# Patient Record
Sex: MALE | Race: WHITE | Employment: OTHER | URBAN - METROPOLITAN AREA
[De-identification: names, ages, dates, MRNs, and addresses within clinical notes are randomized per-mention and may not be internally consistent; named-entity substitution may affect disease eponyms.]

---

## 2017-01-09 ENCOUNTER — HOSPITAL ENCOUNTER (EMERGENCY)
Facility: HOSPITAL | Age: 72
Discharge: HOME | End: 2017-01-09
Attending: EMERGENCY MEDICINE
Payer: MEDICARE

## 2017-01-09 ENCOUNTER — TELEPHONE (OUTPATIENT)
Dept: FAMILY MEDICINE CLINIC | Facility: CLINIC | Age: 72
End: 2017-01-09

## 2017-01-09 ENCOUNTER — PRIOR ORIGINAL RECORDS (OUTPATIENT)
Dept: OTHER | Age: 72
End: 2017-01-09

## 2017-01-09 ENCOUNTER — APPOINTMENT (OUTPATIENT)
Dept: GENERAL RADIOLOGY | Facility: HOSPITAL | Age: 72
End: 2017-01-09
Attending: EMERGENCY MEDICINE
Payer: MEDICARE

## 2017-01-09 VITALS
TEMPERATURE: 99 F | HEIGHT: 72 IN | HEART RATE: 85 BPM | WEIGHT: 190 LBS | DIASTOLIC BLOOD PRESSURE: 89 MMHG | SYSTOLIC BLOOD PRESSURE: 129 MMHG | BODY MASS INDEX: 25.73 KG/M2 | RESPIRATION RATE: 28 BRPM | OXYGEN SATURATION: 98 %

## 2017-01-09 DIAGNOSIS — D69.6 THROMBOCYTOPENIA (HCC): ICD-10-CM

## 2017-01-09 DIAGNOSIS — I48.91 ATRIAL FIBRILLATION WITH RAPID VENTRICULAR RESPONSE (HCC): Primary | ICD-10-CM

## 2017-01-09 LAB
ALBUMIN SERPL-MCNC: 3.5 G/DL (ref 3.5–4.8)
ALP LIVER SERPL-CCNC: 57 U/L (ref 45–117)
ALT SERPL-CCNC: 34 U/L (ref 17–63)
AST SERPL-CCNC: 36 U/L (ref 15–41)
ATRIAL RATE: 102 BPM
BASOPHILS # BLD AUTO: 0.02 X10(3) UL (ref 0–0.1)
BASOPHILS NFR BLD AUTO: 0.5 %
BILIRUB SERPL-MCNC: 0.8 MG/DL (ref 0.1–2)
BUN BLD-MCNC: 21 MG/DL (ref 8–20)
CALCIUM BLD-MCNC: 8.3 MG/DL (ref 8.3–10.3)
CHLORIDE: 110 MMOL/L (ref 101–111)
CO2: 24 MMOL/L (ref 22–32)
CREAT BLD-MCNC: 1.02 MG/DL (ref 0.7–1.3)
EOSINOPHIL # BLD AUTO: 0.09 X10(3) UL (ref 0–0.3)
EOSINOPHIL NFR BLD AUTO: 2.3 %
ERYTHROCYTE [DISTWIDTH] IN BLOOD BY AUTOMATED COUNT: 13.1 % (ref 11.5–16)
GLUCOSE BLD-MCNC: 91 MG/DL (ref 70–99)
HCT VFR BLD AUTO: 40.8 % (ref 37–53)
HGB BLD-MCNC: 13.6 G/DL (ref 13–17)
IMMATURE GRANULOCYTE COUNT: 0.01 X10(3) UL (ref 0–1)
IMMATURE GRANULOCYTE RATIO %: 0.3 %
LYMPHOCYTES # BLD AUTO: 0.98 X10(3) UL (ref 0.9–4)
LYMPHOCYTES NFR BLD AUTO: 24.6 %
M PROTEIN MFR SERPL ELPH: 7.4 G/DL (ref 6.1–8.3)
MCH RBC QN AUTO: 32 PG (ref 27–33.2)
MCHC RBC AUTO-ENTMCNC: 33.3 G/DL (ref 31–37)
MCV RBC AUTO: 96 FL (ref 80–99)
MONOCYTES # BLD AUTO: 0.49 X10(3) UL (ref 0.1–0.6)
MONOCYTES NFR BLD AUTO: 12.3 %
NEUTROPHIL ABS PRELIM: 2.4 X10 (3) UL (ref 1.3–6.7)
NEUTROPHILS # BLD AUTO: 2.4 X10(3) UL (ref 1.3–6.7)
NEUTROPHILS NFR BLD AUTO: 60 %
PLATELET # BLD AUTO: 61 10(3)UL (ref 150–450)
POTASSIUM SERPL-SCNC: 5.1 MMOL/L (ref 3.6–5.1)
PRO-BETA NATRIURETIC PEPTIDE: 1400 PG/ML (ref ?–125)
Q-T INTERVAL: 356 MS
QRS DURATION: 102 MS
QTC CALCULATION (BEZET): 479 MS
R AXIS: 0 DEGREES
RBC # BLD AUTO: 4.25 X10(6)UL (ref 3.8–5.8)
RED CELL DISTRIBUTION WIDTH-SD: 45.6 FL (ref 35.1–46.3)
SODIUM SERPL-SCNC: 139 MMOL/L (ref 136–144)
T AXIS: -5 DEGREES
TROPONIN: <0.046 NG/ML (ref ?–0.05)
TSI SER-ACNC: 1.63 MIU/ML (ref 0.35–5.5)
VENTRICULAR RATE: 109 BPM
WBC # BLD AUTO: 4 X10(3) UL (ref 4–13)

## 2017-01-09 PROCEDURE — 84132 ASSAY OF SERUM POTASSIUM: CPT | Performed by: EMERGENCY MEDICINE

## 2017-01-09 PROCEDURE — 93005 ELECTROCARDIOGRAM TRACING: CPT

## 2017-01-09 PROCEDURE — 85025 COMPLETE CBC W/AUTO DIFF WBC: CPT | Performed by: EMERGENCY MEDICINE

## 2017-01-09 PROCEDURE — 84450 TRANSFERASE (AST) (SGOT): CPT | Performed by: EMERGENCY MEDICINE

## 2017-01-09 PROCEDURE — 99285 EMERGENCY DEPT VISIT HI MDM: CPT

## 2017-01-09 PROCEDURE — 96374 THER/PROPH/DIAG INJ IV PUSH: CPT

## 2017-01-09 PROCEDURE — 84443 ASSAY THYROID STIM HORMONE: CPT | Performed by: EMERGENCY MEDICINE

## 2017-01-09 PROCEDURE — 83880 ASSAY OF NATRIURETIC PEPTIDE: CPT | Performed by: EMERGENCY MEDICINE

## 2017-01-09 PROCEDURE — 84484 ASSAY OF TROPONIN QUANT: CPT | Performed by: EMERGENCY MEDICINE

## 2017-01-09 PROCEDURE — 71010 XR CHEST AP PORTABLE  (CPT=71010): CPT

## 2017-01-09 PROCEDURE — 80053 COMPREHEN METABOLIC PANEL: CPT | Performed by: EMERGENCY MEDICINE

## 2017-01-09 PROCEDURE — 93010 ELECTROCARDIOGRAM REPORT: CPT

## 2017-01-09 RX ORDER — METOPROLOL SUCCINATE 25 MG/1
25 TABLET, EXTENDED RELEASE ORAL DAILY
Qty: 30 TABLET | Refills: 0 | Status: SHIPPED | OUTPATIENT
Start: 2017-01-09 | End: 2017-05-01

## 2017-01-09 RX ORDER — DILTIAZEM HYDROCHLORIDE 5 MG/ML
10 INJECTION INTRAVENOUS ONCE
Status: COMPLETED | OUTPATIENT
Start: 2017-01-09 | End: 2017-01-09

## 2017-01-09 NOTE — ED INITIAL ASSESSMENT (HPI)
Patient to ED via EMS. Reports he was to have an endoscopy today, he was noted to be in a-fib, no hx of the same. Patient denies any complaints. No chest pain, dizziness, SOB .

## 2017-01-09 NOTE — TELEPHONE ENCOUNTER
Dr Tilton Rubinstein called to inform Dr Quan Reyna that Pt is being sent from [facility] Endoscopy to ER for onset Atrial Fibulation. Call him directly w/any questions

## 2017-01-09 NOTE — ED NOTES
D/c instructions and scripts given to pt, no distress, denies wheelchair out of  ED, thanks staff for care.

## 2017-01-09 NOTE — ED PROVIDER NOTES
Patient Seen in: [facility] Emergency Department    History   Patient presents with:  Arrythmia/Palpitations (cardiovascular)    Stated Complaint: new onset a-fib    HPI    70-year-old male complaining of atrial fibrillation.   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 Constitutional and vital signs reviewed.       All other systems Abnormality         Status                     ---------                               -----------         ------                     CBC W/ DIFFERENTIAL[[id number]]          Abnormal            Final result                 Please view results for these norris times daily. , Normal, Disp-60 capsule, R-0

## 2017-01-11 ENCOUNTER — PRIOR ORIGINAL RECORDS (OUTPATIENT)
Dept: OTHER | Age: 72
End: 2017-01-11

## 2017-01-12 LAB
ALBUMIN: 3.5 G/DL
ALKALINE PHOSPHATATE(ALK PHOS): 57 IU/L
BILIRUBIN TOTAL: 0.8 MG/DL
BUN: 21 MG/DL
CALCIUM: 8.3 MG/DL
CHLORIDE: 110 MEQ/L
CREATININE, SERUM: 1.02 MG/DL
GLUCOSE: 91 MG/DL
HEMATOCRIT: 40.8 %
HEMOGLOBIN: 13.6 G/DL
PLATELETS: 61 K/UL
PROTEIN, TOTAL: 7.4 G/DL
RED BLOOD COUNT: 4.25 X 10-6/U
SGOT (AST): 36 IU/L
SGPT (ALT): 34 IU/L
SODIUM: 139 MEQ/L
THYROID STIMULATING HORMONE: 1.63 MLU/L
WHITE BLOOD COUNT: 4 X 10-3/U

## 2017-01-13 ENCOUNTER — HOSPITAL ENCOUNTER (OUTPATIENT)
Dept: CV DIAGNOSTICS | Facility: HOSPITAL | Age: 72
Discharge: HOME | End: 2017-01-13
Attending: INTERNAL MEDICINE
Payer: MEDICARE

## 2017-01-13 DIAGNOSIS — I48.91 A-FIB (HCC): ICD-10-CM

## 2017-01-13 PROCEDURE — 93017 CV STRESS TEST TRACING ONLY: CPT

## 2017-01-13 PROCEDURE — 78452 HT MUSCLE IMAGE SPECT MULT: CPT | Performed by: INTERNAL MEDICINE

## 2017-01-13 PROCEDURE — 78452 HT MUSCLE IMAGE SPECT MULT: CPT

## 2017-01-13 PROCEDURE — 93018 CV STRESS TEST I&R ONLY: CPT | Performed by: INTERNAL MEDICINE

## 2017-01-16 ENCOUNTER — HOSPITAL ENCOUNTER (OUTPATIENT)
Dept: CV DIAGNOSTICS | Facility: HOSPITAL | Age: 72
Discharge: HOME | End: 2017-01-16
Attending: INTERNAL MEDICINE
Payer: MEDICARE

## 2017-01-16 DIAGNOSIS — I48.91 A-FIB (HCC): ICD-10-CM

## 2017-01-16 PROCEDURE — 93225 XTRNL ECG REC<48 HRS REC: CPT

## 2017-01-16 PROCEDURE — 93227 XTRNL ECG REC<48 HR R&I: CPT | Performed by: INTERNAL MEDICINE

## 2017-01-16 PROCEDURE — 93226 XTRNL ECG REC<48 HR SCAN A/R: CPT

## 2017-01-18 ENCOUNTER — PRIOR ORIGINAL RECORDS (OUTPATIENT)
Dept: OTHER | Age: 72
End: 2017-01-18

## 2017-01-22 DIAGNOSIS — I10 BENIGN ESSENTIAL HYPERTENSION: Primary | ICD-10-CM

## 2017-01-23 RX ORDER — BENAZEPRIL HYDROCHLORIDE 40 MG/1
TABLET, FILM COATED ORAL
Qty: 90 TABLET | Refills: 0 | Status: SHIPPED | OUTPATIENT
Start: 2017-01-23 | End: 2017-03-21

## 2017-01-23 NOTE — TELEPHONE ENCOUNTER
Medication refilled per protocol.     LOV 7/28/2016    Future Appointments  Date Time Provider Chrissie Aden   1/27/2017 9:30 AM [facility] CARD ECHO RM 2 ECARD ECHO Daisy Go   3/30/2017 11:30 AM Serjio Liao MD [address]   7/10/2017 10:00

## 2017-01-26 DIAGNOSIS — D69.6 THROMBOCYTOPENIA (HCC): Primary | ICD-10-CM

## 2017-01-27 ENCOUNTER — MYAURORA ACCOUNT LINK (OUTPATIENT)
Dept: OTHER | Age: 72
End: 2017-01-27

## 2017-01-27 ENCOUNTER — HOSPITAL ENCOUNTER (OUTPATIENT)
Dept: CV DIAGNOSTICS | Facility: HOSPITAL | Age: 72
Discharge: HOME | End: 2017-01-27
Attending: INTERNAL MEDICINE

## 2017-01-27 DIAGNOSIS — I48.91 ATRIAL FIBRILLATION, UNSPECIFIED TYPE (HCC): ICD-10-CM

## 2017-01-27 DIAGNOSIS — I10 BENIGN HYPERTENSION: ICD-10-CM

## 2017-02-01 ENCOUNTER — LAB ENCOUNTER (OUTPATIENT)
Dept: LAB | Age: 72
End: 2017-02-01
Attending: INTERNAL MEDICINE
Payer: MEDICARE

## 2017-02-01 DIAGNOSIS — D69.6 THROMBOCYTOPENIA (HCC): ICD-10-CM

## 2017-02-01 LAB
BASOPHILS # BLD AUTO: 0.02 X10(3) UL (ref 0–0.1)
BASOPHILS NFR BLD AUTO: 0.4 %
EOSINOPHIL # BLD AUTO: 0.07 X10(3) UL (ref 0–0.3)
EOSINOPHIL NFR BLD AUTO: 1.6 %
ERYTHROCYTE [DISTWIDTH] IN BLOOD BY AUTOMATED COUNT: 13.1 % (ref 11.5–16)
HCT VFR BLD AUTO: 41.4 % (ref 37–53)
HGB BLD-MCNC: 13.6 G/DL (ref 13–17)
IMMATURE GRANULOCYTE COUNT: 0.02 X10(3) UL (ref 0–1)
IMMATURE GRANULOCYTE RATIO %: 0.4 %
LYMPHOCYTES # BLD AUTO: 1.19 X10(3) UL (ref 0.9–4)
LYMPHOCYTES NFR BLD AUTO: 26.7 %
MCH RBC QN AUTO: 32.5 PG (ref 27–33.2)
MCHC RBC AUTO-ENTMCNC: 32.9 G/DL (ref 31–37)
MCV RBC AUTO: 99 FL (ref 80–99)
MONOCYTES # BLD AUTO: 0.74 X10(3) UL (ref 0.1–0.6)
MONOCYTES NFR BLD AUTO: 16.6 %
NEUTROPHIL ABS PRELIM: 2.41 X10 (3) UL (ref 1.3–6.7)
NEUTROPHILS # BLD AUTO: 2.41 X10(3) UL (ref 1.3–6.7)
NEUTROPHILS NFR BLD AUTO: 54.3 %
PLATELET # BLD AUTO: 70 10(3)UL (ref 150–450)
RBC # BLD AUTO: 4.18 X10(6)UL (ref 3.8–5.8)
RED CELL DISTRIBUTION WIDTH-SD: 47.1 FL (ref 35.1–46.3)
WBC # BLD AUTO: 4.5 X10(3) UL (ref 4–13)

## 2017-02-01 PROCEDURE — 85025 COMPLETE CBC W/AUTO DIFF WBC: CPT

## 2017-02-06 ENCOUNTER — PRIOR ORIGINAL RECORDS (OUTPATIENT)
Dept: OTHER | Age: 72
End: 2017-02-06

## 2017-02-07 ENCOUNTER — PRIOR ORIGINAL RECORDS (OUTPATIENT)
Dept: OTHER | Age: 72
End: 2017-02-07

## 2017-02-10 RX ORDER — PREDNISONE 1 MG/1
TABLET ORAL
Qty: 100 TABLET | Refills: 0 | Status: SHIPPED | OUTPATIENT
Start: 2017-02-10 | End: 2017-05-01

## 2017-02-10 NOTE — TELEPHONE ENCOUNTER
Future Appointments  Date Time Provider Chrissie Aden   6/14/0850 50:98 AM Nano Nazario MD [facility] Nya Steinberg

## 2017-02-14 ENCOUNTER — PRIOR ORIGINAL RECORDS (OUTPATIENT)
Dept: OTHER | Age: 72
End: 2017-02-14

## 2017-02-20 ENCOUNTER — HOSPITAL ENCOUNTER (OUTPATIENT)
Dept: GENERAL RADIOLOGY | Age: 72
Discharge: HOME | End: 2017-02-20
Attending: INTERNAL MEDICINE
Payer: MEDICARE

## 2017-02-20 DIAGNOSIS — I10 HTN (HYPERTENSION): ICD-10-CM

## 2017-02-20 DIAGNOSIS — R94.39 ABNORMAL NUCLEAR STRESS TEST: ICD-10-CM

## 2017-02-20 PROCEDURE — 71020 XR CHEST PA + LAT CHEST (CPT=71020): CPT

## 2017-02-21 ENCOUNTER — LAB ENCOUNTER (OUTPATIENT)
Dept: LAB | Age: 72
End: 2017-02-21
Attending: INTERNAL MEDICINE
Payer: MEDICARE

## 2017-02-21 ENCOUNTER — PRIOR ORIGINAL RECORDS (OUTPATIENT)
Dept: OTHER | Age: 72
End: 2017-02-21

## 2017-02-21 DIAGNOSIS — I10 ESSENTIAL HYPERTENSION, MALIGNANT: Primary | ICD-10-CM

## 2017-02-21 LAB
BASOPHILS # BLD AUTO: 0.02 X10(3) UL (ref 0–0.1)
BASOPHILS NFR BLD AUTO: 0.5 %
BUN BLD-MCNC: 22 MG/DL (ref 8–20)
CALCIUM BLD-MCNC: 8.8 MG/DL (ref 8.3–10.3)
CHLORIDE: 106 MMOL/L (ref 101–111)
CHOLEST SMN-MCNC: 126 MG/DL (ref ?–200)
CO2: 29 MMOL/L (ref 22–32)
CREAT BLD-MCNC: 1.12 MG/DL (ref 0.7–1.3)
EOSINOPHIL # BLD AUTO: 0.09 X10(3) UL (ref 0–0.3)
EOSINOPHIL NFR BLD AUTO: 2.3 %
ERYTHROCYTE [DISTWIDTH] IN BLOOD BY AUTOMATED COUNT: 13.8 % (ref 11.5–16)
GLUCOSE BLD-MCNC: 87 MG/DL (ref 70–99)
HCT VFR BLD AUTO: 44.9 % (ref 37–53)
HDLC SERPL-MCNC: 28 MG/DL (ref 45–?)
HDLC SERPL: 4.5 {RATIO} (ref ?–4.97)
HGB BLD-MCNC: 13.9 G/DL (ref 13–17)
IMMATURE GRANULOCYTE COUNT: 0.02 X10(3) UL (ref 0–1)
IMMATURE GRANULOCYTE RATIO %: 0.5 %
LDLC SERPL CALC-MCNC: 82 MG/DL (ref ?–130)
LYMPHOCYTES # BLD AUTO: 0.96 X10(3) UL (ref 0.9–4)
LYMPHOCYTES NFR BLD AUTO: 24.2 %
MCH RBC QN AUTO: 31.2 PG (ref 27–33.2)
MCHC RBC AUTO-ENTMCNC: 31 G/DL (ref 31–37)
MCV RBC AUTO: 100.7 FL (ref 80–99)
MONOCYTES # BLD AUTO: 0.51 X10(3) UL (ref 0.1–0.6)
MONOCYTES NFR BLD AUTO: 12.8 %
NEUTROPHIL ABS PRELIM: 2.37 X10 (3) UL (ref 1.3–6.7)
NEUTROPHILS # BLD AUTO: 2.37 X10(3) UL (ref 1.3–6.7)
NEUTROPHILS NFR BLD AUTO: 59.7 %
NONHDLC SERPL-MCNC: 98 MG/DL (ref ?–130)
PLATELET # BLD AUTO: 52 10(3)UL (ref 150–450)
POTASSIUM SERPL-SCNC: 4.7 MMOL/L (ref 3.6–5.1)
RBC # BLD AUTO: 4.46 X10(6)UL (ref 3.8–5.8)
RED CELL DISTRIBUTION WIDTH-SD: 50 FL (ref 35.1–46.3)
SODIUM SERPL-SCNC: 140 MMOL/L (ref 136–144)
TRIGLYCERIDES: 80 MG/DL (ref ?–150)
VLDL: 16 MG/DL (ref 5–40)
WBC # BLD AUTO: 4 X10(3) UL (ref 4–13)

## 2017-02-21 PROCEDURE — 85025 COMPLETE CBC W/AUTO DIFF WBC: CPT

## 2017-02-21 PROCEDURE — 80048 BASIC METABOLIC PNL TOTAL CA: CPT

## 2017-02-21 PROCEDURE — 80061 LIPID PANEL: CPT

## 2017-02-21 PROCEDURE — 36415 COLL VENOUS BLD VENIPUNCTURE: CPT

## 2017-02-22 ENCOUNTER — PRIOR ORIGINAL RECORDS (OUTPATIENT)
Dept: OTHER | Age: 72
End: 2017-02-22

## 2017-02-22 ENCOUNTER — OFFICE VISIT (OUTPATIENT)
Dept: FAMILY MEDICINE CLINIC | Facility: CLINIC | Age: 72
End: 2017-02-22

## 2017-02-22 VITALS
WEIGHT: 194 LBS | HEIGHT: 70.5 IN | RESPIRATION RATE: 12 BRPM | DIASTOLIC BLOOD PRESSURE: 70 MMHG | HEART RATE: 84 BPM | SYSTOLIC BLOOD PRESSURE: 120 MMHG | BODY MASS INDEX: 27.46 KG/M2 | TEMPERATURE: 98 F

## 2017-02-22 DIAGNOSIS — I48.20 CHRONIC ATRIAL FIBRILLATION (HCC): ICD-10-CM

## 2017-02-22 DIAGNOSIS — D69.6 THROMBOCYTOPENIA (HCC): ICD-10-CM

## 2017-02-22 DIAGNOSIS — I77.810 AORTIC ROOT DILATION (HCC): ICD-10-CM

## 2017-02-22 DIAGNOSIS — E78.6 LOW HDL (UNDER 40): ICD-10-CM

## 2017-02-22 DIAGNOSIS — I10 BENIGN ESSENTIAL HYPERTENSION: Primary | ICD-10-CM

## 2017-02-22 DIAGNOSIS — K75.4: ICD-10-CM

## 2017-02-22 PROBLEM — I48.91 ATRIAL FIBRILLATION (HCC): Status: ACTIVE | Noted: 2017-02-22

## 2017-02-22 PROCEDURE — 99214 OFFICE O/P EST MOD 30 MIN: CPT | Performed by: FAMILY MEDICINE

## 2017-02-22 NOTE — PROGRESS NOTES
HPI:   Patient presents with: Follow - Up: Pt states he is just here for a 6month f/u   Cough: PT states has dry cough for a long time and it comes and goes. Micheal Hernandes is a 66year old male who presents for recheck of his hypertension.  He ha daily. Potassium Chloride ER 20 MEQ Oral Tab CR Take 1 tablet (20 mEq total) by mouth 3 (three) times daily. Fexofenadine HCl (ALLEGRA) 180 MG Oral Tab Take 180 mg by mouth as needed.    Methylcellulose, Laxative, (CITRUCEL) Oral Powder Take  by mouth d pain on exertion  GI: denies abdominal pain and denies heartburn  NEURO: denies headaches    EXAM:   /70 mmHg  Pulse 84  Temp(Src) 98.2 °F (36.8 °C)  Resp 12  Ht 70.5\"  Wt 194 lb  BMI 27.43 kg/m2 Body mass index is 27.43 kg/(m^2).    GENERAL: well de in remission         Current Assessment & Plan     Stable, Continue present management.               Other    Low HDL (under 40)    Thrombocytopenia (HCC)    Overview     Following with Dr Radha Olson at hematology                Return in about 6 months (arou

## 2017-02-22 NOTE — HISTORICAL OFFICE NOTE
Carol Lobe  : 1945  ACCOUNT:  [id number]  [phone]  PCP: Dr. 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 An ECG today demonstrates atrial fibrillation. Ventricular rates are controlled in the mid-80s. Most recent laboratory studies demonstrate normal renal function. Platelets are down as low as 60.     RISK FACT setting of thrombocytopenia. 2. Check 2-D echo with Doppler to evaluate LV structure and function in the setting of newly diagnosed atrial fibrillation. 3. It has been several years since his last stress test.  He is functional class 1.   He will be sched

## 2017-02-23 ENCOUNTER — TELEPHONE (OUTPATIENT)
Dept: FAMILY MEDICINE CLINIC | Facility: CLINIC | Age: 72
End: 2017-02-23

## 2017-02-23 NOTE — TELEPHONE ENCOUNTER
LMOM for pt to call back and schedule his Medicare Annual Well Visit w/Dr Mirta Faust due after 7/28/17

## 2017-02-23 NOTE — ASSESSMENT & PLAN NOTE
Stable, Continue present management.     Blood Pressure and Cardiac Medications          BENAZEPRIL HCL 40 MG Oral Tab    Metoprolol Succinate ER 25 MG Oral Tablet 24 Hr    AmLODIPine Besylate 5 MG Oral Tab

## 2017-02-24 LAB
BUN: 22 MG/DL
CALCIUM: 8.8 MG/DL
CHLORIDE: 106 MEQ/L
CHOLESTEROL, TOTAL: 126 MG/DL
CREATININE, SERUM: 1.12 MG/DL
GLUCOSE: 87 MG/DL
HDL CHOLESTEROL: 28 MG/DL
HEMATOCRIT: 44.9 %
HEMOGLOBIN: 13.9 G/DL
LDL CHOLESTEROL: 82 MG/DL
PLATELETS: 52 K/UL
POTASSIUM, SERUM: 4.7 MEQ/L
RED BLOOD COUNT: 4.46 X 10-6/U
SODIUM: 140 MEQ/L
TRIGLYCERIDES: 80 MG/DL
WHITE BLOOD COUNT: 4 X 10-3/U

## 2017-02-27 ENCOUNTER — HOSPITAL ENCOUNTER (OUTPATIENT)
Dept: INTERVENTIONAL RADIOLOGY/VASCULAR | Facility: HOSPITAL | Age: 72
Discharge: HOME | End: 2017-02-27
Attending: INTERNAL MEDICINE | Admitting: INTERNAL MEDICINE
Payer: MEDICARE

## 2017-02-27 VITALS
WEIGHT: 188 LBS | TEMPERATURE: 97 F | SYSTOLIC BLOOD PRESSURE: 131 MMHG | BODY MASS INDEX: 25.47 KG/M2 | OXYGEN SATURATION: 99 % | HEART RATE: 67 BPM | RESPIRATION RATE: 27 BRPM | DIASTOLIC BLOOD PRESSURE: 89 MMHG | HEIGHT: 72 IN

## 2017-02-27 DIAGNOSIS — R93.1 ABNORMAL NUCLEAR CARDIAC IMAGING TEST: ICD-10-CM

## 2017-02-27 PROCEDURE — 99152 MOD SED SAME PHYS/QHP 5/>YRS: CPT

## 2017-02-27 PROCEDURE — 4A023N7 MEASUREMENT OF CARDIAC SAMPLING AND PRESSURE, LEFT HEART, PERCUTANEOUS APPROACH: ICD-10-PCS | Performed by: INTERNAL MEDICINE

## 2017-02-27 PROCEDURE — 99153 MOD SED SAME PHYS/QHP EA: CPT

## 2017-02-27 PROCEDURE — 93458 L HRT ARTERY/VENTRICLE ANGIO: CPT

## 2017-02-27 PROCEDURE — B2111ZZ FLUOROSCOPY OF MULTIPLE CORONARY ARTERIES USING LOW OSMOLAR CONTRAST: ICD-10-PCS | Performed by: INTERNAL MEDICINE

## 2017-02-27 PROCEDURE — B2151ZZ FLUOROSCOPY OF LEFT HEART USING LOW OSMOLAR CONTRAST: ICD-10-PCS | Performed by: INTERNAL MEDICINE

## 2017-02-27 RX ORDER — SODIUM CHLORIDE 9 MG/ML
INJECTION, SOLUTION INTRAVENOUS CONTINUOUS
Status: DISCONTINUED | OUTPATIENT
Start: 2017-02-27 | End: 2017-02-27

## 2017-02-27 RX ORDER — HEPARIN SODIUM 5000 [USP'U]/ML
INJECTION, SOLUTION INTRAVENOUS; SUBCUTANEOUS
Status: COMPLETED
Start: 2017-02-27 | End: 2017-02-27

## 2017-02-27 RX ORDER — MIDAZOLAM HYDROCHLORIDE 1 MG/ML
INJECTION INTRAMUSCULAR; INTRAVENOUS
Status: COMPLETED
Start: 2017-02-27 | End: 2017-02-27

## 2017-02-27 RX ORDER — LIDOCAINE HYDROCHLORIDE 10 MG/ML
INJECTION, SOLUTION INFILTRATION; PERINEURAL
Status: COMPLETED
Start: 2017-02-27 | End: 2017-02-27

## 2017-02-27 RX ORDER — ASPIRIN 81 MG/1
324 TABLET, CHEWABLE ORAL ONCE
Status: DISCONTINUED | OUTPATIENT
Start: 2017-02-27 | End: 2017-02-27

## 2017-02-27 RX ADMIN — SODIUM CHLORIDE: 9 INJECTION, SOLUTION INTRAVENOUS at 09:15:00

## 2017-02-27 NOTE — PROCEDURES
[facility]    PATIENT'S NAME: Isela Mitchell   ATTENDING PHYSICIAN: Lenka Fernando M.D. OPERATING PHYSICIAN: Renu Jade M.D.    PATIENT ACCOUNT#:   [de-identified]    LOCATION:  [facility] EDWP 10  MEDICAL RECORD #:   [id number]       DATE OF BI significant stenosis identified. First diagonal branch is a moderate-sized vessel. There is approximately 30% to 40% stenosis in its proximal section. The circumflex coronary artery is a relatively large vessel.   It gives rise to 1 large obtuse marginal

## 2017-02-27 NOTE — PROGRESS NOTES
Patient is s/p C with Dr. Kg Cardenas; a/o x 4; hemodynamically stable/neurologically intact; denies pain; iv site removed intact; right groin site soft/stable with no bleeding/hematoma noted after ambulating in halls; patient verbalized understanding of

## 2017-02-27 NOTE — H&P
History & Physical Examination    Patient Name: Magno Zapata  MRN: [id number]  CSN: [id number]  YOB: 1945    Diagnosis: Afib, LV dysfunction w/ EF ~40%     Present Illness: Mr. Cass Wagner is a pleasant 75yom who follows outpatient with Dr. Elias Stockton at 0800   Omega-3 Fatty Acids (OMEGA 3 OR) None Entered Disp:  Rfl:  2/26/2017 at 0800   MULTI VITAMIN MENS OR None Entered Disp:  Rfl:  2/26/2017 at 0800   Fexofenadine HCl (ALLEGRA) 180 MG Oral Tab Take 180 mg by mouth as needed.  Disp:  Rfl:  Unknown at Kidney Disease Son         Smoking status: Former Smoker     Quit date: 07/28/1973    Smokeless tobacco: Never Used    Alcohol Use: Yes  4.8 - 6.0 oz/week    8-10 Standard drinks or equivalent per week            SYSTEM Check if Review is Normal Check if P

## 2017-03-07 ENCOUNTER — PRIOR ORIGINAL RECORDS (OUTPATIENT)
Dept: OTHER | Age: 72
End: 2017-03-07

## 2017-03-21 ENCOUNTER — LAB ENCOUNTER (OUTPATIENT)
Dept: LAB | Age: 72
End: 2017-03-21
Attending: INTERNAL MEDICINE
Payer: MEDICARE

## 2017-03-21 DIAGNOSIS — E87.6 HYPOKALEMIA: ICD-10-CM

## 2017-03-21 DIAGNOSIS — D69.6 THROMBOCYTOPENIA (HCC): ICD-10-CM

## 2017-03-21 LAB
BASOPHILS # BLD AUTO: 0.01 X10(3) UL (ref 0–0.1)
BASOPHILS NFR BLD AUTO: 0.2 %
EOSINOPHIL # BLD AUTO: 0.09 X10(3) UL (ref 0–0.3)
EOSINOPHIL NFR BLD AUTO: 2 %
ERYTHROCYTE [DISTWIDTH] IN BLOOD BY AUTOMATED COUNT: 13.7 % (ref 11.5–16)
HCT VFR BLD AUTO: 43.8 % (ref 37–53)
HGB BLD-MCNC: 13.8 G/DL (ref 13–17)
IMMATURE GRANULOCYTE COUNT: 0.02 X10(3) UL (ref 0–1)
IMMATURE GRANULOCYTE RATIO %: 0.5 %
LYMPHOCYTES # BLD AUTO: 0.9 X10(3) UL (ref 0.9–4)
LYMPHOCYTES NFR BLD AUTO: 20.4 %
MCH RBC QN AUTO: 31.2 PG (ref 27–33.2)
MCHC RBC AUTO-ENTMCNC: 31.5 G/DL (ref 31–37)
MCV RBC AUTO: 98.9 FL (ref 80–99)
MONOCYTES # BLD AUTO: 0.64 X10(3) UL (ref 0.1–0.6)
MONOCYTES NFR BLD AUTO: 14.5 %
NEUTROPHIL ABS PRELIM: 2.75 X10 (3) UL (ref 1.3–6.7)
NEUTROPHILS # BLD AUTO: 2.75 X10(3) UL (ref 1.3–6.7)
NEUTROPHILS NFR BLD AUTO: 62.4 %
PLATELET # BLD AUTO: 33 10(3)UL (ref 150–450)
RBC # BLD AUTO: 4.43 X10(6)UL (ref 3.8–5.8)
RED CELL DISTRIBUTION WIDTH-SD: 49.3 FL (ref 35.1–46.3)
WBC # BLD AUTO: 4.4 X10(3) UL (ref 4–13)

## 2017-03-21 PROCEDURE — 85025 COMPLETE CBC W/AUTO DIFF WBC: CPT

## 2017-03-21 PROCEDURE — 36415 COLL VENOUS BLD VENIPUNCTURE: CPT

## 2017-03-22 ENCOUNTER — PRIOR ORIGINAL RECORDS (OUTPATIENT)
Dept: OTHER | Age: 72
End: 2017-03-22

## 2017-03-22 RX ORDER — BENAZEPRIL HYDROCHLORIDE 40 MG/1
TABLET, FILM COATED ORAL
Qty: 90 TABLET | Refills: 0 | Status: SHIPPED | OUTPATIENT
Start: 2017-03-22 | End: 2017-11-27 | Stop reason: ALTCHOICE

## 2017-03-29 ENCOUNTER — TELEPHONE (OUTPATIENT)
Dept: HEMATOLOGY/ONCOLOGY | Facility: HOSPITAL | Age: 72
End: 2017-03-29

## 2017-03-29 ENCOUNTER — LAB ENCOUNTER (OUTPATIENT)
Dept: LAB | Age: 72
End: 2017-03-29
Attending: INTERNAL MEDICINE
Payer: MEDICARE

## 2017-03-29 ENCOUNTER — PRIOR ORIGINAL RECORDS (OUTPATIENT)
Dept: OTHER | Age: 72
End: 2017-03-29

## 2017-03-29 DIAGNOSIS — I25.10 CORONARY ATHEROSCLEROSIS OF NATIVE CORONARY ARTERY: Primary | ICD-10-CM

## 2017-03-29 DIAGNOSIS — D69.6 THROMBOCYTOPENIA (HCC): Primary | ICD-10-CM

## 2017-03-29 DIAGNOSIS — D69.6 THROMBOCYTOPENIA (HCC): ICD-10-CM

## 2017-03-29 LAB
BASOPHILS # BLD AUTO: 0.02 X10(3) UL (ref 0–0.1)
BASOPHILS NFR BLD AUTO: 0.4 %
BUN BLD-MCNC: 21 MG/DL (ref 8–20)
CREAT BLD-MCNC: 1.22 MG/DL (ref 0.7–1.3)
EOSINOPHIL # BLD AUTO: 0.08 X10(3) UL (ref 0–0.3)
EOSINOPHIL NFR BLD AUTO: 1.8 %
ERYTHROCYTE [DISTWIDTH] IN BLOOD BY AUTOMATED COUNT: 13.6 % (ref 11.5–16)
HCT VFR BLD AUTO: 44.7 % (ref 37–53)
HGB BLD-MCNC: 14.3 G/DL (ref 13–17)
IMMATURE GRANULOCYTE COUNT: 0.02 X10(3) UL (ref 0–1)
IMMATURE GRANULOCYTE RATIO %: 0.4 %
LYMPHOCYTES # BLD AUTO: 1.18 X10(3) UL (ref 0.9–4)
LYMPHOCYTES NFR BLD AUTO: 26 %
MCH RBC QN AUTO: 31.6 PG (ref 27–33.2)
MCHC RBC AUTO-ENTMCNC: 32 G/DL (ref 31–37)
MCV RBC AUTO: 98.9 FL (ref 80–99)
MONOCYTES # BLD AUTO: 0.62 X10(3) UL (ref 0.1–0.6)
MONOCYTES NFR BLD AUTO: 13.7 %
NEUTROPHIL ABS PRELIM: 2.61 X10 (3) UL (ref 1.3–6.7)
NEUTROPHILS # BLD AUTO: 2.61 X10(3) UL (ref 1.3–6.7)
NEUTROPHILS NFR BLD AUTO: 57.7 %
PLATELET # BLD AUTO: 5 10(3)UL (ref 150–450)
RBC # BLD AUTO: 4.52 X10(6)UL (ref 3.8–5.8)
RED CELL DISTRIBUTION WIDTH-SD: 48.6 FL (ref 35.1–46.3)
WBC # BLD AUTO: 4.5 X10(3) UL (ref 4–13)

## 2017-03-29 PROCEDURE — 80053 COMPREHEN METABOLIC PANEL: CPT | Performed by: INTERNAL MEDICINE

## 2017-03-29 PROCEDURE — 83615 LACTATE (LD) (LDH) ENZYME: CPT | Performed by: INTERNAL MEDICINE

## 2017-03-29 PROCEDURE — 36415 COLL VENOUS BLD VENIPUNCTURE: CPT

## 2017-03-29 PROCEDURE — 85025 COMPLETE CBC W/AUTO DIFF WBC: CPT

## 2017-03-29 PROCEDURE — 82565 ASSAY OF CREATININE: CPT

## 2017-03-29 PROCEDURE — 84520 ASSAY OF UREA NITROGEN: CPT

## 2017-03-30 ENCOUNTER — OFFICE VISIT (OUTPATIENT)
Dept: HEMATOLOGY/ONCOLOGY | Facility: HOSPITAL | Age: 72
End: 2017-03-30
Attending: INTERNAL MEDICINE
Payer: MEDICARE

## 2017-03-30 ENCOUNTER — OFFICE VISIT (OUTPATIENT)
Dept: RHEUMATOLOGY | Facility: CLINIC | Age: 72
End: 2017-03-30

## 2017-03-30 VITALS
WEIGHT: 191.81 LBS | BODY MASS INDEX: 26 KG/M2 | RESPIRATION RATE: 16 BRPM | HEART RATE: 76 BPM | SYSTOLIC BLOOD PRESSURE: 136 MMHG | DIASTOLIC BLOOD PRESSURE: 86 MMHG

## 2017-03-30 VITALS
SYSTOLIC BLOOD PRESSURE: 132 MMHG | HEART RATE: 68 BPM | OXYGEN SATURATION: 97 % | DIASTOLIC BLOOD PRESSURE: 81 MMHG | WEIGHT: 191.5 LBS | HEIGHT: 72.01 IN | RESPIRATION RATE: 18 BRPM | BODY MASS INDEX: 25.94 KG/M2 | TEMPERATURE: 97 F

## 2017-03-30 DIAGNOSIS — R16.1 SPLENOMEGALY: ICD-10-CM

## 2017-03-30 DIAGNOSIS — D69.6 THROMBOCYTOPENIA (HCC): Primary | ICD-10-CM

## 2017-03-30 DIAGNOSIS — K75.4: ICD-10-CM

## 2017-03-30 DIAGNOSIS — M32.19 OTHER SYSTEMIC LUPUS ERYTHEMATOSUS WITH OTHER ORGAN INVOLVEMENT (HCC): Primary | ICD-10-CM

## 2017-03-30 DIAGNOSIS — M33.20 POLYMYOSITIS (HCC): Chronic | ICD-10-CM

## 2017-03-30 DIAGNOSIS — M32.19 OTHER SYSTEMIC LUPUS ERYTHEMATOSUS WITH OTHER ORGAN INVOLVEMENT (HCC): ICD-10-CM

## 2017-03-30 DIAGNOSIS — D69.6 THROMBOCYTOPENIA (HCC): ICD-10-CM

## 2017-03-30 PROBLEM — M32.9 LUPUS (SYSTEMIC LUPUS ERYTHEMATOSUS) (HCC): Status: ACTIVE | Noted: 2017-03-30

## 2017-03-30 PROCEDURE — 99214 OFFICE O/P EST MOD 30 MIN: CPT | Performed by: INTERNAL MEDICINE

## 2017-03-30 PROCEDURE — 99215 OFFICE O/P EST HI 40 MIN: CPT | Performed by: INTERNAL MEDICINE

## 2017-03-30 RX ORDER — PREDNISONE 20 MG/1
20 TABLET ORAL DAILY
Qty: 180 TABLET | Refills: 1 | Status: SHIPPED | OUTPATIENT
Start: 2017-03-30 | End: 2017-06-28

## 2017-03-30 RX ORDER — METOPROLOL SUCCINATE 50 MG/1
TABLET, EXTENDED RELEASE ORAL
Refills: 11 | COMMUNITY
Start: 2017-03-07 | End: 2017-05-01

## 2017-03-30 NOTE — TELEPHONE ENCOUNTER
Called patient with CBC results- platelets 5. No bleeding but has noticed bruising ans feels his lupus may be rearing its ugly head. He will take 20 mg of prednisone tonight and also hold his pradaxa for now.  Signs and symptoms to watch out for were discus

## 2017-03-30 NOTE — PROGRESS NOTES
[facility] Progress Note    Patient Name: Faustino Harper   YOB: 1945   Medical Record Number: [id number]   CSN: [id number]   Attending Physician: Napoleon Espinal M.D.    Referring Physician: MD Dr. Soha Flores Dr. tablet, Rfl: 0  •  OMEPRAZOLE 20 MG Oral Capsule Delayed Release, TAKE 1 CAPSULE BY MOUTH TWICE DAILY, Disp: 180 capsule, Rfl: 3  •  AmLODIPine Besylate 5 MG Oral Tab, Take 1 tablet (5 mg total) by mouth once daily. , Disp: 90 tablet, Rfl: 3  •  spironolact surgery polypectomy    TONSILLECTOMY  1948    COLONOSCOPY WITH BIOPSY  5/14/13    COLONOSCOPY  5/14/2013    SKIN SURGERY      Comment MMS BCC R temple 6/24/09    SKIN SURGERY  2007    Comment basal ceell carcinoma    SKIN SURGERY  7-18-12    Comment BCC-no mainly in extremities. Psych:  Mood and affect appropriate  HEENT: EOMs intact. PERRL. Oropharynx is clear. Neck: No JVD. No palpable lymphadenopathy. Neck is supple. Lymphatics: There is no palpable lymphadenopathy   Chest: Clear to auscultation.   Hea Monday. I spent 60 minutes face to face with the patient. More than 50% of that time was spent counseling the patient and/or on coordination of care.        Emotional Well Being:  I have assessed the patient's emotional well-being and any concerns abou

## 2017-03-30 NOTE — PROGRESS NOTES
Education Record    Learner:  Patient    Disease / Diagnosis:  Thrombocytopenia    Barriers / Limitations:  None   Comments:    Method:  Brief focused and Reinforcement   Comments:    General Topics:  Plan of care reviewed   Comments:    Outcome:  Shows und

## 2017-03-30 NOTE — PROGRESS NOTES
[facility]  Dr. Anton Arcos Progress Note     Subjective: Joshua Em is a(n) 74year old male. Current complaints: Patient presents with: Follow - Up: Polymyositis, thrombocytopenia.  Pt states 'lupus is killing platelets, this morning platelet fashion. While on such a high dose of prednisone I would expect the joint pain to go away. Follow-up with Dr. Annita Mendieta the hematologist as she is scheduled for reevaluation of your thrombocytopenia.   Continue see Dr. Sadaf Napoles of [facility],  your h

## 2017-03-30 NOTE — PATIENT INSTRUCTIONS
Current plan is to take the prednisone at 60 mg a day for treatment of the immune thrombocytopenia. Hopefully this will increase the platelet count in rapid fashion. While on such a high dose of prednisone I would expect the joint pain to go away.   Soraida Watkins

## 2017-03-31 ENCOUNTER — HOSPITAL ENCOUNTER (OUTPATIENT)
Dept: CT IMAGING | Facility: HOSPITAL | Age: 72
Discharge: HOME | End: 2017-03-31
Attending: INTERNAL MEDICINE
Payer: MEDICARE

## 2017-03-31 DIAGNOSIS — I71.9 AORTIC ANEURYSM (HCC): ICD-10-CM

## 2017-03-31 PROCEDURE — 71275 CT ANGIOGRAPHY CHEST: CPT

## 2017-04-03 ENCOUNTER — NURSE ONLY (OUTPATIENT)
Dept: HEMATOLOGY/ONCOLOGY | Facility: HOSPITAL | Age: 72
End: 2017-04-03
Attending: INTERNAL MEDICINE
Payer: MEDICARE

## 2017-04-03 VITALS
HEIGHT: 72.01 IN | DIASTOLIC BLOOD PRESSURE: 72 MMHG | BODY MASS INDEX: 26.36 KG/M2 | HEART RATE: 54 BPM | TEMPERATURE: 98 F | WEIGHT: 194.63 LBS | OXYGEN SATURATION: 98 % | RESPIRATION RATE: 18 BRPM | SYSTOLIC BLOOD PRESSURE: 131 MMHG

## 2017-04-03 DIAGNOSIS — D69.6 THROMBOCYTOPENIA (HCC): ICD-10-CM

## 2017-04-03 DIAGNOSIS — D69.3 IMMUNE THROMBOCYTOPENIA (HCC): ICD-10-CM

## 2017-04-03 LAB
BUN: 21 MG/DL
CREATININE, SERUM: 1.22 MG/DL

## 2017-04-03 PROCEDURE — 99215 OFFICE O/P EST HI 40 MIN: CPT | Performed by: INTERNAL MEDICINE

## 2017-04-03 PROCEDURE — 85025 COMPLETE CBC W/AUTO DIFF WBC: CPT

## 2017-04-03 PROCEDURE — 36415 COLL VENOUS BLD VENIPUNCTURE: CPT

## 2017-04-03 NOTE — PROGRESS NOTES
[facility] Progress Note    Patient Name: Bong Benedict   YOB: 1945   Medical Record Number: [id number]   CSN: [id number]   Attending Physician: Christin Akers M.D.    Referring Physician: MD Dr. Geovani Mane Dr. TWICE DAILY, Disp: 180 capsule, Rfl: 3  •  AmLODIPine Besylate 5 MG Oral Tab, Take 1 tablet (5 mg total) by mouth once daily. , Disp: 90 tablet, Rfl: 3  •  spironolactone 25 MG Oral Tab, Take 1 tablet (25 mg total) by mouth daily. 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  Extremities: Pedal pulses are present. No edema. Neurological: Grossly intact. Laboratory:   Ref.  Range 4/3/2017 08:45   WBC Latest Ref Range: 4.0-13.0 x10(3) uL 9.5   Hemoglobin Latest Ref Range: 13.0-17.0 g/dL 14.1   Hematocrit Latest Ref Ran 31.0 03/30/2017   BUN 25* 03/30/2017   CREATSERUM 1.14 03/30/2017   GLU 71 03/30/2017   CA 9.1 03/30/2017   ALKPHO 62 03/30/2017   ALT 39 03/30/2017   AST 19 03/30/2017   BILT 0.6 03/30/2017   ALB 3.9 03/30/2017   TP 7.4 03/30/2017       Impression and Carlie

## 2017-04-04 ENCOUNTER — HOSPITAL ENCOUNTER (OUTPATIENT)
Dept: MRI IMAGING | Facility: HOSPITAL | Age: 72
Discharge: HOME | End: 2017-04-04
Attending: INTERNAL MEDICINE
Payer: MEDICARE

## 2017-04-04 DIAGNOSIS — I50.1 LEFT VENTRICULAR FAILURE (HCC): ICD-10-CM

## 2017-04-04 DIAGNOSIS — M33.20 POLYMYOSITIS (HCC): ICD-10-CM

## 2017-04-04 PROCEDURE — A9575 INJ GADOTERATE MEGLUMI 0.1ML: HCPCS

## 2017-04-04 PROCEDURE — 75561 CARDIAC MRI FOR MORPH W/DYE: CPT

## 2017-04-05 ENCOUNTER — PRIOR ORIGINAL RECORDS (OUTPATIENT)
Dept: OTHER | Age: 72
End: 2017-04-05

## 2017-04-06 ENCOUNTER — OFFICE VISIT (OUTPATIENT)
Dept: HEMATOLOGY/ONCOLOGY | Facility: HOSPITAL | Age: 72
End: 2017-04-06
Attending: INTERNAL MEDICINE
Payer: MEDICARE

## 2017-04-06 VITALS
SYSTOLIC BLOOD PRESSURE: 133 MMHG | WEIGHT: 196.38 LBS | TEMPERATURE: 97 F | OXYGEN SATURATION: 99 % | HEIGHT: 72.01 IN | DIASTOLIC BLOOD PRESSURE: 84 MMHG | BODY MASS INDEX: 26.6 KG/M2 | RESPIRATION RATE: 18 BRPM | HEART RATE: 73 BPM

## 2017-04-06 DIAGNOSIS — I10 BENIGN ESSENTIAL HYPERTENSION: ICD-10-CM

## 2017-04-06 DIAGNOSIS — M32.19 OTHER SYSTEMIC LUPUS ERYTHEMATOSUS WITH OTHER ORGAN INVOLVEMENT (HCC): ICD-10-CM

## 2017-04-06 DIAGNOSIS — D69.6 THROMBOCYTOPENIA (HCC): Primary | ICD-10-CM

## 2017-04-06 PROCEDURE — 96365 THER/PROPH/DIAG IV INF INIT: CPT

## 2017-04-06 PROCEDURE — 96375 TX/PRO/DX INJ NEW DRUG ADDON: CPT

## 2017-04-06 PROCEDURE — 96366 THER/PROPH/DIAG IV INF ADDON: CPT

## 2017-04-06 NOTE — PROGRESS NOTES
Education Record    Learner:  Patient and Spouse    Disease / Diagnosis: IVIG    Barriers / Limitations:  None   Comments:    Method:  Discussion   Comments:    General Topics:  Medication, Side effects and symptom management and Plan of care reviewed   Co

## 2017-04-07 ENCOUNTER — OFFICE VISIT (OUTPATIENT)
Dept: HEMATOLOGY/ONCOLOGY | Facility: HOSPITAL | Age: 72
End: 2017-04-07
Attending: INTERNAL MEDICINE
Payer: MEDICARE

## 2017-04-07 VITALS
HEART RATE: 73 BPM | RESPIRATION RATE: 18 BRPM | TEMPERATURE: 97 F | SYSTOLIC BLOOD PRESSURE: 116 MMHG | DIASTOLIC BLOOD PRESSURE: 78 MMHG

## 2017-04-07 DIAGNOSIS — M32.19 OTHER SYSTEMIC LUPUS ERYTHEMATOSUS WITH OTHER ORGAN INVOLVEMENT (HCC): ICD-10-CM

## 2017-04-07 DIAGNOSIS — D69.6 THROMBOCYTOPENIA (HCC): Primary | ICD-10-CM

## 2017-04-07 PROCEDURE — 96366 THER/PROPH/DIAG IV INF ADDON: CPT

## 2017-04-07 PROCEDURE — 96365 THER/PROPH/DIAG IV INF INIT: CPT

## 2017-04-07 PROCEDURE — 96375 TX/PRO/DX INJ NEW DRUG ADDON: CPT

## 2017-04-12 ENCOUNTER — TELEPHONE (OUTPATIENT)
Dept: HEMATOLOGY/ONCOLOGY | Facility: HOSPITAL | Age: 72
End: 2017-04-12

## 2017-04-13 ENCOUNTER — LAB ENCOUNTER (OUTPATIENT)
Dept: LAB | Age: 72
End: 2017-04-13
Attending: INTERNAL MEDICINE
Payer: MEDICARE

## 2017-04-13 DIAGNOSIS — D69.6 THROMBOCYTOPENIA (HCC): ICD-10-CM

## 2017-04-13 PROCEDURE — 36415 COLL VENOUS BLD VENIPUNCTURE: CPT

## 2017-04-13 PROCEDURE — 85025 COMPLETE CBC W/AUTO DIFF WBC: CPT

## 2017-04-19 ENCOUNTER — PRIOR ORIGINAL RECORDS (OUTPATIENT)
Dept: OTHER | Age: 72
End: 2017-04-19

## 2017-04-24 ENCOUNTER — TELEPHONE (OUTPATIENT)
Dept: INTERVENTIONAL RADIOLOGY/VASCULAR | Facility: HOSPITAL | Age: 72
End: 2017-04-24

## 2017-04-26 ENCOUNTER — LAB ENCOUNTER (OUTPATIENT)
Dept: LAB | Age: 72
End: 2017-04-26
Attending: INTERNAL MEDICINE
Payer: MEDICARE

## 2017-04-26 DIAGNOSIS — D69.3 IMMUNE THROMBOCYTOPENIA (HCC): ICD-10-CM

## 2017-04-26 PROCEDURE — 85025 COMPLETE CBC W/AUTO DIFF WBC: CPT

## 2017-04-27 ENCOUNTER — TELEPHONE (OUTPATIENT)
Dept: INTERVENTIONAL RADIOLOGY/VASCULAR | Facility: HOSPITAL | Age: 72
End: 2017-04-27

## 2017-05-01 ENCOUNTER — OFFICE VISIT (OUTPATIENT)
Dept: HEMATOLOGY/ONCOLOGY | Facility: HOSPITAL | Age: 72
End: 2017-05-01
Attending: INTERNAL MEDICINE
Payer: MEDICARE

## 2017-05-01 VITALS
RESPIRATION RATE: 18 BRPM | OXYGEN SATURATION: 98 % | BODY MASS INDEX: 26.34 KG/M2 | TEMPERATURE: 98 F | SYSTOLIC BLOOD PRESSURE: 126 MMHG | DIASTOLIC BLOOD PRESSURE: 77 MMHG | HEART RATE: 85 BPM | WEIGHT: 194.5 LBS | HEIGHT: 72.01 IN

## 2017-05-01 DIAGNOSIS — D69.6 THROMBOCYTOPENIA (HCC): ICD-10-CM

## 2017-05-01 PROCEDURE — 99214 OFFICE O/P EST MOD 30 MIN: CPT | Performed by: INTERNAL MEDICINE

## 2017-05-01 RX ORDER — METOPROLOL SUCCINATE 100 MG/1
100 TABLET, EXTENDED RELEASE ORAL DAILY
Status: ON HOLD | COMMUNITY
End: 2019-08-20

## 2017-05-01 NOTE — PROGRESS NOTES
[facility] Progress Note    Patient Name: Sania Lopez   YOB: 1945   Medical Record Number: [id number]   CSN: [id number]   Attending Physician: Lashawn Nunes M.D.    Referring Physician: MD Dr. Colton Villareal Dr. AmLODIPine Besylate 5 MG Oral Tab, Take 1 tablet (5 mg total) by mouth once daily. , Disp: 90 tablet, Rfl: 3  •  spironolactone 25 MG Oral Tab, Take 1 tablet (25 mg total) by mouth daily. 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 Heart: Regular rate and rhythm. Abdomen: Soft, non tender with good bowel sounds. Spleen tip palpable. No palpable mass. Extremities: Pedal pulses are present. No edema. Neurological: Grossly intact. recovery of his platelet counts. Though platelets above 50 I would like to continue to follow trends before I let him resume this. Also he will need to hold off on any elective procedures for now. I spent 30 minutes face to face with the patient.   More

## 2017-05-07 ENCOUNTER — PATIENT MESSAGE (OUTPATIENT)
Dept: RHEUMATOLOGY | Facility: CLINIC | Age: 72
End: 2017-05-07

## 2017-05-08 ENCOUNTER — LAB ENCOUNTER (OUTPATIENT)
Dept: LAB | Age: 72
End: 2017-05-08
Attending: INTERNAL MEDICINE
Payer: MEDICARE

## 2017-05-08 DIAGNOSIS — D69.6 THROMBOCYTOPENIA (HCC): ICD-10-CM

## 2017-05-08 PROCEDURE — 85025 COMPLETE CBC W/AUTO DIFF WBC: CPT

## 2017-05-08 PROCEDURE — 36415 COLL VENOUS BLD VENIPUNCTURE: CPT

## 2017-05-08 NOTE — TELEPHONE ENCOUNTER
From: [facility] File  To: Kathy Reid MD  Sent: 5/7/2017 1:54 PM CDT  Subject: Non-Urgent Medical Question    Dr Monik Patel, Do you have any recommendations for a rheumatologist to treat dermatomyositis for one of our [employer] support dane

## 2017-05-15 ENCOUNTER — LAB ENCOUNTER (OUTPATIENT)
Dept: LAB | Age: 72
End: 2017-05-15
Attending: INTERNAL MEDICINE
Payer: MEDICARE

## 2017-05-15 DIAGNOSIS — D69.6 THROMBOCYTOPENIA (HCC): ICD-10-CM

## 2017-05-15 PROCEDURE — 85025 COMPLETE CBC W/AUTO DIFF WBC: CPT

## 2017-05-15 PROCEDURE — 36415 COLL VENOUS BLD VENIPUNCTURE: CPT

## 2017-05-25 DIAGNOSIS — I10 BENIGN ESSENTIAL HYPERTENSION: Primary | ICD-10-CM

## 2017-05-26 ENCOUNTER — LAB ENCOUNTER (OUTPATIENT)
Dept: LAB | Age: 72
End: 2017-05-26
Attending: INTERNAL MEDICINE
Payer: MEDICARE

## 2017-05-26 DIAGNOSIS — D69.6 THROMBOCYTOPENIA (HCC): ICD-10-CM

## 2017-05-26 PROCEDURE — 36415 COLL VENOUS BLD VENIPUNCTURE: CPT

## 2017-05-26 PROCEDURE — 85025 COMPLETE CBC W/AUTO DIFF WBC: CPT

## 2017-05-26 PROCEDURE — 80053 COMPREHEN METABOLIC PANEL: CPT

## 2017-05-27 RX ORDER — SPIRONOLACTONE 25 MG/1
TABLET ORAL
Qty: 90 TABLET | Refills: 1 | Status: SHIPPED | OUTPATIENT
Start: 2017-05-27 | End: 2017-11-22

## 2017-05-31 ENCOUNTER — OFFICE VISIT (OUTPATIENT)
Dept: HEMATOLOGY/ONCOLOGY | Facility: HOSPITAL | Age: 72
End: 2017-05-31
Attending: INTERNAL MEDICINE
Payer: MEDICARE

## 2017-05-31 VITALS
HEART RATE: 90 BPM | HEIGHT: 72.01 IN | DIASTOLIC BLOOD PRESSURE: 83 MMHG | SYSTOLIC BLOOD PRESSURE: 133 MMHG | RESPIRATION RATE: 18 BRPM | BODY MASS INDEX: 26.71 KG/M2 | TEMPERATURE: 98 F | WEIGHT: 197.19 LBS

## 2017-05-31 DIAGNOSIS — M32.19 OTHER SYSTEMIC LUPUS ERYTHEMATOSUS WITH OTHER ORGAN INVOLVEMENT (HCC): ICD-10-CM

## 2017-05-31 DIAGNOSIS — D69.6 THROMBOCYTOPENIA (HCC): Primary | ICD-10-CM

## 2017-05-31 PROCEDURE — 99214 OFFICE O/P EST MOD 30 MIN: CPT | Performed by: INTERNAL MEDICINE

## 2017-05-31 RX ORDER — FLUTICASONE PROPIONATE 50 MCG
SPRAY, SUSPENSION (ML) NASAL DAILY
COMMUNITY
End: 2017-07-03

## 2017-05-31 NOTE — PROGRESS NOTES
[facility] Progress Note    Patient Name: Wiliam Hernandez   YOB: 1945   Medical Record Number: [id number]   CSN: [id number]   Attending Physician: Giuseppe Marin M.D.    Referring Physician: MD Dr. Sommer Bishop Dr. CAPSULE BY MOUTH TWICE DAILY, Disp: 180 capsule, Rfl: 3  •  AmLODIPine Besylate 5 MG Oral Tab, Take 1 tablet (5 mg total) by mouth once daily. , Disp: 90 tablet, Rfl: 3  •  Potassium Chloride ER 20 MEQ Oral Tab CR, Take 1 tablet (20 mEq total) by mouth 3 (t History:  Family History   Problem Relation Age of Onset   • Ramon's Esophagus[other] [OTHER] Son    • Heart Disease Father      CHF   • Gastro-Intestinal Disorder Father      Diverticulosis   • Heart Attack Maternal Grandfather    • Heart Disease Mother Pedal pulses are present. No edema. Neurological: Grossly intact.          Recent Results (from the past 24 hour(s))  -CBC W/ DIFFERENTIAL   Collection Time: 05/31/17  9:29 AM   Result Value Ref Range   WBC 9.1 4.0-13.0 x10(3) uL   RBC 4.65 3.80-5.80 x10(6

## 2017-06-01 ENCOUNTER — PRIOR ORIGINAL RECORDS (OUTPATIENT)
Dept: OTHER | Age: 72
End: 2017-06-01

## 2017-06-08 ENCOUNTER — TELEPHONE (OUTPATIENT)
Dept: INTERVENTIONAL RADIOLOGY/VASCULAR | Facility: HOSPITAL | Age: 72
End: 2017-06-08

## 2017-06-08 NOTE — TELEPHONE ENCOUNTER
Patient cleared to proceed with WATCHMAN procedure by Dr. Olga Salas and Dr. Renan Harris. Reviewed process and procedure with patient. Scheduled for 7/14/17 procedure. Dr. Teresa FINE notified to schedule pre- and post-procedure TEEs. All questions answered.

## 2017-06-14 ENCOUNTER — LAB ENCOUNTER (OUTPATIENT)
Dept: LAB | Age: 72
End: 2017-06-14
Attending: INTERNAL MEDICINE
Payer: MEDICARE

## 2017-06-14 DIAGNOSIS — M32.19 OTHER SYSTEMIC LUPUS ERYTHEMATOSUS WITH OTHER ORGAN INVOLVEMENT (HCC): ICD-10-CM

## 2017-06-14 DIAGNOSIS — D69.6 THROMBOCYTOPENIA (HCC): ICD-10-CM

## 2017-06-14 PROCEDURE — 36415 COLL VENOUS BLD VENIPUNCTURE: CPT

## 2017-06-14 PROCEDURE — 85025 COMPLETE CBC W/AUTO DIFF WBC: CPT

## 2017-06-15 ENCOUNTER — TELEPHONE (OUTPATIENT)
Dept: HEMATOLOGY/ONCOLOGY | Facility: HOSPITAL | Age: 72
End: 2017-06-15

## 2017-06-28 ENCOUNTER — OFFICE VISIT (OUTPATIENT)
Dept: HEMATOLOGY/ONCOLOGY | Facility: HOSPITAL | Age: 72
End: 2017-06-28
Attending: INTERNAL MEDICINE
Payer: MEDICARE

## 2017-06-28 VITALS
HEIGHT: 72.01 IN | RESPIRATION RATE: 18 BRPM | DIASTOLIC BLOOD PRESSURE: 56 MMHG | HEART RATE: 68 BPM | SYSTOLIC BLOOD PRESSURE: 100 MMHG | TEMPERATURE: 98 F | OXYGEN SATURATION: 94 % | WEIGHT: 191 LBS | BODY MASS INDEX: 25.87 KG/M2

## 2017-06-28 DIAGNOSIS — D69.6 THROMBOCYTOPENIA (HCC): ICD-10-CM

## 2017-06-28 DIAGNOSIS — D69.3 IMMUNE THROMBOCYTOPENIA (HCC): Primary | ICD-10-CM

## 2017-06-28 PROCEDURE — 99214 OFFICE O/P EST MOD 30 MIN: CPT | Performed by: INTERNAL MEDICINE

## 2017-06-28 NOTE — PROGRESS NOTES
[facility] Progress Note    Patient Name: Kylee Woodson   YOB: 1945   Medical Record Number: [id number]   CSN: [id number]   Attending Physician: Meg Caceres M.D.    Referring Physician: MD Dr. Emely Reyes Dr. CAPSULE BY MOUTH TWICE DAILY, Disp: 180 capsule, Rfl: 3  •  AmLODIPine Besylate 5 MG Oral Tab, Take 1 tablet (5 mg total) by mouth once daily. , Disp: 90 tablet, Rfl: 3  •  Potassium Chloride ER 20 MEQ Oral Tab CR, Take 1 tablet (20 mEq total) by mouth 3 (t History:  Family History   Problem Relation Age of Onset   • Heart Disease Father      CHF   • Gastro-Intestinal Disorder Father      Diverticulosis   • Heart Disease Mother      CHF   • Cancer Paternal Grandfather    • Heart Attack Paternal Grandmother mass.  Extremities: Pedal pulses are present. No edema. Neurological: Grossly intact.          Recent Results (from the past 24 hour(s))  -CBC W/ DIFFERENTIAL   Collection Time: 06/28/17  9:32 AM   Result Value Ref Range   WBC 6.7 4.0 - 13.0 x10(3) uL   RB

## 2017-07-03 RX ORDER — FEXOFENADINE HCL 180 MG/1
180 TABLET ORAL
COMMUNITY
End: 2018-06-04

## 2017-07-03 NOTE — HISTORICAL OFFICE NOTE
Miguel Steel  : 1945  ACCOUNT:  [id number]  [phone]  PCP: Dr. Luciano Charlton     TODAY'S DATE: 2017  DICTATED BY:  Bell Arciniega M.D.]    CHIEF COMPLAINT: Louretta Kick fibrillation.]    HPI:  [On 2017, Thomas Jimi, a 74year old male, FACTORS:  CAD - Hypertension    REVIEW OF SYSTEMS:  CONS: doing well. EYES: denies significant visual changes. ENMT: denies difficulties with hearing, otherwise negative. CV: see HPI; denies claudication. RESP: denies chronic cough, hemoptysis.  GI: denies long-term anticoagulation. PLAN:    [1. Continue the current medications. 2. The patient is presently off Pradaxa as outlined above.   I would have to discuss this with Dr. Cesar Pelletier to see if he would be stable to go back on short-term anticoagulat

## 2017-07-03 NOTE — HISTORICAL OFFICE NOTE
Romero Vanita  : 1945  ACCOUNT:  [id number]  [phone]  PCP: Dr. Kalee Rodas     TODAY'S DATE: 2017  DICTATED BY:  Verita Severs, M.D.]    CHIEF COMPLAINT: Sherita La fibrillation.]    HPI:  [On 2017, Araseli Grady, a 74year old male, FACTORS:  CAD - Hypertension    REVIEW OF SYSTEMS:  CONS: doing well. EYES: denies significant visual changes. ENMT: denies difficulties with hearing, otherwise negative. CV: see HPI; denies claudication. RESP: denies chronic cough, hemoptysis.  GI: denies long-term anticoagulation. PLAN:    [1. Continue the current medications. 2. The patient is presently off Pradaxa as outlined above.   I would have to discuss this with Dr. Geneva Osborne to see if he would be stable to go back on short-term anticoagulat

## 2017-07-06 ENCOUNTER — HOSPITAL ENCOUNTER (OUTPATIENT)
Dept: CV DIAGNOSTICS | Facility: HOSPITAL | Age: 72
Discharge: HOME | End: 2017-07-06
Attending: INTERNAL MEDICINE
Payer: MEDICARE

## 2017-07-06 ENCOUNTER — HOSPITAL ENCOUNTER (OUTPATIENT)
Dept: CT IMAGING | Facility: HOSPITAL | Age: 72
Discharge: HOME | End: 2017-07-06
Attending: INTERNAL MEDICINE | Admitting: INTERNAL MEDICINE
Payer: MEDICARE

## 2017-07-06 ENCOUNTER — HOSPITAL ENCOUNTER (OUTPATIENT)
Dept: INTERVENTIONAL RADIOLOGY/VASCULAR | Facility: HOSPITAL | Age: 72
Discharge: HOME | End: 2017-07-06
Attending: INTERNAL MEDICINE | Admitting: INTERNAL MEDICINE
Payer: MEDICARE

## 2017-07-06 ENCOUNTER — APPOINTMENT (OUTPATIENT)
Dept: GENERAL RADIOLOGY | Facility: HOSPITAL | Age: 72
End: 2017-07-06
Attending: INTERNAL MEDICINE
Payer: MEDICARE

## 2017-07-06 VITALS
RESPIRATION RATE: 23 BRPM | OXYGEN SATURATION: 96 % | DIASTOLIC BLOOD PRESSURE: 70 MMHG | SYSTOLIC BLOOD PRESSURE: 112 MMHG | HEART RATE: 69 BPM | TEMPERATURE: 98 F

## 2017-07-06 VITALS
DIASTOLIC BLOOD PRESSURE: 79 MMHG | SYSTOLIC BLOOD PRESSURE: 133 MMHG | OXYGEN SATURATION: 92 % | HEART RATE: 75 BPM | TEMPERATURE: 97 F | RESPIRATION RATE: 23 BRPM

## 2017-07-06 DIAGNOSIS — I48.91 A-FIB (HCC): ICD-10-CM

## 2017-07-06 DIAGNOSIS — I48.20 CHRONIC ATRIAL FIBRILLATION (HCC): Primary | ICD-10-CM

## 2017-07-06 DIAGNOSIS — I48.20 CHRONIC ATRIAL FIBRILLATION (HCC): ICD-10-CM

## 2017-07-06 LAB
ANTIBODY SCREEN: POSITIVE
BASOPHILS # BLD AUTO: 0.03 X10(3) UL (ref 0–0.1)
BASOPHILS NFR BLD AUTO: 0.7 %
BUN BLD-MCNC: 20 MG/DL (ref 8–20)
CALCIUM BLD-MCNC: 8.9 MG/DL (ref 8.3–10.3)
CHLORIDE: 104 MMOL/L (ref 101–111)
CO2: 28 MMOL/L (ref 22–32)
CREAT BLD-MCNC: 1.2 MG/DL (ref 0.7–1.3)
EOSINOPHIL # BLD AUTO: 0.07 X10(3) UL (ref 0–0.3)
EOSINOPHIL NFR BLD AUTO: 1.6 %
ERYTHROCYTE [DISTWIDTH] IN BLOOD BY AUTOMATED COUNT: 14.3 % (ref 11.5–16)
GLUCOSE BLD-MCNC: 90 MG/DL (ref 70–99)
HCT VFR BLD AUTO: 40.3 % (ref 37–53)
HGB BLD-MCNC: 12.9 G/DL (ref 13–17)
IMMATURE GRANULOCYTE COUNT: 0.06 X10(3) UL (ref 0–1)
IMMATURE GRANULOCYTE RATIO %: 1.3 %
LYMPHOCYTES # BLD AUTO: 1.22 X10(3) UL (ref 0.9–4)
LYMPHOCYTES NFR BLD AUTO: 27.2 %
MCH RBC QN AUTO: 31.2 PG (ref 27–33.2)
MCHC RBC AUTO-ENTMCNC: 32 G/DL (ref 31–37)
MCV RBC AUTO: 97.3 FL (ref 80–99)
MONOCYTES # BLD AUTO: 0.78 X10(3) UL (ref 0.1–0.6)
MONOCYTES NFR BLD AUTO: 17.4 %
NEUTROPHIL ABS PRELIM: 2.33 X10 (3) UL (ref 1.3–6.7)
NEUTROPHILS # BLD AUTO: 2.33 X10(3) UL (ref 1.3–6.7)
NEUTROPHILS NFR BLD AUTO: 51.8 %
PLATELET # BLD AUTO: 132 10(3)UL (ref 150–450)
POTASSIUM SERPL-SCNC: 4.9 MMOL/L (ref 3.6–5.1)
RBC # BLD AUTO: 4.14 X10(6)UL (ref 3.8–5.8)
RED CELL DISTRIBUTION WIDTH-SD: 50.8 FL (ref 35.1–46.3)
RH BLOOD TYPE: POSITIVE
SODIUM SERPL-SCNC: 138 MMOL/L (ref 136–144)
WBC # BLD AUTO: 4.5 X10(3) UL (ref 4–13)

## 2017-07-06 PROCEDURE — 71010 XR CHEST AP PORTABLE  (CPT=71010): CPT | Performed by: INTERNAL MEDICINE

## 2017-07-06 PROCEDURE — 75574 CT ANGIO HRT W/3D IMAGE: CPT | Performed by: INTERNAL MEDICINE

## 2017-07-06 PROCEDURE — 93010 ELECTROCARDIOGRAM REPORT: CPT | Performed by: INTERNAL MEDICINE

## 2017-07-06 PROCEDURE — 85025 COMPLETE CBC W/AUTO DIFF WBC: CPT | Performed by: INTERNAL MEDICINE

## 2017-07-06 PROCEDURE — 86901 BLOOD TYPING SEROLOGIC RH(D): CPT | Performed by: INTERNAL MEDICINE

## 2017-07-06 PROCEDURE — 86922 COMPATIBILITY TEST ANTIGLOB: CPT

## 2017-07-06 PROCEDURE — 93312 ECHO TRANSESOPHAGEAL: CPT

## 2017-07-06 PROCEDURE — 99153 MOD SED SAME PHYS/QHP EA: CPT

## 2017-07-06 PROCEDURE — 99152 MOD SED SAME PHYS/QHP 5/>YRS: CPT

## 2017-07-06 PROCEDURE — 86975 RBC SERUM PRETX INCUBJ DRUGS: CPT | Performed by: INTERNAL MEDICINE

## 2017-07-06 PROCEDURE — 86900 BLOOD TYPING SEROLOGIC ABO: CPT | Performed by: INTERNAL MEDICINE

## 2017-07-06 PROCEDURE — 86870 RBC ANTIBODY IDENTIFICATION: CPT | Performed by: INTERNAL MEDICINE

## 2017-07-06 PROCEDURE — 86850 RBC ANTIBODY SCREEN: CPT | Performed by: INTERNAL MEDICINE

## 2017-07-06 PROCEDURE — 93005 ELECTROCARDIOGRAM TRACING: CPT

## 2017-07-06 PROCEDURE — 80048 BASIC METABOLIC PNL TOTAL CA: CPT | Performed by: INTERNAL MEDICINE

## 2017-07-06 RX ORDER — MIDAZOLAM HYDROCHLORIDE 1 MG/ML
INJECTION INTRAMUSCULAR; INTRAVENOUS
Status: COMPLETED
Start: 2017-07-06 | End: 2017-07-06

## 2017-07-06 RX ORDER — SODIUM CHLORIDE 9 MG/ML
INJECTION, SOLUTION INTRAVENOUS CONTINUOUS
Status: DISCONTINUED | OUTPATIENT
Start: 2017-07-06 | End: 2017-07-07

## 2017-07-06 RX ORDER — DILTIAZEM HYDROCHLORIDE 5 MG/ML
5 INJECTION INTRAVENOUS ONCE
Status: COMPLETED | OUTPATIENT
Start: 2017-07-06 | End: 2017-07-06

## 2017-07-06 RX ORDER — MIDAZOLAM HYDROCHLORIDE 1 MG/ML
4 INJECTION INTRAMUSCULAR; INTRAVENOUS SEE ADMIN INSTRUCTIONS
Status: DISCONTINUED | OUTPATIENT
Start: 2017-07-06 | End: 2017-07-07

## 2017-07-06 RX ADMIN — MIDAZOLAM HYDROCHLORIDE 4 MG: 1 INJECTION INTRAMUSCULAR; INTRAVENOUS at 10:43:00

## 2017-07-06 RX ADMIN — SODIUM CHLORIDE: 9 INJECTION, SOLUTION INTRAVENOUS at 08:00:00

## 2017-07-06 RX ADMIN — DILTIAZEM HYDROCHLORIDE 5 MG: 5 INJECTION INTRAVENOUS at 14:00:00

## 2017-07-06 NOTE — PLAN OF CARE
Pt here for GERHARD for Watchman workup. Versed & Fentanyl IVP given as ordered for procedure however, GERHARD probe unable to be passed. Pt to go for Gated CTA instead per Dr Caryn Goyal. Pt now A&O x4, tolerating PO.  Pt c/o slight throat soreness but able to eat soft fo

## 2017-07-06 NOTE — PROGRESS NOTES
Met with patient in Holding prior to GERHARD. Modified Cranford Scale and Barthel Index completed as baseline assessment. Patient scheduled for Watchman procedure 7/14/17.

## 2017-07-06 NOTE — H&P
History & Physical Examination    Patient Name: Yaneli Holm  MRN: [id number]  CSN: [id number]  YOB: 1945    Diagnosis: Atrial fibrillation    History of Present Illness:  Yaneli Holm is a 74year old male with atrial fibrillation o Acids (OMEGA 3 OR) None Entered Disp:  Rfl:  Taking   MULTI VITAMIN MENS OR None Entered Disp:  Rfl:  Taking       Allergies:   Amoxicillin             Rash  Augmentin [Amoclan]     Hives    Past Medical History:   Diagnosis Date   • Arrhythmia    • Autoim oz/week    8 - 10 Standard drinks or equivalent per week            SYSTEM Check if Review is Normal Check if Physical Exam is Normal If not normal, please explain:   HEENT [ x] [x ]    NECK & BACK [x ] [x ]    HEART [x ] Vonzella Just, irregular]    LUNGS [x

## 2017-07-06 NOTE — PROCEDURES
Cardiology Transesophageal Echo Note    PRE-PROCEDURE DIAGNOSIS: Atrial fibrillation and pre-WATCHMAN device assessment    PROCEDURE: Attempted Transesophageal Echocardiogram.     SEDATION:   Topical spray x 1  Versed: 4 mg  Fentanyl: 100 mcg  I personally

## 2017-07-07 LAB
ATRIAL RATE: 71 BPM
Q-T INTERVAL: 414 MS
QRS DURATION: 100 MS
QTC CALCULATION (BEZET): 440 MS
R AXIS: 4 DEGREES
T AXIS: 25 DEGREES
VENTRICULAR RATE: 68 BPM

## 2017-07-07 RX ORDER — PREDNISONE 1 MG/1
TABLET ORAL
Refills: 0 | COMMUNITY
Start: 2017-06-15 | End: 2017-07-10

## 2017-07-10 ENCOUNTER — OFFICE VISIT (OUTPATIENT)
Dept: RHEUMATOLOGY | Facility: CLINIC | Age: 72
End: 2017-07-10

## 2017-07-10 VITALS
DIASTOLIC BLOOD PRESSURE: 80 MMHG | HEART RATE: 52 BPM | BODY MASS INDEX: 27 KG/M2 | RESPIRATION RATE: 12 BRPM | WEIGHT: 200 LBS | SYSTOLIC BLOOD PRESSURE: 122 MMHG

## 2017-07-10 DIAGNOSIS — M33.20 POLYMYOSITIS (HCC): Chronic | ICD-10-CM

## 2017-07-10 DIAGNOSIS — M35.1 MCTD (MIXED CONNECTIVE TISSUE DISEASE) (HCC): Primary | Chronic | ICD-10-CM

## 2017-07-10 DIAGNOSIS — D69.6 THROMBOCYTOPENIA (HCC): Primary | ICD-10-CM

## 2017-07-10 DIAGNOSIS — M35.1 MCTD (MIXED CONNECTIVE TISSUE DISEASE) (HCC): Chronic | ICD-10-CM

## 2017-07-10 DIAGNOSIS — M32.9 SYSTEMIC LUPUS ERYTHEMATOSUS, UNSPECIFIED SLE TYPE, UNSPECIFIED ORGAN INVOLVEMENT STATUS (HCC): ICD-10-CM

## 2017-07-10 DIAGNOSIS — D69.6 THROMBOCYTOPENIA (HCC): ICD-10-CM

## 2017-07-10 LAB — TREATSERUM: 5

## 2017-07-10 PROCEDURE — 99213 OFFICE O/P EST LOW 20 MIN: CPT | Performed by: INTERNAL MEDICINE

## 2017-07-10 RX ORDER — PREDNISONE 1 MG/1
TABLET ORAL
Qty: 90 TABLET | Refills: 1 | Status: SHIPPED | OUTPATIENT
Start: 2017-07-10 | End: 2019-08-23

## 2017-07-10 NOTE — PATIENT INSTRUCTIONS
Use Prednisone 5 mg as needed. Follow-up with Dr Geni Ureña for your heart and Dr Jewel Butterfield for the platelets. Return to office 6 months.

## 2017-07-10 NOTE — PROGRESS NOTES
[facility]  Dr. Chasity Rao Progress Note     Subjective: Bong Benedict is a(n) 74year old male. Current complaints: Patient presents with: Thrombocytopenia: 3 month f/u.  Pt states 'is having a Watchman put in this Friday.'  Platelet's up lately

## 2017-07-11 NOTE — PROGRESS NOTES
[facility]  Dr. Caprice Cohen Progress Note     Subjective: Leandro Segal is a(n) 74year old male. Current complaints: No chief complaint on file. Pt seen, feels ok. Uses prednisone as needed lately.  Before on high doses of Prednsione for thrombo

## 2017-07-12 DIAGNOSIS — I10 ESSENTIAL HYPERTENSION, BENIGN: ICD-10-CM

## 2017-07-13 RX ORDER — BENAZEPRIL HYDROCHLORIDE 40 MG/1
TABLET, FILM COATED ORAL
Qty: 90 TABLET | Refills: 0 | Status: ON HOLD | OUTPATIENT
Start: 2017-07-13 | End: 2017-07-15

## 2017-07-13 NOTE — TELEPHONE ENCOUNTER
Medication refilled per protocol.     LOV 2/22/2017    Future Appointments  Date Time Provider Chrissie Aden   9/27/2017 9:45 AM Starr Jones MD [address] [facility] HEM [address]   1/60/3856 36:84 AM Sheri Shah MD [facility] Wayne Lamb   1/16/2018 10:20

## 2017-07-14 ENCOUNTER — HOSPITAL ENCOUNTER (INPATIENT)
Facility: HOSPITAL | Age: 72
LOS: 1 days | Discharge: HOME | DRG: 274 | End: 2017-07-15
Attending: INTERNAL MEDICINE | Admitting: INTERNAL MEDICINE
Payer: MEDICARE

## 2017-07-14 ENCOUNTER — ANESTHESIA (OUTPATIENT)
Dept: CARDIAC SURGERY | Facility: HOSPITAL | Age: 72
DRG: 274 | End: 2017-07-14
Payer: MEDICARE

## 2017-07-14 ENCOUNTER — ANESTHESIA EVENT (OUTPATIENT)
Dept: CARDIAC SURGERY | Facility: HOSPITAL | Age: 72
DRG: 274 | End: 2017-07-14
Payer: MEDICARE

## 2017-07-14 ENCOUNTER — SURGERY (OUTPATIENT)
Age: 72
End: 2017-07-14

## 2017-07-14 DIAGNOSIS — I48.91 ATRIAL FIBRILLATION, UNSPECIFIED TYPE (HCC): Primary | ICD-10-CM

## 2017-07-14 LAB
ALBUMIN SERPL-MCNC: 3.1 G/DL (ref 3.5–4.8)
ALP LIVER SERPL-CCNC: 41 U/L (ref 45–117)
ALT SERPL-CCNC: 20 U/L (ref 17–63)
AST SERPL-CCNC: 14 U/L (ref 15–41)
ATRIAL RATE: 79 BPM
BASOPHILS NFR BLD AUTO: 0.3 %
BILIRUB SERPL-MCNC: 0.7 MG/DL (ref 0.1–2)
BUN BLD-MCNC: 17 MG/DL (ref 8–20)
CALCIUM BLD-MCNC: 8.1 MG/DL (ref 8.3–10.3)
CHLORIDE: 111 MMOL/L (ref 101–111)
CO2: 23 MMOL/L (ref 22–32)
CREAT BLD-MCNC: 0.89 MG/DL (ref 0.7–1.3)
EOSINOPHIL NFR BLD AUTO: 1 %
ERYTHROCYTE [DISTWIDTH] IN BLOOD BY AUTOMATED COUNT: 14.2 % (ref 11.5–16)
GLUCOSE BLD-MCNC: 111 MG/DL (ref 70–99)
HCT VFR BLD AUTO: 34.6 % (ref 37–53)
HGB BLD-MCNC: 11.1 G/DL (ref 13–17)
IMMATURE GRANULOCYTE RATIO %: 1 %
INR BLD: 1.28 (ref 0.89–1.11)
ISTAT ACTIVATED CLOTTING TIME: 164 SECONDS (ref 74–137)
ISTAT ACTIVATED CLOTTING TIME: 263 SECONDS (ref 74–137)
ISTAT ACTIVATED CLOTTING TIME: 290 SECONDS (ref 74–137)
LYMPHOCYTES NFR BLD AUTO: 14 %
M PROTEIN MFR SERPL ELPH: 6.3 G/DL (ref 6.1–8.3)
MCH RBC QN AUTO: 31.7 PG (ref 27–33.2)
MCHC RBC AUTO-ENTMCNC: 32.1 G/DL (ref 31–37)
MCV RBC AUTO: 98.9 FL (ref 80–99)
MONOCYTES NFR BLD AUTO: 4.1 %
NEUTROPHILS NFR BLD AUTO: 79.6 %
PLATELET # BLD AUTO: 47 10(3)UL (ref 150–450)
POTASSIUM SERPL-SCNC: 4.3 MMOL/L (ref 3.6–5.1)
PSA SERPL DL<=0.01 NG/ML-MCNC: 16.1 SECONDS (ref 12–14.3)
Q-T INTERVAL: 398 MS
QRS DURATION: 100 MS
QTC CALCULATION (BEZET): 473 MS
R AXIS: 4 DEGREES
RBC # BLD AUTO: 3.5 X10(6)UL (ref 3.8–5.8)
RED CELL DISTRIBUTION WIDTH-SD: 51 FL (ref 35.1–46.3)
SODIUM SERPL-SCNC: 141 MMOL/L (ref 136–144)
T AXIS: 2 DEGREES
VENTRICULAR RATE: 85 BPM
WBC # BLD AUTO: 5.9 X10(3) UL (ref 4–13)

## 2017-07-14 PROCEDURE — 85610 PROTHROMBIN TIME: CPT | Performed by: INTERNAL MEDICINE

## 2017-07-14 PROCEDURE — B245ZZ4 ULTRASONOGRAPHY OF LEFT HEART, TRANSESOPHAGEAL: ICD-10-PCS | Performed by: INTERNAL MEDICINE

## 2017-07-14 PROCEDURE — 80053 COMPREHEN METABOLIC PANEL: CPT | Performed by: INTERNAL MEDICINE

## 2017-07-14 PROCEDURE — 02L73DK OCCLUSION OF LEFT ATRIAL APPENDAGE WITH INTRALUMINAL DEVICE, PERCUTANEOUS APPROACH: ICD-10-PCS | Performed by: INTERNAL MEDICINE

## 2017-07-14 PROCEDURE — B215YZZ FLUOROSCOPY OF LEFT HEART USING OTHER CONTRAST: ICD-10-PCS | Performed by: INTERNAL MEDICINE

## 2017-07-14 PROCEDURE — 93010 ELECTROCARDIOGRAM REPORT: CPT | Performed by: INTERNAL MEDICINE

## 2017-07-14 PROCEDURE — 85347 COAGULATION TIME ACTIVATED: CPT

## 2017-07-14 PROCEDURE — 85025 COMPLETE CBC W/AUTO DIFF WBC: CPT | Performed by: INTERNAL MEDICINE

## 2017-07-14 PROCEDURE — S0028 INJECTION, FAMOTIDINE, 20 MG: HCPCS

## 2017-07-14 PROCEDURE — 93005 ELECTROCARDIOGRAM TRACING: CPT

## 2017-07-14 PROCEDURE — 87081 CULTURE SCREEN ONLY: CPT | Performed by: INTERNAL MEDICINE

## 2017-07-14 DEVICE — LEFT ATRIAL APPENDAGE CLOSURE DEVICE WITH DELIVERY SYSTEM
Type: IMPLANTABLE DEVICE | Status: FUNCTIONAL
Brand: WATCHMAN®

## 2017-07-14 RX ORDER — SODIUM CHLORIDE, SODIUM LACTATE, POTASSIUM CHLORIDE, CALCIUM CHLORIDE 600; 310; 30; 20 MG/100ML; MG/100ML; MG/100ML; MG/100ML
INJECTION, SOLUTION INTRAVENOUS CONTINUOUS
Status: DISCONTINUED | OUTPATIENT
Start: 2017-07-14 | End: 2017-07-15

## 2017-07-14 RX ORDER — WARFARIN SODIUM 1 MG/1
1 TABLET ORAL NIGHTLY
Status: DISCONTINUED | OUTPATIENT
Start: 2017-07-14 | End: 2017-07-15

## 2017-07-14 RX ORDER — HEPARIN SODIUM 5000 [USP'U]/ML
INJECTION, SOLUTION INTRAVENOUS; SUBCUTANEOUS
Status: COMPLETED
Start: 2017-07-14 | End: 2017-07-14

## 2017-07-14 RX ORDER — SODIUM CHLORIDE 9 MG/ML
INJECTION, SOLUTION INTRAVENOUS ONCE
Status: DISCONTINUED | OUTPATIENT
Start: 2017-07-14 | End: 2017-07-15

## 2017-07-14 RX ORDER — SPIRONOLACTONE 25 MG/1
25 TABLET ORAL
Status: DISCONTINUED | OUTPATIENT
Start: 2017-07-14 | End: 2017-07-15

## 2017-07-14 RX ORDER — AMLODIPINE BESYLATE 2.5 MG/1
2.5 TABLET ORAL
Status: DISCONTINUED | OUTPATIENT
Start: 2017-07-14 | End: 2017-07-15

## 2017-07-14 RX ORDER — POTASSIUM CHLORIDE 20 MEQ/1
20 TABLET, EXTENDED RELEASE ORAL 2 TIMES DAILY
Status: DISCONTINUED | OUTPATIENT
Start: 2017-07-14 | End: 2017-07-15

## 2017-07-14 RX ORDER — PROTAMINE SULFATE 10 MG/ML
INJECTION, SOLUTION INTRAVENOUS
Status: COMPLETED
Start: 2017-07-14 | End: 2017-07-14

## 2017-07-14 RX ORDER — ACETAMINOPHEN AND CODEINE PHOSPHATE 300; 30 MG/1; MG/1
2 TABLET ORAL EVERY 4 HOURS PRN
Status: DISCONTINUED | OUTPATIENT
Start: 2017-07-14 | End: 2017-07-15

## 2017-07-14 RX ORDER — ACETAMINOPHEN AND CODEINE PHOSPHATE 300; 30 MG/1; MG/1
1 TABLET ORAL EVERY 4 HOURS PRN
Status: DISCONTINUED | OUTPATIENT
Start: 2017-07-14 | End: 2017-07-15

## 2017-07-14 RX ORDER — METOPROLOL SUCCINATE 100 MG/1
100 TABLET, EXTENDED RELEASE ORAL
Status: DISCONTINUED | OUTPATIENT
Start: 2017-07-14 | End: 2017-07-15

## 2017-07-14 RX ORDER — SODIUM CHLORIDE 9 MG/ML
INJECTION, SOLUTION INTRAVENOUS CONTINUOUS
Status: DISCONTINUED | OUTPATIENT
Start: 2017-07-14 | End: 2017-07-15

## 2017-07-14 RX ORDER — ASPIRIN 81 MG/1
81 TABLET ORAL DAILY
Status: DISCONTINUED | OUTPATIENT
Start: 2017-07-14 | End: 2017-07-15

## 2017-07-14 RX ORDER — LIDOCAINE HYDROCHLORIDE 10 MG/ML
INJECTION, SOLUTION INFILTRATION; PERINEURAL
Status: COMPLETED
Start: 2017-07-14 | End: 2017-07-14

## 2017-07-14 RX ORDER — HYDROMORPHONE HYDROCHLORIDE 1 MG/ML
0.4 INJECTION, SOLUTION INTRAMUSCULAR; INTRAVENOUS; SUBCUTANEOUS EVERY 5 MIN PRN
Status: ACTIVE | OUTPATIENT
Start: 2017-07-14 | End: 2017-07-14

## 2017-07-14 RX ORDER — ONDANSETRON 2 MG/ML
4 INJECTION INTRAMUSCULAR; INTRAVENOUS EVERY 6 HOURS PRN
Status: DISCONTINUED | OUTPATIENT
Start: 2017-07-14 | End: 2017-07-15

## 2017-07-14 RX ORDER — NALOXONE HYDROCHLORIDE 0.4 MG/ML
80 INJECTION, SOLUTION INTRAMUSCULAR; INTRAVENOUS; SUBCUTANEOUS AS NEEDED
Status: ACTIVE | OUTPATIENT
Start: 2017-07-14 | End: 2017-07-14

## 2017-07-14 RX ORDER — ACETAMINOPHEN 325 MG/1
650 TABLET ORAL EVERY 4 HOURS PRN
Status: DISCONTINUED | OUTPATIENT
Start: 2017-07-14 | End: 2017-07-15

## 2017-07-14 NOTE — PROCEDURES
PROCEDURE PERFORMED:   1. Watchman device implant. 2. Transseptal catheterization with left atrial pressure recording. INDICATIONS FOR PROCEDURE: History of permanent afib with GI bleeding on Coumadin. CHADSVasc score 3      OPERATORS: SARAH Stout guidewire was then withdrawn. The 6-American pigtail catheter was then advanced to the distal part of the left atrial appendage. The Watchman delivery sheath was advanced over the pigtail catheter within the left atrial appendage.  The pigtail catheter was wi conclusion of the procedure. 3. The patient was transported to postanesthesia recovery in stable condition. 4. CHADSVasc score 3.     Kristy Smith, [address] [facility] [address] [facility]  Cardiac Electrophysiology  7/14/2017

## 2017-07-14 NOTE — ANESTHESIA PREPROCEDURE EVALUATION
PRE-OP EVALUATION    Patient Name: Luisa Goltz    Pre-op Diagnosis: atrial fibrillation    Procedure(s):  watchman    Surgeon(s) and Role:     Rekha Queen MD - Primary     * Nasir Snyder MD    Pre-op vitals reviewed.   Temp: 97.4 °F (36.3 °C) GI/Hepatic/Renal  Comment: Splenomegaly   H/O Dysphagia and barretts    (+) GERD                (+) diverticulitis           Cardiovascular  Comment: aortic root dilation  TTE-Conclusions:    1. Left ventricle:  The cavity size was normal. Wall thickness wa Comment: right  1946: RECTUM SURGERY PROCEDURE UNLISTED      Comment: rectal surgery polypectomy  No date: SKIN SURGERY      Comment: MMS BCC R [place] 6/24/09  2007: SKIN SURGERY      Comment: basal ceell carcinoma  7-18-12: SKIN SURGERY      Comment: BCC- product(s) discussed with: patient and spouse    Consented to blood products.           Present on Admission:  **None**

## 2017-07-14 NOTE — H&P
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, cardiomyopathy with LVEF Rfl:  Taking       Allergies:   Amoxicillin             Rash  Augmentin [Amoclan]     Hives    Past Medical History:   Diagnosis Date   • Arrhythmia    • Autoimmune disease ([facility] [address]    • Ramon's esophagus 5/14/2013   • Cancer (HCC)    • Candidiasis of the e Check if Physical Exam is Normal If not normal, please explain:   HEENT [ x] [x ]    NECK & BACK [x ] [x ]    HEART [x ] [x ]    LUNGS [x ] [x ]    ABDOMEN [x ] [ x]    UROGENITAL [x ] [ defer]    EXTREMITIES [x ] [x ]    OTHER        Plan:  Hgb 12.9, Pl [address]

## 2017-07-14 NOTE — PROGRESS NOTES
[facility]  GERHARD Note    Bong Benedict Location:      N [id number]   Admission Date 7/14/2017 Operation Date 7/14/2017   Attending Physician Shaun Hewitt MD Operating Physician Ashly Juarez MD     Pre-Operative Diagnosis: A fib, watchman LA

## 2017-07-14 NOTE — ANESTHESIA POSTPROCEDURE EVALUATION
[facility] Patient Status:  Inpatient   Age/Gender 74year old male MRN [id number]   [facility] 6NE-A Attending Avelino Sheehan MD   Hosp Day # 0 PCP Noreen Reynolds MD       Anesthesia Post-op Note    Procedure(s):  watc

## 2017-07-15 ENCOUNTER — APPOINTMENT (OUTPATIENT)
Dept: CV DIAGNOSTICS | Facility: HOSPITAL | Age: 72
DRG: 274 | End: 2017-07-15
Attending: INTERNAL MEDICINE
Payer: MEDICARE

## 2017-07-15 ENCOUNTER — PRIOR ORIGINAL RECORDS (OUTPATIENT)
Dept: OTHER | Age: 72
End: 2017-07-15

## 2017-07-15 VITALS
TEMPERATURE: 98 F | SYSTOLIC BLOOD PRESSURE: 116 MMHG | BODY MASS INDEX: 25.47 KG/M2 | WEIGHT: 188 LBS | OXYGEN SATURATION: 95 % | HEART RATE: 90 BPM | HEIGHT: 72 IN | RESPIRATION RATE: 26 BRPM | DIASTOLIC BLOOD PRESSURE: 69 MMHG

## 2017-07-15 LAB
BUN BLD-MCNC: 18 MG/DL (ref 8–20)
CALCIUM BLD-MCNC: 8.3 MG/DL (ref 8.3–10.3)
CHLORIDE: 106 MMOL/L (ref 101–111)
CO2: 25 MMOL/L (ref 22–32)
CREAT BLD-MCNC: 1.14 MG/DL (ref 0.7–1.3)
GLUCOSE BLD-MCNC: 101 MG/DL (ref 70–99)
INR BLD: 1.24 (ref 0.89–1.11)
POTASSIUM SERPL-SCNC: 4.7 MMOL/L (ref 3.6–5.1)
PSA SERPL DL<=0.01 NG/ML-MCNC: 15.7 SECONDS (ref 12–14.3)
SODIUM SERPL-SCNC: 139 MMOL/L (ref 136–144)

## 2017-07-15 PROCEDURE — 93306 TTE W/DOPPLER COMPLETE: CPT | Performed by: INTERNAL MEDICINE

## 2017-07-15 PROCEDURE — 85610 PROTHROMBIN TIME: CPT | Performed by: INTERNAL MEDICINE

## 2017-07-15 PROCEDURE — 80048 BASIC METABOLIC PNL TOTAL CA: CPT | Performed by: INTERNAL MEDICINE

## 2017-07-15 RX ORDER — ASPIRIN 81 MG/1
81 TABLET ORAL DAILY
Qty: 30 TABLET | Refills: 0 | Status: SHIPPED | COMMUNITY
Start: 2017-07-15 | End: 2017-08-05 | Stop reason: ALTCHOICE

## 2017-07-15 RX ORDER — WARFARIN SODIUM 1 MG/1
1 TABLET ORAL NIGHTLY
Qty: 30 TABLET | Refills: 2 | Status: SHIPPED | OUTPATIENT
Start: 2017-07-15 | End: 2017-08-05 | Stop reason: ALTCHOICE

## 2017-07-15 NOTE — PLAN OF CARE
Assumed care of this patient at approximately 1920. Monitor shows a. Fib 80's-110's, increased with activity. S/P watchman's procedure. Right groin intact, soft, non tender to palpation, negative hematoma. Bilateral distal pulses intact, +2 graded.  No c/o

## 2017-07-15 NOTE — PROGRESS NOTES
[facility]  Progress Note    Marcie Tyler Patient Status:  Inpatient    3/17/1945 MRN [id number]   [facility] 6NE-A Attending Dorothy Carnes MD   Hosp Day # 1 PCP Nati Beard MD     Assessment:  1. Status post watchman  2. AF  3. Lab Results  Component Value Date   TROP <0.046 01/09/2017   TROP <0.046 06/27/2015        Medications:    • vancomycin  15 mg/kg Intravenous Once   • sodium chloride   Intravenous Once   • AmLODIPine Besylate  2.5 mg Oral Daily   • Metoprolol Succinat

## 2017-07-15 NOTE — PROCEDURES
[address]    PATIENT'S NAME: Traci Sandoval   ATTENDING PHYSICIAN: Rikki Richards M.D. OPERATING PHYSICIAN: Miah Woodard M.D.    PATIENT ACCOUNT#:   [de-identified]    LOCATION:  [address] [place] [address]  MEDICAL RECORD #:   [id number]       DATE OF BIRTH: too large with a very significant mitral side shoulder and the device fabric did not occlude the appendage. The 33 mm device was then removed.   A 30 mm device was positioned in the appendage using fluoroscopic and GERHARD guidance, and the device was then dep

## 2017-07-15 NOTE — PLAN OF CARE
Assumed care of pt at 0730, a/ox4, afib on monitor, rates controlled, HR up to low 100s with activity, non sustaining, right groin soft, c/d/i dressing, no hematoma, palpable pulses, denies pain, up to bathroom, gait steady, echo completed- awaiting result

## 2017-07-15 NOTE — PROGRESS NOTES
Received pt from cvor s/p watchman procedure. He is alert/oriented x 4. Denies any pain. Right groin C/D/I HOB flat. VSS. Afib.

## 2017-07-15 NOTE — PLAN OF CARE
NURSING DISCHARGE NOTE    Discharged Home via Wheelchair. Accompanied by Support staff  Belongings Taken by patient/family.       Ok for dc per cardiology, dc instructions reviewed at length with patient and spouse including medications, post [employer]

## 2017-07-17 ENCOUNTER — PRIOR ORIGINAL RECORDS (OUTPATIENT)
Dept: OTHER | Age: 72
End: 2017-07-17

## 2017-07-17 ENCOUNTER — HOSPITAL ENCOUNTER (OUTPATIENT)
Dept: LAB | Facility: HOSPITAL | Age: 72
Discharge: HOME | End: 2017-07-17
Attending: INTERNAL MEDICINE
Payer: MEDICARE

## 2017-07-17 ENCOUNTER — LAB ENCOUNTER (OUTPATIENT)
Dept: LAB | Facility: HOSPITAL | Age: 72
End: 2017-07-17
Attending: INTERNAL MEDICINE
Payer: MEDICARE

## 2017-07-17 DIAGNOSIS — D69.6 THROMBOCYTOPENIA (HCC): ICD-10-CM

## 2017-07-17 DIAGNOSIS — I48.91 ATRIAL FIBRILLATION (HCC): ICD-10-CM

## 2017-07-17 LAB
BASOPHILS # BLD AUTO: 0.03 X10(3) UL (ref 0–0.1)
BASOPHILS NFR BLD AUTO: 0.5 %
EOSINOPHIL # BLD AUTO: 0.14 X10(3) UL (ref 0–0.3)
EOSINOPHIL NFR BLD AUTO: 2.2 %
ERYTHROCYTE [DISTWIDTH] IN BLOOD BY AUTOMATED COUNT: 14 % (ref 11.5–16)
HCT VFR BLD AUTO: 35.5 % (ref 37–53)
HGB BLD-MCNC: 11.3 G/DL (ref 13–17)
IMMATURE GRANULOCYTE COUNT: 0.04 X10(3) UL (ref 0–1)
IMMATURE GRANULOCYTE RATIO %: 0.6 %
LYMPHOCYTES # BLD AUTO: 1.6 X10(3) UL (ref 0.9–4)
LYMPHOCYTES NFR BLD AUTO: 25.2 %
MCH RBC QN AUTO: 31.8 PG (ref 27–33.2)
MCHC RBC AUTO-ENTMCNC: 31.8 G/DL (ref 31–37)
MCV RBC AUTO: 100 FL (ref 80–99)
MONOCYTES # BLD AUTO: 0.96 X10(3) UL (ref 0.1–0.6)
MONOCYTES NFR BLD AUTO: 15.1 %
NEUTROPHIL ABS PRELIM: 3.57 X10 (3) UL (ref 1.3–6.7)
NEUTROPHILS # BLD AUTO: 3.57 X10(3) UL (ref 1.3–6.7)
NEUTROPHILS NFR BLD AUTO: 56.4 %
PLATELET # BLD AUTO: 50 10(3)UL (ref 150–450)
POC INR: 1 (ref 0.8–1.3)
RBC # BLD AUTO: 3.55 X10(6)UL (ref 3.8–5.8)
RED CELL DISTRIBUTION WIDTH-SD: 51.1 FL (ref 35.1–46.3)
WBC # BLD AUTO: 6.3 X10(3) UL (ref 4–13)

## 2017-07-17 PROCEDURE — 85025 COMPLETE CBC W/AUTO DIFF WBC: CPT

## 2017-07-17 PROCEDURE — 85610 PROTHROMBIN TIME: CPT

## 2017-07-17 PROCEDURE — 36415 COLL VENOUS BLD VENIPUNCTURE: CPT

## 2017-07-18 ENCOUNTER — TELEPHONE (OUTPATIENT)
Dept: FAMILY MEDICINE CLINIC | Facility: CLINIC | Age: 72
End: 2017-07-18

## 2017-07-18 ENCOUNTER — PRIOR ORIGINAL RECORDS (OUTPATIENT)
Dept: OTHER | Age: 72
End: 2017-07-18

## 2017-07-18 ENCOUNTER — PATIENT OUTREACH (OUTPATIENT)
Dept: CASE MANAGEMENT | Age: 72
End: 2017-07-18

## 2017-07-18 DIAGNOSIS — Z02.9 ENCOUNTERS FOR ADMINISTRATIVE PURPOSE: ICD-10-CM

## 2017-07-18 LAB — BLOOD TYPE BARCODE: 5100

## 2017-07-18 NOTE — TELEPHONE ENCOUNTER
Patient returned call - states he is doing well and is following-up with cardiology. Pt does not wish to schedule an appt in our office at this time. Pt will call office \"in the near future\" to schedule his annual visit with Dr. Charles Sy.

## 2017-07-18 NOTE — TELEPHONE ENCOUNTER
Spoke to pt for TCM. Pt does not have HFU appt scheduled at this time. TCM/HFU appt recommended by 7/29/17 as pt is a moderate risk for readmission. Please advise.       TRIAGE: Please f/u with pt and try to get him to schedule as he would greatly benefi

## 2017-07-18 NOTE — PROGRESS NOTES
Initial Post Discharge Follow Up   Discharge Date: 7/15/17  Contact Date: 7/18/2017    Consent Verification:  Assessment Completed With: Patient  HIPAA Verified? Yes    Discharge Dx:   Afib, s/p watchman device placement    General:   • How have you bee TWICE DAILY Disp: 180 capsule Rfl: 3   AmLODIPine Besylate 5 MG Oral Tab Take 1 tablet (5 mg total) by mouth once daily.  (Patient taking differently: Take 2.5 mg by mouth once daily.  ) Disp: 90 tablet Rfl: 3   Potassium Chloride ER 20 MEQ Oral Tab CR Take prepared as this procedure was scheduled awhile ago. Appointments Scheduled:    PCP in one week:   no   Cardiologist 2 weeks yes    Chest Xray 7 days post d/c no- n/a per pt.        Needs post D/C:   Now that you are home, are there any needs or concer patient     Yes     Overall Rating: How would you rate the care you received while in the hospital?  excellent        Interventions by NCM: All d/c instructions reviewed with pt. Reviewed when to call MD vs when to go to ER/call 911.   Pt states INR was

## 2017-07-18 NOTE — TELEPHONE ENCOUNTER
Addendum from gated CT  Survey images through visualized lung parenchyma utilizing lung windows demonstrate bibasilar dependent changes. Small, 3 mm, right lower lobe nodule is nonspecific. Followup as clinically indicated.  Anterior osteophytes throughout

## 2017-07-19 ENCOUNTER — PRIOR ORIGINAL RECORDS (OUTPATIENT)
Dept: OTHER | Age: 72
End: 2017-07-19

## 2017-07-22 DIAGNOSIS — E87.6 HYPOKALEMIA: ICD-10-CM

## 2017-07-24 ENCOUNTER — LAB ENCOUNTER (OUTPATIENT)
Dept: LAB | Facility: HOSPITAL | Age: 72
End: 2017-07-24
Attending: INTERNAL MEDICINE
Payer: MEDICARE

## 2017-07-24 DIAGNOSIS — D69.3 IMMUNE THROMBOCYTOPENIA (HCC): ICD-10-CM

## 2017-07-24 DIAGNOSIS — D69.6 THROMBOCYTOPENIA (HCC): ICD-10-CM

## 2017-07-24 LAB
BASOPHILS # BLD AUTO: 0.02 X10(3) UL (ref 0–0.1)
BASOPHILS NFR BLD AUTO: 0.5 %
EOSINOPHIL # BLD AUTO: 0.11 X10(3) UL (ref 0–0.3)
EOSINOPHIL NFR BLD AUTO: 2.5 %
ERYTHROCYTE [DISTWIDTH] IN BLOOD BY AUTOMATED COUNT: 14.1 % (ref 11.5–16)
HCT VFR BLD AUTO: 35.3 % (ref 37–53)
HGB BLD-MCNC: 11 G/DL (ref 13–17)
IMMATURE GRANULOCYTE COUNT: 0.03 X10(3) UL (ref 0–1)
IMMATURE GRANULOCYTE RATIO %: 0.7 %
LYMPHOCYTES # BLD AUTO: 0.97 X10(3) UL (ref 0.9–4)
LYMPHOCYTES NFR BLD AUTO: 22.5 %
MCH RBC QN AUTO: 31.7 PG (ref 27–33.2)
MCHC RBC AUTO-ENTMCNC: 31.2 G/DL (ref 31–37)
MCV RBC AUTO: 101.7 FL (ref 80–99)
MONOCYTES # BLD AUTO: 0.72 X10(3) UL (ref 0.1–0.6)
MONOCYTES NFR BLD AUTO: 16.7 %
NEUTROPHIL ABS PRELIM: 2.47 X10 (3) UL (ref 1.3–6.7)
NEUTROPHILS # BLD AUTO: 2.47 X10(3) UL (ref 1.3–6.7)
NEUTROPHILS NFR BLD AUTO: 57.1 %
PLATELET # BLD AUTO: 67 10(3)UL (ref 150–450)
RBC # BLD AUTO: 3.47 X10(6)UL (ref 3.8–5.8)
RED CELL DISTRIBUTION WIDTH-SD: 51.7 FL (ref 35.1–46.3)
WBC # BLD AUTO: 4.3 X10(3) UL (ref 4–13)

## 2017-07-24 PROCEDURE — 36415 COLL VENOUS BLD VENIPUNCTURE: CPT

## 2017-07-24 PROCEDURE — 85025 COMPLETE CBC W/AUTO DIFF WBC: CPT

## 2017-07-24 RX ORDER — POTASSIUM CHLORIDE 20 MEQ/1
TABLET, EXTENDED RELEASE ORAL
Qty: 270 TABLET | Refills: 3 | Status: SHIPPED | OUTPATIENT
Start: 2017-07-24 | End: 2018-05-16

## 2017-07-24 NOTE — TELEPHONE ENCOUNTER
Refill requested for Potassium. LOV 02/22/17 and BMP 07/06/17. Will you approve ? Routed to Dr Evie Nath.

## 2017-07-26 ENCOUNTER — PRIOR ORIGINAL RECORDS (OUTPATIENT)
Dept: OTHER | Age: 72
End: 2017-07-26

## 2017-07-26 ENCOUNTER — MYAURORA ACCOUNT LINK (OUTPATIENT)
Dept: OTHER | Age: 72
End: 2017-07-26

## 2017-07-31 ENCOUNTER — LAB ENCOUNTER (OUTPATIENT)
Dept: LAB | Age: 72
End: 2017-07-31
Attending: INTERNAL MEDICINE
Payer: MEDICARE

## 2017-07-31 DIAGNOSIS — D69.1 THROMBOCYTOPATHIA (HCC): Primary | ICD-10-CM

## 2017-07-31 LAB
BASOPHILS # BLD AUTO: 0.02 X10(3) UL (ref 0–0.1)
BASOPHILS NFR BLD AUTO: 0.4 %
EOSINOPHIL # BLD AUTO: 0.17 X10(3) UL (ref 0–0.3)
EOSINOPHIL NFR BLD AUTO: 3.1 %
ERYTHROCYTE [DISTWIDTH] IN BLOOD BY AUTOMATED COUNT: 13.6 % (ref 11.5–16)
HCT VFR BLD AUTO: 37.5 % (ref 37–53)
HGB BLD-MCNC: 11.4 G/DL (ref 13–17)
IMMATURE GRANULOCYTE COUNT: 0.02 X10(3) UL (ref 0–1)
IMMATURE GRANULOCYTE RATIO %: 0.4 %
LYMPHOCYTES # BLD AUTO: 1.3 X10(3) UL (ref 0.9–4)
LYMPHOCYTES NFR BLD AUTO: 23.9 %
MCH RBC QN AUTO: 31 PG (ref 27–33.2)
MCHC RBC AUTO-ENTMCNC: 30.4 G/DL (ref 31–37)
MCV RBC AUTO: 101.9 FL (ref 80–99)
MONOCYTES # BLD AUTO: 0.72 X10(3) UL (ref 0.1–0.6)
MONOCYTES NFR BLD AUTO: 13.2 %
NEUTROPHIL ABS PRELIM: 3.22 X10 (3) UL (ref 1.3–6.7)
NEUTROPHILS # BLD AUTO: 3.22 X10(3) UL (ref 1.3–6.7)
NEUTROPHILS NFR BLD AUTO: 59 %
PLATELET # BLD AUTO: 58 10(3)UL (ref 150–450)
RBC # BLD AUTO: 3.68 X10(6)UL (ref 3.8–5.8)
RED CELL DISTRIBUTION WIDTH-SD: 50.4 FL (ref 35.1–46.3)
WBC # BLD AUTO: 5.5 X10(3) UL (ref 4–13)

## 2017-07-31 PROCEDURE — 85025 COMPLETE CBC W/AUTO DIFF WBC: CPT

## 2017-07-31 PROCEDURE — 36415 COLL VENOUS BLD VENIPUNCTURE: CPT

## 2017-08-03 LAB
BUN: 18 MG/DL
CALCIUM: 8.3 MG/DL
CHLORIDE: 106 MEQ/L
CREATININE, SERUM: 1.14 MG/DL
GLUCOSE: 101 MG/DL
POTASSIUM, SERUM: 4.7 MEQ/L
SODIUM: 139 MEQ/L

## 2017-08-05 ENCOUNTER — OFFICE VISIT (OUTPATIENT)
Dept: FAMILY MEDICINE CLINIC | Facility: CLINIC | Age: 72
End: 2017-08-05

## 2017-08-05 VITALS
SYSTOLIC BLOOD PRESSURE: 126 MMHG | WEIGHT: 197 LBS | RESPIRATION RATE: 14 BRPM | HEART RATE: 70 BPM | BODY MASS INDEX: 27 KG/M2 | TEMPERATURE: 98 F | DIASTOLIC BLOOD PRESSURE: 82 MMHG

## 2017-08-05 DIAGNOSIS — M35.1 MCTD (MIXED CONNECTIVE TISSUE DISEASE) (HCC): Primary | Chronic | ICD-10-CM

## 2017-08-05 DIAGNOSIS — R91.1 INCIDENTAL LUNG NODULE, LESS THAN OR EQUAL TO 3MM: ICD-10-CM

## 2017-08-05 DIAGNOSIS — I10 BENIGN ESSENTIAL HYPERTENSION: ICD-10-CM

## 2017-08-05 DIAGNOSIS — D64.9 ANEMIA, UNSPECIFIED: ICD-10-CM

## 2017-08-05 DIAGNOSIS — R53.83 OTHER FATIGUE: ICD-10-CM

## 2017-08-05 PROCEDURE — 99214 OFFICE O/P EST MOD 30 MIN: CPT | Performed by: FAMILY MEDICINE

## 2017-08-05 NOTE — PROGRESS NOTES
Patient presents with:  Test Results: pt is here to discuss CT results       HPI:   This is a 74year old male coming in for abnl CT scan, fatigue xx 8 months, and low HGB last month with baseline 14, now 11.0 and back to 12.5     Severe fatigue, lowet poi Allergic/Immunologic: Negative for immunocompromised state. Neurological: Negative. Negative for dizziness, weakness and numbness. Hematological: Negative for adenopathy.         EXAM:   /82   Pulse 70   Temp 98.1 °F (36.7 °C)   Resp 14   Wt 19 stable. Incidental lung nodules less than 3 mm are seen in the recent exam but he will be getting another one before the end of the month and will continue to follow this.   Explained that these are not unusual and if they stay less than 3 mm will continue

## 2017-08-05 NOTE — PATIENT INSTRUCTIONS
Future Appointments  Date Time Provider Chrissie Aden   8/31/2017 9:30 AM [facility] CT MAIN RM2 [facility] CT [facility] AT [facility]   9/27/2017 9:45 AM Hernandez Ponce MD [facility] HEM [address]   5/34/4831 06:50 AM Brady Chow MD [facility] EMG Yvonne Khan   1/16/2018 10:20

## 2017-08-05 NOTE — ASSESSMENT & PLAN NOTE
Recently off of prednisone, changes in energy and changes in hemoglobin may be related to this.   Continue to follow with Dr. Monik Patel

## 2017-08-15 ENCOUNTER — LAB ENCOUNTER (OUTPATIENT)
Dept: LAB | Age: 72
End: 2017-08-15
Attending: INTERNAL MEDICINE
Payer: MEDICARE

## 2017-08-15 DIAGNOSIS — D69.6 THROMBOCYTOPENIA (HCC): ICD-10-CM

## 2017-08-15 DIAGNOSIS — D69.6 THROMBOCYTOPENIA (HCC): Primary | ICD-10-CM

## 2017-08-15 LAB
BASOPHILS # BLD AUTO: 0.04 X10(3) UL (ref 0–0.1)
BASOPHILS NFR BLD AUTO: 0.8 %
EOSINOPHIL # BLD AUTO: 0.14 X10(3) UL (ref 0–0.3)
EOSINOPHIL NFR BLD AUTO: 2.8 %
ERYTHROCYTE [DISTWIDTH] IN BLOOD BY AUTOMATED COUNT: 12.9 % (ref 11.5–16)
HCT VFR BLD AUTO: 42.3 % (ref 37–53)
HGB BLD-MCNC: 12.6 G/DL (ref 13–17)
IMMATURE GRANULOCYTE COUNT: 0.02 X10(3) UL (ref 0–1)
IMMATURE GRANULOCYTE RATIO %: 0.4 %
LYMPHOCYTES # BLD AUTO: 1.27 X10(3) UL (ref 0.9–4)
LYMPHOCYTES NFR BLD AUTO: 25.2 %
MCH RBC QN AUTO: 30.2 PG (ref 27–33.2)
MCHC RBC AUTO-ENTMCNC: 29.8 G/DL (ref 31–37)
MCV RBC AUTO: 101.4 FL (ref 80–99)
MONOCYTES # BLD AUTO: 0.76 X10(3) UL (ref 0.1–0.6)
MONOCYTES NFR BLD AUTO: 15.1 %
NEUTROPHIL ABS PRELIM: 2.81 X10 (3) UL (ref 1.3–6.7)
NEUTROPHILS # BLD AUTO: 2.81 X10(3) UL (ref 1.3–6.7)
NEUTROPHILS NFR BLD AUTO: 55.7 %
PLATELET # BLD AUTO: 60 10(3)UL (ref 150–450)
RBC # BLD AUTO: 4.17 X10(6)UL (ref 3.8–5.8)
RED CELL DISTRIBUTION WIDTH-SD: 48.5 FL (ref 35.1–46.3)
WBC # BLD AUTO: 5 X10(3) UL (ref 4–13)

## 2017-08-15 PROCEDURE — 36415 COLL VENOUS BLD VENIPUNCTURE: CPT

## 2017-08-15 PROCEDURE — 85025 COMPLETE CBC W/AUTO DIFF WBC: CPT

## 2017-08-17 DIAGNOSIS — K22.70 BARRETT'S ESOPHAGUS WITHOUT DYSPLASIA: ICD-10-CM

## 2017-08-18 RX ORDER — OMEPRAZOLE 20 MG/1
CAPSULE, DELAYED RELEASE ORAL
Qty: 180 CAPSULE | Refills: 1 | Status: SHIPPED | OUTPATIENT
Start: 2017-08-18 | End: 2018-01-24

## 2017-08-31 ENCOUNTER — HOSPITAL ENCOUNTER (OUTPATIENT)
Dept: CT IMAGING | Facility: HOSPITAL | Age: 72
Discharge: HOME | End: 2017-08-31
Attending: INTERNAL MEDICINE
Payer: MEDICARE

## 2017-08-31 DIAGNOSIS — I25.10 CAD (CORONARY ARTERY DISEASE): ICD-10-CM

## 2017-08-31 DIAGNOSIS — I71.2 ANEURYSM, AORTA, THORACIC (HCC): ICD-10-CM

## 2017-08-31 DIAGNOSIS — I50.1 LVF (LEFT VENTRICULAR FAILURE) (HCC): ICD-10-CM

## 2017-08-31 PROCEDURE — 75572 CT HRT W/3D IMAGE: CPT | Performed by: INTERNAL MEDICINE

## 2017-09-05 ENCOUNTER — LABORATORY ENCOUNTER (OUTPATIENT)
Dept: LAB | Age: 72
End: 2017-09-05
Attending: INTERNAL MEDICINE
Payer: MEDICARE

## 2017-09-05 DIAGNOSIS — D69.6 THROMBOCYTOPENIA (HCC): ICD-10-CM

## 2017-09-05 LAB
BASOPHILS # BLD AUTO: 0.03 X10(3) UL (ref 0–0.1)
BASOPHILS NFR BLD AUTO: 0.7 %
EOSINOPHIL # BLD AUTO: 0.13 X10(3) UL (ref 0–0.3)
EOSINOPHIL NFR BLD AUTO: 2.9 %
ERYTHROCYTE [DISTWIDTH] IN BLOOD BY AUTOMATED COUNT: 13 % (ref 11.5–16)
HCT VFR BLD AUTO: 41.9 % (ref 37–53)
HGB BLD-MCNC: 12.5 G/DL (ref 13–17)
IMMATURE GRANULOCYTE COUNT: 0.01 X10(3) UL (ref 0–1)
IMMATURE GRANULOCYTE RATIO %: 0.2 %
LYMPHOCYTES # BLD AUTO: 0.93 X10(3) UL (ref 0.9–4)
LYMPHOCYTES NFR BLD AUTO: 20.9 %
MCH RBC QN AUTO: 29.7 PG (ref 27–33.2)
MCHC RBC AUTO-ENTMCNC: 29.8 G/DL (ref 31–37)
MCV RBC AUTO: 99.5 FL (ref 80–99)
MONOCYTES # BLD AUTO: 0.65 X10(3) UL (ref 0.1–0.6)
MONOCYTES NFR BLD AUTO: 14.6 %
NEUTROPHIL ABS PRELIM: 2.69 X10 (3) UL (ref 1.3–6.7)
NEUTROPHILS # BLD AUTO: 2.69 X10(3) UL (ref 1.3–6.7)
NEUTROPHILS NFR BLD AUTO: 60.7 %
PLATELET # BLD AUTO: 51 10(3)UL (ref 150–450)
RBC # BLD AUTO: 4.21 X10(6)UL (ref 3.8–5.8)
RED CELL DISTRIBUTION WIDTH-SD: 47.3 FL (ref 35.1–46.3)
WBC # BLD AUTO: 4.4 X10(3) UL (ref 4–13)

## 2017-09-05 PROCEDURE — 36415 COLL VENOUS BLD VENIPUNCTURE: CPT

## 2017-09-05 PROCEDURE — 85025 COMPLETE CBC W/AUTO DIFF WBC: CPT

## 2017-09-06 ENCOUNTER — TELEPHONE (OUTPATIENT)
Dept: INTERVENTIONAL RADIOLOGY/VASCULAR | Facility: HOSPITAL | Age: 72
End: 2017-09-06

## 2017-09-06 NOTE — TELEPHONE ENCOUNTER
Patient had CTA to assess Watchman device 8/31/17. Per Dr. Yulisa Underwood, acceptable findings for post-33mm Watchman placement - notified patient of findings, verbalizes understanding. He remains off of all anticoagulation per Dr. Manny Grossman.   Modified Kimberly score a

## 2017-09-13 ENCOUNTER — PRIOR ORIGINAL RECORDS (OUTPATIENT)
Dept: OTHER | Age: 72
End: 2017-09-13

## 2017-09-19 ENCOUNTER — LABORATORY ENCOUNTER (OUTPATIENT)
Dept: LAB | Age: 72
End: 2017-09-19
Attending: INTERNAL MEDICINE
Payer: MEDICARE

## 2017-09-19 DIAGNOSIS — D69.6 THROMBOCYTOPENIA (HCC): ICD-10-CM

## 2017-09-19 LAB
BASOPHILS # BLD AUTO: 0.02 X10(3) UL (ref 0–0.1)
BASOPHILS NFR BLD AUTO: 0.5 %
EOSINOPHIL # BLD AUTO: 0.11 X10(3) UL (ref 0–0.3)
EOSINOPHIL NFR BLD AUTO: 2.7 %
ERYTHROCYTE [DISTWIDTH] IN BLOOD BY AUTOMATED COUNT: 13.2 % (ref 11.5–16)
HCT VFR BLD AUTO: 43.8 % (ref 37–53)
HGB BLD-MCNC: 12.9 G/DL (ref 13–17)
IMMATURE GRANULOCYTE COUNT: 0.01 X10(3) UL (ref 0–1)
IMMATURE GRANULOCYTE RATIO %: 0.2 %
LYMPHOCYTES # BLD AUTO: 0.99 X10(3) UL (ref 0.9–4)
LYMPHOCYTES NFR BLD AUTO: 24.1 %
MCH RBC QN AUTO: 28.9 PG (ref 27–33.2)
MCHC RBC AUTO-ENTMCNC: 29.5 G/DL (ref 31–37)
MCV RBC AUTO: 98.2 FL (ref 80–99)
MONOCYTES # BLD AUTO: 0.67 X10(3) UL (ref 0.1–0.6)
MONOCYTES NFR BLD AUTO: 16.3 %
NEUTROPHIL ABS PRELIM: 2.31 X10 (3) UL (ref 1.3–6.7)
NEUTROPHILS # BLD AUTO: 2.31 X10(3) UL (ref 1.3–6.7)
NEUTROPHILS NFR BLD AUTO: 56.2 %
PLATELET # BLD AUTO: 58 10(3)UL (ref 150–450)
RBC # BLD AUTO: 4.46 X10(6)UL (ref 3.8–5.8)
RED CELL DISTRIBUTION WIDTH-SD: 47.4 FL (ref 35.1–46.3)
WBC # BLD AUTO: 4.1 X10(3) UL (ref 4–13)

## 2017-09-19 PROCEDURE — 85025 COMPLETE CBC W/AUTO DIFF WBC: CPT

## 2017-09-19 PROCEDURE — 36415 COLL VENOUS BLD VENIPUNCTURE: CPT

## 2017-09-27 ENCOUNTER — OFFICE VISIT (OUTPATIENT)
Dept: HEMATOLOGY/ONCOLOGY | Facility: HOSPITAL | Age: 72
End: 2017-09-27
Attending: INTERNAL MEDICINE
Payer: MEDICARE

## 2017-09-27 ENCOUNTER — PRIOR ORIGINAL RECORDS (OUTPATIENT)
Dept: OTHER | Age: 72
End: 2017-09-27

## 2017-09-27 VITALS
HEIGHT: 72.01 IN | OXYGEN SATURATION: 98 % | TEMPERATURE: 98 F | BODY MASS INDEX: 26.36 KG/M2 | DIASTOLIC BLOOD PRESSURE: 79 MMHG | WEIGHT: 194.63 LBS | HEART RATE: 84 BPM | RESPIRATION RATE: 18 BRPM | SYSTOLIC BLOOD PRESSURE: 130 MMHG

## 2017-09-27 DIAGNOSIS — D69.6 THROMBOCYTOPENIA (HCC): ICD-10-CM

## 2017-09-27 DIAGNOSIS — D69.3 IMMUNE THROMBOCYTOPENIA (HCC): ICD-10-CM

## 2017-09-27 PROCEDURE — 99214 OFFICE O/P EST MOD 30 MIN: CPT | Performed by: INTERNAL MEDICINE

## 2017-09-27 NOTE — PROGRESS NOTES
Cancer Center Progress Note    Patient Name: Mirian Pritchard   YOB: 1945   Medical Record Number: [id number]   CSN: [id number]   Attending Physician: Khai Polanco M.D.    Referring Physician: MD Dr. Bill Bolton Dr., Ciro Disp: 90 tablet, Rfl: 1  •  Metoprolol Succinate  MG Oral Tablet 24 Hr, Take 100 mg by mouth daily. , Disp: , Rfl:   •  BENAZEPRIL HCL 40 MG Oral Tab, TAKE 1 TABLET BY MOUTH DAILY, Disp: 90 tablet, Rfl: 0  •  Methylcellulose, Laxative, (CITRUCEL) Oral TONSILLECTOMY  10/2003 2006 01/2010 , 5/13: UPPER GI ENDOSCOPY,EXAM      Comment: x3    Family Medical History:  Family History   Problem Relation Age of Onset   • Heart Disease Father      CHF   • Gastro-Intestinal Disorder Father      Diverticulosis   • Results  Component Value Date   WBC 4.5 09/27/2017   RBC 4.36 09/27/2017   HGB 12.5 (L) 09/27/2017   HCT 40.2 09/27/2017   MCV 92.2 09/27/2017   MCH 28.7 09/27/2017   MCHC 31.1 09/27/2017   RDW 13.3 09/27/2017   PLT 53.0 (L) 09/27/2017       Lab Results  C

## 2017-10-15 DIAGNOSIS — I10 ESSENTIAL HYPERTENSION, BENIGN: ICD-10-CM

## 2017-10-16 RX ORDER — BENAZEPRIL HYDROCHLORIDE 40 MG/1
TABLET, FILM COATED ORAL
Qty: 90 TABLET | Refills: 0 | Status: SHIPPED | OUTPATIENT
Start: 2017-10-16 | End: 2017-11-16

## 2017-10-23 ENCOUNTER — LAB ENCOUNTER (OUTPATIENT)
Dept: LAB | Facility: HOSPITAL | Age: 72
End: 2017-10-23
Attending: INTERNAL MEDICINE
Payer: MEDICARE

## 2017-10-23 ENCOUNTER — PRIOR ORIGINAL RECORDS (OUTPATIENT)
Dept: OTHER | Age: 72
End: 2017-10-23

## 2017-10-23 DIAGNOSIS — D69.6 THROMBOCYTOPENIA (HCC): ICD-10-CM

## 2017-10-23 DIAGNOSIS — D69.3 IMMUNE THROMBOCYTOPENIA (HCC): ICD-10-CM

## 2017-10-23 PROCEDURE — 36415 COLL VENOUS BLD VENIPUNCTURE: CPT

## 2017-10-23 PROCEDURE — 85025 COMPLETE CBC W/AUTO DIFF WBC: CPT

## 2017-10-25 ENCOUNTER — PRIOR ORIGINAL RECORDS (OUTPATIENT)
Dept: OTHER | Age: 72
End: 2017-10-25

## 2017-11-07 ENCOUNTER — OFFICE VISIT (OUTPATIENT)
Dept: SLEEP CENTER | Facility: HOSPITAL | Age: 72
End: 2017-11-07
Attending: INTERNAL MEDICINE
Payer: MEDICARE

## 2017-11-07 PROCEDURE — 95806 SLEEP STUDY UNATT&RESP EFFT: CPT

## 2017-11-09 LAB
ALBUMIN: 3.6 G/DL
ALKALINE PHOSPHATATE(ALK PHOS): 58 IU/L
BILIRUBIN TOTAL: 0.5 MG/DL
BUN: 20 MG/DL
CALCIUM: 8.9 MG/DL
CHLORIDE: 109 MEQ/L
CREATININE, SERUM: 1.12 MG/DL
GLUCOSE: 81 MG/DL
HEMATOCRIT: 40.2 %
HEMATOCRIT: 40.3 %
HEMOGLOBIN: 112.1 G/DL
HEMOGLOBIN: 12.5 G/DL
PLATELETS: 43 K/UL
PLATELETS: 53 K/UL
POTASSIUM, SERUM: 4.3 MEQ/L
PROTEIN, TOTAL: 7.2 G/DL
RED BLOOD COUNT: 4.34 X 10-6/U
RED BLOOD COUNT: 4.36 X 10-6/U
SGOT (AST): 14 IU/L
SGPT (ALT): 21 IU/L
SODIUM: 141 MEQ/L
WHITE BLOOD COUNT: 4.1 X 10-3/U
WHITE BLOOD COUNT: 4.5 X 10-3/U

## 2017-11-10 NOTE — PROCEDURES
[address]       Accredited by the [facility] of [facility] ([facility])    PATIENT'S NAME:        Panchito Salazar  ATTENDING PHYSICIAN:   Grecia Daly M.D. REFERRING PHYSICIAN:   Aimee Bravo M.D.   P Annelise Piña M.D.  d: 11/10/2017 11:18:15  t: 11/10/2017 12:41:27  [facility] [id number]  FRANCIE/    cc: Bonnell Boeck, M.D.

## 2017-11-16 PROBLEM — G47.33 OBSTRUCTIVE SLEEP APNEA (ADULT) (PEDIATRIC): Status: ACTIVE | Noted: 2017-11-16

## 2017-11-16 PROBLEM — R53.83 FATIGUE, UNSPECIFIED TYPE: Status: ACTIVE | Noted: 2017-11-16

## 2017-11-19 DIAGNOSIS — I10 BENIGN ESSENTIAL HYPERTENSION: ICD-10-CM

## 2017-11-22 RX ORDER — SPIRONOLACTONE 25 MG/1
TABLET ORAL
Qty: 90 TABLET | Refills: 0 | Status: SHIPPED | OUTPATIENT
Start: 2017-11-22 | End: 2018-01-24

## 2017-11-22 NOTE — TELEPHONE ENCOUNTER
Refill request sent from pharmacy for [address] [place] [address] Rd. LOV 08/05/17 and CMP 9/27/17. Will you approve ? Routed to Dr Diego Garces.

## 2017-11-29 ENCOUNTER — HOSPITAL ENCOUNTER (OUTPATIENT)
Age: 72
Discharge: HOME | End: 2017-11-29
Attending: EMERGENCY MEDICINE
Payer: MEDICARE

## 2017-11-29 VITALS
TEMPERATURE: 98 F | RESPIRATION RATE: 18 BRPM | WEIGHT: 188 LBS | SYSTOLIC BLOOD PRESSURE: 124 MMHG | HEART RATE: 78 BPM | DIASTOLIC BLOOD PRESSURE: 88 MMHG | OXYGEN SATURATION: 98 % | HEIGHT: 72 IN | BODY MASS INDEX: 25.47 KG/M2

## 2017-11-29 DIAGNOSIS — J20.8 VIRAL BRONCHITIS: Primary | ICD-10-CM

## 2017-11-29 PROCEDURE — 99203 OFFICE O/P NEW LOW 30 MIN: CPT

## 2017-11-29 PROCEDURE — 99213 OFFICE O/P EST LOW 20 MIN: CPT

## 2017-11-29 RX ORDER — BENZONATATE 200 MG/1
200 CAPSULE ORAL 3 TIMES DAILY PRN
Qty: 30 CAPSULE | Refills: 0 | Status: SHIPPED | OUTPATIENT
Start: 2017-11-29 | End: 2017-12-29

## 2017-11-29 RX ORDER — ALBUTEROL SULFATE 90 UG/1
2 AEROSOL, METERED RESPIRATORY (INHALATION) EVERY 4 HOURS PRN
Qty: 1 INHALER | Refills: 0 | Status: SHIPPED | OUTPATIENT
Start: 2017-11-29 | End: 2017-12-29

## 2017-11-29 NOTE — ED PROVIDER NOTES
Patient presents with:  Cough/URI    HPI:     Deric Darnell is a 74year old male who presents with chief complaint of cough. Started yesterday afternoon/evening.   \"It feels bronchial.\"  States he has had bronchitis in the past and this feels simila care - robitussin, honey  3. F/u with PCP in 7 days as scheduled for re-evaluation, sooner if symptoms worsen      All results reviewed and discussed with patient. See AVS for detailed discharge instructions.

## 2017-12-01 ENCOUNTER — TELEPHONE (OUTPATIENT)
Dept: FAMILY MEDICINE CLINIC | Facility: CLINIC | Age: 72
End: 2017-12-01

## 2017-12-01 NOTE — TELEPHONE ENCOUNTER
Patient changed his follow up with Dr. Jamal Umana to a Medicare Annual Wellness per Lissy's request. Completed health assessment form in triage.

## 2017-12-04 ENCOUNTER — LABORATORY ENCOUNTER (OUTPATIENT)
Dept: LAB | Age: 72
End: 2017-12-04
Attending: INTERNAL MEDICINE
Payer: MEDICARE

## 2017-12-04 DIAGNOSIS — D69.3 IMMUNE THROMBOCYTOPENIA (HCC): ICD-10-CM

## 2017-12-04 DIAGNOSIS — D69.6 THROMBOCYTOPENIA (HCC): ICD-10-CM

## 2017-12-04 PROCEDURE — 36415 COLL VENOUS BLD VENIPUNCTURE: CPT

## 2017-12-04 PROCEDURE — 85025 COMPLETE CBC W/AUTO DIFF WBC: CPT

## 2017-12-06 ENCOUNTER — OFFICE VISIT (OUTPATIENT)
Dept: FAMILY MEDICINE CLINIC | Facility: CLINIC | Age: 72
End: 2017-12-06

## 2017-12-06 VITALS
HEART RATE: 78 BPM | TEMPERATURE: 97 F | RESPIRATION RATE: 16 BRPM | WEIGHT: 194 LBS | HEIGHT: 70.5 IN | SYSTOLIC BLOOD PRESSURE: 130 MMHG | DIASTOLIC BLOOD PRESSURE: 80 MMHG | BODY MASS INDEX: 27.46 KG/M2

## 2017-12-06 DIAGNOSIS — M35.1 MCTD (MIXED CONNECTIVE TISSUE DISEASE) (HCC): Chronic | ICD-10-CM

## 2017-12-06 DIAGNOSIS — I48.91 ATRIAL FIBRILLATION, UNSPECIFIED TYPE (HCC): Primary | ICD-10-CM

## 2017-12-06 DIAGNOSIS — I10 BENIGN ESSENTIAL HYPERTENSION: ICD-10-CM

## 2017-12-06 DIAGNOSIS — D69.6 THROMBOCYTOPENIA (HCC): ICD-10-CM

## 2017-12-06 DIAGNOSIS — Z00.00 ENCOUNTER FOR ANNUAL HEALTH EXAMINATION: ICD-10-CM

## 2017-12-06 DIAGNOSIS — Z23 NEED FOR VACCINATION: ICD-10-CM

## 2017-12-06 DIAGNOSIS — K75.4: ICD-10-CM

## 2017-12-06 PROBLEM — G47.33 OSA (OBSTRUCTIVE SLEEP APNEA): Status: ACTIVE | Noted: 2017-11-16

## 2017-12-06 PROCEDURE — G0009 ADMIN PNEUMOCOCCAL VACCINE: HCPCS | Performed by: FAMILY MEDICINE

## 2017-12-06 PROCEDURE — 90732 PPSV23 VACC 2 YRS+ SUBQ/IM: CPT | Performed by: FAMILY MEDICINE

## 2017-12-06 PROCEDURE — G0439 PPPS, SUBSEQ VISIT: HCPCS | Performed by: FAMILY MEDICINE

## 2017-12-06 NOTE — PATIENT INSTRUCTIONS
[facility] SCREENING SCHEDULE   Tests on this list are recommended by your physician but may not be covered, or covered at this frequency, by your insurer. Please check with your insurance carrier before scheduling to verify coverage.     PREVENT Men who are 73-68 years old and have smoked more than 100 cigarettes in their lifetime   • Anyone with a family history    Colorectal Cancer Screening Covered up to Age 76     Colonoscopy Screen   Covered every 10 years- more often if abnormal Colonoscopy, virus carrier   Homosexual men   Illicit injectable drug abusers     Tetanus Toxoid- Only covered with a cut with metal- TD and TDaP Not covered by Medicare Part B) No orders found for this or any previous visit.  This may be covered with your prescription

## 2017-12-06 NOTE — PROGRESS NOTES
HPI:   Vinnie Krueger is a 74year old male who presents for a Medicare Subsequent Annual Wellness visit (Pt already had Initial Annual Wellness).     Overall doing well, very stable despite his autoimmune condition and history  His last annual assessmen (ONCOLOGY)  Ngozi Rodríguez (Hepatology)    Patient Active Problem List:     Personal history of other malignant neoplasm of skin     Hypopotassemia     Ramon's esophagus     Gout     ED (erectile dysfunction)     Benign essential hypertension     Low HD MOUTH DAILY   OMEPRAZOLE 20 MG Oral Capsule Delayed Release TAKE 1 CAPSULE BY MOUTH TWICE DAILY   POTASSIUM CHLORIDE ER 20 MEQ Oral Tab CR TAKE 1 TABLET(20 MEQ) BY MOUTH THREE TIMES DAILY   predniSONE 5 MG Oral Tab PRN   Fexofenadine HCl 180 MG Oral Tab Ta that he quit smoking about 44 years ago. He has never used smokeless tobacco. He reports that he drinks about 4.8 - 6.0 oz of alcohol per week . He reports that he does not use drugs. REVIEW OF SYSTEMS:   Review of Systems   Constitutional: Negative. Uvula is midline, oropharynx is clear and moist and mucous membranes are normal. No oropharyngeal exudate. Eyes: Conjunctivae and EOM are normal. Pupils are equal, round, and reactive to light. Neck: Trachea normal and normal range of motion.  Neck supp ASSESSMENT AND OTHER RELEVANT CHRONIC CONDITIONS:   Quoc Ahumada is a 74year old male who presents for a Medicare Assessment.      PLAN SUMMARY:   Diagnoses and all orders for this visit:    Atrial fibrillation, unspecified type ([facility] [address]    Encounter would you describe your current health state?: Good  How do you maintain positive mental well-being?: Social Interaction    This section provided for quick review of chart, separate sheet to patient  [address] Internal the mentally retarded   Persons who live in the same house as a HepB virus carrier   Homosexual men   Illicit injectable drug abusers     Tetanus Toxoid  Only covered with a cut with metal- TD and TDaP Not covered by Medicare Part B) 01/01/1999 This may be

## 2017-12-27 ENCOUNTER — OFFICE VISIT (OUTPATIENT)
Dept: HEMATOLOGY/ONCOLOGY | Facility: HOSPITAL | Age: 72
End: 2017-12-27
Attending: INTERNAL MEDICINE
Payer: MEDICARE

## 2017-12-27 VITALS
SYSTOLIC BLOOD PRESSURE: 123 MMHG | HEART RATE: 76 BPM | WEIGHT: 193.63 LBS | HEIGHT: 72.01 IN | OXYGEN SATURATION: 99 % | DIASTOLIC BLOOD PRESSURE: 77 MMHG | RESPIRATION RATE: 18 BRPM | BODY MASS INDEX: 26.23 KG/M2 | TEMPERATURE: 99 F

## 2017-12-27 DIAGNOSIS — D69.6 THROMBOCYTOPENIA (HCC): ICD-10-CM

## 2017-12-27 DIAGNOSIS — D69.3 IMMUNE THROMBOCYTOPENIA (HCC): ICD-10-CM

## 2017-12-27 PROCEDURE — 99213 OFFICE O/P EST LOW 20 MIN: CPT | Performed by: INTERNAL MEDICINE

## 2017-12-27 NOTE — PROGRESS NOTES
Education Record    Learner:  Patient    Disease / Diagnosis: Immune-related thrombocytopenia    Barriers / Limitations:  None   Comments:    Method:  Brief focused and Reinforcement   Comments:    General Topics:  Plan of care reviewed   Comments:    Genoveva Hernández

## 2017-12-27 NOTE — PROGRESS NOTES
[facility] Progress Note    Patient Name: Kimberly Barfield   YOB: 1945   Medical Record Number: [id number]   CSN: [id number]   Attending Physician: Rashel Molina M.D.    Referring Physician: Jose Miguel Ahumada MD      Date of Visit: 12/27/2017 Delayed Release, TAKE 1 CAPSULE BY MOUTH TWICE DAILY, Disp: 180 capsule, Rfl: 1  •  POTASSIUM CHLORIDE ER 20 MEQ Oral Tab CR, TAKE 1 TABLET(20 MEQ) BY MOUTH THREE TIMES DAILY, Disp: 270 tablet, Rfl: 3  •  predniSONE 5 MG Oral Tab, PRN, Disp: 90 tablet, Rfl Comment: rectal surgery polypectomy  No date: SKIN SURGERY      Comment: MMS BCC R Druze 6/24/09  2007: SKIN SURGERY      Comment: basal ceell carcinoma  7-18-12: SKIN SURGERY      Comment: BCC-nodular to right superior eyebrow/ MOHS  07/15/2013: SKIN ROSANNE 9.6 oz)   SpO2 99%   BMI 26.25 kg/m²       Physical Examination:  General: Patient is alert and oriented x 3, not in acute distress. No bruising. Psych:  Mood and affect appropriate  HEENT: EOMs intact. PERRL. Oropharynx is clear. Neck: No JVD.  No palp

## 2018-01-11 ENCOUNTER — OFFICE VISIT (OUTPATIENT)
Dept: RHEUMATOLOGY | Facility: CLINIC | Age: 73
End: 2018-01-11

## 2018-01-11 VITALS — DIASTOLIC BLOOD PRESSURE: 62 MMHG | SYSTOLIC BLOOD PRESSURE: 124 MMHG | RESPIRATION RATE: 16 BRPM | HEART RATE: 72 BPM

## 2018-01-11 DIAGNOSIS — M35.1 MCTD (MIXED CONNECTIVE TISSUE DISEASE) (HCC): Primary | Chronic | ICD-10-CM

## 2018-01-11 DIAGNOSIS — K75.4: ICD-10-CM

## 2018-01-11 DIAGNOSIS — D69.6 THROMBOCYTOPENIA (HCC): ICD-10-CM

## 2018-01-11 DIAGNOSIS — M33.20 POLYMYOSITIS (HCC): Chronic | ICD-10-CM

## 2018-01-11 PROCEDURE — 99213 OFFICE O/P EST LOW 20 MIN: CPT | Performed by: INTERNAL MEDICINE

## 2018-01-11 RX ORDER — BENAZEPRIL HYDROCHLORIDE 40 MG/1
40 TABLET, FILM COATED ORAL DAILY
COMMUNITY
End: 2018-01-15

## 2018-01-11 NOTE — PROGRESS NOTES
[facility]  Dr. Monika Butts Progress Note     Subjective: Sania oLpez is a(n) 74year old male. Current complaints: Patient presents with:  Mixed Connective Tissue Disease: LOV 7/10/17. Pt stated no flare ups noted since last visit. Occasional shantelle

## 2018-01-11 NOTE — PATIENT INSTRUCTIONS
Use Prednisone 5 mg as needed when you have joint pain. See Dr Jennyfer Naylor for the low platelets. CBC every 4-6 weeks. Return to office 6 months.

## 2018-01-15 ENCOUNTER — TELEPHONE (OUTPATIENT)
Dept: INTERVENTIONAL RADIOLOGY/VASCULAR | Facility: HOSPITAL | Age: 73
End: 2018-01-15

## 2018-01-15 NOTE — TELEPHONE ENCOUNTER
Patient returned phone call. Six month post-WATCHMAN follow-up.   Patient states he has had no bleeding events, no cardiovascular or neurologic events since 45-day CTA in August.  He still struggles with low platelets, and has remained off of all anticoagu

## 2018-01-20 ENCOUNTER — HOSPITAL ENCOUNTER (OUTPATIENT)
Age: 73
Discharge: HOME | End: 2018-01-20
Attending: FAMILY MEDICINE
Payer: MEDICARE

## 2018-01-20 VITALS
BODY MASS INDEX: 25.19 KG/M2 | OXYGEN SATURATION: 99 % | RESPIRATION RATE: 16 BRPM | TEMPERATURE: 98 F | WEIGHT: 186 LBS | HEIGHT: 72 IN | HEART RATE: 76 BPM | SYSTOLIC BLOOD PRESSURE: 142 MMHG | DIASTOLIC BLOOD PRESSURE: 74 MMHG

## 2018-01-20 DIAGNOSIS — J06.9 UPPER RESPIRATORY TRACT INFECTION, UNSPECIFIED TYPE: Primary | ICD-10-CM

## 2018-01-20 PROCEDURE — 99214 OFFICE O/P EST MOD 30 MIN: CPT

## 2018-01-20 PROCEDURE — 99213 OFFICE O/P EST LOW 20 MIN: CPT

## 2018-01-20 RX ORDER — OSELTAMIVIR PHOSPHATE 75 MG/1
75 CAPSULE ORAL 2 TIMES DAILY
Qty: 10 CAPSULE | Refills: 0 | Status: SHIPPED | OUTPATIENT
Start: 2018-01-20 | End: 2018-01-25

## 2018-01-20 NOTE — ED INITIAL ASSESSMENT (HPI)
The patient is here with complaints of a dry cough that started yesterday. Denies any nausea, vomiting, fevers, chills, GI symptoms, or any other symptoms. He has taken Robitussin with some relief but then the cough returns, and Ricola cough drops.

## 2018-01-22 NOTE — ED PROVIDER NOTES
Patient Seen in: [facility]    History   Patient presents with:  Cough/URI    Stated Complaint: FLU [place]    HPI    74year old male presents for cough and congestion.  Patient presents for dry cou ENDOSCOPY,EXAM      Comment: x3    Family history reviewed and is not pertinent to presenting problem.     Smoking status: Former Smoker                                                              Packs/day: 0.25      Years: 15.00        Start date: 8/1/19 Labs Reviewed - No data to display    ED Course as of Jan 21 1948  ------------------------------------------------------------       MDM     Patients for URI symptoms, likely influenza. Advised to increase PO fluids, Tylenol or Motrin as needed.  Tamiflu

## 2018-01-24 DIAGNOSIS — I10 BENIGN ESSENTIAL HYPERTENSION: ICD-10-CM

## 2018-01-24 DIAGNOSIS — K22.70 BARRETT'S ESOPHAGUS WITHOUT DYSPLASIA: ICD-10-CM

## 2018-01-24 RX ORDER — SPIRONOLACTONE 25 MG/1
25 TABLET ORAL
Qty: 90 TABLET | Refills: 1 | Status: SHIPPED | OUTPATIENT
Start: 2018-01-24 | End: 2018-08-19

## 2018-01-24 RX ORDER — OMEPRAZOLE 20 MG/1
CAPSULE, DELAYED RELEASE ORAL
Qty: 180 CAPSULE | Refills: 1 | Status: SHIPPED | OUTPATIENT
Start: 2018-01-24 | End: 2018-08-10

## 2018-01-24 NOTE — TELEPHONE ENCOUNTER
Incoming fax received for [employer]. LOV 12/06/17 and labs 09/27/17. Will you approve pended refill ? Routed to Dr Tien Harvey.

## 2018-02-09 ENCOUNTER — APPOINTMENT (OUTPATIENT)
Dept: LAB | Age: 73
End: 2018-02-09
Attending: INTERNAL MEDICINE
Payer: MEDICARE

## 2018-02-09 LAB
BASOPHILS # BLD AUTO: 0.02 X10(3) UL (ref 0–0.1)
BASOPHILS NFR BLD AUTO: 0.5 %
EOSINOPHIL # BLD AUTO: 0.12 X10(3) UL (ref 0–0.3)
EOSINOPHIL NFR BLD AUTO: 3.2 %
ERYTHROCYTE [DISTWIDTH] IN BLOOD BY AUTOMATED COUNT: 14.6 % (ref 11.5–16)
HCT VFR BLD AUTO: 40.9 % (ref 37–53)
HGB BLD-MCNC: 12.2 G/DL (ref 13–17)
IMMATURE GRANULOCYTE COUNT: 0.02 X10(3) UL (ref 0–1)
IMMATURE GRANULOCYTE RATIO %: 0.5 %
LYMPHOCYTES # BLD AUTO: 1 X10(3) UL (ref 0.9–4)
LYMPHOCYTES NFR BLD AUTO: 27 %
MCH RBC QN AUTO: 29.3 PG (ref 27–33.2)
MCHC RBC AUTO-ENTMCNC: 29.8 G/DL (ref 31–37)
MCV RBC AUTO: 98.3 FL (ref 80–99)
MONOCYTES # BLD AUTO: 0.51 X10(3) UL (ref 0.1–1)
MONOCYTES NFR BLD AUTO: 13.8 %
NEUTROPHIL ABS PRELIM: 2.03 X10 (3) UL (ref 1.3–6.7)
NEUTROPHILS # BLD AUTO: 2.03 X10(3) UL (ref 1.3–6.7)
NEUTROPHILS NFR BLD AUTO: 55 %
PLATELET # BLD AUTO: 57 10(3)UL (ref 150–450)
RBC # BLD AUTO: 4.16 X10(6)UL (ref 3.8–5.8)
RED CELL DISTRIBUTION WIDTH-SD: 51.9 FL (ref 35.1–46.3)
WBC # BLD AUTO: 3.7 X10(3) UL (ref 4–13)

## 2018-02-09 PROCEDURE — 85025 COMPLETE CBC W/AUTO DIFF WBC: CPT

## 2018-02-09 PROCEDURE — 36415 COLL VENOUS BLD VENIPUNCTURE: CPT

## 2018-02-16 ENCOUNTER — PATIENT MESSAGE (OUTPATIENT)
Dept: FAMILY MEDICINE CLINIC | Facility: CLINIC | Age: 73
End: 2018-02-16

## 2018-02-16 NOTE — TELEPHONE ENCOUNTER
From: Karen Long  To: Chan Hull MD  Sent: 2/16/2018 11:34 AM CST  Subject: Non-Urgent Medical Question    Dr. Jocelyne Abdi, I continue to have a cough and some nasal discharge after getting the flu (at least that is what the urgent care doctor thought i

## 2018-02-18 ENCOUNTER — HOSPITAL ENCOUNTER (OUTPATIENT)
Age: 73
Discharge: HOME | End: 2018-02-18
Attending: FAMILY MEDICINE
Payer: MEDICARE

## 2018-02-18 ENCOUNTER — APPOINTMENT (OUTPATIENT)
Dept: GENERAL RADIOLOGY | Age: 73
End: 2018-02-18
Attending: FAMILY MEDICINE
Payer: MEDICARE

## 2018-02-18 VITALS
RESPIRATION RATE: 16 BRPM | BODY MASS INDEX: 25.33 KG/M2 | HEART RATE: 99 BPM | TEMPERATURE: 98 F | SYSTOLIC BLOOD PRESSURE: 143 MMHG | HEIGHT: 72 IN | DIASTOLIC BLOOD PRESSURE: 77 MMHG | OXYGEN SATURATION: 98 % | WEIGHT: 187 LBS

## 2018-02-18 DIAGNOSIS — J20.9 ACUTE BRONCHITIS, UNSPECIFIED ORGANISM: Primary | ICD-10-CM

## 2018-02-18 PROCEDURE — 99213 OFFICE O/P EST LOW 20 MIN: CPT

## 2018-02-18 PROCEDURE — 99214 OFFICE O/P EST MOD 30 MIN: CPT

## 2018-02-18 PROCEDURE — 71046 X-RAY EXAM CHEST 2 VIEWS: CPT | Performed by: FAMILY MEDICINE

## 2018-02-18 RX ORDER — ALBUTEROL SULFATE 90 UG/1
2 AEROSOL, METERED RESPIRATORY (INHALATION) EVERY 4 HOURS PRN
Qty: 1 INHALER | Refills: 6 | Status: SHIPPED | OUTPATIENT
Start: 2018-02-18 | End: 2018-02-28

## 2018-02-18 RX ORDER — DOXYCYCLINE 100 MG/1
100 CAPSULE ORAL 2 TIMES DAILY
Qty: 14 CAPSULE | Refills: 0 | Status: SHIPPED | OUTPATIENT
Start: 2018-02-18 | End: 2018-02-25

## 2018-02-18 NOTE — ED PROVIDER NOTES
Patient Seen in: [facility]    History   Patient presents with:  Cough/URI    Stated Complaint: cough, congestion, x 1 month    HPI    47-year-old male with history of A. 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 No wheezing  or crackles. No accessory muscle use. No respiratory distress. No tachypnea noted. No retractions noted.    Heart: S1, S2 normal, no murmur, click, rub or gallop, regular rate and rhythm  Abdomen: soft, non-tender; bowel soun am    Follow-up:  Nicki Inman MD  [address] [address] [place] [address] [phone]    In 1 week  For re-check        Medications Prescribed:  Discharge Medication List as of 2/18/2018 11:46 AM    START taking these medications    Doxycycline Mo

## 2018-02-18 NOTE — ED INITIAL ASSESSMENT (HPI)
Bronchitis in December, got better but then developed the flu in mid January. Still c/o productive cough and post nasal drip and states cough is getting worse. Denies fevers.

## 2018-03-28 ENCOUNTER — OFFICE VISIT (OUTPATIENT)
Dept: HEMATOLOGY/ONCOLOGY | Facility: HOSPITAL | Age: 73
End: 2018-03-28
Attending: INTERNAL MEDICINE
Payer: MEDICARE

## 2018-03-28 VITALS
BODY MASS INDEX: 26.19 KG/M2 | DIASTOLIC BLOOD PRESSURE: 84 MMHG | OXYGEN SATURATION: 100 % | WEIGHT: 193.38 LBS | SYSTOLIC BLOOD PRESSURE: 131 MMHG | HEIGHT: 72.01 IN | HEART RATE: 69 BPM | RESPIRATION RATE: 18 BRPM

## 2018-03-28 DIAGNOSIS — D69.3 IMMUNE THROMBOCYTOPENIA (HCC): ICD-10-CM

## 2018-03-28 DIAGNOSIS — D69.6 THROMBOCYTOPENIA (HCC): ICD-10-CM

## 2018-03-28 LAB
BASOPHILS # BLD AUTO: 0.02 X10(3) UL (ref 0–0.1)
BASOPHILS NFR BLD AUTO: 0.6 %
EOSINOPHIL # BLD AUTO: 0.07 X10(3) UL (ref 0–0.3)
EOSINOPHIL NFR BLD AUTO: 2.1 %
ERYTHROCYTE [DISTWIDTH] IN BLOOD BY AUTOMATED COUNT: 15.3 % (ref 11.5–16)
HCT VFR BLD AUTO: 40.6 % (ref 37–53)
HGB BLD-MCNC: 12.5 G/DL (ref 13–17)
IMMATURE GRANULOCYTE COUNT: 0.01 X10(3) UL (ref 0–1)
IMMATURE GRANULOCYTE RATIO %: 0.3 %
LYMPHOCYTES # BLD AUTO: 0.85 X10(3) UL (ref 0.9–4)
LYMPHOCYTES NFR BLD AUTO: 25.3 %
MCH RBC QN AUTO: 29.3 PG (ref 27–33.2)
MCHC RBC AUTO-ENTMCNC: 30.8 G/DL (ref 31–37)
MCV RBC AUTO: 95.3 FL (ref 80–99)
MONOCYTES # BLD AUTO: 0.51 X10(3) UL (ref 0.1–1)
MONOCYTES NFR BLD AUTO: 15.2 %
NEUTROPHIL ABS PRELIM: 1.9 X10 (3) UL (ref 1.3–6.7)
NEUTROPHILS # BLD AUTO: 1.9 X10(3) UL (ref 1.3–6.7)
NEUTROPHILS NFR BLD AUTO: 56.5 %
PLATELET # BLD AUTO: 51 10(3)UL (ref 150–450)
RBC # BLD AUTO: 4.26 X10(6)UL (ref 3.8–5.8)
RED CELL DISTRIBUTION WIDTH-SD: 53 FL (ref 35.1–46.3)
WBC # BLD AUTO: 3.4 X10(3) UL (ref 4–13)

## 2018-03-28 PROCEDURE — 99214 OFFICE O/P EST MOD 30 MIN: CPT | Performed by: INTERNAL MEDICINE

## 2018-03-28 RX ORDER — ACETAMINOPHEN 325 MG/1
650 TABLET ORAL ONCE
Status: CANCELLED | OUTPATIENT
Start: 2018-03-30 | End: 2018-03-30

## 2018-03-28 RX ORDER — DIPHENHYDRAMINE HYDROCHLORIDE 50 MG/ML
50 INJECTION INTRAMUSCULAR; INTRAVENOUS ONCE
Status: CANCELLED | OUTPATIENT
Start: 2018-03-30 | End: 2018-03-30

## 2018-03-28 RX ORDER — PREDNISONE 20 MG/1
20 TABLET ORAL DAILY
Qty: 120 TABLET | Refills: 1 | Status: SHIPPED | OUTPATIENT
Start: 2018-03-28 | End: 2018-06-04

## 2018-03-28 RX ORDER — DIPHENHYDRAMINE HYDROCHLORIDE 50 MG/ML
25 INJECTION INTRAMUSCULAR; INTRAVENOUS ONCE
Status: CANCELLED | OUTPATIENT
Start: 2018-03-30 | End: 2018-03-30

## 2018-03-28 RX ORDER — DIPHENHYDRAMINE HCL 25 MG
25 CAPSULE ORAL ONCE
Status: CANCELLED | OUTPATIENT
Start: 2018-03-30 | End: 2018-03-30

## 2018-03-28 NOTE — PROGRESS NOTES
[facility] Progress Note    Patient Name: Adilia Jean   YOB: 1945   Medical Record Number: [id number]   CSN: [id number]   Attending Physician: Dalia Campo M.D.    Referring Physician: Simon Olsen MD      Date of Visit: 3/28/2018 (2.5 mg total) by mouth daily.  Pt is currently using 2.5mg (a half a tab) (Patient taking differently: Take 2.5 mg by mouth daily.  ), Disp: 90 tablet, Rfl: 3  •  POTASSIUM CHLORIDE ER 20 MEQ Oral Tab CR, TAKE 1 TABLET(20 MEQ) BY MOUTH THREE TIMES DAILY (P History:  1970: APPENDECTOMY  10/2003 2006 01/2010: COLONOSCOPY      Comment: x3  5/14/2013: COLONOSCOPY  5/14/13: COLONOSCOPY WITH BIOPSY  2003: HAND/FINGER SURGERY UNLISTED      Comment: right  2017: OTHER SURGICAL HISTORY      Comment: watchman filter History Narrative   None on file         Allergies:    Amoxicillin             Rash  Augmentin [Amoclan]     Hives     Review of Systems:  A 14-point ROS was done with pertinent positives and negative per the HPI    Vital Signs:  /84 (BP Location: [facility] now and check his labs weekly. Prednisone was sent to his pharmacy. He will receive IVIG this week. He will have labs weekly. Will taper his steroids pending results. He was very comfortable with the plan.       Emotional Well Being:  I have assessed th

## 2018-03-30 ENCOUNTER — OFFICE VISIT (OUTPATIENT)
Dept: HEMATOLOGY/ONCOLOGY | Facility: HOSPITAL | Age: 73
End: 2018-03-30
Attending: INTERNAL MEDICINE
Payer: MEDICARE

## 2018-03-30 VITALS
OXYGEN SATURATION: 98 % | WEIGHT: 193 LBS | BODY MASS INDEX: 26.14 KG/M2 | SYSTOLIC BLOOD PRESSURE: 122 MMHG | TEMPERATURE: 98 F | RESPIRATION RATE: 19 BRPM | HEART RATE: 69 BPM | DIASTOLIC BLOOD PRESSURE: 72 MMHG | HEIGHT: 72.01 IN

## 2018-03-30 DIAGNOSIS — M32.19 OTHER SYSTEMIC LUPUS ERYTHEMATOSUS WITH OTHER ORGAN INVOLVEMENT (HCC): Primary | ICD-10-CM

## 2018-03-30 DIAGNOSIS — D69.6 THROMBOCYTOPENIA (HCC): ICD-10-CM

## 2018-03-30 PROCEDURE — 96365 THER/PROPH/DIAG IV INF INIT: CPT

## 2018-03-30 PROCEDURE — 96366 THER/PROPH/DIAG IV INF ADDON: CPT

## 2018-03-30 RX ORDER — DIPHENHYDRAMINE HCL 25 MG
25 CAPSULE ORAL ONCE
Status: CANCELLED | OUTPATIENT
Start: 2018-03-30 | End: 2018-03-30

## 2018-03-30 RX ORDER — DIPHENHYDRAMINE HCL 25 MG
25 CAPSULE ORAL ONCE
Status: DISCONTINUED | OUTPATIENT
Start: 2018-03-30 | End: 2018-03-30

## 2018-03-30 RX ORDER — DIPHENHYDRAMINE HYDROCHLORIDE 50 MG/ML
25 INJECTION INTRAMUSCULAR; INTRAVENOUS ONCE
Status: COMPLETED | OUTPATIENT
Start: 2018-03-30 | End: 2018-03-30

## 2018-03-30 RX ORDER — ACETAMINOPHEN 325 MG/1
650 TABLET ORAL ONCE
Status: CANCELLED | OUTPATIENT
Start: 2018-03-30 | End: 2018-03-30

## 2018-03-30 RX ORDER — DIPHENHYDRAMINE HYDROCHLORIDE 50 MG/ML
50 INJECTION INTRAMUSCULAR; INTRAVENOUS ONCE
Status: CANCELLED | OUTPATIENT
Start: 2018-03-30 | End: 2018-03-30

## 2018-03-30 RX ORDER — ACETAMINOPHEN 325 MG/1
650 TABLET ORAL ONCE
Status: COMPLETED | OUTPATIENT
Start: 2018-03-30 | End: 2018-03-30

## 2018-03-30 RX ORDER — DIPHENHYDRAMINE HYDROCHLORIDE 50 MG/ML
25 INJECTION INTRAMUSCULAR; INTRAVENOUS ONCE
Status: CANCELLED | OUTPATIENT
Start: 2018-03-30 | End: 2018-03-30

## 2018-03-30 RX ADMIN — DIPHENHYDRAMINE HYDROCHLORIDE 25 MG: 50 INJECTION INTRAMUSCULAR; INTRAVENOUS at 08:47:00

## 2018-03-30 RX ADMIN — ACETAMINOPHEN 650 MG: 325 TABLET ORAL at 08:30:00

## 2018-03-30 NOTE — PROGRESS NOTES
Education Record    Learner:  Patient    Disease / Diagnosis:Immune thrombocytopenia    Barriers / Limitations:  None   Comments:    Method:  Brief focused   Comments:    General Topics:  Infection, Medication, Pain, Precautions, Procedure, Side effects an

## 2018-04-04 ENCOUNTER — LAB ENCOUNTER (OUTPATIENT)
Dept: LAB | Age: 73
End: 2018-04-04
Attending: INTERNAL MEDICINE
Payer: MEDICARE

## 2018-04-04 DIAGNOSIS — D69.3 IMMUNE THROMBOCYTOPENIA (HCC): ICD-10-CM

## 2018-04-04 PROCEDURE — 36415 COLL VENOUS BLD VENIPUNCTURE: CPT

## 2018-04-04 PROCEDURE — 85025 COMPLETE CBC W/AUTO DIFF WBC: CPT

## 2018-04-11 ENCOUNTER — LAB ENCOUNTER (OUTPATIENT)
Dept: LAB | Age: 73
End: 2018-04-11
Attending: INTERNAL MEDICINE
Payer: MEDICARE

## 2018-04-11 DIAGNOSIS — D69.3 IMMUNE THROMBOCYTOPENIA (HCC): ICD-10-CM

## 2018-04-11 PROCEDURE — 36415 COLL VENOUS BLD VENIPUNCTURE: CPT

## 2018-04-11 PROCEDURE — 85025 COMPLETE CBC W/AUTO DIFF WBC: CPT

## 2018-04-18 ENCOUNTER — LABORATORY ENCOUNTER (OUTPATIENT)
Dept: LAB | Age: 73
End: 2018-04-18
Attending: INTERNAL MEDICINE
Payer: MEDICARE

## 2018-04-18 DIAGNOSIS — D69.3 IMMUNE THROMBOCYTOPENIA (HCC): ICD-10-CM

## 2018-04-18 PROCEDURE — 36415 COLL VENOUS BLD VENIPUNCTURE: CPT

## 2018-04-18 PROCEDURE — 85025 COMPLETE CBC W/AUTO DIFF WBC: CPT

## 2018-04-20 ENCOUNTER — APPOINTMENT (OUTPATIENT)
Dept: LAB | Facility: HOSPITAL | Age: 73
End: 2018-04-20
Attending: INTERNAL MEDICINE
Payer: MEDICARE

## 2018-04-20 DIAGNOSIS — Z86.010 HISTORY OF COLON POLYPS: ICD-10-CM

## 2018-04-20 DIAGNOSIS — Z87.19 HISTORY OF BARRETT'S ESOPHAGUS: ICD-10-CM

## 2018-04-20 PROCEDURE — 36415 COLL VENOUS BLD VENIPUNCTURE: CPT

## 2018-04-20 PROCEDURE — 80051 ELECTROLYTE PANEL: CPT

## 2018-04-24 ENCOUNTER — LABORATORY ENCOUNTER (OUTPATIENT)
Dept: LAB | Age: 73
End: 2018-04-24
Attending: INTERNAL MEDICINE
Payer: MEDICARE

## 2018-04-24 ENCOUNTER — PRIOR ORIGINAL RECORDS (OUTPATIENT)
Dept: OTHER | Age: 73
End: 2018-04-24

## 2018-04-24 DIAGNOSIS — D69.3 IMMUNE THROMBOCYTOPENIA (HCC): ICD-10-CM

## 2018-04-24 PROCEDURE — 85025 COMPLETE CBC W/AUTO DIFF WBC: CPT

## 2018-04-24 PROCEDURE — 36415 COLL VENOUS BLD VENIPUNCTURE: CPT

## 2018-04-27 ENCOUNTER — APPOINTMENT (OUTPATIENT)
Dept: LAB | Age: 73
End: 2018-04-27
Attending: INTERNAL MEDICINE
Payer: MEDICARE

## 2018-04-27 ENCOUNTER — PRIOR ORIGINAL RECORDS (OUTPATIENT)
Dept: OTHER | Age: 73
End: 2018-04-27

## 2018-04-27 PROCEDURE — 36415 COLL VENOUS BLD VENIPUNCTURE: CPT

## 2018-04-27 PROCEDURE — 85025 COMPLETE CBC W/AUTO DIFF WBC: CPT

## 2018-04-30 ENCOUNTER — ANESTHESIA EVENT (OUTPATIENT)
Dept: ENDOSCOPY | Facility: HOSPITAL | Age: 73
End: 2018-04-30
Payer: MEDICARE

## 2018-05-01 ENCOUNTER — ANESTHESIA (OUTPATIENT)
Dept: ENDOSCOPY | Facility: HOSPITAL | Age: 73
End: 2018-05-01
Payer: MEDICARE

## 2018-05-01 ENCOUNTER — HOSPITAL ENCOUNTER (OUTPATIENT)
Facility: HOSPITAL | Age: 73
Setting detail: HOSPITAL OUTPATIENT SURGERY
Discharge: HOME | End: 2018-05-01
Attending: INTERNAL MEDICINE | Admitting: INTERNAL MEDICINE
Payer: MEDICARE

## 2018-05-01 ENCOUNTER — SURGERY (OUTPATIENT)
Age: 73
End: 2018-05-01

## 2018-05-01 VITALS
WEIGHT: 185 LBS | BODY MASS INDEX: 25.06 KG/M2 | HEART RATE: 64 BPM | HEIGHT: 72 IN | TEMPERATURE: 98 F | DIASTOLIC BLOOD PRESSURE: 87 MMHG | SYSTOLIC BLOOD PRESSURE: 140 MMHG | RESPIRATION RATE: 18 BRPM | OXYGEN SATURATION: 100 %

## 2018-05-01 DIAGNOSIS — Z86.010 HISTORY OF COLON POLYPS: ICD-10-CM

## 2018-05-01 DIAGNOSIS — Z87.19 HISTORY OF BARRETT'S ESOPHAGUS: Primary | ICD-10-CM

## 2018-05-01 PROCEDURE — 0DB48ZX EXCISION OF ESOPHAGOGASTRIC JUNCTION, VIA NATURAL OR ARTIFICIAL OPENING ENDOSCOPIC, DIAGNOSTIC: ICD-10-PCS | Performed by: INTERNAL MEDICINE

## 2018-05-01 PROCEDURE — 0DBK8ZX EXCISION OF ASCENDING COLON, VIA NATURAL OR ARTIFICIAL OPENING ENDOSCOPIC, DIAGNOSTIC: ICD-10-PCS | Performed by: INTERNAL MEDICINE

## 2018-05-01 PROCEDURE — 0DB58ZX EXCISION OF ESOPHAGUS, VIA NATURAL OR ARTIFICIAL OPENING ENDOSCOPIC, DIAGNOSTIC: ICD-10-PCS | Performed by: INTERNAL MEDICINE

## 2018-05-01 PROCEDURE — 0DBN8ZX EXCISION OF SIGMOID COLON, VIA NATURAL OR ARTIFICIAL OPENING ENDOSCOPIC, DIAGNOSTIC: ICD-10-PCS | Performed by: INTERNAL MEDICINE

## 2018-05-01 PROCEDURE — 88305 TISSUE EXAM BY PATHOLOGIST: CPT | Performed by: INTERNAL MEDICINE

## 2018-05-01 RX ORDER — NALOXONE HYDROCHLORIDE 0.4 MG/ML
80 INJECTION, SOLUTION INTRAMUSCULAR; INTRAVENOUS; SUBCUTANEOUS AS NEEDED
Status: DISCONTINUED | OUTPATIENT
Start: 2018-05-01 | End: 2018-05-01

## 2018-05-01 RX ORDER — SODIUM CHLORIDE, SODIUM LACTATE, POTASSIUM CHLORIDE, CALCIUM CHLORIDE 600; 310; 30; 20 MG/100ML; MG/100ML; MG/100ML; MG/100ML
INJECTION, SOLUTION INTRAVENOUS CONTINUOUS
Status: DISCONTINUED | OUTPATIENT
Start: 2018-05-01 | End: 2018-05-01

## 2018-05-01 NOTE — ANESTHESIA POSTPROCEDURE EVALUATION
[facility] Patient Status:  Hospital Outpatient Surgery   Age/Gender 74year old male MRN [id number]   Location [facility]. Attending Sasha Feliciano MD   Hosp Day # 0 PCP Sadaf Tapia MD       Anesthesia Post-op N

## 2018-05-01 NOTE — OPERATIVE REPORT
[address] Patient Status:  Hospital Outpatient Surgery    3/17/1945 MRN [id number]   [facility] ENDOSCOPY Attending Lu Cota MD   Hosp Day # 0 PCP Alex Gonzalez MD     PREOPERATIVE DIAGNOSIS/INDICATION: H/o colon polyps Colonoscopy/polypectomy precautions, watch for bleeding, infection, perforation, adverse drug reactions   - Follow biopsies.  - Repeat colonoscopy in 3 years if clinically indicated at that time.     Lucina Gonzales  5/1/2018  8:04 AM

## 2018-05-01 NOTE — OPERATIVE REPORT
[address] Patient Status:  Hospital Outpatient Surgery    3/17/1945 MRN [id number]   [facility] ENDOSCOPY Attending Ludwin Ritchie MD   Hosp Day # 0 PCP Dakota Majano MD     PREOPERATIVE DIAGNOSIS/INDICATION: Ramon's esophagu

## 2018-05-01 NOTE — ANESTHESIA PREPROCEDURE EVALUATION
PRE-OP EVALUATION    Patient Name: Taylor Rowe    Pre-op Diagnosis: History of colon polyps [Z86.010]  History of Ramon's esophagus [Z87.19]    Procedure(s):  ESOPHAGOGASTRODUODENOSCOPY AND COLONOSCOPY      Surgeon(s) and Role:     Avelino Christina [Amoclan]      Anesthesia Evaluation    Patient summary reviewed.     Anesthetic Complications  (-) history of anesthetic complications         GI/Hepatic/Renal      (+) GERD                           Cardiovascular        Exercise tolerance: good     MET: 04/27/2018   MCHC 31.2 04/27/2018   RDW 16.6 (H) 04/27/2018   PLT 80.0 (L) 04/27/2018       Lab Results  Component Value Date    04/20/2018   K 4.9 04/20/2018    04/20/2018   CO2 29.0 04/20/2018            Airway      Mallampati: III  Mouth ope

## 2018-05-01 NOTE — H&P
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

## 2018-05-04 NOTE — PROGRESS NOTES
Date: 18    To: Denise Murillo  : [birth year]    I hope this letter finds you doing well. I am writing to inform you of the following:      The biopsies obtained from the esophagus at the time of your recent endoscopic procedure showed infection with C

## 2018-05-16 ENCOUNTER — PRIOR ORIGINAL RECORDS (OUTPATIENT)
Dept: OTHER | Age: 73
End: 2018-05-16

## 2018-06-04 ENCOUNTER — OFFICE VISIT (OUTPATIENT)
Dept: HEMATOLOGY/ONCOLOGY | Facility: HOSPITAL | Age: 73
End: 2018-06-04
Attending: INTERNAL MEDICINE
Payer: MEDICARE

## 2018-06-04 VITALS
HEART RATE: 67 BPM | SYSTOLIC BLOOD PRESSURE: 122 MMHG | OXYGEN SATURATION: 98 % | RESPIRATION RATE: 18 BRPM | DIASTOLIC BLOOD PRESSURE: 75 MMHG | HEIGHT: 72.01 IN | WEIGHT: 197.81 LBS | TEMPERATURE: 98 F | BODY MASS INDEX: 26.79 KG/M2

## 2018-06-04 DIAGNOSIS — D69.3 IMMUNE THROMBOCYTOPENIA (HCC): ICD-10-CM

## 2018-06-04 PROBLEM — R53.83 FATIGUE, UNSPECIFIED TYPE: Status: RESOLVED | Noted: 2017-11-16 | Resolved: 2018-06-04

## 2018-06-04 PROCEDURE — 99213 OFFICE O/P EST LOW 20 MIN: CPT | Performed by: INTERNAL MEDICINE

## 2018-06-04 RX ORDER — FLUCONAZOLE 200 MG/1
200 TABLET ORAL
Refills: 0 | COMMUNITY
Start: 2018-05-04 | End: 2018-11-19

## 2018-06-04 NOTE — PROGRESS NOTES
Cancer Center Progress Note    Patient Name: Taylor Rowe   YOB: 1945   Medical Record Number: [id number]   CSN: [id number]   Attending Physician: Nick John M.D.    Referring Physician: Suzy Watkins MD      Date of Visit: 6/4/2018 TWICE DAILY, Disp: 180 capsule, Rfl: 1  •  spironolactone 25 MG Oral Tab, Take 1 tablet (25 mg total) by mouth once daily. , Disp: 90 tablet, Rfl: 1  •  Benazepril HCl 40 MG Oral Tab, Take 1 tablet (40 mg total) by mouth daily. , Disp: 90 tablet, Rfl: 1  • • Sleep apnea     CPAP   • Wears glasses        Past Surgical History:  Past Surgical History:  1970: APPENDECTOMY  No date: APPENDECTOMY  10/2003 2006 01/2010: COLONOSCOPY      Comment: x3  5/14/2013: COLONOSCOPY  5/1/2018: COLONOSCOPY N/A      Comment: 7/28/1973    Smokeless tobacco: Never Used    Alcohol use Yes  4.8 - 6.0 oz/week    8 - 10 Standard drinks or equivalent per week         Comment: 1 per day    Drug use: No    Sexual activity: Not on file     Other Topics Concern    Caffeine Concern Yes 4.9 04/20/2018    04/20/2018   CO2 29.0 04/20/2018   BUN 20 09/27/2017   CREATSERUM 1.12 09/27/2017   GLU 81 09/27/2017   CA 8.9 09/27/2017   ALKPHO 58 09/27/2017   ALT 21 09/27/2017   AST 14 (L) 09/27/2017   BILT 0.5 09/27/2017   ALB 3.6 09/27/2017

## 2018-06-06 ENCOUNTER — OFFICE VISIT (OUTPATIENT)
Dept: FAMILY MEDICINE CLINIC | Facility: CLINIC | Age: 73
End: 2018-06-06

## 2018-06-06 VITALS
TEMPERATURE: 97 F | WEIGHT: 198.63 LBS | HEIGHT: 71.8 IN | HEART RATE: 64 BPM | SYSTOLIC BLOOD PRESSURE: 106 MMHG | BODY MASS INDEX: 27.2 KG/M2 | RESPIRATION RATE: 16 BRPM | DIASTOLIC BLOOD PRESSURE: 60 MMHG

## 2018-06-06 DIAGNOSIS — M35.1 MCTD (MIXED CONNECTIVE TISSUE DISEASE) (HCC): Chronic | ICD-10-CM

## 2018-06-06 DIAGNOSIS — I10 BENIGN ESSENTIAL HYPERTENSION: ICD-10-CM

## 2018-06-06 DIAGNOSIS — Z00.00 LABORATORY EXAMINATION ORDERED AS PART OF A ROUTINE GENERAL MEDICAL EXAMINATION: ICD-10-CM

## 2018-06-06 DIAGNOSIS — M1A.09X0 IDIOPATHIC CHRONIC GOUT OF MULTIPLE SITES WITHOUT TOPHUS: Primary | ICD-10-CM

## 2018-06-06 LAB
HEMATOCRIT: 44.9 %
HEMATOCRIT: 45.2 %
HEMOGLOBIN: 13.8 G/DL
HEMOGLOBIN: 14 G/DL
PLATELETS: 80 K/UL
PLATELETS: 82 K/UL
RED BLOOD COUNT: 4.45 X 10-6/U
RED BLOOD COUNT: 4.48 X 10-6/U
WHITE BLOOD COUNT: 6.2 X 10-3/U
WHITE BLOOD COUNT: 7.9 X 10-3/U

## 2018-06-06 PROCEDURE — 99214 OFFICE O/P EST MOD 30 MIN: CPT | Performed by: FAMILY MEDICINE

## 2018-06-06 NOTE — PROGRESS NOTES
Patient presents with:  BPH: six month check      Subjective   HPI:   This is a 74year old male coming in for BPH, better overall, stable MCT. HPI ;ots of bronchiits last winter, still some congestion/runny nose.  Not using allergy meds, but no change Negative. Negative for rash. Allergic/Immunologic: Negative for immunocompromised state. Neurological: Negative. Negative for dizziness, weakness and numbness. Hematological: Negative for adenopathy.         Objective   EXAM:   /60   Pulse 64 strength and normal reflexes. No cranial nerve deficit or sensory deficit. Skin: Skin is warm, dry and intact. No rash noted. He is not diaphoretic. No cyanosis or erythema. Nails show no clubbing. Psychiatric: He has a normal mood and affect.  His spee

## 2018-06-06 NOTE — ASSESSMENT & PLAN NOTE
Stable, Continue present management.     Blood Pressure and Cardiac Medications          Benazepril HCl 40 MG Oral Tab    AmLODIPine Besylate 2.5 MG Oral Tab    Metoprolol Succinate  MG Oral Tablet 24 Hr

## 2018-06-06 NOTE — ASSESSMENT & PLAN NOTE
Stable. Continue present management.      Lab Results  Component Value Date   URIC 6.1 06/27/2016   URIC 4.2 06/04/2014   WBC 5.1 06/04/2018   CREATSERUM 1.12 09/27/2017   GFR 64 09/27/2017

## 2018-06-15 ENCOUNTER — HOSPITAL ENCOUNTER (OUTPATIENT)
Dept: CV DIAGNOSTICS | Facility: HOSPITAL | Age: 73
Discharge: HOME | End: 2018-06-15
Attending: INTERNAL MEDICINE

## 2018-06-15 ENCOUNTER — MYAURORA ACCOUNT LINK (OUTPATIENT)
Dept: OTHER | Age: 73
End: 2018-06-15

## 2018-06-15 DIAGNOSIS — I50.1 LEFT VENTRICULAR FAILURE (HCC): ICD-10-CM

## 2018-06-15 DIAGNOSIS — M33.20 POLYMYOSITIS (HCC): ICD-10-CM

## 2018-06-18 ENCOUNTER — LABORATORY ENCOUNTER (OUTPATIENT)
Dept: LAB | Age: 73
End: 2018-06-18
Attending: INTERNAL MEDICINE
Payer: MEDICARE

## 2018-06-18 ENCOUNTER — PRIOR ORIGINAL RECORDS (OUTPATIENT)
Dept: OTHER | Age: 73
End: 2018-06-18

## 2018-06-18 DIAGNOSIS — Z00.00 LABORATORY EXAMINATION ORDERED AS PART OF A ROUTINE GENERAL MEDICAL EXAMINATION: ICD-10-CM

## 2018-06-18 DIAGNOSIS — M1A.09X0 IDIOPATHIC CHRONIC GOUT OF MULTIPLE SITES WITHOUT TOPHUS: ICD-10-CM

## 2018-06-18 DIAGNOSIS — D69.3 IMMUNE THROMBOCYTOPENIA (HCC): ICD-10-CM

## 2018-06-18 PROCEDURE — 84550 ASSAY OF BLOOD/URIC ACID: CPT

## 2018-06-18 PROCEDURE — 36415 COLL VENOUS BLD VENIPUNCTURE: CPT

## 2018-06-18 PROCEDURE — 80061 LIPID PANEL: CPT

## 2018-06-18 PROCEDURE — 80053 COMPREHEN METABOLIC PANEL: CPT

## 2018-06-18 PROCEDURE — 84443 ASSAY THYROID STIM HORMONE: CPT

## 2018-06-18 PROCEDURE — 85025 COMPLETE CBC W/AUTO DIFF WBC: CPT

## 2018-06-19 ENCOUNTER — HOSPITAL ENCOUNTER (OUTPATIENT)
Age: 73
Discharge: HOME | End: 2018-06-19
Attending: FAMILY MEDICINE
Payer: MEDICARE

## 2018-06-19 VITALS
RESPIRATION RATE: 16 BRPM | TEMPERATURE: 98 F | BODY MASS INDEX: 25.73 KG/M2 | DIASTOLIC BLOOD PRESSURE: 84 MMHG | WEIGHT: 190 LBS | HEIGHT: 72 IN | OXYGEN SATURATION: 98 % | HEART RATE: 76 BPM | SYSTOLIC BLOOD PRESSURE: 125 MMHG

## 2018-06-19 DIAGNOSIS — H00.022 HORDEOLUM INTERNUM OF RIGHT LOWER EYELID: Primary | ICD-10-CM

## 2018-06-19 PROCEDURE — 99214 OFFICE O/P EST MOD 30 MIN: CPT

## 2018-06-19 PROCEDURE — 99213 OFFICE O/P EST LOW 20 MIN: CPT

## 2018-06-19 RX ORDER — TOBRAMYCIN 3 MG/ML
1 SOLUTION/ DROPS OPHTHALMIC EVERY 4 HOURS
Qty: 1 BOTTLE | Refills: 0 | Status: SHIPPED | OUTPATIENT
Start: 2018-06-19 | End: 2018-06-24

## 2018-06-19 NOTE — ED PROVIDER NOTES
Patient Seen in: [facility]    History   Patient presents with:   Eye Visual Problem (opthalmic)    Stated Complaint: eye swelling    HPI    52-year-old male presents today with chief complaint of lower eyelid swelling, redn PERCUTANEOUS  ANGIOPLASTY  (PTCA)- PBP ONLY      Comment: right  1946: RECTUM SURGERY PROCEDURE UNLISTED      Comment: rectal surgery polypectomy  No date: SKIN SURGERY      Comment: MMS BCC R asya 6/24/09  2007: SKIN SURGERY      Comment: miguel ángel garcía or abscess. Mild swelling in the lower eyelid external skin appreciated without any significant periorbital swelling or redness. No foreign bodies in the eye.   Bulbar conjunctiva bilaterally is clear no redness, pupils equally reactive to light bilateral

## 2018-06-19 NOTE — ED INITIAL ASSESSMENT (HPI)
Pt arrived alert and responsive. Pt c/o swelling to lower right eye lid. Pt states the symptoms started 5 days ago. Pt denies vision issues. Pt has redness to face that's new.

## 2018-06-21 ENCOUNTER — PRIOR ORIGINAL RECORDS (OUTPATIENT)
Dept: OTHER | Age: 73
End: 2018-06-21

## 2018-07-07 RX ORDER — BENAZEPRIL HYDROCHLORIDE 40 MG/1
40 TABLET, FILM COATED ORAL DAILY
Qty: 90 TABLET | Refills: 1 | Status: SHIPPED | OUTPATIENT
Start: 2018-07-07 | End: 2019-01-02

## 2018-07-12 ENCOUNTER — OFFICE VISIT (OUTPATIENT)
Dept: RHEUMATOLOGY | Facility: CLINIC | Age: 73
End: 2018-07-12

## 2018-07-12 VITALS
RESPIRATION RATE: 20 BRPM | HEIGHT: 72 IN | BODY MASS INDEX: 27.36 KG/M2 | WEIGHT: 202 LBS | HEART RATE: 80 BPM | SYSTOLIC BLOOD PRESSURE: 130 MMHG | DIASTOLIC BLOOD PRESSURE: 80 MMHG

## 2018-07-12 DIAGNOSIS — M35.1 MCTD (MIXED CONNECTIVE TISSUE DISEASE) (HCC): ICD-10-CM

## 2018-07-12 DIAGNOSIS — D69.6 THROMBOCYTOPENIA (HCC): ICD-10-CM

## 2018-07-12 DIAGNOSIS — M33.20 POLYMYOSITIS (HCC): Primary | Chronic | ICD-10-CM

## 2018-07-12 PROCEDURE — 99214 OFFICE O/P EST MOD 30 MIN: CPT | Performed by: INTERNAL MEDICINE

## 2018-07-12 RX ORDER — FEXOFENADINE HCL 180 MG/1
180 TABLET ORAL DAILY
COMMUNITY
End: 2018-11-19

## 2018-07-12 NOTE — PATIENT INSTRUCTIONS
Current plan is inflammation tests, lupus tests, and CPK. Etiology of fatigue unclear. Eat a well-balanced diet, 20-30 minutes of exercise every other day, 8 hours of sleep at night. No prednisone therapy for now.   We will call you with results as soon

## 2018-07-12 NOTE — PROGRESS NOTES
[facility]  Dr. Faviola Hunt Progress Note     Subjective: Sherita Menjivar is a(n) 74year old male.    Current complaints: Patient presents with:  Mixed Connective Tissue Disease: 6 mo f/u- labs 6/18/18-uric acid 7.9, Was taking 60mg of predisone for l

## 2018-07-17 ENCOUNTER — LAB ENCOUNTER (OUTPATIENT)
Dept: LAB | Age: 73
End: 2018-07-17
Attending: INTERNAL MEDICINE
Payer: MEDICARE

## 2018-07-17 DIAGNOSIS — M33.20 POLYMYOSITIS (HCC): Chronic | ICD-10-CM

## 2018-07-17 DIAGNOSIS — M35.1 MCTD (MIXED CONNECTIVE TISSUE DISEASE) (HCC): ICD-10-CM

## 2018-07-17 DIAGNOSIS — D69.3 IMMUNE THROMBOCYTOPENIA (HCC): ICD-10-CM

## 2018-07-17 LAB
BASOPHILS # BLD AUTO: 0.02 X10(3) UL (ref 0–0.1)
BASOPHILS NFR BLD AUTO: 0.4 %
C-REACTIVE PROTEIN: <0.29 MG/DL (ref ?–1)
CK: 21 IU/L (ref 39–308)
EOSINOPHIL # BLD AUTO: 0.07 X10(3) UL (ref 0–0.3)
EOSINOPHIL NFR BLD AUTO: 1.3 %
ERYTHROCYTE [DISTWIDTH] IN BLOOD BY AUTOMATED COUNT: 13.9 % (ref 11.5–16)
HCT VFR BLD AUTO: 42.4 % (ref 37–53)
HGB BLD-MCNC: 13 G/DL (ref 13–17)
IMMATURE GRANULOCYTE COUNT: 0.02 X10(3) UL (ref 0–1)
IMMATURE GRANULOCYTE RATIO %: 0.4 %
LYMPHOCYTES # BLD AUTO: 0.67 X10(3) UL (ref 0.9–4)
LYMPHOCYTES NFR BLD AUTO: 12.3 %
MCH RBC QN AUTO: 31.8 PG (ref 27–33.2)
MCHC RBC AUTO-ENTMCNC: 30.7 G/DL (ref 31–37)
MCV RBC AUTO: 103.7 FL (ref 80–99)
MONOCYTES # BLD AUTO: 0.67 X10(3) UL (ref 0.1–1)
MONOCYTES NFR BLD AUTO: 12.3 %
NEUTROPHIL ABS PRELIM: 3.98 X10 (3) UL (ref 1.3–6.7)
NEUTROPHILS # BLD AUTO: 3.98 X10(3) UL (ref 1.3–6.7)
NEUTROPHILS NFR BLD AUTO: 73.3 %
PLATELET # BLD AUTO: 89 10(3)UL (ref 150–450)
RBC # BLD AUTO: 4.09 X10(6)UL (ref 3.8–5.8)
RED CELL DISTRIBUTION WIDTH-SD: 53.3 FL (ref 35.1–46.3)
SED RATE-ML: 31 MM/HR (ref 0–12)
WBC # BLD AUTO: 5.4 X10(3) UL (ref 4–13)

## 2018-07-17 PROCEDURE — 85652 RBC SED RATE AUTOMATED: CPT

## 2018-07-17 PROCEDURE — 85025 COMPLETE CBC W/AUTO DIFF WBC: CPT

## 2018-07-17 PROCEDURE — 82550 ASSAY OF CK (CPK): CPT

## 2018-07-17 PROCEDURE — 86140 C-REACTIVE PROTEIN: CPT

## 2018-07-17 PROCEDURE — 36415 COLL VENOUS BLD VENIPUNCTURE: CPT

## 2018-07-17 PROCEDURE — 86038 ANTINUCLEAR ANTIBODIES: CPT

## 2018-07-18 LAB — ANA SCREEN: NEGATIVE

## 2018-07-20 LAB — ANA SCREEN: NEGATIVE

## 2018-07-26 ENCOUNTER — MED REC SCAN ONLY (OUTPATIENT)
Dept: HEMATOLOGY/ONCOLOGY | Facility: HOSPITAL | Age: 73
End: 2018-07-26

## 2018-07-26 ENCOUNTER — HOSPITAL ENCOUNTER (OUTPATIENT)
Age: 73
Discharge: HOME | End: 2018-07-26
Attending: FAMILY MEDICINE
Payer: MEDICARE

## 2018-07-26 VITALS
BODY MASS INDEX: 25.73 KG/M2 | OXYGEN SATURATION: 98 % | HEART RATE: 80 BPM | HEIGHT: 72 IN | DIASTOLIC BLOOD PRESSURE: 82 MMHG | SYSTOLIC BLOOD PRESSURE: 145 MMHG | TEMPERATURE: 98 F | RESPIRATION RATE: 18 BRPM | WEIGHT: 190 LBS

## 2018-07-26 DIAGNOSIS — T14.8XXA SKIN AVULSION: Primary | ICD-10-CM

## 2018-07-26 PROCEDURE — 99212 OFFICE O/P EST SF 10 MIN: CPT

## 2018-07-26 NOTE — ED INITIAL ASSESSMENT (HPI)
Pt hit his left lower leg on a piece of wooden furniture 2 days ago and now has a skin tear. The bleeding is controlled at this time. Pt is ambulatory to the .

## 2018-07-26 NOTE — ED PROVIDER NOTES
Patient Seen in: [address]    History   Patient presents with:  Laceration Abrasion (integumentary)    Stated Complaint: LEFT LEG INJURY     HPI    47-year-old male presents today for evaluation of lower leg skin abrasion, SURGERY PROCEDURE UNLISTED      Comment: rectal surgery polypectomy  No date: SKIN SURGERY      Comment: MMS BCC R [facility] 6/24/09  2007: SKIN SURGERY      Comment: basal ceell carcinoma  7-18-12: SKIN SURGERY      Comment: BCC-nodular to right superior eye dead skin removed with sterile forceps. The regular wound dressing applied. MDM       No signs of wound infection. Healing by secondary intention. Keep it clean, covered. Monitor, look for signs of infection. Follow-up PCP in a week.         Dispo

## 2018-08-10 DIAGNOSIS — K22.70 BARRETT'S ESOPHAGUS WITHOUT DYSPLASIA: ICD-10-CM

## 2018-08-13 RX ORDER — OMEPRAZOLE 20 MG/1
CAPSULE, DELAYED RELEASE ORAL
Qty: 180 CAPSULE | Refills: 1 | Status: SHIPPED | OUTPATIENT
Start: 2018-08-13 | End: 2019-02-04

## 2018-08-13 NOTE — TELEPHONE ENCOUNTER
LOV 6/6/2018     Future Appointments  Date Time Provider Chrissie Aden   9/13/2018 1:45 PM Loenela Rios MD [address]   12/7/2018 11:00 AM Kendrick Mcdonald MD [facility] 3 [facility]   7/8/5487 56:59 AM Adam Zarco MD [facility] EMG Tori Chiang

## 2018-08-19 DIAGNOSIS — I10 BENIGN ESSENTIAL HYPERTENSION: ICD-10-CM

## 2018-08-21 RX ORDER — SPIRONOLACTONE 25 MG/1
25 TABLET ORAL
Qty: 90 TABLET | Refills: 0 | Status: SHIPPED | OUTPATIENT
Start: 2018-08-21 | End: 2018-11-10

## 2018-08-21 NOTE — TELEPHONE ENCOUNTER
Refill request for Spironolactone passes protocol, but there are warnings. Please address. Routed to Dr Tien Harvey.

## 2018-08-27 DIAGNOSIS — I10 BENIGN ESSENTIAL HYPERTENSION: ICD-10-CM

## 2018-08-28 RX ORDER — AMLODIPINE BESYLATE 2.5 MG/1
TABLET ORAL
Qty: 90 TABLET | Refills: 2 | Status: SHIPPED | OUTPATIENT
Start: 2018-08-28 | End: 2019-05-20

## 2018-08-28 NOTE — TELEPHONE ENCOUNTER
Pharmacy is calling in regards to getting another AmLODIPine Besylate 2.5 MG Oral Tab script sent in. Patient originally picked up medication but lost it. Is completely out, will Dr. Jayce Su script?

## 2018-09-10 ENCOUNTER — APPOINTMENT (OUTPATIENT)
Dept: HEMATOLOGY/ONCOLOGY | Facility: HOSPITAL | Age: 73
End: 2018-09-10
Attending: INTERNAL MEDICINE
Payer: MEDICARE

## 2018-09-12 DIAGNOSIS — D69.6 THROMBOCYTOPENIA (HCC): Primary | ICD-10-CM

## 2018-09-13 ENCOUNTER — OFFICE VISIT (OUTPATIENT)
Dept: HEMATOLOGY/ONCOLOGY | Facility: HOSPITAL | Age: 73
End: 2018-09-13
Attending: INTERNAL MEDICINE
Payer: MEDICARE

## 2018-09-13 VITALS
HEIGHT: 72.01 IN | OXYGEN SATURATION: 97 % | SYSTOLIC BLOOD PRESSURE: 134 MMHG | BODY MASS INDEX: 27.27 KG/M2 | TEMPERATURE: 97 F | DIASTOLIC BLOOD PRESSURE: 80 MMHG | WEIGHT: 201.38 LBS | HEART RATE: 89 BPM | RESPIRATION RATE: 18 BRPM

## 2018-09-13 DIAGNOSIS — D69.6 THROMBOCYTOPENIA (HCC): ICD-10-CM

## 2018-09-13 LAB
BASOPHILS # BLD AUTO: 0.02 X10(3) UL (ref 0–0.1)
BASOPHILS NFR BLD AUTO: 0.4 %
EOSINOPHIL # BLD AUTO: 0.08 X10(3) UL (ref 0–0.3)
EOSINOPHIL NFR BLD AUTO: 1.7 %
ERYTHROCYTE [DISTWIDTH] IN BLOOD BY AUTOMATED COUNT: 12.8 % (ref 11.5–16)
HCT VFR BLD AUTO: 41.5 % (ref 37–53)
HGB BLD-MCNC: 13.2 G/DL (ref 13–17)
IMMATURE GRANULOCYTE COUNT: 0.01 X10(3) UL (ref 0–1)
IMMATURE GRANULOCYTE RATIO %: 0.2 %
LYMPHOCYTES # BLD AUTO: 0.99 X10(3) UL (ref 0.9–4)
LYMPHOCYTES NFR BLD AUTO: 21.2 %
MCH RBC QN AUTO: 31.1 PG (ref 27–33.2)
MCHC RBC AUTO-ENTMCNC: 31.8 G/DL (ref 31–37)
MCV RBC AUTO: 97.9 FL (ref 80–99)
MONOCYTES # BLD AUTO: 0.55 X10(3) UL (ref 0.1–1)
MONOCYTES NFR BLD AUTO: 11.8 %
NEUTROPHIL ABS PRELIM: 3.02 X10 (3) UL (ref 1.3–6.7)
NEUTROPHILS # BLD AUTO: 3.02 X10(3) UL (ref 1.3–6.7)
NEUTROPHILS NFR BLD AUTO: 64.7 %
PLATELET # BLD AUTO: 57 10(3)UL (ref 150–450)
RBC # BLD AUTO: 4.24 X10(6)UL (ref 3.8–5.8)
RED CELL DISTRIBUTION WIDTH-SD: 45.6 FL (ref 35.1–46.3)
WBC # BLD AUTO: 4.7 X10(3) UL (ref 4–13)

## 2018-09-13 PROCEDURE — 99213 OFFICE O/P EST LOW 20 MIN: CPT | Performed by: INTERNAL MEDICINE

## 2018-09-13 NOTE — PROGRESS NOTES
[facility] Progress Note    Patient Name: Cele Fernandez   YOB: 1945   Medical Record Number: [id number]   CSN: [id number]   Attending Physician: Lili Kemp M.D.    Referring Physician: Baldemar La MD      Date of Visit: 9/13/2018 Disp: 90 tablet, Rfl: 0  •  OMEPRAZOLE 20 MG Oral Capsule Delayed Release, TAKE 1 CAPSULE BY MOUTH TWICE DAILY, Disp: 180 capsule, Rfl: 1  •  Fexofenadine HCl 180 MG Oral Tab, Take 180 mg by mouth daily. , Disp: , Rfl:   •  BENAZEPRIL HCL 40 MG Oral Tab, TA Pj 83%    • Pain in joints    • Polymyositis (HCC)    • Problems with swallowing    • Sleep apnea     CPAP   • Wears glasses        Past Surgical History:  Past Surgical History:  1970: APPENDECTOMY  No date: APPENDECTOMY  10/2003 2006 01/2010: Yaquelin Walters (Ramon's Esophagus) Son    • Heart Attack Maternal Grandfather        Psychosocial History:  Social History    Socioeconomic History      Marital status:       Spouse name: Not on file      Number of children: Not on file      Years of education: (36.3 °C) (Tympanic)   Resp 18   Ht 1.829 m (6' 0.01\")   Wt 91.4 kg (201 lb 6.4 oz)   SpO2 97%   BMI 27.31 kg/m²       Physical Examination:  General: Patient is alert and oriented x 3, not in acute distress. No bruising.    Psych:  Mood and affect appropr

## 2018-09-18 DIAGNOSIS — E87.6 HYPOKALEMIA: ICD-10-CM

## 2018-09-20 ENCOUNTER — PATIENT MESSAGE (OUTPATIENT)
Dept: FAMILY MEDICINE CLINIC | Facility: CLINIC | Age: 73
End: 2018-09-20

## 2018-09-20 DIAGNOSIS — E87.6 HYPOKALEMIA: ICD-10-CM

## 2018-09-20 RX ORDER — POTASSIUM CHLORIDE 20 MEQ/1
TABLET, EXTENDED RELEASE ORAL
Qty: 90 TABLET | Refills: 2 | Status: SHIPPED | OUTPATIENT
Start: 2018-09-20 | End: 2019-02-12

## 2018-09-20 RX ORDER — POTASSIUM CHLORIDE 20 MEQ/1
TABLET, EXTENDED RELEASE ORAL
Qty: 90 TABLET | Refills: 2 | Status: SHIPPED | OUTPATIENT
Start: 2018-09-20 | End: 2018-09-20

## 2018-09-20 NOTE — TELEPHONE ENCOUNTER
From: Simon Peters  To: Ekta Kohler MD  Sent: 9/20/2018 1:35 PM CDT  Subject: Prescription Question    You sent a potassium 20meq refill to [facility] which I no longer use. My current pharmacy is [facility] at [address] [place] [address].  Please resend t

## 2018-09-20 NOTE — TELEPHONE ENCOUNTER
LOV 6/6/2018.  Last potassium level was 4.6 on 6/18/18     Future Appointments   Date Time Provider Chrissie Aden   12/7/2018 11:00 AM Erich Delgado MD [facility] 3 [facility]   8/7/1117 12:98 AM Nano Nazario MD [facility] EMG Nya Steinberg   1/15/2019 10:20 AM

## 2018-11-10 DIAGNOSIS — I10 BENIGN ESSENTIAL HYPERTENSION: ICD-10-CM

## 2018-11-12 RX ORDER — SPIRONOLACTONE 25 MG/1
25 TABLET ORAL
Qty: 90 TABLET | Refills: 1 | Status: SHIPPED | OUTPATIENT
Start: 2018-11-12 | End: 2019-05-19

## 2018-11-18 ENCOUNTER — PATIENT MESSAGE (OUTPATIENT)
Dept: RHEUMATOLOGY | Facility: CLINIC | Age: 73
End: 2018-11-18

## 2018-11-19 ENCOUNTER — HOSPITAL ENCOUNTER (EMERGENCY)
Facility: HOSPITAL | Age: 73
Discharge: HOME | End: 2018-11-19
Attending: EMERGENCY MEDICINE
Payer: MEDICARE

## 2018-11-19 VITALS
HEIGHT: 72 IN | WEIGHT: 190 LBS | DIASTOLIC BLOOD PRESSURE: 79 MMHG | SYSTOLIC BLOOD PRESSURE: 130 MMHG | TEMPERATURE: 97 F | RESPIRATION RATE: 16 BRPM | HEART RATE: 75 BPM | BODY MASS INDEX: 25.73 KG/M2 | OXYGEN SATURATION: 100 %

## 2018-11-19 DIAGNOSIS — M10.9 ACUTE GOUT INVOLVING TOE OF LEFT FOOT, UNSPECIFIED CAUSE: Primary | ICD-10-CM

## 2018-11-19 PROCEDURE — 99283 EMERGENCY DEPT VISIT LOW MDM: CPT

## 2018-11-19 RX ORDER — COLCHICINE 0.6 MG/1
0.6 TABLET ORAL DAILY
Status: DISCONTINUED | OUTPATIENT
Start: 2018-11-19 | End: 2018-11-19

## 2018-11-19 RX ORDER — PREDNISONE 10 MG/1
10 TABLET ORAL DAILY
Qty: 6 TABLET | Refills: 0 | Status: SHIPPED | OUTPATIENT
Start: 2018-11-19 | End: 2018-11-22

## 2018-11-19 RX ORDER — PREDNISONE 20 MG/1
40 TABLET ORAL ONCE
Status: COMPLETED | OUTPATIENT
Start: 2018-11-19 | End: 2018-11-19

## 2018-11-19 RX ORDER — COLCHICINE 0.6 MG/1
0.6 TABLET ORAL DAILY
Qty: 2 TABLET | Refills: 0 | Status: SHIPPED | OUTPATIENT
Start: 2018-11-19 | End: 2018-11-21

## 2018-11-19 NOTE — ED PROVIDER NOTES
Patient Seen in: [facility] Emergency Department    History   Patient presents with:  Pain (neurologic)    Stated Complaint: foot pain    HPI    Doloris Avery is a pleasant 68-year-old gentleman with multiple health maladies who presents to the emerge par (EGD) N/A 5/1/2018    Performed by Jolene Zheng MD at [facility] ENDOSCOPY   • HAND/FINGER SURGERY UNLISTED  2003    right   • NEEDLE BIOPSY LIVER     • OTHER SURGICAL HISTORY  2017    watchman filter   • PERCUTANEOUS  ANGIOPLASTY  (PTCA)- PBP ONLY  2006 cyanosis or edema left foot interphalangeal joint of the left great toe is slightly erythematous tender to range of motion capillary refill less than 2 seconds full range of motion is noted though       ED Course   Labs Reviewed - No data to display

## 2018-11-19 NOTE — ED INITIAL ASSESSMENT (HPI)
Patient here with bilateral big toe pain. Patient with history of gout,  indomethacin not helping. Patient with increased pain with weight bearing.

## 2018-11-19 NOTE — TELEPHONE ENCOUNTER
From: Quoc Ahumada  To: Reji Morales MD  Sent: 11/18/2018 3:38 PM CST  Subject: Other    Dr Farida Garcia, Your new doctor prescribed indomethacin for what seems to be a gout attack last week.  There is still lots of pain in both big toes although the l

## 2018-11-20 ENCOUNTER — TELEPHONE (OUTPATIENT)
Dept: FAMILY MEDICINE CLINIC | Facility: CLINIC | Age: 73
End: 2018-11-20

## 2018-11-20 NOTE — TELEPHONE ENCOUNTER
Attempted to contact Ludivina Williamson for a courtesy call of his visit at the ER on 11- for his foot pain, left voicemail to give us a call back if he would like to make a follow up appointment.

## 2018-11-26 ENCOUNTER — PATIENT MESSAGE (OUTPATIENT)
Dept: RHEUMATOLOGY | Facility: CLINIC | Age: 73
End: 2018-11-26

## 2018-11-26 RX ORDER — COLCHICINE 0.6 MG/1
0.6 TABLET ORAL 2 TIMES DAILY PRN
Qty: 60 TABLET | Refills: 3 | Status: SHIPPED | OUTPATIENT
Start: 2018-11-26 | End: 2019-03-11

## 2018-11-26 NOTE — TELEPHONE ENCOUNTER
From: Wiliam Hernandez  To: Debra Song MD  Sent: 11/26/2018 4:47 PM CST  Subject: Other    Dr. Jane Orr, My gout continues to heal very slowly and I extended treatment with 20mg prednisone daily (had a stash of it from treating my platelets).  Both t

## 2018-12-03 ENCOUNTER — TELEPHONE (OUTPATIENT)
Dept: FAMILY MEDICINE CLINIC | Facility: CLINIC | Age: 73
End: 2018-12-03

## 2018-12-07 ENCOUNTER — OFFICE VISIT (OUTPATIENT)
Dept: FAMILY MEDICINE CLINIC | Facility: CLINIC | Age: 73
End: 2018-12-07
Payer: MEDICARE

## 2018-12-07 VITALS
HEIGHT: 70.5 IN | HEART RATE: 68 BPM | SYSTOLIC BLOOD PRESSURE: 136 MMHG | DIASTOLIC BLOOD PRESSURE: 76 MMHG | TEMPERATURE: 98 F | WEIGHT: 192 LBS | BODY MASS INDEX: 27.18 KG/M2 | RESPIRATION RATE: 14 BRPM

## 2018-12-07 DIAGNOSIS — M35.1 MCTD (MIXED CONNECTIVE TISSUE DISEASE) (HCC): Chronic | ICD-10-CM

## 2018-12-07 DIAGNOSIS — Z00.00 LABORATORY EXAMINATION ORDERED AS PART OF A ROUTINE GENERAL MEDICAL EXAMINATION: ICD-10-CM

## 2018-12-07 DIAGNOSIS — I77.810 AORTIC ROOT DILATION (HCC): ICD-10-CM

## 2018-12-07 DIAGNOSIS — Z00.00 ANNUAL PHYSICAL EXAM: Primary | ICD-10-CM

## 2018-12-07 DIAGNOSIS — D69.6 THROMBOCYTOPENIA (HCC): ICD-10-CM

## 2018-12-07 DIAGNOSIS — I48.91 ATRIAL FIBRILLATION, UNSPECIFIED TYPE (HCC): ICD-10-CM

## 2018-12-07 DIAGNOSIS — I10 BENIGN ESSENTIAL HYPERTENSION: ICD-10-CM

## 2018-12-07 DIAGNOSIS — Z00.00 ENCOUNTER FOR ANNUAL HEALTH EXAMINATION: ICD-10-CM

## 2018-12-07 DIAGNOSIS — Z79.52 CURRENT CHRONIC USE OF SYSTEMIC STEROIDS: ICD-10-CM

## 2018-12-07 PROCEDURE — G0439 PPPS, SUBSEQ VISIT: HCPCS | Performed by: FAMILY MEDICINE

## 2018-12-07 PROCEDURE — 99213 OFFICE O/P EST LOW 20 MIN: CPT | Performed by: FAMILY MEDICINE

## 2018-12-07 RX ORDER — PREDNISONE 20 MG/1
20 TABLET ORAL DAILY
COMMUNITY
End: 2019-03-30 | Stop reason: ALTCHOICE

## 2018-12-07 RX ORDER — SILDENAFIL 50 MG/1
50 TABLET, FILM COATED ORAL
Qty: 30 TABLET | Refills: 3 | Status: SHIPPED | OUTPATIENT
Start: 2018-12-07 | End: 2021-01-13

## 2018-12-07 NOTE — PROGRESS NOTES
HPI:   Reji Hobson is a 74year old male who presents for a Medicare Subsequent Annual Wellness visit (Pt already had Initial Annual Wellness).     Doing well, gout on colchicin and MCTD with Raynaud's and yosistis  His last annual assessment has been (GASTROENTEROLOGY)  Nir Bland (Physician Assistant)  Kush Adkins MD ([address])  Neftali Ruff (Hepatology)  Gallo Mars MD (Cardiovascular Diseases)    Patient Active Problem List:     Personal history of other malignant neoplasm Oral Tab TAKE 1 TABLET (25 MG TOTAL) BY MOUTH ONCE DAILY. Potassium Chloride ER 20 MEQ Oral Tab CR TAKE 1 TABLET(20 MEQ) BY MOUTH THREE TIMES DAILY (Patient taking differently: Take 20 mEq by mouth 2 (two) times daily.  TAKE 1 TABLET(20 MEQ) BY MOUTH THRE colonoscopy with biopsy (5/14/13); colonoscopy (5/14/2013); skin surgery; skin surgery (2007); skin surgery (7-18-12); skin surgery (07/15/2013); skin surgery (2-19-14); other surgical history (2017); appendectomy; egd; needle biopsy liver; ESOPHAGOGASTROD (Required for AWV/SWV)    Hearing Screening    Screening Method:  Whisper Test  Whisper Test Result:  Pass             Visual Acuity  Right Eye Visual Acuity: Uncorrected Right Eye Chart Acuity: 20/60   Left Eye Visual Acuity: Uncorrected Left Eye Chart Ac nerve deficit or sensory deficit. Skin: Skin is warm, dry and intact. No rash noted. He is not diaphoretic. No cyanosis or erythema. Nails show no clubbing. Psychiatric: He has a normal mood and affect.  His speech is normal and behavior is normal. Judg Stable, following with CV         Relevant Orders    OFFICE/OUTPT VISIT,EST,LEVL III       Immune    MCTD (mixed connective tissue disease) (HCC) (Chronic)    Overview     Managed by Dr Blaine Jimenes,          Current Assessment & Plan     Stable on meds, cur 07/28/2014 99          Cardiovascular Disease Screening     LDL Annually LDL CHOLESTROL (mg/dL)   Date Value   06/04/2014 83     LDL Cholesterol (mg/dL)   Date Value   06/18/2018 96        EKG - w/ Initial Preventative Physical Exam only, or if medically SPECIFIC DISEASE MONITORING Internal Lab or Procedure External Lab or Procedure      Annual Monitoring of Persistent     Medications (ACE/ARB, digoxin diuretics, anticonvulsants.)    Potassium  Annually Potassium (mmol/L)   Date Value   06/18/2018 4.6

## 2018-12-08 NOTE — ASSESSMENT & PLAN NOTE
Stable, Continue present management.     Blood Pressure and Cardiac Medications          Sildenafil Citrate 50 MG Oral Tab    AMLODIPINE BESYLATE 2.5 MG Oral Tab    BENAZEPRIL HCL 40 MG Oral Tab    Metoprolol Succinate  MG Oral Tablet 24 Hr

## 2018-12-08 NOTE — PATIENT INSTRUCTIONS
Jonathan Davila's SCREENING SCHEDULE   Tests on this list are recommended by your physician but may not be covered, or covered at this frequency, by your insurer. Please check with your insurance carrier before scheduling to verify coverage.     PREVENT years old and have smoked more than 100 cigarettes in their lifetime   • Anyone with a family history    Colorectal Cancer Screening Covered up to Age 76     Colonoscopy Screen   Covered every 10 years- more often if abnormal Colonoscopy due on 05/01/2023 house as a HepB virus carrier   Homosexual men   Illicit injectable drug abusers     Tetanus Toxoid- Only covered with a cut with metal- TD and TDaP Not covered by Medicare Part B) No orders found for this or any previous visit.  This may be covered with yo

## 2018-12-14 ENCOUNTER — LAB ENCOUNTER (OUTPATIENT)
Dept: LAB | Age: 73
End: 2018-12-14
Attending: INTERNAL MEDICINE
Payer: MEDICARE

## 2018-12-14 ENCOUNTER — PRIOR ORIGINAL RECORDS (OUTPATIENT)
Dept: OTHER | Age: 73
End: 2018-12-14

## 2018-12-14 DIAGNOSIS — D69.6 THROMBOCYTOPENIA (HCC): ICD-10-CM

## 2018-12-14 DIAGNOSIS — Z00.00 LABORATORY EXAMINATION ORDERED AS PART OF A ROUTINE GENERAL MEDICAL EXAMINATION: ICD-10-CM

## 2018-12-14 PROCEDURE — 80053 COMPREHEN METABOLIC PANEL: CPT

## 2018-12-14 PROCEDURE — 36415 COLL VENOUS BLD VENIPUNCTURE: CPT

## 2018-12-14 PROCEDURE — 85025 COMPLETE CBC W/AUTO DIFF WBC: CPT

## 2018-12-26 ENCOUNTER — PATIENT MESSAGE (OUTPATIENT)
Dept: RHEUMATOLOGY | Facility: CLINIC | Age: 73
End: 2018-12-26

## 2018-12-27 RX ORDER — ALLOPURINOL 100 MG/1
100 TABLET ORAL DAILY
Qty: 30 TABLET | Refills: 3 | Status: SHIPPED | OUTPATIENT
Start: 2018-12-27 | End: 2019-03-18

## 2018-12-27 NOTE — TELEPHONE ENCOUNTER
From: Quoc Ahumada  To: Reji Morales MD  Sent: 12/26/2018 4:43 PM CST  Subject: Non-Urgent Medical Question    Dr Farida Garcia, I finally got a response from Dr Efren Gay and he was not enthusiastic about using allopurinol since after stopping several ye

## 2019-01-01 ENCOUNTER — EXTERNAL RECORD (OUTPATIENT)
Dept: HEALTH INFORMATION MANAGEMENT | Facility: OTHER | Age: 74
End: 2019-01-01

## 2019-01-04 RX ORDER — BENAZEPRIL HYDROCHLORIDE 40 MG/1
40 TABLET, FILM COATED ORAL DAILY
Qty: 90 TABLET | Refills: 1 | Status: SHIPPED | OUTPATIENT
Start: 2019-01-04 | End: 2019-03-26

## 2019-01-16 ENCOUNTER — PRIOR ORIGINAL RECORDS (OUTPATIENT)
Dept: OTHER | Age: 74
End: 2019-01-16

## 2019-01-16 ENCOUNTER — MYAURORA ACCOUNT LINK (OUTPATIENT)
Dept: OTHER | Age: 74
End: 2019-01-16

## 2019-01-24 LAB
ALBUMIN: 3.5 G/DL
ALBUMIN: 3.7 G/DL
ALKALINE PHOSPHATATE(ALK PHOS): 38 IU/L
ALKALINE PHOSPHATATE(ALK PHOS): 44 IU/L
ALT (SGPT): 27 U/L
AST (SGOT): 18 U/L
BILIRUBIN TOTAL: 0.7 MG/DL
BILIRUBIN TOTAL: 0.7 MG/DL
BUN: 19 MG/DL
BUN: 31 MG/DL
CALCIUM: 8.8 MG/DL
CALCIUM: 9.1 MG/DL
CHLORIDE: 106 MEQ/L
CHLORIDE: 108 MEQ/L
CHOLESTEROL, TOTAL: 142 MG/DL
CREATININE, SERUM: 1.12 MG/DL
CREATININE, SERUM: 1.13 MG/DL
GLOBULIN: 3.4 G/DL
GLUCOSE: 86 MG/DL
HDL CHOLESTEROL: 28 MG/DL
HEMATOCRIT: 44.1 %
HEMOGLOBIN: 13.6 G/DL
LDL CHOLESTEROL: 96 MG/DL
PLATELETS: 72 K/UL
POTASSIUM, SERUM: 4.6 MEQ/L
POTASSIUM, SERUM: 4.8 MEQ/L
PROTEIN, TOTAL: 6.2 G/DL
PROTEIN, TOTAL: 7.1 G/DL
RED BLOOD COUNT: 4.54 X 10-6/U
SGOT (AST): 18 IU/L
SGOT (AST): 19 IU/L
SGPT (ALT): 27 IU/L
SGPT (ALT): 43 IU/L
SODIUM: 140 MEQ/L
SODIUM: 142 MEQ/L
THYROID STIMULATING HORMONE: 1.76 MLU/L
TRIGLYCERIDES: 88 MG/DL
URIC ACID: 7.9 MG/DL
WHITE BLOOD COUNT: 5.4 X 10-3/U

## 2019-02-04 ENCOUNTER — HOSPITAL ENCOUNTER (OUTPATIENT)
Dept: BONE DENSITY | Age: 74
Discharge: HOME | End: 2019-02-04
Attending: FAMILY MEDICINE
Payer: MEDICARE

## 2019-02-04 DIAGNOSIS — M35.1 MCTD (MIXED CONNECTIVE TISSUE DISEASE) (HCC): Chronic | ICD-10-CM

## 2019-02-04 DIAGNOSIS — K22.70 BARRETT'S ESOPHAGUS WITHOUT DYSPLASIA: ICD-10-CM

## 2019-02-04 DIAGNOSIS — Z79.52 CURRENT CHRONIC USE OF SYSTEMIC STEROIDS: ICD-10-CM

## 2019-02-04 PROCEDURE — 77080 DXA BONE DENSITY AXIAL: CPT | Performed by: FAMILY MEDICINE

## 2019-02-05 RX ORDER — OMEPRAZOLE 20 MG/1
CAPSULE, DELAYED RELEASE ORAL
Qty: 180 CAPSULE | Refills: 1 | Status: SHIPPED | OUTPATIENT
Start: 2019-02-05 | End: 2019-07-20

## 2019-02-05 NOTE — TELEPHONE ENCOUNTER
LOV 12/7/2018     Future Appointments   Date Time Provider Chrissie Aden   3/13/2019  9:45 AM Homa Escobar MD [facility] HEM [address]   6/22/9113 23:05 AM Governor Lanes, MD [facility] EMG Wonda Husbands   4/25/2019 10:30 AM Juliana Crook MD [facility]

## 2019-02-12 ENCOUNTER — APPOINTMENT (OUTPATIENT)
Dept: GENERAL RADIOLOGY | Age: 74
End: 2019-02-12
Attending: FAMILY MEDICINE
Payer: MEDICARE

## 2019-02-12 ENCOUNTER — HOSPITAL ENCOUNTER (OUTPATIENT)
Age: 74
Discharge: HOME | End: 2019-02-12
Attending: FAMILY MEDICINE
Payer: MEDICARE

## 2019-02-12 VITALS
BODY MASS INDEX: 25.73 KG/M2 | TEMPERATURE: 99 F | DIASTOLIC BLOOD PRESSURE: 70 MMHG | OXYGEN SATURATION: 96 % | HEIGHT: 72 IN | SYSTOLIC BLOOD PRESSURE: 125 MMHG | RESPIRATION RATE: 16 BRPM | HEART RATE: 73 BPM | WEIGHT: 190 LBS

## 2019-02-12 DIAGNOSIS — E87.6 HYPOKALEMIA: ICD-10-CM

## 2019-02-12 DIAGNOSIS — J20.9 ACUTE BRONCHITIS, UNSPECIFIED ORGANISM: Primary | ICD-10-CM

## 2019-02-12 LAB
POCT INFLUENZA A: NEGATIVE
POCT INFLUENZA B: NEGATIVE

## 2019-02-12 PROCEDURE — 71046 X-RAY EXAM CHEST 2 VIEWS: CPT | Performed by: FAMILY MEDICINE

## 2019-02-12 PROCEDURE — 99214 OFFICE O/P EST MOD 30 MIN: CPT

## 2019-02-12 PROCEDURE — 87502 INFLUENZA DNA AMP PROBE: CPT | Performed by: FAMILY MEDICINE

## 2019-02-12 PROCEDURE — 99213 OFFICE O/P EST LOW 20 MIN: CPT

## 2019-02-12 RX ORDER — DOXYCYCLINE HYCLATE 100 MG/1
100 CAPSULE ORAL 2 TIMES DAILY
Qty: 20 CAPSULE | Refills: 0 | Status: SHIPPED | OUTPATIENT
Start: 2019-02-12 | End: 2019-02-22

## 2019-02-12 NOTE — ED INITIAL ASSESSMENT (HPI)
For the past 2 days c/o dry cough, sinus congestion/pressure and temp today of 99.6. Used cough syrup with minimal relief.

## 2019-02-12 NOTE — ED PROVIDER NOTES
Patient Seen in: [address] [facility]    History   Patient presents with:  Cough/URI    Stated Complaint: cough, congestion x2 days    HPI    77-year-old male with multiple medical problems presents with complaints of dry cough for t • PERCUTANEOUS  ANGIOPLASTY  (PTCA)- PBP ONLY  2006    right   • RECTUM SURGERY PROCEDURE UNLISTED  1946    rectal surgery polypectomy   • SKIN SURGERY      MMS BCC R temple 6/24/09   • SKIN SURGERY  2007    basal ceell carcinoma   • SKIN SURGERY  7-18-1 bilaterally  Lungs no respiratory distress,: Few crepitations appreciated in the right lower lung base otherwise lungs clear to auscultation bilaterally. No wheezing, no rhonchi.   Heart: regularly irregular rhythm S1-S2 heard no murmurs no gallops week          Medications Prescribed:  Discharge Medication List as of 2/12/2019 11:53 AM    START taking these medications    Doxycycline Hyclate 100 MG Oral Cap  Take 1 capsule (100 mg total) by mouth 2 (two) times daily for 10 days. , Normal, Disp-20 cap

## 2019-02-15 DIAGNOSIS — E87.6 HYPOKALEMIA: ICD-10-CM

## 2019-02-19 DIAGNOSIS — E87.6 HYPOKALEMIA: ICD-10-CM

## 2019-02-19 RX ORDER — POTASSIUM CHLORIDE 20 MEQ/1
TABLET, EXTENDED RELEASE ORAL
Qty: 90 TABLET | Refills: 0 | Status: SHIPPED | OUTPATIENT
Start: 2019-02-19 | End: 2019-03-15

## 2019-02-20 RX ORDER — POTASSIUM CHLORIDE 20 MEQ/1
TABLET, EXTENDED RELEASE ORAL
Qty: 90 TABLET | Refills: 2 | OUTPATIENT
Start: 2019-02-20

## 2019-02-28 VITALS
DIASTOLIC BLOOD PRESSURE: 62 MMHG | BODY MASS INDEX: 26.01 KG/M2 | WEIGHT: 192 LBS | HEIGHT: 72 IN | HEART RATE: 76 BPM | SYSTOLIC BLOOD PRESSURE: 100 MMHG

## 2019-02-28 VITALS
SYSTOLIC BLOOD PRESSURE: 116 MMHG | BODY MASS INDEX: 26.95 KG/M2 | DIASTOLIC BLOOD PRESSURE: 62 MMHG | HEIGHT: 72 IN | HEART RATE: 64 BPM | WEIGHT: 199 LBS

## 2019-02-28 VITALS
HEIGHT: 72 IN | WEIGHT: 195 LBS | SYSTOLIC BLOOD PRESSURE: 132 MMHG | BODY MASS INDEX: 26.41 KG/M2 | DIASTOLIC BLOOD PRESSURE: 68 MMHG | HEART RATE: 68 BPM

## 2019-02-28 VITALS
WEIGHT: 187 LBS | DIASTOLIC BLOOD PRESSURE: 52 MMHG | SYSTOLIC BLOOD PRESSURE: 108 MMHG | BODY MASS INDEX: 25.33 KG/M2 | HEART RATE: 70 BPM | HEIGHT: 72 IN

## 2019-03-01 VITALS
HEIGHT: 72 IN | HEART RATE: 60 BPM | SYSTOLIC BLOOD PRESSURE: 118 MMHG | BODY MASS INDEX: 26.01 KG/M2 | WEIGHT: 192 LBS | DIASTOLIC BLOOD PRESSURE: 66 MMHG

## 2019-03-01 VITALS
HEIGHT: 72 IN | SYSTOLIC BLOOD PRESSURE: 124 MMHG | DIASTOLIC BLOOD PRESSURE: 72 MMHG | HEART RATE: 75 BPM | BODY MASS INDEX: 25.73 KG/M2 | WEIGHT: 190 LBS

## 2019-03-01 VITALS
HEART RATE: 72 BPM | WEIGHT: 193 LBS | BODY MASS INDEX: 26.14 KG/M2 | DIASTOLIC BLOOD PRESSURE: 76 MMHG | SYSTOLIC BLOOD PRESSURE: 124 MMHG | HEIGHT: 72 IN

## 2019-03-01 VITALS — SYSTOLIC BLOOD PRESSURE: 141 MMHG | WEIGHT: 193 LBS | DIASTOLIC BLOOD PRESSURE: 66 MMHG | HEART RATE: 60 BPM

## 2019-03-01 VITALS
SYSTOLIC BLOOD PRESSURE: 118 MMHG | WEIGHT: 197 LBS | BODY MASS INDEX: 26.68 KG/M2 | DIASTOLIC BLOOD PRESSURE: 62 MMHG | HEART RATE: 82 BPM | HEIGHT: 72 IN

## 2019-03-11 ENCOUNTER — OFFICE VISIT (OUTPATIENT)
Dept: FAMILY MEDICINE CLINIC | Facility: CLINIC | Age: 74
End: 2019-03-11
Payer: MEDICARE

## 2019-03-11 VITALS
HEIGHT: 70.5 IN | DIASTOLIC BLOOD PRESSURE: 68 MMHG | BODY MASS INDEX: 27.89 KG/M2 | WEIGHT: 197 LBS | SYSTOLIC BLOOD PRESSURE: 128 MMHG | RESPIRATION RATE: 12 BRPM | TEMPERATURE: 97 F | HEART RATE: 76 BPM

## 2019-03-11 DIAGNOSIS — J32.4 CHRONIC PANSINUSITIS: Primary | ICD-10-CM

## 2019-03-11 PROCEDURE — 99213 OFFICE O/P EST LOW 20 MIN: CPT | Performed by: FAMILY MEDICINE

## 2019-03-11 NOTE — PROGRESS NOTES
Patient presents with:  Runny Nose: on/off x 1 year  Cough: on/off x 1 year  Urgent Care F/u: Dx with Bronchitis 2/12/19      Subjective   HPI:   This is a 74year old male coming in for sinus issues. PND and clear discharge 1 year. And other sx ogoing. normal. No respiratory distress. Abdominal: Soft. Neurological: He is alert and oriented to person, place, and time. Skin: Skin is warm and dry. Pale boggy nasal mucosa.        Assessment and Plan:     Problem List Items Addressed This Visit

## 2019-03-13 ENCOUNTER — OFFICE VISIT (OUTPATIENT)
Dept: HEMATOLOGY/ONCOLOGY | Facility: HOSPITAL | Age: 74
End: 2019-03-13
Attending: INTERNAL MEDICINE
Payer: MEDICARE

## 2019-03-13 VITALS
HEART RATE: 70 BPM | BODY MASS INDEX: 27.63 KG/M2 | DIASTOLIC BLOOD PRESSURE: 72 MMHG | OXYGEN SATURATION: 97 % | WEIGHT: 195.19 LBS | HEIGHT: 70.51 IN | TEMPERATURE: 96 F | SYSTOLIC BLOOD PRESSURE: 114 MMHG | RESPIRATION RATE: 18 BRPM

## 2019-03-13 DIAGNOSIS — D69.6 THROMBOCYTOPENIA (HCC): ICD-10-CM

## 2019-03-13 LAB
BASOPHILS # BLD AUTO: 0.02 X10(3) UL (ref 0–0.2)
BASOPHILS NFR BLD AUTO: 0.3 %
DEPRECATED RDW RBC AUTO: 54.1 FL (ref 35.1–46.3)
EOSINOPHIL # BLD AUTO: 0.12 X10(3) UL (ref 0–0.7)
EOSINOPHIL NFR BLD AUTO: 1.7 %
ERYTHROCYTE [DISTWIDTH] IN BLOOD BY AUTOMATED COUNT: 15 % (ref 11–15)
HCT VFR BLD AUTO: 40.1 % (ref 39–53)
HGB BLD-MCNC: 12.9 G/DL (ref 13–17.5)
IMM GRANULOCYTES # BLD AUTO: 0.02 X10(3) UL (ref 0–1)
IMM GRANULOCYTES NFR BLD: 0.3 %
LYMPHOCYTES # BLD AUTO: 2.09 X10(3) UL (ref 1–4)
LYMPHOCYTES NFR BLD AUTO: 29.4 %
MCH RBC QN AUTO: 31.6 PG (ref 26–34)
MCHC RBC AUTO-ENTMCNC: 32.2 G/DL (ref 31–37)
MCV RBC AUTO: 98.3 FL (ref 80–100)
MONOCYTES # BLD AUTO: 1.03 X10(3) UL (ref 0.1–1)
MONOCYTES NFR BLD AUTO: 14.5 %
NEUTROPHILS # BLD AUTO: 3.83 X10 (3) UL (ref 1.5–7.7)
NEUTROPHILS # BLD AUTO: 3.83 X10(3) UL (ref 1.5–7.7)
NEUTROPHILS NFR BLD AUTO: 53.8 %
PLATELET # BLD AUTO: 83 10(3)UL (ref 150–450)
RBC # BLD AUTO: 4.08 X10(6)UL (ref 3.8–5.8)
WBC # BLD AUTO: 7.1 X10(3) UL (ref 4–11)

## 2019-03-13 PROCEDURE — 99213 OFFICE O/P EST LOW 20 MIN: CPT | Performed by: INTERNAL MEDICINE

## 2019-03-13 NOTE — PROGRESS NOTES
[facility] Progress Note    Patient Name: Sherita Menjivar   YOB: 1945   Medical Record Number: [id number]   CSN: [id number]   Attending Physician: Richard Madrid M.D.    Referring Physician: Caden Sánchez MD      Date of Visit: 3/13/2019 total) by mouth daily. Use for gout., Disp: 30 tablet, Rfl: 3  •  predniSONE 20 MG Oral Tab, Take 20 mg by mouth daily. , Disp: , Rfl:   •  Sildenafil Citrate 50 MG Oral Tab, Take 1 tablet (50 mg total) by mouth daily as needed for Erectile Dysfunction. Segundo Miramontes Polymyositis ([address]    • Problems with swallowing    • Sleep apnea     CPAP   • Thrombocytopathia (HCC)    • Wears glasses        Past Surgical History:  Past Surgical History:   Procedure Laterality Date   • APPENDECTOMY  1970   • APPENDECTOMY     • COLONO Not on file      Highest education level: Not on file    Occupational History      Occupation: Retired     Social Needs      Financial resource strain: Not on file      Food insecurity:        Worry: Not on file        Inability: Not on file      Transport Self-Exams: Not Asked    Social History Narrative      Not on file        Allergies:    Augmentin [Amoclan]     HIVES  Amoxicillin             RASH     Review of Systems:  A 14-point ROS was done with pertinent positives and negative per the HPI    Vi year.     Emotional Well Being:  I have assessed the patient's emotional well-being and any concerns about anxiety or depression. We discussed issues of distress, coping difficulties and social support systems and currently there are no active problems.

## 2019-03-15 ENCOUNTER — TELEPHONE (OUTPATIENT)
Dept: FAMILY MEDICINE CLINIC | Facility: CLINIC | Age: 74
End: 2019-03-15

## 2019-03-15 DIAGNOSIS — E87.6 HYPOKALEMIA: ICD-10-CM

## 2019-03-15 RX ORDER — POTASSIUM CHLORIDE 20 MEQ/1
TABLET, EXTENDED RELEASE ORAL
Qty: 90 TABLET | Refills: 2 | Status: SHIPPED | OUTPATIENT
Start: 2019-03-15 | End: 2019-06-08

## 2019-03-18 RX ORDER — ALLOPURINOL 100 MG/1
100 TABLET ORAL DAILY
Qty: 30 TABLET | Refills: 0 | Status: SHIPPED | OUTPATIENT
Start: 2019-03-18 | End: 2019-04-14

## 2019-03-22 RX ORDER — PREDNISONE 20 MG/1
TABLET ORAL
Qty: 120 TABLET | Refills: 0 | OUTPATIENT
Start: 2019-03-22

## 2019-03-30 ENCOUNTER — HOSPITAL ENCOUNTER (OUTPATIENT)
Age: 74
Discharge: HOME | End: 2019-03-30
Attending: FAMILY MEDICINE
Payer: MEDICARE

## 2019-03-30 ENCOUNTER — APPOINTMENT (OUTPATIENT)
Dept: GENERAL RADIOLOGY | Age: 74
End: 2019-03-30
Attending: FAMILY MEDICINE
Payer: MEDICARE

## 2019-03-30 VITALS
BODY MASS INDEX: 25.06 KG/M2 | OXYGEN SATURATION: 98 % | HEIGHT: 72 IN | RESPIRATION RATE: 18 BRPM | TEMPERATURE: 97 F | HEART RATE: 76 BPM | SYSTOLIC BLOOD PRESSURE: 128 MMHG | DIASTOLIC BLOOD PRESSURE: 71 MMHG | WEIGHT: 185 LBS

## 2019-03-30 DIAGNOSIS — J06.9 UPPER RESPIRATORY TRACT INFECTION, UNSPECIFIED TYPE: Primary | ICD-10-CM

## 2019-03-30 PROCEDURE — 99214 OFFICE O/P EST MOD 30 MIN: CPT

## 2019-03-30 PROCEDURE — 99213 OFFICE O/P EST LOW 20 MIN: CPT

## 2019-03-30 PROCEDURE — 71046 X-RAY EXAM CHEST 2 VIEWS: CPT | Performed by: FAMILY MEDICINE

## 2019-03-30 RX ORDER — BENZONATATE 100 MG/1
100 CAPSULE ORAL 2 TIMES DAILY PRN
Qty: 10 CAPSULE | Refills: 0 | Status: SHIPPED | OUTPATIENT
Start: 2019-03-30 | End: 2019-04-04

## 2019-03-30 NOTE — ED PROVIDER NOTES
Patient Seen in: [address] [facility]    History   Patient presents with:  Cough/URI    Stated Complaint: congestion and cough x 7 days     HPI  22-year-old gentleman with complex medical history presents to immediate care with a one by Michelle Mota MD at [facility] ENDOSCOPY   • COLONOSCOPY WITH BIOPSY  5/14/13   • EGD     • ESOPHAGOGASTRODUODENOSCOPY (EGD) N/A 5/1/2018    Performed by Michelle Mota MD at [facility] ENDOSCOPY   • HAND/FINGER SURGERY UNLISTED  2003    right   • NEEDLE BIOPS 25.09 kg/m²         Physical Exam   Constitutional: He is oriented to person, place, and time. He appears well-developed and well-nourished. No distress. HENT:   Head: Normocephalic and atraumatic.    Mouth/Throat: Oropharynx is clear and moist. No oropha by: Lynne Medeiros MD on 3/30/2019 at 10:44     Approved by: Lynne Medeiros MD            Chest xray findings discussed with pt             Disposition and Plan     Clinical Impression:  Upper respiratory tract infection, unspecified type  (primary

## 2019-04-01 ENCOUNTER — OFFICE VISIT (OUTPATIENT)
Dept: FAMILY MEDICINE CLINIC | Facility: CLINIC | Age: 74
End: 2019-04-01
Payer: MEDICARE

## 2019-04-01 VITALS
DIASTOLIC BLOOD PRESSURE: 70 MMHG | HEIGHT: 70.75 IN | HEART RATE: 68 BPM | BODY MASS INDEX: 27.19 KG/M2 | TEMPERATURE: 99 F | SYSTOLIC BLOOD PRESSURE: 122 MMHG | WEIGHT: 194.19 LBS | RESPIRATION RATE: 14 BRPM

## 2019-04-01 DIAGNOSIS — J06.9 ACUTE URI: Primary | ICD-10-CM

## 2019-04-01 PROCEDURE — 99213 OFFICE O/P EST LOW 20 MIN: CPT | Performed by: NURSE PRACTITIONER

## 2019-04-01 RX ORDER — AZITHROMYCIN 250 MG/1
TABLET, FILM COATED ORAL
Qty: 6 TABLET | Refills: 0 | Status: SHIPPED | OUTPATIENT
Start: 2019-04-01 | End: 2019-04-22 | Stop reason: ALTCHOICE

## 2019-04-01 NOTE — PROGRESS NOTES
Patient presents with:  Cough  Nasal Congestion      HPI:  Presents with approx 1 week history of cough with production of green colored sputum- worse at night, sinus congestion, mild sore throat, fatigue and nasal drainage.  Denies fever/chills, ear pain/p Atrial fibrillation (HCC)     Aortic root dilation (HCC)     Lupus (systemic lupus erythematosus) (HCC)     Incidental lung nodule, less than or equal to 3mm     LI (obstructive sleep apnea)        Current Outpatient Medications:  azithromycin 250 MG Oral developed, well nourished,in no apparent distress  HEENT: Normocephalic and atraumatic. Tympanic membranes clear bilat. Oropharynx is erythematous without exudate, lesions or edema. (+)PND. No facial tenderness.    Eyes: Conjunctivae are pink and moist with

## 2019-04-15 RX ORDER — ALLOPURINOL 100 MG/1
100 TABLET ORAL DAILY
Qty: 30 TABLET | Refills: 0 | Status: SHIPPED | OUTPATIENT
Start: 2019-04-15 | End: 2019-05-09

## 2019-04-22 ENCOUNTER — OFFICE VISIT (OUTPATIENT)
Dept: RHEUMATOLOGY | Facility: CLINIC | Age: 74
End: 2019-04-22
Payer: MEDICARE

## 2019-04-22 VITALS
DIASTOLIC BLOOD PRESSURE: 72 MMHG | SYSTOLIC BLOOD PRESSURE: 142 MMHG | RESPIRATION RATE: 16 BRPM | BODY MASS INDEX: 27.16 KG/M2 | WEIGHT: 194 LBS | HEIGHT: 71 IN | HEART RATE: 72 BPM

## 2019-04-22 DIAGNOSIS — M32.19 OTHER SYSTEMIC LUPUS ERYTHEMATOSUS WITH OTHER ORGAN INVOLVEMENT (HCC): Primary | ICD-10-CM

## 2019-04-22 DIAGNOSIS — D69.6 THROMBOCYTOPENIA (HCC): ICD-10-CM

## 2019-04-22 PROCEDURE — 99213 OFFICE O/P EST LOW 20 MIN: CPT | Performed by: INTERNAL MEDICINE

## 2019-04-22 RX ORDER — FEXOFENADINE HCL 180 MG/1
180 TABLET ORAL
COMMUNITY
End: 2019-12-11 | Stop reason: ALTCHOICE

## 2019-04-22 RX ORDER — OMEPRAZOLE 20 MG/1
CAPSULE, DELAYED RELEASE ORAL
COMMUNITY

## 2019-04-22 RX ORDER — SPIRONOLACTONE 25 MG/1
TABLET ORAL
COMMUNITY
End: 2019-10-09 | Stop reason: CLARIF

## 2019-04-22 RX ORDER — ALLOPURINOL 100 MG/1
TABLET ORAL
COMMUNITY

## 2019-04-22 RX ORDER — POTASSIUM CHLORIDE 20 MEQ/1
20 TABLET, EXTENDED RELEASE ORAL 3 TIMES DAILY
COMMUNITY

## 2019-04-22 RX ORDER — ELECTROLYTES/DEXTROSE
SOLUTION, ORAL ORAL
COMMUNITY

## 2019-04-22 RX ORDER — BENAZEPRIL HYDROCHLORIDE 40 MG/1
TABLET, FILM COATED ORAL
COMMUNITY
End: 2019-10-09 | Stop reason: CLARIF

## 2019-04-22 RX ORDER — CHLORAL HYDRATE 500 MG
CAPSULE ORAL
COMMUNITY

## 2019-04-22 RX ORDER — METOPROLOL SUCCINATE 100 MG/1
TABLET, EXTENDED RELEASE ORAL
COMMUNITY
End: 2019-06-12 | Stop reason: SDUPTHER

## 2019-04-22 RX ORDER — AMLODIPINE BESYLATE 2.5 MG/1
TABLET ORAL
COMMUNITY
End: 2019-10-09 | Stop reason: CLARIF

## 2019-04-22 NOTE — PATIENT INSTRUCTIONS
Continue to eat a well-balanced diet, try to get regular exercise, 8 hours of sleep at night. Follow-up with Dr. Melissa Sosa of hepatology from [place]. Follow with Dr. Marija Broderick of hematology from [facility].   Currently your platelets are low at 78,000 but not low

## 2019-04-22 NOTE — PROGRESS NOTES
[facility] RHEUMATOLOGY  Dr. Jayda Joseph Progress Note     Subjective: Luisa Goltz is a(n) 66year old male. Current complaints: Patient presents with: Follow - Up: polymyositis. LOV 7/12/18. Labs 4/6/19-Denies muscle aches.  Pt had gout attacks x 2 in big t

## 2019-04-26 ENCOUNTER — OFFICE VISIT (OUTPATIENT)
Dept: FAMILY MEDICINE CLINIC | Facility: CLINIC | Age: 74
End: 2019-04-26
Payer: MEDICARE

## 2019-04-26 VITALS
WEIGHT: 193 LBS | BODY MASS INDEX: 27.02 KG/M2 | SYSTOLIC BLOOD PRESSURE: 130 MMHG | HEART RATE: 68 BPM | RESPIRATION RATE: 12 BRPM | TEMPERATURE: 97 F | HEIGHT: 71 IN | DIASTOLIC BLOOD PRESSURE: 64 MMHG

## 2019-04-26 DIAGNOSIS — J32.4 CHRONIC PANSINUSITIS: Primary | ICD-10-CM

## 2019-04-26 PROCEDURE — 99213 OFFICE O/P EST LOW 20 MIN: CPT | Performed by: FAMILY MEDICINE

## 2019-04-26 NOTE — PROGRESS NOTES
Patient presents with:  Sinus Problem      Subjective   HPI:   This is a 66year old male coming in for cough ongoing > 12 months, not allergy seasonal but PND< occasional cough    HPI   See reviewed tab for PMSFHx  REVIEW OF SYSTEMS:   GENERAL HEALTH: fee respiratory distress. Abdominal: Soft. Neurological: He is alert and oriented to person, place, and time. Skin: Skin is warm and dry.             Assessment and Plan:     Problem List Items Addressed This Visit     None      Visit Diagnoses     Chroni

## 2019-04-30 ENCOUNTER — HOSPITAL ENCOUNTER (OUTPATIENT)
Dept: CT IMAGING | Facility: HOSPITAL | Age: 74
Discharge: HOME | End: 2019-04-30
Attending: FAMILY MEDICINE
Payer: MEDICARE

## 2019-04-30 DIAGNOSIS — J32.4 CHRONIC PANSINUSITIS: ICD-10-CM

## 2019-04-30 PROCEDURE — 70486 CT MAXILLOFACIAL W/O DYE: CPT | Performed by: FAMILY MEDICINE

## 2019-05-09 RX ORDER — ALLOPURINOL 100 MG/1
100 TABLET ORAL DAILY
Qty: 30 TABLET | Refills: 0 | Status: SHIPPED | OUTPATIENT
Start: 2019-05-09 | End: 2019-06-11

## 2019-05-19 ENCOUNTER — TELEPHONE (OUTPATIENT)
Dept: FAMILY MEDICINE CLINIC | Facility: CLINIC | Age: 74
End: 2019-05-19

## 2019-05-19 DIAGNOSIS — I10 BENIGN ESSENTIAL HYPERTENSION: ICD-10-CM

## 2019-05-20 DIAGNOSIS — I10 BENIGN ESSENTIAL HYPERTENSION: ICD-10-CM

## 2019-05-21 RX ORDER — AMLODIPINE BESYLATE 2.5 MG/1
TABLET ORAL
Qty: 90 TABLET | Refills: 0 | Status: SHIPPED | OUTPATIENT
Start: 2019-05-21 | End: 2019-07-20

## 2019-05-21 RX ORDER — LOSARTAN POTASSIUM 50 MG/1
TABLET ORAL
Qty: 90 TABLET | Refills: 0 | Status: SHIPPED | OUTPATIENT
Start: 2019-05-21 | End: 2019-07-20

## 2019-05-21 RX ORDER — SPIRONOLACTONE 25 MG/1
25 TABLET ORAL
Qty: 90 TABLET | Refills: 0 | Status: SHIPPED | OUTPATIENT
Start: 2019-05-21 | End: 2019-07-20

## 2019-05-21 NOTE — TELEPHONE ENCOUNTER
Medication refilled per protocol. Requested Prescriptions     Signed Prescriptions Disp Refills   • SPIRONOLACTONE 25 MG Oral Tab 90 tablet 0     Sig: TAKE 1 TABLET (25 MG TOTAL) BY MOUTH ONCE DAILY.      Authorizing Provider: Anais Franco

## 2019-05-21 NOTE — TELEPHONE ENCOUNTER
LM letting patient know that medication was refilled and to contact our office to schedule follow up appointment for June.

## 2019-05-21 NOTE — TELEPHONE ENCOUNTER
Requested Prescriptions     Pending Prescriptions Disp Refills   • LOSARTAN POTASSIUM 50 MG Oral Tab [Pharmacy Med Name: LOSARTAN POTASSIUM 50 MG TAB] 90 tablet 0     Sig: TAKE 1 TABLET BY MOUTH EVERY DAY       LOV 4/26/2019     Appointment scheduled: Carol

## 2019-05-21 NOTE — TELEPHONE ENCOUNTER
Requested Prescriptions     Pending Prescriptions Disp Refills   • AMLODIPINE BESYLATE 2.5 MG Oral Tab [Pharmacy Med Name: AMLODIPINE BESYLATE 2.5 MG TAB] 90 tablet 2     Sig: TAKE 1 TABLET BY MOUTH EVERY DAY       LOV 4/26/2019 (acute visit) - had wellnes

## 2019-06-07 ENCOUNTER — OFFICE VISIT (OUTPATIENT)
Dept: FAMILY MEDICINE CLINIC | Facility: CLINIC | Age: 74
End: 2019-06-07
Payer: MEDICARE

## 2019-06-07 VITALS
HEIGHT: 70.5 IN | RESPIRATION RATE: 14 BRPM | HEART RATE: 62 BPM | BODY MASS INDEX: 25.76 KG/M2 | WEIGHT: 182 LBS | DIASTOLIC BLOOD PRESSURE: 60 MMHG | SYSTOLIC BLOOD PRESSURE: 126 MMHG

## 2019-06-07 DIAGNOSIS — Z00.00 LABORATORY EXAMINATION ORDERED AS PART OF A ROUTINE GENERAL MEDICAL EXAMINATION: ICD-10-CM

## 2019-06-07 DIAGNOSIS — I10 BENIGN ESSENTIAL HYPERTENSION: Primary | ICD-10-CM

## 2019-06-07 DIAGNOSIS — M1A.9XX0 CHRONIC GOUT, UNSPECIFIED CAUSE, UNSPECIFIED SITE: ICD-10-CM

## 2019-06-07 PROCEDURE — 99213 OFFICE O/P EST LOW 20 MIN: CPT | Performed by: FAMILY MEDICINE

## 2019-06-07 NOTE — PROGRESS NOTES
HPI:   Patient presents with:  Blood Pressure: f/u    Subjective   Magno Zapata is a 66year old male who presents for recheck of his hypertension. He has been taking medications as instructed, with no medication side effects.  His home BP monitoring i Normal range of motion. Neck supple. Cardiovascular: Normal rate, regular rhythm and normal heart sounds. No murmur heard. Pulmonary/Chest: Effort normal and breath sounds normal. No respiratory distress. Abdominal: Soft.  Normal appearance and pascale

## 2019-06-07 NOTE — ASSESSMENT & PLAN NOTE
Stable, Continue present management.     Blood Pressure and Cardiac Medications          AMLODIPINE BESYLATE 2.5 MG Oral Tab    LOSARTAN POTASSIUM 50 MG Oral Tab    Sildenafil Citrate 50 MG Oral Tab    Metoprolol Succinate  MG Oral Tablet 24 Hr

## 2019-06-08 DIAGNOSIS — E87.6 HYPOKALEMIA: ICD-10-CM

## 2019-06-10 RX ORDER — POTASSIUM CHLORIDE 20 MEQ/1
TABLET, EXTENDED RELEASE ORAL
Qty: 90 TABLET | Refills: 11 | Status: SHIPPED | OUTPATIENT
Start: 2019-06-10 | End: 2019-07-20

## 2019-06-10 NOTE — TELEPHONE ENCOUNTER
LOV 6/7/2019     Appointment scheduled: 12/11/2019 Rupert Girard MD   Last potassium level was 4.8 on 12/14/18    Refill request for:    Requested Prescriptions     Pending Prescriptions Disp Refills   • Potassium Chloride ER (KLOR-CON M20) 20 MEQ Oral Tab

## 2019-06-11 RX ORDER — ALLOPURINOL 100 MG/1
100 TABLET ORAL DAILY
Qty: 90 TABLET | Refills: 3 | Status: SHIPPED | OUTPATIENT
Start: 2019-06-11 | End: 2019-07-20

## 2019-06-13 RX ORDER — METOPROLOL SUCCINATE 100 MG/1
TABLET, EXTENDED RELEASE ORAL
Qty: 30 TABLET | Refills: 5 | Status: SHIPPED | OUTPATIENT
Start: 2019-06-13 | End: 2019-12-29 | Stop reason: SDUPTHER

## 2019-06-28 ENCOUNTER — OFFICE VISIT (OUTPATIENT)
Dept: FAMILY MEDICINE CLINIC | Facility: CLINIC | Age: 74
End: 2019-06-28
Payer: MEDICARE

## 2019-06-28 VITALS
HEART RATE: 76 BPM | HEIGHT: 70.5 IN | TEMPERATURE: 98 F | SYSTOLIC BLOOD PRESSURE: 108 MMHG | BODY MASS INDEX: 27.01 KG/M2 | DIASTOLIC BLOOD PRESSURE: 60 MMHG | WEIGHT: 190.81 LBS | OXYGEN SATURATION: 92 % | RESPIRATION RATE: 16 BRPM

## 2019-06-28 DIAGNOSIS — R05.9 COUGH: Primary | ICD-10-CM

## 2019-06-28 PROCEDURE — 99213 OFFICE O/P EST LOW 20 MIN: CPT | Performed by: FAMILY MEDICINE

## 2019-06-28 RX ORDER — AZITHROMYCIN 250 MG/1
TABLET, FILM COATED ORAL
Qty: 6 TABLET | Refills: 0 | Status: SHIPPED | OUTPATIENT
Start: 2019-06-28 | End: 2019-07-20

## 2019-06-28 RX ORDER — ALBUTEROL SULFATE 90 UG/1
2 AEROSOL, METERED RESPIRATORY (INHALATION) EVERY 4 HOURS PRN
Qty: 1 INHALER | Refills: 2 | Status: SHIPPED | OUTPATIENT
Start: 2019-06-28 | End: 2019-08-23 | Stop reason: ALTCHOICE

## 2019-06-28 NOTE — PROGRESS NOTES
Patient presents with:  Cough: Dry cough present 2 days, feeling like bronchial spasm,disturbing sleep. Fatigue  Shortness Of Breath: with ADL.  Taking prednisone, which helps him feel a little better     HPI:   Reji Hobson is a 66year old male with Dispense: 1 Inhaler; Refill: 2  - azithromycin 250 MG Oral Tab; Take two tablets by mouth today, then one daily. Dispense: 6 tablet;  Refill: 0      Jenn Mclain M.D.   [facility] 3  06/28/19

## 2019-07-20 ENCOUNTER — APPOINTMENT (OUTPATIENT)
Dept: GENERAL RADIOLOGY | Facility: HOSPITAL | Age: 74
DRG: 187 | End: 2019-07-20
Attending: EMERGENCY MEDICINE
Payer: MEDICARE

## 2019-07-20 ENCOUNTER — APPOINTMENT (OUTPATIENT)
Dept: CT IMAGING | Facility: HOSPITAL | Age: 74
DRG: 187 | End: 2019-07-20
Attending: EMERGENCY MEDICINE
Payer: MEDICARE

## 2019-07-20 ENCOUNTER — HOSPITAL ENCOUNTER (INPATIENT)
Facility: HOSPITAL | Age: 74
LOS: 2 days | Discharge: HOME | DRG: 187 | End: 2019-07-22
Attending: EMERGENCY MEDICINE | Admitting: INTERNAL MEDICINE
Payer: MEDICARE

## 2019-07-20 DIAGNOSIS — R09.02 HYPOXIA: Primary | ICD-10-CM

## 2019-07-20 DIAGNOSIS — J90 PLEURAL EFFUSION: ICD-10-CM

## 2019-07-20 PROBLEM — M32.9 LUPUS: Status: ACTIVE | Noted: 2017-03-30

## 2019-07-20 PROBLEM — IMO0002 LUPUS: Status: ACTIVE | Noted: 2017-03-30

## 2019-07-20 PROBLEM — M32.9 LUPUS (HCC): Status: ACTIVE | Noted: 2017-03-30

## 2019-07-20 LAB
ADENOVIRUS PCR:: NEGATIVE
ALBUMIN SERPL-MCNC: 3.5 G/DL (ref 3.4–5)
ALBUMIN/GLOB SERPL: 1.1 {RATIO} (ref 1–2)
ALP LIVER SERPL-CCNC: 62 U/L (ref 45–117)
ALT SERPL-CCNC: 32 U/L (ref 16–61)
ANION GAP SERPL CALC-SCNC: 8 MMOL/L (ref 0–18)
AST SERPL-CCNC: 26 U/L (ref 15–37)
B PERT DNA SPEC QL NAA+PROBE: NEGATIVE
BASOPHILS # BLD AUTO: 0.02 X10(3) UL (ref 0–0.2)
BASOPHILS NFR BLD AUTO: 0.5 %
BILIRUB SERPL-MCNC: 0.9 MG/DL (ref 0.1–2)
BUN BLD-MCNC: 21 MG/DL (ref 7–18)
BUN/CREAT SERPL: 18.3 (ref 10–20)
C PNEUM DNA SPEC QL NAA+PROBE: NEGATIVE
CALCIUM BLD-MCNC: 8.7 MG/DL (ref 8.5–10.1)
CHLORIDE SERPL-SCNC: 108 MMOL/L (ref 98–112)
CO2 SERPL-SCNC: 24 MMOL/L (ref 21–32)
CORONAVIRUS 229E PCR:: NEGATIVE
CORONAVIRUS HKU1 PCR:: NEGATIVE
CORONAVIRUS NL63 PCR:: NEGATIVE
CORONAVIRUS OC43 PCR:: NEGATIVE
CREAT BLD-MCNC: 1.15 MG/DL (ref 0.7–1.3)
DEPRECATED RDW RBC AUTO: 57 FL (ref 35.1–46.3)
EOSINOPHIL # BLD AUTO: 0.2 X10(3) UL (ref 0–0.7)
EOSINOPHIL NFR BLD AUTO: 4.5 %
ERYTHROCYTE [DISTWIDTH] IN BLOOD BY AUTOMATED COUNT: 15.9 % (ref 11–15)
FLUAV RNA SPEC QL NAA+PROBE: NEGATIVE
FLUBV RNA SPEC QL NAA+PROBE: NEGATIVE
GLOBULIN PLAS-MCNC: 3.2 G/DL (ref 2.8–4.4)
GLUCOSE BLD-MCNC: 108 MG/DL (ref 70–99)
HCT VFR BLD AUTO: 45.7 % (ref 39–53)
HGB BLD-MCNC: 14.7 G/DL (ref 13–17.5)
IMM GRANULOCYTES # BLD AUTO: 0.02 X10(3) UL (ref 0–1)
IMM GRANULOCYTES NFR BLD: 0.5 %
INR BLD: 1.03 (ref 0.9–1.1)
LYMPHOCYTES # BLD AUTO: 1.17 X10(3) UL (ref 1–4)
LYMPHOCYTES NFR BLD AUTO: 26.4 %
M PROTEIN MFR SERPL ELPH: 6.7 G/DL (ref 6.4–8.2)
MCH RBC QN AUTO: 31.5 PG (ref 26–34)
MCHC RBC AUTO-ENTMCNC: 32.2 G/DL (ref 31–37)
MCV RBC AUTO: 98.1 FL (ref 80–100)
METAPNEUMOVIRUS PCR:: NEGATIVE
MONOCYTES # BLD AUTO: 0.45 X10(3) UL (ref 0.1–1)
MONOCYTES NFR BLD AUTO: 10.1 %
MYCOPLASMA PNEUMONIA PCR:: NEGATIVE
NEUTROPHILS # BLD AUTO: 2.58 X10 (3) UL (ref 1.5–7.7)
NEUTROPHILS # BLD AUTO: 2.58 X10(3) UL (ref 1.5–7.7)
NEUTROPHILS NFR BLD AUTO: 58 %
NT-PROBNP SERPL-MCNC: 2348 PG/ML (ref ?–450)
OSMOLALITY SERPL CALC.SUM OF ELEC: 294 MOSM/KG (ref 275–295)
PARAINFLUENZA 1 PCR:: NEGATIVE
PARAINFLUENZA 2 PCR:: NEGATIVE
PARAINFLUENZA 3 PCR:: NEGATIVE
PARAINFLUENZA 4 PCR:: NEGATIVE
PLATELET # BLD AUTO: 68 10(3)UL (ref 150–450)
POTASSIUM SERPL-SCNC: 4.4 MMOL/L (ref 3.5–5.1)
PROCALCITONIN SERPL-MCNC: 0.03 NG/ML
PSA SERPL DL<=0.01 NG/ML-MCNC: 13.9 SECONDS (ref 12.5–14.7)
RBC # BLD AUTO: 4.66 X10(6)UL (ref 3.8–5.8)
RHINOVIRUS/ENTERO PCR:: NEGATIVE
RSV RNA SPEC QL NAA+PROBE: NEGATIVE
SODIUM SERPL-SCNC: 140 MMOL/L (ref 136–145)
TROPONIN I SERPL-MCNC: <0.045 NG/ML (ref ?–0.04)
VENOUS BASE EXCESS: -0.6
VENOUS BLOOD GAS HCO3: 25.4 MEQ/L (ref 23–27)
VENOUS LITERS PER MINUTE: 3 L/MIN
VENOUS O2 SAT CALC: 31 % (ref 72–78)
VENOUS O2 SATURATION: 26 % (ref 72–78)
VENOUS PCO2: 47 MM HG (ref 38–50)
VENOUS PH: 7.35 (ref 7.33–7.43)
VENOUS PO2: 21 MM HG (ref 30–50)
WBC # BLD AUTO: 4.4 X10(3) UL (ref 4–11)

## 2019-07-20 PROCEDURE — 71045 X-RAY EXAM CHEST 1 VIEW: CPT | Performed by: EMERGENCY MEDICINE

## 2019-07-20 PROCEDURE — 99223 1ST HOSP IP/OBS HIGH 75: CPT | Performed by: STUDENT IN AN ORGANIZED HEALTH CARE EDUCATION/TRAINING PROGRAM

## 2019-07-20 PROCEDURE — 71275 CT ANGIOGRAPHY CHEST: CPT | Performed by: EMERGENCY MEDICINE

## 2019-07-20 RX ORDER — PANTOPRAZOLE SODIUM 20 MG/1
20 TABLET, DELAYED RELEASE ORAL
Status: DISCONTINUED | OUTPATIENT
Start: 2019-07-20 | End: 2019-07-22

## 2019-07-20 RX ORDER — METOPROLOL SUCCINATE 100 MG/1
100 TABLET, EXTENDED RELEASE ORAL NIGHTLY
Status: DISCONTINUED | OUTPATIENT
Start: 2019-07-20 | End: 2019-07-22

## 2019-07-20 RX ORDER — POTASSIUM CHLORIDE 20 MEQ/1
20 TABLET, EXTENDED RELEASE ORAL DAILY
COMMUNITY
End: 2019-10-01

## 2019-07-20 RX ORDER — LOSARTAN POTASSIUM 50 MG/1
50 TABLET ORAL DAILY
Status: DISCONTINUED | OUTPATIENT
Start: 2019-07-21 | End: 2019-07-22

## 2019-07-20 RX ORDER — FUROSEMIDE 10 MG/ML
40 INJECTION INTRAMUSCULAR; INTRAVENOUS ONCE
Status: COMPLETED | OUTPATIENT
Start: 2019-07-20 | End: 2019-07-20

## 2019-07-20 RX ORDER — ALLOPURINOL 100 MG/1
100 TABLET ORAL DAILY
COMMUNITY
End: 2020-02-13

## 2019-07-20 RX ORDER — AMLODIPINE BESYLATE 2.5 MG/1
2.5 TABLET ORAL DAILY
COMMUNITY
End: 2019-08-05 | Stop reason: DRUGHIGH

## 2019-07-20 RX ORDER — METOCLOPRAMIDE HYDROCHLORIDE 5 MG/ML
10 INJECTION INTRAMUSCULAR; INTRAVENOUS EVERY 8 HOURS PRN
Status: DISCONTINUED | OUTPATIENT
Start: 2019-07-20 | End: 2019-07-22

## 2019-07-20 RX ORDER — LOSARTAN POTASSIUM 50 MG/1
50 TABLET ORAL DAILY
Status: ON HOLD | COMMUNITY
End: 2019-08-20

## 2019-07-20 RX ORDER — OMEPRAZOLE 20 MG/1
20 CAPSULE, DELAYED RELEASE ORAL
COMMUNITY
End: 2019-08-28

## 2019-07-20 RX ORDER — AMLODIPINE BESYLATE 2.5 MG/1
2.5 TABLET ORAL DAILY
Status: DISCONTINUED | OUTPATIENT
Start: 2019-07-21 | End: 2019-07-22

## 2019-07-20 RX ORDER — ONDANSETRON 2 MG/ML
4 INJECTION INTRAMUSCULAR; INTRAVENOUS EVERY 6 HOURS PRN
Status: DISCONTINUED | OUTPATIENT
Start: 2019-07-20 | End: 2019-07-22

## 2019-07-20 RX ORDER — ALBUTEROL SULFATE 2.5 MG/3ML
2.5 SOLUTION RESPIRATORY (INHALATION) EVERY 4 HOURS PRN
Status: DISCONTINUED | OUTPATIENT
Start: 2019-07-20 | End: 2019-07-22

## 2019-07-20 RX ORDER — SPIRONOLACTONE 25 MG/1
25 TABLET ORAL DAILY
Status: ON HOLD | COMMUNITY
End: 2019-08-20

## 2019-07-20 RX ORDER — ACETAMINOPHEN 325 MG/1
650 TABLET ORAL EVERY 6 HOURS PRN
Status: DISCONTINUED | OUTPATIENT
Start: 2019-07-20 | End: 2019-07-22

## 2019-07-20 RX ORDER — PANTOPRAZOLE SODIUM 20 MG/1
20 TABLET, DELAYED RELEASE ORAL
Status: DISCONTINUED | OUTPATIENT
Start: 2019-07-21 | End: 2019-07-20

## 2019-07-20 RX ORDER — ALLOPURINOL 100 MG/1
100 TABLET ORAL DAILY
Status: DISCONTINUED | OUTPATIENT
Start: 2019-07-21 | End: 2019-07-22

## 2019-07-20 RX ORDER — CETIRIZINE HYDROCHLORIDE 10 MG/1
10 TABLET ORAL DAILY
Status: DISCONTINUED | OUTPATIENT
Start: 2019-07-21 | End: 2019-07-22

## 2019-07-20 NOTE — ED PROVIDER NOTES
Patient Seen in: [facility] Emergency Department    History   Patient presents with:  Dyspnea LIZ SOB (respiratory)    Stated Complaint: sob    HPI    This is a very pleasant 68-year-old male with past medical history of atrial fibrillation with delia COLONOSCOPY  10/2003 2006 01/2010    x3   • COLONOSCOPY  5/14/2013   • COLONOSCOPY N/A 5/1/2018    Performed by Neymar Dexter MD at [address] [place] [address] ENDOSCOPY   • COLONOSCOPY WITH BIOPSY  5/14/13   • EGD     • ESOPHAGOGASTRODUODENOSCOPY (EGD) N/A 5/1/2018    Perfor 1635 91 %   O2 Device 07/20/19 1742 Nasal cannula       Current:BP (!) 161/96   Pulse 76   Temp 97.6 °F (36.4 °C)   Resp 24   Ht 180.3 cm (5' 11\")   Wt 84.8 kg   SpO2 96%   BMI 26.08 kg/m²         Physical Exam    GENERAL: Awake, alert oriented x3, nontox ------                     CBC W/ DIFFERENTIAL[[id number]]          Abnormal            Final result                 Please view results for these tests on the individual orders.    PROCALCITONIN    Narrative:     Resulted by: batch: K, PBNP, CTNI, CO2, CL, lobe.  There are a few subcentimeter hypodense foci within the liver, too small to accurately characterize. Partially visualized cholelithiasis. Bilateral renal cysts measuring up to 3.3 cm on the left, partially visualized.  BONES:  Degenerative changes Patient placed on cardiac monitor, continuous pulse oximetry and IV line was established of normal saline. Patient's O2 sats were 82% on room air with good waveform. He was placed on 4 L oxygen by nasal cannula and O2 sats improved to 96%.   LARRYG paola

## 2019-07-20 NOTE — ED INITIAL ASSESSMENT (HPI)
C/o sob for 2 days,  Hx broncitis 6 weeks ago.   Denies pain   Last albuterol 5 hours ago,  States is not helping any more

## 2019-07-21 ENCOUNTER — APPOINTMENT (OUTPATIENT)
Dept: GENERAL RADIOLOGY | Facility: HOSPITAL | Age: 74
DRG: 187 | End: 2019-07-21
Attending: INTERNAL MEDICINE
Payer: MEDICARE

## 2019-07-21 ENCOUNTER — APPOINTMENT (OUTPATIENT)
Dept: CV DIAGNOSTICS | Facility: HOSPITAL | Age: 74
DRG: 187 | End: 2019-07-21
Attending: STUDENT IN AN ORGANIZED HEALTH CARE EDUCATION/TRAINING PROGRAM
Payer: MEDICARE

## 2019-07-21 LAB
ANION GAP SERPL CALC-SCNC: 5 MMOL/L (ref 0–18)
ATRIAL RATE: 100 BPM
BASOPHIL PLEURAL FLUID: 0 %
BASOPHILS # BLD AUTO: 0.02 X10(3) UL (ref 0–0.2)
BASOPHILS NFR BLD AUTO: 0.4 %
BUN BLD-MCNC: 23 MG/DL (ref 7–18)
BUN/CREAT SERPL: 18.5 (ref 10–20)
CALCIUM BLD-MCNC: 8.6 MG/DL (ref 8.5–10.1)
CHLORIDE SERPL-SCNC: 106 MMOL/L (ref 98–112)
CO2 SERPL-SCNC: 29 MMOL/L (ref 21–32)
CREAT BLD-MCNC: 1.24 MG/DL (ref 0.7–1.3)
DEPRECATED RDW RBC AUTO: 57.7 FL (ref 35.1–46.3)
EOSINOPHIL # BLD AUTO: 0.15 X10(3) UL (ref 0–0.7)
EOSINOPHIL NFR BLD AUTO: 3.3 %
EOSINOPHIL PLEURAL FLUID: 0 %
ERYTHROCYTE [DISTWIDTH] IN BLOOD BY AUTOMATED COUNT: 15.8 % (ref 11–15)
GLUCOSE BLD-MCNC: 91 MG/DL (ref 70–99)
GLUCOSE PLR-MCNC: 100 MG/DL
HCT VFR BLD AUTO: 44.5 % (ref 39–53)
HGB BLD-MCNC: 14.3 G/DL (ref 13–17.5)
IMM GRANULOCYTES # BLD AUTO: 0.02 X10(3) UL (ref 0–1)
IMM GRANULOCYTES NFR BLD: 0.4 %
LDH FLD L TO P-CCNC: 73 U/L
LYMPHOCYTE PLEURAL FLUID: 70 %
LYMPHOCYTES # BLD AUTO: 1.03 X10(3) UL (ref 1–4)
LYMPHOCYTES NFR BLD AUTO: 22.6 %
MCH RBC QN AUTO: 31.7 PG (ref 26–34)
MCHC RBC AUTO-ENTMCNC: 32.1 G/DL (ref 31–37)
MCV RBC AUTO: 98.7 FL (ref 80–100)
MESOTHELIAL PLEURAL FLUID: 5 %
MONO/MAC/HISTIO PLEURAL FLUID: 20 %
MONOCYTES # BLD AUTO: 0.6 X10(3) UL (ref 0.1–1)
MONOCYTES NFR BLD AUTO: 13.2 %
NEUTROPHIL PLEURAL FLUID: 5 %
NEUTROPHILS # BLD AUTO: 2.73 X10 (3) UL (ref 1.5–7.7)
NEUTROPHILS # BLD AUTO: 2.73 X10(3) UL (ref 1.5–7.7)
NEUTROPHILS NFR BLD AUTO: 60.1 %
OSMOLALITY SERPL CALC.SUM OF ELEC: 293 MOSM/KG (ref 275–295)
PLATELET # BLD AUTO: 64 10(3)UL (ref 150–450)
POTASSIUM SERPL-SCNC: 4.4 MMOL/L (ref 3.5–5.1)
PROT PLR-MCNC: 3.4 G/DL
Q-T INTERVAL: 398 MS
QRS DURATION: 96 MS
QTC CALCULATION (BEZET): 464 MS
R AXIS: 40 DEGREES
RBC # BLD AUTO: 4.51 X10(6)UL (ref 3.8–5.8)
RBC PLEURAL FLUID: 6000 /MM3
SODIUM SERPL-SCNC: 140 MMOL/L (ref 136–145)
T AXIS: 19 DEGREES
TOTAL CELLS COUNTED: 100
TSI SER-ACNC: 2.54 MIU/ML (ref 0.36–3.74)
VENTRICULAR RATE: 82 BPM
WBC # BLD AUTO: 4.6 X10(3) UL (ref 4–11)
WBC # FLD: 519 /MM3

## 2019-07-21 PROCEDURE — 0W9B3ZZ DRAINAGE OF LEFT PLEURAL CAVITY, PERCUTANEOUS APPROACH: ICD-10-PCS | Performed by: INTERNAL MEDICINE

## 2019-07-21 PROCEDURE — 93306 TTE W/DOPPLER COMPLETE: CPT | Performed by: STUDENT IN AN ORGANIZED HEALTH CARE EDUCATION/TRAINING PROGRAM

## 2019-07-21 PROCEDURE — 99232 SBSQ HOSP IP/OBS MODERATE 35: CPT | Performed by: HOSPITALIST

## 2019-07-21 PROCEDURE — 71045 X-RAY EXAM CHEST 1 VIEW: CPT | Performed by: INTERNAL MEDICINE

## 2019-07-21 RX ORDER — SPIRONOLACTONE 25 MG/1
25 TABLET ORAL DAILY
Status: DISCONTINUED | OUTPATIENT
Start: 2019-07-21 | End: 2019-07-22

## 2019-07-21 NOTE — PROCEDURES
[facility] Patient Status:  Inpatient    3/17/1945 MRN [id number]   [facility] 5NW-A Attending Antonio Garcia, DO   Hosp Day # 1 PCP Jcakie Nichols MD     Thoracentesis Procedure Note    Consent: Informed consent was

## 2019-07-21 NOTE — RESPIRATORY THERAPY NOTE
Patient states he is josé miguel, and does wear a cpap machine at home, but is refusing to use a machine while in the hospital. Advised patient that we will be monitoring his oxygen over night and if his saturation drop we may need to bring a machine.  Pulse ox to

## 2019-07-21 NOTE — PLAN OF CARE
DX: BILATERAL PLEURAL EFFUSIONS  PLAN OF CARE: PLAN FOR BEDSIDE THORACENTESIS TODAY PER DR. RUIZ, WEAN O2 AS ABLE  PT COMMUNICATES UNDERSTANDING, NO PAIN. STATES BREATHING FEELS BETTER. CURRENTLY ON 3-4 LITERS NASAL CANNULA. AFIB ON TELE.  LEFT THORA DO

## 2019-07-21 NOTE — H&P
[facility]  History and Physical     Alidaisa Goltz Patient Status:  Emergency    3/17/1945 MRN [id number]   Location [facility] Attending Randy Evans, [facility] Day # 0 PCP Katey Canada MD     Chief Complaint: apnea     CPAP   • Thrombocytopathia [facility])    • Wears glasses         Past Surgical History:   Past Surgical History:   Procedure Laterality Date   • APPENDECTOMY  1970   • APPENDECTOMY     • COLONOSCOPY  10/2003 2006 01/2010    x3   • COLONOSCOPY  5/14/20 Esophagus) Son    • Heart Attack Maternal Grandfather     reviewed    Allergies:   Augmentin [Amoclan]     HIVES  Amoxicillin             RASH    Medications:    No current facility-administered medications on file prior to encounter.    Current Outpatient Pertinent positives and negatives noted in the HPI. Physical Exam:    BP (!) 161/96   Pulse 76   Temp 97.6 °F (36.4 °C)   Resp 24   Ht 5' 11\" (1.803 m)   Wt 187 lb (84.8 kg)   SpO2 96%   BMI 26.08 kg/m²   General: No acute distress.  Alert and oriente Quality:  · DVT Prophylaxis: SCD, plt < 100K  · CODE status: full  · Reyes: no    Plan of care discussed with pt, pt spouse     Rene Fatima MD  7/20/2019

## 2019-07-21 NOTE — PROGRESS NOTES
[facility]  Progress Note     Ayden Fernandes Patient Status:  Inpatient    3/17/1945 MRN [id number]   [facility] 5NW-A Attending Elliot Brand, [address] [facility] Day # 1 PCP Jennifer Patricia MD     Chief Complaint: SOB    S: Patient seen and Daily   • amLODIPine Besylate  2.5 mg Oral Daily   • cetirizine  10 mg Oral Daily   • losartan Potassium  50 mg Oral Daily   • Metoprolol Succinate ER  100 mg Oral Nightly   • Pantoprazole Sodium  20 mg Oral BID AC       ASSESSMENT / PLAN:     1.  Dyspnea/H

## 2019-07-21 NOTE — CONSULTS
Pulmonary H&P/Consult     NAME: Luisa Goltz - ROOM: 803/067-L - MRN: [id number] - Age: 66year old - :  3/17/1945    Date of Admission: 2019  4:28 PM  Admission Diagnosis: Pleural effusion [J90]  Hypoxia [R09.02]    Assessment/Plan:  1.  Garcia 11/16/2017   • LI (obstructive sleep apnea) HST 11-7-17    AHI 37  Supine AHI 38 non-supine AHI 16 Sao2 Pj 83%    • LI (obstructive sleep apnea) PSG 5-22-19    AHI 36 RDI 36 REM AHI 45 Supine AHI 65 non-supine AHI 19 Sao2 Pj 86% CPAP 9cwp   • Pain Paternal Grandmother    • Kidney Disease Son    • Other (Ramon's Esophagus) Son    • Heart Attack Maternal Grandfather    Social History    Tobacco Use      Smoking status: Former Smoker        Packs/day: 0.25        Years: 15.00        Pack years: 3.75 Recent Labs   Lab 07/20/19  1638 07/21/19  0608   * 91   BUN 21* 23*   CREATSERUM 1.15 1.24   GFRAA 71 65   GFRNAA 61 56*   CA 8.7 8.6   ALB 3.5  --     140   K 4.4 4.4    106   CO2 24.0 29.0   ALKPHO 62  --    AST 26  --    ALT 32

## 2019-07-21 NOTE — PLAN OF CARE
NURSING ADMISSION NOTE      Patient admitted via cart. Oriented to room. Safety precautions initiated. Bed in low position. Call light in reach. Monitor on tele-Afib,  on nasal cannula wean as able. Pt refusing CPAP. Monitor I&O.  Labs in am.

## 2019-07-22 ENCOUNTER — APPOINTMENT (OUTPATIENT)
Dept: ULTRASOUND IMAGING | Facility: HOSPITAL | Age: 74
DRG: 187 | End: 2019-07-22
Attending: INTERNAL MEDICINE
Payer: MEDICARE

## 2019-07-22 ENCOUNTER — APPOINTMENT (OUTPATIENT)
Dept: GENERAL RADIOLOGY | Facility: HOSPITAL | Age: 74
DRG: 187 | End: 2019-07-22
Attending: INTERNAL MEDICINE
Payer: MEDICARE

## 2019-07-22 VITALS
RESPIRATION RATE: 17 BRPM | HEART RATE: 78 BPM | DIASTOLIC BLOOD PRESSURE: 69 MMHG | TEMPERATURE: 98 F | BODY MASS INDEX: 27.16 KG/M2 | WEIGHT: 194 LBS | HEIGHT: 71 IN | OXYGEN SATURATION: 95 % | SYSTOLIC BLOOD PRESSURE: 116 MMHG

## 2019-07-22 LAB
BASOPHIL PLEURAL FLUID: 0 %
EOSINOPHIL PLEURAL FLUID: 0 %
GLUCOSE PLR-MCNC: 102 MG/DL
LDH FLD L TO P-CCNC: 76 U/L
LDH SERPL L TO P-CCNC: 169 U/L (ref 87–241)
LYMPHOCYTE PLEURAL FLUID: 34 %
MONO/MAC/HISTIO PLEURAL FLUID: 59 %
NEUTROPHIL PLEURAL FLUID: 7 %
PROT PLR-MCNC: 3.1 G/DL
RBC PLEURAL FLUID: 3000 /MM3
TOTAL CELLS COUNTED: 100
WBC # FLD: 606 /MM3

## 2019-07-22 PROCEDURE — 71045 X-RAY EXAM CHEST 1 VIEW: CPT | Performed by: INTERNAL MEDICINE

## 2019-07-22 PROCEDURE — 71046 X-RAY EXAM CHEST 2 VIEWS: CPT | Performed by: INTERNAL MEDICINE

## 2019-07-22 PROCEDURE — 76604 US EXAM CHEST: CPT | Performed by: INTERNAL MEDICINE

## 2019-07-22 PROCEDURE — 0W993ZZ DRAINAGE OF RIGHT PLEURAL CAVITY, PERCUTANEOUS APPROACH: ICD-10-PCS | Performed by: INTERNAL MEDICINE

## 2019-07-22 PROCEDURE — 99239 HOSP IP/OBS DSCHRG MGMT >30: CPT | Performed by: HOSPITALIST

## 2019-07-22 NOTE — HOME CARE LIAISON
Met with patient and family at the bedside. Patient declined [facility]. Residential brochure provided with contact information. All questions addressed and answered.

## 2019-07-22 NOTE — PLAN OF CARE
Assumed care of patient at 1900. Monitor on tele-controlled Afib,  on NC. Drsg C/D/I L back from thoracentesis. Denies any pain. VSS.  Will continue to monitor

## 2019-07-22 NOTE — PROGRESS NOTES
Multidisciplinary Discharge Rounds held 7/22/2019.     Treatment team members present today include , , Charge Nurse, Nurse, RT, PT and Pharmacy caring for Betzy Neighbors     Other care providers present:    Mobility Goal:    Re

## 2019-07-22 NOTE — PROCEDURES
Thoracentesis Procedure Note  : Dr. Darryl Wilson  Indication: right pleural effusion  Location: right  Pre-procedure: Patient was identified in room 511. Consent was obtained and documented from the patient. Procedural pause performed.   Ultrasound was u

## 2019-07-22 NOTE — PROGRESS NOTES
Pulmonary Progress Note        NAME: Micheal Hernandes - ROOM: 389[facility]- - MRN: [id number] - Age: 66year old - : 3/17/1945        Last 24hrs: No events overnight, feels much better since having his left side drained yesterday, ambulated w/ nursing staff --    AST 26  --    ALB 3.5  --    LDH  --  169       Invalid input(s): ARTERIALPH, ARTERIALPO2, ARTERIALPCO2, ARTERIALHCO3    No results for input(s): BNP in the last 72 hours.     Invalid input(s): TROPI    INR   Date/Time Value Ref Range Status   07/20/

## 2019-07-22 NOTE — DISCHARGE SUMMARY
[facility] HOSPITALIST  DISCHARGE SUMMARY     Leandro Segal Patient Status:  Inpatient    3/17/1945 MRN [id number]   [facility] 5NW-A Attending No att. providers found   Hosp Day # 2 PCP Zoie Castellon MD     Date of Admission: 2019  Andres Medication List:     Discharge Medications      CONTINUE taking these medications      Instructions Prescription details   Albuterol Sulfate  (90 Base) MCG/ACT Aers      Inhale 2 puffs into the lungs every 4 (four) hours as needed for Wheezing.    Oumou Lindsay weeks     Appointments for Next 30 Days 7/22/2019 - 8/21/2019      Date Arrival Time Visit Type Length Department Provider     7/26/2019 10:30 AM  NON-[facility] HOSPITAL FOLLOW UP [5060] 30 min.  Jesus Perez MD

## 2019-07-22 NOTE — PROGRESS NOTES
AMBER HOSPITALIST  Progress Note     Reji Hobson Patient Status:  Inpatient    3/17/1945 MRN [id number]   [facility] 5NW-A Attending Rafia Villagran, [facility] Day # 2 PCP Dakota Majano MD     Chief Complaint: SOB    S: Patient seen and allopurinol  100 mg Oral Daily   • amLODIPine Besylate  2.5 mg Oral Daily   • cetirizine  10 mg Oral Daily   • losartan Potassium  50 mg Oral Daily   • Metoprolol Succinate ER  100 mg Oral Nightly   • Pantoprazole Sodium  20 mg Oral BID AC       ASSESSMENT

## 2019-07-22 NOTE — PLAN OF CARE
Problem: Patient/Family Goals  Goal: Patient/Family Long Term Goal  Description  Patient's Long Term Goal: To return to normal active life  Interventions:  - Meds as prescribe  -follow up with md  -pulmonary consult  -echo  -wean O2 s able  - See additio

## 2019-07-22 NOTE — PROGRESS NOTES
SPO2 % ON ROOM AIR 91-88%    SPO2% ON O2 AT REST: 91% ON 1.5 LITERS  O2 AT REST     SPO2% AMBULATION ON ROOM AIR  85%    SPO2% AMBULATION ON O2 6  LITERS PER MINUTE  89%

## 2019-07-22 NOTE — CM/SW NOTE
07/22/19 1400   CM/SW Screening   Referral Source    Information Source Chart review;Nursing rounds   Patient's Mental Status Alert;Oriented   Patient lives with Spouse   Patient Status Prior to Admission   Independent with ADLs and Mobility

## 2019-07-23 ENCOUNTER — PATIENT OUTREACH (OUTPATIENT)
Dept: CASE MANAGEMENT | Age: 74
End: 2019-07-23

## 2019-07-23 ENCOUNTER — TELEPHONE (OUTPATIENT)
Dept: INTERVENTIONAL RADIOLOGY/VASCULAR | Facility: HOSPITAL | Age: 74
End: 2019-07-23

## 2019-07-23 DIAGNOSIS — Z02.9 ENCOUNTERS FOR UNSPECIFIED ADMINISTRATIVE PURPOSE: ICD-10-CM

## 2019-07-23 LAB
NON GYNE INTERPRETATION: NEGATIVE
NON GYNE INTERPRETATION: NEGATIVE

## 2019-07-23 NOTE — TELEPHONE ENCOUNTER
2 year Watchman f/u call. Pt was just released from hospital for bilateral pleural effusions. Otherwise he has been doing well. No bleeding or embolic events.   Encouraged to call with any questions/concerns

## 2019-07-23 NOTE — PROGRESS NOTES
Initial Post Discharge Follow Up   Discharge Date: 7/22/19  Contact Date: 7/23/2019    Consent Verification:  Assessment Completed With: Patient  HIPAA Verified?   Yes    Discharge Dx:    B/L pleural effusion  Pulmonary hypertension  LI  Afib  SLE  Mixe Sulfate HFA (PROAIR HFA) 108 (90 Base) MCG/ACT Inhalation Aero Soln Inhale 2 puffs into the lungs every 4 (four) hours as needed for Wheezing. Disp: 1 Inhaler Rfl: 2   Fexofenadine HCl 180 MG Oral Tab Take 180 mg by mouth daily.  Disp:  Rfl:    Sildenafil C Group (Extension Ray Galvan)        Dec 11, 2019 11:15 AM CST Medicare Annual Well Visit with MD Trever Miller 26, [address], [place]  ([address])        Jan 21, 2020  4:20 PM CST EXAM-ESTABLISHED with

## 2019-07-24 ENCOUNTER — TELEPHONE (OUTPATIENT)
Dept: CARDIOLOGY | Age: 74
End: 2019-07-24

## 2019-07-25 NOTE — PROGRESS NOTES
HPI:    Faustino Harper is a 66year old male here today for hospital follow up.    He was discharged from Inpatient hospital, [facility] to Home   Admission Date: 7/20/19   Discharge Date: 7/22/19  Hospital Discharge Diagnoses (since 6/24/2019)   No bronchitis- given albuterol/  Z pack and prolonged prednisone taper and felt better ~ 4 weeks later. Has been off the steroids for ~ 2 weeks now.   No LE edema, no CP, no palpitations.      Brief Synopsis: Patient admitted with dyspnea/hypoxia secondary to daily.     MULTI VITAMIN MENS OR Take 1 tablet by mouth daily. No current facility-administered medications on file prior to visit.        HISTORY: reconciled and review with patient  He  has a past medical history of A-fib [facility]), Arrhythmia, Arthriti about 46 years ago. He started smoking about 61 years ago. He has a 3.75 pack-year smoking history. He has never used smokeless tobacco. He reports that he drinks about 8.0 - 10.0 standard drinks of alcohol per week. He reports that he does not use drugs. place, and time. He has normal strength. Skin: Skin is warm and dry. No rash noted. Psychiatric: He has a normal mood and affect.  His speech is normal and behavior is normal. Judgment and thought content normal. Cognition and memory are normal.     ASS Management Options: moderate  · Amount and/or Complexity of Data to Be Reviewed: moderate  · Risk of Significant Complications, Morbidity, and/or Mortality: moderate    Overall Risk:   moderate    Patient seen within 7 days from date of discharge.      Sravani Mckeon

## 2019-07-26 ENCOUNTER — OFFICE VISIT (OUTPATIENT)
Dept: FAMILY MEDICINE CLINIC | Facility: CLINIC | Age: 74
End: 2019-07-26
Payer: MEDICARE

## 2019-07-26 ENCOUNTER — HOSPITAL ENCOUNTER (OUTPATIENT)
Dept: GENERAL RADIOLOGY | Age: 74
Discharge: HOME | End: 2019-07-26
Attending: FAMILY MEDICINE
Payer: MEDICARE

## 2019-07-26 VITALS
BODY MASS INDEX: 26.88 KG/M2 | TEMPERATURE: 98 F | DIASTOLIC BLOOD PRESSURE: 62 MMHG | HEART RATE: 72 BPM | HEIGHT: 71 IN | SYSTOLIC BLOOD PRESSURE: 110 MMHG | WEIGHT: 192 LBS | RESPIRATION RATE: 12 BRPM

## 2019-07-26 DIAGNOSIS — I27.20 PULMONARY HYPERTENSION (HCC): ICD-10-CM

## 2019-07-26 DIAGNOSIS — I27.20 PULMONARY HYPERTENSION (HCC): Primary | ICD-10-CM

## 2019-07-26 DIAGNOSIS — J90 BILATERAL PLEURAL EFFUSION: ICD-10-CM

## 2019-07-26 DIAGNOSIS — M35.1 MCTD (MIXED CONNECTIVE TISSUE DISEASE) (HCC): Chronic | ICD-10-CM

## 2019-07-26 DIAGNOSIS — R09.02 HYPOXIA: ICD-10-CM

## 2019-07-26 PROCEDURE — 99495 TRANSJ CARE MGMT MOD F2F 14D: CPT | Performed by: FAMILY MEDICINE

## 2019-07-26 PROCEDURE — 1111F DSCHRG MED/CURRENT MED MERGE: CPT | Performed by: FAMILY MEDICINE

## 2019-07-26 PROCEDURE — 71046 X-RAY EXAM CHEST 2 VIEWS: CPT | Performed by: FAMILY MEDICINE

## 2019-07-26 RX ORDER — AZITHROMYCIN 250 MG/1
TABLET, FILM COATED ORAL
Qty: 6 TABLET | Refills: 0 | Status: SHIPPED | OUTPATIENT
Start: 2019-07-26 | End: 2019-08-05 | Stop reason: ALTCHOICE

## 2019-07-30 RX ORDER — SILDENAFIL 50 MG/1
50 TABLET, FILM COATED ORAL
COMMUNITY
Start: 2018-12-07

## 2019-07-30 RX ORDER — LOSARTAN POTASSIUM 50 MG/1
50 TABLET ORAL
COMMUNITY
End: 2019-10-09 | Stop reason: CLARIF

## 2019-07-30 RX ORDER — PREDNISONE 50 MG/1
9 TABLET ORAL DAILY
COMMUNITY
Start: 2017-07-10 | End: 2020-10-14 | Stop reason: DRUGHIGH

## 2019-07-30 RX ORDER — ALBUTEROL SULFATE 90 UG/1
2 AEROSOL, METERED RESPIRATORY (INHALATION)
COMMUNITY
Start: 2019-06-28 | End: 2019-10-09 | Stop reason: CLARIF

## 2019-07-30 RX ORDER — FEXOFENADINE HCL 180 MG/1
180 TABLET ORAL
COMMUNITY
End: 2019-10-09 | Stop reason: CLARIF

## 2019-07-31 ENCOUNTER — OFFICE VISIT (OUTPATIENT)
Dept: CARDIOLOGY | Age: 74
End: 2019-07-31

## 2019-07-31 ENCOUNTER — HOSPITAL ENCOUNTER (OUTPATIENT)
Dept: GENERAL RADIOLOGY | Age: 74
Discharge: HOME | End: 2019-07-31
Attending: FAMILY MEDICINE
Payer: MEDICARE

## 2019-07-31 VITALS
DIASTOLIC BLOOD PRESSURE: 68 MMHG | HEIGHT: 71 IN | WEIGHT: 190 LBS | SYSTOLIC BLOOD PRESSURE: 126 MMHG | HEART RATE: 64 BPM | BODY MASS INDEX: 26.6 KG/M2

## 2019-07-31 DIAGNOSIS — I27.20 PULMONARY HYPERTENSION (HCC): ICD-10-CM

## 2019-07-31 DIAGNOSIS — Z86.79 HISTORY OF ATRIAL FIBRILLATION: ICD-10-CM

## 2019-07-31 DIAGNOSIS — J90 PLEURAL EFFUSION: Primary | ICD-10-CM

## 2019-07-31 DIAGNOSIS — J90 BILATERAL PLEURAL EFFUSION: ICD-10-CM

## 2019-07-31 PROCEDURE — 71046 X-RAY EXAM CHEST 2 VIEWS: CPT | Performed by: FAMILY MEDICINE

## 2019-07-31 PROCEDURE — 99214 OFFICE O/P EST MOD 30 MIN: CPT | Performed by: INTERNAL MEDICINE

## 2019-08-02 DIAGNOSIS — I10 BENIGN ESSENTIAL HYPERTENSION: ICD-10-CM

## 2019-08-02 RX ORDER — AMLODIPINE BESYLATE 2.5 MG/1
TABLET ORAL
Qty: 90 TABLET | Refills: 1 | Status: ON HOLD | OUTPATIENT
Start: 2019-08-02 | End: 2019-08-20

## 2019-08-12 PROCEDURE — 88305 TISSUE EXAM BY PATHOLOGIST: CPT | Performed by: INTERNAL MEDICINE

## 2019-08-12 PROCEDURE — 87070 CULTURE OTHR SPECIMN AEROBIC: CPT | Performed by: INTERNAL MEDICINE

## 2019-08-12 PROCEDURE — 89051 BODY FLUID CELL COUNT: CPT | Performed by: INTERNAL MEDICINE

## 2019-08-12 PROCEDURE — 82465 ASSAY BLD/SERUM CHOLESTEROL: CPT | Performed by: INTERNAL MEDICINE

## 2019-08-12 PROCEDURE — 36415 COLL VENOUS BLD VENIPUNCTURE: CPT | Performed by: INTERNAL MEDICINE

## 2019-08-12 PROCEDURE — 82150 ASSAY OF AMYLASE: CPT | Performed by: INTERNAL MEDICINE

## 2019-08-12 PROCEDURE — 84311 SPECTROPHOTOMETRY: CPT | Performed by: INTERNAL MEDICINE

## 2019-08-12 PROCEDURE — 82945 GLUCOSE OTHER FLUID: CPT | Performed by: INTERNAL MEDICINE

## 2019-08-12 PROCEDURE — 88108 CYTOPATH CONCENTRATE TECH: CPT | Performed by: INTERNAL MEDICINE

## 2019-08-12 PROCEDURE — 84157 ASSAY OF PROTEIN OTHER: CPT | Performed by: INTERNAL MEDICINE

## 2019-08-12 PROCEDURE — 85014 HEMATOCRIT: CPT | Performed by: INTERNAL MEDICINE

## 2019-08-12 PROCEDURE — 83615 LACTATE (LD) (LDH) ENZYME: CPT | Performed by: INTERNAL MEDICINE

## 2019-08-12 PROCEDURE — 83690 ASSAY OF LIPASE: CPT | Performed by: INTERNAL MEDICINE

## 2019-08-12 PROCEDURE — 87205 SMEAR GRAM STAIN: CPT | Performed by: INTERNAL MEDICINE

## 2019-08-12 PROCEDURE — 84478 ASSAY OF TRIGLYCERIDES: CPT | Performed by: INTERNAL MEDICINE

## 2019-08-15 ENCOUNTER — APPOINTMENT (OUTPATIENT)
Dept: GENERAL RADIOLOGY | Facility: HOSPITAL | Age: 74
DRG: 292 | End: 2019-08-15
Attending: EMERGENCY MEDICINE
Payer: MEDICARE

## 2019-08-15 ENCOUNTER — HOSPITAL ENCOUNTER (EMERGENCY)
Facility: HOSPITAL | Age: 74
Discharge: HOME | DRG: 292 | End: 2019-08-15
Attending: EMERGENCY MEDICINE
Payer: MEDICARE

## 2019-08-15 VITALS
OXYGEN SATURATION: 92 % | DIASTOLIC BLOOD PRESSURE: 87 MMHG | RESPIRATION RATE: 24 BRPM | TEMPERATURE: 97 F | WEIGHT: 185 LBS | SYSTOLIC BLOOD PRESSURE: 120 MMHG | HEART RATE: 83 BPM | BODY MASS INDEX: 26 KG/M2

## 2019-08-15 DIAGNOSIS — I50.9 ACUTE ON CHRONIC CONGESTIVE HEART FAILURE, UNSPECIFIED HEART FAILURE TYPE (HCC): Primary | ICD-10-CM

## 2019-08-15 LAB
ALBUMIN SERPL-MCNC: 3.4 G/DL (ref 3.4–5)
ALBUMIN/GLOB SERPL: 1.1 {RATIO} (ref 1–2)
ALP LIVER SERPL-CCNC: 62 U/L (ref 45–117)
ALT SERPL-CCNC: 24 U/L (ref 16–61)
ANION GAP SERPL CALC-SCNC: 5 MMOL/L (ref 0–18)
AST SERPL-CCNC: 17 U/L (ref 15–37)
ATRIAL RATE: 71 BPM
BASOPHILS # BLD AUTO: 0.02 X10(3) UL (ref 0–0.2)
BASOPHILS NFR BLD AUTO: 0.5 %
BILIRUB SERPL-MCNC: 1.1 MG/DL (ref 0.1–2)
BUN BLD-MCNC: 28 MG/DL (ref 7–18)
BUN/CREAT SERPL: 23.5 (ref 10–20)
CALCIUM BLD-MCNC: 8.5 MG/DL (ref 8.5–10.1)
CHLORIDE SERPL-SCNC: 103 MMOL/L (ref 98–112)
CO2 SERPL-SCNC: 25 MMOL/L (ref 21–32)
CREAT BLD-MCNC: 1.19 MG/DL (ref 0.7–1.3)
D-DIMER: 0.68 UG/ML FEU (ref ?–0.76)
DEPRECATED RDW RBC AUTO: 57.5 FL (ref 35.1–46.3)
EOSINOPHIL # BLD AUTO: 0.12 X10(3) UL (ref 0–0.7)
EOSINOPHIL NFR BLD AUTO: 2.7 %
ERYTHROCYTE [DISTWIDTH] IN BLOOD BY AUTOMATED COUNT: 15.9 % (ref 11–15)
GLOBULIN PLAS-MCNC: 3.2 G/DL (ref 2.8–4.4)
GLUCOSE BLD-MCNC: 90 MG/DL (ref 70–99)
HCT VFR BLD AUTO: 44.5 % (ref 39–53)
HGB BLD-MCNC: 14.7 G/DL (ref 13–17.5)
IMM GRANULOCYTES # BLD AUTO: 0.03 X10(3) UL (ref 0–1)
IMM GRANULOCYTES NFR BLD: 0.7 %
INR BLD: 1.06 (ref 0.9–1.1)
LYMPHOCYTES # BLD AUTO: 1.03 X10(3) UL (ref 1–4)
LYMPHOCYTES NFR BLD AUTO: 23.5 %
M PROTEIN MFR SERPL ELPH: 6.6 G/DL (ref 6.4–8.2)
MCH RBC QN AUTO: 32.4 PG (ref 26–34)
MCHC RBC AUTO-ENTMCNC: 33 G/DL (ref 31–37)
MCV RBC AUTO: 98 FL (ref 80–100)
MONOCYTES # BLD AUTO: 0.51 X10(3) UL (ref 0.1–1)
MONOCYTES NFR BLD AUTO: 11.6 %
NEUTROPHILS # BLD AUTO: 2.67 X10 (3) UL (ref 1.5–7.7)
NEUTROPHILS # BLD AUTO: 2.67 X10(3) UL (ref 1.5–7.7)
NEUTROPHILS NFR BLD AUTO: 61 %
NT-PROBNP SERPL-MCNC: 2188 PG/ML (ref ?–450)
OSMOLALITY SERPL CALC.SUM OF ELEC: 281 MOSM/KG (ref 275–295)
PLATELET # BLD AUTO: 66 10(3)UL (ref 150–450)
POTASSIUM SERPL-SCNC: 4.7 MMOL/L (ref 3.5–5.1)
PSA SERPL DL<=0.01 NG/ML-MCNC: 14.3 SECONDS (ref 12.5–14.7)
Q-T INTERVAL: 414 MS
QRS DURATION: 104 MS
QTC CALCULATION (BEZET): 480 MS
R AXIS: 45 DEGREES
RBC # BLD AUTO: 4.54 X10(6)UL (ref 3.8–5.8)
SODIUM SERPL-SCNC: 133 MMOL/L (ref 136–145)
T AXIS: -5 DEGREES
TROPONIN I SERPL-MCNC: <0.045 NG/ML (ref ?–0.04)
VENTRICULAR RATE: 81 BPM
WBC # BLD AUTO: 4.4 X10(3) UL (ref 4–11)

## 2019-08-15 PROCEDURE — 84484 ASSAY OF TROPONIN QUANT: CPT | Performed by: EMERGENCY MEDICINE

## 2019-08-15 PROCEDURE — 85379 FIBRIN DEGRADATION QUANT: CPT | Performed by: EMERGENCY MEDICINE

## 2019-08-15 PROCEDURE — 85025 COMPLETE CBC W/AUTO DIFF WBC: CPT | Performed by: EMERGENCY MEDICINE

## 2019-08-15 PROCEDURE — 93005 ELECTROCARDIOGRAM TRACING: CPT

## 2019-08-15 PROCEDURE — 96374 THER/PROPH/DIAG INJ IV PUSH: CPT

## 2019-08-15 PROCEDURE — 99285 EMERGENCY DEPT VISIT HI MDM: CPT

## 2019-08-15 PROCEDURE — 80053 COMPREHEN METABOLIC PANEL: CPT | Performed by: EMERGENCY MEDICINE

## 2019-08-15 PROCEDURE — 93010 ELECTROCARDIOGRAM REPORT: CPT

## 2019-08-15 PROCEDURE — 85610 PROTHROMBIN TIME: CPT | Performed by: EMERGENCY MEDICINE

## 2019-08-15 PROCEDURE — 71045 X-RAY EXAM CHEST 1 VIEW: CPT | Performed by: EMERGENCY MEDICINE

## 2019-08-15 PROCEDURE — 83880 ASSAY OF NATRIURETIC PEPTIDE: CPT | Performed by: EMERGENCY MEDICINE

## 2019-08-15 RX ORDER — FUROSEMIDE 20 MG/1
20 TABLET ORAL EVERY OTHER DAY
Qty: 10 TABLET | Refills: 0 | Status: ON HOLD | OUTPATIENT
Start: 2019-08-15 | End: 2019-08-20

## 2019-08-15 RX ORDER — FUROSEMIDE 10 MG/ML
40 INJECTION INTRAMUSCULAR; INTRAVENOUS ONCE
Status: COMPLETED | OUTPATIENT
Start: 2019-08-15 | End: 2019-08-15

## 2019-08-15 NOTE — ED INITIAL ASSESSMENT (HPI)
Pt here due to LIZ. Has been going on for the past few weeks. Pt has been admitted for it and has fluid drained off his lungs.

## 2019-08-15 NOTE — ED NOTES
Pt ambulated around the nursing station on pulse ox with steady gait. Pt O2 sat did not drop below 92%, pt O2 sat RA 94%.   Pt heart rate did increase to 120 and pt stated he felt winded not short of breath but felt he was breathing a little heavier, non la

## 2019-08-15 NOTE — ED PROVIDER NOTES
Patient Seen in: [facility] Emergency Department    History   Patient presents with:  Dyspnea LIZ SOB (respiratory)    Stated Complaint: shortness of breath, recent thoracentesis, not feeling better    HPI    61-year-old male complaint of shortness APPENDECTOMY     • COLONOSCOPY  10/2003 2006 01/2010    x3   • COLONOSCOPY  5/14/2013   • COLONOSCOPY N/A 5/1/2018    Performed by Janie De La Torre MD at [facility] ENDOSCOPY   • COLONOSCOPY WITH BIOPSY  5/14/13   • EGD     • ESOPHAGOGASTRODUODENOSCOPY (EGD) N/A 96.6 °F (35.9 °C)   Temp src Temporal   SpO2 95 %   O2 Device None (Room air)       Current:/87   Pulse 83   Temp 96.6 °F (35.9 °C) (Temporal)   Resp 24   Wt 83.9 kg   SpO2 92%   BMI 25.80 kg/m²         Physical Exam    Patient is alert orient x3 no Abnormal            Final result                 Please view results for these tests on the individual orders.    SCAN SLIDE   RAINBOW DRAW BLUE   RAINBOW DRAW LAVENDER   RAINBOW DRAW LIGHT GREEN   RAINBOW DRAW GOLD     EKG    Rate, intervals and axes atelectasis, however, a superimposed infectious process is not excluded. 4. Dilated main pulmonary artery is nonspecific but may represent pulmonary hypertension. Reflux of contrast into the hepatic veins may represent right heart dysfunction.  5. Dilated Saurav Fierro MD            Xr Chest Ap Portable  (cpt=71045)    Result Date: 7/20/2019  CONCLUSION:  1. Left basilar consolidation may represent atelectasis and/or pneumonia. 2. Mild to moderate left pleural effusion.  3. Patchy right infrahilar airspace disease m

## 2019-08-16 ENCOUNTER — HOSPITAL ENCOUNTER (INPATIENT)
Facility: HOSPITAL | Age: 74
LOS: 3 days | Discharge: HOME | DRG: 292 | End: 2019-08-20
Attending: EMERGENCY MEDICINE | Admitting: HOSPITALIST
Payer: MEDICARE

## 2019-08-16 ENCOUNTER — APPOINTMENT (OUTPATIENT)
Dept: CT IMAGING | Facility: HOSPITAL | Age: 74
DRG: 292 | End: 2019-08-16
Attending: EMERGENCY MEDICINE
Payer: MEDICARE

## 2019-08-16 ENCOUNTER — APPOINTMENT (OUTPATIENT)
Dept: GENERAL RADIOLOGY | Facility: HOSPITAL | Age: 74
DRG: 292 | End: 2019-08-16
Attending: EMERGENCY MEDICINE
Payer: MEDICARE

## 2019-08-16 ENCOUNTER — TELEPHONE (OUTPATIENT)
Dept: CARDIOLOGY | Age: 74
End: 2019-08-16

## 2019-08-16 DIAGNOSIS — J90 BILATERAL PLEURAL EFFUSION: Primary | ICD-10-CM

## 2019-08-16 PROBLEM — M32.9 SYSTEMIC LUPUS ERYTHEMATOSUS (HCC): Status: ACTIVE | Noted: 2017-03-30

## 2019-08-16 PROBLEM — R79.89 AZOTEMIA: Status: ACTIVE | Noted: 2019-08-16

## 2019-08-16 PROBLEM — E87.1 HYPONATREMIA: Status: ACTIVE | Noted: 2019-08-16

## 2019-08-16 LAB
ALBUMIN SERPL-MCNC: 3.4 G/DL (ref 3.4–5)
ALBUMIN/GLOB SERPL: 1.1 {RATIO} (ref 1–2)
ALP LIVER SERPL-CCNC: 59 U/L (ref 45–117)
ALT SERPL-CCNC: 24 U/L (ref 16–61)
ANION GAP SERPL CALC-SCNC: 6 MMOL/L (ref 0–18)
AST SERPL-CCNC: 22 U/L (ref 15–37)
BASOPHILS # BLD AUTO: 0.02 X10(3) UL (ref 0–0.2)
BASOPHILS NFR BLD AUTO: 0.5 %
BILIRUB SERPL-MCNC: 0.9 MG/DL (ref 0.1–2)
BUN BLD-MCNC: 33 MG/DL (ref 7–18)
BUN/CREAT SERPL: 24.8 (ref 10–20)
CALCIUM BLD-MCNC: 8.4 MG/DL (ref 8.5–10.1)
CHLORIDE SERPL-SCNC: 103 MMOL/L (ref 98–112)
CO2 SERPL-SCNC: 24 MMOL/L (ref 21–32)
CREAT BLD-MCNC: 1.33 MG/DL (ref 0.7–1.3)
DEPRECATED RDW RBC AUTO: 55.6 FL (ref 35.1–46.3)
EOSINOPHIL # BLD AUTO: 0.09 X10(3) UL (ref 0–0.7)
EOSINOPHIL NFR BLD AUTO: 2.4 %
ERYTHROCYTE [DISTWIDTH] IN BLOOD BY AUTOMATED COUNT: 15.7 % (ref 11–15)
GLOBULIN PLAS-MCNC: 3.1 G/DL (ref 2.8–4.4)
GLUCOSE BLD-MCNC: 77 MG/DL (ref 70–99)
HCT VFR BLD AUTO: 42.6 % (ref 39–53)
HGB BLD-MCNC: 14.2 G/DL (ref 13–17.5)
IMM GRANULOCYTES # BLD AUTO: 0.02 X10(3) UL (ref 0–1)
IMM GRANULOCYTES NFR BLD: 0.5 %
LYMPHOCYTES # BLD AUTO: 1.05 X10(3) UL (ref 1–4)
LYMPHOCYTES NFR BLD AUTO: 27.5 %
M PROTEIN MFR SERPL ELPH: 6.5 G/DL (ref 6.4–8.2)
MCH RBC QN AUTO: 32.1 PG (ref 26–34)
MCHC RBC AUTO-ENTMCNC: 33.3 G/DL (ref 31–37)
MCV RBC AUTO: 96.4 FL (ref 80–100)
MONOCYTES # BLD AUTO: 0.4 X10(3) UL (ref 0.1–1)
MONOCYTES NFR BLD AUTO: 10.5 %
NEUTROPHILS # BLD AUTO: 2.24 X10 (3) UL (ref 1.5–7.7)
NEUTROPHILS # BLD AUTO: 2.24 X10(3) UL (ref 1.5–7.7)
NEUTROPHILS NFR BLD AUTO: 58.6 %
NT-PROBNP SERPL-MCNC: 2387 PG/ML (ref ?–450)
OSMOLALITY SERPL CALC.SUM OF ELEC: 282 MOSM/KG (ref 275–295)
PLATELET # BLD AUTO: 73 10(3)UL (ref 150–450)
POTASSIUM SERPL-SCNC: 4.9 MMOL/L (ref 3.5–5.1)
RBC # BLD AUTO: 4.42 X10(6)UL (ref 3.8–5.8)
SODIUM SERPL-SCNC: 133 MMOL/L (ref 136–145)
TROPONIN I SERPL-MCNC: <0.045 NG/ML (ref ?–0.04)
WBC # BLD AUTO: 3.8 X10(3) UL (ref 4–11)

## 2019-08-16 PROCEDURE — 71275 CT ANGIOGRAPHY CHEST: CPT | Performed by: EMERGENCY MEDICINE

## 2019-08-16 PROCEDURE — 99223 1ST HOSP IP/OBS HIGH 75: CPT | Performed by: HOSPITALIST

## 2019-08-16 PROCEDURE — 71045 X-RAY EXAM CHEST 1 VIEW: CPT | Performed by: EMERGENCY MEDICINE

## 2019-08-17 LAB
ATRIAL RATE: 102 BPM
ATRIAL RATE: 91 BPM
CRP SERPL-MCNC: <0.29 MG/DL (ref ?–0.3)
Q-T INTERVAL: 406 MS
Q-T INTERVAL: 438 MS
QRS DURATION: 104 MS
QRS DURATION: 108 MS
QTC CALCULATION (BEZET): 483 MS
QTC CALCULATION (BEZET): 499 MS
R AXIS: 47 DEGREES
R AXIS: 65 DEGREES
SED RATE-ML: 22 MM/HR (ref 0–12)
T AXIS: 27 DEGREES
T AXIS: 8 DEGREES
VENTRICULAR RATE: 78 BPM
VENTRICULAR RATE: 85 BPM

## 2019-08-17 PROCEDURE — 99222 1ST HOSP IP/OBS MODERATE 55: CPT | Performed by: INTERNAL MEDICINE

## 2019-08-17 PROCEDURE — 99232 SBSQ HOSP IP/OBS MODERATE 35: CPT | Performed by: HOSPITALIST

## 2019-08-17 RX ORDER — FUROSEMIDE 10 MG/ML
40 INJECTION INTRAMUSCULAR; INTRAVENOUS
Status: DISCONTINUED | OUTPATIENT
Start: 2019-08-17 | End: 2019-08-20

## 2019-08-17 RX ORDER — HEPARIN SODIUM 5000 [USP'U]/ML
5000 INJECTION, SOLUTION INTRAVENOUS; SUBCUTANEOUS EVERY 12 HOURS SCHEDULED
Status: DISCONTINUED | OUTPATIENT
Start: 2019-08-17 | End: 2019-08-17

## 2019-08-17 RX ORDER — FUROSEMIDE 10 MG/ML
20 INJECTION INTRAMUSCULAR; INTRAVENOUS
Status: DISCONTINUED | OUTPATIENT
Start: 2019-08-17 | End: 2019-08-17

## 2019-08-17 RX ORDER — ONDANSETRON 2 MG/ML
4 INJECTION INTRAMUSCULAR; INTRAVENOUS EVERY 6 HOURS PRN
Status: DISCONTINUED | OUTPATIENT
Start: 2019-08-17 | End: 2019-08-20

## 2019-08-17 RX ORDER — METOPROLOL SUCCINATE 100 MG/1
100 TABLET, EXTENDED RELEASE ORAL
Status: DISCONTINUED | OUTPATIENT
Start: 2019-08-17 | End: 2019-08-20

## 2019-08-17 RX ORDER — PANTOPRAZOLE SODIUM 20 MG/1
20 TABLET, DELAYED RELEASE ORAL
Status: DISCONTINUED | OUTPATIENT
Start: 2019-08-17 | End: 2019-08-20

## 2019-08-17 RX ORDER — ALLOPURINOL 100 MG/1
100 TABLET ORAL DAILY
Status: DISCONTINUED | OUTPATIENT
Start: 2019-08-17 | End: 2019-08-20

## 2019-08-17 RX ORDER — ACETAMINOPHEN 325 MG/1
650 TABLET ORAL EVERY 6 HOURS PRN
Status: DISCONTINUED | OUTPATIENT
Start: 2019-08-17 | End: 2019-08-20

## 2019-08-17 NOTE — CONSULTS
Cardiology Consult Note     PRIMARY CARDIOLOGIST: HERNANDEZ/YRN      CONSULT FOR:DYSPNEA AND POSS CHF COMPONENT, CHRONIC AFIB WITH WATCHMAN      HISTORY: 73 Y/O MALE WITH KNOWN LUPUS AND CHRONIC AFIB   S/P WATCHMAN.  RECENT WITH RECURRENT PLEURAL EFFUSION NEED

## 2019-08-17 NOTE — ED PROVIDER NOTES
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 Constitutional and vital signs reviewed. All other systems reviewed and negative except as noted above.     Physical Exam     ED Triage Vitals [08/16/19 2038]   /7 Please view results for these tests on the individual orders. RAINBOW DRAW BLUE   RAINBOW DRAW LAVENDER   RAINBOW DRAW LIGHT GREEN   RAINBOW DRAW GOLD     EKG    Rate, intervals and axes as noted on EKG Report.   Rate: 85  Rhythm: Atrial Fibrillation (ljw=[id number])    Result Date: 7/20/2019  CONCLUSION:  1. No acute pulmonary embolism. 2. Moderate bilateral pleural effusions, left greater than right.  3. There is a small amount of consolidation within the right middle lobe and lingula which may represent a 7/22/2019  CONCLUSION:    No sign of pneumothorax after thoracentesis. Mild atelectasis left lower lobe and probable small left pleural effusion. Stable cardiac enlargement.   Decreased vascular congestion   Dictated by: Skip Rosas MD on 7/22/2019 at

## 2019-08-17 NOTE — CONSULTS
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   • Sleep apnea     CPAP   • Thrombocytopathia (HCC)    • Wears glasses       Past Surgical History:   Procedure Laterality Date   • APPENDECTOMY  1970   • APPENDECTOMY Stroke Mother    • Cancer Paternal Grandfather    • Heart Attack Paternal Grandmother    • Kidney Disease Son    • Other (Ramon's Esophagus) Son    • Heart Attack Maternal Grandfather          Home Medications:    Outpatient Medications Marked as Taking Medications:   •  allopurinol (ZYLOPRIM) tab 100 mg, 100 mg, Oral, Daily  •  Metoprolol Succinate ER (Toprol XL) 24 hr tab 100 mg, 100 mg, Oral, Daily Beta Blocker  •  Pantoprazole Sodium (PROTONIX) EC tab 20 mg, 20 mg, Oral, QAM AC  •  acetaminophen (TYLE 08/16/2019    HGB 14.2 08/16/2019    HCT 42.6 08/16/2019    MCV 96.4 08/16/2019    MCH 32.1 08/16/2019    MCHC 33.3 08/16/2019    RDW 15.7 08/16/2019    PLT 73.0 08/16/2019     Lab Results   Component Value Date     08/16/2019    K 4.9 08/16/2019 more than one occurrence. Has not been diuresed as OP.   Likely related to HFpEF  - IV lasix  - cards consult  - check ESR/CRP to r/o inflammatory condition but highly unlikely to be lupus related b/c it is transudative and pt has no other systemic symptom

## 2019-08-17 NOTE — PLAN OF CARE
Patient admitted to room 7604  Admission navigator completed  Patients wife at bedside  Vss monitored on tele.  Sat >90% on 2L O2  Patient alert and oriented X 4, denies pain      Problem: RESPIRATORY - ADULT  Goal: Achieves optimal ventilation and oxygenat

## 2019-08-17 NOTE — ED INITIAL ASSESSMENT (HPI)
Patient reporting shortness of breath for the last month. He said he was seen in ED at that time and diagnosed with pleural effusion. He was admitted and underwent pleuracentesis. He has gotten progressively worse since then.  Cardiologist told him to take

## 2019-08-17 NOTE — PROGRESS NOTES
AMBER HOSPITALIST  Progress Note     Ayden Fernandes Patient Status:  Inpatient    3/17/1945 MRN [id number]   [facility] 7NE-A Attending Sherrill Cotter MD   Hosp Day # 0 PCP Jennifer Patricia MD     Chief Complaint: sob    S: Patient still so ASSESSMENT / PLAN:     1. Dyspnea/Hypoxia due to recurrent bilateral pleural effusions 2/2 autoimmune dz vs less likely CHF? 1. Recent left thoracentesis on Monday w/ -1200 cc  2. Previously fluid has been transudative in nature  3.  Recent echo with

## 2019-08-18 ENCOUNTER — APPOINTMENT (OUTPATIENT)
Dept: CV DIAGNOSTICS | Facility: HOSPITAL | Age: 74
DRG: 292 | End: 2019-08-18
Attending: INTERNAL MEDICINE
Payer: MEDICARE

## 2019-08-18 LAB
ALBUMIN SERPL-MCNC: 3 G/DL (ref 3.4–5)
ALBUMIN/GLOB SERPL: 1.1 {RATIO} (ref 1–2)
ALP LIVER SERPL-CCNC: 54 U/L (ref 45–117)
ALT SERPL-CCNC: 24 U/L (ref 16–61)
ANION GAP SERPL CALC-SCNC: 8 MMOL/L (ref 0–18)
AST SERPL-CCNC: 24 U/L (ref 15–37)
BILIRUB SERPL-MCNC: 0.8 MG/DL (ref 0.1–2)
BUN BLD-MCNC: 33 MG/DL (ref 7–18)
BUN/CREAT SERPL: 26.6 (ref 10–20)
CALCIUM BLD-MCNC: 8.1 MG/DL (ref 8.5–10.1)
CHLORIDE SERPL-SCNC: 101 MMOL/L (ref 98–112)
CO2 SERPL-SCNC: 24 MMOL/L (ref 21–32)
CREAT BLD-MCNC: 1.24 MG/DL (ref 0.7–1.3)
GLOBULIN PLAS-MCNC: 2.8 G/DL (ref 2.8–4.4)
GLUCOSE BLD-MCNC: 102 MG/DL (ref 70–99)
HAV IGM SER QL: 2 MG/DL (ref 1.6–2.6)
M PROTEIN MFR SERPL ELPH: 5.8 G/DL (ref 6.4–8.2)
OSMOLALITY SERPL CALC.SUM OF ELEC: 283 MOSM/KG (ref 275–295)
POTASSIUM SERPL-SCNC: 4.1 MMOL/L (ref 3.5–5.1)
SODIUM SERPL-SCNC: 133 MMOL/L (ref 136–145)

## 2019-08-18 PROCEDURE — 99232 SBSQ HOSP IP/OBS MODERATE 35: CPT | Performed by: HOSPITALIST

## 2019-08-18 PROCEDURE — 93306 TTE W/DOPPLER COMPLETE: CPT | Performed by: INTERNAL MEDICINE

## 2019-08-18 PROCEDURE — 99232 SBSQ HOSP IP/OBS MODERATE 35: CPT | Performed by: INTERNAL MEDICINE

## 2019-08-18 NOTE — PLAN OF CARE
Assumed care of patient at 0700. Pt A/O x 4. VSS, controlled A-fib on tele. SPO2 > 93% on RA to 2L throughout shift. Pt experiencing SOB with exertion. Pt denies any pain or discomfort. Daily weights. Strict I &O's. Pt and wife updated on POC.   Trey Reid or hemorrhage  - Monitor labs and vital signs for trends  - Administer supportive blood products/factors, fluids and medications as ordered and appropriate  - Administer supportive blood products/factors as ordered and appropriate  Outcome: Progressing

## 2019-08-18 NOTE — PROGRESS NOTES
[facility]  Progress Note    Marcie Tyler Patient Status:  Inpatient    3/17/1945 MRN [id number]   [facility] 7NE-A Attending Era Morrell MD   Hosp Day # 1 PCP Nati Beard MD       Assessment and Plan:  Patient Active Problem (36.9 °C)      Intake/Output:    Intake/Output Summary (Last 24 hours) at 8/18/2019 0837  Last data filed at 8/18/2019 0100  Gross per 24 hour   Intake 960 ml   Output 1460 ml   Net -500 ml       Wt Readings from Last 3 Encounters:  08/18/19 : 180 lb 14.4

## 2019-08-18 NOTE — PLAN OF CARE
Assumed care at Doctor Mara 91 and oriented x4  Wife at bedside  SpO2 >90 with 2 L O2  Lungs clear, dim at bases  Instructed on cough/deep breathing  Verbalizes understanding  Plan is for diuresis  Discussed with patient and wife  Call light in reach signs for trends  - Administer supportive blood products/factors, fluids and medications as ordered and appropriate  - Administer supportive blood products/factors as ordered and appropriate  Outcome: Progressing

## 2019-08-18 NOTE — PLAN OF CARE
Assumed care at 0730  A&Ox4, VSS, afib on tele  C/O SOB with exertion. Pt reports minimal improvement  Pt reported no increase in urinary frequency after IV lasix administered. IV patent. MDs aware.  No new orders  Denies dizziness with ambulation  Echo com

## 2019-08-18 NOTE — CONSULTS
[address]    PATIENT'S NAME: Zeinab Marin   ATTENDING PHYSICIAN: Genaro Caceres MD   CONSULTING PHYSICIAN: Mirtha Villalpando M.D.    PATIENT ACCOUNT#:   [de-identified]    LOCATION:  [address] [place] [address]  MEDICAL RECORD #:   [id number]       DATE OF BIR round.    NECK:  Neck veins not distended. No bruits. LUNGS:  Poor airflow. Dull bases. HEART:  S1, S2 irregular. No murmur. ABDOMEN:  Positive bowel sounds. Liver is somewhat enlarged. EXTREMITIES:  Without edema.   Radial pulses equal.  NEUROLOGIC

## 2019-08-18 NOTE — PROGRESS NOTES
AMBER HOSPITALIST  Progress Note     Luisa Goltz Patient Status:  Inpatient    3/17/1945 MRN [id number]   [facility] 7NE-A Attending Mic Parrish MD   Hosp Day # 1 PCP Katey Canada MD     Chief Complaint: sob    S: severely dyspnei (Diuretic)       ASSESSMENT / PLAN:     1. Dyspnea/Hypoxia due to recurrent bilateral pleural effusions 2/2 autoimmune dz vs less likely CHF? 1. Recent left thoracentesis on Monday w/ -1200 cc  2. Previously fluid has been transudative in nature  3.  Recen

## 2019-08-19 ENCOUNTER — APPOINTMENT (OUTPATIENT)
Dept: GENERAL RADIOLOGY | Facility: HOSPITAL | Age: 74
DRG: 292 | End: 2019-08-19
Attending: INTERNAL MEDICINE
Payer: MEDICARE

## 2019-08-19 LAB
ANION GAP SERPL CALC-SCNC: 8 MMOL/L (ref 0–18)
BUN BLD-MCNC: 38 MG/DL (ref 7–18)
BUN/CREAT SERPL: 26.4 (ref 10–20)
CALCIUM BLD-MCNC: 8.4 MG/DL (ref 8.5–10.1)
CHLORIDE SERPL-SCNC: 101 MMOL/L (ref 98–112)
CO2 SERPL-SCNC: 28 MMOL/L (ref 21–32)
CREAT BLD-MCNC: 1.44 MG/DL (ref 0.7–1.3)
DEPRECATED RDW RBC AUTO: 57.2 FL (ref 35.1–46.3)
ERYTHROCYTE [DISTWIDTH] IN BLOOD BY AUTOMATED COUNT: 15.7 % (ref 11–15)
GLUCOSE BLD-MCNC: 83 MG/DL (ref 70–99)
HCT VFR BLD AUTO: 41 % (ref 39–53)
HGB BLD-MCNC: 13.5 G/DL (ref 13–17.5)
MCH RBC QN AUTO: 32.5 PG (ref 26–34)
MCHC RBC AUTO-ENTMCNC: 32.9 G/DL (ref 31–37)
MCV RBC AUTO: 98.6 FL (ref 80–100)
OSMOLALITY SERPL CALC.SUM OF ELEC: 292 MOSM/KG (ref 275–295)
PLATELET # BLD AUTO: 61 10(3)UL (ref 150–450)
POTASSIUM SERPL-SCNC: 4.1 MMOL/L (ref 3.5–5.1)
RBC # BLD AUTO: 4.16 X10(6)UL (ref 3.8–5.8)
SODIUM SERPL-SCNC: 137 MMOL/L (ref 136–145)
WBC # BLD AUTO: 3.3 X10(3) UL (ref 4–11)

## 2019-08-19 PROCEDURE — 99232 SBSQ HOSP IP/OBS MODERATE 35: CPT | Performed by: INTERNAL MEDICINE

## 2019-08-19 PROCEDURE — 71045 X-RAY EXAM CHEST 1 VIEW: CPT | Performed by: INTERNAL MEDICINE

## 2019-08-19 NOTE — PROGRESS NOTES
AMBER HOSPITALIST  Progress Note     Wiliam Hernandez Patient Status:  Inpatient    3/17/1945 MRN [id number]   [facility] 7NE-A Attending Lydia Cash MD   Hosp Day # 2 PCP Luciano Charlton MD     Chief Complaint: sob    S: Pt without acute Oral Daily   • Metoprolol Succinate ER  100 mg Oral Daily Beta Blocker   • Pantoprazole Sodium  20 mg Oral QAM AC   • furosemide  40 mg Intravenous BID (Diuretic)       ASSESSMENT / PLAN:     1.  Dyspnea/Hypoxia due to recurrent bilateral pleural effusions

## 2019-08-19 NOTE — PROGRESS NOTES
[facility]  Progress Note    Reino Tamica Patient Status:  Inpatient    3/17/1945 MRN [id number]   [facility] 7NE-A Attending Carmina Burks MD   [address] Day # 2 PCP Yaritza Ceja MD       Subjective:  Breathing overall improved bu Pantoprazole Sodium  20 mg Oral QAM AC   • furosemide  40 mg Intravenous BID (Diuretic)         Assessment:      1. Recurrent pleural effusion requiring repeat thoracenteses  2. Afib s/p watchman device  3.  Pulmonary htn with rv dysfunction- pap similar to

## 2019-08-19 NOTE — PLAN OF CARE
Patient is alert and oriented. Spouse at the bedside. Oxygen saturation remains above 90% on 2 L. Patient continues to report dyspnea with exertion but reports improvement. A fib on telemetry. Ambulates independently in room.   Plan to repeat CXR in the hematologic stability  Description  INTERVENTIONS  - Assess for signs and symptoms of bleeding or hemorrhage  - Monitor labs and vital signs for trends  - Administer supportive blood products/factors, fluids and medications as ordered and appropriate  - Ad

## 2019-08-19 NOTE — PROGRESS NOTES
Pulmonary Progress Note        NAME: [address]: 4958/1430-A - MRN: [id number] - Age: 66year old - : 3/17/1945        Last 24hrs: No events overnight, feels that breathing is better, almost off O2 at rest    OBJECTIVE:   19  0503  TROPI    INR   Date/Time Value Ref Range Status   08/15/2019 12:31 PM 1.06 0.90 - 1.10 Final     TSH   Date/Time Value Ref Range Status   07/21/2019 06:08 AM 2.540 0.358 - 3.740 mIU/mL Final     Comment:        This test may exhibit interference when a samp

## 2019-08-20 VITALS
OXYGEN SATURATION: 92 % | DIASTOLIC BLOOD PRESSURE: 64 MMHG | SYSTOLIC BLOOD PRESSURE: 109 MMHG | TEMPERATURE: 98 F | RESPIRATION RATE: 20 BRPM | HEART RATE: 76 BPM | WEIGHT: 179.31 LBS | HEIGHT: 71 IN | BODY MASS INDEX: 25.1 KG/M2

## 2019-08-20 LAB
BUN BLD-MCNC: 37 MG/DL (ref 7–18)
CALCIUM BLD-MCNC: 8.2 MG/DL (ref 8.5–10.1)
CHLORIDE SERPL-SCNC: 102 MMOL/L (ref 98–112)
CO2 SERPL-SCNC: 28 MMOL/L (ref 21–32)
CREAT BLD-MCNC: 1.23 MG/DL (ref 0.7–1.3)
GLUCOSE BLD-MCNC: 86 MG/DL (ref 70–99)
HAV IGM SER QL: 1.9 MG/DL (ref 1.6–2.6)
POTASSIUM SERPL-SCNC: 3.7 MMOL/L (ref 3.5–5.1)
SODIUM SERPL-SCNC: 137 MMOL/L (ref 136–145)

## 2019-08-20 PROCEDURE — 99232 SBSQ HOSP IP/OBS MODERATE 35: CPT | Performed by: INTERNAL MEDICINE

## 2019-08-20 PROCEDURE — 99222 1ST HOSP IP/OBS MODERATE 55: CPT | Performed by: INTERNAL MEDICINE

## 2019-08-20 RX ORDER — FUROSEMIDE 20 MG/1
20 TABLET ORAL DAILY
Qty: 10 TABLET | Refills: 0 | Status: SHIPPED | OUTPATIENT
Start: 2019-08-20 | End: 2019-08-27 | Stop reason: ALTCHOICE

## 2019-08-20 RX ORDER — METOPROLOL SUCCINATE 100 MG/1
100 TABLET, EXTENDED RELEASE ORAL
Qty: 30 TABLET | Refills: 3 | Status: SHIPPED | OUTPATIENT
Start: 2019-08-21 | End: 2020-02-27

## 2019-08-20 RX ORDER — POTASSIUM CHLORIDE 20 MEQ/1
40 TABLET, EXTENDED RELEASE ORAL ONCE
Status: COMPLETED | OUTPATIENT
Start: 2019-08-20 | End: 2019-08-20

## 2019-08-20 RX ORDER — ACETAMINOPHEN 325 MG/1
650 TABLET ORAL EVERY 6 HOURS PRN
Refills: 0 | Status: SHIPPED | COMMUNITY
Start: 2019-08-20 | End: 2019-08-20

## 2019-08-20 NOTE — PLAN OF CARE
NURSING DISCHARGE NOTE    Discharged Home via Wheelchair. Accompanied by Support staff  Belongings Taken by patient/family. Cleared for discharge by all consults. Discharge AVS completed and reviewed with patient and wife.  Discussed medications, in Adequate for Discharge     Problem: SKIN/TISSUE INTEGRITY - ADULT  Goal: Skin integrity remains intact  Description  INTERVENTIONS  - Assess and document risk factors for pressure ulcer development  - Assess and document skin integrity  - Monitor for areas

## 2019-08-20 NOTE — CONSULTS
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 About 2 months ago he was able to walk as far as he needed and was not really limited by SOB. Since then he has been noticing more exertional dyspnea.  Since the pleural effusion problems started last month he has progressively decl x3   • COLONOSCOPY  5/14/2013   • COLONOSCOPY N/A 5/1/2018    Performed by Teena Parra MD at [facility] ENDOSCOPY   • COLONOSCOPY WITH BIOPSY  5/14/13   • EGD     • ESOPHAGOGASTRODUODENOSCOPY (EGD) N/A 5/1/2018    Performed by Teena Parra MD at [facility] per session: 1 or 2        Binge frequency: Never        Comment: 1 per day      Drug use: No      Sexual activity: Not on file    Lifestyle      Physical activity:        Days per week: Not on file        Minutes per session: Not on file      Stress: Not systems is otherwise negative.     Medications/Infusions:  Scheduled:   • allopurinol  100 mg Oral Daily   • Metoprolol Succinate ER  100 mg Oral Daily Beta Blocker   • Pantoprazole Sodium  20 mg Oral QAM AC           Vital Last Value 24 Hour Range   Temper evidence for regional wall motion      abnormalities. The study is not technically sufficient to allow      evaluation of LV diastolic function. 2.  Aortic valve: Trileaflet; mildly to moderately calcified leaflets. Mild      regurgitation.   3.  Aorta: As

## 2019-08-20 NOTE — PLAN OF CARE
Assumed care @ 1900. Patient alert oriented x4. On telemetry monitoring. Denies SOB, Denies any pain. Updated patient with plan of care, verbalizes understanding. Ensures patient is safe at all times. Patient requested for 02 @ night, 02 SAT @ 90%. areas of redness and/or skin breakdown  - Initiate interventions, skin care algorithm/standards of care as needed  Outcome: Progressing     Problem: HEMATOLOGIC - ADULT  Goal: Maintains hematologic stability  Description  INTERVENTIONS  - Assess for signs

## 2019-08-20 NOTE — PROGRESS NOTES
[facility] Patient Status:  Inpatient    3/17/1945 MRN [id number]   [facility] 7NE-A Attending Maciej Greer MD   Hosp Day # 3 PCP Fany Montalvo MD     SUBJECTIVE: no new events.  Feels better     OBJECTIVE:  BP on more than one occurrence.  Likely related to HFpEF  - on IV lasix per cards  -echo w/ signs of fluid overload  -hold on thora for now- CXR showed improvement with diuresis  - mgt per heart failure clinic- has plans for extensive OP w/u with Dr. Jaquelni Roca

## 2019-08-20 NOTE — PLAN OF CARE
Assumed care at 0730  AxOx4, VSS, afib on tele  2L O2 weaned to room air. Ambulated in halls for approximately 5 min on RA with sats > 90%. No complaints of SOB or dizziness with ambulation.   Plan for possible D/C tomorrow  Pt and spouse updated on POC, wi Maintains hematologic stability  Description  INTERVENTIONS  - Assess for signs and symptoms of bleeding or hemorrhage  - Monitor labs and vital signs for trends  - Administer supportive blood products/factors, fluids and medications as ordered and appropr

## 2019-08-20 NOTE — PROGRESS NOTES
AMBER HOSPITALIST  Progress Note     Rajat Hernandez Patient Status:  Inpatient    3/17/1945 MRN [id number]   [facility] 7NE-A Attending Isaias Blum MD   Hosp Day # 3 PCP Emma Bacon MD     Chief Complaint: sob    S: Pt without acute Imaging data reviewed in Epic.     Medications:   • allopurinol  100 mg Oral Daily   • Metoprolol Succinate ER  100 mg Oral Daily Beta Blocker   • Pantoprazole Sodium  20 mg Oral QAM AC   • furosemide  40 mg Intravenous BID (Diuretic)       ASSESSMENT / KATARZYNA

## 2019-08-21 ENCOUNTER — PATIENT OUTREACH (OUTPATIENT)
Dept: CASE MANAGEMENT | Age: 74
End: 2019-08-21

## 2019-08-21 DIAGNOSIS — Z02.9 ENCOUNTERS FOR UNSPECIFIED ADMINISTRATIVE PURPOSE: ICD-10-CM

## 2019-08-21 DIAGNOSIS — I27.20 PULMONARY HYPERTENSION (HCC): ICD-10-CM

## 2019-08-21 PROCEDURE — 1111F DSCHRG MED/CURRENT MED MERGE: CPT

## 2019-08-21 NOTE — PROGRESS NOTES
Initial Post Discharge Follow Up   Discharge Date: 8/20/19  Contact Date: 8/21/2019    Consent Verification:  Assessment Completed With: Patient  HIPAA Verified?   Yes    Discharge Dx:  Recurrent bilateral pleural effusion- s/p thoracentesis, CHF    Was Release Take 20 mg by mouth 2 (two) times daily before meals. Disp:  Rfl:    Potassium Chloride ER (KLOR-CON M20) 20 MEQ Oral Tab CR Take 20 mEq by mouth 3 (three) times daily.  Disp:  Rfl:    Albuterol Sulfate HFA (PROAIR HFA) 108 (90 Base) MCG/ACT Inhalat diagnosis weighing yourself is very important  How often are you weighing yourself? Daily   Is there any reason you are unable to weigh yourself daily?   No  What was your weight yesterday?  179lbs   Today?  179lbs  Were you told about any fluid restrictio [id number]  [phone]          PCP TCM/HFU appointment: scheduled at D/C within 7-14 days  no     NCM Reviewed/scheduled/rescheduled PCP TCM/HFU appointment with pt:  Yes      Have you made all of your follow up appointments? no    Is there any reason a

## 2019-08-21 NOTE — DISCHARGE SUMMARY
[facility] HOSPITALIST  DISCHARGE SUMMARY     Rajat Hernandez Patient Status:  Inpatient    3/17/1945 MRN [id number]   [facility] 7NE-A Attending No att. providers found   [address] Day # 3 PCP Emma Bacon MD     Date of Admission: 2019  Andres daily.   Quantity:  10 tablet  Refills:  0     Metoprolol Succinate  MG Tb24  Commonly known as: Toprol XL  Start taking on:  8/21/2019  What changed:  when to take this      Take 1 tablet (100 mg total) by mouth Daily Beta Blocker.    Quantity:  30 These medications were sent to [facility], [address] [place] [address] MONA Salas [phone], [phone]  [address], [address] [place]    Phone:  [phone]   · furosemide 20 MG Tabs     Please  your prescriptions at th

## 2019-08-23 ENCOUNTER — OFFICE VISIT (OUTPATIENT)
Dept: FAMILY MEDICINE CLINIC | Facility: CLINIC | Age: 74
End: 2019-08-23
Payer: MEDICARE

## 2019-08-23 VITALS
TEMPERATURE: 97 F | SYSTOLIC BLOOD PRESSURE: 102 MMHG | RESPIRATION RATE: 24 BRPM | WEIGHT: 180 LBS | DIASTOLIC BLOOD PRESSURE: 54 MMHG | BODY MASS INDEX: 25 KG/M2 | HEART RATE: 68 BPM

## 2019-08-23 DIAGNOSIS — J90 BILATERAL PLEURAL EFFUSION: Primary | ICD-10-CM

## 2019-08-23 DIAGNOSIS — I10 BENIGN ESSENTIAL HYPERTENSION: ICD-10-CM

## 2019-08-23 DIAGNOSIS — R09.02 HYPOXIA: ICD-10-CM

## 2019-08-23 DIAGNOSIS — M33.20 POLYMYOSITIS (HCC): Chronic | ICD-10-CM

## 2019-08-23 PROCEDURE — 99215 OFFICE O/P EST HI 40 MIN: CPT | Performed by: FAMILY MEDICINE

## 2019-08-23 NOTE — PATIENT INSTRUCTIONS
Future Appointments   Date Time Provider Chrissie Aden   8/26/2019 10:00 AM Lynne Beckford NP DGPUL DG   9/9/2019  2:00 PM HEART FAILURE APN 1 [facility] HF CLIN [email or web address] AT [facility]   9/11/2019 12:15 PM Amanda Goldsmith MD EMG 3 [facility] Marisabel   45/56/8410 74:50 AM Stanislav

## 2019-08-23 NOTE — PROGRESS NOTES
HPI:    Quoc Ahumada is a 66year old male here today for hospital follow up.    He was discharged from Inpatient hospital, [facility] to [address] [place] [address] Date: 8/16  Discharge Date: 8/20  Hospital Discharge Diagnosis:    Right pleural effusion, hypon list and reviewed by me. See medication list for additions of new medication, and changes to current doses of medications and discontinued medications.     HPI: continue pleural effusion, now consistent with high output ht failure, EF good, but no energy a (11/16/2017), LI (obstructive sleep apnea) (HST 11-7-17), LI (obstructive sleep apnea) (PSG 5-22-19), Pain in joints, Pleural effusion, Polymyositis ([facility] [address] Problems with swallowing, Raynaud disease, Renal disorder, Sleep apnea, Thrombocytopathia ([facility] [address] a dysuria and difficulty urinating. Musculoskeletal: Negative for joint swelling. Skin: Negative. Negative for rash. Neurological: Negative. Negative for dizziness. PHYSICAL EXAM:   No LMP for male patient.  Estimated body mass index is 25.1 kg/ ASSESSMENT/ PLAN:     Problem List Items Addressed This Visit        Cardiovascular    Benign essential hypertension    Overview     Benazepril 40, amlodipine 5, triameteren HCT 37.5-25            Respiratory    Bilateral pleural effusion - Primary Medical Decision Making- Based on service period of discharge to 30 days:   · Number of Possible Diagnoses and/or Management Options: severe  · Amount and/or Complexity of Data to Be Reviewed: severe  · Risk of Significant Complications, Morbidity, and/o

## 2019-08-26 ENCOUNTER — TELEPHONE (OUTPATIENT)
Dept: CARDIOLOGY CLINIC | Facility: HOSPITAL | Age: 74
End: 2019-08-26

## 2019-08-26 ENCOUNTER — TELEPHONE (OUTPATIENT)
Dept: CARDIOLOGY | Age: 74
End: 2019-08-26

## 2019-08-26 NOTE — PROGRESS NOTES
Received call from [facility] of Dr. Jeanetta Mortimer asking if pt can be seen sooner because he is now SOB. Pt was d/c'ed from the hospital on 8/20/19 and was referred to the [facility]. Initial appointment was originally scheduled for 9/9/19.  New appointment made for jayleen

## 2019-08-27 ENCOUNTER — OFFICE VISIT (OUTPATIENT)
Dept: RHEUMATOLOGY | Facility: CLINIC | Age: 74
End: 2019-08-27
Payer: MEDICARE

## 2019-08-27 ENCOUNTER — HOSPITAL ENCOUNTER (OUTPATIENT)
Dept: CARDIOLOGY CLINIC | Facility: HOSPITAL | Age: 74
Discharge: HOME | End: 2019-08-27
Attending: NURSE PRACTITIONER
Payer: MEDICARE

## 2019-08-27 VITALS
WEIGHT: 188.69 LBS | DIASTOLIC BLOOD PRESSURE: 70 MMHG | HEART RATE: 66 BPM | OXYGEN SATURATION: 94 % | RESPIRATION RATE: 36 BRPM | BODY MASS INDEX: 26 KG/M2 | SYSTOLIC BLOOD PRESSURE: 122 MMHG

## 2019-08-27 VITALS
HEART RATE: 72 BPM | HEIGHT: 71 IN | BODY MASS INDEX: 25.48 KG/M2 | WEIGHT: 182 LBS | DIASTOLIC BLOOD PRESSURE: 54 MMHG | RESPIRATION RATE: 20 BRPM | SYSTOLIC BLOOD PRESSURE: 86 MMHG

## 2019-08-27 DIAGNOSIS — I48.20 CHRONIC ATRIAL FIBRILLATION (HCC): ICD-10-CM

## 2019-08-27 DIAGNOSIS — M32.19 OTHER SYSTEMIC LUPUS ERYTHEMATOSUS WITH OTHER ORGAN INVOLVEMENT (HCC): ICD-10-CM

## 2019-08-27 DIAGNOSIS — I27.20 PULMONARY HYPERTENSION (HCC): ICD-10-CM

## 2019-08-27 DIAGNOSIS — J90 BILATERAL PLEURAL EFFUSION: ICD-10-CM

## 2019-08-27 DIAGNOSIS — D69.3 AUTOIMMUNE THROMBOCYTOPENIA (HCC): ICD-10-CM

## 2019-08-27 DIAGNOSIS — I50.812 CHRONIC RIGHT-SIDED CONGESTIVE HEART FAILURE (HCC): ICD-10-CM

## 2019-08-27 DIAGNOSIS — N18.2 CKD (CHRONIC KIDNEY DISEASE) STAGE 2, GFR 60-89 ML/MIN: ICD-10-CM

## 2019-08-27 DIAGNOSIS — M33.20 POLYMYOSITIS (HCC): Primary | ICD-10-CM

## 2019-08-27 DIAGNOSIS — E87.1 HYPONATREMIA: ICD-10-CM

## 2019-08-27 DIAGNOSIS — G47.33 OSA (OBSTRUCTIVE SLEEP APNEA): ICD-10-CM

## 2019-08-27 DIAGNOSIS — M35.1 MIXED CONNECTIVE TISSUE DISEASE (HCC): ICD-10-CM

## 2019-08-27 DIAGNOSIS — D69.6 THROMBOCYTOPENIA (HCC): ICD-10-CM

## 2019-08-27 DIAGNOSIS — I50.813 ACUTE ON CHRONIC RIGHT HEART FAILURE (HCC): Primary | ICD-10-CM

## 2019-08-27 PROBLEM — R79.89 AZOTEMIA: Status: RESOLVED | Noted: 2019-08-16 | Resolved: 2019-08-27

## 2019-08-27 LAB
ANION GAP SERPL CALC-SCNC: 4 MMOL/L (ref 0–18)
BUN BLD-MCNC: 24 MG/DL (ref 7–18)
BUN/CREAT SERPL: 21.4 (ref 10–20)
CALCIUM BLD-MCNC: 8.4 MG/DL (ref 8.5–10.1)
CHLORIDE SERPL-SCNC: 104 MMOL/L (ref 98–112)
CO2 SERPL-SCNC: 24 MMOL/L (ref 21–32)
CREAT BLD-MCNC: 1.12 MG/DL (ref 0.7–1.3)
GLUCOSE BLD-MCNC: 85 MG/DL (ref 70–99)
OSMOLALITY SERPL CALC.SUM OF ELEC: 277 MOSM/KG (ref 275–295)
POTASSIUM SERPL-SCNC: 4.8 MMOL/L (ref 3.5–5.1)
SODIUM SERPL-SCNC: 132 MMOL/L (ref 136–145)

## 2019-08-27 PROCEDURE — 99215 OFFICE O/P EST HI 40 MIN: CPT | Performed by: NURSE PRACTITIONER

## 2019-08-27 PROCEDURE — 99214 OFFICE O/P EST MOD 30 MIN: CPT | Performed by: INTERNAL MEDICINE

## 2019-08-27 RX ORDER — TORSEMIDE 20 MG/1
20 TABLET ORAL DAILY
Qty: 45 TABLET | Refills: 6 | Status: SHIPPED | OUTPATIENT
Start: 2019-08-27 | End: 2019-12-02

## 2019-08-27 RX ORDER — BUMETANIDE 0.25 MG/ML
2 INJECTION, SOLUTION INTRAMUSCULAR; INTRAVENOUS ONCE
Status: COMPLETED | OUTPATIENT
Start: 2019-08-27 | End: 2019-08-27

## 2019-08-27 RX ADMIN — BUMETANIDE 2 MG: 0.25 INJECTION, SOLUTION INTRAMUSCULAR; INTRAVENOUS at 10:36:00

## 2019-08-27 NOTE — PROGRESS NOTES
This pt is a new pt to the [facility] and arrived with his wife. The patient was provided with an orientation to the [facility] with a review of the welcome packet and all questions answered. Pt. assessed. Pt. c/o sob with activity and APN notified. Weight 188.7 lbs.  L

## 2019-08-27 NOTE — PROGRESS NOTES
[facility] RHEUMATOLOGY  Dr. Ross Co Progress Note     Subjective: Micheal Hernandes is a(n) 66year old male. Current complaints: Patient presents with: Other: Polymyositis with h/o SLE (MCTD), also on allopurinol, labs 8/16/19 Sed Rate 22, CRP <0.29.  Has be autoimmune disease.     Neli Oquendo MD 1/82/5058 1:50 PM

## 2019-08-27 NOTE — PATIENT INSTRUCTIONS
Heart Failure Discharge Instructions    · Stop taking Lasix (furosmide) and start Torsemide 20mg daily. Activity: Regular exercise and activity is important for your overall health and to help keep your heart strong and functioning as well as possible. are limiting activity because of shortness of breath or fatigue  · You are short of breath lying down, you need more pillows to breathe comfortably,  or wake up during the night short of breath  · You urinate less often during the day and more often at nig

## 2019-08-27 NOTE — PROGRESS NOTES
[facility] Cardiac Health Progress Note    Joshua Em is a 66year old male who presents to clinic at the request of Dr. Paddy Jones for APN assessment and management of chronic RV heart failure and pHTN and is functional class 3-4.   P OTHER SURGICAL HISTORY  2017    watchman filter   • PERCUTANEOUS  ANGIOPLASTY  (PTCA)- PBP ONLY  2006    right   • RECTUM SURGERY PROCEDURE UNLISTED  1946    rectal surgery polypectomy   • SKIN SURGERY      MMS BCC R temple 6/24/09   • SKIN SURGERY  2007 dyspneic within only a couple days with associated orthopnea. He started having issues a couple months ago with shortness of breath.  He has been having \"chronic bronchitis and sinusitis\" problems for the last year or so, which he stated has caused him fa PM    Echocardiogram:  Conclusions:  1.  Left ventricle: The cavity size was normal. Wall thickness was mildly      increased. Systolic function was normal. The estimated ejection fraction      was 55-60%.  There was no diagnostic evidence for regional wall for acute pulmonary embolism. Stable moderate size pleural effusions, larger on the left.        Education:  Patient and family instructed at length regarding clinic procedures, hours, purpose of clinic visits, sodium restricted diet, low sodium foods, flu

## 2019-08-27 NOTE — PATIENT INSTRUCTIONS
Plan do autoimmune labs to check for inflammation and polymyositis activity. Pulmonary hypertension is highly suspected as the cause of the heart failure, aand the right heart cath is the test for that diagnosis.   Pulmonary Hypertension treatment is new a

## 2019-08-28 ENCOUNTER — TELEPHONE (OUTPATIENT)
Dept: CARDIOLOGY | Age: 74
End: 2019-08-28

## 2019-08-28 DIAGNOSIS — I50.813 ACUTE ON CHRONIC RIGHT-SIDED HEART FAILURE (CMD): Primary | ICD-10-CM

## 2019-08-28 DIAGNOSIS — K22.70 BARRETT'S ESOPHAGUS WITHOUT DYSPLASIA: ICD-10-CM

## 2019-08-28 RX ORDER — OMEPRAZOLE 20 MG/1
CAPSULE, DELAYED RELEASE ORAL
Qty: 180 CAPSULE | Refills: 1 | Status: SHIPPED | OUTPATIENT
Start: 2019-08-28 | End: 2020-03-13

## 2019-08-28 NOTE — TELEPHONE ENCOUNTER
LOV 8/23/2019     Patient was asked to follow-up in: Follow-up timeframe not documented in note    Appointment scheduled: 9/11/2019 Melyssa Wilhelm MD     Refill request for:    Requested Prescriptions     Pending Prescriptions Disp Refills   • OMEPRAZOLE 20

## 2019-08-29 ENCOUNTER — TELEPHONE (OUTPATIENT)
Dept: CARDIOLOGY | Age: 74
End: 2019-08-29

## 2019-09-03 ENCOUNTER — TELEPHONE (OUTPATIENT)
Dept: CARDIOLOGY CLINIC | Facility: HOSPITAL | Age: 74
End: 2019-09-03

## 2019-09-03 NOTE — PROGRESS NOTES
Mr. Messi Gutierrez called to let us know that he was dehydrated yesterday. He drank some water and felt better. He had some lightheadedness as well. He states he has had some intermittent diarrhea but otherwise he has done nothing to become dehydrated.   Today

## 2019-09-04 ENCOUNTER — HOSPITAL ENCOUNTER (OUTPATIENT)
Dept: CARDIOLOGY CLINIC | Facility: HOSPITAL | Age: 74
Discharge: HOME | End: 2019-09-04
Attending: NURSE PRACTITIONER
Payer: MEDICARE

## 2019-09-04 ENCOUNTER — APPOINTMENT (OUTPATIENT)
Dept: LAB | Facility: HOSPITAL | Age: 74
End: 2019-09-04
Attending: NURSE PRACTITIONER
Payer: MEDICARE

## 2019-09-04 ENCOUNTER — HOSPITAL ENCOUNTER (OUTPATIENT)
Dept: GENERAL RADIOLOGY | Facility: HOSPITAL | Age: 74
Discharge: HOME | End: 2019-09-04
Attending: NURSE PRACTITIONER
Payer: MEDICARE

## 2019-09-04 VITALS
SYSTOLIC BLOOD PRESSURE: 118 MMHG | DIASTOLIC BLOOD PRESSURE: 65 MMHG | OXYGEN SATURATION: 95 % | RESPIRATION RATE: 18 BRPM | HEART RATE: 80 BPM | WEIGHT: 183 LBS | BODY MASS INDEX: 26 KG/M2

## 2019-09-04 DIAGNOSIS — I27.20 PULMONARY HYPERTENSION (HCC): ICD-10-CM

## 2019-09-04 DIAGNOSIS — G47.33 OSA (OBSTRUCTIVE SLEEP APNEA): ICD-10-CM

## 2019-09-04 DIAGNOSIS — I36.1 NONRHEUMATIC TRICUSPID VALVE REGURGITATION: ICD-10-CM

## 2019-09-04 DIAGNOSIS — M33.20 POLYMYOSITIS (HCC): ICD-10-CM

## 2019-09-04 DIAGNOSIS — N18.2 CKD (CHRONIC KIDNEY DISEASE) STAGE 2, GFR 60-89 ML/MIN: ICD-10-CM

## 2019-09-04 DIAGNOSIS — D69.6 THROMBOCYTOPENIA (HCC): ICD-10-CM

## 2019-09-04 DIAGNOSIS — M32.19 OTHER SYSTEMIC LUPUS ERYTHEMATOSUS WITH OTHER ORGAN INVOLVEMENT (HCC): ICD-10-CM

## 2019-09-04 DIAGNOSIS — I50.812 CHRONIC RIGHT HEART FAILURE (HCC): Primary | ICD-10-CM

## 2019-09-04 DIAGNOSIS — I48.20 CHRONIC ATRIAL FIBRILLATION (HCC): ICD-10-CM

## 2019-09-04 LAB
ANION GAP SERPL CALC-SCNC: 10 MMOL/L (ref 0–18)
BUN BLD-MCNC: 29 MG/DL (ref 7–18)
BUN/CREAT SERPL: 21.2 (ref 10–20)
C4 SERPL-MCNC: 4.4 MG/DL (ref 10–40)
CALCIUM BLD-MCNC: 8.9 MG/DL (ref 8.5–10.1)
CHLORIDE SERPL-SCNC: 101 MMOL/L (ref 98–112)
CK SERPL-CCNC: 20 U/L (ref 39–308)
CO2 SERPL-SCNC: 25 MMOL/L (ref 21–32)
CREAT BLD-MCNC: 1.37 MG/DL (ref 0.7–1.3)
GLUCOSE BLD-MCNC: 82 MG/DL (ref 70–99)
OSMOLALITY SERPL CALC.SUM OF ELEC: 287 MOSM/KG (ref 275–295)
POTASSIUM SERPL-SCNC: 4 MMOL/L (ref 3.5–5.1)
SODIUM SERPL-SCNC: 136 MMOL/L (ref 136–145)
TSI SER-ACNC: 3.43 MIU/ML (ref 0.36–3.74)

## 2019-09-04 PROCEDURE — 83516 IMMUNOASSAY NONANTIBODY: CPT

## 2019-09-04 PROCEDURE — 86235 NUCLEAR ANTIGEN ANTIBODY: CPT

## 2019-09-04 PROCEDURE — 86038 ANTINUCLEAR ANTIBODIES: CPT

## 2019-09-04 PROCEDURE — 86160 COMPLEMENT ANTIGEN: CPT

## 2019-09-04 PROCEDURE — 99214 OFFICE O/P EST MOD 30 MIN: CPT | Performed by: NURSE PRACTITIONER

## 2019-09-04 PROCEDURE — 71046 X-RAY EXAM CHEST 2 VIEWS: CPT | Performed by: NURSE PRACTITIONER

## 2019-09-04 PROCEDURE — 82550 ASSAY OF CK (CPK): CPT

## 2019-09-04 NOTE — PROGRESS NOTES
[facility] Patient assessed. Pt reports persistent SOB and fatigue. Pt reports that he sleeps on two pillows at night. Weight is down 5 lbs today.  Reviewed current list of patient's allergies and medication and up

## 2019-09-04 NOTE — PATIENT INSTRUCTIONS
Heart Failure Discharge Instructions       · We will have you alternate taking Torsemide 10mg daily and 20mg daily every other day.      Activity: Regular exercise and activity is important for your overall health and to help keep your heart strong and func with regular activity, or are limiting activity because of shortness of breath or fatigue  · You are short of breath lying down, you need more pillows to breathe comfortably,  or wake up during the night short of breath  · You urinate less often during the

## 2019-09-04 NOTE — PROGRESS NOTES
[facility]  Heart Failure Clinic Progress Note  Wiliam Hernandez is a 66year old male who presents to clinic at the request of Dr. Yari Hernández for APN assessment and management of chronic RV heart failure and pHTN and is functional class 3     Subjective  09/04/2019    CO2 25.0 09/04/2019    GLU 82 09/04/2019    CA 8.9 09/04/2019     Echo 8/18/19:  Conclusions:    1.  Left ventricle: The cavity size was normal. Wall thickness was mildly      increased.  Systolic function was normal. The estimated e daily. Disp:  Rfl:    Sildenafil Citrate 50 MG Oral Tab Take 1 tablet (50 mg total) by mouth daily as needed for Erectile Dysfunction. Disp: 30 tablet Rfl: 3   Calcium Citrate-Vitamin D (CITRACAL + D OR) Take 1 tablet by mouth daily.    Disp:  Rfl:    Omega effusions - recent left thoracentesis with 1200cc out. Has increased dry cough and marked shortness of breath with minimal activity. Needs repeat CXR today. · Hx Lupus/Mixed CTD/Severe polymyositis - Hx IVIG infusions, follows with Dr. Kathie Guzman.   · Non-ob

## 2019-09-11 ENCOUNTER — OFFICE VISIT (OUTPATIENT)
Dept: FAMILY MEDICINE CLINIC | Facility: CLINIC | Age: 74
End: 2019-09-11
Payer: MEDICARE

## 2019-09-11 VITALS
WEIGHT: 182 LBS | SYSTOLIC BLOOD PRESSURE: 100 MMHG | TEMPERATURE: 98 F | HEIGHT: 71 IN | RESPIRATION RATE: 14 BRPM | HEART RATE: 68 BPM | BODY MASS INDEX: 25.48 KG/M2 | DIASTOLIC BLOOD PRESSURE: 60 MMHG

## 2019-09-11 DIAGNOSIS — M33.20 POLYMYOSITIS (HCC): Chronic | ICD-10-CM

## 2019-09-11 DIAGNOSIS — J90 BILATERAL PLEURAL EFFUSION: ICD-10-CM

## 2019-09-11 DIAGNOSIS — I27.20 PULMONARY HYPERTENSION (HCC): Primary | ICD-10-CM

## 2019-09-11 PROCEDURE — 1111F DSCHRG MED/CURRENT MED MERGE: CPT | Performed by: FAMILY MEDICINE

## 2019-09-11 PROCEDURE — 99213 OFFICE O/P EST LOW 20 MIN: CPT | Performed by: FAMILY MEDICINE

## 2019-09-11 NOTE — PROGRESS NOTES
Patient presents with:  Hospital F/U      Subjective   HPI:   This is a 66year old male coming in for continued cough, better pleural effusion, thougt to be CV in source. Right hearrt cath next week. Still coughing with deep congestion.    Diuretic 1x jeet strength. Skin: Skin is warm and dry. No rash noted. Psychiatric: He has a normal mood and affect.  His speech is normal and behavior is normal. Judgment and thought content normal. Cognition and memory are normal.            Assessment and Plan:     Pr

## 2019-09-11 NOTE — PATIENT INSTRUCTIONS
Future Appointments   Date Time Provider Chrissie Keiko   9/18/2019  6:00 AM [facility] IVS CATH EH IVS [facility] AT [facility]   9/19/2019  3:00 PM HEART FAILURE APN 1 EH HF CLIN [facility] AT [facility]   12/11/2019 11:15 AM Cheyanne Iverson MD EMG 3 EMG [facility]abel   1/21/2020  4:20 PM Her

## 2019-09-13 NOTE — HISTORICAL OFFICE NOTE
Martin Pelletier MD - 2019 2:45 PM CDT  Formatting of this note might be different from the original.  [address] Note    Adilia Jean  : 3/17/1945  PCP: Nahun De Jesus Pcp    Reason for Consultation:  Chief Complaint   Fanny Mcguire Relation Age of Onset   • Coronary Artery Disease Neg Hx   Negative for premature CAD   • Aneurysm Neg Hx   Negative for AAA. Social Hx:  he reports that he has quit smoking.  He has quit using smokeless tobacco.    Allergies:  ALLERGIES:   Allergen Vanduser Sill Month(s) from this visit.    Latest known visit with results is:   Prior Original Records on 12/14/2018   Component Date Value Ref Range Status   • Hemoglobin 12/14/2018 13.6 g/dL Final   • Hematocrit 12/14/2018 44.1 % Final   • Red Blood Count 12/14/2018 4

## 2019-09-16 RX ORDER — TORSEMIDE 10 MG/1
20 TABLET ORAL DAILY
COMMUNITY
End: 2019-11-05

## 2019-09-17 LAB
EJ (GLYCYL - TRNA SYNTHETASE) ANTIBODY: NEGATIVE
FIBRILLARIN (U3 RNP) AB, IGG: NEGATIVE
JO-1 (HISTIDY1-TRNA SYNTHETASE) AB, IGG: 12 AU/ML
KU ANTIBODY: NEGATIVE
MI-2 (NUCLEAR HELICASE PROTEIN) ANTIBODY: NEGATIVE
OJ (ISOLEUCYL-TRNA SYNTHETASE) ANTIBODY: NEGATIVE
P155/140 (TIF-1 GAMMA) ANTIBODY: NEGATIVE
PL-12 (ALANYL-TRNA SYNTHETASE) ANTIBODY: NEGATIVE
PL-7 (THREONYL-TRNA SYNTHETASE) ANTIBODY: NEGATIVE
PM_SCL 100 ANTIBODY, IGG: NEGATIVE
RIBONUCLEIC PROTEIN (U1) (ENA) AB, IGG: 71 AU/ML
SRP (SINGLE RECOGNITION PARTICLE) AB: NEGATIVE
SSA 52 (RO) (ENA) ANTIBODY, IGG: 9 AU/ML
SSA 60 (RO) (ENA) ANTIBODY, IGG: 14 AU/ML

## 2019-09-18 ENCOUNTER — APPOINTMENT (OUTPATIENT)
Dept: NUCLEAR MEDICINE | Facility: HOSPITAL | Age: 74
End: 2019-09-18
Attending: INTERNAL MEDICINE
Payer: MEDICARE

## 2019-09-18 ENCOUNTER — HOSPITAL ENCOUNTER (OUTPATIENT)
Dept: INTERVENTIONAL RADIOLOGY/VASCULAR | Facility: HOSPITAL | Age: 74
Discharge: HOME | End: 2019-09-22
Attending: INTERNAL MEDICINE | Admitting: INTERNAL MEDICINE
Payer: MEDICARE

## 2019-09-18 ENCOUNTER — APPOINTMENT (OUTPATIENT)
Dept: CT IMAGING | Facility: HOSPITAL | Age: 74
End: 2019-09-18
Attending: INTERNAL MEDICINE
Payer: MEDICARE

## 2019-09-18 ENCOUNTER — APPOINTMENT (OUTPATIENT)
Dept: GENERAL RADIOLOGY | Facility: HOSPITAL | Age: 74
End: 2019-09-18
Attending: INTERNAL MEDICINE
Payer: MEDICARE

## 2019-09-18 DIAGNOSIS — I50.9 HEART FAILURE, UNSPECIFIED HF CHRONICITY, UNSPECIFIED HEART FAILURE TYPE (HCC): ICD-10-CM

## 2019-09-18 LAB
ALLENS TEST: POSITIVE
ARTERIAL BLD GAS O2 SATURATION: 85 % (ref 92–100)
ARTERIAL BLD GAS O2 SATURATION: 85 % (ref 92–100)
ARTERIAL BLOOD GAS BASE EXCESS: -0.9 MMOL/L (ref ?–2)
ARTERIAL BLOOD GAS BASE EXCESS: -1.1 MMOL/L (ref ?–2)
ARTERIAL BLOOD GAS HCO3: 22.6 MEQ/L (ref 22–26)
ARTERIAL BLOOD GAS HCO3: 22.7 MEQ/L (ref 22–26)
ARTERIAL BLOOD GAS PCO2: 34 MM HG (ref 35–45)
ARTERIAL BLOOD GAS PCO2: 34 MM HG (ref 35–45)
ARTERIAL BLOOD GAS PH: 7.44 (ref 7.35–7.45)
ARTERIAL BLOOD GAS PH: 7.44 (ref 7.35–7.45)
ARTERIAL BLOOD GAS PO2: 52 MM HG (ref 80–105)
ARTERIAL BLOOD GAS PO2: 52 MM HG (ref 80–105)
BASOPHILS # BLD AUTO: 0.02 X10(3) UL (ref 0–0.2)
BASOPHILS NFR BLD AUTO: 0.5 %
CALCULATED O2 SATURATION: 88 % (ref 92–100)
CALCULATED O2 SATURATION: 88 % (ref 92–100)
CARBOXYHEMOGLOBIN: 2 % SAT (ref 0–3)
CARBOXYHEMOGLOBIN: 2 % SAT (ref 0–3)
DEPRECATED RDW RBC AUTO: 63.8 FL (ref 35.1–46.3)
EOSINOPHIL # BLD AUTO: 0.1 X10(3) UL (ref 0–0.7)
EOSINOPHIL NFR BLD AUTO: 2.7 %
ERYTHROCYTE [DISTWIDTH] IN BLOOD BY AUTOMATED COUNT: 16.7 % (ref 11–15)
FIO2: 21 %
HCT VFR BLD AUTO: 41.6 % (ref 39–53)
HGB BLD-MCNC: 13.5 G/DL (ref 13–17.5)
IMM GRANULOCYTES # BLD AUTO: 0.02 X10(3) UL (ref 0–1)
IMM GRANULOCYTES NFR BLD: 0.5 %
LYMPHOCYTES # BLD AUTO: 0.93 X10(3) UL (ref 1–4)
LYMPHOCYTES NFR BLD AUTO: 24.9 %
MCH RBC QN AUTO: 33.7 PG (ref 26–34)
MCHC RBC AUTO-ENTMCNC: 32.5 G/DL (ref 31–37)
MCV RBC AUTO: 103.7 FL (ref 80–100)
METHEMOGLOBIN: 0.7 % SAT (ref 0.4–1.5)
METHEMOGLOBIN: 0.7 % SAT (ref 0.4–1.5)
MONOCYTES # BLD AUTO: 0.45 X10(3) UL (ref 0.1–1)
MONOCYTES NFR BLD AUTO: 12.1 %
NEUTROPHILS # BLD AUTO: 2.21 X10 (3) UL (ref 1.5–7.7)
NEUTROPHILS # BLD AUTO: 2.21 X10(3) UL (ref 1.5–7.7)
NEUTROPHILS NFR BLD AUTO: 59.3 %
PATIENT TEMPERATURE: 98.6 F
PATIENT TEMPERATURE: 98.6 F
PLATELET # BLD AUTO: 69 10(3)UL (ref 150–450)
RAPID HIV: NONREACTIVE
RBC # BLD AUTO: 4.01 X10(6)UL (ref 3.8–5.8)
TOTAL HEMOGLOBIN: 12.4 G/DL (ref 12.6–17.4)
TOTAL HEMOGLOBIN: 12.4 G/DL (ref 12.6–17.4)
WBC # BLD AUTO: 3.7 X10(3) UL (ref 4–11)

## 2019-09-18 PROCEDURE — 71046 X-RAY EXAM CHEST 2 VIEWS: CPT | Performed by: INTERNAL MEDICINE

## 2019-09-18 PROCEDURE — 76937 US GUIDE VASCULAR ACCESS: CPT | Performed by: INTERNAL MEDICINE

## 2019-09-18 PROCEDURE — 93451 RIGHT HEART CATH: CPT | Performed by: INTERNAL MEDICINE

## 2019-09-18 PROCEDURE — 71250 CT THORAX DX C-: CPT | Performed by: INTERNAL MEDICINE

## 2019-09-18 PROCEDURE — 78582 LUNG VENTILAT&PERFUS IMAGING: CPT | Performed by: INTERNAL MEDICINE

## 2019-09-18 PROCEDURE — 3E083KZ INTRODUCTION OF OTHER DIAGNOSTIC SUBSTANCE INTO HEART, PERCUTANEOUS APPROACH: ICD-10-PCS | Performed by: INTERNAL MEDICINE

## 2019-09-18 PROCEDURE — 4A023N6 MEASUREMENT OF CARDIAC SAMPLING AND PRESSURE, RIGHT HEART, PERCUTANEOUS APPROACH: ICD-10-PCS | Performed by: INTERNAL MEDICINE

## 2019-09-18 RX ORDER — ALLOPURINOL 100 MG/1
100 TABLET ORAL DAILY
Status: DISCONTINUED | OUTPATIENT
Start: 2019-09-19 | End: 2019-09-22

## 2019-09-18 RX ORDER — HEPARIN SODIUM 5000 [USP'U]/ML
INJECTION, SOLUTION INTRAVENOUS; SUBCUTANEOUS
Status: COMPLETED
Start: 2019-09-18 | End: 2019-09-18

## 2019-09-18 RX ORDER — METOPROLOL SUCCINATE 100 MG/1
100 TABLET, EXTENDED RELEASE ORAL
Status: DISCONTINUED | OUTPATIENT
Start: 2019-09-19 | End: 2019-09-22

## 2019-09-18 RX ORDER — TORSEMIDE 20 MG/1
20 TABLET ORAL EVERY OTHER DAY
Status: DISCONTINUED | OUTPATIENT
Start: 2019-09-20 | End: 2019-09-22

## 2019-09-18 RX ORDER — LIDOCAINE HYDROCHLORIDE 10 MG/ML
INJECTION, SOLUTION EPIDURAL; INFILTRATION; INTRACAUDAL; PERINEURAL
Status: COMPLETED
Start: 2019-09-18 | End: 2019-09-18

## 2019-09-18 RX ORDER — SODIUM CHLORIDE 9 MG/ML
INJECTION, SOLUTION INTRAVENOUS
Status: DISCONTINUED | OUTPATIENT
Start: 2019-09-19 | End: 2019-09-18 | Stop reason: HOSPADM

## 2019-09-18 RX ORDER — TORSEMIDE 5 MG/1
10 TABLET ORAL EVERY OTHER DAY
Status: DISCONTINUED | OUTPATIENT
Start: 2019-09-19 | End: 2019-09-22

## 2019-09-18 RX ORDER — PANTOPRAZOLE SODIUM 20 MG/1
20 TABLET, DELAYED RELEASE ORAL
Status: DISCONTINUED | OUTPATIENT
Start: 2019-09-19 | End: 2019-09-22

## 2019-09-18 NOTE — PROGRESS NOTES
Patient received from cath lab. Right jugular site soft, no hematoma, dressing C/D/I. Pulses intact. Hemodynamics stable. Post-op orders received and implemented. Patient and family educated on flat bedrest, verbalize understanding.  Dr. Naila Alvarado at [facility]

## 2019-09-18 NOTE — CONSULTS
Pulmonary / Critical Care H&P/Consult       NAME: [address]: [id number] - MRN: [id number] - Age: 66year old - :  3/17/1945    Date of Admission: 2019  6:48 AM  Admission Diagnosis: Heart failure, unspecified HF chronicity, unspecifi • Problems with swallowing     dysphagia in 2006   • Raynaud disease    • Renal disorder     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   • Sleep apnea     CPAP   • Thrombocytopathia (HCC)    • Wears glasses       Past Surgical History:   Procedure Laterality Date   • AP Tobacco Use      Smoking status: Former Smoker        Packs/day: 0.25        Years: 15.00        Pack years: 3.75        Start date: 1958        Quit date: 1973        Years since quittin.1      Smokeless tobacco: Never Used    Substance and Disorder Father         Diverticulosis   • Alcohol and Other Disorders Associated Father    • Heart Disease Mother         CHF   • Breast Cancer Mother    • Stroke Mother    • Cancer Paternal Grandfather    • Heart Attack Paternal Grandmother    • Kidney D 09/18/19  1404 09/18/19  1406 09/18/19  1408   BP: 119/82 126/66 120/76 126/66   BP Location:  Left arm Left arm Left arm   Pulse: 66 79     Resp: 25 22     Temp:  97.7 °F (36.5 °C)     TempSrc:  Oral     SpO2: 94% 96%     Weight:  174 lb 9.7 oz (79.2 kg) 11/22/2013 0.91     Creatinine (mg/dL)   Date Value   09/04/2019 1.37 (H)   08/27/2019 1.12   08/20/2019 1.23   ]    No results for input(s): ALKPHOS, ALT, AST in the last 168 hours.     Invalid input(s): BILITOT, BILIDIR, PROT, LABALBU    RHC: PA 58/28,

## 2019-09-18 NOTE — PROCEDURES
Right Heart Catheterization     DESCRIPTION OF PROCEDURE:  After informed consent, patient had interrogation of the right neck with  ultrasound and a micropuncture set was used to effect venipuncture.  The ultrasound was used to define the relationship betw evaluation of hypoxemia and assess need for home O2  -Pulmonary consultation     Chelsey Freitas MD  Advanced Heart Failure  [facility]

## 2019-09-18 NOTE — H&P
PRE PROCEDURE H&P      Patient: Marcie Tyler Date: 2019     :  Attending: Jame Escalante MD   66year old male      Chief Complaint/Reason for consult: Presenting for RHC    History of Present Illness:  Marcie Tyler is a 66 year • APPENDECTOMY     • COLONOSCOPY  10/2003 2006 01/2010    x3   • COLONOSCOPY  5/14/2013   • COLONOSCOPY N/A 5/1/2018    Performed by Ludwin Ritchie MD at [facility] ENDOSCOPY   • COLONOSCOPY WITH BIOPSY  5/14/13   • EGD     • ESOPHAGOGASTRODUODENOSCOPY (EGD) week        Frequency: 4 or more times a week        Drinks per session: 1 or 2        Binge frequency: Never        Comment: 1 per day      Drug use: No      Sexual activity: Not on file    Lifestyle      Physical activity:        Days per week: Not on fi (history of present illness): A comprehensive 10 point + review of systems is otherwise negative.     Medications/Infusions:  Scheduled:   • [START ON 9/19/2019] sodium chloride   Intravenous On Call       Continuous Infusions:       Rhythm: AF    Arrythmia

## 2019-09-19 ENCOUNTER — APPOINTMENT (OUTPATIENT)
Dept: CV DIAGNOSTICS | Facility: HOSPITAL | Age: 74
End: 2019-09-19
Attending: INTERNAL MEDICINE
Payer: MEDICARE

## 2019-09-19 ENCOUNTER — APPOINTMENT (OUTPATIENT)
Dept: GENERAL RADIOLOGY | Facility: HOSPITAL | Age: 74
End: 2019-09-19
Attending: RADIOLOGY
Payer: MEDICARE

## 2019-09-19 ENCOUNTER — APPOINTMENT (OUTPATIENT)
Dept: ULTRASOUND IMAGING | Facility: HOSPITAL | Age: 74
End: 2019-09-19
Attending: INTERNAL MEDICINE
Payer: MEDICARE

## 2019-09-19 LAB
ANION GAP SERPL CALC-SCNC: 6 MMOL/L (ref 0–18)
BASOPHIL PLEURAL FLUID: 0 %
BASOPHILS # BLD AUTO: 0.02 X10(3) UL (ref 0–0.2)
BASOPHILS NFR BLD AUTO: 0.6 %
BUN BLD-MCNC: 25 MG/DL (ref 7–18)
BUN/CREAT SERPL: 22.7 (ref 10–20)
CALCIUM BLD-MCNC: 8.7 MG/DL (ref 8.5–10.1)
CHLORIDE SERPL-SCNC: 109 MMOL/L (ref 98–112)
CO2 SERPL-SCNC: 29 MMOL/L (ref 21–32)
CREAT BLD-MCNC: 1.1 MG/DL (ref 0.7–1.3)
DEPRECATED RDW RBC AUTO: 64.5 FL (ref 35.1–46.3)
EOSINOPHIL # BLD AUTO: 0.1 X10(3) UL (ref 0–0.7)
EOSINOPHIL NFR BLD AUTO: 3 %
EOSINOPHIL PLEURAL FLUID: 0 %
ERYTHROCYTE [DISTWIDTH] IN BLOOD BY AUTOMATED COUNT: 16.7 % (ref 11–15)
GLUCOSE BLD-MCNC: 99 MG/DL (ref 70–99)
HAV IGM SER QL: 1.9 MG/DL (ref 1.6–2.6)
HCT VFR BLD AUTO: 36.8 % (ref 39–53)
HGB BLD-MCNC: 11.7 G/DL (ref 13–17.5)
IMM GRANULOCYTES # BLD AUTO: 0.01 X10(3) UL (ref 0–1)
IMM GRANULOCYTES NFR BLD: 0.3 %
INR BLD: 1.2 (ref 0.9–1.1)
LDH FLD L TO P-CCNC: 91 U/L
LYMPHOCYTE PLEURAL FLUID: 93 %
LYMPHOCYTES # BLD AUTO: 0.74 X10(3) UL (ref 1–4)
LYMPHOCYTES NFR BLD AUTO: 22.6 %
MCH RBC QN AUTO: 33.3 PG (ref 26–34)
MCHC RBC AUTO-ENTMCNC: 31.8 G/DL (ref 31–37)
MCV RBC AUTO: 104.8 FL (ref 80–100)
MONO/MAC/HISTIO PLEURAL FLUID: 2 %
MONOCYTES # BLD AUTO: 0.46 X10(3) UL (ref 0.1–1)
MONOCYTES NFR BLD AUTO: 14 %
NEUTROPHIL PLEURAL FLUID: 5 %
NEUTROPHILS # BLD AUTO: 1.95 X10 (3) UL (ref 1.5–7.7)
NEUTROPHILS # BLD AUTO: 1.95 X10(3) UL (ref 1.5–7.7)
NEUTROPHILS NFR BLD AUTO: 59.5 %
OSMOLALITY SERPL CALC.SUM OF ELEC: 302 MOSM/KG (ref 275–295)
PLATELET # BLD AUTO: 59 10(3)UL (ref 150–450)
POTASSIUM SERPL-SCNC: 3.9 MMOL/L (ref 3.5–5.1)
PROT PLR-MCNC: 3.3 G/DL
PSA SERPL DL<=0.01 NG/ML-MCNC: 15.8 SECONDS (ref 12.5–14.7)
RBC # BLD AUTO: 3.51 X10(6)UL (ref 3.8–5.8)
RBC PLEURAL FLUID: NORMAL /MM3
SODIUM SERPL-SCNC: 144 MMOL/L (ref 136–145)
TOTAL CELLS COUNTED: 100
WBC # BLD AUTO: 3.3 X10(3) UL (ref 4–11)
WBC # FLD: 967 /MM3

## 2019-09-19 PROCEDURE — 71045 X-RAY EXAM CHEST 1 VIEW: CPT | Performed by: RADIOLOGY

## 2019-09-19 PROCEDURE — 93306 TTE W/DOPPLER COMPLETE: CPT | Performed by: INTERNAL MEDICINE

## 2019-09-19 PROCEDURE — 0W9B3ZZ DRAINAGE OF LEFT PLEURAL CAVITY, PERCUTANEOUS APPROACH: ICD-10-PCS | Performed by: RADIOLOGY

## 2019-09-19 PROCEDURE — 99214 OFFICE O/P EST MOD 30 MIN: CPT | Performed by: INTERNAL MEDICINE

## 2019-09-19 PROCEDURE — 99226 SUBSEQUENT OBSERVATION CARE III: CPT | Performed by: INTERNAL MEDICINE

## 2019-09-19 PROCEDURE — 32555 ASPIRATE PLEURA W/ IMAGING: CPT | Performed by: INTERNAL MEDICINE

## 2019-09-19 RX ORDER — HYDROCODONE BITARTRATE AND ACETAMINOPHEN 5; 325 MG/1; MG/1
1 TABLET ORAL EVERY 4 HOURS PRN
Status: DISCONTINUED | OUTPATIENT
Start: 2019-09-19 | End: 2019-09-22

## 2019-09-19 RX ORDER — ACETAMINOPHEN 325 MG/1
650 TABLET ORAL EVERY 6 HOURS PRN
Status: DISCONTINUED | OUTPATIENT
Start: 2019-09-19 | End: 2019-09-22

## 2019-09-19 RX ORDER — MORPHINE SULFATE 4 MG/ML
2 INJECTION, SOLUTION INTRAMUSCULAR; INTRAVENOUS EVERY 2 HOUR PRN
Status: DISCONTINUED | OUTPATIENT
Start: 2019-09-19 | End: 2019-09-22

## 2019-09-19 RX ORDER — HYDROCODONE BITARTRATE AND ACETAMINOPHEN 5; 325 MG/1; MG/1
2 TABLET ORAL EVERY 4 HOURS PRN
Status: DISCONTINUED | OUTPATIENT
Start: 2019-09-19 | End: 2019-09-22

## 2019-09-19 RX ADMIN — METOPROLOL SUCCINATE 100 MG: 100 TABLET, EXTENDED RELEASE ORAL at 05:41:00

## 2019-09-19 RX ADMIN — PANTOPRAZOLE SODIUM 20 MG: 20 TABLET, DELAYED RELEASE ORAL at 05:41:00

## 2019-09-19 RX ADMIN — TORSEMIDE 10 MG: 5 TABLET ORAL at 09:45:00

## 2019-09-19 RX ADMIN — ALLOPURINOL 100 MG: 100 TABLET ORAL at 09:45:00

## 2019-09-19 NOTE — IMAGING NOTE
Report phoned to [place], RN pt had 750 removed from left lung bandaid to site c,d,i. Pt transported back to room 2604 following chest xray.

## 2019-09-19 NOTE — PLAN OF CARE
Controlled a fib on telemetry. VSS. No c/o cardiac symptoms. Received on 2L O2 per nasal canula with SPO2 at 100%. Weaned to 1L. Desaturated to 85% during Echo and laying flat, elevated head of bed and increase O2 to 3L with SPO2 returning to >90%.   vasoactive medications to optimize hemodynamic stability  - Monitor arterial and/or venous puncture sites for bleeding and/or hematoma  - Assess quality of pulses, skin color and temperature  - Assess for signs of decreased coronary artery perfusion - ex.

## 2019-09-19 NOTE — PROGRESS NOTES
Pulmonary Progress Note     Assessment / Plan:  1. Pulmonary hypertension: has h/o ctd as well as josé miguel which likely contribute. No clear intrinsic lung disease though needs further w/u for this. reportedly no vasodilator response durnig RHC.    - high res c

## 2019-09-19 NOTE — PLAN OF CARE
Patient admitted from cath lab recovery, admission navigator and assessment completed in EMR; patient lives with his wife, is independent in all ADLs to include driving and volunteering at [facility] when feeling better, no home health; patient is alert an

## 2019-09-19 NOTE — PROCEDURES
[facility]  Procedure Note    Faustino Feast Patient Status:  Outpatient in a Bed    3/17/1945 MRN [id number]   [facility] 2NE-A Attending Jaja Escobedo MD   Hosp Day # 0 PCP Ana Escalante MD     Procedure: US L thoracentesis    Pre

## 2019-09-19 NOTE — PROGRESS NOTES
[facility]  Cardiology Progress Note    Cele Fernandez Patient Status:  Outpatient in a Bed    3/17/1945 MRN [id number]   [facility] 2NE-A Attending Xin Briseno MD   Hosp Day # 0 PCP Baldemar La MD     Subjective:  No change in Assessment:  · Chronic RV heart failure - LVEF 55-60% with mild to moderately dilated RV with reduced RV systolic function s/p RHC 9/18.    · Hypoxemia noted on RHC- bubble study today, pulmonary also following   · pHTN w/ moderate TR - PAS 55-60mmh and chart reviewed. Right heart catheterization results reviewed. Right heart catheter revealed moderate to severe pulmonary hypertension partially congestion related. Pulmonary hypertension was reversible with nitric oxide.   Mean pulmonary pressure emily half of the time. Will consider PAH vasodilator -I will discuss it with Dr. Danielle Johnson -he has not started vasodilator yet in hospital but may consider it outpatient during follow-up.   Recurrent left pleural effusion may be a combination of chronic RV dysfunc

## 2019-09-19 NOTE — PLAN OF CARE
Assumed care of pt. At [address] [facility]. Wife at bedside. Pt. Is Axox4 and on 3 L's of O2 with a O2 sat of 96%. Pt. Has diminished bilateral breath sounds. Pt. Has a nonproductive cough. Pt. Is refusing to wear the CPAP. Pt. Has controlled Afib with a HR of 72.  Pt. D Maintains optimal cardiac output and hemodynamic stability  Description  INTERVENTIONS:  - Monitor vital signs, rhythm, and trends  - Monitor for bleeding, hypotension and signs of decreased cardiac output  - Evaluate effectiveness of vasoactive medication

## 2019-09-19 NOTE — RESPIRATORY THERAPY NOTE
Patient did not use CPAP for LI last night because he is refusing . said he sleeps better without it. RT informed patient we will return the cpap unit  if he needs it.

## 2019-09-19 NOTE — RESPIRATORY THERAPY NOTE
Pt on LI protocol and wears cpap at home but is refusing to wear it while in the hospital. Instructed pt on importance of wearing cpap with sleep and encouraged to wear it tonight but pt still refusing. cpap of 9 is set up in the room.  Pt instructed to le

## 2019-09-20 LAB
C4 SERPL-MCNC: 3.7 MG/DL (ref 10–40)
CRP SERPL-MCNC: <0.29 MG/DL (ref ?–0.3)
SED RATE-ML: 38 MM/HR (ref 0–12)

## 2019-09-20 PROCEDURE — 99225 SUBSEQUENT OBSERVATION CARE LEVEL II: CPT | Performed by: NURSE PRACTITIONER

## 2019-09-20 PROCEDURE — 99214 OFFICE O/P EST MOD 30 MIN: CPT | Performed by: INTERNAL MEDICINE

## 2019-09-20 RX ADMIN — TORSEMIDE 20 MG: 20 TABLET ORAL at 09:07:00

## 2019-09-20 RX ADMIN — PANTOPRAZOLE SODIUM 20 MG: 20 TABLET, DELAYED RELEASE ORAL at 05:48:00

## 2019-09-20 RX ADMIN — ALLOPURINOL 100 MG: 100 TABLET ORAL at 09:08:00

## 2019-09-20 RX ADMIN — METOPROLOL SUCCINATE 100 MG: 100 TABLET, EXTENDED RELEASE ORAL at 05:48:00

## 2019-09-20 NOTE — PLAN OF CARE
Up and about in room  Denies any pain, no sob on room air  Immune Globulin IV on going per order  per pt request to give it slow, pharmacy ok'd slow infusion  IVIG at 0.5ml/kg/hr at 40cc/hr now running for 3 hrs  So far no reactions noted  Will continue to arrhythmias  - Monitor electrolytes and administer replacement therapy as ordered  Outcome: Progressing

## 2019-09-20 NOTE — PROGRESS NOTES
09/20/19 1100   Mobility   O2 walk?  Yes   SPO2 on Room Air at Rest 95   SPO2 Ambulation on Room Air 94

## 2019-09-20 NOTE — PROGRESS NOTES
[facility]  Rheumatology Progress Note  Vanessa Milian Patient Status:  Outpatient in a Bed    3/17/1945 MRN [id number]   [facility] 2NE-A Attending Kayleigh Lam MD   Hosp Day # 0 PCP Lenka Sequeira MD     Subjective:   Keerthi Gr disease/polymyositis/mixed connective tissue disease. Follow-up with Dr. Reji Mcdonald in office in 2 weeks.       Humberto De La Paz MD 6/32/1246 3:30 PM

## 2019-09-20 NOTE — PROGRESS NOTES
[facility]  Cardiology Progress Note    Kimberly Barfield Patient Status:  Outpatient in a Bed    3/17/1945 MRN [id number]   [facility] 2NE-A Attending Indira Peng MD   Hosp Day # 0 PCP Jose Miguel Ahumada MD     Subjective:  Sitting up in pressure was moderately to markedly     increased, estimated to be 54mm Hg. 9. Pericardium, extracardiac: There was a moderate-sized left pleural     effusion. *    Physical Exam:  General: Alert and oriented in no apparent distress.   HEENT: No focal d with Dr. Kimberley Velázquez. · Non-obstructive CAD  · Chronic thrombocytopenia - last platelet count stable at . · Hyponatremia - labile, continue to monitor.  Na normal at 144.      Plan:   · Prednisone 60 mg per Rheum  · Continue BB, torsemide  · Will revaluate fo

## 2019-09-20 NOTE — PROGRESS NOTES
Pulmonary Progress Note        NAME: Mann Yanes - ROOM: 7712/2667-H - MRN: [id number] - Age: 66year old - : 3/17/1945        Last 24hrs: No events overnight, feeling better this AM    OBJECTIVE:   19  0100 19  0401 19  0626  (H) 0.90 - 1.10 Final     TSH   Date/Time Value Ref Range Status   09/04/2019 11:07 AM 3.430 0.358 - 3.740 mIU/mL Final     Comment:        This test may exhibit interference when a sample is collected from a person who is consuming high dose of biotin supp in 1-2 weeks after discharge    [address] [facility] Pulmonary and Critical Care

## 2019-09-20 NOTE — DIETARY NOTE
[facility]     Admitting diagnosis:  Heart failure, unspecified HF chronicity, unspecified heart failure type ([facility] [I50.9]    Ht: 180.3 cm (5' 11\")  Wt: 79 kg (174 lb 2.6 oz).    BMI: Body mass index is 24.29

## 2019-09-20 NOTE — CONSULTS
[facility]  Rheumatology Report of Consultation  Micheal Hernandes Patient Status:  Outpatient in a Bed    3/17/1945 MRN [id number]   [facility] 2NE-A Attending Delicia Morocho MD   Hosp Day # 0 PCP Alta Romo MD     Date of Admission treated treated with Imuran and prednisone over 10 years ago. Immune thrombocytopenia treated with prednisone and IVIG which is occurred over the last 4 years or so. Obstructive sleep apnea. Hypertension.     Atrial fibrillation ablation status po on exertion. Decreased stamina. No current abdominal pain. No nausea vomiting diarrhea. No major joint pain or joint swelling. Occasionally the ankles swell.     Physical Exam:/68 (BP Location: Left arm)   Pulse 88   Temp 98.1 °F (36.7 °C) (Oral) moderate to severe regurgitation. Pulmonary arteries systemic pressure moderately to markedly increased to 55 mg mercury. Also moderate size left pleural effusion noted. Nuclear medicine scan for PE shows low probability of PE.   Abnormality in medial se infections which could lead to significant problems even death.        Also with discussed with pulmonary treatment of pulmonary hypertension with use of agents that are effective to lower the pulmonary hypertension such as Traclear, Siledafil etc.    Thank

## 2019-09-20 NOTE — PLAN OF CARE
Assumed care of pt. At [address] [facility]. Wife at bedside. Pt. Is Axox4 and on 2 L's of O2 with a O2 sat of 94%. Attempting to wean O2. Pt. Has diminished bilateral breath sounds. Pt. Has a nonproductive cough. Pt. Is refusing to wear the CPAP.   Pt. Has controlled Afib difficulty  - Respiratory Therapy support as indicated  - Manage/alleviate anxiety  - Monitor for signs/symptoms of CO2 retention  Outcome: Progressing     Problem: CARDIOVASCULAR - ADULT  Goal: Maintains optimal cardiac output and hemodynamic stability  D

## 2019-09-21 ENCOUNTER — APPOINTMENT (OUTPATIENT)
Dept: GENERAL RADIOLOGY | Facility: HOSPITAL | Age: 74
End: 2019-09-21
Attending: INTERNAL MEDICINE
Payer: MEDICARE

## 2019-09-21 LAB — ALDOLASE, SERUM: 2.9 U/L

## 2019-09-21 PROCEDURE — 99225 SUBSEQUENT OBSERVATION CARE LEVEL II: CPT | Performed by: INTERNAL MEDICINE

## 2019-09-21 PROCEDURE — 71046 X-RAY EXAM CHEST 2 VIEWS: CPT | Performed by: INTERNAL MEDICINE

## 2019-09-21 RX ADMIN — METOPROLOL SUCCINATE 100 MG: 100 TABLET, EXTENDED RELEASE ORAL at 07:04:00

## 2019-09-21 RX ADMIN — ALLOPURINOL 100 MG: 100 TABLET ORAL at 08:17:00

## 2019-09-21 RX ADMIN — TORSEMIDE 10 MG: 5 TABLET ORAL at 08:16:00

## 2019-09-21 RX ADMIN — PANTOPRAZOLE SODIUM 20 MG: 20 TABLET, DELAYED RELEASE ORAL at 07:04:00

## 2019-09-21 NOTE — PROGRESS NOTES
[facility]    Cardiology Progress Note    Ayden Fernandes Patient Status:  Outpatient in a Bed    3/17/1945 MRN [id number]   [facility] 2NE-A Attending Berry Petersen MD   Hosp Day # 0 PCP Jennifer Patricia MD     Patient Active Problem recent left thoracentesis with 1200cc out.  Has increased dry cough and marked shortness of breath with minimal activity.  Pulmonary following may need repeat left sided thoracentesis   · Hx Lupus/Mixed CTD/Severe polymyositis - Hx IVIG infusions, follows Last 3 Weights  09/21/19 0626 : 176 lb 5.9 oz (80 kg)  09/20/19 0626 : 174 lb 2.6 oz (79 kg)  09/19/19 0435 : 174 lb 2.6 oz (79 kg)  09/18/19 1404 : 174 lb 9.7 oz (79.2 kg)  09/16/19 1032 : 175 lb (79.4 kg)  09/11/19 1227 : 182 lb (82.6 kg)  09/04/19 1 HYDROcodone-acetaminophen (NORCO) 5-325 MG per tab 2 tablet 2 tablet Oral Q4H PRN   morphINE sulfate (PF) 4 MG/ML injection 2 mg 2 mg Intravenous Q2H PRN   predniSONE (DELTASONE) tab 60 mg 60 mg Oral Daily with breakfast   allopurinol (ZYLOPRIM) tab 100

## 2019-09-21 NOTE — PROGRESS NOTES
Pulmonary Progress Note     Assessment / Plan:  1. Pulmonary hypertension - has h/o CTD as well as LI which likely contribute. No clear intrinsic lung disease though needs further w/u for this.  Reportedly no vasodilator response during RHC.   - HRCT witho

## 2019-09-21 NOTE — PLAN OF CARE
A fib controlled rate deny chest pain deny dyspnea at rest  Per patient exertional dyspnea is less   Weaned off oxygen 02 sat stable 93%    Chest x ray done today; cultures from pleural fluid  still pending  Iv vancomycin   ; trough level tomorrow  Deny medications as ordered  - Initiate emergency measures for life threatening arrhythmias  - Monitor electrolytes and administer replacement therapy as ordered  Outcome: Progressing

## 2019-09-21 NOTE — PROGRESS NOTES
[facility] [employer] Dosing Service    Initial Pharmacokinetic Consult for Vancomycin Dosing     Aquilla Closs is a 66year old male who is being treated for postive pleural fluid cultures - gram + cocci in clusters .   Pharmacy has been asked to dose Vancomyci

## 2019-09-21 NOTE — PROGRESS NOTES
09/20/19 8262   Provider Notification   Reason for Communication Critical value   Test/Procedure Name Pleural fluid culture +   Provider Name Other (comment)  (Dr Oscar Ross)   Method of Communication Page   Response Phone call;See orders     Dr Malik Osorio

## 2019-09-21 NOTE — PLAN OF CARE
Assumed care of pt at 0000 a/o x4 with wife bedside in no apparent distress. He is on RA without c/o SOB and is able to speak in full sentences whereas before he came in he could only speak 2-3 words at a time.   Monitor shows controlled Afib and stable BP vital signs, rhythm, and trends  - Monitor for bleeding, hypotension and signs of decreased cardiac output  - Evaluate effectiveness of vasoactive medications to optimize hemodynamic stability  - Monitor arterial and/or venous puncture sites for bleeding a

## 2019-09-22 VITALS
HEIGHT: 71 IN | HEART RATE: 75 BPM | SYSTOLIC BLOOD PRESSURE: 121 MMHG | OXYGEN SATURATION: 96 % | BODY MASS INDEX: 25.12 KG/M2 | DIASTOLIC BLOOD PRESSURE: 75 MMHG | TEMPERATURE: 98 F | RESPIRATION RATE: 17 BRPM | WEIGHT: 179.44 LBS

## 2019-09-22 RX ORDER — PREDNISONE 20 MG/1
TABLET ORAL
Qty: 60 TABLET | Refills: 0 | Status: SHIPPED | OUTPATIENT
Start: 2019-09-22 | End: 2019-11-05

## 2019-09-22 RX ADMIN — PANTOPRAZOLE SODIUM 20 MG: 20 TABLET, DELAYED RELEASE ORAL at 05:16:00

## 2019-09-22 RX ADMIN — ALLOPURINOL 100 MG: 100 TABLET ORAL at 09:37:00

## 2019-09-22 RX ADMIN — METOPROLOL SUCCINATE 100 MG: 100 TABLET, EXTENDED RELEASE ORAL at 05:16:00

## 2019-09-22 RX ADMIN — TORSEMIDE 20 MG: 20 TABLET ORAL at 09:38:00

## 2019-09-22 NOTE — PROGRESS NOTES
Pulmonary Progress Note     Assessment / Plan:  1. Pulmonary hypertension - has h/o CTD as well as LI which likely contribute. No clear intrinsic lung disease though needs further w/u for this.  Reportedly no vasodilator response during RHC.   - HRCT witho edema  Mental status - interactive and answering questions appropriately    Medications:  Reviewed in EMR    Lab Data:  Reviewed in EMR    Imaging:  I independently visualized all relevant chest imaging in PACS and agree with radiology interpretation excep

## 2019-09-22 NOTE — BH PROGRESS NOTE
Patient walk around on room air tolerated  Activity well deny pain deny dyspnea  A fib controlled rate   Discharge instructions completed w/ printed avs- verbalized understanding

## 2019-09-22 NOTE — BH PROGRESS NOTE
Discharge instructions completed w/ printed avs-verbalized  Understanding  Discharge home accompanied by transporter

## 2019-09-22 NOTE — PLAN OF CARE
Assumed care of pt at 1930 a/o x4 in no apparent distress. Monitors shows controlled AFib with stable BP. He denies pain and wife is staying bedside overnight. He is anxious to go home but understands the need to stay for full pleural cxs.   He is up ad Monitor for bleeding, hypotension and signs of decreased cardiac output  - Evaluate effectiveness of vasoactive medications to optimize hemodynamic stability  - Monitor arterial and/or venous puncture sites for bleeding and/or hematoma  - Assess quality of

## 2019-09-23 LAB
ANTI-NUCLEAR ANTIBODY (ANA), HEP-2 IGG: DETECTED
NON GYNE INTERPRETATION: NEGATIVE

## 2019-09-24 LAB
DOUBLE-STRANDED DNA (DSDNA) AB IGG ELISA: DETECTED
RIBONUCLEIC PROTEIN (U1) (ENA) AB, IGG: 61 AU/ML
SCLERODERMA (SCL-70) (ENA) AB, IGG: 23 AU/ML
SMITH (ENA) ANTIBODY, IGG: 2 AU/ML
SSA 52 (RO) (ENA) ANTIBODY, IGG: 11 AU/ML
SSA 60 (RO) (ENA) ANTIBODY, IGG: 17 AU/ML
SSB (LA) (ENA) ANTIBODY, IGG: 3 AU/ML

## 2019-09-25 LAB — CHROMATIN ANTIBODY,IGG: 124 UNITS

## 2019-10-01 ENCOUNTER — HOSPITAL ENCOUNTER (OUTPATIENT)
Dept: CARDIOLOGY CLINIC | Facility: HOSPITAL | Age: 74
Discharge: HOME | End: 2019-10-01
Attending: NURSE PRACTITIONER
Payer: MEDICARE

## 2019-10-01 ENCOUNTER — PATIENT MESSAGE (OUTPATIENT)
Dept: RHEUMATOLOGY | Facility: CLINIC | Age: 74
End: 2019-10-01

## 2019-10-01 VITALS
BODY MASS INDEX: 25 KG/M2 | WEIGHT: 179.88 LBS | SYSTOLIC BLOOD PRESSURE: 137 MMHG | RESPIRATION RATE: 16 BRPM | OXYGEN SATURATION: 100 % | DIASTOLIC BLOOD PRESSURE: 84 MMHG | HEART RATE: 71 BPM

## 2019-10-01 DIAGNOSIS — I48.20 CHRONIC ATRIAL FIBRILLATION (HCC): ICD-10-CM

## 2019-10-01 DIAGNOSIS — J90 BILATERAL PLEURAL EFFUSION: ICD-10-CM

## 2019-10-01 DIAGNOSIS — I27.20 PULMONARY HYPERTENSION (HCC): ICD-10-CM

## 2019-10-01 DIAGNOSIS — I36.1 NONRHEUMATIC TRICUSPID VALVE REGURGITATION: ICD-10-CM

## 2019-10-01 DIAGNOSIS — D69.3 AUTOIMMUNE THROMBOCYTOPENIA (HCC): ICD-10-CM

## 2019-10-01 DIAGNOSIS — M35.1 MIXED CONNECTIVE TISSUE DISEASE (HCC): ICD-10-CM

## 2019-10-01 DIAGNOSIS — N18.2 CKD (CHRONIC KIDNEY DISEASE) STAGE 2, GFR 60-89 ML/MIN: ICD-10-CM

## 2019-10-01 DIAGNOSIS — M32.19 OTHER SYSTEMIC LUPUS ERYTHEMATOSUS WITH OTHER ORGAN INVOLVEMENT (HCC): ICD-10-CM

## 2019-10-01 DIAGNOSIS — M33.20 POLYMYOSITIS (HCC): ICD-10-CM

## 2019-10-01 DIAGNOSIS — I50.812 CHRONIC RIGHT HEART FAILURE (HCC): Primary | ICD-10-CM

## 2019-10-01 DIAGNOSIS — G47.33 OSA (OBSTRUCTIVE SLEEP APNEA): ICD-10-CM

## 2019-10-01 PROCEDURE — 99214 OFFICE O/P EST MOD 30 MIN: CPT | Performed by: NURSE PRACTITIONER

## 2019-10-01 RX ORDER — POTASSIUM CHLORIDE 20 MEQ/1
20 TABLET, EXTENDED RELEASE ORAL DAILY
Refills: 0 | Status: SHIPPED | COMMUNITY
Start: 2019-10-01 | End: 2020-04-21

## 2019-10-01 NOTE — PATIENT INSTRUCTIONS
Heart Failure Discharge Instructions    · Please let us know when you are having your blood drawn next week, so we can check your potassium. · Increase potassium 20meq daily to 40meq daily.     Activity: Regular exercise and activity is important for your days  · You have more trouble breathing  · You get more tired with regular activity, or are limiting activity because of shortness of breath or fatigue  · You are short of breath lying down, you need more pillows to breathe comfortably,  or wake up during

## 2019-10-01 NOTE — PROGRESS NOTES
[facility] Cardiac Health Progress Note    Cele Fernandez is a 66year old male who presents to clinic for APN assessment and management of chronic right ventricular heart failure and is functional class III.      Subjective:  He returns • Esophageal reflux    • Essential hypertension    • Gout    • Hammer toe, acquired 6/29/2012   • High blood pressure    • Lupus (HCC)    • MCTD (mixed connective tissue disease) (HCC)    • Obstructive sleep apnea (adult) (pediatric) 11/16/2017   • LI ( Disease Father         CHF   • Gastro-Intestinal Disorder Father         Diverticulosis   • Alcohol and Other Disorders Associated Father    • Heart Disease Mother         CHF   • Breast Cancer Mother    • Stroke Mother    • Cancer Paternal Grandfather 0.6 mg by mouth daily. For gout. ), Disp: 60 tablet, Rfl: 1  •  OMEPRAZOLE 20 MG Oral Capsule Delayed Release, TAKE 1 CAPSULE BY MOUTH TWICE DAILY (Patient taking differently: Take 20 mg by mouth 2 (two) times daily before meals.  TAKE 1 CAPSULE BY MOUTH TW Sat 59.8%  AO Sat 90%     TPG 18 mmHg  DPG 8 mmHg  PVR 4 Wood units     Cardiac output/cardiac index (Lillie) 4.7/2. 4     Condition 1 - BP improvement to 130/72 mmHg     PA 45/25/32 mmHg  PCW 10 mmHg     Cardiac output/cardiac index (TD) = 4.5/2.3      Condi · Increase potassium to 40 meq daily  · Continue with current medications  · CMP next week from another physician - patient will call [facility] when lab work is completed  · Return to clinic in 3 weeks  · F/U with Dr. Roman Morton as planned on 12/7  · CHF discharge

## 2019-10-01 NOTE — PROGRESS NOTES
Pt. assessed. No s/s of shortness of breath, fatigue, chest pain or increased edema noted. Weight down 3 lbs at 179.9 lbs. Reviewed current list of patient's allergies and medication; updated EMR.  Labs ordered and drawn in the [facility] to assess kidney function

## 2019-10-01 NOTE — TELEPHONE ENCOUNTER
From: Kimberly Barfield  To: aBrney Valverde MD  Sent: 10/1/2019 1:02 PM CDT  Subject: Visit Follow-up Question    Doctor, were you able to connect with Dr. Sangeetha Reyna from [facility]? If not I will send an email directly to her and to the exec dir.  Thes

## 2019-10-03 ENCOUNTER — LAB ENCOUNTER (OUTPATIENT)
Dept: LAB | Age: 74
End: 2019-10-03
Attending: INTERNAL MEDICINE
Payer: MEDICARE

## 2019-10-03 DIAGNOSIS — D69.6 THROMBOCYTOPENIA (HCC): ICD-10-CM

## 2019-10-03 DIAGNOSIS — E87.6 HYPOKALEMIA: Primary | ICD-10-CM

## 2019-10-03 PROCEDURE — 36415 COLL VENOUS BLD VENIPUNCTURE: CPT

## 2019-10-03 PROCEDURE — 85025 COMPLETE CBC W/AUTO DIFF WBC: CPT

## 2019-10-08 RX ORDER — TORSEMIDE 20 MG/1
40 TABLET ORAL
COMMUNITY

## 2019-10-09 ENCOUNTER — OFFICE VISIT (OUTPATIENT)
Dept: CARDIOLOGY | Age: 74
End: 2019-10-09

## 2019-10-09 VITALS
DIASTOLIC BLOOD PRESSURE: 64 MMHG | HEART RATE: 92 BPM | HEIGHT: 71 IN | RESPIRATION RATE: 18 BRPM | OXYGEN SATURATION: 98 % | BODY MASS INDEX: 25.48 KG/M2 | WEIGHT: 182 LBS | SYSTOLIC BLOOD PRESSURE: 110 MMHG

## 2019-10-09 DIAGNOSIS — I50.810 RIGHT-SIDED HEART FAILURE, UNSPECIFIED HF CHRONICITY (CMD): Primary | ICD-10-CM

## 2019-10-09 DIAGNOSIS — I50.9 HEART FAILURE, UNSPECIFIED HF CHRONICITY, UNSPECIFIED HEART FAILURE TYPE (CMD): ICD-10-CM

## 2019-10-09 PROCEDURE — 99213 OFFICE O/P EST LOW 20 MIN: CPT | Performed by: INTERNAL MEDICINE

## 2019-10-09 ASSESSMENT — ENCOUNTER SYMPTOMS
HEMATOCHEZIA: 0
ALLERGIC/IMMUNOLOGIC COMMENTS: NO NEW FOOD ALLERGIES
COUGH: 0
CHILLS: 0
HEMOPTYSIS: 0
WEIGHT LOSS: 0
WEIGHT GAIN: 0
SUSPICIOUS LESIONS: 0
BRUISES/BLEEDS EASILY: 0
FEVER: 0

## 2019-10-09 ASSESSMENT — PATIENT HEALTH QUESTIONNAIRE - PHQ9
SUM OF ALL RESPONSES TO PHQ9 QUESTIONS 1 AND 2: 0
2. FEELING DOWN, DEPRESSED OR HOPELESS: NOT AT ALL
SUM OF ALL RESPONSES TO PHQ9 QUESTIONS 1 AND 2: 0
1. LITTLE INTEREST OR PLEASURE IN DOING THINGS: NOT AT ALL

## 2019-10-10 ENCOUNTER — OFFICE VISIT (OUTPATIENT)
Dept: RHEUMATOLOGY | Facility: CLINIC | Age: 74
End: 2019-10-10
Payer: MEDICARE

## 2019-10-10 VITALS
BODY MASS INDEX: 25.34 KG/M2 | DIASTOLIC BLOOD PRESSURE: 80 MMHG | HEART RATE: 64 BPM | WEIGHT: 181 LBS | SYSTOLIC BLOOD PRESSURE: 140 MMHG | HEIGHT: 71 IN | RESPIRATION RATE: 16 BRPM

## 2019-10-10 DIAGNOSIS — D69.3 AUTOIMMUNE THROMBOCYTOPENIA (HCC): ICD-10-CM

## 2019-10-10 DIAGNOSIS — I27.20 PULMONARY HYPERTENSION (HCC): ICD-10-CM

## 2019-10-10 DIAGNOSIS — M33.20 POLYMYOSITIS (HCC): Chronic | ICD-10-CM

## 2019-10-10 DIAGNOSIS — M35.1 MIXED CONNECTIVE TISSUE DISEASE (HCC): Primary | Chronic | ICD-10-CM

## 2019-10-10 PROCEDURE — 99215 OFFICE O/P EST HI 40 MIN: CPT | Performed by: INTERNAL MEDICINE

## 2019-10-10 RX ORDER — PREDNISONE 20 MG/1
TABLET ORAL
Qty: 60 TABLET | Refills: 0 | Status: CANCELLED | OUTPATIENT
Start: 2019-10-10

## 2019-10-10 RX ORDER — PREDNISONE 10 MG/1
TABLET ORAL
Qty: 150 TABLET | Refills: 2 | Status: SHIPPED | OUTPATIENT
Start: 2019-10-10 | End: 2020-03-20

## 2019-10-10 NOTE — PATIENT INSTRUCTIONS
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   Prednisone 50 mg per week for ano

## 2019-10-10 NOTE — PROGRESS NOTES
[facility]  Dr. Rolan Barnhart Progress Note     Subjective: Vinnie Krueger is a(n) 66year old male. Current complaints: Patient presents with:   Follow - Up: est pt-fu hospital visit 9/19- Pt had SOB- CHF and pulmonary Hypertension-txd with prednison homogenous pattern. Also 1 to–80s cytoplasmic pattern. 9/4/2019 Maiolo site is specific antibody testing negative. Tania 1 antibody negative. MI 2-. PDL 7-.  Ku antibody negative. SRP negative. Interpretation myositis panel negative.   9/21/2019 chest x 40-minute office visit with greater than half time spent discussing autoimmune disease, pulmonary hypertension, and continue treatment with prednisone and IVIG. Possible necessity to add medicines for pulmonary hypertension in the future.   Katia Weaver

## 2019-10-14 PROBLEM — D69.3 IMMUNE THROMBOCYTOPENIC PURPURA (HCC): Status: ACTIVE | Noted: 2019-10-14

## 2019-10-18 ENCOUNTER — OFFICE VISIT (OUTPATIENT)
Dept: HEMATOLOGY/ONCOLOGY | Age: 74
End: 2019-10-18
Attending: INTERNAL MEDICINE
Payer: MEDICARE

## 2019-10-18 VITALS
WEIGHT: 181 LBS | BODY MASS INDEX: 25 KG/M2 | DIASTOLIC BLOOD PRESSURE: 73 MMHG | TEMPERATURE: 97 F | OXYGEN SATURATION: 100 % | SYSTOLIC BLOOD PRESSURE: 112 MMHG | HEART RATE: 84 BPM | RESPIRATION RATE: 18 BRPM

## 2019-10-18 DIAGNOSIS — M32.19 OTHER SYSTEMIC LUPUS ERYTHEMATOSUS WITH OTHER ORGAN INVOLVEMENT (HCC): ICD-10-CM

## 2019-10-18 DIAGNOSIS — D69.3 IMMUNE THROMBOCYTOPENIC PURPURA (HCC): Primary | ICD-10-CM

## 2019-10-18 DIAGNOSIS — M33.20 POLYMYOSITIS (HCC): ICD-10-CM

## 2019-10-18 DIAGNOSIS — D69.6 THROMBOCYTOPENIA (HCC): ICD-10-CM

## 2019-10-18 PROCEDURE — 96365 THER/PROPH/DIAG IV INF INIT: CPT

## 2019-10-18 PROCEDURE — 96375 TX/PRO/DX INJ NEW DRUG ADDON: CPT

## 2019-10-18 PROCEDURE — 96366 THER/PROPH/DIAG IV INF ADDON: CPT

## 2019-10-18 RX ORDER — METHYLPREDNISOLONE SODIUM SUCCINATE 125 MG/2ML
125 INJECTION, POWDER, LYOPHILIZED, FOR SOLUTION INTRAMUSCULAR; INTRAVENOUS ONCE
Status: CANCELLED | OUTPATIENT
Start: 2019-11-15

## 2019-10-18 RX ORDER — METHYLPREDNISOLONE SODIUM SUCCINATE 40 MG/ML
40 INJECTION, POWDER, LYOPHILIZED, FOR SOLUTION INTRAMUSCULAR; INTRAVENOUS ONCE
Status: CANCELLED
Start: 2019-11-15

## 2019-10-18 RX ORDER — DIPHENHYDRAMINE HCL 25 MG
25 CAPSULE ORAL ONCE
Status: CANCELLED | OUTPATIENT
Start: 2019-11-15

## 2019-10-18 RX ORDER — ACETAMINOPHEN 325 MG/1
650 TABLET ORAL ONCE
Status: CANCELLED | OUTPATIENT
Start: 2019-11-15

## 2019-10-18 RX ORDER — FAMOTIDINE 10 MG/ML
20 INJECTION, SOLUTION INTRAVENOUS ONCE
Status: CANCELLED | OUTPATIENT
Start: 2019-11-15

## 2019-10-18 RX ORDER — MEPERIDINE HYDROCHLORIDE 25 MG/ML
25 INJECTION INTRAMUSCULAR; INTRAVENOUS; SUBCUTANEOUS ONCE
Status: CANCELLED | OUTPATIENT
Start: 2019-11-15

## 2019-10-18 RX ORDER — DIPHENHYDRAMINE HCL 25 MG
25 CAPSULE ORAL ONCE
Status: COMPLETED | OUTPATIENT
Start: 2019-10-18 | End: 2019-10-18

## 2019-10-18 RX ORDER — METHYLPREDNISOLONE SODIUM SUCCINATE 40 MG/ML
40 INJECTION, POWDER, LYOPHILIZED, FOR SOLUTION INTRAMUSCULAR; INTRAVENOUS ONCE
Status: COMPLETED | OUTPATIENT
Start: 2019-10-18 | End: 2019-10-18

## 2019-10-18 RX ORDER — METHYLPREDNISOLONE SODIUM SUCCINATE 40 MG/ML
40 INJECTION, POWDER, LYOPHILIZED, FOR SOLUTION INTRAMUSCULAR; INTRAVENOUS ONCE
Status: CANCELLED | OUTPATIENT
Start: 2019-11-15

## 2019-10-18 RX ORDER — ACETAMINOPHEN 325 MG/1
650 TABLET ORAL ONCE
Status: COMPLETED | OUTPATIENT
Start: 2019-10-18 | End: 2019-10-18

## 2019-10-18 RX ORDER — DIPHENHYDRAMINE HYDROCHLORIDE 50 MG/ML
25 INJECTION INTRAMUSCULAR; INTRAVENOUS ONCE
Status: CANCELLED | OUTPATIENT
Start: 2019-11-15

## 2019-10-18 RX ADMIN — ACETAMINOPHEN 650 MG: 325 TABLET ORAL at 11:36:00

## 2019-10-18 RX ADMIN — DIPHENHYDRAMINE HCL 25 MG: 25 MG CAPSULE ORAL at 11:36:00

## 2019-10-18 RX ADMIN — METHYLPREDNISOLONE SODIUM SUCCINATE 40 MG: 40 INJECTION, POWDER, LYOPHILIZED, FOR SOLUTION INTRAMUSCULAR; INTRAVENOUS at 11:36:00

## 2019-10-18 NOTE — PROGRESS NOTES
Education Record    Learner:  Patient and Spouse    Disease / Diagnosis: IVIG infusion    Barriers / Limitations:  None   Comments:    Method:  Brief focused and Discussion   Comments:    General Topics:  Medication, Side effects and symptom management and

## 2019-10-21 ENCOUNTER — HOSPITAL ENCOUNTER (OUTPATIENT)
Dept: LAB | Facility: HOSPITAL | Age: 74
Discharge: HOME | End: 2019-10-21
Attending: NURSE PRACTITIONER
Payer: MEDICARE

## 2019-10-21 ENCOUNTER — HOSPITAL ENCOUNTER (OUTPATIENT)
Dept: CARDIOLOGY CLINIC | Facility: HOSPITAL | Age: 74
Discharge: HOME | End: 2019-10-21
Attending: NURSE PRACTITIONER
Payer: MEDICARE

## 2019-10-21 VITALS
SYSTOLIC BLOOD PRESSURE: 138 MMHG | OXYGEN SATURATION: 100 % | WEIGHT: 183.88 LBS | RESPIRATION RATE: 14 BRPM | HEART RATE: 84 BPM | DIASTOLIC BLOOD PRESSURE: 82 MMHG | BODY MASS INDEX: 26 KG/M2

## 2019-10-21 DIAGNOSIS — I50.9 CHF (CONGESTIVE HEART FAILURE) (HCC): ICD-10-CM

## 2019-10-21 DIAGNOSIS — I27.20 PULMONARY HYPERTENSION (HCC): ICD-10-CM

## 2019-10-21 DIAGNOSIS — I36.1 NONRHEUMATIC TRICUSPID VALVE REGURGITATION: ICD-10-CM

## 2019-10-21 DIAGNOSIS — I48.20 CHRONIC ATRIAL FIBRILLATION (HCC): ICD-10-CM

## 2019-10-21 DIAGNOSIS — I10 BENIGN ESSENTIAL HYPERTENSION: ICD-10-CM

## 2019-10-21 DIAGNOSIS — M32.19 OTHER SYSTEMIC LUPUS ERYTHEMATOSUS WITH OTHER ORGAN INVOLVEMENT (HCC): ICD-10-CM

## 2019-10-21 DIAGNOSIS — I50.812 CHRONIC RIGHT HEART FAILURE (HCC): Primary | ICD-10-CM

## 2019-10-21 DIAGNOSIS — J90 BILATERAL PLEURAL EFFUSION: ICD-10-CM

## 2019-10-21 DIAGNOSIS — M35.1 MIXED CONNECTIVE TISSUE DISEASE (HCC): ICD-10-CM

## 2019-10-21 DIAGNOSIS — G47.33 OSA (OBSTRUCTIVE SLEEP APNEA): ICD-10-CM

## 2019-10-21 DIAGNOSIS — M33.20 POLYMYOSITIS (HCC): ICD-10-CM

## 2019-10-21 PROCEDURE — 36415 COLL VENOUS BLD VENIPUNCTURE: CPT | Performed by: NURSE PRACTITIONER

## 2019-10-21 PROCEDURE — 80048 BASIC METABOLIC PNL TOTAL CA: CPT | Performed by: NURSE PRACTITIONER

## 2019-10-21 PROCEDURE — 99213 OFFICE O/P EST LOW 20 MIN: CPT | Performed by: NURSE PRACTITIONER

## 2019-10-21 NOTE — PROGRESS NOTES
Pt. assessed. No s/s of shortness of breath, fatigue, or chest pain. Pt with BLE edema noted. Weight up 4 lbs at 183.9 lbs. Reviewed current list of patient's allergies and medication; updated EMR. Labs ordered to assess kidney function.  Reviewed follow-up

## 2019-10-21 NOTE — PATIENT INSTRUCTIONS
Heart Failure Discharge Instructions    · Continue current medications. Activity: Regular exercise and activity is important for your overall health and to help keep your heart strong and functioning as well as possible.    Walk at a slow to moderate pac shortness of breath or fatigue  · You are short of breath lying down, you need more pillows to breathe comfortably,  or wake up during the night short of breath  · You urinate less often during the day and more often at night  · You have a bloated feeling,

## 2019-10-21 NOTE — PROGRESS NOTES
[facility] Cardiac Health Progress Note    Aquilla Closs is a 66year old male who presents to clinic for APN assessment and management of chronic RV heart failure and is functional class 2.      Subjective:  He returns for routine asse AHI 38 non-supine AHI 16 Sao2 Pj 83%    • LI (obstructive sleep apnea) PSG 5-22-19    AHI 36 RDI 36 REM AHI 45 Supine AHI 65 non-supine AHI 19 Sao2 Pj 86% CPAP 9cwp   • Pain in joints    • Pleural effusion     right   • Polymyositis (HCC)    • Probl Grandmother    • Kidney Disease Son    • Other (Ramon's Esophagus) Son    • Heart Attack Maternal Grandfather       Social History    Tobacco Use      Smoking status: Former Smoker        Packs/day: 0.25        Years: 15.00        Pack years: 3.75 40 mg per week for 3 weeks. Then 30 mg per week., Disp: 150 tablet, Rfl: 2  •  Potassium Chloride ER (KLOR-CON M20) 20 MEQ Oral Tab CR, Take 2 tablets (40 mEq total) by mouth daily. , Disp: , Rfl: 0  •  predniSONE 20 MG Oral Tab, Take 60mg daily x 5 days th CTAB                     CV:                  Irregularly irregular  Abdomen:       Non-distended, soft, non-tender, BS+  Extremities:    +1 LE edema up to mid calf  Neuro:             A&O x 3, CASTRO  Skin:                Pink, warm, dry    Education: and greater than 50% of the time was spent counseling and coordinating care.     Zahira Jackson, APRN  10/21/2019

## 2019-10-23 ENCOUNTER — OFFICE VISIT (OUTPATIENT)
Dept: FAMILY MEDICINE CLINIC | Facility: CLINIC | Age: 74
End: 2019-10-23
Payer: MEDICARE

## 2019-10-23 VITALS
HEART RATE: 64 BPM | DIASTOLIC BLOOD PRESSURE: 64 MMHG | RESPIRATION RATE: 14 BRPM | HEIGHT: 71 IN | BODY MASS INDEX: 25.45 KG/M2 | WEIGHT: 181.81 LBS | SYSTOLIC BLOOD PRESSURE: 112 MMHG | TEMPERATURE: 98 F

## 2019-10-23 DIAGNOSIS — I10 BENIGN ESSENTIAL HYPERTENSION: Primary | ICD-10-CM

## 2019-10-23 DIAGNOSIS — K75.4: ICD-10-CM

## 2019-10-23 DIAGNOSIS — M35.1 MIXED CONNECTIVE TISSUE DISEASE (HCC): Chronic | ICD-10-CM

## 2019-10-23 DIAGNOSIS — J90 PLEURAL EFFUSION, LEFT: ICD-10-CM

## 2019-10-23 PROCEDURE — 99214 OFFICE O/P EST MOD 30 MIN: CPT | Performed by: FAMILY MEDICINE

## 2019-10-23 NOTE — PATIENT INSTRUCTIONS
Future Appointments   Date Time Provider Chrissie Aden   40/5/8881  1:75 PM Yanira Collado MD [facility] Ria Santiago   11/20/2019 10:30 AM [facility] [place] RN1 [address] [facility]   12/2/2019  2:30 PM HEART FAILURE APN 1 [facility] HF CLIN [facility] AT [facility]   12/11/2019 11:1

## 2019-10-23 NOTE — PROGRESS NOTES
Patient presents with:  Hypertension      Subjective   HPI:   This is a 66year old male coming in for pleural effusion, now no coughing or SOB, slowly increasing endurance. IVIG last Friday and next month. HPI due to CHF and auoimmune issues.  No sinsu Skin is warm and dry. No rash noted. Psychiatric: He has a normal mood and affect.  His speech is normal and behavior is normal. Judgment and thought content normal. Cognition and memory are normal.            Assessment and Plan:     Problem List Items A

## 2019-11-05 ENCOUNTER — OFFICE VISIT (OUTPATIENT)
Dept: RHEUMATOLOGY | Facility: CLINIC | Age: 74
End: 2019-11-05
Payer: MEDICARE

## 2019-11-05 VITALS
WEIGHT: 181 LBS | HEIGHT: 71 IN | HEART RATE: 64 BPM | SYSTOLIC BLOOD PRESSURE: 138 MMHG | RESPIRATION RATE: 18 BRPM | DIASTOLIC BLOOD PRESSURE: 76 MMHG | BODY MASS INDEX: 25.34 KG/M2

## 2019-11-05 DIAGNOSIS — M33.20 POLYMYOSITIS (HCC): Chronic | ICD-10-CM

## 2019-11-05 DIAGNOSIS — D69.6 THROMBOCYTOPENIA (HCC): ICD-10-CM

## 2019-11-05 DIAGNOSIS — D69.3 AUTOIMMUNE THROMBOCYTOPENIA (HCC): ICD-10-CM

## 2019-11-05 DIAGNOSIS — D69.3 IMMUNE THROMBOCYTOPENIC PURPURA (HCC): ICD-10-CM

## 2019-11-05 DIAGNOSIS — M35.1 MIXED CONNECTIVE TISSUE DISEASE (HCC): Primary | Chronic | ICD-10-CM

## 2019-11-05 PROCEDURE — 99214 OFFICE O/P EST MOD 30 MIN: CPT | Performed by: INTERNAL MEDICINE

## 2019-11-05 NOTE — PROGRESS NOTES
[facility]  Dr. Caprice Cohen Progress Note     Subjective: Leandro Segal is a(n) 66year old male.    Current complaints: Patient presents with:  Mixed Connective Tissue Disease: 1 month f/u, doing well over the past month, feels weak, started working

## 2019-11-05 NOTE — DISCHARGE SUMMARY
[facility]    Discharge Summary    Jonathan Davila Patient Status:  Outpatient in a Bed    3/17/1945 MRN [id number]   [facility] 2NE-A Attending No att. providers found   Hosp Day # 0 PCP Lenka Sequeira MD     Date of Admission:  recommend with possiblie reversibility for further evaluation. Hospital Course:   Patient was admitted after RHC and worsening SOB. He had a L thoracentesis and felt better.  He was seen by rheumatology and his wosrening SOB was thought to be due to wors Fexofenadine HCl 180 MG Tabs  Commonly known as:  ALLEGRA      Take 180 mg by mouth daily as needed. Refills:  0     Metoprolol Succinate  MG Tb24  Commonly known as: Toprol XL      Take 1 tablet (100 mg total) by mouth Daily Beta Blocker.    Karla Santiago

## 2019-11-05 NOTE — PATIENT INSTRUCTIONS
Maintain Prednisone 30 mg per day for the next month. Check labs in 4 weeks. Then depending on labs hopefully lower Prednisone 25 mg per day. Continue IvIG monthly at [facility] Infusion center. Return to 5 weeks.

## 2019-11-20 ENCOUNTER — OFFICE VISIT (OUTPATIENT)
Dept: HEMATOLOGY/ONCOLOGY | Facility: HOSPITAL | Age: 74
End: 2019-11-20
Attending: INTERNAL MEDICINE
Payer: MEDICARE

## 2019-11-20 VITALS
OXYGEN SATURATION: 94 % | SYSTOLIC BLOOD PRESSURE: 118 MMHG | WEIGHT: 190.19 LBS | HEART RATE: 90 BPM | BODY MASS INDEX: 25.76 KG/M2 | TEMPERATURE: 98 F | HEIGHT: 72.01 IN | RESPIRATION RATE: 16 BRPM | DIASTOLIC BLOOD PRESSURE: 74 MMHG

## 2019-11-20 DIAGNOSIS — D69.6 THROMBOCYTOPENIA (HCC): ICD-10-CM

## 2019-11-20 DIAGNOSIS — M33.20 POLYMYOSITIS (HCC): ICD-10-CM

## 2019-11-20 DIAGNOSIS — D69.3 IMMUNE THROMBOCYTOPENIC PURPURA (HCC): Primary | ICD-10-CM

## 2019-11-20 DIAGNOSIS — M32.19 OTHER SYSTEMIC LUPUS ERYTHEMATOSUS WITH OTHER ORGAN INVOLVEMENT (HCC): ICD-10-CM

## 2019-11-20 PROCEDURE — 96375 TX/PRO/DX INJ NEW DRUG ADDON: CPT

## 2019-11-20 PROCEDURE — 96366 THER/PROPH/DIAG IV INF ADDON: CPT

## 2019-11-20 PROCEDURE — 96365 THER/PROPH/DIAG IV INF INIT: CPT

## 2019-11-20 RX ORDER — METHYLPREDNISOLONE SODIUM SUCCINATE 125 MG/2ML
125 INJECTION, POWDER, LYOPHILIZED, FOR SOLUTION INTRAMUSCULAR; INTRAVENOUS ONCE
Status: CANCELLED | OUTPATIENT
Start: 2019-12-11

## 2019-11-20 RX ORDER — DIPHENHYDRAMINE HYDROCHLORIDE 50 MG/ML
25 INJECTION INTRAMUSCULAR; INTRAVENOUS ONCE
Status: CANCELLED | OUTPATIENT
Start: 2019-12-11

## 2019-11-20 RX ORDER — METHYLPREDNISOLONE SODIUM SUCCINATE 40 MG/ML
40 INJECTION, POWDER, LYOPHILIZED, FOR SOLUTION INTRAMUSCULAR; INTRAVENOUS ONCE
Status: CANCELLED
Start: 2019-12-11

## 2019-11-20 RX ORDER — ACETAMINOPHEN 325 MG/1
650 TABLET ORAL ONCE
Status: COMPLETED | OUTPATIENT
Start: 2019-11-20 | End: 2019-11-20

## 2019-11-20 RX ORDER — METHYLPREDNISOLONE SODIUM SUCCINATE 40 MG/ML
40 INJECTION, POWDER, LYOPHILIZED, FOR SOLUTION INTRAMUSCULAR; INTRAVENOUS ONCE
Status: COMPLETED | OUTPATIENT
Start: 2019-11-20 | End: 2019-11-20

## 2019-11-20 RX ORDER — ACETAMINOPHEN 325 MG/1
650 TABLET ORAL ONCE
Status: CANCELLED | OUTPATIENT
Start: 2019-12-11

## 2019-11-20 RX ORDER — METHYLPREDNISOLONE SODIUM SUCCINATE 40 MG/ML
40 INJECTION, POWDER, LYOPHILIZED, FOR SOLUTION INTRAMUSCULAR; INTRAVENOUS ONCE
Status: CANCELLED | OUTPATIENT
Start: 2019-12-11

## 2019-11-20 RX ORDER — DIPHENHYDRAMINE HCL 25 MG
25 CAPSULE ORAL ONCE
Status: COMPLETED | OUTPATIENT
Start: 2019-11-20 | End: 2019-11-20

## 2019-11-20 RX ORDER — DIPHENHYDRAMINE HCL 25 MG
25 CAPSULE ORAL ONCE
Status: CANCELLED | OUTPATIENT
Start: 2019-12-11

## 2019-11-20 RX ORDER — FAMOTIDINE 10 MG/ML
20 INJECTION, SOLUTION INTRAVENOUS ONCE
Status: CANCELLED | OUTPATIENT
Start: 2019-12-11

## 2019-11-20 RX ORDER — MEPERIDINE HYDROCHLORIDE 25 MG/ML
25 INJECTION INTRAMUSCULAR; INTRAVENOUS; SUBCUTANEOUS ONCE
Status: CANCELLED | OUTPATIENT
Start: 2019-12-11

## 2019-11-20 RX ADMIN — ACETAMINOPHEN 650 MG: 325 TABLET ORAL at 10:54:00

## 2019-11-20 RX ADMIN — METHYLPREDNISOLONE SODIUM SUCCINATE 40 MG: 40 INJECTION, POWDER, LYOPHILIZED, FOR SOLUTION INTRAMUSCULAR; INTRAVENOUS at 10:57:00

## 2019-11-20 RX ADMIN — DIPHENHYDRAMINE HCL 25 MG: 25 MG CAPSULE ORAL at 10:54:00

## 2019-11-20 NOTE — PROGRESS NOTES
Pt here for IVIG infusion.   Arrives Ambulating independently, accompanied by Spouse           Patient denies possible pregnancy since last therapy cycle: Not Applicable    Modifications in dose or schedule: No     Frequency of blood return and site check t

## 2019-11-22 ENCOUNTER — TELEPHONE (OUTPATIENT)
Dept: RHEUMATOLOGY | Facility: CLINIC | Age: 74
End: 2019-11-22

## 2019-11-22 ENCOUNTER — LAB ENCOUNTER (OUTPATIENT)
Dept: LAB | Facility: HOSPITAL | Age: 74
End: 2019-11-22
Attending: INTERNAL MEDICINE
Payer: MEDICARE

## 2019-11-22 DIAGNOSIS — D69.6 THROMBOCYTOPENIA (HCC): Primary | ICD-10-CM

## 2019-11-22 DIAGNOSIS — M33.20 POLYMYOSITIS (HCC): ICD-10-CM

## 2019-11-22 DIAGNOSIS — D69.6 THROMBOCYTOPENIA (HCC): ICD-10-CM

## 2019-11-22 PROCEDURE — 36415 COLL VENOUS BLD VENIPUNCTURE: CPT

## 2019-11-22 PROCEDURE — 85025 COMPLETE CBC W/AUTO DIFF WBC: CPT

## 2019-11-22 NOTE — TELEPHONE ENCOUNTER
Patient had IVIG treatment earlier this week. Today had a nosebleed that took 40 minutes to control. Will order CBC to check platelets. Dr. Anton Arcos.

## 2019-11-23 ENCOUNTER — PATIENT MESSAGE (OUTPATIENT)
Dept: RHEUMATOLOGY | Facility: CLINIC | Age: 74
End: 2019-11-23

## 2019-11-25 ENCOUNTER — APPOINTMENT (OUTPATIENT)
Dept: GENERAL RADIOLOGY | Age: 74
End: 2019-11-25
Attending: FAMILY MEDICINE
Payer: MEDICARE

## 2019-11-25 ENCOUNTER — HOSPITAL ENCOUNTER (OUTPATIENT)
Age: 74
Discharge: HOME | End: 2019-11-25
Attending: FAMILY MEDICINE
Payer: MEDICARE

## 2019-11-25 VITALS
OXYGEN SATURATION: 96 % | BODY MASS INDEX: 25.76 KG/M2 | HEART RATE: 95 BPM | HEIGHT: 71 IN | WEIGHT: 184 LBS | SYSTOLIC BLOOD PRESSURE: 124 MMHG | TEMPERATURE: 98 F | RESPIRATION RATE: 14 BRPM | DIASTOLIC BLOOD PRESSURE: 79 MMHG

## 2019-11-25 DIAGNOSIS — J18.1 LUNG CONSOLIDATION (HCC): Primary | ICD-10-CM

## 2019-11-25 DIAGNOSIS — R09.81 SINUS CONGESTION: ICD-10-CM

## 2019-11-25 PROCEDURE — 99214 OFFICE O/P EST MOD 30 MIN: CPT

## 2019-11-25 PROCEDURE — 94640 AIRWAY INHALATION TREATMENT: CPT

## 2019-11-25 PROCEDURE — 71046 X-RAY EXAM CHEST 2 VIEWS: CPT | Performed by: FAMILY MEDICINE

## 2019-11-25 RX ORDER — IPRATROPIUM BROMIDE AND ALBUTEROL SULFATE 2.5; .5 MG/3ML; MG/3ML
3 SOLUTION RESPIRATORY (INHALATION) ONCE
Status: COMPLETED | OUTPATIENT
Start: 2019-11-25 | End: 2019-11-25

## 2019-11-25 RX ORDER — ALBUTEROL SULFATE 90 UG/1
2 AEROSOL, METERED RESPIRATORY (INHALATION) EVERY 4 HOURS PRN
Qty: 1 INHALER | Refills: 0 | Status: SHIPPED | OUTPATIENT
Start: 2019-11-25 | End: 2019-12-25

## 2019-11-25 RX ORDER — AZITHROMYCIN 250 MG/1
TABLET, FILM COATED ORAL
Qty: 1 PACKAGE | Refills: 0 | Status: SHIPPED | OUTPATIENT
Start: 2019-11-25 | End: 2019-11-30

## 2019-11-25 NOTE — ED INITIAL ASSESSMENT (HPI)
Pt c/o nasal congestion, post nasal drip, cough, over the past 3 days. Denies fever, chills or body aches.

## 2019-11-25 NOTE — ED PROVIDER NOTES
Patient Seen in: [facility]      History   Patient presents with:  Sinusitis    Stated Complaint: cough / nasal congestion x3 days    HPI    57-year-old male with multiple medical problems presents with complaints of nasal COLONOSCOPY  10/2003 2006 01/2010    x3   • COLONOSCOPY  5/14/2013   • COLONOSCOPY N/A 5/1/2018    Performed by Sonny Sierra MD at [facility] ENDOSCOPY   • COLONOSCOPY WITH BIOPSY  5/14/13   • EGD     • ESOPHAGOGASTRODUODENOSCOPY (EGD) N/A 5/1/2018    Perfor [11/25/19 1005]   /79   Pulse 94   Resp 14   Temp 97.7 °F (36.5 °C)   Temp src Oral   SpO2 93 %   O2 Device None (Room air)       Current:/79   Pulse 95   Temp 97.7 °F (36.5 °C) (Oral)   Resp 14   Ht 180.3 cm (5' 11\")   Wt 83.5 kg   SpO2 96% the medication. Recommend to monitor symptoms and follow-up PCP in a week for recheck. If worse go to the ER.               Disposition and Plan     Clinical Impression:  Lung consolidation ([address]  (primary encounter diagnosis)  Sinus congestion    Disposi

## 2019-11-29 DIAGNOSIS — I50.9 CHF (CONGESTIVE HEART FAILURE) (HCC): Primary | ICD-10-CM

## 2019-12-02 ENCOUNTER — HOSPITAL ENCOUNTER (OUTPATIENT)
Dept: CARDIOLOGY CLINIC | Facility: HOSPITAL | Age: 74
Discharge: HOME | End: 2019-12-02
Attending: NURSE PRACTITIONER
Payer: MEDICARE

## 2019-12-02 ENCOUNTER — HOSPITAL ENCOUNTER (OUTPATIENT)
Dept: LAB | Facility: HOSPITAL | Age: 74
Discharge: HOME | End: 2019-12-02
Attending: NURSE PRACTITIONER
Payer: MEDICARE

## 2019-12-02 VITALS
HEART RATE: 80 BPM | DIASTOLIC BLOOD PRESSURE: 76 MMHG | SYSTOLIC BLOOD PRESSURE: 129 MMHG | BODY MASS INDEX: 27 KG/M2 | RESPIRATION RATE: 16 BRPM | WEIGHT: 195 LBS | OXYGEN SATURATION: 91 %

## 2019-12-02 DIAGNOSIS — I50.813 ACUTE ON CHRONIC RIGHT HEART FAILURE (HCC): Primary | ICD-10-CM

## 2019-12-02 DIAGNOSIS — I10 BENIGN ESSENTIAL HYPERTENSION: ICD-10-CM

## 2019-12-02 DIAGNOSIS — M35.1 MIXED CONNECTIVE TISSUE DISEASE (HCC): ICD-10-CM

## 2019-12-02 DIAGNOSIS — N18.2 CKD (CHRONIC KIDNEY DISEASE) STAGE 2, GFR 60-89 ML/MIN: ICD-10-CM

## 2019-12-02 DIAGNOSIS — M33.20 POLYMYOSITIS (HCC): ICD-10-CM

## 2019-12-02 DIAGNOSIS — M32.19 OTHER SYSTEMIC LUPUS ERYTHEMATOSUS WITH OTHER ORGAN INVOLVEMENT (HCC): ICD-10-CM

## 2019-12-02 DIAGNOSIS — D69.6 THROMBOCYTOPENIA (HCC): ICD-10-CM

## 2019-12-02 DIAGNOSIS — I36.1 NONRHEUMATIC TRICUSPID VALVE REGURGITATION: ICD-10-CM

## 2019-12-02 DIAGNOSIS — G47.33 OSA (OBSTRUCTIVE SLEEP APNEA): ICD-10-CM

## 2019-12-02 DIAGNOSIS — I48.20 CHRONIC ATRIAL FIBRILLATION (HCC): ICD-10-CM

## 2019-12-02 DIAGNOSIS — I50.9 CHF (CONGESTIVE HEART FAILURE) (HCC): ICD-10-CM

## 2019-12-02 PROCEDURE — 36415 COLL VENOUS BLD VENIPUNCTURE: CPT | Performed by: NURSE PRACTITIONER

## 2019-12-02 PROCEDURE — 99214 OFFICE O/P EST MOD 30 MIN: CPT | Performed by: NURSE PRACTITIONER

## 2019-12-02 PROCEDURE — 83880 ASSAY OF NATRIURETIC PEPTIDE: CPT | Performed by: NURSE PRACTITIONER

## 2019-12-02 PROCEDURE — 80048 BASIC METABOLIC PNL TOTAL CA: CPT | Performed by: NURSE PRACTITIONER

## 2019-12-02 RX ORDER — TORSEMIDE 20 MG/1
20 TABLET ORAL 2 TIMES DAILY
Qty: 45 TABLET | Refills: 6 | Status: SHIPPED | COMMUNITY
Start: 2019-12-02 | End: 2020-02-27

## 2019-12-02 RX ORDER — BUMETANIDE 0.25 MG/ML
2 INJECTION, SOLUTION INTRAMUSCULAR; INTRAVENOUS ONCE
Status: COMPLETED | OUTPATIENT
Start: 2019-12-02 | End: 2019-12-02

## 2019-12-02 RX ADMIN — BUMETANIDE 2 MG: 0.25 INJECTION, SOLUTION INTRAMUSCULAR; INTRAVENOUS at 15:26:00

## 2019-12-02 NOTE — PROGRESS NOTES
[facility] Cardiac Health Progress Note    Mann Yanes is a 66year old male who presents to clinic for APN assessment and management of chronic RV heart failure and is functional class 2-3.      Subjective:  He returns for routine as swallowing     dysphagia in 2006   • Raynaud disease    • Renal disorder     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   • Sleep apnea     CPAP   • Thrombocytopathia (HCC)    • Wears glasses       Past Surgical History:   Procedure Laterality Date   • APPENDECTOMY  1970 date: 1958        Quit date: 1973        Years since quittin.3      Smokeless tobacco: Never Used    Alcohol use: Not Currently      Alcohol/week: 8.0 - 10.0 standard drinks      Types: 8 - 10 Standard drinks or equivalent per week      Frequ differently: Take 30 mg by mouth daily. 50 mg per week for one week, then 40 mg per week for 3 weeks.  Then 30 mg per week. ), Disp: 150 tablet, Rfl: 2  •  Potassium Chloride ER (KLOR-CON M20) 20 MEQ Oral Tab CR, Take 2 tablets (40 mEq total) by mouth daily instructed regarding sodium restricted diet, low sodium foods, fluid restriction, daily weights, medication regimen, s/s HF exacerbation and when to call APN/clinic. Activity: Works out three times per week.     Assessment:   1. Chronic RV heart failure je.    Tam Hicks, APRN  12/2/2019

## 2019-12-02 NOTE — PROGRESS NOTES
Pt. assessed. Pt. c/o increased weight and LE edema attributed to prednisone and APN notified. Weight up 11 lbs at 195.0 lbs. Labs ordered and drawn in the [facility]. Reviewed allergies and list of current medications with pt. and updated it in the EMR.  IV estab

## 2019-12-02 NOTE — PATIENT INSTRUCTIONS
Heart Failure Discharge Instructions    · Continue current dose of medications. Activity: Regular exercise and activity is important for your overall health and to help keep your heart strong and functioning as well as possible.    Walk at a slow to mode because of shortness of breath or fatigue  · You are short of breath lying down, you need more pillows to breathe comfortably,  or wake up during the night short of breath  · You urinate less often during the day and more often at night  · You have a bloat

## 2019-12-05 ENCOUNTER — TELEPHONE (OUTPATIENT)
Dept: FAMILY MEDICINE CLINIC | Facility: CLINIC | Age: 74
End: 2019-12-05

## 2019-12-11 ENCOUNTER — OFFICE VISIT (OUTPATIENT)
Dept: FAMILY MEDICINE CLINIC | Facility: CLINIC | Age: 74
End: 2019-12-11
Payer: MEDICARE

## 2019-12-11 VITALS
SYSTOLIC BLOOD PRESSURE: 130 MMHG | WEIGHT: 191 LBS | DIASTOLIC BLOOD PRESSURE: 68 MMHG | HEIGHT: 70.5 IN | RESPIRATION RATE: 16 BRPM | TEMPERATURE: 98 F | BODY MASS INDEX: 27.04 KG/M2 | HEART RATE: 64 BPM

## 2019-12-11 DIAGNOSIS — Z00.00 ENCOUNTER FOR ANNUAL HEALTH EXAMINATION: ICD-10-CM

## 2019-12-11 DIAGNOSIS — Z00.00 ANNUAL PHYSICAL EXAM: Primary | ICD-10-CM

## 2019-12-11 DIAGNOSIS — K22.70 BARRETT'S ESOPHAGUS WITHOUT DYSPLASIA: ICD-10-CM

## 2019-12-11 DIAGNOSIS — K75.4: ICD-10-CM

## 2019-12-11 DIAGNOSIS — I77.810 AORTIC ROOT DILATION (HCC): ICD-10-CM

## 2019-12-11 DIAGNOSIS — I10 BENIGN ESSENTIAL HYPERTENSION: ICD-10-CM

## 2019-12-11 DIAGNOSIS — I48.11 LONGSTANDING PERSISTENT ATRIAL FIBRILLATION (HCC): ICD-10-CM

## 2019-12-11 PROCEDURE — G0439 PPPS, SUBSEQ VISIT: HCPCS | Performed by: FAMILY MEDICINE

## 2019-12-11 PROCEDURE — 99213 OFFICE O/P EST LOW 20 MIN: CPT | Performed by: FAMILY MEDICINE

## 2019-12-11 RX ORDER — AZELASTINE 1 MG/ML
1 SPRAY, METERED NASAL 2 TIMES DAILY
Qty: 3 BOTTLE | Refills: 0 | Status: SHIPPED | OUTPATIENT
Start: 2019-12-11 | End: 2020-01-13

## 2019-12-11 NOTE — PROGRESS NOTES
HPI:   Sherita Menjivar is a 66year old male who presents for a Medicare Subsequent Annual Wellness visit (Pt already had Initial Annual Wellness).     Recent PNA, but better, chronic PND and no help with flonase (Gave nose bleed)  His last annual assessm Assistant)  Francesca Martinez MD ([address])  Viktoriya Beach (Hepatology)  Jose Tomlinson MD (Cardiovascular Diseases)  Claudia Talavera (OPHTHALMOLOGY)    Patient Active Problem List:     Personal history of other malignant neoplasm of skin     Hypopota Aero Soln, Inhale 2 puffs into the lungs every 4 (four) hours as needed for Wheezing. predniSONE 10 MG Oral Tab, 50 mg per week for one week, then 40 mg per week for 3 weeks. Then 30 mg per week. (Patient taking differently: Take 25 mg by mouth daily.  46 percutaneous  angioplasty  (ptca)- pbp only (2006); appendectomy (1970); upper gi endoscopy,exam (10/2003 2006 01/2010 , 5/13); hand/finger surgery unlisted (2003); rectum surgery procedure unlisted (1946); tonsillectomy (1965); colonoscopy with biopsy (5/ 70.5\"   Wt 191 lb (86.6 kg)   BMI 27.02 kg/m²   Estimated body mass index is 27.02 kg/m² as calculated from the following:    Height as of this encounter: 70.5\". Weight as of this encounter: 191 lb (86.6 kg).     Medicare Hearing Assessment  (Required no tenderness. There is no rebound and no guarding. No hernia. Musculoskeletal: Normal range of motion. Lymphadenopathy:     He has no cervical adenopathy. He has no axillary adenopathy.    Neurological: He is alert and oriented to person, place, an VISIT,EST,LEVL III    Benign essential hypertension    Overview     Benazepril 40, amlodipine 5, triameteren HCT 37.5-25         Current Assessment & Plan     Stable, Continue present management.     Blood Pressure and Cardiac Medications          Metoprolo Value   07/28/2014 99          Cardiovascular Disease Screening     LDL Annually LDL Cholesterol (mg/dL)   Date Value   06/18/2018 96     LDL CHOLESTROL (mg/dL)   Date Value   06/04/2014 83        EKG - w/ Initial Preventative Physical Exam only, or if med SPECIFIC DISEASE MONITORING Internal Lab or Procedure External Lab or Procedure      Annual Monitoring of Persistent     Medications (ACE/ARB, digoxin diuretics, anticonvulsants.)    Potassium  Annually Potassium (mmol/L)   Date Value   12/02/2019 4.9

## 2019-12-12 NOTE — PATIENT INSTRUCTIONS
Zoey Davila's SCREENING SCHEDULE   Tests on this list are recommended by your physician but may not be covered, or covered at this frequency, by your insurer. Please check with your insurance carrier before scheduling to verify coverage.     PREVENT 73-68 years old and have smoked more than 100 cigarettes in their lifetime   • Anyone with a family history    Colorectal Cancer Screening Covered up to Age 76     Colonoscopy Screen   Covered every 10 years- more often if abnormal Colonoscopy due on 05/01 same house as a HepB virus carrier   Homosexual men   Illicit injectable drug abusers     Tetanus Toxoid- Only covered with a cut with metal- TD and TDaP Not covered by Medicare Part B) No orders found for this or any previous visit.  This may be covered wi

## 2019-12-12 NOTE — ASSESSMENT & PLAN NOTE
Stable, Continue present management.     Blood Pressure and Cardiac Medications          Metoprolol Succinate  MG Oral Tablet 24 Hr    Sildenafil Citrate 50 MG Oral Tab

## 2019-12-18 ENCOUNTER — HOSPITAL ENCOUNTER (OUTPATIENT)
Dept: CARDIOLOGY CLINIC | Facility: HOSPITAL | Age: 74
Discharge: HOME | End: 2019-12-18
Attending: NURSE PRACTITIONER
Payer: MEDICARE

## 2019-12-18 VITALS
SYSTOLIC BLOOD PRESSURE: 150 MMHG | HEART RATE: 95 BPM | OXYGEN SATURATION: 95 % | BODY MASS INDEX: 28 KG/M2 | RESPIRATION RATE: 16 BRPM | WEIGHT: 195.19 LBS | DIASTOLIC BLOOD PRESSURE: 94 MMHG

## 2019-12-18 DIAGNOSIS — M33.20 POLYMYOSITIS (HCC): ICD-10-CM

## 2019-12-18 DIAGNOSIS — I27.20 PULMONARY HYPERTENSION (HCC): ICD-10-CM

## 2019-12-18 DIAGNOSIS — I36.1 NONRHEUMATIC TRICUSPID VALVE REGURGITATION: ICD-10-CM

## 2019-12-18 DIAGNOSIS — G47.33 OSA (OBSTRUCTIVE SLEEP APNEA): ICD-10-CM

## 2019-12-18 DIAGNOSIS — I48.20 CHRONIC ATRIAL FIBRILLATION (HCC): ICD-10-CM

## 2019-12-18 DIAGNOSIS — M32.19 OTHER SYSTEMIC LUPUS ERYTHEMATOSUS WITH OTHER ORGAN INVOLVEMENT (HCC): ICD-10-CM

## 2019-12-18 DIAGNOSIS — N18.2 CKD (CHRONIC KIDNEY DISEASE) STAGE 2, GFR 60-89 ML/MIN: ICD-10-CM

## 2019-12-18 DIAGNOSIS — I50.812 CHRONIC RIGHT-SIDED CONGESTIVE HEART FAILURE (HCC): Primary | ICD-10-CM

## 2019-12-18 DIAGNOSIS — I10 BENIGN ESSENTIAL HYPERTENSION: ICD-10-CM

## 2019-12-18 PROCEDURE — 99214 OFFICE O/P EST MOD 30 MIN: CPT | Performed by: NURSE PRACTITIONER

## 2019-12-18 NOTE — PROGRESS NOTES
Pt. Assessed. No s/s of shortness of breath, fatigue, chest pain or edema noted. Weight stable at 195.2 lbs. Reviewed current list of patient's allergies and medication; updated EMR. Labs ordered to assess kidney function.  6 minute walk completed;results b

## 2019-12-18 NOTE — PROGRESS NOTES
[facility]  Heart Failure Clinic Progress Note    Karen Long is a 66year old male who presents to clinic for APN assessment and management of chronic RV heart failure and is functional class 2-3.      Subjective:  He returns for routine assessm Value Date    CREATSERUM 1.33 12/18/2019    BUN 31 12/18/2019     12/18/2019    K 3.5 12/18/2019     12/18/2019    CO2 32.0 12/18/2019    GLU 92 12/18/2019    CA 8.7 12/18/2019       Azelastine HCl 0.1 % Nasal Solution, 1 spray by Nasal route 2 Level II  Number of times stopped: 0  Duration of rest times: 0 minutes    Recovery Results     Recovery SpO2: 92 %  Recovery HR: 118          6 Min Walk Results  Exertion Scale: Very light  Angina Scale: None  Dyspnea Scale: Mild    Exam:   General: symptoms. · Non-obstructive CAD  · Chronic thrombocytopenia - last platelet BLWZV 40. · Hyponatremia - resolved. · Emphysema - seen on recent CT. · Recent PNA/URI - s/p antiboitics. Plan:     · No change to baseline cardiac medications.   We did dis

## 2019-12-30 RX ORDER — METOPROLOL SUCCINATE 100 MG/1
TABLET, EXTENDED RELEASE ORAL
Qty: 90 TABLET | Refills: 3 | Status: SHIPPED | OUTPATIENT
Start: 2019-12-30 | End: 2020-03-18 | Stop reason: SDUPTHER

## 2019-12-31 DIAGNOSIS — M33.20 POLYMYOSITIS (HCC): Primary | ICD-10-CM

## 2019-12-31 DIAGNOSIS — M35.1 MCTD (MIXED CONNECTIVE TISSUE DISEASE) (HCC): ICD-10-CM

## 2019-12-31 DIAGNOSIS — I50.9 CHF (CONGESTIVE HEART FAILURE) (HCC): Primary | ICD-10-CM

## 2019-12-31 NOTE — PROGRESS NOTES
Your appointments     Date & Time Appointment Department [facility])          Jan 09, 2020  2:00 PM CST Exam - Established with Chris Reilly 149 ([facility])

## 2020-01-01 ENCOUNTER — EXTERNAL RECORD (OUTPATIENT)
Dept: OTHER | Age: 75
End: 2020-01-01

## 2020-01-07 ENCOUNTER — APPOINTMENT (OUTPATIENT)
Dept: LAB | Age: 75
End: 2020-01-07
Attending: INTERNAL MEDICINE
Payer: MEDICARE

## 2020-01-07 DIAGNOSIS — M35.1 MCTD (MIXED CONNECTIVE TISSUE DISEASE) (HCC): ICD-10-CM

## 2020-01-07 DIAGNOSIS — M33.20 POLYMYOSITIS (HCC): ICD-10-CM

## 2020-01-07 LAB
C4 SERPL-MCNC: 1.8 MG/DL (ref 10–40)
CRP SERPL-MCNC: <0.29 MG/DL (ref ?–0.3)
SED RATE-ML: 8 MM/HR (ref 0–12)

## 2020-01-07 PROCEDURE — 86140 C-REACTIVE PROTEIN: CPT

## 2020-01-07 PROCEDURE — 36415 COLL VENOUS BLD VENIPUNCTURE: CPT

## 2020-01-07 PROCEDURE — 85652 RBC SED RATE AUTOMATED: CPT

## 2020-01-07 PROCEDURE — 82085 ASSAY OF ALDOLASE: CPT

## 2020-01-07 PROCEDURE — 86160 COMPLEMENT ANTIGEN: CPT

## 2020-01-08 LAB — ALDOLASE, SERUM: 6.9 U/L

## 2020-01-09 ENCOUNTER — OFFICE VISIT (OUTPATIENT)
Dept: RHEUMATOLOGY | Facility: CLINIC | Age: 75
End: 2020-01-09
Payer: MEDICARE

## 2020-01-09 VITALS
BODY MASS INDEX: 27.61 KG/M2 | DIASTOLIC BLOOD PRESSURE: 76 MMHG | SYSTOLIC BLOOD PRESSURE: 118 MMHG | HEIGHT: 70.5 IN | RESPIRATION RATE: 18 BRPM | WEIGHT: 195 LBS | HEART RATE: 78 BPM

## 2020-01-09 DIAGNOSIS — D69.3 AUTOIMMUNE THROMBOCYTOPENIA (HCC): ICD-10-CM

## 2020-01-09 DIAGNOSIS — M33.20 POLYMYOSITIS (HCC): Chronic | ICD-10-CM

## 2020-01-09 DIAGNOSIS — M35.1 MIXED CONNECTIVE TISSUE DISEASE (HCC): Primary | Chronic | ICD-10-CM

## 2020-01-09 PROCEDURE — 99214 OFFICE O/P EST MOD 30 MIN: CPT | Performed by: INTERNAL MEDICINE

## 2020-01-09 RX ORDER — ACETAMINOPHEN 325 MG/1
650 TABLET ORAL ONCE
Status: CANCELLED | OUTPATIENT
Start: 2020-01-09

## 2020-01-09 RX ORDER — METHYLPREDNISOLONE SODIUM SUCCINATE 40 MG/ML
40 INJECTION, POWDER, LYOPHILIZED, FOR SOLUTION INTRAMUSCULAR; INTRAVENOUS ONCE
Status: CANCELLED
Start: 2020-01-09

## 2020-01-09 RX ORDER — DIPHENHYDRAMINE HCL 25 MG
25 CAPSULE ORAL ONCE
Status: CANCELLED | OUTPATIENT
Start: 2020-01-09

## 2020-01-09 NOTE — PATIENT INSTRUCTIONS
Current plan -  Iv Ig ordered to treat thrombocytopenia and MCTD/Myositis. Continue Prednisone 25 mg per day. Lower to 20 mg per day in February if feeling well. Check labs before next visit CBC/CMP/KLAUS/ESR. Return to office 5 weeks.

## 2020-01-13 RX ORDER — AZELASTINE 1 MG/ML
SPRAY, METERED NASAL
Qty: 1 BOTTLE | Refills: 0 | Status: SHIPPED | OUTPATIENT
Start: 2020-01-13 | End: 2020-03-02

## 2020-01-14 ENCOUNTER — TELEPHONE (OUTPATIENT)
Dept: HEMATOLOGY/ONCOLOGY | Facility: HOSPITAL | Age: 75
End: 2020-01-14

## 2020-01-14 ENCOUNTER — HOSPITAL ENCOUNTER (OUTPATIENT)
Dept: CARDIOLOGY CLINIC | Facility: HOSPITAL | Age: 75
Discharge: HOME | End: 2020-01-14
Attending: NURSE PRACTITIONER
Payer: MEDICARE

## 2020-01-14 ENCOUNTER — HOSPITAL ENCOUNTER (OUTPATIENT)
Dept: LAB | Facility: HOSPITAL | Age: 75
Discharge: HOME | End: 2020-01-14
Attending: NURSE PRACTITIONER
Payer: MEDICARE

## 2020-01-14 VITALS
OXYGEN SATURATION: 97 % | HEART RATE: 90 BPM | SYSTOLIC BLOOD PRESSURE: 121 MMHG | DIASTOLIC BLOOD PRESSURE: 74 MMHG | BODY MASS INDEX: 28 KG/M2 | WEIGHT: 196 LBS

## 2020-01-14 DIAGNOSIS — M35.1 MIXED CONNECTIVE TISSUE DISEASE (HCC): ICD-10-CM

## 2020-01-14 DIAGNOSIS — G47.33 OSA (OBSTRUCTIVE SLEEP APNEA): ICD-10-CM

## 2020-01-14 DIAGNOSIS — M33.20 POLYMYOSITIS (HCC): ICD-10-CM

## 2020-01-14 DIAGNOSIS — I50.812 CHRONIC RIGHT-SIDED CONGESTIVE HEART FAILURE (HCC): Primary | ICD-10-CM

## 2020-01-14 DIAGNOSIS — I10 BENIGN ESSENTIAL HYPERTENSION: ICD-10-CM

## 2020-01-14 DIAGNOSIS — I50.9 CHF (CONGESTIVE HEART FAILURE) (HCC): ICD-10-CM

## 2020-01-14 DIAGNOSIS — I36.1 NONRHEUMATIC TRICUSPID VALVE REGURGITATION: ICD-10-CM

## 2020-01-14 DIAGNOSIS — M32.19 OTHER SYSTEMIC LUPUS ERYTHEMATOSUS WITH OTHER ORGAN INVOLVEMENT (HCC): ICD-10-CM

## 2020-01-14 DIAGNOSIS — D69.6 THROMBOCYTOPENIA (HCC): ICD-10-CM

## 2020-01-14 DIAGNOSIS — I27.20 PULMONARY HYPERTENSION (HCC): ICD-10-CM

## 2020-01-14 DIAGNOSIS — I48.20 CHRONIC ATRIAL FIBRILLATION (HCC): ICD-10-CM

## 2020-01-14 DIAGNOSIS — N18.2 CKD (CHRONIC KIDNEY DISEASE) STAGE 2, GFR 60-89 ML/MIN: ICD-10-CM

## 2020-01-14 LAB
ANION GAP SERPL CALC-SCNC: 4 MMOL/L (ref 0–18)
BUN BLD-MCNC: 39 MG/DL (ref 7–18)
BUN/CREAT SERPL: 26.7 (ref 10–20)
CALCIUM BLD-MCNC: 9.1 MG/DL (ref 8.5–10.1)
CHLORIDE SERPL-SCNC: 106 MMOL/L (ref 98–112)
CO2 SERPL-SCNC: 33 MMOL/L (ref 21–32)
CREAT BLD-MCNC: 1.46 MG/DL (ref 0.7–1.3)
GLUCOSE BLD-MCNC: 113 MG/DL (ref 70–99)
OSMOLALITY SERPL CALC.SUM OF ELEC: 306 MOSM/KG (ref 275–295)
PATIENT FASTING Y/N/NP: NO
POTASSIUM SERPL-SCNC: 4.6 MMOL/L (ref 3.5–5.1)
SODIUM SERPL-SCNC: 143 MMOL/L (ref 136–145)

## 2020-01-14 PROCEDURE — 80048 BASIC METABOLIC PNL TOTAL CA: CPT | Performed by: NURSE PRACTITIONER

## 2020-01-14 PROCEDURE — 36415 COLL VENOUS BLD VENIPUNCTURE: CPT | Performed by: NURSE PRACTITIONER

## 2020-01-14 PROCEDURE — 99214 OFFICE O/P EST MOD 30 MIN: CPT | Performed by: NURSE PRACTITIONER

## 2020-01-14 NOTE — PROGRESS NOTES
Pt assessed. Feels his face is puffy. PO steroids tapered down to 25 mg daily. Plan is to go down to 20 mg daily by the end of the month. Denies shortness of breath, abdominal bloating, or increased edema. 2+ BLE edema on exam. Weight up 1 lb at 196 lbs.  H

## 2020-01-14 NOTE — PATIENT INSTRUCTIONS
Heart Failure Discharge Instructions    · Continue taking torsemide 20mg twice per day. Activity: Regular exercise and activity is important for your overall health and to help keep your heart strong and functioning as well as possible.    Walk at a slow activity because of shortness of breath or fatigue  · You are short of breath lying down, you need more pillows to breathe comfortably,  or wake up during the night short of breath  · You urinate less often during the day and more often at night  · You hav

## 2020-01-14 NOTE — PROGRESS NOTES
[facility] Cardiac Health Progress Note    Luisa Goltz is a 66year old male who presents to clinic for APN assessment and management of chronic RV heart failure and is functional class 2.      Subjective:  He returns for routine asse sleep apnea) PSG 5-22-19    AHI 36 RDI 36 REM AHI 45 Supine AHI 65 non-supine AHI 19 Sao2 Pj 86% CPAP 9cwp   • Pain in joints    • Pleural effusion     right   • Polymyositis (HCC)    • Problems with swallowing     dysphagia in 2006   • Raynaud disease Esophagus) Son    • Heart Attack Maternal Grandfather       Social History    Tobacco Use      Smoking status: Former Smoker        Packs/day: 0.25        Years: 15.00        Pack years: 3.75        Start date: 8/1/1958        Quit date: 7/28/1973        Y mouth 2 (two) times daily. , Disp: 45 tablet, Rfl: 6  •  predniSONE 10 MG Oral Tab, 50 mg per week for one week, then 40 mg per week for 3 weeks. Then 30 mg per week. (Patient taking differently: Take 25 mg by mouth daily.  50 mg per week for one week, the autoimmune disease, mixed connective tissue disease and sleep apnea. ILD ruled out with High res CT but with GGOs.   3. Dilated ascending aorta - max dimension 4.5cm by echo in August. Ongoing outpatient f/u.    4. Progressive weakness - likely due to [employer]

## 2020-01-22 ENCOUNTER — TELEPHONE (OUTPATIENT)
Dept: CARDIOLOGY CLINIC | Facility: HOSPITAL | Age: 75
End: 2020-01-22

## 2020-01-22 ENCOUNTER — OFFICE VISIT (OUTPATIENT)
Dept: HEMATOLOGY/ONCOLOGY | Facility: HOSPITAL | Age: 75
End: 2020-01-22
Attending: INTERNAL MEDICINE
Payer: MEDICARE

## 2020-01-22 VITALS
SYSTOLIC BLOOD PRESSURE: 127 MMHG | TEMPERATURE: 97 F | OXYGEN SATURATION: 98 % | DIASTOLIC BLOOD PRESSURE: 79 MMHG | HEART RATE: 82 BPM | RESPIRATION RATE: 16 BRPM

## 2020-01-22 DIAGNOSIS — I27.20 PULMONARY HYPERTENSION (HCC): ICD-10-CM

## 2020-01-22 DIAGNOSIS — M32.19 OTHER SYSTEMIC LUPUS ERYTHEMATOSUS WITH OTHER ORGAN INVOLVEMENT (HCC): ICD-10-CM

## 2020-01-22 DIAGNOSIS — D69.3 IMMUNE THROMBOCYTOPENIC PURPURA (HCC): Primary | ICD-10-CM

## 2020-01-22 DIAGNOSIS — M33.20 POLYMYOSITIS (HCC): Chronic | ICD-10-CM

## 2020-01-22 DIAGNOSIS — D69.3 AUTOIMMUNE THROMBOCYTOPENIA (HCC): ICD-10-CM

## 2020-01-22 DIAGNOSIS — D69.6 THROMBOCYTOPENIA (HCC): ICD-10-CM

## 2020-01-22 DIAGNOSIS — M35.1 MIXED CONNECTIVE TISSUE DISEASE (HCC): Chronic | ICD-10-CM

## 2020-01-22 PROCEDURE — 96375 TX/PRO/DX INJ NEW DRUG ADDON: CPT

## 2020-01-22 PROCEDURE — 96365 THER/PROPH/DIAG IV INF INIT: CPT

## 2020-01-22 PROCEDURE — 96366 THER/PROPH/DIAG IV INF ADDON: CPT

## 2020-01-22 RX ORDER — METHYLPREDNISOLONE SODIUM SUCCINATE 40 MG/ML
40 INJECTION, POWDER, LYOPHILIZED, FOR SOLUTION INTRAMUSCULAR; INTRAVENOUS ONCE
Status: COMPLETED | OUTPATIENT
Start: 2020-01-22 | End: 2020-01-22

## 2020-01-22 RX ORDER — METHYLPREDNISOLONE SODIUM SUCCINATE 40 MG/ML
40 INJECTION, POWDER, LYOPHILIZED, FOR SOLUTION INTRAMUSCULAR; INTRAVENOUS ONCE
Status: CANCELLED
Start: 2020-02-19

## 2020-01-22 RX ORDER — DIPHENHYDRAMINE HCL 25 MG
25 CAPSULE ORAL ONCE
Status: COMPLETED | OUTPATIENT
Start: 2020-01-22 | End: 2020-01-22

## 2020-01-22 RX ORDER — ACETAMINOPHEN 325 MG/1
650 TABLET ORAL ONCE
Status: COMPLETED | OUTPATIENT
Start: 2020-01-22 | End: 2020-01-22

## 2020-01-22 RX ORDER — DIPHENHYDRAMINE HCL 25 MG
25 CAPSULE ORAL ONCE
Status: CANCELLED | OUTPATIENT
Start: 2020-02-19

## 2020-01-22 RX ORDER — ACETAMINOPHEN 325 MG/1
650 TABLET ORAL ONCE
Status: CANCELLED | OUTPATIENT
Start: 2020-02-19

## 2020-01-22 RX ADMIN — METHYLPREDNISOLONE SODIUM SUCCINATE 40 MG: 40 INJECTION, POWDER, LYOPHILIZED, FOR SOLUTION INTRAMUSCULAR; INTRAVENOUS at 11:20:00

## 2020-01-22 RX ADMIN — DIPHENHYDRAMINE HCL 25 MG: 25 MG CAPSULE ORAL at 11:13:00

## 2020-01-22 RX ADMIN — ACETAMINOPHEN 650 MG: 325 TABLET ORAL at 11:13:00

## 2020-01-22 NOTE — PROGRESS NOTES
Pt. Called asking for a new Rx to be sent to [facility]. The old one only gives him 45 tablets of torsemide, he tkes 20mg po bid. Called [facility] and spoke with pharmacist. Reordered torsemide 20mg po bid, qty#60 with 2 refills. Pt. Will pickup tomorrow.

## 2020-01-22 NOTE — PROGRESS NOTES
Education Record    Learner:  Patient and Spouse    Disease / Diagnosis:myositis    Barriers / Limitations:  None   Comments:    Method:  Discussion and Reinforcement   Comments:    General Topics:  Medication, Side effects and symptom management, Plan of

## 2020-01-27 RX ORDER — PREDNISONE 10 MG/1
TABLET ORAL
Qty: 150 TABLET | Refills: 2 | OUTPATIENT
Start: 2020-01-27

## 2020-02-11 ENCOUNTER — LAB ENCOUNTER (OUTPATIENT)
Dept: LAB | Age: 75
End: 2020-02-11
Attending: INTERNAL MEDICINE
Payer: MEDICARE

## 2020-02-11 DIAGNOSIS — M35.1 MIXED CONNECTIVE TISSUE DISEASE (HCC): ICD-10-CM

## 2020-02-11 DIAGNOSIS — M33.20 POLYMYOSITIS (HCC): ICD-10-CM

## 2020-02-11 DIAGNOSIS — I50.9 CHF (CONGESTIVE HEART FAILURE) (HCC): Primary | ICD-10-CM

## 2020-02-11 DIAGNOSIS — D69.3 AUTOIMMUNE THROMBOCYTOPENIA (HCC): ICD-10-CM

## 2020-02-11 LAB
ALBUMIN SERPL-MCNC: 3.3 G/DL (ref 3.4–5)
ALBUMIN/GLOB SERPL: 0.8 {RATIO} (ref 1–2)
ALP LIVER SERPL-CCNC: 77 U/L (ref 45–117)
ALT SERPL-CCNC: 41 U/L (ref 16–61)
ANION GAP SERPL CALC-SCNC: 5 MMOL/L (ref 0–18)
AST SERPL-CCNC: 35 U/L (ref 15–37)
BASOPHILS # BLD AUTO: 0.03 X10(3) UL (ref 0–0.2)
BASOPHILS NFR BLD AUTO: 0.3 %
BILIRUB SERPL-MCNC: 1.4 MG/DL (ref 0.1–2)
BUN BLD-MCNC: 40 MG/DL (ref 7–18)
BUN/CREAT SERPL: 25.5 (ref 10–20)
CALCIUM BLD-MCNC: 9.2 MG/DL (ref 8.5–10.1)
CHLORIDE SERPL-SCNC: 107 MMOL/L (ref 98–112)
CO2 SERPL-SCNC: 30 MMOL/L (ref 21–32)
CREAT BLD-MCNC: 1.57 MG/DL (ref 0.7–1.3)
DEPRECATED RDW RBC AUTO: 62.1 FL (ref 35.1–46.3)
EOSINOPHIL # BLD AUTO: 0.2 X10(3) UL (ref 0–0.7)
EOSINOPHIL NFR BLD AUTO: 2.3 %
ERYTHROCYTE [DISTWIDTH] IN BLOOD BY AUTOMATED COUNT: 15.3 % (ref 11–15)
GLOBULIN PLAS-MCNC: 4.1 G/DL (ref 2.8–4.4)
GLUCOSE BLD-MCNC: 105 MG/DL (ref 70–99)
HCT VFR BLD AUTO: 46.4 % (ref 39–53)
HGB BLD-MCNC: 14.2 G/DL (ref 13–17.5)
IMM GRANULOCYTES # BLD AUTO: 0.06 X10(3) UL (ref 0–1)
IMM GRANULOCYTES NFR BLD: 0.7 %
LYMPHOCYTES # BLD AUTO: 1.04 X10(3) UL (ref 1–4)
LYMPHOCYTES NFR BLD AUTO: 11.9 %
M PROTEIN MFR SERPL ELPH: 7.4 G/DL (ref 6.4–8.2)
MCH RBC QN AUTO: 33.6 PG (ref 26–34)
MCHC RBC AUTO-ENTMCNC: 30.6 G/DL (ref 31–37)
MCV RBC AUTO: 110 FL (ref 80–100)
MONOCYTES # BLD AUTO: 0.44 X10(3) UL (ref 0.1–1)
MONOCYTES NFR BLD AUTO: 5 %
NEUTROPHILS # BLD AUTO: 6.95 X10 (3) UL (ref 1.5–7.7)
NEUTROPHILS # BLD AUTO: 6.95 X10(3) UL (ref 1.5–7.7)
NEUTROPHILS NFR BLD AUTO: 79.8 %
OSMOLALITY SERPL CALC.SUM OF ELEC: 304 MOSM/KG (ref 275–295)
PATIENT FASTING Y/N/NP: YES
PLATELET # BLD AUTO: 171 10(3)UL (ref 150–450)
POTASSIUM SERPL-SCNC: 4.9 MMOL/L (ref 3.5–5.1)
RBC # BLD AUTO: 4.22 X10(6)UL (ref 3.8–5.8)
SED RATE-ML: 63 MM/HR (ref 0–12)
SODIUM SERPL-SCNC: 142 MMOL/L (ref 136–145)
WBC # BLD AUTO: 8.7 X10(3) UL (ref 4–11)

## 2020-02-11 PROCEDURE — 36415 COLL VENOUS BLD VENIPUNCTURE: CPT

## 2020-02-11 PROCEDURE — 85652 RBC SED RATE AUTOMATED: CPT

## 2020-02-11 PROCEDURE — 86225 DNA ANTIBODY NATIVE: CPT

## 2020-02-11 PROCEDURE — 86235 NUCLEAR ANTIGEN ANTIBODY: CPT

## 2020-02-11 PROCEDURE — 86038 ANTINUCLEAR ANTIBODIES: CPT

## 2020-02-11 PROCEDURE — 80053 COMPREHEN METABOLIC PANEL: CPT

## 2020-02-11 PROCEDURE — 85025 COMPLETE CBC W/AUTO DIFF WBC: CPT

## 2020-02-12 ENCOUNTER — HOSPITAL ENCOUNTER (OUTPATIENT)
Dept: CARDIOLOGY CLINIC | Facility: HOSPITAL | Age: 75
Discharge: HOME | End: 2020-02-12
Attending: NURSE PRACTITIONER
Payer: MEDICARE

## 2020-02-12 VITALS
DIASTOLIC BLOOD PRESSURE: 68 MMHG | OXYGEN SATURATION: 96 % | RESPIRATION RATE: 24 BRPM | HEART RATE: 102 BPM | WEIGHT: 197.63 LBS | BODY MASS INDEX: 28 KG/M2 | SYSTOLIC BLOOD PRESSURE: 109 MMHG

## 2020-02-12 DIAGNOSIS — M32.19 OTHER SYSTEMIC LUPUS ERYTHEMATOSUS WITH OTHER ORGAN INVOLVEMENT (HCC): ICD-10-CM

## 2020-02-12 DIAGNOSIS — N18.30 CKD (CHRONIC KIDNEY DISEASE) STAGE 3, GFR 30-59 ML/MIN (HCC): ICD-10-CM

## 2020-02-12 DIAGNOSIS — I27.20 PULMONARY HYPERTENSION (HCC): ICD-10-CM

## 2020-02-12 DIAGNOSIS — I48.20 CHRONIC ATRIAL FIBRILLATION (HCC): ICD-10-CM

## 2020-02-12 DIAGNOSIS — M33.20 POLYMYOSITIS (HCC): ICD-10-CM

## 2020-02-12 DIAGNOSIS — G47.33 OSA (OBSTRUCTIVE SLEEP APNEA): ICD-10-CM

## 2020-02-12 DIAGNOSIS — I10 BENIGN ESSENTIAL HYPERTENSION: ICD-10-CM

## 2020-02-12 DIAGNOSIS — D69.6 THROMBOCYTOPENIA (HCC): ICD-10-CM

## 2020-02-12 DIAGNOSIS — I50.812 CHRONIC RIGHT-SIDED CONGESTIVE HEART FAILURE (HCC): Primary | ICD-10-CM

## 2020-02-12 LAB — NT-PROBNP SERPL-MCNC: 4190 PG/ML (ref ?–450)

## 2020-02-12 PROCEDURE — 99214 OFFICE O/P EST MOD 30 MIN: CPT | Performed by: NURSE PRACTITIONER

## 2020-02-12 RX ORDER — METOPROLOL SUCCINATE 50 MG/1
50 TABLET, EXTENDED RELEASE ORAL NIGHTLY
Qty: 30 TABLET | Refills: 3 | Status: SHIPPED | OUTPATIENT
Start: 2020-02-12 | End: 2020-02-27 | Stop reason: DRUGHIGH

## 2020-02-12 NOTE — PROGRESS NOTES
[address] [facility]  Heart Failure Clinic Progress Note    Sharon Davila is a 66year old male who presents to clinic for APN assessment and management of chronic RV heart failure and is functional class 3.     Subjective:  He returns for routine assessment Oral Tab, Take 20 mg by mouth 2 (two) times daily. , Disp: 45 tablet, Rfl: 6  predniSONE 10 MG Oral Tab, 50 mg per week for one week, then 40 mg per week for 3 weeks. Then 30 mg per week. (Patient taking differently: Take 25 mg by mouth daily.  50 mg per hypervolemic on exam but stable. Probnp 2,953 12/2 and slowly rising - will add to blood obtained as outpatient today for comparison.   · pHTN w/ moderate TR - PAS 55-60mmhg on echo. Per Rheumatology, thought to be due to autoimmune disease, mixed connectiv that visit to reassess how you are feeling with any new/uptitrated therapies based on recent elevation in ESR.   · CHF discharge instructions given    I spent greater than 35 minutes with this patient, >50% of which was spent providing counseling, coordinat no radiation

## 2020-02-12 NOTE — PATIENT INSTRUCTIONS
Heart Failure Discharge Instructions    · No change to diuretic regimen for now  · Add a dose of Toprol XL 50mg by mouth every night. Continue daily Toprol XL 100mg every morning.   A new Rx has been sent for the night time dose to your [facility] in [facility] have any of these signs or symptoms:  · You gain 2 or more pounds overnight or 3-5 pounds in 3-7 days  · You have more trouble breathing  · You get more tired with regular activity, or are limiting activity because of shortness of breath or fatigue  · You

## 2020-02-12 NOTE — PROGRESS NOTES
Pt. Assessed. No s/s of shortness of breath, fatigue, chest pain or edema noted. Weight stable at __197.6___ lbs. Reviewed current list of patient's allergies and medication; updated EMR. Labs ordered to assess kidney function.  Reviewed follow-up appointme

## 2020-02-13 ENCOUNTER — OFFICE VISIT (OUTPATIENT)
Dept: RHEUMATOLOGY | Facility: CLINIC | Age: 75
End: 2020-02-13
Payer: MEDICARE

## 2020-02-13 VITALS
HEART RATE: 68 BPM | BODY MASS INDEX: 28 KG/M2 | DIASTOLIC BLOOD PRESSURE: 82 MMHG | RESPIRATION RATE: 16 BRPM | WEIGHT: 197 LBS | TEMPERATURE: 98 F | SYSTOLIC BLOOD PRESSURE: 110 MMHG

## 2020-02-13 DIAGNOSIS — D69.3 AUTOIMMUNE THROMBOCYTOPENIA (HCC): ICD-10-CM

## 2020-02-13 DIAGNOSIS — M35.1 MIXED CONNECTIVE TISSUE DISEASE (HCC): Chronic | ICD-10-CM

## 2020-02-13 DIAGNOSIS — I50.812 CHRONIC RIGHT-SIDED CONGESTIVE HEART FAILURE (HCC): ICD-10-CM

## 2020-02-13 DIAGNOSIS — M33.20 POLYMYOSITIS (HCC): Primary | Chronic | ICD-10-CM

## 2020-02-13 PROCEDURE — 99214 OFFICE O/P EST MOD 30 MIN: CPT | Performed by: INTERNAL MEDICINE

## 2020-02-13 RX ORDER — SODIUM FLUORIDE 6 MG/ML
PASTE, DENTIFRICE DENTAL
COMMUNITY
Start: 2020-01-07 | End: 2020-08-03 | Stop reason: ALTCHOICE

## 2020-02-13 RX ORDER — AZATHIOPRINE 50 MG/1
50 TABLET ORAL DAILY
Qty: 30 TABLET | Refills: 3 | Status: SHIPPED | OUTPATIENT
Start: 2020-02-13 | End: 2020-03-12

## 2020-02-13 NOTE — PATIENT INSTRUCTIONS
Add Imuran 50 mg per day. Use Prednisone 30 mg per day. Next Wednesday next IV IG treatment. Check ESR , CPK  CMP CBC  In one month  Return to office 6 weeks.

## 2020-02-13 NOTE — PROGRESS NOTES
[facility]  Dr. Farida Garcia Progress Note     Subjective: Quoc Ahumada is a(n) 66year old male. Current complaints: Patient presents with:  Mixed Connective Tissue Disease: F/U. Pt states 'is not doing so well. Feels bad. Is on 30 mg prednisone.

## 2020-02-14 LAB
CENTROMERE AUTOAB: <100 AU/ML (ref ?–100)
DSDNA AUTOAB: <100 IU/ML (ref ?–100)
JO-1 AUTOAB: 117 AU/ML (ref ?–100)
RNP AUTOAB: <100 AU/ML (ref ?–100)
SCL-70 AUTOAB: <100 AU/ML (ref ?–100)
SM AUTOAB (SMITH): <100 AU/ML (ref ?–100)
SSA AUTOAB: <100 AU/ML (ref ?–100)
SSB AUTOAB: <100 AU/ML (ref ?–100)

## 2020-02-19 ENCOUNTER — OFFICE VISIT (OUTPATIENT)
Dept: HEMATOLOGY/ONCOLOGY | Facility: HOSPITAL | Age: 75
End: 2020-02-19
Attending: INTERNAL MEDICINE
Payer: MEDICARE

## 2020-02-19 VITALS
TEMPERATURE: 98 F | RESPIRATION RATE: 18 BRPM | HEIGHT: 72.01 IN | WEIGHT: 199.63 LBS | BODY MASS INDEX: 27.04 KG/M2 | HEART RATE: 108 BPM

## 2020-02-19 DIAGNOSIS — M32.19 OTHER SYSTEMIC LUPUS ERYTHEMATOSUS WITH OTHER ORGAN INVOLVEMENT (HCC): ICD-10-CM

## 2020-02-19 DIAGNOSIS — D69.6 THROMBOCYTOPENIA (HCC): ICD-10-CM

## 2020-02-19 DIAGNOSIS — D69.3 IMMUNE THROMBOCYTOPENIC PURPURA (HCC): Primary | ICD-10-CM

## 2020-02-19 PROCEDURE — 96365 THER/PROPH/DIAG IV INF INIT: CPT

## 2020-02-19 PROCEDURE — 96366 THER/PROPH/DIAG IV INF ADDON: CPT

## 2020-02-19 PROCEDURE — 96375 TX/PRO/DX INJ NEW DRUG ADDON: CPT

## 2020-02-19 RX ORDER — DIPHENHYDRAMINE HCL 25 MG
25 CAPSULE ORAL ONCE
Status: CANCELLED | OUTPATIENT
Start: 2020-03-18

## 2020-02-19 RX ORDER — DIPHENHYDRAMINE HCL 25 MG
25 CAPSULE ORAL ONCE
Status: COMPLETED | OUTPATIENT
Start: 2020-02-19 | End: 2020-02-19

## 2020-02-19 RX ORDER — ACETAMINOPHEN 325 MG/1
650 TABLET ORAL ONCE
Status: CANCELLED | OUTPATIENT
Start: 2020-03-18

## 2020-02-19 RX ORDER — ACETAMINOPHEN 325 MG/1
650 TABLET ORAL ONCE
Status: COMPLETED | OUTPATIENT
Start: 2020-02-19 | End: 2020-02-19

## 2020-02-19 RX ORDER — METHYLPREDNISOLONE SODIUM SUCCINATE 40 MG/ML
40 INJECTION, POWDER, LYOPHILIZED, FOR SOLUTION INTRAMUSCULAR; INTRAVENOUS ONCE
Status: COMPLETED | OUTPATIENT
Start: 2020-02-19 | End: 2020-02-19

## 2020-02-19 RX ORDER — METHYLPREDNISOLONE SODIUM SUCCINATE 40 MG/ML
40 INJECTION, POWDER, LYOPHILIZED, FOR SOLUTION INTRAMUSCULAR; INTRAVENOUS ONCE
Status: CANCELLED
Start: 2020-03-18

## 2020-02-19 RX ADMIN — METHYLPREDNISOLONE SODIUM SUCCINATE 40 MG: 40 INJECTION, POWDER, LYOPHILIZED, FOR SOLUTION INTRAMUSCULAR; INTRAVENOUS at 11:17:00

## 2020-02-19 RX ADMIN — ACETAMINOPHEN 650 MG: 325 TABLET ORAL at 11:05:00

## 2020-02-19 RX ADMIN — DIPHENHYDRAMINE HCL 25 MG: 25 MG CAPSULE ORAL at 11:06:00

## 2020-02-19 NOTE — PROGRESS NOTES
Education Record    Learner:  Patient    Disease / Diagnosis: Idiopathic thrombocytopenia    Barriers / Limitations:  None   Comments:    Method:  Brief focused   Comments:    General Topics:  Medication, Procedure and Plan of care reviewed   Comments:

## 2020-02-26 DIAGNOSIS — I50.9 CHF (CONGESTIVE HEART FAILURE) (HCC): Primary | ICD-10-CM

## 2020-02-27 ENCOUNTER — HOSPITAL ENCOUNTER (OUTPATIENT)
Dept: LAB | Facility: HOSPITAL | Age: 75
Discharge: HOME | End: 2020-02-27
Attending: NURSE PRACTITIONER
Payer: MEDICARE

## 2020-02-27 ENCOUNTER — HOSPITAL ENCOUNTER (OUTPATIENT)
Dept: CARDIOLOGY CLINIC | Facility: HOSPITAL | Age: 75
Discharge: HOME | End: 2020-02-27
Attending: NURSE PRACTITIONER
Payer: MEDICARE

## 2020-02-27 ENCOUNTER — HOSPITAL ENCOUNTER (OUTPATIENT)
Dept: GENERAL RADIOLOGY | Facility: HOSPITAL | Age: 75
Discharge: HOME | End: 2020-02-27
Attending: NURSE PRACTITIONER
Payer: MEDICARE

## 2020-02-27 VITALS
HEART RATE: 90 BPM | BODY MASS INDEX: 27 KG/M2 | SYSTOLIC BLOOD PRESSURE: 123 MMHG | DIASTOLIC BLOOD PRESSURE: 69 MMHG | WEIGHT: 199 LBS | RESPIRATION RATE: 24 BRPM | OXYGEN SATURATION: 96 %

## 2020-02-27 DIAGNOSIS — I48.20 CHRONIC ATRIAL FIBRILLATION (HCC): ICD-10-CM

## 2020-02-27 DIAGNOSIS — D69.6 THROMBOCYTOPENIA (HCC): ICD-10-CM

## 2020-02-27 DIAGNOSIS — I10 BENIGN ESSENTIAL HYPERTENSION: ICD-10-CM

## 2020-02-27 DIAGNOSIS — M33.20 POLYMYOSITIS (HCC): ICD-10-CM

## 2020-02-27 DIAGNOSIS — N18.2 CKD (CHRONIC KIDNEY DISEASE) STAGE 2, GFR 60-89 ML/MIN: ICD-10-CM

## 2020-02-27 DIAGNOSIS — M35.1 MIXED CONNECTIVE TISSUE DISEASE (HCC): ICD-10-CM

## 2020-02-27 DIAGNOSIS — I27.20 PULMONARY HYPERTENSION (HCC): ICD-10-CM

## 2020-02-27 DIAGNOSIS — G47.33 OSA (OBSTRUCTIVE SLEEP APNEA): ICD-10-CM

## 2020-02-27 DIAGNOSIS — I50.9 CHF (CONGESTIVE HEART FAILURE) (HCC): ICD-10-CM

## 2020-02-27 DIAGNOSIS — I50.41 ACUTE COMBINED SYSTOLIC AND DIASTOLIC CONGESTIVE HEART FAILURE (HCC): Primary | ICD-10-CM

## 2020-02-27 LAB
ANION GAP SERPL CALC-SCNC: 4 MMOL/L (ref 0–18)
BUN BLD-MCNC: 32 MG/DL (ref 7–18)
BUN/CREAT SERPL: 21.1 (ref 10–20)
CALCIUM BLD-MCNC: 8.8 MG/DL (ref 8.5–10.1)
CHLORIDE SERPL-SCNC: 105 MMOL/L (ref 98–112)
CO2 SERPL-SCNC: 32 MMOL/L (ref 21–32)
CREAT BLD-MCNC: 1.52 MG/DL (ref 0.7–1.3)
GLUCOSE BLD-MCNC: 96 MG/DL (ref 70–99)
NT-PROBNP SERPL-MCNC: 5927 PG/ML (ref ?–450)
OSMOLALITY SERPL CALC.SUM OF ELEC: 299 MOSM/KG (ref 275–295)
PATIENT FASTING Y/N/NP: NO
POTASSIUM SERPL-SCNC: 4.4 MMOL/L (ref 3.5–5.1)
SODIUM SERPL-SCNC: 141 MMOL/L (ref 136–145)

## 2020-02-27 PROCEDURE — 99215 OFFICE O/P EST HI 40 MIN: CPT | Performed by: NURSE PRACTITIONER

## 2020-02-27 PROCEDURE — 80048 BASIC METABOLIC PNL TOTAL CA: CPT | Performed by: NURSE PRACTITIONER

## 2020-02-27 PROCEDURE — 36415 COLL VENOUS BLD VENIPUNCTURE: CPT | Performed by: NURSE PRACTITIONER

## 2020-02-27 PROCEDURE — 71045 X-RAY EXAM CHEST 1 VIEW: CPT | Performed by: NURSE PRACTITIONER

## 2020-02-27 RX ORDER — BUMETANIDE 0.25 MG/ML
2 INJECTION, SOLUTION INTRAMUSCULAR; INTRAVENOUS ONCE
Status: COMPLETED | OUTPATIENT
Start: 2020-02-27 | End: 2020-02-27

## 2020-02-27 RX ORDER — TORSEMIDE 20 MG/1
20 TABLET ORAL 2 TIMES DAILY
Qty: 45 TABLET | Refills: 6 | Status: SHIPPED | COMMUNITY
Start: 2020-02-27 | End: 2020-03-09

## 2020-02-27 RX ORDER — METOPROLOL SUCCINATE 100 MG/1
100 TABLET, EXTENDED RELEASE ORAL
Qty: 30 TABLET | Refills: 3 | Status: SHIPPED | COMMUNITY
Start: 2020-02-27 | End: 2020-03-20

## 2020-02-27 RX ADMIN — BUMETANIDE 2 MG: 0.25 INJECTION, SOLUTION INTRAMUSCULAR; INTRAVENOUS at 15:29:00

## 2020-02-27 NOTE — PROGRESS NOTES
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 Subjective:   Karie Helton returns for routine asses mid-upper 90's at rest    /70   Pulse 94   Resp (!) 32   Wt 199 lb (90.3 kg)   SpO2 96%   BMI 26.98 kg/m²     Wt Readings from Last 6 Encounters:  02/27/20 : 199 lb (90.3 kg)  02/19/20 : 199 lb 9.6 oz (90.5 kg)  02/13/20 : 197 lb (89.4 kg)  02/12/20 (+) JVD up to jawline  Lungs:            Bibasilar crackles, seems rather diminished both bases (hx recurrent effusions)           CV:                  S1, S2 irreg irreg, 2/6 TARA                   Abdomen:       Non-distended, soft, non-tender, BS+  Ex recent CT. · Recent PNA/URI - s/p antiboitics.     Plan:     · Diuril 250mg IV once  · Bumex 2mg IV once  · Continue Torsemide 20mg PO BID, but add additional Torsemide 20mg PO daily 3x/week on Monday/Wed/Fri.   · Increase to Toprol XL 100mg PO BID  · Retu

## 2020-02-27 NOTE — PROGRESS NOTES
Pt. assessed. Pt. c/o sob and APN notified. Weight up > 2 lbs at 199 lbs. Labs ordered and drawn in the [facility]. Reviewed allergies and list of current medications with pt. and updated it in the EMR. IV established in the [facility] per protocol.  IVP diuril 250 mg gi

## 2020-03-02 RX ORDER — AZELASTINE 1 MG/ML
SPRAY, METERED NASAL
Qty: 1 BOTTLE | Refills: 0 | Status: SHIPPED | OUTPATIENT
Start: 2020-03-02 | End: 2020-05-29 | Stop reason: ALTCHOICE

## 2020-03-02 NOTE — TELEPHONE ENCOUNTER
Requested Prescriptions     Pending Prescriptions Disp Refills   • Azelastine HCl 0.1 % Nasal Solution [Pharmacy Med Name: AZELASTINE 0.1% (137 MCG) SPRY] 1 Bottle 0     Sig: USE 1 SPRAY INTO EACH NOSTRIL TWICE A DAY     LOV 12/11/2019     Patient was aske

## 2020-03-09 ENCOUNTER — HOSPITAL ENCOUNTER (OUTPATIENT)
Dept: CARDIOLOGY CLINIC | Facility: HOSPITAL | Age: 75
Discharge: HOME | End: 2020-03-09
Attending: NURSE PRACTITIONER
Payer: MEDICARE

## 2020-03-09 VITALS
WEIGHT: 199.63 LBS | OXYGEN SATURATION: 99 % | SYSTOLIC BLOOD PRESSURE: 129 MMHG | HEART RATE: 88 BPM | BODY MASS INDEX: 27 KG/M2 | RESPIRATION RATE: 25 BRPM | DIASTOLIC BLOOD PRESSURE: 76 MMHG

## 2020-03-09 DIAGNOSIS — G47.33 OSA (OBSTRUCTIVE SLEEP APNEA): ICD-10-CM

## 2020-03-09 DIAGNOSIS — N18.2 CKD (CHRONIC KIDNEY DISEASE) STAGE 2, GFR 60-89 ML/MIN: ICD-10-CM

## 2020-03-09 DIAGNOSIS — I50.41 ACUTE COMBINED SYSTOLIC AND DIASTOLIC CONGESTIVE HEART FAILURE (HCC): Primary | ICD-10-CM

## 2020-03-09 DIAGNOSIS — M33.20 POLYMYOSITIS (HCC): ICD-10-CM

## 2020-03-09 DIAGNOSIS — M35.1 MIXED CONNECTIVE TISSUE DISEASE (HCC): ICD-10-CM

## 2020-03-09 DIAGNOSIS — I48.20 CHRONIC ATRIAL FIBRILLATION (HCC): ICD-10-CM

## 2020-03-09 DIAGNOSIS — I27.20 PULMONARY HYPERTENSION (HCC): ICD-10-CM

## 2020-03-09 DIAGNOSIS — I10 BENIGN ESSENTIAL HYPERTENSION: ICD-10-CM

## 2020-03-09 LAB
ANION GAP SERPL CALC-SCNC: 7 MMOL/L (ref 0–18)
BUN BLD-MCNC: 32 MG/DL (ref 7–18)
BUN/CREAT SERPL: 23 (ref 10–20)
CALCIUM BLD-MCNC: 9.2 MG/DL (ref 8.5–10.1)
CHLORIDE SERPL-SCNC: 105 MMOL/L (ref 98–112)
CO2 SERPL-SCNC: 30 MMOL/L (ref 21–32)
CREAT BLD-MCNC: 1.39 MG/DL (ref 0.7–1.3)
GLUCOSE BLD-MCNC: 131 MG/DL (ref 70–99)
OSMOLALITY SERPL CALC.SUM OF ELEC: 303 MOSM/KG (ref 275–295)
PATIENT FASTING Y/N/NP: NO
POTASSIUM SERPL-SCNC: 4 MMOL/L (ref 3.5–5.1)
SODIUM SERPL-SCNC: 142 MMOL/L (ref 136–145)

## 2020-03-09 PROCEDURE — 99215 OFFICE O/P EST HI 40 MIN: CPT | Performed by: NURSE PRACTITIONER

## 2020-03-09 RX ORDER — BUMETANIDE 0.25 MG/ML
3 INJECTION, SOLUTION INTRAMUSCULAR; INTRAVENOUS ONCE
Status: COMPLETED | OUTPATIENT
Start: 2020-03-09 | End: 2020-03-09

## 2020-03-09 RX ORDER — TORSEMIDE 20 MG/1
TABLET ORAL
Qty: 45 TABLET | Refills: 6 | Status: SHIPPED | OUTPATIENT
Start: 2020-03-09 | End: 2020-04-21

## 2020-03-09 RX ADMIN — BUMETANIDE 3 MG: 0.25 INJECTION, SOLUTION INTRAMUSCULAR; INTRAVENOUS at 16:53:00

## 2020-03-09 NOTE — PROGRESS NOTES
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 Subjective:   Richard Combs returns for assessment of 83%    • LI (obstructive sleep apnea) PSG 5-22-19    AHI 36 RDI 36 REM AHI 45 Supine AHI 65 non-supine AHI 19 Sao2 Pj 86% CPAP 9cwp   • Pain in joints    • Pleural effusion     right   • Polymyositis (HCC)    • Problems with swallowing     dysphagia in • Other (Ramon's Esophagus) Son    • Heart Attack Maternal Grandfather       Social History    Tobacco Use      Smoking status: Former Smoker        Packs/day: 0.25        Years: 15.00        Pack years: 3.75        Start date: [birth year]/1/1960        Quit meka Capsule Delayed Release, TAKE 1 CAPSULE BY MOUTH TWICE DAILY, Disp: 180 capsule, Rfl: 1  Sildenafil Citrate 50 MG Oral Tab, Take 1 tablet (50 mg total) by mouth daily as needed for Erectile Dysfunction. , Disp: 30 tablet, Rfl: 3  Calcium Citrate-Vitamin D ( Afib - s/p Watchman, now off AC. Rates 80-90's today on increased dose Toprol  mg BID. · LI - uses cpap, but doesn't like it. · Recurrent bilateral pleural effusions - s/p left thoracentesis while inpatient.    · Hx Lupus/Mixed CTD/Severe polymyos

## 2020-03-09 NOTE — PATIENT INSTRUCTIONS
Heart Failure Discharge Instructions    Take 40 mg Torsemide in the morning, 20 mg in the afternoon everyday.        Activity: Regular exercise and activity is important for your overall health and to help keep your heart strong and functioning as well as p activity, or are limiting activity because of shortness of breath or fatigue  · You are short of breath lying down, you need more pillows to breathe comfortably,  or wake up during the night short of breath  · You urinate less often during the day and more

## 2020-03-09 NOTE — PROGRESS NOTES
Pt. Assessed. Pt. c/o _fatigue and shortness of breath. Weight stable at 199.6 lbs. APN notified of pt's c/o fatigue and shortness of breath despite the increase in diuretics and continuation of prednisone. Labs ordered.  Reviewed allergies and list of curr

## 2020-03-10 ENCOUNTER — APPOINTMENT (OUTPATIENT)
Dept: LAB | Age: 75
End: 2020-03-10
Attending: PEDIATRICS
Payer: MEDICARE

## 2020-03-10 DIAGNOSIS — M33.20 POLYMYOSITIS (HCC): ICD-10-CM

## 2020-03-10 DIAGNOSIS — M33.20 POLYMYOSITIS (HCC): Primary | ICD-10-CM

## 2020-03-10 DIAGNOSIS — M35.1 MIXED CONNECTIVE TISSUE DISEASE (HCC): ICD-10-CM

## 2020-03-10 LAB
C4 SERPL-MCNC: 4.6 MG/DL (ref 10–40)
CRP SERPL-MCNC: 1.64 MG/DL (ref ?–0.3)
SED RATE-ML: 27 MM/HR (ref 0–12)

## 2020-03-10 PROCEDURE — 85652 RBC SED RATE AUTOMATED: CPT

## 2020-03-10 PROCEDURE — 86160 COMPLEMENT ANTIGEN: CPT

## 2020-03-10 PROCEDURE — 36415 COLL VENOUS BLD VENIPUNCTURE: CPT

## 2020-03-10 PROCEDURE — 86140 C-REACTIVE PROTEIN: CPT

## 2020-03-10 NOTE — ADDENDUM NOTE
Encounter addended by: Leonore Alpers, APRN on: 3/10/2020 1:15 PM   Actions taken: Clinical Note Signed

## 2020-03-12 ENCOUNTER — OFFICE VISIT (OUTPATIENT)
Dept: RHEUMATOLOGY | Facility: CLINIC | Age: 75
End: 2020-03-12
Payer: MEDICARE

## 2020-03-12 VITALS
WEIGHT: 185 LBS | TEMPERATURE: 99 F | DIASTOLIC BLOOD PRESSURE: 68 MMHG | RESPIRATION RATE: 24 BRPM | SYSTOLIC BLOOD PRESSURE: 102 MMHG | HEART RATE: 64 BPM | BODY MASS INDEX: 25 KG/M2

## 2020-03-12 DIAGNOSIS — D69.3 IMMUNE THROMBOCYTOPENIC PURPURA (HCC): ICD-10-CM

## 2020-03-12 DIAGNOSIS — K22.70 BARRETT'S ESOPHAGUS WITHOUT DYSPLASIA: ICD-10-CM

## 2020-03-12 DIAGNOSIS — M33.20 POLYMYOSITIS (HCC): Primary | Chronic | ICD-10-CM

## 2020-03-12 DIAGNOSIS — I43 CARDIOMYOPATHY AS MANIFESTATION OF UNDERLYING DISEASE (HCC): ICD-10-CM

## 2020-03-12 DIAGNOSIS — M35.1 MIXED CONNECTIVE TISSUE DISEASE (HCC): Chronic | ICD-10-CM

## 2020-03-12 DIAGNOSIS — I50.812 CHRONIC RIGHT-SIDED CONGESTIVE HEART FAILURE (HCC): ICD-10-CM

## 2020-03-12 PROBLEM — R09.02 HYPOXIA: Status: RESOLVED | Noted: 2019-07-20 | Resolved: 2020-03-12

## 2020-03-12 PROCEDURE — 99214 OFFICE O/P EST MOD 30 MIN: CPT | Performed by: INTERNAL MEDICINE

## 2020-03-12 RX ORDER — AZATHIOPRINE 50 MG/1
50 TABLET ORAL 2 TIMES DAILY
Qty: 60 TABLET | Refills: 3 | Status: SHIPPED | OUTPATIENT
Start: 2020-03-12 | End: 2020-06-10

## 2020-03-12 RX ORDER — PREDNISONE 1 MG/1
15 TABLET ORAL DAILY
Qty: 90 TABLET | Refills: 3 | Status: SHIPPED | OUTPATIENT
Start: 2020-03-12 | End: 2020-04-21

## 2020-03-12 NOTE — PROGRESS NOTES
[facility]  Dr. Jane Orr Progress Note     Subjective: Wiliam Hernandez is a(n) 66year old male. Current complaints: Patient presents with: Follow - Up: 6 week polymyositis. Pt states 'is doing a little better.  Is on prednisone 20 mg daily.'  Fe

## 2020-03-12 NOTE — PATIENT INSTRUCTIONS
Current plan - increase Imuran 50 mg to twice a day. Decrease Prednisone 15 mg per day after one more week of Prednisone 20 mg per day. IV IG once a month. Activity as tolerated  Recheck labs in 5 weeks, office visit on 5 weeks.

## 2020-03-13 RX ORDER — OMEPRAZOLE 20 MG/1
CAPSULE, DELAYED RELEASE ORAL
Qty: 180 CAPSULE | Refills: 1 | Status: SHIPPED | OUTPATIENT
Start: 2020-03-13 | End: 2020-09-15

## 2020-03-13 NOTE — TELEPHONE ENCOUNTER
Refill request for:    Requested Prescriptions     Pending Prescriptions Disp Refills   • OMEPRAZOLE 20 MG Oral Capsule Delayed Release [Pharmacy Med Name: OMEPRAZOLE DR 20 MG CAPSULE] 180 capsule 1     Sig: TAKE 1 CAPSULE BY MOUTH TWICE DAILY        Last

## 2020-03-17 ENCOUNTER — TELEPHONE (OUTPATIENT)
Dept: CARDIOLOGY | Age: 75
End: 2020-03-17

## 2020-03-17 ENCOUNTER — TELEPHONE (OUTPATIENT)
Dept: HEMATOLOGY/ONCOLOGY | Facility: HOSPITAL | Age: 75
End: 2020-03-17

## 2020-03-18 ENCOUNTER — OFFICE VISIT (OUTPATIENT)
Dept: CARDIOLOGY | Age: 75
End: 2020-03-18

## 2020-03-18 VITALS
BODY MASS INDEX: 25.9 KG/M2 | HEIGHT: 71 IN | DIASTOLIC BLOOD PRESSURE: 73 MMHG | HEART RATE: 94 BPM | WEIGHT: 185 LBS | SYSTOLIC BLOOD PRESSURE: 127 MMHG

## 2020-03-18 DIAGNOSIS — Z95.818 PRESENCE OF WATCHMAN LEFT ATRIAL APPENDAGE CLOSURE DEVICE: ICD-10-CM

## 2020-03-18 DIAGNOSIS — Z86.79 HISTORY OF ATRIAL FIBRILLATION: ICD-10-CM

## 2020-03-18 DIAGNOSIS — J90 PLEURAL EFFUSION: Primary | ICD-10-CM

## 2020-03-18 PROCEDURE — 99442 TELEPHONE E&M BY PHYSICIAN EST PT NOT ORIG PREV 7 DAYS 11-20 MIN: CPT | Performed by: INTERNAL MEDICINE

## 2020-03-18 RX ORDER — METOPROLOL SUCCINATE 100 MG/1
100 TABLET, EXTENDED RELEASE ORAL 2 TIMES DAILY
Qty: 180 TABLET | Refills: 3 | Status: SHIPPED | OUTPATIENT
Start: 2020-03-18 | End: 2021-06-01

## 2020-03-18 RX ORDER — AZATHIOPRINE 100 MG/1
50 TABLET ORAL 2 TIMES DAILY
COMMUNITY
End: 2021-05-03

## 2020-03-20 ENCOUNTER — TELEPHONE (OUTPATIENT)
Dept: CARDIOLOGY CLINIC | Facility: HOSPITAL | Age: 75
End: 2020-03-20

## 2020-03-20 ENCOUNTER — HOSPITAL ENCOUNTER (OUTPATIENT)
Dept: CARDIOLOGY CLINIC | Facility: HOSPITAL | Age: 75
Discharge: HOME | End: 2020-03-20
Attending: NURSE PRACTITIONER
Payer: MEDICARE

## 2020-03-20 VITALS
DIASTOLIC BLOOD PRESSURE: 72 MMHG | OXYGEN SATURATION: 100 % | RESPIRATION RATE: 20 BRPM | WEIGHT: 193.38 LBS | SYSTOLIC BLOOD PRESSURE: 122 MMHG | HEART RATE: 83 BPM | BODY MASS INDEX: 26 KG/M2

## 2020-03-20 DIAGNOSIS — M33.20 POLYMYOSITIS (HCC): ICD-10-CM

## 2020-03-20 DIAGNOSIS — N18.2 CKD (CHRONIC KIDNEY DISEASE) STAGE 2, GFR 60-89 ML/MIN: ICD-10-CM

## 2020-03-20 DIAGNOSIS — I48.20 CHRONIC ATRIAL FIBRILLATION (HCC): ICD-10-CM

## 2020-03-20 DIAGNOSIS — I50.41 ACUTE COMBINED SYSTOLIC AND DIASTOLIC CONGESTIVE HEART FAILURE (HCC): Primary | ICD-10-CM

## 2020-03-20 DIAGNOSIS — M35.1 MIXED CONNECTIVE TISSUE DISEASE (HCC): ICD-10-CM

## 2020-03-20 DIAGNOSIS — I27.20 PULMONARY HYPERTENSION (HCC): ICD-10-CM

## 2020-03-20 DIAGNOSIS — I10 BENIGN ESSENTIAL HYPERTENSION: ICD-10-CM

## 2020-03-20 LAB
ANION GAP SERPL CALC-SCNC: 7 MMOL/L (ref 0–18)
BUN BLD-MCNC: 27 MG/DL (ref 7–18)
BUN/CREAT SERPL: 19.4 (ref 10–20)
CALCIUM BLD-MCNC: 8.8 MG/DL (ref 8.5–10.1)
CHLORIDE SERPL-SCNC: 105 MMOL/L (ref 98–112)
CO2 SERPL-SCNC: 30 MMOL/L (ref 21–32)
CREAT BLD-MCNC: 1.39 MG/DL (ref 0.7–1.3)
GLUCOSE BLD-MCNC: 101 MG/DL (ref 70–99)
OSMOLALITY SERPL CALC.SUM OF ELEC: 299 MOSM/KG (ref 275–295)
PATIENT FASTING Y/N/NP: NO
POTASSIUM SERPL-SCNC: 3.7 MMOL/L (ref 3.5–5.1)
SODIUM SERPL-SCNC: 142 MMOL/L (ref 136–145)

## 2020-03-20 PROCEDURE — 99214 OFFICE O/P EST MOD 30 MIN: CPT | Performed by: NURSE PRACTITIONER

## 2020-03-20 RX ORDER — METOPROLOL SUCCINATE 100 MG/1
100 TABLET, EXTENDED RELEASE ORAL
Qty: 60 TABLET | Refills: 3 | Status: SHIPPED | OUTPATIENT
Start: 2020-03-20

## 2020-03-20 RX ORDER — POTASSIUM CHLORIDE 20 MEQ/1
60 TABLET, EXTENDED RELEASE ORAL ONCE
Status: COMPLETED | OUTPATIENT
Start: 2020-03-20 | End: 2020-03-20

## 2020-03-20 RX ORDER — BUMETANIDE 0.25 MG/ML
2 INJECTION, SOLUTION INTRAMUSCULAR; INTRAVENOUS ONCE
Status: COMPLETED | OUTPATIENT
Start: 2020-03-20 | End: 2020-03-20

## 2020-03-20 RX ADMIN — BUMETANIDE 2 MG: 0.25 INJECTION, SOLUTION INTRAMUSCULAR; INTRAVENOUS at 16:27:00

## 2020-03-20 RX ADMIN — POTASSIUM CHLORIDE 60 MEQ: 20 TABLET, EXTENDED RELEASE ORAL at 16:20:00

## 2020-03-20 NOTE — PROGRESS NOTES
Pt assessed. Pt reports improvement in breathing and LE edema but still has some edema left. Weight down 6 lbs at 193.4 lbs. APN notified of pt's complaints. Labs ordered.  Reviewed allergies and list of current medications with pt and updated it in the EMR

## 2020-03-20 NOTE — PATIENT INSTRUCTIONS
Heart Failure Discharge Instructions    Call central scheduling to arrange CPX and Echocardiogram.       Activity: Regular exercise and activity is important for your overall health and to help keep your heart strong and functioning as well as possible. limiting activity because of shortness of breath or fatigue  · You are short of breath lying down, you need more pillows to breathe comfortably,  or wake up during the night short of breath  · You urinate less often during the day and more often at night

## 2020-03-20 NOTE — PROGRESS NOTES
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 Subjective:   Ming sheldon returns for assessment of 11/16/2017   • LI (obstructive sleep apnea) HST 11-7-17    AHI 37  Supine AHI 38 non-supine AHI 16 Sao2 Pj 83%    • LI (obstructive sleep apnea) PSG 5-22-19    AHI 36 RDI 36 REM AHI 45 Supine AHI 65 non-supine AHI 19 Sao2 Pj 86% CPAP 9cwp   • Pain Stroke Mother    • Cancer Paternal Grandfather    • Heart Attack Paternal Grandmother    • Kidney Disease Son    • Other (Ramon's Esophagus) Son    • Heart Attack Maternal Grandfather       Social History    Tobacco Use      Smoking status: Former Smoker tablet, Rfl: 3  PREVIDENT 5000 BOOSTER PLUS 1.1 % Dental Paste, , Disp: , Rfl:   Potassium Chloride ER (KLOR-CON M20) 20 MEQ Oral Tab CR, Take 20 mEq by mouth daily.   , Disp: , Rfl: 0  Sildenafil Citrate 50 MG Oral Tab, Take 1 tablet (50 mg total) by mouth f/u.    · Progressive weakness - likely due to deconditioning, but improving. Patient working on increasing strength with ADLs and exercise. · CKD stage 2 - baseline creatinine ~ 1.2-1.4 mg/dl, stable.   · Chronic Afib - s/p Watchman, now off AC. Rates imp

## 2020-03-20 NOTE — PROGRESS NOTES
Rec'd call from patient regarding cell phone left in [facility]. Patient name and ID attached to phone and placed in security downstairs at the desk in [place].

## 2020-03-23 ENCOUNTER — OFFICE VISIT (OUTPATIENT)
Dept: HEMATOLOGY/ONCOLOGY | Facility: HOSPITAL | Age: 75
End: 2020-03-23
Attending: INTERNAL MEDICINE
Payer: MEDICARE

## 2020-03-23 VITALS
RESPIRATION RATE: 16 BRPM | TEMPERATURE: 98 F | OXYGEN SATURATION: 99 % | HEART RATE: 73 BPM | DIASTOLIC BLOOD PRESSURE: 72 MMHG | WEIGHT: 192.81 LBS | SYSTOLIC BLOOD PRESSURE: 125 MMHG | BODY MASS INDEX: 26 KG/M2

## 2020-03-23 DIAGNOSIS — M32.19 OTHER SYSTEMIC LUPUS ERYTHEMATOSUS WITH OTHER ORGAN INVOLVEMENT (HCC): ICD-10-CM

## 2020-03-23 DIAGNOSIS — D69.3 IMMUNE THROMBOCYTOPENIC PURPURA (HCC): Primary | ICD-10-CM

## 2020-03-23 DIAGNOSIS — D69.6 THROMBOCYTOPENIA (HCC): ICD-10-CM

## 2020-03-23 PROCEDURE — 96365 THER/PROPH/DIAG IV INF INIT: CPT

## 2020-03-23 PROCEDURE — 96375 TX/PRO/DX INJ NEW DRUG ADDON: CPT

## 2020-03-23 PROCEDURE — 96366 THER/PROPH/DIAG IV INF ADDON: CPT

## 2020-03-23 RX ORDER — METHYLPREDNISOLONE SODIUM SUCCINATE 40 MG/ML
40 INJECTION, POWDER, LYOPHILIZED, FOR SOLUTION INTRAMUSCULAR; INTRAVENOUS ONCE
Status: CANCELLED | OUTPATIENT
Start: 2020-04-20

## 2020-03-23 RX ORDER — ACETAMINOPHEN 325 MG/1
650 TABLET ORAL ONCE
Status: COMPLETED | OUTPATIENT
Start: 2020-03-23 | End: 2020-03-23

## 2020-03-23 RX ORDER — DIPHENHYDRAMINE HCL 25 MG
25 CAPSULE ORAL ONCE
Status: COMPLETED | OUTPATIENT
Start: 2020-03-23 | End: 2020-03-23

## 2020-03-23 RX ORDER — FAMOTIDINE 10 MG/ML
20 INJECTION, SOLUTION INTRAVENOUS ONCE
Status: CANCELLED | OUTPATIENT
Start: 2020-04-20

## 2020-03-23 RX ORDER — ACETAMINOPHEN 325 MG/1
650 TABLET ORAL ONCE
Status: CANCELLED | OUTPATIENT
Start: 2020-04-20

## 2020-03-23 RX ORDER — METHYLPREDNISOLONE SODIUM SUCCINATE 40 MG/ML
40 INJECTION, POWDER, LYOPHILIZED, FOR SOLUTION INTRAMUSCULAR; INTRAVENOUS ONCE
Status: COMPLETED | OUTPATIENT
Start: 2020-03-23 | End: 2020-03-23

## 2020-03-23 RX ORDER — MEPERIDINE HYDROCHLORIDE 25 MG/ML
25 INJECTION INTRAMUSCULAR; INTRAVENOUS; SUBCUTANEOUS ONCE
Status: CANCELLED | OUTPATIENT
Start: 2020-04-20

## 2020-03-23 RX ORDER — METHYLPREDNISOLONE SODIUM SUCCINATE 40 MG/ML
40 INJECTION, POWDER, LYOPHILIZED, FOR SOLUTION INTRAMUSCULAR; INTRAVENOUS ONCE
Status: CANCELLED
Start: 2020-04-20

## 2020-03-23 RX ORDER — METHYLPREDNISOLONE SODIUM SUCCINATE 125 MG/2ML
125 INJECTION, POWDER, LYOPHILIZED, FOR SOLUTION INTRAMUSCULAR; INTRAVENOUS ONCE
Status: CANCELLED | OUTPATIENT
Start: 2020-04-20

## 2020-03-23 RX ORDER — DIPHENHYDRAMINE HCL 25 MG
25 CAPSULE ORAL ONCE
Status: CANCELLED | OUTPATIENT
Start: 2020-04-20

## 2020-03-23 RX ORDER — DIPHENHYDRAMINE HYDROCHLORIDE 50 MG/ML
25 INJECTION INTRAMUSCULAR; INTRAVENOUS ONCE
Status: CANCELLED | OUTPATIENT
Start: 2020-04-20

## 2020-03-23 RX ADMIN — ACETAMINOPHEN 650 MG: 325 TABLET ORAL at 11:25:00

## 2020-03-23 RX ADMIN — DIPHENHYDRAMINE HCL 25 MG: 25 MG CAPSULE ORAL at 11:25:00

## 2020-03-23 RX ADMIN — METHYLPREDNISOLONE SODIUM SUCCINATE 40 MG: 40 INJECTION, POWDER, LYOPHILIZED, FOR SOLUTION INTRAMUSCULAR; INTRAVENOUS at 11:44:00

## 2020-03-23 NOTE — PROGRESS NOTES
Education Record    Learner:  Patient    Disease / Piedad Gordon polymyositis    Barriers / Limitations:  None   Comments:    Method:  Discussion and Reinforcement   Comments:    General Topics:  Medication, Side effects and symptom management, Plan of

## 2020-03-25 ENCOUNTER — APPOINTMENT (OUTPATIENT)
Dept: HEMATOLOGY/ONCOLOGY | Facility: HOSPITAL | Age: 75
End: 2020-03-25
Attending: INTERNAL MEDICINE
Payer: MEDICARE

## 2020-03-26 ENCOUNTER — PATIENT MESSAGE (OUTPATIENT)
Dept: RHEUMATOLOGY | Facility: CLINIC | Age: 75
End: 2020-03-26

## 2020-04-06 ENCOUNTER — HOSPITAL ENCOUNTER (OUTPATIENT)
Dept: CARDIOLOGY CLINIC | Facility: HOSPITAL | Age: 75
Discharge: HOME | End: 2020-04-06
Attending: NURSE PRACTITIONER
Payer: MEDICARE

## 2020-04-06 DIAGNOSIS — M35.1 MIXED CONNECTIVE TISSUE DISEASE (HCC): Chronic | ICD-10-CM

## 2020-04-06 DIAGNOSIS — I48.19 OTHER PERSISTENT ATRIAL FIBRILLATION (HCC): ICD-10-CM

## 2020-04-06 DIAGNOSIS — I50.812 CHRONIC RIGHT-SIDED CONGESTIVE HEART FAILURE (HCC): ICD-10-CM

## 2020-04-06 DIAGNOSIS — I27.20 PULMONARY HYPERTENSION (HCC): ICD-10-CM

## 2020-04-06 DIAGNOSIS — G47.33 OSA (OBSTRUCTIVE SLEEP APNEA): ICD-10-CM

## 2020-04-06 PROCEDURE — 99443 PHONE E/M BY PHYS 21-30 MIN: CPT | Performed by: NURSE PRACTITIONER

## 2020-04-06 NOTE — PROGRESS NOTES
Virtual/Telephone Check-In    [facility] File verbally consents to a Virtual/Telephone Check-In service on 04/06/20. Patient understands and accepts financial responsibility for any deductible, co-insurance and/or co-pays associated with this service. · Hx Lupus/Mixed CTD/Severe polymyositis - IVIG infusions monthly, follows with Dr. Compa Garcia steroid taper and down to 10 mg today. Imuran increased to 50 mg BID, increased dose.   · Non-obstructive CAD  · Chronic thrombocytopenia - last platelet count

## 2020-04-09 ENCOUNTER — TELEPHONE (OUTPATIENT)
Dept: CARDIOLOGY CLINIC | Facility: HOSPITAL | Age: 75
End: 2020-04-09

## 2020-04-16 ENCOUNTER — E-ADVICE (OUTPATIENT)
Dept: CARDIOLOGY | Age: 75
End: 2020-04-16

## 2020-04-16 ENCOUNTER — TELEPHONE (OUTPATIENT)
Dept: CARDIOLOGY CLINIC | Facility: HOSPITAL | Age: 75
End: 2020-04-16

## 2020-04-16 NOTE — TELEPHONE ENCOUNTER
Pt. Called c/o weight gain slowly of 4-5 lbs. Normal baseline weight 184-185lbs. Today's weight is 190lbs. Pt. Feels\"more cloated and swollen,\" but denies shortness of breath. Discussed with Rogelio SWARTZ.  Pt. Instructed to increase his torsemide to 40mg

## 2020-04-17 ENCOUNTER — E-ADVICE (OUTPATIENT)
Dept: CARDIOLOGY | Age: 75
End: 2020-04-17

## 2020-04-17 ENCOUNTER — VIRTUAL PHONE E/M (OUTPATIENT)
Dept: RHEUMATOLOGY | Facility: CLINIC | Age: 75
End: 2020-04-17
Payer: MEDICARE

## 2020-04-17 DIAGNOSIS — D69.3 IMMUNE THROMBOCYTOPENIC PURPURA (HCC): ICD-10-CM

## 2020-04-17 DIAGNOSIS — I50.812 CHRONIC RIGHT-SIDED CONGESTIVE HEART FAILURE (HCC): ICD-10-CM

## 2020-04-17 DIAGNOSIS — M33.20 POLYMYOSITIS (HCC): Primary | ICD-10-CM

## 2020-04-17 DIAGNOSIS — M35.1 MCTD (MIXED CONNECTIVE TISSUE DISEASE) (HCC): ICD-10-CM

## 2020-04-17 DIAGNOSIS — D69.6 THROMBOCYTOPENIA (HCC): ICD-10-CM

## 2020-04-17 DIAGNOSIS — I43 CARDIOMYOPATHY AS MANIFESTATION OF UNDERLYING DISEASE (HCC): ICD-10-CM

## 2020-04-17 PROCEDURE — 99442 PHONE E/M BY PHYS 11-20 MIN: CPT | Performed by: INTERNAL MEDICINE

## 2020-04-17 RX ORDER — PREDNISONE 10 MG/1
10 TABLET ORAL DAILY
Qty: 90 TABLET | Refills: 1 | Status: SHIPPED | OUTPATIENT
Start: 2020-04-17 | End: 2020-06-10 | Stop reason: DRUGHIGH

## 2020-04-17 NOTE — PROGRESS NOTES
Virtual Telephone Check-In    Idalia Hernandez verbally consents to a Virtual/Telephone Check-In visit on 04/17/20. Patient understands and accepts financial responsibility for any deductible, co-insurance and/or co-pays associated with this service. Imuran 50 mg twice a day. Continue prednisone 10 mg a day. Plan to get IVIG which treats the thrombocytopenia, polymyositis, mixed connective tissue disease in early May. Recheck labs also in early May including CBC, chemistry profile, muscle enzymes.

## 2020-04-20 ENCOUNTER — TELEPHONE (OUTPATIENT)
Dept: CARDIOLOGY CLINIC | Facility: HOSPITAL | Age: 75
End: 2020-04-20

## 2020-04-20 ENCOUNTER — APPOINTMENT (OUTPATIENT)
Dept: HEMATOLOGY/ONCOLOGY | Facility: HOSPITAL | Age: 75
End: 2020-04-20
Attending: INTERNAL MEDICINE
Payer: MEDICARE

## 2020-04-21 ENCOUNTER — HOSPITAL ENCOUNTER (OUTPATIENT)
Dept: CARDIOLOGY CLINIC | Facility: HOSPITAL | Age: 75
Discharge: HOME | End: 2020-04-21
Attending: NURSE PRACTITIONER
Payer: MEDICARE

## 2020-04-21 DIAGNOSIS — I48.19 OTHER PERSISTENT ATRIAL FIBRILLATION (HCC): ICD-10-CM

## 2020-04-21 DIAGNOSIS — M35.1 MIXED CONNECTIVE TISSUE DISEASE (HCC): Chronic | ICD-10-CM

## 2020-04-21 DIAGNOSIS — G47.33 OSA (OBSTRUCTIVE SLEEP APNEA): ICD-10-CM

## 2020-04-21 DIAGNOSIS — I27.20 PULMONARY HYPERTENSION (HCC): ICD-10-CM

## 2020-04-21 DIAGNOSIS — I50.811 ACUTE RIGHT-SIDED HEART FAILURE (HCC): ICD-10-CM

## 2020-04-21 PROCEDURE — 99443 PHONE E/M BY PHYS 21-30 MIN: CPT | Performed by: NURSE PRACTITIONER

## 2020-04-21 RX ORDER — POTASSIUM CHLORIDE 20 MEQ/1
40 TABLET, EXTENDED RELEASE ORAL DAILY
Refills: 0 | Status: SHIPPED | COMMUNITY
Start: 2020-04-21 | End: 2020-08-12

## 2020-04-21 RX ORDER — TORSEMIDE 20 MG/1
40 TABLET ORAL 2 TIMES DAILY
Qty: 45 TABLET | Refills: 6 | Status: SHIPPED | COMMUNITY
Start: 2020-04-21 | End: 2020-05-18

## 2020-04-21 RX ORDER — PREDNISONE 1 MG/1
10 TABLET ORAL DAILY
Qty: 90 TABLET | Refills: 3 | Status: SHIPPED | COMMUNITY
Start: 2020-04-21 | End: 2020-05-18

## 2020-04-21 NOTE — PROGRESS NOTES
Virtual Telephone Check-In    Reji Hobson verbally consents to a Virtual/Telephone Check-In visit on 04/21/20. Patient understands and accepts financial responsibility for any deductible, co-insurance and/or co-pays associated with this service. · Hx Lupus/Mixed CTD/Severe polymyositis - IVIG infusions monthly, follows with Dr. Letty Manuel steroid taper and down to 10 mg. On increased dose of Imuran.    · Non-obstructive CAD  · Chronic thrombocytopenia - last platelet count interestingly normal at

## 2020-04-22 ENCOUNTER — E-ADVICE (OUTPATIENT)
Dept: CARDIOLOGY | Age: 75
End: 2020-04-22

## 2020-04-22 ENCOUNTER — V-VISIT (OUTPATIENT)
Dept: CARDIOLOGY | Age: 75
End: 2020-04-22

## 2020-04-22 VITALS
DIASTOLIC BLOOD PRESSURE: 74 MMHG | WEIGHT: 186 LBS | HEART RATE: 80 BPM | BODY MASS INDEX: 25.94 KG/M2 | SYSTOLIC BLOOD PRESSURE: 121 MMHG

## 2020-04-22 DIAGNOSIS — I50.810 RVF (RIGHT VENTRICULAR FAILURE) (CMD): ICD-10-CM

## 2020-04-22 DIAGNOSIS — I27.21 PULMONARY ARTERIAL HYPERTENSION (CMD): Primary | ICD-10-CM

## 2020-04-22 PROCEDURE — 99211 OFF/OP EST MAY X REQ PHY/QHP: CPT | Performed by: INTERNAL MEDICINE

## 2020-04-22 ASSESSMENT — ENCOUNTER SYMPTOMS
BLOATING: 1
WEIGHT LOSS: 0
HEMOPTYSIS: 0
ABDOMINAL PAIN: 1
BRUISES/BLEEDS EASILY: 0
COUGH: 0
SUSPICIOUS LESIONS: 0
CHILLS: 0
WEIGHT GAIN: 1
ALLERGIC/IMMUNOLOGIC COMMENTS: NO NEW FOOD ALLERGIES
FEVER: 0
HEMATOCHEZIA: 0

## 2020-04-22 ASSESSMENT — PATIENT HEALTH QUESTIONNAIRE - PHQ9
SUM OF ALL RESPONSES TO PHQ9 QUESTIONS 1 AND 2: 0
1. LITTLE INTEREST OR PLEASURE IN DOING THINGS: NOT AT ALL
2. FEELING DOWN, DEPRESSED OR HOPELESS: NOT AT ALL
SUM OF ALL RESPONSES TO PHQ9 QUESTIONS 1 AND 2: 0

## 2020-04-23 DIAGNOSIS — I50.9 CHF (CONGESTIVE HEART FAILURE) (HCC): Primary | ICD-10-CM

## 2020-04-24 ENCOUNTER — APPOINTMENT (OUTPATIENT)
Dept: LAB | Facility: HOSPITAL | Age: 75
End: 2020-04-24
Attending: SOCIAL WORKER
Payer: MEDICARE

## 2020-04-24 DIAGNOSIS — I50.9 CHF (CONGESTIVE HEART FAILURE) (HCC): ICD-10-CM

## 2020-04-24 PROCEDURE — 80048 BASIC METABOLIC PNL TOTAL CA: CPT

## 2020-04-24 PROCEDURE — 36415 COLL VENOUS BLD VENIPUNCTURE: CPT

## 2020-04-29 ENCOUNTER — TELEPHONE (OUTPATIENT)
Dept: CARDIOLOGY CLINIC | Facility: HOSPITAL | Age: 75
End: 2020-04-29

## 2020-04-29 RX ORDER — TORSEMIDE 20 MG/1
40 TABLET ORAL 2 TIMES DAILY
Qty: 360 TABLET | Refills: 1 | Status: SHIPPED | OUTPATIENT
Start: 2020-04-29 | End: 2020-07-10

## 2020-05-01 ENCOUNTER — HOSPITAL ENCOUNTER (OUTPATIENT)
Dept: CARDIOLOGY CLINIC | Facility: HOSPITAL | Age: 75
Discharge: HOME | End: 2020-05-01
Attending: NURSE PRACTITIONER
Payer: MEDICARE

## 2020-05-01 DIAGNOSIS — I10 BENIGN ESSENTIAL HYPERTENSION: ICD-10-CM

## 2020-05-01 DIAGNOSIS — I48.20 CHRONIC ATRIAL FIBRILLATION (HCC): ICD-10-CM

## 2020-05-01 DIAGNOSIS — I50.812 CHRONIC RIGHT-SIDED CONGESTIVE HEART FAILURE (HCC): Primary | ICD-10-CM

## 2020-05-01 DIAGNOSIS — M35.1 MIXED CONNECTIVE TISSUE DISEASE (HCC): ICD-10-CM

## 2020-05-01 DIAGNOSIS — G47.33 OSA (OBSTRUCTIVE SLEEP APNEA): ICD-10-CM

## 2020-05-01 DIAGNOSIS — N18.2 CKD (CHRONIC KIDNEY DISEASE) STAGE 2, GFR 60-89 ML/MIN: ICD-10-CM

## 2020-05-01 DIAGNOSIS — I27.20 PULMONARY HYPERTENSION (HCC): ICD-10-CM

## 2020-05-01 DIAGNOSIS — I50.811 ACUTE RIGHT-SIDED HEART FAILURE (HCC): ICD-10-CM

## 2020-05-01 PROCEDURE — 99214 OFFICE O/P EST MOD 30 MIN: CPT | Performed by: NURSE PRACTITIONER

## 2020-05-01 NOTE — PROGRESS NOTES
This is a telemedicine visit with live, interactive video and audio. Patient understands and accepts financial responsibility for any deductible, co-insurance and/or co-pays associated with this service.     CHAPITO Morel Spearing Lola is a 66 year ol History:   Procedure Laterality Date   • APPENDECTOMY  1970   • APPENDECTOMY     • CARDIAC CATHETERIZATION  09/2019   • COLONOSCOPY  10/2003 2006 01/2010    x3   • COLONOSCOPY  5/14/2013   • COLONOSCOPY N/A 5/1/2018    Performed by Sonny Sierra MD at Types: 8 - 10 Standard drinks or equivalent per week      Frequency: 4 or more times a week      Drinks per session: 1 or 2      Binge frequency: Never      Comment: 1 per day    Drug use: No         Amoxicillin             RASH  Augmentin [Amoclan]     RA for Reginia Marking (MRN [id number]) as of 5/1/2020 12:35   Ref.  Range 4/24/2020 11:07   Glucose Latest Ref Range: 70 - 99 mg/dL 95   Sodium Latest Ref Range: 136 - 145 mmol/L 142   Potassium Latest Ref Range: 3.5 - 5.1 mmol/L 4.0   Chloride Latest Ref R and down to 10 mg. On increased dose of Imuran. · Non-obstructive CAD  · Chronic thrombocytopenia - last platelet count interestingly normal at 171K (highest it has been in years).   · Hyponatremia - resolved per labs 3/20.   · HERNANDEZ with biopsy proven sta

## 2020-05-04 NOTE — ADDENDUM NOTE
Encounter addended by: SHEA Durham on: 5/4/2020 9:00 AM   Actions taken: Charge Capture section accepted

## 2020-05-12 ENCOUNTER — OFFICE VISIT (OUTPATIENT)
Dept: HEMATOLOGY/ONCOLOGY | Facility: HOSPITAL | Age: 75
End: 2020-05-12
Attending: INTERNAL MEDICINE
Payer: MEDICARE

## 2020-05-12 VITALS
TEMPERATURE: 98 F | SYSTOLIC BLOOD PRESSURE: 115 MMHG | OXYGEN SATURATION: 99 % | WEIGHT: 195.38 LBS | BODY MASS INDEX: 27 KG/M2 | HEART RATE: 89 BPM | RESPIRATION RATE: 16 BRPM | DIASTOLIC BLOOD PRESSURE: 71 MMHG

## 2020-05-12 DIAGNOSIS — D69.6 THROMBOCYTOPENIA (HCC): ICD-10-CM

## 2020-05-12 DIAGNOSIS — D69.3 IMMUNE THROMBOCYTOPENIC PURPURA (HCC): Primary | ICD-10-CM

## 2020-05-12 DIAGNOSIS — M32.19 OTHER SYSTEMIC LUPUS ERYTHEMATOSUS WITH OTHER ORGAN INVOLVEMENT (HCC): ICD-10-CM

## 2020-05-12 PROCEDURE — 96375 TX/PRO/DX INJ NEW DRUG ADDON: CPT

## 2020-05-12 PROCEDURE — 96365 THER/PROPH/DIAG IV INF INIT: CPT

## 2020-05-12 PROCEDURE — 96366 THER/PROPH/DIAG IV INF ADDON: CPT

## 2020-05-12 RX ORDER — ACETAMINOPHEN 325 MG/1
650 TABLET ORAL ONCE
Status: CANCELLED | OUTPATIENT
Start: 2020-05-19

## 2020-05-12 RX ORDER — FAMOTIDINE 10 MG/ML
20 INJECTION, SOLUTION INTRAVENOUS ONCE
Status: CANCELLED | OUTPATIENT
Start: 2020-05-19

## 2020-05-12 RX ORDER — MEPERIDINE HYDROCHLORIDE 25 MG/ML
25 INJECTION INTRAMUSCULAR; INTRAVENOUS; SUBCUTANEOUS ONCE
Status: CANCELLED | OUTPATIENT
Start: 2020-05-19

## 2020-05-12 RX ORDER — METHYLPREDNISOLONE SODIUM SUCCINATE 40 MG/ML
40 INJECTION, POWDER, LYOPHILIZED, FOR SOLUTION INTRAMUSCULAR; INTRAVENOUS ONCE
Status: CANCELLED | OUTPATIENT
Start: 2020-05-19

## 2020-05-12 RX ORDER — DIPHENHYDRAMINE HCL 25 MG
25 CAPSULE ORAL ONCE
Status: COMPLETED | OUTPATIENT
Start: 2020-05-12 | End: 2020-05-12

## 2020-05-12 RX ORDER — METHYLPREDNISOLONE SODIUM SUCCINATE 40 MG/ML
40 INJECTION, POWDER, LYOPHILIZED, FOR SOLUTION INTRAMUSCULAR; INTRAVENOUS ONCE
Status: CANCELLED
Start: 2020-05-19

## 2020-05-12 RX ORDER — ACETAMINOPHEN 325 MG/1
650 TABLET ORAL ONCE
Status: COMPLETED | OUTPATIENT
Start: 2020-05-12 | End: 2020-05-12

## 2020-05-12 RX ORDER — DIPHENHYDRAMINE HCL 25 MG
25 CAPSULE ORAL ONCE
Status: CANCELLED | OUTPATIENT
Start: 2020-05-19

## 2020-05-12 RX ORDER — METHYLPREDNISOLONE SODIUM SUCCINATE 125 MG/2ML
125 INJECTION, POWDER, LYOPHILIZED, FOR SOLUTION INTRAMUSCULAR; INTRAVENOUS ONCE
Status: CANCELLED | OUTPATIENT
Start: 2020-05-19

## 2020-05-12 RX ORDER — METHYLPREDNISOLONE SODIUM SUCCINATE 40 MG/ML
40 INJECTION, POWDER, LYOPHILIZED, FOR SOLUTION INTRAMUSCULAR; INTRAVENOUS ONCE
Status: COMPLETED | OUTPATIENT
Start: 2020-05-12 | End: 2020-05-12

## 2020-05-12 RX ORDER — DIPHENHYDRAMINE HYDROCHLORIDE 50 MG/ML
25 INJECTION INTRAMUSCULAR; INTRAVENOUS ONCE
Status: CANCELLED | OUTPATIENT
Start: 2020-05-19

## 2020-05-12 RX ADMIN — METHYLPREDNISOLONE SODIUM SUCCINATE 40 MG: 40 INJECTION, POWDER, LYOPHILIZED, FOR SOLUTION INTRAMUSCULAR; INTRAVENOUS at 11:16:00

## 2020-05-12 RX ADMIN — DIPHENHYDRAMINE HCL 25 MG: 25 MG CAPSULE ORAL at 11:10:00

## 2020-05-12 RX ADMIN — ACETAMINOPHEN 650 MG: 325 TABLET ORAL at 11:10:00

## 2020-05-12 NOTE — PROGRESS NOTES
Education Record    Learner:  Patient    Disease / Diagnosis:Polymyositis    Barriers / Limitations:  None   Comments:    Method:  Discussion and Reinforcement   Comments:    General Topics:  Medication, Side effects and symptom management, Plan of care re

## 2020-05-18 ENCOUNTER — HOSPITAL ENCOUNTER (OUTPATIENT)
Dept: CARDIOLOGY CLINIC | Facility: HOSPITAL | Age: 75
Discharge: HOME | End: 2020-05-18
Attending: NURSE PRACTITIONER
Payer: MEDICARE

## 2020-05-18 DIAGNOSIS — G47.33 OSA (OBSTRUCTIVE SLEEP APNEA): ICD-10-CM

## 2020-05-18 DIAGNOSIS — I27.20 PULMONARY HYPERTENSION (HCC): ICD-10-CM

## 2020-05-18 DIAGNOSIS — I48.20 CHRONIC ATRIAL FIBRILLATION (HCC): ICD-10-CM

## 2020-05-18 DIAGNOSIS — M35.1 MIXED CONNECTIVE TISSUE DISEASE (HCC): ICD-10-CM

## 2020-05-18 DIAGNOSIS — I10 BENIGN ESSENTIAL HYPERTENSION: ICD-10-CM

## 2020-05-18 DIAGNOSIS — I48.19 OTHER PERSISTENT ATRIAL FIBRILLATION (HCC): ICD-10-CM

## 2020-05-18 DIAGNOSIS — N18.2 CKD (CHRONIC KIDNEY DISEASE) STAGE 2, GFR 60-89 ML/MIN: ICD-10-CM

## 2020-05-18 DIAGNOSIS — I50.812 CHRONIC RIGHT-SIDED CONGESTIVE HEART FAILURE (HCC): Primary | ICD-10-CM

## 2020-05-18 PROCEDURE — 99442 PHONE E/M BY PHYS 11-20 MIN: CPT | Performed by: NURSE PRACTITIONER

## 2020-05-18 NOTE — PROGRESS NOTES
Virtual Telephone Check-In    Taylor Rowe verbally consents to a Virtual/Telephone Check-In visit on 05/18/20. Patient understands and accepts financial responsibility for any deductible, co-insurance and/or co-pays associated with this service. Rfl: 3  Calcium Citrate-Vitamin D (CITRACAL + D OR), Take 1 tablet by mouth daily. , Disp: , Rfl:   Omega-3 Fatty Acids (OMEGA 3 OR), Take 1 capsule by mouth daily. , Disp: , Rfl:   MULTI VITAMIN MENS OR, Take 1 tablet by mouth daily.   , Disp: , Rfl: 5/1/2018    Performed by Jessica Marie MD at [facility] ENDOSCOPY   • COLONOSCOPY WITH BIOPSY  5/14/13   • EGD     • ESOPHAGOGASTRODUODENOSCOPY (EGD) N/A 5/1/2018    Performed by Jessica Marie MD at [facility] ENDOSCOPY   • HAND/FINGER SURGERY UNLISTED  2003 · Chronic RV heart failure - LVEF 55-60% with mild to moderately dilated RV with reduced RV systolic function. No s/s to suggest decompensation. 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 PVR 4 wood units and mPAP 38 mmhg, wedge 10 mmg. communication.   This has been done in good herbert to provide continuity of care in the best interest of the provider-patient relationship, due to the on-going public health crisis/national emergency and because of restrictions of face to face non - critical

## 2020-05-27 ENCOUNTER — APPOINTMENT (OUTPATIENT)
Dept: LAB | Facility: HOSPITAL | Age: 75
End: 2020-05-27
Attending: INTERNAL MEDICINE
Payer: MEDICARE

## 2020-05-27 DIAGNOSIS — M33.20 POLYMYOSITIS (HCC): ICD-10-CM

## 2020-05-27 DIAGNOSIS — D69.3 IMMUNE THROMBOCYTOPENIC PURPURA (HCC): ICD-10-CM

## 2020-05-27 DIAGNOSIS — I43 CARDIOMYOPATHY AS MANIFESTATION OF UNDERLYING DISEASE (HCC): ICD-10-CM

## 2020-05-27 DIAGNOSIS — M35.1 MIXED CONNECTIVE TISSUE DISEASE (HCC): ICD-10-CM

## 2020-05-27 DIAGNOSIS — M35.1 MCTD (MIXED CONNECTIVE TISSUE DISEASE) (HCC): ICD-10-CM

## 2020-05-27 PROCEDURE — 82085 ASSAY OF ALDOLASE: CPT

## 2020-05-27 PROCEDURE — 82550 ASSAY OF CK (CPK): CPT

## 2020-05-27 PROCEDURE — 80053 COMPREHEN METABOLIC PANEL: CPT

## 2020-05-27 PROCEDURE — 36415 COLL VENOUS BLD VENIPUNCTURE: CPT

## 2020-05-27 PROCEDURE — 86140 C-REACTIVE PROTEIN: CPT

## 2020-05-29 ENCOUNTER — TELEMEDICINE (OUTPATIENT)
Dept: RHEUMATOLOGY | Facility: CLINIC | Age: 75
End: 2020-05-29

## 2020-05-29 VITALS — HEIGHT: 71 IN | WEIGHT: 188 LBS | BODY MASS INDEX: 26.32 KG/M2

## 2020-05-29 DIAGNOSIS — I50.812 CHRONIC RIGHT-SIDED CONGESTIVE HEART FAILURE (HCC): ICD-10-CM

## 2020-05-29 DIAGNOSIS — I43 CARDIOMYOPATHY AS MANIFESTATION OF UNDERLYING DISEASE (HCC): ICD-10-CM

## 2020-05-29 DIAGNOSIS — M35.1 MIXED CONNECTIVE TISSUE DISEASE (HCC): Chronic | ICD-10-CM

## 2020-05-29 DIAGNOSIS — D69.3 IMMUNE THROMBOCYTOPENIC PURPURA (HCC): ICD-10-CM

## 2020-05-29 DIAGNOSIS — D69.6 THROMBOCYTOPENIA (HCC): ICD-10-CM

## 2020-05-29 DIAGNOSIS — M33.20 POLYMYOSITIS (HCC): Primary | ICD-10-CM

## 2020-05-29 PROCEDURE — 99214 OFFICE O/P EST MOD 30 MIN: CPT | Performed by: INTERNAL MEDICINE

## 2020-05-29 RX ORDER — PREDNISONE 1 MG/1
4 TABLET ORAL
Qty: 150 TABLET | Refills: 2 | Status: SHIPPED | OUTPATIENT
Start: 2020-05-29 | End: 2020-08-24

## 2020-05-29 RX ORDER — PREDNISONE 1 MG/1
5 TABLET ORAL DAILY
Qty: 90 TABLET | Refills: 2 | Status: SHIPPED | OUTPATIENT
Start: 2020-05-29 | End: 2020-12-01

## 2020-05-29 NOTE — PATIENT INSTRUCTIONS
Continue regular follow-up visits with cardiology regarding her cardiomyopathy and edema. Continue to follow a proper diet low in salt low in fat. Avoid the junk food. Continue IVIG infusions monthly for the thrombocytopenia and polymyositis.   Continue

## 2020-05-29 NOTE — PROGRESS NOTES
This visit is conducted using Telemedicine with live, interactive video and audio. Patient understands and accepts financial responsibility for any deductible, co-insurance and/or co-pays associated with this service.   [facility] Video Visit   IVIG infusions monthly for the thrombocytopenia and polymyositis. Continue Imuran 50 mg twice a day. Regarding prednisone try to lower the prednisone from 10 mg a day to 9 mg a day. If you feel worse on 9 mg daily, then return to 10 mg/day prednisone.

## 2020-06-09 ENCOUNTER — OFFICE VISIT (OUTPATIENT)
Dept: HEMATOLOGY/ONCOLOGY | Facility: HOSPITAL | Age: 75
End: 2020-06-09
Attending: INTERNAL MEDICINE
Payer: MEDICARE

## 2020-06-09 VITALS
RESPIRATION RATE: 18 BRPM | TEMPERATURE: 98 F | HEIGHT: 72.01 IN | HEART RATE: 70 BPM | DIASTOLIC BLOOD PRESSURE: 77 MMHG | WEIGHT: 195 LBS | OXYGEN SATURATION: 99 % | SYSTOLIC BLOOD PRESSURE: 137 MMHG | BODY MASS INDEX: 26.41 KG/M2

## 2020-06-09 DIAGNOSIS — D69.6 THROMBOCYTOPENIA (HCC): ICD-10-CM

## 2020-06-09 DIAGNOSIS — M32.19 OTHER SYSTEMIC LUPUS ERYTHEMATOSUS WITH OTHER ORGAN INVOLVEMENT (HCC): ICD-10-CM

## 2020-06-09 DIAGNOSIS — D69.3 IMMUNE THROMBOCYTOPENIC PURPURA (HCC): Primary | ICD-10-CM

## 2020-06-09 PROCEDURE — 96365 THER/PROPH/DIAG IV INF INIT: CPT

## 2020-06-09 PROCEDURE — 96366 THER/PROPH/DIAG IV INF ADDON: CPT

## 2020-06-09 PROCEDURE — 96375 TX/PRO/DX INJ NEW DRUG ADDON: CPT

## 2020-06-09 RX ORDER — MEPERIDINE HYDROCHLORIDE 25 MG/ML
25 INJECTION INTRAMUSCULAR; INTRAVENOUS; SUBCUTANEOUS ONCE
Status: CANCELLED | OUTPATIENT
Start: 2020-07-07

## 2020-06-09 RX ORDER — METHYLPREDNISOLONE SODIUM SUCCINATE 40 MG/ML
40 INJECTION, POWDER, LYOPHILIZED, FOR SOLUTION INTRAMUSCULAR; INTRAVENOUS ONCE
Status: COMPLETED | OUTPATIENT
Start: 2020-06-09 | End: 2020-06-09

## 2020-06-09 RX ORDER — METHYLPREDNISOLONE SODIUM SUCCINATE 125 MG/2ML
125 INJECTION, POWDER, LYOPHILIZED, FOR SOLUTION INTRAMUSCULAR; INTRAVENOUS ONCE
Status: CANCELLED | OUTPATIENT
Start: 2020-07-07

## 2020-06-09 RX ORDER — FAMOTIDINE 10 MG/ML
20 INJECTION, SOLUTION INTRAVENOUS ONCE
Status: CANCELLED | OUTPATIENT
Start: 2020-07-07

## 2020-06-09 RX ORDER — DIPHENHYDRAMINE HYDROCHLORIDE 50 MG/ML
25 INJECTION INTRAMUSCULAR; INTRAVENOUS ONCE
Status: CANCELLED | OUTPATIENT
Start: 2020-07-07

## 2020-06-09 RX ORDER — ACETAMINOPHEN 325 MG/1
650 TABLET ORAL ONCE
Status: CANCELLED | OUTPATIENT
Start: 2020-07-07

## 2020-06-09 RX ORDER — METHYLPREDNISOLONE SODIUM SUCCINATE 40 MG/ML
40 INJECTION, POWDER, LYOPHILIZED, FOR SOLUTION INTRAMUSCULAR; INTRAVENOUS ONCE
Status: CANCELLED | OUTPATIENT
Start: 2020-07-07

## 2020-06-09 RX ORDER — DIPHENHYDRAMINE HCL 25 MG
25 CAPSULE ORAL ONCE
Status: CANCELLED | OUTPATIENT
Start: 2020-07-07

## 2020-06-09 RX ORDER — METHYLPREDNISOLONE SODIUM SUCCINATE 40 MG/ML
40 INJECTION, POWDER, LYOPHILIZED, FOR SOLUTION INTRAMUSCULAR; INTRAVENOUS ONCE
Status: CANCELLED
Start: 2020-07-07

## 2020-06-09 RX ORDER — ACETAMINOPHEN 325 MG/1
650 TABLET ORAL ONCE
Status: COMPLETED | OUTPATIENT
Start: 2020-06-09 | End: 2020-06-09

## 2020-06-09 RX ORDER — DIPHENHYDRAMINE HCL 25 MG
25 CAPSULE ORAL ONCE
Status: COMPLETED | OUTPATIENT
Start: 2020-06-09 | End: 2020-06-09

## 2020-06-09 RX ADMIN — METHYLPREDNISOLONE SODIUM SUCCINATE 40 MG: 40 INJECTION, POWDER, LYOPHILIZED, FOR SOLUTION INTRAMUSCULAR; INTRAVENOUS at 11:09:00

## 2020-06-09 RX ADMIN — ACETAMINOPHEN 650 MG: 325 TABLET ORAL at 11:09:00

## 2020-06-09 RX ADMIN — DIPHENHYDRAMINE HCL 25 MG: 25 MG CAPSULE ORAL at 11:09:00

## 2020-06-09 NOTE — PROGRESS NOTES
Education Record    Learner:  Patient    Disease / Diagnosis: ITP - IVIG infusion    Barriers / Limitations:  None   Comments:    Method:  Brief focused and Reinforcement   Comments:    General Topics:  Plan of care reviewed   Comments:    Outcome:  Shows

## 2020-06-10 ENCOUNTER — OFFICE VISIT (OUTPATIENT)
Dept: HEMATOLOGY/ONCOLOGY | Facility: HOSPITAL | Age: 75
End: 2020-06-10
Attending: INTERNAL MEDICINE
Payer: MEDICARE

## 2020-06-10 VITALS
WEIGHT: 197 LBS | SYSTOLIC BLOOD PRESSURE: 131 MMHG | HEART RATE: 85 BPM | BODY MASS INDEX: 26.68 KG/M2 | TEMPERATURE: 97 F | RESPIRATION RATE: 16 BRPM | HEIGHT: 72.01 IN | DIASTOLIC BLOOD PRESSURE: 72 MMHG | OXYGEN SATURATION: 98 %

## 2020-06-10 DIAGNOSIS — M32.19 OTHER SYSTEMIC LUPUS ERYTHEMATOSUS WITH OTHER ORGAN INVOLVEMENT (HCC): ICD-10-CM

## 2020-06-10 DIAGNOSIS — M33.20 POLYMYOSITIS (HCC): ICD-10-CM

## 2020-06-10 DIAGNOSIS — D69.6 THROMBOCYTOPENIA (HCC): Primary | ICD-10-CM

## 2020-06-10 PROCEDURE — 99213 OFFICE O/P EST LOW 20 MIN: CPT | Performed by: INTERNAL MEDICINE

## 2020-06-10 RX ORDER — AZATHIOPRINE 50 MG/1
TABLET ORAL
Qty: 30 TABLET | Refills: 3 | Status: SHIPPED | OUTPATIENT
Start: 2020-06-10 | End: 2020-07-03

## 2020-06-10 NOTE — PROGRESS NOTES
Education Record    Learner:  Patient    Disease / Diagnosis:thrombocytopenia     Barriers / Limitations:  None   Comments:    Method:  Discussion   Comments:    General Topics:  Plan of care reviewed   Comments:    Outcome:  Shows understanding   Comments

## 2020-06-10 NOTE — PROGRESS NOTES
[facility] Progress Note    Patient Name: Simon Peters   YOB: 1945   Medical Record Number: [id number]   CSN: [id number]   Attending Physician: Emilio Schultz M.D.    Referring Physician: MD Dr. Sumi Hall    Date of Vi Disp: 360 tablet, Rfl: 1  •  Potassium Chloride ER (KLOR-CON M20) 20 MEQ Oral Tab CR, Take 2 tablets (40 mEq total) by mouth daily. 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   • Esophagus) Son    • Heart Attack Maternal Grandfather        Psychosocial History:  Social History    Socioeconomic History      Marital status:       Spouse name: Not on file      Number of children: Not on file      Years of education: Not on file Concern: Yes          1 soda per day and decaf coffee        Occupational Exposure: Not Asked        Hobby Hazards: Not Asked        Sleep Concern: No        Stress Concern: Not Asked        Weight Concern: Not Asked        Special Diet: Not Asked        B 07/28/2014 0821    HCT 39.4 06/04/2014 0831    .9 (H) 06/10/2020 0944    .0 (H) 02/11/2020 0932    .6 (H) 11/22/2019 1126    MEAN CELL VOLUME 98.1 08/05/2014 1205    MEAN CELL VOLUME 98.0 07/28/2014 0821    MEAN CELL VOLUME 94.9 06/04/ signs and symptoms to watch out for. He has frequent labs through his other physicians and will forward results to us for review. RTC 1 year.      Emotional Well Being:  I have assessed the patient's emotional well-being and any concerns about anxie

## 2020-06-11 ENCOUNTER — HOSPITAL ENCOUNTER (OUTPATIENT)
Dept: CARDIOLOGY CLINIC | Facility: HOSPITAL | Age: 75
Discharge: HOME | End: 2020-06-11
Attending: NURSE PRACTITIONER
Payer: MEDICARE

## 2020-06-11 DIAGNOSIS — I50.812 CHRONIC RIGHT-SIDED CONGESTIVE HEART FAILURE (HCC): Primary | ICD-10-CM

## 2020-06-11 DIAGNOSIS — I27.20 PULMONARY HYPERTENSION (HCC): ICD-10-CM

## 2020-06-11 PROCEDURE — 99214 OFFICE O/P EST MOD 30 MIN: CPT | Performed by: NURSE PRACTITIONER

## 2020-06-11 NOTE — PROGRESS NOTES
Virtual Video Visit     This is a telemedicine visit with live, interactive video and audio. Patient understands and accepts financial responsibility for any deductible, co-insurance and/or co-pays associated with this service.     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 Assessment:   · Chronic RV heart failure - LVEF 55-60% with mild to moderately dilated RV with reduced RV systolic function. No s/s to suggest decompensation. 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  PVR 4 wood uni

## 2020-06-22 RX ORDER — DOXYCYCLINE HYCLATE 100 MG
TABLET ORAL
Qty: 20 TABLET | Refills: 0 | Status: SHIPPED | OUTPATIENT
Start: 2020-06-22 | End: 2020-06-25 | Stop reason: ALTCHOICE

## 2020-06-22 NOTE — TELEPHONE ENCOUNTER
Refill request for:    Requested Prescriptions     Pending Prescriptions Disp Refills   • DOXYCYCLINE HYCLATE 100 MG Oral Tab [Pharmacy Med Name: DOXYCYCLINE HYCLATE 100 MG TAB] 20 tablet 0     Sig: TAKE 1 TABLET BY MOUTH TWICE A DAY FOR 10 DAYS        Las

## 2020-06-23 ENCOUNTER — PATIENT MESSAGE (OUTPATIENT)
Dept: RHEUMATOLOGY | Facility: CLINIC | Age: 75
End: 2020-06-23

## 2020-06-24 ENCOUNTER — TELEPHONE (OUTPATIENT)
Dept: RHEUMATOLOGY | Facility: CLINIC | Age: 75
End: 2020-06-24

## 2020-06-24 DIAGNOSIS — I50.9 CHF (CONGESTIVE HEART FAILURE) (HCC): Primary | ICD-10-CM

## 2020-06-24 NOTE — TELEPHONE ENCOUNTER
Pt contacted Dany Wong regarding his IVIG infusions. Julio Moody is approved until March. Pt notified to call [facility] to schedule.

## 2020-06-24 NOTE — TELEPHONE ENCOUNTER
Mare Mcintyre RN 6/24/2020 8:35 AM CDT      ----- Message -----  From: Reji Hobson  Sent: 6/23/2020 9:41 PM CDT  To: [facility] Clinical Staff  Subject: RE: Other     IVIG is helping and I feel good. I will call the office tomorrow.  Thanh dueñas

## 2020-06-25 ENCOUNTER — HOSPITAL ENCOUNTER (OUTPATIENT)
Dept: LAB | Facility: HOSPITAL | Age: 75
Discharge: HOME | End: 2020-06-25
Attending: NURSE PRACTITIONER
Payer: MEDICARE

## 2020-06-25 ENCOUNTER — HOSPITAL ENCOUNTER (OUTPATIENT)
Dept: CV DIAGNOSTICS | Facility: HOSPITAL | Age: 75
Discharge: HOME | End: 2020-06-25
Attending: INTERNAL MEDICINE
Payer: MEDICARE

## 2020-06-25 ENCOUNTER — HOSPITAL ENCOUNTER (OUTPATIENT)
Dept: CARDIOLOGY CLINIC | Facility: HOSPITAL | Age: 75
Discharge: HOME | End: 2020-06-25
Attending: NURSE PRACTITIONER
Payer: MEDICARE

## 2020-06-25 VITALS
DIASTOLIC BLOOD PRESSURE: 63 MMHG | BODY MASS INDEX: 26 KG/M2 | HEART RATE: 86 BPM | OXYGEN SATURATION: 100 % | SYSTOLIC BLOOD PRESSURE: 120 MMHG | RESPIRATION RATE: 27 BRPM | WEIGHT: 193.38 LBS

## 2020-06-25 DIAGNOSIS — N18.2 CKD (CHRONIC KIDNEY DISEASE) STAGE 2, GFR 60-89 ML/MIN: ICD-10-CM

## 2020-06-25 DIAGNOSIS — I27.20 PULMONARY HYPERTENSION (HCC): ICD-10-CM

## 2020-06-25 DIAGNOSIS — I48.20 CHRONIC ATRIAL FIBRILLATION (HCC): ICD-10-CM

## 2020-06-25 DIAGNOSIS — I50.812 CHRONIC RIGHT-SIDED CONGESTIVE HEART FAILURE (HCC): Primary | ICD-10-CM

## 2020-06-25 DIAGNOSIS — G47.33 OSA (OBSTRUCTIVE SLEEP APNEA): ICD-10-CM

## 2020-06-25 DIAGNOSIS — I50.9 CHF (CONGESTIVE HEART FAILURE) (HCC): ICD-10-CM

## 2020-06-25 DIAGNOSIS — I50.9 HEART FAILURE, UNSPECIFIED HF CHRONICITY, UNSPECIFIED HEART FAILURE TYPE (HCC): ICD-10-CM

## 2020-06-25 PROCEDURE — 83880 ASSAY OF NATRIURETIC PEPTIDE: CPT | Performed by: NURSE PRACTITIONER

## 2020-06-25 PROCEDURE — 99215 OFFICE O/P EST HI 40 MIN: CPT | Performed by: NURSE PRACTITIONER

## 2020-06-25 PROCEDURE — 93306 TTE W/DOPPLER COMPLETE: CPT | Performed by: INTERNAL MEDICINE

## 2020-06-25 PROCEDURE — 80048 BASIC METABOLIC PNL TOTAL CA: CPT | Performed by: NURSE PRACTITIONER

## 2020-06-25 PROCEDURE — 36415 COLL VENOUS BLD VENIPUNCTURE: CPT | Performed by: NURSE PRACTITIONER

## 2020-06-25 NOTE — PROGRESS NOTES
Pt. assessed. No s/s of shortness of breath, fatigue, or chest pain. BLE edema noted, and pt reporting mild abdominal bloating and APN notified. Weight up 5 lbs at 193.4 lbs. Reviewed current list of patient's allergies and medication; updated EMR.  Labs o

## 2020-06-25 NOTE — PROGRESS NOTES
[facility] Cardiac Health Progress Note    Marcie Tyler is a 66year old male who presents to clinic for APN assessment and management of chronic RV heart failure and is functional class 2-3.      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 , Disp: 90 tablet, Rfl: 2  •  torsemide 20 MG Oral Tab, Take 2 tablets (40 mg total) by mouth 2 (two) times daily. , Disp: 360 tablet, Rfl: 1  •  Po 38 mmhg, wedge 10 mmg. Moderate-severe TR per last echo. Eventual RHC per Dr. Gabriele Harringtno. Per Rheumatology, PH thought to be due to autoimmune disease, mixed connective tissue disease and sleep apnea. ILD ruled out with High res CT but with GGOs.    · Dilated a

## 2020-06-26 DIAGNOSIS — I50.9 HEART FAILURE, UNSPECIFIED HF CHRONICITY, UNSPECIFIED HEART FAILURE TYPE (CMD): ICD-10-CM

## 2020-07-02 ENCOUNTER — TELEPHONE (OUTPATIENT)
Dept: CARDIOLOGY CLINIC | Facility: HOSPITAL | Age: 75
End: 2020-07-02

## 2020-07-02 NOTE — TELEPHONE ENCOUNTER
Rec'd a call from the patient stating he has gain 5 lbs in the last few days, today's weight 196 lbs. Denies any SOB and sig leg edema but feels bloated. Patient is taking 40 mg of Torsemide BID. Denies any changes to his food and fluid intake.    Consulted

## 2020-07-03 ENCOUNTER — HOSPITAL ENCOUNTER (OUTPATIENT)
Dept: CARDIOLOGY CLINIC | Facility: HOSPITAL | Age: 75
Discharge: HOME | End: 2020-07-03
Attending: NURSE PRACTITIONER
Payer: MEDICARE

## 2020-07-03 ENCOUNTER — PATIENT MESSAGE (OUTPATIENT)
Dept: RHEUMATOLOGY | Facility: CLINIC | Age: 75
End: 2020-07-03

## 2020-07-03 VITALS
BODY MASS INDEX: 28 KG/M2 | DIASTOLIC BLOOD PRESSURE: 89 MMHG | WEIGHT: 203.69 LBS | HEART RATE: 94 BPM | SYSTOLIC BLOOD PRESSURE: 123 MMHG | RESPIRATION RATE: 20 BRPM

## 2020-07-03 DIAGNOSIS — N18.2 CKD (CHRONIC KIDNEY DISEASE) STAGE 2, GFR 60-89 ML/MIN: ICD-10-CM

## 2020-07-03 DIAGNOSIS — I27.20 PULMONARY HYPERTENSION (HCC): ICD-10-CM

## 2020-07-03 DIAGNOSIS — I48.20 CHRONIC ATRIAL FIBRILLATION (HCC): ICD-10-CM

## 2020-07-03 DIAGNOSIS — G47.33 OSA (OBSTRUCTIVE SLEEP APNEA): ICD-10-CM

## 2020-07-03 DIAGNOSIS — I50.41 ACUTE COMBINED SYSTOLIC AND DIASTOLIC CONGESTIVE HEART FAILURE (HCC): Primary | ICD-10-CM

## 2020-07-03 DIAGNOSIS — M32.19 OTHER SYSTEMIC LUPUS ERYTHEMATOSUS WITH OTHER ORGAN INVOLVEMENT (HCC): Primary | ICD-10-CM

## 2020-07-03 DIAGNOSIS — I10 BENIGN ESSENTIAL HYPERTENSION: ICD-10-CM

## 2020-07-03 LAB
ANION GAP SERPL CALC-SCNC: 6 MMOL/L (ref 0–18)
BUN BLD-MCNC: 35 MG/DL (ref 7–18)
BUN/CREAT SERPL: 20.8 (ref 10–20)
CALCIUM BLD-MCNC: 9.1 MG/DL (ref 8.5–10.1)
CHLORIDE SERPL-SCNC: 107 MMOL/L (ref 98–112)
CO2 SERPL-SCNC: 28 MMOL/L (ref 21–32)
CREAT BLD-MCNC: 1.68 MG/DL (ref 0.7–1.3)
GLUCOSE BLD-MCNC: 115 MG/DL (ref 70–99)
OSMOLALITY SERPL CALC.SUM OF ELEC: 301 MOSM/KG (ref 275–295)
PATIENT FASTING Y/N/NP: NO
POTASSIUM SERPL-SCNC: 4.8 MMOL/L (ref 3.5–5.1)
SODIUM SERPL-SCNC: 141 MMOL/L (ref 136–145)

## 2020-07-03 PROCEDURE — 99215 OFFICE O/P EST HI 40 MIN: CPT | Performed by: NURSE PRACTITIONER

## 2020-07-03 RX ORDER — AZATHIOPRINE 50 MG/1
50 TABLET ORAL 2 TIMES DAILY
Qty: 30 TABLET | Refills: 3 | Status: SHIPPED | OUTPATIENT
Start: 2020-07-03 | End: 2020-07-03

## 2020-07-03 RX ORDER — AZATHIOPRINE 100 MG/1
50 TABLET ORAL 2 TIMES DAILY
COMMUNITY
End: 2020-07-24

## 2020-07-03 RX ORDER — BUMETANIDE 0.25 MG/ML
4 INJECTION, SOLUTION INTRAMUSCULAR; INTRAVENOUS ONCE
Status: COMPLETED | OUTPATIENT
Start: 2020-07-03 | End: 2020-07-03

## 2020-07-03 RX ADMIN — BUMETANIDE 4 MG: 0.25 INJECTION, SOLUTION INTRAMUSCULAR; INTRAVENOUS at 14:55:00

## 2020-07-03 NOTE — PATIENT INSTRUCTIONS
Heart Failure Discharge Instructions    Hold diuretics tonight. Activity: Regular exercise and activity is important for your overall health and to help keep your heart strong and functioning as well as possible.    Walk at a slow to moderate pace for shortness of breath or fatigue  · You are short of breath lying down, you need more pillows to breathe comfortably,  or wake up during the night short of breath  · You urinate less often during the day and more often at night  · You have a bloated feeling,

## 2020-07-03 NOTE — PROGRESS NOTES
Patient assessed. Evidence of slight Bilateral leg edema but evidence of abdominal bloating . Weight at 203.7 lbs, Up about 10 lbs. C/O SOB, Reviewed current list of patient's allergies and medication; updated EMR. BMP drawn, IV established per protocol.  2

## 2020-07-03 NOTE — PROGRESS NOTES
[facility] Cardiac Health Progress Note    Cele Fernandez is a 66year old male who presents to clinic for APN assessment and management of chronic RV heart failure and is functional class 2-3.      Subjective:  He returns for assessment sleep apnea) HST 11-7-17    AHI 37  Supine AHI 38 non-supine AHI 16 Sao2 Pj 83%    • LI (obstructive sleep apnea) PSG 5-22-19    AHI 36 RDI 36 REM AHI 45 Supine AHI 65 non-supine AHI 19 Sao2 Pj 86% CPAP 9cwp   • Pain in joints    • Pleural effusion Paternal Grandfather    • Heart Attack Paternal Grandmother    • Kidney Disease Son    • Other (Ramon's Esophagus) Son    • Heart Attack Maternal Grandfather       Social History    Tobacco Use      Smoking status: Former Smoker        Packs/day: 0.25 50 mg by mouth 2 (two) times daily. , Disp: , Rfl:   •  predniSONE 1 MG Oral Tab, Take 4 tablets (4 mg total) by mouth daily with breakfast., Disp: 150 tablet, Rfl: 2  •  predniSONE 5 MG Oral Tab, Take 1 tablet (5 mg total) by mouth daily. , Disp: 90 tablet, left ventricular     diastolic compliance and/or increased left atrial pressure. 2. Aortic valve: Trileaflet; mildly to moderately calcified leaflets. Transvalvular velocity was within the normal range. There was no     stenosis.  Moderate, 2+ regurgit exercise.   · CKD stage 2-3 - baseline creatinine ~ 1.2-1.4 mg/dl, worse, possibly d.t fluid overload. Has seen Dr. Shayan Em in the past.   · Chronic Afib - s/p Watchman, now off AC. Rates improved on increased dose Toprol  mg BID.    · LI - uses cp

## 2020-07-03 NOTE — TELEPHONE ENCOUNTER
----- Message from Jose Manuel Davila sent at 7/3/2020 11:37 AM CDT -----  Regarding: Prescription Question  Contact: [phone]  Doctor, my current prescription for azathioprine is for 50mg once a day but that was changed to twice daily a month or two a

## 2020-07-06 ENCOUNTER — PATIENT MESSAGE (OUTPATIENT)
Dept: RHEUMATOLOGY | Facility: CLINIC | Age: 75
End: 2020-07-06

## 2020-07-06 RX ORDER — AZATHIOPRINE 50 MG/1
50 TABLET ORAL 2 TIMES DAILY
Qty: 60 TABLET | Refills: 3 | Status: SHIPPED | OUTPATIENT
Start: 2020-07-06 | End: 2020-11-16

## 2020-07-06 NOTE — TELEPHONE ENCOUNTER
From: Mann Yanes  To:  Blaise Carson MD  Sent: 4/2/2576 4:17 PM CDT  Subject: Prescription Question    Dr. Haley Mirza know how this happened but [facility] only has a prescription for azathioprine for 50 mg daily and I have been taking 100 per day fo

## 2020-07-10 ENCOUNTER — HOSPITAL ENCOUNTER (OUTPATIENT)
Dept: CARDIOLOGY CLINIC | Facility: HOSPITAL | Age: 75
Discharge: HOME | End: 2020-07-10
Attending: NURSE PRACTITIONER
Payer: MEDICARE

## 2020-07-10 VITALS
SYSTOLIC BLOOD PRESSURE: 136 MMHG | HEART RATE: 74 BPM | OXYGEN SATURATION: 100 % | BODY MASS INDEX: 27 KG/M2 | DIASTOLIC BLOOD PRESSURE: 72 MMHG | WEIGHT: 200.19 LBS | RESPIRATION RATE: 17 BRPM

## 2020-07-10 DIAGNOSIS — I77.810 AORTIC ROOT DILATION (HCC): ICD-10-CM

## 2020-07-10 DIAGNOSIS — I10 BENIGN ESSENTIAL HYPERTENSION: ICD-10-CM

## 2020-07-10 DIAGNOSIS — I48.19 OTHER PERSISTENT ATRIAL FIBRILLATION (HCC): ICD-10-CM

## 2020-07-10 DIAGNOSIS — N18.30 CKD (CHRONIC KIDNEY DISEASE) STAGE 3, GFR 30-59 ML/MIN (HCC): ICD-10-CM

## 2020-07-10 DIAGNOSIS — I50.810 RIGHT HEART FAILURE, NYHA CLASS 3 (HCC): ICD-10-CM

## 2020-07-10 LAB
ANION GAP SERPL CALC-SCNC: 4 MMOL/L (ref 0–18)
BUN BLD-MCNC: 37 MG/DL (ref 7–18)
BUN/CREAT SERPL: 20.2 (ref 10–20)
CALCIUM BLD-MCNC: 9.2 MG/DL (ref 8.5–10.1)
CHLORIDE SERPL-SCNC: 106 MMOL/L (ref 98–112)
CO2 SERPL-SCNC: 32 MMOL/L (ref 21–32)
CREAT BLD-MCNC: 1.83 MG/DL (ref 0.7–1.3)
GLUCOSE BLD-MCNC: 105 MG/DL (ref 70–99)
NT-PROBNP SERPL-MCNC: 4631 PG/ML (ref ?–450)
OSMOLALITY SERPL CALC.SUM OF ELEC: 303 MOSM/KG (ref 275–295)
PATIENT FASTING Y/N/NP: NO
POTASSIUM SERPL-SCNC: 3.7 MMOL/L (ref 3.5–5.1)
SODIUM SERPL-SCNC: 142 MMOL/L (ref 136–145)

## 2020-07-10 PROCEDURE — 99214 OFFICE O/P EST MOD 30 MIN: CPT | Performed by: NURSE PRACTITIONER

## 2020-07-10 RX ORDER — TORSEMIDE 20 MG/1
TABLET ORAL
Qty: 360 TABLET | Refills: 1 | Status: SHIPPED | COMMUNITY
Start: 2020-07-10 | End: 2020-07-10

## 2020-07-10 RX ORDER — TORSEMIDE 20 MG/1
40 TABLET ORAL 2 TIMES DAILY
Qty: 360 TABLET | Refills: 1 | Status: SHIPPED | COMMUNITY
Start: 2020-07-10 | End: 2020-10-27

## 2020-07-10 RX ORDER — SPIRONOLACTONE 25 MG/1
12.5 TABLET ORAL DAILY
Qty: 30 TABLET | Refills: 2 | Status: SHIPPED | OUTPATIENT
Start: 2020-07-10 | End: 2021-01-04

## 2020-07-10 RX ORDER — BUMETANIDE 0.25 MG/ML
4 INJECTION, SOLUTION INTRAMUSCULAR; INTRAVENOUS ONCE
Status: COMPLETED | OUTPATIENT
Start: 2020-07-10 | End: 2020-07-10

## 2020-07-10 RX ORDER — POTASSIUM CHLORIDE 20 MEQ/1
40 TABLET, EXTENDED RELEASE ORAL ONCE
Status: COMPLETED | OUTPATIENT
Start: 2020-07-10 | End: 2020-07-10

## 2020-07-10 RX ORDER — POTASSIUM CHLORIDE 20 MEQ/1
60 TABLET, EXTENDED RELEASE ORAL ONCE
Status: DISCONTINUED | OUTPATIENT
Start: 2020-07-10 | End: 2020-07-10

## 2020-07-10 RX ADMIN — BUMETANIDE 4 MG: 0.25 INJECTION, SOLUTION INTRAMUSCULAR; INTRAVENOUS at 12:13:00

## 2020-07-10 RX ADMIN — POTASSIUM CHLORIDE 40 MEQ: 20 TABLET, EXTENDED RELEASE ORAL at 12:10:00

## 2020-07-10 NOTE — PATIENT INSTRUCTIONS
Heart Failure Discharge Instructions    Start taking Aldactone 12.5 mg daily. Take 30 minutes before you take Torsemide. Have labs when you see Dr. Don Tran to check kidney function.        Activity: Regular exercise and activity is important for your ove days  · You have more trouble breathing  · You get more tired with regular activity, or are limiting activity because of shortness of breath or fatigue  · You are short of breath lying down, you need more pillows to breathe comfortably,  or wake up during

## 2020-07-10 NOTE — PROGRESS NOTES
Pt. Assessed. Pt. C/olosing 5 pounds then regaining 2 lbs back. Pt. C/o abdominal bloating. Weight down 3 1/2 pounds at 200.2lbs. APN notified of pt's c/o abdominal bloating_. Labs ordered. Reviewed allergies and list of current medications with pt.  And up

## 2020-07-10 NOTE — PROGRESS NOTES
[facility] Cardiac Health Progress Note    Magno Zapata is a 66year old male who presents to clinic for APN assessment and management of chronic RV heart failure and is functional class 2-3.      He returns for a one week follow up of • LI (obstructive sleep apnea) HST 11-7-17    AHI 37  Supine AHI 38 non-supine AHI 16 Sao2 Pj 83%    • LI (obstructive sleep apnea) PSG 5-22-19    AHI 36 RDI 36 REM AHI 45 Supine AHI 65 non-supine AHI 19 Sao2 Pj 86% CPAP 9cwp   • Pain in joints Mother    • Cancer Paternal Grandfather    • Heart Attack Paternal Grandmother    • Kidney Disease Son    • Other (Ramon's Esophagus) Son    • Heart Attack Maternal Grandfather       Social History    Tobacco Use      Smoking status: Former Smoker total) by mouth daily as needed for Erectile Dysfunction. , Disp: 30 tablet, Rfl: 3  •  Calcium Citrate-Vitamin D (CITRACAL + D OR), Take 1 tablet by mouth daily. , Disp: , Rfl:   •  Omega-3 Fatty Acids (OMEGA 3 OR), Take 1 capsule by mouth daily.   , Disp: 2+ regurgitation. 3. Aortic root: The aortic root was normal at 3.9 cm. The ascending aorta     was dilated to ~ 4.5 cm. 4. Mitral valve: Mitral valve demonstrates normal thickness leaflets. Transvalvular velocity was within the normal range.  There w past follow up with Pulmonary revealed elevated AHI mostly due central apneas.   · Recurrent bilateral pleural effusions - hx of thoracentesis.   · Hx Lupus/Mixed CTD/Severe polymyositis - IVIG infusions monthly, follows with Dr. Aliya Garcia steroid taper an

## 2020-07-14 ENCOUNTER — OFFICE VISIT (OUTPATIENT)
Dept: HEMATOLOGY/ONCOLOGY | Facility: HOSPITAL | Age: 75
End: 2020-07-14
Attending: INTERNAL MEDICINE
Payer: MEDICARE

## 2020-07-14 VITALS
RESPIRATION RATE: 18 BRPM | HEART RATE: 74 BPM | DIASTOLIC BLOOD PRESSURE: 75 MMHG | WEIGHT: 195 LBS | TEMPERATURE: 96 F | HEIGHT: 72.01 IN | SYSTOLIC BLOOD PRESSURE: 132 MMHG | BODY MASS INDEX: 26.41 KG/M2 | OXYGEN SATURATION: 100 %

## 2020-07-14 DIAGNOSIS — D69.3 IMMUNE THROMBOCYTOPENIC PURPURA (HCC): Primary | ICD-10-CM

## 2020-07-14 DIAGNOSIS — M32.19 OTHER SYSTEMIC LUPUS ERYTHEMATOSUS WITH OTHER ORGAN INVOLVEMENT (HCC): ICD-10-CM

## 2020-07-14 DIAGNOSIS — D69.6 THROMBOCYTOPENIA (HCC): ICD-10-CM

## 2020-07-14 PROCEDURE — 96365 THER/PROPH/DIAG IV INF INIT: CPT

## 2020-07-14 PROCEDURE — 96375 TX/PRO/DX INJ NEW DRUG ADDON: CPT

## 2020-07-14 PROCEDURE — 96366 THER/PROPH/DIAG IV INF ADDON: CPT

## 2020-07-14 RX ORDER — DIPHENHYDRAMINE HCL 25 MG
25 CAPSULE ORAL ONCE
Status: COMPLETED | OUTPATIENT
Start: 2020-07-14 | End: 2020-07-14

## 2020-07-14 RX ORDER — METHYLPREDNISOLONE SODIUM SUCCINATE 40 MG/ML
40 INJECTION, POWDER, LYOPHILIZED, FOR SOLUTION INTRAMUSCULAR; INTRAVENOUS ONCE
Status: COMPLETED | OUTPATIENT
Start: 2020-07-14 | End: 2020-07-14

## 2020-07-14 RX ORDER — ACETAMINOPHEN 325 MG/1
650 TABLET ORAL ONCE
Status: CANCELLED | OUTPATIENT
Start: 2020-08-04

## 2020-07-14 RX ORDER — METHYLPREDNISOLONE SODIUM SUCCINATE 125 MG/2ML
125 INJECTION, POWDER, LYOPHILIZED, FOR SOLUTION INTRAMUSCULAR; INTRAVENOUS ONCE
Status: CANCELLED | OUTPATIENT
Start: 2020-08-04

## 2020-07-14 RX ORDER — MEPERIDINE HYDROCHLORIDE 25 MG/ML
25 INJECTION INTRAMUSCULAR; INTRAVENOUS; SUBCUTANEOUS ONCE
Status: CANCELLED | OUTPATIENT
Start: 2020-08-04

## 2020-07-14 RX ORDER — FAMOTIDINE 10 MG/ML
20 INJECTION, SOLUTION INTRAVENOUS ONCE
Status: CANCELLED | OUTPATIENT
Start: 2020-08-04

## 2020-07-14 RX ORDER — ACETAMINOPHEN 325 MG/1
650 TABLET ORAL ONCE
Status: COMPLETED | OUTPATIENT
Start: 2020-07-14 | End: 2020-07-14

## 2020-07-14 RX ORDER — METHYLPREDNISOLONE SODIUM SUCCINATE 40 MG/ML
40 INJECTION, POWDER, LYOPHILIZED, FOR SOLUTION INTRAMUSCULAR; INTRAVENOUS ONCE
Status: CANCELLED | OUTPATIENT
Start: 2020-08-04

## 2020-07-14 RX ORDER — DIPHENHYDRAMINE HCL 25 MG
25 CAPSULE ORAL ONCE
Status: CANCELLED | OUTPATIENT
Start: 2020-08-04

## 2020-07-14 RX ORDER — METHYLPREDNISOLONE SODIUM SUCCINATE 40 MG/ML
40 INJECTION, POWDER, LYOPHILIZED, FOR SOLUTION INTRAMUSCULAR; INTRAVENOUS ONCE
Status: CANCELLED
Start: 2020-08-04

## 2020-07-14 RX ORDER — DIPHENHYDRAMINE HYDROCHLORIDE 50 MG/ML
25 INJECTION INTRAMUSCULAR; INTRAVENOUS ONCE
Status: CANCELLED | OUTPATIENT
Start: 2020-08-04

## 2020-07-14 RX ADMIN — ACETAMINOPHEN 650 MG: 325 TABLET ORAL at 11:19:00

## 2020-07-14 RX ADMIN — DIPHENHYDRAMINE HCL 25 MG: 25 MG CAPSULE ORAL at 11:19:00

## 2020-07-14 RX ADMIN — METHYLPREDNISOLONE SODIUM SUCCINATE 40 MG: 40 INJECTION, POWDER, LYOPHILIZED, FOR SOLUTION INTRAMUSCULAR; INTRAVENOUS at 11:33:00

## 2020-07-14 NOTE — PROGRESS NOTES
Education Record    Learner:  Patient    Disease / Diagnosis: ITP    Barriers / Limitations:  None   Comments:    Method:  Brief focused   Comments:    General Topics:  Plan of care reviewed and Fall risk and prevention   Comments:    Outcome:  Shows under

## 2020-07-15 ENCOUNTER — HOSPITAL ENCOUNTER (OUTPATIENT)
Dept: LAB | Facility: HOSPITAL | Age: 75
Discharge: HOME | End: 2020-07-15
Attending: NURSE PRACTITIONER
Payer: MEDICARE

## 2020-07-15 ENCOUNTER — TELEPHONE (OUTPATIENT)
Dept: CARDIOLOGY CLINIC | Facility: HOSPITAL | Age: 75
End: 2020-07-15

## 2020-07-15 ENCOUNTER — OFFICE VISIT (OUTPATIENT)
Dept: CARDIOLOGY | Age: 75
End: 2020-07-15

## 2020-07-15 VITALS
BODY MASS INDEX: 27.44 KG/M2 | DIASTOLIC BLOOD PRESSURE: 74 MMHG | HEART RATE: 80 BPM | WEIGHT: 196 LBS | RESPIRATION RATE: 18 BRPM | HEIGHT: 71 IN | SYSTOLIC BLOOD PRESSURE: 122 MMHG

## 2020-07-15 DIAGNOSIS — I27.21 PAH (PULMONARY ARTERIAL HYPERTENSION) WITH PORTAL HYPERTENSION (CMD): ICD-10-CM

## 2020-07-15 DIAGNOSIS — I50.33 ACUTE ON CHRONIC HEART FAILURE WITH PRESERVED EJECTION FRACTION (HFPEF) (CMD): Primary | ICD-10-CM

## 2020-07-15 DIAGNOSIS — K76.6 PAH (PULMONARY ARTERIAL HYPERTENSION) WITH PORTAL HYPERTENSION (CMD): ICD-10-CM

## 2020-07-15 DIAGNOSIS — I50.810 RIGHT HEART FAILURE, NYHA CLASS 3 (HCC): ICD-10-CM

## 2020-07-15 LAB
ANION GAP SERPL CALC-SCNC: 3 MMOL/L (ref 0–18)
BUN BLD-MCNC: 36 MG/DL (ref 7–18)
BUN/CREAT SERPL: 22.2 (ref 10–20)
CALCIUM BLD-MCNC: 9.1 MG/DL (ref 8.5–10.1)
CHLORIDE SERPL-SCNC: 107 MMOL/L (ref 98–112)
CO2 SERPL-SCNC: 33 MMOL/L (ref 21–32)
CREAT BLD-MCNC: 1.62 MG/DL (ref 0.7–1.3)
GLUCOSE BLD-MCNC: 83 MG/DL (ref 70–99)
OSMOLALITY SERPL CALC.SUM OF ELEC: 303 MOSM/KG (ref 275–295)
PATIENT FASTING Y/N/NP: NO
POTASSIUM SERPL-SCNC: 3.9 MMOL/L (ref 3.5–5.1)
SODIUM SERPL-SCNC: 143 MMOL/L (ref 136–145)

## 2020-07-15 PROCEDURE — 36415 COLL VENOUS BLD VENIPUNCTURE: CPT | Performed by: NURSE PRACTITIONER

## 2020-07-15 PROCEDURE — 99214 OFFICE O/P EST MOD 30 MIN: CPT | Performed by: INTERNAL MEDICINE

## 2020-07-15 PROCEDURE — 80048 BASIC METABOLIC PNL TOTAL CA: CPT | Performed by: NURSE PRACTITIONER

## 2020-07-15 RX ORDER — SPIRONOLACTONE 25 MG/1
12.5 TABLET ORAL DAILY
COMMUNITY
Start: 2020-07-10

## 2020-07-15 ASSESSMENT — ENCOUNTER SYMPTOMS
FEVER: 0
WEIGHT LOSS: 0
HEMATOCHEZIA: 0
ABDOMINAL PAIN: 1
BRUISES/BLEEDS EASILY: 0
HEMOPTYSIS: 0
CHILLS: 0
SUSPICIOUS LESIONS: 0
WEIGHT GAIN: 1
BLOATING: 1
COUGH: 0
ALLERGIC/IMMUNOLOGIC COMMENTS: NO NEW FOOD ALLERGIES

## 2020-07-15 ASSESSMENT — PATIENT HEALTH QUESTIONNAIRE - PHQ9
SUM OF ALL RESPONSES TO PHQ9 QUESTIONS 1 AND 2: 0
CLINICAL INTERPRETATION OF PHQ2 SCORE: NO FURTHER SCREENING NEEDED
SUM OF ALL RESPONSES TO PHQ9 QUESTIONS 1 AND 2: 0
CLINICAL INTERPRETATION OF PHQ9 SCORE: NO FURTHER SCREENING NEEDED
2. FEELING DOWN, DEPRESSED OR HOPELESS: NOT AT ALL
1. LITTLE INTEREST OR PLEASURE IN DOING THINGS: NOT AT ALL

## 2020-07-15 NOTE — TELEPHONE ENCOUNTER
Labs reviewed with stable renal function after initiation of low Spironolactone. Results for Roxana Moncada (MRN [id number]) as of 7/15/2020 17:10   Ref.  Range 7/15/2020 11:59   Glucose Latest Ref Range: 70 - 99 mg/dL 83   Sodium Latest Ref Range: 13

## 2020-07-17 ENCOUNTER — TELEPHONE (OUTPATIENT)
Dept: CARDIOLOGY | Age: 75
End: 2020-07-17

## 2020-07-20 ENCOUNTER — TELEPHONE (OUTPATIENT)
Dept: CARDIOLOGY | Age: 75
End: 2020-07-20

## 2020-07-20 DIAGNOSIS — I50.9 CHRONIC CONGESTIVE HEART FAILURE, UNSPECIFIED HEART FAILURE TYPE (CMD): Primary | ICD-10-CM

## 2020-07-20 DIAGNOSIS — I27.20 PULMONARY HYPERTENSION (CMD): ICD-10-CM

## 2020-07-24 ENCOUNTER — HOSPITAL ENCOUNTER (OUTPATIENT)
Dept: CARDIOLOGY CLINIC | Facility: HOSPITAL | Age: 75
Discharge: HOME | End: 2020-07-24
Attending: NURSE PRACTITIONER
Payer: MEDICARE

## 2020-07-24 VITALS
SYSTOLIC BLOOD PRESSURE: 127 MMHG | HEART RATE: 92 BPM | DIASTOLIC BLOOD PRESSURE: 75 MMHG | OXYGEN SATURATION: 100 % | RESPIRATION RATE: 23 BRPM | BODY MASS INDEX: 26 KG/M2 | WEIGHT: 193.5 LBS

## 2020-07-24 DIAGNOSIS — G47.33 OSA (OBSTRUCTIVE SLEEP APNEA): ICD-10-CM

## 2020-07-24 DIAGNOSIS — I48.19 OTHER PERSISTENT ATRIAL FIBRILLATION (HCC): ICD-10-CM

## 2020-07-24 DIAGNOSIS — I48.20 CHRONIC ATRIAL FIBRILLATION (HCC): ICD-10-CM

## 2020-07-24 DIAGNOSIS — N18.2 CKD (CHRONIC KIDNEY DISEASE) STAGE 2, GFR 60-89 ML/MIN: ICD-10-CM

## 2020-07-24 DIAGNOSIS — N18.30 CKD (CHRONIC KIDNEY DISEASE) STAGE 3, GFR 30-59 ML/MIN (HCC): ICD-10-CM

## 2020-07-24 DIAGNOSIS — I50.810 RIGHT HEART FAILURE, NYHA CLASS 3 (HCC): Primary | ICD-10-CM

## 2020-07-24 DIAGNOSIS — I77.810 AORTIC ROOT DILATION (HCC): ICD-10-CM

## 2020-07-24 DIAGNOSIS — I10 BENIGN ESSENTIAL HYPERTENSION: ICD-10-CM

## 2020-07-24 LAB
ANION GAP SERPL CALC-SCNC: 2 MMOL/L (ref 0–18)
BUN BLD-MCNC: 28 MG/DL (ref 7–18)
BUN/CREAT SERPL: 20.1 (ref 10–20)
CALCIUM BLD-MCNC: 9.4 MG/DL (ref 8.5–10.1)
CHLORIDE SERPL-SCNC: 103 MMOL/L (ref 98–112)
CO2 SERPL-SCNC: 34 MMOL/L (ref 21–32)
CREAT BLD-MCNC: 1.39 MG/DL (ref 0.7–1.3)
GLUCOSE BLD-MCNC: 108 MG/DL (ref 70–99)
OSMOLALITY SERPL CALC.SUM OF ELEC: 294 MOSM/KG (ref 275–295)
PATIENT FASTING Y/N/NP: NO
POTASSIUM SERPL-SCNC: 4.4 MMOL/L (ref 3.5–5.1)
SODIUM SERPL-SCNC: 139 MMOL/L (ref 136–145)

## 2020-07-24 PROCEDURE — 99215 OFFICE O/P EST HI 40 MIN: CPT | Performed by: NURSE PRACTITIONER

## 2020-07-24 NOTE — PROGRESS NOTES
[facility] Cardiac Health Progress Note    Marcie Tyler is a 66year old male who presents to clinic for APN assessment and management of chronic RV heart failure and is functional class 3. He returns for a 2 week follow up.  Since (mixed connective tissue disease) ([facility] [address]    • Obstructive sleep apnea (adult) (pediatric) 11/16/2017   • LI (obstructive sleep apnea) HST 11-7-17    AHI 37  Supine AHI 38 non-supine AHI 16 Sao2 Pj 83%    • LI (obstructive sleep apnea) PSG 5-22-19    AH Disorders Associated Father    • Heart Disease Mother         CHF   • Breast Cancer Mother    • Stroke Mother    • Cancer Paternal Grandfather    • Heart Attack Paternal Grandmother    • Kidney Disease Son    • Other (Ramon's Esophagus) Son    • Heart At PREVIDENT 5000 BOOSTER PLUS 1.1 % Dental Paste, , Disp: , Rfl:   •  Sildenafil Citrate 50 MG Oral Tab, Take 1 tablet (50 mg total) by mouth daily as needed for Erectile Dysfunction. , Disp: 30 tablet, Rfl: 3  •  Calcium Citrate-Vitamin D (CITRACAL + D OR), decreased left ventricular     diastolic compliance and/or increased left atrial pressure. 2. Aortic valve: Trileaflet; mildly to moderately calcified leaflets. Transvalvular velocity was within the normal range. There was no     stenosis.  Moderate, 2 increasing strength with ADLs and mild exercise.   · CKD stage 2-3 - baseline creatinine ~ 1.2-1.4 mg/dl, back to baseline. Has seen Dr. Meghna Sutherland in the past.   · Chronic Afib - s/p Watchman. Rates acceptable on Toprol  mg BID.    · LI - uses cpap,

## 2020-07-24 NOTE — PROGRESS NOTES
Pt assessed. He feels good today. Reports breathing and bloating is better and edema is down. Weight down 7 lbs at 193.5 lbs. Reviewed current list of patient's allergies and medication; updated EMR. Labs ordered.  Reviewed follow-up appointment and HF disc

## 2020-07-28 ENCOUNTER — OFFICE VISIT (OUTPATIENT)
Dept: RHEUMATOLOGY | Facility: CLINIC | Age: 75
End: 2020-07-28
Payer: MEDICARE

## 2020-07-28 VITALS
HEIGHT: 72 IN | TEMPERATURE: 97 F | BODY MASS INDEX: 25.06 KG/M2 | RESPIRATION RATE: 18 BRPM | SYSTOLIC BLOOD PRESSURE: 124 MMHG | HEART RATE: 88 BPM | DIASTOLIC BLOOD PRESSURE: 78 MMHG | WEIGHT: 185 LBS

## 2020-07-28 DIAGNOSIS — M35.1 MIXED CONNECTIVE TISSUE DISEASE (HCC): Primary | Chronic | ICD-10-CM

## 2020-07-28 DIAGNOSIS — M33.20 POLYMYOSITIS (HCC): Chronic | ICD-10-CM

## 2020-07-28 DIAGNOSIS — I43 CARDIOMYOPATHY AS MANIFESTATION OF UNDERLYING DISEASE (HCC): ICD-10-CM

## 2020-07-28 DIAGNOSIS — I50.812 CHRONIC RIGHT-SIDED CONGESTIVE HEART FAILURE (HCC): ICD-10-CM

## 2020-07-28 DIAGNOSIS — D69.3 IMMUNE THROMBOCYTOPENIC PURPURA (HCC): ICD-10-CM

## 2020-07-28 DIAGNOSIS — M32.19 OTHER SYSTEMIC LUPUS ERYTHEMATOSUS WITH OTHER ORGAN INVOLVEMENT (HCC): ICD-10-CM

## 2020-07-28 PROCEDURE — 99214 OFFICE O/P EST MOD 30 MIN: CPT | Performed by: INTERNAL MEDICINE

## 2020-07-28 NOTE — PROGRESS NOTES
[facility]  Dr. Faviola Hunt Progress Note     Subjective: Sherita Menjivar is a(n) 66year old male. Current complaints: Patient presents with: Follow - Up: 4 month f/u IVIG q month, CHF clinic stabilizing heart failure  Feeling better.   Lost 10 maikel

## 2020-07-28 NOTE — PATIENT INSTRUCTIONS
Continue Imuran 50 mg twice a day. Use Prednisone 9 mg daily. IV IG monthly. Right Heart Cath 8/4/20  Low salt diet. Check labs CBC, CMP, CK. Return to office 2 months.

## 2020-07-29 ENCOUNTER — LAB ENCOUNTER (OUTPATIENT)
Dept: LAB | Facility: HOSPITAL | Age: 75
End: 2020-07-29
Attending: INTERNAL MEDICINE
Payer: MEDICARE

## 2020-07-29 DIAGNOSIS — D69.6 THROMBOCYTOPENIA (HCC): ICD-10-CM

## 2020-07-29 DIAGNOSIS — M33.20 POLYMYOSITIS (HCC): ICD-10-CM

## 2020-07-29 LAB
ALBUMIN SERPL-MCNC: 3.5 G/DL (ref 3.4–5)
ALBUMIN/GLOB SERPL: 1 {RATIO} (ref 1–2)
ALP LIVER SERPL-CCNC: 57 U/L (ref 45–117)
ALT SERPL-CCNC: 32 U/L (ref 16–61)
ANION GAP SERPL CALC-SCNC: 2 MMOL/L (ref 0–18)
AST SERPL-CCNC: 30 U/L (ref 15–37)
BASOPHILS # BLD AUTO: 0.03 X10(3) UL (ref 0–0.2)
BASOPHILS NFR BLD AUTO: 0.5 %
BILIRUB SERPL-MCNC: 0.9 MG/DL (ref 0.1–2)
BUN BLD-MCNC: 35 MG/DL (ref 7–18)
BUN/CREAT SERPL: 21.3 (ref 10–20)
CALCIUM BLD-MCNC: 9.4 MG/DL (ref 8.5–10.1)
CHLORIDE SERPL-SCNC: 103 MMOL/L (ref 98–112)
CK SERPL-CCNC: 25 U/L (ref 39–308)
CO2 SERPL-SCNC: 34 MMOL/L (ref 21–32)
CREAT BLD-MCNC: 1.64 MG/DL (ref 0.7–1.3)
DEPRECATED RDW RBC AUTO: 56.5 FL (ref 35.1–46.3)
EOSINOPHIL # BLD AUTO: 0.09 X10(3) UL (ref 0–0.7)
EOSINOPHIL NFR BLD AUTO: 1.4 %
ERYTHROCYTE [DISTWIDTH] IN BLOOD BY AUTOMATED COUNT: 15.2 % (ref 11–15)
GLOBULIN PLAS-MCNC: 3.5 G/DL (ref 2.8–4.4)
GLUCOSE BLD-MCNC: 105 MG/DL (ref 70–99)
HCT VFR BLD AUTO: 44.9 % (ref 39–53)
HGB BLD-MCNC: 14 G/DL (ref 13–17.5)
IMM GRANULOCYTES # BLD AUTO: 0.02 X10(3) UL (ref 0–1)
IMM GRANULOCYTES NFR BLD: 0.3 %
LYMPHOCYTES # BLD AUTO: 0.65 X10(3) UL (ref 1–4)
LYMPHOCYTES NFR BLD AUTO: 10.2 %
M PROTEIN MFR SERPL ELPH: 7 G/DL (ref 6.4–8.2)
MCH RBC QN AUTO: 31.4 PG (ref 26–34)
MCHC RBC AUTO-ENTMCNC: 31.2 G/DL (ref 31–37)
MCV RBC AUTO: 100.7 FL (ref 80–100)
MONOCYTES # BLD AUTO: 0.57 X10(3) UL (ref 0.1–1)
MONOCYTES NFR BLD AUTO: 9 %
NEUTROPHILS # BLD AUTO: 5 X10 (3) UL (ref 1.5–7.7)
NEUTROPHILS # BLD AUTO: 5 X10(3) UL (ref 1.5–7.7)
NEUTROPHILS NFR BLD AUTO: 78.6 %
OSMOLALITY SERPL CALC.SUM OF ELEC: 296 MOSM/KG (ref 275–295)
PATIENT FASTING Y/N/NP: NO
PLATELET # BLD AUTO: 105 10(3)UL (ref 150–450)
POTASSIUM SERPL-SCNC: 4.7 MMOL/L (ref 3.5–5.1)
RBC # BLD AUTO: 4.46 X10(6)UL (ref 3.8–5.8)
SED RATE-ML: 16 MM/HR (ref 0–12)
SODIUM SERPL-SCNC: 139 MMOL/L (ref 136–145)
WBC # BLD AUTO: 6.4 X10(3) UL (ref 4–11)

## 2020-07-29 PROCEDURE — 80053 COMPREHEN METABOLIC PANEL: CPT

## 2020-07-29 PROCEDURE — 82550 ASSAY OF CK (CPK): CPT

## 2020-07-29 PROCEDURE — 85025 COMPLETE CBC W/AUTO DIFF WBC: CPT

## 2020-07-29 PROCEDURE — 36415 COLL VENOUS BLD VENIPUNCTURE: CPT

## 2020-07-29 PROCEDURE — 85652 RBC SED RATE AUTOMATED: CPT

## 2020-08-01 ENCOUNTER — LAB ENCOUNTER (OUTPATIENT)
Dept: LAB | Facility: HOSPITAL | Age: 75
End: 2020-08-01
Attending: INTERNAL MEDICINE
Payer: MEDICARE

## 2020-08-01 DIAGNOSIS — I50.9 HEART FAILURE (HCC): ICD-10-CM

## 2020-08-02 LAB
SARS-COV-2 RNA RESP QL NAA+PROBE: NOT DETECTED
SARS-COV-2 RNA SPEC QL NAA+PROBE: NOT DETECTED
SPECIMEN SOURCE: NORMAL

## 2020-08-03 NOTE — H&P
Progress Notes  - documented in this encounter  Jessica Martinez MD - 75/[id number]/9639 12:30 PM CDT  Formatting of this note might be different from the original.  [address]  Advanced Heart Failure Clinic Note    Pulmonary Hypertension Consult out or get SOB as he did. He used to maintain a very active lifestyle few years ago. He denies orthopnea, PND. He is not having chest pain. He denies lightheadedness, dizziness, falls, or syncope.     COMPLIANCE   Description   [x] YES [] NO Medication: MOUTH DAILY. FOR GOUT.    • Omega-3 Fatty Acids (FISH OIL) 1000 MG capsule one capsule daily   • Multiple Vitamins-Minerals (MULTIVITAMIN ADULT) Tab one tablet daily   • omeprazole (PRILOSEC) 20 MG capsule one capsule twice daily   • potassium chloride (KLO was normal.     Systolic function was normal. The estimated ejection fraction was 55-60%.      There was no diagnostic evidence for regional wall motion abnormalities.     The study is not technically sufficient to allow evaluation of LV     diastolic funct thickening with perihilar interlobular septal thickening extending into the lower lobes and some associated ground-glass opacity.  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 Bilateral pleural effusions left greater than right appearance unchanged. basis. Call with a weight gain greater than 3 pounds in one day or 5 pounds in one week. Follow a 2 gram sodium (2000 mg)/2 liter (64 ounces) fluid restricted diet.    Avoid the use of NSAID (nonsteroidal anti-inflammatory drugs) including but not limit

## 2020-08-04 ENCOUNTER — HOSPITAL ENCOUNTER (OUTPATIENT)
Dept: INTERVENTIONAL RADIOLOGY/VASCULAR | Facility: HOSPITAL | Age: 75
Discharge: HOME | End: 2020-08-04
Attending: INTERNAL MEDICINE | Admitting: INTERNAL MEDICINE
Payer: MEDICARE

## 2020-08-04 VITALS
HEIGHT: 71 IN | TEMPERATURE: 97 F | DIASTOLIC BLOOD PRESSURE: 62 MMHG | RESPIRATION RATE: 15 BRPM | WEIGHT: 185 LBS | HEART RATE: 68 BPM | BODY MASS INDEX: 25.9 KG/M2 | OXYGEN SATURATION: 99 % | SYSTOLIC BLOOD PRESSURE: 116 MMHG

## 2020-08-04 DIAGNOSIS — I50.9 HEART FAILURE, UNSPECIFIED HF CHRONICITY, UNSPECIFIED HEART FAILURE TYPE (HCC): ICD-10-CM

## 2020-08-04 DIAGNOSIS — I50.9 HEART FAILURE (HCC): Primary | ICD-10-CM

## 2020-08-04 PROCEDURE — 93451 RIGHT HEART CATH: CPT | Performed by: INTERNAL MEDICINE

## 2020-08-04 PROCEDURE — 4A023N6 MEASUREMENT OF CARDIAC SAMPLING AND PRESSURE, RIGHT HEART, PERCUTANEOUS APPROACH: ICD-10-PCS | Performed by: INTERNAL MEDICINE

## 2020-08-04 PROCEDURE — 76937 US GUIDE VASCULAR ACCESS: CPT

## 2020-08-04 PROCEDURE — 93451 RIGHT HEART CATH: CPT

## 2020-08-04 PROCEDURE — 99152 MOD SED SAME PHYS/QHP 5/>YRS: CPT

## 2020-08-04 PROCEDURE — 99153 MOD SED SAME PHYS/QHP EA: CPT

## 2020-08-04 PROCEDURE — 99152 MOD SED SAME PHYS/QHP 5/>YRS: CPT | Performed by: INTERNAL MEDICINE

## 2020-08-04 RX ORDER — SODIUM CHLORIDE 9 MG/ML
INJECTION, SOLUTION INTRAVENOUS
Status: DISCONTINUED | OUTPATIENT
Start: 2020-08-05 | End: 2020-08-04 | Stop reason: HOSPADM

## 2020-08-04 RX ORDER — MIDAZOLAM HYDROCHLORIDE 1 MG/ML
INJECTION INTRAMUSCULAR; INTRAVENOUS
Status: COMPLETED
Start: 2020-08-04 | End: 2020-08-04

## 2020-08-04 RX ORDER — HEPARIN SODIUM 5000 [USP'U]/ML
INJECTION, SOLUTION INTRAVENOUS; SUBCUTANEOUS
Status: COMPLETED
Start: 2020-08-04 | End: 2020-08-04

## 2020-08-04 RX ORDER — LIDOCAINE HYDROCHLORIDE 10 MG/ML
INJECTION, SOLUTION EPIDURAL; INFILTRATION; INTRACAUDAL; PERINEURAL
Status: COMPLETED
Start: 2020-08-04 | End: 2020-08-04

## 2020-08-04 NOTE — PROGRESS NOTES
Pt arrived from lab via bed. Rt neck puncture site C/d/I soft. Activity restriction reviewed with support person. Call light in reach, HOB flat. Will continue to monitor. 1110: rt neck dressing remains unchanged.  IV d.cd, discharge instructions reviewed

## 2020-08-04 NOTE — PROCEDURES
Right Heart Catheterization     DESCRIPTION OF PROCEDURE:  After informed consent, patient had interrogation of the right neck with  ultrasound and a micropuncture set was used to effect venipuncture.  The ultrasound was used to define the relationship betw

## 2020-08-11 ENCOUNTER — OFFICE VISIT (OUTPATIENT)
Dept: HEMATOLOGY/ONCOLOGY | Facility: HOSPITAL | Age: 75
End: 2020-08-11
Attending: INTERNAL MEDICINE
Payer: MEDICARE

## 2020-08-11 VITALS
TEMPERATURE: 98 F | HEIGHT: 72.01 IN | BODY MASS INDEX: 26.14 KG/M2 | WEIGHT: 193 LBS | SYSTOLIC BLOOD PRESSURE: 136 MMHG | DIASTOLIC BLOOD PRESSURE: 81 MMHG | OXYGEN SATURATION: 98 % | HEART RATE: 88 BPM | RESPIRATION RATE: 16 BRPM

## 2020-08-11 DIAGNOSIS — M32.19 OTHER SYSTEMIC LUPUS ERYTHEMATOSUS WITH OTHER ORGAN INVOLVEMENT (HCC): ICD-10-CM

## 2020-08-11 DIAGNOSIS — D69.3 IMMUNE THROMBOCYTOPENIC PURPURA (HCC): Primary | ICD-10-CM

## 2020-08-11 DIAGNOSIS — D69.6 THROMBOCYTOPENIA (HCC): ICD-10-CM

## 2020-08-11 PROCEDURE — 96365 THER/PROPH/DIAG IV INF INIT: CPT

## 2020-08-11 PROCEDURE — 96375 TX/PRO/DX INJ NEW DRUG ADDON: CPT

## 2020-08-11 PROCEDURE — 96366 THER/PROPH/DIAG IV INF ADDON: CPT

## 2020-08-11 RX ORDER — MEPERIDINE HYDROCHLORIDE 25 MG/ML
25 INJECTION INTRAMUSCULAR; INTRAVENOUS; SUBCUTANEOUS ONCE
Status: CANCELLED | OUTPATIENT
Start: 2020-09-08

## 2020-08-11 RX ORDER — DIPHENHYDRAMINE HCL 25 MG
25 CAPSULE ORAL ONCE
Status: CANCELLED | OUTPATIENT
Start: 2020-09-08

## 2020-08-11 RX ORDER — METHYLPREDNISOLONE SODIUM SUCCINATE 40 MG/ML
40 INJECTION, POWDER, LYOPHILIZED, FOR SOLUTION INTRAMUSCULAR; INTRAVENOUS ONCE
Status: COMPLETED | OUTPATIENT
Start: 2020-08-11 | End: 2020-08-11

## 2020-08-11 RX ORDER — METHYLPREDNISOLONE SODIUM SUCCINATE 125 MG/2ML
125 INJECTION, POWDER, LYOPHILIZED, FOR SOLUTION INTRAMUSCULAR; INTRAVENOUS ONCE
Status: CANCELLED | OUTPATIENT
Start: 2020-09-08

## 2020-08-11 RX ORDER — ACETAMINOPHEN 325 MG/1
650 TABLET ORAL ONCE
Status: CANCELLED | OUTPATIENT
Start: 2020-09-08

## 2020-08-11 RX ORDER — METHYLPREDNISOLONE SODIUM SUCCINATE 40 MG/ML
40 INJECTION, POWDER, LYOPHILIZED, FOR SOLUTION INTRAMUSCULAR; INTRAVENOUS ONCE
Status: CANCELLED
Start: 2020-09-08

## 2020-08-11 RX ORDER — FAMOTIDINE 10 MG/ML
20 INJECTION, SOLUTION INTRAVENOUS ONCE
Status: CANCELLED | OUTPATIENT
Start: 2020-09-08

## 2020-08-11 RX ORDER — DIPHENHYDRAMINE HYDROCHLORIDE 50 MG/ML
25 INJECTION INTRAMUSCULAR; INTRAVENOUS ONCE
Status: CANCELLED | OUTPATIENT
Start: 2020-09-08

## 2020-08-11 RX ORDER — DIPHENHYDRAMINE HCL 25 MG
25 CAPSULE ORAL ONCE
Status: COMPLETED | OUTPATIENT
Start: 2020-08-11 | End: 2020-08-11

## 2020-08-11 RX ORDER — METHYLPREDNISOLONE SODIUM SUCCINATE 40 MG/ML
40 INJECTION, POWDER, LYOPHILIZED, FOR SOLUTION INTRAMUSCULAR; INTRAVENOUS ONCE
Status: CANCELLED | OUTPATIENT
Start: 2020-09-08

## 2020-08-11 RX ORDER — ACETAMINOPHEN 325 MG/1
650 TABLET ORAL ONCE
Status: COMPLETED | OUTPATIENT
Start: 2020-08-11 | End: 2020-08-11

## 2020-08-11 RX ADMIN — DIPHENHYDRAMINE HCL 25 MG: 25 MG CAPSULE ORAL at 11:39:00

## 2020-08-11 RX ADMIN — ACETAMINOPHEN 650 MG: 325 TABLET ORAL at 11:39:00

## 2020-08-11 RX ADMIN — METHYLPREDNISOLONE SODIUM SUCCINATE 40 MG: 40 INJECTION, POWDER, LYOPHILIZED, FOR SOLUTION INTRAMUSCULAR; INTRAVENOUS at 11:39:00

## 2020-08-11 NOTE — PROGRESS NOTES
Education Record    Learner:  Patient    Disease / Diagnosis: lupus - IVIG infusion 35 g monthly. appt made for next month. Tolerated well without issue.      Barriers / Limitations:  None   Comments:    Method:  Brief focused   Comments:    General Topics:

## 2020-08-12 ENCOUNTER — PATIENT MESSAGE (OUTPATIENT)
Dept: FAMILY MEDICINE CLINIC | Facility: CLINIC | Age: 75
End: 2020-08-12

## 2020-08-12 RX ORDER — POTASSIUM CHLORIDE 20 MEQ/1
TABLET, EXTENDED RELEASE ORAL
Qty: 270 TABLET | Refills: 0 | Status: SHIPPED | OUTPATIENT
Start: 2020-08-12 | End: 2020-11-03

## 2020-08-12 NOTE — TELEPHONE ENCOUNTER
Dr. Jocelyne Abdi, I need a refill asap for klor con 20, twice daily.  Overlooked my supply and ran out yesterday. Kane Cherry will send a request for a renewal prescription.  Sorry about the urgency but I thought I had another bottle of it but did not.  Thanks fo

## 2020-08-12 NOTE — TELEPHONE ENCOUNTER
From: Adilia Jean  To: Simon Olsen MD  Sent: 8/12/2020 11:56 AM CDT  Subject: Prescription Question    Dr. Janie Garcia, I need a refill asap for klor con 20, twice daily. Overlooked my supply and ran out yesterday.  [facility] will send a request for a wiley

## 2020-08-13 ENCOUNTER — HOSPITAL ENCOUNTER (OUTPATIENT)
Dept: CARDIOLOGY CLINIC | Facility: HOSPITAL | Age: 75
Discharge: HOME | End: 2020-08-13
Attending: NURSE PRACTITIONER
Payer: MEDICARE

## 2020-08-13 ENCOUNTER — HOSPITAL ENCOUNTER (OUTPATIENT)
Dept: LAB | Facility: HOSPITAL | Age: 75
Discharge: HOME | End: 2020-08-13
Attending: NURSE PRACTITIONER
Payer: MEDICARE

## 2020-08-13 VITALS
WEIGHT: 195.13 LBS | SYSTOLIC BLOOD PRESSURE: 143 MMHG | HEART RATE: 70 BPM | BODY MASS INDEX: 26 KG/M2 | DIASTOLIC BLOOD PRESSURE: 71 MMHG | RESPIRATION RATE: 18 BRPM | OXYGEN SATURATION: 100 %

## 2020-08-13 DIAGNOSIS — N18.2 CKD (CHRONIC KIDNEY DISEASE) STAGE 2, GFR 60-89 ML/MIN: ICD-10-CM

## 2020-08-13 DIAGNOSIS — N18.30 CKD (CHRONIC KIDNEY DISEASE) STAGE 3, GFR 30-59 ML/MIN (HCC): ICD-10-CM

## 2020-08-13 DIAGNOSIS — I50.9 CHF (CONGESTIVE HEART FAILURE) (HCC): ICD-10-CM

## 2020-08-13 DIAGNOSIS — I77.810 AORTIC ROOT DILATION (HCC): ICD-10-CM

## 2020-08-13 DIAGNOSIS — I48.20 CHRONIC ATRIAL FIBRILLATION (HCC): ICD-10-CM

## 2020-08-13 DIAGNOSIS — G47.33 OSA (OBSTRUCTIVE SLEEP APNEA): ICD-10-CM

## 2020-08-13 DIAGNOSIS — I10 BENIGN ESSENTIAL HYPERTENSION: ICD-10-CM

## 2020-08-13 DIAGNOSIS — I50.810 RIGHT HEART FAILURE, NYHA CLASS 3 (HCC): Primary | ICD-10-CM

## 2020-08-13 LAB
ANION GAP SERPL CALC-SCNC: 1 MMOL/L (ref 0–18)
BUN BLD-MCNC: 29 MG/DL (ref 7–18)
BUN/CREAT SERPL: 17.8 (ref 10–20)
CALCIUM BLD-MCNC: 9.3 MG/DL (ref 8.5–10.1)
CHLORIDE SERPL-SCNC: 103 MMOL/L (ref 98–112)
CO2 SERPL-SCNC: 38 MMOL/L (ref 21–32)
CREAT BLD-MCNC: 1.63 MG/DL (ref 0.7–1.3)
GLUCOSE BLD-MCNC: 100 MG/DL (ref 70–99)
OSMOLALITY SERPL CALC.SUM OF ELEC: 300 MOSM/KG (ref 275–295)
PATIENT FASTING Y/N/NP: NO
POTASSIUM SERPL-SCNC: 4.2 MMOL/L (ref 3.5–5.1)
SODIUM SERPL-SCNC: 142 MMOL/L (ref 136–145)

## 2020-08-13 PROCEDURE — 99214 OFFICE O/P EST MOD 30 MIN: CPT | Performed by: NURSE PRACTITIONER

## 2020-08-13 PROCEDURE — 36415 COLL VENOUS BLD VENIPUNCTURE: CPT | Performed by: NURSE PRACTITIONER

## 2020-08-13 PROCEDURE — 80048 BASIC METABOLIC PNL TOTAL CA: CPT | Performed by: NURSE PRACTITIONER

## 2020-08-13 NOTE — PROGRESS NOTES
[facility] Patient assessed. No signs and symptoms of shortness of breath, fatigue, chest pain or edema noted. Pt states that he has more energy and is going to start working out outside this week.  The patient rec

## 2020-08-24 RX ORDER — PREDNISONE 1 MG/1
4 TABLET ORAL
Qty: 120 TABLET | Refills: 2 | Status: SHIPPED | OUTPATIENT
Start: 2020-08-24 | End: 2020-11-05

## 2020-08-24 NOTE — TELEPHONE ENCOUNTER
Future Appointments   Date Time Provider Chrissie Aden   14/3/9713 86:63 AM Edmund Rendon MD [facility] [place]

## 2020-08-28 ENCOUNTER — HOSPITAL ENCOUNTER (OUTPATIENT)
Dept: CARDIOLOGY CLINIC | Facility: HOSPITAL | Age: 75
Discharge: HOME | End: 2020-08-28
Attending: NURSE PRACTITIONER
Payer: MEDICARE

## 2020-08-28 ENCOUNTER — HOSPITAL ENCOUNTER (OUTPATIENT)
Dept: LAB | Facility: HOSPITAL | Age: 75
Discharge: HOME | End: 2020-08-28
Attending: NURSE PRACTITIONER
Payer: MEDICARE

## 2020-08-28 VITALS
OXYGEN SATURATION: 100 % | BODY MASS INDEX: 27 KG/M2 | WEIGHT: 197 LBS | SYSTOLIC BLOOD PRESSURE: 138 MMHG | DIASTOLIC BLOOD PRESSURE: 75 MMHG | HEART RATE: 70 BPM

## 2020-08-28 DIAGNOSIS — N18.30 CKD (CHRONIC KIDNEY DISEASE) STAGE 3, GFR 30-59 ML/MIN (HCC): ICD-10-CM

## 2020-08-28 DIAGNOSIS — I48.20 CHRONIC ATRIAL FIBRILLATION (HCC): ICD-10-CM

## 2020-08-28 DIAGNOSIS — M35.1 MIXED CONNECTIVE TISSUE DISEASE (HCC): ICD-10-CM

## 2020-08-28 DIAGNOSIS — G47.33 OSA (OBSTRUCTIVE SLEEP APNEA): ICD-10-CM

## 2020-08-28 DIAGNOSIS — I50.812 CHRONIC RIGHT-SIDED CONGESTIVE HEART FAILURE (HCC): Primary | ICD-10-CM

## 2020-08-28 DIAGNOSIS — I50.9 CHF (CONGESTIVE HEART FAILURE) (HCC): ICD-10-CM

## 2020-08-28 LAB
ANION GAP SERPL CALC-SCNC: 3 MMOL/L (ref 0–18)
BUN BLD-MCNC: 29 MG/DL (ref 7–18)
BUN/CREAT SERPL: 18.4 (ref 10–20)
CALCIUM BLD-MCNC: 9.3 MG/DL (ref 8.5–10.1)
CHLORIDE SERPL-SCNC: 104 MMOL/L (ref 98–112)
CO2 SERPL-SCNC: 35 MMOL/L (ref 21–32)
CREAT BLD-MCNC: 1.58 MG/DL (ref 0.7–1.3)
GLUCOSE BLD-MCNC: 103 MG/DL (ref 70–99)
OSMOLALITY SERPL CALC.SUM OF ELEC: 300 MOSM/KG (ref 275–295)
PATIENT FASTING Y/N/NP: NO
POTASSIUM SERPL-SCNC: 4.6 MMOL/L (ref 3.5–5.1)
SODIUM SERPL-SCNC: 142 MMOL/L (ref 136–145)

## 2020-08-28 PROCEDURE — 80048 BASIC METABOLIC PNL TOTAL CA: CPT | Performed by: NURSE PRACTITIONER

## 2020-08-28 PROCEDURE — 36415 COLL VENOUS BLD VENIPUNCTURE: CPT | Performed by: NURSE PRACTITIONER

## 2020-08-28 PROCEDURE — 99215 OFFICE O/P EST HI 40 MIN: CPT | Performed by: NURSE PRACTITIONER

## 2020-08-28 NOTE — PROGRESS NOTES
Pt assessed. States he feels \"fine\" Denies shortness of breath or abdominal bloating. His weight log shows gradual weight gain of about 6 lbs at home a/w increased LE edema but he slowly lost 4 lbs w/ improvement of symptoms.  Today, weight up 2 lbs at 19

## 2020-08-28 NOTE — PATIENT INSTRUCTIONS
Heart Failure Discharge Instructions    Keep current medications. Follow-up in 1 month. Activity: Regular exercise and activity is important for your overall health and to help keep your heart strong and functioning as well as possible.    Walk at a slo activity because of shortness of breath or fatigue  · You are short of breath lying down, you need more pillows to breathe comfortably,  or wake up during the night short of breath  · You urinate less often during the day and more often at night  · You hav

## 2020-08-28 NOTE — PROGRESS NOTES
[facility] Cardiac Health Progress Note    Rajat Hernandez is a 66year old male who presents to clinic for APN assessment and management of chronic diastolic/RV heart failure and is functional class 2.      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 , Disp: 60 tablet, Rfl: 3  •  predniSONE 5 MG Oral Tab, Take 1 tablet (5 mg total) by mouth daily. (Patient taking differently: Take 5 mg by mouth daily.  With 4 -1 increasing strength with ADLs and mild exercise.   · CKD stage 2-3 - baseline creatinine ~ 1.2-1.4 mg/dl, stable. Follows with Dr. Eric Danielosn. · Chronic Afib - s/p Watchman. Rates acceptable on Toprol  mg BID.    · LI - uses cpap, but doesn't like it

## 2020-09-08 ENCOUNTER — OFFICE VISIT (OUTPATIENT)
Dept: HEMATOLOGY/ONCOLOGY | Facility: HOSPITAL | Age: 75
End: 2020-09-08
Attending: INTERNAL MEDICINE
Payer: MEDICARE

## 2020-09-08 VITALS
SYSTOLIC BLOOD PRESSURE: 130 MMHG | HEIGHT: 72.01 IN | WEIGHT: 197.38 LBS | DIASTOLIC BLOOD PRESSURE: 78 MMHG | RESPIRATION RATE: 16 BRPM | TEMPERATURE: 97 F | BODY MASS INDEX: 26.73 KG/M2 | HEART RATE: 58 BPM | OXYGEN SATURATION: 96 %

## 2020-09-08 DIAGNOSIS — M32.19 OTHER SYSTEMIC LUPUS ERYTHEMATOSUS WITH OTHER ORGAN INVOLVEMENT (HCC): ICD-10-CM

## 2020-09-08 DIAGNOSIS — D69.6 THROMBOCYTOPENIA (HCC): ICD-10-CM

## 2020-09-08 DIAGNOSIS — D69.3 IMMUNE THROMBOCYTOPENIC PURPURA (HCC): Primary | ICD-10-CM

## 2020-09-08 PROCEDURE — 96366 THER/PROPH/DIAG IV INF ADDON: CPT

## 2020-09-08 PROCEDURE — 96365 THER/PROPH/DIAG IV INF INIT: CPT

## 2020-09-08 PROCEDURE — 96375 TX/PRO/DX INJ NEW DRUG ADDON: CPT

## 2020-09-08 RX ORDER — METHYLPREDNISOLONE SODIUM SUCCINATE 125 MG/2ML
125 INJECTION, POWDER, LYOPHILIZED, FOR SOLUTION INTRAMUSCULAR; INTRAVENOUS ONCE
Status: CANCELLED | OUTPATIENT
Start: 2020-10-06

## 2020-09-08 RX ORDER — DIPHENHYDRAMINE HCL 25 MG
25 CAPSULE ORAL ONCE
Status: COMPLETED | OUTPATIENT
Start: 2020-09-08 | End: 2020-09-08

## 2020-09-08 RX ORDER — FAMOTIDINE 10 MG/ML
20 INJECTION, SOLUTION INTRAVENOUS ONCE
Status: CANCELLED | OUTPATIENT
Start: 2020-10-06

## 2020-09-08 RX ORDER — METHYLPREDNISOLONE SODIUM SUCCINATE 40 MG/ML
40 INJECTION, POWDER, LYOPHILIZED, FOR SOLUTION INTRAMUSCULAR; INTRAVENOUS ONCE
Status: CANCELLED | OUTPATIENT
Start: 2020-10-06

## 2020-09-08 RX ORDER — ACETAMINOPHEN 325 MG/1
650 TABLET ORAL ONCE
Status: CANCELLED | OUTPATIENT
Start: 2020-10-06

## 2020-09-08 RX ORDER — DIPHENHYDRAMINE HCL 25 MG
25 CAPSULE ORAL ONCE
Status: CANCELLED | OUTPATIENT
Start: 2020-10-06

## 2020-09-08 RX ORDER — METHYLPREDNISOLONE SODIUM SUCCINATE 40 MG/ML
40 INJECTION, POWDER, LYOPHILIZED, FOR SOLUTION INTRAMUSCULAR; INTRAVENOUS ONCE
Status: COMPLETED | OUTPATIENT
Start: 2020-09-08 | End: 2020-09-08

## 2020-09-08 RX ORDER — DIPHENHYDRAMINE HYDROCHLORIDE 50 MG/ML
25 INJECTION INTRAMUSCULAR; INTRAVENOUS ONCE
Status: CANCELLED | OUTPATIENT
Start: 2020-10-06

## 2020-09-08 RX ORDER — MEPERIDINE HYDROCHLORIDE 25 MG/ML
25 INJECTION INTRAMUSCULAR; INTRAVENOUS; SUBCUTANEOUS ONCE
Status: CANCELLED | OUTPATIENT
Start: 2020-10-06

## 2020-09-08 RX ORDER — METHYLPREDNISOLONE SODIUM SUCCINATE 40 MG/ML
40 INJECTION, POWDER, LYOPHILIZED, FOR SOLUTION INTRAMUSCULAR; INTRAVENOUS ONCE
Status: CANCELLED
Start: 2020-10-06

## 2020-09-08 RX ORDER — ACETAMINOPHEN 325 MG/1
650 TABLET ORAL ONCE
Status: COMPLETED | OUTPATIENT
Start: 2020-09-08 | End: 2020-09-08

## 2020-09-08 RX ADMIN — METHYLPREDNISOLONE SODIUM SUCCINATE 40 MG: 40 INJECTION, POWDER, LYOPHILIZED, FOR SOLUTION INTRAMUSCULAR; INTRAVENOUS at 11:47:00

## 2020-09-08 RX ADMIN — ACETAMINOPHEN 650 MG: 325 TABLET ORAL at 11:46:00

## 2020-09-08 RX ADMIN — DIPHENHYDRAMINE HCL 25 MG: 25 MG CAPSULE ORAL at 11:46:00

## 2020-09-09 ENCOUNTER — OFFICE VISIT (OUTPATIENT)
Dept: NEPHROLOGY | Facility: CLINIC | Age: 75
End: 2020-09-09
Payer: MEDICARE

## 2020-09-09 VITALS — BODY MASS INDEX: 27 KG/M2 | WEIGHT: 197.38 LBS | DIASTOLIC BLOOD PRESSURE: 80 MMHG | SYSTOLIC BLOOD PRESSURE: 128 MMHG

## 2020-09-09 DIAGNOSIS — N18.30 CKD (CHRONIC KIDNEY DISEASE) STAGE 3, GFR 30-59 ML/MIN (HCC): Primary | ICD-10-CM

## 2020-09-09 DIAGNOSIS — I50.812 CHRONIC RIGHT-SIDED CONGESTIVE HEART FAILURE (HCC): ICD-10-CM

## 2020-09-09 DIAGNOSIS — I10 ESSENTIAL HYPERTENSION: ICD-10-CM

## 2020-09-09 DIAGNOSIS — M35.1 MIXED CONNECTIVE TISSUE DISEASE (HCC): ICD-10-CM

## 2020-09-09 PROCEDURE — 99203 OFFICE O/P NEW LOW 30 MIN: CPT | Performed by: INTERNAL MEDICINE

## 2020-09-09 NOTE — PROGRESS NOTES
Consult Note      REASON FOR CONSULT:  CKD III         HPI:   Simon Peters is a 66year old male with Patient presents with: Other: New patient referred from 's NP.  Lab 8/13    MD Mr. Kalen Hall was seen in nephrology clinic tod thrombocytopenia   • Blood in urine    • Cancer (HCC)     skin   • Candidiasis of the esophagus 6/29/2012    Due to steroid use for Autoimmune disorder    • Colon polyps 5/13/2013   • Congestive heart disease ([facility] [address]    • Diverticulosis of colon 5/14/2013 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/MMS   • SKIN SURGERY  2-19-14    BCC-NOD to right superior pinna, MMS, AB   • TONSILLECTOMY  1948   • UPPER GI ENDOSCOPY,EXAM  10/2003 2006 01/2010 , 5/13    x3   • WATCHMAN N/A 7/14/2017    Performed by Alba Mcmanus MD at E Spouse name: Not on file      Number of children: Not on file      Years of education: Not on file      Highest education level: Not on file    Occupational History      Occupation: Retired     Tobacco Use      Smoking status: Former Smoker        Packs/da without wheezes, rales, rhonchi. Extremities: No significant pitting edema.   Neurologic:  normal affect, cranial nerves grossly intact, moving all extremities  Skin: Warm and dry, no rashes      LABS:     Lab Results   Component Value Date     (H III-has had a longstanding mild elevation in creatinine likely in large part related to his history of autoimmune disease.   That being said the creatinine is slightly higher of late although this is in association with increased diuretic dosing and an over

## 2020-09-10 DIAGNOSIS — K22.70 BARRETT'S ESOPHAGUS WITHOUT DYSPLASIA: ICD-10-CM

## 2020-09-11 NOTE — TELEPHONE ENCOUNTER
Refill request for:    Requested Prescriptions     Pending Prescriptions Disp Refills   • OMEPRAZOLE 20 MG Oral Capsule Delayed Release [Pharmacy Med Name: OMEPRAZOLE DR 20 MG CAPSULE] 180 capsule 1     Sig: TAKE 1 CAPSULE BY MOUTH TWICE A DAY        Last

## 2020-09-14 DIAGNOSIS — K22.70 BARRETT'S ESOPHAGUS WITHOUT DYSPLASIA: ICD-10-CM

## 2020-09-14 NOTE — TELEPHONE ENCOUNTER
Pt only has two tablets left of medication. Pt is asking If medication can be sent to pharmacy.  Appointment scheduled for Friday with Dr. Kecia Bradford

## 2020-09-15 RX ORDER — OMEPRAZOLE 20 MG/1
20 CAPSULE, DELAYED RELEASE ORAL 2 TIMES DAILY
Qty: 180 CAPSULE | Refills: 1 | Status: SHIPPED | OUTPATIENT
Start: 2020-09-15 | End: 2021-02-26

## 2020-09-16 RX ORDER — OMEPRAZOLE 20 MG/1
20 CAPSULE, DELAYED RELEASE ORAL 2 TIMES DAILY
Qty: 180 CAPSULE | Refills: 1 | OUTPATIENT
Start: 2020-09-16

## 2020-09-17 PROBLEM — J43.2 CENTRILOBULAR EMPHYSEMA (HCC): Status: ACTIVE | Noted: 2020-09-17

## 2020-09-17 PROBLEM — Z95.818 PRESENCE OF WATCHMAN LEFT ATRIAL APPENDAGE CLOSURE DEVICE: Status: ACTIVE | Noted: 2020-03-18

## 2020-09-18 ENCOUNTER — OFFICE VISIT (OUTPATIENT)
Dept: FAMILY MEDICINE CLINIC | Facility: CLINIC | Age: 75
End: 2020-09-18
Payer: MEDICARE

## 2020-09-18 VITALS
RESPIRATION RATE: 18 BRPM | BODY MASS INDEX: 27.44 KG/M2 | WEIGHT: 196 LBS | TEMPERATURE: 98 F | DIASTOLIC BLOOD PRESSURE: 70 MMHG | HEIGHT: 71.01 IN | SYSTOLIC BLOOD PRESSURE: 126 MMHG | HEART RATE: 67 BPM

## 2020-09-18 DIAGNOSIS — I48.11 LONGSTANDING PERSISTENT ATRIAL FIBRILLATION (HCC): ICD-10-CM

## 2020-09-18 DIAGNOSIS — K75.4: ICD-10-CM

## 2020-09-18 DIAGNOSIS — I77.810 AORTIC ROOT DILATION (HCC): Primary | ICD-10-CM

## 2020-09-18 DIAGNOSIS — I10 BENIGN ESSENTIAL HYPERTENSION: ICD-10-CM

## 2020-09-18 DIAGNOSIS — J43.2 CENTRILOBULAR EMPHYSEMA (HCC): ICD-10-CM

## 2020-09-18 PROCEDURE — 99214 OFFICE O/P EST MOD 30 MIN: CPT | Performed by: FAMILY MEDICINE

## 2020-09-18 NOTE — PROGRESS NOTES
HPI:   Patient presents with: Follow - Up: 6 month     Subjective   Adilia Jean is a 66year old male who presents for recheck of his hypertension. He has been taking medications as instructed, with no medication side effects.  His home BP monitoring well-nourished. No distress. HENT:   Head: Normocephalic. Neck: Normal range of motion. Cardiovascular: Normal rate, regular rhythm, S1 normal, S2 normal and normal heart sounds. No murmur heard.   Pulses:       Posterior tibial pulses are 2+ on th Dr Robinson Holland, currently in remission           stable function. Continue present management      Return in about 4 months (around 1/18/2021) for AWV with chonic condition follow up.     Jose Miguel Ahumada, 9/18/2020  10:21 AM

## 2020-09-25 ENCOUNTER — HOSPITAL ENCOUNTER (OUTPATIENT)
Dept: LAB | Facility: HOSPITAL | Age: 75
Discharge: HOME | End: 2020-09-25
Attending: NURSE PRACTITIONER
Payer: MEDICARE

## 2020-09-25 ENCOUNTER — HOSPITAL ENCOUNTER (OUTPATIENT)
Dept: CARDIOLOGY CLINIC | Facility: HOSPITAL | Age: 75
Discharge: HOME | End: 2020-09-25
Attending: NURSE PRACTITIONER
Payer: MEDICARE

## 2020-09-25 VITALS
HEART RATE: 76 BPM | OXYGEN SATURATION: 98 % | BODY MASS INDEX: 28 KG/M2 | SYSTOLIC BLOOD PRESSURE: 147 MMHG | DIASTOLIC BLOOD PRESSURE: 78 MMHG | WEIGHT: 199.63 LBS | RESPIRATION RATE: 17 BRPM

## 2020-09-25 DIAGNOSIS — I50.9 CHF (CONGESTIVE HEART FAILURE) (HCC): ICD-10-CM

## 2020-09-25 DIAGNOSIS — G47.33 OSA (OBSTRUCTIVE SLEEP APNEA): ICD-10-CM

## 2020-09-25 DIAGNOSIS — M35.1 MIXED CONNECTIVE TISSUE DISEASE (HCC): ICD-10-CM

## 2020-09-25 DIAGNOSIS — I48.20 CHRONIC ATRIAL FIBRILLATION (HCC): ICD-10-CM

## 2020-09-25 DIAGNOSIS — N18.30 CKD (CHRONIC KIDNEY DISEASE) STAGE 3, GFR 30-59 ML/MIN (HCC): ICD-10-CM

## 2020-09-25 DIAGNOSIS — I50.810 RIGHT HEART FAILURE, NYHA CLASS 3 (HCC): Primary | ICD-10-CM

## 2020-09-25 LAB
ANION GAP SERPL CALC-SCNC: 4 MMOL/L (ref 0–18)
BUN BLD-MCNC: 31 MG/DL (ref 7–18)
BUN/CREAT SERPL: 21.5 (ref 10–20)
CALCIUM BLD-MCNC: 9.3 MG/DL (ref 8.5–10.1)
CHLORIDE SERPL-SCNC: 105 MMOL/L (ref 98–112)
CO2 SERPL-SCNC: 33 MMOL/L (ref 21–32)
CREAT BLD-MCNC: 1.44 MG/DL
GLUCOSE BLD-MCNC: 105 MG/DL (ref 70–99)
OSMOLALITY SERPL CALC.SUM OF ELEC: 301 MOSM/KG (ref 275–295)
PATIENT FASTING Y/N/NP: NO
POTASSIUM SERPL-SCNC: 4.7 MMOL/L (ref 3.5–5.1)
SODIUM SERPL-SCNC: 142 MMOL/L (ref 136–145)

## 2020-09-25 PROCEDURE — 36415 COLL VENOUS BLD VENIPUNCTURE: CPT | Performed by: NURSE PRACTITIONER

## 2020-09-25 PROCEDURE — 99215 OFFICE O/P EST HI 40 MIN: CPT | Performed by: NURSE PRACTITIONER

## 2020-09-25 PROCEDURE — 80048 BASIC METABOLIC PNL TOTAL CA: CPT | Performed by: NURSE PRACTITIONER

## 2020-09-25 RX ORDER — BUMETANIDE 0.25 MG/ML
3 INJECTION, SOLUTION INTRAMUSCULAR; INTRAVENOUS ONCE
Status: COMPLETED | OUTPATIENT
Start: 2020-09-25 | End: 2020-09-25

## 2020-09-25 RX ORDER — NAPROXEN SODIUM 220 MG
1 TABLET ORAL DAILY
COMMUNITY
End: 2020-09-25

## 2020-09-25 RX ORDER — NAPROXEN SODIUM 220 MG
1 TABLET ORAL AS NEEDED
Refills: 0 | Status: SHIPPED | COMMUNITY
Start: 2020-09-25 | End: 2020-11-16

## 2020-09-25 RX ADMIN — BUMETANIDE 3 MG: 0.25 INJECTION, SOLUTION INTRAMUSCULAR; INTRAVENOUS at 15:13:00

## 2020-09-25 NOTE — PROGRESS NOTES
[facility] Cardiac Health Progress Note    Taylor Rowe is a 66year old male who presents to clinic for APN assessment and management of chronic diastolic and RV heart failure and is functional class 3.      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 , Disp: 180 capsule, Rfl: 1  •  predniSONE 1 MG Oral Tab, Take 4 tablets (4 mg total) by mouth daily with breakfast. (Patient taking differently: Take 3 mg by mouth daily with breakfast.  ), Disp: 1 heart failure - LVEF 55% with moderately dilated RV with reduced RV systolic function. Hypervolemic on exam.  · PH, mild post capillary- DPG 5 on RHC 8/4. PVR 2.3 wood units and mPAP 29 mmhg, wedge 15 mmg.    · Moderate AI, Moderate MR, Moderate TR   · Dila

## 2020-09-25 NOTE — PATIENT INSTRUCTIONS
Heart Failure Discharge Instructions    · Continue current medications. · No more torsemide today. Activity: Regular exercise and activity is important for your overall health and to help keep your heart strong and functioning as well as possible.    Wa limiting activity because of shortness of breath or fatigue  · You are short of breath lying down, you need more pillows to breathe comfortably,  or wake up during the night short of breath  · You urinate less often during the day and more often at night

## 2020-09-25 NOTE — PROGRESS NOTES
Pt. Assessed. No s/s of shortness of breath, fatigue, chest pain or edema noted. Weight stable at _199.6 lbs; a few pounds higher than last visit. Pt. C/o a \"puffy face\" due to his steroids; denies poor eating habits or fluid restriction.  Reviewed rachid

## 2020-09-28 ENCOUNTER — OFFICE VISIT (OUTPATIENT)
Dept: CARDIOLOGY | Age: 75
End: 2020-09-28

## 2020-09-28 VITALS
DIASTOLIC BLOOD PRESSURE: 70 MMHG | HEART RATE: 79 BPM | WEIGHT: 198 LBS | BODY MASS INDEX: 27.72 KG/M2 | SYSTOLIC BLOOD PRESSURE: 120 MMHG | HEIGHT: 71 IN

## 2020-09-28 DIAGNOSIS — Z86.79 HISTORY OF ATRIAL FIBRILLATION: ICD-10-CM

## 2020-09-28 DIAGNOSIS — Z95.818 PRESENCE OF WATCHMAN LEFT ATRIAL APPENDAGE CLOSURE DEVICE: Primary | ICD-10-CM

## 2020-09-28 PROCEDURE — 99213 OFFICE O/P EST LOW 20 MIN: CPT | Performed by: INTERNAL MEDICINE

## 2020-10-01 ENCOUNTER — TELEPHONE (OUTPATIENT)
Dept: RHEUMATOLOGY | Facility: CLINIC | Age: 75
End: 2020-10-01

## 2020-10-01 ENCOUNTER — LAB ENCOUNTER (OUTPATIENT)
Dept: LAB | Facility: HOSPITAL | Age: 75
End: 2020-10-01
Attending: INTERNAL MEDICINE
Payer: MEDICARE

## 2020-10-01 DIAGNOSIS — M33.20 POLYMYOSITIS (HCC): ICD-10-CM

## 2020-10-01 DIAGNOSIS — M33.20 POLYMYOSITIS (HCC): Primary | ICD-10-CM

## 2020-10-01 PROCEDURE — 36415 COLL VENOUS BLD VENIPUNCTURE: CPT

## 2020-10-01 PROCEDURE — 85652 RBC SED RATE AUTOMATED: CPT

## 2020-10-01 PROCEDURE — 85025 COMPLETE CBC W/AUTO DIFF WBC: CPT

## 2020-10-01 PROCEDURE — 82550 ASSAY OF CK (CPK): CPT

## 2020-10-01 PROCEDURE — 80053 COMPREHEN METABOLIC PANEL: CPT

## 2020-10-02 ENCOUNTER — OFFICE VISIT (OUTPATIENT)
Dept: RHEUMATOLOGY | Facility: CLINIC | Age: 75
End: 2020-10-02
Payer: MEDICARE

## 2020-10-02 VITALS
OXYGEN SATURATION: 99 % | RESPIRATION RATE: 16 BRPM | TEMPERATURE: 97 F | HEART RATE: 76 BPM | HEIGHT: 71 IN | DIASTOLIC BLOOD PRESSURE: 72 MMHG | BODY MASS INDEX: 28.05 KG/M2 | SYSTOLIC BLOOD PRESSURE: 120 MMHG | WEIGHT: 200.38 LBS

## 2020-10-02 DIAGNOSIS — M35.1 MIXED CONNECTIVE TISSUE DISEASE (HCC): Chronic | ICD-10-CM

## 2020-10-02 DIAGNOSIS — D69.6 THROMBOCYTOPENIA (HCC): ICD-10-CM

## 2020-10-02 DIAGNOSIS — M33.20 POLYMYOSITIS (HCC): Primary | Chronic | ICD-10-CM

## 2020-10-02 DIAGNOSIS — D69.3 IMMUNE THROMBOCYTOPENIC PURPURA (HCC): ICD-10-CM

## 2020-10-02 PROCEDURE — 99214 OFFICE O/P EST MOD 30 MIN: CPT | Performed by: INTERNAL MEDICINE

## 2020-10-02 NOTE — PATIENT INSTRUCTIONS
Maintain IV IG infusions monthly. Use azathioprine 50 mg 2 per day. Lower Prednisone to 7 mg per day. Maintain visits with Dr Jennyfer Naylor of hematology. Current labs are stable except for low platelets of 26,728.   The bruising experiences there is direct r

## 2020-10-02 NOTE — PROGRESS NOTES
[facility]  Dr. Sonia Duran Progress Note     Subjective: Karen Long is a(n) 66year old male. Current complaints: Patient presents with: Follow - Up: Patient states doing \"good. \" Went to CHF clinic last week, weight still up and down.   Rayna Smoker months.         Jovana Carballo MD 55/3/0325 10:25 AM

## 2020-10-06 ENCOUNTER — OFFICE VISIT (OUTPATIENT)
Dept: HEMATOLOGY/ONCOLOGY | Facility: HOSPITAL | Age: 75
End: 2020-10-06
Attending: INTERNAL MEDICINE
Payer: MEDICARE

## 2020-10-06 VITALS
TEMPERATURE: 97 F | SYSTOLIC BLOOD PRESSURE: 107 MMHG | DIASTOLIC BLOOD PRESSURE: 68 MMHG | BODY MASS INDEX: 27.77 KG/M2 | WEIGHT: 198.38 LBS | HEART RATE: 84 BPM | OXYGEN SATURATION: 97 % | RESPIRATION RATE: 18 BRPM | HEIGHT: 70.98 IN

## 2020-10-06 DIAGNOSIS — D69.6 THROMBOCYTOPENIA (HCC): ICD-10-CM

## 2020-10-06 DIAGNOSIS — D69.3 IMMUNE THROMBOCYTOPENIC PURPURA (HCC): Primary | ICD-10-CM

## 2020-10-06 DIAGNOSIS — M32.19 OTHER SYSTEMIC LUPUS ERYTHEMATOSUS WITH OTHER ORGAN INVOLVEMENT (HCC): ICD-10-CM

## 2020-10-06 PROCEDURE — 96366 THER/PROPH/DIAG IV INF ADDON: CPT

## 2020-10-06 PROCEDURE — 96365 THER/PROPH/DIAG IV INF INIT: CPT

## 2020-10-06 PROCEDURE — 96375 TX/PRO/DX INJ NEW DRUG ADDON: CPT

## 2020-10-06 RX ORDER — DIPHENHYDRAMINE HCL 25 MG
25 CAPSULE ORAL ONCE
Status: CANCELLED | OUTPATIENT
Start: 2020-11-03

## 2020-10-06 RX ORDER — ACETAMINOPHEN 325 MG/1
650 TABLET ORAL ONCE
Status: CANCELLED | OUTPATIENT
Start: 2020-11-03

## 2020-10-06 RX ORDER — FAMOTIDINE 10 MG/ML
20 INJECTION, SOLUTION INTRAVENOUS ONCE
Status: CANCELLED | OUTPATIENT
Start: 2020-11-03

## 2020-10-06 RX ORDER — MEPERIDINE HYDROCHLORIDE 25 MG/ML
25 INJECTION INTRAMUSCULAR; INTRAVENOUS; SUBCUTANEOUS ONCE
Status: CANCELLED | OUTPATIENT
Start: 2020-11-03

## 2020-10-06 RX ORDER — METHYLPREDNISOLONE SODIUM SUCCINATE 40 MG/ML
40 INJECTION, POWDER, LYOPHILIZED, FOR SOLUTION INTRAMUSCULAR; INTRAVENOUS ONCE
Status: CANCELLED | OUTPATIENT
Start: 2020-11-03

## 2020-10-06 RX ORDER — DIPHENHYDRAMINE HYDROCHLORIDE 50 MG/ML
25 INJECTION INTRAMUSCULAR; INTRAVENOUS ONCE
Status: CANCELLED | OUTPATIENT
Start: 2020-11-03

## 2020-10-06 RX ORDER — ACETAMINOPHEN 325 MG/1
650 TABLET ORAL ONCE
Status: COMPLETED | OUTPATIENT
Start: 2020-10-06 | End: 2020-10-06

## 2020-10-06 RX ORDER — METHYLPREDNISOLONE SODIUM SUCCINATE 40 MG/ML
40 INJECTION, POWDER, LYOPHILIZED, FOR SOLUTION INTRAMUSCULAR; INTRAVENOUS ONCE
Status: COMPLETED | OUTPATIENT
Start: 2020-10-06 | End: 2020-10-06

## 2020-10-06 RX ORDER — METHYLPREDNISOLONE SODIUM SUCCINATE 40 MG/ML
40 INJECTION, POWDER, LYOPHILIZED, FOR SOLUTION INTRAMUSCULAR; INTRAVENOUS ONCE
Status: CANCELLED
Start: 2020-11-03 | End: 2020-11-03

## 2020-10-06 RX ORDER — DIPHENHYDRAMINE HCL 25 MG
25 CAPSULE ORAL ONCE
Status: COMPLETED | OUTPATIENT
Start: 2020-10-06 | End: 2020-10-06

## 2020-10-06 RX ORDER — METHYLPREDNISOLONE SODIUM SUCCINATE 125 MG/2ML
125 INJECTION, POWDER, LYOPHILIZED, FOR SOLUTION INTRAMUSCULAR; INTRAVENOUS ONCE
Status: CANCELLED | OUTPATIENT
Start: 2020-11-03

## 2020-10-06 RX ADMIN — DIPHENHYDRAMINE HCL 25 MG: 25 MG CAPSULE ORAL at 11:36:00

## 2020-10-06 RX ADMIN — ACETAMINOPHEN 650 MG: 325 TABLET ORAL at 11:36:00

## 2020-10-06 RX ADMIN — METHYLPREDNISOLONE SODIUM SUCCINATE 40 MG: 40 INJECTION, POWDER, LYOPHILIZED, FOR SOLUTION INTRAMUSCULAR; INTRAVENOUS at 12:00:00

## 2020-10-06 NOTE — PROGRESS NOTES
Education Record    Learner:  Patient     Disease / Diagnosis: Polymyositis    Barriers / Limitations:  None   Comments:    Method:  Brief focused and Discussion   Comments:    General Topics:  Side effects and symptom management, Plan of care reviewed and

## 2020-10-09 ENCOUNTER — HOSPITAL ENCOUNTER (OUTPATIENT)
Dept: CARDIOLOGY CLINIC | Facility: HOSPITAL | Age: 75
Discharge: HOME | End: 2020-10-09
Attending: NURSE PRACTITIONER
Payer: MEDICARE

## 2020-10-09 VITALS
WEIGHT: 200 LBS | DIASTOLIC BLOOD PRESSURE: 57 MMHG | HEART RATE: 70 BPM | RESPIRATION RATE: 12 BRPM | BODY MASS INDEX: 28 KG/M2 | OXYGEN SATURATION: 100 % | SYSTOLIC BLOOD PRESSURE: 126 MMHG

## 2020-10-09 DIAGNOSIS — I50.9 CHF (CONGESTIVE HEART FAILURE) (HCC): ICD-10-CM

## 2020-10-09 DIAGNOSIS — G47.33 OSA (OBSTRUCTIVE SLEEP APNEA): ICD-10-CM

## 2020-10-09 DIAGNOSIS — I10 BENIGN ESSENTIAL HYPERTENSION: ICD-10-CM

## 2020-10-09 DIAGNOSIS — N18.30 STAGE 3 CHRONIC KIDNEY DISEASE, UNSPECIFIED WHETHER STAGE 3A OR 3B CKD (HCC): ICD-10-CM

## 2020-10-09 DIAGNOSIS — M35.1 MIXED CONNECTIVE TISSUE DISEASE (HCC): ICD-10-CM

## 2020-10-09 DIAGNOSIS — I50.810 RIGHT HEART FAILURE, NYHA CLASS 3 (HCC): Primary | ICD-10-CM

## 2020-10-09 DIAGNOSIS — I48.20 CHRONIC ATRIAL FIBRILLATION (HCC): ICD-10-CM

## 2020-10-09 DIAGNOSIS — I50.812 CHRONIC RIGHT-SIDED CONGESTIVE HEART FAILURE (HCC): ICD-10-CM

## 2020-10-09 PROCEDURE — 99214 OFFICE O/P EST MOD 30 MIN: CPT | Performed by: NURSE PRACTITIONER

## 2020-10-09 RX ORDER — BUMETANIDE 0.25 MG/ML
3 INJECTION, SOLUTION INTRAMUSCULAR; INTRAVENOUS ONCE
Status: COMPLETED | OUTPATIENT
Start: 2020-10-09 | End: 2020-10-09

## 2020-10-09 RX ADMIN — BUMETANIDE 3 MG: 0.25 INJECTION, SOLUTION INTRAMUSCULAR; INTRAVENOUS at 15:13:00

## 2020-10-09 NOTE — PATIENT INSTRUCTIONS
Heart Failure Discharge Instructions    Hold torsemide tonight. Activity: Regular exercise and activity is important for your overall health and to help keep your heart strong and functioning as well as possible.    Walk at a slow to moderate pace for shortness of breath or fatigue  · You are short of breath lying down, you need more pillows to breathe comfortably,  or wake up during the night short of breath  · You urinate less often during the day and more often at night  · You have a bloated feeling,

## 2020-10-09 NOTE — PROGRESS NOTES
[facility] Cardiac Health Progress Note    Rajat Hernandez is a 66year old male who presents to clinic for APN assessment and management of chronic diastolic and RV heart failure and is functional class 3.      Since he was last seen he 16 Sao2 Pj 83%    • LI (obstructive sleep apnea) PSG 5-22-19    AHI 36 RDI 36 REM AHI 45 Supine AHI 65 non-supine AHI 19 Sao2 Pj 86% CPAP 9cwp   • Pain in joints    • Pleural effusion     right   • Polymyositis (HCC)    • Problems with swallowing Kidney Disease Son    • Other (Ramon's Esophagus) Son    • Heart Attack Maternal Grandfather       Social History    Tobacco Use      Smoking status: Former Smoker        Packs/day: 0.25        Years: 15.00        Pack years: 3.75        Start date: 8/1/ mg total) by mouth 2 (two) times daily. , Disp: 60 tablet, Rfl: 3  •  predniSONE 5 MG Oral Tab, Take 1 tablet (5 mg total) by mouth daily. (Patient taking differently: Take 5 mg by mouth daily.  With 2 -1 mg tablets for 7 mg ), Disp: 90 tablet, Rfl: 2  •  Me dilated RV with reduced RV systolic function. Mild fluid overload. ProBNP stable at 3,298, improved from July but up form June. · PH, mild post capillary- DPG 5 on RHC 8/4. PVR 2.3 wood units and mPAP 29 mmhg, wedge 15 mmg.    · Moderate AI, Moderate MR,

## 2020-10-09 NOTE — PROGRESS NOTES
Pt. Assessed. Pt. c/o abdominal bloating. Weight stable at 200lbs. . APN notified of pt's c/o abdominal bloating d/t steroids__. Labs ordered. Reviewed allergies and list of current medications with pt. And updated it int he EMR.  IV established per protocol

## 2020-10-14 ENCOUNTER — OFFICE VISIT (OUTPATIENT)
Dept: CARDIOLOGY | Age: 75
End: 2020-10-14

## 2020-10-14 VITALS
HEIGHT: 71 IN | SYSTOLIC BLOOD PRESSURE: 136 MMHG | BODY MASS INDEX: 27.61 KG/M2 | WEIGHT: 197.25 LBS | HEART RATE: 81 BPM | DIASTOLIC BLOOD PRESSURE: 70 MMHG

## 2020-10-14 DIAGNOSIS — I27.20 PULMONARY HYPERTENSION (CMD): Primary | ICD-10-CM

## 2020-10-14 PROCEDURE — 99214 OFFICE O/P EST MOD 30 MIN: CPT | Performed by: INTERNAL MEDICINE

## 2020-10-14 RX ORDER — PREDNISONE 1 MG/1
7 TABLET ORAL
COMMUNITY
Start: 2020-09-23

## 2020-10-14 ASSESSMENT — ENCOUNTER SYMPTOMS
BRUISES/BLEEDS EASILY: 0
CHILLS: 0
ABDOMINAL PAIN: 0
COUGH: 0
SUSPICIOUS LESIONS: 0
ALLERGIC/IMMUNOLOGIC COMMENTS: NO NEW FOOD ALLERGIES
WEIGHT GAIN: 0
HEMATOCHEZIA: 0
FEVER: 0
BLOATING: 0
HEMOPTYSIS: 0

## 2020-10-14 ASSESSMENT — PATIENT HEALTH QUESTIONNAIRE - PHQ9
SUM OF ALL RESPONSES TO PHQ9 QUESTIONS 1 AND 2: 0
CLINICAL INTERPRETATION OF PHQ2 SCORE: NO FURTHER SCREENING NEEDED
2. FEELING DOWN, DEPRESSED OR HOPELESS: NOT AT ALL
1. LITTLE INTEREST OR PLEASURE IN DOING THINGS: NOT AT ALL
CLINICAL INTERPRETATION OF PHQ9 SCORE: NO FURTHER SCREENING NEEDED
SUM OF ALL RESPONSES TO PHQ9 QUESTIONS 1 AND 2: 0

## 2020-10-27 RX ORDER — TORSEMIDE 20 MG/1
40 TABLET ORAL 2 TIMES DAILY
Qty: 360 TABLET | Refills: 1 | Status: SHIPPED | OUTPATIENT
Start: 2020-10-27 | End: 2021-11-15

## 2020-11-03 ENCOUNTER — OFFICE VISIT (OUTPATIENT)
Dept: HEMATOLOGY/ONCOLOGY | Facility: HOSPITAL | Age: 75
End: 2020-11-03
Attending: INTERNAL MEDICINE
Payer: MEDICARE

## 2020-11-03 VITALS
HEIGHT: 70.98 IN | HEART RATE: 87 BPM | WEIGHT: 199.81 LBS | TEMPERATURE: 97 F | OXYGEN SATURATION: 98 % | DIASTOLIC BLOOD PRESSURE: 84 MMHG | SYSTOLIC BLOOD PRESSURE: 131 MMHG | BODY MASS INDEX: 27.97 KG/M2 | RESPIRATION RATE: 16 BRPM

## 2020-11-03 DIAGNOSIS — M32.19 OTHER SYSTEMIC LUPUS ERYTHEMATOSUS WITH OTHER ORGAN INVOLVEMENT (HCC): ICD-10-CM

## 2020-11-03 DIAGNOSIS — D69.3 IMMUNE THROMBOCYTOPENIC PURPURA (HCC): Primary | ICD-10-CM

## 2020-11-03 DIAGNOSIS — D69.6 THROMBOCYTOPENIA (HCC): ICD-10-CM

## 2020-11-03 PROCEDURE — 96365 THER/PROPH/DIAG IV INF INIT: CPT

## 2020-11-03 PROCEDURE — 96375 TX/PRO/DX INJ NEW DRUG ADDON: CPT

## 2020-11-03 PROCEDURE — 96366 THER/PROPH/DIAG IV INF ADDON: CPT

## 2020-11-03 RX ORDER — ACETAMINOPHEN 325 MG/1
650 TABLET ORAL ONCE
Status: COMPLETED | OUTPATIENT
Start: 2020-11-03 | End: 2020-11-03

## 2020-11-03 RX ORDER — DIPHENHYDRAMINE HYDROCHLORIDE 50 MG/ML
25 INJECTION INTRAMUSCULAR; INTRAVENOUS ONCE
Status: CANCELLED | OUTPATIENT
Start: 2020-12-01

## 2020-11-03 RX ORDER — METHYLPREDNISOLONE SODIUM SUCCINATE 40 MG/ML
40 INJECTION, POWDER, LYOPHILIZED, FOR SOLUTION INTRAMUSCULAR; INTRAVENOUS ONCE
Status: COMPLETED | OUTPATIENT
Start: 2020-11-03 | End: 2020-11-03

## 2020-11-03 RX ORDER — DIPHENHYDRAMINE HCL 25 MG
25 CAPSULE ORAL ONCE
Status: CANCELLED | OUTPATIENT
Start: 2020-12-01

## 2020-11-03 RX ORDER — MEPERIDINE HYDROCHLORIDE 25 MG/ML
25 INJECTION INTRAMUSCULAR; INTRAVENOUS; SUBCUTANEOUS ONCE
Status: CANCELLED | OUTPATIENT
Start: 2020-12-01

## 2020-11-03 RX ORDER — METHYLPREDNISOLONE SODIUM SUCCINATE 40 MG/ML
40 INJECTION, POWDER, LYOPHILIZED, FOR SOLUTION INTRAMUSCULAR; INTRAVENOUS ONCE
Status: CANCELLED
Start: 2020-12-01 | End: 2020-12-01

## 2020-11-03 RX ORDER — ACETAMINOPHEN 325 MG/1
650 TABLET ORAL ONCE
Status: CANCELLED | OUTPATIENT
Start: 2020-12-01

## 2020-11-03 RX ORDER — METHYLPREDNISOLONE SODIUM SUCCINATE 40 MG/ML
40 INJECTION, POWDER, LYOPHILIZED, FOR SOLUTION INTRAMUSCULAR; INTRAVENOUS ONCE
Status: CANCELLED | OUTPATIENT
Start: 2020-12-01

## 2020-11-03 RX ORDER — METHYLPREDNISOLONE SODIUM SUCCINATE 125 MG/2ML
125 INJECTION, POWDER, LYOPHILIZED, FOR SOLUTION INTRAMUSCULAR; INTRAVENOUS ONCE
Status: CANCELLED | OUTPATIENT
Start: 2020-12-01

## 2020-11-03 RX ORDER — POTASSIUM CHLORIDE 20 MEQ/1
20 TABLET, EXTENDED RELEASE ORAL 3 TIMES DAILY
Qty: 270 TABLET | Refills: 1 | Status: SHIPPED | OUTPATIENT
Start: 2020-11-03 | End: 2020-11-06

## 2020-11-03 RX ORDER — DIPHENHYDRAMINE HCL 25 MG
25 CAPSULE ORAL ONCE
Status: COMPLETED | OUTPATIENT
Start: 2020-11-03 | End: 2020-11-03

## 2020-11-03 RX ORDER — FAMOTIDINE 10 MG/ML
20 INJECTION, SOLUTION INTRAVENOUS ONCE
Status: CANCELLED | OUTPATIENT
Start: 2020-12-01

## 2020-11-03 RX ADMIN — DIPHENHYDRAMINE HCL 25 MG: 25 MG CAPSULE ORAL at 10:14:00

## 2020-11-03 RX ADMIN — ACETAMINOPHEN 650 MG: 325 TABLET ORAL at 10:14:00

## 2020-11-03 RX ADMIN — METHYLPREDNISOLONE SODIUM SUCCINATE 40 MG: 40 INJECTION, POWDER, LYOPHILIZED, FOR SOLUTION INTRAMUSCULAR; INTRAVENOUS at 10:00:00

## 2020-11-03 NOTE — PROGRESS NOTES
Education Record    Learner:  Patient    Disease / Mary Mins thrombocytopenic purpura    Barriers / Limitations:  None   Comments:    Method:  Brief focused   Comments:    General Topics:  Infection, Medication, Pain, Precautions, Procedure, Side ef

## 2020-11-05 ENCOUNTER — OFFICE VISIT (OUTPATIENT)
Dept: RHEUMATOLOGY | Facility: CLINIC | Age: 75
End: 2020-11-05
Payer: MEDICARE

## 2020-11-05 VITALS
SYSTOLIC BLOOD PRESSURE: 128 MMHG | HEIGHT: 71 IN | WEIGHT: 199 LBS | DIASTOLIC BLOOD PRESSURE: 70 MMHG | OXYGEN SATURATION: 99 % | RESPIRATION RATE: 16 BRPM | HEART RATE: 67 BPM | BODY MASS INDEX: 27.86 KG/M2 | TEMPERATURE: 97 F

## 2020-11-05 DIAGNOSIS — M32.19 OTHER SYSTEMIC LUPUS ERYTHEMATOSUS WITH OTHER ORGAN INVOLVEMENT (HCC): ICD-10-CM

## 2020-11-05 DIAGNOSIS — I50.9 CHF (CONGESTIVE HEART FAILURE) (HCC): Primary | ICD-10-CM

## 2020-11-05 DIAGNOSIS — M35.1 MIXED CONNECTIVE TISSUE DISEASE (HCC): Chronic | ICD-10-CM

## 2020-11-05 DIAGNOSIS — M33.20 POLYMYOSITIS (HCC): Chronic | ICD-10-CM

## 2020-11-05 DIAGNOSIS — M1A.09X0 IDIOPATHIC CHRONIC GOUT OF MULTIPLE SITES WITHOUT TOPHUS: Primary | ICD-10-CM

## 2020-11-05 PROCEDURE — 99213 OFFICE O/P EST LOW 20 MIN: CPT | Performed by: INTERNAL MEDICINE

## 2020-11-05 RX ORDER — INDOMETHACIN 50 MG/1
50 CAPSULE ORAL 3 TIMES DAILY PRN
Qty: 50 CAPSULE | Refills: 1 | Status: SHIPPED | OUTPATIENT
Start: 2020-11-05 | End: 2020-11-16

## 2020-11-05 NOTE — PROGRESS NOTES
[facility]  Dr. Angeli You Progress Note     Subjective: Rajat Hernandez is a(n) 66year old male. Current complaints: Patient presents with:  Knee Pain: Patient in for L. and R. big toe gout flare  C/O pain - rates 5/10. Took aleve yesterday.   No

## 2020-11-05 NOTE — PATIENT INSTRUCTIONS
You are currently having a gout attack you left big toe and left second toe. Take indomethacin 50 mg 3 times per day along with colchicine 0.6 mg twice a day. This should improve things in a day or 2. If not then start taking prednisone 20 mg a day.   Ca

## 2020-11-06 ENCOUNTER — HOSPITAL ENCOUNTER (OUTPATIENT)
Dept: CARDIOLOGY CLINIC | Facility: HOSPITAL | Age: 75
Discharge: HOME | End: 2020-11-06
Attending: NURSE PRACTITIONER
Payer: MEDICARE

## 2020-11-06 ENCOUNTER — HOSPITAL ENCOUNTER (OUTPATIENT)
Dept: LAB | Facility: HOSPITAL | Age: 75
Discharge: HOME | End: 2020-11-06
Attending: NURSE PRACTITIONER
Payer: MEDICARE

## 2020-11-06 VITALS
OXYGEN SATURATION: 100 % | RESPIRATION RATE: 21 BRPM | SYSTOLIC BLOOD PRESSURE: 143 MMHG | DIASTOLIC BLOOD PRESSURE: 72 MMHG | BODY MASS INDEX: 28 KG/M2 | WEIGHT: 202.69 LBS | HEART RATE: 77 BPM

## 2020-11-06 DIAGNOSIS — I50.810 RIGHT HEART FAILURE, NYHA CLASS 3 (HCC): Primary | ICD-10-CM

## 2020-11-06 DIAGNOSIS — I10 BENIGN ESSENTIAL HYPERTENSION: ICD-10-CM

## 2020-11-06 DIAGNOSIS — G47.33 OSA (OBSTRUCTIVE SLEEP APNEA): ICD-10-CM

## 2020-11-06 DIAGNOSIS — M35.1 MIXED CONNECTIVE TISSUE DISEASE (HCC): ICD-10-CM

## 2020-11-06 DIAGNOSIS — I50.9 CHF (CONGESTIVE HEART FAILURE) (HCC): ICD-10-CM

## 2020-11-06 DIAGNOSIS — I48.20 CHRONIC ATRIAL FIBRILLATION (HCC): ICD-10-CM

## 2020-11-06 DIAGNOSIS — I50.812 CHRONIC RIGHT-SIDED CONGESTIVE HEART FAILURE (HCC): ICD-10-CM

## 2020-11-06 DIAGNOSIS — N18.30 STAGE 3 CHRONIC KIDNEY DISEASE, UNSPECIFIED WHETHER STAGE 3A OR 3B CKD (HCC): ICD-10-CM

## 2020-11-06 PROCEDURE — 80048 BASIC METABOLIC PNL TOTAL CA: CPT | Performed by: NURSE PRACTITIONER

## 2020-11-06 PROCEDURE — 99215 OFFICE O/P EST HI 40 MIN: CPT | Performed by: NURSE PRACTITIONER

## 2020-11-06 PROCEDURE — 36415 COLL VENOUS BLD VENIPUNCTURE: CPT | Performed by: NURSE PRACTITIONER

## 2020-11-06 RX ORDER — POTASSIUM CHLORIDE 20 MEQ/1
20 TABLET, EXTENDED RELEASE ORAL DAILY
Qty: 270 TABLET | Refills: 1 | Status: SHIPPED | COMMUNITY
Start: 2020-11-06 | End: 2021-02-12

## 2020-11-06 RX ORDER — BUMETANIDE 0.25 MG/ML
3 INJECTION, SOLUTION INTRAMUSCULAR; INTRAVENOUS ONCE
Status: COMPLETED | OUTPATIENT
Start: 2020-11-06 | End: 2020-11-06

## 2020-11-06 RX ORDER — POTASSIUM CHLORIDE 20 MEQ/1
20 TABLET, EXTENDED RELEASE ORAL 2 TIMES DAILY
Qty: 270 TABLET | Refills: 1 | Status: SHIPPED | COMMUNITY
Start: 2020-11-06 | End: 2020-11-06

## 2020-11-06 RX ADMIN — BUMETANIDE 3 MG: 0.25 INJECTION, SOLUTION INTRAMUSCULAR; INTRAVENOUS at 14:50:00

## 2020-11-06 NOTE — PROGRESS NOTES
[facility] Cardiac Health Progress Note    Idalia Hernandez is a 66year old male who presents to clinic for APN assessment and management of chronic diastolic and RV heart failure and is functional class 3.      Since he was last seen he (obstructive sleep apnea) HST 11-7-17    AHI 37  Supine AHI 38 non-supine AHI 16 Sao2 Pj 83%    • LI (obstructive sleep apnea) PSG 5-22-19    AHI 36 RDI 36 REM AHI 45 Supine AHI 65 non-supine AHI 19 Sao2 Pj 86% CPAP 9cwp   • Pain in joints    • Pleu Cancer Paternal Grandfather    • Heart Attack Paternal Grandmother    • Kidney Disease Son    • Other (Ramon's Esophagus) Son    • Heart Attack Maternal Grandfather       Social History    Tobacco Use      Smoking status: Former Smoker        Packs/day: Oral Tab, Take 1 tablet (50 mg total) by mouth 2 (two) times daily. , Disp: 60 tablet, Rfl: 3  •  predniSONE 5 MG Oral Tab, Take 1 tablet (5 mg total) by mouth daily. (Patient taking differently: Take 5 mg by mouth daily.  With 2 -1 mg tablets for 7 mg ), Marcello Drone but doesn't like it and past follow up with Pulmonary revealed elevated AHI mostly due central apneas.   · Recurrent bilateral pleural effusions - hx of thoracentesis.   · Hx Lupus/Mixed CTD/Severe polymyositis - IVIG infusions monthly, follows with Dr. Lewis Hobson

## 2020-11-06 NOTE — PATIENT INSTRUCTIONS
Heart Failure Discharge Instructions    Decrease Potassium to 20 meq daily. Try to limit use of Indomethacin and Aleve. Hold evening dose of Torsemide.        Activity: Regular exercise and activity is important for your overall health and to help keep y breathing  · You get more tired with regular activity, or are limiting activity because of shortness of breath or fatigue  · You are short of breath lying down, you need more pillows to breathe comfortably,  or wake up during the night short of breath  · Y

## 2020-11-06 NOTE — PROGRESS NOTES
Patient was assessed. No complain of  fatigue, chest pain or significant edema, but complain of slight shortness of breath . Weight up 2 lbs to 202.7 lbs.  Reviewed current list of patient's allergies and medication; updated medications list. Labs ordered t

## 2020-11-09 ENCOUNTER — TELEPHONE (OUTPATIENT)
Dept: CARDIOLOGY CLINIC | Facility: HOSPITAL | Age: 75
End: 2020-11-09

## 2020-11-12 ENCOUNTER — TELEPHONE (OUTPATIENT)
Dept: CARDIOLOGY CLINIC | Facility: HOSPITAL | Age: 75
End: 2020-11-12

## 2020-11-12 NOTE — TELEPHONE ENCOUNTER
Rec'd a call from patient Stating \" his weight has been unchanged since he increased the Torsemide and he feels very Bloated in belly. \"  Denies Fever temp 97.2- 97.6, SPO2 97-98% but still SOB from abdominal bloating.    Stated Beatriz La still takes [place]

## 2020-11-13 DIAGNOSIS — I50.812 CHRONIC RIGHT-SIDED CONGESTIVE HEART FAILURE (HCC): Primary | ICD-10-CM

## 2020-11-16 ENCOUNTER — HOSPITAL ENCOUNTER (OUTPATIENT)
Dept: CARDIOLOGY CLINIC | Facility: HOSPITAL | Age: 75
Discharge: HOME | End: 2020-11-16
Attending: NURSE PRACTITIONER
Payer: MEDICARE

## 2020-11-16 ENCOUNTER — HOSPITAL ENCOUNTER (OUTPATIENT)
Dept: LAB | Facility: HOSPITAL | Age: 75
Discharge: HOME | End: 2020-11-16
Attending: NURSE PRACTITIONER
Payer: MEDICARE

## 2020-11-16 VITALS
BODY MASS INDEX: 28 KG/M2 | RESPIRATION RATE: 17 BRPM | WEIGHT: 197.81 LBS | SYSTOLIC BLOOD PRESSURE: 134 MMHG | OXYGEN SATURATION: 99 % | DIASTOLIC BLOOD PRESSURE: 73 MMHG | HEART RATE: 75 BPM

## 2020-11-16 DIAGNOSIS — I50.812 CHRONIC RIGHT-SIDED CONGESTIVE HEART FAILURE (HCC): Primary | ICD-10-CM

## 2020-11-16 DIAGNOSIS — I27.20 PULMONARY HTN (HCC): ICD-10-CM

## 2020-11-16 DIAGNOSIS — I48.20 CHRONIC ATRIAL FIBRILLATION (HCC): ICD-10-CM

## 2020-11-16 DIAGNOSIS — I50.812 CHRONIC RIGHT-SIDED CONGESTIVE HEART FAILURE (HCC): ICD-10-CM

## 2020-11-16 DIAGNOSIS — N18.30 STAGE 3 CHRONIC KIDNEY DISEASE, UNSPECIFIED WHETHER STAGE 3A OR 3B CKD (HCC): ICD-10-CM

## 2020-11-16 DIAGNOSIS — G47.33 OSA (OBSTRUCTIVE SLEEP APNEA): ICD-10-CM

## 2020-11-16 PROCEDURE — 36415 COLL VENOUS BLD VENIPUNCTURE: CPT | Performed by: NURSE PRACTITIONER

## 2020-11-16 PROCEDURE — 99215 OFFICE O/P EST HI 40 MIN: CPT | Performed by: NURSE PRACTITIONER

## 2020-11-16 PROCEDURE — 80048 BASIC METABOLIC PNL TOTAL CA: CPT | Performed by: NURSE PRACTITIONER

## 2020-11-16 RX ORDER — COLCHICINE 0.6 MG/1
0.6 TABLET ORAL 2 TIMES DAILY
COMMUNITY
End: 2021-03-02

## 2020-11-16 RX ORDER — AZATHIOPRINE 50 MG/1
50 TABLET ORAL 2 TIMES DAILY
Qty: 60 TABLET | Refills: 1 | Status: SHIPPED | OUTPATIENT
Start: 2020-11-16 | End: 2021-01-18

## 2020-11-16 NOTE — PROGRESS NOTES
[facility] Cardiac Health Progress Note    Cele Fernandez is a 66year old male who presents to clinic for APN assessment and management of chronic diastolic and RV heart failure and is functional class 2-3.      Subjective:  He returns non-supine AHI 16 Sao2 Pj 83%    • LI (obstructive sleep apnea) PSG 5-22-19    AHI 36 RDI 36 REM AHI 45 Supine AHI 65 non-supine AHI 19 Sao2 Pj 86% CPAP 9cwp   • Pain in joints    • Pleural effusion     right   • Polymyositis (HCC)    • Problems wit Grandmother    • Kidney Disease Son    • Other (Ramon's Esophagus) Son    • Heart Attack Maternal Grandfather       Social History    Tobacco Use      Smoking status: Former Smoker        Packs/day: 0.25        Years: 15.00        Pack years: 3.75 20 MEQ Oral Tab CR, Take 1 tablet (20 mEq total) by mouth daily. , Disp: 270 tablet, Rfl: 1  •  torsemide 20 MG Oral Tab, Take 2 tablets (40 mg total) by mouth 2 (two) times a day., Disp: 360 tablet, Rfl: 1  •  omeprazole 20 MG Oral Capsule Delayed Release, units and mPAP 29 mmhg, wedge 15 mmg. · Moderate AI, Moderate MR, Moderate TR   · Dilated ascending aorta - max dimension 4.5cm by echo 6/25.  Ongoing outpatient f/u.    · Progressive weakness - improving. Patient working on increasing strength with ADLs

## 2020-11-30 ENCOUNTER — HOSPITAL ENCOUNTER (OUTPATIENT)
Dept: LAB | Facility: HOSPITAL | Age: 75
Discharge: HOME | End: 2020-11-30
Attending: NURSE PRACTITIONER
Payer: MEDICARE

## 2020-11-30 ENCOUNTER — HOSPITAL ENCOUNTER (OUTPATIENT)
Dept: CARDIOLOGY CLINIC | Facility: HOSPITAL | Age: 75
Discharge: HOME | End: 2020-11-30
Attending: NURSE PRACTITIONER
Payer: MEDICARE

## 2020-11-30 VITALS
SYSTOLIC BLOOD PRESSURE: 113 MMHG | OXYGEN SATURATION: 100 % | DIASTOLIC BLOOD PRESSURE: 79 MMHG | BODY MASS INDEX: 27 KG/M2 | WEIGHT: 197 LBS | HEART RATE: 75 BPM

## 2020-11-30 DIAGNOSIS — I48.20 CHRONIC ATRIAL FIBRILLATION (HCC): ICD-10-CM

## 2020-11-30 DIAGNOSIS — I50.812 CHRONIC RIGHT-SIDED CONGESTIVE HEART FAILURE (HCC): Primary | ICD-10-CM

## 2020-11-30 DIAGNOSIS — I50.9 CHF (CONGESTIVE HEART FAILURE) (HCC): ICD-10-CM

## 2020-11-30 DIAGNOSIS — I27.20 PULMONARY HTN (HCC): ICD-10-CM

## 2020-11-30 DIAGNOSIS — N18.30 STAGE 3 CHRONIC KIDNEY DISEASE, UNSPECIFIED WHETHER STAGE 3A OR 3B CKD (HCC): ICD-10-CM

## 2020-11-30 DIAGNOSIS — M35.1 MIXED CONNECTIVE TISSUE DISEASE (HCC): ICD-10-CM

## 2020-11-30 DIAGNOSIS — G47.33 OSA (OBSTRUCTIVE SLEEP APNEA): ICD-10-CM

## 2020-11-30 PROCEDURE — 99215 OFFICE O/P EST HI 40 MIN: CPT | Performed by: NURSE PRACTITIONER

## 2020-11-30 PROCEDURE — 36415 COLL VENOUS BLD VENIPUNCTURE: CPT | Performed by: NURSE PRACTITIONER

## 2020-11-30 PROCEDURE — 80048 BASIC METABOLIC PNL TOTAL CA: CPT | Performed by: NURSE PRACTITIONER

## 2020-11-30 NOTE — PATIENT INSTRUCTIONS
Heart Failure Discharge Instructions    No changes to medications at this time. Activity: Regular exercise and activity is important for your overall health and to help keep your heart strong and functioning as well as possible.    Walk at a slow to mode because of shortness of breath or fatigue  · You are short of breath lying down, you need more pillows to breathe comfortably,  or wake up during the night short of breath  · You urinate less often during the day and more often at night  · You have a bloat

## 2020-11-30 NOTE — PROGRESS NOTES
[facility] Cardiac Health Progress Note    Vinnie Krueger is a 66year old male who presents to clinic for APN assessment and management of chronic diastolic, RV heart failure and is functional class 2-3.      Subjective:  He returns for 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   • Sleep apnea     CPAP   • Thrombocytopathia (HCC)    • Wears glasses       Past Surgical History:   Procedure Laterality Date   • APPENDECTOMY  1970   • APPENDECTOMY     • CARDIAC CATHETERIZATION  09/2019   • COLONOSCOPY  10/20 Smokeless tobacco: Never Used    Alcohol use: Not Currently      Alcohol/week: 8.0 - 10.0 standard drinks      Types: 8 - 10 Standard drinks or equivalent per week      Frequency: 4 or more times a week      Drinks per session: 1 or 2      Binge frequenc increased torsemide. , Disp: 270 tablet, Rfl: 1  •  torsemide 20 MG Oral Tab, Take 2 tablets (40 mg total) by mouth 2 (two) times a day. (Patient taking differently: Take 40 mg by mouth 2 (two) times a day. Pt.  Was instructed to take 60mg in AM and 40mg in wedge 15 mmg. · Moderate AI, Moderate MR, Moderate TR   · Dilated ascending aorta - max dimension 4.5cm by echo 6/25.  Ongoing outpatient f/u.    · Progressive weakness - improving. Patient working on increasing strength with ADLs and mild exercise.   · C

## 2020-11-30 NOTE — PROGRESS NOTES
[facility]. RN assessed the patient. Patient denies symptoms of shortness of breath, fatigue, chest pain, and edema. The patient's weight stable at 197 lbs.  RN reviewed the patient's allergies and medications and u

## 2020-12-01 ENCOUNTER — OFFICE VISIT (OUTPATIENT)
Dept: HEMATOLOGY/ONCOLOGY | Facility: HOSPITAL | Age: 75
End: 2020-12-01
Attending: INTERNAL MEDICINE
Payer: MEDICARE

## 2020-12-01 VITALS
DIASTOLIC BLOOD PRESSURE: 72 MMHG | HEIGHT: 70.98 IN | SYSTOLIC BLOOD PRESSURE: 115 MMHG | OXYGEN SATURATION: 98 % | WEIGHT: 195.19 LBS | HEART RATE: 78 BPM | BODY MASS INDEX: 27.33 KG/M2 | RESPIRATION RATE: 18 BRPM | TEMPERATURE: 96 F

## 2020-12-01 DIAGNOSIS — D69.6 THROMBOCYTOPENIA (HCC): ICD-10-CM

## 2020-12-01 DIAGNOSIS — D69.3 IMMUNE THROMBOCYTOPENIC PURPURA (HCC): Primary | ICD-10-CM

## 2020-12-01 DIAGNOSIS — M32.19 OTHER SYSTEMIC LUPUS ERYTHEMATOSUS WITH OTHER ORGAN INVOLVEMENT (HCC): ICD-10-CM

## 2020-12-01 PROCEDURE — 96365 THER/PROPH/DIAG IV INF INIT: CPT

## 2020-12-01 PROCEDURE — 96375 TX/PRO/DX INJ NEW DRUG ADDON: CPT

## 2020-12-01 PROCEDURE — 96366 THER/PROPH/DIAG IV INF ADDON: CPT

## 2020-12-01 RX ORDER — METHYLPREDNISOLONE SODIUM SUCCINATE 40 MG/ML
40 INJECTION, POWDER, LYOPHILIZED, FOR SOLUTION INTRAMUSCULAR; INTRAVENOUS ONCE
Status: COMPLETED | OUTPATIENT
Start: 2020-12-01 | End: 2020-12-01

## 2020-12-01 RX ORDER — FAMOTIDINE 10 MG/ML
20 INJECTION, SOLUTION INTRAVENOUS ONCE
Status: CANCELLED | OUTPATIENT
Start: 2020-12-29

## 2020-12-01 RX ORDER — METHYLPREDNISOLONE SODIUM SUCCINATE 40 MG/ML
40 INJECTION, POWDER, LYOPHILIZED, FOR SOLUTION INTRAMUSCULAR; INTRAVENOUS ONCE
Status: CANCELLED
Start: 2020-12-29 | End: 2020-12-29

## 2020-12-01 RX ORDER — PREDNISONE 5 MG/1
5 TABLET ORAL DAILY
Qty: 90 TABLET | Refills: 0 | Status: SHIPPED | OUTPATIENT
Start: 2020-12-01 | End: 2021-03-24

## 2020-12-01 RX ORDER — DIPHENHYDRAMINE HCL 25 MG
25 CAPSULE ORAL ONCE
Status: COMPLETED | OUTPATIENT
Start: 2020-12-01 | End: 2020-12-01

## 2020-12-01 RX ORDER — ACETAMINOPHEN 325 MG/1
650 TABLET ORAL ONCE
Status: CANCELLED | OUTPATIENT
Start: 2020-12-29

## 2020-12-01 RX ORDER — METHYLPREDNISOLONE SODIUM SUCCINATE 125 MG/2ML
125 INJECTION, POWDER, LYOPHILIZED, FOR SOLUTION INTRAMUSCULAR; INTRAVENOUS ONCE
Status: CANCELLED | OUTPATIENT
Start: 2020-12-29

## 2020-12-01 RX ORDER — MEPERIDINE HYDROCHLORIDE 25 MG/ML
25 INJECTION INTRAMUSCULAR; INTRAVENOUS; SUBCUTANEOUS ONCE
Status: CANCELLED | OUTPATIENT
Start: 2020-12-29

## 2020-12-01 RX ORDER — ACETAMINOPHEN 325 MG/1
650 TABLET ORAL ONCE
Status: COMPLETED | OUTPATIENT
Start: 2020-12-01 | End: 2020-12-01

## 2020-12-01 RX ORDER — DIPHENHYDRAMINE HYDROCHLORIDE 50 MG/ML
25 INJECTION INTRAMUSCULAR; INTRAVENOUS ONCE
Status: CANCELLED | OUTPATIENT
Start: 2020-12-29

## 2020-12-01 RX ORDER — METHYLPREDNISOLONE SODIUM SUCCINATE 40 MG/ML
40 INJECTION, POWDER, LYOPHILIZED, FOR SOLUTION INTRAMUSCULAR; INTRAVENOUS ONCE
Status: CANCELLED | OUTPATIENT
Start: 2020-12-29

## 2020-12-01 RX ORDER — DIPHENHYDRAMINE HCL 25 MG
25 CAPSULE ORAL ONCE
Status: CANCELLED | OUTPATIENT
Start: 2020-12-29

## 2020-12-01 RX ORDER — PREDNISONE 1 MG/1
2 TABLET ORAL
Qty: 180 TABLET | Refills: 0 | Status: SHIPPED | OUTPATIENT
Start: 2020-12-01 | End: 2021-03-24

## 2020-12-01 RX ADMIN — METHYLPREDNISOLONE SODIUM SUCCINATE 40 MG: 40 INJECTION, POWDER, LYOPHILIZED, FOR SOLUTION INTRAMUSCULAR; INTRAVENOUS at 10:56:00

## 2020-12-01 RX ADMIN — ACETAMINOPHEN 650 MG: 325 TABLET ORAL at 10:55:00

## 2020-12-01 RX ADMIN — DIPHENHYDRAMINE HCL 25 MG: 25 MG CAPSULE ORAL at 10:55:00

## 2020-12-01 NOTE — PROGRESS NOTES
Education Record    Learner:  Patient    Disease / Diagnosis: Myositis - IVIG infusion monthly.      Barriers / Limitations:  None   Comments:    Method:  Brief focused   Comments:    General Topics:  Plan of care reviewed   Comments:    Outcome:  Shows und

## 2020-12-10 ENCOUNTER — TELEPHONE (OUTPATIENT)
Dept: RHEUMATOLOGY | Facility: CLINIC | Age: 75
End: 2020-12-10

## 2020-12-10 DIAGNOSIS — M1A.09X0 IDIOPATHIC CHRONIC GOUT OF MULTIPLE SITES WITHOUT TOPHUS: Primary | ICD-10-CM

## 2020-12-10 RX ORDER — COLCHICINE 0.6 MG/1
0.6 TABLET ORAL 2 TIMES DAILY
Qty: 60 TABLET | Refills: 5 | Status: SHIPPED | OUTPATIENT
Start: 2020-12-10 | End: 2021-01-04

## 2020-12-17 ENCOUNTER — TELEPHONE (OUTPATIENT)
Dept: RHEUMATOLOGY | Facility: CLINIC | Age: 75
End: 2020-12-17

## 2020-12-17 NOTE — TELEPHONE ENCOUNTER
Phoned pt, left voice message for pt to call our office. Dr. Renee Every would like him to hold azathioprine the week of covid vaccine and the week after.

## 2020-12-26 ENCOUNTER — PATIENT MESSAGE (OUTPATIENT)
Dept: RHEUMATOLOGY | Facility: CLINIC | Age: 75
End: 2020-12-26

## 2020-12-26 DIAGNOSIS — M33.20 POLYMYOSITIS (HCC): Primary | ICD-10-CM

## 2020-12-28 ENCOUNTER — HOSPITAL ENCOUNTER (OUTPATIENT)
Dept: CARDIOLOGY CLINIC | Facility: HOSPITAL | Age: 75
Discharge: HOME | End: 2020-12-28
Attending: NURSE PRACTITIONER
Payer: MEDICARE

## 2020-12-28 DIAGNOSIS — I50.812 CHRONIC RIGHT-SIDED CONGESTIVE HEART FAILURE (HCC): Primary | ICD-10-CM

## 2020-12-28 PROCEDURE — 99443 PHONE E/M BY PHYS 21-30 MIN: CPT | Performed by: NURSE PRACTITIONER

## 2020-12-28 NOTE — PROGRESS NOTES
Virtual Telephone Check-In      Errol Ruiz verbally consents to a Virtual/Telephone Check-In visit on 05/04/20. Patient understands and accepts financial responsibility for any deductible, co-insurance and/or co-pays associated with this service. [facility])    • Wears glasses       Past Surgical History:   Procedure Laterality Date   • APPENDECTOMY  1970   • APPENDECTOMY     • CARDIAC CATHETERIZATION  09/2019   • COLONOSCOPY  10/2003 2006 01/2010    x3   • COLONOSCOPY  5/14/2013   • COLONOSCOPY N/A 5/1/ Alcohol/week: 8.0 - 10.0 standard drinks      Types: 8 - 10 Standard drinks or equivalent per week      Frequency: 4 or more times a week      Drinks per session: 1 or 2      Binge frequency: Never      Comment: 1 per day    Drug use: No          Current O , Disp: , Rfl:   •  MULTI VITAMIN MENS OR, Take 1 tablet by mouth daily.   , Disp: , Rfl:     Assessment:  · Chronic diastolic, RV heart failure - LVEF 55% with moderately dilated RV with reduced RV systolic function. Volume status improved.   · PH, mild po There are limitations of this visit as no physical exam could be performed. Every conscious effort was taken to allow for sufficient and adequate time.   This billing visit was spent on reviewing sodium restricted diet, low sodium foods, fluid restrictio

## 2020-12-28 NOTE — TELEPHONE ENCOUNTER
From: Karen Long  To: Javan Parra MD  Sent: 29/79/1319 9:51 PM CST  Subject: Other    Dr. Sonia Duran, do you have labs in the system for my appointment on Jan 4th?  Thanks, [facility]

## 2020-12-29 ENCOUNTER — OFFICE VISIT (OUTPATIENT)
Dept: HEMATOLOGY/ONCOLOGY | Facility: HOSPITAL | Age: 75
End: 2020-12-29
Attending: INTERNAL MEDICINE
Payer: MEDICARE

## 2020-12-29 VITALS
BODY MASS INDEX: 27.53 KG/M2 | WEIGHT: 196.63 LBS | HEIGHT: 70.98 IN | DIASTOLIC BLOOD PRESSURE: 69 MMHG | OXYGEN SATURATION: 99 % | SYSTOLIC BLOOD PRESSURE: 126 MMHG | HEART RATE: 65 BPM | RESPIRATION RATE: 18 BRPM | TEMPERATURE: 98 F

## 2020-12-29 DIAGNOSIS — M33.20 POLYMYOSITIS (HCC): Primary | ICD-10-CM

## 2020-12-29 DIAGNOSIS — D69.3 IMMUNE THROMBOCYTOPENIC PURPURA (HCC): ICD-10-CM

## 2020-12-29 DIAGNOSIS — M32.19 OTHER SYSTEMIC LUPUS ERYTHEMATOSUS WITH OTHER ORGAN INVOLVEMENT (HCC): ICD-10-CM

## 2020-12-29 DIAGNOSIS — D69.6 THROMBOCYTOPENIA (HCC): ICD-10-CM

## 2020-12-29 PROCEDURE — 85025 COMPLETE CBC W/AUTO DIFF WBC: CPT

## 2020-12-29 PROCEDURE — 36415 COLL VENOUS BLD VENIPUNCTURE: CPT

## 2020-12-29 PROCEDURE — 80053 COMPREHEN METABOLIC PANEL: CPT

## 2020-12-29 PROCEDURE — 96365 THER/PROPH/DIAG IV INF INIT: CPT

## 2020-12-29 PROCEDURE — 96375 TX/PRO/DX INJ NEW DRUG ADDON: CPT

## 2020-12-29 PROCEDURE — 82550 ASSAY OF CK (CPK): CPT

## 2020-12-29 PROCEDURE — 96366 THER/PROPH/DIAG IV INF ADDON: CPT

## 2020-12-29 PROCEDURE — 85652 RBC SED RATE AUTOMATED: CPT

## 2020-12-29 RX ORDER — METHYLPREDNISOLONE SODIUM SUCCINATE 40 MG/ML
40 INJECTION, POWDER, LYOPHILIZED, FOR SOLUTION INTRAMUSCULAR; INTRAVENOUS ONCE
Status: CANCELLED | OUTPATIENT
Start: 2021-01-26

## 2020-12-29 RX ORDER — MEPERIDINE HYDROCHLORIDE 25 MG/ML
25 INJECTION INTRAMUSCULAR; INTRAVENOUS; SUBCUTANEOUS ONCE
Status: CANCELLED | OUTPATIENT
Start: 2021-01-26

## 2020-12-29 RX ORDER — METHYLPREDNISOLONE SODIUM SUCCINATE 40 MG/ML
40 INJECTION, POWDER, LYOPHILIZED, FOR SOLUTION INTRAMUSCULAR; INTRAVENOUS ONCE
Status: CANCELLED
Start: 2021-01-26 | End: 2021-01-26

## 2020-12-29 RX ORDER — METHYLPREDNISOLONE SODIUM SUCCINATE 125 MG/2ML
125 INJECTION, POWDER, LYOPHILIZED, FOR SOLUTION INTRAMUSCULAR; INTRAVENOUS ONCE
Status: CANCELLED | OUTPATIENT
Start: 2021-01-26

## 2020-12-29 RX ORDER — METHYLPREDNISOLONE SODIUM SUCCINATE 40 MG/ML
40 INJECTION, POWDER, LYOPHILIZED, FOR SOLUTION INTRAMUSCULAR; INTRAVENOUS ONCE
Status: COMPLETED | OUTPATIENT
Start: 2020-12-29 | End: 2020-12-29

## 2020-12-29 RX ORDER — DIPHENHYDRAMINE HCL 25 MG
25 CAPSULE ORAL ONCE
Status: COMPLETED | OUTPATIENT
Start: 2020-12-29 | End: 2020-12-29

## 2020-12-29 RX ORDER — ACETAMINOPHEN 325 MG/1
650 TABLET ORAL ONCE
Status: CANCELLED | OUTPATIENT
Start: 2021-01-26

## 2020-12-29 RX ORDER — DIPHENHYDRAMINE HYDROCHLORIDE 50 MG/ML
25 INJECTION INTRAMUSCULAR; INTRAVENOUS ONCE
Status: CANCELLED | OUTPATIENT
Start: 2021-01-26

## 2020-12-29 RX ORDER — DIPHENHYDRAMINE HCL 25 MG
25 CAPSULE ORAL ONCE
Status: CANCELLED | OUTPATIENT
Start: 2021-01-26

## 2020-12-29 RX ORDER — ACETAMINOPHEN 325 MG/1
650 TABLET ORAL ONCE
Status: COMPLETED | OUTPATIENT
Start: 2020-12-29 | End: 2020-12-29

## 2020-12-29 RX ORDER — FAMOTIDINE 10 MG/ML
20 INJECTION, SOLUTION INTRAVENOUS ONCE
Status: CANCELLED | OUTPATIENT
Start: 2021-01-26

## 2020-12-29 RX ADMIN — ACETAMINOPHEN 650 MG: 325 TABLET ORAL at 11:23:00

## 2020-12-29 RX ADMIN — DIPHENHYDRAMINE HCL 25 MG: 25 MG CAPSULE ORAL at 11:23:00

## 2020-12-29 RX ADMIN — METHYLPREDNISOLONE SODIUM SUCCINATE 40 MG: 40 INJECTION, POWDER, LYOPHILIZED, FOR SOLUTION INTRAMUSCULAR; INTRAVENOUS at 11:22:00

## 2020-12-29 NOTE — PROGRESS NOTES
Education Record    Learner:  Patient    Disease / Diagnosis: Polymyositis/Lupus    Barriers / Limitations:  None   Comments:    Method:  Brief focused   Comments:    General Topics:  Plan of care reviewed   Comments:    Outcome:  Shows understanding   Com

## 2021-01-04 ENCOUNTER — OFFICE VISIT (OUTPATIENT)
Dept: RHEUMATOLOGY | Facility: CLINIC | Age: 76
End: 2021-01-04
Payer: MEDICARE

## 2021-01-04 VITALS
HEIGHT: 71 IN | BODY MASS INDEX: 27.69 KG/M2 | TEMPERATURE: 97 F | HEART RATE: 74 BPM | SYSTOLIC BLOOD PRESSURE: 128 MMHG | OXYGEN SATURATION: 97 % | DIASTOLIC BLOOD PRESSURE: 76 MMHG | WEIGHT: 197.81 LBS

## 2021-01-04 DIAGNOSIS — M33.20 POLYMYOSITIS (HCC): Primary | Chronic | ICD-10-CM

## 2021-01-04 DIAGNOSIS — M35.1 MIXED CONNECTIVE TISSUE DISEASE (HCC): Chronic | ICD-10-CM

## 2021-01-04 DIAGNOSIS — D69.6 THROMBOCYTOPENIA (HCC): ICD-10-CM

## 2021-01-04 DIAGNOSIS — I43 CARDIOMYOPATHY AS MANIFESTATION OF UNDERLYING DISEASE (HCC): ICD-10-CM

## 2021-01-04 DIAGNOSIS — D69.3 IMMUNE THROMBOCYTOPENIC PURPURA (HCC): ICD-10-CM

## 2021-01-04 DIAGNOSIS — I50.812 CHRONIC RIGHT-SIDED CONGESTIVE HEART FAILURE (HCC): ICD-10-CM

## 2021-01-04 PROCEDURE — 99214 OFFICE O/P EST MOD 30 MIN: CPT | Performed by: INTERNAL MEDICINE

## 2021-01-04 RX ORDER — SPIRONOLACTONE 25 MG/1
TABLET ORAL
Qty: 30 TABLET | Refills: 2 | Status: SHIPPED | OUTPATIENT
Start: 2021-01-04 | End: 2021-04-27

## 2021-01-04 NOTE — PATIENT INSTRUCTIONS
Gout diet. Prednisone 7 mg per day. Colchicine as needed for gout. Exercise as tolerated. No NSAIDs due to CHF/low platelets. Return to office 2 months.

## 2021-01-04 NOTE — PROGRESS NOTES
[facility]  Dr. Ashtyn Sanderson Progress Note     Subjective: Vanessa Milian is a(n) 66year old male. Current complaints: Patient presents with:   Follow - Up: LOV 11-5-2020; chronic pain, knees, ankles, feet worse; Pred 7mg/d; IVIG monthly infusions;

## 2021-01-08 ENCOUNTER — PATIENT MESSAGE (OUTPATIENT)
Dept: FAMILY MEDICINE CLINIC | Facility: CLINIC | Age: 76
End: 2021-01-08

## 2021-01-08 ENCOUNTER — TELEPHONE (OUTPATIENT)
Dept: FAMILY MEDICINE CLINIC | Facility: CLINIC | Age: 76
End: 2021-01-08

## 2021-01-08 NOTE — TELEPHONE ENCOUNTER
From: Yaneli Holm  To: Fany Montalvo MD  Sent: 1/8/2021 12:45 PM CST  Subject: Non-Urgent Medical Question    Dr Zenovia Cooks, Is there any news when covid19 vaccines will be available for myself and Leoncio Goyal?  I will be 76 in a few weeks and Guille Jose is 78 and

## 2021-01-12 NOTE — ASSESSMENT & PLAN NOTE
Stable, based on history, physical exam and review of pertinent labs, studies and medications; meds reconciled; continue current treatment plan, medication compliance emphasized.

## 2021-01-12 NOTE — ASSESSMENT & PLAN NOTE
Last Platelet value was 175 done 12/29/2020. Lowest level in the last 10 years was 5. stable.  Continue present management

## 2021-01-13 ENCOUNTER — OFFICE VISIT (OUTPATIENT)
Dept: FAMILY MEDICINE CLINIC | Facility: CLINIC | Age: 76
End: 2021-01-13
Payer: MEDICARE

## 2021-01-13 VITALS
HEIGHT: 71 IN | DIASTOLIC BLOOD PRESSURE: 60 MMHG | WEIGHT: 199.19 LBS | BODY MASS INDEX: 27.89 KG/M2 | TEMPERATURE: 97 F | RESPIRATION RATE: 16 BRPM | HEART RATE: 68 BPM | SYSTOLIC BLOOD PRESSURE: 122 MMHG

## 2021-01-13 DIAGNOSIS — Z00.00 ENCOUNTER FOR ANNUAL HEALTH EXAMINATION: ICD-10-CM

## 2021-01-13 DIAGNOSIS — I10 BENIGN ESSENTIAL HYPERTENSION: ICD-10-CM

## 2021-01-13 DIAGNOSIS — E87.6 HYPOPOTASSEMIA: ICD-10-CM

## 2021-01-13 DIAGNOSIS — J43.2 CENTRILOBULAR EMPHYSEMA (HCC): ICD-10-CM

## 2021-01-13 DIAGNOSIS — D69.3 IMMUNE THROMBOCYTOPENIC PURPURA (HCC): ICD-10-CM

## 2021-01-13 DIAGNOSIS — I27.20 PULMONARY HYPERTENSION (HCC): ICD-10-CM

## 2021-01-13 DIAGNOSIS — M35.1 MIXED CONNECTIVE TISSUE DISEASE (HCC): Chronic | ICD-10-CM

## 2021-01-13 DIAGNOSIS — Z00.00 ANNUAL PHYSICAL EXAM: Primary | ICD-10-CM

## 2021-01-13 DIAGNOSIS — I77.810 AORTIC ROOT DILATION (HCC): ICD-10-CM

## 2021-01-13 DIAGNOSIS — K22.70 BARRETT'S ESOPHAGUS WITHOUT DYSPLASIA: ICD-10-CM

## 2021-01-13 DIAGNOSIS — D69.6 THROMBOCYTOPENIA (HCC): ICD-10-CM

## 2021-01-13 DIAGNOSIS — M33.20 POLYMYOSITIS (HCC): Chronic | ICD-10-CM

## 2021-01-13 DIAGNOSIS — I48.11 LONGSTANDING PERSISTENT ATRIAL FIBRILLATION (HCC): ICD-10-CM

## 2021-01-13 DIAGNOSIS — I43 CARDIOMYOPATHY AS MANIFESTATION OF UNDERLYING DISEASE (HCC): ICD-10-CM

## 2021-01-13 DIAGNOSIS — K75.4: ICD-10-CM

## 2021-01-13 PROCEDURE — G0439 PPPS, SUBSEQ VISIT: HCPCS | Performed by: FAMILY MEDICINE

## 2021-01-13 PROCEDURE — 99213 OFFICE O/P EST LOW 20 MIN: CPT | Performed by: FAMILY MEDICINE

## 2021-01-13 RX ORDER — OMEPRAZOLE 20 MG/1
1 TABLET, DELAYED RELEASE ORAL
COMMUNITY
End: 2021-01-20

## 2021-01-13 NOTE — PROGRESS NOTES
HPI:   Taylor Rowe is a 66year old male who presents for a Medicare Subsequent Annual Wellness visit (Pt already had Initial Annual Wellness). Doing well overall.    Annual Physical due on 01/13/2022        Fall/Risk Assessment   He has been scree Ramon's esophagus     Gout     ED (erectile dysfunction)     Benign essential hypertension     Low HDL (under 40)     Thrombocytopenia (HCC)     Basal cell carcinoma, ear     Lupus hepatitis syndrome (HCC)     Splenomegaly     Mixed connective tissue di MG Oral Tab, Take 1 tablet (50 mg total) by mouth 2 (two) times daily. •  colchicine 0.6 MG Oral Tab, Take 0.6 mg by mouth daily. Wean down to 1 tablet daily when gout improves.       •  Potassium Chloride ER (KLOR-CON M20) 20 MEQ Oral Tab CR, Take 20 mE 2006 01/2010 , 5/13); hand/finger surgery unlisted (2003); rectum surgery procedure unlisted (1946); tonsillectomy (1948); colonoscopy with biopsy (5/14/13); colonoscopy (5/14/2013); skin surgery; skin surgery (2007); skin surgery (7-18-12); skin surgery ( from the following:    Height as of this encounter: 5' 11\" (1.803 m). Weight as of this encounter: 199 lb 3.2 oz (90.4 kg).     Medicare Hearing Assessment  (Required for AWV/SWV)    Hearing Screening    Screening Method: Whisper Test  Whisper Test Resu He has no cervical adenopathy. He has no axillary adenopathy. Neurological: He is alert and oriented to person, place, and time. He has normal strength and normal reflexes. No cranial nerve deficit or sensory deficit.    Skin: Skin is warm, dry an OFFICE/OUTPT VISIT,EST,LEVL III    Benign essential hypertension    Overview     Benazepril 40, amlodipine 5, triameteren HCT 37.5-25         Current Assessment & Plan     Stable, Continue present management.     Blood Pressure and Cardiac Medications compliance emphasized.           Relevant Orders    OFFICE/OUTPT VISIT,EST,LEVL III    Lupus hepatitis syndrome ([facility] [address]    Overview     Followed with Dr Roberto Carlos Parks, currently in remission         Current Assessment & Plan     Stable continue present management your daily physical activity?: Moderate  How would you describe your current health state?: Fair  How do you maintain positive mental well-being?: Social Interaction(has support groups)    This section provided for quick review of chart, separate sheet to Hemophiliacs who received Factor VIII or IX concentrates   Clients of institutions for the mentally retarded   Persons who live in the same house as a HepB virus carrier   Homosexual men   Illicit injectable drug abusers     Tetanus Toxoid  Only covered

## 2021-01-13 NOTE — PATIENT INSTRUCTIONS
Indu Davila's SCREENING SCHEDULE   Tests on this list are recommended by your physician but may not be covered, or covered at this frequency, by your insurer. Please check with your insurance carrier before scheduling to verify coverage.     PREVENT years old and have smoked more than 100 cigarettes in their lifetime   • Anyone with a family history    Colorectal Cancer Screening Covered up to Age 76     Colonoscopy Screen   Covered every 10 years- more often if abnormal Colonoscopy due on 05/01/2023 house as a HepB virus carrier   Homosexual men   Illicit injectable drug abusers     Tetanus Toxoid- Only covered with a cut with metal- TD and TDaP Not covered by Medicare Part B) No orders found for this or any previous visit.  This may be covered with yo

## 2021-01-18 RX ORDER — AZATHIOPRINE 50 MG/1
TABLET ORAL
Qty: 60 TABLET | Refills: 1 | Status: SHIPPED | OUTPATIENT
Start: 2021-01-18 | End: 2021-03-31

## 2021-01-19 ENCOUNTER — TELEPHONE (OUTPATIENT)
Dept: CARDIOLOGY CLINIC | Facility: HOSPITAL | Age: 76
End: 2021-01-19

## 2021-01-19 NOTE — TELEPHONE ENCOUNTER
Brittney Daughters called to say that his weight is up 4-5 lbs. It is all in the belly and he has a little swelling in his left foot. He takes torsemide 40 mg bid and spironolactone 12.5 mg daily. Discussed with SHERIDAN Coreas. We will see him tomorrow.   He verbali

## 2021-01-20 ENCOUNTER — HOSPITAL ENCOUNTER (OUTPATIENT)
Dept: CARDIOLOGY CLINIC | Facility: HOSPITAL | Age: 76
Discharge: HOME | End: 2021-01-20
Attending: NURSE PRACTITIONER
Payer: MEDICARE

## 2021-01-20 ENCOUNTER — HOSPITAL ENCOUNTER (OUTPATIENT)
Dept: LAB | Facility: HOSPITAL | Age: 76
Discharge: HOME | End: 2021-01-20
Attending: NURSE PRACTITIONER
Payer: MEDICARE

## 2021-01-20 ENCOUNTER — TELEPHONE (OUTPATIENT)
Dept: CARDIOLOGY CLINIC | Facility: HOSPITAL | Age: 76
End: 2021-01-20

## 2021-01-20 VITALS
RESPIRATION RATE: 15 BRPM | WEIGHT: 200.81 LBS | HEART RATE: 62 BPM | BODY MASS INDEX: 28 KG/M2 | SYSTOLIC BLOOD PRESSURE: 128 MMHG | OXYGEN SATURATION: 99 % | DIASTOLIC BLOOD PRESSURE: 70 MMHG

## 2021-01-20 DIAGNOSIS — G47.33 OSA (OBSTRUCTIVE SLEEP APNEA): ICD-10-CM

## 2021-01-20 DIAGNOSIS — I48.20 CHRONIC ATRIAL FIBRILLATION (HCC): ICD-10-CM

## 2021-01-20 DIAGNOSIS — I27.20 PULMONARY HTN (HCC): ICD-10-CM

## 2021-01-20 DIAGNOSIS — I50.810 RIGHT HEART FAILURE, NYHA CLASS 3 (HCC): Primary | ICD-10-CM

## 2021-01-20 DIAGNOSIS — I50.9 CHF (CONGESTIVE HEART FAILURE) (HCC): ICD-10-CM

## 2021-01-20 DIAGNOSIS — N18.30 STAGE 3 CHRONIC KIDNEY DISEASE, UNSPECIFIED WHETHER STAGE 3A OR 3B CKD (HCC): ICD-10-CM

## 2021-01-20 LAB
ANION GAP SERPL CALC-SCNC: 3 MMOL/L (ref 0–18)
BUN BLD-MCNC: 23 MG/DL (ref 7–18)
BUN/CREAT SERPL: 16.3 (ref 10–20)
CALCIUM BLD-MCNC: 9.4 MG/DL (ref 8.5–10.1)
CHLORIDE SERPL-SCNC: 107 MMOL/L (ref 98–112)
CO2 SERPL-SCNC: 35 MMOL/L (ref 21–32)
CREAT BLD-MCNC: 1.41 MG/DL
GLUCOSE BLD-MCNC: 73 MG/DL (ref 70–99)
OSMOLALITY SERPL CALC.SUM OF ELEC: 302 MOSM/KG (ref 275–295)
PATIENT FASTING Y/N/NP: NO
POTASSIUM SERPL-SCNC: 4.6 MMOL/L (ref 3.5–5.1)
SODIUM SERPL-SCNC: 145 MMOL/L (ref 136–145)

## 2021-01-20 PROCEDURE — 36415 COLL VENOUS BLD VENIPUNCTURE: CPT | Performed by: NURSE PRACTITIONER

## 2021-01-20 PROCEDURE — 99215 OFFICE O/P EST HI 40 MIN: CPT | Performed by: NURSE PRACTITIONER

## 2021-01-20 PROCEDURE — 80048 BASIC METABOLIC PNL TOTAL CA: CPT | Performed by: NURSE PRACTITIONER

## 2021-01-20 RX ORDER — BUMETANIDE 0.25 MG/ML
4 INJECTION, SOLUTION INTRAMUSCULAR; INTRAVENOUS ONCE
Status: COMPLETED | OUTPATIENT
Start: 2021-01-20 | End: 2021-01-20

## 2021-01-20 RX ADMIN — BUMETANIDE 4 MG: 0.25 INJECTION, SOLUTION INTRAMUSCULAR; INTRAVENOUS at 11:55:00

## 2021-01-20 NOTE — PROGRESS NOTES
[facility] for Cardiac Health Progress Note    Cele Fernandez is a 66year old male who presents to clinic for APN assessment and management of chronic diastolic, RV heart failure and is functional class 3.      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  Homar Gibbs exam.   · PH, mild post capillary- DPG 5 on RHC 8/4. PVR 2.3 wood units and mPAP 29 mmhg, wedge 15 mmg. · Moderate AI, Moderate MR, Moderate TR   · Dilated ascending aorta - max dimension 4.5cm by echo 6/25.  Ongoing outpatient f/u.    · Progressive weakn

## 2021-01-20 NOTE — PATIENT INSTRUCTIONS
Heart Failure Discharge Instructions    · Continue current medications. · Hold your afternoon dose of Torsemide.     Activity: Regular exercise and activity is important for your overall health and to help keep your heart strong and functioning as well as activity, or are limiting activity because of shortness of breath or fatigue  · You are short of breath lying down, you need more pillows to breathe comfortably,  or wake up during the night short of breath  · You urinate less often during the day and more

## 2021-01-20 NOTE — PROGRESS NOTES
Patient assessed. Patient reports abdominal bloating and a 4-lb weight gain in 2 days. He also has bilateral lower extremity edema. Denies increased shortness of breath.  Endorses intermittent arthritic pain for which he has taken 2 tablets of Aleve in a sp

## 2021-01-25 DIAGNOSIS — Z23 NEED FOR VACCINATION: ICD-10-CM

## 2021-01-26 ENCOUNTER — OFFICE VISIT (OUTPATIENT)
Dept: HEMATOLOGY/ONCOLOGY | Facility: HOSPITAL | Age: 76
End: 2021-01-26
Attending: INTERNAL MEDICINE
Payer: MEDICARE

## 2021-01-26 VITALS
BODY MASS INDEX: 28 KG/M2 | RESPIRATION RATE: 18 BRPM | OXYGEN SATURATION: 95 % | WEIGHT: 200.19 LBS | TEMPERATURE: 97 F | DIASTOLIC BLOOD PRESSURE: 78 MMHG | SYSTOLIC BLOOD PRESSURE: 131 MMHG | HEART RATE: 73 BPM

## 2021-01-26 DIAGNOSIS — M32.19 OTHER SYSTEMIC LUPUS ERYTHEMATOSUS WITH OTHER ORGAN INVOLVEMENT (HCC): ICD-10-CM

## 2021-01-26 DIAGNOSIS — D69.6 THROMBOCYTOPENIA (HCC): ICD-10-CM

## 2021-01-26 DIAGNOSIS — D69.3 IMMUNE THROMBOCYTOPENIC PURPURA (HCC): Primary | ICD-10-CM

## 2021-01-26 PROCEDURE — 96366 THER/PROPH/DIAG IV INF ADDON: CPT

## 2021-01-26 PROCEDURE — 96365 THER/PROPH/DIAG IV INF INIT: CPT

## 2021-01-26 PROCEDURE — 96375 TX/PRO/DX INJ NEW DRUG ADDON: CPT

## 2021-01-26 RX ORDER — FAMOTIDINE 10 MG/ML
20 INJECTION, SOLUTION INTRAVENOUS ONCE
Status: CANCELLED | OUTPATIENT
Start: 2021-02-23

## 2021-01-26 RX ORDER — DIPHENHYDRAMINE HCL 25 MG
25 CAPSULE ORAL ONCE
Status: CANCELLED | OUTPATIENT
Start: 2021-02-23

## 2021-01-26 RX ORDER — METHYLPREDNISOLONE SODIUM SUCCINATE 40 MG/ML
40 INJECTION, POWDER, LYOPHILIZED, FOR SOLUTION INTRAMUSCULAR; INTRAVENOUS ONCE
Status: CANCELLED
Start: 2021-02-23 | End: 2021-02-23

## 2021-01-26 RX ORDER — ACETAMINOPHEN 325 MG/1
650 TABLET ORAL ONCE
Status: CANCELLED | OUTPATIENT
Start: 2021-02-23

## 2021-01-26 RX ORDER — METHYLPREDNISOLONE SODIUM SUCCINATE 40 MG/ML
40 INJECTION, POWDER, LYOPHILIZED, FOR SOLUTION INTRAMUSCULAR; INTRAVENOUS ONCE
Status: COMPLETED | OUTPATIENT
Start: 2021-01-26 | End: 2021-01-26

## 2021-01-26 RX ORDER — MEPERIDINE HYDROCHLORIDE 25 MG/ML
25 INJECTION INTRAMUSCULAR; INTRAVENOUS; SUBCUTANEOUS ONCE
Status: CANCELLED | OUTPATIENT
Start: 2021-02-23

## 2021-01-26 RX ORDER — METHYLPREDNISOLONE SODIUM SUCCINATE 125 MG/2ML
125 INJECTION, POWDER, LYOPHILIZED, FOR SOLUTION INTRAMUSCULAR; INTRAVENOUS ONCE
Status: CANCELLED | OUTPATIENT
Start: 2021-02-23

## 2021-01-26 RX ORDER — DIPHENHYDRAMINE HCL 25 MG
25 CAPSULE ORAL ONCE
Status: COMPLETED | OUTPATIENT
Start: 2021-01-26 | End: 2021-01-26

## 2021-01-26 RX ORDER — ACETAMINOPHEN 325 MG/1
650 TABLET ORAL ONCE
Status: COMPLETED | OUTPATIENT
Start: 2021-01-26 | End: 2021-01-26

## 2021-01-26 RX ORDER — DIPHENHYDRAMINE HYDROCHLORIDE 50 MG/ML
25 INJECTION INTRAMUSCULAR; INTRAVENOUS ONCE
Status: CANCELLED | OUTPATIENT
Start: 2021-02-23

## 2021-01-26 RX ORDER — METHYLPREDNISOLONE SODIUM SUCCINATE 40 MG/ML
40 INJECTION, POWDER, LYOPHILIZED, FOR SOLUTION INTRAMUSCULAR; INTRAVENOUS ONCE
Status: CANCELLED | OUTPATIENT
Start: 2021-02-23

## 2021-01-26 RX ADMIN — METHYLPREDNISOLONE SODIUM SUCCINATE 40 MG: 40 INJECTION, POWDER, LYOPHILIZED, FOR SOLUTION INTRAMUSCULAR; INTRAVENOUS at 11:28:00

## 2021-01-26 RX ADMIN — DIPHENHYDRAMINE HCL 25 MG: 25 MG CAPSULE ORAL at 11:11:00

## 2021-01-26 RX ADMIN — ACETAMINOPHEN 650 MG: 325 TABLET ORAL at 11:11:00

## 2021-01-26 NOTE — PROGRESS NOTES
Education Record    Learner:  Patient    Disease / Diagnosis: Immune thrombocytopenia purpura, systemic lupus erythematosus    Barriers / Limitations:  None   Comments:    Method:  Brief focused   Comments:    General Topics:  Plan of care elyssa and Con Gavin

## 2021-01-29 ENCOUNTER — HOSPITAL ENCOUNTER (OUTPATIENT)
Dept: LAB | Facility: HOSPITAL | Age: 76
Discharge: HOME | End: 2021-01-29
Attending: NURSE PRACTITIONER
Payer: MEDICARE

## 2021-01-29 ENCOUNTER — HOSPITAL ENCOUNTER (OUTPATIENT)
Dept: CARDIOLOGY CLINIC | Facility: HOSPITAL | Age: 76
Discharge: HOME | End: 2021-01-29
Attending: NURSE PRACTITIONER
Payer: MEDICARE

## 2021-01-29 VITALS
WEIGHT: 201.69 LBS | HEART RATE: 64 BPM | SYSTOLIC BLOOD PRESSURE: 146 MMHG | OXYGEN SATURATION: 100 % | DIASTOLIC BLOOD PRESSURE: 72 MMHG | BODY MASS INDEX: 28 KG/M2 | RESPIRATION RATE: 21 BRPM

## 2021-01-29 DIAGNOSIS — N18.30 STAGE 3 CHRONIC KIDNEY DISEASE, UNSPECIFIED WHETHER STAGE 3A OR 3B CKD (HCC): ICD-10-CM

## 2021-01-29 DIAGNOSIS — I50.812 CHRONIC RIGHT-SIDED CONGESTIVE HEART FAILURE (HCC): Primary | ICD-10-CM

## 2021-01-29 DIAGNOSIS — M35.1 MIXED CONNECTIVE TISSUE DISEASE (HCC): ICD-10-CM

## 2021-01-29 DIAGNOSIS — I48.20 CHRONIC ATRIAL FIBRILLATION (HCC): ICD-10-CM

## 2021-01-29 DIAGNOSIS — I27.20 PULMONARY HTN (HCC): ICD-10-CM

## 2021-01-29 DIAGNOSIS — I50.9 CHF (CONGESTIVE HEART FAILURE) (HCC): ICD-10-CM

## 2021-01-29 DIAGNOSIS — G47.33 OSA (OBSTRUCTIVE SLEEP APNEA): ICD-10-CM

## 2021-01-29 LAB
ANION GAP SERPL CALC-SCNC: 3 MMOL/L (ref 0–18)
BUN BLD-MCNC: 26 MG/DL (ref 7–18)
BUN/CREAT SERPL: 18.4 (ref 10–20)
CALCIUM BLD-MCNC: 9.3 MG/DL (ref 8.5–10.1)
CHLORIDE SERPL-SCNC: 108 MMOL/L (ref 98–112)
CO2 SERPL-SCNC: 33 MMOL/L (ref 21–32)
CREAT BLD-MCNC: 1.41 MG/DL
DEPRECATED RDW RBC AUTO: 59.3 FL (ref 35.1–46.3)
ERYTHROCYTE [DISTWIDTH] IN BLOOD BY AUTOMATED COUNT: 15.6 % (ref 11–15)
GLUCOSE BLD-MCNC: 92 MG/DL (ref 70–99)
HCT VFR BLD AUTO: 43.9 %
HGB BLD-MCNC: 13.9 G/DL
MCH RBC QN AUTO: 32.6 PG (ref 26–34)
MCHC RBC AUTO-ENTMCNC: 31.7 G/DL (ref 31–37)
MCV RBC AUTO: 103.1 FL
NT-PROBNP SERPL-MCNC: 2173 PG/ML (ref ?–450)
OSMOLALITY SERPL CALC.SUM OF ELEC: 302 MOSM/KG (ref 275–295)
PLATELET # BLD AUTO: 107 10(3)UL (ref 150–450)
POTASSIUM SERPL-SCNC: 4.2 MMOL/L (ref 3.5–5.1)
RBC # BLD AUTO: 4.26 X10(6)UL
SODIUM SERPL-SCNC: 144 MMOL/L (ref 136–145)
WBC # BLD AUTO: 4.5 X10(3) UL (ref 4–11)

## 2021-01-29 PROCEDURE — 80048 BASIC METABOLIC PNL TOTAL CA: CPT | Performed by: NURSE PRACTITIONER

## 2021-01-29 PROCEDURE — 36415 COLL VENOUS BLD VENIPUNCTURE: CPT | Performed by: NURSE PRACTITIONER

## 2021-01-29 PROCEDURE — 99215 OFFICE O/P EST HI 40 MIN: CPT | Performed by: NURSE PRACTITIONER

## 2021-01-29 PROCEDURE — 83880 ASSAY OF NATRIURETIC PEPTIDE: CPT | Performed by: NURSE PRACTITIONER

## 2021-01-29 PROCEDURE — 85027 COMPLETE CBC AUTOMATED: CPT | Performed by: NURSE PRACTITIONER

## 2021-01-29 NOTE — PROGRESS NOTES
[facility] Cardiac Health Progress Note    Zan Bence is a 66year old male who presents to clinic for APN assessment and management of chronic diastolic/RV heart failure and is functional class 2-3.      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   • Sleep apnea     CPAP   • Thrombocytopathia (HCC)    • Wears glasses       Past S Packs/day: 0.25        Years: 15.00        Pack years: 3.75        Start date: 1958        Quit date: 1973        Years since quittin.5      Smokeless tobacco: Never Used    Alcohol use:  Yes      Alcohol/week: 8.0 - 10.0 standard drinks Latest Ref Range: 150.0 - 450.0 10(3)uL 107.0 (L)   RBC Latest Ref Range: 3.80 - 5.80 x10(6)uL 4.26   MCH Latest Ref Range: 26.0 - 34.0 pg 32.6   MCHC Latest Ref Range: 31.0 - 37.0 g/dL 31.7   MCV Latest Ref Range: 80.0 - 100.0 fL 103.1 (H)   RDW Latest Re Alert, in no apparent distress  HEENT:          +6cm JVD  Lungs:            CTAB                     CV:                  irregular  Abdomen:       Slightly distended, soft, non-tender, BS+  Extremities:    Trace LE edema  Neuro:             A&O x 3 medications. · Return to clinic in 1 month. · F/U with Dr. Karthikeyan Cortez as planned in March. · CHF discharge instructions given    40 minutes spent with patient and greater than 50% of the time was spent counseling and coordinating care.     ROGER Villanueva

## 2021-01-29 NOTE — PATIENT INSTRUCTIONS
Heart Failure Discharge Instructions    Continue with same medications. Activity: Regular exercise and activity is important for your overall health and to help keep your heart strong and functioning as well as possible.    Walk at a slow to moderate pa shortness of breath or fatigue  · You are short of breath lying down, you need more pillows to breathe comfortably,  or wake up during the night short of breath  · You urinate less often during the day and more often at night  · You have a bloated feeling,

## 2021-01-29 NOTE — PROGRESS NOTES
Patient was assessed. No complain of shortness of breath, fatigue, chest pain or edema reported. Does not feel like he is bloated. Only C/O Lupus flair up, Imuran and Prednisone is on hold for a week now due to getting the COVID-19 vaccine tomorrow.  Weight

## 2021-01-30 ENCOUNTER — IMMUNIZATION (OUTPATIENT)
Dept: LAB | Age: 76
End: 2021-01-30

## 2021-01-30 DIAGNOSIS — Z23 NEED FOR VACCINATION: Primary | ICD-10-CM

## 2021-01-30 PROCEDURE — 91301 COVID-19 MODERNA VACCINE: CPT

## 2021-01-30 PROCEDURE — 0011A COVID-19 MODERNA VACCINE: CPT

## 2021-02-11 ENCOUNTER — PATIENT MESSAGE (OUTPATIENT)
Dept: FAMILY MEDICINE CLINIC | Facility: CLINIC | Age: 76
End: 2021-02-11

## 2021-02-12 RX ORDER — POTASSIUM CHLORIDE 20 MEQ/1
20 TABLET, EXTENDED RELEASE ORAL DAILY
Qty: 90 TABLET | Refills: 1 | Status: SHIPPED | OUTPATIENT
Start: 2021-02-12 | End: 2021-08-16

## 2021-02-12 RX ORDER — MULTIVITAMIN,THER AND MINERALS
TABLET ORAL
COMMUNITY
End: 2021-04-27

## 2021-02-12 NOTE — TELEPHONE ENCOUNTER
From: Luisa Goltz  To: Katey Canada MD  Sent: 2/11/2021 10:23 PM CST  Subject: Prescription Question    Dr. Tien Harvey, I need a refill on my potassium prescription.  It is currently for 3 X 20 meq daily but I have not needed to take that much since my test

## 2021-02-23 ENCOUNTER — OFFICE VISIT (OUTPATIENT)
Dept: HEMATOLOGY/ONCOLOGY | Facility: HOSPITAL | Age: 76
End: 2021-02-23
Attending: INTERNAL MEDICINE
Payer: MEDICARE

## 2021-02-23 VITALS
HEART RATE: 78 BPM | RESPIRATION RATE: 16 BRPM | WEIGHT: 202.38 LBS | BODY MASS INDEX: 28 KG/M2 | SYSTOLIC BLOOD PRESSURE: 117 MMHG | TEMPERATURE: 96 F | OXYGEN SATURATION: 97 % | DIASTOLIC BLOOD PRESSURE: 66 MMHG

## 2021-02-23 DIAGNOSIS — D69.6 THROMBOCYTOPENIA (HCC): ICD-10-CM

## 2021-02-23 DIAGNOSIS — M32.19 OTHER SYSTEMIC LUPUS ERYTHEMATOSUS WITH OTHER ORGAN INVOLVEMENT (HCC): ICD-10-CM

## 2021-02-23 DIAGNOSIS — D69.3 IMMUNE THROMBOCYTOPENIC PURPURA (HCC): Primary | ICD-10-CM

## 2021-02-23 PROCEDURE — 96366 THER/PROPH/DIAG IV INF ADDON: CPT

## 2021-02-23 PROCEDURE — 96375 TX/PRO/DX INJ NEW DRUG ADDON: CPT

## 2021-02-23 PROCEDURE — 96365 THER/PROPH/DIAG IV INF INIT: CPT

## 2021-02-23 RX ORDER — ACETAMINOPHEN 325 MG/1
650 TABLET ORAL ONCE
Status: COMPLETED | OUTPATIENT
Start: 2021-02-23 | End: 2021-02-23

## 2021-02-23 RX ORDER — ACETAMINOPHEN 325 MG/1
650 TABLET ORAL ONCE
Status: CANCELLED | OUTPATIENT
Start: 2021-03-23

## 2021-02-23 RX ORDER — METHYLPREDNISOLONE SODIUM SUCCINATE 40 MG/ML
40 INJECTION, POWDER, LYOPHILIZED, FOR SOLUTION INTRAMUSCULAR; INTRAVENOUS ONCE
Status: CANCELLED
Start: 2021-03-23 | End: 2021-03-23

## 2021-02-23 RX ORDER — FAMOTIDINE 10 MG/ML
20 INJECTION, SOLUTION INTRAVENOUS ONCE
Status: CANCELLED | OUTPATIENT
Start: 2021-03-23

## 2021-02-23 RX ORDER — METHYLPREDNISOLONE SODIUM SUCCINATE 40 MG/ML
40 INJECTION, POWDER, LYOPHILIZED, FOR SOLUTION INTRAMUSCULAR; INTRAVENOUS ONCE
Status: CANCELLED | OUTPATIENT
Start: 2021-03-23

## 2021-02-23 RX ORDER — METHYLPREDNISOLONE SODIUM SUCCINATE 125 MG/2ML
125 INJECTION, POWDER, LYOPHILIZED, FOR SOLUTION INTRAMUSCULAR; INTRAVENOUS ONCE
Status: CANCELLED | OUTPATIENT
Start: 2021-03-23

## 2021-02-23 RX ORDER — DIPHENHYDRAMINE HCL 25 MG
25 CAPSULE ORAL ONCE
Status: COMPLETED | OUTPATIENT
Start: 2021-02-23 | End: 2021-02-23

## 2021-02-23 RX ORDER — MEPERIDINE HYDROCHLORIDE 25 MG/ML
25 INJECTION INTRAMUSCULAR; INTRAVENOUS; SUBCUTANEOUS ONCE
Status: CANCELLED | OUTPATIENT
Start: 2021-03-23

## 2021-02-23 RX ORDER — METHYLPREDNISOLONE SODIUM SUCCINATE 40 MG/ML
40 INJECTION, POWDER, LYOPHILIZED, FOR SOLUTION INTRAMUSCULAR; INTRAVENOUS ONCE
Status: COMPLETED | OUTPATIENT
Start: 2021-02-23 | End: 2021-02-23

## 2021-02-23 RX ORDER — DIPHENHYDRAMINE HCL 25 MG
25 CAPSULE ORAL ONCE
Status: CANCELLED | OUTPATIENT
Start: 2021-03-23

## 2021-02-23 RX ORDER — DIPHENHYDRAMINE HYDROCHLORIDE 50 MG/ML
25 INJECTION INTRAMUSCULAR; INTRAVENOUS ONCE
Status: CANCELLED | OUTPATIENT
Start: 2021-03-23

## 2021-02-23 RX ADMIN — ACETAMINOPHEN 650 MG: 325 TABLET ORAL at 10:59:00

## 2021-02-23 RX ADMIN — DIPHENHYDRAMINE HCL 25 MG: 25 MG CAPSULE ORAL at 10:59:00

## 2021-02-23 RX ADMIN — METHYLPREDNISOLONE SODIUM SUCCINATE 40 MG: 40 INJECTION, POWDER, LYOPHILIZED, FOR SOLUTION INTRAMUSCULAR; INTRAVENOUS at 11:01:00

## 2021-02-23 NOTE — PROGRESS NOTES
Pt tolerated IVIG without difficulty. Pt is aware that his current order will  before his next infusion is due. No further appointments made at this time.  Pt states he will be meeting with Dr. Helio Mary within the next month to discuss further treatment

## 2021-02-24 DIAGNOSIS — I50.812 CHRONIC RIGHT-SIDED CONGESTIVE HEART FAILURE (HCC): Primary | ICD-10-CM

## 2021-02-25 ENCOUNTER — HOSPITAL ENCOUNTER (OUTPATIENT)
Dept: LAB | Facility: HOSPITAL | Age: 76
Discharge: HOME | End: 2021-02-25
Attending: NURSE PRACTITIONER
Payer: MEDICARE

## 2021-02-25 ENCOUNTER — HOSPITAL ENCOUNTER (OUTPATIENT)
Dept: CARDIOLOGY CLINIC | Facility: HOSPITAL | Age: 76
Discharge: HOME | End: 2021-02-25
Attending: NURSE PRACTITIONER
Payer: MEDICARE

## 2021-02-25 VITALS
RESPIRATION RATE: 16 BRPM | HEART RATE: 68 BPM | OXYGEN SATURATION: 98 % | DIASTOLIC BLOOD PRESSURE: 72 MMHG | WEIGHT: 203.63 LBS | SYSTOLIC BLOOD PRESSURE: 137 MMHG | BODY MASS INDEX: 28 KG/M2

## 2021-02-25 DIAGNOSIS — I50.812 CHRONIC RIGHT-SIDED CONGESTIVE HEART FAILURE (HCC): ICD-10-CM

## 2021-02-25 DIAGNOSIS — I50.30 DIASTOLIC HEART FAILURE, UNSPECIFIED HF CHRONICITY (HCC): ICD-10-CM

## 2021-02-25 DIAGNOSIS — I48.11 LONGSTANDING PERSISTENT ATRIAL FIBRILLATION (HCC): ICD-10-CM

## 2021-02-25 DIAGNOSIS — G47.33 OSA (OBSTRUCTIVE SLEEP APNEA): ICD-10-CM

## 2021-02-25 DIAGNOSIS — I10 BENIGN ESSENTIAL HYPERTENSION: ICD-10-CM

## 2021-02-25 LAB
ANION GAP SERPL CALC-SCNC: 7 MMOL/L (ref 0–18)
BUN BLD-MCNC: 33 MG/DL (ref 7–18)
BUN/CREAT SERPL: 21.4 (ref 10–20)
CALCIUM BLD-MCNC: 9.1 MG/DL (ref 8.5–10.1)
CHLORIDE SERPL-SCNC: 106 MMOL/L (ref 98–112)
CO2 SERPL-SCNC: 32 MMOL/L (ref 21–32)
CREAT BLD-MCNC: 1.54 MG/DL
GLUCOSE BLD-MCNC: 88 MG/DL (ref 70–99)
OSMOLALITY SERPL CALC.SUM OF ELEC: 307 MOSM/KG (ref 275–295)
PATIENT FASTING Y/N/NP: NO
POTASSIUM SERPL-SCNC: 4.3 MMOL/L (ref 3.5–5.1)
SODIUM SERPL-SCNC: 145 MMOL/L (ref 136–145)

## 2021-02-25 PROCEDURE — 99214 OFFICE O/P EST MOD 30 MIN: CPT | Performed by: NURSE PRACTITIONER

## 2021-02-25 PROCEDURE — 80048 BASIC METABOLIC PNL TOTAL CA: CPT | Performed by: NURSE PRACTITIONER

## 2021-02-25 PROCEDURE — 36415 COLL VENOUS BLD VENIPUNCTURE: CPT | Performed by: NURSE PRACTITIONER

## 2021-02-25 NOTE — PROGRESS NOTES
[facility] Cardiac Health Progress Note    Janell Romero is a 66year old male who presents to clinic for APN assessment and management of chronic diastolic/RV heart failure and is functional class 2-3.      He returns for routine asses 6/29/2012   • High blood pressure    • Lupus (HCC)    • MCTD (mixed connective tissue disease) (HCC)    • Obstructive sleep apnea (adult) (pediatric) 11/16/2017   • LI (obstructive sleep apnea) HST 11-7-17    AHI 37  Supine AHI 38 non-supine AHI 16 Sao2 N • Heart Disease Father         CHF   • Gastro-Intestinal Disorder Father         Diverticulosis   • Alcohol and Other Disorders Associated Father    • Heart Disease Mother         CHF   • Breast Cancer Mother    • Stroke Mother    • Cancer Paternal Kincaid tablet, Rfl: 2  •  predniSONE 1 MG Oral Tab, Take 2 tablets (2 mg total) by mouth daily with breakfast. (Patient not taking: Reported on 1/26/2021 ), Disp: 180 tablet, Rfl: 0  •  predniSONE 5 MG Oral Tab, Take 1 tablet (5 mg total) by mouth daily.  With 2 - Latest Ref Range: 10.0 - 20.0  21.4 (H)   eGFR NON-AFR.  AMERICAN Latest Ref Range: >=60  43 (L)   eGFR  Latest Ref Range: >=60  50 (L)   ANION GAP Latest Ref Range: 0 - 18 mmol/L 7   CALCULATED OSMOLALITY Latest Ref Range: 275 - 295 mOsm/kg March.  · CHF discharge instructions given    40 minutes spent with patient and greater than 50% of the time was spent counseling and coordinating care.     Cristina Tanner NP   2/25/2021

## 2021-02-25 NOTE — PROGRESS NOTES
Patient assessed. Patient complaining of mild bilateral lower extremity edema with left>right and APN notified. Weight up 2 lbs at 203.6 lbs. Patient denies abdominal bloating or shortness of breath. Labs ordered and drawn by [facility].  Reviewed allergies and

## 2021-02-26 DIAGNOSIS — K22.70 BARRETT'S ESOPHAGUS WITHOUT DYSPLASIA: ICD-10-CM

## 2021-02-26 RX ORDER — OMEPRAZOLE 20 MG/1
CAPSULE, DELAYED RELEASE ORAL
Qty: 180 CAPSULE | Refills: 1 | Status: SHIPPED | OUTPATIENT
Start: 2021-02-26 | End: 2021-04-27

## 2021-02-27 ENCOUNTER — IMMUNIZATION (OUTPATIENT)
Dept: LAB | Age: 76
End: 2021-02-27

## 2021-02-27 DIAGNOSIS — Z23 NEED FOR VACCINATION: Primary | ICD-10-CM

## 2021-02-27 PROCEDURE — 0012A COVID-19 MODERNA VACCINE: CPT | Performed by: NURSE PRACTITIONER

## 2021-02-27 PROCEDURE — 91301 COVID-19 MODERNA VACCINE: CPT | Performed by: NURSE PRACTITIONER

## 2021-03-02 ENCOUNTER — TELEPHONE (OUTPATIENT)
Dept: RHEUMATOLOGY | Facility: CLINIC | Age: 76
End: 2021-03-02

## 2021-03-02 DIAGNOSIS — M1A.09X0 IDIOPATHIC CHRONIC GOUT OF MULTIPLE SITES WITHOUT TOPHUS: Primary | ICD-10-CM

## 2021-03-02 DIAGNOSIS — M33.20 POLYMYOSITIS (HCC): Primary | ICD-10-CM

## 2021-03-02 RX ORDER — COLCHICINE 0.6 MG/1
0.6 TABLET ORAL 2 TIMES DAILY
Qty: 60 TABLET | Refills: 1 | Status: SHIPPED | OUTPATIENT
Start: 2021-03-02 | End: 2021-05-06

## 2021-03-02 NOTE — TELEPHONE ENCOUNTER
----- Message from Renetta Davila sent at 3/2/2021 10:07 AM CST -----  Regarding: Other  Contact: [phone]  Dr. Mary Pan, Do you want me to get any labs prior to my visit on Thursday?   I still have some swelling in my toe joints and mostly on the bot

## 2021-03-03 ENCOUNTER — LAB ENCOUNTER (OUTPATIENT)
Dept: LAB | Age: 76
End: 2021-03-03
Attending: INTERNAL MEDICINE
Payer: MEDICARE

## 2021-03-03 DIAGNOSIS — M1A.09X0 IDIOPATHIC CHRONIC GOUT OF MULTIPLE SITES WITHOUT TOPHUS: ICD-10-CM

## 2021-03-03 DIAGNOSIS — M33.20 POLYMYOSITIS (HCC): ICD-10-CM

## 2021-03-03 LAB
ALBUMIN SERPL-MCNC: 3.5 G/DL (ref 3.4–5)
ALBUMIN/GLOB SERPL: 1 {RATIO} (ref 1–2)
ALP LIVER SERPL-CCNC: 61 U/L
ALT SERPL-CCNC: 27 U/L
ANION GAP SERPL CALC-SCNC: 3 MMOL/L (ref 0–18)
AST SERPL-CCNC: 23 U/L (ref 15–37)
BASOPHILS # BLD AUTO: 0.04 X10(3) UL (ref 0–0.2)
BASOPHILS NFR BLD AUTO: 0.7 %
BILIRUB SERPL-MCNC: 0.8 MG/DL (ref 0.1–2)
BUN BLD-MCNC: 23 MG/DL (ref 7–18)
BUN/CREAT SERPL: 15.6 (ref 10–20)
CALCIUM BLD-MCNC: 8.9 MG/DL (ref 8.5–10.1)
CHLORIDE SERPL-SCNC: 106 MMOL/L (ref 98–112)
CO2 SERPL-SCNC: 32 MMOL/L (ref 21–32)
CREAT BLD-MCNC: 1.47 MG/DL
DEPRECATED RDW RBC AUTO: 58.9 FL (ref 35.1–46.3)
EOSINOPHIL # BLD AUTO: 0.11 X10(3) UL (ref 0–0.7)
EOSINOPHIL NFR BLD AUTO: 1.9 %
ERYTHROCYTE [DISTWIDTH] IN BLOOD BY AUTOMATED COUNT: 14.9 % (ref 11–15)
GLOBULIN PLAS-MCNC: 3.5 G/DL (ref 2.8–4.4)
GLUCOSE BLD-MCNC: 92 MG/DL (ref 70–99)
HCT VFR BLD AUTO: 42.3 %
HGB BLD-MCNC: 13.2 G/DL
IMM GRANULOCYTES # BLD AUTO: 0.02 X10(3) UL (ref 0–1)
IMM GRANULOCYTES NFR BLD: 0.3 %
LYMPHOCYTES # BLD AUTO: 0.36 X10(3) UL (ref 1–4)
LYMPHOCYTES NFR BLD AUTO: 6.1 %
M PROTEIN MFR SERPL ELPH: 7 G/DL (ref 6.4–8.2)
MCH RBC QN AUTO: 33.2 PG (ref 26–34)
MCHC RBC AUTO-ENTMCNC: 31.2 G/DL (ref 31–37)
MCV RBC AUTO: 106.5 FL
MONOCYTES # BLD AUTO: 0.59 X10(3) UL (ref 0.1–1)
MONOCYTES NFR BLD AUTO: 10 %
NEUTROPHILS # BLD AUTO: 4.76 X10 (3) UL (ref 1.5–7.7)
NEUTROPHILS # BLD AUTO: 4.76 X10(3) UL (ref 1.5–7.7)
NEUTROPHILS NFR BLD AUTO: 81 %
OSMOLALITY SERPL CALC.SUM OF ELEC: 295 MOSM/KG (ref 275–295)
PATIENT FASTING Y/N/NP: YES
PLATELET # BLD AUTO: 101 10(3)UL (ref 150–450)
POTASSIUM SERPL-SCNC: 4 MMOL/L (ref 3.5–5.1)
RBC # BLD AUTO: 3.97 X10(6)UL
SED RATE-ML: 26 MM/HR
SODIUM SERPL-SCNC: 141 MMOL/L (ref 136–145)
URATE SERPL-MCNC: 11.2 MG/DL
WBC # BLD AUTO: 5.9 X10(3) UL (ref 4–11)

## 2021-03-03 PROCEDURE — 36415 COLL VENOUS BLD VENIPUNCTURE: CPT

## 2021-03-03 PROCEDURE — 84550 ASSAY OF BLOOD/URIC ACID: CPT

## 2021-03-03 PROCEDURE — 80053 COMPREHEN METABOLIC PANEL: CPT

## 2021-03-03 PROCEDURE — 85652 RBC SED RATE AUTOMATED: CPT

## 2021-03-03 PROCEDURE — 85025 COMPLETE CBC W/AUTO DIFF WBC: CPT

## 2021-03-04 ENCOUNTER — OFFICE VISIT (OUTPATIENT)
Dept: RHEUMATOLOGY | Facility: CLINIC | Age: 76
End: 2021-03-04
Payer: MEDICARE

## 2021-03-04 VITALS
HEART RATE: 55 BPM | DIASTOLIC BLOOD PRESSURE: 64 MMHG | BODY MASS INDEX: 27.3 KG/M2 | OXYGEN SATURATION: 99 % | TEMPERATURE: 97 F | WEIGHT: 195 LBS | HEIGHT: 71 IN | SYSTOLIC BLOOD PRESSURE: 110 MMHG

## 2021-03-04 DIAGNOSIS — M35.1 MIXED CONNECTIVE TISSUE DISEASE (HCC): Primary | Chronic | ICD-10-CM

## 2021-03-04 DIAGNOSIS — M1A.09X0 IDIOPATHIC CHRONIC GOUT OF MULTIPLE SITES WITHOUT TOPHUS: ICD-10-CM

## 2021-03-04 DIAGNOSIS — M33.20 POLYMYOSITIS (HCC): Chronic | ICD-10-CM

## 2021-03-04 DIAGNOSIS — M79.672 PAIN IN BOTH FEET: ICD-10-CM

## 2021-03-04 DIAGNOSIS — D69.6 THROMBOCYTOPENIA (HCC): ICD-10-CM

## 2021-03-04 DIAGNOSIS — M32.19 OTHER SYSTEMIC LUPUS ERYTHEMATOSUS WITH OTHER ORGAN INVOLVEMENT (HCC): ICD-10-CM

## 2021-03-04 DIAGNOSIS — M79.671 PAIN IN BOTH FEET: ICD-10-CM

## 2021-03-04 PROCEDURE — 99214 OFFICE O/P EST MOD 30 MIN: CPT | Performed by: INTERNAL MEDICINE

## 2021-03-04 RX ORDER — ALLOPURINOL 100 MG/1
100 TABLET ORAL DAILY
Qty: 90 TABLET | Refills: 1 | Status: SHIPPED | OUTPATIENT
Start: 2021-03-04 | End: 2021-07-27

## 2021-03-04 NOTE — PROGRESS NOTES
[facility]  Dr. Forrest Urias Progress Note     Subjective: Aquilla Closs is a(n) 66year old male. Current complaints: Patient presents with: Follow - Up: LOV 1-4-21;  Mohs surgery left forehead 2 wks ago for [address]. Dr Maximilian Del Valle; doing okay, still has alexey

## 2021-03-04 NOTE — PATIENT INSTRUCTIONS
Increase colchicine to .6 mg twice aday. Increase Prednisone 8 mg per day. You n might nahum 9 or 10 mg of Prednisone for awhile. Add allopurinol 100 mg per day. Labs show platelets 039,941,  Uric acid 11.2, sed rate 26. Call if things don't improve.

## 2021-03-05 ENCOUNTER — TELEPHONE (OUTPATIENT)
Dept: RHEUMATOLOGY | Facility: CLINIC | Age: 76
End: 2021-03-05

## 2021-03-05 DIAGNOSIS — M35.1 MIXED CONNECTIVE TISSUE DISEASE (HCC): Chronic | ICD-10-CM

## 2021-03-05 DIAGNOSIS — D69.3 IMMUNE THROMBOCYTOPENIC PURPURA (HCC): Primary | ICD-10-CM

## 2021-03-05 DIAGNOSIS — D69.3 IMMUNE THROMBOCYTOPENIC PURPURA (HCC): ICD-10-CM

## 2021-03-05 DIAGNOSIS — M33.20 POLYMYOSITIS (HCC): Primary | Chronic | ICD-10-CM

## 2021-03-05 RX ORDER — DIPHENHYDRAMINE HCL 25 MG
25 CAPSULE ORAL ONCE
Status: CANCELLED | OUTPATIENT
Start: 2021-03-05

## 2021-03-05 RX ORDER — METHYLPREDNISOLONE SODIUM SUCCINATE 40 MG/ML
40 INJECTION, POWDER, LYOPHILIZED, FOR SOLUTION INTRAMUSCULAR; INTRAVENOUS ONCE
Status: CANCELLED | OUTPATIENT
Start: 2021-03-05

## 2021-03-05 RX ORDER — ACETAMINOPHEN 325 MG/1
650 TABLET ORAL ONCE
Status: CANCELLED | OUTPATIENT
Start: 2021-03-05

## 2021-03-05 RX ORDER — FAMOTIDINE 10 MG/ML
20 INJECTION, SOLUTION INTRAVENOUS ONCE
Status: CANCELLED | OUTPATIENT
Start: 2021-03-05

## 2021-03-05 RX ORDER — MEPERIDINE HYDROCHLORIDE 25 MG/ML
25 INJECTION INTRAMUSCULAR; INTRAVENOUS; SUBCUTANEOUS ONCE
Status: CANCELLED | OUTPATIENT
Start: 2021-03-05

## 2021-03-05 RX ORDER — METHYLPREDNISOLONE SODIUM SUCCINATE 125 MG/2ML
125 INJECTION, POWDER, LYOPHILIZED, FOR SOLUTION INTRAMUSCULAR; INTRAVENOUS ONCE
Status: CANCELLED | OUTPATIENT
Start: 2021-03-05

## 2021-03-05 RX ORDER — DIPHENHYDRAMINE HYDROCHLORIDE 50 MG/ML
25 INJECTION INTRAMUSCULAR; INTRAVENOUS ONCE
Status: CANCELLED | OUTPATIENT
Start: 2021-03-05

## 2021-03-05 RX ORDER — METHYLPREDNISOLONE SODIUM SUCCINATE 40 MG/ML
40 INJECTION, POWDER, LYOPHILIZED, FOR SOLUTION INTRAMUSCULAR; INTRAVENOUS ONCE
Status: CANCELLED
Start: 2021-03-05 | End: 2021-03-05

## 2021-03-05 NOTE — TELEPHONE ENCOUNTER
Gammagard/IVIG medication ordered at [facility] for monthly infusion. 0.4 g/kg dosing, 1 dose given once a month for his combination of idiopathic thrombocytopenia purpura, polymyositis, and mixed connective tissue disease.   Dr. Jane Orr

## 2021-03-08 ENCOUNTER — OFFICE VISIT (OUTPATIENT)
Dept: NEPHROLOGY | Facility: CLINIC | Age: 76
End: 2021-03-08
Payer: MEDICARE

## 2021-03-08 VITALS — WEIGHT: 200.63 LBS | SYSTOLIC BLOOD PRESSURE: 134 MMHG | DIASTOLIC BLOOD PRESSURE: 74 MMHG | BODY MASS INDEX: 28 KG/M2

## 2021-03-08 DIAGNOSIS — I10 ESSENTIAL HYPERTENSION: ICD-10-CM

## 2021-03-08 DIAGNOSIS — I50.812 CHRONIC RIGHT-SIDED CONGESTIVE HEART FAILURE (HCC): ICD-10-CM

## 2021-03-08 DIAGNOSIS — M35.1 MIXED CONNECTIVE TISSUE DISEASE (HCC): ICD-10-CM

## 2021-03-08 DIAGNOSIS — N18.30 STAGE 3 CHRONIC KIDNEY DISEASE, UNSPECIFIED WHETHER STAGE 3A OR 3B CKD (HCC): Primary | ICD-10-CM

## 2021-03-08 PROCEDURE — 99214 OFFICE O/P EST MOD 30 MIN: CPT | Performed by: INTERNAL MEDICINE

## 2021-03-08 NOTE — PROGRESS NOTES
Nephrology Progress Note      Bong Benedict is a 66year old male.     HPI:   Patient presents with:  Chronic Kidney Disease  Hypertension  Other: CHF, mixed connective tissue disease      Mr. Kinza Cabrera was seen in the nephrology clinic today in follow-up • LI (obstructive sleep apnea) PSG 5-22-19    AHI 36 RDI 36 REM AHI 45 Supine AHI 65 non-supine AHI 19 Sao2 Pj 86% CPAP 9cwp   • Pain in joints    • Pleural effusion     right   • Polymyositis (HCC)    • Problems with swallowing     dysphagia in 2006 • Heart Attack Paternal Grandmother    • Kidney Disease Son    • Other (Ramon's Esophagus) Son    • Heart Attack Maternal Grandfather       Social History: Social History    Tobacco Use      Smoking status: Former Smoker        Packs/day: 0.25        Y tablets (40 mg total) by mouth 2 (two) times a day. (Patient taking differently: Take 40 mg by mouth 2 (two) times a day.  Pt taking 40am and 40pm ) 360 tablet 1   • Metoprolol Succinate  MG Oral Tablet 24 Hr Take 1 tablet (100 mg total) by mouth 2x D 03/03/2021    ALB 3.5 03/03/2021    GLOBULIN 3.5 03/03/2021     03/03/2021    K 4.0 03/03/2021     03/03/2021    CO2 32.0 03/03/2021     Lab Results   Component Value Date    RBC 3.97 03/03/2021    HGB 13.2 03/03/2021    HCT 42.3 03/03/2021 includes torsemide 40 mg twice daily and Aldactone 12.5 mg daily. He continues to follow in [place] for cardiac health    #3. Gouty arthritis-he has developed gouty arthritis in the setting of diuretic use although does have a history of gout as well.   He

## 2021-03-24 ENCOUNTER — OFFICE VISIT (OUTPATIENT)
Dept: HEMATOLOGY/ONCOLOGY | Facility: HOSPITAL | Age: 76
End: 2021-03-24
Attending: INTERNAL MEDICINE
Payer: MEDICARE

## 2021-03-24 VITALS
DIASTOLIC BLOOD PRESSURE: 75 MMHG | RESPIRATION RATE: 16 BRPM | HEART RATE: 65 BPM | WEIGHT: 199 LBS | BODY MASS INDEX: 27.86 KG/M2 | TEMPERATURE: 99 F | SYSTOLIC BLOOD PRESSURE: 123 MMHG | HEIGHT: 70.98 IN | OXYGEN SATURATION: 96 %

## 2021-03-24 DIAGNOSIS — I50.30 DIASTOLIC HEART FAILURE, UNSPECIFIED HF CHRONICITY (HCC): Primary | ICD-10-CM

## 2021-03-24 DIAGNOSIS — M32.19 OTHER SYSTEMIC LUPUS ERYTHEMATOSUS WITH OTHER ORGAN INVOLVEMENT (HCC): ICD-10-CM

## 2021-03-24 DIAGNOSIS — D69.3 IMMUNE THROMBOCYTOPENIC PURPURA (HCC): Primary | ICD-10-CM

## 2021-03-24 PROCEDURE — 96366 THER/PROPH/DIAG IV INF ADDON: CPT

## 2021-03-24 PROCEDURE — 96365 THER/PROPH/DIAG IV INF INIT: CPT

## 2021-03-24 PROCEDURE — 96375 TX/PRO/DX INJ NEW DRUG ADDON: CPT

## 2021-03-24 RX ORDER — FAMOTIDINE 10 MG/ML
20 INJECTION, SOLUTION INTRAVENOUS ONCE
Status: CANCELLED | OUTPATIENT
Start: 2021-04-21

## 2021-03-24 RX ORDER — METHYLPREDNISOLONE SODIUM SUCCINATE 40 MG/ML
40 INJECTION, POWDER, LYOPHILIZED, FOR SOLUTION INTRAMUSCULAR; INTRAVENOUS ONCE
Status: CANCELLED
Start: 2021-04-21 | End: 2021-04-21

## 2021-03-24 RX ORDER — DIPHENHYDRAMINE HYDROCHLORIDE 50 MG/ML
25 INJECTION INTRAMUSCULAR; INTRAVENOUS ONCE
Status: CANCELLED | OUTPATIENT
Start: 2021-04-21

## 2021-03-24 RX ORDER — METHYLPREDNISOLONE SODIUM SUCCINATE 40 MG/ML
40 INJECTION, POWDER, LYOPHILIZED, FOR SOLUTION INTRAMUSCULAR; INTRAVENOUS ONCE
Status: COMPLETED | OUTPATIENT
Start: 2021-03-24 | End: 2021-03-24

## 2021-03-24 RX ORDER — METHYLPREDNISOLONE SODIUM SUCCINATE 40 MG/ML
40 INJECTION, POWDER, LYOPHILIZED, FOR SOLUTION INTRAMUSCULAR; INTRAVENOUS ONCE
Status: CANCELLED | OUTPATIENT
Start: 2021-04-21

## 2021-03-24 RX ORDER — ACETAMINOPHEN 325 MG/1
650 TABLET ORAL ONCE
Status: COMPLETED | OUTPATIENT
Start: 2021-03-24 | End: 2021-03-24

## 2021-03-24 RX ORDER — PREDNISONE 1 MG/1
5 TABLET ORAL DAILY
COMMUNITY
End: 2021-04-12

## 2021-03-24 RX ORDER — MEPERIDINE HYDROCHLORIDE 25 MG/ML
25 INJECTION INTRAMUSCULAR; INTRAVENOUS; SUBCUTANEOUS ONCE
Status: CANCELLED | OUTPATIENT
Start: 2021-04-21

## 2021-03-24 RX ORDER — FEXOFENADINE HYDROCHLORIDE 60 MG/1
60 TABLET, FILM COATED ORAL DAILY
COMMUNITY
End: 2021-04-27

## 2021-03-24 RX ORDER — METHYLPREDNISOLONE SODIUM SUCCINATE 125 MG/2ML
125 INJECTION, POWDER, LYOPHILIZED, FOR SOLUTION INTRAMUSCULAR; INTRAVENOUS ONCE
Status: CANCELLED | OUTPATIENT
Start: 2021-04-21

## 2021-03-24 RX ORDER — PREDNISONE 1 MG/1
2 TABLET ORAL
COMMUNITY
End: 2021-04-12

## 2021-03-24 RX ORDER — DIPHENHYDRAMINE HCL 25 MG
25 CAPSULE ORAL ONCE
Status: COMPLETED | OUTPATIENT
Start: 2021-03-24 | End: 2021-03-24

## 2021-03-24 RX ORDER — ACETAMINOPHEN 325 MG/1
650 TABLET ORAL ONCE
Status: CANCELLED | OUTPATIENT
Start: 2021-04-21

## 2021-03-24 RX ORDER — DIPHENHYDRAMINE HCL 25 MG
25 CAPSULE ORAL ONCE
Status: CANCELLED | OUTPATIENT
Start: 2021-04-21

## 2021-03-24 RX ADMIN — METHYLPREDNISOLONE SODIUM SUCCINATE 40 MG: 40 INJECTION, POWDER, LYOPHILIZED, FOR SOLUTION INTRAMUSCULAR; INTRAVENOUS at 11:34:00

## 2021-03-24 RX ADMIN — DIPHENHYDRAMINE HCL 25 MG: 25 MG CAPSULE ORAL at 11:32:00

## 2021-03-24 RX ADMIN — ACETAMINOPHEN 650 MG: 325 TABLET ORAL at 11:32:00

## 2021-03-24 NOTE — PROGRESS NOTES
Education Record    Learner:  Patient    Disease / Diagnosis: ITP    Barriers / Limitations:  None   Comments:    Method:  Discussion   Comments:    General Topics:  Medication and Plan of care reviewed   Comments:    Outcome:  Shows understanding   Commen

## 2021-03-25 ENCOUNTER — HOSPITAL ENCOUNTER (OUTPATIENT)
Dept: LAB | Facility: HOSPITAL | Age: 76
Discharge: HOME | End: 2021-03-25
Attending: NURSE PRACTITIONER
Payer: MEDICARE

## 2021-03-25 ENCOUNTER — HOSPITAL ENCOUNTER (OUTPATIENT)
Dept: CARDIOLOGY CLINIC | Facility: HOSPITAL | Age: 76
Discharge: HOME | End: 2021-03-25
Attending: NURSE PRACTITIONER
Payer: MEDICARE

## 2021-03-25 VITALS
HEART RATE: 68 BPM | WEIGHT: 201.13 LBS | OXYGEN SATURATION: 100 % | RESPIRATION RATE: 12 BRPM | BODY MASS INDEX: 28 KG/M2 | DIASTOLIC BLOOD PRESSURE: 72 MMHG | SYSTOLIC BLOOD PRESSURE: 123 MMHG

## 2021-03-25 DIAGNOSIS — M35.1 MIXED CONNECTIVE TISSUE DISEASE (HCC): ICD-10-CM

## 2021-03-25 DIAGNOSIS — M33.20 POLYMYOSITIS (HCC): ICD-10-CM

## 2021-03-25 DIAGNOSIS — M10.9 GOUT, UNSPECIFIED CAUSE, UNSPECIFIED CHRONICITY, UNSPECIFIED SITE: ICD-10-CM

## 2021-03-25 DIAGNOSIS — G47.33 OSA (OBSTRUCTIVE SLEEP APNEA): ICD-10-CM

## 2021-03-25 DIAGNOSIS — I50.30 DIASTOLIC HEART FAILURE, UNSPECIFIED HF CHRONICITY (HCC): ICD-10-CM

## 2021-03-25 DIAGNOSIS — I10 BENIGN ESSENTIAL HYPERTENSION: ICD-10-CM

## 2021-03-25 DIAGNOSIS — I36.1 NONRHEUMATIC TRICUSPID VALVE REGURGITATION: ICD-10-CM

## 2021-03-25 DIAGNOSIS — I27.20 PULMONARY HTN (HCC): ICD-10-CM

## 2021-03-25 DIAGNOSIS — I48.20 CHRONIC ATRIAL FIBRILLATION (HCC): ICD-10-CM

## 2021-03-25 DIAGNOSIS — I77.810 AORTIC ROOT DILATION (HCC): ICD-10-CM

## 2021-03-25 DIAGNOSIS — N18.30 STAGE 3 CHRONIC KIDNEY DISEASE, UNSPECIFIED WHETHER STAGE 3A OR 3B CKD (HCC): ICD-10-CM

## 2021-03-25 DIAGNOSIS — I50.812 CHRONIC RIGHT-SIDED CONGESTIVE HEART FAILURE (HCC): Primary | ICD-10-CM

## 2021-03-25 LAB
ANION GAP SERPL CALC-SCNC: 5 MMOL/L (ref 0–18)
BUN BLD-MCNC: 36 MG/DL (ref 7–18)
BUN/CREAT SERPL: 25 (ref 10–20)
CALCIUM BLD-MCNC: 9.2 MG/DL (ref 8.5–10.1)
CHLORIDE SERPL-SCNC: 104 MMOL/L (ref 98–112)
CO2 SERPL-SCNC: 33 MMOL/L (ref 21–32)
CREAT BLD-MCNC: 1.44 MG/DL
GLUCOSE BLD-MCNC: 66 MG/DL (ref 70–99)
OSMOLALITY SERPL CALC.SUM OF ELEC: 301 MOSM/KG (ref 275–295)
POTASSIUM SERPL-SCNC: 4 MMOL/L (ref 3.5–5.1)
SODIUM SERPL-SCNC: 142 MMOL/L (ref 136–145)

## 2021-03-25 PROCEDURE — 36415 COLL VENOUS BLD VENIPUNCTURE: CPT | Performed by: NURSE PRACTITIONER

## 2021-03-25 PROCEDURE — 99215 OFFICE O/P EST HI 40 MIN: CPT | Performed by: NURSE PRACTITIONER

## 2021-03-25 PROCEDURE — 80048 BASIC METABOLIC PNL TOTAL CA: CPT | Performed by: NURSE PRACTITIONER

## 2021-03-25 NOTE — PROGRESS NOTES
Patient was assessed. No complain of shortness of breath, fatigue, chest pain or edema reported. Weight down 2.5 lbs to 201.1 lbs.  Reviewed current list of patient's allergies and medication; updated medications list. Labs drawn in [facility] lab and reviewed by A

## 2021-03-31 RX ORDER — AZATHIOPRINE 50 MG/1
TABLET ORAL
Qty: 60 TABLET | Refills: 3 | Status: SHIPPED | OUTPATIENT
Start: 2021-03-31 | End: 2021-04-27

## 2021-03-31 NOTE — TELEPHONE ENCOUNTER
LOV 3-4-21  Future Appointments   Date Time Provider Chrissie Aden   9/8/5055 78:89 PM Brady Chow MD [facility] Yvonne Epp

## 2021-04-12 RX ORDER — PREDNISONE 1 MG/1
TABLET ORAL
Qty: 180 TABLET | Refills: 0 | Status: SHIPPED | OUTPATIENT
Start: 2021-04-12 | End: 2021-04-27

## 2021-04-12 RX ORDER — PREDNISONE 1 MG/1
TABLET ORAL
Qty: 90 TABLET | Refills: 0 | Status: SHIPPED | OUTPATIENT
Start: 2021-04-12 | End: 2021-04-27

## 2021-04-14 ENCOUNTER — OFFICE VISIT (OUTPATIENT)
Dept: CARDIOLOGY | Age: 76
End: 2021-04-14

## 2021-04-14 ENCOUNTER — APPOINTMENT (OUTPATIENT)
Dept: CARDIOLOGY | Age: 76
End: 2021-04-14

## 2021-04-14 VITALS
SYSTOLIC BLOOD PRESSURE: 109 MMHG | HEIGHT: 71 IN | WEIGHT: 196 LBS | BODY MASS INDEX: 27.44 KG/M2 | DIASTOLIC BLOOD PRESSURE: 64 MMHG | HEART RATE: 87 BPM

## 2021-04-14 DIAGNOSIS — I50.9 CHRONIC CONGESTIVE HEART FAILURE, UNSPECIFIED HEART FAILURE TYPE (CMD): Primary | ICD-10-CM

## 2021-04-14 DIAGNOSIS — E78.00 HYPERCHOLESTEREMIA: ICD-10-CM

## 2021-04-14 DIAGNOSIS — I27.20 PULMONARY HYPERTENSION (CMD): ICD-10-CM

## 2021-04-14 DIAGNOSIS — E55.9 VITAMIN D DEFICIENCY: ICD-10-CM

## 2021-04-14 PROCEDURE — 99214 OFFICE O/P EST MOD 30 MIN: CPT | Performed by: INTERNAL MEDICINE

## 2021-04-14 ASSESSMENT — ENCOUNTER SYMPTOMS
WEIGHT GAIN: 0
CHILLS: 0
ABDOMINAL PAIN: 0
BRUISES/BLEEDS EASILY: 0
BLOATING: 0
HEMATOCHEZIA: 0
SUSPICIOUS LESIONS: 0
ALLERGIC/IMMUNOLOGIC COMMENTS: NO NEW FOOD ALLERGIES
COUGH: 0
BLURRED VISION: 1
HEMOPTYSIS: 0
FEVER: 0

## 2021-04-21 ENCOUNTER — OFFICE VISIT (OUTPATIENT)
Dept: HEMATOLOGY/ONCOLOGY | Facility: HOSPITAL | Age: 76
End: 2021-04-21
Attending: INTERNAL MEDICINE
Payer: MEDICARE

## 2021-04-21 VITALS
TEMPERATURE: 98 F | BODY MASS INDEX: 27.72 KG/M2 | WEIGHT: 198 LBS | SYSTOLIC BLOOD PRESSURE: 107 MMHG | OXYGEN SATURATION: 98 % | HEART RATE: 75 BPM | DIASTOLIC BLOOD PRESSURE: 62 MMHG | HEIGHT: 70.98 IN | RESPIRATION RATE: 16 BRPM

## 2021-04-21 DIAGNOSIS — D69.3 IMMUNE THROMBOCYTOPENIC PURPURA (HCC): Primary | ICD-10-CM

## 2021-04-21 DIAGNOSIS — M32.19 OTHER SYSTEMIC LUPUS ERYTHEMATOSUS WITH OTHER ORGAN INVOLVEMENT (HCC): ICD-10-CM

## 2021-04-21 PROCEDURE — 96366 THER/PROPH/DIAG IV INF ADDON: CPT

## 2021-04-21 PROCEDURE — 96365 THER/PROPH/DIAG IV INF INIT: CPT

## 2021-04-21 PROCEDURE — 96375 TX/PRO/DX INJ NEW DRUG ADDON: CPT

## 2021-04-21 RX ORDER — METHYLPREDNISOLONE SODIUM SUCCINATE 125 MG/2ML
125 INJECTION, POWDER, LYOPHILIZED, FOR SOLUTION INTRAMUSCULAR; INTRAVENOUS ONCE
Status: CANCELLED | OUTPATIENT
Start: 2021-05-19

## 2021-04-21 RX ORDER — MEPERIDINE HYDROCHLORIDE 25 MG/ML
25 INJECTION INTRAMUSCULAR; INTRAVENOUS; SUBCUTANEOUS ONCE
Status: CANCELLED | OUTPATIENT
Start: 2021-05-19

## 2021-04-21 RX ORDER — METHYLPREDNISOLONE SODIUM SUCCINATE 40 MG/ML
40 INJECTION, POWDER, LYOPHILIZED, FOR SOLUTION INTRAMUSCULAR; INTRAVENOUS ONCE
Status: CANCELLED | OUTPATIENT
Start: 2021-05-19

## 2021-04-21 RX ORDER — ACETAMINOPHEN 325 MG/1
650 TABLET ORAL ONCE
Status: CANCELLED | OUTPATIENT
Start: 2021-05-19

## 2021-04-21 RX ORDER — DIPHENHYDRAMINE HCL 25 MG
25 CAPSULE ORAL ONCE
Status: CANCELLED | OUTPATIENT
Start: 2021-05-19

## 2021-04-21 RX ORDER — FAMOTIDINE 10 MG/ML
20 INJECTION, SOLUTION INTRAVENOUS ONCE
Status: CANCELLED | OUTPATIENT
Start: 2021-05-19

## 2021-04-21 RX ORDER — ACETAMINOPHEN 325 MG/1
650 TABLET ORAL ONCE
Status: COMPLETED | OUTPATIENT
Start: 2021-04-21 | End: 2021-04-21

## 2021-04-21 RX ORDER — DIPHENHYDRAMINE HYDROCHLORIDE 50 MG/ML
25 INJECTION INTRAMUSCULAR; INTRAVENOUS ONCE
Status: CANCELLED | OUTPATIENT
Start: 2021-05-19

## 2021-04-21 RX ORDER — DIPHENHYDRAMINE HCL 25 MG
25 CAPSULE ORAL ONCE
Status: COMPLETED | OUTPATIENT
Start: 2021-04-21 | End: 2021-04-21

## 2021-04-21 RX ORDER — METHYLPREDNISOLONE SODIUM SUCCINATE 40 MG/ML
40 INJECTION, POWDER, LYOPHILIZED, FOR SOLUTION INTRAMUSCULAR; INTRAVENOUS ONCE
Status: CANCELLED
Start: 2021-05-19 | End: 2021-05-19

## 2021-04-21 RX ORDER — METHYLPREDNISOLONE SODIUM SUCCINATE 40 MG/ML
40 INJECTION, POWDER, LYOPHILIZED, FOR SOLUTION INTRAMUSCULAR; INTRAVENOUS ONCE
Status: COMPLETED | OUTPATIENT
Start: 2021-04-21 | End: 2021-04-21

## 2021-04-21 RX ADMIN — METHYLPREDNISOLONE SODIUM SUCCINATE 40 MG: 40 INJECTION, POWDER, LYOPHILIZED, FOR SOLUTION INTRAMUSCULAR; INTRAVENOUS at 11:39:00

## 2021-04-21 RX ADMIN — ACETAMINOPHEN 650 MG: 325 TABLET ORAL at 11:34:00

## 2021-04-21 RX ADMIN — DIPHENHYDRAMINE HCL 25 MG: 25 MG CAPSULE ORAL at 11:34:00

## 2021-04-27 ENCOUNTER — HOSPITAL ENCOUNTER (INPATIENT)
Facility: HOSPITAL | Age: 76
LOS: 2 days | Discharge: HOME | DRG: 809 | End: 2021-04-29
Attending: EMERGENCY MEDICINE | Admitting: INTERNAL MEDICINE
Payer: MEDICARE

## 2021-04-27 ENCOUNTER — APPOINTMENT (OUTPATIENT)
Dept: GENERAL RADIOLOGY | Facility: HOSPITAL | Age: 76
DRG: 809 | End: 2021-04-27
Payer: MEDICARE

## 2021-04-27 ENCOUNTER — APPOINTMENT (OUTPATIENT)
Dept: CV DIAGNOSTICS | Facility: HOSPITAL | Age: 76
DRG: 809 | End: 2021-04-27
Attending: EMERGENCY MEDICINE
Payer: MEDICARE

## 2021-04-27 DIAGNOSIS — D70.2 OTHER DRUG-INDUCED NEUTROPENIA (HCC): ICD-10-CM

## 2021-04-27 DIAGNOSIS — D61.818 PANCYTOPENIA (HCC): ICD-10-CM

## 2021-04-27 DIAGNOSIS — D64.9 SYMPTOMATIC ANEMIA: ICD-10-CM

## 2021-04-27 DIAGNOSIS — R06.00 EXERTIONAL DYSPNEA: Primary | ICD-10-CM

## 2021-04-27 DIAGNOSIS — M32.9 SYSTEMIC LUPUS ERYTHEMATOSUS, UNSPECIFIED SLE TYPE, UNSPECIFIED ORGAN INVOLVEMENT STATUS (HCC): ICD-10-CM

## 2021-04-27 PROBLEM — R06.09 EXERTIONAL DYSPNEA: Status: ACTIVE | Noted: 2021-04-27

## 2021-04-27 PROBLEM — R79.89 AZOTEMIA: Status: ACTIVE | Noted: 2021-04-27

## 2021-04-27 PROCEDURE — 71045 X-RAY EXAM CHEST 1 VIEW: CPT | Performed by: EMERGENCY MEDICINE

## 2021-04-27 PROCEDURE — 99221 1ST HOSP IP/OBS SF/LOW 40: CPT | Performed by: INTERNAL MEDICINE

## 2021-04-27 PROCEDURE — 99223 1ST HOSP IP/OBS HIGH 75: CPT | Performed by: INTERNAL MEDICINE

## 2021-04-27 PROCEDURE — 93306 TTE W/DOPPLER COMPLETE: CPT | Performed by: EMERGENCY MEDICINE

## 2021-04-27 RX ORDER — OMEPRAZOLE 20 MG/1
20 CAPSULE, DELAYED RELEASE ORAL
COMMUNITY
End: 2021-08-23

## 2021-04-27 RX ORDER — TORSEMIDE 20 MG/1
40 TABLET ORAL 2 TIMES DAILY
Status: DISCONTINUED | OUTPATIENT
Start: 2021-04-27 | End: 2021-04-29

## 2021-04-27 RX ORDER — PREDNISONE 1 MG/1
2 TABLET ORAL
Status: DISCONTINUED | OUTPATIENT
Start: 2021-04-28 | End: 2021-04-29

## 2021-04-27 RX ORDER — AZATHIOPRINE 50 MG/1
50 TABLET ORAL 2 TIMES DAILY
Status: ON HOLD | COMMUNITY
End: 2021-04-29

## 2021-04-27 RX ORDER — SPIRONOLACTONE 25 MG/1
12.5 TABLET ORAL DAILY
COMMUNITY
End: 2021-11-15

## 2021-04-27 RX ORDER — PREDNISONE 1 MG/1
5 TABLET ORAL DAILY
Status: DISCONTINUED | OUTPATIENT
Start: 2021-04-27 | End: 2021-04-29

## 2021-04-27 RX ORDER — AZATHIOPRINE 50 MG/1
50 TABLET ORAL DAILY
Status: DISCONTINUED | OUTPATIENT
Start: 2021-04-28 | End: 2021-04-28

## 2021-04-27 RX ORDER — MULTIVITAMIN WITH FOLIC ACID 400 MCG
1 TABLET ORAL DAILY
COMMUNITY

## 2021-04-27 RX ORDER — METOCLOPRAMIDE HYDROCHLORIDE 5 MG/ML
10 INJECTION INTRAMUSCULAR; INTRAVENOUS EVERY 8 HOURS PRN
Status: DISCONTINUED | OUTPATIENT
Start: 2021-04-27 | End: 2021-04-29

## 2021-04-27 RX ORDER — AZATHIOPRINE 50 MG/1
50 TABLET ORAL 2 TIMES DAILY
Status: DISCONTINUED | OUTPATIENT
Start: 2021-04-27 | End: 2021-04-27

## 2021-04-27 RX ORDER — ACETAMINOPHEN 325 MG/1
650 TABLET ORAL EVERY 6 HOURS PRN
Status: DISCONTINUED | OUTPATIENT
Start: 2021-04-27 | End: 2021-04-29

## 2021-04-27 RX ORDER — PREDNISONE 1 MG/1
2 TABLET ORAL
COMMUNITY
End: 2021-05-06

## 2021-04-27 RX ORDER — CETIRIZINE HYDROCHLORIDE 10 MG/1
10 TABLET ORAL DAILY
Status: DISCONTINUED | OUTPATIENT
Start: 2021-04-27 | End: 2021-04-29

## 2021-04-27 RX ORDER — ALLOPURINOL 100 MG/1
100 TABLET ORAL DAILY
Status: DISCONTINUED | OUTPATIENT
Start: 2021-04-27 | End: 2021-04-29

## 2021-04-27 RX ORDER — METOPROLOL SUCCINATE 100 MG/1
100 TABLET, EXTENDED RELEASE ORAL
Status: DISCONTINUED | OUTPATIENT
Start: 2021-04-27 | End: 2021-04-29

## 2021-04-27 RX ORDER — POTASSIUM CHLORIDE 20 MEQ/1
20 TABLET, EXTENDED RELEASE ORAL
Status: DISCONTINUED | OUTPATIENT
Start: 2021-04-27 | End: 2021-04-29

## 2021-04-27 RX ORDER — COLCHICINE 0.6 MG/1
0.6 TABLET ORAL DAILY
Status: DISCONTINUED | OUTPATIENT
Start: 2021-04-27 | End: 2021-04-29

## 2021-04-27 RX ORDER — SPIRONOLACTONE 25 MG/1
12.5 TABLET ORAL DAILY
Status: DISCONTINUED | OUTPATIENT
Start: 2021-04-27 | End: 2021-04-29

## 2021-04-27 RX ORDER — CETIRIZINE HYDROCHLORIDE 10 MG/1
10 TABLET ORAL DAILY
COMMUNITY
End: 2021-05-14

## 2021-04-27 RX ORDER — ONDANSETRON 2 MG/ML
4 INJECTION INTRAMUSCULAR; INTRAVENOUS EVERY 6 HOURS PRN
Status: DISCONTINUED | OUTPATIENT
Start: 2021-04-27 | End: 2021-04-29

## 2021-04-27 RX ORDER — PREDNISONE 1 MG/1
5 TABLET ORAL
COMMUNITY
End: 2021-05-06

## 2021-04-27 RX ORDER — MULTIVITAMIN WITH MINERALS
1 TABLET ORAL DAILY
COMMUNITY
End: 2021-10-11 | Stop reason: ALTCHOICE

## 2021-04-27 RX ORDER — PANTOPRAZOLE SODIUM 20 MG/1
20 TABLET, DELAYED RELEASE ORAL
Status: DISCONTINUED | OUTPATIENT
Start: 2021-04-28 | End: 2021-04-29

## 2021-04-27 NOTE — PLAN OF CARE
Problem: CARDIOVASCULAR - ADULT  Goal: Maintains optimal cardiac output and hemodynamic stability  Description: INTERVENTIONS:  - Monitor vital signs, rhythm, and trends  - Monitor for bleeding, hypotension and signs of decreased cardiac output  - Evalua labs and vital signs for trends  - Administer supportive blood products/factors, fluids and medications as ordered and appropriate  - Administer supportive blood products/factors as ordered and appropriate  Outcome: Progressing  Goal: Free from bleeding in

## 2021-04-27 NOTE — ED PROVIDER NOTES
Patient Seen in: [facility] Emergency Department      History   Patient presents with:  Difficulty Breathing    Stated Complaint: SOB with exertion, x 1 week, has been getting worse.     HPI/Subjective:   HPI  Pleasant 40-year-old presents to the cami 5/14/2013   • Blood disorder     thrombocytopenia   • Blood in urine    • Cancer (HCC)     skin   • Candidiasis of the esophagus 6/29/2012    Due to steroid use for Autoimmune disorder    • Cataract    • Colon polyps 5/13/2013   • Congestive heart disease watchman filter   • PERCUTANEOUS  ANGIOPLASTY  (PTCA)- PBP ONLY  2006    right   • RECTUM SURGERY PROCEDURE UNLISTED  1946    rectal surgery polypectomy   • SKIN SURGERY      MMS BCC R temple 6/24/09   • SKIN SURGERY  2007    basal ceell carcinoma   • SKIN Left Ear: External ear normal.      Nose: Nose normal.   Eyes:      General: No scleral icterus. Right eye: No discharge. Left eye: No discharge.       Conjunctiva/sclera: Conjunctivae normal.      Pupils: Pupils are equal, round, and reactiv Osmolality 297 (*)     GFR, Non- 47 (*)     GFR, -American 54 (*)     All other components within normal limits   IRON AND TIBC - Abnormal; Notable for the following components:    Iron 297 (*)     % Saturation 89 (*)     All other c 100.9 (*)     RDW 15.3 (*)     RDW-SD 54.3 (*)     Neutrophil Absolute Prelim 0.76 (*)     All other components within normal limits   TROPONIN I - Normal   PROTHROMBIN TIME (PT) - Normal   PTT, ACTIVATED - Normal   FOLIC ACID SERUM(FOLATE) - Normal   SIDNEY results for these tests on the individual orders. ABORH (BLOOD TYPE)   ANTIBODY SCREEN   PREPARE RBC   RAINBOW DRAW BLUE   RAINBOW DRAW LAVENDER   RAINBOW DRAW LIGHT GREEN   RAINBOW DRAW GOLD     EKG    Rate, intervals and axes as noted on EKG Report.   R symptomatic anemia/dyspnea on exertion. Echocardiogram results being read by [facility]. As long as patient is resting in the emergency department has not required oxygen supplementation and is hemodynamically stable.   He does however tire very quickl

## 2021-04-27 NOTE — CONSULTS
[facility]  Cardiology Consultation    [address] Patient Status:  Inpatient    3/17/1945 MRN [id number]   [facility] 2NE-A Attending Maciej Greer MD   Hosp Day # 0 PCP Fany Montalvo MD     Reason for Consultation:  Derick Pratt Polymyositis ([facility] [address]    • Problems with swallowing     dysphagia in 2006   • Pulmonary hypertension ([facility] [address]    • Raynaud disease    • Raynaud disease    • Renal disorder     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   • Sleep apnea     CPAP   • Thrombocytopathia (HCC)    • Vi that he quit smoking about 47 years ago. He started smoking about 62 years ago. He has a 3.75 pack-year smoking history. He has never used smokeless tobacco. He reports current alcohol use of about 8.0 - 10.0 standard drinks of alcohol per week.  He reports kg)  03/25/21 : 201 lb 1.6 oz (91.2 kg)      Physical Exam:   General: Alert and oriented x 3. HEENT: Normocephalic, pale  Neck: No JVD  Cardiac: irregular. S1, S2 normal. Holosystolic murmur  Lungs: Clear anteriorly  Abdomen: Soft, non-tender.    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

## 2021-04-27 NOTE — H&P
[place] [facility]ITAL[facility]  History and Physical     Bartholome File Patient Status:  Emergency    3/17/1945 MRN [id number]   Location [address] [facility] Attending Thuan Daniels MD   Hosp Day # 0 PCP Reg Shea MD     Chief Complaint: SO Surgical History:   Past Surgical History:   Procedure Laterality Date   • APPENDECTOMY  1970   • APPENDECTOMY     • CARDIAC CATHETERIZATION  09/2019   • COLONOSCOPY  10/2003 2006 01/2010    x3   • COLONOSCOPY  5/14/2013   • COLONOSCOPY N/A 5/1/2018    Pro [Amoclan]     RASH  Doxycycline             RASH    Medications:  No current facility-administered medications on file prior to encounter. spironolactone 25 MG Oral Tab, Take 12.5 mg by mouth daily. , Disp: , Rfl:   omeprazole 20 MG Oral Capsule Delayed Re , Disp: , Rfl:         Review of Systems:   A comprehensive 14 point review of systems was completed. Pertinent positives and negatives noted in the HPI.     Physical Exam:    /81   Pulse 79   Temp 97.9 °F (36.6 °C) (Oral)   Resp 16   Ht 180.3 cm ( 168 hours. Imaging: Imaging data reviewed in Epic. ASSESSMENT / PLAN:     1. Dyspnea  1. Likely multifactorial but more due to Anemia  2. Echo given hx of pericarditis  3. O2 as needed  4. Cardiology consulted from ED  2. SYmptomatic anemia  1. ?  G

## 2021-04-28 PROCEDURE — 99223 1ST HOSP IP/OBS HIGH 75: CPT | Performed by: INTERNAL MEDICINE

## 2021-04-28 PROCEDURE — 99232 SBSQ HOSP IP/OBS MODERATE 35: CPT | Performed by: STUDENT IN AN ORGANIZED HEALTH CARE EDUCATION/TRAINING PROGRAM

## 2021-04-28 PROCEDURE — 30233N1 TRANSFUSION OF NONAUTOLOGOUS RED BLOOD CELLS INTO PERIPHERAL VEIN, PERCUTANEOUS APPROACH: ICD-10-PCS | Performed by: STUDENT IN AN ORGANIZED HEALTH CARE EDUCATION/TRAINING PROGRAM

## 2021-04-28 PROCEDURE — 99232 SBSQ HOSP IP/OBS MODERATE 35: CPT | Performed by: INTERNAL MEDICINE

## 2021-04-28 RX ORDER — BUMETANIDE 0.25 MG/ML
1 INJECTION, SOLUTION INTRAMUSCULAR; INTRAVENOUS ONCE
Status: COMPLETED | OUTPATIENT
Start: 2021-04-28 | End: 2021-04-28

## 2021-04-28 RX ORDER — BUMETANIDE 0.25 MG/ML
2 INJECTION, SOLUTION INTRAMUSCULAR; INTRAVENOUS ONCE
Status: DISCONTINUED | OUTPATIENT
Start: 2021-04-28 | End: 2021-04-28

## 2021-04-28 NOTE — CONSULTS
[facility]                       Gastroenterology [address] [facility] Gastroenterology    [address] [facility] Patient Status:  Inpatient    3/17/1945 MRN [id number]   [facility] 2NE-A Attending Jadiel Malagon, [address] [facility] Esophageal polyp 5/14/2013   • Esophageal reflux    • Essential hypertension    • Gout    • Hammer toe, acquired 6/29/2012   • Hepatitis     autoimmune induce hepatitis d/t lupus   • High blood pressure    • IBS (irritable bowel syndrome)     mild d/t lupu eyebrow/ MOHS   • SKIN SURGERY  07/15/2013    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/MMS   • SKIN SURGERY  2-19-14    BCC-NOD to right superior pinna, MMS, AB   • SKIN SURGERY  02/16/2021    BCC- left superior forehead, MMS    • TONSILLECTOMY  1948   • UPPER GI END mediated thrombocytopenia;   The patient has no history of known chronic anemia            Dermatologic: The patient reports no recent rashes or chronic skin disorders            Rheumatologic: + mixed connective tissue disease             Genitourinary:  T ALKPHO 56 04/27/2021    BILT 1.0 04/27/2021    AST 26 04/27/2021    ALT 28 04/27/2021    PTT 28.0 04/27/2021    INR 1.04 04/27/2021    PTP 13.9 04/27/2021     Recent Labs   Lab 04/27/21  1206 04/28/21  0500   GLU 95 92   BUN 32* 24*   CREATSERUM 1.42* 1.25 and agree with above nurse practitioner's assessment and recommendations.      Briefly, this is a 64-year-old male with a history of polymyositis, ITP, mixed connective tissue disorder complicated by pulmonary hypertension, Ramon's esophagus presenting wi

## 2021-04-28 NOTE — CONSULTS
[facility]  Rheumatology Report of Consultation  Bartholome File Patient Status:  Inpatient    3/17/1945 MRN [id number]   [facility] 2NE-A Attending Francesca Maurice MD   Hosp Day # 0 PCP Reg Shea MD     Date of Admission:  was treated successfully with Imuran and prednisone. Eventually his medicines were tapered off. Next he developed thrombocytopenia referred to Dr. Filiberto Soni of [facility].   Treated with steroids once again and IVIG and eventually is thrombocytope Daily(Beta Blocker)  [START ON 4/28/2021] Pantoprazole Sodium (PROTONIX) EC tab 20 mg, 20 mg, Oral, QAM AC  Potassium Chloride ER (K-DUR M20) CR tab 20 mEq, 20 mEq, Oral, Before breakfast  [START ON 4/28/2021] predniSONE (DELTASONE) tab 2 mg, 2 mg, Oral, D peptide 3072. BUN 32 creatinine 1.42.   Lab Results   Component Value Date    WBC 2.0 04/27/2021    HGB 7.8 04/27/2021    HCT 23.6 04/27/2021    PLT 74.0 04/27/2021    CREATSERUM 1.42 04/27/2021    BUN 32 04/27/2021     04/27/2021    K 4.0 04/27/2021 participate in the care of your patient. Yuli Ortega [phone]  4/27/2021  8:59 PM

## 2021-04-28 NOTE — CONSULTS
Hematology-Oncology Consultation Note    Patient Name: Rajat Hernandez   YOB: 1945   Medical Record Number: [id number]   [facility]: [id number]   Consulting Physician: Delicia Conkiln MD  Date of Consultation: 4/28/2021     Reason for Consultation non-supine AHI 19 Sao2 Pj 86% CPAP 9cwp   • Pain in joints    • Personal history of antineoplastic chemotherapy    • Pleural effusion     right   • Pneumonia due to organism    • Polymyositis [facility])    • Problems with swallowing     dysphagia in 2006   • CHF   • Breast Cancer Mother    • Stroke Mother    • Cancer Paternal Grandfather    • Heart Attack Paternal Grandmother    • Kidney Disease Son    • Other (Ramon's Esophagus) Son    • Heart Attack Maternal Grandfather        Psychosocial History:  Social Activity:       Days of Exercise per Week:       Minutes of Exercise per Session:   Stress:       Feeling of Stress :   Social Connections:       Frequency of Communication with Friends and Family:       Frequency of Social Gatherings with Friends and Fami alert and oriented x 3, not in acute distress. No significant ecchymosis  HEENT: EOMs intact. PERRL. Oropharynx is clear. Neck: No JVD. No palpable lymphadenopathy. Neck is supple. Chest: Clear to auscultation.   Heart: Irregular  Abdomen: Soft, non tend WIDTH 13.5 07/28/2014 0821    RED CELL DISTRIBUTION WIDTH 13.7 06/04/2014 0831    PRELIMINARY NEUTROPHIL ABS 1.68 08/05/2014 1205    PRELIMINARY NEUTROPHIL ABS 1.76 11/22/2013 0850    PRELIMINARY NEUTROPHIL ABS 1.65 06/20/2013 0815    Neutrophil Absolute P 03/25/2021 1236    CALCIUM 8.8 (L) 07/28/2014 0821    CALCIUM 9.1 06/04/2014 0831    CALCIUM 8.6 (L) 11/22/2013 0850    Calcium, Total 8.4 (L) 04/28/2021 0500    Calcium, Total 8.8 04/27/2021 1206    Calcium, Total 9.2 03/25/2021 1236    Albumin 3.6 04/27/ Bilirubin, Total 1.0 04/27/2021 1206    Bilirubin, Total 0.8 03/03/2021 1341    Bilirubin, Total 0.7 12/29/2020 1053    Total Protein 6.7 04/27/2021 1206    Total Protein 7.0 03/03/2021 1341    Total Protein 7.1 12/29/2020 1053    TOTAL PROTEIN 7.0 08/0 No evidence of hemolysis or nutritional deficiencies. He says in retrospect he had the same problems before on Imuran and effect on his counts was delayed.  Imuran has been on hold and will give a dose of growth factor (Filgrastim) and transfuse a unit of P

## 2021-04-28 NOTE — PROGRESS NOTES
[facility]  Rheumatology Progress Note  [address] Patient Status:  Inpatient    3/17/1945 MRN [id number]   [facility] 2NE-A Attending Jadiel Malagon MD   Hosp Day # 1 PCP Federico Kenney MD     Subjective:   [address] is a( blood due to chronic suppression of the autoimmune disease. #3 mixed connective tissue disease appears stable, past history of polymyositis CK is normal and strength is good. Does have chronic thrombocytopenia and a history of ITP.   Again question is is

## 2021-04-28 NOTE — PLAN OF CARE
Assume care of patient [phone] [id number] 75 resting in bed. AO x4. Afib on tele monitor. O2 sat adequate on room air. Denies chest pain and shortness of breath. Patient does not want bed alarm on. Informed patient this is unit policy for every patient.  Bed locked, in [facility] and heart rate control medications as ordered  - Initiate emergency measures for life threatening arrhythmias  - Monitor electrolytes and administer replacement therapy as ordered  Outcome: Progressing

## 2021-04-28 NOTE — PROGRESS NOTES
[facility]  Progress Note     Joshua Em Patient Status:  Inpatient    3/17/1945 MRN [id number]   [facility] 2NE-A Attending Fredrick Smith MD   Hosp Day # 1 PCP Rasheed Logan MD     Chief Complaint: Shortness of breath     S: Detected 04/27/2021    COVID19 Not Detected 08/01/2020       Pro-Calcitonin  No results for input(s): PCT in the last 168 hours.     Cardiac  Recent Labs   Lab 04/27/21  1206   TROP <0.045   PBNP 3,072*       Creatinine Kinase  Recent Labs   Lab 04/28/21  0 clinical documentation in H+P. Based on patients current state of illness, I anticipate that, after discharge, patient will require TBD.

## 2021-04-28 NOTE — PROGRESS NOTES
[facility]  Cardiology Progress Note    Subjective:  No chest pain or shortness of breath at rest, but does note significant dyspnea with any exertion. Noted plans for GI and Heme to evaluate.   Does not feel any significant volume excess (no edema/no - Hgb 7.0 today. Heme/GI to evaluate. · Pancytopenia - ANC ~760. Heme to evaluate. Imuran held for possible marrow suppression.    · Chronic diastolic, RV heart failure - LVEF 55% with moderately dilated RV with reduced RV systolic function. Recent prob plans for PRBC transfusion. Will plan for Bumex 1mg IV after PRBCs transfused. See orders. SHERIDAN Perera      =======================================================  Patient seen and examined independently. Note reviewed and labs reviewed.   Agree

## 2021-04-28 NOTE — PLAN OF CARE
Assumed care of patient at 0730. Alert and oriented. Vital signs stable. Atrial fibrillation on telemetry. Heart rate controlled. Hgb 7.0.  1 unit PRBCs currently transfusing. No reaction noted. Lungs clear to auscultation.   Denies dyspnea on exertion deep breathe, Incentive Spirometry  - Assess the need for suctioning and perform as needed  - Assess and instruct to report SOB or any respiratory difficulty  - Respiratory Therapy support as indicated  - Manage/alleviate anxiety  - Monitor for signs/sympt

## 2021-04-29 VITALS
RESPIRATION RATE: 16 BRPM | WEIGHT: 192.19 LBS | DIASTOLIC BLOOD PRESSURE: 64 MMHG | TEMPERATURE: 98 F | SYSTOLIC BLOOD PRESSURE: 107 MMHG | HEART RATE: 78 BPM | HEIGHT: 71 IN | BODY MASS INDEX: 26.91 KG/M2 | OXYGEN SATURATION: 100 %

## 2021-04-29 PROCEDURE — 99232 SBSQ HOSP IP/OBS MODERATE 35: CPT | Performed by: INTERNAL MEDICINE

## 2021-04-29 NOTE — PROGRESS NOTES
Heme/Onc Progress Note    Patient Name: Sania Lopez   YOB: 1945   Medical Record Number: [id number]   CSN: [id number]   Attending Physician: Lashawn Nunes M.D. Subjective:  No new complaints.  No problems with transfusion    Object 0.25 MG/ML injection 1 mg, 1 mg, Intravenous, Once  allopurinol (ZYLOPRIM) tab 100 mg, 100 mg, Oral, Daily  colchicine tab 0.6 mg, 0.6 mg, Oral, Daily  cetirizine (ZYRTEC) tab 10 mg, 10 mg, Oral, Daily  Metoprolol Succinate ER (Toprol XL) 24 hr tab 100 mg, count normal. Would give another dose filgrastim today prior to DC.      2. Autoimmune disease- management as per rheum. Imuran on hold given (1). Continues on steroids and IVIG     3. Dyspnea- multifactorial but main culprit felt to be severe anemia.  Also

## 2021-04-29 NOTE — PLAN OF CARE
Assumed care of patient 1930. AO x4. Afib on tele monitor, controlled rate. O2 sat adequate on room air. Denies chest pain and shortness of breath. Ambulating in room without issue. Plan of care updated with patient. Bed locked, in lowest position.  Patient instruct to report SOB or any respiratory difficulty  - Respiratory Therapy support as indicated  - Manage/alleviate anxiety  - Monitor for signs/symptoms of CO2 retention  Outcome: Progressing     Problem: HEMATOLOGIC - ADULT  Goal: Maintains hematologic

## 2021-04-29 NOTE — PROGRESS NOTES
[facility] Gastroenterology Progress Note    CC: Anemia    S: No GI bleeding sx     O: /52 (BP Location: Right arm)   Pulse 66   Temp 98.2 °F (36.8 °C) (Oral)   Resp 18   Ht 5' 11\" (1.803 m)   Wt 192 lb 3.2 oz (87.2 kg)   SpO2 95%   BMI 26.81 k

## 2021-04-29 NOTE — PROGRESS NOTES
[facility]  Progress Note     Lori Jules Patient Status:  Inpatient    3/17/1945 MRN [id number]   [facility] 2NE-A Attending Anthony Mcdaniel MD   Hosp Day # 2 PCP Al Chatterjee MD     Chief Complaint: Shortness of breath     S: Results    COVID-19  Lab Results   Component Value Date    COVID19 Not Detected 04/27/2021    COVID19 Not Detected 08/01/2020       Pro-Calcitonin  No results for input(s): PCT in the last 168 hours.     Cardiac  Recent Labs   Lab 04/27/21  1206   TROP <0.0

## 2021-04-29 NOTE — PLAN OF CARE
Assumed pt care around 0730. Pt denies CP, dizziness, or lightheadedness. Pt stated WILSON is \"improved. \" Pt education provided on medication and POC. Pt verbalized understanding. All needs met. Will continue to monitor.    POC: monitor Hgb/ SOB, discharge p Respiratory Therapy support as indicated  - Manage/alleviate anxiety  - Monitor for signs/symptoms of CO2 retention  Outcome: Progressing     Problem: HEMATOLOGIC - ADULT  Goal: Maintains hematologic stability  Description: INTERVENTIONS  - Assess for sign

## 2021-04-29 NOTE — PLAN OF CARE
Chart reviewed. Patient apparently feeling better after transfusion. Counts have improved. Volume status remains compensated. No cardiac objection to discharge. Needs f/u appointment in [facility] next week. Discussed w/ bedside RN.      SHERIDAN Schuster

## 2021-04-29 NOTE — DISCHARGE SUMMARY
----- Message from Christina Velásquez sent at 11/5/2019 12:45 PM CST -----  Patient FOBT specimen received too old to process   [facility] [place] [facility]  DISCHARGE SUMMARY     Simon Peters Patient Status:  Inpatient    3/17/1945 MRN [id number]   [facility] 2NE-A Attending Radha Velez MD   Hosp Day # 2 PCP Kenn Carreno MD     Date of Admission: 2021  Date of D CITRACAL + D OR      Take 1 tablet by mouth daily. Refills: 0     colchicine 0.6 MG Tabs      Take 1 tablet (0.6 mg total) by mouth 2 (two) times a day. Wean down to 1 tablet daily when gout improves.    Quantity: 60 tablet  Refills: 1     Immune Globul breakfast.   Refills: 0              [place] n[place]    Follow-up appointment:   Liane Awad MD  [address] [facility] [address]          Kendrick Mcdonald MD  [address] [place] [address] [place] [address] [phone] is an absolute priority for [place] [address], we have put the following visitor restrictions in place during the Coronavirus outbreak.   -Visitors 18 years and under are not allowed at the [address] [place] [address] A visitors and patients will be screened for

## 2021-04-29 NOTE — PROGRESS NOTES
[facility]  Rheumatology Progress Note  Taylor Rowe Patient Status:  Inpatient    3/17/1945 MRN [id number]   [facility] 2NE-A Attending Erick Steele MD   Hosp Day # 2 PCP Enrioc Yepez MD     Subjective:   Taylor Rowe is a( exertion is improved. Transfusion undoubtedly help this. Patient does have underlying pulmonary hypertension and right-sided heart failure, and chronic atrial fib.   #3–mixed connective tissue disease, and autoimmune disease which has had features of thro

## 2021-04-30 ENCOUNTER — PATIENT OUTREACH (OUTPATIENT)
Dept: CASE MANAGEMENT | Age: 76
End: 2021-04-30

## 2021-04-30 DIAGNOSIS — I27.20 PULMONARY HYPERTENSION (HCC): ICD-10-CM

## 2021-04-30 DIAGNOSIS — I10 BENIGN ESSENTIAL HYPERTENSION: ICD-10-CM

## 2021-04-30 DIAGNOSIS — Z02.9 ENCOUNTERS FOR UNSPECIFIED ADMINISTRATIVE PURPOSE: ICD-10-CM

## 2021-04-30 DIAGNOSIS — M32.9 LUPUS (HCC): ICD-10-CM

## 2021-04-30 DIAGNOSIS — I50.812 CHRONIC RIGHT-SIDED CONGESTIVE HEART FAILURE (HCC): ICD-10-CM

## 2021-04-30 DIAGNOSIS — I48.11 LONGSTANDING PERSISTENT ATRIAL FIBRILLATION (HCC): Primary | ICD-10-CM

## 2021-04-30 PROCEDURE — 1111F DSCHRG MED/CURRENT MED MERGE: CPT

## 2021-04-30 NOTE — PROGRESS NOTES
Attempted to reach the patient to complete [facility] FU call. Left a message for the pt to call NCM back at, [phone]. The number for the TCC was also given to the patient to schedule at, [phone]. A referral was entered for CCM.

## 2021-05-03 ENCOUNTER — TELEPHONE (OUTPATIENT)
Dept: FAMILY MEDICINE CLINIC | Facility: CLINIC | Age: 76
End: 2021-05-03

## 2021-05-03 NOTE — TELEPHONE ENCOUNTER
Spoke to the pt today for TCM to follow up on the recent hospitalization for anemia/pancytopenia. The pt does not have HFU appt scheduled and prefers to only follow up with specialists at this time .  A TCM/HFU appt is recommended by 5/6/2021 as the pt is

## 2021-05-03 NOTE — PROGRESS NOTES
Initial Post Discharge Follow Up   Discharge Date: 4/29/21  Contact Date: 5/3/2021    Consent Verification:  Assessment Completed With: Patient  HIPAA Verified?   Yes    Discharge Dx:     Pancytopenia due to imuran  Afib  CHF  HTN  GERD  Lupus  Mixed con MG Oral Tab Take 12.5 mg by mouth daily. • omeprazole 20 MG Oral Capsule Delayed Release Take 20 mg by mouth 2 (two) times daily before meals. • cetirizine 10 MG Oral Tab Take 10 mg by mouth daily.      • Multiple Vitamins-Minerals (TAB-A-KEVIN) Oral taking your medication as prescribed? No  Are you having any concerns with constipation? No    Referrals/orders at D/C:  Home Health/Services ordered at D/C? No    Except for [facility] Miriam MEJÍA mentioned above, have you scheduled these other services? feet to the right, just inside the entrance to the hospital floor. It's across from the main desk you see as soon as you enter into the unit.       May 14, 2021  1:15 PM CDT HEMATOLOGY ONCOLOGY FOLLOW UP with MD Dave Holder [address] [phone]          PCP TCM/HFU appointment: scheduled at D/C within 7-14 days  no     NCM Reviewed/scheduled/rescheduled PCP TCM/HFU appointment with pt:  Yes;  The patient declined an appoi and or orders sent to PCP.

## 2021-05-03 NOTE — TELEPHONE ENCOUNTER
Future Appointments   Date Time Provider Chrissie Aden   9/5/9545 34:36 PM Josiah Joshua MD [facility] EMG Tana Violeta   5/12/2021  3:00 PM HEART FAILURE APN 1 [facility] HF CLIN [facility] AT [facility]   5/14/2021  1:15 PM Manny Carlos MD [address] [employer]   5/19

## 2021-05-05 ENCOUNTER — LAB ENCOUNTER (OUTPATIENT)
Dept: LAB | Age: 76
End: 2021-05-05
Attending: INTERNAL MEDICINE
Payer: MEDICARE

## 2021-05-05 DIAGNOSIS — D61.818 PANCYTOPENIA (HCC): ICD-10-CM

## 2021-05-05 PROCEDURE — 36415 COLL VENOUS BLD VENIPUNCTURE: CPT

## 2021-05-05 PROCEDURE — 85025 COMPLETE CBC W/AUTO DIFF WBC: CPT

## 2021-05-06 ENCOUNTER — OFFICE VISIT (OUTPATIENT)
Dept: RHEUMATOLOGY | Facility: CLINIC | Age: 76
End: 2021-05-06
Payer: MEDICARE

## 2021-05-06 VITALS
TEMPERATURE: 98 F | SYSTOLIC BLOOD PRESSURE: 115 MMHG | BODY MASS INDEX: 26.88 KG/M2 | WEIGHT: 192 LBS | HEART RATE: 67 BPM | HEIGHT: 71 IN | DIASTOLIC BLOOD PRESSURE: 60 MMHG

## 2021-05-06 DIAGNOSIS — R06.00 EXERTIONAL DYSPNEA: ICD-10-CM

## 2021-05-06 DIAGNOSIS — Z95.818 PRESENCE OF WATCHMAN LEFT ATRIAL APPENDAGE CLOSURE DEVICE: ICD-10-CM

## 2021-05-06 DIAGNOSIS — M32.9 LUPUS (HCC): ICD-10-CM

## 2021-05-06 DIAGNOSIS — I43 CARDIOMYOPATHY AS MANIFESTATION OF UNDERLYING DISEASE (HCC): ICD-10-CM

## 2021-05-06 DIAGNOSIS — M10.9 GOUT, UNSPECIFIED CAUSE, UNSPECIFIED CHRONICITY, UNSPECIFIED SITE: ICD-10-CM

## 2021-05-06 DIAGNOSIS — D61.818 PANCYTOPENIA (HCC): ICD-10-CM

## 2021-05-06 DIAGNOSIS — M35.1 MCTD (MIXED CONNECTIVE TISSUE DISEASE) (HCC): Primary | ICD-10-CM

## 2021-05-06 PROCEDURE — 99214 OFFICE O/P EST MOD 30 MIN: CPT | Performed by: INTERNAL MEDICINE

## 2021-05-06 RX ORDER — COLCHICINE 0.6 MG/1
0.6 TABLET ORAL 2 TIMES DAILY
Qty: 60 TABLET | Refills: 1 | Status: SHIPPED | OUTPATIENT
Start: 2021-05-06 | End: 2021-07-06 | Stop reason: DRUGHIGH

## 2021-05-06 RX ORDER — PREDNISONE 1 MG/1
5 TABLET ORAL
Qty: 90 TABLET | Refills: 3 | Status: SHIPPED | OUTPATIENT
Start: 2021-05-06 | End: 2021-08-24

## 2021-05-06 RX ORDER — PREDNISONE 1 MG/1
2 TABLET ORAL
Qty: 180 TABLET | Refills: 3 | Status: SHIPPED | OUTPATIENT
Start: 2021-05-06 | End: 2021-08-24

## 2021-05-06 NOTE — PROGRESS NOTES
[facility] RHEUMATOLOGY  Dr. Greg Velez Progress Note     Subjective: Idalia Hernandez is a(n) 64year old male. Current complaints: Patient presents with: Follow - Up: LOV 03/04/21, HFU from 04/27 due to his Imuran attacking his blood cells.  Has been feeling t

## 2021-05-06 NOTE — PATIENT INSTRUCTIONS
Prednisone 7 mg per day. IVIG monthly for autoimmune treatment especially low platelets. Off Imuran due to pancytopenia. Colchicine 0.6 mg as needed for gout attacks. See Dr. Elana Andrea of hematology next week and Dr Aisha Simeon of cardiology.   Activity as ash

## 2021-05-10 ENCOUNTER — OFFICE VISIT (OUTPATIENT)
Dept: CARDIOLOGY | Age: 76
End: 2021-05-10

## 2021-05-10 VITALS
WEIGHT: 192 LBS | DIASTOLIC BLOOD PRESSURE: 60 MMHG | SYSTOLIC BLOOD PRESSURE: 120 MMHG | BODY MASS INDEX: 26.88 KG/M2 | HEART RATE: 80 BPM | HEIGHT: 71 IN

## 2021-05-10 DIAGNOSIS — Z86.79 HISTORY OF ATRIAL FIBRILLATION: ICD-10-CM

## 2021-05-10 DIAGNOSIS — Z95.818 PRESENCE OF WATCHMAN LEFT ATRIAL APPENDAGE CLOSURE DEVICE: Primary | ICD-10-CM

## 2021-05-10 PROCEDURE — 99213 OFFICE O/P EST LOW 20 MIN: CPT | Performed by: INTERNAL MEDICINE

## 2021-05-10 RX ORDER — COLCHICINE 0.6 MG/1
TABLET ORAL
COMMUNITY
Start: 2021-05-06

## 2021-05-12 ENCOUNTER — HOSPITAL ENCOUNTER (OUTPATIENT)
Dept: LAB | Facility: HOSPITAL | Age: 76
Discharge: HOME | End: 2021-05-12
Attending: NURSE PRACTITIONER
Payer: MEDICARE

## 2021-05-12 ENCOUNTER — HOSPITAL ENCOUNTER (OUTPATIENT)
Dept: CARDIOLOGY CLINIC | Facility: HOSPITAL | Age: 76
Discharge: HOME | End: 2021-05-12
Attending: NURSE PRACTITIONER
Payer: MEDICARE

## 2021-05-12 VITALS
OXYGEN SATURATION: 100 % | SYSTOLIC BLOOD PRESSURE: 118 MMHG | HEART RATE: 73 BPM | RESPIRATION RATE: 18 BRPM | WEIGHT: 201.19 LBS | DIASTOLIC BLOOD PRESSURE: 68 MMHG | BODY MASS INDEX: 28 KG/M2

## 2021-05-12 DIAGNOSIS — N18.30 STAGE 3 CHRONIC KIDNEY DISEASE, UNSPECIFIED WHETHER STAGE 3A OR 3B CKD (HCC): ICD-10-CM

## 2021-05-12 DIAGNOSIS — I50.9 CHRONIC CONGESTIVE HEART FAILURE, UNSPECIFIED HEART FAILURE TYPE (HCC): ICD-10-CM

## 2021-05-12 DIAGNOSIS — M35.1 MIXED CONNECTIVE TISSUE DISEASE (HCC): ICD-10-CM

## 2021-05-12 DIAGNOSIS — I27.20 PULMONARY HTN (HCC): ICD-10-CM

## 2021-05-12 DIAGNOSIS — G47.33 OSA (OBSTRUCTIVE SLEEP APNEA): ICD-10-CM

## 2021-05-12 DIAGNOSIS — I48.20 CHRONIC ATRIAL FIBRILLATION (HCC): ICD-10-CM

## 2021-05-12 DIAGNOSIS — I10 BENIGN ESSENTIAL HYPERTENSION: ICD-10-CM

## 2021-05-12 DIAGNOSIS — I50.812 CHRONIC RIGHT-SIDED CONGESTIVE HEART FAILURE (HCC): Primary | ICD-10-CM

## 2021-05-12 PROCEDURE — 80048 BASIC METABOLIC PNL TOTAL CA: CPT | Performed by: NURSE PRACTITIONER

## 2021-05-12 PROCEDURE — 36415 COLL VENOUS BLD VENIPUNCTURE: CPT | Performed by: NURSE PRACTITIONER

## 2021-05-12 PROCEDURE — 99215 OFFICE O/P EST HI 40 MIN: CPT | Performed by: NURSE PRACTITIONER

## 2021-05-12 RX ORDER — BUMETANIDE 0.25 MG/ML
4 INJECTION, SOLUTION INTRAMUSCULAR; INTRAVENOUS ONCE
Status: DISCONTINUED | OUTPATIENT
Start: 2021-05-12 | End: 2021-05-12

## 2021-05-12 RX ADMIN — BUMETANIDE 4 MG: 0.25 INJECTION, SOLUTION INTRAMUSCULAR; INTRAVENOUS at 15:45:00

## 2021-05-12 NOTE — PROGRESS NOTES
[facility] Cardiac Health Progress Note    Vinnie Krueger is a 64year old male who presents to clinic for APN assessment and management of chronic diastolic, RV heart failure and is functional class 3.      Subjective:  He returns for r colon 5/14/2013   • Esophageal polyp 5/14/2013   • Esophageal reflux    • Essential hypertension    • Gout    • Hammer toe, acquired 6/29/2012   • Hepatitis     autoimmune induce hepatitis d/t lupus   • High blood pressure    • IBS (irritable bowel syndrom superior eyebrow/ MOHS   • SKIN SURGERY  07/15/2013    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/MMS   • SKIN SURGERY  2-19-14    BCC-NOD to right superior pinna, MMS, AB   • SKIN SURGERY  02/16/2021    BCC- left superior forehead, MMS    • TONSILLECTOMY  1948   • P mmol/L 107   Carbon Dioxide, Total Latest Ref Range: 21.0 - 32.0 mmol/L 28.0   BUN Latest Ref Range: 7 - 18 mg/dL 19 (H)   CREATININE Latest Ref Range: 0.70 - 1.30 mg/dL 1.27   CALCIUM Latest Ref Range: 8.5 - 10.1 mg/dL 8.8   BUN/CREAT Ratio Latest Ref Ran torsemide.  (Patient taking differently: Take 20 mEq by mouth daily.  ), Disp: 90 tablet, Rfl: 1  •  torsemide 20 MG Oral Tab, Take 2 tablets (40 mg total) by mouth 2 (two) times a day., Disp: 360 tablet, Rfl: 1  •  Metoprolol Succinate  MG Oral Table polymyositis - s/p IVIG monthly infusions. Follows with Dr. Gordon Rico currently. Off Imuran due to pancytopenia.   · Non-obstructive CAD  · Chronic thrombocytopenia, immune mediated/Recent pancytopenia related to imuran - last platelet coun

## 2021-05-12 NOTE — PATIENT INSTRUCTIONS
Heart Failure Discharge Instructions    · Hold evening Torsemide today. Activity: Regular exercise and activity is important for your overall health and to help keep your heart strong and functioning as well as possible.    Walk at a slow to moderate pac shortness of breath or fatigue  · You are short of breath lying down, you need more pillows to breathe comfortably,  or wake up during the night short of breath  · You urinate less often during the day and more often at night  · You have a bloated feeling,

## 2021-05-12 NOTE — PROGRESS NOTES
Patient was assessed. No complain of shortness of breath, fatigue, chest pain or edema reported. Weight same as last visit at 201.2 lbs.  Reviewed current list of patient's allergies and medication; updated medications list. Labs drawn in [facility] lab and reviewe

## 2021-05-14 ENCOUNTER — OFFICE VISIT (OUTPATIENT)
Dept: HEMATOLOGY/ONCOLOGY | Facility: HOSPITAL | Age: 76
End: 2021-05-14
Attending: INTERNAL MEDICINE
Payer: MEDICARE

## 2021-05-14 VITALS
OXYGEN SATURATION: 99 % | HEIGHT: 70.98 IN | RESPIRATION RATE: 16 BRPM | DIASTOLIC BLOOD PRESSURE: 70 MMHG | BODY MASS INDEX: 28.05 KG/M2 | SYSTOLIC BLOOD PRESSURE: 111 MMHG | TEMPERATURE: 97 F | WEIGHT: 200.38 LBS | HEART RATE: 90 BPM

## 2021-05-14 DIAGNOSIS — D61.818 PANCYTOPENIA (HCC): ICD-10-CM

## 2021-05-14 PROCEDURE — 99214 OFFICE O/P EST MOD 30 MIN: CPT | Performed by: INTERNAL MEDICINE

## 2021-05-14 NOTE — PROGRESS NOTES
Cancer Center Progress Note    Patient Name: Simon Peters   YOB: 1945   Medical Record Number: [id number]   CSN: [id number]   Attending Physician: Emilio Schultz M.D.    Referring Physician: MD Dr. Sumi Hall    Date of Vi and platelets were 82. Continues on Prednisone and IVIG per rheum for polymyositis. He is tolerating this quite well and feels ok. No pain or bleeding.   Denies chest or abdominal pain, change in his bowel or urinary habits, headaches, dizziness or visu Rfl:   •  colchicine 0.6 MG Oral Tab, Take 1 tablet (0.6 mg total) by mouth 2 (two) times a day. Wean down to 1 tablet daily when gout improves.  (Patient not taking: Reported on 5/14/2021 ), Disp: 60 tablet, Rfl: 1    Past Medical History:  Past Medical Hi APPENDECTOMY  1970   • APPENDECTOMY     • CARDIAC CATHETERIZATION  09/2019   • COLONOSCOPY  10/2003 2006 01/2010    x3   • COLONOSCOPY  5/14/2013   • COLONOSCOPY N/A 5/1/2018    Procedure: COLONOSCOPY;  Surgeon: Elie Moreland MD;  Location: [facility] END[facility] Pack years: 3.75        Start date: 1958        Quit date: 1973        Years since quittin.8      Smokeless tobacco: Never Used    Vaping Use      Vaping Use: Never used    Substance and Sexual Activity      Alcohol use:  Yes        Alcohol/we [Amoclan]     RASH  Doxycycline             RASH     Review of Systems:  A 14-point ROS was done with pertinent positives and negative per the HPI    Vital Signs:  /70 (BP Location: Left arm, Patient Position: Sitting, Cuff Size: adult)   Pulse 90 06/04/2014 0831    RBC 2.61 (L) 05/05/2021 1107    RBC 2.65 (L) 04/29/2021 0752    RBC 2.18 (L) 04/28/2021 0500    HGB 8.4 (L) 05/05/2021 1107    HGB 8.5 (L) 04/29/2021 0752    HGB 8.1 (L) 04/28/2021 1753    HGB 13.5 08/05/2014 1205    HGB 13.3 07/28/2014 62.0 (L) 04/29/2021 0752    PLT 60.0 (L) 04/28/2021 0500    PLT 46.0 (L) 08/05/2014 1205    PLT 49.0 (L) 07/28/2014 0821    .0 (L) 06/04/2014 0831         Impression and Plan:  1. Pancytopenia- suspect marrow suppression from Imuran.   No evidence of

## 2021-05-19 ENCOUNTER — OFFICE VISIT (OUTPATIENT)
Dept: HEMATOLOGY/ONCOLOGY | Facility: HOSPITAL | Age: 76
End: 2021-05-19
Attending: INTERNAL MEDICINE
Payer: MEDICARE

## 2021-05-19 VITALS
WEIGHT: 202.63 LBS | RESPIRATION RATE: 16 BRPM | BODY MASS INDEX: 28.37 KG/M2 | OXYGEN SATURATION: 97 % | SYSTOLIC BLOOD PRESSURE: 121 MMHG | HEART RATE: 100 BPM | DIASTOLIC BLOOD PRESSURE: 66 MMHG | HEIGHT: 70.98 IN | TEMPERATURE: 97 F

## 2021-05-19 DIAGNOSIS — D69.3 IMMUNE THROMBOCYTOPENIC PURPURA (HCC): Primary | ICD-10-CM

## 2021-05-19 DIAGNOSIS — M32.19 OTHER SYSTEMIC LUPUS ERYTHEMATOSUS WITH OTHER ORGAN INVOLVEMENT (HCC): ICD-10-CM

## 2021-05-19 PROCEDURE — 96365 THER/PROPH/DIAG IV INF INIT: CPT

## 2021-05-19 PROCEDURE — 96366 THER/PROPH/DIAG IV INF ADDON: CPT

## 2021-05-19 PROCEDURE — 96375 TX/PRO/DX INJ NEW DRUG ADDON: CPT

## 2021-05-19 RX ORDER — METHYLPREDNISOLONE SODIUM SUCCINATE 40 MG/ML
40 INJECTION, POWDER, LYOPHILIZED, FOR SOLUTION INTRAMUSCULAR; INTRAVENOUS ONCE
Status: COMPLETED | OUTPATIENT
Start: 2021-05-19 | End: 2021-05-19

## 2021-05-19 RX ORDER — METHYLPREDNISOLONE SODIUM SUCCINATE 125 MG/2ML
125 INJECTION, POWDER, LYOPHILIZED, FOR SOLUTION INTRAMUSCULAR; INTRAVENOUS ONCE
Status: CANCELLED | OUTPATIENT
Start: 2021-06-16

## 2021-05-19 RX ORDER — FAMOTIDINE 10 MG/ML
20 INJECTION, SOLUTION INTRAVENOUS ONCE
Status: CANCELLED | OUTPATIENT
Start: 2021-06-16

## 2021-05-19 RX ORDER — DIPHENHYDRAMINE HYDROCHLORIDE 50 MG/ML
25 INJECTION INTRAMUSCULAR; INTRAVENOUS ONCE
Status: CANCELLED | OUTPATIENT
Start: 2021-06-16

## 2021-05-19 RX ORDER — DIPHENHYDRAMINE HCL 25 MG
25 CAPSULE ORAL ONCE
Status: CANCELLED | OUTPATIENT
Start: 2021-06-16

## 2021-05-19 RX ORDER — ACETAMINOPHEN 325 MG/1
650 TABLET ORAL ONCE
Status: COMPLETED | OUTPATIENT
Start: 2021-05-19 | End: 2021-05-19

## 2021-05-19 RX ORDER — DIPHENHYDRAMINE HCL 25 MG
25 CAPSULE ORAL ONCE
Status: COMPLETED | OUTPATIENT
Start: 2021-05-19 | End: 2021-05-19

## 2021-05-19 RX ORDER — MEPERIDINE HYDROCHLORIDE 25 MG/ML
25 INJECTION INTRAMUSCULAR; INTRAVENOUS; SUBCUTANEOUS ONCE
Status: CANCELLED | OUTPATIENT
Start: 2021-06-16

## 2021-05-19 RX ORDER — METHYLPREDNISOLONE SODIUM SUCCINATE 40 MG/ML
40 INJECTION, POWDER, LYOPHILIZED, FOR SOLUTION INTRAMUSCULAR; INTRAVENOUS ONCE
Status: CANCELLED | OUTPATIENT
Start: 2021-06-16

## 2021-05-19 RX ORDER — ACETAMINOPHEN 325 MG/1
650 TABLET ORAL ONCE
Status: CANCELLED | OUTPATIENT
Start: 2021-06-16

## 2021-05-19 RX ORDER — METHYLPREDNISOLONE SODIUM SUCCINATE 40 MG/ML
40 INJECTION, POWDER, LYOPHILIZED, FOR SOLUTION INTRAMUSCULAR; INTRAVENOUS ONCE
Status: CANCELLED
Start: 2021-06-16 | End: 2021-06-16

## 2021-05-19 RX ADMIN — DIPHENHYDRAMINE HCL 25 MG: 25 MG CAPSULE ORAL at 10:56:00

## 2021-05-19 RX ADMIN — ACETAMINOPHEN 650 MG: 325 TABLET ORAL at 10:56:00

## 2021-05-19 RX ADMIN — METHYLPREDNISOLONE SODIUM SUCCINATE 40 MG: 40 INJECTION, POWDER, LYOPHILIZED, FOR SOLUTION INTRAMUSCULAR; INTRAVENOUS at 10:56:00

## 2021-05-24 ENCOUNTER — TELEPHONE (OUTPATIENT)
Dept: FAMILY MEDICINE CLINIC | Facility: CLINIC | Age: 76
End: 2021-05-24

## 2021-05-24 NOTE — TELEPHONE ENCOUNTER
Patient is having a Cataract -extraction with IOL on 6/14 & 06/28   He will need HP  And any other testing that the Dr  sees fit for, five days prior to surgery.   Please fax [phone]  Phone # [phone] placed in Triage appointment is 6/1/

## 2021-05-24 NOTE — TELEPHONE ENCOUNTER
Future Appointments     Date Time Provider Chrissie Keiko   5/27/2021  2:30 PM HEART FAILURE APN 1 [facility] HF CLIN Jayjay Close   6/1/2021  1:30 PM María Marks MD EMG 3 [facility] Marisabel   6/8/1988  8:06 PM Rian Kepm MD [facility] EMG Britney Bradford   7/21/202

## 2021-05-27 ENCOUNTER — HOSPITAL ENCOUNTER (OUTPATIENT)
Dept: CARDIOLOGY CLINIC | Facility: HOSPITAL | Age: 76
Discharge: HOME | End: 2021-05-27
Attending: NURSE PRACTITIONER
Payer: MEDICARE

## 2021-05-27 ENCOUNTER — HOSPITAL ENCOUNTER (OUTPATIENT)
Dept: LAB | Facility: HOSPITAL | Age: 76
Discharge: HOME | End: 2021-05-27
Attending: NURSE PRACTITIONER
Payer: MEDICARE

## 2021-05-27 VITALS
DIASTOLIC BLOOD PRESSURE: 50 MMHG | BODY MASS INDEX: 28 KG/M2 | HEART RATE: 69 BPM | OXYGEN SATURATION: 96 % | WEIGHT: 201.63 LBS | RESPIRATION RATE: 22 BRPM | SYSTOLIC BLOOD PRESSURE: 105 MMHG

## 2021-05-27 DIAGNOSIS — I50.812 CHRONIC RIGHT-SIDED CONGESTIVE HEART FAILURE (HCC): Primary | ICD-10-CM

## 2021-05-27 DIAGNOSIS — G47.33 OSA (OBSTRUCTIVE SLEEP APNEA): ICD-10-CM

## 2021-05-27 DIAGNOSIS — I48.20 CHRONIC ATRIAL FIBRILLATION (HCC): ICD-10-CM

## 2021-05-27 DIAGNOSIS — I50.9 CHRONIC CONGESTIVE HEART FAILURE, UNSPECIFIED HEART FAILURE TYPE (HCC): ICD-10-CM

## 2021-05-27 DIAGNOSIS — N18.30 STAGE 3 CHRONIC KIDNEY DISEASE, UNSPECIFIED WHETHER STAGE 3A OR 3B CKD (HCC): ICD-10-CM

## 2021-05-27 DIAGNOSIS — I27.20 PULMONARY HTN (HCC): ICD-10-CM

## 2021-05-27 PROCEDURE — 80048 BASIC METABOLIC PNL TOTAL CA: CPT | Performed by: NURSE PRACTITIONER

## 2021-05-27 PROCEDURE — 99214 OFFICE O/P EST MOD 30 MIN: CPT | Performed by: NURSE PRACTITIONER

## 2021-05-27 PROCEDURE — 36415 COLL VENOUS BLD VENIPUNCTURE: CPT | Performed by: NURSE PRACTITIONER

## 2021-05-27 NOTE — PROGRESS NOTES
[facility] Cardiac Health Progress Note    Kylee Woodson is a 64year old male who presents to clinic for APN assessment and management of chronic diastolic, RV heart failure and is functional class 3.      Subjective:  He returns for r Cataract    • Colon polyps 5/13/2013   • Congestive heart disease ([facility] [address]    • Diverticulosis of colon 5/14/2013   • Esophageal polyp 5/14/2013   • Esophageal reflux    • Essential hypertension    • Gout    • Hammer toe, acquired 6/29/2012   • Hepatitis SKIN SURGERY  2007    basal ceell carcinoma   • SKIN SURGERY  7-18-12    BCC-nodular to right superior eyebrow/ MOHS   • SKIN SURGERY  07/15/2013    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/MMS   • SKIN SURGERY  2-19-14    BCC-NOD to right superior pinna, MMS, AB   • - 145 mmol/L 141   Potassium Latest Ref Range: 3.5 - 5.1 mmol/L 4.4   Chloride Latest Ref Range: 98 - 112 mmol/L 105   Carbon Dioxide, Total Latest Ref Range: 21.0 - 32.0 mmol/L 30.0   BUN Latest Ref Range: 7 - 18 mg/dL 26 (H)   CREATININE Latest Ref Range total) by mouth daily. Pt. Has taken 40meq po daily the past 3 days d/t increased torsemide.  (Patient taking differently: Take 20 mEq by mouth daily.  ), Disp: 90 tablet, Rfl: 1  •  torsemide 20 MG Oral Tab, Take 2 tablets (40 mg total) by mouth 2 (two) ti bilateral pleural effusions - hx of thoracentesis.   · Hx Lupus/Mixed CTD/Severe polymyositis - s/p IVIG monthly infusions. Follows with Dr. Azar Solis currently. Off Imuran due to pancytopenia.   · Non-obstructive CAD  · Chronic thrombocyto

## 2021-05-27 NOTE — PATIENT INSTRUCTIONS
Heart Failure Discharge Instructions    Continue with same medications. Activity: Regular exercise and activity is important for your overall health and to help keep your heart strong and functioning as well as possible.    Walk at a slow to moderate pace shortness of breath or fatigue  · You are short of breath lying down, you need more pillows to breathe comfortably,  or wake up during the night short of breath  · You urinate less often during the day and more often at night  · You have a bloated feeling,

## 2021-05-27 NOTE — PROGRESS NOTES
Patient was assessed. No complain of shortness of breath, fatigue, chest pain or edema reported. Overall feeling better. Weight same as last visit at 201.6 lbs. Reviewed current list of patient's allergies and medication.  Labs drawn in [facility] lab and reviewed

## 2021-06-01 ENCOUNTER — OFFICE VISIT (OUTPATIENT)
Dept: FAMILY MEDICINE CLINIC | Facility: CLINIC | Age: 76
End: 2021-06-01
Payer: MEDICARE

## 2021-06-01 VITALS
OXYGEN SATURATION: 99 % | DIASTOLIC BLOOD PRESSURE: 64 MMHG | HEIGHT: 71 IN | RESPIRATION RATE: 16 BRPM | WEIGHT: 201.81 LBS | TEMPERATURE: 98 F | BODY MASS INDEX: 28.25 KG/M2 | SYSTOLIC BLOOD PRESSURE: 118 MMHG | HEART RATE: 84 BPM

## 2021-06-01 DIAGNOSIS — M35.1 MCTD (MIXED CONNECTIVE TISSUE DISEASE) (HCC): ICD-10-CM

## 2021-06-01 DIAGNOSIS — H26.9 CATARACT OF BOTH EYES, UNSPECIFIED CATARACT TYPE: ICD-10-CM

## 2021-06-01 DIAGNOSIS — I48.11 LONGSTANDING PERSISTENT ATRIAL FIBRILLATION (HCC): ICD-10-CM

## 2021-06-01 DIAGNOSIS — D61.818 PANCYTOPENIA (HCC): ICD-10-CM

## 2021-06-01 DIAGNOSIS — I50.812 CHRONIC RIGHT-SIDED CONGESTIVE HEART FAILURE (HCC): ICD-10-CM

## 2021-06-01 DIAGNOSIS — M33.20 POLYMYOSITIS (HCC): Chronic | ICD-10-CM

## 2021-06-01 DIAGNOSIS — Z01.818 PREOP EXAMINATION: Primary | ICD-10-CM

## 2021-06-01 DIAGNOSIS — I10 BENIGN ESSENTIAL HYPERTENSION: ICD-10-CM

## 2021-06-01 DIAGNOSIS — M32.9 LUPUS (HCC): ICD-10-CM

## 2021-06-01 PROCEDURE — 99214 OFFICE O/P EST MOD 30 MIN: CPT | Performed by: FAMILY MEDICINE

## 2021-06-01 RX ORDER — BESIFLOXACIN 6 MG/ML
1 SUSPENSION OPHTHALMIC
COMMUNITY
Start: 2021-05-24 | End: 2021-07-06 | Stop reason: ALTCHOICE

## 2021-06-01 RX ORDER — MOXIFLOXACIN 5 MG/ML
SOLUTION/ DROPS OPHTHALMIC
COMMUNITY
Start: 2021-05-25 | End: 2021-07-21 | Stop reason: ALTCHOICE

## 2021-06-01 RX ORDER — TRIPROLIDINE/PSEUDOEPHEDRINE 2.5MG-60MG
TABLET ORAL
COMMUNITY
Start: 2021-05-24 | End: 2021-08-02

## 2021-06-01 RX ORDER — METOPROLOL SUCCINATE 100 MG/1
TABLET, EXTENDED RELEASE ORAL
Qty: 180 TABLET | Refills: 3 | Status: SHIPPED | OUTPATIENT
Start: 2021-06-01

## 2021-06-01 NOTE — PROGRESS NOTES
Patient presents with:  Pre-Op Exam: Cataract Surg with Dr. Dorothy Brandt    HPI:   Cele Fernandez is a 64year old male who is being evaluated for pre-surgical clearance at the request of Dr. Juan Deras.    Surgery: cataract removal  Surgeon: Dorothy Brandt  Date of Surgery: Exertional dyspnea     Symptomatic anemia     Pancytopenia (HCC)     Renal insufficiency, mild      besifloxacin HCl (BESIVANCE) 0.6 % Ophthalmic Suspension, 1 drop., Disp: , Rfl:   Difluprednate (DUREZOL) 0.05 % Ophthalmic Emulsion, POST-OP: 1 drop in erin Blocker). , Disp: 60 tablet, Rfl: 3  Calcium Citrate-Vitamin D (CITRACAL + D OR), Take 1 tablet by mouth daily. , Disp: , Rfl:   colchicine 0.6 MG Oral Tab, Take 1 tablet (0.6 mg total) by mouth 2 (two) times a day.  Wean down to 1 tablet daily when gout im Grandfather    • Heart Attack Paternal Grandmother    • Kidney Disease Son    • Other (Ramon's Esophagus) Son    • Heart Attack Maternal Grandfather        Social History    Tobacco Use      Smoking status: Former Smoker        Packs/day: 0.25        Yea candidate for the planned procedures  All medical conditions currently controlled and stable. Patient aware to avoid NSAIDs and NSAID eye drops leading up to procedure  Ok to continue other meds  He is cleared    2.  Cataract of both eyes, unspecified raudel

## 2021-07-06 ENCOUNTER — HOSPITAL ENCOUNTER (OUTPATIENT)
Dept: CARDIOLOGY CLINIC | Facility: HOSPITAL | Age: 76
Discharge: HOME | End: 2021-07-06
Attending: NURSE PRACTITIONER
Payer: MEDICARE

## 2021-07-06 ENCOUNTER — HOSPITAL ENCOUNTER (OUTPATIENT)
Dept: LAB | Facility: HOSPITAL | Age: 76
Discharge: HOME | End: 2021-07-06
Attending: NURSE PRACTITIONER
Payer: MEDICARE

## 2021-07-06 VITALS
SYSTOLIC BLOOD PRESSURE: 116 MMHG | OXYGEN SATURATION: 99 % | HEART RATE: 75 BPM | DIASTOLIC BLOOD PRESSURE: 50 MMHG | WEIGHT: 199.19 LBS | BODY MASS INDEX: 28 KG/M2

## 2021-07-06 DIAGNOSIS — I48.11 LONGSTANDING PERSISTENT ATRIAL FIBRILLATION (HCC): ICD-10-CM

## 2021-07-06 DIAGNOSIS — G47.33 OSA (OBSTRUCTIVE SLEEP APNEA): ICD-10-CM

## 2021-07-06 DIAGNOSIS — I27.20 PULMONARY HYPERTENSION (HCC): ICD-10-CM

## 2021-07-06 DIAGNOSIS — I50.9 CHF (CONGESTIVE HEART FAILURE) (HCC): ICD-10-CM

## 2021-07-06 DIAGNOSIS — I50.812 CHRONIC RIGHT-SIDED CONGESTIVE HEART FAILURE (HCC): Primary | ICD-10-CM

## 2021-07-06 LAB
ANION GAP SERPL CALC-SCNC: 3 MMOL/L (ref 0–18)
BUN BLD-MCNC: 22 MG/DL (ref 7–18)
BUN/CREAT SERPL: 15 (ref 10–20)
CALCIUM BLD-MCNC: 8.9 MG/DL (ref 8.5–10.1)
CHLORIDE SERPL-SCNC: 104 MMOL/L (ref 98–112)
CO2 SERPL-SCNC: 32 MMOL/L (ref 21–32)
CREAT BLD-MCNC: 1.47 MG/DL
GLUCOSE BLD-MCNC: 113 MG/DL (ref 70–99)
NT-PROBNP SERPL-MCNC: 2472 PG/ML (ref ?–450)
OSMOLALITY SERPL CALC.SUM OF ELEC: 292 MOSM/KG (ref 275–295)
PATIENT FASTING Y/N/NP: NO
POTASSIUM SERPL-SCNC: 4.3 MMOL/L (ref 3.5–5.1)
SODIUM SERPL-SCNC: 139 MMOL/L (ref 136–145)

## 2021-07-06 PROCEDURE — 83880 ASSAY OF NATRIURETIC PEPTIDE: CPT | Performed by: NURSE PRACTITIONER

## 2021-07-06 PROCEDURE — 99215 OFFICE O/P EST HI 40 MIN: CPT | Performed by: NURSE PRACTITIONER

## 2021-07-06 PROCEDURE — 36415 COLL VENOUS BLD VENIPUNCTURE: CPT | Performed by: NURSE PRACTITIONER

## 2021-07-06 PROCEDURE — 80048 BASIC METABOLIC PNL TOTAL CA: CPT | Performed by: NURSE PRACTITIONER

## 2021-07-06 RX ORDER — COLCHICINE 0.6 MG/1
0.6 TABLET ORAL DAILY
COMMUNITY
End: 2021-08-24

## 2021-07-06 NOTE — PROGRESS NOTES
Pt. Assessed. No signs or symptoms of shortness of breath, fatigue, chest pain or edema noted. Weight stable at 199.2 lbs. Pt states that he feels the best he has felt in years.  Reviewed current list of patient's allergies and medication; updated the [facility]

## 2021-07-06 NOTE — PROGRESS NOTES
[facility] Cardiac Health Progress Note    Aquilla Closs is a 64year old male who presents to clinic for APN assessment and management of chronic diastolic, RV heart failure and is functional class 2.      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 , Disp: 90 tablet, Rfl: 1  •  Potassium Chloride ER (KLOR-CON M20) 20 MEQ Oral Tab CR, Take 1 tablet (20 mEq total) by mouth daily.  Pt. Has Toprol  mg BID. · LI - uses cpap, but doesn't like it and past follow up with Pulmonary revealed elevated AHI mostly due central apneas.   · Recurrent bilateral pleural effusions - hx of thoracentesis.   · Hx Lupus/Mixed CTD/Severe polymyositis - s

## 2021-07-08 ENCOUNTER — OFFICE VISIT (OUTPATIENT)
Dept: RHEUMATOLOGY | Facility: CLINIC | Age: 76
End: 2021-07-08
Payer: MEDICARE

## 2021-07-08 VITALS
SYSTOLIC BLOOD PRESSURE: 114 MMHG | WEIGHT: 199 LBS | HEART RATE: 64 BPM | DIASTOLIC BLOOD PRESSURE: 70 MMHG | HEIGHT: 71 IN | TEMPERATURE: 98 F | BODY MASS INDEX: 27.86 KG/M2 | RESPIRATION RATE: 16 BRPM

## 2021-07-08 DIAGNOSIS — M33.20 POLYMYOSITIS (HCC): Chronic | ICD-10-CM

## 2021-07-08 DIAGNOSIS — M35.1 MCTD (MIXED CONNECTIVE TISSUE DISEASE) (HCC): Primary | ICD-10-CM

## 2021-07-08 DIAGNOSIS — M32.9 LUPUS (HCC): ICD-10-CM

## 2021-07-08 DIAGNOSIS — Z95.818 PRESENCE OF WATCHMAN LEFT ATRIAL APPENDAGE CLOSURE DEVICE: ICD-10-CM

## 2021-07-08 DIAGNOSIS — I50.812 CHRONIC RIGHT-SIDED CONGESTIVE HEART FAILURE (HCC): ICD-10-CM

## 2021-07-08 DIAGNOSIS — M1A.0720 IDIOPATHIC CHRONIC GOUT OF LEFT FOOT WITHOUT TOPHUS: ICD-10-CM

## 2021-07-08 DIAGNOSIS — I43 CARDIOMYOPATHY AS MANIFESTATION OF UNDERLYING DISEASE (HCC): ICD-10-CM

## 2021-07-08 DIAGNOSIS — K75.4: ICD-10-CM

## 2021-07-08 PROCEDURE — 99214 OFFICE O/P EST MOD 30 MIN: CPT | Performed by: INTERNAL MEDICINE

## 2021-07-08 RX ORDER — FEXOFENADINE HCL 180 MG/1
180 TABLET ORAL DAILY PRN
COMMUNITY

## 2021-07-08 NOTE — PATIENT INSTRUCTIONS
Check uric acid level. Allopurinol 100 mg per day. IV IG monthly for MCTDlpolymyositis. Colchicine 0.6 mg as needed for gout pain. Prednisone 7 mg per day. Gout diet. Return to office 3 months.

## 2021-07-08 NOTE — PROGRESS NOTES
[facility]  Dr. Blaine Jimnees Progress Note     Subjective: Faustino Harper is a(n) 64year old male. Current complaints: Patient presents with: Follow - Up: 2 month f/u. Feeling good. Feeling better then before.  Still has swelling in toes and wanted monthly for MCTDlpolymyositis. Colchicine 0.6 mg as needed for gout pain. Prednisone 7 mg per day. Gout diet. Return to office 3 months.         Jim George MD 5/3/9127 8:11 PM

## 2021-07-12 ENCOUNTER — TELEPHONE (OUTPATIENT)
Dept: RHEUMATOLOGY | Facility: CLINIC | Age: 76
End: 2021-07-12

## 2021-07-12 NOTE — TELEPHONE ENCOUNTER
Test results 7/12/2021 CBC white count 5.0 hemoglobin 12.4 hematocrit 37.7  platelet count 71,790  Sodium 140 potassium 4.6 chloride 100 BUN 26 creatinine 1.31 glucose 92 albumin 4.0 total protein 6.6 calcium 9.4 alkaline phosphatase 44 pro time 11.

## 2021-07-19 ENCOUNTER — TELEPHONE (OUTPATIENT)
Dept: RHEUMATOLOGY | Facility: CLINIC | Age: 76
End: 2021-07-19

## 2021-07-19 NOTE — TELEPHONE ENCOUNTER
New Order for IVIG faxed to [facility] per Dr. Marcelino Dorsey.     Sent for Scanning

## 2021-07-21 ENCOUNTER — OFFICE VISIT (OUTPATIENT)
Dept: FAMILY MEDICINE CLINIC | Facility: CLINIC | Age: 76
End: 2021-07-21
Payer: MEDICARE

## 2021-07-21 ENCOUNTER — TELEPHONE (OUTPATIENT)
Dept: FAMILY MEDICINE CLINIC | Facility: CLINIC | Age: 76
End: 2021-07-21

## 2021-07-21 VITALS
HEART RATE: 76 BPM | HEIGHT: 71 IN | WEIGHT: 200.81 LBS | RESPIRATION RATE: 10 BRPM | BODY MASS INDEX: 28.11 KG/M2 | SYSTOLIC BLOOD PRESSURE: 120 MMHG | TEMPERATURE: 98 F | DIASTOLIC BLOOD PRESSURE: 68 MMHG

## 2021-07-21 DIAGNOSIS — Z00.00 LABORATORY EXAMINATION ORDERED AS PART OF A ROUTINE GENERAL MEDICAL EXAMINATION: ICD-10-CM

## 2021-07-21 DIAGNOSIS — J43.2 CENTRILOBULAR EMPHYSEMA (HCC): ICD-10-CM

## 2021-07-21 DIAGNOSIS — E78.6 LOW HDL (UNDER 40): ICD-10-CM

## 2021-07-21 DIAGNOSIS — I27.20 PULMONARY HYPERTENSION (HCC): Primary | ICD-10-CM

## 2021-07-21 DIAGNOSIS — K75.4: ICD-10-CM

## 2021-07-21 DIAGNOSIS — D69.6 THROMBOCYTOPENIA (HCC): ICD-10-CM

## 2021-07-21 DIAGNOSIS — I48.11 LONGSTANDING PERSISTENT ATRIAL FIBRILLATION (HCC): Primary | ICD-10-CM

## 2021-07-21 DIAGNOSIS — I48.11 LONGSTANDING PERSISTENT ATRIAL FIBRILLATION (HCC): ICD-10-CM

## 2021-07-21 DIAGNOSIS — I27.20 PULMONARY HYPERTENSION (HCC): ICD-10-CM

## 2021-07-21 PROCEDURE — 99214 OFFICE O/P EST MOD 30 MIN: CPT | Performed by: FAMILY MEDICINE

## 2021-07-21 RX ORDER — SODIUM FLUORIDE 6 MG/ML
PASTE, DENTIFRICE DENTAL
COMMUNITY
Start: 2021-06-10

## 2021-07-21 NOTE — TELEPHONE ENCOUNTER
Please enter lab orders for the patient's upcoming physical appointment. Physical scheduled:    Your appointments     Date & Time Appointment Department [facility])    Jan 13, 2022 11:00 AM CST Medicare Annual Well Visit with Melyssa Wilhelm MD [facility]

## 2021-07-21 NOTE — PROGRESS NOTES
HPI/Subjective:     Patient presents with:  Blood Pressure: 6 months follow up      Trever Palmer is a 64year old male who presents for recheck of his hypertension.  He has been taking medications as instructed, with no medication side eff edema.   Skin:     General: Skin is warm and dry. Findings: No rash. Neurological:      Mental Status: He is alert and oriented to person, place, and time.    Psychiatric:         Mood and Affect: Mood normal.         Behavior: Behavior normal. Plus.   Return in about 6 months (around 1/21/2022) for AWV with chonic condition follow up.     Luanne Hendrickson, 7/21/2021  12:20 PM

## 2021-07-27 ENCOUNTER — TELEPHONE (OUTPATIENT)
Dept: RHEUMATOLOGY | Facility: CLINIC | Age: 76
End: 2021-07-27

## 2021-07-27 ENCOUNTER — LAB ENCOUNTER (OUTPATIENT)
Dept: LAB | Age: 76
End: 2021-07-27
Attending: INTERNAL MEDICINE
Payer: MEDICARE

## 2021-07-27 DIAGNOSIS — M1A.0720 IDIOPATHIC CHRONIC GOUT OF LEFT FOOT WITHOUT TOPHUS: Primary | ICD-10-CM

## 2021-07-27 DIAGNOSIS — M1A.0720 IDIOPATHIC CHRONIC GOUT OF LEFT FOOT WITHOUT TOPHUS: ICD-10-CM

## 2021-07-27 DIAGNOSIS — M10.9 GOUT, UNSPECIFIED CAUSE, UNSPECIFIED CHRONICITY, UNSPECIFIED SITE: Primary | ICD-10-CM

## 2021-07-27 DIAGNOSIS — J43.2 CENTRILOBULAR EMPHYSEMA (HCC): ICD-10-CM

## 2021-07-27 DIAGNOSIS — I48.11 LONGSTANDING PERSISTENT ATRIAL FIBRILLATION (HCC): ICD-10-CM

## 2021-07-27 LAB
ALBUMIN SERPL-MCNC: 3.8 G/DL (ref 3.4–5)
ALBUMIN/GLOB SERPL: 1.2 {RATIO} (ref 1–2)
ALP LIVER SERPL-CCNC: 60 U/L
ALT SERPL-CCNC: 48 U/L
ANION GAP SERPL CALC-SCNC: 5 MMOL/L (ref 0–18)
AST SERPL-CCNC: 29 U/L (ref 15–37)
BASOPHILS # BLD AUTO: 0.02 X10(3) UL (ref 0–0.2)
BASOPHILS NFR BLD AUTO: 0.3 %
BILIRUB SERPL-MCNC: 0.6 MG/DL (ref 0.1–2)
BUN BLD-MCNC: 20 MG/DL (ref 7–18)
BUN/CREAT SERPL: 15.4 (ref 10–20)
CALCIUM BLD-MCNC: 8.8 MG/DL (ref 8.5–10.1)
CHLORIDE SERPL-SCNC: 107 MMOL/L (ref 98–112)
CO2 SERPL-SCNC: 32 MMOL/L (ref 21–32)
CREAT BLD-MCNC: 1.3 MG/DL
DEPRECATED RDW RBC AUTO: 67.1 FL (ref 35.1–46.3)
EOSINOPHIL # BLD AUTO: 0.14 X10(3) UL (ref 0–0.7)
EOSINOPHIL NFR BLD AUTO: 2.1 %
ERYTHROCYTE [DISTWIDTH] IN BLOOD BY AUTOMATED COUNT: 15.8 % (ref 11–15)
GLOBULIN PLAS-MCNC: 3.1 G/DL (ref 2.8–4.4)
GLUCOSE BLD-MCNC: 67 MG/DL (ref 70–99)
HCT VFR BLD AUTO: 41.7 %
HGB BLD-MCNC: 12.4 G/DL
IMM GRANULOCYTES # BLD AUTO: 0.03 X10(3) UL (ref 0–1)
IMM GRANULOCYTES NFR BLD: 0.4 %
LYMPHOCYTES # BLD AUTO: 0.86 X10(3) UL (ref 1–4)
LYMPHOCYTES NFR BLD AUTO: 12.9 %
M PROTEIN MFR SERPL ELPH: 6.9 G/DL (ref 6.4–8.2)
MCH RBC QN AUTO: 34 PG (ref 26–34)
MCHC RBC AUTO-ENTMCNC: 29.7 G/DL (ref 31–37)
MCV RBC AUTO: 114.2 FL
MONOCYTES # BLD AUTO: 0.96 X10(3) UL (ref 0.1–1)
MONOCYTES NFR BLD AUTO: 14.3 %
NEUTROPHILS # BLD AUTO: 4.68 X10 (3) UL (ref 1.5–7.7)
NEUTROPHILS # BLD AUTO: 4.68 X10(3) UL (ref 1.5–7.7)
NEUTROPHILS NFR BLD AUTO: 70 %
OSMOLALITY SERPL CALC.SUM OF ELEC: 299 MOSM/KG (ref 275–295)
PATIENT FASTING Y/N/NP: YES
PLATELET # BLD AUTO: 95 10(3)UL (ref 150–450)
PLATELET MORPHOLOGY: NORMAL
POTASSIUM SERPL-SCNC: 3.1 MMOL/L (ref 3.5–5.1)
RBC # BLD AUTO: 3.65 X10(6)UL
SODIUM SERPL-SCNC: 144 MMOL/L (ref 136–145)
URATE SERPL-MCNC: 9.1 MG/DL
WBC # BLD AUTO: 6.7 X10(3) UL (ref 4–11)

## 2021-07-27 PROCEDURE — 84550 ASSAY OF BLOOD/URIC ACID: CPT

## 2021-07-27 PROCEDURE — 80053 COMPREHEN METABOLIC PANEL: CPT

## 2021-07-27 PROCEDURE — 36415 COLL VENOUS BLD VENIPUNCTURE: CPT

## 2021-07-27 PROCEDURE — 85025 COMPLETE CBC W/AUTO DIFF WBC: CPT

## 2021-07-27 RX ORDER — ALLOPURINOL 100 MG/1
200 TABLET ORAL DAILY
Qty: 180 TABLET | Refills: 1 | Status: SHIPPED | OUTPATIENT
Start: 2021-07-27 | End: 2021-08-05 | Stop reason: DRUGHIGH

## 2021-07-29 ENCOUNTER — OFFICE VISIT (OUTPATIENT)
Dept: HEMATOLOGY/ONCOLOGY | Facility: HOSPITAL | Age: 76
End: 2021-07-29
Attending: INTERNAL MEDICINE
Payer: MEDICARE

## 2021-07-29 VITALS
HEART RATE: 93 BPM | HEIGHT: 70.98 IN | TEMPERATURE: 98 F | SYSTOLIC BLOOD PRESSURE: 123 MMHG | DIASTOLIC BLOOD PRESSURE: 71 MMHG | WEIGHT: 201.63 LBS | RESPIRATION RATE: 18 BRPM | OXYGEN SATURATION: 96 % | BODY MASS INDEX: 28.23 KG/M2

## 2021-07-29 DIAGNOSIS — M32.9 LUPUS (HCC): ICD-10-CM

## 2021-07-29 DIAGNOSIS — D69.3 IMMUNE THROMBOCYTOPENIC PURPURA (HCC): Primary | ICD-10-CM

## 2021-07-29 DIAGNOSIS — D69.6 THROMBOCYTOPENIA (HCC): ICD-10-CM

## 2021-07-29 DIAGNOSIS — M33.20 POLYMYOSITIS (HCC): ICD-10-CM

## 2021-07-29 PROCEDURE — 96365 THER/PROPH/DIAG IV INF INIT: CPT

## 2021-07-29 PROCEDURE — 96366 THER/PROPH/DIAG IV INF ADDON: CPT

## 2021-07-29 RX ORDER — ACETAMINOPHEN 325 MG/1
650 TABLET ORAL ONCE
Status: COMPLETED | OUTPATIENT
Start: 2021-07-29 | End: 2021-07-29

## 2021-07-29 RX ORDER — MEPERIDINE HYDROCHLORIDE 25 MG/ML
25 INJECTION INTRAMUSCULAR; INTRAVENOUS; SUBCUTANEOUS ONCE
Status: CANCELLED | OUTPATIENT
Start: 2021-08-26

## 2021-07-29 RX ORDER — METHYLPREDNISOLONE SODIUM SUCCINATE 125 MG/2ML
125 INJECTION, POWDER, LYOPHILIZED, FOR SOLUTION INTRAMUSCULAR; INTRAVENOUS ONCE
Status: CANCELLED | OUTPATIENT
Start: 2021-08-26

## 2021-07-29 RX ORDER — FAMOTIDINE 10 MG/ML
20 INJECTION, SOLUTION INTRAVENOUS ONCE
Status: CANCELLED | OUTPATIENT
Start: 2021-08-26

## 2021-07-29 RX ORDER — DIPHENHYDRAMINE HCL 25 MG
25 CAPSULE ORAL ONCE
Status: COMPLETED | OUTPATIENT
Start: 2021-07-29 | End: 2021-07-29

## 2021-07-29 RX ORDER — ACETAMINOPHEN 325 MG/1
650 TABLET ORAL ONCE
Status: CANCELLED | OUTPATIENT
Start: 2021-08-26

## 2021-07-29 RX ORDER — DIPHENHYDRAMINE HYDROCHLORIDE 50 MG/ML
25 INJECTION INTRAMUSCULAR; INTRAVENOUS ONCE
Status: CANCELLED | OUTPATIENT
Start: 2021-08-26

## 2021-07-29 RX ORDER — DIPHENHYDRAMINE HCL 25 MG
25 CAPSULE ORAL ONCE
Status: CANCELLED | OUTPATIENT
Start: 2021-08-26

## 2021-07-29 RX ORDER — METHYLPREDNISOLONE SODIUM SUCCINATE 40 MG/ML
40 INJECTION, POWDER, LYOPHILIZED, FOR SOLUTION INTRAMUSCULAR; INTRAVENOUS ONCE
Status: CANCELLED | OUTPATIENT
Start: 2021-08-26

## 2021-07-29 RX ADMIN — ACETAMINOPHEN 650 MG: 325 TABLET ORAL at 12:45:00

## 2021-07-29 RX ADMIN — DIPHENHYDRAMINE HCL 25 MG: 25 MG CAPSULE ORAL at 12:45:00

## 2021-07-29 NOTE — PROGRESS NOTES
Education Record    Learner:  Patient    Disease / Diagnosis:MCTD    Barriers / Limitations:  None   Comments:    Method:  Discussion   Comments:    General Topics:  Plan of care reviewed   Comments:    Outcome:  Shows understanding   Comments:    Patient

## 2021-08-04 DIAGNOSIS — I50.812 CHRONIC RIGHT-SIDED CONGESTIVE HEART FAILURE (HCC): Primary | ICD-10-CM

## 2021-08-05 ENCOUNTER — HOSPITAL ENCOUNTER (OUTPATIENT)
Dept: LAB | Facility: HOSPITAL | Age: 76
Discharge: HOME | End: 2021-08-05
Attending: NURSE PRACTITIONER
Payer: MEDICARE

## 2021-08-05 ENCOUNTER — HOSPITAL ENCOUNTER (OUTPATIENT)
Dept: CARDIOLOGY CLINIC | Facility: HOSPITAL | Age: 76
Discharge: HOME | End: 2021-08-05
Attending: NURSE PRACTITIONER
Payer: MEDICARE

## 2021-08-05 VITALS
OXYGEN SATURATION: 98 % | WEIGHT: 200.81 LBS | HEART RATE: 66 BPM | SYSTOLIC BLOOD PRESSURE: 130 MMHG | BODY MASS INDEX: 28 KG/M2 | DIASTOLIC BLOOD PRESSURE: 71 MMHG | RESPIRATION RATE: 18 BRPM

## 2021-08-05 DIAGNOSIS — I27.20 PULMONARY HYPERTENSION (HCC): ICD-10-CM

## 2021-08-05 DIAGNOSIS — I50.812 CHRONIC RIGHT-SIDED CONGESTIVE HEART FAILURE (HCC): ICD-10-CM

## 2021-08-05 DIAGNOSIS — M33.20 POLYMYOSITIS (HCC): Chronic | ICD-10-CM

## 2021-08-05 DIAGNOSIS — I77.810 AORTIC ROOT DILATION (HCC): ICD-10-CM

## 2021-08-05 DIAGNOSIS — I43 CARDIOMYOPATHY AS MANIFESTATION OF UNDERLYING DISEASE (HCC): ICD-10-CM

## 2021-08-05 DIAGNOSIS — I10 BENIGN ESSENTIAL HYPERTENSION: ICD-10-CM

## 2021-08-05 LAB
ANION GAP SERPL CALC-SCNC: 3 MMOL/L (ref 0–18)
BUN BLD-MCNC: 23 MG/DL (ref 7–18)
CALCIUM BLD-MCNC: 9.1 MG/DL (ref 8.5–10.1)
CHLORIDE SERPL-SCNC: 106 MMOL/L (ref 98–112)
CO2 SERPL-SCNC: 33 MMOL/L (ref 21–32)
CREAT BLD-MCNC: 1.46 MG/DL
GLUCOSE BLD-MCNC: 97 MG/DL (ref 70–99)
OSMOLALITY SERPL CALC.SUM OF ELEC: 298 MOSM/KG (ref 275–295)
PATIENT FASTING Y/N/NP: NO
POTASSIUM SERPL-SCNC: 3.7 MMOL/L (ref 3.5–5.1)
SODIUM SERPL-SCNC: 142 MMOL/L (ref 136–145)

## 2021-08-05 PROCEDURE — 99214 OFFICE O/P EST MOD 30 MIN: CPT | Performed by: NURSE PRACTITIONER

## 2021-08-05 PROCEDURE — 36415 COLL VENOUS BLD VENIPUNCTURE: CPT | Performed by: NURSE PRACTITIONER

## 2021-08-05 PROCEDURE — 80048 BASIC METABOLIC PNL TOTAL CA: CPT | Performed by: NURSE PRACTITIONER

## 2021-08-05 RX ORDER — ALLOPURINOL 100 MG/1
100 TABLET ORAL 2 TIMES DAILY
COMMUNITY
End: 2021-08-23

## 2021-08-05 NOTE — PATIENT INSTRUCTIONS
Heart Failure Discharge Instructions    Continue with same Cardiac medications. Activity: Regular exercise and activity is important for your overall health and to help keep your heart strong and functioning as well as possible.    Walk at a slow to moder because of shortness of breath or fatigue  · You are short of breath lying down, you need more pillows to breathe comfortably,  or wake up during the night short of breath  · You urinate less often during the day and more often at night  · You have a bloat

## 2021-08-05 NOTE — PROGRESS NOTES
Patient was assessed. No complain of shortness of breath, fatigue, chest pain or edema reported. Weight almost the same as last visit at 200.8 lbs.  Reviewed current list of patient's allergies and medication; updated medications list. Labs drawn in [facility]

## 2021-08-05 NOTE — PROGRESS NOTES
[facility] Cardiac Health Progress Note    Magno Zapata is a 64year old male who presents to clinic for APN assessment and management of chronic diastolic, RV heart failure and is functional class 2.      Subjective:  He returns for a HISTORY:  Past Medical History:   Diagnosis Date   • A-fib [facility])    • Arrhythmia     atrial fibrillation   • Arthritis    • Autoimmune disease ([facility] [address]     hepatits, resolved anfd nephritis, resolved   • Ramon's esophagus 5/14/2013   • Bleeding nose 1 driving   • Wears glasses       Past Surgical History:   Procedure Laterality Date   • APPENDECTOMY  1970   • APPENDECTOMY     • CARDIAC CATHETERIZATION  09/2019   • CATARACT Bilateral    • COLONOSCOPY  10/2003 2006 01/2010    x3   • COLONOSCOPY  5/14/2013 smoking in 1973    Vaping Use      Vaping Use: Never used    Alcohol use:  Yes      Alcohol/week: 8.0 - 10.0 standard drinks      Types: 8 - 10 Standard drinks or equivalent per week      Comment: 1 per day    Drug use: Never          Objective:    Cardiac tablet, Rfl: 3  •  spironolactone 25 MG Oral Tab, Take 12.5 mg by mouth daily. , Disp: , Rfl:   •  omeprazole 20 MG Oral Capsule Delayed Release, Take 20 mg by mouth 2 (two) times daily before meals. , Disp: , Rfl:   •  Multiple Vitamins-Minerals (TAB-A-KEVIN with mildly reduced RV systolic function. Last Probnp stable at 2,472. Compensated. · PH, mild post capillary - DPG 5 on RHC 8/4. PVR 2.3 wood units and mPAP 29 mmhg, wedge 15 mmg. Recent PAS 56 mmhg on echo and stable.   · Moderate AI, Mild-Moderate MR/T

## 2021-08-16 ENCOUNTER — APPOINTMENT (OUTPATIENT)
Dept: CARDIOLOGY | Age: 76
End: 2021-08-16

## 2021-08-16 RX ORDER — POTASSIUM CHLORIDE 20 MEQ/1
20 TABLET, EXTENDED RELEASE ORAL DAILY
Qty: 90 TABLET | Refills: 1 | Status: SHIPPED | OUTPATIENT
Start: 2021-08-16

## 2021-08-16 NOTE — TELEPHONE ENCOUNTER
KLOR-CON M20 TABLET     Sig: TAKE 1 TABLET (20 MEQ TOTAL) BY MOUTH DAILY    Disp:  90 tablet    Refills:  1    Start: 8/10/2021    Class: Normal    Non-formulary    Last ordered: 6 months ago by Daniela Kc MD Last refill: 5/13/2021

## 2021-08-16 NOTE — TELEPHONE ENCOUNTER
LOV 07/21//21 w/ [facility] for pulmonary HTN  MAW scheduled 1/13/2022  BMP on 08/05/2021 - sodium and potassium WNL

## 2021-08-22 DIAGNOSIS — K22.70 BARRETT'S ESOPHAGUS WITHOUT DYSPLASIA: ICD-10-CM

## 2021-08-22 DIAGNOSIS — M10.9 GOUT, UNSPECIFIED CAUSE, UNSPECIFIED CHRONICITY, UNSPECIFIED SITE: Primary | ICD-10-CM

## 2021-08-23 RX ORDER — OMEPRAZOLE 20 MG/1
CAPSULE, DELAYED RELEASE ORAL
Qty: 180 CAPSULE | Refills: 1 | Status: SHIPPED | OUTPATIENT
Start: 2021-08-23

## 2021-08-23 RX ORDER — ALLOPURINOL 100 MG/1
TABLET ORAL
Qty: 90 TABLET | Refills: 1 | Status: SHIPPED | OUTPATIENT
Start: 2021-08-23 | End: 2021-10-11 | Stop reason: DRUGHIGH

## 2021-08-23 NOTE — TELEPHONE ENCOUNTER
LOV 7-8-21  Future Appointments   Date Time Provider Chrissie Aden   8/26/2021 11:30 AM [address] [place] [address]   9/9/2021 11:00 AM Keily Miner MD [facility] CAYLA Weinstein   9/20/2021  1:00 PM HEART FAILURE APN 1 [facility]  [place] [id number]

## 2021-08-24 ENCOUNTER — TELEPHONE (OUTPATIENT)
Dept: RHEUMATOLOGY | Facility: CLINIC | Age: 76
End: 2021-08-24

## 2021-08-24 DIAGNOSIS — M35.1 MCTD (MIXED CONNECTIVE TISSUE DISEASE) (HCC): Primary | ICD-10-CM

## 2021-08-24 RX ORDER — PREDNISONE 1 MG/1
2 TABLET ORAL
Qty: 180 TABLET | Refills: 3 | Status: SHIPPED | OUTPATIENT
Start: 2021-08-24

## 2021-08-24 RX ORDER — COLCHICINE 0.6 MG/1
0.6 TABLET ORAL DAILY
Qty: 30 TABLET | Refills: 3 | Status: SHIPPED | OUTPATIENT
Start: 2021-08-24 | End: 2021-09-20 | Stop reason: ALTCHOICE

## 2021-08-24 RX ORDER — PREDNISONE 1 MG/1
5 TABLET ORAL
Qty: 90 TABLET | Refills: 3 | Status: SHIPPED | OUTPATIENT
Start: 2021-08-24

## 2021-08-24 NOTE — TELEPHONE ENCOUNTER
Prednisone 1 mg, prednisone 5 mg, and colchicine 0.6 mg medications are refilled. Take the colchicine 1 a day for gout. Take prednisone 1 mg a day and 5 mg a day for underlying autoimmune disease.

## 2021-08-26 ENCOUNTER — OFFICE VISIT (OUTPATIENT)
Dept: HEMATOLOGY/ONCOLOGY | Facility: HOSPITAL | Age: 76
End: 2021-08-26
Attending: INTERNAL MEDICINE
Payer: MEDICARE

## 2021-08-26 VITALS
OXYGEN SATURATION: 95 % | HEIGHT: 70.98 IN | BODY MASS INDEX: 28.28 KG/M2 | DIASTOLIC BLOOD PRESSURE: 72 MMHG | RESPIRATION RATE: 18 BRPM | TEMPERATURE: 98 F | HEART RATE: 91 BPM | WEIGHT: 202 LBS | SYSTOLIC BLOOD PRESSURE: 125 MMHG

## 2021-08-26 DIAGNOSIS — D69.3 IMMUNE THROMBOCYTOPENIC PURPURA (HCC): Primary | ICD-10-CM

## 2021-08-26 DIAGNOSIS — M32.9 LUPUS (HCC): ICD-10-CM

## 2021-08-26 DIAGNOSIS — D69.6 THROMBOCYTOPENIA (HCC): ICD-10-CM

## 2021-08-26 DIAGNOSIS — M33.20 POLYMYOSITIS (HCC): ICD-10-CM

## 2021-08-26 PROCEDURE — 96365 THER/PROPH/DIAG IV INF INIT: CPT

## 2021-08-26 PROCEDURE — 96366 THER/PROPH/DIAG IV INF ADDON: CPT

## 2021-08-26 RX ORDER — ACETAMINOPHEN 325 MG/1
650 TABLET ORAL ONCE
Status: COMPLETED | OUTPATIENT
Start: 2021-08-26 | End: 2021-08-26

## 2021-08-26 RX ORDER — ACETAMINOPHEN 325 MG/1
650 TABLET ORAL ONCE
Status: CANCELLED | OUTPATIENT
Start: 2021-09-23

## 2021-08-26 RX ORDER — DIPHENHYDRAMINE HCL 25 MG
25 CAPSULE ORAL ONCE
Status: COMPLETED | OUTPATIENT
Start: 2021-08-26 | End: 2021-08-26

## 2021-08-26 RX ORDER — FAMOTIDINE 10 MG/ML
20 INJECTION, SOLUTION INTRAVENOUS ONCE
Status: CANCELLED | OUTPATIENT
Start: 2021-09-23

## 2021-08-26 RX ORDER — METHYLPREDNISOLONE SODIUM SUCCINATE 125 MG/2ML
125 INJECTION, POWDER, LYOPHILIZED, FOR SOLUTION INTRAMUSCULAR; INTRAVENOUS ONCE
Status: CANCELLED | OUTPATIENT
Start: 2021-09-23

## 2021-08-26 RX ORDER — MEPERIDINE HYDROCHLORIDE 25 MG/ML
25 INJECTION INTRAMUSCULAR; INTRAVENOUS; SUBCUTANEOUS ONCE
Status: CANCELLED | OUTPATIENT
Start: 2021-09-23

## 2021-08-26 RX ORDER — METHYLPREDNISOLONE SODIUM SUCCINATE 40 MG/ML
40 INJECTION, POWDER, LYOPHILIZED, FOR SOLUTION INTRAMUSCULAR; INTRAVENOUS ONCE
Status: CANCELLED | OUTPATIENT
Start: 2021-09-23

## 2021-08-26 RX ORDER — DIPHENHYDRAMINE HCL 25 MG
25 CAPSULE ORAL ONCE
Status: CANCELLED | OUTPATIENT
Start: 2021-09-23

## 2021-08-26 RX ORDER — DIPHENHYDRAMINE HYDROCHLORIDE 50 MG/ML
25 INJECTION INTRAMUSCULAR; INTRAVENOUS ONCE
Status: CANCELLED | OUTPATIENT
Start: 2021-09-23

## 2021-08-26 RX ADMIN — DIPHENHYDRAMINE HCL 25 MG: 25 MG CAPSULE ORAL at 11:49:00

## 2021-08-26 RX ADMIN — ACETAMINOPHEN 650 MG: 325 TABLET ORAL at 11:49:00

## 2021-09-09 ENCOUNTER — OFFICE VISIT (OUTPATIENT)
Dept: NEPHROLOGY | Facility: CLINIC | Age: 76
End: 2021-09-09
Payer: MEDICARE

## 2021-09-09 VITALS — DIASTOLIC BLOOD PRESSURE: 78 MMHG | WEIGHT: 202 LBS | BODY MASS INDEX: 28 KG/M2 | SYSTOLIC BLOOD PRESSURE: 112 MMHG

## 2021-09-09 DIAGNOSIS — N18.30 STAGE 3 CHRONIC KIDNEY DISEASE, UNSPECIFIED WHETHER STAGE 3A OR 3B CKD (HCC): Primary | ICD-10-CM

## 2021-09-09 DIAGNOSIS — I10 ESSENTIAL HYPERTENSION: ICD-10-CM

## 2021-09-09 DIAGNOSIS — M35.1 MIXED CONNECTIVE TISSUE DISEASE (HCC): ICD-10-CM

## 2021-09-09 PROCEDURE — 99214 OFFICE O/P EST MOD 30 MIN: CPT | Performed by: INTERNAL MEDICINE

## 2021-09-09 NOTE — PROGRESS NOTES
Nephrology Progress Note      Leandro Segal is a 64year old male.     HPI:   Patient presents with:  Chronic Kidney Disease  Hypertension  Lupus      Mr. Tahmina Rudd was seen in the nephrology clinic today in follow-up for management of several issues incl • LI (obstructive sleep apnea) HST 11-7-17    AHI 37  Supine AHI 38 non-supine AHI 16 Sao2 Pj 83%    • LI (obstructive sleep apnea) PSG 5-22-19    AHI 36 RDI 36 REM AHI 45 Supine AHI 65 non-supine AHI 19 Sao2 Pj 86% CPAP 9cwp   • Pain in joints 1948   • UPPER GI ENDOSCOPY,EXAM  10/2003 2006 01/2010 , 5/13    x3      Family History   Problem Relation Age of Onset   • Heart Disease Father         CHF   • Gastro-Intestinal Disorder Father         Diverticulosis   • Alcohol and Other Disorders Associ daily.     • Multiple Vitamins-Minerals (TAB-A-KEVIN) Oral Tab Take 1 tablet by mouth daily. • Multiple Vitamins-Minerals (GNP HAIR/SKIN/NAILS) Oral Tab Take 1 tablet by mouth daily.      • Immune Globulin, Human, 10 g Intravenous Recon Soln Inject 0.4 g Component Value Date    BUN 23 (H) 08/05/2021    BUNCREA 15.4 07/27/2021    CREATSERUM 1.46 (H) 08/05/2021    ANIONGAP 3 08/05/2021    GFR 64 09/27/2017    GFRNAA 45 (L) 08/05/2021    GFRAA 53 (L) 08/05/2021    CA 9.1 08/05/2021    OSMOCALC 298 (H) 08/05 includes polymyositis and lupus. He does have pulmonary hypertension right heart failure and requires higher doses of diuretics to maintain volume status.   That being said he does remain quite stable with a creatinine close to 1.5 mg/dL so and this is unc

## 2021-09-20 ENCOUNTER — HOSPITAL ENCOUNTER (OUTPATIENT)
Dept: LAB | Facility: HOSPITAL | Age: 76
Discharge: HOME | End: 2021-09-20
Attending: NURSE PRACTITIONER
Payer: MEDICARE

## 2021-09-20 ENCOUNTER — HOSPITAL ENCOUNTER (OUTPATIENT)
Dept: CARDIOLOGY CLINIC | Facility: HOSPITAL | Age: 76
Discharge: HOME | End: 2021-09-20
Attending: NURSE PRACTITIONER
Payer: MEDICARE

## 2021-09-20 VITALS
RESPIRATION RATE: 16 BRPM | DIASTOLIC BLOOD PRESSURE: 75 MMHG | OXYGEN SATURATION: 100 % | BODY MASS INDEX: 28 KG/M2 | WEIGHT: 203.38 LBS | SYSTOLIC BLOOD PRESSURE: 131 MMHG | HEART RATE: 63 BPM

## 2021-09-20 DIAGNOSIS — I48.11 LONGSTANDING PERSISTENT ATRIAL FIBRILLATION (HCC): ICD-10-CM

## 2021-09-20 DIAGNOSIS — I50.9 CHF (CONGESTIVE HEART FAILURE) (HCC): Primary | ICD-10-CM

## 2021-09-20 DIAGNOSIS — I50.9 CHF (CONGESTIVE HEART FAILURE) (HCC): ICD-10-CM

## 2021-09-20 DIAGNOSIS — Z95.818 PRESENCE OF WATCHMAN LEFT ATRIAL APPENDAGE CLOSURE DEVICE: ICD-10-CM

## 2021-09-20 DIAGNOSIS — I10 BENIGN ESSENTIAL HYPERTENSION: ICD-10-CM

## 2021-09-20 DIAGNOSIS — G47.33 OSA (OBSTRUCTIVE SLEEP APNEA): ICD-10-CM

## 2021-09-20 DIAGNOSIS — I27.20 PULMONARY HYPERTENSION (HCC): ICD-10-CM

## 2021-09-20 DIAGNOSIS — C44.219 BASAL CELL CARCINOMA (BCC) OF SKIN OF LEFT EAR: ICD-10-CM

## 2021-09-20 DIAGNOSIS — I50.812 CHRONIC RIGHT-SIDED CONGESTIVE HEART FAILURE (HCC): ICD-10-CM

## 2021-09-20 LAB
ANION GAP SERPL CALC-SCNC: 3 MMOL/L (ref 0–18)
BUN BLD-MCNC: 24 MG/DL (ref 7–18)
CALCIUM BLD-MCNC: 9.1 MG/DL (ref 8.5–10.1)
CHLORIDE SERPL-SCNC: 108 MMOL/L (ref 98–112)
CO2 SERPL-SCNC: 31 MMOL/L (ref 21–32)
CREAT BLD-MCNC: 1.29 MG/DL
GLUCOSE BLD-MCNC: 95 MG/DL (ref 70–99)
OSMOLALITY SERPL CALC.SUM OF ELEC: 298 MOSM/KG (ref 275–295)
PATIENT FASTING Y/N/NP: NO
POTASSIUM SERPL-SCNC: 4.3 MMOL/L (ref 3.5–5.1)
SODIUM SERPL-SCNC: 142 MMOL/L (ref 136–145)

## 2021-09-20 PROCEDURE — 80048 BASIC METABOLIC PNL TOTAL CA: CPT | Performed by: NURSE PRACTITIONER

## 2021-09-20 PROCEDURE — 99215 OFFICE O/P EST HI 40 MIN: CPT | Performed by: NURSE PRACTITIONER

## 2021-09-20 PROCEDURE — 36415 COLL VENOUS BLD VENIPUNCTURE: CPT | Performed by: NURSE PRACTITIONER

## 2021-09-20 NOTE — PROGRESS NOTES
[facility] Cardiac Health Progress Note    Franklyn Kee is a 64year old male who presents to clinic for APN assessment and management of chronic diastolic, RV heart failure and is functional class 2.      Subjective:  He returns for a steroids, surgery in June   • Cancer [facility])     skin   • Candidiasis of the esophagus 6/29/2012    Due to steroid use for Autoimmune disorder    • Cataract    • Colon polyps 5/13/2013   • Congestive heart disease ([facility] [address]    • Diverticulosis of colon 5/14/2013 COLONOSCOPY WITH BIOPSY  5/14/13   • EGD     • HAND/FINGER SURGERY UNLISTED  2003    right   • NEEDLE BIOPSY LIVER     • OTHER  12/2005    needle bipsy of kidney   • OTHER SURGICAL HISTORY  2017    watchman filter   • PERCUTANEOUS  ANGIOPLASTY  (PTCA)- PBP Encounters:  09/20/21 : 203 lb 6.4 oz (92.3 kg)  09/09/21 : 202 lb (91.6 kg)  08/26/21 : 202 lb (91.6 kg)  08/05/21 : 200 lb 12.8 oz (91.1 kg)  08/02/21 : 200 lb 9.6 oz (91 kg)  07/29/21 : 201 lb 10.1 oz (91.5 kg)      Current Outpatient Medications:   • Disp: 60 tablet, Rfl: 3  •  Calcium Citrate-Vitamin D (CITRACAL + D OR), Take 1 tablet by mouth daily.   , Disp: , Rfl:     Exam:   General:         Alert, in no apparent distress  HEENT:          No JVD  Lungs:            Diminished bases Afib - s/p Watchman. Rates acceptable on Toprol  mg BID. · LI - uses cpap.   · Recurrent bilateral pleural effusions - hx of thoracentesis.   · Hx Lupus/Mixed CTD/Severe polymyositis - s/p IVIG monthly infusions. Follows with Dr. Taty Davison

## 2021-09-20 NOTE — PATIENT INSTRUCTIONS
Heart Failure Discharge Instructions    Take 60 mg of Torsemide in the AM the next 2 mornings, continue 40 mg in the evening.        Activity: Regular exercise and activity is important for your overall health and to help keep your heart strong and function with regular activity, or are limiting activity because of shortness of breath or fatigue  · You are short of breath lying down, you need more pillows to breathe comfortably,  or wake up during the night short of breath  · You urinate less often during the

## 2021-09-20 NOTE — PROGRESS NOTES
Patient assessed. No signs or symptoms of shortness of breath, fatigue, chest pain, or abdominal bloating noted. Patient with mild edema to lower extremities with left>right. Weight up 3 lbs at 203.4 lbs.  Reviewed current list of patient's allergies and me

## 2021-09-23 ENCOUNTER — OFFICE VISIT (OUTPATIENT)
Dept: HEMATOLOGY/ONCOLOGY | Facility: HOSPITAL | Age: 76
End: 2021-09-23
Attending: INTERNAL MEDICINE
Payer: MEDICARE

## 2021-09-23 VITALS
HEIGHT: 70.98 IN | TEMPERATURE: 97 F | OXYGEN SATURATION: 99 % | DIASTOLIC BLOOD PRESSURE: 87 MMHG | BODY MASS INDEX: 28.28 KG/M2 | RESPIRATION RATE: 18 BRPM | SYSTOLIC BLOOD PRESSURE: 132 MMHG | HEART RATE: 83 BPM | WEIGHT: 202 LBS

## 2021-09-23 DIAGNOSIS — D69.3 IMMUNE THROMBOCYTOPENIC PURPURA (HCC): Primary | ICD-10-CM

## 2021-09-23 DIAGNOSIS — M33.20 POLYMYOSITIS (HCC): ICD-10-CM

## 2021-09-23 DIAGNOSIS — M32.9 LUPUS (HCC): ICD-10-CM

## 2021-09-23 DIAGNOSIS — D69.6 THROMBOCYTOPENIA (HCC): ICD-10-CM

## 2021-09-23 PROCEDURE — 96365 THER/PROPH/DIAG IV INF INIT: CPT

## 2021-09-23 PROCEDURE — 96366 THER/PROPH/DIAG IV INF ADDON: CPT

## 2021-09-23 RX ORDER — METHYLPREDNISOLONE SODIUM SUCCINATE 125 MG/2ML
125 INJECTION, POWDER, LYOPHILIZED, FOR SOLUTION INTRAMUSCULAR; INTRAVENOUS ONCE
Status: CANCELLED | OUTPATIENT
Start: 2021-09-23

## 2021-09-23 RX ORDER — METHYLPREDNISOLONE SODIUM SUCCINATE 40 MG/ML
40 INJECTION, POWDER, LYOPHILIZED, FOR SOLUTION INTRAMUSCULAR; INTRAVENOUS ONCE
Status: CANCELLED | OUTPATIENT
Start: 2021-09-23

## 2021-09-23 RX ORDER — FAMOTIDINE 10 MG/ML
20 INJECTION, SOLUTION INTRAVENOUS ONCE
Status: CANCELLED | OUTPATIENT
Start: 2021-09-23

## 2021-09-23 RX ORDER — DIPHENHYDRAMINE HCL 25 MG
25 CAPSULE ORAL ONCE
Status: COMPLETED | OUTPATIENT
Start: 2021-09-23 | End: 2021-09-23

## 2021-09-23 RX ORDER — ACETAMINOPHEN 325 MG/1
650 TABLET ORAL ONCE
Status: COMPLETED | OUTPATIENT
Start: 2021-09-23 | End: 2021-09-23

## 2021-09-23 RX ORDER — MEPERIDINE HYDROCHLORIDE 25 MG/ML
25 INJECTION INTRAMUSCULAR; INTRAVENOUS; SUBCUTANEOUS ONCE
Status: CANCELLED | OUTPATIENT
Start: 2021-09-23

## 2021-09-23 RX ORDER — DIPHENHYDRAMINE HYDROCHLORIDE 50 MG/ML
25 INJECTION INTRAMUSCULAR; INTRAVENOUS ONCE
Status: CANCELLED | OUTPATIENT
Start: 2021-09-23

## 2021-09-23 RX ORDER — ACETAMINOPHEN 325 MG/1
650 TABLET ORAL ONCE
Status: CANCELLED | OUTPATIENT
Start: 2021-09-23

## 2021-09-23 RX ORDER — DIPHENHYDRAMINE HCL 25 MG
25 CAPSULE ORAL ONCE
Status: CANCELLED | OUTPATIENT
Start: 2021-09-23

## 2021-09-23 RX ADMIN — DIPHENHYDRAMINE HCL 25 MG: 25 MG CAPSULE ORAL at 11:51:00

## 2021-09-23 RX ADMIN — ACETAMINOPHEN 650 MG: 325 TABLET ORAL at 11:51:00

## 2021-09-23 NOTE — PROGRESS NOTES
Education Record    Learner:  Patient    Disease / Fany Palin thrombocytopenic purpura    Barriers / Limitations:  None   Comments:    Method:  Discussion   Comments:    General Topics:  Side effects and symptom management and Plan of care reviewed

## 2021-10-08 ENCOUNTER — LAB ENCOUNTER (OUTPATIENT)
Dept: LAB | Age: 76
End: 2021-10-08
Attending: INTERNAL MEDICINE
Payer: MEDICARE

## 2021-10-08 DIAGNOSIS — I10 ESSENTIAL HYPERTENSION: ICD-10-CM

## 2021-10-08 DIAGNOSIS — N18.30 STAGE 3 CHRONIC KIDNEY DISEASE, UNSPECIFIED WHETHER STAGE 3A OR 3B CKD (HCC): ICD-10-CM

## 2021-10-08 DIAGNOSIS — M35.1 MIXED CONNECTIVE TISSUE DISEASE (HCC): ICD-10-CM

## 2021-10-08 PROCEDURE — 80048 BASIC METABOLIC PNL TOTAL CA: CPT

## 2021-10-08 PROCEDURE — 36415 COLL VENOUS BLD VENIPUNCTURE: CPT

## 2021-10-09 ENCOUNTER — LAB ENCOUNTER (OUTPATIENT)
Dept: LAB | Facility: HOSPITAL | Age: 76
End: 2021-10-09
Attending: INTERNAL MEDICINE
Payer: MEDICARE

## 2021-10-09 DIAGNOSIS — M1A.0720 IDIOPATHIC CHRONIC GOUT OF LEFT FOOT WITHOUT TOPHUS: ICD-10-CM

## 2021-10-09 PROCEDURE — 36415 COLL VENOUS BLD VENIPUNCTURE: CPT

## 2021-10-09 PROCEDURE — 84550 ASSAY OF BLOOD/URIC ACID: CPT

## 2021-10-11 ENCOUNTER — OFFICE VISIT (OUTPATIENT)
Dept: RHEUMATOLOGY | Facility: CLINIC | Age: 76
End: 2021-10-11
Payer: MEDICARE

## 2021-10-11 VITALS
WEIGHT: 196 LBS | SYSTOLIC BLOOD PRESSURE: 122 MMHG | DIASTOLIC BLOOD PRESSURE: 70 MMHG | HEIGHT: 71 IN | HEART RATE: 74 BPM | TEMPERATURE: 98 F | BODY MASS INDEX: 27.44 KG/M2 | OXYGEN SATURATION: 99 %

## 2021-10-11 DIAGNOSIS — M35.1 MCTD (MIXED CONNECTIVE TISSUE DISEASE) (HCC): Primary | ICD-10-CM

## 2021-10-11 DIAGNOSIS — M33.20 POLYMYOSITIS (HCC): Chronic | ICD-10-CM

## 2021-10-11 DIAGNOSIS — M10.9 GOUT, UNSPECIFIED CAUSE, UNSPECIFIED CHRONICITY, UNSPECIFIED SITE: ICD-10-CM

## 2021-10-11 PROCEDURE — 99214 OFFICE O/P EST MOD 30 MIN: CPT | Performed by: INTERNAL MEDICINE

## 2021-10-11 RX ORDER — PREDNISONE 1 MG/1
5 TABLET ORAL
Qty: 90 TABLET | Refills: 3 | Status: CANCELLED | OUTPATIENT
Start: 2021-10-11

## 2021-10-11 RX ORDER — ALLOPURINOL 100 MG/1
100 TABLET ORAL DAILY
Qty: 90 TABLET | Refills: 1 | Status: CANCELLED | OUTPATIENT
Start: 2021-10-11

## 2021-10-11 RX ORDER — ALLOPURINOL 300 MG/1
300 TABLET ORAL DAILY
Qty: 90 TABLET | Refills: 3 | Status: SHIPPED | OUTPATIENT
Start: 2021-10-11

## 2021-10-11 RX ORDER — PREDNISONE 1 MG/1
2 TABLET ORAL
Qty: 180 TABLET | Refills: 3 | Status: CANCELLED | OUTPATIENT
Start: 2021-10-11

## 2021-10-11 NOTE — PROGRESS NOTES
[facility]  Dr. Chasity Rao Progress Note     Subjective: Bong Benedict is a(n) 64year old male. Current complaints: Patient presents with: Follow - Up: LOV in July. .. pt states having normal pain and soreness in the big toes.  skin cancer in the

## 2021-10-11 NOTE — PATIENT INSTRUCTIONS
Increase allopurinol from 200 mg to 300 mg per day. Predniosne 7 mg per day. IV IG monthly. Colchicine 0.6 mg as needed. Exercise as tolerated. Return to office 3 months.

## 2021-10-13 ENCOUNTER — HOSPITAL ENCOUNTER (OUTPATIENT)
Dept: LAB | Facility: HOSPITAL | Age: 76
Discharge: HOME | End: 2021-10-13
Attending: NURSE PRACTITIONER
Payer: MEDICARE

## 2021-10-13 PROCEDURE — 80061 LIPID PANEL: CPT | Performed by: NURSE PRACTITIONER

## 2021-10-13 PROCEDURE — 36415 COLL VENOUS BLD VENIPUNCTURE: CPT | Performed by: NURSE PRACTITIONER

## 2021-10-13 PROCEDURE — 80053 COMPREHEN METABOLIC PANEL: CPT | Performed by: NURSE PRACTITIONER

## 2021-10-13 PROCEDURE — 83036 HEMOGLOBIN GLYCOSYLATED A1C: CPT | Performed by: NURSE PRACTITIONER

## 2021-10-13 PROCEDURE — 83880 ASSAY OF NATRIURETIC PEPTIDE: CPT | Performed by: NURSE PRACTITIONER

## 2021-10-13 PROCEDURE — 85025 COMPLETE CBC W/AUTO DIFF WBC: CPT | Performed by: NURSE PRACTITIONER

## 2021-10-15 ENCOUNTER — APPOINTMENT (OUTPATIENT)
Dept: CARDIOLOGY | Age: 76
End: 2021-10-15
Attending: INTERNAL MEDICINE

## 2021-10-20 ENCOUNTER — APPOINTMENT (OUTPATIENT)
Dept: CARDIOLOGY | Age: 76
End: 2021-10-20

## 2021-10-21 ENCOUNTER — APPOINTMENT (OUTPATIENT)
Dept: HEMATOLOGY/ONCOLOGY | Facility: HOSPITAL | Age: 76
End: 2021-10-21
Attending: INTERNAL MEDICINE
Payer: MEDICARE

## 2021-10-25 ENCOUNTER — TELEPHONE (OUTPATIENT)
Dept: HEMATOLOGY/ONCOLOGY | Facility: HOSPITAL | Age: 76
End: 2021-10-25

## 2021-10-25 ENCOUNTER — TELEPHONE (OUTPATIENT)
Dept: RHEUMATOLOGY | Facility: CLINIC | Age: 76
End: 2021-10-25

## 2021-10-25 NOTE — TELEPHONE ENCOUNTER
Spoke with Deirdre Borja at Dr. Rajwinder Busby office. Pt's IVIG order was good for 3 months. Pt say MD 2 weeks ago. New orders need to be sent for appt scheduled tomorrow.

## 2021-10-26 ENCOUNTER — OFFICE VISIT (OUTPATIENT)
Dept: HEMATOLOGY/ONCOLOGY | Facility: HOSPITAL | Age: 76
End: 2021-10-26
Attending: INTERNAL MEDICINE
Payer: MEDICARE

## 2021-10-26 VITALS
TEMPERATURE: 97 F | BODY MASS INDEX: 28.51 KG/M2 | HEIGHT: 70.98 IN | RESPIRATION RATE: 18 BRPM | OXYGEN SATURATION: 99 % | WEIGHT: 203.63 LBS | HEART RATE: 74 BPM | DIASTOLIC BLOOD PRESSURE: 78 MMHG | SYSTOLIC BLOOD PRESSURE: 118 MMHG

## 2021-10-26 DIAGNOSIS — D69.3 IMMUNE THROMBOCYTOPENIC PURPURA (HCC): Primary | ICD-10-CM

## 2021-10-26 DIAGNOSIS — M33.20 POLYMYOSITIS (HCC): ICD-10-CM

## 2021-10-26 DIAGNOSIS — M32.9 LUPUS (HCC): ICD-10-CM

## 2021-10-26 DIAGNOSIS — D69.6 THROMBOCYTOPENIA (HCC): ICD-10-CM

## 2021-10-26 PROCEDURE — 96365 THER/PROPH/DIAG IV INF INIT: CPT

## 2021-10-26 PROCEDURE — 96366 THER/PROPH/DIAG IV INF ADDON: CPT

## 2021-10-26 RX ORDER — METHYLPREDNISOLONE SODIUM SUCCINATE 125 MG/2ML
125 INJECTION, POWDER, LYOPHILIZED, FOR SOLUTION INTRAMUSCULAR; INTRAVENOUS ONCE
Status: CANCELLED | OUTPATIENT
Start: 2021-11-23

## 2021-10-26 RX ORDER — DIPHENHYDRAMINE HCL 25 MG
25 CAPSULE ORAL ONCE
Status: COMPLETED | OUTPATIENT
Start: 2021-10-26 | End: 2021-10-26

## 2021-10-26 RX ORDER — ACETAMINOPHEN 325 MG/1
650 TABLET ORAL ONCE
Status: COMPLETED | OUTPATIENT
Start: 2021-10-26 | End: 2021-10-26

## 2021-10-26 RX ORDER — DIPHENHYDRAMINE HCL 25 MG
25 CAPSULE ORAL ONCE
Status: CANCELLED | OUTPATIENT
Start: 2021-11-23

## 2021-10-26 RX ORDER — ACETAMINOPHEN 325 MG/1
650 TABLET ORAL ONCE
Status: CANCELLED | OUTPATIENT
Start: 2021-11-23

## 2021-10-26 RX ORDER — METHYLPREDNISOLONE SODIUM SUCCINATE 40 MG/ML
40 INJECTION, POWDER, LYOPHILIZED, FOR SOLUTION INTRAMUSCULAR; INTRAVENOUS ONCE
Status: CANCELLED | OUTPATIENT
Start: 2021-11-23

## 2021-10-26 RX ORDER — DIPHENHYDRAMINE HYDROCHLORIDE 50 MG/ML
25 INJECTION INTRAMUSCULAR; INTRAVENOUS ONCE
Status: CANCELLED | OUTPATIENT
Start: 2021-11-23

## 2021-10-26 RX ORDER — FAMOTIDINE 10 MG/ML
20 INJECTION, SOLUTION INTRAVENOUS ONCE
Status: CANCELLED | OUTPATIENT
Start: 2021-11-23

## 2021-10-26 RX ORDER — MEPERIDINE HYDROCHLORIDE 25 MG/ML
25 INJECTION INTRAMUSCULAR; INTRAVENOUS; SUBCUTANEOUS ONCE
Status: CANCELLED | OUTPATIENT
Start: 2021-11-23

## 2021-10-26 RX ADMIN — DIPHENHYDRAMINE HCL 25 MG: 25 MG CAPSULE ORAL at 11:26:00

## 2021-10-26 RX ADMIN — ACETAMINOPHEN 650 MG: 325 TABLET ORAL at 11:26:00

## 2021-10-26 NOTE — PROGRESS NOTES
Education Record    Learner:  Patient    Disease / Diagnosis: ITP: IVIG     Barriers / Limitations:  None   Comments:    Method:  Brief focused and Discussion   Comments:    General Topics:  Side effects and symptom management   Comments:    Outcome:  Show

## 2021-11-12 ENCOUNTER — OFFICE VISIT (OUTPATIENT)
Dept: HEMATOLOGY/ONCOLOGY | Facility: HOSPITAL | Age: 76
End: 2021-11-12
Attending: INTERNAL MEDICINE
Payer: MEDICARE

## 2021-11-12 VITALS
BODY MASS INDEX: 29 KG/M2 | HEART RATE: 76 BPM | SYSTOLIC BLOOD PRESSURE: 112 MMHG | RESPIRATION RATE: 16 BRPM | OXYGEN SATURATION: 98 % | WEIGHT: 206 LBS | DIASTOLIC BLOOD PRESSURE: 74 MMHG | TEMPERATURE: 98 F

## 2021-11-12 DIAGNOSIS — M32.19 OTHER SYSTEMIC LUPUS ERYTHEMATOSUS WITH OTHER ORGAN INVOLVEMENT (HCC): ICD-10-CM

## 2021-11-12 DIAGNOSIS — D61.818 PANCYTOPENIA (HCC): ICD-10-CM

## 2021-11-12 DIAGNOSIS — M33.20 POLYMYOSITIS (HCC): ICD-10-CM

## 2021-11-12 DIAGNOSIS — D69.3 IMMUNE THROMBOCYTOPENIC PURPURA (HCC): Primary | ICD-10-CM

## 2021-11-12 DIAGNOSIS — I50.812 CHRONIC RIGHT-SIDED CONGESTIVE HEART FAILURE (HCC): Primary | ICD-10-CM

## 2021-11-12 PROCEDURE — 99213 OFFICE O/P EST LOW 20 MIN: CPT | Performed by: INTERNAL MEDICINE

## 2021-11-12 NOTE — PROGRESS NOTES
[facility] Progress Note    Patient Name: Vinnie Krueger   YOB: 1945   Medical Record Number: [id number]   CSN: [id number]   Attending Physician: Russell Mccord M.D.    Referring Physician: Yany Amado MD      Date of Visit: 11/12/2021 Present Illness:  He feels ok. Energy improved and overall doing better but still not back to baseline. Continues on Prednisone and IVIG per rheum for polymyositis. Intermittent joint pain and/or muscle soreness. No bleeding.   Denies chest or abdominal alexey (CITRACAL + D OR), Take 1 tablet by mouth daily.   , Disp: , Rfl:     Past Medical History:  Past Medical History:   Diagnosis Date   • A-fib ([facility]    • Arrhythmia     atrial fibrillation   • Arthritis    • Autoimmune disease ([facility]     hepatits, resolved an • Visual impairment     bifocals for reading & computer; distance for driving   • Wears glasses        Past Surgical History:  Past Surgical History:   Procedure Laterality Date   • APPENDECTOMY  1970   • APPENDECTOMY     • CARDIAC CATHETERIZATION  09/20 Not on file      Highest education level: Not on file    Occupational History      Occupation: Retired     Tobacco Use      Smoking status: Former Smoker        Packs/day: 0.50        Years: 15.00        Pack years: 7.5        Start date: 8/1/1958        Q SpO2 98%   BMI 28.74 kg/m²       Physical Examination:  General: Patient is alert and oriented x 3, not in acute distress. No bruising. Psych:  Mood and affect appropriate  HEENT: EOMs intact. PERRL. Oropharynx is clear. Malar rash? Neck: No JVD.  No pal 10/13/2021    EOPERCENT 3.1 10/13/2021    BAPERCENT 0.5 10/13/2021    NE 3.65 10/13/2021    LYMABS 0.96 (L) 10/13/2021    MOABSO 0.72 10/13/2021    EOABSO 0.17 10/13/2021    BAABSO 0.03 10/13/2021         Lab Component   Component Value Date/Time    RED BL NEUTROPHIL ABS 1.65 06/20/2013 0815    Neutrophil Absolute Prelim 3.65 10/13/2021 0924    Neutrophil Absolute Prelim 4.68 07/27/2021 1047    Neutrophil Absolute Prelim 2.99 05/14/2021 1319    WBC 5.6 10/13/2021 0924    WBC 6.7 07/27/2021 1047    WBC 4.0 05

## 2021-11-15 ENCOUNTER — HOSPITAL ENCOUNTER (OUTPATIENT)
Dept: LAB | Facility: HOSPITAL | Age: 76
Discharge: HOME | End: 2021-11-15
Attending: NURSE PRACTITIONER
Payer: MEDICARE

## 2021-11-15 ENCOUNTER — HOSPITAL ENCOUNTER (OUTPATIENT)
Dept: CARDIOLOGY CLINIC | Facility: HOSPITAL | Age: 76
Discharge: HOME | End: 2021-11-15
Attending: NURSE PRACTITIONER
Payer: MEDICARE

## 2021-11-15 VITALS
SYSTOLIC BLOOD PRESSURE: 120 MMHG | BODY MASS INDEX: 29 KG/M2 | HEART RATE: 73 BPM | DIASTOLIC BLOOD PRESSURE: 69 MMHG | RESPIRATION RATE: 31 BRPM | OXYGEN SATURATION: 97 % | WEIGHT: 204.81 LBS

## 2021-11-15 DIAGNOSIS — I50.812 CHRONIC RIGHT-SIDED CONGESTIVE HEART FAILURE (HCC): ICD-10-CM

## 2021-11-15 DIAGNOSIS — N18.31 STAGE 3A CHRONIC KIDNEY DISEASE (HCC): ICD-10-CM

## 2021-11-15 DIAGNOSIS — I50.812 CHRONIC RIGHT-SIDED CONGESTIVE HEART FAILURE (HCC): Primary | ICD-10-CM

## 2021-11-15 DIAGNOSIS — I27.20 PULMONARY HYPERTENSION (HCC): ICD-10-CM

## 2021-11-15 DIAGNOSIS — I48.11 LONGSTANDING PERSISTENT ATRIAL FIBRILLATION (HCC): ICD-10-CM

## 2021-11-15 PROCEDURE — 36415 COLL VENOUS BLD VENIPUNCTURE: CPT | Performed by: NURSE PRACTITIONER

## 2021-11-15 PROCEDURE — 99215 OFFICE O/P EST HI 40 MIN: CPT | Performed by: NURSE PRACTITIONER

## 2021-11-15 PROCEDURE — 80048 BASIC METABOLIC PNL TOTAL CA: CPT | Performed by: NURSE PRACTITIONER

## 2021-11-15 RX ORDER — SPIRONOLACTONE 25 MG/1
12.5 TABLET ORAL DAILY
Qty: 15 TABLET | Refills: 2 | Status: ON HOLD | OUTPATIENT
Start: 2021-11-15 | End: 2021-12-06

## 2021-11-15 RX ORDER — TORSEMIDE 20 MG/1
40 TABLET ORAL 2 TIMES DAILY
Qty: 130 TABLET | Refills: 2 | Status: SHIPPED | OUTPATIENT
Start: 2021-11-15 | End: 2022-01-10

## 2021-11-15 NOTE — PROGRESS NOTES
Patient assessed. No signs or symptoms of shortness of breath, fatigue, chest pain, or abdominal bloating, or edema noted. Patient reports having mild occasional lower extremity edema and APN notified.  Weight up 1 lb at 204.8 lbs, since last seen in Septem

## 2021-11-15 NOTE — PROGRESS NOTES
[facility] Cardiac Health Progress Note    Vinnie Krueger is a 64year old male who presents to clinic for APN assessment and management of chronic  diastolic, RV heart failure and is functional class 2-3.      Subjective:  Since he was 5/14/2013   • Esophageal reflux    • Essential hypertension    • Fatigue Since 2005    polymyositis and lupus   • Gout    • Hammer toe, acquired 6/29/2012   • Heart palpitations 2017    Afib   • Hepatitis     autoimmune induce hepatitis d/t lupus   • High SURGERY PROCEDURE UNLISTED  1946    rectal surgery polypectomy   • SKIN SURGERY      [facility] R temple 6/24/09   • SKIN SURGERY  2007    basal ceell carcinoma   • SKIN SURGERY  7-18-12    BCC-nodular to right superior eyebrow/ MOHS   • SKIN SURGERY  07/15/2 [id number]) as of 11/15/2021 13:17   Ref.  Range 11/15/2021 12:30   Glucose Latest Ref Range: 70 - 99 mg/dL 96   Sodium Latest Ref Range: 136 - 145 mmol/L 143   Potassium Latest Ref Range: 3.5 - 5.1 mmol/L 4.6   Chloride Latest Ref Range: 98 - 112 mmol/L 106 (thirty) days.  Given for treatment of polymyositis, mixed connective tissue disease, and immune thrombocytopenia purpura monthly at [facility]. , Disp: 3 each, Rfl: 0  •  Metoprolol Succinate  MG Oral Tablet 24 Hr, Take 1 tablet (100 mg t pancytopenia related to imuran - last platelet GIUMU 26 and stable. Follows with Dr. Tiffanie Hemphill. · Hx Hyponatremia   · HERNANDEZ with biopsy proven stage I Fibrosis. Follows with Dr. Newton Galeazzi, Hepatology at [facility]  · Gout - on colchicine prn and Allopurinol.   · [place] Soni

## 2021-11-22 ENCOUNTER — APPOINTMENT (OUTPATIENT)
Dept: CARDIOLOGY | Age: 76
End: 2021-11-22

## 2021-11-23 ENCOUNTER — OFFICE VISIT (OUTPATIENT)
Dept: HEMATOLOGY/ONCOLOGY | Facility: HOSPITAL | Age: 76
End: 2021-11-23
Attending: INTERNAL MEDICINE
Payer: MEDICARE

## 2021-11-23 VITALS
RESPIRATION RATE: 18 BRPM | TEMPERATURE: 96 F | WEIGHT: 197.81 LBS | HEIGHT: 70.98 IN | HEART RATE: 82 BPM | OXYGEN SATURATION: 98 % | BODY MASS INDEX: 27.69 KG/M2 | SYSTOLIC BLOOD PRESSURE: 139 MMHG | DIASTOLIC BLOOD PRESSURE: 81 MMHG

## 2021-11-23 DIAGNOSIS — D69.3 IMMUNE THROMBOCYTOPENIC PURPURA (HCC): Primary | ICD-10-CM

## 2021-11-23 DIAGNOSIS — M32.9 LUPUS (HCC): ICD-10-CM

## 2021-11-23 DIAGNOSIS — D69.6 THROMBOCYTOPENIA (HCC): ICD-10-CM

## 2021-11-23 DIAGNOSIS — M33.20 POLYMYOSITIS (HCC): ICD-10-CM

## 2021-11-23 PROCEDURE — 96366 THER/PROPH/DIAG IV INF ADDON: CPT

## 2021-11-23 PROCEDURE — 96365 THER/PROPH/DIAG IV INF INIT: CPT

## 2021-11-23 RX ORDER — MEPERIDINE HYDROCHLORIDE 25 MG/ML
25 INJECTION INTRAMUSCULAR; INTRAVENOUS; SUBCUTANEOUS ONCE
Status: CANCELLED | OUTPATIENT
Start: 2021-12-21

## 2021-11-23 RX ORDER — ACETAMINOPHEN 325 MG/1
650 TABLET ORAL ONCE
Status: CANCELLED | OUTPATIENT
Start: 2021-12-21

## 2021-11-23 RX ORDER — FAMOTIDINE 10 MG/ML
20 INJECTION, SOLUTION INTRAVENOUS ONCE
Status: CANCELLED | OUTPATIENT
Start: 2021-12-21

## 2021-11-23 RX ORDER — ACETAMINOPHEN 325 MG/1
650 TABLET ORAL ONCE
Status: COMPLETED | OUTPATIENT
Start: 2021-11-23 | End: 2021-11-23

## 2021-11-23 RX ORDER — DIPHENHYDRAMINE HYDROCHLORIDE 50 MG/ML
25 INJECTION INTRAMUSCULAR; INTRAVENOUS ONCE
Status: CANCELLED | OUTPATIENT
Start: 2021-12-21

## 2021-11-23 RX ORDER — METHYLPREDNISOLONE SODIUM SUCCINATE 125 MG/2ML
125 INJECTION, POWDER, LYOPHILIZED, FOR SOLUTION INTRAMUSCULAR; INTRAVENOUS ONCE
Status: CANCELLED | OUTPATIENT
Start: 2021-12-21

## 2021-11-23 RX ORDER — DIPHENHYDRAMINE HCL 25 MG
25 CAPSULE ORAL ONCE
Status: COMPLETED | OUTPATIENT
Start: 2021-11-23 | End: 2021-11-23

## 2021-11-23 RX ORDER — METHYLPREDNISOLONE SODIUM SUCCINATE 40 MG/ML
40 INJECTION, POWDER, LYOPHILIZED, FOR SOLUTION INTRAMUSCULAR; INTRAVENOUS ONCE
Status: CANCELLED | OUTPATIENT
Start: 2021-12-21

## 2021-11-23 RX ORDER — DIPHENHYDRAMINE HCL 25 MG
25 CAPSULE ORAL ONCE
Status: CANCELLED | OUTPATIENT
Start: 2021-12-21

## 2021-11-23 RX ADMIN — DIPHENHYDRAMINE HCL 25 MG: 25 MG CAPSULE ORAL at 11:09:00

## 2021-11-23 RX ADMIN — ACETAMINOPHEN 650 MG: 325 TABLET ORAL at 11:09:00

## 2021-12-03 ENCOUNTER — LAB ENCOUNTER (OUTPATIENT)
Dept: LAB | Age: 76
End: 2021-12-03
Attending: INTERNAL MEDICINE
Payer: MEDICARE

## 2021-12-03 DIAGNOSIS — M33.20 POLYMYOSITIS (HCC): ICD-10-CM

## 2021-12-06 ENCOUNTER — HOSPITAL ENCOUNTER (OUTPATIENT)
Dept: INTERVENTIONAL RADIOLOGY/VASCULAR | Facility: HOSPITAL | Age: 76
Discharge: HOME | End: 2021-12-06
Attending: INTERNAL MEDICINE | Admitting: INTERNAL MEDICINE
Payer: MEDICARE

## 2021-12-06 VITALS
BODY MASS INDEX: 27.3 KG/M2 | TEMPERATURE: 96 F | OXYGEN SATURATION: 94 % | HEIGHT: 71 IN | HEART RATE: 62 BPM | RESPIRATION RATE: 19 BRPM | WEIGHT: 195 LBS | DIASTOLIC BLOOD PRESSURE: 71 MMHG | SYSTOLIC BLOOD PRESSURE: 116 MMHG

## 2021-12-06 DIAGNOSIS — M34.9 SCLERODERMA (HCC): ICD-10-CM

## 2021-12-06 DIAGNOSIS — M33.20 POLYMYOSITIS (HCC): Primary | ICD-10-CM

## 2021-12-06 PROCEDURE — 85018 HEMOGLOBIN: CPT | Performed by: INTERNAL MEDICINE

## 2021-12-06 PROCEDURE — 36600 WITHDRAWAL OF ARTERIAL BLOOD: CPT | Performed by: INTERNAL MEDICINE

## 2021-12-06 PROCEDURE — 82375 ASSAY CARBOXYHB QUANT: CPT | Performed by: INTERNAL MEDICINE

## 2021-12-06 PROCEDURE — 93463 DRUG ADMIN & HEMODYNMIC MEAS: CPT

## 2021-12-06 PROCEDURE — 83050 HGB METHEMOGLOBIN QUAN: CPT | Performed by: INTERNAL MEDICINE

## 2021-12-06 PROCEDURE — 4A023N6 MEASUREMENT OF CARDIAC SAMPLING AND PRESSURE, RIGHT HEART, PERCUTANEOUS APPROACH: ICD-10-PCS | Performed by: INTERNAL MEDICINE

## 2021-12-06 PROCEDURE — 82803 BLOOD GASES ANY COMBINATION: CPT | Performed by: INTERNAL MEDICINE

## 2021-12-06 PROCEDURE — 93451 RIGHT HEART CATH: CPT

## 2021-12-06 RX ORDER — SODIUM CHLORIDE 9 MG/ML
INJECTION, SOLUTION INTRAVENOUS
Status: DISCONTINUED | OUTPATIENT
Start: 2021-12-07 | End: 2021-12-06 | Stop reason: HOSPADM

## 2021-12-06 RX ORDER — LIDOCAINE HYDROCHLORIDE 10 MG/ML
INJECTION, SOLUTION EPIDURAL; INFILTRATION; INTRACAUDAL; PERINEURAL
Status: COMPLETED
Start: 2021-12-06 | End: 2021-12-06

## 2021-12-06 RX ORDER — HEPARIN SODIUM 5000 [USP'U]/ML
INJECTION, SOLUTION INTRAVENOUS; SUBCUTANEOUS
Status: COMPLETED
Start: 2021-12-06 | End: 2021-12-06

## 2021-12-06 RX ORDER — SPIRONOLACTONE 25 MG/1
25 TABLET ORAL DAILY
Qty: 15 TABLET | Refills: 2 | Status: SHIPPED | OUTPATIENT
Start: 2021-12-06

## 2021-12-06 RX ORDER — MIDAZOLAM HYDROCHLORIDE 1 MG/ML
INJECTION INTRAMUSCULAR; INTRAVENOUS
Status: DISCONTINUED
Start: 2021-12-06 | End: 2021-12-06 | Stop reason: WASHOUT

## 2021-12-06 NOTE — OPERATIVE REPORT
Right Heart Catheterization with Vasodilator Challenge    Procedures:    Right Heart Catheterization  Vasodilator Challenge    Indication: PAH WHO I due to ROSANNA    Access Site: Right Internal Jugular Vein    Sheath Size (Fr): 7    Complications:  None    Va

## 2021-12-06 NOTE — PROGRESS NOTES
Pt s/p RHC with Dr. Willy Damon. Pt recovered in holding. Right neck dressing CDI, no bleeding or hematoma. Pt wife at bedside. Pt urinated. Pt ate and drank. Dr. Sarita Prado came and spoke to pt and wife at bedside.  Discharge instructions reviewed with pt and w

## 2021-12-13 NOTE — TELEPHONE ENCOUNTER
1. Longstanding persistent atrial fibrillation (HCC) (Primary)  Overview:   metoprolol ER 25, Dr Song Public placed July 2017  Orders:  -     TSH W Reflex To Free T4; Future; Expected date: 12/13/2021  2.  P[facility] ([facility]  Portillo Solorzano

## 2021-12-14 NOTE — IMAGING NOTE
Call placed to pt regarding CTA Gated pulmonary vein study on 12/16/2021. Instructed to arrive at 13:45. Instructed to drink plenty of fluids a day prior to procedure and day of procedure. May eat a light breakfast/lunch.  Advised to hold caffeine 12 hrs pr

## 2021-12-16 ENCOUNTER — HOSPITAL ENCOUNTER (OUTPATIENT)
Dept: CT IMAGING | Facility: HOSPITAL | Age: 76
Discharge: HOME | End: 2021-12-16
Attending: INTERNAL MEDICINE
Payer: MEDICARE

## 2021-12-16 VITALS — DIASTOLIC BLOOD PRESSURE: 63 MMHG | SYSTOLIC BLOOD PRESSURE: 120 MMHG | HEART RATE: 70 BPM

## 2021-12-16 DIAGNOSIS — I27.20 PULMONARY HTN (HCC): ICD-10-CM

## 2021-12-16 DIAGNOSIS — I50.9 CHF (CONGESTIVE HEART FAILURE) (HCC): ICD-10-CM

## 2021-12-16 DIAGNOSIS — M33.20 POLYMYOSITIS (HCC): ICD-10-CM

## 2021-12-16 DIAGNOSIS — M34.9 SCLERODERMA (HCC): ICD-10-CM

## 2021-12-16 DIAGNOSIS — I10 HTN (HYPERTENSION): ICD-10-CM

## 2021-12-16 DIAGNOSIS — M32.9 LUPUS (HCC): ICD-10-CM

## 2021-12-16 DIAGNOSIS — G47.33 OSA (OBSTRUCTIVE SLEEP APNEA): ICD-10-CM

## 2021-12-16 DIAGNOSIS — I48.91 A-FIB (HCC): ICD-10-CM

## 2021-12-16 PROCEDURE — 82565 ASSAY OF CREATININE: CPT

## 2021-12-16 PROCEDURE — 75574 CT ANGIO HRT W/3D IMAGE: CPT | Performed by: INTERNAL MEDICINE

## 2021-12-16 NOTE — IMAGING NOTE
Received pt to CT 4, GE at 1428. Pt verified name, , and allergies. Contrast= 75 ml  Saline= 74 ml  Average HR= 78  Pt tolerated exam well. Exam finished at 1436. Joe Wilson

## 2021-12-21 ENCOUNTER — OFFICE VISIT (OUTPATIENT)
Dept: HEMATOLOGY/ONCOLOGY | Facility: HOSPITAL | Age: 76
End: 2021-12-21
Attending: INTERNAL MEDICINE
Payer: MEDICARE

## 2021-12-21 VITALS
TEMPERATURE: 97 F | BODY MASS INDEX: 28 KG/M2 | SYSTOLIC BLOOD PRESSURE: 131 MMHG | RESPIRATION RATE: 16 BRPM | OXYGEN SATURATION: 97 % | HEART RATE: 75 BPM | WEIGHT: 201 LBS | DIASTOLIC BLOOD PRESSURE: 77 MMHG

## 2021-12-21 DIAGNOSIS — M32.9 LUPUS (HCC): ICD-10-CM

## 2021-12-21 DIAGNOSIS — M33.20 POLYMYOSITIS (HCC): ICD-10-CM

## 2021-12-21 DIAGNOSIS — D69.3 IMMUNE THROMBOCYTOPENIC PURPURA (HCC): Primary | ICD-10-CM

## 2021-12-21 DIAGNOSIS — D69.6 THROMBOCYTOPENIA (HCC): ICD-10-CM

## 2021-12-21 PROCEDURE — 96365 THER/PROPH/DIAG IV INF INIT: CPT

## 2021-12-21 PROCEDURE — 96366 THER/PROPH/DIAG IV INF ADDON: CPT

## 2021-12-21 RX ORDER — MEPERIDINE HYDROCHLORIDE 25 MG/ML
25 INJECTION INTRAMUSCULAR; INTRAVENOUS; SUBCUTANEOUS ONCE
Status: CANCELLED | OUTPATIENT
Start: 2022-01-18

## 2021-12-21 RX ORDER — ACETAMINOPHEN 325 MG/1
650 TABLET ORAL ONCE
Status: COMPLETED | OUTPATIENT
Start: 2021-12-21 | End: 2021-12-21

## 2021-12-21 RX ORDER — DIPHENHYDRAMINE HCL 25 MG
25 CAPSULE ORAL ONCE
Status: CANCELLED | OUTPATIENT
Start: 2022-01-18

## 2021-12-21 RX ORDER — DIPHENHYDRAMINE HCL 25 MG
25 CAPSULE ORAL ONCE
Status: COMPLETED | OUTPATIENT
Start: 2021-12-21 | End: 2021-12-21

## 2021-12-21 RX ORDER — METHYLPREDNISOLONE SODIUM SUCCINATE 125 MG/2ML
125 INJECTION, POWDER, LYOPHILIZED, FOR SOLUTION INTRAMUSCULAR; INTRAVENOUS ONCE
Status: CANCELLED | OUTPATIENT
Start: 2022-01-18

## 2021-12-21 RX ORDER — ACETAMINOPHEN 325 MG/1
650 TABLET ORAL ONCE
Status: CANCELLED | OUTPATIENT
Start: 2022-01-18

## 2021-12-21 RX ORDER — FAMOTIDINE 10 MG/ML
20 INJECTION, SOLUTION INTRAVENOUS ONCE
Status: CANCELLED | OUTPATIENT
Start: 2022-01-18

## 2021-12-21 RX ORDER — METHYLPREDNISOLONE SODIUM SUCCINATE 40 MG/ML
40 INJECTION, POWDER, LYOPHILIZED, FOR SOLUTION INTRAMUSCULAR; INTRAVENOUS ONCE
Status: CANCELLED | OUTPATIENT
Start: 2022-01-18

## 2021-12-21 RX ORDER — DIPHENHYDRAMINE HYDROCHLORIDE 50 MG/ML
25 INJECTION INTRAMUSCULAR; INTRAVENOUS ONCE
Status: CANCELLED | OUTPATIENT
Start: 2022-01-18

## 2021-12-21 RX ADMIN — ACETAMINOPHEN 650 MG: 325 TABLET ORAL at 11:31:00

## 2021-12-21 RX ADMIN — DIPHENHYDRAMINE HCL 25 MG: 25 MG CAPSULE ORAL at 11:31:00

## 2022-01-06 ENCOUNTER — TELEPHONE (OUTPATIENT)
Dept: RHEUMATOLOGY | Facility: CLINIC | Age: 77
End: 2022-01-06

## 2022-01-06 DIAGNOSIS — D69.6 THROMBOCYTOPENIA (HCC): ICD-10-CM

## 2022-01-06 DIAGNOSIS — M33.20 POLYMYOSITIS (HCC): Primary | ICD-10-CM

## 2022-01-06 DIAGNOSIS — M35.1 MCTD (MIXED CONNECTIVE TISSUE DISEASE) (HCC): ICD-10-CM

## 2022-01-06 DIAGNOSIS — M10.9 GOUT, UNSPECIFIED CAUSE, UNSPECIFIED CHRONICITY, UNSPECIFIED SITE: ICD-10-CM

## 2022-01-07 ENCOUNTER — LAB ENCOUNTER (OUTPATIENT)
Dept: LAB | Facility: HOSPITAL | Age: 77
End: 2022-01-07
Attending: INTERNAL MEDICINE
Payer: MEDICARE

## 2022-01-07 DIAGNOSIS — E78.6 LOW HDL (UNDER 40): ICD-10-CM

## 2022-01-07 DIAGNOSIS — M35.1 MCTD (MIXED CONNECTIVE TISSUE DISEASE) (HCC): ICD-10-CM

## 2022-01-07 DIAGNOSIS — I27.20 PULMONARY HYPERTENSION (HCC): ICD-10-CM

## 2022-01-07 DIAGNOSIS — D69.6 THROMBOCYTOPENIA (HCC): ICD-10-CM

## 2022-01-07 DIAGNOSIS — M10.9 GOUT, UNSPECIFIED CAUSE, UNSPECIFIED CHRONICITY, UNSPECIFIED SITE: ICD-10-CM

## 2022-01-07 DIAGNOSIS — M33.20 POLYMYOSITIS (HCC): ICD-10-CM

## 2022-01-07 DIAGNOSIS — I48.11 LONGSTANDING PERSISTENT ATRIAL FIBRILLATION (HCC): ICD-10-CM

## 2022-01-07 DIAGNOSIS — Z00.00 LABORATORY EXAMINATION ORDERED AS PART OF A ROUTINE GENERAL MEDICAL EXAMINATION: ICD-10-CM

## 2022-01-07 LAB
ALBUMIN SERPL-MCNC: 3.8 G/DL (ref 3.4–5)
ALBUMIN/GLOB SERPL: 1 {RATIO} (ref 1–2)
ALP LIVER SERPL-CCNC: 59 U/L
ALT SERPL-CCNC: 25 U/L
ANION GAP SERPL CALC-SCNC: 7 MMOL/L (ref 0–18)
AST SERPL-CCNC: 22 U/L (ref 15–37)
BASOPHILS # BLD AUTO: 0.04 X10(3) UL (ref 0–0.2)
BASOPHILS NFR BLD AUTO: 0.6 %
BILIRUB SERPL-MCNC: 0.8 MG/DL (ref 0.1–2)
BUN BLD-MCNC: 52 MG/DL (ref 7–18)
CALCIUM BLD-MCNC: 9.4 MG/DL (ref 8.5–10.1)
CHLORIDE SERPL-SCNC: 103 MMOL/L (ref 98–112)
CHOLEST SERPL-MCNC: 200 MG/DL (ref ?–200)
CK SERPL-CCNC: 32 U/L
CO2 SERPL-SCNC: 31 MMOL/L (ref 21–32)
CREAT BLD-MCNC: 1.82 MG/DL
EOSINOPHIL # BLD AUTO: 0.12 X10(3) UL (ref 0–0.7)
EOSINOPHIL NFR BLD AUTO: 1.9 %
ERYTHROCYTE [DISTWIDTH] IN BLOOD BY AUTOMATED COUNT: 16.5 %
FASTING PATIENT LIPID ANSWER: YES
FASTING STATUS PATIENT QL REPORTED: YES
GLOBULIN PLAS-MCNC: 4 G/DL (ref 2.8–4.4)
GLUCOSE BLD-MCNC: 97 MG/DL (ref 70–99)
HCT VFR BLD AUTO: 47 %
HDLC SERPL-MCNC: 38 MG/DL (ref 40–59)
HGB BLD-MCNC: 14.7 G/DL
IMM GRANULOCYTES # BLD AUTO: 0.03 X10(3) UL (ref 0–1)
IMM GRANULOCYTES NFR BLD: 0.5 %
LDLC SERPL CALC-MCNC: 127 MG/DL (ref ?–100)
LYMPHOCYTES # BLD AUTO: 1.3 X10(3) UL (ref 1–4)
LYMPHOCYTES NFR BLD AUTO: 20.4 %
MCH RBC QN AUTO: 34.5 PG (ref 26–34)
MCHC RBC AUTO-ENTMCNC: 31.3 G/DL (ref 31–37)
MCV RBC AUTO: 110.3 FL
MONOCYTES # BLD AUTO: 0.72 X10(3) UL (ref 0.1–1)
MONOCYTES NFR BLD AUTO: 11.3 %
NEUTROPHILS # BLD AUTO: 4.15 X10 (3) UL (ref 1.5–7.7)
NEUTROPHILS # BLD AUTO: 4.15 X10(3) UL (ref 1.5–7.7)
NEUTROPHILS NFR BLD AUTO: 65.3 %
NONHDLC SERPL-MCNC: 162 MG/DL (ref ?–130)
OSMOLALITY SERPL CALC.SUM OF ELEC: 306 MOSM/KG (ref 275–295)
PLATELET # BLD AUTO: 106 10(3)UL (ref 150–450)
POTASSIUM SERPL-SCNC: 5.4 MMOL/L (ref 3.5–5.1)
PROT SERPL-MCNC: 7.8 G/DL (ref 6.4–8.2)
RBC # BLD AUTO: 4.26 X10(6)UL
SODIUM SERPL-SCNC: 141 MMOL/L (ref 136–145)
T4 FREE SERPL-MCNC: 0.9 NG/DL (ref 0.8–1.7)
TRIGL SERPL-MCNC: 195 MG/DL (ref 30–149)
TSI SER-ACNC: 4.22 MIU/ML (ref 0.36–3.74)
URATE SERPL-MCNC: 5.4 MG/DL
VLDLC SERPL CALC-MCNC: 35 MG/DL (ref 0–30)
WBC # BLD AUTO: 6.4 X10(3) UL (ref 4–11)

## 2022-01-07 PROCEDURE — 84443 ASSAY THYROID STIM HORMONE: CPT

## 2022-01-07 PROCEDURE — 84550 ASSAY OF BLOOD/URIC ACID: CPT

## 2022-01-07 PROCEDURE — 80053 COMPREHEN METABOLIC PANEL: CPT

## 2022-01-07 PROCEDURE — 36415 COLL VENOUS BLD VENIPUNCTURE: CPT

## 2022-01-07 PROCEDURE — 84439 ASSAY OF FREE THYROXINE: CPT

## 2022-01-07 PROCEDURE — 85025 COMPLETE CBC W/AUTO DIFF WBC: CPT

## 2022-01-07 PROCEDURE — 82550 ASSAY OF CK (CPK): CPT

## 2022-01-07 PROCEDURE — 80061 LIPID PANEL: CPT

## 2022-01-10 RX ORDER — TORSEMIDE 20 MG/1
40 TABLET ORAL 2 TIMES DAILY
Qty: 130 TABLET | Refills: 5 | Status: SHIPPED | OUTPATIENT
Start: 2022-01-10

## 2022-01-11 ENCOUNTER — OFFICE VISIT (OUTPATIENT)
Dept: RHEUMATOLOGY | Facility: CLINIC | Age: 77
End: 2022-01-11
Payer: MEDICARE

## 2022-01-11 VITALS
BODY MASS INDEX: 27.63 KG/M2 | HEIGHT: 71 IN | DIASTOLIC BLOOD PRESSURE: 60 MMHG | WEIGHT: 197.38 LBS | HEART RATE: 93 BPM | SYSTOLIC BLOOD PRESSURE: 102 MMHG | TEMPERATURE: 97 F | OXYGEN SATURATION: 98 %

## 2022-01-11 DIAGNOSIS — I50.812 CHRONIC RIGHT-SIDED CONGESTIVE HEART FAILURE (HCC): ICD-10-CM

## 2022-01-11 DIAGNOSIS — D69.3 IMMUNE THROMBOCYTOPENIC PURPURA (HCC): ICD-10-CM

## 2022-01-11 DIAGNOSIS — M10.9 GOUT, UNSPECIFIED CAUSE, UNSPECIFIED CHRONICITY, UNSPECIFIED SITE: ICD-10-CM

## 2022-01-11 DIAGNOSIS — I27.20 PULMONARY HYPERTENSION (HCC): ICD-10-CM

## 2022-01-11 DIAGNOSIS — M35.1 MCTD (MIXED CONNECTIVE TISSUE DISEASE) (HCC): Primary | ICD-10-CM

## 2022-01-11 DIAGNOSIS — M33.20 POLYMYOSITIS (HCC): Chronic | ICD-10-CM

## 2022-01-11 PROCEDURE — 99214 OFFICE O/P EST MOD 30 MIN: CPT | Performed by: INTERNAL MEDICINE

## 2022-01-11 RX ORDER — PREDNISONE 1 MG/1
2 TABLET ORAL
Qty: 180 TABLET | Refills: 3 | Status: CANCELLED | OUTPATIENT
Start: 2022-01-11

## 2022-01-11 RX ORDER — PREDNISONE 1 MG/1
5 TABLET ORAL
Qty: 90 TABLET | Refills: 3 | Status: CANCELLED | OUTPATIENT
Start: 2022-01-11

## 2022-01-11 NOTE — PROGRESS NOTES
[facility]  Dr. Rosa Herbert Progress Note     Subjective: Juana Montgomery is a(n) 64year old male. Current complaints: Patient presents with:   Follow - Up: LOV 10-11-21; feels the best he has in 2-3 years; labs at [facility] 1-7-22; CT heart scan 12-16-21 MD 1/11/2022 1:13 PM

## 2022-01-11 NOTE — PATIENT INSTRUCTIONS
Continue Prednisone 7 mg per day. IV IG receive monthly. Exercise regularly to build up your stamina and strength. Current las are stable. Platelets 031,035. Muscle enzyme normal.  Hemoglobin normal.  Allopurinol 300 mg per day.   Continue toresimide,

## 2022-01-12 PROBLEM — R79.89 AZOTEMIA: Status: RESOLVED | Noted: 2021-04-27 | Resolved: 2022-01-12

## 2022-01-13 ENCOUNTER — OFFICE VISIT (OUTPATIENT)
Dept: FAMILY MEDICINE CLINIC | Facility: CLINIC | Age: 77
End: 2022-01-13
Payer: MEDICARE

## 2022-01-13 VITALS
HEIGHT: 71 IN | DIASTOLIC BLOOD PRESSURE: 60 MMHG | BODY MASS INDEX: 27.6 KG/M2 | RESPIRATION RATE: 16 BRPM | HEART RATE: 74 BPM | SYSTOLIC BLOOD PRESSURE: 120 MMHG | WEIGHT: 197.19 LBS

## 2022-01-13 DIAGNOSIS — M35.1 MCTD (MIXED CONNECTIVE TISSUE DISEASE) (HCC): ICD-10-CM

## 2022-01-13 DIAGNOSIS — I48.11 LONGSTANDING PERSISTENT ATRIAL FIBRILLATION (HCC): ICD-10-CM

## 2022-01-13 DIAGNOSIS — G47.33 OSA (OBSTRUCTIVE SLEEP APNEA): ICD-10-CM

## 2022-01-13 DIAGNOSIS — E87.6 HYPOPOTASSEMIA: ICD-10-CM

## 2022-01-13 DIAGNOSIS — K22.70 BARRETT'S ESOPHAGUS WITHOUT DYSPLASIA: ICD-10-CM

## 2022-01-13 DIAGNOSIS — J43.2 CENTRILOBULAR EMPHYSEMA (HCC): ICD-10-CM

## 2022-01-13 DIAGNOSIS — N18.31 STAGE 3A CHRONIC KIDNEY DISEASE (HCC): ICD-10-CM

## 2022-01-13 DIAGNOSIS — Z00.00 ENCOUNTER FOR ANNUAL HEALTH EXAMINATION: ICD-10-CM

## 2022-01-13 DIAGNOSIS — M33.20 POLYMYOSITIS (HCC): Chronic | ICD-10-CM

## 2022-01-13 DIAGNOSIS — D61.818 PANCYTOPENIA (HCC): ICD-10-CM

## 2022-01-13 DIAGNOSIS — I50.812 CHRONIC RIGHT-SIDED CONGESTIVE HEART FAILURE (HCC): ICD-10-CM

## 2022-01-13 DIAGNOSIS — I27.20 PULMONARY HYPERTENSION (HCC): ICD-10-CM

## 2022-01-13 DIAGNOSIS — I77.810 AORTIC ROOT DILATION (HCC): ICD-10-CM

## 2022-01-13 DIAGNOSIS — Z00.00 ANNUAL PHYSICAL EXAM: Primary | ICD-10-CM

## 2022-01-13 DIAGNOSIS — D69.3 IMMUNE THROMBOCYTOPENIC PURPURA (HCC): ICD-10-CM

## 2022-01-13 DIAGNOSIS — I43 CARDIOMYOPATHY AS MANIFESTATION OF UNDERLYING DISEASE (HCC): ICD-10-CM

## 2022-01-13 DIAGNOSIS — E78.6 LOW HDL (UNDER 40): ICD-10-CM

## 2022-01-13 DIAGNOSIS — I10 BENIGN ESSENTIAL HYPERTENSION: ICD-10-CM

## 2022-01-13 DIAGNOSIS — K75.4: ICD-10-CM

## 2022-01-13 PROCEDURE — G0439 PPPS, SUBSEQ VISIT: HCPCS | Performed by: FAMILY MEDICINE

## 2022-01-13 PROCEDURE — 99214 OFFICE O/P EST MOD 30 MIN: CPT | Performed by: FAMILY MEDICINE

## 2022-01-13 NOTE — PROGRESS NOTES
HPI:   Jazmin Lazo is a 64year old male who presents for a MA (Medicare Advantage) [facility] (Once per calendar year). Doing well, stable autoimmune, working on weight loss. No new issues.    His last annual assessment has been over 1 year: Tomasa Gauthier esophagus     Gout     ED (erectile dysfunction)     Benign essential hypertension     Low HDL (under 40)     Thrombocytopenia (HCC)     Basal cell carcinoma, ear     Lupus hepatitis syndrome (HCC)     Splenomegaly     MCTD (mixed connective tissue disease total) by mouth daily.   predniSONE 1 MG Oral Tab, Take 2 tablets (2 mg total) by mouth daily with breakfast.  predniSONE 5 MG Oral Tab, Take 1 tablet (5 mg total) by mouth daily with breakfast.  OMEPRAZOLE 20 MG Oral Capsule Delayed Release, TAKE 1 CAPSULE to organism, Polymyositis ([address] Presence of other cardiac implants and grafts (2017), Problems with swallowing, Pulmonary hypertension ([address] Raynaud disease, Raynaud disease, Renal disorder, Shortness of breath (on and off), Sleep apnea, Thrombocytopathi Negative for nausea and vomiting. Endocrine: Negative. Negative for polydipsia, polyphagia and polyuria. Genitourinary: Negative. Musculoskeletal: Negative. Negative for neck pain. Skin: Negative. Neurological: Negative.     Psychiatric/Behavi left side. Heart sounds: Normal heart sounds, S1 normal and S2 normal. No murmur heard. Pulmonary:      Effort: Pulmonary effort is normal.      Breath sounds: Normal breath sounds. Abdominal:      General: Abdomen is flat.  Bowel sounds are nor understanding of these issues and agrees to the plan. Reinforced healthy diet, lifestyle, and exercise. 1. Annual physical exam (Primary)  2.  Benign essential hypertension  Overview:  Benazepril 40, amlodipine 5, triameteren HCT 37.5-25  Assessment & Carlie MGMT  -     OFFICE/OUTPT VISIT,EST,LEVL IV  8. LI (obstructive sleep apnea)  Assessment & Plan:  Stable, continue present management     Orders:  -     [facility] REFER TO CHRONIC CARE MGMT  -     OFFICE/OUTPT VISIT,EST,LEVL IV  9.  Centrilobular emphysema ([address] 1/7/2022. Lowest level in the last 10 years was 5. Orders:  -     EE[facility] REFER TO CHRONIC CARE MGMT  -     OFFICE/OUTPT VISIT,EST,LEVL IV  16. Pancytopenia ([facility]  Overview:  Stable per oncology. WBC: 6.4, done on 1/7/2022. HGB: 14.7, done on 1/7/2022.   PLT Fasting Blood Sugar / Glucose    One screening every 12 months if never tested or if previously tested but not diagnosed with pre-diabetes   One screening every 6 months if diagnosed with pre-diabetes Lab Results   Component Value Date    GLU 97 01/07/2022 unless medically necessary (cut with metal); may be covered with your pharmacy prescription benefits 01/01/1999    Tetanus, Diptheria and Pertusis TD and TDaP Not covered by Medicare Part B -  No recommendations at this time    Zoster Not covered by Gin Ugalde

## 2022-01-13 NOTE — PATIENT INSTRUCTIONS
David Davila's SCREENING SCHEDULE   Tests on this list are recommended by your physician but may not be covered, or covered at this frequency, by your insurer. Please check with your insurance carrier before scheduling to verify coverage.    PREVEN this time    Pneumococcal Each vaccine (Hucuwqv18 & [id number]) covered once after 65 Prevnar 13: 07/28/2016    Hdophczfl77: 12/06/2017     No recommendations at this time    Hepatitis B One screening covered for patients with certain risk factors   07/12

## 2022-01-13 NOTE — ASSESSMENT & PLAN NOTE
Stable, Continue present management.     Blood Pressure and Cardiac Medications          Metoprolol Succinate  MG Oral Tablet 24 Hr

## 2022-01-18 ENCOUNTER — APPOINTMENT (OUTPATIENT)
Dept: HEMATOLOGY/ONCOLOGY | Facility: HOSPITAL | Age: 77
End: 2022-01-18
Attending: INTERNAL MEDICINE
Payer: MEDICARE

## 2022-01-18 DIAGNOSIS — I50.812 CHRONIC RIGHT-SIDED CONGESTIVE HEART FAILURE (HCC): Primary | ICD-10-CM

## 2022-01-19 ENCOUNTER — HOSPITAL ENCOUNTER (OUTPATIENT)
Dept: CARDIOLOGY CLINIC | Facility: HOSPITAL | Age: 77
Discharge: HOME | End: 2022-01-19
Attending: NURSE PRACTITIONER
Payer: MEDICARE

## 2022-01-19 ENCOUNTER — HOSPITAL ENCOUNTER (OUTPATIENT)
Dept: LAB | Facility: HOSPITAL | Age: 77
Discharge: HOME | End: 2022-01-19
Attending: NURSE PRACTITIONER
Payer: MEDICARE

## 2022-01-19 VITALS
RESPIRATION RATE: 18 BRPM | BODY MASS INDEX: 28 KG/M2 | SYSTOLIC BLOOD PRESSURE: 135 MMHG | HEART RATE: 69 BPM | OXYGEN SATURATION: 100 % | DIASTOLIC BLOOD PRESSURE: 75 MMHG | WEIGHT: 198 LBS

## 2022-01-19 DIAGNOSIS — G47.33 OSA (OBSTRUCTIVE SLEEP APNEA): ICD-10-CM

## 2022-01-19 DIAGNOSIS — E87.5 HYPERKALEMIA: ICD-10-CM

## 2022-01-19 DIAGNOSIS — N28.9 RENAL INSUFFICIENCY, MILD: ICD-10-CM

## 2022-01-19 DIAGNOSIS — I48.21 PERMANENT ATRIAL FIBRILLATION (HCC): ICD-10-CM

## 2022-01-19 DIAGNOSIS — I50.812 CHRONIC RIGHT-SIDED CONGESTIVE HEART FAILURE (HCC): ICD-10-CM

## 2022-01-19 LAB
ANION GAP SERPL CALC-SCNC: 5 MMOL/L (ref 0–18)
BUN BLD-MCNC: 40 MG/DL (ref 7–18)
CALCIUM BLD-MCNC: 9.4 MG/DL (ref 8.5–10.1)
CHLORIDE SERPL-SCNC: 103 MMOL/L (ref 98–112)
CO2 SERPL-SCNC: 32 MMOL/L (ref 21–32)
CREAT BLD-MCNC: 1.79 MG/DL
FASTING STATUS PATIENT QL REPORTED: NO
GLUCOSE BLD-MCNC: 92 MG/DL (ref 70–99)
NT-PROBNP SERPL-MCNC: 2660 PG/ML (ref ?–450)
OSMOLALITY SERPL CALC.SUM OF ELEC: 299 MOSM/KG (ref 275–295)
POTASSIUM SERPL-SCNC: 4.9 MMOL/L (ref 3.5–5.1)
SODIUM SERPL-SCNC: 140 MMOL/L (ref 136–145)

## 2022-01-19 PROCEDURE — 83880 ASSAY OF NATRIURETIC PEPTIDE: CPT | Performed by: NURSE PRACTITIONER

## 2022-01-19 PROCEDURE — 80048 BASIC METABOLIC PNL TOTAL CA: CPT | Performed by: NURSE PRACTITIONER

## 2022-01-19 PROCEDURE — 36415 COLL VENOUS BLD VENIPUNCTURE: CPT | Performed by: NURSE PRACTITIONER

## 2022-01-19 PROCEDURE — 99214 OFFICE O/P EST MOD 30 MIN: CPT | Performed by: CLINICAL NURSE SPECIALIST

## 2022-01-19 NOTE — PATIENT INSTRUCTIONS
Heart Failure Discharge Instructions    · We will call you today with your lab results  · See you again the first part of March, but certainly call with any concerns or problems    Activity: Regular exercise and activity is important for your overall healt more trouble breathing  · You get more tired with regular activity, or are limiting activity because of shortness of breath or fatigue  · You are short of breath lying down, you need more pillows to breathe comfortably,  or wake up during the night short o

## 2022-01-19 NOTE — PROGRESS NOTES
RN welcomed patient to the [place] [facility]. RN assessed the patient. Patient denies symptoms of shortness of breath, fatigue, chest pain, and edema beyond her baseline.   The patient has no concerns today and states that he feels the best he's fel

## 2022-01-19 NOTE — PROGRESS NOTES
[facility] Cardiac Health Progress Note    Nancy Huffman is a 64year old male who presents to clinic for APN assessment and management of chronic  diastolic, RV heart failure and is functional class 2-3.      Subjective:  Since he was atrium with no evidence of anomalous pulmonary venous return.     No abnormality on cardiac over read.          Of note, he was hospitalized from 4/27-4/29 for progressive shortness of breath.  He was evaluated by Cardiology, GI, Rheumatology and Oncology a • Pain in joints    • Personal history of antineoplastic chemotherapy    • Pleural effusion     right   • Pneumonia due to organism    • Polymyositis [facility])    • Presence of other cardiac implants and grafts 2017    watchman filter   • Problems with swall Other Disorders Associated Father    • Heart Disease Mother         CHF   • Breast Cancer Mother    • Stroke Mother    • Cancer Paternal Grandfather    • Heart Attack Paternal Grandmother    • Kidney Disease Son    • Other (Ramon's Esophagus) Son    • He 201 lb (91.2 kg)  12/02/21 : 195 lb (88.5 kg)  11/23/21 : 197 lb 12.8 oz (89.7 kg)          Current Outpatient Medications:   •  torsemide 20 MG Oral Tab, Take 2 tablets (40 mg total) by mouth 2 (two) times a day., Disp: 130 tablet, Rfl: 5  •  spironolacto Irregularly irregular  Abdomen:       Non-distended/round, soft, non-tender, BS+  Extremities:    No  LE edema  Neuro:             A&O x 3, CASTRO  Skin:                Pink, warm, dry    Education:  Patient instructed regarding sodium restricted diet, low so recent RHC/ pro bnp results, and clinical stability we will leave as is. He knows to call us if he is feeling dry and wiped out.     · CHF discharge instructions given    40 minutes spent with patient and greater than 50% of the time was spent counseling a

## 2022-01-20 ENCOUNTER — OFFICE VISIT (OUTPATIENT)
Dept: HEMATOLOGY/ONCOLOGY | Facility: HOSPITAL | Age: 77
End: 2022-01-20
Attending: INTERNAL MEDICINE
Payer: MEDICARE

## 2022-01-20 VITALS
RESPIRATION RATE: 16 BRPM | OXYGEN SATURATION: 98 % | DIASTOLIC BLOOD PRESSURE: 66 MMHG | TEMPERATURE: 97 F | HEIGHT: 70.98 IN | WEIGHT: 199.63 LBS | SYSTOLIC BLOOD PRESSURE: 102 MMHG | HEART RATE: 87 BPM | BODY MASS INDEX: 27.95 KG/M2

## 2022-01-20 DIAGNOSIS — M32.9 LUPUS (HCC): ICD-10-CM

## 2022-01-20 DIAGNOSIS — D69.3 IMMUNE THROMBOCYTOPENIC PURPURA (HCC): Primary | ICD-10-CM

## 2022-01-20 DIAGNOSIS — M33.20 POLYMYOSITIS (HCC): ICD-10-CM

## 2022-01-20 DIAGNOSIS — D69.6 THROMBOCYTOPENIA (HCC): ICD-10-CM

## 2022-01-20 PROCEDURE — 96365 THER/PROPH/DIAG IV INF INIT: CPT

## 2022-01-20 PROCEDURE — 96366 THER/PROPH/DIAG IV INF ADDON: CPT

## 2022-01-20 RX ORDER — ACETAMINOPHEN 325 MG/1
650 TABLET ORAL ONCE
Status: CANCELLED | OUTPATIENT
Start: 2022-01-20

## 2022-01-20 RX ORDER — DIPHENHYDRAMINE HCL 25 MG
25 CAPSULE ORAL ONCE
Status: CANCELLED | OUTPATIENT
Start: 2022-01-20

## 2022-01-20 RX ORDER — MEPERIDINE HYDROCHLORIDE 25 MG/ML
25 INJECTION INTRAMUSCULAR; INTRAVENOUS; SUBCUTANEOUS ONCE
Status: CANCELLED | OUTPATIENT
Start: 2022-01-20

## 2022-01-20 RX ORDER — METHYLPREDNISOLONE SODIUM SUCCINATE 125 MG/2ML
125 INJECTION, POWDER, LYOPHILIZED, FOR SOLUTION INTRAMUSCULAR; INTRAVENOUS ONCE
Status: CANCELLED | OUTPATIENT
Start: 2022-01-20

## 2022-01-20 RX ORDER — DIPHENHYDRAMINE HCL 25 MG
25 CAPSULE ORAL ONCE
Status: COMPLETED | OUTPATIENT
Start: 2022-01-20 | End: 2022-01-20

## 2022-01-20 RX ORDER — ACETAMINOPHEN 325 MG/1
650 TABLET ORAL ONCE
Status: COMPLETED | OUTPATIENT
Start: 2022-01-20 | End: 2022-01-20

## 2022-01-20 RX ORDER — FAMOTIDINE 10 MG/ML
20 INJECTION, SOLUTION INTRAVENOUS ONCE
Status: CANCELLED | OUTPATIENT
Start: 2022-01-20

## 2022-01-20 RX ORDER — DIPHENHYDRAMINE HYDROCHLORIDE 50 MG/ML
25 INJECTION INTRAMUSCULAR; INTRAVENOUS ONCE
Status: CANCELLED | OUTPATIENT
Start: 2022-01-20

## 2022-01-20 RX ORDER — METHYLPREDNISOLONE SODIUM SUCCINATE 40 MG/ML
40 INJECTION, POWDER, LYOPHILIZED, FOR SOLUTION INTRAMUSCULAR; INTRAVENOUS ONCE
Status: CANCELLED | OUTPATIENT
Start: 2022-01-20

## 2022-01-20 RX ADMIN — DIPHENHYDRAMINE HCL 25 MG: 25 MG CAPSULE ORAL at 11:22:00

## 2022-01-20 RX ADMIN — ACETAMINOPHEN 650 MG: 325 TABLET ORAL at 11:21:00

## 2022-01-20 NOTE — PROGRESS NOTES
Education Record    Learner:  Patient    Disease / Diagnosis: IVIG    Barriers / Limitations:  None   Comments:    Method:  Discussion   Comments:    General Topics:  Plan of care reviewed   Comments:    Outcome:  Shows understanding   Comments:  Pt tolera

## 2022-01-22 ENCOUNTER — PATIENT MESSAGE (OUTPATIENT)
Dept: RHEUMATOLOGY | Facility: CLINIC | Age: 77
End: 2022-01-22

## 2022-01-27 DIAGNOSIS — D69.3 IMMUNE THROMBOCYTOPENIC PURPURA (HCC): Primary | ICD-10-CM

## 2022-01-27 RX ORDER — METHYLPREDNISOLONE SODIUM SUCCINATE 125 MG/2ML
125 INJECTION, POWDER, LYOPHILIZED, FOR SOLUTION INTRAMUSCULAR; INTRAVENOUS ONCE
OUTPATIENT
Start: 2022-01-27

## 2022-01-27 RX ORDER — FAMOTIDINE 10 MG/ML
20 INJECTION, SOLUTION INTRAVENOUS ONCE
OUTPATIENT
Start: 2022-01-27

## 2022-01-27 RX ORDER — MEPERIDINE HYDROCHLORIDE 25 MG/ML
25 INJECTION INTRAMUSCULAR; INTRAVENOUS; SUBCUTANEOUS ONCE
OUTPATIENT
Start: 2022-01-27

## 2022-01-27 RX ORDER — ACETAMINOPHEN 325 MG/1
650 TABLET ORAL ONCE
OUTPATIENT
Start: 2022-01-27

## 2022-01-27 RX ORDER — DIPHENHYDRAMINE HCL 25 MG
25 CAPSULE ORAL ONCE
OUTPATIENT
Start: 2022-01-27

## 2022-01-27 RX ORDER — METHYLPREDNISOLONE SODIUM SUCCINATE 40 MG/ML
40 INJECTION, POWDER, LYOPHILIZED, FOR SOLUTION INTRAMUSCULAR; INTRAVENOUS ONCE
OUTPATIENT
Start: 2022-01-27

## 2022-01-27 RX ORDER — DIPHENHYDRAMINE HYDROCHLORIDE 50 MG/ML
25 INJECTION INTRAMUSCULAR; INTRAVENOUS ONCE
OUTPATIENT
Start: 2022-01-27

## 2022-02-02 ENCOUNTER — TELEPHONE (OUTPATIENT)
Dept: RHEUMATOLOGY | Facility: CLINIC | Age: 77
End: 2022-02-02

## 2022-02-04 ENCOUNTER — LAB ENCOUNTER (OUTPATIENT)
Dept: LAB | Age: 77
End: 2022-02-04
Attending: FAMILY MEDICINE
Payer: MEDICARE

## 2022-02-04 DIAGNOSIS — E87.6 HYPOPOTASSEMIA: ICD-10-CM

## 2022-02-04 LAB
CREAT BLD-MCNC: 1.85 MG/DL
POTASSIUM SERPL-SCNC: 4.6 MMOL/L (ref 3.5–5.1)

## 2022-02-04 PROCEDURE — 36415 COLL VENOUS BLD VENIPUNCTURE: CPT

## 2022-02-04 PROCEDURE — 82565 ASSAY OF CREATININE: CPT

## 2022-02-04 PROCEDURE — 84132 ASSAY OF SERUM POTASSIUM: CPT

## 2022-02-10 RX ORDER — POTASSIUM CHLORIDE 20 MEQ/1
TABLET, EXTENDED RELEASE ORAL
Qty: 90 TABLET | Refills: 1 | Status: SHIPPED | OUTPATIENT
Start: 2022-02-10 | End: 2022-03-01 | Stop reason: ALTCHOICE

## 2022-02-11 ENCOUNTER — TELEMEDICINE (OUTPATIENT)
Dept: FAMILY MEDICINE CLINIC | Facility: CLINIC | Age: 77
End: 2022-02-11

## 2022-02-11 ENCOUNTER — TELEPHONE (OUTPATIENT)
Dept: FAMILY MEDICINE CLINIC | Facility: CLINIC | Age: 77
End: 2022-02-11

## 2022-02-11 DIAGNOSIS — G47.33 OSA (OBSTRUCTIVE SLEEP APNEA): Primary | ICD-10-CM

## 2022-02-11 DIAGNOSIS — J01.91 ACUTE RECURRENT SINUSITIS, UNSPECIFIED LOCATION: Primary | ICD-10-CM

## 2022-02-11 PROCEDURE — 99213 OFFICE O/P EST LOW 20 MIN: CPT | Performed by: FAMILY MEDICINE

## 2022-02-11 RX ORDER — CEFUROXIME AXETIL 250 MG/1
250 TABLET ORAL 2 TIMES DAILY
Qty: 20 TABLET | Refills: 0 | Status: SHIPPED | OUTPATIENT
Start: 2022-02-11 | End: 2022-02-21

## 2022-02-11 NOTE — TELEPHONE ENCOUNTER
Referral request [facility]  Fax number: [phone]    Diagnosis: LI    Office notes from Dr. Katharina Yousif M.D. 1/13/22  LI (obstructive sleep apnea)     quantity 1 per 90 days   quantity 3 per 90 days   quantity 1 per 180 days   quantity 1 per 180 days   quantity 1 per 90 days   quantity 1 per 180 days   quantity 6 per 90 days   quantity 1 per 180 days

## 2022-02-11 NOTE — PROGRESS NOTES
Halie Romero is a 64year old male coming in for had concerns including Chest Congestion (long term, nasal drip, congestion is worse ). Subjective:   HPI sick 2 weeks ago with GI ussues and congestion. Neg home testing for COVID. LT PND cotninues and now worse since then . More nasally    ROS: GENERAL HEALTH: feels well otherwise  This visit is conducted using Telemedicine with live, interactive video and audio. Last abx doxyxcycline. Patient has been referred to the [facility] website at [email or web address] to review the yearly Consent to Treat document. Patient understands and accepts financial responsibility for any deductible, co-insurance and/or co-pays associated with this service. Objective: There were no vitals taken for this visit. There is no height or weight on file to calculate BMI. Physical Exam  Constitutional:       Appearance: He is well-developed. HENT:      Head: Normocephalic and atraumatic. Pulmonary:      Effort: Pulmonary effort is normal. No respiratory distress. Musculoskeletal:         General: Normal range of motion. Cervical back: Normal range of motion. Skin:     Findings: No rash. Neurological:      Mental Status: He is alert and oriented to person, place, and time. Psychiatric:         Mood and Affect: Mood normal.         Behavior: Behavior normal.         Thought Content: Thought content normal.         Judgment: Judgment normal.           Assessment & Plan:   1. Acute recurrent sinusitis, unspecified location (Primary)  -     Cefuroxime Axetil; Take 1 tablet (250 mg total) by mouth 2 (two) times daily for 10 days. Dispense: 20 tablet; Refill: 0   not likely COVID> doin OK but will try abx and reassess ig needed. I am having Dilia Carlsoneral start on cefuroxime.  I am also having him maintain his Calcium Citrate-Vitamin D (CITRACAL + D OR), metoprolol succinate, Immune Globulin (Human), Tab-A-Nick, fexofenadine, PreviDent 5000 Booster Plus, omeprazole, predniSONE, predniSONE, allopurinol, spironolactone, torsemide, and Klor-Con M20. Return if symptoms worsen or fail to improve.     Wanda Tejada MD, 2/11/2022, 12:48 PM

## 2022-02-21 RX ORDER — OMEPRAZOLE 20 MG/1
CAPSULE, DELAYED RELEASE ORAL
Qty: 180 CAPSULE | Refills: 1 | Status: SHIPPED | OUTPATIENT
Start: 2022-02-21

## 2022-02-22 ENCOUNTER — OFFICE VISIT (OUTPATIENT)
Dept: HEMATOLOGY/ONCOLOGY | Facility: HOSPITAL | Age: 77
End: 2022-02-22
Attending: INTERNAL MEDICINE
Payer: MEDICARE

## 2022-02-22 VITALS
BODY MASS INDEX: 27.83 KG/M2 | WEIGHT: 198.81 LBS | SYSTOLIC BLOOD PRESSURE: 124 MMHG | RESPIRATION RATE: 16 BRPM | HEART RATE: 84 BPM | OXYGEN SATURATION: 99 % | HEIGHT: 70.98 IN | TEMPERATURE: 97 F | DIASTOLIC BLOOD PRESSURE: 81 MMHG

## 2022-02-22 DIAGNOSIS — D69.3 IMMUNE THROMBOCYTOPENIC PURPURA (HCC): Primary | ICD-10-CM

## 2022-02-22 DIAGNOSIS — D69.6 THROMBOCYTOPENIA (HCC): ICD-10-CM

## 2022-02-22 DIAGNOSIS — M32.9 LUPUS (HCC): ICD-10-CM

## 2022-02-22 DIAGNOSIS — M33.20 POLYMYOSITIS (HCC): ICD-10-CM

## 2022-02-22 PROCEDURE — 96365 THER/PROPH/DIAG IV INF INIT: CPT

## 2022-02-22 PROCEDURE — 96366 THER/PROPH/DIAG IV INF ADDON: CPT

## 2022-02-22 RX ORDER — DIPHENHYDRAMINE HCL 25 MG
25 CAPSULE ORAL ONCE
Status: CANCELLED | OUTPATIENT
Start: 2022-03-22

## 2022-02-22 RX ORDER — METHYLPREDNISOLONE SODIUM SUCCINATE 40 MG/ML
40 INJECTION, POWDER, LYOPHILIZED, FOR SOLUTION INTRAMUSCULAR; INTRAVENOUS ONCE
Status: CANCELLED | OUTPATIENT
Start: 2022-03-22

## 2022-02-22 RX ORDER — ACETAMINOPHEN 325 MG/1
650 TABLET ORAL ONCE
Status: CANCELLED | OUTPATIENT
Start: 2022-03-22

## 2022-02-22 RX ORDER — FAMOTIDINE 10 MG/ML
20 INJECTION, SOLUTION INTRAVENOUS ONCE
Status: CANCELLED | OUTPATIENT
Start: 2022-03-22

## 2022-02-22 RX ORDER — ACETAMINOPHEN 325 MG/1
650 TABLET ORAL ONCE
Status: COMPLETED | OUTPATIENT
Start: 2022-02-22 | End: 2022-02-22

## 2022-02-22 RX ORDER — DIPHENHYDRAMINE HCL 25 MG
25 CAPSULE ORAL ONCE
Status: COMPLETED | OUTPATIENT
Start: 2022-02-22 | End: 2022-02-22

## 2022-02-22 RX ORDER — DIPHENHYDRAMINE HYDROCHLORIDE 50 MG/ML
25 INJECTION INTRAMUSCULAR; INTRAVENOUS ONCE
Status: CANCELLED | OUTPATIENT
Start: 2022-03-22

## 2022-02-22 RX ORDER — MEPERIDINE HYDROCHLORIDE 25 MG/ML
25 INJECTION INTRAMUSCULAR; INTRAVENOUS; SUBCUTANEOUS ONCE
Status: CANCELLED | OUTPATIENT
Start: 2022-03-22

## 2022-02-22 RX ORDER — METHYLPREDNISOLONE SODIUM SUCCINATE 125 MG/2ML
125 INJECTION, POWDER, LYOPHILIZED, FOR SOLUTION INTRAMUSCULAR; INTRAVENOUS ONCE
Status: CANCELLED | OUTPATIENT
Start: 2022-03-22

## 2022-02-22 RX ADMIN — DIPHENHYDRAMINE HCL 25 MG: 25 MG CAPSULE ORAL at 11:07:00

## 2022-02-22 RX ADMIN — ACETAMINOPHEN 650 MG: 325 TABLET ORAL at 11:07:00

## 2022-02-22 NOTE — PROGRESS NOTES
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 Tolerated well without issue. Will return for same in 4 weeks.      Barriers / Limitations:  None   Comments:    Method:  Brief focused   Comments:    General Topics:  Plan of care reviewed   Comments:    Outcome:  Shows understanding   Comments:

## 2022-03-01 ENCOUNTER — HOSPITAL ENCOUNTER (OUTPATIENT)
Dept: CARDIOLOGY CLINIC | Facility: HOSPITAL | Age: 77
Discharge: HOME | End: 2022-03-01
Attending: NURSE PRACTITIONER
Payer: MEDICARE

## 2022-03-01 ENCOUNTER — HOSPITAL ENCOUNTER (OUTPATIENT)
Dept: LAB | Facility: HOSPITAL | Age: 77
Discharge: HOME | End: 2022-03-01
Attending: NURSE PRACTITIONER
Payer: MEDICARE

## 2022-03-01 VITALS
HEART RATE: 66 BPM | SYSTOLIC BLOOD PRESSURE: 124 MMHG | BODY MASS INDEX: 28 KG/M2 | RESPIRATION RATE: 13 BRPM | WEIGHT: 198.81 LBS | DIASTOLIC BLOOD PRESSURE: 83 MMHG | OXYGEN SATURATION: 100 %

## 2022-03-01 DIAGNOSIS — N18.31 STAGE 3A CHRONIC KIDNEY DISEASE (HCC): ICD-10-CM

## 2022-03-01 DIAGNOSIS — I50.812 CHRONIC RIGHT-SIDED CONGESTIVE HEART FAILURE (HCC): Primary | ICD-10-CM

## 2022-03-01 DIAGNOSIS — G47.33 OSA (OBSTRUCTIVE SLEEP APNEA): ICD-10-CM

## 2022-03-01 DIAGNOSIS — I27.20 PULMONARY HYPERTENSION (HCC): ICD-10-CM

## 2022-03-01 DIAGNOSIS — I50.30 DIASTOLIC HEART FAILURE, UNSPECIFIED HF CHRONICITY (HCC): ICD-10-CM

## 2022-03-01 DIAGNOSIS — I48.21 PERMANENT ATRIAL FIBRILLATION (HCC): ICD-10-CM

## 2022-03-01 DIAGNOSIS — E87.5 HYPERKALEMIA: ICD-10-CM

## 2022-03-01 LAB
ANION GAP SERPL CALC-SCNC: 3 MMOL/L (ref 0–18)
BUN BLD-MCNC: 47 MG/DL (ref 7–18)
CALCIUM BLD-MCNC: 9.1 MG/DL (ref 8.5–10.1)
CHLORIDE SERPL-SCNC: 103 MMOL/L (ref 98–112)
CO2 SERPL-SCNC: 31 MMOL/L (ref 21–32)
CREAT BLD-MCNC: 1.66 MG/DL
OSMOLALITY SERPL CALC.SUM OF ELEC: 297 MOSM/KG (ref 275–295)
POTASSIUM SERPL-SCNC: 5.2 MMOL/L (ref 3.5–5.1)
SODIUM SERPL-SCNC: 137 MMOL/L (ref 136–145)

## 2022-03-01 PROCEDURE — 36415 COLL VENOUS BLD VENIPUNCTURE: CPT | Performed by: NURSE PRACTITIONER

## 2022-03-01 PROCEDURE — 99215 OFFICE O/P EST HI 40 MIN: CPT | Performed by: NURSE PRACTITIONER

## 2022-03-01 PROCEDURE — 80048 BASIC METABOLIC PNL TOTAL CA: CPT | Performed by: NURSE PRACTITIONER

## 2022-03-01 NOTE — PROGRESS NOTES
Patient assessed. No signs or symptoms of shortness of breath, fatigue, chest pain, or edema noted. Weight stable at 198.8 lbs. Reviewed current list of patient's allergies and medication; updated the Electronic Medical Record. Labs ordered to assess kidney function and drawn by [facility]. Reviewed follow-up appointment and Heart Failure discharge instructions with patient. Patient verbalized an understanding. Spent 16-30 minutes with patient who has acute heart failure.

## 2022-03-09 ENCOUNTER — TELEPHONE (OUTPATIENT)
Dept: CARDIOLOGY CLINIC | Facility: HOSPITAL | Age: 77
End: 2022-03-09

## 2022-03-09 ENCOUNTER — OFFICE VISIT (OUTPATIENT)
Dept: NEPHROLOGY | Facility: CLINIC | Age: 77
End: 2022-03-09
Payer: MEDICARE

## 2022-03-09 ENCOUNTER — LAB ENCOUNTER (OUTPATIENT)
Dept: LAB | Facility: HOSPITAL | Age: 77
End: 2022-03-09
Attending: NURSE PRACTITIONER
Payer: MEDICARE

## 2022-03-09 VITALS — BODY MASS INDEX: 34 KG/M2 | DIASTOLIC BLOOD PRESSURE: 58 MMHG | SYSTOLIC BLOOD PRESSURE: 120 MMHG | WEIGHT: 246.38 LBS

## 2022-03-09 DIAGNOSIS — E87.5 HYPERKALEMIA: ICD-10-CM

## 2022-03-09 DIAGNOSIS — N18.30 STAGE 3 CHRONIC KIDNEY DISEASE, UNSPECIFIED WHETHER STAGE 3A OR 3B CKD (HCC): Primary | ICD-10-CM

## 2022-03-09 DIAGNOSIS — I50.812 CHRONIC RIGHT-SIDED CONGESTIVE HEART FAILURE (HCC): ICD-10-CM

## 2022-03-09 DIAGNOSIS — I10 ESSENTIAL HYPERTENSION: ICD-10-CM

## 2022-03-09 LAB
ANION GAP SERPL CALC-SCNC: 3 MMOL/L (ref 0–18)
BUN BLD-MCNC: 41 MG/DL (ref 7–18)
CALCIUM BLD-MCNC: 9.5 MG/DL (ref 8.5–10.1)
CHLORIDE SERPL-SCNC: 103 MMOL/L (ref 98–112)
CO2 SERPL-SCNC: 32 MMOL/L (ref 21–32)
CREAT BLD-MCNC: 1.66 MG/DL
FASTING STATUS PATIENT QL REPORTED: NO
GLUCOSE BLD-MCNC: 98 MG/DL (ref 70–99)
OSMOLALITY SERPL CALC.SUM OF ELEC: 296 MOSM/KG (ref 275–295)
POTASSIUM SERPL-SCNC: 5.5 MMOL/L (ref 3.5–5.1)
SODIUM SERPL-SCNC: 138 MMOL/L (ref 136–145)

## 2022-03-09 PROCEDURE — 36415 COLL VENOUS BLD VENIPUNCTURE: CPT

## 2022-03-09 PROCEDURE — 99214 OFFICE O/P EST MOD 30 MIN: CPT | Performed by: INTERNAL MEDICINE

## 2022-03-09 PROCEDURE — 80048 BASIC METABOLIC PNL TOTAL CA: CPT

## 2022-03-09 NOTE — TELEPHONE ENCOUNTER
Per APN, Ginny Sat. Labs reviewed with worsening potassium level after stopping potassium supplement. Make sure he stopped the supplement, and if so, we need to cut his Spironolactone in half to 12.5mg daily with repeat lab work next week. Spoke with patient, he is not taking any potassium. Patient verbalized an understanding to decrease his spironolactone dose to 12.5 mg po daily. Patient states he will be able to get labs done next week 3/16. APN aware.

## 2022-03-16 ENCOUNTER — LAB ENCOUNTER (OUTPATIENT)
Dept: LAB | Age: 77
End: 2022-03-16
Attending: NURSE PRACTITIONER
Payer: MEDICARE

## 2022-03-16 DIAGNOSIS — E87.5 HYPERKALEMIA: ICD-10-CM

## 2022-03-16 LAB
ANION GAP SERPL CALC-SCNC: 4 MMOL/L (ref 0–18)
BUN BLD-MCNC: 51 MG/DL (ref 7–18)
CALCIUM BLD-MCNC: 9.4 MG/DL (ref 8.5–10.1)
CHLORIDE SERPL-SCNC: 103 MMOL/L (ref 98–112)
CO2 SERPL-SCNC: 31 MMOL/L (ref 21–32)
FASTING STATUS PATIENT QL REPORTED: YES
GLUCOSE BLD-MCNC: 123 MG/DL (ref 70–99)
OSMOLALITY SERPL CALC.SUM OF ELEC: 301 MOSM/KG (ref 275–295)
POTASSIUM SERPL-SCNC: 5.2 MMOL/L (ref 3.5–5.1)
SODIUM SERPL-SCNC: 138 MMOL/L (ref 136–145)

## 2022-03-16 PROCEDURE — 36415 COLL VENOUS BLD VENIPUNCTURE: CPT

## 2022-03-16 PROCEDURE — 80048 BASIC METABOLIC PNL TOTAL CA: CPT

## 2022-03-17 ENCOUNTER — TELEPHONE (OUTPATIENT)
Dept: CARDIOLOGY CLINIC | Facility: HOSPITAL | Age: 77
End: 2022-03-17

## 2022-03-17 NOTE — PROGRESS NOTES
I spoke with Arno Apgar and instructed him to follow a low potassium diet and to stay on the lower dose of spironolactone. I also instructed him to stay off of his supplement. I asked him if he needed any instructions for a low potassium diet. He stated he knows what to eat. He verbalized understanding of instructions.

## 2022-03-22 ENCOUNTER — OFFICE VISIT (OUTPATIENT)
Dept: HEMATOLOGY/ONCOLOGY | Facility: HOSPITAL | Age: 77
End: 2022-03-22
Attending: INTERNAL MEDICINE
Payer: MEDICARE

## 2022-03-22 VITALS
SYSTOLIC BLOOD PRESSURE: 125 MMHG | TEMPERATURE: 97 F | BODY MASS INDEX: 28 KG/M2 | DIASTOLIC BLOOD PRESSURE: 78 MMHG | HEART RATE: 96 BPM | OXYGEN SATURATION: 96 % | RESPIRATION RATE: 18 BRPM | WEIGHT: 201.19 LBS

## 2022-03-22 DIAGNOSIS — M32.9 LUPUS (HCC): ICD-10-CM

## 2022-03-22 DIAGNOSIS — D69.6 THROMBOCYTOPENIA (HCC): ICD-10-CM

## 2022-03-22 DIAGNOSIS — D69.3 IMMUNE THROMBOCYTOPENIC PURPURA (HCC): Primary | ICD-10-CM

## 2022-03-22 DIAGNOSIS — M33.20 POLYMYOSITIS (HCC): ICD-10-CM

## 2022-03-22 PROCEDURE — 96365 THER/PROPH/DIAG IV INF INIT: CPT

## 2022-03-22 PROCEDURE — 96366 THER/PROPH/DIAG IV INF ADDON: CPT

## 2022-03-22 RX ORDER — DIPHENHYDRAMINE HYDROCHLORIDE 50 MG/ML
25 INJECTION INTRAMUSCULAR; INTRAVENOUS ONCE
OUTPATIENT
Start: 2022-04-19

## 2022-03-22 RX ORDER — METHYLPREDNISOLONE SODIUM SUCCINATE 40 MG/ML
40 INJECTION, POWDER, LYOPHILIZED, FOR SOLUTION INTRAMUSCULAR; INTRAVENOUS ONCE
OUTPATIENT
Start: 2022-04-19

## 2022-03-22 RX ORDER — ACETAMINOPHEN 325 MG/1
650 TABLET ORAL ONCE
Status: COMPLETED | OUTPATIENT
Start: 2022-03-22 | End: 2022-03-22

## 2022-03-22 RX ORDER — DIPHENHYDRAMINE HCL 25 MG
25 CAPSULE ORAL ONCE
Status: COMPLETED | OUTPATIENT
Start: 2022-03-22 | End: 2022-03-22

## 2022-03-22 RX ORDER — FAMOTIDINE 10 MG/ML
20 INJECTION, SOLUTION INTRAVENOUS ONCE
OUTPATIENT
Start: 2022-04-19

## 2022-03-22 RX ORDER — METHYLPREDNISOLONE SODIUM SUCCINATE 125 MG/2ML
125 INJECTION, POWDER, LYOPHILIZED, FOR SOLUTION INTRAMUSCULAR; INTRAVENOUS ONCE
OUTPATIENT
Start: 2022-04-19

## 2022-03-22 RX ORDER — MEPERIDINE HYDROCHLORIDE 25 MG/ML
25 INJECTION INTRAMUSCULAR; INTRAVENOUS; SUBCUTANEOUS ONCE
OUTPATIENT
Start: 2022-04-19

## 2022-03-22 RX ORDER — ACETAMINOPHEN 325 MG/1
650 TABLET ORAL ONCE
OUTPATIENT
Start: 2022-04-19

## 2022-03-22 RX ORDER — DIPHENHYDRAMINE HCL 25 MG
25 CAPSULE ORAL ONCE
OUTPATIENT
Start: 2022-04-19

## 2022-03-22 RX ADMIN — ACETAMINOPHEN 650 MG: 325 TABLET ORAL at 11:00:00

## 2022-03-22 RX ADMIN — DIPHENHYDRAMINE HCL 25 MG: 25 MG CAPSULE ORAL at 11:00:00

## 2022-03-22 NOTE — PROGRESS NOTES
Education Record    Learner:  Patient    Disease / Diagnosis: Immune thrombocytopenia purpura, systemic lupus erythematosus    Barriers / Limitations:  None   Comments:    Method:  Brief focused   Comments:    General Topics:  Plan of care reviewed and Fall risk and prevention   Comments:    Outcome:  Shows understanding   Comments:

## 2022-03-29 ENCOUNTER — PATIENT MESSAGE (OUTPATIENT)
Dept: FAMILY MEDICINE CLINIC | Facility: CLINIC | Age: 77
End: 2022-03-29

## 2022-03-29 ENCOUNTER — HOSPITAL ENCOUNTER (OUTPATIENT)
Dept: CARDIOLOGY CLINIC | Facility: HOSPITAL | Age: 77
Discharge: HOME | End: 2022-03-29
Attending: NURSE PRACTITIONER
Payer: MEDICARE

## 2022-03-29 ENCOUNTER — HOSPITAL ENCOUNTER (OUTPATIENT)
Dept: LAB | Facility: HOSPITAL | Age: 77
Discharge: HOME | End: 2022-03-29
Attending: NURSE PRACTITIONER
Payer: MEDICARE

## 2022-03-29 VITALS
WEIGHT: 201 LBS | HEART RATE: 73 BPM | RESPIRATION RATE: 18 BRPM | SYSTOLIC BLOOD PRESSURE: 124 MMHG | OXYGEN SATURATION: 100 % | DIASTOLIC BLOOD PRESSURE: 60 MMHG | BODY MASS INDEX: 28 KG/M2

## 2022-03-29 DIAGNOSIS — I27.20 PULMONARY HYPERTENSION (HCC): ICD-10-CM

## 2022-03-29 DIAGNOSIS — I48.21 PERMANENT ATRIAL FIBRILLATION (HCC): ICD-10-CM

## 2022-03-29 DIAGNOSIS — I50.812 CHRONIC RIGHT-SIDED CONGESTIVE HEART FAILURE (HCC): Primary | ICD-10-CM

## 2022-03-29 DIAGNOSIS — N18.31 STAGE 3A CHRONIC KIDNEY DISEASE (HCC): ICD-10-CM

## 2022-03-29 DIAGNOSIS — I50.812 CHRONIC RIGHT-SIDED CONGESTIVE HEART FAILURE (HCC): ICD-10-CM

## 2022-03-29 LAB
ANION GAP SERPL CALC-SCNC: 1 MMOL/L (ref 0–18)
BUN BLD-MCNC: 35 MG/DL (ref 7–18)
CALCIUM BLD-MCNC: 9.2 MG/DL (ref 8.5–10.1)
CHLORIDE SERPL-SCNC: 105 MMOL/L (ref 98–112)
CO2 SERPL-SCNC: 32 MMOL/L (ref 21–32)
CREAT BLD-MCNC: 1.72 MG/DL
FASTING STATUS PATIENT QL REPORTED: NO
GLUCOSE BLD-MCNC: 138 MG/DL (ref 70–99)
OSMOLALITY SERPL CALC.SUM OF ELEC: 296 MOSM/KG (ref 275–295)
POTASSIUM SERPL-SCNC: 4.8 MMOL/L (ref 3.5–5.1)
SODIUM SERPL-SCNC: 138 MMOL/L (ref 136–145)

## 2022-03-29 PROCEDURE — 80048 BASIC METABOLIC PNL TOTAL CA: CPT | Performed by: NURSE PRACTITIONER

## 2022-03-29 PROCEDURE — 36415 COLL VENOUS BLD VENIPUNCTURE: CPT | Performed by: NURSE PRACTITIONER

## 2022-03-29 PROCEDURE — 99215 OFFICE O/P EST HI 40 MIN: CPT | Performed by: NURSE PRACTITIONER

## 2022-03-29 RX ORDER — BUMETANIDE 0.25 MG/ML
4 INJECTION, SOLUTION INTRAMUSCULAR; INTRAVENOUS ONCE
Status: COMPLETED | OUTPATIENT
Start: 2022-03-29 | End: 2022-03-29

## 2022-03-29 RX ORDER — SPIRONOLACTONE 25 MG/1
12.5 TABLET ORAL DAILY
Qty: 15 TABLET | Refills: 2 | COMMUNITY
Start: 2022-03-29

## 2022-03-29 RX ADMIN — BUMETANIDE 4 MG: 0.25 INJECTION, SOLUTION INTRAMUSCULAR; INTRAVENOUS at 13:41:00

## 2022-03-29 NOTE — TELEPHONE ENCOUNTER
From: Dez Griffin  To: Dexter Pereira MD  Sent: 3/29/2022 2:53 PM CDT  Subject: 2nd Covid 19 booster    Dr. Chayito Pat, Now that the 2nd booster has been authorized by Chema Rincon and I intend to get them unless you have different thoughts. Also, should we get the same vaccine we each got before? Joshua Fernanda had Jose Gresham and I had Desiree Ann.  Thanks, Sterling and MIKE Ng

## 2022-03-29 NOTE — PROGRESS NOTES
Patient assessed. Since his last visit on 3/1, weight up 3 lbs at 201.0 lbs. Admits to feeling bloated today. Mild LE edema noted. Denies shortness of breath. He had an IVIG infusion last week but did not take an extra dose of Torsemide as suggested by Dr. Dipti Davidson because he is worried about potential adverse effects. States he did not gain weight after last week's infusion. APN notified of patient's complaints. Labs ordered and drawn in outpatient lab. Reviewed allergies and list of current medications with patient and updated it in the electronic medical record. IV established per protocol. IVP Bumex 4 mg x1 given. Patient tolerated well. Educated patient on low sodium diet and food choices, fluid restriction of 2 liters, and daily weights. Reviewed follow-up appointments and discharge Heart Failure instructions with patient. Patient verbalized understanding. IV discontinued; catheter intact. Pressure held and gauze applied.

## 2022-03-31 NOTE — H&P
No changes; We will proceed with repeat RHC and O2 shunt run    Louann Saucedo M.D., F.MEGAN.C.C., F.MEGAN.H.MEGAN., F.E.S.C. Medical Director, [facility] AT [address]  [address], [address] [place], [address] [address] [place]  Phone: [phone]  Fax: [phone]  E-mail: [email or web address]    7/18/2022  7:09 AM

## 2022-04-12 ENCOUNTER — OFFICE VISIT (OUTPATIENT)
Dept: RHEUMATOLOGY | Facility: CLINIC | Age: 77
End: 2022-04-12
Payer: MEDICARE

## 2022-04-12 VITALS
TEMPERATURE: 98 F | WEIGHT: 205 LBS | SYSTOLIC BLOOD PRESSURE: 118 MMHG | DIASTOLIC BLOOD PRESSURE: 70 MMHG | BODY MASS INDEX: 28.7 KG/M2 | HEIGHT: 71 IN | OXYGEN SATURATION: 99 % | HEART RATE: 72 BPM

## 2022-04-12 DIAGNOSIS — I43 CARDIOMYOPATHY AS MANIFESTATION OF UNDERLYING DISEASE (HCC): ICD-10-CM

## 2022-04-12 DIAGNOSIS — M33.20 POLYMYOSITIS (HCC): Chronic | ICD-10-CM

## 2022-04-12 DIAGNOSIS — D69.3 IMMUNE THROMBOCYTOPENIC PURPURA (HCC): ICD-10-CM

## 2022-04-12 DIAGNOSIS — M35.1 MCTD (MIXED CONNECTIVE TISSUE DISEASE) (HCC): Primary | ICD-10-CM

## 2022-04-12 DIAGNOSIS — I50.812 CHRONIC RIGHT-SIDED CONGESTIVE HEART FAILURE (HCC): ICD-10-CM

## 2022-04-12 DIAGNOSIS — M10.9 GOUT, UNSPECIFIED CAUSE, UNSPECIFIED CHRONICITY, UNSPECIFIED SITE: ICD-10-CM

## 2022-04-12 PROCEDURE — 99214 OFFICE O/P EST MOD 30 MIN: CPT | Performed by: INTERNAL MEDICINE

## 2022-04-12 RX ORDER — PREDNISONE 1 MG/1
2 TABLET ORAL
Qty: 180 TABLET | Refills: 3 | Status: CANCELLED | OUTPATIENT
Start: 2022-04-12

## 2022-04-12 RX ORDER — PREDNISONE 1 MG/1
5 TABLET ORAL
Qty: 90 TABLET | Refills: 3 | Status: CANCELLED | OUTPATIENT
Start: 2022-04-12

## 2022-04-12 RX ORDER — ALLOPURINOL 300 MG/1
300 TABLET ORAL DAILY
Qty: 90 TABLET | Refills: 3 | Status: CANCELLED | OUTPATIENT
Start: 2022-04-12

## 2022-04-12 NOTE — PATIENT INSTRUCTIONS
Continue prednisone 7 mg/day. If you feel much better you could try to lower the 6 mg/day. IVIG continues monthly because of your thrombocytopenia and autoimmune disease. Tomorrow you will be seen in the cardiac clinic hopefully they can address some of your recent weight gain and fluid retention. Continue allopurinol 300 mg a day to prevent gout. Continue your water pills as ordered by cardiology has your torsemide. Exercise as tolerated. Return to office for recheck 3 months.

## 2022-04-13 ENCOUNTER — HOSPITAL ENCOUNTER (OUTPATIENT)
Dept: CARDIOLOGY CLINIC | Facility: HOSPITAL | Age: 77
Discharge: HOME | End: 2022-04-13
Attending: NURSE PRACTITIONER
Payer: MEDICARE

## 2022-04-13 ENCOUNTER — HOSPITAL ENCOUNTER (OUTPATIENT)
Dept: LAB | Facility: HOSPITAL | Age: 77
Discharge: HOME | End: 2022-04-13
Attending: NURSE PRACTITIONER
Payer: MEDICARE

## 2022-04-13 VITALS
WEIGHT: 205.19 LBS | OXYGEN SATURATION: 98 % | HEART RATE: 63 BPM | BODY MASS INDEX: 29 KG/M2 | SYSTOLIC BLOOD PRESSURE: 114 MMHG | DIASTOLIC BLOOD PRESSURE: 71 MMHG | RESPIRATION RATE: 20 BRPM

## 2022-04-13 DIAGNOSIS — I50.9 CHF (CONGESTIVE HEART FAILURE) (HCC): ICD-10-CM

## 2022-04-13 DIAGNOSIS — I48.20 CHRONIC ATRIAL FIBRILLATION (HCC): ICD-10-CM

## 2022-04-13 DIAGNOSIS — I50.32 CHRONIC DIASTOLIC CONGESTIVE HEART FAILURE (HCC): Primary | ICD-10-CM

## 2022-04-13 DIAGNOSIS — I50.812 CHRONIC RIGHT-SIDED CONGESTIVE HEART FAILURE (HCC): ICD-10-CM

## 2022-04-13 DIAGNOSIS — N18.31 STAGE 3A CHRONIC KIDNEY DISEASE (HCC): ICD-10-CM

## 2022-04-13 DIAGNOSIS — I27.20 PULMONARY HTN (HCC): ICD-10-CM

## 2022-04-13 LAB
ANION GAP SERPL CALC-SCNC: 5 MMOL/L (ref 0–18)
BUN BLD-MCNC: 33 MG/DL (ref 7–18)
CALCIUM BLD-MCNC: 8.9 MG/DL (ref 8.5–10.1)
CHLORIDE SERPL-SCNC: 104 MMOL/L (ref 98–112)
CO2 SERPL-SCNC: 31 MMOL/L (ref 21–32)
CREAT BLD-MCNC: 1.42 MG/DL
ERYTHROCYTE [DISTWIDTH] IN BLOOD BY AUTOMATED COUNT: 15.1 %
FASTING STATUS PATIENT QL REPORTED: NO
GLUCOSE BLD-MCNC: 124 MG/DL (ref 70–99)
HCT VFR BLD AUTO: 41.4 %
HGB BLD-MCNC: 13.3 G/DL
MCH RBC QN AUTO: 36 PG (ref 26–34)
MCHC RBC AUTO-ENTMCNC: 32.1 G/DL (ref 31–37)
MCV RBC AUTO: 112.2 FL
OSMOLALITY SERPL CALC.SUM OF ELEC: 299 MOSM/KG (ref 275–295)
PLATELET # BLD AUTO: 108 10(3)UL (ref 150–450)
POTASSIUM SERPL-SCNC: 4.7 MMOL/L (ref 3.5–5.1)
RBC # BLD AUTO: 3.69 X10(6)UL
SODIUM SERPL-SCNC: 140 MMOL/L (ref 136–145)
WBC # BLD AUTO: 7.4 X10(3) UL (ref 4–11)

## 2022-04-13 PROCEDURE — 96375 TX/PRO/DX INJ NEW DRUG ADDON: CPT

## 2022-04-13 PROCEDURE — 80048 BASIC METABOLIC PNL TOTAL CA: CPT | Performed by: NURSE PRACTITIONER

## 2022-04-13 PROCEDURE — S0171 BUMETANIDE 0.5 MG: HCPCS | Performed by: NURSE PRACTITIONER

## 2022-04-13 PROCEDURE — 36415 COLL VENOUS BLD VENIPUNCTURE: CPT | Performed by: NURSE PRACTITIONER

## 2022-04-13 PROCEDURE — 85027 COMPLETE CBC AUTOMATED: CPT | Performed by: NURSE PRACTITIONER

## 2022-04-13 PROCEDURE — 96374 THER/PROPH/DIAG INJ IV PUSH: CPT

## 2022-04-13 PROCEDURE — 99213 OFFICE O/P EST LOW 20 MIN: CPT

## 2022-04-13 RX ORDER — BUMETANIDE 0.25 MG/ML
4 INJECTION, SOLUTION INTRAMUSCULAR; INTRAVENOUS ONCE
Status: COMPLETED | OUTPATIENT
Start: 2022-04-13 | End: 2022-04-13

## 2022-04-13 RX ORDER — TORSEMIDE 20 MG/1
TABLET ORAL
Qty: 130 TABLET | Refills: 5 | Status: SHIPPED | OUTPATIENT
Start: 2022-04-13

## 2022-04-13 RX ADMIN — BUMETANIDE 4 MG: 0.25 INJECTION, SOLUTION INTRAMUSCULAR; INTRAVENOUS at 16:00:00

## 2022-04-13 NOTE — PROGRESS NOTES
Patient assessed with wife present. Pt. complaining of weight gain, abdominal bloat and bilateral lower leg edema. Weight 205lbs. APN notified of above. Labs ordered and drawn by [facility]. Reviewed allergies and list of current medications with patient and updated it in the Electronic Medical Record. IV established per protocol. Diuril 500 mg IVP and Bumex 4 mg IVP given. Patient tolerated it well. Educated patient on low sodium diet and food choices, fluid restriction of 2 liters, and daily weights. Reviewed follow-up appointments and discharge Heart Failure instructions with patient. Patient verbalized an understanding. IV discontinued; catheter intact. Pressure held and gauze applied. Spent 31-60 minutes with patient who has acute heart failure.       Will RTC in 2 weeks

## 2022-04-19 ENCOUNTER — OFFICE VISIT (OUTPATIENT)
Dept: HEMATOLOGY/ONCOLOGY | Facility: HOSPITAL | Age: 77
End: 2022-04-19
Attending: INTERNAL MEDICINE
Payer: MEDICARE

## 2022-04-19 VITALS
SYSTOLIC BLOOD PRESSURE: 111 MMHG | OXYGEN SATURATION: 98 % | WEIGHT: 205.38 LBS | HEIGHT: 70.98 IN | DIASTOLIC BLOOD PRESSURE: 68 MMHG | BODY MASS INDEX: 28.75 KG/M2 | TEMPERATURE: 97 F | HEART RATE: 76 BPM | RESPIRATION RATE: 18 BRPM

## 2022-04-19 DIAGNOSIS — D69.3 IMMUNE THROMBOCYTOPENIC PURPURA (HCC): Primary | ICD-10-CM

## 2022-04-19 DIAGNOSIS — M33.20 POLYMYOSITIS (HCC): ICD-10-CM

## 2022-04-19 DIAGNOSIS — M32.9 LUPUS (HCC): ICD-10-CM

## 2022-04-19 DIAGNOSIS — D69.6 THROMBOCYTOPENIA (HCC): ICD-10-CM

## 2022-04-19 PROCEDURE — 96366 THER/PROPH/DIAG IV INF ADDON: CPT

## 2022-04-19 PROCEDURE — 96365 THER/PROPH/DIAG IV INF INIT: CPT

## 2022-04-19 RX ORDER — DIPHENHYDRAMINE HCL 25 MG
25 CAPSULE ORAL ONCE
OUTPATIENT
Start: 2022-05-17

## 2022-04-19 RX ORDER — METHYLPREDNISOLONE SODIUM SUCCINATE 125 MG/2ML
125 INJECTION, POWDER, LYOPHILIZED, FOR SOLUTION INTRAMUSCULAR; INTRAVENOUS ONCE
OUTPATIENT
Start: 2022-05-17

## 2022-04-19 RX ORDER — ACETAMINOPHEN 325 MG/1
650 TABLET ORAL ONCE
OUTPATIENT
Start: 2022-05-17

## 2022-04-19 RX ORDER — MEPERIDINE HYDROCHLORIDE 25 MG/ML
25 INJECTION INTRAMUSCULAR; INTRAVENOUS; SUBCUTANEOUS ONCE
OUTPATIENT
Start: 2022-05-17

## 2022-04-19 RX ORDER — ACETAMINOPHEN 325 MG/1
650 TABLET ORAL ONCE
Status: COMPLETED | OUTPATIENT
Start: 2022-04-19 | End: 2022-04-19

## 2022-04-19 RX ORDER — FAMOTIDINE 10 MG/ML
20 INJECTION, SOLUTION INTRAVENOUS ONCE
OUTPATIENT
Start: 2022-05-17

## 2022-04-19 RX ORDER — DIPHENHYDRAMINE HCL 25 MG
25 CAPSULE ORAL ONCE
Status: COMPLETED | OUTPATIENT
Start: 2022-04-19 | End: 2022-04-19

## 2022-04-19 RX ORDER — METHYLPREDNISOLONE SODIUM SUCCINATE 40 MG/ML
40 INJECTION, POWDER, LYOPHILIZED, FOR SOLUTION INTRAMUSCULAR; INTRAVENOUS ONCE
OUTPATIENT
Start: 2022-05-17

## 2022-04-19 RX ORDER — DIPHENHYDRAMINE HYDROCHLORIDE 50 MG/ML
25 INJECTION INTRAMUSCULAR; INTRAVENOUS ONCE
OUTPATIENT
Start: 2022-05-17

## 2022-04-19 RX ADMIN — DIPHENHYDRAMINE HCL 25 MG: 25 MG CAPSULE ORAL at 11:09:00

## 2022-04-19 RX ADMIN — ACETAMINOPHEN 650 MG: 325 TABLET ORAL at 11:09:00

## 2022-04-19 NOTE — PROGRESS NOTES
Education Record    Learner:  Patient    Disease / Diagnosis: IVIG.     Barriers / Limitations:  None   Comments:    Method:  Discussion   Comments:    General Topics:  Plan of care reviewed   Comments:    Outcome:  Shows understanding   Comments:

## 2022-04-22 ENCOUNTER — HOSPITAL ENCOUNTER (OUTPATIENT)
Dept: LAB | Facility: HOSPITAL | Age: 77
Discharge: HOME | End: 2022-04-22
Attending: NURSE PRACTITIONER
Payer: MEDICARE

## 2022-04-22 ENCOUNTER — HOSPITAL ENCOUNTER (OUTPATIENT)
Dept: CARDIOLOGY CLINIC | Facility: HOSPITAL | Age: 77
Discharge: HOME | End: 2022-04-22
Attending: NURSE PRACTITIONER
Payer: MEDICARE

## 2022-04-22 VITALS
BODY MASS INDEX: 29 KG/M2 | RESPIRATION RATE: 34 BRPM | OXYGEN SATURATION: 100 % | WEIGHT: 205.38 LBS | DIASTOLIC BLOOD PRESSURE: 76 MMHG | HEART RATE: 89 BPM | SYSTOLIC BLOOD PRESSURE: 131 MMHG

## 2022-04-22 DIAGNOSIS — I50.33 ACUTE ON CHRONIC DIASTOLIC HEART FAILURE (HCC): ICD-10-CM

## 2022-04-22 DIAGNOSIS — G47.33 OSA (OBSTRUCTIVE SLEEP APNEA): ICD-10-CM

## 2022-04-22 DIAGNOSIS — I48.21 PERMANENT ATRIAL FIBRILLATION (HCC): ICD-10-CM

## 2022-04-22 DIAGNOSIS — N18.31 STAGE 3A CHRONIC KIDNEY DISEASE (HCC): ICD-10-CM

## 2022-04-22 DIAGNOSIS — I50.32 CHRONIC DIASTOLIC CONGESTIVE HEART FAILURE (HCC): ICD-10-CM

## 2022-04-22 LAB
ANION GAP SERPL CALC-SCNC: 5 MMOL/L (ref 0–18)
BUN BLD-MCNC: 28 MG/DL (ref 7–18)
CALCIUM BLD-MCNC: 9.1 MG/DL (ref 8.5–10.1)
CHLORIDE SERPL-SCNC: 104 MMOL/L (ref 98–112)
CO2 SERPL-SCNC: 31 MMOL/L (ref 21–32)
CREAT BLD-MCNC: 1.59 MG/DL
GLUCOSE BLD-MCNC: 128 MG/DL (ref 70–99)
NT-PROBNP SERPL-MCNC: 3210 PG/ML (ref ?–450)
OSMOLALITY SERPL CALC.SUM OF ELEC: 297 MOSM/KG (ref 275–295)
POTASSIUM SERPL-SCNC: 4.8 MMOL/L (ref 3.5–5.1)
SODIUM SERPL-SCNC: 140 MMOL/L (ref 136–145)

## 2022-04-22 PROCEDURE — 83880 ASSAY OF NATRIURETIC PEPTIDE: CPT | Performed by: NURSE PRACTITIONER

## 2022-04-22 PROCEDURE — 99215 OFFICE O/P EST HI 40 MIN: CPT | Performed by: CLINICAL NURSE SPECIALIST

## 2022-04-22 PROCEDURE — 36415 COLL VENOUS BLD VENIPUNCTURE: CPT | Performed by: NURSE PRACTITIONER

## 2022-04-22 PROCEDURE — 80048 BASIC METABOLIC PNL TOTAL CA: CPT | Performed by: NURSE PRACTITIONER

## 2022-04-22 RX ORDER — BUMETANIDE 0.25 MG/ML
4 INJECTION, SOLUTION INTRAMUSCULAR; INTRAVENOUS ONCE
Status: COMPLETED | OUTPATIENT
Start: 2022-04-22 | End: 2022-04-22

## 2022-04-22 RX ORDER — TORSEMIDE 20 MG/1
TABLET ORAL
Qty: 130 TABLET | Refills: 5 | COMMUNITY
Start: 2022-04-22

## 2022-04-22 RX ADMIN — BUMETANIDE 4 MG: 0.25 INJECTION, SOLUTION INTRAMUSCULAR; INTRAVENOUS at 16:20:00

## 2022-04-22 NOTE — PROGRESS NOTES
Patient assessed with wife present. Pt. complaining of weight gain and abdominal bloat. Trace edema to bilateral lower legs. Weight stable 205.4 lbs. States he lost 4 lbs last week the day after he was seen in [facility] and received IV diuretics. APN notified of above Labs ordered and drawn by [facility]. Reviewed allergies and list of current medications with patient and updated it in the Electronic Medical Record. IV established per protocol. IV Bumex 4 mg IVP and diuril 500 mg IVP given. Patient tolerated it well. Educated patient on low sodium diet and food choices, fluid restriction of 2 liters, and daily weights. Reviewed follow-up appointments and discharge Heart Failure instructions with patient. Patient verbalized an understanding. IV discontinued; catheter intact. Pressure held and gauze applied. Spent 31-60 minutes with patient who has acute heart failure.       Six minute walk completed    6 Minute Walk    Results at Rest  Resting SpO2 (minimum): 99 %  Resting HR (max): 87  Resting Oxygen Device: None          6 Minute Walk     Number of laps made: 21 laps  Distance Ambulated (ft): 1050 ft  Level: Level II  Number of times stopped: 0  Duration of rest times: 0 minutes    Recovery Results     Recovery SpO2: 98 %  Recovery HR: 106          6 Min Walk Results  Exertion Scale: Fairly light  Angina Scale: None  Dyspnea Scale: Mild, noticeable

## 2022-04-28 RX ORDER — TORSEMIDE 20 MG/1
TABLET ORAL
Qty: 130 TABLET | Refills: 5 | Status: SHIPPED | OUTPATIENT
Start: 2022-04-28

## 2022-05-02 ENCOUNTER — HOSPITAL ENCOUNTER (OUTPATIENT)
Dept: LAB | Facility: HOSPITAL | Age: 77
Discharge: HOME | End: 2022-05-02
Attending: NURSE PRACTITIONER
Payer: MEDICARE

## 2022-05-02 ENCOUNTER — HOSPITAL ENCOUNTER (OUTPATIENT)
Dept: CARDIOLOGY CLINIC | Facility: HOSPITAL | Age: 77
End: 2022-05-02
Attending: NURSE PRACTITIONER
Payer: MEDICARE

## 2022-05-02 VITALS
BODY MASS INDEX: 28 KG/M2 | RESPIRATION RATE: 14 BRPM | SYSTOLIC BLOOD PRESSURE: 123 MMHG | WEIGHT: 202.81 LBS | HEART RATE: 69 BPM | DIASTOLIC BLOOD PRESSURE: 72 MMHG | OXYGEN SATURATION: 99 %

## 2022-05-02 DIAGNOSIS — G47.33 OSA (OBSTRUCTIVE SLEEP APNEA): ICD-10-CM

## 2022-05-02 DIAGNOSIS — I50.32 CHRONIC DIASTOLIC CONGESTIVE HEART FAILURE (HCC): ICD-10-CM

## 2022-05-02 DIAGNOSIS — I48.21 PERMANENT ATRIAL FIBRILLATION (HCC): ICD-10-CM

## 2022-05-02 DIAGNOSIS — I27.20 PULMONARY HTN (HCC): ICD-10-CM

## 2022-05-02 DIAGNOSIS — I50.812 CHRONIC RIGHT-SIDED CONGESTIVE HEART FAILURE (HCC): ICD-10-CM

## 2022-05-02 DIAGNOSIS — N18.31 STAGE 3A CHRONIC KIDNEY DISEASE (HCC): ICD-10-CM

## 2022-05-02 DIAGNOSIS — I50.32 CHRONIC DIASTOLIC (CONGESTIVE) HEART FAILURE (HCC): Primary | ICD-10-CM

## 2022-05-02 LAB
ANION GAP SERPL CALC-SCNC: 3 MMOL/L (ref 0–18)
BUN BLD-MCNC: 35 MG/DL (ref 7–18)
CALCIUM BLD-MCNC: 9.3 MG/DL (ref 8.5–10.1)
CHLORIDE SERPL-SCNC: 106 MMOL/L (ref 98–112)
CO2 SERPL-SCNC: 32 MMOL/L (ref 21–32)
CREAT BLD-MCNC: 1.54 MG/DL
GLUCOSE BLD-MCNC: 120 MG/DL (ref 70–99)
OSMOLALITY SERPL CALC.SUM OF ELEC: 301 MOSM/KG (ref 275–295)
POTASSIUM SERPL-SCNC: 4.6 MMOL/L (ref 3.5–5.1)
SODIUM SERPL-SCNC: 141 MMOL/L (ref 136–145)

## 2022-05-02 PROCEDURE — 80048 BASIC METABOLIC PNL TOTAL CA: CPT | Performed by: NURSE PRACTITIONER

## 2022-05-02 PROCEDURE — 36415 COLL VENOUS BLD VENIPUNCTURE: CPT | Performed by: NURSE PRACTITIONER

## 2022-05-02 PROCEDURE — 99215 OFFICE O/P EST HI 40 MIN: CPT | Performed by: NURSE PRACTITIONER

## 2022-05-02 RX ORDER — BUMETANIDE 0.25 MG/ML
4 INJECTION, SOLUTION INTRAMUSCULAR; INTRAVENOUS ONCE
Status: COMPLETED | OUTPATIENT
Start: 2022-05-02 | End: 2022-05-02

## 2022-05-02 RX ADMIN — BUMETANIDE 4 MG: 0.25 INJECTION, SOLUTION INTRAMUSCULAR; INTRAVENOUS at 15:36:00

## 2022-05-02 NOTE — PROGRESS NOTES
Patient assessed. Patient complaining of abdominal bloating and bilateral lower extremity edema with +1 pitting edema noted. Weight down 3 lbs at 302.8 lbs. Patient reports having difficulty wearing his CPAP at night. He is generally a side sleeper, but it has been difficult after his right ear surgery. He also states that when he moves in his sleep he gets increased air that feels like a \"hurricane. \" APN notified of all the above information. Labs ordered and drawn by [facility]. Reviewed allergies and list of current medications with patient and updated it in the Electronic Medical Record. IV established per protocol.  mg diuril given and IVP 4 mg bumex given. Patient tolerated it well. Educated patient on low sodium diet and food choices, fluid restriction of 2 liters, and daily weights. Reviewed follow-up appointments and discharge Heart Failure instructions with patient. Patient verbalized an understanding. IV discontinued; catheter intact. Pressure held and gauze applied. Spent 31-60 minutes with patient who has acute heart failure.

## 2022-05-05 ENCOUNTER — PATIENT OUTREACH (OUTPATIENT)
Dept: CASE MANAGEMENT | Age: 77
End: 2022-05-05

## 2022-05-10 ENCOUNTER — HOSPITAL ENCOUNTER (OUTPATIENT)
Dept: CV DIAGNOSTICS | Facility: HOSPITAL | Age: 77
Discharge: HOME | End: 2022-05-10
Attending: NURSE PRACTITIONER
Payer: MEDICARE

## 2022-05-10 DIAGNOSIS — I50.32 CHRONIC DIASTOLIC (CONGESTIVE) HEART FAILURE (HCC): ICD-10-CM

## 2022-05-10 PROCEDURE — 93306 TTE W/DOPPLER COMPLETE: CPT | Performed by: NURSE PRACTITIONER

## 2022-05-11 ENCOUNTER — HOSPITAL ENCOUNTER (OUTPATIENT)
Dept: CARDIOLOGY CLINIC | Facility: HOSPITAL | Age: 77
Discharge: HOME | End: 2022-05-11
Attending: NURSE PRACTITIONER
Payer: MEDICARE

## 2022-05-11 ENCOUNTER — HOSPITAL ENCOUNTER (OUTPATIENT)
Dept: LAB | Facility: HOSPITAL | Age: 77
Discharge: HOME | End: 2022-05-11
Attending: NURSE PRACTITIONER
Payer: MEDICARE

## 2022-05-11 VITALS
WEIGHT: 201.19 LBS | BODY MASS INDEX: 28 KG/M2 | DIASTOLIC BLOOD PRESSURE: 65 MMHG | HEART RATE: 75 BPM | SYSTOLIC BLOOD PRESSURE: 138 MMHG | OXYGEN SATURATION: 100 % | RESPIRATION RATE: 15 BRPM

## 2022-05-11 DIAGNOSIS — I27.20 PULMONARY HTN (HCC): ICD-10-CM

## 2022-05-11 DIAGNOSIS — I48.20 CHRONIC ATRIAL FIBRILLATION (HCC): ICD-10-CM

## 2022-05-11 DIAGNOSIS — I50.32 CHRONIC DIASTOLIC (CONGESTIVE) HEART FAILURE (HCC): Primary | ICD-10-CM

## 2022-05-11 DIAGNOSIS — I50.32 CHRONIC DIASTOLIC (CONGESTIVE) HEART FAILURE (HCC): ICD-10-CM

## 2022-05-11 LAB
ANION GAP SERPL CALC-SCNC: 4 MMOL/L (ref 0–18)
BUN BLD-MCNC: 35 MG/DL (ref 7–18)
CALCIUM BLD-MCNC: 9 MG/DL (ref 8.5–10.1)
CHLORIDE SERPL-SCNC: 104 MMOL/L (ref 98–112)
CO2 SERPL-SCNC: 31 MMOL/L (ref 21–32)
CREAT BLD-MCNC: 1.6 MG/DL
FASTING STATUS PATIENT QL REPORTED: NO
GLUCOSE BLD-MCNC: 105 MG/DL (ref 70–99)
OSMOLALITY SERPL CALC.SUM OF ELEC: 296 MOSM/KG (ref 275–295)
POTASSIUM SERPL-SCNC: 5 MMOL/L (ref 3.5–5.1)
SODIUM SERPL-SCNC: 139 MMOL/L (ref 136–145)

## 2022-05-11 PROCEDURE — 80048 BASIC METABOLIC PNL TOTAL CA: CPT | Performed by: NURSE PRACTITIONER

## 2022-05-11 PROCEDURE — 99215 OFFICE O/P EST HI 40 MIN: CPT | Performed by: NURSE PRACTITIONER

## 2022-05-11 PROCEDURE — 36415 COLL VENOUS BLD VENIPUNCTURE: CPT | Performed by: NURSE PRACTITIONER

## 2022-05-11 NOTE — PROGRESS NOTES
Patient assessed. Patient complaining of mild abdominal bloating and mild lower extremity edema noted. Patient reports his shortness of breath is at baseline. Weight stable at 201 lbs. APN notified of all the above information. Labs ordered and drawn by [facility]. Reviewed allergies and list of current medications with patient and updated it in the Electronic Medical Record. Educated patient on low sodium diet and food choices, fluid restriction of 2 liters, and daily weights. Reviewed follow-up appointments and discharge Heart Failure instructions with patient. Patient verbalized an understanding. Spent 16-30 minutes with patient who has acute heart failure.

## 2022-05-13 ENCOUNTER — OFFICE VISIT (OUTPATIENT)
Dept: HEMATOLOGY/ONCOLOGY | Facility: HOSPITAL | Age: 77
End: 2022-05-13
Attending: INTERNAL MEDICINE
Payer: MEDICARE

## 2022-05-13 VITALS
OXYGEN SATURATION: 100 % | HEIGHT: 70.98 IN | RESPIRATION RATE: 18 BRPM | BODY MASS INDEX: 28 KG/M2 | SYSTOLIC BLOOD PRESSURE: 107 MMHG | DIASTOLIC BLOOD PRESSURE: 68 MMHG | HEART RATE: 84 BPM | TEMPERATURE: 97 F | WEIGHT: 200 LBS

## 2022-05-13 DIAGNOSIS — D69.3 IMMUNE THROMBOCYTOPENIC PURPURA (HCC): ICD-10-CM

## 2022-05-13 DIAGNOSIS — M32.19 OTHER SYSTEMIC LUPUS ERYTHEMATOSUS WITH OTHER ORGAN INVOLVEMENT (HCC): ICD-10-CM

## 2022-05-13 DIAGNOSIS — D61.818 PANCYTOPENIA (HCC): ICD-10-CM

## 2022-05-13 DIAGNOSIS — M33.20 POLYMYOSITIS (HCC): ICD-10-CM

## 2022-05-13 LAB
BASOPHILS # BLD AUTO: 0.03 X10(3) UL (ref 0–0.2)
BASOPHILS NFR BLD AUTO: 0.5 %
EOSINOPHIL # BLD AUTO: 0.1 X10(3) UL (ref 0–0.7)
EOSINOPHIL NFR BLD AUTO: 1.7 %
ERYTHROCYTE [DISTWIDTH] IN BLOOD BY AUTOMATED COUNT: 15.5 %
HCT VFR BLD AUTO: 42.1 %
HGB BLD-MCNC: 13.2 G/DL
IMM GRANULOCYTES # BLD AUTO: 0.02 X10(3) UL (ref 0–1)
IMM GRANULOCYTES NFR BLD: 0.3 %
LYMPHOCYTES # BLD AUTO: 0.88 X10(3) UL (ref 1–4)
LYMPHOCYTES NFR BLD AUTO: 14.5 %
MCH RBC QN AUTO: 34.6 PG (ref 26–34)
MCHC RBC AUTO-ENTMCNC: 31.4 G/DL (ref 31–37)
MCV RBC AUTO: 110.2 FL
MONOCYTES # BLD AUTO: 0.59 X10(3) UL (ref 0.1–1)
MONOCYTES NFR BLD AUTO: 9.8 %
NEUTROPHILS # BLD AUTO: 4.43 X10 (3) UL (ref 1.5–7.7)
NEUTROPHILS # BLD AUTO: 4.43 X10(3) UL (ref 1.5–7.7)
NEUTROPHILS NFR BLD AUTO: 73.2 %
PLATELET # BLD AUTO: 101 10(3)UL (ref 150–450)
RBC # BLD AUTO: 3.82 X10(6)UL
WBC # BLD AUTO: 6.1 X10(3) UL (ref 4–11)

## 2022-05-13 PROCEDURE — 99214 OFFICE O/P EST MOD 30 MIN: CPT | Performed by: INTERNAL MEDICINE

## 2022-05-13 NOTE — PROGRESS NOTES
Outpatient / Education Record    Learner:   patient    Problems related to:    Disease/diagnosis    Barriers / Limitations:   None    Interventions/Method  Brief focused discussion, reinforcement  Medications/plan of care    Outcomes:  shows understanding    AVS provided and follow up reviewed. Pt instructed to call office for concerns.

## 2022-05-19 ENCOUNTER — OFFICE VISIT (OUTPATIENT)
Dept: HEMATOLOGY/ONCOLOGY | Facility: HOSPITAL | Age: 77
End: 2022-05-19
Attending: INTERNAL MEDICINE
Payer: MEDICARE

## 2022-05-19 VITALS
OXYGEN SATURATION: 99 % | BODY MASS INDEX: 28.02 KG/M2 | WEIGHT: 200.19 LBS | SYSTOLIC BLOOD PRESSURE: 112 MMHG | TEMPERATURE: 97 F | HEIGHT: 70.98 IN | HEART RATE: 62 BPM | RESPIRATION RATE: 18 BRPM | DIASTOLIC BLOOD PRESSURE: 76 MMHG

## 2022-05-19 DIAGNOSIS — D69.6 THROMBOCYTOPENIA (HCC): ICD-10-CM

## 2022-05-19 DIAGNOSIS — M33.20 POLYMYOSITIS (HCC): ICD-10-CM

## 2022-05-19 DIAGNOSIS — D69.3 IMMUNE THROMBOCYTOPENIC PURPURA (HCC): Primary | ICD-10-CM

## 2022-05-19 DIAGNOSIS — M32.9 LUPUS (HCC): ICD-10-CM

## 2022-05-19 PROCEDURE — 96365 THER/PROPH/DIAG IV INF INIT: CPT

## 2022-05-19 PROCEDURE — 96366 THER/PROPH/DIAG IV INF ADDON: CPT

## 2022-05-19 RX ORDER — DIPHENHYDRAMINE HCL 25 MG
25 CAPSULE ORAL ONCE
Status: CANCELLED | OUTPATIENT
Start: 2022-05-19

## 2022-05-19 RX ORDER — DIPHENHYDRAMINE HCL 25 MG
25 CAPSULE ORAL ONCE
Status: COMPLETED | OUTPATIENT
Start: 2022-05-19 | End: 2022-05-19

## 2022-05-19 RX ORDER — METHYLPREDNISOLONE SODIUM SUCCINATE 125 MG/2ML
125 INJECTION, POWDER, LYOPHILIZED, FOR SOLUTION INTRAMUSCULAR; INTRAVENOUS ONCE
OUTPATIENT
Start: 2022-05-19

## 2022-05-19 RX ORDER — FAMOTIDINE 10 MG/ML
20 INJECTION, SOLUTION INTRAVENOUS ONCE
OUTPATIENT
Start: 2022-05-19

## 2022-05-19 RX ORDER — ACETAMINOPHEN 325 MG/1
650 TABLET ORAL ONCE
Status: CANCELLED | OUTPATIENT
Start: 2022-05-19

## 2022-05-19 RX ORDER — ACETAMINOPHEN 325 MG/1
650 TABLET ORAL ONCE
Status: COMPLETED | OUTPATIENT
Start: 2022-05-19 | End: 2022-05-19

## 2022-05-19 RX ORDER — DIPHENHYDRAMINE HYDROCHLORIDE 50 MG/ML
25 INJECTION INTRAMUSCULAR; INTRAVENOUS ONCE
OUTPATIENT
Start: 2022-05-19

## 2022-05-19 RX ORDER — METHYLPREDNISOLONE SODIUM SUCCINATE 40 MG/ML
40 INJECTION, POWDER, LYOPHILIZED, FOR SOLUTION INTRAMUSCULAR; INTRAVENOUS ONCE
OUTPATIENT
Start: 2022-05-19

## 2022-05-19 RX ORDER — MEPERIDINE HYDROCHLORIDE 25 MG/ML
25 INJECTION INTRAMUSCULAR; INTRAVENOUS; SUBCUTANEOUS ONCE
OUTPATIENT
Start: 2022-05-19

## 2022-05-19 RX ADMIN — DIPHENHYDRAMINE HCL 25 MG: 25 MG CAPSULE ORAL at 11:11:00

## 2022-05-19 RX ADMIN — ACETAMINOPHEN 650 MG: 325 TABLET ORAL at 11:11:00

## 2022-05-19 NOTE — PROGRESS NOTES
Education Record    Learner:  Patient    Disease / Diagnosis: polymyositis    Barriers / Limitations:  None    Method:  Brief focused, printed material and  reinforcement    General Topics:  Plan of care reviewed    Outcome:  Shows understanding      Here for IVIG. Gets monthly, tolerating well. Last dose in order from rheumatology. Pt aware, will call doctor for new order to set up next appt.

## 2022-05-20 ENCOUNTER — PATIENT MESSAGE (OUTPATIENT)
Dept: RHEUMATOLOGY | Facility: CLINIC | Age: 77
End: 2022-05-20

## 2022-05-23 NOTE — TELEPHONE ENCOUNTER
From: Montana Hudson  To: Mary Garcias MD  Sent: 5/23/5754 12:48 PM CDT  Subject: Ivig order    Dr. Jackman Innocent, I assume you want me to continue getting IVIG monthly infusions. Yesterday I had the last infusion on the current order. Please put in another order if you think I should continue. Doing well and hate to break up the party.     Thanks, Yennifer Elder

## 2022-05-26 ENCOUNTER — TELEPHONE (OUTPATIENT)
Dept: HEMATOLOGY/ONCOLOGY | Facility: HOSPITAL | Age: 77
End: 2022-05-26

## 2022-05-26 RX ORDER — MEPERIDINE HYDROCHLORIDE 25 MG/ML
25 INJECTION INTRAMUSCULAR; INTRAVENOUS; SUBCUTANEOUS ONCE
OUTPATIENT
Start: 2022-05-26

## 2022-05-26 RX ORDER — METHYLPREDNISOLONE SODIUM SUCCINATE 125 MG/2ML
125 INJECTION, POWDER, LYOPHILIZED, FOR SOLUTION INTRAMUSCULAR; INTRAVENOUS ONCE
OUTPATIENT
Start: 2022-05-26

## 2022-05-26 RX ORDER — DIPHENHYDRAMINE HYDROCHLORIDE 50 MG/ML
25 INJECTION INTRAMUSCULAR; INTRAVENOUS ONCE
Status: CANCELLED | OUTPATIENT
Start: 2022-05-26

## 2022-05-26 RX ORDER — FAMOTIDINE 10 MG/ML
20 INJECTION, SOLUTION INTRAVENOUS ONCE
OUTPATIENT
Start: 2022-05-26

## 2022-05-26 RX ORDER — DIPHENHYDRAMINE HCL 25 MG
25 CAPSULE ORAL ONCE
OUTPATIENT
Start: 2022-05-26

## 2022-05-26 RX ORDER — FAMOTIDINE 10 MG/ML
20 INJECTION, SOLUTION INTRAVENOUS ONCE
Status: CANCELLED | OUTPATIENT
Start: 2022-05-26

## 2022-05-26 RX ORDER — METHYLPREDNISOLONE SODIUM SUCCINATE 125 MG/2ML
125 INJECTION, POWDER, LYOPHILIZED, FOR SOLUTION INTRAMUSCULAR; INTRAVENOUS ONCE
Status: CANCELLED | OUTPATIENT
Start: 2022-05-26

## 2022-05-26 RX ORDER — DIPHENHYDRAMINE HCL 25 MG
25 CAPSULE ORAL ONCE
Status: CANCELLED | OUTPATIENT
Start: 2022-05-26 | End: 2022-05-26

## 2022-05-26 RX ORDER — METHYLPREDNISOLONE SODIUM SUCCINATE 40 MG/ML
40 INJECTION, POWDER, LYOPHILIZED, FOR SOLUTION INTRAMUSCULAR; INTRAVENOUS ONCE
OUTPATIENT
Start: 2022-05-26

## 2022-05-26 RX ORDER — METHYLPREDNISOLONE SODIUM SUCCINATE 40 MG/ML
40 INJECTION, POWDER, LYOPHILIZED, FOR SOLUTION INTRAMUSCULAR; INTRAVENOUS ONCE
Status: CANCELLED | OUTPATIENT
Start: 2022-05-26

## 2022-05-26 RX ORDER — ACETAMINOPHEN 325 MG/1
650 TABLET ORAL ONCE
OUTPATIENT
Start: 2022-05-26

## 2022-05-26 RX ORDER — ACETAMINOPHEN 325 MG/1
650 TABLET ORAL ONCE
Status: CANCELLED | OUTPATIENT
Start: 2022-05-26 | End: 2022-05-26

## 2022-05-26 RX ORDER — DIPHENHYDRAMINE HYDROCHLORIDE 50 MG/ML
25 INJECTION INTRAMUSCULAR; INTRAVENOUS ONCE
OUTPATIENT
Start: 2022-05-26

## 2022-05-26 NOTE — TELEPHONE ENCOUNTER
----- Message from Elvis Leach RN sent at 5/26/2022 11:34 AM CDT -----  Patient ready to be scheduled. Recommend about 11ish? About a 3h treatment    Thank you!   Henry Boyd RN

## 2022-06-08 ENCOUNTER — HOSPITAL ENCOUNTER (OUTPATIENT)
Dept: LAB | Facility: HOSPITAL | Age: 77
Discharge: HOME | End: 2022-06-08
Attending: NURSE PRACTITIONER
Payer: MEDICARE

## 2022-06-08 ENCOUNTER — HOSPITAL ENCOUNTER (OUTPATIENT)
Dept: CARDIOLOGY CLINIC | Facility: HOSPITAL | Age: 77
Discharge: HOME | End: 2022-06-08
Attending: NURSE PRACTITIONER
Payer: MEDICARE

## 2022-06-08 VITALS
RESPIRATION RATE: 17 BRPM | HEART RATE: 73 BPM | DIASTOLIC BLOOD PRESSURE: 89 MMHG | WEIGHT: 203 LBS | BODY MASS INDEX: 28 KG/M2 | SYSTOLIC BLOOD PRESSURE: 140 MMHG | OXYGEN SATURATION: 100 %

## 2022-06-08 DIAGNOSIS — I50.32 CHRONIC DIASTOLIC (CONGESTIVE) HEART FAILURE (HCC): ICD-10-CM

## 2022-06-08 DIAGNOSIS — N18.31 STAGE 3A CHRONIC KIDNEY DISEASE (HCC): ICD-10-CM

## 2022-06-08 DIAGNOSIS — I50.32 CHRONIC DIASTOLIC HEART FAILURE (HCC): Primary | ICD-10-CM

## 2022-06-08 DIAGNOSIS — G47.33 OSA (OBSTRUCTIVE SLEEP APNEA): ICD-10-CM

## 2022-06-08 DIAGNOSIS — I50.32 CHRONIC DIASTOLIC HEART FAILURE (HCC): ICD-10-CM

## 2022-06-08 DIAGNOSIS — I27.20 PULMONARY HTN (HCC): ICD-10-CM

## 2022-06-08 DIAGNOSIS — I48.21 PERMANENT ATRIAL FIBRILLATION (HCC): ICD-10-CM

## 2022-06-08 LAB
ANION GAP SERPL CALC-SCNC: 6 MMOL/L (ref 0–18)
BUN BLD-MCNC: 31 MG/DL (ref 7–18)
CALCIUM BLD-MCNC: 9 MG/DL (ref 8.5–10.1)
CHLORIDE SERPL-SCNC: 101 MMOL/L (ref 98–112)
CO2 SERPL-SCNC: 33 MMOL/L (ref 21–32)
CREAT BLD-MCNC: 1.58 MG/DL
FASTING STATUS PATIENT QL REPORTED: NO
GLUCOSE BLD-MCNC: 100 MG/DL (ref 70–99)
OSMOLALITY SERPL CALC.SUM OF ELEC: 297 MOSM/KG (ref 275–295)
POTASSIUM SERPL-SCNC: 4.7 MMOL/L (ref 3.5–5.1)
SODIUM SERPL-SCNC: 140 MMOL/L (ref 136–145)

## 2022-06-08 PROCEDURE — 80048 BASIC METABOLIC PNL TOTAL CA: CPT | Performed by: NURSE PRACTITIONER

## 2022-06-08 PROCEDURE — 99215 OFFICE O/P EST HI 40 MIN: CPT | Performed by: NURSE PRACTITIONER

## 2022-06-08 PROCEDURE — 36415 COLL VENOUS BLD VENIPUNCTURE: CPT | Performed by: NURSE PRACTITIONER

## 2022-06-08 NOTE — PROGRESS NOTES
Pt. Assessed with wife present. No signs or symptoms of shortness of breath, fatigue, chest pain or edema noted. Weight stable at 203 lbs, up 2 lbs since last appointment. States overall, he feels ok, has had some flairs of arthritic lupus pain, now resolved. Reviewed current list of patient's allergies and medication; updated the Electronic Medical Record. Labs ordered to assess kidney function and drawn by [facility]. Reviewed follow-up appointment and Heart Failure discharge instructions with patient. Patient verbalized an understanding. Spent 16-30 minutes with patient who has acute heart failure. Will RTC in 1 month.

## 2022-06-27 ENCOUNTER — OFFICE VISIT (OUTPATIENT)
Dept: HEMATOLOGY/ONCOLOGY | Facility: HOSPITAL | Age: 77
End: 2022-06-27
Attending: INTERNAL MEDICINE
Payer: MEDICARE

## 2022-06-27 VITALS
RESPIRATION RATE: 18 BRPM | TEMPERATURE: 98 F | SYSTOLIC BLOOD PRESSURE: 130 MMHG | HEART RATE: 80 BPM | WEIGHT: 202.63 LBS | HEIGHT: 70.98 IN | BODY MASS INDEX: 28.37 KG/M2 | OXYGEN SATURATION: 99 % | DIASTOLIC BLOOD PRESSURE: 82 MMHG

## 2022-06-27 DIAGNOSIS — D69.6 THROMBOCYTOPENIA (HCC): ICD-10-CM

## 2022-06-27 DIAGNOSIS — D69.3 IMMUNE THROMBOCYTOPENIC PURPURA (HCC): Primary | ICD-10-CM

## 2022-06-27 PROCEDURE — 96365 THER/PROPH/DIAG IV INF INIT: CPT

## 2022-06-27 PROCEDURE — 96366 THER/PROPH/DIAG IV INF ADDON: CPT

## 2022-06-27 RX ORDER — DIPHENHYDRAMINE HCL 25 MG
25 CAPSULE ORAL ONCE
Status: COMPLETED | OUTPATIENT
Start: 2022-06-27 | End: 2022-06-27

## 2022-06-27 RX ORDER — DIPHENHYDRAMINE HYDROCHLORIDE 50 MG/ML
25 INJECTION INTRAMUSCULAR; INTRAVENOUS ONCE
OUTPATIENT
Start: 2022-08-01

## 2022-06-27 RX ORDER — ACETAMINOPHEN 325 MG/1
650 TABLET ORAL ONCE
OUTPATIENT
Start: 2022-08-01

## 2022-06-27 RX ORDER — MEPERIDINE HYDROCHLORIDE 25 MG/ML
25 INJECTION INTRAMUSCULAR; INTRAVENOUS; SUBCUTANEOUS ONCE
OUTPATIENT
Start: 2022-08-01

## 2022-06-27 RX ORDER — METHYLPREDNISOLONE SODIUM SUCCINATE 125 MG/2ML
125 INJECTION, POWDER, LYOPHILIZED, FOR SOLUTION INTRAMUSCULAR; INTRAVENOUS ONCE
OUTPATIENT
Start: 2022-08-01

## 2022-06-27 RX ORDER — FAMOTIDINE 10 MG/ML
20 INJECTION, SOLUTION INTRAVENOUS ONCE
OUTPATIENT
Start: 2022-08-01

## 2022-06-27 RX ORDER — ACETAMINOPHEN 325 MG/1
650 TABLET ORAL ONCE
Status: COMPLETED | OUTPATIENT
Start: 2022-06-27 | End: 2022-06-27

## 2022-06-27 RX ORDER — DIPHENHYDRAMINE HCL 25 MG
25 CAPSULE ORAL ONCE
OUTPATIENT
Start: 2022-08-01

## 2022-06-27 RX ORDER — METHYLPREDNISOLONE SODIUM SUCCINATE 40 MG/ML
40 INJECTION, POWDER, LYOPHILIZED, FOR SOLUTION INTRAMUSCULAR; INTRAVENOUS ONCE
OUTPATIENT
Start: 2022-08-01

## 2022-06-27 RX ADMIN — DIPHENHYDRAMINE HCL 25 MG: 25 MG CAPSULE ORAL at 11:22:00

## 2022-06-27 RX ADMIN — ACETAMINOPHEN 650 MG: 325 TABLET ORAL at 11:22:00

## 2022-06-27 NOTE — PROGRESS NOTES
Education Record    Learner:  Patient and Spouse    Disease / Diagnosis: ITP    Barriers / Limitations:  None   Comments:    Method:  Brief focused   Comments:    General Topics:  Plan of care reviewed   Comments:    Outcome:  Shows understanding   Comments: here for IVIG infusion. Tolerated treatment. Pt aware he will need an order after next treatment. Discharge pt home in stable condition.

## 2022-07-07 DIAGNOSIS — I50.32 CHRONIC DIASTOLIC HEART FAILURE (HCC): Primary | ICD-10-CM

## 2022-07-08 ENCOUNTER — HOSPITAL ENCOUNTER (OUTPATIENT)
Dept: LAB | Facility: HOSPITAL | Age: 77
Discharge: HOME | End: 2022-07-08
Attending: NURSE PRACTITIONER
Payer: MEDICARE

## 2022-07-08 ENCOUNTER — HOSPITAL ENCOUNTER (OUTPATIENT)
Dept: CARDIOLOGY CLINIC | Facility: HOSPITAL | Age: 77
Discharge: HOME | End: 2022-07-08
Attending: NURSE PRACTITIONER
Payer: MEDICARE

## 2022-07-08 VITALS
SYSTOLIC BLOOD PRESSURE: 139 MMHG | DIASTOLIC BLOOD PRESSURE: 77 MMHG | BODY MASS INDEX: 28 KG/M2 | WEIGHT: 203.19 LBS | HEART RATE: 74 BPM | OXYGEN SATURATION: 98 % | RESPIRATION RATE: 13 BRPM

## 2022-07-08 DIAGNOSIS — I50.32 CHRONIC DIASTOLIC HEART FAILURE (HCC): ICD-10-CM

## 2022-07-08 DIAGNOSIS — I50.812 CHRONIC RIGHT-SIDED HEART FAILURE (HCC): ICD-10-CM

## 2022-07-08 LAB
ANION GAP SERPL CALC-SCNC: 5 MMOL/L (ref 0–18)
BUN BLD-MCNC: 37 MG/DL (ref 7–18)
CALCIUM BLD-MCNC: 9.4 MG/DL (ref 8.5–10.1)
CHLORIDE SERPL-SCNC: 104 MMOL/L (ref 98–112)
CO2 SERPL-SCNC: 33 MMOL/L (ref 21–32)
CREAT BLD-MCNC: 1.57 MG/DL
FASTING STATUS PATIENT QL REPORTED: NO
GLUCOSE BLD-MCNC: 85 MG/DL (ref 70–99)
OSMOLALITY SERPL CALC.SUM OF ELEC: 302 MOSM/KG (ref 275–295)
POTASSIUM SERPL-SCNC: 4 MMOL/L (ref 3.5–5.1)
SODIUM SERPL-SCNC: 142 MMOL/L (ref 136–145)

## 2022-07-08 PROCEDURE — 99214 OFFICE O/P EST MOD 30 MIN: CPT | Performed by: NURSE PRACTITIONER

## 2022-07-08 PROCEDURE — 36415 COLL VENOUS BLD VENIPUNCTURE: CPT | Performed by: NURSE PRACTITIONER

## 2022-07-08 PROCEDURE — 80048 BASIC METABOLIC PNL TOTAL CA: CPT | Performed by: NURSE PRACTITIONER

## 2022-07-08 NOTE — PROGRESS NOTES
Pt. Assessed with wife present. No signs or symptoms of shortness of breath, fatigue, chest pain or edema noted. States he is coming out of his \"lupus funk. \" Weight stable at 203.2 lbs. Reviewed current list of patient's allergies and medication; updated the Electronic Medical Record. Labs ordered to assess kidney function and drawn by [facility]. Reviewed follow-up appointment and Heart Failure discharge instructions with patient. Patient verbalized an understanding. Spent 16-30 minutes with patient who has acute heart failure.     RTC in September

## 2022-07-14 ENCOUNTER — TELEPHONE (OUTPATIENT)
Dept: FAMILY MEDICINE CLINIC | Facility: CLINIC | Age: 77
End: 2022-07-14

## 2022-07-14 ENCOUNTER — OFFICE VISIT (OUTPATIENT)
Dept: FAMILY MEDICINE CLINIC | Facility: CLINIC | Age: 77
End: 2022-07-14
Payer: MEDICARE

## 2022-07-14 VITALS
WEIGHT: 204.81 LBS | BODY MASS INDEX: 29.32 KG/M2 | HEIGHT: 70 IN | DIASTOLIC BLOOD PRESSURE: 68 MMHG | HEART RATE: 68 BPM | RESPIRATION RATE: 18 BRPM | SYSTOLIC BLOOD PRESSURE: 122 MMHG

## 2022-07-14 DIAGNOSIS — J43.2 CENTRILOBULAR EMPHYSEMA (HCC): ICD-10-CM

## 2022-07-14 DIAGNOSIS — K75.4: ICD-10-CM

## 2022-07-14 DIAGNOSIS — I10 BENIGN ESSENTIAL HYPERTENSION: Primary | ICD-10-CM

## 2022-07-14 DIAGNOSIS — I48.20 ATRIAL FIBRILLATION, CHRONIC (HCC): ICD-10-CM

## 2022-07-14 DIAGNOSIS — N18.31 STAGE 3A CHRONIC KIDNEY DISEASE (HCC): ICD-10-CM

## 2022-07-14 DIAGNOSIS — D61.818 PANCYTOPENIA (HCC): ICD-10-CM

## 2022-07-14 DIAGNOSIS — E87.6 HYPOPOTASSEMIA: ICD-10-CM

## 2022-07-14 DIAGNOSIS — M1A.9XX0 CHRONIC GOUT, UNSPECIFIED CAUSE, UNSPECIFIED SITE: ICD-10-CM

## 2022-07-14 DIAGNOSIS — E78.6 LOW HDL (UNDER 40): Primary | ICD-10-CM

## 2022-07-14 DIAGNOSIS — I27.20 PULMONARY HYPERTENSION (HCC): ICD-10-CM

## 2022-07-14 DIAGNOSIS — Z00.00 LABORATORY EXAMINATION ORDERED AS PART OF A ROUTINE GENERAL MEDICAL EXAMINATION: ICD-10-CM

## 2022-07-14 DIAGNOSIS — E78.2 MIXED DYSLIPIDEMIA: ICD-10-CM

## 2022-07-14 DIAGNOSIS — R73.9 HYPERGLYCEMIA: ICD-10-CM

## 2022-07-14 PROCEDURE — 99214 OFFICE O/P EST MOD 30 MIN: CPT | Performed by: FAMILY MEDICINE

## 2022-07-14 NOTE — TELEPHONE ENCOUNTER
Please enter lab orders for the patient's upcoming physical appointment. Physical scheduled: Your appointments     Date & Time Appointment Department [facility])    Jan 05, 2023 11:30 AM CST Medicare Annual Well Visit with Raeford Carrel, MD [facility], [place] [address], [address], [place]  ([address])            Shayy Ramirez [address] [phone]         Preferred lab: [facility])     The patient has been notified to complete fasting labs prior to their physical appointment.

## 2022-07-14 NOTE — ASSESSMENT & PLAN NOTE
Lab Results   Component Value Date/Time    CREATSERUM 1.57 (H) 07/08/2022 12:33 PM    GFR 64 09/27/2017 09:43 AM    GFRNAA 41 (L) 07/08/2022 12:33 PM      Stable, continue present management

## 2022-07-15 ENCOUNTER — LAB ENCOUNTER (OUTPATIENT)
Dept: LAB | Age: 77
End: 2022-07-15
Attending: INTERNAL MEDICINE
Payer: MEDICARE

## 2022-07-15 DIAGNOSIS — G47.33 OSA (OBSTRUCTIVE SLEEP APNEA): ICD-10-CM

## 2022-07-16 LAB — SARS-COV-2 RNA RESP QL NAA+PROBE: NOT DETECTED

## 2022-07-18 ENCOUNTER — HOSPITAL ENCOUNTER (OUTPATIENT)
Dept: INTERVENTIONAL RADIOLOGY/VASCULAR | Facility: HOSPITAL | Age: 77
Discharge: HOME | End: 2022-07-18
Attending: INTERNAL MEDICINE | Admitting: INTERNAL MEDICINE
Payer: MEDICARE

## 2022-07-18 ENCOUNTER — HOSPITAL ENCOUNTER (OUTPATIENT)
Dept: GENERAL RADIOLOGY | Facility: HOSPITAL | Age: 77
End: 2022-07-18
Attending: INTERNAL MEDICINE | Admitting: INTERNAL MEDICINE
Payer: MEDICARE

## 2022-07-18 VITALS
RESPIRATION RATE: 23 BRPM | HEART RATE: 67 BPM | BODY MASS INDEX: 28.2 KG/M2 | OXYGEN SATURATION: 99 % | SYSTOLIC BLOOD PRESSURE: 110 MMHG | HEIGHT: 70 IN | DIASTOLIC BLOOD PRESSURE: 53 MMHG | WEIGHT: 197 LBS | TEMPERATURE: 97 F

## 2022-07-18 DIAGNOSIS — I50.9 CHF (CONGESTIVE HEART FAILURE) (HCC): ICD-10-CM

## 2022-07-18 DIAGNOSIS — G47.33 OSA (OBSTRUCTIVE SLEEP APNEA): Primary | ICD-10-CM

## 2022-07-18 LAB
ARTERIAL PATENCY WRIST A: POSITIVE
BASE EXCESS BLDA CALC-SCNC: 7.2 MMOL/L (ref ?–2)
BODY TEMPERATURE: 98.6 F
COHGB MFR BLD: 1.9 % SAT (ref 0–3)
ERYTHROCYTE [DISTWIDTH] IN BLOOD BY AUTOMATED COUNT: 16.5 %
FIO2: 21 %
HCO3 BLDA-SCNC: 30.5 MEQ/L (ref 21–27)
HCT VFR BLD AUTO: 44.6 %
HGB BLD-MCNC: 13.7 G/DL
HGB BLD-MCNC: 14.4 G/DL
MCH RBC QN AUTO: 35.2 PG (ref 26–34)
MCHC RBC AUTO-ENTMCNC: 32.3 G/DL (ref 31–37)
MCV RBC AUTO: 109 FL
METHGB MFR BLD: 0.7 % SAT (ref 0.4–1.5)
OXYHGB MFR BLDA: 96.8 % (ref 92–100)
PCO2 BLDA: 36 MM HG (ref 35–45)
PH BLDA: 7.53 [PH] (ref 7.35–7.45)
PLATELET # BLD AUTO: 112 10(3)UL (ref 150–450)
PO2 BLDA: 100 MM HG (ref 80–100)
RBC # BLD AUTO: 4.09 X10(6)UL
WBC # BLD AUTO: 6.8 X10(3) UL (ref 4–11)

## 2022-07-18 PROCEDURE — 4A023N6 MEASUREMENT OF CARDIAC SAMPLING AND PRESSURE, RIGHT HEART, PERCUTANEOUS APPROACH: ICD-10-PCS | Performed by: INTERNAL MEDICINE

## 2022-07-18 PROCEDURE — 85018 HEMOGLOBIN: CPT | Performed by: INTERNAL MEDICINE

## 2022-07-18 PROCEDURE — 85027 COMPLETE CBC AUTOMATED: CPT | Performed by: INTERNAL MEDICINE

## 2022-07-18 PROCEDURE — 82803 BLOOD GASES ANY COMBINATION: CPT | Performed by: INTERNAL MEDICINE

## 2022-07-18 PROCEDURE — 99153 MOD SED SAME PHYS/QHP EA: CPT | Performed by: INTERNAL MEDICINE

## 2022-07-18 PROCEDURE — 82375 ASSAY CARBOXYHB QUANT: CPT | Performed by: INTERNAL MEDICINE

## 2022-07-18 PROCEDURE — 93451 RIGHT HEART CATH: CPT | Performed by: INTERNAL MEDICINE

## 2022-07-18 PROCEDURE — 36600 WITHDRAWAL OF ARTERIAL BLOOD: CPT | Performed by: INTERNAL MEDICINE

## 2022-07-18 PROCEDURE — 99152 MOD SED SAME PHYS/QHP 5/>YRS: CPT | Performed by: INTERNAL MEDICINE

## 2022-07-18 PROCEDURE — 83050 HGB METHEMOGLOBIN QUAN: CPT | Performed by: INTERNAL MEDICINE

## 2022-07-18 RX ORDER — HEPARIN SODIUM 5000 [USP'U]/ML
INJECTION, SOLUTION INTRAVENOUS; SUBCUTANEOUS
Status: COMPLETED
Start: 2022-07-18 | End: 2022-07-18

## 2022-07-18 RX ORDER — LIDOCAINE HYDROCHLORIDE 10 MG/ML
INJECTION, SOLUTION EPIDURAL; INFILTRATION; INTRACAUDAL; PERINEURAL
Status: COMPLETED
Start: 2022-07-18 | End: 2022-07-18

## 2022-07-18 RX ORDER — MIDAZOLAM HYDROCHLORIDE 1 MG/ML
INJECTION INTRAMUSCULAR; INTRAVENOUS
Status: COMPLETED
Start: 2022-07-18 | End: 2022-07-18

## 2022-07-18 RX ORDER — SODIUM CHLORIDE 9 MG/ML
INJECTION, SOLUTION INTRAVENOUS
Status: DISCONTINUED | OUTPATIENT
Start: 2022-07-19 | End: 2022-07-18

## 2022-07-18 NOTE — PROGRESS NOTES
Patient received from cath lab Right neck puncture site soft, no hematoma, dressing C/D/I. Hemodynamics stable. Post-op orders received and implemented. Dr Luciano Reyes at bedside, questions answered. Will continue to monitor. 0915 neck puncture site unchanged. Pt tolerated po well.  Discharge instructions reviewed w pt and spouse. MELANIA [email or web address] and pt discharged to home in stable condition

## 2022-07-18 NOTE — OPERATIVE REPORT
Right Heart Catheterization    Procedures:    Right Heart Catheterization    Indication: Elevated cardiac filling pressures  Access Site: Right Internal Jugular Vein      Sheath Size (Fr): 7    Complications:  None    Findings:  BP: 139/93 mmHg  RA: 11 mmHg  RV: 55/0 mmHg  PA: 51/20/32 mmHg  PCWP: 20 mmHg  TP   mmHg    Thermo  CO: 3.5 liters/min  CI: 1.7 liters/min/m2  PVR: 3.4 Wood units  PVRI: 7.0 Wood units/m2    Lillie  CO: 4.7 liters/min  CI: 2.1 liters/min/m2  PVR: 2.5 Wood units  PVRI: 5.7 Wood units/m2    Disposition: At office visit I will increase Spironolactone, add SGLT2i and ARNI. I will also discuss CardioMEMS with the patient. Nini Lee M.D., F.A.C.C., F.A.H.A., F.E.S.C. Medical Director, Heart Failure Research, [address] [place] [address] Director, [address]  Jennifer Peoples 12, [address] [facility], [address] [place] [address]  Phone: [phone]  Fax: [phone]  E-mail: [email or web address]

## 2022-07-25 ENCOUNTER — TELEPHONE (OUTPATIENT)
Dept: RHEUMATOLOGY | Facility: CLINIC | Age: 77
End: 2022-07-25

## 2022-07-25 DIAGNOSIS — M35.1 MCTD (MIXED CONNECTIVE TISSUE DISEASE) (HCC): Primary | ICD-10-CM

## 2022-07-26 ENCOUNTER — LAB ENCOUNTER (OUTPATIENT)
Dept: LAB | Age: 77
End: 2022-07-26
Attending: INTERNAL MEDICINE
Payer: MEDICARE

## 2022-07-26 DIAGNOSIS — M35.1 MCTD (MIXED CONNECTIVE TISSUE DISEASE) (HCC): ICD-10-CM

## 2022-07-26 LAB
CK SERPL-CCNC: 31 U/L
CRP SERPL-MCNC: 0.51 MG/DL (ref ?–0.3)

## 2022-07-26 PROCEDURE — 86140 C-REACTIVE PROTEIN: CPT

## 2022-07-26 PROCEDURE — 82550 ASSAY OF CK (CPK): CPT

## 2022-07-26 PROCEDURE — 36415 COLL VENOUS BLD VENIPUNCTURE: CPT

## 2022-07-27 ENCOUNTER — OFFICE VISIT (OUTPATIENT)
Dept: RHEUMATOLOGY | Facility: CLINIC | Age: 77
End: 2022-07-27
Payer: MEDICARE

## 2022-07-27 VITALS
WEIGHT: 206 LBS | HEIGHT: 71 IN | DIASTOLIC BLOOD PRESSURE: 66 MMHG | HEART RATE: 75 BPM | TEMPERATURE: 97 F | OXYGEN SATURATION: 99 % | SYSTOLIC BLOOD PRESSURE: 104 MMHG | BODY MASS INDEX: 28.84 KG/M2

## 2022-07-27 DIAGNOSIS — M35.1 MCTD (MIXED CONNECTIVE TISSUE DISEASE) (HCC): Primary | ICD-10-CM

## 2022-07-27 DIAGNOSIS — I50.812 CHRONIC RIGHT-SIDED CONGESTIVE HEART FAILURE (HCC): ICD-10-CM

## 2022-07-27 DIAGNOSIS — M33.20 POLYMYOSITIS (HCC): Chronic | ICD-10-CM

## 2022-07-27 DIAGNOSIS — D69.3 IMMUNE THROMBOCYTOPENIC PURPURA (HCC): ICD-10-CM

## 2022-07-27 DIAGNOSIS — I27.20 PULMONARY HYPERTENSION (HCC): ICD-10-CM

## 2022-07-27 PROCEDURE — 99214 OFFICE O/P EST MOD 30 MIN: CPT | Performed by: INTERNAL MEDICINE

## 2022-07-27 PROCEDURE — 1111F DSCHRG MED/CURRENT MED MERGE: CPT | Performed by: INTERNAL MEDICINE

## 2022-07-27 RX ORDER — PREDNISONE 1 MG/1
2 TABLET ORAL
Qty: 180 TABLET | Refills: 3 | Status: CANCELLED
Start: 2022-07-27

## 2022-07-27 RX ORDER — PREDNISONE 1 MG/1
5 TABLET ORAL
Qty: 90 TABLET | Refills: 3 | Status: CANCELLED
Start: 2022-07-27

## 2022-07-27 NOTE — PATIENT INSTRUCTIONS
Continue Prednisone 7 mg per day. IV IG monthly for immune thrombocytopenia. Activity and exercise as tolerated. Allopurinol 300 mg per day to prevent gout. See the cardiologist.  Return to office 3 months.

## 2022-08-01 ENCOUNTER — OFFICE VISIT (OUTPATIENT)
Dept: HEMATOLOGY/ONCOLOGY | Facility: HOSPITAL | Age: 77
End: 2022-08-01
Attending: INTERNAL MEDICINE
Payer: MEDICARE

## 2022-08-01 VITALS
TEMPERATURE: 97 F | OXYGEN SATURATION: 99 % | DIASTOLIC BLOOD PRESSURE: 74 MMHG | HEIGHT: 70.98 IN | SYSTOLIC BLOOD PRESSURE: 126 MMHG | WEIGHT: 205 LBS | BODY MASS INDEX: 28.7 KG/M2 | RESPIRATION RATE: 18 BRPM | HEART RATE: 77 BPM

## 2022-08-01 DIAGNOSIS — D69.6 THROMBOCYTOPENIA (HCC): ICD-10-CM

## 2022-08-01 DIAGNOSIS — D69.3 IMMUNE THROMBOCYTOPENIC PURPURA (HCC): Primary | ICD-10-CM

## 2022-08-01 PROCEDURE — 96365 THER/PROPH/DIAG IV INF INIT: CPT

## 2022-08-01 PROCEDURE — 96366 THER/PROPH/DIAG IV INF ADDON: CPT

## 2022-08-01 RX ORDER — DIPHENHYDRAMINE HCL 25 MG
25 CAPSULE ORAL ONCE
Status: COMPLETED | OUTPATIENT
Start: 2022-08-01 | End: 2022-08-01

## 2022-08-01 RX ORDER — ACETAMINOPHEN 325 MG/1
650 TABLET ORAL ONCE
OUTPATIENT
Start: 2022-09-05

## 2022-08-01 RX ORDER — METHYLPREDNISOLONE SODIUM SUCCINATE 40 MG/ML
40 INJECTION, POWDER, LYOPHILIZED, FOR SOLUTION INTRAMUSCULAR; INTRAVENOUS ONCE
OUTPATIENT
Start: 2022-09-05

## 2022-08-01 RX ORDER — DIPHENHYDRAMINE HCL 25 MG
25 CAPSULE ORAL ONCE
OUTPATIENT
Start: 2022-09-05

## 2022-08-01 RX ORDER — DIPHENHYDRAMINE HYDROCHLORIDE 50 MG/ML
25 INJECTION INTRAMUSCULAR; INTRAVENOUS ONCE
OUTPATIENT
Start: 2022-09-05

## 2022-08-01 RX ORDER — FAMOTIDINE 10 MG/ML
20 INJECTION, SOLUTION INTRAVENOUS ONCE
OUTPATIENT
Start: 2022-09-05

## 2022-08-01 RX ORDER — MEPERIDINE HYDROCHLORIDE 25 MG/ML
25 INJECTION INTRAMUSCULAR; INTRAVENOUS; SUBCUTANEOUS ONCE
OUTPATIENT
Start: 2022-09-05

## 2022-08-01 RX ORDER — ACETAMINOPHEN 325 MG/1
650 TABLET ORAL ONCE
Status: COMPLETED | OUTPATIENT
Start: 2022-08-01 | End: 2022-08-01

## 2022-08-01 RX ORDER — METHYLPREDNISOLONE SODIUM SUCCINATE 125 MG/2ML
125 INJECTION, POWDER, LYOPHILIZED, FOR SOLUTION INTRAMUSCULAR; INTRAVENOUS ONCE
OUTPATIENT
Start: 2022-09-05

## 2022-08-01 RX ADMIN — DIPHENHYDRAMINE HCL 25 MG: 25 MG CAPSULE ORAL at 10:57:00

## 2022-08-01 RX ADMIN — ACETAMINOPHEN 650 MG: 325 TABLET ORAL at 10:57:00

## 2022-08-01 NOTE — PROGRESS NOTES
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 Barriers / Limitations:  None   Comments:    Method:  Brief focused   Comments:    General Topics:  Medication and Precautions   Comments:    Outcome:  Shows understanding   Comments: Tolerated infusion well without issue. Will return for same in 5 weeks.

## 2022-08-04 ENCOUNTER — ORDER TRANSCRIPTION (OUTPATIENT)
Dept: ADMINISTRATIVE | Facility: HOSPITAL | Age: 77
End: 2022-08-04

## 2022-08-04 DIAGNOSIS — Z01.818 PREOP EXAMINATION: Primary | ICD-10-CM

## 2022-08-12 ENCOUNTER — HOSPITAL ENCOUNTER (OUTPATIENT)
Dept: CARDIOLOGY CLINIC | Facility: HOSPITAL | Age: 77
Discharge: HOME | End: 2022-08-12
Attending: NURSE PRACTITIONER
Payer: MEDICARE

## 2022-08-12 ENCOUNTER — HOSPITAL ENCOUNTER (OUTPATIENT)
Dept: GENERAL RADIOLOGY | Facility: HOSPITAL | Age: 77
Discharge: HOME | End: 2022-08-12
Attending: INTERNAL MEDICINE
Payer: MEDICARE

## 2022-08-12 ENCOUNTER — HOSPITAL ENCOUNTER (OUTPATIENT)
Dept: LAB | Facility: HOSPITAL | Age: 77
Discharge: HOME | End: 2022-08-12
Attending: NURSE PRACTITIONER
Payer: MEDICARE

## 2022-08-12 VITALS
DIASTOLIC BLOOD PRESSURE: 53 MMHG | RESPIRATION RATE: 16 BRPM | OXYGEN SATURATION: 97 % | BODY MASS INDEX: 28 KG/M2 | WEIGHT: 203.38 LBS | HEART RATE: 65 BPM | SYSTOLIC BLOOD PRESSURE: 110 MMHG

## 2022-08-12 DIAGNOSIS — I50.812 CHRONIC RIGHT-SIDED HEART FAILURE (HCC): ICD-10-CM

## 2022-08-12 DIAGNOSIS — I50.812 CHRONIC RIGHT-SIDED CONGESTIVE HEART FAILURE (HCC): ICD-10-CM

## 2022-08-12 DIAGNOSIS — I27.20 PULMONARY HYPERTENSION (HCC): ICD-10-CM

## 2022-08-12 DIAGNOSIS — Z01.818 PRE-OP TESTING: ICD-10-CM

## 2022-08-12 DIAGNOSIS — J90 PLEURAL EFFUSION, LEFT: ICD-10-CM

## 2022-08-12 DIAGNOSIS — N18.31 STAGE 3A CHRONIC KIDNEY DISEASE (HCC): ICD-10-CM

## 2022-08-12 DIAGNOSIS — I10 BENIGN ESSENTIAL HYPERTENSION: ICD-10-CM

## 2022-08-12 LAB
ANION GAP SERPL CALC-SCNC: 4 MMOL/L (ref 0–18)
BUN BLD-MCNC: 34 MG/DL (ref 7–18)
CALCIUM BLD-MCNC: 9.2 MG/DL (ref 8.5–10.1)
CHLORIDE SERPL-SCNC: 104 MMOL/L (ref 98–112)
CO2 SERPL-SCNC: 32 MMOL/L (ref 21–32)
CREAT BLD-MCNC: 1.73 MG/DL
FASTING STATUS PATIENT QL REPORTED: NO
GFR SERPLBLD BASED ON 1.73 SQ M-ARVRAT: 40 ML/MIN/1.73M2 (ref 60–?)
GLUCOSE BLD-MCNC: 101 MG/DL (ref 70–99)
NT-PROBNP SERPL-MCNC: 2366 PG/ML (ref ?–450)
OSMOLALITY SERPL CALC.SUM OF ELEC: 298 MOSM/KG (ref 275–295)
POTASSIUM SERPL-SCNC: 4.7 MMOL/L (ref 3.5–5.1)
SODIUM SERPL-SCNC: 140 MMOL/L (ref 136–145)

## 2022-08-12 PROCEDURE — 99215 OFFICE O/P EST HI 40 MIN: CPT | Performed by: NURSE PRACTITIONER

## 2022-08-12 PROCEDURE — 71046 X-RAY EXAM CHEST 2 VIEWS: CPT | Performed by: INTERNAL MEDICINE

## 2022-08-12 PROCEDURE — 80048 BASIC METABOLIC PNL TOTAL CA: CPT | Performed by: NURSE PRACTITIONER

## 2022-08-12 PROCEDURE — 36415 COLL VENOUS BLD VENIPUNCTURE: CPT | Performed by: NURSE PRACTITIONER

## 2022-08-12 PROCEDURE — 83880 ASSAY OF NATRIURETIC PEPTIDE: CPT | Performed by: NURSE PRACTITIONER

## 2022-08-12 RX ORDER — SACUBITRIL AND VALSARTAN 24; 26 MG/1; MG/1
1 TABLET, FILM COATED ORAL 2 TIMES DAILY
Qty: 60 TABLET | Refills: 2 | Status: SHIPPED | OUTPATIENT
Start: 2022-08-12

## 2022-08-12 NOTE — PROGRESS NOTES
Pt. Assessed. No signs or symptoms of shortness of breath, fatigue, chest pain or edema noted. Weight stable at 203.4lbs. Patient started on Jardiance last week and will be having a cardiomems inserted next week. Reviewed current list of patient's allergies and medication; updated the Electronic Medical Record. Labs ordered to assess kidney function and drawn by [facility]. Reviewed follow-up appointment and Heart Failure discharge instructions with patient. Patient verbalized an understanding. Spent 16-30 minutes with patient who has acute heart failure. Will RTC in 2 weeks.

## 2022-08-14 ENCOUNTER — LAB ENCOUNTER (OUTPATIENT)
Dept: LAB | Facility: HOSPITAL | Age: 77
End: 2022-08-14
Attending: INTERNAL MEDICINE
Payer: MEDICARE

## 2022-08-14 DIAGNOSIS — Z01.818 PREOP EXAMINATION: ICD-10-CM

## 2022-08-15 LAB — SARS-COV-2 RNA RESP QL NAA+PROBE: NOT DETECTED

## 2022-08-17 ENCOUNTER — HOSPITAL ENCOUNTER (OUTPATIENT)
Dept: INTERVENTIONAL RADIOLOGY/VASCULAR | Facility: HOSPITAL | Age: 77
Discharge: HOME | End: 2022-08-17
Attending: INTERNAL MEDICINE | Admitting: INTERNAL MEDICINE
Payer: MEDICARE

## 2022-08-17 VITALS
HEIGHT: 71 IN | OXYGEN SATURATION: 97 % | RESPIRATION RATE: 19 BRPM | HEART RATE: 56 BPM | DIASTOLIC BLOOD PRESSURE: 62 MMHG | BODY MASS INDEX: 27.44 KG/M2 | SYSTOLIC BLOOD PRESSURE: 92 MMHG | TEMPERATURE: 97 F | WEIGHT: 196 LBS

## 2022-08-17 DIAGNOSIS — I27.21 PAH (PULMONARY ARTERY HYPERTENSION) (HCC): ICD-10-CM

## 2022-08-17 DIAGNOSIS — I50.9 CHF (CONGESTIVE HEART FAILURE) (HCC): ICD-10-CM

## 2022-08-17 PROCEDURE — 02HR30Z INSERTION OF PRESSURE SENSOR MONITORING DEVICE INTO LEFT PULMONARY ARTERY, PERCUTANEOUS APPROACH: ICD-10-PCS | Performed by: INTERNAL MEDICINE

## 2022-08-17 PROCEDURE — B2141ZZ FLUOROSCOPY OF RIGHT HEART USING LOW OSMOLAR CONTRAST: ICD-10-PCS | Performed by: INTERNAL MEDICINE

## 2022-08-17 PROCEDURE — B31T1ZZ FLUOROSCOPY OF LEFT PULMONARY ARTERY USING LOW OSMOLAR CONTRAST: ICD-10-PCS | Performed by: INTERNAL MEDICINE

## 2022-08-17 PROCEDURE — 99153 MOD SED SAME PHYS/QHP EA: CPT | Performed by: INTERNAL MEDICINE

## 2022-08-17 PROCEDURE — 99152 MOD SED SAME PHYS/QHP 5/>YRS: CPT | Performed by: INTERNAL MEDICINE

## 2022-08-17 PROCEDURE — 33289 TCAT IMPL WRLS P-ART PRS SNR: CPT | Performed by: INTERNAL MEDICINE

## 2022-08-17 PROCEDURE — 4A023N6 MEASUREMENT OF CARDIAC SAMPLING AND PRESSURE, RIGHT HEART, PERCUTANEOUS APPROACH: ICD-10-PCS | Performed by: INTERNAL MEDICINE

## 2022-08-17 RX ORDER — CLOPIDOGREL BISULFATE 75 MG/1
TABLET ORAL
Status: COMPLETED
Start: 2022-08-17 | End: 2022-08-17

## 2022-08-17 RX ORDER — CLOPIDOGREL BISULFATE 75 MG/1
75 TABLET ORAL DAILY
Qty: 30 TABLET | Refills: 0 | Status: SHIPPED | OUTPATIENT
Start: 2022-08-17

## 2022-08-17 RX ORDER — SODIUM CHLORIDE 9 MG/ML
INJECTION, SOLUTION INTRAVENOUS
Status: COMPLETED | OUTPATIENT
Start: 2022-08-17 | End: 2022-08-17

## 2022-08-17 RX ORDER — HEPARIN SODIUM 5000 [USP'U]/ML
INJECTION, SOLUTION INTRAVENOUS; SUBCUTANEOUS
Status: COMPLETED
Start: 2022-08-17 | End: 2022-08-17

## 2022-08-17 RX ORDER — ASPIRIN 81 MG/1
TABLET, CHEWABLE ORAL
Status: COMPLETED
Start: 2022-08-17 | End: 2022-08-17

## 2022-08-17 RX ORDER — MIDAZOLAM HYDROCHLORIDE 1 MG/ML
INJECTION INTRAMUSCULAR; INTRAVENOUS
Status: COMPLETED
Start: 2022-08-17 | End: 2022-08-17

## 2022-08-17 RX ORDER — ASPIRIN 81 MG/1
324 TABLET, CHEWABLE ORAL ONCE
Status: DISCONTINUED | OUTPATIENT
Start: 2022-08-17 | End: 2022-08-17 | Stop reason: HOSPADM

## 2022-08-17 RX ORDER — IODIXANOL 320 MG/ML
50 INJECTION, SOLUTION INTRAVASCULAR
Status: COMPLETED | OUTPATIENT
Start: 2022-08-17 | End: 2022-08-17

## 2022-08-17 RX ORDER — ASPIRIN 81 MG/1
81 TABLET ORAL DAILY
Qty: 30 TABLET | Refills: 0 | Status: SHIPPED | OUTPATIENT
Start: 2022-08-17

## 2022-08-17 RX ORDER — LIDOCAINE HYDROCHLORIDE 10 MG/ML
INJECTION, SOLUTION EPIDURAL; INFILTRATION; INTRACAUDAL; PERINEURAL
Status: COMPLETED
Start: 2022-08-17 | End: 2022-08-17

## 2022-08-17 RX ADMIN — SODIUM CHLORIDE: 9 INJECTION, SOLUTION INTRAVENOUS at 08:18:00

## 2022-08-17 RX ADMIN — IODIXANOL 10 ML: 320 INJECTION, SOLUTION INTRAVASCULAR at 10:23:00

## 2022-08-17 NOTE — H&P
[address]       Please see scanned recent H&P from [address] office. Associated Documents  Scan on 8/11/2022 2:04 PM by Lili Kown RN    History reviewed and up to date. No changes to H&P. The patient is scheduled for implantation of a pulmonary artery pressure sensor (CardioMems) to help to manage volume status outpatient and to decrease risk of heart failure re-admissions. Patient was consented. The risks and benefits of the procedure were explained to the patient and he wishes to proceed. D/w pt and his wife. Adrienne Parnell M.D., F.A.C.C.   Interventional Cardiology  Advanced Heart Failure  [address] [place] [address]    8/17/2022

## 2022-08-17 NOTE — PROGRESS NOTES
Pt received s/p RHC with Cardiomems implant with Dr. Concepcion File. Right femoral venous access site closed with manual pressure, dressing CDI, no bleeding present. Dr Concepcion File at bedside. Cardiomems rep at bedside. Recovery completed, right groin remains unchanged. Discharge instructions given to pt and pt's wife. IV discontinued, pt taken down to [facility] via wheelchair for discharge. Pt's friend driving pt and pt's wife home.

## 2022-08-18 RX ORDER — PREDNISONE 1 MG/1
2 TABLET ORAL
Qty: 180 TABLET | Refills: 3 | Status: SHIPPED | OUTPATIENT
Start: 2022-08-18

## 2022-08-18 NOTE — TELEPHONE ENCOUNTER
Future Appointments   Date Time Provider Chrissie Aden   8/23/2022  2:30 PM HEART FAILURE APN 1 [facility] HF CLIN Julián Wood   9/6/2022 11:00 AM [facility] TX RN1 [facility]   9/8/2022  1:00 PM HEART FAILURE APN 1 [facility] HF CLIN Juliná Wood   58/5/4242 77:68 AM Kelle Castelan MD [id number] EMG Mike   1/5/2023 11:30 AM Saida Swenson MD EMG 3 EMG Marisabel     LOV 7/27/22  RTO in 3mo

## 2022-08-18 NOTE — PROCEDURES
[facility]    PATIENT'S NAME: Warren Aguirre   ATTENDING PHYSICIAN: Noa Sy M.D. OPERATING PHYSICIAN: Clinton Gibbons M.D. PATIENT ACCOUNT#:   [de-identified]    LOCATION:  [address]  MEDICAL RECORD #:   [id number]       YOB: 1945  ADMISSION DATE:       08/17/2022      OPERATION DATE:  08/17/2022    CARDIAC PROCEDURE TRANSCRIPTION    CARDIAC CATHETERIZATION/CARDIOMEMS SENSOR IMPLANTATION     PREOPERATIVE DIAGNOSIS:    1. Chronic diastolic heart failure, stage C, functional class II, difficult to manage fluid status outpatient. 2.   Pulmonary hypertension. 3.   Dyspnea on exertion and fatigue. 4.   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 5.   Atrial fibrillation and status post Watchman. POSTOPERATIVE DIAGNOSIS:    1. Chronic diastolic heart failure, stage C, functional class II, difficult to manage fluid status outpatient. 2.   Pulmonary hypertension. 3.   Dyspnea on exertion and fatigue. 4.   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 5.   Atrial fibrillation and status post Watchman. PROCEDURE PERFORMED:    1. Successful pulmonary artery pressure sensor implantation (CardioMEMS). 2.   Selective left pulmonary angiography. 3.   Right heart catheterization with saturations. 4.   Calibration of implanted sensor with external electronics in catheterization lab. SEDATION:  We performed moderate conscious sedation with IV Versed and IV fentanyl. The time of first sedation dose was 9:10 a.m. and procedure ended at 10 a.m. DESCRIPTION OF PROCEDURE:  After written informed consent was obtained from the patient, he was brought to the cardiac catheterization laboratory. He was prepped and draped in usual sterile fashion. Lidocaine 1% was used to infiltrate the right groin with local anesthesia and 8-Vincentian Introducer sheath was inserted into right femoral vein using modified Seldinger technique.   The 8-Vincentian sheath was removed over J-wire and 11-Citizen of the [place] Terumo sheath was inserted into right femoral vein using modified Seldinger technique. Right heart catheterization was performed using pulmonary capillary wedge pressure catheter. We measured right heart pressures and calculated cardiac output using a Lillie equation on room air. We also obtained saturations from the pulmonary artery and right atrium, and arterial saturation to observe for any intracardiac shunt on room air. A pulmonary capillary wedge pressure catheter was advanced to left pulmonary artery over the J-wire and selective left pulmonary angiography was performed to choose right caliber vessel for sensory implantation. We chose a 9 mm caliber vessel. Subsequently, we advanced a Roadrunner guidewire 0.018 x 300 cm through the wedge catheter to the chosen pulmonary artery branch. The CardioMEMS sensor was advanced over Roadrunner wire to the chosen pulmonary artery vessel and deployed successfully. Sensor delivery system was removed and Roadrunner wire was withdrawn to the main pulmonary artery trunk. Over this wire we advanced a wedge catheter to the pulmonary artery for calibration of the sensor. Successful calibration of CardioMEMS sensor with external electronics was completed in catheterization lab successfully. Hemostasis of the right femoral vein was achieved with manual hold for 15 minutes. There was no bleeding or hematoma in the right groin. Patient tolerated the procedure very well and was transferred to holding area for monitoring. There were no immediate post cardiac catheterization complications. We used IV heparin 3000 units for this procedure. Patient was given Plavix 75 mg and baby aspirin 81 mg in catheterization lab. RIGHT HEART CATHETERIZATION HEMODYNAMICS:    1. Right atrial pressure 5 mmHg. 2. Right ventricle pressure 38/1/6 mmHg. 3. Pulmonary artery pressure 42/18/27 mmHg.   4. Pulmonary capillary wedge pressure 8 mmHg. 5. Transpulmonary gradient 19 mmHg. 6. Lillie cardiac output was 7.0 L/min and cardiac index was 3.4 L/min per sq m. 7. Systemic blood pressure was 96/62 mmHg and pulmonary vascular resistance was 2.7 Wood units. 8. The patient stayed in atrial fibrillation, with a heart rate in 70 beats per minute with no other prognostically significant arrhythmia. 9. Saturations of 88% in aorta, 68.4% in pulmonary artery, and 70.3% in right atrium on room air. IMPRESSION:    1. Successful implantation of pulmonary artery pressure sensor (CardioMEMS) to the chosen left pulmonary artery branch under fluoroscopy guidance. 2.   Successful calibration of CardioMEMS sensor with external electronics in catheterization lab. 3.   Right heart catheterization was consistent with mild pulmonary hypertension and normal biventricular filling pressures with wedge on the lower side of 8 mmHg. 4.   Delta between diastolic PA pressure and wedge pressure was 10 mmHg with diastolic PA pressure of 18 and wedge pressure of 8 mmHg. 5.   Elevated transpulmonary gradient of 19 mmHg, speaking for some pulmonary component of patient dyspnea on exertion. 6.   There is some pulmonary component. 7.   Normal cardiac output with cardiac index of 3.4 L/min per sq m and increased pulmonary vascular resistance of 2.7 Wood units. 8.   No significant intracardiac shunt by quick saturation run, but no full saturation run was completed since it was not the reason for this procedure. Patient has a history of Watchman implant device with septal puncture. PLAN:    1. CardioMEMS teaching and start CardioMEMS checking at home. 2.   Plavix 75 mg p.o. daily for 1 month only. 3.   Aspirin 81 mg p.o. daily more indefinitely if tolerated. 4.   See post procedure orders. 5.   Right leg straight for 3 hours since it was 11-Kazakh Terumo sheath.   6.   Follow up with Hardeep Hawkins, advanced heart failure nurse practitioner at [facility], in 1 to 2 weeks, and with Dr. Jimena Julio after as scheduled. Dictated By Ileana Curry M.D.  d: 08/17/2022 10:26:53  t: 08/17/2022 11:09:18  [facility] [id number]  NS/    cc: AALIYAH Rico M.D.

## 2022-08-19 DIAGNOSIS — K22.70 BARRETT'S ESOPHAGUS WITHOUT DYSPLASIA: ICD-10-CM

## 2022-08-22 DIAGNOSIS — I50.32 CHRONIC DIASTOLIC HEART FAILURE (HCC): Primary | ICD-10-CM

## 2022-08-23 ENCOUNTER — HOSPITAL ENCOUNTER (OUTPATIENT)
Dept: CARDIOLOGY CLINIC | Facility: HOSPITAL | Age: 77
Discharge: HOME | End: 2022-08-23
Attending: NURSE PRACTITIONER
Payer: MEDICARE

## 2022-08-23 ENCOUNTER — HOSPITAL ENCOUNTER (OUTPATIENT)
Dept: LAB | Facility: HOSPITAL | Age: 77
Discharge: HOME | End: 2022-08-23
Attending: NURSE PRACTITIONER
Payer: MEDICARE

## 2022-08-23 VITALS
DIASTOLIC BLOOD PRESSURE: 80 MMHG | WEIGHT: 202.81 LBS | RESPIRATION RATE: 24 BRPM | BODY MASS INDEX: 28 KG/M2 | SYSTOLIC BLOOD PRESSURE: 108 MMHG | HEART RATE: 67 BPM | OXYGEN SATURATION: 100 %

## 2022-08-23 DIAGNOSIS — I48.21 PERMANENT ATRIAL FIBRILLATION (HCC): ICD-10-CM

## 2022-08-23 DIAGNOSIS — I50.32 CHRONIC DIASTOLIC HEART FAILURE (HCC): ICD-10-CM

## 2022-08-23 DIAGNOSIS — N18.31 STAGE 3A CHRONIC KIDNEY DISEASE (HCC): ICD-10-CM

## 2022-08-23 DIAGNOSIS — I50.32 CHRONIC DIASTOLIC HEART FAILURE (HCC): Primary | ICD-10-CM

## 2022-08-23 DIAGNOSIS — I27.20 PULMONARY HYPERTENSION (HCC): ICD-10-CM

## 2022-08-23 DIAGNOSIS — G47.33 OSA (OBSTRUCTIVE SLEEP APNEA): ICD-10-CM

## 2022-08-23 LAB
ANION GAP SERPL CALC-SCNC: 6 MMOL/L (ref 0–18)
BUN BLD-MCNC: 43 MG/DL (ref 7–18)
CALCIUM BLD-MCNC: 9.1 MG/DL (ref 8.5–10.1)
CHLORIDE SERPL-SCNC: 103 MMOL/L (ref 98–112)
CO2 SERPL-SCNC: 30 MMOL/L (ref 21–32)
CREAT BLD-MCNC: 1.84 MG/DL
FASTING STATUS PATIENT QL REPORTED: NO
GFR SERPLBLD BASED ON 1.73 SQ M-ARVRAT: 37 ML/MIN/1.73M2 (ref 60–?)
GLUCOSE BLD-MCNC: 106 MG/DL (ref 70–99)
OSMOLALITY SERPL CALC.SUM OF ELEC: 299 MOSM/KG (ref 275–295)
POTASSIUM SERPL-SCNC: 4.5 MMOL/L (ref 3.5–5.1)
SODIUM SERPL-SCNC: 139 MMOL/L (ref 136–145)

## 2022-08-23 PROCEDURE — 99215 OFFICE O/P EST HI 40 MIN: CPT | Performed by: NURSE PRACTITIONER

## 2022-08-23 PROCEDURE — 80048 BASIC METABOLIC PNL TOTAL CA: CPT | Performed by: NURSE PRACTITIONER

## 2022-08-23 PROCEDURE — 36415 COLL VENOUS BLD VENIPUNCTURE: CPT | Performed by: NURSE PRACTITIONER

## 2022-08-23 RX ORDER — OMEPRAZOLE 20 MG/1
CAPSULE, DELAYED RELEASE ORAL
Qty: 180 CAPSULE | Refills: 1 | Status: SHIPPED | OUTPATIENT
Start: 2022-08-23

## 2022-08-23 NOTE — PROGRESS NOTES
Pt. Assessed. No signs or symptoms of shortness of breath, fatigue, chest pain or edema noted. Weight stable at 20.8 lbs. Reviewed current list of patient's allergies and medication; updated the Electronic Medical Record. Labs ordered to assess kidney function and drawn by [facility]. Reviewed follow-up appointment and Heart Failure discharge instructions with patient. Patient verbalized an understanding. Spent 16-30 minutes with patient who has acute heart failure.

## 2022-08-30 ENCOUNTER — LAB ENCOUNTER (OUTPATIENT)
Dept: LAB | Age: 77
End: 2022-08-30
Attending: NURSE PRACTITIONER
Payer: MEDICARE

## 2022-08-30 DIAGNOSIS — I50.32 CHRONIC DIASTOLIC HEART FAILURE (HCC): ICD-10-CM

## 2022-08-30 LAB
ERYTHROCYTE [DISTWIDTH] IN BLOOD BY AUTOMATED COUNT: 16.6 %
HCT VFR BLD AUTO: 46.8 %
HGB BLD-MCNC: 14.5 G/DL
MCH RBC QN AUTO: 34 PG (ref 26–34)
MCHC RBC AUTO-ENTMCNC: 31 G/DL (ref 31–37)
MCV RBC AUTO: 109.9 FL
PLATELET # BLD AUTO: 131 10(3)UL (ref 150–450)
RBC # BLD AUTO: 4.26 X10(6)UL
WBC # BLD AUTO: 8.7 X10(3) UL (ref 4–11)

## 2022-08-30 PROCEDURE — 85027 COMPLETE CBC AUTOMATED: CPT

## 2022-08-30 PROCEDURE — 36415 COLL VENOUS BLD VENIPUNCTURE: CPT

## 2022-08-31 ENCOUNTER — TELEPHONE (OUTPATIENT)
Dept: CARDIOLOGY CLINIC | Facility: HOSPITAL | Age: 77
End: 2022-08-31

## 2022-08-31 NOTE — TELEPHONE ENCOUNTER
The [place] [facility] received a fax from [employer] regarding financial assistance. The [facility] called Obdulio Parikh and informed him that the fax received from [employer] is reporting that Coreen Eric has been approved for NPAF assistance for the remainder of the calendar year, and his eligibility will be re-evaluated at the end of the calendar year to determine if they qualify for continued assistance. Sterling verbalized back understanding.

## 2022-08-31 NOTE — PROGRESS NOTES
[facility] called Ly Sheets to inform him that the APN reviewed his recent labs and that his platelets are stable. Ly Sheets verbalized back understanding and reported he was surprised, since his platelets have not been that good in about 15 years.

## 2022-09-06 ENCOUNTER — OFFICE VISIT (OUTPATIENT)
Dept: HEMATOLOGY/ONCOLOGY | Facility: HOSPITAL | Age: 77
End: 2022-09-06
Attending: INTERNAL MEDICINE
Payer: MEDICARE

## 2022-09-06 VITALS
OXYGEN SATURATION: 100 % | BODY MASS INDEX: 28 KG/M2 | TEMPERATURE: 98 F | SYSTOLIC BLOOD PRESSURE: 109 MMHG | DIASTOLIC BLOOD PRESSURE: 68 MMHG | RESPIRATION RATE: 16 BRPM | WEIGHT: 200 LBS | HEART RATE: 75 BPM

## 2022-09-06 DIAGNOSIS — D69.3 IMMUNE THROMBOCYTOPENIC PURPURA (HCC): Primary | ICD-10-CM

## 2022-09-06 DIAGNOSIS — D69.6 THROMBOCYTOPENIA (HCC): ICD-10-CM

## 2022-09-06 PROCEDURE — 96366 THER/PROPH/DIAG IV INF ADDON: CPT

## 2022-09-06 PROCEDURE — 96365 THER/PROPH/DIAG IV INF INIT: CPT

## 2022-09-06 RX ORDER — DIPHENHYDRAMINE HCL 25 MG
25 CAPSULE ORAL ONCE
OUTPATIENT
Start: 2022-10-11

## 2022-09-06 RX ORDER — ACETAMINOPHEN 325 MG/1
650 TABLET ORAL ONCE
OUTPATIENT
Start: 2022-10-11

## 2022-09-06 RX ORDER — DIPHENHYDRAMINE HCL 25 MG
25 CAPSULE ORAL ONCE
Status: COMPLETED | OUTPATIENT
Start: 2022-09-06 | End: 2022-09-06

## 2022-09-06 RX ORDER — ACETAMINOPHEN 325 MG/1
650 TABLET ORAL ONCE
Status: COMPLETED | OUTPATIENT
Start: 2022-09-06 | End: 2022-09-06

## 2022-09-06 RX ADMIN — ACETAMINOPHEN 650 MG: 325 TABLET ORAL at 11:35:00

## 2022-09-06 RX ADMIN — DIPHENHYDRAMINE HCL 25 MG: 25 MG CAPSULE ORAL at 11:35:00

## 2022-09-06 NOTE — PROGRESS NOTES
Education Record    Learner:  Patient    Disease / Diagnosis: IVIG infusion.      Barriers / Limitations:  None   Comments:    Method:  Discussion   Comments:    General Topics:  Plan of care reviewed   Comments:    Outcome:  Shows understanding   Comments:

## 2022-09-07 RX ORDER — SACUBITRIL AND VALSARTAN 24; 26 MG/1; MG/1
1 TABLET, FILM COATED ORAL 2 TIMES DAILY
Qty: 180 TABLET | Refills: 1 | Status: SHIPPED | OUTPATIENT
Start: 2022-09-07

## 2022-09-08 ENCOUNTER — HOSPITAL ENCOUNTER (OUTPATIENT)
Dept: CARDIOLOGY CLINIC | Facility: HOSPITAL | Age: 77
Discharge: HOME | End: 2022-09-08
Attending: NURSE PRACTITIONER
Payer: MEDICARE

## 2022-09-08 ENCOUNTER — HOSPITAL ENCOUNTER (OUTPATIENT)
Dept: LAB | Facility: HOSPITAL | Age: 77
Discharge: HOME | End: 2022-09-08
Attending: NURSE PRACTITIONER
Payer: MEDICARE

## 2022-09-08 VITALS
HEART RATE: 71 BPM | OXYGEN SATURATION: 100 % | DIASTOLIC BLOOD PRESSURE: 61 MMHG | BODY MASS INDEX: 28 KG/M2 | RESPIRATION RATE: 23 BRPM | SYSTOLIC BLOOD PRESSURE: 106 MMHG | WEIGHT: 202 LBS

## 2022-09-08 DIAGNOSIS — N18.30 STAGE 3 CHRONIC KIDNEY DISEASE, UNSPECIFIED WHETHER STAGE 3A OR 3B CKD (HCC): ICD-10-CM

## 2022-09-08 DIAGNOSIS — I43 CARDIOMYOPATHY AS MANIFESTATION OF UNDERLYING DISEASE (HCC): ICD-10-CM

## 2022-09-08 DIAGNOSIS — I50.32 CHRONIC DIASTOLIC HEART FAILURE (HCC): Primary | ICD-10-CM

## 2022-09-08 DIAGNOSIS — D69.6 THROMBOCYTOPENIA (HCC): ICD-10-CM

## 2022-09-08 DIAGNOSIS — I48.21 PERMANENT ATRIAL FIBRILLATION (HCC): ICD-10-CM

## 2022-09-08 DIAGNOSIS — I50.32 CHRONIC DIASTOLIC HEART FAILURE (HCC): ICD-10-CM

## 2022-09-08 LAB
ANION GAP SERPL CALC-SCNC: 4 MMOL/L (ref 0–18)
BUN BLD-MCNC: 49 MG/DL (ref 7–18)
CALCIUM BLD-MCNC: 8.8 MG/DL (ref 8.5–10.1)
CHLORIDE SERPL-SCNC: 104 MMOL/L (ref 98–112)
CO2 SERPL-SCNC: 29 MMOL/L (ref 21–32)
CREAT BLD-MCNC: 1.8 MG/DL
ERYTHROCYTE [DISTWIDTH] IN BLOOD BY AUTOMATED COUNT: 16.8 %
GFR SERPLBLD BASED ON 1.73 SQ M-ARVRAT: 38 ML/MIN/1.73M2 (ref 60–?)
GLUCOSE BLD-MCNC: 82 MG/DL (ref 70–99)
HCT VFR BLD AUTO: 45.2 %
HGB BLD-MCNC: 14.2 G/DL
MCH RBC QN AUTO: 34.3 PG (ref 26–34)
MCHC RBC AUTO-ENTMCNC: 31.4 G/DL (ref 31–37)
MCV RBC AUTO: 109.2 FL
OSMOLALITY SERPL CALC.SUM OF ELEC: 296 MOSM/KG (ref 275–295)
PLATELET # BLD AUTO: 101 10(3)UL (ref 150–450)
POTASSIUM SERPL-SCNC: 5 MMOL/L (ref 3.5–5.1)
RBC # BLD AUTO: 4.14 X10(6)UL
SODIUM SERPL-SCNC: 137 MMOL/L (ref 136–145)
WBC # BLD AUTO: 6.7 X10(3) UL (ref 4–11)

## 2022-09-08 PROCEDURE — 85027 COMPLETE CBC AUTOMATED: CPT | Performed by: NURSE PRACTITIONER

## 2022-09-08 PROCEDURE — 99215 OFFICE O/P EST HI 40 MIN: CPT | Performed by: NURSE PRACTITIONER

## 2022-09-08 PROCEDURE — 36415 COLL VENOUS BLD VENIPUNCTURE: CPT | Performed by: NURSE PRACTITIONER

## 2022-09-08 PROCEDURE — 80048 BASIC METABOLIC PNL TOTAL CA: CPT | Performed by: NURSE PRACTITIONER

## 2022-09-08 NOTE — PROGRESS NOTES
Patient assessed. No signs or symptoms of shortness of breath, fatigue, chest pain or abdominal bloating noted. Patient with trace lower extremity edema. Weight down almost 1 lb at 202.0 lbs. Reviewed current list of patient's allergies and medication; updated the Electronic Medical Record. Labs ordered to assess kidney function and drawn by [facility]. Reviewed follow-up appointment and Heart Failure discharge instructions with patient. Patient verbalized an understanding. Spent 16-30 minutes with patient who has acute heart failure.

## 2022-09-15 ENCOUNTER — TELEPHONE (OUTPATIENT)
Dept: CARDIOLOGY CLINIC | Facility: HOSPITAL | Age: 77
End: 2022-09-15

## 2022-09-15 NOTE — TELEPHONE ENCOUNTER
RN left a voicemail with the patient reminding him to get a CBC as ordered that was due today. This is the first attempt to contact the patient.

## 2022-09-16 ENCOUNTER — HOSPITAL ENCOUNTER (EMERGENCY)
Facility: HOSPITAL | Age: 77
Discharge: HOME | End: 2022-09-16
Attending: EMERGENCY MEDICINE

## 2022-09-16 ENCOUNTER — APPOINTMENT (OUTPATIENT)
Dept: GENERAL RADIOLOGY | Facility: HOSPITAL | Age: 77
End: 2022-09-16
Attending: EMERGENCY MEDICINE

## 2022-09-16 ENCOUNTER — APPOINTMENT (OUTPATIENT)
Dept: CT IMAGING | Facility: HOSPITAL | Age: 77
End: 2022-09-16
Attending: EMERGENCY MEDICINE

## 2022-09-16 VITALS
SYSTOLIC BLOOD PRESSURE: 93 MMHG | HEART RATE: 76 BPM | BODY MASS INDEX: 27.3 KG/M2 | DIASTOLIC BLOOD PRESSURE: 66 MMHG | RESPIRATION RATE: 20 BRPM | OXYGEN SATURATION: 97 % | WEIGHT: 195 LBS | TEMPERATURE: 98 F | HEIGHT: 71 IN

## 2022-09-16 DIAGNOSIS — R06.09 DOE (DYSPNEA ON EXERTION): ICD-10-CM

## 2022-09-16 DIAGNOSIS — R07.9 CHEST PAIN OF UNCERTAIN ETIOLOGY: Primary | ICD-10-CM

## 2022-09-16 LAB
ALBUMIN SERPL-MCNC: 3.5 G/DL (ref 3.4–5)
ALBUMIN/GLOB SERPL: 0.9 {RATIO} (ref 1–2)
ALP LIVER SERPL-CCNC: 49 U/L
ALT SERPL-CCNC: 21 U/L
ANION GAP SERPL CALC-SCNC: 9 MMOL/L (ref 0–18)
AST SERPL-CCNC: 16 U/L (ref 15–37)
ATRIAL RATE: 64 BPM
BASOPHILS # BLD AUTO: 0.04 X10(3) UL (ref 0–0.2)
BASOPHILS NFR BLD AUTO: 0.5 %
BILIRUB SERPL-MCNC: 0.6 MG/DL (ref 0.1–2)
BUN BLD-MCNC: 58 MG/DL (ref 7–18)
CALCIUM BLD-MCNC: 9.2 MG/DL (ref 8.5–10.1)
CHLORIDE SERPL-SCNC: 99 MMOL/L (ref 98–112)
CO2 SERPL-SCNC: 28 MMOL/L (ref 21–32)
CREAT BLD-MCNC: 2.08 MG/DL
EOSINOPHIL # BLD AUTO: 0.24 X10(3) UL (ref 0–0.7)
EOSINOPHIL NFR BLD AUTO: 2.8 %
ERYTHROCYTE [DISTWIDTH] IN BLOOD BY AUTOMATED COUNT: 17 %
GFR SERPLBLD BASED ON 1.73 SQ M-ARVRAT: 32 ML/MIN/1.73M2 (ref 60–?)
GLOBULIN PLAS-MCNC: 4.1 G/DL (ref 2.8–4.4)
GLUCOSE BLD-MCNC: 84 MG/DL (ref 70–99)
HCT VFR BLD AUTO: 47 %
HGB BLD-MCNC: 15.1 G/DL
IMM GRANULOCYTES # BLD AUTO: 0.05 X10(3) UL (ref 0–1)
IMM GRANULOCYTES NFR BLD: 0.6 %
LYMPHOCYTES # BLD AUTO: 0.75 X10(3) UL (ref 1–4)
LYMPHOCYTES NFR BLD AUTO: 8.7 %
MCH RBC QN AUTO: 34.6 PG (ref 26–34)
MCHC RBC AUTO-ENTMCNC: 32.1 G/DL (ref 31–37)
MCV RBC AUTO: 107.6 FL
MONOCYTES # BLD AUTO: 0.76 X10(3) UL (ref 0.1–1)
MONOCYTES NFR BLD AUTO: 8.8 %
NEUTROPHILS # BLD AUTO: 6.79 X10 (3) UL (ref 1.5–7.7)
NEUTROPHILS # BLD AUTO: 6.79 X10(3) UL (ref 1.5–7.7)
NEUTROPHILS NFR BLD AUTO: 78.6 %
NT-PROBNP SERPL-MCNC: 2278 PG/ML (ref ?–450)
OSMOLALITY SERPL CALC.SUM OF ELEC: 297 MOSM/KG (ref 275–295)
PLATELET # BLD AUTO: 127 10(3)UL (ref 150–450)
POTASSIUM SERPL-SCNC: 3.9 MMOL/L (ref 3.5–5.1)
PROT SERPL-MCNC: 7.6 G/DL (ref 6.4–8.2)
Q-T INTERVAL: 444 MS
QRS DURATION: 102 MS
QTC CALCULATION (BEZET): 428 MS
R AXIS: 7 DEGREES
RBC # BLD AUTO: 4.37 X10(6)UL
SARS-COV-2 RNA RESP QL NAA+PROBE: NOT DETECTED
SODIUM SERPL-SCNC: 136 MMOL/L (ref 136–145)
T AXIS: 5 DEGREES
TROPONIN I HIGH SENSITIVITY: 53 NG/L
VENTRICULAR RATE: 56 BPM
WBC # BLD AUTO: 8.6 X10(3) UL (ref 4–11)

## 2022-09-16 PROCEDURE — 99234 HOSP IP/OBS SM DT SF/LOW 45: CPT | Performed by: HOSPITALIST

## 2022-09-16 PROCEDURE — 71045 X-RAY EXAM CHEST 1 VIEW: CPT | Performed by: EMERGENCY MEDICINE

## 2022-09-16 PROCEDURE — 74176 CT ABD & PELVIS W/O CONTRAST: CPT | Performed by: EMERGENCY MEDICINE

## 2022-09-16 NOTE — PLAN OF CARE
CardioMems checked at bedside in ED PAD 16. Home reading was done prior to coming to ED, PAD was 19.

## 2022-09-18 ENCOUNTER — APPOINTMENT (OUTPATIENT)
Dept: GENERAL RADIOLOGY | Facility: HOSPITAL | Age: 77
End: 2022-09-18
Attending: EMERGENCY MEDICINE
Payer: MEDICARE

## 2022-09-18 ENCOUNTER — HOSPITAL ENCOUNTER (EMERGENCY)
Facility: HOSPITAL | Age: 77
Discharge: HOME | End: 2022-09-18
Attending: EMERGENCY MEDICINE
Payer: MEDICARE

## 2022-09-18 VITALS
OXYGEN SATURATION: 96 % | TEMPERATURE: 98 F | RESPIRATION RATE: 16 BRPM | HEART RATE: 77 BPM | HEIGHT: 71 IN | SYSTOLIC BLOOD PRESSURE: 113 MMHG | BODY MASS INDEX: 26.88 KG/M2 | WEIGHT: 192 LBS | DIASTOLIC BLOOD PRESSURE: 83 MMHG

## 2022-09-18 DIAGNOSIS — R10.12 ABDOMINAL PAIN, LEFT UPPER QUADRANT: Primary | ICD-10-CM

## 2022-09-18 DIAGNOSIS — K59.00 CONSTIPATION, UNSPECIFIED CONSTIPATION TYPE: ICD-10-CM

## 2022-09-18 LAB
ALBUMIN SERPL-MCNC: 3.6 G/DL (ref 3.4–5)
ALBUMIN/GLOB SERPL: 0.9 {RATIO} (ref 1–2)
ALP LIVER SERPL-CCNC: 53 U/L
ALT SERPL-CCNC: 20 U/L
ANION GAP SERPL CALC-SCNC: 9 MMOL/L (ref 0–18)
AST SERPL-CCNC: 16 U/L (ref 15–37)
ATRIAL RATE: 375 BPM
BASOPHILS # BLD AUTO: 0.02 X10(3) UL (ref 0–0.2)
BASOPHILS NFR BLD AUTO: 0.3 %
BILIRUB SERPL-MCNC: 1.1 MG/DL (ref 0.1–2)
BUN BLD-MCNC: 51 MG/DL (ref 7–18)
CALCIUM BLD-MCNC: 9.4 MG/DL (ref 8.5–10.1)
CHLORIDE SERPL-SCNC: 101 MMOL/L (ref 98–112)
CO2 SERPL-SCNC: 26 MMOL/L (ref 21–32)
CREAT BLD-MCNC: 2.05 MG/DL
EOSINOPHIL # BLD AUTO: 0.07 X10(3) UL (ref 0–0.7)
EOSINOPHIL NFR BLD AUTO: 1.1 %
ERYTHROCYTE [DISTWIDTH] IN BLOOD BY AUTOMATED COUNT: 17.2 %
GFR SERPLBLD BASED ON 1.73 SQ M-ARVRAT: 32 ML/MIN/1.73M2 (ref 60–?)
GLOBULIN PLAS-MCNC: 4.1 G/DL (ref 2.8–4.4)
GLUCOSE BLD-MCNC: 156 MG/DL (ref 70–99)
HCT VFR BLD AUTO: 49.6 %
HGB BLD-MCNC: 16.1 G/DL
IMM GRANULOCYTES # BLD AUTO: 0.02 X10(3) UL (ref 0–1)
IMM GRANULOCYTES NFR BLD: 0.3 %
LYMPHOCYTES # BLD AUTO: 0.72 X10(3) UL (ref 1–4)
LYMPHOCYTES NFR BLD AUTO: 11.6 %
MCH RBC QN AUTO: 35 PG (ref 26–34)
MCHC RBC AUTO-ENTMCNC: 32.5 G/DL (ref 31–37)
MCV RBC AUTO: 107.8 FL
MONOCYTES # BLD AUTO: 0.28 X10(3) UL (ref 0.1–1)
MONOCYTES NFR BLD AUTO: 4.5 %
NEUTROPHILS # BLD AUTO: 5.08 X10 (3) UL (ref 1.5–7.7)
NEUTROPHILS # BLD AUTO: 5.08 X10(3) UL (ref 1.5–7.7)
NEUTROPHILS NFR BLD AUTO: 82.2 %
OSMOLALITY SERPL CALC.SUM OF ELEC: 299 MOSM/KG (ref 275–295)
PLATELET # BLD AUTO: 125 10(3)UL (ref 150–450)
POTASSIUM SERPL-SCNC: 4.4 MMOL/L (ref 3.5–5.1)
PROT SERPL-MCNC: 7.7 G/DL (ref 6.4–8.2)
Q-T INTERVAL: 418 MS
QRS DURATION: 104 MS
QTC CALCULATION (BEZET): 444 MS
R AXIS: -2 DEGREES
RBC # BLD AUTO: 4.6 X10(6)UL
SODIUM SERPL-SCNC: 136 MMOL/L (ref 136–145)
T AXIS: 0 DEGREES
TROPONIN I HIGH SENSITIVITY: 48 NG/L
VENTRICULAR RATE: 68 BPM
WBC # BLD AUTO: 6.2 X10(3) UL (ref 4–11)

## 2022-09-18 PROCEDURE — 93010 ELECTROCARDIOGRAM REPORT: CPT

## 2022-09-18 PROCEDURE — 99284 EMERGENCY DEPT VISIT MOD MDM: CPT

## 2022-09-18 PROCEDURE — 85025 COMPLETE CBC W/AUTO DIFF WBC: CPT | Performed by: EMERGENCY MEDICINE

## 2022-09-18 PROCEDURE — 36415 COLL VENOUS BLD VENIPUNCTURE: CPT

## 2022-09-18 PROCEDURE — 99285 EMERGENCY DEPT VISIT HI MDM: CPT

## 2022-09-18 PROCEDURE — 93005 ELECTROCARDIOGRAM TRACING: CPT

## 2022-09-18 PROCEDURE — 71045 X-RAY EXAM CHEST 1 VIEW: CPT | Performed by: EMERGENCY MEDICINE

## 2022-09-18 PROCEDURE — 84484 ASSAY OF TROPONIN QUANT: CPT | Performed by: EMERGENCY MEDICINE

## 2022-09-18 PROCEDURE — 74018 RADEX ABDOMEN 1 VIEW: CPT | Performed by: EMERGENCY MEDICINE

## 2022-09-18 PROCEDURE — 80053 COMPREHEN METABOLIC PANEL: CPT | Performed by: EMERGENCY MEDICINE

## 2022-09-18 NOTE — ED INITIAL ASSESSMENT (HPI)
Pt presents to ER with SOB. Pt was seen here Friday for similar symptoms. covid was negative. Pt states that he continued to decline at home, feeling weak, worsening LLQ abd pain. Yes diarrhea, yes constipation. No fever. No chest pain. Pt is speaking clearly. Skin warm and dry. Respirations equal and nonlabored. Pt is a&ox3.

## 2022-09-19 ENCOUNTER — TELEPHONE (OUTPATIENT)
Dept: FAMILY MEDICINE CLINIC | Facility: CLINIC | Age: 77
End: 2022-09-19

## 2022-09-19 NOTE — TELEPHONE ENCOUNTER
Pt reports low BP, SOB, upper abd pain (left sided radiating to front and back). Sx started last 9/14. He states he was feeling fine until then. Was seen in ER 9/16 and 9/18 for these sx. Feels weak overall. Does not feel he is getting answers from ER. He says all testing done was unremarkable. Wants to speak with Dr. Sid Bassett via video visit. He doesn't know where to go from here to figure out next steps.      Routed to Dr. Sid Bassett

## 2022-09-19 NOTE — TELEPHONE ENCOUNTER
Pt has been in and out of the ER and they keep saying everything is ok. He had tons of tests done on Friday and Sunday. He and his wife are unable to drive and he would like a video call with Dr. Hammad Gonzalez only.

## 2022-09-20 ENCOUNTER — APPOINTMENT (OUTPATIENT)
Dept: ULTRASOUND IMAGING | Facility: HOSPITAL | Age: 77
End: 2022-09-20
Attending: EMERGENCY MEDICINE
Payer: MEDICARE

## 2022-09-20 ENCOUNTER — APPOINTMENT (OUTPATIENT)
Dept: GENERAL RADIOLOGY | Facility: HOSPITAL | Age: 77
End: 2022-09-20
Attending: EMERGENCY MEDICINE
Payer: MEDICARE

## 2022-09-20 ENCOUNTER — APPOINTMENT (OUTPATIENT)
Dept: NUCLEAR MEDICINE | Facility: HOSPITAL | Age: 77
End: 2022-09-20
Attending: EMERGENCY MEDICINE
Payer: MEDICARE

## 2022-09-20 ENCOUNTER — HOSPITAL ENCOUNTER (INPATIENT)
Facility: HOSPITAL | Age: 77
LOS: 2 days | Discharge: HOME | End: 2022-09-22
Attending: EMERGENCY MEDICINE | Admitting: HOSPITALIST
Payer: MEDICARE

## 2022-09-20 DIAGNOSIS — I48.20 ATRIAL FIBRILLATION, CHRONIC (HCC): ICD-10-CM

## 2022-09-20 DIAGNOSIS — N18.9 ACUTE KIDNEY INJURY SUPERIMPOSED ON CHRONIC KIDNEY DISEASE (HCC): ICD-10-CM

## 2022-09-20 DIAGNOSIS — R06.09 DYSPNEA ON EXERTION: ICD-10-CM

## 2022-09-20 DIAGNOSIS — R10.12 ABDOMINAL PAIN, LEFT UPPER QUADRANT: ICD-10-CM

## 2022-09-20 DIAGNOSIS — N17.9 ACUTE KIDNEY INJURY SUPERIMPOSED ON CHRONIC KIDNEY DISEASE (HCC): ICD-10-CM

## 2022-09-20 DIAGNOSIS — R55 SYNCOPE, NEAR: Primary | ICD-10-CM

## 2022-09-20 PROBLEM — R79.89 AZOTEMIA: Status: ACTIVE | Noted: 2022-09-20

## 2022-09-20 PROBLEM — E87.1 HYPONATREMIA: Status: ACTIVE | Noted: 2022-09-20

## 2022-09-20 LAB
ALBUMIN SERPL-MCNC: 3.5 G/DL (ref 3.4–5)
ALBUMIN/GLOB SERPL: 1 {RATIO} (ref 1–2)
ALP LIVER SERPL-CCNC: 49 U/L
ALT SERPL-CCNC: 23 U/L
ANION GAP SERPL CALC-SCNC: 6 MMOL/L (ref 0–18)
AST SERPL-CCNC: 18 U/L (ref 15–37)
ATRIAL RATE: 75 BPM
BASOPHILS # BLD AUTO: 0.03 X10(3) UL (ref 0–0.2)
BASOPHILS NFR BLD AUTO: 0.6 %
BILIRUB SERPL-MCNC: 1.1 MG/DL (ref 0.1–2)
BUN BLD-MCNC: 57 MG/DL (ref 7–18)
CALCIUM BLD-MCNC: 8.6 MG/DL (ref 8.5–10.1)
CHLORIDE SERPL-SCNC: 102 MMOL/L (ref 98–112)
CO2 SERPL-SCNC: 27 MMOL/L (ref 21–32)
CREAT BLD-MCNC: 2.08 MG/DL
D DIMER PPP FEU-MCNC: 2.65 UG/ML FEU (ref ?–0.79)
EOSINOPHIL # BLD AUTO: 0.16 X10(3) UL (ref 0–0.7)
EOSINOPHIL NFR BLD AUTO: 3 %
ERYTHROCYTE [DISTWIDTH] IN BLOOD BY AUTOMATED COUNT: 17.1 %
GFR SERPLBLD BASED ON 1.73 SQ M-ARVRAT: 32 ML/MIN/1.73M2 (ref 60–?)
GLOBULIN PLAS-MCNC: 3.5 G/DL (ref 2.8–4.4)
GLUCOSE BLD-MCNC: 94 MG/DL (ref 70–99)
HCT VFR BLD AUTO: 46 %
HGB BLD-MCNC: 14.8 G/DL
IMM GRANULOCYTES # BLD AUTO: 0.03 X10(3) UL (ref 0–1)
IMM GRANULOCYTES NFR BLD: 0.6 %
LIPASE SERPL-CCNC: 700 U/L (ref 73–393)
LYMPHOCYTES # BLD AUTO: 0.75 X10(3) UL (ref 1–4)
LYMPHOCYTES NFR BLD AUTO: 13.8 %
MCH RBC QN AUTO: 34.2 PG (ref 26–34)
MCHC RBC AUTO-ENTMCNC: 32.2 G/DL (ref 31–37)
MCV RBC AUTO: 106.2 FL
MONOCYTES # BLD AUTO: 0.66 X10(3) UL (ref 0.1–1)
MONOCYTES NFR BLD AUTO: 12.2 %
NEUTROPHILS # BLD AUTO: 3.79 X10 (3) UL (ref 1.5–7.7)
NEUTROPHILS # BLD AUTO: 3.79 X10(3) UL (ref 1.5–7.7)
NEUTROPHILS NFR BLD AUTO: 69.8 %
NT-PROBNP SERPL-MCNC: 1451 PG/ML (ref ?–450)
OSMOLALITY SERPL CALC.SUM OF ELEC: 296 MOSM/KG (ref 275–295)
PLATELET # BLD AUTO: 125 10(3)UL (ref 150–450)
POTASSIUM SERPL-SCNC: 4.4 MMOL/L (ref 3.5–5.1)
PROT SERPL-MCNC: 7 G/DL (ref 6.4–8.2)
Q-T INTERVAL: 430 MS
QRS DURATION: 104 MS
QTC CALCULATION (BEZET): 505 MS
R AXIS: 1 DEGREES
RBC # BLD AUTO: 4.33 X10(6)UL
SARS-COV-2 RNA RESP QL NAA+PROBE: NOT DETECTED
SODIUM SERPL-SCNC: 135 MMOL/L (ref 136–145)
T AXIS: -4 DEGREES
TRIGL SERPL-MCNC: 205 MG/DL (ref 30–149)
TROPONIN I HIGH SENSITIVITY: 46 NG/L
VENTRICULAR RATE: 83 BPM
WBC # BLD AUTO: 5.4 X10(3) UL (ref 4–11)

## 2022-09-20 PROCEDURE — 76700 US EXAM ABDOM COMPLETE: CPT | Performed by: EMERGENCY MEDICINE

## 2022-09-20 PROCEDURE — 99223 1ST HOSP IP/OBS HIGH 75: CPT | Performed by: INTERNAL MEDICINE

## 2022-09-20 PROCEDURE — 71045 X-RAY EXAM CHEST 1 VIEW: CPT | Performed by: EMERGENCY MEDICINE

## 2022-09-20 PROCEDURE — 78582 LUNG VENTILAT&PERFUS IMAGING: CPT | Performed by: EMERGENCY MEDICINE

## 2022-09-20 RX ORDER — SODIUM CHLORIDE, SODIUM LACTATE, POTASSIUM CHLORIDE, CALCIUM CHLORIDE 600; 310; 30; 20 MG/100ML; MG/100ML; MG/100ML; MG/100ML
INJECTION, SOLUTION INTRAVENOUS CONTINUOUS
Status: DISCONTINUED | OUTPATIENT
Start: 2022-09-20 | End: 2022-09-22

## 2022-09-20 RX ORDER — HYDROCODONE BITARTRATE AND ACETAMINOPHEN 5; 325 MG/1; MG/1
2 TABLET ORAL EVERY 4 HOURS PRN
Status: DISCONTINUED | OUTPATIENT
Start: 2022-09-20 | End: 2022-09-22

## 2022-09-20 RX ORDER — HEPARIN SODIUM 5000 [USP'U]/ML
5000 INJECTION, SOLUTION INTRAVENOUS; SUBCUTANEOUS EVERY 8 HOURS SCHEDULED
Status: DISCONTINUED | OUTPATIENT
Start: 2022-09-20 | End: 2022-09-22

## 2022-09-20 RX ORDER — MELATONIN
3 NIGHTLY PRN
Status: DISCONTINUED | OUTPATIENT
Start: 2022-09-20 | End: 2022-09-22

## 2022-09-20 RX ORDER — BISACODYL 10 MG
10 SUPPOSITORY, RECTAL RECTAL
Status: DISCONTINUED | OUTPATIENT
Start: 2022-09-20 | End: 2022-09-22

## 2022-09-20 RX ORDER — PREDNISONE 1 MG/1
5 TABLET ORAL
Status: DISCONTINUED | OUTPATIENT
Start: 2022-09-21 | End: 2022-09-20 | Stop reason: SDUPTHER

## 2022-09-20 RX ORDER — SODIUM PHOSPHATE, DIBASIC AND SODIUM PHOSPHATE, MONOBASIC 7; 19 G/133ML; G/133ML
1 ENEMA RECTAL ONCE AS NEEDED
Status: DISCONTINUED | OUTPATIENT
Start: 2022-09-20 | End: 2022-09-22

## 2022-09-20 RX ORDER — METOCLOPRAMIDE HYDROCHLORIDE 5 MG/ML
5 INJECTION INTRAMUSCULAR; INTRAVENOUS EVERY 8 HOURS PRN
Status: DISCONTINUED | OUTPATIENT
Start: 2022-09-20 | End: 2022-09-22

## 2022-09-20 RX ORDER — PANTOPRAZOLE SODIUM 20 MG/1
20 TABLET, DELAYED RELEASE ORAL
Status: DISCONTINUED | OUTPATIENT
Start: 2022-09-21 | End: 2022-09-22

## 2022-09-20 RX ORDER — ONDANSETRON 2 MG/ML
4 INJECTION INTRAMUSCULAR; INTRAVENOUS EVERY 6 HOURS PRN
Status: DISCONTINUED | OUTPATIENT
Start: 2022-09-20 | End: 2022-09-22

## 2022-09-20 RX ORDER — SODIUM CHLORIDE, SODIUM LACTATE, POTASSIUM CHLORIDE, CALCIUM CHLORIDE 600; 310; 30; 20 MG/100ML; MG/100ML; MG/100ML; MG/100ML
INJECTION, SOLUTION INTRAVENOUS CONTINUOUS
Status: ACTIVE | OUTPATIENT
Start: 2022-09-20 | End: 2022-09-21

## 2022-09-20 RX ORDER — METOPROLOL SUCCINATE 50 MG/1
100 TABLET, EXTENDED RELEASE ORAL
Status: DISCONTINUED | OUTPATIENT
Start: 2022-09-20 | End: 2022-09-22

## 2022-09-20 RX ORDER — SENNOSIDES 8.6 MG
17.2 TABLET ORAL NIGHTLY PRN
Status: DISCONTINUED | OUTPATIENT
Start: 2022-09-20 | End: 2022-09-22

## 2022-09-20 RX ORDER — ACETAMINOPHEN 500 MG
1000 TABLET ORAL EVERY 8 HOURS PRN
Status: DISCONTINUED | OUTPATIENT
Start: 2022-09-20 | End: 2022-09-22

## 2022-09-20 RX ORDER — SODIUM CHLORIDE 9 MG/ML
INJECTION, SOLUTION INTRAVENOUS CONTINUOUS
Status: DISCONTINUED | OUTPATIENT
Start: 2022-09-20 | End: 2022-09-22

## 2022-09-20 RX ORDER — HYDROCODONE BITARTRATE AND ACETAMINOPHEN 5; 325 MG/1; MG/1
1 TABLET ORAL EVERY 4 HOURS PRN
Status: DISCONTINUED | OUTPATIENT
Start: 2022-09-20 | End: 2022-09-22

## 2022-09-20 RX ORDER — POLYETHYLENE GLYCOL 3350 17 G/17G
17 POWDER, FOR SOLUTION ORAL DAILY PRN
Status: DISCONTINUED | OUTPATIENT
Start: 2022-09-20 | End: 2022-09-22

## 2022-09-20 RX ORDER — ACETAMINOPHEN 325 MG/1
650 TABLET ORAL EVERY 4 HOURS PRN
Status: DISCONTINUED | OUTPATIENT
Start: 2022-09-20 | End: 2022-09-22

## 2022-09-20 RX ORDER — PREDNISONE 1 MG/1
2 TABLET ORAL
Status: DISCONTINUED | OUTPATIENT
Start: 2022-09-21 | End: 2022-09-20 | Stop reason: SDUPTHER

## 2022-09-20 RX ORDER — ALLOPURINOL 300 MG/1
300 TABLET ORAL DAILY
Status: DISCONTINUED | OUTPATIENT
Start: 2022-09-21 | End: 2022-09-22

## 2022-09-20 NOTE — ED INITIAL ASSESSMENT (HPI)
Pt to the ER via EMS for light headed and dizziness that has been on going since last Thursday. Was seen Friday and Sunday with no findings.  Back today for same feeling

## 2022-09-21 ENCOUNTER — APPOINTMENT (OUTPATIENT)
Dept: ULTRASOUND IMAGING | Facility: HOSPITAL | Age: 77
End: 2022-09-21
Attending: INTERNAL MEDICINE
Payer: MEDICARE

## 2022-09-21 ENCOUNTER — APPOINTMENT (OUTPATIENT)
Dept: CT IMAGING | Facility: HOSPITAL | Age: 77
End: 2022-09-21
Attending: STUDENT IN AN ORGANIZED HEALTH CARE EDUCATION/TRAINING PROGRAM
Payer: MEDICARE

## 2022-09-21 LAB
ALBUMIN SERPL-MCNC: 3 G/DL (ref 3.4–5)
ALBUMIN/GLOB SERPL: 0.9 {RATIO} (ref 1–2)
ALP LIVER SERPL-CCNC: 48 U/L
ALT SERPL-CCNC: 22 U/L
ANION GAP SERPL CALC-SCNC: 5 MMOL/L (ref 0–18)
AST SERPL-CCNC: 22 U/L (ref 15–37)
BASOPHILS # BLD AUTO: 0.03 X10(3) UL (ref 0–0.2)
BASOPHILS NFR BLD AUTO: 0.6 %
BILIRUB SERPL-MCNC: 0.9 MG/DL (ref 0.1–2)
BUN BLD-MCNC: 46 MG/DL (ref 7–18)
CALCIUM BLD-MCNC: 8.5 MG/DL (ref 8.5–10.1)
CHLORIDE SERPL-SCNC: 106 MMOL/L (ref 98–112)
CO2 SERPL-SCNC: 22 MMOL/L (ref 21–32)
CREAT BLD-MCNC: 1.5 MG/DL
EOSINOPHIL # BLD AUTO: 0.13 X10(3) UL (ref 0–0.7)
EOSINOPHIL NFR BLD AUTO: 2.8 %
ERYTHROCYTE [DISTWIDTH] IN BLOOD BY AUTOMATED COUNT: 17.4 %
GFR SERPLBLD BASED ON 1.73 SQ M-ARVRAT: 47 ML/MIN/1.73M2 (ref 60–?)
GLOBULIN PLAS-MCNC: 3.5 G/DL (ref 2.8–4.4)
GLUCOSE BLD-MCNC: 78 MG/DL (ref 70–99)
HCT VFR BLD AUTO: 49.4 %
HGB BLD-MCNC: 15.2 G/DL
IMM GRANULOCYTES # BLD AUTO: 0.02 X10(3) UL (ref 0–1)
IMM GRANULOCYTES NFR BLD: 0.4 %
LYMPHOCYTES # BLD AUTO: 0.67 X10(3) UL (ref 1–4)
LYMPHOCYTES NFR BLD AUTO: 14.3 %
MCH RBC QN AUTO: 34.8 PG (ref 26–34)
MCHC RBC AUTO-ENTMCNC: 30.8 G/DL (ref 31–37)
MCV RBC AUTO: 113 FL
MONOCYTES # BLD AUTO: 0.56 X10(3) UL (ref 0.1–1)
MONOCYTES NFR BLD AUTO: 12 %
NEUTROPHILS # BLD AUTO: 3.27 X10 (3) UL (ref 1.5–7.7)
NEUTROPHILS # BLD AUTO: 3.27 X10(3) UL (ref 1.5–7.7)
NEUTROPHILS NFR BLD AUTO: 69.9 %
OSMOLALITY SERPL CALC.SUM OF ELEC: 287 MOSM/KG (ref 275–295)
PLATELET # BLD AUTO: 103 10(3)UL (ref 150–450)
POTASSIUM SERPL-SCNC: 4.4 MMOL/L (ref 3.5–5.1)
PROT SERPL-MCNC: 6.5 G/DL (ref 6.4–8.2)
RBC # BLD AUTO: 4.37 X10(6)UL
SODIUM SERPL-SCNC: 133 MMOL/L (ref 136–145)
WBC # BLD AUTO: 4.7 X10(3) UL (ref 4–11)

## 2022-09-21 PROCEDURE — 93970 EXTREMITY STUDY: CPT | Performed by: INTERNAL MEDICINE

## 2022-09-21 PROCEDURE — 99232 SBSQ HOSP IP/OBS MODERATE 35: CPT | Performed by: STUDENT IN AN ORGANIZED HEALTH CARE EDUCATION/TRAINING PROGRAM

## 2022-09-21 PROCEDURE — 74176 CT ABD & PELVIS W/O CONTRAST: CPT | Performed by: STUDENT IN AN ORGANIZED HEALTH CARE EDUCATION/TRAINING PROGRAM

## 2022-09-21 RX ORDER — ASPIRIN 81 MG/1
81 TABLET ORAL DAILY
Status: DISCONTINUED | OUTPATIENT
Start: 2022-09-22 | End: 2022-09-22

## 2022-09-21 NOTE — RESPIRATORY THERAPY NOTE
Patient has a history of LI and is suppose to wear CPAP at night but is refusing. LI protocol in place.

## 2022-09-21 NOTE — PLAN OF CARE
Received patient this morning at 0730. Pt is A&Ox4.   RA,VSS   Tele: Afib   Denies having pain. Plan is for pt to have CT abd with oral contrast and ultrasound doppler. Left AC-LR 75ml. On clear liquids. Wife present at bedside. Pt ambulates to the bathroom, standby assist.   Pt instructed to not ambulate if dizziness occurs. Bed in lowest position, call light within reach. Ultrasound doppler negative. CT abd done. Problem: CARDIOVASCULAR - ADULT  Goal: Maintains optimal cardiac output and hemodynamic stability  Description: INTERVENTIONS:  - Monitor vital signs, rhythm, and trends  - Monitor for bleeding, hypotension and signs of decreased cardiac output  - Evaluate effectiveness of vasoactive medications to optimize hemodynamic stability  - Monitor arterial and/or venous puncture sites for bleeding and/or hematoma  - Assess quality of pulses, skin color and temperature  - Assess for signs of decreased coronary artery perfusion - ex.  Angina  - Evaluate fluid balance, assess for edema, trend weights  Outcome: Progressing     Problem: PAIN - ADULT  Goal: Verbalizes/displays adequate comfort level or patient's stated pain goal  Description: INTERVENTIONS:  - Encourage pt to monitor pain and request assistance  - Assess pain using appropriate pain scale  - Administer analgesics based on type and severity of pain and evaluate response  - Implement non-pharmacological measures as appropriate and evaluate response  - Consider cultural and social influences on pain and pain management  - Manage/alleviate anxiety  - Utilize distraction and/or relaxation techniques  - Monitor for opioid side effects  - Notify MD/LIP if interventions unsuccessful or patient reports new pain  - Anticipate increased pain with activity and pre-medicate as appropriate  Outcome: Progressing

## 2022-09-21 NOTE — PROGRESS NOTES
Cardiomems reading performed with good signal strength and waveform. PAD 15 Patient tolerated it well.  .   Second reading taken PAD 16  Baseline range PAD [address] MSN, RN  Heart Failure Navigator

## 2022-09-21 NOTE — PLAN OF CARE
NURSING ADMISSION NOTE      Patient admitted via Cart  Oriented to room. Safety precautions initiated. Bed in low position. Call light in reach. Assumed care at 2000. Admission navigator completed. A/O x4. RA, refused cpap tonight. Afib on tele. Denies SOB, dizziness or being lightheaded. C/o mild epigastric pain- dull aching. Continent- up w/ 1x assist. LR infusing at 75 ml/hr. Cardiac elec protocol. Clear liquid diet. US doppler to be completed. Safety prec in place. Needs being met at this time.

## 2022-09-21 NOTE — CM/SW NOTE
09/21/22 1100   CM/SW Referral Data   Referral Source Social Work (self-referral)   Reason for Referral Discharge planning   Informant Patient;Spouse/Significant Other   Patient Info   Patient's Current Mental Status at Time of Assessment Alert;Oriented   Patient lives with Spouse/Significant other   Patient Status Prior to Admission   Independent with ADLs and Mobility Yes       MSW met with pt and his spouse at bedside. Pt has had 3 ER visits in last month. Pt denies needs, states he drives and does all his ADL's. Client denies need for any HHC post dc.

## 2022-09-22 ENCOUNTER — TELEPHONE (OUTPATIENT)
Dept: CARDIOLOGY CLINIC | Facility: HOSPITAL | Age: 77
End: 2022-09-22

## 2022-09-22 VITALS
BODY MASS INDEX: 27.21 KG/M2 | SYSTOLIC BLOOD PRESSURE: 128 MMHG | TEMPERATURE: 98 F | WEIGHT: 194.38 LBS | OXYGEN SATURATION: 100 % | HEIGHT: 71 IN | DIASTOLIC BLOOD PRESSURE: 91 MMHG | HEART RATE: 78 BPM | RESPIRATION RATE: 21 BRPM

## 2022-09-22 LAB
ALBUMIN SERPL-MCNC: 2.9 G/DL (ref 3.4–5)
ALP LIVER SERPL-CCNC: 45 U/L
ALT SERPL-CCNC: 20 U/L
ANION GAP SERPL CALC-SCNC: 5 MMOL/L (ref 0–18)
AST SERPL-CCNC: 18 U/L (ref 15–37)
BASOPHILS # BLD AUTO: 0.02 X10(3) UL (ref 0–0.2)
BASOPHILS NFR BLD AUTO: 0.4 %
BILIRUB DIRECT SERPL-MCNC: 0.3 MG/DL (ref 0–0.2)
BILIRUB SERPL-MCNC: 0.9 MG/DL (ref 0.1–2)
BUN BLD-MCNC: 32 MG/DL (ref 7–18)
CALCIUM BLD-MCNC: 8.9 MG/DL (ref 8.5–10.1)
CHLORIDE SERPL-SCNC: 103 MMOL/L (ref 98–112)
CO2 SERPL-SCNC: 28 MMOL/L (ref 21–32)
CREAT BLD-MCNC: 1.29 MG/DL
EOSINOPHIL # BLD AUTO: 0.13 X10(3) UL (ref 0–0.7)
EOSINOPHIL NFR BLD AUTO: 2.7 %
ERYTHROCYTE [DISTWIDTH] IN BLOOD BY AUTOMATED COUNT: 17 %
GFR SERPLBLD BASED ON 1.73 SQ M-ARVRAT: 56 ML/MIN/1.73M2 (ref 60–?)
GLUCOSE BLD-MCNC: 96 MG/DL (ref 70–99)
HCT VFR BLD AUTO: 42.1 %
HGB BLD-MCNC: 13.6 G/DL
IMM GRANULOCYTES # BLD AUTO: 0.02 X10(3) UL (ref 0–1)
IMM GRANULOCYTES NFR BLD: 0.4 %
LYMPHOCYTES # BLD AUTO: 0.74 X10(3) UL (ref 1–4)
LYMPHOCYTES NFR BLD AUTO: 15.6 %
MAGNESIUM SERPL-MCNC: 3 MG/DL (ref 1.6–2.6)
MCH RBC QN AUTO: 34.5 PG (ref 26–34)
MCHC RBC AUTO-ENTMCNC: 32.3 G/DL (ref 31–37)
MCV RBC AUTO: 106.9 FL
MONOCYTES # BLD AUTO: 0.53 X10(3) UL (ref 0.1–1)
MONOCYTES NFR BLD AUTO: 11.2 %
NEUTROPHILS # BLD AUTO: 3.3 X10 (3) UL (ref 1.5–7.7)
NEUTROPHILS # BLD AUTO: 3.3 X10(3) UL (ref 1.5–7.7)
NEUTROPHILS NFR BLD AUTO: 69.7 %
OSMOLALITY SERPL CALC.SUM OF ELEC: 289 MOSM/KG (ref 275–295)
PHOSPHATE SERPL-MCNC: 3 MG/DL (ref 2.5–4.9)
PLATELET # BLD AUTO: 93 10(3)UL (ref 150–450)
PLATELET MORPHOLOGY: NORMAL
POTASSIUM SERPL-SCNC: 4.9 MMOL/L (ref 3.5–5.1)
PROT SERPL-MCNC: 6.5 G/DL (ref 6.4–8.2)
RBC # BLD AUTO: 3.94 X10(6)UL
SODIUM SERPL-SCNC: 136 MMOL/L (ref 136–145)
WBC # BLD AUTO: 4.7 X10(3) UL (ref 4–11)

## 2022-09-22 PROCEDURE — 99239 HOSP IP/OBS DSCHRG MGMT >30: CPT | Performed by: STUDENT IN AN ORGANIZED HEALTH CARE EDUCATION/TRAINING PROGRAM

## 2022-09-22 RX ORDER — SACUBITRIL AND VALSARTAN 24; 26 MG/1; MG/1
1 TABLET, FILM COATED ORAL 2 TIMES DAILY
Qty: 180 TABLET | Refills: 1 | Status: SHIPPED | COMMUNITY
Start: 2022-09-22

## 2022-09-22 RX ORDER — TORSEMIDE 20 MG/1
TABLET ORAL
Qty: 130 TABLET | Refills: 5 | Status: SHIPPED | COMMUNITY
Start: 2022-09-24 | End: 2022-10-06

## 2022-09-22 NOTE — DISCHARGE PLANNING
NURSING DISCHARGE NOTE    Discharged Home via Wheelchair. Accompanied by Support staff  Belongings Taken by patient/family. Patient education provided to patient. Pt verbalized understanding. Pt is aware of prescriptions changes. IV removed. Pt left the unit around 1500.

## 2022-09-22 NOTE — PROGRESS NOTES
Pt awake and alert. On tele running afib. RA. LR @ 75. PRN Tylenol and Norco for pain. Clear liquid diet. Pt expressed his wish to go home today.

## 2022-09-22 NOTE — PROGRESS NOTES
Cardiomems reading performed with good signal strength and waveform. PAD 20 Patient tolerated it well.  .   Second reading taken PAD 21  Yesterday's reading PAD 15  Baseline goal PAD One Janell Lynn MSN, RN  Heart Failure Navigator

## 2022-09-22 NOTE — PLAN OF CARE
Received patient this morning at 0730. Pt is A&OX 4  RA, VSS   Tele: Afib   Complains of abd pain rates pain 2/10, refused pain medication earlier. Left 18Fr LR at 75ml/hr. On clear liquid diet. Plan is for pt to be discharged today after pt is cleared by cardiologist.   Bed in lowest position, call light within reach, bed alarm on. Problem: CARDIOVASCULAR - ADULT  Goal: Maintains optimal cardiac output and hemodynamic stability  Description: INTERVENTIONS:  - Monitor vital signs, rhythm, and trends  - Monitor for bleeding, hypotension and signs of decreased cardiac output  - Evaluate effectiveness of vasoactive medications to optimize hemodynamic stability  - Monitor arterial and/or venous puncture sites for bleeding and/or hematoma  - Assess quality of pulses, skin color and temperature  - Assess for signs of decreased coronary artery perfusion - ex.  Angina  - Evaluate fluid balance, assess for edema, trend weights  Outcome: Progressing     Problem: PAIN - ADULT  Goal: Verbalizes/displays adequate comfort level or patient's stated pain goal  Description: INTERVENTIONS:  - Encourage pt to monitor pain and request assistance  - Assess pain using appropriate pain scale  - Administer analgesics based on type and severity of pain and evaluate response  - Implement non-pharmacological measures as appropriate and evaluate response  - Consider cultural and social influences on pain and pain management  - Manage/alleviate anxiety  - Utilize distraction and/or relaxation techniques  - Monitor for opioid side effects  - Notify MD/LIP if interventions unsuccessful or patient reports new pain  - Anticipate increased pain with activity and pre-medicate as appropriate  Outcome: Progressing

## 2022-09-23 ENCOUNTER — TELEPHONE (OUTPATIENT)
Dept: FAMILY MEDICINE CLINIC | Facility: CLINIC | Age: 77
End: 2022-09-23

## 2022-09-23 ENCOUNTER — PATIENT OUTREACH (OUTPATIENT)
Dept: CASE MANAGEMENT | Age: 77
End: 2022-09-23

## 2022-09-23 ENCOUNTER — TELEPHONE (OUTPATIENT)
Dept: CARDIOLOGY CLINIC | Facility: HOSPITAL | Age: 77
End: 2022-09-23

## 2022-09-23 DIAGNOSIS — Z02.9 ENCOUNTERS FOR ADMINISTRATIVE PURPOSE: ICD-10-CM

## 2022-09-23 PROCEDURE — 1111F DSCHRG MED/CURRENT MED MERGE: CPT

## 2022-09-23 RX ORDER — HYDROCODONE BITARTRATE AND ACETAMINOPHEN 5; 325 MG/1; MG/1
1 TABLET ORAL EVERY 4 HOURS PRN
Qty: 20 TABLET | Refills: 0 | Status: SHIPPED | OUTPATIENT
Start: 2022-09-23

## 2022-09-23 NOTE — TELEPHONE ENCOUNTER
S/w patient for TCM. He states that he has barely gotten any sleep for the past 8 or so days due to the back pain and not being able to get comfortable in bed. He states that he has barely had a couple hours of sleep a night and is exhausted. He states that this is very out of the normal for him as prior to this he was a very sound sleeper. He is asking for Dr. Carver Bodily advice. He is open to sleeping aids as he just wants to get a solid night's sleep. Please advise.

## 2022-09-26 ENCOUNTER — TELEPHONE (OUTPATIENT)
Dept: CARDIOLOGY CLINIC | Facility: HOSPITAL | Age: 77
End: 2022-09-26

## 2022-09-26 NOTE — TELEPHONE ENCOUNTER
Mckenna Harrington called to say that he fell again in the bathroom and the paramedics were called. He is now at [facility].

## 2022-09-26 NOTE — TELEPHONE ENCOUNTER
I called and spoke with Horacio Aruna and asked him to do a cardiomems readhing. He stated he was just getting ready to call us. He will do a reading later today. He states restarted his medications yesterday as instructed and his BP was 128/78 and later dropped to 75/45. He was walking to the bathroom using a cane and assistance of his wife when his legs gave out and he fell back and was able to roll to his side. He states he didn't hurt himself, except for his pride. Today he has only taken his entresto. His BP was 88/68 and rechecking later his BP was 81/68. He denies any dizziness or lightheadedness.

## 2022-09-27 ENCOUNTER — TELEPHONE (OUTPATIENT)
Dept: CARDIOLOGY CLINIC | Facility: HOSPITAL | Age: 77
End: 2022-09-27

## 2022-09-27 NOTE — TELEPHONE ENCOUNTER
Tessa Mcrae is currently hospitalized at [facility]. [facility] for tomorrow and placed Bill on the call back list for this Friday to determine if will be able to reschedule at that time. APN notified of the above information. Lot # (Optional): [id number]

## 2022-09-28 RX ORDER — PREDNISONE 5 MG/1
TABLET ORAL
Qty: 90 TABLET | Refills: 3 | OUTPATIENT
Start: 2022-09-28

## 2022-09-30 ENCOUNTER — TELEPHONE (OUTPATIENT)
Dept: CARDIOLOGY CLINIC | Facility: HOSPITAL | Age: 77
End: 2022-09-30

## 2022-09-30 NOTE — TELEPHONE ENCOUNTER
Patient d/c from [facility]. Called 9-30-22 to make [facility] appt for next week. Patient said he was sleeping and didnt have calendar nearby. Told patient we would call him Monday 10-3-22 to schedule next [facility] appointment.

## 2022-10-03 ENCOUNTER — TELEPHONE (OUTPATIENT)
Dept: CARDIOLOGY CLINIC | Facility: HOSPITAL | Age: 77
End: 2022-10-03

## 2022-10-03 NOTE — PROGRESS NOTES
[facility] for [address] pulmonary artery pressure sensor    Remote Monitoring Report Summary    10/4/2022    Montana Sers    : 3/17/1945    Reporting Period-  -22    Diagnosis - HFpEF    Provider- SHERIDAN Bravo       The CardioMEMS report for the above date has been reviewed with the following results:      [address] hemodynamics at time of CardioMEMS impant:    PA 42/18/27  Wedge 8    Acceptable range for [place] Sers      PAD range 18-24 mmhg     Remote monitoring documentation for the past ~30 days:    2022 Hospitalized at [facility]  2022 Last reading last week at 21mmhg. Had been recently hospitalized. Will call him.  2022 Last reading 3 days ago, will call him.  2022 PAD stable and in range. 2022 PAD stable 19-21 mmhg. CPM  2022 PAD stable and in range.  CPM         Iris Tolbert NP   10/4/2022

## 2022-10-04 ENCOUNTER — HOSPITAL ENCOUNTER (OUTPATIENT)
Dept: CARDIOLOGY CLINIC | Facility: HOSPITAL | Age: 77
Discharge: HOME | End: 2022-10-04
Attending: NURSE PRACTITIONER
Payer: MEDICARE

## 2022-10-04 DIAGNOSIS — Z95.818 PRESENCE OF CARDIOMEMS HF SYSTEM: ICD-10-CM

## 2022-10-04 DIAGNOSIS — I50.812 CHRONIC RIGHT-SIDED CONGESTIVE HEART FAILURE (HCC): ICD-10-CM

## 2022-10-06 ENCOUNTER — HOSPITAL ENCOUNTER (OUTPATIENT)
Dept: LAB | Facility: HOSPITAL | Age: 77
Discharge: HOME | End: 2022-10-06
Attending: NURSE PRACTITIONER
Payer: MEDICARE

## 2022-10-06 ENCOUNTER — HOSPITAL ENCOUNTER (OUTPATIENT)
Dept: CARDIOLOGY CLINIC | Facility: HOSPITAL | Age: 77
Discharge: HOME | End: 2022-10-06
Attending: NURSE PRACTITIONER
Payer: MEDICARE

## 2022-10-06 VITALS
OXYGEN SATURATION: 100 % | DIASTOLIC BLOOD PRESSURE: 61 MMHG | SYSTOLIC BLOOD PRESSURE: 94 MMHG | BODY MASS INDEX: 26 KG/M2 | HEART RATE: 70 BPM | RESPIRATION RATE: 42 BRPM | WEIGHT: 189.19 LBS

## 2022-10-06 DIAGNOSIS — I95.9 HYPOTENSION, UNSPECIFIED HYPOTENSION TYPE: ICD-10-CM

## 2022-10-06 DIAGNOSIS — I48.21 PERMANENT ATRIAL FIBRILLATION (HCC): ICD-10-CM

## 2022-10-06 DIAGNOSIS — I50.32 CHRONIC DIASTOLIC HEART FAILURE (HCC): ICD-10-CM

## 2022-10-06 DIAGNOSIS — I50.812 CHRONIC RIGHT-SIDED CONGESTIVE HEART FAILURE (HCC): ICD-10-CM

## 2022-10-06 DIAGNOSIS — N18.30 STAGE 3 CHRONIC KIDNEY DISEASE, UNSPECIFIED WHETHER STAGE 3A OR 3B CKD (HCC): ICD-10-CM

## 2022-10-06 DIAGNOSIS — I50.812 CHRONIC RIGHT-SIDED CONGESTIVE HEART FAILURE (HCC): Primary | ICD-10-CM

## 2022-10-06 LAB
ANION GAP SERPL CALC-SCNC: 9 MMOL/L (ref 0–18)
BUN BLD-MCNC: 44 MG/DL (ref 7–18)
CALCIUM BLD-MCNC: 9.6 MG/DL (ref 8.5–10.1)
CHLORIDE SERPL-SCNC: 109 MMOL/L (ref 98–112)
CO2 SERPL-SCNC: 23 MMOL/L (ref 21–32)
CREAT BLD-MCNC: 1.68 MG/DL
FASTING STATUS PATIENT QL REPORTED: NO
GFR SERPLBLD BASED ON 1.73 SQ M-ARVRAT: 41 ML/MIN/1.73M2 (ref 60–?)
GLUCOSE BLD-MCNC: 100 MG/DL (ref 70–99)
OSMOLALITY SERPL CALC.SUM OF ELEC: 303 MOSM/KG (ref 275–295)
POTASSIUM SERPL-SCNC: 4.3 MMOL/L (ref 3.5–5.1)
SODIUM SERPL-SCNC: 141 MMOL/L (ref 136–145)

## 2022-10-06 PROCEDURE — 80048 BASIC METABOLIC PNL TOTAL CA: CPT | Performed by: NURSE PRACTITIONER

## 2022-10-06 PROCEDURE — 36415 COLL VENOUS BLD VENIPUNCTURE: CPT | Performed by: NURSE PRACTITIONER

## 2022-10-06 PROCEDURE — 99215 OFFICE O/P EST HI 40 MIN: CPT | Performed by: NURSE PRACTITIONER

## 2022-10-06 RX ORDER — TORSEMIDE 20 MG/1
20 TABLET ORAL 2 TIMES DAILY
Qty: 130 TABLET | Refills: 5 | COMMUNITY
Start: 2022-10-06

## 2022-10-06 NOTE — PROGRESS NOTES
Pt. Assessed. No signs or symptom chest pain or edema noted. AFib rates controlled. Patient reporting SBP 75 at times at home. With recent hospitalization at [facility] for falls, patient was diagnosed with orthostatic hypotension. BP 95/46 in [facility]. Patient reports dyspnea on exertion, and overall feeling bad. Patient was not dizziness nor did he have and chest palpatations. \"It was like a light switch how quickly it happened. \" Weight down to 189.2 lbs. Some of his meds were restarted at a lower dose. Patient fearful of falling so has a gait belt with him, his cane, and came up in a wheelchair. Mems reading done to check volume status. Reading downloaded to [employer]. Reviewed current list of patient's allergies and medication; updated the Electronic Medical Record. Labs ordered to assess kidney function and drawn by [facility]. Reviewed follow-up appointment and Heart Failure discharge instructions with patient. Patient verbalized an understanding. Spent 16-30 minutes with patient who has acute heart failure. [facility] 1 week.

## 2022-10-11 ENCOUNTER — APPOINTMENT (OUTPATIENT)
Dept: HEMATOLOGY/ONCOLOGY | Facility: HOSPITAL | Age: 77
End: 2022-10-11
Attending: INTERNAL MEDICINE
Payer: MEDICARE

## 2022-10-12 DIAGNOSIS — I50.32 CHRONIC DIASTOLIC HEART FAILURE (HCC): Primary | ICD-10-CM

## 2022-10-13 ENCOUNTER — HOSPITAL ENCOUNTER (OUTPATIENT)
Dept: CARDIOLOGY CLINIC | Facility: HOSPITAL | Age: 77
Discharge: HOME | End: 2022-10-13
Attending: NURSE PRACTITIONER
Payer: MEDICARE

## 2022-10-13 ENCOUNTER — HOSPITAL ENCOUNTER (OUTPATIENT)
Dept: LAB | Facility: HOSPITAL | Age: 77
Discharge: HOME | End: 2022-10-13
Attending: NURSE PRACTITIONER
Payer: MEDICARE

## 2022-10-13 VITALS
HEART RATE: 57 BPM | OXYGEN SATURATION: 100 % | DIASTOLIC BLOOD PRESSURE: 54 MMHG | WEIGHT: 193.63 LBS | SYSTOLIC BLOOD PRESSURE: 114 MMHG | BODY MASS INDEX: 27 KG/M2 | RESPIRATION RATE: 13 BRPM

## 2022-10-13 DIAGNOSIS — I48.21 PERMANENT ATRIAL FIBRILLATION (HCC): ICD-10-CM

## 2022-10-13 DIAGNOSIS — I95.9 HYPOTENSION, UNSPECIFIED HYPOTENSION TYPE: ICD-10-CM

## 2022-10-13 DIAGNOSIS — I50.812 CHRONIC RIGHT-SIDED CONGESTIVE HEART FAILURE (HCC): Primary | ICD-10-CM

## 2022-10-13 DIAGNOSIS — I50.32 CHRONIC DIASTOLIC HEART FAILURE (HCC): ICD-10-CM

## 2022-10-13 LAB
ANION GAP SERPL CALC-SCNC: 7 MMOL/L (ref 0–18)
BUN BLD-MCNC: 23 MG/DL (ref 7–18)
CALCIUM BLD-MCNC: 8.8 MG/DL (ref 8.5–10.1)
CHLORIDE SERPL-SCNC: 108 MMOL/L (ref 98–112)
CO2 SERPL-SCNC: 27 MMOL/L (ref 21–32)
CREAT BLD-MCNC: 1.35 MG/DL
FASTING STATUS PATIENT QL REPORTED: NO
GFR SERPLBLD BASED ON 1.73 SQ M-ARVRAT: 53 ML/MIN/1.73M2 (ref 60–?)
GLUCOSE BLD-MCNC: 114 MG/DL (ref 70–99)
OSMOLALITY SERPL CALC.SUM OF ELEC: 299 MOSM/KG (ref 275–295)
POTASSIUM SERPL-SCNC: 4.4 MMOL/L (ref 3.5–5.1)
SODIUM SERPL-SCNC: 142 MMOL/L (ref 136–145)

## 2022-10-13 PROCEDURE — 99215 OFFICE O/P EST HI 40 MIN: CPT | Performed by: NURSE PRACTITIONER

## 2022-10-13 PROCEDURE — 80048 BASIC METABOLIC PNL TOTAL CA: CPT | Performed by: NURSE PRACTITIONER

## 2022-10-13 PROCEDURE — 36415 COLL VENOUS BLD VENIPUNCTURE: CPT | Performed by: NURSE PRACTITIONER

## 2022-10-13 RX ORDER — PREDNISONE 1 MG/1
TABLET ORAL
Qty: 90 TABLET | Refills: 3 | Status: SHIPPED | OUTPATIENT
Start: 2022-10-13

## 2022-10-13 RX ORDER — ALLOPURINOL 300 MG/1
TABLET ORAL
Qty: 90 TABLET | Refills: 3 | Status: SHIPPED | OUTPATIENT
Start: 2022-10-13

## 2022-10-13 NOTE — TELEPHONE ENCOUNTER
Future Appointments   Date Time Provider Chrissie dAen   10/13/2022  3:00 PM [facility] [facility] CARD PHLEBOTOMY RM1 [id number]   10/13/2022  3:30 PM SHEA Gomez [facility] HF Carmen Lock   10/18/2022 11:30 AM [facility] [facility] [place] RN5 [address] [facility]   43/8/7310 77:07 AM Horacio Valentine MD EMGRHEUMHBSN EMG Mike   11/8/2022  5:30 PM HEART FAILURE APN 1 [address] [facility] HF [facility]IN [facility]   12/6/2022 12:00 PM HEART FAILURE APN 1 [address] [facility] HF CLIN [facility]   1/5/2023 11:30 AM Get Dey MD EMG 3 EMG Marisabel     Last office visit 7/27/2022  Last labs taken 9/29/2022

## 2022-10-13 NOTE — PROGRESS NOTES
Patient assessed. Patient complaining of mild abdominal bloating. Patient reports being short of breath only when his SBP <100. Patient with trace edema noted to bilateral lower extremities. Patient reports he has a good appetite. He states he usually sleeps well, except for last night after having a leak in his CPAP mask, which he states he will swap out masks tonight. Weight up 4 lbs at 193.6 lbs. APN notified of all the above information. Labs ordered and drawn by [facility]. Reviewed allergies and list of current medications with patient and updated it in the Electronic Medical Record. Educated patient on low sodium diet and food choices, fluid restriction of 2 liters, and daily weights. Reviewed follow-up appointments and discharge Heart Failure instructions with patient. Patient verbalized an understanding. Spent 16-30 minutes with patient who has acute heart failure.

## 2022-10-13 NOTE — TELEPHONE ENCOUNTER
Future Appointments   Date Time Provider Chrissie Aden   10/13/2022  3:00 PM [address] [facility] CARD PHLEBOTOMY RM1 [phone]   10/13/2022  3:30 PM SHEA Watson [address] [facility] HF Rich Pouch   10/18/2022 11:30 AM [address] [facility] [place] RN5 [address] [facility]   61/1/5829 83:48 AM Reg Betancourt MD EMGRHEUMHBSN EMG Mike   11/8/2022  5:30 PM HEART FAILURE APN 1 [address] [facility] HF CLIN [facility]IT[facility] AT [facility]   12/6/2022 12:00 PM HEART FAILURE APN 1 [address] [facility] HF CLIN [facility]   1/5/2023 11:30 AM Adelso Montgomery MD EMG 3 EMG Marisabel     Last office visit 7/27/2022  Last labs taken 9/28/2022

## 2022-10-18 ENCOUNTER — OFFICE VISIT (OUTPATIENT)
Dept: HEMATOLOGY/ONCOLOGY | Facility: HOSPITAL | Age: 77
End: 2022-10-18
Attending: INTERNAL MEDICINE
Payer: MEDICARE

## 2022-10-18 VITALS — BODY MASS INDEX: 27 KG/M2 | WEIGHT: 194.5 LBS

## 2022-10-18 DIAGNOSIS — D69.3 IMMUNE THROMBOCYTOPENIC PURPURA (HCC): Primary | ICD-10-CM

## 2022-10-18 DIAGNOSIS — D69.6 THROMBOCYTOPENIA (HCC): ICD-10-CM

## 2022-10-18 PROCEDURE — 96365 THER/PROPH/DIAG IV INF INIT: CPT

## 2022-10-18 PROCEDURE — 96366 THER/PROPH/DIAG IV INF ADDON: CPT

## 2022-10-18 RX ORDER — DIPHENHYDRAMINE HCL 25 MG
25 CAPSULE ORAL ONCE
Status: COMPLETED | OUTPATIENT
Start: 2022-10-18 | End: 2022-10-18

## 2022-10-18 RX ORDER — ACETAMINOPHEN 325 MG/1
650 TABLET ORAL ONCE
Status: CANCELLED | OUTPATIENT
Start: 2022-11-15

## 2022-10-18 RX ORDER — DIPHENHYDRAMINE HCL 25 MG
25 CAPSULE ORAL ONCE
Status: CANCELLED | OUTPATIENT
Start: 2022-11-15

## 2022-10-18 RX ORDER — ACETAMINOPHEN 325 MG/1
650 TABLET ORAL ONCE
Status: COMPLETED | OUTPATIENT
Start: 2022-10-18 | End: 2022-10-18

## 2022-10-18 RX ADMIN — ACETAMINOPHEN 650 MG: 325 TABLET ORAL at 10:42:00

## 2022-10-18 RX ADMIN — DIPHENHYDRAMINE HCL 25 MG: 25 MG CAPSULE ORAL at 10:42:00

## 2022-10-25 DIAGNOSIS — I50.32 CHRONIC DIASTOLIC HEART FAILURE (HCC): Primary | ICD-10-CM

## 2022-10-26 ENCOUNTER — HOSPITAL ENCOUNTER (OUTPATIENT)
Dept: LAB | Facility: HOSPITAL | Age: 77
Discharge: HOME | End: 2022-10-26
Attending: NURSE PRACTITIONER
Payer: MEDICARE

## 2022-10-26 ENCOUNTER — HOSPITAL ENCOUNTER (OUTPATIENT)
Dept: CARDIOLOGY CLINIC | Facility: HOSPITAL | Age: 77
Discharge: HOME | End: 2022-10-26
Attending: NURSE PRACTITIONER
Payer: MEDICARE

## 2022-10-26 VITALS
DIASTOLIC BLOOD PRESSURE: 56 MMHG | BODY MASS INDEX: 27 KG/M2 | SYSTOLIC BLOOD PRESSURE: 84 MMHG | HEART RATE: 64 BPM | WEIGHT: 196 LBS | OXYGEN SATURATION: 99 % | RESPIRATION RATE: 35 BRPM

## 2022-10-26 DIAGNOSIS — I50.32 CHRONIC DIASTOLIC HEART FAILURE (HCC): ICD-10-CM

## 2022-10-26 DIAGNOSIS — D69.6 THROMBOCYTOPENIA (HCC): ICD-10-CM

## 2022-10-26 DIAGNOSIS — E87.1 HYPONATREMIA: ICD-10-CM

## 2022-10-26 DIAGNOSIS — I50.812 CHRONIC RIGHT-SIDED CONGESTIVE HEART FAILURE (HCC): ICD-10-CM

## 2022-10-26 DIAGNOSIS — N18.31 STAGE 3A CHRONIC KIDNEY DISEASE (HCC): ICD-10-CM

## 2022-10-26 DIAGNOSIS — E87.6 HYPOPOTASSEMIA: ICD-10-CM

## 2022-10-26 DIAGNOSIS — I27.20 PULMONARY HYPERTENSION (HCC): ICD-10-CM

## 2022-10-26 LAB
ANION GAP SERPL CALC-SCNC: 6 MMOL/L (ref 0–18)
BUN BLD-MCNC: 23 MG/DL (ref 7–18)
CALCIUM BLD-MCNC: 9.1 MG/DL (ref 8.5–10.1)
CHLORIDE SERPL-SCNC: 110 MMOL/L (ref 98–112)
CO2 SERPL-SCNC: 28 MMOL/L (ref 21–32)
CREAT BLD-MCNC: 1.33 MG/DL
ERYTHROCYTE [DISTWIDTH] IN BLOOD BY AUTOMATED COUNT: 16.7 %
GFR SERPLBLD BASED ON 1.73 SQ M-ARVRAT: 54 ML/MIN/1.73M2 (ref 60–?)
GLUCOSE BLD-MCNC: 92 MG/DL (ref 70–99)
HCT VFR BLD AUTO: 40.3 %
HGB BLD-MCNC: 12.8 G/DL
MCH RBC QN AUTO: 35.8 PG (ref 26–34)
MCHC RBC AUTO-ENTMCNC: 31.8 G/DL (ref 31–37)
MCV RBC AUTO: 112.6 FL
OSMOLALITY SERPL CALC.SUM OF ELEC: 301 MOSM/KG (ref 275–295)
PLATELET # BLD AUTO: 106 10(3)UL (ref 150–450)
POTASSIUM SERPL-SCNC: 3.9 MMOL/L (ref 3.5–5.1)
RBC # BLD AUTO: 3.58 X10(6)UL
SODIUM SERPL-SCNC: 144 MMOL/L (ref 136–145)
WBC # BLD AUTO: 7.4 X10(3) UL (ref 4–11)

## 2022-10-26 PROCEDURE — 85027 COMPLETE CBC AUTOMATED: CPT | Performed by: NURSE PRACTITIONER

## 2022-10-26 PROCEDURE — 36415 COLL VENOUS BLD VENIPUNCTURE: CPT | Performed by: NURSE PRACTITIONER

## 2022-10-26 PROCEDURE — 99215 OFFICE O/P EST HI 40 MIN: CPT | Performed by: NURSE PRACTITIONER

## 2022-10-26 PROCEDURE — 80048 BASIC METABOLIC PNL TOTAL CA: CPT | Performed by: NURSE PRACTITIONER

## 2022-10-26 NOTE — PATIENT INSTRUCTIONS
Heart Failure Discharge Instructions      Activity: Regular exercise and activity is important for your overall health and to help keep your heart strong and functioning as well as possible. Walk at a slow to moderate pace for 15-20 minutes 3-5 days per week. Follow these instructions every day to keep yourself in the [address] you are feeling well and your symptoms are under control                                    Medications  Take your medication every day as instructed  Do not stop taking your medicine or change the amount you are taking without instructions from your doctor or nurse  Do Not take non-steroidal antiinflammatory drugs such as ibuprofen, aleve, advil, or motrin                                    Diet/Fluids  People with heart failure should eat less sodium (salt) and limit fluid. Sodium attracts water and makes the body hold fluid. This extra fluid makes the heart work harder and can worsen the symptoms of heart failure. Diet    2000 mg sodium daily  Fluid restriction    64 ounces daily  (8 oz. = 1 cup)                                     Body Weight  Weigh yourself every day before breakfast and write your weight down  Use the same scale and wear about the same amount of clothes each time  A sudden weight increase is due to fluid retention rather than fat                                         Activity  Pace your activities to avoid getting overtired  Take rest periods as needed  Elevate your feet to reduce ankle swelling when resting                             Signs of Worsening Heart Failure    You are entering the Yellow Zone - this is a warning zone    Call your doctor or nurse if you have any of these signs or symptoms:   You gain 2 or more pounds overnight or 3-5 pounds in 3-7 days  You have more trouble breathing  You get more tired with regular activity, or are limiting activity because of shortness of breath or fatigue  You are short of breath lying down, you need more pillows to breathe comfortably,  or wake up during the night short of breath  You urinate less often during the day and more often at night  You have a bloated feeling, upset stomach, loss of appetite, or your clothes are fitting tighter    GO TO THE EMERGENCY DEPARTMENT or CALL 911 IF: These are signs you are in the RED ZONE - THIS IS AN EMERGENCY  You have tightness or pain in your chest  You are extremely short of breath or can't catch your breath  You cough up frothy pink mucous  You feel confused or can't think clearly  You are traveling and develop symptoms of worsening heart failure     We respect everyone's time and availability. Please be aware that this is not a walk-in clinic and we require appointments in order to facilitate timely care for all patients. We ask you to arrive 30 minutes before your appointment to allow time for you to check-in and have your bloodwork drawn. Please understand if you are late for your appointment, you may be asked to reschedule. If possible, all attempts will be made to accommodate but realize this is no guarantee that this will always be available. We understand there are extenuating circumstances. If you need to cancel or reschedule your appointment, please call the [facility] within 24 hours at [phone]. Thank you for your cooperation, [facility] Staff. IF YOU HAVE QUESTIONS REGARDING YOUR BILL, FEEL FREE TO CONTACT [facility] PATIENT ACCOUNTS -[phone]. IF YOU NEED FINANCIAL ASSISTANCE, PLEASE CALL AN [facility] FINANCIAL COUNSELOR -[phone].              [facility]     [phone]

## 2022-10-26 NOTE — PROGRESS NOTES
Patient assessed. No signs or symptoms of shortness of breath, fatigue, or abdominal bloating noted. Patient with mild edema to bilateral lower extremities. Patient states that he is slowly starting to get back to his regular activity. He says he is no longer needing help from his sister. Patient states he has been having bouts of diarrhea, which is lasting longer than usual. Patient states he last did his CardioMEMs reading a couple days ago, and reports being busy with his son visiting for a couple days and still catching up on things at home after recent hospitalizations. Weight up 3 lbs at 196.0 lbs. Reviewed current list of patient's allergies and medication; updated the Electronic Medical Record. Labs ordered to assess kidney function and drawn by [facility]. Reviewed follow-up appointment and Heart Failure discharge instructions with patient. Patient verbalized an understanding. Spent 16-30 minutes with patient who has acute heart failure.

## 2022-11-01 ENCOUNTER — TELEPHONE (OUTPATIENT)
Dept: RHEUMATOLOGY | Facility: CLINIC | Age: 77
End: 2022-11-01

## 2022-11-01 ENCOUNTER — OFFICE VISIT (OUTPATIENT)
Dept: RHEUMATOLOGY | Facility: CLINIC | Age: 77
End: 2022-11-01
Payer: MEDICARE

## 2022-11-01 VITALS
WEIGHT: 196 LBS | OXYGEN SATURATION: 100 % | SYSTOLIC BLOOD PRESSURE: 90 MMHG | BODY MASS INDEX: 27.44 KG/M2 | HEIGHT: 71 IN | HEART RATE: 106 BPM | DIASTOLIC BLOOD PRESSURE: 70 MMHG

## 2022-11-01 DIAGNOSIS — M35.1 MCTD (MIXED CONNECTIVE TISSUE DISEASE) (HCC): Primary | ICD-10-CM

## 2022-11-01 DIAGNOSIS — Z95.818 PRESENCE OF WATCHMAN LEFT ATRIAL APPENDAGE CLOSURE DEVICE: ICD-10-CM

## 2022-11-01 DIAGNOSIS — Z87.19 HISTORY OF PANCREATITIS: ICD-10-CM

## 2022-11-01 DIAGNOSIS — I43 CARDIOMYOPATHY AS MANIFESTATION OF UNDERLYING DISEASE (HCC): ICD-10-CM

## 2022-11-01 DIAGNOSIS — D69.3 IMMUNE THROMBOCYTOPENIC PURPURA (HCC): ICD-10-CM

## 2022-11-01 DIAGNOSIS — M32.9 LUPUS (HCC): ICD-10-CM

## 2022-11-01 PROCEDURE — 99214 OFFICE O/P EST MOD 30 MIN: CPT | Performed by: INTERNAL MEDICINE

## 2022-11-01 RX ORDER — ACETAMINOPHEN 325 MG/1
650 TABLET ORAL ONCE
OUTPATIENT
Start: 2022-11-01

## 2022-11-01 RX ORDER — PREDNISONE 1 MG/1
2 TABLET ORAL
Qty: 180 TABLET | Refills: 3 | Status: CANCELLED
Start: 2022-11-01

## 2022-11-01 RX ORDER — PREDNISONE 1 MG/1
5 TABLET ORAL
Qty: 90 TABLET | Refills: 3 | Status: CANCELLED
Start: 2022-11-01

## 2022-11-01 RX ORDER — METHYLPREDNISOLONE SODIUM SUCCINATE 125 MG/2ML
125 INJECTION, POWDER, LYOPHILIZED, FOR SOLUTION INTRAMUSCULAR; INTRAVENOUS ONCE
OUTPATIENT
Start: 2022-11-01

## 2022-11-01 RX ORDER — DIPHENHYDRAMINE HYDROCHLORIDE 50 MG/ML
25 INJECTION INTRAMUSCULAR; INTRAVENOUS ONCE
OUTPATIENT
Start: 2022-11-01

## 2022-11-01 RX ORDER — FAMOTIDINE 10 MG/ML
20 INJECTION, SOLUTION INTRAVENOUS ONCE
OUTPATIENT
Start: 2022-11-01

## 2022-11-01 RX ORDER — ALLOPURINOL 300 MG/1
300 TABLET ORAL DAILY
Qty: 90 TABLET | Refills: 3 | Status: CANCELLED
Start: 2022-11-01

## 2022-11-01 RX ORDER — DIPHENHYDRAMINE HCL 25 MG
25 CAPSULE ORAL ONCE
OUTPATIENT
Start: 2022-11-01

## 2022-11-01 RX ORDER — METHYLPREDNISOLONE SODIUM SUCCINATE 40 MG/ML
40 INJECTION, POWDER, LYOPHILIZED, FOR SOLUTION INTRAMUSCULAR; INTRAVENOUS ONCE
OUTPATIENT
Start: 2022-11-01

## 2022-11-01 NOTE — PATIENT INSTRUCTIONS
Renew IVIG taken for thrombocytopenia. Prednisone 7 mg per day. Allopurinol 300 mg per day. See cardiology regarding heart issues. Cardiac clinic in 2 weeks. Return to my office - 3 months.

## 2022-11-01 NOTE — PATIENT INSTRUCTIONS
Heart Failure Discharge Instructions  Continue with same medications     Activity: Regular exercise and activity is important for your overall health and to help keep your heart strong and functioning as well as possible.    Walk at a slow to moderate pace shortness of breath or fatigue  · You are short of breath lying down, you need more pillows to breathe comfortably,  or wake up during the night short of breath  · You urinate less often during the day and more often at night  · You have a bloated feeling, Surrogate

## 2022-11-02 ENCOUNTER — TELEPHONE (OUTPATIENT)
Dept: HEMATOLOGY/ONCOLOGY | Facility: HOSPITAL | Age: 77
End: 2022-11-02

## 2022-11-04 NOTE — H&P
[facility]  History and Physical     Ayden Dena Patient Status:  Emergency    3/17/1945 MRN [id number]   Location [address] [facility] Attending Oksana Velázquez MD   Hosp Day # 0 PCP Jennifer Patricia MD     Chief Complaint: Problem: PHYSICAL THERAPY ADULT  Goal: Performs mobility at highest level of function for planned discharge setting  See evaluation for individualized goals  Description: Treatment/Interventions: LE strengthening/ROM, Therapeutic exercise, Elevations, Gait training, Endurance training, Patient/family training, Equipment eval/education, Spoke to nursing, Spoke to case management, Spoke to MD  Equipment Recommended: Barb [place]     See flowsheet documentation for full assessment, interventions and recommendations  Outcome: Adequate for Discharge  Note: Prognosis: Good  Problem List: Decreased range of motion, Impaired balance, Decreased mobility  Assessment: Pt seen for PT treatment today with focus on gait training and elevations assessment as pt planned to tentative d/c today and he has a full flight of steps to access his home  Pt presents seated EOB and denies pain, reports ongoing edema to R knee restricting motion but remains WFL  Pt completes transfers independently, ambulates 260ft with RW at [facility] level, and negotiates 10 stairs with RHR and S (non reciprocal progressed to reciprocal pattern on stairs)  Pt denies increase in pain with functional mobility  Pt denies concerns regarding d/c to home today  Pt was provided with RW from  for d/c, pt also has SPC at home  At end of session pt was left semi-supine in bed with all needs in place and cold pack to R knee for edema  Pt cleared for safe d/c home with increased social supports  Barriers to Discharge: None     PT Discharge Recommendation: No rehabilitation needs    See flowsheet documentation for full assessment  Thrombocytopathia [facility])    • Wears glasses         Past Surgical History:   Past Surgical History:   Procedure Laterality Date   • APPENDECTOMY  1970   • APPENDECTOMY     • COLONOSCOPY  10/2003 2006 01/2010    x3   • COLONOSCOPY  5/14/2013   • COLONOSCOPY Attack Maternal Grandfather         Allergies:   Augmentin [Amoclan]     HIVES  Amoxicillin             RASH    Medications:    No current facility-administered medications on file prior to encounter.    Current Outpatient Medications on File Prior to [facility] and negatives noted in the HPI. Physical Exam:    /70   Pulse 62   Temp 98.8 °F (37.1 °C) (Temporal)   Resp 23   Ht 180.3 cm (5' 11\")   Wt 184 lb (83.5 kg)   SpO2 94%   BMI 25.66 kg/m²   General: No acute distress. Alert and oriented x 3.   HEENT: w/ Dr Cade Willie   5. Autoimmune thrombocytopenia   6. Leukopenia  1. Monitor  7. Barretts esophagus  1. PPI BID     Quality:  · DVT Prophylaxis: Heparin  · CODE status: full  · Reyes: no    Plan of care discussed with patient and family at bedside.      Negin Alcantar

## 2022-11-07 ENCOUNTER — TELEPHONE (OUTPATIENT)
Dept: CARDIOLOGY CLINIC | Facility: HOSPITAL | Age: 77
End: 2022-11-07

## 2022-11-07 NOTE — PROGRESS NOTES
[facility] for [address] pulmonary artery pressure sensor    Remote Monitoring Report Summary    2022     Juana Montgomery    : 3/17/1945    Reporting Period-  10/1-2022    Diagnosis - HFpEF    Provider- SHERIDAN Lemons       The CardioMEMS report for the above date has been reviewed with the following results:      [address] hemodynamics at time of CardioMEMS impant:    PA 42/18/27  Wedge 8    Acceptable range for Juana Montgomery      PAD range 18-24 mmhg     Remote monitoring documentation for the past ~30 days:       2022 Last reading on  and above range. Will call to remind him. 10- Reminded to transmit more readings at last visit. Reading transmitted in range. 10- in range. CT  10- Couple readings transmitted in range at 21 mmhg.  [facility]  10- Hospitalized    Olga Gonzalez NP   2022

## 2022-11-08 ENCOUNTER — HOSPITAL ENCOUNTER (OUTPATIENT)
Dept: CARDIOLOGY CLINIC | Facility: HOSPITAL | Age: 77
Discharge: HOME | End: 2022-11-08
Attending: NURSE PRACTITIONER
Payer: MEDICARE

## 2022-11-08 DIAGNOSIS — I43 CARDIOMYOPATHY AS MANIFESTATION OF UNDERLYING DISEASE (HCC): ICD-10-CM

## 2022-11-08 DIAGNOSIS — Z95.818 PRESENCE OF CARDIOMEMS HF SYSTEM: ICD-10-CM

## 2022-11-08 PROCEDURE — 93264 REM MNTR WRLS P-ART PRS SNR: CPT | Performed by: NURSE PRACTITIONER

## 2022-11-10 ENCOUNTER — TELEPHONE (OUTPATIENT)
Dept: CARDIOLOGY CLINIC | Facility: HOSPITAL | Age: 77
End: 2022-11-10

## 2022-11-10 NOTE — TELEPHONE ENCOUNTER
RN reached out to the patient because his PAD on MEMS was up to 27mmHg today. The patient said that he \"feels fine\" and denies shortness of breath. The patient has noticed increased edema that is now pitting in his bilateral lower extremities for the past few days. He patient feels as if his abdomen is bloated as well. The patient reports gaining 5-6 lbs on his home scale in the last week. Patient is taking Torsemide PO 20mg BID. Per SHERIDAN Sultana, RN instructed the patient to take an additional 20mg of PO Torsemide for today and tomorrow only. Patient verbalized an understanding. He will follow up in the [facility] on 11/17.

## 2022-11-14 ENCOUNTER — TELEPHONE (OUTPATIENT)
Dept: CARDIOLOGY CLINIC | Facility: HOSPITAL | Age: 77
End: 2022-11-14

## 2022-11-15 ENCOUNTER — TELEPHONE (OUTPATIENT)
Dept: CARDIOLOGY CLINIC | Facility: HOSPITAL | Age: 77
End: 2022-11-15

## 2022-11-15 NOTE — TELEPHONE ENCOUNTER
Spoke with Miladis Grant and asked him to complete his mems. Once seen, Braydon De La Torre informed me his PAD is still elevated to 27mmhg on his CardioMEMs. He was instructed to take an extra 20mg of Torsemide for the next 3 days until we see him this Thursday. Call was not answered on Monday afternoon; spoke on Tuesday AM - and will take an extra 20mg of torsemide until seen on Thursday. Patient verbalized understanding.

## 2022-11-16 DIAGNOSIS — I50.32 CHRONIC DIASTOLIC HEART FAILURE (HCC): Primary | ICD-10-CM

## 2022-11-17 ENCOUNTER — HOSPITAL ENCOUNTER (OUTPATIENT)
Dept: LAB | Facility: HOSPITAL | Age: 77
Discharge: HOME | End: 2022-11-17
Attending: NURSE PRACTITIONER
Payer: MEDICARE

## 2022-11-17 ENCOUNTER — HOSPITAL ENCOUNTER (OUTPATIENT)
Dept: CARDIOLOGY CLINIC | Facility: HOSPITAL | Age: 77
Discharge: HOME | End: 2022-11-17
Attending: NURSE PRACTITIONER
Payer: MEDICARE

## 2022-11-17 VITALS
DIASTOLIC BLOOD PRESSURE: 55 MMHG | WEIGHT: 202.19 LBS | SYSTOLIC BLOOD PRESSURE: 116 MMHG | OXYGEN SATURATION: 100 % | HEART RATE: 76 BPM | BODY MASS INDEX: 28 KG/M2 | RESPIRATION RATE: 16 BRPM

## 2022-11-17 DIAGNOSIS — I50.32 CHRONIC DIASTOLIC HEART FAILURE (HCC): ICD-10-CM

## 2022-11-17 DIAGNOSIS — I27.20 PULMONARY HYPERTENSION (HCC): ICD-10-CM

## 2022-11-17 DIAGNOSIS — I48.21 PERMANENT ATRIAL FIBRILLATION (HCC): ICD-10-CM

## 2022-11-17 DIAGNOSIS — I50.812 CHRONIC RIGHT-SIDED CONGESTIVE HEART FAILURE (HCC): Primary | ICD-10-CM

## 2022-11-17 PROBLEM — I10 HYPERTENSION: Status: ACTIVE | Noted: 2021-08-16

## 2022-11-17 LAB
ANION GAP SERPL CALC-SCNC: 6 MMOL/L (ref 0–18)
BUN BLD-MCNC: 17 MG/DL (ref 7–18)
CALCIUM BLD-MCNC: 9 MG/DL (ref 8.5–10.1)
CHLORIDE SERPL-SCNC: 105 MMOL/L (ref 98–112)
CO2 SERPL-SCNC: 33 MMOL/L (ref 21–32)
CREAT BLD-MCNC: 1.25 MG/DL
FASTING STATUS PATIENT QL REPORTED: NO
GFR SERPLBLD BASED ON 1.73 SQ M-ARVRAT: 59 ML/MIN/1.73M2 (ref 60–?)
GLUCOSE BLD-MCNC: 102 MG/DL (ref 70–99)
OSMOLALITY SERPL CALC.SUM OF ELEC: 300 MOSM/KG (ref 275–295)
POTASSIUM SERPL-SCNC: 4 MMOL/L (ref 3.5–5.1)
SODIUM SERPL-SCNC: 144 MMOL/L (ref 136–145)

## 2022-11-17 PROCEDURE — 99215 OFFICE O/P EST HI 40 MIN: CPT | Performed by: NURSE PRACTITIONER

## 2022-11-17 PROCEDURE — 36415 COLL VENOUS BLD VENIPUNCTURE: CPT | Performed by: NURSE PRACTITIONER

## 2022-11-17 PROCEDURE — 80048 BASIC METABOLIC PNL TOTAL CA: CPT | Performed by: NURSE PRACTITIONER

## 2022-11-17 RX ORDER — TORSEMIDE 20 MG/1
TABLET ORAL
Qty: 130 TABLET | Refills: 5 | Status: SHIPPED | OUTPATIENT
Start: 2022-11-17

## 2022-11-17 RX ORDER — SACUBITRIL AND VALSARTAN 24; 26 MG/1; MG/1
1 TABLET, FILM COATED ORAL 2 TIMES DAILY
Qty: 180 TABLET | Refills: 3 | Status: SHIPPED | OUTPATIENT
Start: 2022-11-17

## 2022-11-17 RX ORDER — BUMETANIDE 0.25 MG/ML
2 INJECTION, SOLUTION INTRAMUSCULAR; INTRAVENOUS ONCE
Status: COMPLETED | OUTPATIENT
Start: 2022-11-17 | End: 2022-11-17

## 2022-11-17 RX ADMIN — BUMETANIDE 2 MG: 0.25 INJECTION, SOLUTION INTRAMUSCULAR; INTRAVENOUS at 13:52:00

## 2022-11-17 NOTE — PROGRESS NOTES
Patient assessed. Patient complaining of moderate abdominal bloating. Patient with +1 pitting bilateral lower extremity edema. Weight up 6 lbs at 202.2 lbs. APN notified of all the above information. Labs ordered and drawn by [facility]. Reviewed allergies and list of current medications with patient and updated it in the Electronic Medical Record. Patient brought in his re-enrollment forms by [employer] for his Venkatesh Lawn. All forms were collected, completed, and faxed to [employer] IV established per protocol. IV 2 mg bumex given. Patient tolerated it well. Educated patient on low sodium diet and food choices, fluid restriction of 2 liters, and daily weights. Reviewed follow-up appointments and discharge Heart Failure instructions with patient. Patient verbalized an understanding. IV discontinued; catheter intact. Pressure held and gauze applied. Spent 31-60 minutes with patient who has acute heart failure.

## 2022-11-17 NOTE — PATIENT INSTRUCTIONS
Heart Failure Discharge Instructions    No more Torsemide today. Take Torsemide 40mg in the am and 20mg in the afternoon. Complete CardioMEMs readings regularly. Will back off on Torsemide if readings drop below your threshold. Activity: Regular exercise and activity is important for your overall health and to help keep your heart strong and functioning as well as possible. Walk at a slow to moderate pace for 15-20 minutes 3-5 days per week. Follow these instructions every day to keep yourself in the [address] you are feeling well and your symptoms are under control                                    Medications  Take your medication every day as instructed  Do not stop taking your medicine or change the amount you are taking without instructions from your doctor or nurse  Do Not take non-steroidal antiinflammatory drugs such as ibuprofen, aleve, advil, or motrin                                    Diet/Fluids  People with heart failure should eat less sodium (salt) and limit fluid. Sodium attracts water and makes the body hold fluid. This extra fluid makes the heart work harder and can worsen the symptoms of heart failure. Diet    2000 mg sodium daily  Fluid restriction    64 ounces daily  (8 oz. = 1 cup)                                     Body Weight  Weigh yourself every day before breakfast and write your weight down  Use the same scale and wear about the same amount of clothes each time  A sudden weight increase is due to fluid retention rather than fat                                         Activity  Pace your activities to avoid getting overtired  Take rest periods as needed  Elevate your feet to reduce ankle swelling when resting                             Signs of Worsening Heart Failure    You are entering the Yellow Zone - this is a warning zone    Call your doctor or nurse if you have any of these signs or symptoms:   You gain 2 or more pounds overnight or 3-5 pounds in 3-7 days  You have more trouble breathing  You get more tired with regular activity, or are limiting activity because of shortness of breath or fatigue  You are short of breath lying down, you need more pillows to breathe comfortably,  or wake up during the night short of breath  You urinate less often during the day and more often at night  You have a bloated feeling, upset stomach, loss of appetite, or your clothes are fitting tighter    GO TO THE EMERGENCY DEPARTMENT or CALL 911 IF: These are signs you are in the RED ZONE - THIS IS AN EMERGENCY  You have tightness or pain in your chest  You are extremely short of breath or can't catch your breath  You cough up frothy pink mucous  You feel confused or can't think clearly  You are traveling and develop symptoms of worsening heart failure     We respect everyone's time and availability. Please be aware that this is not a walk-in clinic and we require appointments in order to facilitate timely care for all patients. We ask you to arrive 30 minutes before your appointment to allow time for you to check-in and have your bloodwork drawn. Please understand if you are late for your appointment, you may be asked to reschedule. If possible, all attempts will be made to accommodate but realize this is no guarantee that this will always be available. We understand there are extenuating circumstances. If you need to cancel or reschedule your appointment, please call the [facility] [employer] within 24 hours at [phone]. Thank you for your cooperation, [facility] Staff. IF YOU HAVE QUESTIONS REGARDING YOUR BILL, FEEL FREE TO CONTACT [facility] PATIENT ACCOUNTS -[phone]. IF YOU NEED FINANCIAL ASSISTANCE, PLEASE CALL AN [facility] FINANCIAL COUNSELOR -[phone].              [facility]     [phone]

## 2022-11-22 ENCOUNTER — OFFICE VISIT (OUTPATIENT)
Dept: HEMATOLOGY/ONCOLOGY | Facility: HOSPITAL | Age: 77
End: 2022-11-22
Attending: INTERNAL MEDICINE
Payer: MEDICARE

## 2022-11-22 VITALS
BODY MASS INDEX: 28.25 KG/M2 | HEIGHT: 70.98 IN | HEART RATE: 80 BPM | WEIGHT: 201.81 LBS | OXYGEN SATURATION: 99 % | SYSTOLIC BLOOD PRESSURE: 112 MMHG | DIASTOLIC BLOOD PRESSURE: 71 MMHG | RESPIRATION RATE: 16 BRPM | TEMPERATURE: 97 F

## 2022-11-22 DIAGNOSIS — D69.6 THROMBOCYTOPENIA (HCC): ICD-10-CM

## 2022-11-22 DIAGNOSIS — D69.3 IMMUNE THROMBOCYTOPENIC PURPURA (HCC): Primary | ICD-10-CM

## 2022-11-22 PROCEDURE — 96365 THER/PROPH/DIAG IV INF INIT: CPT

## 2022-11-22 PROCEDURE — 96366 THER/PROPH/DIAG IV INF ADDON: CPT

## 2022-11-22 RX ORDER — FAMOTIDINE 10 MG/ML
20 INJECTION, SOLUTION INTRAVENOUS ONCE
OUTPATIENT
Start: 2022-12-27

## 2022-11-22 RX ORDER — DIPHENHYDRAMINE HCL 25 MG
25 CAPSULE ORAL ONCE
Status: COMPLETED | OUTPATIENT
Start: 2022-11-22 | End: 2022-11-22

## 2022-11-22 RX ORDER — DIPHENHYDRAMINE HCL 25 MG
25 CAPSULE ORAL ONCE
OUTPATIENT
Start: 2022-12-27

## 2022-11-22 RX ORDER — METHYLPREDNISOLONE SODIUM SUCCINATE 40 MG/ML
40 INJECTION, POWDER, LYOPHILIZED, FOR SOLUTION INTRAMUSCULAR; INTRAVENOUS ONCE
OUTPATIENT
Start: 2022-12-27

## 2022-11-22 RX ORDER — ACETAMINOPHEN 325 MG/1
650 TABLET ORAL ONCE
OUTPATIENT
Start: 2022-12-27

## 2022-11-22 RX ORDER — DIPHENHYDRAMINE HYDROCHLORIDE 50 MG/ML
25 INJECTION INTRAMUSCULAR; INTRAVENOUS ONCE
OUTPATIENT
Start: 2022-12-27

## 2022-11-22 RX ORDER — ACETAMINOPHEN 325 MG/1
650 TABLET ORAL ONCE
Status: COMPLETED | OUTPATIENT
Start: 2022-11-22 | End: 2022-11-22

## 2022-11-22 RX ORDER — METHYLPREDNISOLONE SODIUM SUCCINATE 125 MG/2ML
125 INJECTION, POWDER, LYOPHILIZED, FOR SOLUTION INTRAMUSCULAR; INTRAVENOUS ONCE
OUTPATIENT
Start: 2022-12-27

## 2022-11-22 RX ADMIN — ACETAMINOPHEN 650 MG: 325 TABLET ORAL at 11:57:00

## 2022-11-22 RX ADMIN — DIPHENHYDRAMINE HCL 25 MG: 25 MG CAPSULE ORAL at 11:57:00

## 2022-11-22 NOTE — PROGRESS NOTES
Education Record    Learner:  Patient    Disease / Diagnosis: immune thrombocytopenic purpura    Barriers / Limitations:  None   Comments:    Method:  Brief focused   Comments:    General Topics:  Plan of care reviewed   Comments:    Outcome:  Shows understanding      Comments: ivig infusion, 35g every 5 weeks. Tolerated well without issue.  Will return in 5 weeks for next infusion (12/27)

## 2022-11-28 DIAGNOSIS — I50.32 CHRONIC DIASTOLIC HEART FAILURE (HCC): Primary | ICD-10-CM

## 2022-11-29 ENCOUNTER — HOSPITAL ENCOUNTER (OUTPATIENT)
Dept: LAB | Facility: HOSPITAL | Age: 77
Discharge: HOME | End: 2022-11-29
Attending: NURSE PRACTITIONER
Payer: MEDICARE

## 2022-11-29 ENCOUNTER — HOSPITAL ENCOUNTER (OUTPATIENT)
Dept: CARDIOLOGY CLINIC | Facility: HOSPITAL | Age: 77
Discharge: HOME | End: 2022-11-29
Attending: NURSE PRACTITIONER
Payer: MEDICARE

## 2022-11-29 VITALS
HEART RATE: 81 BPM | BODY MASS INDEX: 28 KG/M2 | OXYGEN SATURATION: 100 % | RESPIRATION RATE: 29 BRPM | WEIGHT: 200.19 LBS | DIASTOLIC BLOOD PRESSURE: 78 MMHG | SYSTOLIC BLOOD PRESSURE: 120 MMHG

## 2022-11-29 DIAGNOSIS — I27.20 PULMONARY HYPERTENSION (HCC): ICD-10-CM

## 2022-11-29 DIAGNOSIS — I50.812 CHRONIC RIGHT-SIDED CONGESTIVE HEART FAILURE (HCC): Primary | ICD-10-CM

## 2022-11-29 DIAGNOSIS — I48.21 PERMANENT ATRIAL FIBRILLATION (HCC): ICD-10-CM

## 2022-11-29 DIAGNOSIS — I50.32 CHRONIC DIASTOLIC HEART FAILURE (HCC): ICD-10-CM

## 2022-11-29 LAB
ANION GAP SERPL CALC-SCNC: 4 MMOL/L (ref 0–18)
BUN BLD-MCNC: 21 MG/DL (ref 7–18)
CALCIUM BLD-MCNC: 9.3 MG/DL (ref 8.5–10.1)
CHLORIDE SERPL-SCNC: 106 MMOL/L (ref 98–112)
CO2 SERPL-SCNC: 33 MMOL/L (ref 21–32)
CREAT BLD-MCNC: 1.18 MG/DL
FASTING STATUS PATIENT QL REPORTED: NO
GFR SERPLBLD BASED ON 1.73 SQ M-ARVRAT: 63 ML/MIN/1.73M2 (ref 60–?)
GLUCOSE BLD-MCNC: 104 MG/DL (ref 70–99)
OSMOLALITY SERPL CALC.SUM OF ELEC: 299 MOSM/KG (ref 275–295)
POTASSIUM SERPL-SCNC: 4 MMOL/L (ref 3.5–5.1)
SODIUM SERPL-SCNC: 143 MMOL/L (ref 136–145)

## 2022-11-29 PROCEDURE — 99215 OFFICE O/P EST HI 40 MIN: CPT | Performed by: NURSE PRACTITIONER

## 2022-11-29 PROCEDURE — 80048 BASIC METABOLIC PNL TOTAL CA: CPT | Performed by: NURSE PRACTITIONER

## 2022-11-29 PROCEDURE — 36415 COLL VENOUS BLD VENIPUNCTURE: CPT | Performed by: NURSE PRACTITIONER

## 2022-11-29 RX ORDER — POTASSIUM CHLORIDE 20 MEQ/1
20 TABLET, EXTENDED RELEASE ORAL ONCE
Status: COMPLETED | OUTPATIENT
Start: 2022-11-29 | End: 2022-11-29

## 2022-11-29 RX ORDER — BUMETANIDE 0.25 MG/ML
4 INJECTION, SOLUTION INTRAMUSCULAR; INTRAVENOUS ONCE
Status: COMPLETED | OUTPATIENT
Start: 2022-11-29 | End: 2022-11-29

## 2022-11-29 RX ORDER — TORSEMIDE 20 MG/1
TABLET ORAL
Qty: 130 TABLET | Refills: 5 | COMMUNITY
Start: 2022-11-29

## 2022-11-29 RX ADMIN — POTASSIUM CHLORIDE 20 MEQ: 20 TABLET, EXTENDED RELEASE ORAL at 14:15:00

## 2022-11-29 RX ADMIN — BUMETANIDE 4 MG: 0.25 INJECTION, SOLUTION INTRAMUSCULAR; INTRAVENOUS at 14:31:00

## 2022-11-29 NOTE — PROGRESS NOTES
Pt. Assessed. No signs or symptoms of shortness of breath, fatigue, chest pain or edema noted. Weight down 2 lbs at 200.2 lbs. Reviewed current list of patient's allergies and medication; updated the Electronic Medical Record. Labs ordered to assess kidney function and drawn by [facility]. Patient took cardiomems reading at home, reviewed with APN    Declined 6 min walk today- requesting to do at next appointment    Will RTC in 2 weeks      IV established per protocol. Bumex 4 mg IVP, Diuril 250 mg IVP and 20 mEq po K-Dur given. Patient tolerated it well. Educated patient on low sodium diet and food choices, fluid restriction of 2 liters, and daily weights. Reviewed follow-up appointments and discharge Heart Failure instructions with patient. Patient verbalized an understanding. IV discontinued; catheter intact. Pressure held and gauze applied. Chanel Pompa

## 2022-11-29 NOTE — PATIENT INSTRUCTIONS
Heart Failure Discharge Instructions    Increase Torsemide to 40mg twice per day. Starting tomorrow. Hold your afternoon Torsemide dose. Activity: Regular exercise and activity is important for your overall health and to help keep your heart strong and functioning as well as possible. Walk at a slow to moderate pace for 15-20 minutes 3-5 days per week. Follow these instructions every day to keep yourself in the [address] you are feeling well and your symptoms are under control                                    Medications  Take your medication every day as instructed  Do not stop taking your medicine or change the amount you are taking without instructions from your doctor or nurse  Do Not take non-steroidal antiinflammatory drugs such as ibuprofen, aleve, advil, or motrin                                    Diet/Fluids  People with heart failure should eat less sodium (salt) and limit fluid. Sodium attracts water and makes the body hold fluid. This extra fluid makes the heart work harder and can worsen the symptoms of heart failure. Diet    2000 mg sodium daily  Fluid restriction    64 ounces daily  (8 oz. = 1 cup)                                     Body Weight  Weigh yourself every day before breakfast and write your weight down  Use the same scale and wear about the same amount of clothes each time  A sudden weight increase is due to fluid retention rather than fat                                         Activity  Pace your activities to avoid getting overtired  Take rest periods as needed  Elevate your feet to reduce ankle swelling when resting                             Signs of Worsening Heart Failure    You are entering the Yellow Zone - this is a warning zone    Call your doctor or nurse if you have any of these signs or symptoms:   You gain 2 or more pounds overnight or 3-5 pounds in 3-7 days  You have more trouble breathing  You get more tired with regular activity, or are limiting activity because of shortness of breath or fatigue  You are short of breath lying down, you need more pillows to breathe comfortably,  or wake up during the night short of breath  You urinate less often during the day and more often at night  You have a bloated feeling, upset stomach, loss of appetite, or your clothes are fitting tighter    GO TO THE EMERGENCY DEPARTMENT or CALL 911 IF: These are signs you are in the RED ZONE - THIS IS AN EMERGENCY  You have tightness or pain in your chest  You are extremely short of breath or can't catch your breath  You cough up frothy pink mucous  You feel confused or can't think clearly  You are traveling and develop symptoms of worsening heart failure     We respect everyone's time and availability. Please be aware that this is not a walk-in clinic and we require appointments in order to facilitate timely care for all patients. We ask you to arrive 30 minutes before your appointment to allow time for you to check-in and have your bloodwork drawn. Please understand if you are late for your appointment, you may be asked to reschedule. If possible, all attempts will be made to accommodate but realize this is no guarantee that this will always be available. We understand there are extenuating circumstances. If you need to cancel or reschedule your appointment, please call the [facility] within 24 hours at [phone]. Thank you for your cooperation, [facility] Staff. IF YOU HAVE QUESTIONS REGARDING YOUR BILL, FEEL FREE TO CONTACT [facility] PATIENT ACCOUNTS -[phone]. IF YOU NEED FINANCIAL ASSISTANCE, PLEASE CALL AN [facility] FINANCIAL COUNSELOR -[phone].              [facility]     [phone]

## 2022-12-05 ENCOUNTER — TELEPHONE (OUTPATIENT)
Dept: CARDIOLOGY CLINIC | Facility: HOSPITAL | Age: 77
End: 2022-12-05

## 2022-12-05 NOTE — TELEPHONE ENCOUNTER
PAD still running on the higher side at 25 mmhg. Please make sure he increased Torsemide to 40 mg BID as recommended last visit. Continue daily MEMs readings and we will see him on the 8th. If still high will give IV diuretics again.      Thank you

## 2022-12-05 NOTE — PROGRESS NOTES
[facility] for [address]. pulmonary artery pressure sensor    Remote Monitoring Report Summary    2022     Eureka Ibarra    : 3/17/1945    Reporting Period-  -     Diagnosis - HFpEF    Provider- SHERIDAN You       The CardioMEMS report for the above date has been reviewed with the following results:      [address] hemodynamics at time of CardioMEMS impant:    PA 42/18/27  Wedge 8    Acceptable range for Arturo Ibarra      PAD range 18-24 mmhg     Remote monitoring documentation for the past ~30 days:      2022 Calling to request a reading and remind him to transmit more readings. Last one elevated at 25 mmHg. 2022 PAD 28mmhg and remains elevated. Has an appt tomorrow. 2022 Last PAD 29mmhg 2 days ago. Will call and check symptoms/weight and have him check another reading today. 2022 Contacted to remind him to transmit readings. Last one elevated. if remains elevated will give extra diuretics. 2022 Last reading on  and above range. Will call to remind him.       John Zuniga NP

## 2022-12-05 NOTE — TELEPHONE ENCOUNTER
1. Low HDL (under 40) (Primary)  Overview:  HDL 28  Orders:  -     Comp Metabolic Panel (14); Future; Expected date: 12/05/2022  2. Atrial fibrillation, chronic (HCC)  -     CBC, Platelet; No Differential; Future; Expected date: 12/05/2022  3. Lupus hepatitis syndrome ([facility]  Overview: Followed with Dr Yusra Flowers, currently in remission  4. Stage 3a chronic kidney disease (HCC)  Overview:  GFR 41  Orders:  -     CBC, Platelet; No Differential; Future; Expected date: 12/05/2022  5. Pancytopenia ([facility]  Overview:  Stable per oncology. WBC: 6.4, done on 1/7/2022. HGB: 14.7, done on 1/7/2022. PLT: 106, done on 1/7/2022. Orders:  -     CBC, Platelet; No Differential; Future; Expected date: 12/05/2022  6. Mixed dyslipidemia  -     Lipid Panel; Future; Expected date: 12/05/2022  -     TSH W Reflex To Free T4; Future; Expected date: 12/05/2022  7. Laboratory examination ordered as part of a routine general medical examination  -     Lipid Panel; Future; Expected date: 12/05/2022  -     TSH W Reflex To Free T4; Future; Expected date: 12/05/2022  -     Comp Metabolic Panel (14); Future; Expected date: 12/05/2022  -     CBC, Platelet; No Differential; Future; Expected date: 12/05/2022  8. Hyperglycemia  -     Comp Metabolic Panel (14); Future; Expected date: 12/05/2022  -     Hemoglobin A1C; Future; Expected date: 12/05/2022  9. Chronic gout, unspecified cause, unspecified site  -     Lipid Panel; Future; Expected date: 12/05/2022  -     TSH W Reflex To Free T4; Future; Expected date: 12/05/2022  -     Uric Acid; Future; Expected date: 12/05/2022       OK to notify.  Thanks, Jerel Sequeira MD

## 2022-12-05 NOTE — TELEPHONE ENCOUNTER
He's lost a few lbs since last visit with improvement in leg swelling and abdominal bloating. Continues on torsemide 40 mg BID. I recommended to continue current medications and try to transmit more MEMs readings. He will definitely try, has just been busy and still worn out from multiple hospital stays. He was also feeling better so he didn't think it was as important. To follow up next week as scheduled.

## 2022-12-08 ENCOUNTER — HOSPITAL ENCOUNTER (OUTPATIENT)
Dept: CARDIOLOGY CLINIC | Facility: HOSPITAL | Age: 77
Discharge: HOME | End: 2022-12-08
Attending: NURSE PRACTITIONER
Payer: MEDICARE

## 2022-12-08 ENCOUNTER — HOSPITAL ENCOUNTER (OUTPATIENT)
Dept: ULTRASOUND IMAGING | Facility: HOSPITAL | Age: 77
Discharge: HOME | End: 2022-12-08
Attending: INTERNAL MEDICINE
Payer: MEDICARE

## 2022-12-08 DIAGNOSIS — R06.09 DOE (DYSPNEA ON EXERTION): ICD-10-CM

## 2022-12-08 DIAGNOSIS — R18.8 OTHER ASCITES: ICD-10-CM

## 2022-12-08 DIAGNOSIS — R14.0 ABDOMINAL BLOATING: ICD-10-CM

## 2022-12-08 DIAGNOSIS — Z95.818 PRESENCE OF CARDIOMEMS HF SYSTEM: ICD-10-CM

## 2022-12-08 DIAGNOSIS — I50.32 CHRONIC HEART FAILURE WITH PRESERVED EJECTION FRACTION (HCC): ICD-10-CM

## 2022-12-08 PROCEDURE — 76700 US EXAM ABDOM COMPLETE: CPT | Performed by: INTERNAL MEDICINE

## 2022-12-08 PROCEDURE — 93264 REM MNTR WRLS P-ART PRS SNR: CPT | Performed by: NURSE PRACTITIONER

## 2022-12-12 DIAGNOSIS — I50.32 CHRONIC HEART FAILURE WITH PRESERVED EJECTION FRACTION (HCC): Primary | ICD-10-CM

## 2022-12-14 ENCOUNTER — HOSPITAL ENCOUNTER (OUTPATIENT)
Dept: LAB | Facility: HOSPITAL | Age: 77
Discharge: HOME | End: 2022-12-14
Attending: NURSE PRACTITIONER
Payer: MEDICARE

## 2022-12-14 ENCOUNTER — HOSPITAL ENCOUNTER (OUTPATIENT)
Dept: CARDIOLOGY CLINIC | Facility: HOSPITAL | Age: 77
Discharge: HOME | End: 2022-12-14
Attending: NURSE PRACTITIONER
Payer: MEDICARE

## 2022-12-14 ENCOUNTER — TELEPHONE (OUTPATIENT)
Dept: RHEUMATOLOGY | Facility: CLINIC | Age: 77
End: 2022-12-14

## 2022-12-14 VITALS
WEIGHT: 200.38 LBS | SYSTOLIC BLOOD PRESSURE: 120 MMHG | BODY MASS INDEX: 28 KG/M2 | DIASTOLIC BLOOD PRESSURE: 72 MMHG | OXYGEN SATURATION: 96 % | HEART RATE: 67 BPM

## 2022-12-14 DIAGNOSIS — M33.20 POLYMYOSITIS (HCC): ICD-10-CM

## 2022-12-14 DIAGNOSIS — N18.30 STAGE 3 CHRONIC KIDNEY DISEASE, UNSPECIFIED WHETHER STAGE 3A OR 3B CKD (HCC): ICD-10-CM

## 2022-12-14 DIAGNOSIS — M35.1 MCTD (MIXED CONNECTIVE TISSUE DISEASE) (HCC): Primary | ICD-10-CM

## 2022-12-14 DIAGNOSIS — D69.6 THROMBOCYTOPENIA (HCC): ICD-10-CM

## 2022-12-14 DIAGNOSIS — I50.32 CHRONIC HEART FAILURE WITH PRESERVED EJECTION FRACTION (HCC): ICD-10-CM

## 2022-12-14 DIAGNOSIS — I50.812 CHRONIC RIGHT-SIDED CONGESTIVE HEART FAILURE (HCC): ICD-10-CM

## 2022-12-14 DIAGNOSIS — I50.811 ACUTE RIGHT-SIDED HEART FAILURE (HCC): ICD-10-CM

## 2022-12-14 LAB
ANION GAP SERPL CALC-SCNC: 5 MMOL/L (ref 0–18)
BUN BLD-MCNC: 22 MG/DL (ref 7–18)
CALCIUM BLD-MCNC: 9.3 MG/DL (ref 8.5–10.1)
CHLORIDE SERPL-SCNC: 107 MMOL/L (ref 98–112)
CO2 SERPL-SCNC: 31 MMOL/L (ref 21–32)
CREAT BLD-MCNC: 1.26 MG/DL
FASTING STATUS PATIENT QL REPORTED: NO
GFR SERPLBLD BASED ON 1.73 SQ M-ARVRAT: 58 ML/MIN/1.73M2 (ref 60–?)
GLUCOSE BLD-MCNC: 98 MG/DL (ref 70–99)
OSMOLALITY SERPL CALC.SUM OF ELEC: 299 MOSM/KG (ref 275–295)
POTASSIUM SERPL-SCNC: 4.3 MMOL/L (ref 3.5–5.1)
SODIUM SERPL-SCNC: 143 MMOL/L (ref 136–145)

## 2022-12-14 PROCEDURE — 36415 COLL VENOUS BLD VENIPUNCTURE: CPT | Performed by: NURSE PRACTITIONER

## 2022-12-14 PROCEDURE — 99215 OFFICE O/P EST HI 40 MIN: CPT | Performed by: NURSE PRACTITIONER

## 2022-12-14 PROCEDURE — 80048 BASIC METABOLIC PNL TOTAL CA: CPT | Performed by: NURSE PRACTITIONER

## 2022-12-14 RX ORDER — TORSEMIDE 20 MG/1
TABLET ORAL
Qty: 130 TABLET | Refills: 5 | COMMUNITY
Start: 2022-12-14

## 2022-12-14 RX ORDER — BUMETANIDE 0.25 MG/ML
4 INJECTION, SOLUTION INTRAMUSCULAR; INTRAVENOUS ONCE
Status: COMPLETED | OUTPATIENT
Start: 2022-12-14 | End: 2022-12-14

## 2022-12-14 RX ORDER — PREDNISONE 1 MG/1
5 TABLET ORAL DAILY
COMMUNITY

## 2022-12-14 RX ADMIN — BUMETANIDE 4 MG: 0.25 INJECTION, SOLUTION INTRAMUSCULAR; INTRAVENOUS at 16:30:00

## 2022-12-14 NOTE — PROGRESS NOTES
Pt. Assessed. Pt. complaining of swelling in bilateral legs. He appears bloated but us of abdomen showed no fluid. He saw Dr. Jerie Landau she lowered his torsemide dose. PAD on cardiomems is 28. His range is 18-24. Weight 200.4 which is virtually unchanged from previous visit. APN notified of patient's complaints. Labs ordered and drawn by [facility]. Reviewed allergies and list of current medications with patient and updated it in the Electronic Medical Record. IV established per protocol. IV diuril 250 mg and IV bumex 4 mg given. Patient tolerated it well. Educated patient on low sodium diet and food choices, fluid restriction of 2 liters, and daily weights. Reviewed follow-up appointments and discharge Heart Failure instructions with patient. Patient verbalized an understanding. IV discontinued; catheter intact. Pressure held and gauze applied. RTC 1 week. Discharged home in stable condition.

## 2022-12-14 NOTE — PATIENT INSTRUCTIONS
Heart Failure Discharge Instructions    Hold torsemide tonight. Starting tomorrow increase morning dose of Torsemide to 40 mg, continue 20 mg in the PM.       Activity: Regular exercise and activity is important for your overall health and to help keep your heart strong and functioning as well as possible. Walk at a slow to moderate pace for 15-20 minutes 3-5 days per week. Follow these instructions every day to keep yourself in the [address] you are feeling well and your symptoms are under control                                    Medications  Take your medication every day as instructed  Do not stop taking your medicine or change the amount you are taking without instructions from your doctor or nurse  Do Not take non-steroidal antiinflammatory drugs such as ibuprofen, aleve, advil, or motrin                                    Diet/Fluids  People with heart failure should eat less sodium (salt) and limit fluid. Sodium attracts water and makes the body hold fluid. This extra fluid makes the heart work harder and can worsen the symptoms of heart failure. Diet    2000 mg sodium daily  Fluid restriction    64 ounces daily  (8 oz. = 1 cup)                                     Body Weight  Weigh yourself every day before breakfast and write your weight down  Use the same scale and wear about the same amount of clothes each time  A sudden weight increase is due to fluid retention rather than fat                                         Activity  Pace your activities to avoid getting overtired  Take rest periods as needed  Elevate your feet to reduce ankle swelling when resting                             Signs of Worsening Heart Failure    You are entering the Yellow Zone - this is a warning zone    Call your doctor or nurse if you have any of these signs or symptoms:   You gain 2 or more pounds overnight or 3-5 pounds in 3-7 days  You have more trouble breathing  You get more tired with regular activity, or are limiting activity because of shortness of breath or fatigue  You are short of breath lying down, you need more pillows to breathe comfortably,  or wake up during the night short of breath  You urinate less often during the day and more often at night  You have a bloated feeling, upset stomach, loss of appetite, or your clothes are fitting tighter    GO TO THE EMERGENCY DEPARTMENT or CALL 911 IF: These are signs you are in the RED ZONE - THIS IS AN EMERGENCY  You have tightness or pain in your chest  You are extremely short of breath or can't catch your breath  You cough up frothy pink mucous  You feel confused or can't think clearly  You are traveling and develop symptoms of worsening heart failure     We respect everyone's time and availability. Please be aware that this is not a walk-in clinic and we require appointments in order to facilitate timely care for all patients. We ask you to arrive 30 minutes before your appointment to allow time for you to check-in and have your bloodwork drawn. Please understand if you are late for your appointment, you may be asked to reschedule. If possible, all attempts will be made to accommodate but realize this is no guarantee that this will always be available. We understand there are extenuating circumstances. If you need to cancel or reschedule your appointment, please call the [facility] [employer] within 24 hours at [phone]. Thank you for your cooperation, [facility] Staff. IF YOU HAVE QUESTIONS REGARDING YOUR BILL, FEEL FREE TO CONTACT [facility] PATIENT ACCOUNTS -[phone]. IF YOU NEED FINANCIAL ASSISTANCE, PLEASE CALL AN [facility] FINANCIAL COUNSELOR -[phone].              [facility]     [phone]

## 2022-12-19 ENCOUNTER — LAB ENCOUNTER (OUTPATIENT)
Dept: LAB | Age: 77
End: 2022-12-19
Attending: INTERNAL MEDICINE
Payer: MEDICARE

## 2022-12-19 DIAGNOSIS — M33.20 POLYMYOSITIS (HCC): ICD-10-CM

## 2022-12-19 DIAGNOSIS — M35.1 MCTD (MIXED CONNECTIVE TISSUE DISEASE) (HCC): ICD-10-CM

## 2022-12-19 DIAGNOSIS — D69.6 THROMBOCYTOPENIA (HCC): ICD-10-CM

## 2022-12-19 DIAGNOSIS — I50.812 CHRONIC RIGHT-SIDED CONGESTIVE HEART FAILURE (HCC): ICD-10-CM

## 2022-12-19 LAB
BASOPHILS # BLD AUTO: 0.04 X10(3) UL (ref 0–0.2)
BASOPHILS NFR BLD AUTO: 0.6 %
CK SERPL-CCNC: 22 U/L
CRP SERPL-MCNC: 0.5 MG/DL (ref ?–0.3)
EOSINOPHIL # BLD AUTO: 0.16 X10(3) UL (ref 0–0.7)
EOSINOPHIL NFR BLD AUTO: 2.3 %
ERYTHROCYTE [DISTWIDTH] IN BLOOD BY AUTOMATED COUNT: 14.5 %
ERYTHROCYTE [SEDIMENTATION RATE] IN BLOOD: 25 MM/HR
HCT VFR BLD AUTO: 40.5 %
HGB BLD-MCNC: 12.4 G/DL
IMM GRANULOCYTES # BLD AUTO: 0.02 X10(3) UL (ref 0–1)
IMM GRANULOCYTES NFR BLD: 0.3 %
LYMPHOCYTES # BLD AUTO: 1.29 X10(3) UL (ref 1–4)
LYMPHOCYTES NFR BLD AUTO: 18.3 %
MCH RBC QN AUTO: 34.5 PG (ref 26–34)
MCHC RBC AUTO-ENTMCNC: 30.6 G/DL (ref 31–37)
MCV RBC AUTO: 112.8 FL
MONOCYTES # BLD AUTO: 0.65 X10(3) UL (ref 0.1–1)
MONOCYTES NFR BLD AUTO: 9.2 %
NEUTROPHILS # BLD AUTO: 4.89 X10 (3) UL (ref 1.5–7.7)
NEUTROPHILS # BLD AUTO: 4.89 X10(3) UL (ref 1.5–7.7)
NEUTROPHILS NFR BLD AUTO: 69.3 %
PLATELET # BLD AUTO: 125 10(3)UL (ref 150–450)
RBC # BLD AUTO: 3.59 X10(6)UL
RHEUMATOID FACT SERPL-ACNC: <10 IU/ML (ref ?–15)
WBC # BLD AUTO: 7.1 X10(3) UL (ref 4–11)

## 2022-12-19 PROCEDURE — 86235 NUCLEAR ANTIGEN ANTIBODY: CPT

## 2022-12-19 PROCEDURE — 86225 DNA ANTIBODY NATIVE: CPT

## 2022-12-19 PROCEDURE — 85652 RBC SED RATE AUTOMATED: CPT

## 2022-12-19 PROCEDURE — 86431 RHEUMATOID FACTOR QUANT: CPT

## 2022-12-19 PROCEDURE — 85025 COMPLETE CBC W/AUTO DIFF WBC: CPT

## 2022-12-19 PROCEDURE — 82550 ASSAY OF CK (CPK): CPT

## 2022-12-19 PROCEDURE — 86038 ANTINUCLEAR ANTIBODIES: CPT

## 2022-12-19 PROCEDURE — 83516 IMMUNOASSAY NONANTIBODY: CPT

## 2022-12-19 PROCEDURE — 36415 COLL VENOUS BLD VENIPUNCTURE: CPT

## 2022-12-19 PROCEDURE — 86140 C-REACTIVE PROTEIN: CPT

## 2022-12-20 LAB
DSDNA IGG SERPL IA-ACNC: 0.8 IU/ML
ENA AB SER QL IA: 1.1 UG/L
ENA AB SER QL IA: POSITIVE

## 2022-12-22 DIAGNOSIS — I50.812 CHRONIC RIGHT-SIDED CONGESTIVE HEART FAILURE (HCC): Primary | ICD-10-CM

## 2022-12-22 LAB
CENTROMERE IGG SER-ACNC: <0.4 U/ML
ENA JO1 AB SER IA-ACNC: <0.3 U/ML
ENA RNP IGG SER IA-ACNC: 1.9 U/ML
ENA SCL70 IGG SER IA-ACNC: <0.6 U/ML
ENA SM IGG SER IA-ACNC: <0.7 U/ML
ENA SS-A IGG SER IA-ACNC: 1.3 U/ML
ENA SS-B IGG SER IA-ACNC: <0.4 U/ML
U1 SNRNP IGG SER IA-ACNC: 19.1 U/ML

## 2022-12-23 ENCOUNTER — HOSPITAL ENCOUNTER (OUTPATIENT)
Dept: LAB | Facility: HOSPITAL | Age: 77
Discharge: HOME | End: 2022-12-23
Attending: NURSE PRACTITIONER
Payer: MEDICARE

## 2022-12-23 ENCOUNTER — HOSPITAL ENCOUNTER (OUTPATIENT)
Dept: CARDIOLOGY CLINIC | Facility: HOSPITAL | Age: 77
Discharge: HOME | End: 2022-12-23
Attending: NURSE PRACTITIONER
Payer: MEDICARE

## 2022-12-23 VITALS
SYSTOLIC BLOOD PRESSURE: 115 MMHG | RESPIRATION RATE: 29 BRPM | DIASTOLIC BLOOD PRESSURE: 66 MMHG | BODY MASS INDEX: 28 KG/M2 | OXYGEN SATURATION: 100 % | HEART RATE: 71 BPM | WEIGHT: 200.19 LBS

## 2022-12-23 DIAGNOSIS — I50.32 CHRONIC HEART FAILURE WITH PRESERVED EJECTION FRACTION (HCC): ICD-10-CM

## 2022-12-23 DIAGNOSIS — Z95.818 PRESENCE OF WATCHMAN LEFT ATRIAL APPENDAGE CLOSURE DEVICE: ICD-10-CM

## 2022-12-23 DIAGNOSIS — Z95.818 PRESENCE OF CARDIOMEMS HF SYSTEM: ICD-10-CM

## 2022-12-23 DIAGNOSIS — I50.812 CHRONIC RIGHT-SIDED CONGESTIVE HEART FAILURE (HCC): ICD-10-CM

## 2022-12-23 DIAGNOSIS — G47.33 OSA (OBSTRUCTIVE SLEEP APNEA): ICD-10-CM

## 2022-12-23 DIAGNOSIS — I48.20 ATRIAL FIBRILLATION, CHRONIC (HCC): ICD-10-CM

## 2022-12-23 LAB
ANION GAP SERPL CALC-SCNC: 4 MMOL/L (ref 0–18)
BUN BLD-MCNC: 19 MG/DL (ref 7–18)
CALCIUM BLD-MCNC: 9.1 MG/DL (ref 8.5–10.1)
CHLORIDE SERPL-SCNC: 108 MMOL/L (ref 98–112)
CO2 SERPL-SCNC: 32 MMOL/L (ref 21–32)
CREAT BLD-MCNC: 1.22 MG/DL
FASTING STATUS PATIENT QL REPORTED: NO
GFR SERPLBLD BASED ON 1.73 SQ M-ARVRAT: 60 ML/MIN/1.73M2 (ref 60–?)
GLUCOSE BLD-MCNC: 102 MG/DL (ref 70–99)
NT-PROBNP SERPL-MCNC: 3070 PG/ML (ref ?–450)
OSMOLALITY SERPL CALC.SUM OF ELEC: 300 MOSM/KG (ref 275–295)
POTASSIUM SERPL-SCNC: 3.7 MMOL/L (ref 3.5–5.1)
SODIUM SERPL-SCNC: 144 MMOL/L (ref 136–145)

## 2022-12-23 PROCEDURE — 80048 BASIC METABOLIC PNL TOTAL CA: CPT | Performed by: NURSE PRACTITIONER

## 2022-12-23 PROCEDURE — 83880 ASSAY OF NATRIURETIC PEPTIDE: CPT | Performed by: NURSE PRACTITIONER

## 2022-12-23 PROCEDURE — 99215 OFFICE O/P EST HI 40 MIN: CPT | Performed by: CLINICAL NURSE SPECIALIST

## 2022-12-23 PROCEDURE — 36415 COLL VENOUS BLD VENIPUNCTURE: CPT | Performed by: NURSE PRACTITIONER

## 2022-12-23 RX ORDER — BUMETANIDE 0.25 MG/ML
4 INJECTION, SOLUTION INTRAMUSCULAR; INTRAVENOUS ONCE
Status: COMPLETED | OUTPATIENT
Start: 2022-12-23 | End: 2022-12-23

## 2022-12-23 RX ORDER — POTASSIUM CHLORIDE 20 MEQ/1
40 TABLET, EXTENDED RELEASE ORAL ONCE
Status: COMPLETED | OUTPATIENT
Start: 2022-12-23 | End: 2022-12-23

## 2022-12-23 RX ADMIN — BUMETANIDE 4 MG: 0.25 INJECTION, SOLUTION INTRAMUSCULAR; INTRAVENOUS at 13:15:00

## 2022-12-23 RX ADMIN — POTASSIUM CHLORIDE 40 MEQ: 20 TABLET, EXTENDED RELEASE ORAL at 13:15:00

## 2022-12-23 NOTE — PATIENT INSTRUCTIONS
Heart Failure Discharge Instructions    Continue the same torsemide dose of 40 mg am, 20 mg pm  No further torsemide today    Activity: Regular exercise and activity is important for your overall health and to help keep your heart strong and functioning as well as possible. Walk at a slow to moderate pace for 15-20 minutes 3-5 days per week. Follow these instructions every day to keep yourself in the [address] you are feeling well and your symptoms are under control                                    Medications  Take your medication every day as instructed  Do not stop taking your medicine or change the amount you are taking without instructions from your doctor or nurse  Do Not take non-steroidal antiinflammatory drugs such as ibuprofen, aleve, advil, or motrin                                    Diet/Fluids  People with heart failure should eat less sodium (salt) and limit fluid. Sodium attracts water and makes the body hold fluid. This extra fluid makes the heart work harder and can worsen the symptoms of heart failure. Diet    2000 mg sodium daily  Fluid restriction    64 ounces daily  (8 oz. = 1 cup)                                     Body Weight  Weigh yourself every day before breakfast and write your weight down  Use the same scale and wear about the same amount of clothes each time  A sudden weight increase is due to fluid retention rather than fat                                         Activity  Pace your activities to avoid getting overtired  Take rest periods as needed  Elevate your feet to reduce ankle swelling when resting                             Signs of Worsening Heart Failure    You are entering the Yellow Zone - this is a warning zone    Call your doctor or nurse if you have any of these signs or symptoms:   You gain 2 or more pounds overnight or 3-5 pounds in 3-7 days  You have more trouble breathing  You get more tired with regular activity, or are limiting activity because of shortness of breath or fatigue  You are short of breath lying down, you need more pillows to breathe comfortably,  or wake up during the night short of breath  You urinate less often during the day and more often at night  You have a bloated feeling, upset stomach, loss of appetite, or your clothes are fitting tighter    GO TO THE EMERGENCY DEPARTMENT or CALL 911 IF: These are signs you are in the RED ZONE - THIS IS AN EMERGENCY  You have tightness or pain in your chest  You are extremely short of breath or can't catch your breath  You cough up frothy pink mucous  You feel confused or can't think clearly  You are traveling and develop symptoms of worsening heart failure     We respect everyone's time and availability. Please be aware that this is not a walk-in clinic and we require appointments in order to facilitate timely care for all patients. Please understand if you are more than 20 minutes late for your appointment, you may be asked to reschedule. If possible, all attempts will be made to accommodate but realize this is no guarantee that this will always be available. We understand there are extenuating circumstances. If you need to cancel or reschedule your appointment, please call the [facility] [employer] within 24 hours at [phone]. Thank you for your cooperation, [facility] Staff.              [facility] [employer] [facility]     [phone]

## 2022-12-23 NOTE — PROGRESS NOTES
Pt. Assessed. Pt. complaining of lower extremity swelling with no change to weight at this visit. Gradually lost weight and then slowly it came back on. Weight 200.2 lbs. APN notified of patient's complaint of after IV diuretics last. Labs ordered and drawn by [facility]. Reviewed allergies and list of current medications with patient and updated it in the Electronic Medical Record. IV established per protocol.  mg diuril and 4mg bumexwith 40 meq kcl PO given. Patient tolerated it well. Educated patient on low sodium diet and food choices, fluid restriction of 2 liters, and daily weights. Reviewed follow-up appointments and discharge Heart Failure instructions with patient. Patient verbalized an understanding. IV discontinued; catheter intact. Pressure held and gauze applied. Alma Brand

## 2022-12-26 LAB
EJ (GLYCYL - TRNA SYNTHETASE) ANTIBODY: NEGATIVE
FIBRILLARIN (U3 RNP) AB, IGG: NEGATIVE
JO-1 (HISTIDY1-TRNA SYNTHETASE) AB, IGG: 7 AU/ML
KU ANTIBODY: NEGATIVE
MDA5 (CADM-140) ANTIBODY: NEGATIVE
MI-2 (NUCLEAR HELICASE PROTEIN) ANTIBODY: NEGATIVE
NXP-2 (NUCLEAR MATRIX PROTEIN-2) AB: NEGATIVE
OJ (ISOLEUCYL-TRNA SYNTHETASE) ANTIBODY: NEGATIVE
P155/140 (TIF-1 GAMMA) ANTIBODY: NEGATIVE
PL-12 (ALANYL-TRNA SYNTHETASE) ANTIBODY: NEGATIVE
PL-7 (THREONYL-TRNA SYNTHETASE) ANTIBODY: NEGATIVE
PM_SCL 100 ANTIBODY, IGG: NEGATIVE
RIBONUCLEIC PROTEIN (U1) (ENA) AB, IGG: 68 UNITS
SAE1 (SUMO ACTIVATING ENZYME) ANTIBODY: NEGATIVE
SRP (SINGLE RECOGNITION PARTICLE) AB: NEGATIVE
SSA 52 (RO) (ENA) ANTIBODY, IGG: 3 AU/ML
SSA 60 (RO) (ENA) ANTIBODY, IGG: 1 AU/ML
TIF1-GAMMA ANTIBODY: NEGATIVE

## 2022-12-27 ENCOUNTER — OFFICE VISIT (OUTPATIENT)
Dept: HEMATOLOGY/ONCOLOGY | Facility: HOSPITAL | Age: 77
End: 2022-12-27
Attending: INTERNAL MEDICINE
Payer: MEDICARE

## 2022-12-27 VITALS
HEIGHT: 70.98 IN | HEART RATE: 88 BPM | DIASTOLIC BLOOD PRESSURE: 67 MMHG | RESPIRATION RATE: 16 BRPM | TEMPERATURE: 97 F | BODY MASS INDEX: 27.69 KG/M2 | OXYGEN SATURATION: 98 % | SYSTOLIC BLOOD PRESSURE: 102 MMHG | WEIGHT: 197.81 LBS

## 2022-12-27 DIAGNOSIS — D69.3 IMMUNE THROMBOCYTOPENIC PURPURA (HCC): Primary | ICD-10-CM

## 2022-12-27 DIAGNOSIS — D69.6 THROMBOCYTOPENIA (HCC): ICD-10-CM

## 2022-12-27 PROCEDURE — 96365 THER/PROPH/DIAG IV INF INIT: CPT

## 2022-12-27 PROCEDURE — 96366 THER/PROPH/DIAG IV INF ADDON: CPT

## 2022-12-27 RX ORDER — METHYLPREDNISOLONE SODIUM SUCCINATE 125 MG/2ML
125 INJECTION, POWDER, LYOPHILIZED, FOR SOLUTION INTRAMUSCULAR; INTRAVENOUS ONCE
OUTPATIENT
Start: 2023-01-31

## 2022-12-27 RX ORDER — DIPHENHYDRAMINE HCL 25 MG
25 CAPSULE ORAL ONCE
Status: COMPLETED | OUTPATIENT
Start: 2022-12-27 | End: 2022-12-27

## 2022-12-27 RX ORDER — DIPHENHYDRAMINE HYDROCHLORIDE 50 MG/ML
25 INJECTION INTRAMUSCULAR; INTRAVENOUS ONCE
OUTPATIENT
Start: 2023-01-31

## 2022-12-27 RX ORDER — FAMOTIDINE 10 MG/ML
20 INJECTION, SOLUTION INTRAVENOUS ONCE
OUTPATIENT
Start: 2023-01-31

## 2022-12-27 RX ORDER — ACETAMINOPHEN 325 MG/1
650 TABLET ORAL ONCE
OUTPATIENT
Start: 2023-01-31

## 2022-12-27 RX ORDER — DIPHENHYDRAMINE HCL 25 MG
25 CAPSULE ORAL ONCE
OUTPATIENT
Start: 2023-01-31

## 2022-12-27 RX ORDER — ACETAMINOPHEN 325 MG/1
650 TABLET ORAL ONCE
Status: COMPLETED | OUTPATIENT
Start: 2022-12-27 | End: 2022-12-27

## 2022-12-27 RX ORDER — METHYLPREDNISOLONE SODIUM SUCCINATE 40 MG/ML
40 INJECTION, POWDER, LYOPHILIZED, FOR SOLUTION INTRAMUSCULAR; INTRAVENOUS ONCE
OUTPATIENT
Start: 2023-01-31

## 2022-12-27 RX ADMIN — DIPHENHYDRAMINE HCL 25 MG: 25 MG CAPSULE ORAL at 11:45:00

## 2022-12-27 RX ADMIN — ACETAMINOPHEN 650 MG: 325 TABLET ORAL at 11:45:00

## 2022-12-27 NOTE — PROGRESS NOTES
Education Record    Learner:  Patient and Spouse    Disease / Diagnosis: myositis, MCTD    Barriers / Limitations:  None   Comments:    Method:  Discussion and Printed material   Comments:    General Topics:  Plan of care reviewed   Comments:    Outcome:  Shows understanding   Comments:    IVIG completed per therapy plan. Appt made for 5 weeks.

## 2023-01-02 ENCOUNTER — LAB ENCOUNTER (OUTPATIENT)
Dept: LAB | Age: 78
End: 2023-01-02
Attending: FAMILY MEDICINE
Payer: MEDICARE

## 2023-01-02 DIAGNOSIS — R73.9 HYPERGLYCEMIA: ICD-10-CM

## 2023-01-02 DIAGNOSIS — D61.818 PANCYTOPENIA (HCC): ICD-10-CM

## 2023-01-02 DIAGNOSIS — I48.20 ATRIAL FIBRILLATION, CHRONIC (HCC): ICD-10-CM

## 2023-01-02 DIAGNOSIS — N18.31 STAGE 3A CHRONIC KIDNEY DISEASE (HCC): ICD-10-CM

## 2023-01-02 DIAGNOSIS — E78.6 LOW HDL (UNDER 40): ICD-10-CM

## 2023-01-02 DIAGNOSIS — E78.2 MIXED DYSLIPIDEMIA: ICD-10-CM

## 2023-01-02 DIAGNOSIS — Z00.00 LABORATORY EXAMINATION ORDERED AS PART OF A ROUTINE GENERAL MEDICAL EXAMINATION: ICD-10-CM

## 2023-01-02 DIAGNOSIS — M1A.9XX0 CHRONIC GOUT, UNSPECIFIED CAUSE, UNSPECIFIED SITE: ICD-10-CM

## 2023-01-02 LAB
ALBUMIN SERPL-MCNC: 3.3 G/DL (ref 3.4–5)
ALBUMIN/GLOB SERPL: 0.9 {RATIO} (ref 1–2)
ALP LIVER SERPL-CCNC: 57 U/L
ALT SERPL-CCNC: 22 U/L
ANION GAP SERPL CALC-SCNC: 4 MMOL/L (ref 0–18)
AST SERPL-CCNC: 17 U/L (ref 15–37)
BILIRUB SERPL-MCNC: 0.8 MG/DL (ref 0.1–2)
BUN BLD-MCNC: 21 MG/DL (ref 7–18)
CALCIUM BLD-MCNC: 8.8 MG/DL (ref 8.5–10.1)
CHLORIDE SERPL-SCNC: 107 MMOL/L (ref 98–112)
CHOLEST SERPL-MCNC: 142 MG/DL (ref ?–200)
CO2 SERPL-SCNC: 34 MMOL/L (ref 21–32)
CREAT BLD-MCNC: 1.21 MG/DL
ERYTHROCYTE [DISTWIDTH] IN BLOOD BY AUTOMATED COUNT: 14.4 %
EST. AVERAGE GLUCOSE BLD GHB EST-MCNC: 103 MG/DL (ref 68–126)
FASTING PATIENT LIPID ANSWER: YES
FASTING STATUS PATIENT QL REPORTED: YES
GFR SERPLBLD BASED ON 1.73 SQ M-ARVRAT: 61 ML/MIN/1.73M2 (ref 60–?)
GLOBULIN PLAS-MCNC: 3.6 G/DL (ref 2.8–4.4)
GLUCOSE BLD-MCNC: 93 MG/DL (ref 70–99)
HBA1C MFR BLD: 5.2 % (ref ?–5.7)
HCT VFR BLD AUTO: 40.3 %
HDLC SERPL-MCNC: 32 MG/DL (ref 40–59)
HGB BLD-MCNC: 12.4 G/DL
LDLC SERPL CALC-MCNC: 91 MG/DL (ref ?–100)
MCH RBC QN AUTO: 34.4 PG (ref 26–34)
MCHC RBC AUTO-ENTMCNC: 30.8 G/DL (ref 31–37)
MCV RBC AUTO: 111.9 FL
NONHDLC SERPL-MCNC: 110 MG/DL (ref ?–130)
OSMOLALITY SERPL CALC.SUM OF ELEC: 303 MOSM/KG (ref 275–295)
PLATELET # BLD AUTO: 93 10(3)UL (ref 150–450)
POTASSIUM SERPL-SCNC: 3.7 MMOL/L (ref 3.5–5.1)
PROT SERPL-MCNC: 6.9 G/DL (ref 6.4–8.2)
RBC # BLD AUTO: 3.6 X10(6)UL
SODIUM SERPL-SCNC: 145 MMOL/L (ref 136–145)
TRIGL SERPL-MCNC: 104 MG/DL (ref 30–149)
TSI SER-ACNC: 3.48 MIU/ML (ref 0.36–3.74)
URATE SERPL-MCNC: 5.2 MG/DL
VLDLC SERPL CALC-MCNC: 17 MG/DL (ref 0–30)
WBC # BLD AUTO: 5.7 X10(3) UL (ref 4–11)

## 2023-01-02 PROCEDURE — 80061 LIPID PANEL: CPT

## 2023-01-02 PROCEDURE — 85027 COMPLETE CBC AUTOMATED: CPT

## 2023-01-02 PROCEDURE — 84443 ASSAY THYROID STIM HORMONE: CPT

## 2023-01-02 PROCEDURE — 36415 COLL VENOUS BLD VENIPUNCTURE: CPT

## 2023-01-02 PROCEDURE — 83036 HEMOGLOBIN GLYCOSYLATED A1C: CPT

## 2023-01-02 PROCEDURE — 80053 COMPREHEN METABOLIC PANEL: CPT

## 2023-01-02 PROCEDURE — 84550 ASSAY OF BLOOD/URIC ACID: CPT

## 2023-01-03 ENCOUNTER — TELEPHONE (OUTPATIENT)
Dept: FAMILY MEDICINE CLINIC | Facility: CLINIC | Age: 78
End: 2023-01-03

## 2023-01-04 PROBLEM — I50.32 CHRONIC HEART FAILURE WITH PRESERVED EJECTION FRACTION (HCC): Status: RESOLVED | Noted: 2022-09-26 | Resolved: 2023-01-04

## 2023-01-04 PROBLEM — R06.09 DOE (DYSPNEA ON EXERTION): Status: RESOLVED | Noted: 2021-04-27 | Resolved: 2023-01-04

## 2023-01-04 PROBLEM — I10 HYPERTENSION: Status: RESOLVED | Noted: 2021-08-16 | Resolved: 2023-01-04

## 2023-01-04 PROBLEM — R07.9 CHEST PAIN OF UNCERTAIN ETIOLOGY: Status: RESOLVED | Noted: 2022-09-16 | Resolved: 2023-01-04

## 2023-01-04 PROBLEM — R06.09 DYSPNEA ON EXERTION: Status: RESOLVED | Noted: 2022-09-20 | Resolved: 2023-01-04

## 2023-01-04 PROBLEM — I48.20 ATRIAL FIBRILLATION, CHRONIC (HCC): Status: RESOLVED | Noted: 2022-09-20 | Resolved: 2023-01-04

## 2023-01-04 PROBLEM — R10.12 ABDOMINAL PAIN, LEFT UPPER QUADRANT: Status: RESOLVED | Noted: 2022-09-20 | Resolved: 2023-01-04

## 2023-01-05 ENCOUNTER — OFFICE VISIT (OUTPATIENT)
Dept: FAMILY MEDICINE CLINIC | Facility: CLINIC | Age: 78
End: 2023-01-05
Payer: MEDICARE

## 2023-01-05 ENCOUNTER — APPOINTMENT (OUTPATIENT)
Dept: LAB | Facility: HOSPITAL | Age: 78
End: 2023-01-05
Attending: NURSE PRACTITIONER
Payer: MEDICARE

## 2023-01-05 ENCOUNTER — HOSPITAL ENCOUNTER (OUTPATIENT)
Dept: CARDIOLOGY CLINIC | Facility: HOSPITAL | Age: 78
Discharge: HOME | End: 2023-01-05
Attending: NURSE PRACTITIONER
Payer: MEDICARE

## 2023-01-05 VITALS
DIASTOLIC BLOOD PRESSURE: 55 MMHG | SYSTOLIC BLOOD PRESSURE: 100 MMHG | HEART RATE: 82 BPM | WEIGHT: 198.81 LBS | BODY MASS INDEX: 29 KG/M2

## 2023-01-05 VITALS
BODY MASS INDEX: 28.54 KG/M2 | HEART RATE: 78 BPM | DIASTOLIC BLOOD PRESSURE: 70 MMHG | SYSTOLIC BLOOD PRESSURE: 114 MMHG | RESPIRATION RATE: 18 BRPM | WEIGHT: 199.38 LBS | HEIGHT: 70 IN

## 2023-01-05 DIAGNOSIS — K75.4: ICD-10-CM

## 2023-01-05 DIAGNOSIS — Z95.818 PRESENCE OF WATCHMAN LEFT ATRIAL APPENDAGE CLOSURE DEVICE: ICD-10-CM

## 2023-01-05 DIAGNOSIS — G47.33 OSA (OBSTRUCTIVE SLEEP APNEA): ICD-10-CM

## 2023-01-05 DIAGNOSIS — N18.31 STAGE 3A CHRONIC KIDNEY DISEASE (HCC): ICD-10-CM

## 2023-01-05 DIAGNOSIS — I27.20 PULMONARY HYPERTENSION (HCC): ICD-10-CM

## 2023-01-05 DIAGNOSIS — E87.6 HYPOPOTASSEMIA: ICD-10-CM

## 2023-01-05 DIAGNOSIS — D69.3 IMMUNE THROMBOCYTOPENIC PURPURA (HCC): ICD-10-CM

## 2023-01-05 DIAGNOSIS — I50.32 CHRONIC HEART FAILURE WITH PRESERVED EJECTION FRACTION (HFPEF) (HCC): Primary | ICD-10-CM

## 2023-01-05 DIAGNOSIS — J01.40 ACUTE NON-RECURRENT PANSINUSITIS: ICD-10-CM

## 2023-01-05 DIAGNOSIS — M33.20 POLYMYOSITIS (HCC): Chronic | ICD-10-CM

## 2023-01-05 DIAGNOSIS — Z00.00 ANNUAL PHYSICAL EXAM: Primary | ICD-10-CM

## 2023-01-05 DIAGNOSIS — I48.19 PERSISTENT ATRIAL FIBRILLATION (HCC): ICD-10-CM

## 2023-01-05 DIAGNOSIS — I77.810 AORTIC ROOT DILATION (HCC): ICD-10-CM

## 2023-01-05 DIAGNOSIS — Z00.00 ENCOUNTER FOR ANNUAL HEALTH EXAMINATION: ICD-10-CM

## 2023-01-05 DIAGNOSIS — Z95.818 PRESENCE OF CARDIOMEMS HF SYSTEM: ICD-10-CM

## 2023-01-05 DIAGNOSIS — I48.20 ATRIAL FIBRILLATION, CHRONIC (HCC): ICD-10-CM

## 2023-01-05 DIAGNOSIS — E78.6 LOW HDL (UNDER 40): ICD-10-CM

## 2023-01-05 DIAGNOSIS — I10 BENIGN ESSENTIAL HYPERTENSION: ICD-10-CM

## 2023-01-05 DIAGNOSIS — J43.2 CENTRILOBULAR EMPHYSEMA (HCC): ICD-10-CM

## 2023-01-05 DIAGNOSIS — Z87.19 HISTORY OF PANCREATITIS: ICD-10-CM

## 2023-01-05 DIAGNOSIS — I50.812 CHRONIC RIGHT-SIDED CONGESTIVE HEART FAILURE (HCC): ICD-10-CM

## 2023-01-05 DIAGNOSIS — I43 CARDIOMYOPATHY AS MANIFESTATION OF UNDERLYING DISEASE (HCC): ICD-10-CM

## 2023-01-05 DIAGNOSIS — K22.70 BARRETT'S ESOPHAGUS WITHOUT DYSPLASIA: ICD-10-CM

## 2023-01-05 DIAGNOSIS — M35.1 MCTD (MIXED CONNECTIVE TISSUE DISEASE) (HCC): ICD-10-CM

## 2023-01-05 PROBLEM — M32.9 LUPUS (HCC): Status: RESOLVED | Noted: 2017-03-30 | Resolved: 2023-01-05

## 2023-01-05 PROBLEM — N18.9 ACUTE KIDNEY INJURY SUPERIMPOSED ON CHRONIC KIDNEY DISEASE: Status: RESOLVED | Noted: 2022-09-20 | Resolved: 2023-01-05

## 2023-01-05 PROBLEM — N17.9 ACUTE KIDNEY INJURY SUPERIMPOSED ON CHRONIC KIDNEY DISEASE: Status: RESOLVED | Noted: 2022-09-20 | Resolved: 2023-01-05

## 2023-01-05 PROBLEM — N18.9 ACUTE KIDNEY INJURY SUPERIMPOSED ON CHRONIC KIDNEY DISEASE (HCC): Status: RESOLVED | Noted: 2022-09-20 | Resolved: 2023-01-05

## 2023-01-05 PROBLEM — N17.9 ACUTE KIDNEY INJURY: Status: RESOLVED | Noted: 2022-09-20 | Resolved: 2023-01-05

## 2023-01-05 PROBLEM — R55 SYNCOPE, NEAR: Status: RESOLVED | Noted: 2022-09-20 | Resolved: 2023-01-05

## 2023-01-05 PROBLEM — N17.9 ACUTE KIDNEY INJURY (HCC): Status: RESOLVED | Noted: 2022-09-20 | Resolved: 2023-01-05

## 2023-01-05 PROBLEM — R79.89 AZOTEMIA: Status: RESOLVED | Noted: 2022-09-20 | Resolved: 2023-01-05

## 2023-01-05 PROBLEM — N17.9 ACUTE KIDNEY INJURY SUPERIMPOSED ON CHRONIC KIDNEY DISEASE (HCC): Status: RESOLVED | Noted: 2022-09-20 | Resolved: 2023-01-05

## 2023-01-05 PROBLEM — N18.9 ACUTE KIDNEY INJURY SUPERIMPOSED ON CHRONIC KIDNEY DISEASE  (HCC): Status: RESOLVED | Noted: 2022-09-20 | Resolved: 2023-01-05

## 2023-01-05 PROBLEM — IMO0002 LUPUS: Status: RESOLVED | Noted: 2017-03-30 | Resolved: 2023-01-05

## 2023-01-05 PROBLEM — M32.9 LUPUS: Status: RESOLVED | Noted: 2017-03-30 | Resolved: 2023-01-05

## 2023-01-05 PROBLEM — N17.9 ACUTE KIDNEY INJURY SUPERIMPOSED ON CHRONIC KIDNEY DISEASE  (HCC): Status: RESOLVED | Noted: 2022-09-20 | Resolved: 2023-01-05

## 2023-01-05 PROCEDURE — 1126F AMNT PAIN NOTED NONE PRSNT: CPT | Performed by: FAMILY MEDICINE

## 2023-01-05 PROCEDURE — 99215 OFFICE O/P EST HI 40 MIN: CPT | Performed by: NURSE PRACTITIONER

## 2023-01-05 PROCEDURE — G0439 PPPS, SUBSEQ VISIT: HCPCS | Performed by: FAMILY MEDICINE

## 2023-01-05 PROCEDURE — 99214 OFFICE O/P EST MOD 30 MIN: CPT | Performed by: FAMILY MEDICINE

## 2023-01-05 RX ORDER — CEFUROXIME AXETIL 250 MG/1
250 TABLET ORAL 2 TIMES DAILY
Qty: 20 TABLET | Refills: 0 | Status: SHIPPED | OUTPATIENT
Start: 2023-01-05 | End: 2023-01-15

## 2023-01-05 RX ORDER — TORSEMIDE 20 MG/1
TABLET ORAL
Qty: 130 TABLET | Refills: 5 | COMMUNITY
Start: 2023-01-05

## 2023-01-05 NOTE — PATIENT INSTRUCTIONS
Heart Failure Discharge Instructions    Alternate taking 40mg in the am and 20mg in the pm and 40mg twice per day; every other day. Probiotic: Align  Consider seeing a Functional Medicine Physician. Please get your lab work completed the day of Dr. Michelle Bejarano appt. Activity: Regular exercise and activity is important for your overall health and to help keep your heart strong and functioning as well as possible. Walk at a slow to moderate pace for 15-20 minutes 3-5 days per week. Follow these instructions every day to keep yourself in the [address] you are feeling well and your symptoms are under control                                    Medications  Take your medication every day as instructed  Do not stop taking your medicine or change the amount you are taking without instructions from your doctor or nurse  Do Not take non-steroidal antiinflammatory drugs such as ibuprofen, aleve, advil, or motrin                                    Diet/Fluids  People with heart failure should eat less sodium (salt) and limit fluid. Sodium attracts water and makes the body hold fluid. This extra fluid makes the heart work harder and can worsen the symptoms of heart failure. Diet    2000 mg sodium daily  Fluid restriction    64 ounces daily  (8 oz. = 1 cup)                                     Body Weight  Weigh yourself every day before breakfast and write your weight down  Use the same scale and wear about the same amount of clothes each time  A sudden weight increase is due to fluid retention rather than fat                                         Activity  Pace your activities to avoid getting overtired  Take rest periods as needed  Elevate your feet to reduce ankle swelling when resting                             Signs of Worsening Heart Failure    You are entering the Yellow Zone - this is a warning zone    Call your doctor or nurse if you have any of these signs or symptoms:   You gain 2 or more pounds overnight or 3-5 pounds in 3-7 days  You have more trouble breathing  You get more tired with regular activity, or are limiting activity because of shortness of breath or fatigue  You are short of breath lying down, you need more pillows to breathe comfortably,  or wake up during the night short of breath  You urinate less often during the day and more often at night  You have a bloated feeling, upset stomach, loss of appetite, or your clothes are fitting tighter    GO TO THE EMERGENCY DEPARTMENT or CALL 911 IF: These are signs you are in the RED ZONE - THIS IS AN EMERGENCY  You have tightness or pain in your chest  You are extremely short of breath or can't catch your breath  You cough up frothy pink mucous  You feel confused or can't think clearly  You are traveling and develop symptoms of worsening heart failure     We respect everyone's time and availability. Please be aware that this is not a walk-in clinic and we require appointments in order to facilitate timely care for all patients. We ask you to arrive 30 minutes before your appointment to allow time for you to check-in and have your bloodwork drawn. Please understand if you are late for your appointment, you may be asked to reschedule. If possible, all attempts will be made to accommodate but realize this is no guarantee that this will always be available. We understand there are extenuating circumstances. If you need to cancel or reschedule your appointment, please call the [facility] [employer] within 24 hours at [phone]. Thank you for your cooperation, [facility] Staff. IF YOU HAVE QUESTIONS REGARDING YOUR BILL, FEEL FREE TO CONTACT [facility] PATIENT ACCOUNTS -[phone]. IF YOU NEED FINANCIAL ASSISTANCE, PLEASE CALL AN [facility] FINANCIAL COUNSELOR -[phone].              [facility] [employer] [facility]     [phone]

## 2023-01-05 NOTE — ASSESSMENT & PLAN NOTE
Stable, Continue present management.     Blood Pressure and Cardiac Medications          sacubitril-valsartan (ENTRESTO) 24-26 MG Oral Tab    Metoprolol Succinate  MG Oral Tablet 24 Hr

## 2023-01-05 NOTE — TELEPHONE ENCOUNTER
CC:    Mihai Serrano is a 47 year old male with type 2 DM, and HL who presents for follow up.    The patient was last seen on 11/3/2022.    HPI:Mihai returns for follow up.    He continues to take Metformin 1000mg BID. No GI issues on therapy.     Glipizide XL 2.5mg daily was started at his last visit.  He reports no Hypoglycemia concerns.     No meter provided at today's visit. He is not testing BG.     No polyuria or polydipsia.    Blood pressure is at goal.    He is on the treadmill ~ 1 x a week. Jogging 3 miles.    His 1st meal is ~ 11am, Skip breakfast.  He is taking Glipizide XL with lunch.     He reports a GOUT attack to RT great toe in late November. No LE pains or toe concerns today.  No ETOH, aged cheeses etc in diet.    Diabetes:  Diabetes type 2, diagnosed at age ~39  Symptoms at diagnosis: Hx of Pre-DM  Hospitalizations for sugar issues since diagnosis: None    Past DM meds: None   Current DM medications:  Oral: Metformin 1000mg BID , Glipizide XL 2.5mg daily    Frequency of BG monitoring:  NONE   Symptoms of hypoglycemia present: N/A    Macrovascular Complications?   Nephropathy: No  CAD: No  Stroke: No  History of PAD: No  History of gastroparesis: No  History of Peripheral Neuropathy: No    Other Co-Morbidities?:   History of HTN: No  History of dyslipidemia: Yes- on Crestor 5mg   Obesity - Yes  History of BILLY: No     BG readings:  NONE     FH:  DM- Mom, Father  Celiac?  No known thyroid disease    SH:  No Tobacco use     Labs:  Component      Latest Ref Rng & Units 3/31/2022 6/30/2022 10/3/2022 1/11/2023   GLYCOHEMOGLOBIN A1C      4.5 - 5.6 % 9.5 (H) 8.5 (H) 9.0 (H) 8.8 (H)   Est Avg Glucose      0 - 154 mg/dL 225 (H) 196 (H) 212 (H)        Component      Latest Ref Rng & Units 10/3/2022   CHOLESTEROL      140 - 200 mg/dL 147   HDL      >40 mg/dL 49   CALCULATED LDL      30 - 100 mg/dL 71   TRIGLYCERIDE      0 - 200 mg/dL 134   MICROALBUMIN, URINE RANDOM      mg/L 11.7   CREATININE, URINE  Called pt. To check on his weight. Pt. States,\"My weight is down alittle more than 3 lbs. , just under 187 lbs. My belly bloating has gone down significantly. I don't feel like the [place] Man anymore. \" Pt.  Feels he can have a phone visit, as his conditi RANDOM      30.0 - 125.0 mg/dL 162.6 (H)   MICROALBUMIN/CREATININE RATIO      <30.0 mg/G 7.2     Component      Latest Ref Rng & Units 10/3/2022   CALCIUM      8.6 - 10.6 mg/dL 8.8   Albumin      3.6 - 5.1 g/dL 4.2   ALK PHOSPHATASE      45 - 115 U/L 68   ALT/SGPT      5 - 49 U/L 41   AST/SGOT      14 - 43 U/L 28   TOTAL BILIRUBIN      0.0 - 1.3 mg/dL 1.1   BUN      6 - 27 mg/dL 16   Chloride      96 - 107 mmol/L 105   CO2      22 - 32 mmol/L 27   CREATININE, SERUM      0.6 - 1.6 mg/dL 0.9   Potassium      3.5 - 5.3 mmol/L 4.5   Sodium      136 - 146 mmol/L 139   GLUCOSE, FASTING      60 - 100 mg/dL 230 (H)   TOTAL PROTEIN      6.4 - 8.5 g/dL 7.0   EGFR AFRICAN AMERICAN      >60 mL/min/1.73m2 >60   EGFR* NON-AFRICAN AMERICAN      >60 mL/min/1.73m2 >60       Past Medical History:   Diagnosis Date   • Diabetes mellitus (CMS/HCC)    • High cholesterol        No past surgical history on file.      Social History     Tobacco Use   • Smoking status: Never   • Smokeless tobacco: Never   Vaping Use   • Vaping Use: never used   Substance Use Topics   • Alcohol use: No   • Drug use: No        No family history on file.    Allergies: ALLERGIES:  Bee, Bee venom, Hornet venom, Wasp venom, Yellow jacket venom, Lac bovis, and Lactose   (food or med)    Current Outpatient Medications   Medication Sig Dispense Refill   • glipiZIDE (GLUCOTROL XL) 5 MG 24 hr tablet Take 1 tablet by mouth daily. 90 tablet 1   • metformin (GLUCOPHAGE) 1000 MG tablet Take 1 tablet by mouth in the morning and 1 tablet in the evening. Take with meals. 180 tablet 1   • rosuvastatin (CRESTOR) 5 MG tablet Take 1 tablet by mouth daily. 90 tablet 1   • EPINEPHrine 0.3 MG/0.3ML auto-injector Inject 0.3 mLs into the muscle 1 time as needed for Anaphylaxis. Inject into muscle once as directed for anaphylactic reaction and report immediately to the Emergency Department 2 each 0   • albuterol 108 (90 Base) MCG/ACT inhaler Inhale 2 Puffs by mouth every 4 (four) hours  as needed for Wheezing.     • cetirizine (ZyrTEC) 10 MG tablet Take 10 mg by mouth.       No current facility-administered medications for this visit.         Review of Systems:  Constitutional: No fatigue, fevers, chills   Eyes: No blurred vision, other changes in vision  Respiratory: No SOB, cough, wheezing, snoring  CV: No chest pain, palpitations, leg swelling,claudication   GI: No abd pain, change in bowels, nausea/vomiting  : No dysuria, urinary frequency, hematuria  Skin: No rashes, ulcers, sores, changes in pigmentation  Psych: No depression, anxiety  Neuro: No diminished sensation, numbness/tingling    All other systems reviewed are negative    OBJECTIVE  Vitals:   Visit Vitals  /70   Pulse 87   Wt 107.9 kg (237 lb 12.8 oz)   SpO2 97%   BMI 30.53 kg/m²     Odilias BMI is Body mass index is 30.53 kg/m².    Physical Exam:  General appearance: healthy and alert  HEENT: PERRLA  Neck: supple  Chest: clear bilaterally  CV: RRR and S1 S2  Abdo:bowel sounds normal, soft and non-tender  Foot exam performed: Yes.  Pulses present.  No signs of infection, neuropathy, or vascular insufficiency.sensation intact with 10g monofilament to feet bilaterally.  Extr: no edema  Skin: normal  Neuro: No tremor of outstretched hands, normal reflexes      ASSESSMENT/PLAN    A/P: Mihai Serrano is a 47 year old male with type 2 DM and HL    DIABETES-Type 2  He is not interested in GLP-1 agonist injectables.    Continue Metformin 1000mg BID  INCREASE Glipizide ER to 5mg daily - risk of hypoglycemia reviewed     Goal BG , 2h PP < 180     -Check BG at least 1 times/day     -Also advised to check BG before driving/before or after exercise     -Pt advised to call me if BG readings are consistently < 70 or > 200 and not coming down    -Check feet daily, call for any skin changes or concerns     -Check A1c level before next visit  -Check Uric Acid level with next labs    BP- at goal  -Not on Rx      HL - LDL at goal    -Continue Crestor 5mg daily   -Advised to eat a low-fat diet and to exercise regularly for at least 150min/week  -Check lipids summer 2023    Ophtho - No retinopathy  -Annual dilated eye exam due February 2023    Renal  -Check urine microalbumin level Fall 2023  -Will continue to Monitor Cr/GFR    OBESITY- Discussed healthy diet and daily exercise to aid in weight loss.     RTC in 6 months

## 2023-01-05 NOTE — PROGRESS NOTES
Pt. Assessed. No signs or symptoms of shortness of breath, fatigue, chest pain. Minimal edema to lower extremities. Weight down 2 lbs at 198 lbs. Reviewed current list of patient's allergies and medication; updated the Electronic Medical Record. Labs ordered to assess kidney function and drawn by [facility]. Reviewed follow-up appointment and Heart Failure discharge instructions with patient. Patient verbalized an understanding. APN to manipulate PO dosages to improve swelling at this time. 2 weeks with [facility] and 4 weeks with us.

## 2023-01-06 NOTE — ASSESSMENT & PLAN NOTE
Lab Results   Component Value Date/Time    CREATSERUM 1.21 01/02/2023 09:49 AM    EGFRCR 61 01/02/2023 09:49 AM    GFR 64 09/27/2017 09:43 AM    GFRNAA 41 (L) 07/08/2022 12:33 PM      Stable, continue present management

## 2023-01-06 NOTE — PROGRESS NOTES
[facility] for [address]. pulmonary artery pressure sensor    Remote Monitoring Report Summary    2023     Cary Kanner Simeral    : 3/17/1945    Reporting Period-  12/3-1/3/2023     Diagnosis - HFpEF    Provider- SHERIDAN Garcia       The CardioMEMS report for the above date has been reviewed with the following results:      RHC hemodynamics at time of CardioMEMS impant:    PA 42//27  Wedge 8    Acceptable range for Cary Kanner Simeral      PAD range 18-24 mmhg     Remote monitoring documentation for the past ~30 days:    2023 PAD 26mmhg and within range on . CTM. 2022 Improved and at goal. CTM  2022 Improved with increase in oral diuretics. Will follow. 2022 At goal per [facility]'s recent OV. 2022 Calling to request a reading and remind him to transmit more readings. Last one elevated.     Maura Guido, NP

## 2023-01-09 ENCOUNTER — HOSPITAL ENCOUNTER (OUTPATIENT)
Dept: CARDIOLOGY CLINIC | Facility: HOSPITAL | Age: 78
End: 2023-01-09
Attending: NURSE PRACTITIONER
Payer: MEDICARE

## 2023-01-09 ENCOUNTER — TELEPHONE (OUTPATIENT)
Dept: CARDIOLOGY CLINIC | Facility: HOSPITAL | Age: 78
End: 2023-01-09

## 2023-01-09 DIAGNOSIS — I50.812 CHRONIC RIGHT-SIDED CONGESTIVE HEART FAILURE (HCC): ICD-10-CM

## 2023-01-09 PROCEDURE — 93264 REM MNTR WRLS P-ART PRS SNR: CPT | Performed by: NURSE PRACTITIONER

## 2023-01-09 NOTE — TELEPHONE ENCOUNTER
[facility] and informed Vinny Watson that the clinic received a fax from [address] that he has been approved and is eligible to receive Entresto until 12/31/2023 at no cost to the patient. Vinny Watson verbalized back understanding and stated that he has the number to contact [employer] for any refills. Vinny Watson requested a copy of this approval. Vinny Watson was informed that the approval letter is kept in the clinic, and he can request a copy at his next appointment.

## 2023-01-10 ENCOUNTER — TELEPHONE (OUTPATIENT)
Dept: RHEUMATOLOGY | Facility: CLINIC | Age: 78
End: 2023-01-10

## 2023-01-17 ENCOUNTER — LAB ENCOUNTER (OUTPATIENT)
Dept: LAB | Age: 78
End: 2023-01-17
Attending: NURSE PRACTITIONER
Payer: MEDICARE

## 2023-01-17 DIAGNOSIS — I50.32 CHRONIC HEART FAILURE WITH PRESERVED EJECTION FRACTION (HFPEF) (HCC): ICD-10-CM

## 2023-01-17 LAB
ANION GAP SERPL CALC-SCNC: 4 MMOL/L (ref 0–18)
BUN BLD-MCNC: 21 MG/DL (ref 7–18)
CALCIUM BLD-MCNC: 9.3 MG/DL (ref 8.5–10.1)
CHLORIDE SERPL-SCNC: 106 MMOL/L (ref 98–112)
CO2 SERPL-SCNC: 33 MMOL/L (ref 21–32)
CREAT BLD-MCNC: 1.12 MG/DL
FASTING STATUS PATIENT QL REPORTED: NO
GFR SERPLBLD BASED ON 1.73 SQ M-ARVRAT: 67 ML/MIN/1.73M2 (ref 60–?)
GLUCOSE BLD-MCNC: 109 MG/DL (ref 70–99)
OSMOLALITY SERPL CALC.SUM OF ELEC: 300 MOSM/KG (ref 275–295)
POTASSIUM SERPL-SCNC: 4 MMOL/L (ref 3.5–5.1)
SODIUM SERPL-SCNC: 143 MMOL/L (ref 136–145)

## 2023-01-17 PROCEDURE — 36415 COLL VENOUS BLD VENIPUNCTURE: CPT

## 2023-01-17 PROCEDURE — 80048 BASIC METABOLIC PNL TOTAL CA: CPT

## 2023-01-18 NOTE — PROGRESS NOTES
[facility] called Marga Gifford, but no answer, so left message on Bill's voicemail letting him know that his recent labs were reviewed by the APN in the clinic and are stable with his recent increase in torsemide.

## 2023-01-23 ENCOUNTER — TELEPHONE (OUTPATIENT)
Dept: CARDIOLOGY CLINIC | Facility: HOSPITAL | Age: 78
End: 2023-01-23

## 2023-01-23 NOTE — TELEPHONE ENCOUNTER
PAD still running on the higher side at 26 mmHg and entresto was increased on the 19th after OV with Dr. Priya Arvizu. During visit she mentioned she should lower target PAD closer to 20 mmhg. Please have him increase Torsemide to 40 mg BID every day.     Thank you

## 2023-01-30 ENCOUNTER — TELEPHONE (OUTPATIENT)
Dept: CARDIOLOGY CLINIC | Facility: HOSPITAL | Age: 78
End: 2023-01-30

## 2023-01-30 ENCOUNTER — HOSPITAL ENCOUNTER (OUTPATIENT)
Dept: CARDIOLOGY CLINIC | Facility: HOSPITAL | Age: 78
End: 2023-01-30
Attending: NURSE PRACTITIONER
Payer: MEDICARE

## 2023-01-30 ENCOUNTER — HOSPITAL ENCOUNTER (OUTPATIENT)
Dept: LAB | Facility: HOSPITAL | Age: 78
Discharge: HOME | End: 2023-01-30
Attending: NURSE PRACTITIONER
Payer: MEDICARE

## 2023-01-30 VITALS
WEIGHT: 201.63 LBS | HEART RATE: 62 BPM | BODY MASS INDEX: 29 KG/M2 | SYSTOLIC BLOOD PRESSURE: 111 MMHG | DIASTOLIC BLOOD PRESSURE: 45 MMHG | OXYGEN SATURATION: 98 %

## 2023-01-30 DIAGNOSIS — I27.20 PULMONARY HYPERTENSION (HCC): ICD-10-CM

## 2023-01-30 DIAGNOSIS — I48.20 ATRIAL FIBRILLATION, CHRONIC (HCC): ICD-10-CM

## 2023-01-30 DIAGNOSIS — Z95.818 PRESENCE OF CARDIOMEMS HF SYSTEM: ICD-10-CM

## 2023-01-30 DIAGNOSIS — I50.32 CHRONIC DIASTOLIC HEART FAILURE (HCC): Primary | ICD-10-CM

## 2023-01-30 DIAGNOSIS — G47.33 OSA (OBSTRUCTIVE SLEEP APNEA): ICD-10-CM

## 2023-01-30 DIAGNOSIS — I50.32 CHRONIC DIASTOLIC (CONGESTIVE) HEART FAILURE (HCC): ICD-10-CM

## 2023-01-30 DIAGNOSIS — I50.32 CHRONIC DIASTOLIC (CONGESTIVE) HEART FAILURE (HCC): Primary | ICD-10-CM

## 2023-01-30 LAB
ANION GAP SERPL CALC-SCNC: 4 MMOL/L (ref 0–18)
BUN BLD-MCNC: 23 MG/DL (ref 7–18)
CALCIUM BLD-MCNC: 9.1 MG/DL (ref 8.5–10.1)
CHLORIDE SERPL-SCNC: 108 MMOL/L (ref 98–112)
CO2 SERPL-SCNC: 32 MMOL/L (ref 21–32)
CREAT BLD-MCNC: 1.25 MG/DL
FASTING STATUS PATIENT QL REPORTED: NO
GFR SERPLBLD BASED ON 1.73 SQ M-ARVRAT: 59 ML/MIN/1.73M2 (ref 60–?)
GLUCOSE BLD-MCNC: 107 MG/DL (ref 70–99)
OSMOLALITY SERPL CALC.SUM OF ELEC: 302 MOSM/KG (ref 275–295)
POTASSIUM SERPL-SCNC: 4.1 MMOL/L (ref 3.5–5.1)
SODIUM SERPL-SCNC: 144 MMOL/L (ref 136–145)

## 2023-01-30 PROCEDURE — 36415 COLL VENOUS BLD VENIPUNCTURE: CPT | Performed by: NURSE PRACTITIONER

## 2023-01-30 PROCEDURE — 99215 OFFICE O/P EST HI 40 MIN: CPT | Performed by: NURSE PRACTITIONER

## 2023-01-30 PROCEDURE — 80048 BASIC METABOLIC PNL TOTAL CA: CPT | Performed by: NURSE PRACTITIONER

## 2023-01-30 RX ORDER — TORSEMIDE 20 MG/1
20 TABLET ORAL 2 TIMES DAILY
COMMUNITY

## 2023-01-30 RX ORDER — BUMETANIDE 0.25 MG/ML
4 INJECTION, SOLUTION INTRAMUSCULAR; INTRAVENOUS ONCE
Status: COMPLETED | OUTPATIENT
Start: 2023-01-30 | End: 2023-01-30

## 2023-01-30 RX ORDER — SODIUM FLUORIDE 0.1 MG/ML
1 RINSE ORAL 2 TIMES DAILY
COMMUNITY

## 2023-01-30 RX ADMIN — BUMETANIDE 4 MG: 0.25 INJECTION, SOLUTION INTRAMUSCULAR; INTRAVENOUS at 16:03:00

## 2023-01-30 NOTE — TELEPHONE ENCOUNTER
PAD still elevated after increase in diuretics last week. Can you see if he can move up appointment to this week instead of next Monday. Likely needs IV diuretics.      Thank you

## 2023-01-30 NOTE — PATIENT INSTRUCTIONS
Heart Failure Discharge Instructions    Hold afternoon Torsemide today. Increase Torsemide from 40mg twice per day to 60mg in the morning and 40mg in the afternoon. Activity: Regular exercise and activity is important for your overall health and to help keep your heart strong and functioning as well as possible. Walk at a slow to moderate pace for 15-20 minutes 3-5 days per week. Follow these instructions every day to keep yourself in the [address] you are feeling well and your symptoms are under control                                    Medications  Take your medication every day as instructed  Do not stop taking your medicine or change the amount you are taking without instructions from your doctor or nurse  Do Not take non-steroidal antiinflammatory drugs such as ibuprofen, aleve, advil, or motrin                                    Diet/Fluids  People with heart failure should eat less sodium (salt) and limit fluid. Sodium attracts water and makes the body hold fluid. This extra fluid makes the heart work harder and can worsen the symptoms of heart failure. Diet    2000 mg sodium daily  Fluid restriction    64 ounces daily  (8 oz. = 1 cup)                                     Body Weight  Weigh yourself every day before breakfast and write your weight down  Use the same scale and wear about the same amount of clothes each time  A sudden weight increase is due to fluid retention rather than fat                                         Activity  Pace your activities to avoid getting overtired  Take rest periods as needed  Elevate your feet to reduce ankle swelling when resting                             Signs of Worsening Heart Failure    You are entering the Yellow Zone - this is a warning zone    Call your doctor or nurse if you have any of these signs or symptoms:   You gain 2 or more pounds overnight or 3-5 pounds in 3-7 days  You have more trouble breathing  You get more tired with regular activity, or are limiting activity because of shortness of breath or fatigue  You are short of breath lying down, you need more pillows to breathe comfortably,  or wake up during the night short of breath  You urinate less often during the day and more often at night  You have a bloated feeling, upset stomach, loss of appetite, or your clothes are fitting tighter    GO TO THE EMERGENCY DEPARTMENT or CALL 911 IF: These are signs you are in the RED ZONE - THIS IS AN EMERGENCY  You have tightness or pain in your chest  You are extremely short of breath or can't catch your breath  You cough up frothy pink mucous  You feel confused or can't think clearly  You are traveling and develop symptoms of worsening heart failure     We respect everyone's time and availability. Please be aware that this is not a walk-in clinic and we require appointments in order to facilitate timely care for all patients. We ask you to arrive 30 minutes before your appointment to allow time for you to check-in and have your bloodwork drawn. Please understand if you are late for your appointment, you may be asked to reschedule. If possible, all attempts will be made to accommodate but realize this is no guarantee that this will always be available. We understand there are extenuating circumstances. If you need to cancel or reschedule your appointment, please call the [facility] within 24 hours at [phone]. Thank you for your cooperation, [facility] Staff. IF YOU HAVE QUESTIONS REGARDING YOUR BILL, FEEL FREE TO CONTACT [facility] PATIENT ACCOUNTS -[phone]. IF YOU NEED FINANCIAL ASSISTANCE, PLEASE CALL AN [facility] FINANCIAL COUNSELOR -[phone].              [facility]     [phone]

## 2023-01-30 NOTE — PROGRESS NOTES
Pt. Assessed. Pt. complaining of swelling in legs L > R.  MEMS reading was elevated, pt was called and asked to come in to clinic today. He denies bloating or sob. Weight 201.6 lbs which is up approximately 3 lbs from previous visit. APN notified of patient's complaints. Labs ordered and drawn by [facility]. Reviewed allergies and list of current medications with patient and updated it in the Electronic Medical Record. IV established per protocol. IV diuril 250 mg and IV bumex 4 mg given. Patient tolerated it well. Educated patient on low sodium diet and food choices, fluid restriction of 2 liters, and daily weights. Reviewed follow-up appointments and discharge Heart Failure instructions with patient. Patient verbalized an understanding. IV discontinued; catheter intact. Pressure held and gauze applied. RTC 1 week. Discharged home in stable condition.

## 2023-02-01 ENCOUNTER — APPOINTMENT (OUTPATIENT)
Dept: HEMATOLOGY/ONCOLOGY | Facility: HOSPITAL | Age: 78
End: 2023-02-01
Attending: INTERNAL MEDICINE
Payer: MEDICARE

## 2023-02-01 ENCOUNTER — OFFICE VISIT (OUTPATIENT)
Dept: RHEUMATOLOGY | Facility: CLINIC | Age: 78
End: 2023-02-01
Payer: MEDICARE

## 2023-02-01 VITALS
BODY MASS INDEX: 29 KG/M2 | DIASTOLIC BLOOD PRESSURE: 76 MMHG | SYSTOLIC BLOOD PRESSURE: 128 MMHG | OXYGEN SATURATION: 98 % | WEIGHT: 200 LBS

## 2023-02-01 DIAGNOSIS — I43 CARDIOMYOPATHY AS MANIFESTATION OF UNDERLYING DISEASE (HCC): ICD-10-CM

## 2023-02-01 DIAGNOSIS — M33.20 POLYMYOSITIS (HCC): Primary | Chronic | ICD-10-CM

## 2023-02-01 DIAGNOSIS — I50.812 CHRONIC RIGHT-SIDED CONGESTIVE HEART FAILURE (HCC): ICD-10-CM

## 2023-02-01 DIAGNOSIS — M35.1 MCTD (MIXED CONNECTIVE TISSUE DISEASE) (HCC): ICD-10-CM

## 2023-02-01 DIAGNOSIS — D69.3 IMMUNE THROMBOCYTOPENIC PURPURA (HCC): ICD-10-CM

## 2023-02-01 DIAGNOSIS — I27.20 PULMONARY HYPERTENSION (HCC): ICD-10-CM

## 2023-02-01 PROCEDURE — 99214 OFFICE O/P EST MOD 30 MIN: CPT | Performed by: INTERNAL MEDICINE

## 2023-02-01 PROCEDURE — 1126F AMNT PAIN NOTED NONE PRSNT: CPT | Performed by: INTERNAL MEDICINE

## 2023-02-01 NOTE — PATIENT INSTRUCTIONS
Prednisone 7 mg per day. IV IG monthly. Heart Clinic visits regularly. Allopurinol 300 mg daily. Continue Toresemide daily. Return to office 3 months with labs.

## 2023-02-02 DIAGNOSIS — I50.32 CHRONIC DIASTOLIC HEART FAILURE (HCC): Primary | ICD-10-CM

## 2023-02-03 ENCOUNTER — OFFICE VISIT (OUTPATIENT)
Dept: HEMATOLOGY/ONCOLOGY | Facility: HOSPITAL | Age: 78
End: 2023-02-03
Attending: INTERNAL MEDICINE
Payer: MEDICARE

## 2023-02-03 VITALS
HEIGHT: 70.98 IN | WEIGHT: 203.19 LBS | BODY MASS INDEX: 28.45 KG/M2 | HEART RATE: 76 BPM | DIASTOLIC BLOOD PRESSURE: 74 MMHG | RESPIRATION RATE: 18 BRPM | OXYGEN SATURATION: 98 % | SYSTOLIC BLOOD PRESSURE: 113 MMHG | TEMPERATURE: 97 F

## 2023-02-03 DIAGNOSIS — D69.6 THROMBOCYTOPENIA (HCC): ICD-10-CM

## 2023-02-03 DIAGNOSIS — D69.3 IMMUNE THROMBOCYTOPENIC PURPURA (HCC): Primary | ICD-10-CM

## 2023-02-03 PROCEDURE — 96365 THER/PROPH/DIAG IV INF INIT: CPT

## 2023-02-03 PROCEDURE — 96366 THER/PROPH/DIAG IV INF ADDON: CPT

## 2023-02-03 RX ORDER — ACETAMINOPHEN 325 MG/1
650 TABLET ORAL ONCE
Status: COMPLETED | OUTPATIENT
Start: 2023-02-03 | End: 2023-02-03

## 2023-02-03 RX ORDER — FAMOTIDINE 10 MG/ML
20 INJECTION, SOLUTION INTRAVENOUS ONCE
OUTPATIENT
Start: 2023-03-10

## 2023-02-03 RX ORDER — METHYLPREDNISOLONE SODIUM SUCCINATE 125 MG/2ML
125 INJECTION, POWDER, LYOPHILIZED, FOR SOLUTION INTRAMUSCULAR; INTRAVENOUS ONCE
OUTPATIENT
Start: 2023-03-10

## 2023-02-03 RX ORDER — DIPHENHYDRAMINE HCL 25 MG
25 CAPSULE ORAL ONCE
OUTPATIENT
Start: 2023-03-10

## 2023-02-03 RX ORDER — METHYLPREDNISOLONE SODIUM SUCCINATE 40 MG/ML
40 INJECTION, POWDER, LYOPHILIZED, FOR SOLUTION INTRAMUSCULAR; INTRAVENOUS ONCE
OUTPATIENT
Start: 2023-03-10

## 2023-02-03 RX ORDER — DIPHENHYDRAMINE HYDROCHLORIDE 50 MG/ML
25 INJECTION INTRAMUSCULAR; INTRAVENOUS ONCE
OUTPATIENT
Start: 2023-03-10

## 2023-02-03 RX ORDER — DIPHENHYDRAMINE HCL 25 MG
25 CAPSULE ORAL ONCE
Status: COMPLETED | OUTPATIENT
Start: 2023-02-03 | End: 2023-02-03

## 2023-02-03 RX ORDER — ACETAMINOPHEN 325 MG/1
650 TABLET ORAL ONCE
OUTPATIENT
Start: 2023-03-10

## 2023-02-03 RX ADMIN — DIPHENHYDRAMINE HCL 25 MG: 25 MG CAPSULE ORAL at 11:06:00

## 2023-02-03 RX ADMIN — ACETAMINOPHEN 650 MG: 325 TABLET ORAL at 11:06:00

## 2023-02-03 NOTE — PROGRESS NOTES
Education Record    Learner:  Patient and Spouse    Disease / Diagnosis: myositis    Barriers / Limitations:  None   Comments:    Method:  Discussion   Comments:    General Topics:  Plan of care reviewed   Comments:    Outcome:  Shows understanding   Comments:    Pt here for IVIG infusion. Premedicated per orders. Pt tolerated infusion well. Discharged in stable condition.

## 2023-02-06 ENCOUNTER — HOSPITAL ENCOUNTER (OUTPATIENT)
Dept: LAB | Facility: HOSPITAL | Age: 78
Discharge: HOME | End: 2023-02-06
Attending: NURSE PRACTITIONER
Payer: MEDICARE

## 2023-02-06 ENCOUNTER — HOSPITAL ENCOUNTER (OUTPATIENT)
Dept: CARDIOLOGY CLINIC | Facility: HOSPITAL | Age: 78
End: 2023-02-06
Attending: NURSE PRACTITIONER
Payer: MEDICARE

## 2023-02-06 VITALS
RESPIRATION RATE: 30 BRPM | SYSTOLIC BLOOD PRESSURE: 112 MMHG | HEART RATE: 83 BPM | BODY MASS INDEX: 28 KG/M2 | OXYGEN SATURATION: 99 % | DIASTOLIC BLOOD PRESSURE: 64 MMHG | WEIGHT: 204.19 LBS

## 2023-02-06 DIAGNOSIS — I50.32 CHRONIC DIASTOLIC HEART FAILURE (HCC): Primary | ICD-10-CM

## 2023-02-06 DIAGNOSIS — I50.32 CHRONIC DIASTOLIC HEART FAILURE (HCC): ICD-10-CM

## 2023-02-06 DIAGNOSIS — I27.20 PULMONARY HYPERTENSION (HCC): ICD-10-CM

## 2023-02-06 DIAGNOSIS — I48.20 ATRIAL FIBRILLATION, CHRONIC (HCC): ICD-10-CM

## 2023-02-06 DIAGNOSIS — Z95.818 PRESENCE OF CARDIOMEMS HF SYSTEM: ICD-10-CM

## 2023-02-06 DIAGNOSIS — G47.33 OSA (OBSTRUCTIVE SLEEP APNEA): ICD-10-CM

## 2023-02-06 DIAGNOSIS — I50.812 CHRONIC RIGHT-SIDED CONGESTIVE HEART FAILURE (HCC): ICD-10-CM

## 2023-02-06 LAB
ANION GAP SERPL CALC-SCNC: 4 MMOL/L (ref 0–18)
BUN BLD-MCNC: 22 MG/DL (ref 7–18)
CALCIUM BLD-MCNC: 9.3 MG/DL (ref 8.5–10.1)
CHLORIDE SERPL-SCNC: 105 MMOL/L (ref 98–112)
CO2 SERPL-SCNC: 33 MMOL/L (ref 21–32)
CREAT BLD-MCNC: 1.33 MG/DL
FASTING STATUS PATIENT QL REPORTED: NO
GFR SERPLBLD BASED ON 1.73 SQ M-ARVRAT: 54 ML/MIN/1.73M2 (ref 60–?)
GLUCOSE BLD-MCNC: 100 MG/DL (ref 70–99)
OSMOLALITY SERPL CALC.SUM OF ELEC: 297 MOSM/KG (ref 275–295)
POTASSIUM SERPL-SCNC: 4 MMOL/L (ref 3.5–5.1)
SODIUM SERPL-SCNC: 142 MMOL/L (ref 136–145)

## 2023-02-06 PROCEDURE — 80048 BASIC METABOLIC PNL TOTAL CA: CPT | Performed by: NURSE PRACTITIONER

## 2023-02-06 PROCEDURE — 36415 COLL VENOUS BLD VENIPUNCTURE: CPT | Performed by: NURSE PRACTITIONER

## 2023-02-06 PROCEDURE — 99215 OFFICE O/P EST HI 40 MIN: CPT | Performed by: NURSE PRACTITIONER

## 2023-02-06 RX ORDER — BUMETANIDE 0.25 MG/ML
4 INJECTION, SOLUTION INTRAMUSCULAR; INTRAVENOUS ONCE
Status: COMPLETED | OUTPATIENT
Start: 2023-02-06 | End: 2023-02-06

## 2023-02-06 RX ORDER — POTASSIUM CHLORIDE 20 MEQ/1
20 TABLET, EXTENDED RELEASE ORAL ONCE
Status: COMPLETED | OUTPATIENT
Start: 2023-02-06 | End: 2023-02-06

## 2023-02-06 RX ORDER — BUMETANIDE 1 MG/1
TABLET ORAL
Qty: 165 TABLET | Refills: 1 | Status: SHIPPED | OUTPATIENT
Start: 2023-02-06

## 2023-02-06 RX ADMIN — POTASSIUM CHLORIDE 20 MEQ: 20 TABLET, EXTENDED RELEASE ORAL at 15:00:00

## 2023-02-06 RX ADMIN — BUMETANIDE 4 MG: 0.25 INJECTION, SOLUTION INTRAMUSCULAR; INTRAVENOUS at 15:00:00

## 2023-02-06 NOTE — PATIENT INSTRUCTIONS
Heart Failure Discharge Instructions    Hold afternoon Torsemide. Stop Torsemide. Start Bumex 3mg in the morning and 2mg in the afternoon. Activity: Regular exercise and activity is important for your overall health and to help keep your heart strong and functioning as well as possible. Walk at a slow to moderate pace for 15-20 minutes 3-5 days per week. Follow these instructions every day to keep yourself in the [address] you are feeling well and your symptoms are under control                                    Medications  Take your medication every day as instructed  Do not stop taking your medicine or change the amount you are taking without instructions from your doctor or nurse  Do Not take non-steroidal antiinflammatory drugs such as ibuprofen, aleve, advil, or motrin                                    Diet/Fluids  People with heart failure should eat less sodium (salt) and limit fluid. Sodium attracts water and makes the body hold fluid. This extra fluid makes the heart work harder and can worsen the symptoms of heart failure. Diet    2000 mg sodium daily  Fluid restriction    64 ounces daily  (8 oz. = 1 cup)                                     Body Weight  Weigh yourself every day before breakfast and write your weight down  Use the same scale and wear about the same amount of clothes each time  A sudden weight increase is due to fluid retention rather than fat                                         Activity  Pace your activities to avoid getting overtired  Take rest periods as needed  Elevate your feet to reduce ankle swelling when resting                             Signs of Worsening Heart Failure    You are entering the Yellow Zone - this is a warning zone    Call your doctor or nurse if you have any of these signs or symptoms:   You gain 2 or more pounds overnight or 3-5 pounds in 3-7 days  You have more trouble breathing  You get more tired with regular activity, or are limiting activity because of shortness of breath or fatigue  You are short of breath lying down, you need more pillows to breathe comfortably,  or wake up during the night short of breath  You urinate less often during the day and more often at night  You have a bloated feeling, upset stomach, loss of appetite, or your clothes are fitting tighter    GO TO THE EMERGENCY DEPARTMENT or CALL 911 IF: These are signs you are in the RED ZONE - THIS IS AN EMERGENCY  You have tightness or pain in your chest  You are extremely short of breath or can't catch your breath  You cough up frothy pink mucous  You feel confused or can't think clearly  You are traveling and develop symptoms of worsening heart failure     We respect everyone's time and availability. Please be aware that this is not a walk-in clinic and we require appointments in order to facilitate timely care for all patients. We ask you to arrive 30 minutes before your appointment to allow time for you to check-in and have your bloodwork drawn. Please understand if you are late for your appointment, you may be asked to reschedule. If possible, all attempts will be made to accommodate but realize this is no guarantee that this will always be available. We understand there are extenuating circumstances. If you need to cancel or reschedule your appointment, please call the [facility] within 24 hours at [phone]. Thank you for your cooperation, [facility] Staff. IF YOU HAVE QUESTIONS REGARDING YOUR BILL, FEEL FREE TO CONTACT [facility] PATIENT ACCOUNTS -[phone]. IF YOU NEED FINANCIAL ASSISTANCE, PLEASE CALL AN [facility] FINANCIAL COUNSELOR -[phone].              [facility]     [phone]

## 2023-02-06 NOTE — PROGRESS NOTES
Pt. Assessed. Mems reading at 30 for goal of 20. Pt. complaining of weight gain. Weight up 4 lbs at 204.2 lbs. Patient sleeping well, good appetite, no chest pain and shortness of breath. Left greater that right with 2+ piting edema. APN notified of patient's complaint of weight gain,Left greater that right with 2+ piting edema. Labs ordered and drawn by [facility]. Reviewed allergies and list of current medications with patient and updated it in the Electronic Medical Record. IV established per protocol. IV diuril 500 and bumex 4 mg ivp given. Patient tolerated it well. Educated patient on low sodium diet and food choices, fluid restriction of 2 liters, and daily weights. Reviewed follow-up appointments and discharge Heart Failure instructions with patient. Patient verbalized an understanding. IV discontinued; catheter intact. Pressure held and gauze applied. [address] 1 week.

## 2023-02-09 ENCOUNTER — LAB ENCOUNTER (OUTPATIENT)
Dept: LAB | Age: 78
End: 2023-02-09
Attending: INTERNAL MEDICINE
Payer: MEDICARE

## 2023-02-09 DIAGNOSIS — I50.812 CHRONIC RIGHT-SIDED CONGESTIVE HEART FAILURE (HCC): ICD-10-CM

## 2023-02-09 DIAGNOSIS — M33.20 POLYMYOSITIS (HCC): ICD-10-CM

## 2023-02-09 DIAGNOSIS — I43 CARDIOMYOPATHY AS MANIFESTATION OF UNDERLYING DISEASE (HCC): ICD-10-CM

## 2023-02-09 DIAGNOSIS — M35.1 MCTD (MIXED CONNECTIVE TISSUE DISEASE) (HCC): ICD-10-CM

## 2023-02-09 DIAGNOSIS — D69.3 IMMUNE THROMBOCYTOPENIC PURPURA (HCC): ICD-10-CM

## 2023-02-09 DIAGNOSIS — I27.20 PULMONARY HYPERTENSION (HCC): ICD-10-CM

## 2023-02-09 LAB
BASOPHILS # BLD AUTO: 0.03 X10(3) UL (ref 0–0.2)
BASOPHILS NFR BLD AUTO: 0.5 %
CK SERPL-CCNC: 26 U/L
CRP SERPL-MCNC: 0.8 MG/DL (ref ?–0.3)
EOSINOPHIL # BLD AUTO: 0.13 X10(3) UL (ref 0–0.7)
EOSINOPHIL NFR BLD AUTO: 2.1 %
ERYTHROCYTE [DISTWIDTH] IN BLOOD BY AUTOMATED COUNT: 15.3 %
ERYTHROCYTE [SEDIMENTATION RATE] IN BLOOD: 25 MM/HR
HCT VFR BLD AUTO: 41.5 %
HGB BLD-MCNC: 12.6 G/DL
IMM GRANULOCYTES # BLD AUTO: 0.02 X10(3) UL (ref 0–1)
IMM GRANULOCYTES NFR BLD: 0.3 %
LYMPHOCYTES # BLD AUTO: 1.25 X10(3) UL (ref 1–4)
LYMPHOCYTES NFR BLD AUTO: 19.9 %
MCH RBC QN AUTO: 32 PG (ref 26–34)
MCHC RBC AUTO-ENTMCNC: 30.4 G/DL (ref 31–37)
MCV RBC AUTO: 105.3 FL
MONOCYTES # BLD AUTO: 0.62 X10(3) UL (ref 0.1–1)
MONOCYTES NFR BLD AUTO: 9.9 %
NEUTROPHILS # BLD AUTO: 4.24 X10 (3) UL (ref 1.5–7.7)
NEUTROPHILS # BLD AUTO: 4.24 X10(3) UL (ref 1.5–7.7)
NEUTROPHILS NFR BLD AUTO: 67.3 %
PLATELET # BLD AUTO: 124 10(3)UL (ref 150–450)
RBC # BLD AUTO: 3.94 X10(6)UL
WBC # BLD AUTO: 6.3 X10(3) UL (ref 4–11)

## 2023-02-09 PROCEDURE — 36415 COLL VENOUS BLD VENIPUNCTURE: CPT

## 2023-02-09 PROCEDURE — 85025 COMPLETE CBC W/AUTO DIFF WBC: CPT

## 2023-02-09 PROCEDURE — 82550 ASSAY OF CK (CPK): CPT

## 2023-02-09 PROCEDURE — 85652 RBC SED RATE AUTOMATED: CPT

## 2023-02-09 PROCEDURE — 86140 C-REACTIVE PROTEIN: CPT

## 2023-02-10 DIAGNOSIS — I50.32 CHRONIC DIASTOLIC HEART FAILURE (HCC): Primary | ICD-10-CM

## 2023-02-13 ENCOUNTER — HOSPITAL ENCOUNTER (OUTPATIENT)
Dept: LAB | Facility: HOSPITAL | Age: 78
Discharge: HOME | End: 2023-02-13
Attending: NURSE PRACTITIONER
Payer: MEDICARE

## 2023-02-13 ENCOUNTER — HOSPITAL ENCOUNTER (OUTPATIENT)
Dept: CARDIOLOGY CLINIC | Facility: HOSPITAL | Age: 78
End: 2023-02-13
Attending: NURSE PRACTITIONER
Payer: MEDICARE

## 2023-02-13 VITALS
HEART RATE: 71 BPM | SYSTOLIC BLOOD PRESSURE: 111 MMHG | WEIGHT: 201.63 LBS | DIASTOLIC BLOOD PRESSURE: 69 MMHG | BODY MASS INDEX: 28 KG/M2 | RESPIRATION RATE: 14 BRPM | OXYGEN SATURATION: 100 %

## 2023-02-13 DIAGNOSIS — G47.33 OSA (OBSTRUCTIVE SLEEP APNEA): ICD-10-CM

## 2023-02-13 DIAGNOSIS — I27.20 PULMONARY HYPERTENSION (HCC): ICD-10-CM

## 2023-02-13 DIAGNOSIS — Z95.818 PRESENCE OF WATCHMAN LEFT ATRIAL APPENDAGE CLOSURE DEVICE: ICD-10-CM

## 2023-02-13 DIAGNOSIS — N18.31 STAGE 3A CHRONIC KIDNEY DISEASE (HCC): ICD-10-CM

## 2023-02-13 DIAGNOSIS — I50.32 CHRONIC DIASTOLIC HEART FAILURE (HCC): ICD-10-CM

## 2023-02-13 DIAGNOSIS — J90 PLEURAL EFFUSION, LEFT: ICD-10-CM

## 2023-02-13 DIAGNOSIS — I50.811 ACUTE RIGHT-SIDED HEART FAILURE (HCC): ICD-10-CM

## 2023-02-13 LAB
ANION GAP SERPL CALC-SCNC: 6 MMOL/L (ref 0–18)
BUN BLD-MCNC: 24 MG/DL (ref 7–18)
CALCIUM BLD-MCNC: 8.9 MG/DL (ref 8.5–10.1)
CHLORIDE SERPL-SCNC: 107 MMOL/L (ref 98–112)
CO2 SERPL-SCNC: 32 MMOL/L (ref 21–32)
CREAT BLD-MCNC: 1.12 MG/DL
FASTING STATUS PATIENT QL REPORTED: NO
GFR SERPLBLD BASED ON 1.73 SQ M-ARVRAT: 67 ML/MIN/1.73M2 (ref 60–?)
GLUCOSE BLD-MCNC: 108 MG/DL (ref 70–99)
OSMOLALITY SERPL CALC.SUM OF ELEC: 305 MOSM/KG (ref 275–295)
POTASSIUM SERPL-SCNC: 4 MMOL/L (ref 3.5–5.1)
SODIUM SERPL-SCNC: 145 MMOL/L (ref 136–145)

## 2023-02-13 PROCEDURE — 36415 COLL VENOUS BLD VENIPUNCTURE: CPT | Performed by: NURSE PRACTITIONER

## 2023-02-13 PROCEDURE — 80048 BASIC METABOLIC PNL TOTAL CA: CPT | Performed by: NURSE PRACTITIONER

## 2023-02-13 PROCEDURE — 99215 OFFICE O/P EST HI 40 MIN: CPT | Performed by: NURSE PRACTITIONER

## 2023-02-13 RX ORDER — BUMETANIDE 1 MG/1
3 TABLET ORAL 2 TIMES DAILY
Qty: 165 TABLET | Refills: 1 | COMMUNITY
Start: 2023-02-13

## 2023-02-13 RX ORDER — BUMETANIDE 0.25 MG/ML
4 INJECTION, SOLUTION INTRAMUSCULAR; INTRAVENOUS ONCE
Status: COMPLETED | OUTPATIENT
Start: 2023-02-13 | End: 2023-02-13

## 2023-02-13 RX ORDER — SPIRONOLACTONE 25 MG/1
12.5 TABLET ORAL DAILY
Qty: 15 TABLET | Refills: 2 | Status: SHIPPED | OUTPATIENT
Start: 2023-02-13

## 2023-02-13 RX ADMIN — BUMETANIDE 4 MG: 0.25 INJECTION, SOLUTION INTRAMUSCULAR; INTRAVENOUS at 16:15:00

## 2023-02-13 NOTE — PATIENT INSTRUCTIONS
Heart Failure Discharge Instructions    Hold bumex this afternoon. Starting tomorrow increase bumex to 3 mg twice a day. Start Spironolactone 12.5 mg daily tomorrow, take in AM 30 minutes before you take Bumex. Activity: Regular exercise and activity is important for your overall health and to help keep your heart strong and functioning as well as possible. Walk at a slow to moderate pace for 15-20 minutes 3-5 days per week. Follow these instructions every day to keep yourself in the [address] you are feeling well and your symptoms are under control                                    Medications  Take your medication every day as instructed  Do not stop taking your medicine or change the amount you are taking without instructions from your doctor or nurse  Do Not take non-steroidal antiinflammatory drugs such as ibuprofen, aleve, advil, or motrin                                    Diet/Fluids  People with heart failure should eat less sodium (salt) and limit fluid. Sodium attracts water and makes the body hold fluid. This extra fluid makes the heart work harder and can worsen the symptoms of heart failure. Diet    2000 mg sodium daily  Fluid restriction    64 ounces daily  (8 oz. = 1 cup)                                     Body Weight  Weigh yourself every day before breakfast and write your weight down  Use the same scale and wear about the same amount of clothes each time  A sudden weight increase is due to fluid retention rather than fat                                         Activity  Pace your activities to avoid getting overtired  Take rest periods as needed  Elevate your feet to reduce ankle swelling when resting                             Signs of Worsening Heart Failure    You are entering the Yellow Zone - this is a warning zone    Call your doctor or nurse if you have any of these signs or symptoms:   You gain 2 or more pounds overnight or 3-5 pounds in 3-7 days  You have more trouble breathing  You get more tired with regular activity, or are limiting activity because of shortness of breath or fatigue  You are short of breath lying down, you need more pillows to breathe comfortably,  or wake up during the night short of breath  You urinate less often during the day and more often at night  You have a bloated feeling, upset stomach, loss of appetite, or your clothes are fitting tighter    GO TO THE EMERGENCY DEPARTMENT or CALL 911 IF: These are signs you are in the RED ZONE - THIS IS AN EMERGENCY  You have tightness or pain in your chest  You are extremely short of breath or can't catch your breath  You cough up frothy pink mucous  You feel confused or can't think clearly  You are traveling and develop symptoms of worsening heart failure     We respect everyone's time and availability. Please be aware that this is not a walk-in clinic and we require appointments in order to facilitate timely care for all patients. We ask you to arrive 30 minutes before your appointment to allow time for you to check-in and have your bloodwork drawn. Please understand if you are late for your appointment, you may be asked to reschedule. If possible, all attempts will be made to accommodate but realize this is no guarantee that this will always be available. We understand there are extenuating circumstances. If you need to cancel or reschedule your appointment, please call the [facility] [employer] within 24 hours at [phone]. Thank you for your cooperation, [facility] Staff. IF YOU HAVE QUESTIONS REGARDING YOUR BILL, FEEL FREE TO CONTACT [facility] PATIENT ACCOUNTS -[phone]. IF YOU NEED FINANCIAL ASSISTANCE, PLEASE CALL AN [facility] FINANCIAL COUNSELOR -[phone].              [facility]     [phone]

## 2023-02-13 NOTE — PROGRESS NOTES
Patient assessed. Patient complaining of increased lower extremity edema with left>right. Patient denies abdominal bloating and reports his shortness of breath is at baseline. Weight down 3 lbs at 201.6 lbs. APN notified of all the above information. [facility] Reviewed allergies and list of current medications with patient and updated it in the Electronic Medical Record. IV established per protocol.  mg diuril and IVP 4 mg bumex given. Patient tolerated it well. Educated patient on low sodium diet and food choices, fluid restriction of 2 liters, and daily weights. Reviewed follow-up appointments and discharge Heart Failure instructions with patient. Patient verbalized an understanding. IV discontinued; catheter intact. Pressure held and gauze applied. Patient to return to the clinic in 2 weeks.

## 2023-02-22 ENCOUNTER — HOSPITAL ENCOUNTER (OUTPATIENT)
Dept: CARDIOLOGY CLINIC | Facility: HOSPITAL | Age: 78
Discharge: HOME | End: 2023-02-22
Attending: NURSE PRACTITIONER
Payer: MEDICARE

## 2023-02-22 DIAGNOSIS — I50.812 CHRONIC RIGHT-SIDED CONGESTIVE HEART FAILURE (HCC): ICD-10-CM

## 2023-02-22 PROCEDURE — 93264 REM MNTR WRLS P-ART PRS SNR: CPT | Performed by: NURSE PRACTITIONER

## 2023-02-22 NOTE — PROGRESS NOTES
[facility] [address] [address]. pulmonary artery pressure sensor    Remote Monitoring Report Summary    2023      Priya Davila    : 3/17/1945    Reporting Period-  -2023     Diagnosis - HFpEF    Provider- SHERIDAN Carvajal       The CardioMEMS report for the above date has been reviewed with the following results:      [address] hemodynamics at time of CardioMEMS impant:    PA 42/18/27  Wedge 8    Acceptable range for Priya Davila      PAD range 18-24 mmhg     Remote monitoring documentation for the past ~30 days:       2023 Elevated a couple days ago. Will check today at appointment if he doesn't transmit another reading. 2023 Reaching out to him to move up appointment to this week. PAD still elevated and oral diuretics increased last week. 2023 Will increase Torsemide to get PAD closer to 20 mmhg per [facility]'s last note. Still elevated on increased dose of Entresto. 2023 In range. CTM  2023 Continue to follow, just seen on the  and diuretics increased.       Nina Langston NP   2023  1:14 PM

## 2023-02-24 DIAGNOSIS — I50.32 CHRONIC DIASTOLIC HEART FAILURE (HCC): Primary | ICD-10-CM

## 2023-02-27 ENCOUNTER — HOSPITAL ENCOUNTER (OUTPATIENT)
Dept: CARDIOLOGY CLINIC | Facility: HOSPITAL | Age: 78
End: 2023-02-27
Attending: NURSE PRACTITIONER
Payer: MEDICARE

## 2023-02-27 ENCOUNTER — HOSPITAL ENCOUNTER (OUTPATIENT)
Dept: LAB | Facility: HOSPITAL | Age: 78
Discharge: HOME | End: 2023-02-27
Attending: NURSE PRACTITIONER
Payer: MEDICARE

## 2023-02-27 VITALS
RESPIRATION RATE: 21 BRPM | SYSTOLIC BLOOD PRESSURE: 112 MMHG | HEART RATE: 62 BPM | BODY MASS INDEX: 27 KG/M2 | OXYGEN SATURATION: 99 % | WEIGHT: 196.19 LBS | DIASTOLIC BLOOD PRESSURE: 64 MMHG

## 2023-02-27 DIAGNOSIS — I27.20 PULMONARY HYPERTENSION (HCC): ICD-10-CM

## 2023-02-27 DIAGNOSIS — I48.20 ATRIAL FIBRILLATION, CHRONIC (HCC): ICD-10-CM

## 2023-02-27 DIAGNOSIS — I50.32 CHRONIC DIASTOLIC HEART FAILURE (HCC): ICD-10-CM

## 2023-02-27 DIAGNOSIS — I50.32 CHRONIC DIASTOLIC HEART FAILURE (HCC): Primary | ICD-10-CM

## 2023-02-27 DIAGNOSIS — Z95.818 PRESENCE OF CARDIOMEMS HF SYSTEM: ICD-10-CM

## 2023-02-27 LAB
ANION GAP SERPL CALC-SCNC: 8 MMOL/L (ref 0–18)
BUN BLD-MCNC: 26 MG/DL (ref 7–18)
CALCIUM BLD-MCNC: 9.5 MG/DL (ref 8.5–10.1)
CHLORIDE SERPL-SCNC: 103 MMOL/L (ref 98–112)
CO2 SERPL-SCNC: 32 MMOL/L (ref 21–32)
CREAT BLD-MCNC: 1.29 MG/DL
FASTING STATUS PATIENT QL REPORTED: NO
GFR SERPLBLD BASED ON 1.73 SQ M-ARVRAT: 56 ML/MIN/1.73M2 (ref 60–?)
GLUCOSE BLD-MCNC: 96 MG/DL (ref 70–99)
OSMOLALITY SERPL CALC.SUM OF ELEC: 301 MOSM/KG (ref 275–295)
POTASSIUM SERPL-SCNC: 4.3 MMOL/L (ref 3.5–5.1)
SODIUM SERPL-SCNC: 143 MMOL/L (ref 136–145)

## 2023-02-27 PROCEDURE — 80048 BASIC METABOLIC PNL TOTAL CA: CPT | Performed by: NURSE PRACTITIONER

## 2023-02-27 PROCEDURE — 99215 OFFICE O/P EST HI 40 MIN: CPT | Performed by: NURSE PRACTITIONER

## 2023-02-27 PROCEDURE — 36415 COLL VENOUS BLD VENIPUNCTURE: CPT | Performed by: NURSE PRACTITIONER

## 2023-02-27 NOTE — PATIENT INSTRUCTIONS
Heart Failure Discharge Instructions      Activity: Regular exercise and activity is important for your overall health and to help keep your heart strong and functioning as well as possible. Walk at a slow to moderate pace for 15-20 minutes 3-5 days per week. Follow these instructions every day to keep yourself in the [address] you are feeling well and your symptoms are under control                                    Medications  Take your medication every day as instructed  Do not stop taking your medicine or change the amount you are taking without instructions from your doctor or nurse  Do Not take non-steroidal antiinflammatory drugs such as ibuprofen, aleve, advil, or motrin                                    Diet/Fluids  People with heart failure should eat less sodium (salt) and limit fluid. Sodium attracts water and makes the body hold fluid. This extra fluid makes the heart work harder and can worsen the symptoms of heart failure. Diet    2000 mg sodium daily  Fluid restriction    64 ounces daily  (8 oz. = 1 cup)                                     Body Weight  Weigh yourself every day before breakfast and write your weight down  Use the same scale and wear about the same amount of clothes each time  A sudden weight increase is due to fluid retention rather than fat                                         Activity  Pace your activities to avoid getting overtired  Take rest periods as needed  Elevate your feet to reduce ankle swelling when resting                             Signs of Worsening Heart Failure    You are entering the Yellow Zone - this is a warning zone    Call your doctor or nurse if you have any of these signs or symptoms:   You gain 2 or more pounds overnight or 3-5 pounds in 3-7 days  You have more trouble breathing  You get more tired with regular activity, or are limiting activity because of shortness of breath or fatigue  You are short of breath lying down, you need more pillows to breathe comfortably,  or wake up during the night short of breath  You urinate less often during the day and more often at night  You have a bloated feeling, upset stomach, loss of appetite, or your clothes are fitting tighter    GO TO THE EMERGENCY DEPARTMENT or CALL 911 IF: These are signs you are in the RED ZONE - THIS IS AN EMERGENCY  You have tightness or pain in your chest  You are extremely short of breath or can't catch your breath  You cough up frothy pink mucous  You feel confused or can't think clearly  You are traveling and develop symptoms of worsening heart failure     We respect everyone's time and availability. Please be aware that this is not a walk-in clinic and we require appointments in order to facilitate timely care for all patients. We ask you to arrive 30 minutes before your appointment to allow time for you to check-in and have your bloodwork drawn. Please understand if you are late for your appointment, you may be asked to reschedule. If possible, all attempts will be made to accommodate but realize this is no guarantee that this will always be available. We understand there are extenuating circumstances. If you need to cancel or reschedule your appointment, please call the [facility] [facility] within 24 hours at [phone]. Thank you for your cooperation, [facility] Staff. IF YOU HAVE QUESTIONS REGARDING YOUR BILL, FEEL FREE TO CONTACT [facility] PATIENT ACCOUNTS -[phone]. IF YOU NEED FINANCIAL ASSISTANCE, PLEASE CALL AN [facility] FINANCIAL COUNSELOR -[phone].              [facility] [employer] [facility]     [phone]

## 2023-02-27 NOTE — PROGRESS NOTES
Patient assessed. Patient reports his lower extremity edema has improved since last appointment with IV diuretics. Patient with mild lower extremity edema noted. Patient denies abdominal bloating and shortness of breath. Weight down 5 lbs at 196.2 lbs. APN notified of all the above information. Labs ordered and drawn by [facility]. Reviewed allergies and list of current medications with patient and updated it in the Electronic Medical Record. Educated patient on low sodium diet and food choices, fluid restriction of 2 liters, and daily weights. Reviewed follow-up appointments and discharge Heart Failure instructions with patient. Patient verbalized an understanding. Patient to return to the clinic in 2 weeks.

## 2023-03-01 RX ORDER — BUMETANIDE 1 MG/1
3 TABLET ORAL 2 TIMES DAILY
Qty: 180 TABLET | Refills: 1 | Status: SHIPPED | OUTPATIENT
Start: 2023-03-01

## 2023-03-02 DIAGNOSIS — K22.70 BARRETT'S ESOPHAGUS WITHOUT DYSPLASIA: ICD-10-CM

## 2023-03-02 RX ORDER — OMEPRAZOLE 20 MG/1
CAPSULE, DELAYED RELEASE ORAL
Qty: 180 CAPSULE | Refills: 1 | Status: SHIPPED | OUTPATIENT
Start: 2023-03-02

## 2023-03-06 ENCOUNTER — APPOINTMENT (OUTPATIENT)
Dept: GENERAL RADIOLOGY | Age: 78
End: 2023-03-06
Attending: PHYSICIAN ASSISTANT
Payer: MEDICARE

## 2023-03-06 ENCOUNTER — HOSPITAL ENCOUNTER (OUTPATIENT)
Age: 78
Discharge: HOME | End: 2023-03-06
Payer: MEDICARE

## 2023-03-06 VITALS
OXYGEN SATURATION: 98 % | RESPIRATION RATE: 16 BRPM | SYSTOLIC BLOOD PRESSURE: 114 MMHG | TEMPERATURE: 98 F | HEART RATE: 64 BPM | DIASTOLIC BLOOD PRESSURE: 65 MMHG

## 2023-03-06 DIAGNOSIS — J40 BRONCHITIS: ICD-10-CM

## 2023-03-06 DIAGNOSIS — B34.9 VIRAL ILLNESS: Primary | ICD-10-CM

## 2023-03-06 LAB — SARS-COV-2 RNA RESP QL NAA+PROBE: NOT DETECTED

## 2023-03-06 PROCEDURE — U0002 COVID-19 LAB TEST NON-CDC: HCPCS | Performed by: PHYSICIAN ASSISTANT

## 2023-03-06 PROCEDURE — 71046 X-RAY EXAM CHEST 2 VIEWS: CPT | Performed by: PHYSICIAN ASSISTANT

## 2023-03-06 PROCEDURE — 99213 OFFICE O/P EST LOW 20 MIN: CPT | Performed by: PHYSICIAN ASSISTANT

## 2023-03-06 RX ORDER — PREDNISONE 20 MG/1
40 TABLET ORAL DAILY
Qty: 10 TABLET | Refills: 0 | Status: SHIPPED | OUTPATIENT
Start: 2023-03-06 | End: 2023-03-11

## 2023-03-06 RX ORDER — ALBUTEROL SULFATE 90 UG/1
2 AEROSOL, METERED RESPIRATORY (INHALATION) EVERY 4 HOURS PRN
Qty: 1 EACH | Refills: 0 | Status: SHIPPED | OUTPATIENT
Start: 2023-03-06 | End: 2023-04-05

## 2023-03-06 NOTE — DISCHARGE INSTRUCTIONS
Take steroid burst as directed. You have also been prescribed an albuterol inhaler which you can use every 4 hours as needed. Follow-up with your primary care provider.   Go to ER with any new or worsening symptoms

## 2023-03-13 ENCOUNTER — APPOINTMENT (OUTPATIENT)
Dept: HEMATOLOGY/ONCOLOGY | Facility: HOSPITAL | Age: 78
End: 2023-03-13
Attending: INTERNAL MEDICINE
Payer: MEDICARE

## 2023-03-13 ENCOUNTER — TELEPHONE (OUTPATIENT)
Dept: CARDIOLOGY CLINIC | Facility: HOSPITAL | Age: 78
End: 2023-03-13

## 2023-03-13 DIAGNOSIS — I50.32 CHRONIC DIASTOLIC HEART FAILURE (HCC): Primary | ICD-10-CM

## 2023-03-14 ENCOUNTER — HOSPITAL ENCOUNTER (OUTPATIENT)
Dept: CARDIOLOGY CLINIC | Facility: HOSPITAL | Age: 78
Discharge: HOME | End: 2023-03-14
Attending: NURSE PRACTITIONER
Payer: MEDICARE

## 2023-03-14 ENCOUNTER — HOSPITAL ENCOUNTER (OUTPATIENT)
Dept: LAB | Facility: HOSPITAL | Age: 78
Discharge: HOME | End: 2023-03-14
Attending: NURSE PRACTITIONER
Payer: MEDICARE

## 2023-03-14 VITALS
WEIGHT: 191.81 LBS | HEART RATE: 63 BPM | BODY MASS INDEX: 27 KG/M2 | SYSTOLIC BLOOD PRESSURE: 95 MMHG | RESPIRATION RATE: 26 BRPM | DIASTOLIC BLOOD PRESSURE: 49 MMHG | OXYGEN SATURATION: 96 %

## 2023-03-14 DIAGNOSIS — I50.32 CHRONIC DIASTOLIC HEART FAILURE (HCC): Primary | ICD-10-CM

## 2023-03-14 DIAGNOSIS — I50.32 CHRONIC DIASTOLIC HEART FAILURE (HCC): ICD-10-CM

## 2023-03-14 DIAGNOSIS — I27.20 PULMONARY HYPERTENSION (HCC): ICD-10-CM

## 2023-03-14 DIAGNOSIS — I50.812 CHRONIC RIGHT-SIDED CONGESTIVE HEART FAILURE (HCC): ICD-10-CM

## 2023-03-14 DIAGNOSIS — I48.20 ATRIAL FIBRILLATION, CHRONIC (HCC): ICD-10-CM

## 2023-03-14 DIAGNOSIS — Z95.818 PRESENCE OF CARDIOMEMS HF SYSTEM: ICD-10-CM

## 2023-03-14 LAB
ANION GAP SERPL CALC-SCNC: 7 MMOL/L (ref 0–18)
BUN BLD-MCNC: 46 MG/DL (ref 7–18)
CALCIUM BLD-MCNC: 8.8 MG/DL (ref 8.5–10.1)
CHLORIDE SERPL-SCNC: 101 MMOL/L (ref 98–112)
CO2 SERPL-SCNC: 30 MMOL/L (ref 21–32)
CREAT BLD-MCNC: 1.57 MG/DL
FASTING STATUS PATIENT QL REPORTED: NO
GFR SERPLBLD BASED ON 1.73 SQ M-ARVRAT: 45 ML/MIN/1.73M2 (ref 60–?)
GLUCOSE BLD-MCNC: 87 MG/DL (ref 70–99)
OSMOLALITY SERPL CALC.SUM OF ELEC: 297 MOSM/KG (ref 275–295)
POTASSIUM SERPL-SCNC: 4.9 MMOL/L (ref 3.5–5.1)
SODIUM SERPL-SCNC: 138 MMOL/L (ref 136–145)

## 2023-03-14 PROCEDURE — 99215 OFFICE O/P EST HI 40 MIN: CPT | Performed by: NURSE PRACTITIONER

## 2023-03-14 PROCEDURE — 36415 COLL VENOUS BLD VENIPUNCTURE: CPT | Performed by: NURSE PRACTITIONER

## 2023-03-14 PROCEDURE — 80048 BASIC METABOLIC PNL TOTAL CA: CPT | Performed by: NURSE PRACTITIONER

## 2023-03-14 RX ORDER — BUMETANIDE 1 MG/1
TABLET ORAL
Qty: 180 TABLET | Refills: 1 | COMMUNITY
Start: 2023-03-14

## 2023-03-14 NOTE — PATIENT INSTRUCTIONS
Heart Failure Discharge Instructions      Take Bumex 2mg in the morning and 3mg in the afternoon/evening. Activity: Regular exercise and activity is important for your overall health and to help keep your heart strong and functioning as well as possible. Walk at a slow to moderate pace for 15-20 minutes 3-5 days per week. Follow these instructions every day to keep yourself in the [address] you are feeling well and your symptoms are under control                                    Medications  Take your medication every day as instructed  Do not stop taking your medicine or change the amount you are taking without instructions from your doctor or nurse  Do Not take non-steroidal antiinflammatory drugs such as ibuprofen, aleve, advil, or motrin                                    Diet/Fluids  People with heart failure should eat less sodium (salt) and limit fluid. Sodium attracts water and makes the body hold fluid. This extra fluid makes the heart work harder and can worsen the symptoms of heart failure. Diet    2000 mg sodium daily  Fluid restriction    64 ounces daily  (8 oz. = 1 cup)                                     Body Weight  Weigh yourself every day before breakfast and write your weight down  Use the same scale and wear about the same amount of clothes each time  A sudden weight increase is due to fluid retention rather than fat                                         Activity  Pace your activities to avoid getting overtired  Take rest periods as needed  Elevate your feet to reduce ankle swelling when resting                             Signs of Worsening Heart Failure    You are entering the Yellow Zone - this is a warning zone    Call your doctor or nurse if you have any of these signs or symptoms:   You gain 2 or more pounds overnight or 3-5 pounds in 3-7 days  You have more trouble breathing  You get more tired with regular activity, or are limiting activity because of shortness of breath or fatigue  You are short of breath lying down, you need more pillows to breathe comfortably,  or wake up during the night short of breath  You urinate less often during the day and more often at night  You have a bloated feeling, upset stomach, loss of appetite, or your clothes are fitting tighter    GO TO THE EMERGENCY DEPARTMENT or CALL 911 IF: These are signs you are in the RED ZONE - THIS IS AN EMERGENCY  You have tightness or pain in your chest  You are extremely short of breath or can't catch your breath  You cough up frothy pink mucous  You feel confused or can't think clearly  You are traveling and develop symptoms of worsening heart failure     We respect everyone's time and availability. Please be aware that this is not a walk-in clinic and we require appointments in order to facilitate timely care for all patients. We ask you to arrive 30 minutes before your appointment to allow time for you to check-in and have your bloodwork drawn. Please understand if you are late for your appointment, you may be asked to reschedule. If possible, all attempts will be made to accommodate but realize this is no guarantee that this will always be available. We understand there are extenuating circumstances. If you need to cancel or reschedule your appointment, please call the [facility] [employer] within 24 hours at [phone]. Thank you for your cooperation, [facility] Staff. IF YOU HAVE QUESTIONS REGARDING YOUR BILL, FEEL FREE TO CONTACT [facility] PATIENT ACCOUNTS -[phone]. IF YOU NEED FINANCIAL ASSISTANCE, PLEASE CALL AN [facility] FINANCIAL COUNSELOR -[phone].              [facility] [employer] [facility]     [phone]

## 2023-03-14 NOTE — PROGRESS NOTES
Pt. Assessed. No signs or symptoms of shortness of breath, fatigue, chest pain or edema noted. Weight down 191.8lb lbs. Natty Christine reports being sick for 2 weeks + with URI. Went to urgent care. Starting to feel better. Still with cough - productive at time, thin clear sputum. Patient states he has tested and is covid negative. Reviewed current list of patient's allergies and medication; updated the Electronic Medical Record. Labs ordered to assess kidney function and drawn by [place] [facility]. Reviewed follow-up appointment and Heart Failure discharge instructions with patient. Patient verbalized an understanding.

## 2023-03-20 ENCOUNTER — PATIENT OUTREACH (OUTPATIENT)
Dept: CASE MANAGEMENT | Age: 78
End: 2023-03-20

## 2023-03-21 ENCOUNTER — OFFICE VISIT (OUTPATIENT)
Dept: HEMATOLOGY/ONCOLOGY | Facility: HOSPITAL | Age: 78
End: 2023-03-21
Attending: INTERNAL MEDICINE
Payer: MEDICARE

## 2023-03-21 VITALS
SYSTOLIC BLOOD PRESSURE: 105 MMHG | OXYGEN SATURATION: 98 % | HEIGHT: 70.98 IN | WEIGHT: 192.38 LBS | TEMPERATURE: 97 F | BODY MASS INDEX: 26.93 KG/M2 | DIASTOLIC BLOOD PRESSURE: 66 MMHG | HEART RATE: 77 BPM | RESPIRATION RATE: 18 BRPM

## 2023-03-21 DIAGNOSIS — D69.6 THROMBOCYTOPENIA (HCC): ICD-10-CM

## 2023-03-21 DIAGNOSIS — D69.3 IMMUNE THROMBOCYTOPENIC PURPURA (HCC): Primary | ICD-10-CM

## 2023-03-21 PROCEDURE — 96366 THER/PROPH/DIAG IV INF ADDON: CPT

## 2023-03-21 PROCEDURE — 96365 THER/PROPH/DIAG IV INF INIT: CPT

## 2023-03-21 RX ORDER — DIPHENHYDRAMINE HYDROCHLORIDE 50 MG/ML
25 INJECTION INTRAMUSCULAR; INTRAVENOUS ONCE
OUTPATIENT
Start: 2023-04-11

## 2023-03-21 RX ORDER — METHYLPREDNISOLONE SODIUM SUCCINATE 40 MG/ML
40 INJECTION, POWDER, LYOPHILIZED, FOR SOLUTION INTRAMUSCULAR; INTRAVENOUS ONCE
OUTPATIENT
Start: 2023-04-11

## 2023-03-21 RX ORDER — ACETAMINOPHEN 325 MG/1
650 TABLET ORAL ONCE
OUTPATIENT
Start: 2023-04-11

## 2023-03-21 RX ORDER — METHYLPREDNISOLONE SODIUM SUCCINATE 125 MG/2ML
125 INJECTION, POWDER, LYOPHILIZED, FOR SOLUTION INTRAMUSCULAR; INTRAVENOUS ONCE
OUTPATIENT
Start: 2023-04-11

## 2023-03-21 RX ORDER — ACETAMINOPHEN 325 MG/1
650 TABLET ORAL ONCE
Status: COMPLETED | OUTPATIENT
Start: 2023-03-21 | End: 2023-03-21

## 2023-03-21 RX ORDER — DIPHENHYDRAMINE HCL 25 MG
25 CAPSULE ORAL ONCE
OUTPATIENT
Start: 2023-04-11

## 2023-03-21 RX ORDER — DIPHENHYDRAMINE HCL 25 MG
25 CAPSULE ORAL ONCE
Status: COMPLETED | OUTPATIENT
Start: 2023-03-21 | End: 2023-03-21

## 2023-03-21 RX ORDER — FAMOTIDINE 10 MG/ML
20 INJECTION, SOLUTION INTRAVENOUS ONCE
OUTPATIENT
Start: 2023-04-11

## 2023-03-21 RX ADMIN — ACETAMINOPHEN 650 MG: 325 TABLET ORAL at 11:18:00

## 2023-03-21 RX ADMIN — DIPHENHYDRAMINE HCL 25 MG: 25 MG CAPSULE ORAL at 11:18:00

## 2023-03-21 NOTE — PROGRESS NOTES
Education Record    Learner:  Patient    Disease / Diagnosis: ITP    Barriers / Limitations:  None   Comments:    Method:  Brief focused   Comments:    General Topics:  Medication, Side effects and symptom management and Plan of care reviewed   Comments:    Outcome:  Shows understanding   Comments:    ivig infusion - tolerated without issue. Will return for next infusion in 5 weeks.

## 2023-03-23 ENCOUNTER — PATIENT OUTREACH (OUTPATIENT)
Dept: CASE MANAGEMENT | Age: 78
End: 2023-03-23

## 2023-03-23 DIAGNOSIS — K75.4: ICD-10-CM

## 2023-03-23 DIAGNOSIS — N18.31 STAGE 3A CHRONIC KIDNEY DISEASE (HCC): ICD-10-CM

## 2023-03-23 DIAGNOSIS — I48.19 PERSISTENT ATRIAL FIBRILLATION (HCC): ICD-10-CM

## 2023-03-23 DIAGNOSIS — I43 CARDIOMYOPATHY AS MANIFESTATION OF UNDERLYING DISEASE (HCC): ICD-10-CM

## 2023-03-23 DIAGNOSIS — G47.33 OSA (OBSTRUCTIVE SLEEP APNEA): ICD-10-CM

## 2023-03-23 DIAGNOSIS — M35.1 MCTD (MIXED CONNECTIVE TISSUE DISEASE) (HCC): ICD-10-CM

## 2023-03-23 DIAGNOSIS — C44.219 BASAL CELL CARCINOMA (BCC) OF SKIN OF LEFT EAR: ICD-10-CM

## 2023-03-23 DIAGNOSIS — Z87.19 HISTORY OF PANCREATITIS: ICD-10-CM

## 2023-03-23 DIAGNOSIS — K22.70 BARRETT'S ESOPHAGUS WITHOUT DYSPLASIA: ICD-10-CM

## 2023-03-23 DIAGNOSIS — M10.9 GOUT, UNSPECIFIED CAUSE, UNSPECIFIED CHRONICITY, UNSPECIFIED SITE: ICD-10-CM

## 2023-03-23 DIAGNOSIS — I10 BENIGN ESSENTIAL HYPERTENSION: ICD-10-CM

## 2023-03-28 DIAGNOSIS — I50.32 CHRONIC DIASTOLIC HEART FAILURE (HCC): Primary | ICD-10-CM

## 2023-03-29 ENCOUNTER — HOSPITAL ENCOUNTER (OUTPATIENT)
Dept: CARDIOLOGY CLINIC | Facility: HOSPITAL | Age: 78
Discharge: HOME | End: 2023-03-29
Attending: NURSE PRACTITIONER
Payer: MEDICARE

## 2023-03-29 DIAGNOSIS — I50.32 CHRONIC HEART FAILURE WITH PRESERVED EJECTION FRACTION (HFPEF) (HCC): Primary | ICD-10-CM

## 2023-03-29 PROCEDURE — 93264 REM MNTR WRLS P-ART PRS SNR: CPT | Performed by: NURSE PRACTITIONER

## 2023-03-29 NOTE — PROGRESS NOTES
[facility] [address] pulmonary artery pressure sensor    Remote Monitoring Report Summary    3/29/2023    Leoncio Davila    : 3/17/1945    Reporting Period- -3/27/2023    Diagnosis - HFpEF    Provider- SHERIDAN Stanley       The CardioMEMS report for the above date has been reviewed with the following results:        [facility] hemodynamics at time of CardioMEMS impant:    PA 42/  Wedge 8    Acceptable range for Leoncio Davila      PAD range 18-24 mmhg     Remote monitoring documentation for the past ~30 days:    2023 Stable. CTM  2023 Stable. CTM  2023 PAD elevated. To address at clinic follow up tomorrow. 2023 Stable. 2023 PAD down to 21mmhg and improved on sat. CTM.  2023 last reading improved but not done last couple days. Will follow and send a reminder if he doesn't transmit readings. 2023 Elevated. will address at [address] today.

## 2023-03-31 ENCOUNTER — HOSPITAL ENCOUNTER (OUTPATIENT)
Dept: CARDIOLOGY CLINIC | Facility: HOSPITAL | Age: 78
Discharge: HOME | End: 2023-03-31
Attending: NURSE PRACTITIONER
Payer: MEDICARE

## 2023-03-31 ENCOUNTER — HOSPITAL ENCOUNTER (OUTPATIENT)
Dept: LAB | Facility: HOSPITAL | Age: 78
Discharge: HOME | End: 2023-03-31
Attending: NURSE PRACTITIONER
Payer: MEDICARE

## 2023-03-31 VITALS
OXYGEN SATURATION: 100 % | HEART RATE: 66 BPM | DIASTOLIC BLOOD PRESSURE: 52 MMHG | SYSTOLIC BLOOD PRESSURE: 89 MMHG | WEIGHT: 192 LBS | RESPIRATION RATE: 18 BRPM | BODY MASS INDEX: 27 KG/M2

## 2023-03-31 DIAGNOSIS — I50.32 CHRONIC DIASTOLIC HEART FAILURE (HCC): ICD-10-CM

## 2023-03-31 DIAGNOSIS — E87.6 HYPOPOTASSEMIA: ICD-10-CM

## 2023-03-31 DIAGNOSIS — I10 BENIGN ESSENTIAL HYPERTENSION: ICD-10-CM

## 2023-03-31 DIAGNOSIS — I50.812 CHRONIC RIGHT-SIDED CONGESTIVE HEART FAILURE (HCC): ICD-10-CM

## 2023-03-31 DIAGNOSIS — N18.31 STAGE 3A CHRONIC KIDNEY DISEASE (HCC): ICD-10-CM

## 2023-03-31 DIAGNOSIS — J90 PLEURAL EFFUSION, LEFT: ICD-10-CM

## 2023-03-31 DIAGNOSIS — I27.20 PULMONARY HYPERTENSION (HCC): ICD-10-CM

## 2023-03-31 DIAGNOSIS — E87.1 HYPONATREMIA: ICD-10-CM

## 2023-03-31 LAB
ANION GAP SERPL CALC-SCNC: 5 MMOL/L (ref 0–18)
BUN BLD-MCNC: 44 MG/DL (ref 7–18)
CALCIUM BLD-MCNC: 9.2 MG/DL (ref 8.5–10.1)
CHLORIDE SERPL-SCNC: 105 MMOL/L (ref 98–112)
CO2 SERPL-SCNC: 30 MMOL/L (ref 21–32)
CREAT BLD-MCNC: 1.59 MG/DL
FASTING STATUS PATIENT QL REPORTED: NO
GFR SERPLBLD BASED ON 1.73 SQ M-ARVRAT: 44 ML/MIN/1.73M2 (ref 60–?)
GLUCOSE BLD-MCNC: 94 MG/DL (ref 70–99)
OSMOLALITY SERPL CALC.SUM OF ELEC: 301 MOSM/KG (ref 275–295)
POTASSIUM SERPL-SCNC: 4.5 MMOL/L (ref 3.5–5.1)
SODIUM SERPL-SCNC: 140 MMOL/L (ref 136–145)

## 2023-03-31 PROCEDURE — 36415 COLL VENOUS BLD VENIPUNCTURE: CPT | Performed by: NURSE PRACTITIONER

## 2023-03-31 PROCEDURE — 80048 BASIC METABOLIC PNL TOTAL CA: CPT | Performed by: NURSE PRACTITIONER

## 2023-03-31 PROCEDURE — 99214 OFFICE O/P EST MOD 30 MIN: CPT | Performed by: NURSE PRACTITIONER

## 2023-03-31 NOTE — PROGRESS NOTES
RN [facility]. RN assessed the patient. Patient denies symptoms of shortness of breath, fatigue, chest pain, and edema. The patient states that he feels fine outside of some residual post nasal drip from a cold he had last month. The patient started taking regular Allegra for seasonal allergies daily. The patient's weight is up 1 lb at 192 lbs. RN reviewed the patient's allergies and medications and updated them in the Electronic Medical Record. RN reviewed the patient's lab results. RN reviewed the patient's follow-up appointment and heart failure discharge instructions. The patient verbalized an understanding. No changes to medications at this time. RN made appointment follow up for three weeks. RN spent 16-30 minutes in direct care of this patient with acute Heart Failure.

## 2023-03-31 NOTE — ADDENDUM NOTE
Encounter addended by: Veronika Alston RN on: 3/31/2023 11:59 AM   Actions taken: Flowsheet accepted

## 2023-03-31 NOTE — PATIENT INSTRUCTIONS
Heart Failure Discharge Instructions    No changes to medications at this time. Activity: Regular exercise and activity is important for your overall health and to help keep your heart strong and functioning as well as possible. Walk at a slow to moderate pace for 15-20 minutes 3-5 days per week. Follow these instructions every day to keep yourself in the [address] you are feeling well and your symptoms are under control                                    Medications  Take your medication every day as instructed  Do not stop taking your medicine or change the amount you are taking without instructions from your doctor or nurse  Do Not take non-steroidal antiinflammatory drugs such as ibuprofen, aleve, advil, or motrin                                    Diet/Fluids  People with heart failure should eat less sodium (salt) and limit fluid. Sodium attracts water and makes the body hold fluid. This extra fluid makes the heart work harder and can worsen the symptoms of heart failure. Diet    2000 mg sodium daily  Fluid restriction    64 ounces daily  (8 oz. = 1 cup)                                     Body Weight  Weigh yourself every day before breakfast and write your weight down  Use the same scale and wear about the same amount of clothes each time  A sudden weight increase is due to fluid retention rather than fat                                         Activity  Pace your activities to avoid getting overtired  Take rest periods as needed  Elevate your feet to reduce ankle swelling when resting                             Signs of Worsening Heart Failure    You are entering the Yellow Zone - this is a warning zone    Call your doctor or nurse if you have any of these signs or symptoms:   You gain 2 or more pounds overnight or 3-5 pounds in 3-7 days  You have more trouble breathing  You get more tired with regular activity, or are limiting activity because of shortness of breath or fatigue  You are short of breath lying down, you need more pillows to breathe comfortably,  or wake up during the night short of breath  You urinate less often during the day and more often at night  You have a bloated feeling, upset stomach, loss of appetite, or your clothes are fitting tighter    GO TO THE EMERGENCY DEPARTMENT or CALL 911 IF: These are signs you are in the RED ZONE - THIS IS AN EMERGENCY  You have tightness or pain in your chest  You are extremely short of breath or can't catch your breath  You cough up frothy pink mucous  You feel confused or can't think clearly  You are traveling and develop symptoms of worsening heart failure     We respect everyone's time and availability. Please be aware that this is not a walk-in clinic and we require appointments in order to facilitate timely care for all patients. We ask you to arrive 30 minutes before your appointment to allow time for you to check-in and have your bloodwork drawn. Please understand if you are late for your appointment, you may be asked to reschedule. If possible, all attempts will be made to accommodate but realize this is no guarantee that this will always be available. We understand there are extenuating circumstances. If you need to cancel or reschedule your appointment, please call the [facility] [employer] within 24 hours at [phone]. Thank you for your cooperation, [facility] Staff. IF YOU HAVE QUESTIONS REGARDING YOUR BILL, FEEL FREE TO CONTACT [facility] PATIENT ACCOUNTS -[phone]. IF YOU NEED FINANCIAL ASSISTANCE, PLEASE CALL AN [facility] FINANCIAL COUNSELOR -[phone].              [facility] [employer] [facility]     [phone]

## 2023-04-19 DIAGNOSIS — I50.32 CHRONIC DIASTOLIC HEART FAILURE (HCC): Primary | ICD-10-CM

## 2023-04-21 ENCOUNTER — HOSPITAL ENCOUNTER (OUTPATIENT)
Dept: LAB | Facility: HOSPITAL | Age: 78
Discharge: HOME | End: 2023-04-21
Attending: NURSE PRACTITIONER
Payer: MEDICARE

## 2023-04-21 ENCOUNTER — HOSPITAL ENCOUNTER (OUTPATIENT)
Dept: CARDIOLOGY CLINIC | Facility: HOSPITAL | Age: 78
Discharge: HOME | End: 2023-04-21
Attending: NURSE PRACTITIONER
Payer: MEDICARE

## 2023-04-21 VITALS
RESPIRATION RATE: 18 BRPM | BODY MASS INDEX: 27 KG/M2 | WEIGHT: 192.81 LBS | HEART RATE: 66 BPM | OXYGEN SATURATION: 100 % | DIASTOLIC BLOOD PRESSURE: 62 MMHG | SYSTOLIC BLOOD PRESSURE: 108 MMHG

## 2023-04-21 DIAGNOSIS — N18.31 STAGE 3A CHRONIC KIDNEY DISEASE (HCC): ICD-10-CM

## 2023-04-21 DIAGNOSIS — I50.812 CHRONIC RIGHT-SIDED CONGESTIVE HEART FAILURE (HCC): ICD-10-CM

## 2023-04-21 DIAGNOSIS — I27.20 PULMONARY HYPERTENSION (HCC): ICD-10-CM

## 2023-04-21 DIAGNOSIS — I50.32 CHRONIC DIASTOLIC HEART FAILURE (HCC): ICD-10-CM

## 2023-04-21 DIAGNOSIS — I10 BENIGN ESSENTIAL HYPERTENSION: ICD-10-CM

## 2023-04-21 LAB
ANION GAP SERPL CALC-SCNC: 5 MMOL/L (ref 0–18)
BUN BLD-MCNC: 49 MG/DL (ref 7–18)
CALCIUM BLD-MCNC: 9.2 MG/DL (ref 8.5–10.1)
CHLORIDE SERPL-SCNC: 104 MMOL/L (ref 98–112)
CO2 SERPL-SCNC: 31 MMOL/L (ref 21–32)
CREAT BLD-MCNC: 1.81 MG/DL
FASTING STATUS PATIENT QL REPORTED: NO
GFR SERPLBLD BASED ON 1.73 SQ M-ARVRAT: 37 ML/MIN/1.73M2 (ref 60–?)
GLUCOSE BLD-MCNC: 93 MG/DL (ref 70–99)
NT-PROBNP SERPL-MCNC: 2275 PG/ML (ref ?–450)
OSMOLALITY SERPL CALC.SUM OF ELEC: 303 MOSM/KG (ref 275–295)
POTASSIUM SERPL-SCNC: 4.9 MMOL/L (ref 3.5–5.1)
SODIUM SERPL-SCNC: 140 MMOL/L (ref 136–145)

## 2023-04-21 PROCEDURE — 36415 COLL VENOUS BLD VENIPUNCTURE: CPT | Performed by: NURSE PRACTITIONER

## 2023-04-21 PROCEDURE — 99215 OFFICE O/P EST HI 40 MIN: CPT | Performed by: NURSE PRACTITIONER

## 2023-04-21 PROCEDURE — 83880 ASSAY OF NATRIURETIC PEPTIDE: CPT | Performed by: NURSE PRACTITIONER

## 2023-04-21 PROCEDURE — 80048 BASIC METABOLIC PNL TOTAL CA: CPT | Performed by: NURSE PRACTITIONER

## 2023-04-21 RX ORDER — SPIRONOLACTONE 25 MG/1
25 TABLET ORAL DAILY
COMMUNITY

## 2023-04-21 RX ORDER — SPIRONOLACTONE 25 MG/1
12.5 TABLET ORAL DAILY
COMMUNITY

## 2023-04-21 NOTE — PROGRESS NOTES
Pt. Assessed. No signs or symptoms of shortness of breath, fatigue, chest pain or edema noted. Weight stable at 192.8 lbs. Reviewed current list of patient's allergies and medication; updated the Electronic Medical Record. Labs ordered to assess kidney function and drawn by [facility]. Reviewed follow-up appointment and Heart Failure discharge instructions with patient. Patient verbalized an understanding. Patient feels good overall without any complaints. [address] in three weeks.

## 2023-04-21 NOTE — PATIENT INSTRUCTIONS
Heart Failure Discharge Instructions      Activity: Regular exercise and activity is important for your overall health and to help keep your heart strong and functioning as well as possible. Walk at a slow to moderate pace for 15-20 minutes 3-5 days per week. Follow these instructions every day to keep yourself in the [address] you are feeling well and your symptoms are under control                                    Medications  Take your medication every day as instructed  Do not stop taking your medicine or change the amount you are taking without instructions from your doctor or nurse  Do Not take non-steroidal antiinflammatory drugs such as ibuprofen, aleve, advil, or motrin                                    Diet/Fluids  People with heart failure should eat less sodium (salt) and limit fluid. Sodium attracts water and makes the body hold fluid. This extra fluid makes the heart work harder and can worsen the symptoms of heart failure. Diet    2000 mg sodium daily  Fluid restriction    64 ounces daily  (8 oz. = 1 cup)                                     Body Weight  Weigh yourself every day before breakfast and write your weight down  Use the same scale and wear about the same amount of clothes each time  A sudden weight increase is due to fluid retention rather than fat                                         Activity  Pace your activities to avoid getting overtired  Take rest periods as needed  Elevate your feet to reduce ankle swelling when resting                             Signs of Worsening Heart Failure    You are entering the Yellow Zone - this is a warning zone    Call your doctor or nurse if you have any of these signs or symptoms:   You gain 2 or more pounds overnight or 3-5 pounds in 3-7 days  You have more trouble breathing  You get more tired with regular activity, or are limiting activity because of shortness of breath or fatigue  You are short of breath lying down, you need more pillows to breathe comfortably,  or wake up during the night short of breath  You urinate less often during the day and more often at night  You have a bloated feeling, upset stomach, loss of appetite, or your clothes are fitting tighter    GO TO THE EMERGENCY DEPARTMENT or CALL 911 IF: These are signs you are in the RED ZONE - THIS IS AN EMERGENCY  You have tightness or pain in your chest  You are extremely short of breath or can't catch your breath  You cough up frothy pink mucous  You feel confused or can't think clearly  You are traveling and develop symptoms of worsening heart failure     We respect everyone's time and availability. Please be aware that this is not a walk-in clinic and we require appointments in order to facilitate timely care for all patients. We ask you to arrive 30 minutes before your appointment to allow time for you to check-in and have your bloodwork drawn. Please understand if you are late for your appointment, you may be asked to reschedule. If possible, all attempts will be made to accommodate but realize this is no guarantee that this will always be available. We understand there are extenuating circumstances. If you need to cancel or reschedule your appointment, please call the [facility] within 24 hours at [phone]. Thank you for your cooperation, [facility] Staff. IF YOU HAVE QUESTIONS REGARDING YOUR BILL, FEEL FREE TO CONTACT [facility] PATIENT ACCOUNTS -[phone]. IF YOU NEED FINANCIAL ASSISTANCE, PLEASE CALL AN [facility] FINANCIAL COUNSELOR -[phone].              [facility] [employer] [facility]     [phone]

## 2023-04-24 ENCOUNTER — PATIENT OUTREACH (OUTPATIENT)
Dept: CASE MANAGEMENT | Age: 78
End: 2023-04-24

## 2023-04-24 DIAGNOSIS — Z87.19 HISTORY OF PANCREATITIS: ICD-10-CM

## 2023-04-24 DIAGNOSIS — C44.219 BASAL CELL CARCINOMA (BCC) OF SKIN OF LEFT EAR: ICD-10-CM

## 2023-04-24 DIAGNOSIS — I77.810 AORTIC ROOT DILATION (HCC): ICD-10-CM

## 2023-04-24 DIAGNOSIS — I10 BENIGN ESSENTIAL HYPERTENSION: ICD-10-CM

## 2023-04-24 DIAGNOSIS — G47.33 OSA (OBSTRUCTIVE SLEEP APNEA): ICD-10-CM

## 2023-04-24 DIAGNOSIS — M35.1 MCTD (MIXED CONNECTIVE TISSUE DISEASE) (HCC): ICD-10-CM

## 2023-04-24 DIAGNOSIS — M33.20 POLYMYOSITIS (HCC): Chronic | ICD-10-CM

## 2023-04-24 DIAGNOSIS — M79.671 PAIN IN BOTH FEET: ICD-10-CM

## 2023-04-24 DIAGNOSIS — I48.19 PERSISTENT ATRIAL FIBRILLATION (HCC): ICD-10-CM

## 2023-04-24 DIAGNOSIS — K75.4: ICD-10-CM

## 2023-04-24 DIAGNOSIS — M79.672 PAIN IN BOTH FEET: ICD-10-CM

## 2023-04-24 DIAGNOSIS — N18.31 STAGE 3A CHRONIC KIDNEY DISEASE (HCC): ICD-10-CM

## 2023-04-24 DIAGNOSIS — I43 CARDIOMYOPATHY AS MANIFESTATION OF UNDERLYING DISEASE (HCC): ICD-10-CM

## 2023-04-24 DIAGNOSIS — I27.20 PULMONARY HYPERTENSION (HCC): ICD-10-CM

## 2023-04-24 DIAGNOSIS — K22.70 BARRETT'S ESOPHAGUS WITHOUT DYSPLASIA: ICD-10-CM

## 2023-04-24 DIAGNOSIS — M10.9 GOUT, UNSPECIFIED CAUSE, UNSPECIFIED CHRONICITY, UNSPECIFIED SITE: ICD-10-CM

## 2023-04-24 DIAGNOSIS — I50.812 CHRONIC RIGHT-SIDED CONGESTIVE HEART FAILURE (HCC): ICD-10-CM

## 2023-04-24 RX ORDER — BUMETANIDE 1 MG/1
TABLET ORAL
Qty: 150 TABLET | Refills: 2 | Status: SHIPPED | OUTPATIENT
Start: 2023-04-24

## 2023-04-25 ENCOUNTER — OFFICE VISIT (OUTPATIENT)
Dept: HEMATOLOGY/ONCOLOGY | Facility: HOSPITAL | Age: 78
End: 2023-04-25
Attending: INTERNAL MEDICINE
Payer: MEDICARE

## 2023-04-25 VITALS
OXYGEN SATURATION: 97 % | DIASTOLIC BLOOD PRESSURE: 64 MMHG | TEMPERATURE: 97 F | SYSTOLIC BLOOD PRESSURE: 105 MMHG | HEIGHT: 70.98 IN | RESPIRATION RATE: 18 BRPM | HEART RATE: 78 BPM | WEIGHT: 193.19 LBS | BODY MASS INDEX: 27.05 KG/M2

## 2023-04-25 DIAGNOSIS — D69.6 THROMBOCYTOPENIA (HCC): ICD-10-CM

## 2023-04-25 DIAGNOSIS — D69.3 IMMUNE THROMBOCYTOPENIC PURPURA (HCC): Primary | ICD-10-CM

## 2023-04-25 PROCEDURE — 96365 THER/PROPH/DIAG IV INF INIT: CPT

## 2023-04-25 PROCEDURE — 96366 THER/PROPH/DIAG IV INF ADDON: CPT

## 2023-04-25 RX ORDER — METHYLPREDNISOLONE SODIUM SUCCINATE 40 MG/ML
40 INJECTION, POWDER, LYOPHILIZED, FOR SOLUTION INTRAMUSCULAR; INTRAVENOUS ONCE
OUTPATIENT
Start: 2023-05-30

## 2023-04-25 RX ORDER — DIPHENHYDRAMINE HCL 25 MG
25 CAPSULE ORAL ONCE
Status: COMPLETED | OUTPATIENT
Start: 2023-04-25 | End: 2023-04-25

## 2023-04-25 RX ORDER — METHYLPREDNISOLONE SODIUM SUCCINATE 125 MG/2ML
125 INJECTION, POWDER, LYOPHILIZED, FOR SOLUTION INTRAMUSCULAR; INTRAVENOUS ONCE
OUTPATIENT
Start: 2023-05-30

## 2023-04-25 RX ORDER — DIPHENHYDRAMINE HYDROCHLORIDE 50 MG/ML
25 INJECTION INTRAMUSCULAR; INTRAVENOUS ONCE
OUTPATIENT
Start: 2023-05-30

## 2023-04-25 RX ORDER — FAMOTIDINE 10 MG/ML
20 INJECTION, SOLUTION INTRAVENOUS ONCE
OUTPATIENT
Start: 2023-05-30

## 2023-04-25 RX ORDER — ACETAMINOPHEN 325 MG/1
650 TABLET ORAL ONCE
Status: COMPLETED | OUTPATIENT
Start: 2023-04-25 | End: 2023-04-25

## 2023-04-25 RX ORDER — ACETAMINOPHEN 325 MG/1
650 TABLET ORAL ONCE
OUTPATIENT
Start: 2023-05-30

## 2023-04-25 RX ORDER — DIPHENHYDRAMINE HCL 25 MG
25 CAPSULE ORAL ONCE
OUTPATIENT
Start: 2023-05-30

## 2023-04-25 RX ADMIN — DIPHENHYDRAMINE HCL 25 MG: 25 MG CAPSULE ORAL at 11:09:00

## 2023-04-25 RX ADMIN — ACETAMINOPHEN 650 MG: 325 TABLET ORAL at 11:09:00

## 2023-04-25 NOTE — PROGRESS NOTES
Education Record    Learner:  Patient    Disease / Diagnosis: IVIG infusion     Barriers / Limitations:  None   Comments:    Method:  Brief focused   Comments:    General Topics:  Plan of care reviewed   Comments:    Outcome: shows understanding   Comments:

## 2023-04-27 RX ORDER — PREDNISONE 1 MG/1
2 TABLET ORAL
Qty: 180 TABLET | Refills: 3 | Status: SHIPPED | OUTPATIENT
Start: 2023-04-27

## 2023-04-27 NOTE — TELEPHONE ENCOUNTER
Future Appointments   Date Time Provider Chrissie Aden   3/7/4129 06:42 PM Loraine Browning MD EMGRHEUMHBSN EMG [facility]   5/5/2023 11:30 AM Trevor Swan MD EMG 3 EMG [facility]   5/12/2023  2:00 PM HEART FAILURE APN 1 [id number] [facility] CLIN [facility] AT [facility]   5/30/2023 11:00 AM [address] [facility]  2/1/2023    Last refill 8/18/2022  180 tabs, 3 refills

## 2023-05-01 ENCOUNTER — HOSPITAL ENCOUNTER (OUTPATIENT)
Dept: CARDIOLOGY CLINIC | Facility: HOSPITAL | Age: 78
End: 2023-05-01
Attending: NURSE PRACTITIONER
Payer: MEDICARE

## 2023-05-01 ENCOUNTER — OFFICE VISIT (OUTPATIENT)
Dept: RHEUMATOLOGY | Facility: CLINIC | Age: 78
End: 2023-05-01
Payer: MEDICARE

## 2023-05-01 VITALS
HEART RATE: 75 BPM | DIASTOLIC BLOOD PRESSURE: 64 MMHG | WEIGHT: 193.81 LBS | RESPIRATION RATE: 14 BRPM | BODY MASS INDEX: 27.13 KG/M2 | HEIGHT: 70.98 IN | OXYGEN SATURATION: 98 % | SYSTOLIC BLOOD PRESSURE: 108 MMHG

## 2023-05-01 DIAGNOSIS — M33.20 POLYMYOSITIS (HCC): Primary | Chronic | ICD-10-CM

## 2023-05-01 DIAGNOSIS — M10.9 GOUT, UNSPECIFIED CAUSE, UNSPECIFIED CHRONICITY, UNSPECIFIED SITE: ICD-10-CM

## 2023-05-01 DIAGNOSIS — I50.812 CHRONIC RIGHT-SIDED CONGESTIVE HEART FAILURE (HCC): ICD-10-CM

## 2023-05-01 DIAGNOSIS — I43 CARDIOMYOPATHY AS MANIFESTATION OF UNDERLYING DISEASE (HCC): ICD-10-CM

## 2023-05-01 DIAGNOSIS — D69.3 IMMUNE THROMBOCYTOPENIC PURPURA (HCC): ICD-10-CM

## 2023-05-01 DIAGNOSIS — M35.1 MCTD (MIXED CONNECTIVE TISSUE DISEASE) (HCC): ICD-10-CM

## 2023-05-01 DIAGNOSIS — N18.31 STAGE 3A CHRONIC KIDNEY DISEASE (HCC): ICD-10-CM

## 2023-05-01 PROBLEM — E87.1 HYPONATREMIA: Status: RESOLVED | Noted: 2022-09-20 | Resolved: 2023-05-01

## 2023-05-01 PROBLEM — M79.672 PAIN IN BOTH FEET: Status: RESOLVED | Noted: 2021-03-04 | Resolved: 2023-05-01

## 2023-05-01 PROBLEM — M79.671 PAIN IN BOTH FEET: Status: RESOLVED | Noted: 2021-03-04 | Resolved: 2023-05-01

## 2023-05-01 PROCEDURE — 93264 REM MNTR WRLS P-ART PRS SNR: CPT | Performed by: NURSE PRACTITIONER

## 2023-05-01 PROCEDURE — 99214 OFFICE O/P EST MOD 30 MIN: CPT | Performed by: INTERNAL MEDICINE

## 2023-05-01 NOTE — PROGRESS NOTES
[facility] [address] pulmonary artery pressure sensor    Remote Monitoring Report Summary    2023      Iggy Davila    : 3/17/1945    Reporting Period-3/     Diagnosis - HFpEF    Provider- SHERIDAN Lemons       The CardioMEMS report for the above date has been reviewed with the following results:        [facility] hemodynamics at time of CardioMEMS impant:    PA 42/18/27  Wedge 8    Acceptable range for Iggy Davila      PAD range 18-24 mmhg     Remote monitoring documentation for the past ~30 days:      2023 Stable. CTM  2023 PAD 25mmhg on Sat, will send reading reminder. CTM.  04- PAD improved to 23 mmhg. CTM  2023 PAD 25-26mmhg most recently, will call him.         Olga Gonzalez NP   2023  3:53 PM

## 2023-05-01 NOTE — PATIENT INSTRUCTIONS
Continue heart meds, continue Prednisone 7 mg per a day. IVIG every 5 weeks. Pace your activities. Return to office 3 months.

## 2023-05-04 NOTE — ASSESSMENT & PLAN NOTE
Cholesterol shows Good control. Stable, continue present management   Cholesterol: 142, done on 1/2/2023. HDL Cholesterol: 32, done on 1/2/2023. TriGlycerides 104, done on 1/2/2023. LDL Cholesterol: 91, done on 1/2/2023. No current Cholesterol medication.

## 2023-05-04 NOTE — ASSESSMENT & PLAN NOTE
BP shows good control with last BP of 108/64. Continue lifestyle changes, diet, exercise and weight loss. Last K was 4.9 done on 4/21/2023. Last Cr was 1.81 done on 4/21/2023. Hypertension meds include bumetanide 1 MG Oral Tab [[id number]], spironolactone 25 MG Oral Tab [[id number]], spironolactone 25 MG Oral Tab [[id number]].      stable, continue present management

## 2023-05-05 ENCOUNTER — OFFICE VISIT (OUTPATIENT)
Dept: FAMILY MEDICINE CLINIC | Facility: CLINIC | Age: 78
End: 2023-05-05
Payer: MEDICARE

## 2023-05-05 VITALS
BODY MASS INDEX: 27.43 KG/M2 | WEIGHT: 191.63 LBS | SYSTOLIC BLOOD PRESSURE: 100 MMHG | DIASTOLIC BLOOD PRESSURE: 60 MMHG | HEIGHT: 70 IN | RESPIRATION RATE: 16 BRPM | HEART RATE: 84 BPM

## 2023-05-05 DIAGNOSIS — E78.6 LOW HDL (UNDER 40): ICD-10-CM

## 2023-05-05 DIAGNOSIS — I27.20 PULMONARY HYPERTENSION (HCC): Primary | ICD-10-CM

## 2023-05-05 DIAGNOSIS — I10 BENIGN ESSENTIAL HYPERTENSION: ICD-10-CM

## 2023-05-05 PROCEDURE — 99214 OFFICE O/P EST MOD 30 MIN: CPT | Performed by: FAMILY MEDICINE

## 2023-05-11 DIAGNOSIS — I50.32 CHRONIC DIASTOLIC HEART FAILURE (HCC): Primary | ICD-10-CM

## 2023-05-12 ENCOUNTER — HOSPITAL ENCOUNTER (OUTPATIENT)
Dept: CARDIOLOGY CLINIC | Facility: HOSPITAL | Age: 78
Discharge: HOME | End: 2023-05-12
Attending: NURSE PRACTITIONER
Payer: MEDICARE

## 2023-05-12 ENCOUNTER — HOSPITAL ENCOUNTER (OUTPATIENT)
Dept: LAB | Facility: HOSPITAL | Age: 78
Discharge: HOME | End: 2023-05-12
Attending: NURSE PRACTITIONER
Payer: MEDICARE

## 2023-05-12 VITALS
BODY MASS INDEX: 28 KG/M2 | RESPIRATION RATE: 24 BRPM | DIASTOLIC BLOOD PRESSURE: 57 MMHG | WEIGHT: 193.19 LBS | HEART RATE: 76 BPM | SYSTOLIC BLOOD PRESSURE: 95 MMHG | OXYGEN SATURATION: 98 %

## 2023-05-12 DIAGNOSIS — I50.812 CHRONIC RIGHT-SIDED CONGESTIVE HEART FAILURE (HCC): ICD-10-CM

## 2023-05-12 DIAGNOSIS — I50.32 CHRONIC DIASTOLIC HEART FAILURE (HCC): ICD-10-CM

## 2023-05-12 DIAGNOSIS — N18.31 STAGE 3A CHRONIC KIDNEY DISEASE (HCC): ICD-10-CM

## 2023-05-12 DIAGNOSIS — I27.20 PULMONARY HYPERTENSION (HCC): ICD-10-CM

## 2023-05-12 LAB
ANION GAP SERPL CALC-SCNC: 2 MMOL/L (ref 0–18)
BUN BLD-MCNC: 50 MG/DL (ref 7–18)
CALCIUM BLD-MCNC: 9.3 MG/DL (ref 8.5–10.1)
CHLORIDE SERPL-SCNC: 103 MMOL/L (ref 98–112)
CO2 SERPL-SCNC: 30 MMOL/L (ref 21–32)
CREAT BLD-MCNC: 1.65 MG/DL
ERYTHROCYTE [DISTWIDTH] IN BLOOD BY AUTOMATED COUNT: 18.4 %
FASTING STATUS PATIENT QL REPORTED: NO
GFR SERPLBLD BASED ON 1.73 SQ M-ARVRAT: 42 ML/MIN/1.73M2 (ref 60–?)
GLUCOSE BLD-MCNC: 105 MG/DL (ref 70–99)
HCT VFR BLD AUTO: 41.9 %
HGB BLD-MCNC: 13.3 G/DL
MCH RBC QN AUTO: 34.5 PG (ref 26–34)
MCHC RBC AUTO-ENTMCNC: 31.7 G/DL (ref 31–37)
MCV RBC AUTO: 108.5 FL
OSMOLALITY SERPL CALC.SUM OF ELEC: 294 MOSM/KG (ref 275–295)
PLATELET # BLD AUTO: 114 10(3)UL (ref 150–450)
POTASSIUM SERPL-SCNC: 5.4 MMOL/L (ref 3.5–5.1)
RBC # BLD AUTO: 3.86 X10(6)UL
SODIUM SERPL-SCNC: 135 MMOL/L (ref 136–145)
WBC # BLD AUTO: 8.1 X10(3) UL (ref 4–11)

## 2023-05-12 PROCEDURE — 99215 OFFICE O/P EST HI 40 MIN: CPT | Performed by: NURSE PRACTITIONER

## 2023-05-12 PROCEDURE — 85027 COMPLETE CBC AUTOMATED: CPT | Performed by: NURSE PRACTITIONER

## 2023-05-12 PROCEDURE — 80048 BASIC METABOLIC PNL TOTAL CA: CPT | Performed by: NURSE PRACTITIONER

## 2023-05-12 PROCEDURE — 36415 COLL VENOUS BLD VENIPUNCTURE: CPT | Performed by: NURSE PRACTITIONER

## 2023-05-12 RX ORDER — SPIRONOLACTONE 25 MG/1
12.5 TABLET ORAL DAILY
Refills: 0 | COMMUNITY
Start: 2023-05-12 | End: 2023-05-15

## 2023-05-12 NOTE — PATIENT INSTRUCTIONS
Heart Failure Discharge Instructions    Decrease Aldactone (Spironolactone) to 12.5 mg every day. Limit potassium in the diet. Have labs next Thursday, order is in the computer. Activity: Regular exercise and activity is important for your overall health and to help keep your heart strong and functioning as well as possible. Walk at a slow to moderate pace for 15-20 minutes 3-5 days per week. Follow these instructions every day to keep yourself in the [address] you are feeling well and your symptoms are under control                                    Medications  Take your medication every day as instructed  Do not stop taking your medicine or change the amount you are taking without instructions from your doctor or nurse  Do Not take non-steroidal antiinflammatory drugs such as ibuprofen, aleve, advil, or motrin                                    Diet/Fluids  People with heart failure should eat less sodium (salt) and limit fluid. Sodium attracts water and makes the body hold fluid. This extra fluid makes the heart work harder and can worsen the symptoms of heart failure. Diet    2000 mg sodium daily  Fluid restriction    64 ounces daily  (8 oz. = 1 cup)                                     Body Weight  Weigh yourself every day before breakfast and write your weight down  Use the same scale and wear about the same amount of clothes each time  A sudden weight increase is due to fluid retention rather than fat                                         Activity  Pace your activities to avoid getting overtired  Take rest periods as needed  Elevate your feet to reduce ankle swelling when resting                             Signs of Worsening Heart Failure    You are entering the Yellow Zone - this is a warning zone    Call your doctor or nurse if you have any of these signs or symptoms:   You gain 2 or more pounds overnight or 3-5 pounds in 3-7 days  You have more trouble breathing  You get more tired with regular activity, or are limiting activity because of shortness of breath or fatigue  You are short of breath lying down, you need more pillows to breathe comfortably,  or wake up during the night short of breath  You urinate less often during the day and more often at night  You have a bloated feeling, upset stomach, loss of appetite, or your clothes are fitting tighter    GO TO THE EMERGENCY DEPARTMENT or CALL 911 IF: These are signs you are in the RED ZONE - THIS IS AN EMERGENCY  You have tightness or pain in your chest  You are extremely short of breath or can't catch your breath  You cough up frothy pink mucous  You feel confused or can't think clearly  You are traveling and develop symptoms of worsening heart failure     We respect everyone's time and availability. Please be aware that this is not a walk-in clinic and we require appointments in order to facilitate timely care for all patients. We ask you to arrive 30 minutes before your appointment to allow time for you to check-in and have your bloodwork drawn. Please understand if you are late for your appointment, you may be asked to reschedule. If possible, all attempts will be made to accommodate but realize this is no guarantee that this will always be available. We understand there are extenuating circumstances. If you need to cancel or reschedule your appointment, please call the [facility] within 24 hours at [phone]. Thank you for your cooperation, [facility] Staff. IF YOU HAVE QUESTIONS REGARDING YOUR BILL, FEEL FREE TO CONTACT [facility] PATIENT ACCOUNTS -[phone]. IF YOU NEED FINANCIAL ASSISTANCE, PLEASE CALL AN [facility] FINANCIAL COUNSELOR -[phone].              [facility]     [phone]

## 2023-05-12 NOTE — PROGRESS NOTES
Pt. Assessed. No signs or symptoms of shortness of breath, fatigue, chest pain or edema noted. States he has had some GI disturbance recently, which he states he intermittently suffers from as a result of his lupus. Weight stable at 193.2 lbs. Reviewed current list of patient's allergies and medication; updated the Electronic Medical Record. Labs ordered to assess kidney function and drawn by [facility]. Reviewed follow-up appointment and Heart Failure discharge instructions with patient. Patient verbalized an understanding.      Will RTC in 3 weeks, labs in 1 week    6 min walk completed  6 Minute Walk    Results at Rest  Resting SpO2 (minimum): 100 %  Resting HR (max): 85  Resting Oxygen Device: None          Ambulatory Results     SpO2 with exertion (minimum): 93  HR with exertion (max): 91  Intervention oxygen device: None        Number of laps made: 18 laps  Distance Ambulated (ft): 900 ft  Level: Level I    Recovery Results  Minutes required to return to resting level: 0  Recovery SpO2: 95 %  Recovery HR: 72          6 Min Walk Results  Exertion Scale: Very light  Angina Scale: None  Dyspnea Scale: No Dyspnea

## 2023-05-15 RX ORDER — SPIRONOLACTONE 25 MG/1
TABLET ORAL
Qty: 45 TABLET | Refills: 0 | Status: SHIPPED | OUTPATIENT
Start: 2023-05-15

## 2023-05-18 ENCOUNTER — LAB ENCOUNTER (OUTPATIENT)
Dept: LAB | Age: 78
End: 2023-05-18
Attending: NURSE PRACTITIONER
Payer: MEDICARE

## 2023-05-18 DIAGNOSIS — I50.812 CHRONIC RIGHT-SIDED CONGESTIVE HEART FAILURE (HCC): ICD-10-CM

## 2023-05-18 LAB
ANION GAP SERPL CALC-SCNC: 7 MMOL/L (ref 0–18)
BUN BLD-MCNC: 43 MG/DL (ref 7–18)
CALCIUM BLD-MCNC: 8.9 MG/DL (ref 8.5–10.1)
CHLORIDE SERPL-SCNC: 106 MMOL/L (ref 98–112)
CO2 SERPL-SCNC: 26 MMOL/L (ref 21–32)
CREAT BLD-MCNC: 1.7 MG/DL
GFR SERPLBLD BASED ON 1.73 SQ M-ARVRAT: 40 ML/MIN/1.73M2 (ref 60–?)
GLUCOSE BLD-MCNC: 112 MG/DL (ref 70–99)
OSMOLALITY SERPL CALC.SUM OF ELEC: 300 MOSM/KG (ref 275–295)
POTASSIUM SERPL-SCNC: 4.6 MMOL/L (ref 3.5–5.1)
SODIUM SERPL-SCNC: 139 MMOL/L (ref 136–145)

## 2023-05-18 PROCEDURE — 36415 COLL VENOUS BLD VENIPUNCTURE: CPT

## 2023-05-18 PROCEDURE — 80048 BASIC METABOLIC PNL TOTAL CA: CPT

## 2023-05-26 ENCOUNTER — PATIENT OUTREACH (OUTPATIENT)
Dept: CASE MANAGEMENT | Age: 78
End: 2023-05-26

## 2023-05-30 ENCOUNTER — OFFICE VISIT (OUTPATIENT)
Dept: HEMATOLOGY/ONCOLOGY | Facility: HOSPITAL | Age: 78
End: 2023-05-30
Attending: INTERNAL MEDICINE
Payer: MEDICARE

## 2023-05-30 ENCOUNTER — PATIENT OUTREACH (OUTPATIENT)
Dept: CASE MANAGEMENT | Age: 78
End: 2023-05-30

## 2023-05-30 ENCOUNTER — TELEPHONE (OUTPATIENT)
Dept: RHEUMATOLOGY | Facility: CLINIC | Age: 78
End: 2023-05-30

## 2023-05-30 VITALS
BODY MASS INDEX: 27.02 KG/M2 | TEMPERATURE: 97 F | OXYGEN SATURATION: 95 % | RESPIRATION RATE: 16 BRPM | HEIGHT: 70.98 IN | WEIGHT: 193 LBS | SYSTOLIC BLOOD PRESSURE: 94 MMHG | HEART RATE: 79 BPM | DIASTOLIC BLOOD PRESSURE: 58 MMHG

## 2023-05-30 DIAGNOSIS — D69.6 THROMBOCYTOPENIA (HCC): ICD-10-CM

## 2023-05-30 DIAGNOSIS — D69.3 IMMUNE THROMBOCYTOPENIC PURPURA (HCC): Primary | ICD-10-CM

## 2023-05-30 PROCEDURE — 96365 THER/PROPH/DIAG IV INF INIT: CPT

## 2023-05-30 PROCEDURE — 96366 THER/PROPH/DIAG IV INF ADDON: CPT

## 2023-05-30 RX ORDER — METHYLPREDNISOLONE SODIUM SUCCINATE 125 MG/2ML
125 INJECTION, POWDER, LYOPHILIZED, FOR SOLUTION INTRAMUSCULAR; INTRAVENOUS ONCE
Status: CANCELLED | OUTPATIENT
Start: 2023-07-04

## 2023-05-30 RX ORDER — ACETAMINOPHEN 325 MG/1
650 TABLET ORAL ONCE
Status: CANCELLED | OUTPATIENT
Start: 2023-07-04

## 2023-05-30 RX ORDER — DIPHENHYDRAMINE HCL 25 MG
25 CAPSULE ORAL ONCE
Status: CANCELLED | OUTPATIENT
Start: 2023-07-04

## 2023-05-30 RX ORDER — METHYLPREDNISOLONE SODIUM SUCCINATE 40 MG/ML
40 INJECTION, POWDER, LYOPHILIZED, FOR SOLUTION INTRAMUSCULAR; INTRAVENOUS ONCE
Status: CANCELLED | OUTPATIENT
Start: 2023-07-04

## 2023-05-30 RX ORDER — DIPHENHYDRAMINE HCL 25 MG
25 CAPSULE ORAL ONCE
Status: COMPLETED | OUTPATIENT
Start: 2023-05-30 | End: 2023-05-30

## 2023-05-30 RX ORDER — DIPHENHYDRAMINE HYDROCHLORIDE 50 MG/ML
25 INJECTION INTRAMUSCULAR; INTRAVENOUS ONCE
Status: CANCELLED | OUTPATIENT
Start: 2023-07-04

## 2023-05-30 RX ORDER — ACETAMINOPHEN 325 MG/1
650 TABLET ORAL ONCE
Status: COMPLETED | OUTPATIENT
Start: 2023-05-30 | End: 2023-05-30

## 2023-05-30 RX ORDER — FAMOTIDINE 10 MG/ML
20 INJECTION, SOLUTION INTRAVENOUS ONCE
Status: CANCELLED | OUTPATIENT
Start: 2023-07-04

## 2023-05-30 RX ADMIN — ACETAMINOPHEN 650 MG: 325 TABLET ORAL at 11:18:00

## 2023-05-30 RX ADMIN — DIPHENHYDRAMINE HCL 25 MG: 25 MG CAPSULE ORAL at 11:18:00

## 2023-05-30 NOTE — PROGRESS NOTES
Education Record    Learner:  Patient and Spouse    Disease / Diagnosis: Myositis    Barriers / Limitations:  None   Comments:    Method:  Brief focused   Comments:    General Topics:  Plan of care reviewed   Comments:    Outcome:  Shows understanding   Comments:

## 2023-05-30 NOTE — PROGRESS NOTES
MD progress note from Dr. Phelps Blow on 1/1/99 states pt to continue with IVIG q 5 weeks. Called office to request new order with extension of expiration. Office verbalized understanding and will fax updated order.

## 2023-05-30 NOTE — TELEPHONE ENCOUNTER
[facility] phoned office requesting a new order for IVIG.  IVIG order placed on Dr. Heydi Rueda desdavid for approval.

## 2023-05-31 ENCOUNTER — PATIENT OUTREACH (OUTPATIENT)
Dept: CASE MANAGEMENT | Age: 78
End: 2023-05-31

## 2023-05-31 DIAGNOSIS — M33.20 POLYMYOSITIS (HCC): Chronic | ICD-10-CM

## 2023-05-31 DIAGNOSIS — M35.1 MCTD (MIXED CONNECTIVE TISSUE DISEASE) (HCC): ICD-10-CM

## 2023-05-31 DIAGNOSIS — I10 BENIGN ESSENTIAL HYPERTENSION: ICD-10-CM

## 2023-05-31 DIAGNOSIS — I27.20 PULMONARY HYPERTENSION (HCC): ICD-10-CM

## 2023-05-31 DIAGNOSIS — I50.812 CHRONIC RIGHT-SIDED CONGESTIVE HEART FAILURE (HCC): Primary | ICD-10-CM

## 2023-05-31 DIAGNOSIS — Z87.19 HISTORY OF PANCREATITIS: ICD-10-CM

## 2023-05-31 DIAGNOSIS — I50.812 CHRONIC RIGHT-SIDED CONGESTIVE HEART FAILURE (HCC): ICD-10-CM

## 2023-05-31 DIAGNOSIS — N18.31 STAGE 3A CHRONIC KIDNEY DISEASE (HCC): ICD-10-CM

## 2023-05-31 DIAGNOSIS — K75.4: ICD-10-CM

## 2023-05-31 DIAGNOSIS — G47.33 OSA (OBSTRUCTIVE SLEEP APNEA): ICD-10-CM

## 2023-05-31 DIAGNOSIS — C44.219 BASAL CELL CARCINOMA (BCC) OF SKIN OF LEFT EAR: ICD-10-CM

## 2023-05-31 DIAGNOSIS — I48.19 PERSISTENT ATRIAL FIBRILLATION (HCC): ICD-10-CM

## 2023-05-31 DIAGNOSIS — K22.70 BARRETT'S ESOPHAGUS WITHOUT DYSPLASIA: ICD-10-CM

## 2023-05-31 PROCEDURE — 99490 CHRNC CARE MGMT STAFF 1ST 20: CPT

## 2023-06-01 ENCOUNTER — HOSPITAL ENCOUNTER (OUTPATIENT)
Dept: CARDIOLOGY CLINIC | Facility: HOSPITAL | Age: 78
Discharge: HOME | End: 2023-06-01
Attending: NURSE PRACTITIONER
Payer: MEDICARE

## 2023-06-01 ENCOUNTER — HOSPITAL ENCOUNTER (OUTPATIENT)
Dept: LAB | Facility: HOSPITAL | Age: 78
Discharge: HOME | End: 2023-06-01
Attending: NURSE PRACTITIONER
Payer: MEDICARE

## 2023-06-01 VITALS
OXYGEN SATURATION: 100 % | WEIGHT: 194 LBS | DIASTOLIC BLOOD PRESSURE: 59 MMHG | SYSTOLIC BLOOD PRESSURE: 93 MMHG | HEART RATE: 71 BPM | RESPIRATION RATE: 21 BRPM | BODY MASS INDEX: 27 KG/M2

## 2023-06-01 DIAGNOSIS — I50.812 CHRONIC RIGHT-SIDED CONGESTIVE HEART FAILURE (HCC): ICD-10-CM

## 2023-06-01 DIAGNOSIS — I27.20 PULMONARY HYPERTENSION (HCC): ICD-10-CM

## 2023-06-01 DIAGNOSIS — I48.19 PERSISTENT ATRIAL FIBRILLATION (HCC): ICD-10-CM

## 2023-06-01 DIAGNOSIS — I50.812 CHRONIC RIGHT-SIDED CONGESTIVE HEART FAILURE (HCC): Primary | ICD-10-CM

## 2023-06-01 DIAGNOSIS — I10 BENIGN ESSENTIAL HYPERTENSION: ICD-10-CM

## 2023-06-01 DIAGNOSIS — N18.31 STAGE 3A CHRONIC KIDNEY DISEASE (HCC): ICD-10-CM

## 2023-06-01 LAB
ANION GAP SERPL CALC-SCNC: 1 MMOL/L (ref 0–18)
BUN BLD-MCNC: 56 MG/DL (ref 7–18)
CALCIUM BLD-MCNC: 9.3 MG/DL (ref 8.5–10.1)
CHLORIDE SERPL-SCNC: 107 MMOL/L (ref 98–112)
CO2 SERPL-SCNC: 30 MMOL/L (ref 21–32)
CREAT BLD-MCNC: 1.77 MG/DL
FASTING STATUS PATIENT QL REPORTED: NO
GFR SERPLBLD BASED ON 1.73 SQ M-ARVRAT: 38 ML/MIN/1.73M2 (ref 60–?)
GLUCOSE BLD-MCNC: 92 MG/DL (ref 70–99)
OSMOLALITY SERPL CALC.SUM OF ELEC: 301 MOSM/KG (ref 275–295)
POTASSIUM SERPL-SCNC: 4.8 MMOL/L (ref 3.5–5.1)
SODIUM SERPL-SCNC: 138 MMOL/L (ref 136–145)

## 2023-06-01 PROCEDURE — 80048 BASIC METABOLIC PNL TOTAL CA: CPT | Performed by: NURSE PRACTITIONER

## 2023-06-01 PROCEDURE — 99214 OFFICE O/P EST MOD 30 MIN: CPT | Performed by: NURSE PRACTITIONER

## 2023-06-01 PROCEDURE — 93264 REM MNTR WRLS P-ART PRS SNR: CPT | Performed by: NURSE PRACTITIONER

## 2023-06-01 PROCEDURE — 36415 COLL VENOUS BLD VENIPUNCTURE: CPT | Performed by: NURSE PRACTITIONER

## 2023-06-01 NOTE — PATIENT INSTRUCTIONS
Heart Failure Discharge Instructions      Activity: Regular exercise and activity is important for your overall health and to help keep your heart strong and functioning as well as possible. Walk at a slow to moderate pace for 15-20 minutes 3-5 days per week. Follow these instructions every day to keep yourself in the [address] you are feeling well and your symptoms are under control                                    Medications  Take your medication every day as instructed  Do not stop taking your medicine or change the amount you are taking without instructions from your doctor or nurse  Do Not take non-steroidal antiinflammatory drugs such as ibuprofen, aleve, advil, or motrin                                    Diet/Fluids  People with heart failure should eat less sodium (salt) and limit fluid. Sodium attracts water and makes the body hold fluid. This extra fluid makes the heart work harder and can worsen the symptoms of heart failure. Diet    2000 mg sodium daily  Fluid restriction    64 ounces daily  (8 oz. = 1 cup)                                     Body Weight  Weigh yourself every day before breakfast and write your weight down  Use the same scale and wear about the same amount of clothes each time  A sudden weight increase is due to fluid retention rather than fat                                         Activity  Pace your activities to avoid getting overtired  Take rest periods as needed  Elevate your feet to reduce ankle swelling when resting                             Signs of Worsening Heart Failure    You are entering the Yellow Zone - this is a warning zone    Call your doctor or nurse if you have any of these signs or symptoms:   You gain 2 or more pounds overnight or 3-5 pounds in 3-7 days  You have more trouble breathing  You get more tired with regular activity, or are limiting activity because of shortness of breath or fatigue  You are short of breath lying down, you need more pillows to breathe comfortably,  or wake up during the night short of breath  You urinate less often during the day and more often at night  You have a bloated feeling, upset stomach, loss of appetite, or your clothes are fitting tighter    GO TO THE EMERGENCY DEPARTMENT or CALL 911 IF: These are signs you are in the RED ZONE - THIS IS AN EMERGENCY  You have tightness or pain in your chest  You are extremely short of breath or can't catch your breath  You cough up frothy pink mucous  You feel confused or can't think clearly  You are traveling and develop symptoms of worsening heart failure     We respect everyone's time and availability. Please be aware that this is not a walk-in clinic and we require appointments in order to facilitate timely care for all patients. We ask you to arrive 30 minutes before your appointment to allow time for you to check-in and have your bloodwork drawn. Please understand if you are late for your appointment, you may be asked to reschedule. If possible, all attempts will be made to accommodate but realize this is no guarantee that this will always be available. We understand there are extenuating circumstances. If you need to cancel or reschedule your appointment, please call the [facility] within 24 hours at [phone]. Thank you for your cooperation, [facility] Staff. IF YOU HAVE QUESTIONS REGARDING YOUR BILL, FEEL FREE TO CONTACT [facility] PATIENT ACCOUNTS -[phone]. IF YOU NEED FINANCIAL ASSISTANCE, PLEASE CALL AN [facility] FINANCIAL COUNSELOR -[phone].              [facility]     [phone]

## 2023-06-01 NOTE — PROGRESS NOTES
Pt. Assessed. No signs or symptoms of shortness of breath, fatigue, chest pain or edema noted. Weight stable at 194 lbs. Reviewed current list of patient's allergies and medication; updated the Electronic Medical Record. Labs ordered to assess kidney function and drawn by [facility]. Reviewed follow-up appointment and Heart Failure discharge instructions with patient. Patient verbalized an understanding.        Depression Screening (PHQ-2/PHQ-9): Over the LAST 2 WEEKS   Little interest or pleasure in doing things: Not at all    Feeling down, depressed, or hopeless: Not at all    PHQ-2 SCORE: 0        Will RTC in 3 weeks

## 2023-06-20 DIAGNOSIS — I50.812 CHRONIC RIGHT-SIDED CONGESTIVE HEART FAILURE (HCC): Primary | ICD-10-CM

## 2023-06-22 ENCOUNTER — HOSPITAL ENCOUNTER (OUTPATIENT)
Dept: LAB | Facility: HOSPITAL | Age: 78
Discharge: HOME | End: 2023-06-22
Attending: NURSE PRACTITIONER
Payer: MEDICARE

## 2023-06-22 ENCOUNTER — HOSPITAL ENCOUNTER (OUTPATIENT)
Dept: CARDIOLOGY CLINIC | Facility: HOSPITAL | Age: 78
Discharge: HOME | End: 2023-06-22
Attending: NURSE PRACTITIONER
Payer: MEDICARE

## 2023-06-22 VITALS
DIASTOLIC BLOOD PRESSURE: 62 MMHG | HEART RATE: 68 BPM | BODY MASS INDEX: 27 KG/M2 | SYSTOLIC BLOOD PRESSURE: 94 MMHG | RESPIRATION RATE: 25 BRPM | WEIGHT: 194 LBS | OXYGEN SATURATION: 99 %

## 2023-06-22 DIAGNOSIS — I48.21 PERMANENT ATRIAL FIBRILLATION (HCC): ICD-10-CM

## 2023-06-22 DIAGNOSIS — I50.32 CHRONIC DIASTOLIC HEART FAILURE (HCC): Primary | ICD-10-CM

## 2023-06-22 DIAGNOSIS — I50.812 CHRONIC RIGHT-SIDED CONGESTIVE HEART FAILURE (HCC): ICD-10-CM

## 2023-06-22 DIAGNOSIS — N17.9 AKI (ACUTE KIDNEY INJURY) (HCC): ICD-10-CM

## 2023-06-22 LAB
ANION GAP SERPL CALC-SCNC: 4 MMOL/L (ref 0–18)
BUN BLD-MCNC: 65 MG/DL (ref 7–18)
CALCIUM BLD-MCNC: 9.2 MG/DL (ref 8.5–10.1)
CHLORIDE SERPL-SCNC: 104 MMOL/L (ref 98–112)
CO2 SERPL-SCNC: 30 MMOL/L (ref 21–32)
CREAT BLD-MCNC: 2.59 MG/DL
FASTING STATUS PATIENT QL REPORTED: NO
GFR SERPLBLD BASED ON 1.73 SQ M-ARVRAT: 24 ML/MIN/1.73M2 (ref 60–?)
GLUCOSE BLD-MCNC: 103 MG/DL (ref 70–99)
OSMOLALITY SERPL CALC.SUM OF ELEC: 305 MOSM/KG (ref 275–295)
POTASSIUM SERPL-SCNC: 5 MMOL/L (ref 3.5–5.1)
SODIUM SERPL-SCNC: 138 MMOL/L (ref 136–145)

## 2023-06-22 PROCEDURE — 99215 OFFICE O/P EST HI 40 MIN: CPT | Performed by: NURSE PRACTITIONER

## 2023-06-22 PROCEDURE — 80048 BASIC METABOLIC PNL TOTAL CA: CPT | Performed by: NURSE PRACTITIONER

## 2023-06-22 PROCEDURE — 36415 COLL VENOUS BLD VENIPUNCTURE: CPT | Performed by: NURSE PRACTITIONER

## 2023-06-22 RX ORDER — BUMETANIDE 1 MG/1
3 TABLET ORAL DAILY
Qty: 150 TABLET | Refills: 2 | COMMUNITY
Start: 2023-06-22

## 2023-06-22 NOTE — PROGRESS NOTES
Patient assessed. Patient denies abdominal bloating and denies any increase in shortness of breath. Patient with no lower extremity edema. Weight stable at 194.0 lbs. SBP ranging from upper 80's to low 100's. Patient afib on tele. APN notified of all the above information. Labs ordered and drawn by [facility]. Reviewed allergies and list of current medications with patient and updated it in the Electronic Medical Record. Educated patient on low sodium diet and food choices, fluid restriction of 2 liters, and daily weights. Reviewed follow-up appointments and discharge Heart Failure instructions with patient. Patient verbalized an understanding. Attempted to get CardioMEMs reading, but unable. APN unable to access [facility] site to review MEMS reading. MEMs rep states they have been having issues with their site. Patient to return to the clinic in 2 weeks.

## 2023-06-22 NOTE — PATIENT INSTRUCTIONS
Heart Failure Discharge Instructions    Hold Bumex dose this afternoon. Decrease Bumex to 3mg daily. Please have your blood work the same day you see Dr. Aminah Song. Follow these instructions every day to keep yourself in the [address] you are feeling well and your symptoms are under control                                    Medications  Take your medication every day as instructed  Do not stop taking your medicine or change the amount you are taking without instructions from your doctor or nurse  Do Not take non-steroidal antiinflammatory drugs such as ibuprofen, aleve, advil, or motrin                                    Diet/Fluids  People with heart failure should eat less sodium (salt) and limit fluid. Sodium attracts water and makes the body hold fluid. This extra fluid makes the heart work harder and can worsen the symptoms of heart failure. Diet    2000 mg sodium daily  Fluid restriction    64 ounces daily  (8 oz. = 1 cup)                                     Body Weight  Weigh yourself every day before breakfast and write your weight down  Use the same scale and wear about the same amount of clothes each time  A sudden weight increase is due to fluid retention rather than fat                                         Activity  Some regular activity is important to keep your heart as strong as possible  Pace your activities to avoid getting overtired  Take rest periods as needed  Elevate your feet to reduce ankle swelling when resting                             Signs of Worsening Heart Failure    You are entering the Yellow Zone - this is a warning zone    Call your doctor or nurse if you have any of these signs or symptoms:   You gain 2 or more pounds overnight or 3-5 pounds in 3-7 days  You have more trouble breathing  You get more tired with regular activity, or are limiting activity because of shortness of breath or fatigue  You are short of breath lying down, you need more pillows to breathe comfortably,  or wake up during the night short of breath  You urinate less often during the day and more often at night  You have a bloated feeling, upset stomach, loss of appetite, or your clothes are fitting tighter    GO TO THE EMERGENCY DEPARTMENT or CALL 911 IF:     These are signs you are in the RED ZONE - THIS IS AN EMERGENCY  You have tightness or pain in your chest  You are extremely short of breath or can't catch your breath  You cough up frothy pink mucous  You feel confused or can't think clearly  You are traveling and develop symptoms of worsening heart failure

## 2023-06-26 ENCOUNTER — LAB ENCOUNTER (OUTPATIENT)
Dept: LAB | Age: 78
End: 2023-06-26
Attending: INTERNAL MEDICINE
Payer: MEDICARE

## 2023-06-26 ENCOUNTER — TELEPHONE (OUTPATIENT)
Dept: CARDIOLOGY CLINIC | Facility: HOSPITAL | Age: 78
End: 2023-06-26

## 2023-06-26 DIAGNOSIS — R06.09 DOE (DYSPNEA ON EXERTION): ICD-10-CM

## 2023-06-26 DIAGNOSIS — E78.5 HYPERLIPIDEMIA: ICD-10-CM

## 2023-06-26 DIAGNOSIS — I50.9 CHF (CONGESTIVE HEART FAILURE) (HCC): Primary | ICD-10-CM

## 2023-06-26 DIAGNOSIS — E55.9 VITAMIN D DEFICIENCY: ICD-10-CM

## 2023-06-26 DIAGNOSIS — M32.9 LUPUS (HCC): ICD-10-CM

## 2023-06-26 DIAGNOSIS — R53.83 FATIGUE: ICD-10-CM

## 2023-06-26 DIAGNOSIS — N17.9 AKI (ACUTE KIDNEY INJURY) (HCC): ICD-10-CM

## 2023-06-26 DIAGNOSIS — I50.32 CHRONIC DIASTOLIC HEART FAILURE (HCC): ICD-10-CM

## 2023-06-26 LAB
ALBUMIN SERPL-MCNC: 3.2 G/DL (ref 3.4–5)
ALBUMIN/GLOB SERPL: 0.9 {RATIO} (ref 1–2)
ALP LIVER SERPL-CCNC: 48 U/L
ALT SERPL-CCNC: 17 U/L
ANION GAP SERPL CALC-SCNC: 7 MMOL/L (ref 0–18)
AST SERPL-CCNC: 17 U/L (ref 15–37)
BASOPHILS # BLD AUTO: 0.04 X10(3) UL (ref 0–0.2)
BASOPHILS NFR BLD AUTO: 0.6 %
BILIRUB SERPL-MCNC: 0.6 MG/DL (ref 0.1–2)
BUN BLD-MCNC: 72 MG/DL (ref 7–18)
CALCIUM BLD-MCNC: 9.1 MG/DL (ref 8.5–10.1)
CHLORIDE SERPL-SCNC: 106 MMOL/L (ref 98–112)
CHOLEST SERPL-MCNC: 148 MG/DL (ref ?–200)
CO2 SERPL-SCNC: 27 MMOL/L (ref 21–32)
CREAT BLD-MCNC: 2.03 MG/DL
EOSINOPHIL # BLD AUTO: 0.21 X10(3) UL (ref 0–0.7)
EOSINOPHIL NFR BLD AUTO: 3.2 %
ERYTHROCYTE [DISTWIDTH] IN BLOOD BY AUTOMATED COUNT: 15.9 %
FASTING PATIENT LIPID ANSWER: YES
FASTING STATUS PATIENT QL REPORTED: YES
GFR SERPLBLD BASED ON 1.73 SQ M-ARVRAT: 33 ML/MIN/1.73M2 (ref 60–?)
GLOBULIN PLAS-MCNC: 3.6 G/DL (ref 2.8–4.4)
GLUCOSE BLD-MCNC: 96 MG/DL (ref 70–99)
HCT VFR BLD AUTO: 40 %
HDLC SERPL-MCNC: 34 MG/DL (ref 40–59)
HGB BLD-MCNC: 12.6 G/DL
IMM GRANULOCYTES # BLD AUTO: 0.02 X10(3) UL (ref 0–1)
IMM GRANULOCYTES NFR BLD: 0.3 %
LDLC SERPL CALC-MCNC: 94 MG/DL (ref ?–100)
LYMPHOCYTES # BLD AUTO: 0.87 X10(3) UL (ref 1–4)
LYMPHOCYTES NFR BLD AUTO: 13.2 %
MCH RBC QN AUTO: 34.9 PG (ref 26–34)
MCHC RBC AUTO-ENTMCNC: 31.5 G/DL (ref 31–37)
MCV RBC AUTO: 110.8 FL
MONOCYTES # BLD AUTO: 0.57 X10(3) UL (ref 0.1–1)
MONOCYTES NFR BLD AUTO: 8.6 %
NEUTROPHILS # BLD AUTO: 4.89 X10 (3) UL (ref 1.5–7.7)
NEUTROPHILS # BLD AUTO: 4.89 X10(3) UL (ref 1.5–7.7)
NEUTROPHILS NFR BLD AUTO: 74.1 %
NONHDLC SERPL-MCNC: 114 MG/DL (ref ?–130)
NT-PROBNP SERPL-MCNC: 4922 PG/ML (ref ?–450)
OSMOLALITY SERPL CALC.SUM OF ELEC: 311 MOSM/KG (ref 275–295)
PLATELET # BLD AUTO: 120 10(3)UL (ref 150–450)
POTASSIUM SERPL-SCNC: 5.1 MMOL/L (ref 3.5–5.1)
PROT SERPL-MCNC: 6.8 G/DL (ref 6.4–8.2)
RBC # BLD AUTO: 3.61 X10(6)UL
SODIUM SERPL-SCNC: 140 MMOL/L (ref 136–145)
TRIGL SERPL-MCNC: 109 MG/DL (ref 30–149)
TSI SER-ACNC: 3 MIU/ML (ref 0.36–3.74)
VIT D+METAB SERPL-MCNC: 56.9 NG/ML (ref 30–100)
VLDLC SERPL CALC-MCNC: 18 MG/DL (ref 0–30)
WBC # BLD AUTO: 6.6 X10(3) UL (ref 4–11)

## 2023-06-26 PROCEDURE — 80053 COMPREHEN METABOLIC PANEL: CPT

## 2023-06-26 PROCEDURE — 83880 ASSAY OF NATRIURETIC PEPTIDE: CPT

## 2023-06-26 PROCEDURE — 84443 ASSAY THYROID STIM HORMONE: CPT

## 2023-06-26 PROCEDURE — 82306 VITAMIN D 25 HYDROXY: CPT

## 2023-06-26 PROCEDURE — 83036 HEMOGLOBIN GLYCOSYLATED A1C: CPT

## 2023-06-26 PROCEDURE — 85025 COMPLETE CBC W/AUTO DIFF WBC: CPT

## 2023-06-26 PROCEDURE — 36415 COLL VENOUS BLD VENIPUNCTURE: CPT

## 2023-06-26 PROCEDURE — 80061 LIPID PANEL: CPT

## 2023-06-26 NOTE — TELEPHONE ENCOUNTER
[facility] called Marga Gifford and asked him to get a MEMs reading today. Marga Gifford reports that he will get a MEMs reading today and states that he did a MEMs reading yesterday evening. Marga Gifford says he always makes sure that it connects and is sent. Marga Gifford declined on having mobile text messages sent to him for MEMs reminders. Marga Gifford states that he already gets too many other messages and life is really busy for him right now. Marga Giffrod states that he has not been feeling good, but will get better at getting daily MEMs readings.

## 2023-06-26 NOTE — TELEPHONE ENCOUNTER
----- Message from SHEA Tam sent at 6/26/2023 11:53 AM CDT -----  Can one of you call Rose Marie Hughes and have him get a MEMs reading? He has not read since last Weds. Can we also get consent to send him mobile text messages for reminders?  Thx!

## 2023-06-27 LAB
EST. AVERAGE GLUCOSE BLD GHB EST-MCNC: 108 MG/DL (ref 68–126)
HBA1C MFR BLD: 5.4 % (ref ?–5.7)

## 2023-06-30 ENCOUNTER — PATIENT OUTREACH (OUTPATIENT)
Dept: CASE MANAGEMENT | Age: 78
End: 2023-06-30

## 2023-07-03 ENCOUNTER — HOSPITAL ENCOUNTER (OUTPATIENT)
Dept: CARDIOLOGY CLINIC | Facility: HOSPITAL | Age: 78
End: 2023-07-03
Attending: NURSE PRACTITIONER
Payer: MEDICARE

## 2023-07-03 DIAGNOSIS — I50.812 CHRONIC RIGHT-SIDED CONGESTIVE HEART FAILURE (HCC): Primary | ICD-10-CM

## 2023-07-03 PROCEDURE — 93264 REM MNTR WRLS P-ART PRS SNR: CPT | Performed by: NURSE PRACTITIONER

## 2023-07-05 ENCOUNTER — OFFICE VISIT (OUTPATIENT)
Dept: HEMATOLOGY/ONCOLOGY | Facility: HOSPITAL | Age: 78
End: 2023-07-05
Attending: INTERNAL MEDICINE
Payer: MEDICARE

## 2023-07-05 VITALS
WEIGHT: 196 LBS | TEMPERATURE: 98 F | DIASTOLIC BLOOD PRESSURE: 57 MMHG | OXYGEN SATURATION: 96 % | HEART RATE: 79 BPM | BODY MASS INDEX: 27.44 KG/M2 | SYSTOLIC BLOOD PRESSURE: 93 MMHG | RESPIRATION RATE: 16 BRPM | HEIGHT: 70.98 IN

## 2023-07-05 DIAGNOSIS — D69.6 THROMBOCYTOPENIA (HCC): ICD-10-CM

## 2023-07-05 DIAGNOSIS — D69.3 IMMUNE THROMBOCYTOPENIC PURPURA (HCC): Primary | ICD-10-CM

## 2023-07-05 PROCEDURE — 96365 THER/PROPH/DIAG IV INF INIT: CPT

## 2023-07-05 PROCEDURE — 96366 THER/PROPH/DIAG IV INF ADDON: CPT

## 2023-07-05 RX ORDER — ACETAMINOPHEN 325 MG/1
650 TABLET ORAL ONCE
OUTPATIENT
Start: 2023-08-09

## 2023-07-05 RX ORDER — MEPERIDINE HYDROCHLORIDE 25 MG/ML
25 INJECTION INTRAMUSCULAR; INTRAVENOUS; SUBCUTANEOUS ONCE
OUTPATIENT
Start: 2023-08-09

## 2023-07-05 RX ORDER — ACETAMINOPHEN 325 MG/1
650 TABLET ORAL ONCE
Status: COMPLETED | OUTPATIENT
Start: 2023-07-05 | End: 2023-07-05

## 2023-07-05 RX ORDER — METHYLPREDNISOLONE SODIUM SUCCINATE 125 MG/2ML
125 INJECTION, POWDER, LYOPHILIZED, FOR SOLUTION INTRAMUSCULAR; INTRAVENOUS ONCE
OUTPATIENT
Start: 2023-08-09

## 2023-07-05 RX ORDER — DIPHENHYDRAMINE HCL 25 MG
25 CAPSULE ORAL ONCE
Status: COMPLETED | OUTPATIENT
Start: 2023-07-05 | End: 2023-07-05

## 2023-07-05 RX ORDER — DIPHENHYDRAMINE HYDROCHLORIDE 50 MG/ML
25 INJECTION INTRAMUSCULAR; INTRAVENOUS ONCE
OUTPATIENT
Start: 2023-08-09

## 2023-07-05 RX ORDER — FAMOTIDINE 10 MG/ML
20 INJECTION, SOLUTION INTRAVENOUS ONCE
OUTPATIENT
Start: 2023-08-09

## 2023-07-05 RX ORDER — METHYLPREDNISOLONE SODIUM SUCCINATE 40 MG/ML
40 INJECTION, POWDER, LYOPHILIZED, FOR SOLUTION INTRAMUSCULAR; INTRAVENOUS ONCE
OUTPATIENT
Start: 2023-08-09

## 2023-07-05 RX ORDER — DIPHENHYDRAMINE HCL 25 MG
25 CAPSULE ORAL ONCE
OUTPATIENT
Start: 2023-08-09

## 2023-07-05 RX ADMIN — ACETAMINOPHEN 650 MG: 325 TABLET ORAL at 10:37:00

## 2023-07-05 RX ADMIN — DIPHENHYDRAMINE HCL 25 MG: 25 MG CAPSULE ORAL at 10:37:00

## 2023-07-05 NOTE — PROGRESS NOTES
Education Record    Learner:  Patient and Spouse    Disease / Diagnosis: Lupus and myositis    Barriers / Limitations:  None   Comments:    Method:  Discussion and Printed material   Comments:    General Topics:  Medication, Plan of care reviewed, and Fall risk and prevention   Comments:    Outcome:  Shows understanding   Comments:    Pt here for IVIG infusion. Tolerated well. Next appt scheduled for 5 weeks. Discharged in stable condition.

## 2023-07-06 ENCOUNTER — PATIENT OUTREACH (OUTPATIENT)
Dept: CASE MANAGEMENT | Age: 78
End: 2023-07-06

## 2023-07-06 DIAGNOSIS — N18.31 STAGE 3A CHRONIC KIDNEY DISEASE (HCC): ICD-10-CM

## 2023-07-06 DIAGNOSIS — I48.19 PERSISTENT ATRIAL FIBRILLATION (HCC): Primary | ICD-10-CM

## 2023-07-06 DIAGNOSIS — G47.33 OSA (OBSTRUCTIVE SLEEP APNEA): ICD-10-CM

## 2023-07-06 DIAGNOSIS — K22.70 BARRETT'S ESOPHAGUS WITHOUT DYSPLASIA: ICD-10-CM

## 2023-07-06 DIAGNOSIS — I50.32 CHRONIC DIASTOLIC HEART FAILURE (HCC): Primary | ICD-10-CM

## 2023-07-06 DIAGNOSIS — I50.812 CHRONIC RIGHT-SIDED CONGESTIVE HEART FAILURE (HCC): ICD-10-CM

## 2023-07-06 DIAGNOSIS — K75.4: ICD-10-CM

## 2023-07-06 DIAGNOSIS — C44.219 BASAL CELL CARCINOMA (BCC) OF SKIN OF LEFT EAR: ICD-10-CM

## 2023-07-06 NOTE — PROGRESS NOTES
Spoke to Prince chung for [facility]. Updates to patient care team/comments: UTD  Patient reported changes in medications: UTD  Med Adherence  Comment: Pt taking medications as prescribed     Health Maintenance: Colorectal Cancer Screening due on 05/01/2023  Influenza Vaccine(1) due on 10/01/2023  Annual Physical due on 01/05/2024  Annual Depression Screening Completed  Fall Risk Screening (Annual) Completed  Pneumococcal Vaccine: 65+ Years Completed  Zoster Vaccines Completed    Patient updates/concerns:    Pt has received all of the resources mailed to him by [facility] about dementia and alzheimer's resources in the area. Pt has not yet started calling any and will f/u with ccm if he needs more resources or suggestions. 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 Pt states that he is aware of how much stress he can tolerate before needing to rely on others for help. Pt states that he is not to proud to ask for help when he needs it. Pt states that his congestion is only slightly improved. Pt is tolerating it and has not started to use anything additionally OTC. Pt states his arthritis pain has been more significant recently. 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 He states that the pain can hit all of his joints at once and last anywhere from 1-3 weeks. Pt manages with maintenance prednisone dose every day. Pt understands the reason he is supposed to avoid NSAIDs and will only take an aleve if the pain becomes intolerable. Pt states this rarely happens but when he takes aleve he will afford himself 3 days of pain relief. Pt has f/u with dr Daisha Guzman next week. Pt saw dentist to get a tooth pulled. Pt states it was not because of lack of hygiene but a tooth that had been displaced because of wisdom tooth growth and pt had never addressed it any sooner. Pt states he has no f/u regarding the tooth and everything is healing appropriately.     Pt has been under some stress caring for his wife and has not been uploading his mems readings every day. Pt states that sometimes the process can take some time and pt will be more committed to making sure he can submit a reading every day. Pt discussed how sometimes it is frustrating to get the machine calibrated but overall the testing is not prohibitive. pt states that he has been requested to submit daily readings and he will attempt to commit to that. Pt has not started an exercise routine yet but plans to organize some equipment in his basement. Pt does not require any resources on creating a routine. Pt states he is walking more and can tolerate between a 1-1.5 miles at a time. Goals/Action Plan: Active goal from previous outreach:exercise    Patient reported progress towards goals: pt is walking 1-1.5 miles and wants to start a routine               - What: weights/resistance bands           - When:            - How:            - How Often:            - Where: at home  Patient Reported Barriers and Concerns: pt has been committed to managing his wifes care                   - Plan for overcoming barriers: find time to make it happen    Care Managers Interventions: continue to provide encouragement and education for healthy coping and dx    Future Appointments:   Future Appointments   Date Time Provider Chrissie Aden   7/7/2023 11:00 AM [address] CARD PHLEBOTOMY RM[id number]   7/7/2023 11:30 AM HEART FAILURE APN 1 [address] HF CLIN [facility] AT [facility]   1/1/0944 33:26 AM Compa Herrera MD EMGRHEUMHBSN EMG Mike   8/9/2023 11:00 AM [address] [facility] [place] RN6 [facility]   11/3/2023 11:15 AM Gideon Franklin MD EMG 3 EMG Marisabel Fatima Care Manager Follow Up Date: one month    Reason For Follow Up: review progress and or barriers towards patient's goals.      Time Spent This Encounter Total: 33 min medical record review, telephone communication, care plan updates where needed, education, goals, and action plan recreation/update. Provided acknowledgment and validation to patient's concerns.    Monthly Minute Total including today: 33  Physical assessment, complete health history, and need for CCM established by Peri Richard MD.

## 2023-07-07 ENCOUNTER — HOSPITAL ENCOUNTER (OUTPATIENT)
Dept: CARDIOLOGY CLINIC | Facility: HOSPITAL | Age: 78
Discharge: HOME | End: 2023-07-07
Attending: NURSE PRACTITIONER
Payer: MEDICARE

## 2023-07-07 ENCOUNTER — HOSPITAL ENCOUNTER (OUTPATIENT)
Dept: LAB | Facility: HOSPITAL | Age: 78
Discharge: HOME | End: 2023-07-07
Attending: NURSE PRACTITIONER
Payer: MEDICARE

## 2023-07-07 VITALS
WEIGHT: 199 LBS | RESPIRATION RATE: 16 BRPM | DIASTOLIC BLOOD PRESSURE: 58 MMHG | OXYGEN SATURATION: 100 % | BODY MASS INDEX: 28 KG/M2 | HEART RATE: 71 BPM | SYSTOLIC BLOOD PRESSURE: 104 MMHG

## 2023-07-07 DIAGNOSIS — I27.20 PULMONARY HYPERTENSION (HCC): ICD-10-CM

## 2023-07-07 DIAGNOSIS — I50.811 ACUTE RIGHT HEART FAILURE (HCC): ICD-10-CM

## 2023-07-07 DIAGNOSIS — I48.19 PERSISTENT ATRIAL FIBRILLATION (HCC): Primary | ICD-10-CM

## 2023-07-07 DIAGNOSIS — I50.32 CHRONIC DIASTOLIC HEART FAILURE (HCC): ICD-10-CM

## 2023-07-07 DIAGNOSIS — N18.31 STAGE 3A CHRONIC KIDNEY DISEASE (HCC): ICD-10-CM

## 2023-07-07 LAB
ANION GAP SERPL CALC-SCNC: 4 MMOL/L (ref 0–18)
BUN BLD-MCNC: 26 MG/DL (ref 7–18)
CALCIUM BLD-MCNC: 8.9 MG/DL (ref 8.5–10.1)
CHLORIDE SERPL-SCNC: 107 MMOL/L (ref 98–112)
CO2 SERPL-SCNC: 27 MMOL/L (ref 21–32)
CREAT BLD-MCNC: 1.59 MG/DL
DEPRECATED HBV CORE AB SER IA-ACNC: 69.7 NG/ML
FASTING STATUS PATIENT QL REPORTED: NO
GFR SERPLBLD BASED ON 1.73 SQ M-ARVRAT: 44 ML/MIN/1.73M2 (ref 60–?)
GLUCOSE BLD-MCNC: 82 MG/DL (ref 70–99)
IRON SATN MFR SERPL: 26 %
IRON SERPL-MCNC: 100 UG/DL
OSMOLALITY SERPL CALC.SUM OF ELEC: 290 MOSM/KG (ref 275–295)
POTASSIUM SERPL-SCNC: 4.8 MMOL/L (ref 3.5–5.1)
SODIUM SERPL-SCNC: 138 MMOL/L (ref 136–145)
TIBC SERPL-MCNC: 392 UG/DL (ref 240–450)
TRANSFERRIN SERPL-MCNC: 263 MG/DL (ref 200–360)

## 2023-07-07 PROCEDURE — 80048 BASIC METABOLIC PNL TOTAL CA: CPT | Performed by: NURSE PRACTITIONER

## 2023-07-07 PROCEDURE — 99215 OFFICE O/P EST HI 40 MIN: CPT | Performed by: NURSE PRACTITIONER

## 2023-07-07 RX ORDER — METOPROLOL SUCCINATE 100 MG/1
TABLET, EXTENDED RELEASE ORAL
Qty: 60 TABLET | Refills: 3 | COMMUNITY
Start: 2023-07-07

## 2023-07-07 NOTE — PATIENT INSTRUCTIONS
Heart Failure Discharge Instructions    Reduce Toprol to 50 mg in the evening. Start taking Bumex 2mg twice a day. Take 2mg tonight. Activity: Regular exercise and activity is important for your overall health and to help keep your heart strong and functioning as well as possible. Walk at a slow to moderate pace for 15-20 minutes 3-5 days per week. Follow these instructions every day to keep yourself in the [address] you are feeling well and your symptoms are under control                                    Medications  Take your medication every day as instructed  Do not stop taking your medicine or change the amount you are taking without instructions from your doctor or nurse  Do Not take non-steroidal antiinflammatory drugs such as ibuprofen, aleve, advil, or motrin                                    Diet/Fluids  People with heart failure should eat less sodium (salt) and limit fluid. Sodium attracts water and makes the body hold fluid. This extra fluid makes the heart work harder and can worsen the symptoms of heart failure. Diet    2000 mg sodium daily  Fluid restriction    64 ounces daily  (8 oz. = 1 cup)                                     Body Weight  Weigh yourself every day before breakfast and write your weight down  Use the same scale and wear about the same amount of clothes each time  A sudden weight increase is due to fluid retention rather than fat                                         Activity  Pace your activities to avoid getting overtired  Take rest periods as needed  Elevate your feet to reduce ankle swelling when resting                             Signs of Worsening Heart Failure    You are entering the Yellow Zone - this is a warning zone    Call your doctor or nurse if you have any of these signs or symptoms:   You gain 2 or more pounds overnight or 3-5 pounds in 3-7 days  You have more trouble breathing  You get more tired with regular activity, or are limiting activity because of shortness of breath or fatigue  You are short of breath lying down, you need more pillows to breathe comfortably,  or wake up during the night short of breath  You urinate less often during the day and more often at night  You have a bloated feeling, upset stomach, loss of appetite, or your clothes are fitting tighter    GO TO THE EMERGENCY DEPARTMENT or CALL 911 IF: These are signs you are in the RED ZONE - THIS IS AN EMERGENCY  You have tightness or pain in your chest  You are extremely short of breath or can't catch your breath  You cough up frothy pink mucous  You feel confused or can't think clearly  You are traveling and develop symptoms of worsening heart failure     We respect everyone's time and availability. Please be aware that this is not a walk-in clinic and we require appointments in order to facilitate timely care for all patients. Please understand if you are more than 20 minutes late for your appointment, you may be asked to reschedule. If possible, all attempts will be made to accommodate but realize this is no guarantee that this will always be available. We understand there are extenuating circumstances. If you need to cancel or reschedule your appointment, please call the [facility] within 24 hours at [phone]. Thank you for your cooperation, [facility] Staff.              [facility]     [phone]

## 2023-07-07 NOTE — PROGRESS NOTES
Patient assessed. Patient denies shortness of breath and lower extremity edema. Patient reports having mild abdominal bloating. Patient reports having increased fatigue over the last couple of days. Weight up 5 lbs at 199.0 lbs, and patient confirms that he did decrease his torsemide as directed during his last visit. APN notified of all the above information. Reviewed current list of patient's allergies and medication; updated the Electronic Medical Record. Labs ordered to assess kidney function and drawn by [facility]. Reviewed follow-up appointment and Heart Failure discharge instructions with patient. Patient verbalized an understanding. Patient to return to the clinic in 3 weeks.

## 2023-07-17 ENCOUNTER — TELEPHONE (OUTPATIENT)
Dept: CARDIOLOGY CLINIC | Facility: HOSPITAL | Age: 78
End: 2023-07-17

## 2023-07-17 NOTE — TELEPHONE ENCOUNTER
Called and left VM letting him know his pad is elevated and that Tasha wanted to see him today at 1:30. Awaiting call back. 118 update: patient called to state that they feel fine; that their weight is down 5-6 lbs and have zero symptoms. States that he had to do his mems reading a dozen times last night, and will retry this morning. Planning to come next Thursday, 7/27.  Both Kem made aware

## 2023-07-17 NOTE — TELEPHONE ENCOUNTER
Please see if Mr. Tay Zarco wants to come in at  1:30 pm today. His pad is elevated and I increased diuretics last visit. He probably needs IV diuretics.

## 2023-07-26 DIAGNOSIS — I50.32 CHRONIC DIASTOLIC HEART FAILURE (HCC): Primary | ICD-10-CM

## 2023-07-27 ENCOUNTER — HOSPITAL ENCOUNTER (OUTPATIENT)
Dept: CARDIOLOGY CLINIC | Facility: HOSPITAL | Age: 78
Discharge: HOME | End: 2023-07-27
Attending: NURSE PRACTITIONER
Payer: MEDICARE

## 2023-07-27 ENCOUNTER — HOSPITAL ENCOUNTER (OUTPATIENT)
Dept: LAB | Facility: HOSPITAL | Age: 78
Discharge: HOME | End: 2023-07-27
Attending: NURSE PRACTITIONER
Payer: MEDICARE

## 2023-07-27 VITALS
SYSTOLIC BLOOD PRESSURE: 113 MMHG | DIASTOLIC BLOOD PRESSURE: 71 MMHG | HEART RATE: 68 BPM | RESPIRATION RATE: 19 BRPM | OXYGEN SATURATION: 100 % | WEIGHT: 194.38 LBS | BODY MASS INDEX: 27 KG/M2

## 2023-07-27 DIAGNOSIS — Z95.818 PRESENCE OF CARDIOMEMS HF SYSTEM: ICD-10-CM

## 2023-07-27 DIAGNOSIS — I48.21 PERMANENT ATRIAL FIBRILLATION (HCC): ICD-10-CM

## 2023-07-27 DIAGNOSIS — N18.32 STAGE 3B CHRONIC KIDNEY DISEASE (HCC): ICD-10-CM

## 2023-07-27 DIAGNOSIS — I50.32 CHRONIC DIASTOLIC CONGESTIVE HEART FAILURE (HCC): Primary | ICD-10-CM

## 2023-07-27 DIAGNOSIS — I50.32 CHRONIC DIASTOLIC HEART FAILURE (HCC): ICD-10-CM

## 2023-07-27 DIAGNOSIS — E87.5 HYPERKALEMIA: ICD-10-CM

## 2023-07-27 LAB
ANION GAP SERPL CALC-SCNC: 2 MMOL/L (ref 0–18)
BUN BLD-MCNC: 49 MG/DL (ref 7–18)
CALCIUM BLD-MCNC: 9.1 MG/DL (ref 8.5–10.1)
CHLORIDE SERPL-SCNC: 102 MMOL/L (ref 98–112)
CO2 SERPL-SCNC: 32 MMOL/L (ref 21–32)
CREAT BLD-MCNC: 1.94 MG/DL
EGFRCR SERPLBLD CKD-EPI 2021: 34 ML/MIN/1.73M2 (ref 60–?)
FASTING STATUS PATIENT QL REPORTED: NO
GLUCOSE BLD-MCNC: 87 MG/DL (ref 70–99)
OSMOLALITY SERPL CALC.SUM OF ELEC: 294 MOSM/KG (ref 275–295)
POTASSIUM SERPL-SCNC: 5.5 MMOL/L (ref 3.5–5.1)
SODIUM SERPL-SCNC: 136 MMOL/L (ref 136–145)

## 2023-07-27 PROCEDURE — 36415 COLL VENOUS BLD VENIPUNCTURE: CPT | Performed by: NURSE PRACTITIONER

## 2023-07-27 PROCEDURE — 99215 OFFICE O/P EST HI 40 MIN: CPT | Performed by: NURSE PRACTITIONER

## 2023-07-27 PROCEDURE — 80048 BASIC METABOLIC PNL TOTAL CA: CPT | Performed by: NURSE PRACTITIONER

## 2023-07-27 RX ORDER — BUMETANIDE 1 MG/1
TABLET ORAL
Qty: 360 TABLET | Refills: 1 | COMMUNITY
Start: 2023-07-27

## 2023-07-27 RX ORDER — BUMETANIDE 1 MG/1
2 TABLET ORAL 2 TIMES DAILY
Qty: 360 TABLET | Refills: 1 | Status: SHIPPED | OUTPATIENT
Start: 2023-07-27 | End: 2023-07-27

## 2023-07-27 NOTE — PROGRESS NOTES
Pt. Assessed. No signs or symptoms of shortness of breath, chest pain or edema noted. Patient reports fatigue. Weight at 194.4 lbs. Reviewed current list of patient's allergies and medication; updated the Electronic Medical Record. Labs ordered to assess kidney function and drawn by [facility]. Reviewed follow-up appointment and Heart Failure discharge instructions with patient. Patient verbalized an understanding. [facility] PAD 15. [address] 1month.

## 2023-07-27 NOTE — PATIENT INSTRUCTIONS
Heart Failure Discharge Instructions    Take Bumex 3mg daily on Monday, Wednesday and Friday, then 2mg twice per day on Sunday, Tuesday, Thursday and Saturday. Watch potassium in diet. Please have your blood work completed in 2 weeks. Activity: Regular exercise and activity is important for your overall health and to help keep your heart strong and functioning as well as possible. Walk at a slow to moderate pace for 15-20 minutes 3-5 days per week. Follow these instructions every day to keep yourself in the [address] you are feeling well and your symptoms are under control                                    Medications  Take your medication every day as instructed  Do not stop taking your medicine or change the amount you are taking without instructions from your doctor or nurse  Do Not take non-steroidal antiinflammatory drugs such as ibuprofen, aleve, advil, or motrin                                    Diet/Fluids  People with heart failure should eat less sodium (salt) and limit fluid. Sodium attracts water and makes the body hold fluid. This extra fluid makes the heart work harder and can worsen the symptoms of heart failure. Diet    2000 mg sodium daily  Fluid restriction    64 ounces daily  (8 oz. = 1 cup)                                     Body Weight  Weigh yourself every day before breakfast and write your weight down  Use the same scale and wear about the same amount of clothes each time  A sudden weight increase is due to fluid retention rather than fat                                         Activity  Pace your activities to avoid getting overtired  Take rest periods as needed  Elevate your feet to reduce ankle swelling when resting                             Signs of Worsening Heart Failure    You are entering the Yellow Zone - this is a warning zone    Call your doctor or nurse if you have any of these signs or symptoms:   You gain 2 or more pounds overnight or 3-5 pounds in 3-7 days  You have more trouble breathing  You get more tired with regular activity, or are limiting activity because of shortness of breath or fatigue  You are short of breath lying down, you need more pillows to breathe comfortably,  or wake up during the night short of breath  You urinate less often during the day and more often at night  You have a bloated feeling, upset stomach, loss of appetite, or your clothes are fitting tighter    GO TO THE EMERGENCY DEPARTMENT or CALL 911 IF: These are signs you are in the RED ZONE - THIS IS AN EMERGENCY  You have tightness or pain in your chest  You are extremely short of breath or can't catch your breath  You cough up frothy pink mucous  You feel confused or can't think clearly  You are traveling and develop symptoms of worsening heart failure     We respect everyone's time and availability. Please be aware that this is not a walk-in clinic and we require appointments in order to facilitate timely care for all patients. We ask you to arrive 30 minutes before your appointment to allow time for you to check-in and have your bloodwork drawn. Please understand if you are late for your appointment, you may be asked to reschedule. If possible, all attempts will be made to accommodate but realize this is no guarantee that this will always be available. We understand there are extenuating circumstances. If you need to cancel or reschedule your appointment, please call the [facility] Cell Therapy within 24 hours at [phone]. Thank you for your cooperation, [facility] Staff. IF YOU HAVE QUESTIONS REGARDING YOUR BILL, FEEL FREE TO CONTACT [facility] PATIENT ACCOUNTS -[phone]. IF YOU NEED FINANCIAL ASSISTANCE, PLEASE CALL AN [facility] FINANCIAL COUNSELOR -[phone].              [facility] [address] [facility]     [phone]

## 2023-07-31 ENCOUNTER — LAB ENCOUNTER (OUTPATIENT)
Dept: LAB | Age: 78
End: 2023-07-31
Attending: INTERNAL MEDICINE
Payer: MEDICARE

## 2023-07-31 DIAGNOSIS — M33.20 POLYMYOSITIS (HCC): ICD-10-CM

## 2023-07-31 DIAGNOSIS — I43 CARDIOMYOPATHY AS MANIFESTATION OF UNDERLYING DISEASE (HCC): ICD-10-CM

## 2023-07-31 DIAGNOSIS — D69.3 IMMUNE THROMBOCYTOPENIC PURPURA (HCC): ICD-10-CM

## 2023-07-31 DIAGNOSIS — N18.31 STAGE 3A CHRONIC KIDNEY DISEASE (HCC): ICD-10-CM

## 2023-07-31 DIAGNOSIS — I50.812 CHRONIC RIGHT-SIDED CONGESTIVE HEART FAILURE (HCC): ICD-10-CM

## 2023-07-31 DIAGNOSIS — M35.1 MCTD (MIXED CONNECTIVE TISSUE DISEASE) (HCC): ICD-10-CM

## 2023-07-31 DIAGNOSIS — M10.9 GOUT, UNSPECIFIED CAUSE, UNSPECIFIED CHRONICITY, UNSPECIFIED SITE: ICD-10-CM

## 2023-07-31 LAB
BASOPHILS # BLD AUTO: 0.03 X10(3) UL (ref 0–0.2)
BASOPHILS NFR BLD AUTO: 0.4 %
CK SERPL-CCNC: 30 U/L
CRP SERPL-MCNC: <0.29 MG/DL (ref ?–0.3)
EOSINOPHIL # BLD AUTO: 0.12 X10(3) UL (ref 0–0.7)
EOSINOPHIL NFR BLD AUTO: 1.6 %
ERYTHROCYTE [DISTWIDTH] IN BLOOD BY AUTOMATED COUNT: 15.9 %
ERYTHROCYTE [SEDIMENTATION RATE] IN BLOOD: 13 MM/HR
HCT VFR BLD AUTO: 43.4 %
HGB BLD-MCNC: 13.3 G/DL
IMM GRANULOCYTES # BLD AUTO: 0.03 X10(3) UL (ref 0–1)
IMM GRANULOCYTES NFR BLD: 0.4 %
LYMPHOCYTES # BLD AUTO: 1.63 X10(3) UL (ref 1–4)
LYMPHOCYTES NFR BLD AUTO: 22.3 %
MCH RBC QN AUTO: 34.6 PG (ref 26–34)
MCHC RBC AUTO-ENTMCNC: 30.6 G/DL (ref 31–37)
MCV RBC AUTO: 113 FL
MONOCYTES # BLD AUTO: 0.68 X10(3) UL (ref 0.1–1)
MONOCYTES NFR BLD AUTO: 9.3 %
NEUTROPHILS # BLD AUTO: 4.81 X10 (3) UL (ref 1.5–7.7)
NEUTROPHILS # BLD AUTO: 4.81 X10(3) UL (ref 1.5–7.7)
NEUTROPHILS NFR BLD AUTO: 66 %
PLATELET # BLD AUTO: 106 10(3)UL (ref 150–450)
RBC # BLD AUTO: 3.84 X10(6)UL
WBC # BLD AUTO: 7.3 X10(3) UL (ref 4–11)

## 2023-07-31 PROCEDURE — 82550 ASSAY OF CK (CPK): CPT

## 2023-07-31 PROCEDURE — 85025 COMPLETE CBC W/AUTO DIFF WBC: CPT

## 2023-07-31 PROCEDURE — 85652 RBC SED RATE AUTOMATED: CPT

## 2023-07-31 PROCEDURE — 36415 COLL VENOUS BLD VENIPUNCTURE: CPT

## 2023-07-31 PROCEDURE — 86140 C-REACTIVE PROTEIN: CPT

## 2023-08-01 ENCOUNTER — OFFICE VISIT (OUTPATIENT)
Dept: RHEUMATOLOGY | Facility: CLINIC | Age: 78
End: 2023-08-01
Payer: MEDICARE

## 2023-08-01 VITALS
TEMPERATURE: 96 F | DIASTOLIC BLOOD PRESSURE: 60 MMHG | HEIGHT: 70 IN | OXYGEN SATURATION: 100 % | WEIGHT: 191 LBS | BODY MASS INDEX: 27.35 KG/M2 | HEART RATE: 75 BPM | SYSTOLIC BLOOD PRESSURE: 90 MMHG

## 2023-08-01 DIAGNOSIS — N18.31 STAGE 3A CHRONIC KIDNEY DISEASE (HCC): ICD-10-CM

## 2023-08-01 DIAGNOSIS — M10.9 GOUT, UNSPECIFIED CAUSE, UNSPECIFIED CHRONICITY, UNSPECIFIED SITE: ICD-10-CM

## 2023-08-01 DIAGNOSIS — D69.6 THROMBOCYTOPENIA (HCC): ICD-10-CM

## 2023-08-01 DIAGNOSIS — I43 CARDIOMYOPATHY AS MANIFESTATION OF UNDERLYING DISEASE (HCC): ICD-10-CM

## 2023-08-01 DIAGNOSIS — M33.20 POLYMYOSITIS (HCC): Primary | Chronic | ICD-10-CM

## 2023-08-01 PROCEDURE — 1126F AMNT PAIN NOTED NONE PRSNT: CPT | Performed by: INTERNAL MEDICINE

## 2023-08-01 PROCEDURE — 99214 OFFICE O/P EST MOD 30 MIN: CPT | Performed by: INTERNAL MEDICINE

## 2023-08-01 NOTE — PATIENT INSTRUCTIONS
Continue IV IG infusions every 5 weeks. Prednisone 7 mg per day. [address] visits. Labs are stable. Kidney insufficiency present. Pace your activity. Return to office 3 months.

## 2023-08-07 ENCOUNTER — TELEPHONE (OUTPATIENT)
Dept: CARDIOLOGY CLINIC | Facility: HOSPITAL | Age: 78
End: 2023-08-07

## 2023-08-07 RX ORDER — SPIRONOLACTONE 25 MG/1
12.5 TABLET ORAL DAILY
Qty: 45 TABLET | Refills: 3 | Status: SHIPPED | OUTPATIENT
Start: 2023-08-07

## 2023-08-09 ENCOUNTER — TELEPHONE (OUTPATIENT)
Dept: CARDIOLOGY CLINIC | Facility: HOSPITAL | Age: 78
End: 2023-08-09

## 2023-08-09 ENCOUNTER — OFFICE VISIT (OUTPATIENT)
Dept: HEMATOLOGY/ONCOLOGY | Facility: HOSPITAL | Age: 78
End: 2023-08-09
Attending: INTERNAL MEDICINE
Payer: MEDICARE

## 2023-08-09 VITALS
HEIGHT: 70.98 IN | DIASTOLIC BLOOD PRESSURE: 69 MMHG | WEIGHT: 196.19 LBS | OXYGEN SATURATION: 99 % | SYSTOLIC BLOOD PRESSURE: 104 MMHG | BODY MASS INDEX: 27.47 KG/M2 | RESPIRATION RATE: 16 BRPM | HEART RATE: 79 BPM | TEMPERATURE: 97 F

## 2023-08-09 DIAGNOSIS — D69.6 THROMBOCYTOPENIA (HCC): ICD-10-CM

## 2023-08-09 DIAGNOSIS — D69.3 IMMUNE THROMBOCYTOPENIC PURPURA (HCC): Primary | ICD-10-CM

## 2023-08-09 PROCEDURE — 96366 THER/PROPH/DIAG IV INF ADDON: CPT

## 2023-08-09 PROCEDURE — 96365 THER/PROPH/DIAG IV INF INIT: CPT

## 2023-08-09 RX ORDER — METHYLPREDNISOLONE SODIUM SUCCINATE 40 MG/ML
40 INJECTION, POWDER, LYOPHILIZED, FOR SOLUTION INTRAMUSCULAR; INTRAVENOUS ONCE
OUTPATIENT
Start: 2023-09-13

## 2023-08-09 RX ORDER — DIPHENHYDRAMINE HCL 25 MG
25 CAPSULE ORAL ONCE
Status: COMPLETED | OUTPATIENT
Start: 2023-08-09 | End: 2023-08-09

## 2023-08-09 RX ORDER — ACETAMINOPHEN 325 MG/1
650 TABLET ORAL ONCE
OUTPATIENT
Start: 2023-09-13

## 2023-08-09 RX ORDER — DIPHENHYDRAMINE HCL 25 MG
25 CAPSULE ORAL ONCE
OUTPATIENT
Start: 2023-09-13

## 2023-08-09 RX ORDER — FAMOTIDINE 10 MG/ML
20 INJECTION, SOLUTION INTRAVENOUS ONCE
OUTPATIENT
Start: 2023-09-13

## 2023-08-09 RX ORDER — ACETAMINOPHEN 325 MG/1
650 TABLET ORAL ONCE
Status: COMPLETED | OUTPATIENT
Start: 2023-08-09 | End: 2023-08-09

## 2023-08-09 RX ORDER — MEPERIDINE HYDROCHLORIDE 25 MG/ML
25 INJECTION INTRAMUSCULAR; INTRAVENOUS; SUBCUTANEOUS ONCE
OUTPATIENT
Start: 2023-09-13

## 2023-08-09 RX ORDER — METHYLPREDNISOLONE SODIUM SUCCINATE 125 MG/2ML
125 INJECTION, POWDER, LYOPHILIZED, FOR SOLUTION INTRAMUSCULAR; INTRAVENOUS ONCE
OUTPATIENT
Start: 2023-09-13

## 2023-08-09 RX ORDER — DIPHENHYDRAMINE HYDROCHLORIDE 50 MG/ML
25 INJECTION INTRAMUSCULAR; INTRAVENOUS ONCE
OUTPATIENT
Start: 2023-09-13

## 2023-08-09 RX ADMIN — ACETAMINOPHEN 650 MG: 325 TABLET ORAL at 11:24:00

## 2023-08-09 RX ADMIN — DIPHENHYDRAMINE HCL 25 MG: 25 MG CAPSULE ORAL at 11:24:00

## 2023-08-09 NOTE — TELEPHONE ENCOUNTER
As directed by the APN, the [facility] called Magaly London, but no answer, so left a message reminding him to get his CardioMEMs reading completed today and try to be more consistent with getting readings. Magaly London was also reminded that the APN ordered labs at his last visit which are due to be drawn tomorrow. In a previous follow up call approximately 2 weeks ago, Magaly London stated that he did not want to get text messages to remind him to complete his CardioMEMs readings, because he already gets too many messages for other things.

## 2023-08-09 NOTE — PROGRESS NOTES
Education Record    Learner:  Patient    Disease / Diagnosis: Immune thrombocytopenic purpura    Barriers / Limitations:  None   Comments:    Method:  Brief focused and Reinforcement   Comments:    General Topics:  Plan of care reviewed and Fall risk and prevention   Comments:    Outcome:  Shows understanding   Comments:

## 2023-08-10 ENCOUNTER — HOSPITAL ENCOUNTER (OUTPATIENT)
Dept: CARDIOLOGY CLINIC | Facility: HOSPITAL | Age: 78
Discharge: HOME | End: 2023-08-10
Attending: NURSE PRACTITIONER
Payer: MEDICARE

## 2023-08-10 ENCOUNTER — TELEPHONE (OUTPATIENT)
Dept: CARDIOLOGY CLINIC | Facility: HOSPITAL | Age: 78
End: 2023-08-10

## 2023-08-10 ENCOUNTER — LAB ENCOUNTER (OUTPATIENT)
Dept: LAB | Age: 78
End: 2023-08-10
Attending: NURSE PRACTITIONER
Payer: MEDICARE

## 2023-08-10 DIAGNOSIS — I50.32 CHRONIC DIASTOLIC CONGESTIVE HEART FAILURE (HCC): Primary | ICD-10-CM

## 2023-08-10 DIAGNOSIS — E87.5 HYPERKALEMIA: ICD-10-CM

## 2023-08-10 LAB
ANION GAP SERPL CALC-SCNC: 2 MMOL/L (ref 0–18)
BUN BLD-MCNC: 47 MG/DL (ref 7–18)
CALCIUM BLD-MCNC: 8.8 MG/DL (ref 8.5–10.1)
CHLORIDE SERPL-SCNC: 107 MMOL/L (ref 98–112)
CO2 SERPL-SCNC: 29 MMOL/L (ref 21–32)
CREAT BLD-MCNC: 2.06 MG/DL
EGFRCR SERPLBLD CKD-EPI 2021: 32 ML/MIN/1.73M2 (ref 60–?)
FASTING STATUS PATIENT QL REPORTED: NO
GLUCOSE BLD-MCNC: 101 MG/DL (ref 70–99)
OSMOLALITY SERPL CALC.SUM OF ELEC: 298 MOSM/KG (ref 275–295)
POTASSIUM SERPL-SCNC: 4.9 MMOL/L (ref 3.5–5.1)
SODIUM SERPL-SCNC: 138 MMOL/L (ref 136–145)

## 2023-08-10 PROCEDURE — 36415 COLL VENOUS BLD VENIPUNCTURE: CPT

## 2023-08-10 PROCEDURE — 93264 REM MNTR WRLS P-ART PRS SNR: CPT | Performed by: NURSE PRACTITIONER

## 2023-08-10 PROCEDURE — 80048 BASIC METABOLIC PNL TOTAL CA: CPT

## 2023-08-10 NOTE — PROGRESS NOTES
[facility] for [address] pulmonary artery pressure sensor    Remote Monitoring Report Summary    8/10/2023    Iggy Davila    : 3/17/1945    Reporting Period-  7/3-8/10/23    Diagnosis - HFpEF    Provider- SHERIDAN Webb       The CardioMEMS report for the above date has been reviewed with the following results:        [address] hemodynamics at time of CardioMEMS impant:    PA 42/18/27  Wedge 8    Acceptable range for Iggy Davila      PAD range 18-24mmhg     Remote monitoring documentation for the past ~30 days:    08- PAD 32mmhg and trending up. Will call him. 2023 not done since last wed, will call him.  2023 PAD stable. CTM  2023 Elevated and stable. He declined earlier follow up, doing well. [facility]  2023 Will contact to arrange appointment for IV diuretics. Oral increase recently and PAD still high. 07- PAD improving on increased dose Bumex, will follow. 2023 Last reading 25 mmhg, acceptable.

## 2023-08-11 ENCOUNTER — TELEPHONE (OUTPATIENT)
Dept: CARDIOLOGY CLINIC | Facility: HOSPITAL | Age: 78
End: 2023-08-11

## 2023-08-11 NOTE — TELEPHONE ENCOUNTER
[facility] called Luiza Holloway to get an update on his symptoms, because his CardioMEMs readings are the highest they have ever been at 32 for the last 2 readings. Luiza Holloway states that he has no symptoms of edema, bloating or shortness of breath. Luiza Holloway reports having fatigue that has been present for quite some time and not recent. Luiza Holloway states that he saw his recent lab results and got the message from the [place] [facility] [address] regarding his lab results. Luiza Holloway was informed that SHERIDAN Amezquita in the clinic, recommends he itake an extra 1 mg of bumex today and increase his bumex to 2 mg BID starting tomorrow, due to his elevated CardioMEMs readings. Sina Brownupe back understanding and intent on taking an extra 1 mg of bumex today.

## 2023-08-11 NOTE — PROGRESS NOTES
[facility] called Vera Valencia, but no answer, so left a message that the APN reviewed his labs, which his kidney function is stable and his potassium has improved, so he should continue with current medications. Instructed Bill to call for any questions.

## 2023-08-21 ENCOUNTER — TELEPHONE (OUTPATIENT)
Dept: CARDIOLOGY CLINIC | Facility: HOSPITAL | Age: 78
End: 2023-08-21

## 2023-08-21 NOTE — TELEPHONE ENCOUNTER
Mr. Katie Patel returned my call from earlier regarding elevated PAD. He was out all day and has been very busy. He thinks he is doing well, other than fatigue. Unfortunately, the appointment I offered him is no longer available on  Friday. I offered to move up his appointment to Monday from Thursday but he wants to keep it on Thursday for now. He was encouraged to continue to transmit MEMs readings in the meantime and if they start to rise he will come in earlier.

## 2023-08-21 NOTE — TELEPHONE ENCOUNTER
Please see if he is willing to come in on Friday, MEMs is still elevated. I would like to move up his appointment.

## 2023-08-30 DIAGNOSIS — I50.32 CHRONIC HEART FAILURE WITH PRESERVED EJECTION FRACTION (HFPEF) (HCC): Primary | ICD-10-CM

## 2023-08-31 ENCOUNTER — HOSPITAL ENCOUNTER (OUTPATIENT)
Dept: LAB | Facility: HOSPITAL | Age: 78
Discharge: HOME | End: 2023-08-31
Attending: NURSE PRACTITIONER
Payer: MEDICARE

## 2023-08-31 ENCOUNTER — HOSPITAL ENCOUNTER (OUTPATIENT)
Dept: CARDIOLOGY CLINIC | Facility: HOSPITAL | Age: 78
Discharge: HOME | End: 2023-08-31
Attending: NURSE PRACTITIONER
Payer: MEDICARE

## 2023-08-31 VITALS
HEART RATE: 71 BPM | DIASTOLIC BLOOD PRESSURE: 51 MMHG | OXYGEN SATURATION: 100 % | BODY MASS INDEX: 27 KG/M2 | WEIGHT: 194.19 LBS | RESPIRATION RATE: 17 BRPM | SYSTOLIC BLOOD PRESSURE: 104 MMHG

## 2023-08-31 DIAGNOSIS — N18.31 STAGE 3A CHRONIC KIDNEY DISEASE (HCC): ICD-10-CM

## 2023-08-31 DIAGNOSIS — E87.6 HYPOPOTASSEMIA: ICD-10-CM

## 2023-08-31 DIAGNOSIS — I50.812 CHRONIC RIGHT-SIDED CONGESTIVE HEART FAILURE (HCC): Primary | ICD-10-CM

## 2023-08-31 DIAGNOSIS — I10 BENIGN ESSENTIAL HYPERTENSION: ICD-10-CM

## 2023-08-31 DIAGNOSIS — I48.19 PERSISTENT ATRIAL FIBRILLATION (HCC): ICD-10-CM

## 2023-08-31 DIAGNOSIS — I50.32 CHRONIC HEART FAILURE WITH PRESERVED EJECTION FRACTION (HFPEF) (HCC): ICD-10-CM

## 2023-08-31 DIAGNOSIS — I43 CARDIOMYOPATHY AS MANIFESTATION OF UNDERLYING DISEASE (HCC): ICD-10-CM

## 2023-08-31 LAB
ANION GAP SERPL CALC-SCNC: 5 MMOL/L (ref 0–18)
BUN BLD-MCNC: 45 MG/DL (ref 7–18)
CALCIUM BLD-MCNC: 9.3 MG/DL (ref 8.5–10.1)
CHLORIDE SERPL-SCNC: 106 MMOL/L (ref 98–112)
CO2 SERPL-SCNC: 28 MMOL/L (ref 21–32)
CREAT BLD-MCNC: 1.93 MG/DL
EGFRCR SERPLBLD CKD-EPI 2021: 35 ML/MIN/1.73M2 (ref 60–?)
FASTING STATUS PATIENT QL REPORTED: NO
GLUCOSE BLD-MCNC: 96 MG/DL (ref 70–99)
OSMOLALITY SERPL CALC.SUM OF ELEC: 299 MOSM/KG (ref 275–295)
POTASSIUM SERPL-SCNC: 4.7 MMOL/L (ref 3.5–5.1)
SODIUM SERPL-SCNC: 139 MMOL/L (ref 136–145)

## 2023-08-31 PROCEDURE — 99215 OFFICE O/P EST HI 40 MIN: CPT | Performed by: NURSE PRACTITIONER

## 2023-08-31 PROCEDURE — 80048 BASIC METABOLIC PNL TOTAL CA: CPT | Performed by: NURSE PRACTITIONER

## 2023-08-31 PROCEDURE — 36415 COLL VENOUS BLD VENIPUNCTURE: CPT | Performed by: NURSE PRACTITIONER

## 2023-08-31 RX ORDER — FEXOFENADINE HCL 60 MG/1
60 TABLET, FILM COATED ORAL DAILY
COMMUNITY

## 2023-09-01 ENCOUNTER — PATIENT OUTREACH (OUTPATIENT)
Dept: CASE MANAGEMENT | Age: 78
End: 2023-09-01

## 2023-09-01 DIAGNOSIS — N18.31 STAGE 3A CHRONIC KIDNEY DISEASE (HCC): ICD-10-CM

## 2023-09-01 DIAGNOSIS — C44.219 BASAL CELL CARCINOMA (BCC) OF SKIN OF LEFT EAR: ICD-10-CM

## 2023-09-01 DIAGNOSIS — I48.19 PERSISTENT ATRIAL FIBRILLATION (HCC): ICD-10-CM

## 2023-09-01 DIAGNOSIS — Z87.19 HISTORY OF PANCREATITIS: ICD-10-CM

## 2023-09-01 DIAGNOSIS — I10 BENIGN ESSENTIAL HYPERTENSION: ICD-10-CM

## 2023-09-01 DIAGNOSIS — I43 CARDIOMYOPATHY AS MANIFESTATION OF UNDERLYING DISEASE (HCC): ICD-10-CM

## 2023-09-01 DIAGNOSIS — I27.20 PULMONARY HYPERTENSION (HCC): ICD-10-CM

## 2023-09-01 DIAGNOSIS — M10.9 GOUT, UNSPECIFIED CAUSE, UNSPECIFIED CHRONICITY, UNSPECIFIED SITE: ICD-10-CM

## 2023-09-01 DIAGNOSIS — G47.33 OSA (OBSTRUCTIVE SLEEP APNEA): Primary | ICD-10-CM

## 2023-09-05 DIAGNOSIS — K22.70 BARRETT'S ESOPHAGUS WITHOUT DYSPLASIA: ICD-10-CM

## 2023-09-05 RX ORDER — OMEPRAZOLE 20 MG/1
20 CAPSULE, DELAYED RELEASE ORAL 2 TIMES DAILY
Qty: 180 CAPSULE | Refills: 1 | Status: SHIPPED | OUTPATIENT
Start: 2023-09-05

## 2023-09-05 NOTE — TELEPHONE ENCOUNTER
Refill request for:    Requested Prescriptions     Pending Prescriptions Disp Refills    OMEPRAZOLE 20 MG Oral Capsule Delayed Release [Pharmacy Med Name: OMEPRAZOLE DR 20 MG CAPSULE] 180 capsule 1     Sig: TAKE 1 CAPSULE BY MOUTH TWICE A DAY        Last Prescribed Quantity Refills   3/2/23 180 1     LOV 5/5/2023     Patient was asked to follow-up in: 6 months    Appointment scheduled: 11/3/2023 Jocelyne Mcconnell MD    Medication not on protocol.      # 180 with 1 refills routed to Ivette Hinkle MD for review

## 2023-09-11 ENCOUNTER — HOSPITAL ENCOUNTER (OUTPATIENT)
Dept: CARDIOLOGY CLINIC | Facility: HOSPITAL | Age: 78
End: 2023-09-11
Attending: NURSE PRACTITIONER
Payer: MEDICARE

## 2023-09-11 DIAGNOSIS — I50.32 CHRONIC DIASTOLIC HEART FAILURE (HCC): Primary | ICD-10-CM

## 2023-09-11 PROCEDURE — 93264 REM MNTR WRLS P-ART PRS SNR: CPT | Performed by: NURSE PRACTITIONER

## 2023-09-11 NOTE — PROGRESS NOTES
[facility] for [address] pulmonary artery pressure sensor    Remote Monitoring Report Summary    2023    Jalen Pac Simeral    : 3/17/1945    Reporting Period-  23-23    Diagnosis - HFpEF    Provider- SHERIDAN Shields       The CardioMEMS report for the above date has been reviewed with the following results:        [facility] hemodynamics at time of CardioMEMS impant:    PA 42/18/27  Wedge 8    Acceptable range for Jalen Pac Simeral      PA Diastolic Pressure Goal: 22 mmHg, Lower 19 mmHg, Upper 25 mmHg    Remote monitoring documentation for the past ~30 days:    2023 PAD stable. CTM  2023 PAD stable. CTM  2023 PAD stable and elevated. Doing well. Will assess at follow up on Friday as scheduled. 2023 PAD remains elevated on increased dose of Bumex. Needs to be seen. Will reach out to move up appointment. 2023 PAD improved.  CTM

## 2023-09-13 ENCOUNTER — OFFICE VISIT (OUTPATIENT)
Dept: HEMATOLOGY/ONCOLOGY | Facility: HOSPITAL | Age: 78
End: 2023-09-13
Attending: INTERNAL MEDICINE
Payer: MEDICARE

## 2023-09-13 VITALS
HEART RATE: 88 BPM | HEIGHT: 70.98 IN | OXYGEN SATURATION: 99 % | SYSTOLIC BLOOD PRESSURE: 96 MMHG | TEMPERATURE: 99 F | WEIGHT: 194.38 LBS | BODY MASS INDEX: 27.21 KG/M2 | DIASTOLIC BLOOD PRESSURE: 63 MMHG

## 2023-09-13 DIAGNOSIS — D69.6 THROMBOCYTOPENIA (HCC): ICD-10-CM

## 2023-09-13 DIAGNOSIS — D69.3 IMMUNE THROMBOCYTOPENIC PURPURA (HCC): Primary | ICD-10-CM

## 2023-09-13 PROCEDURE — 96365 THER/PROPH/DIAG IV INF INIT: CPT

## 2023-09-13 PROCEDURE — 96366 THER/PROPH/DIAG IV INF ADDON: CPT

## 2023-09-13 RX ORDER — METHYLPREDNISOLONE SODIUM SUCCINATE 40 MG/ML
40 INJECTION, POWDER, LYOPHILIZED, FOR SOLUTION INTRAMUSCULAR; INTRAVENOUS ONCE
OUTPATIENT
Start: 2023-10-18

## 2023-09-13 RX ORDER — METHYLPREDNISOLONE SODIUM SUCCINATE 125 MG/2ML
125 INJECTION, POWDER, LYOPHILIZED, FOR SOLUTION INTRAMUSCULAR; INTRAVENOUS ONCE
OUTPATIENT
Start: 2023-10-18

## 2023-09-13 RX ORDER — ACETAMINOPHEN 325 MG/1
650 TABLET ORAL ONCE
OUTPATIENT
Start: 2023-10-18

## 2023-09-13 RX ORDER — DIPHENHYDRAMINE HCL 25 MG
25 CAPSULE ORAL ONCE
Status: COMPLETED | OUTPATIENT
Start: 2023-09-13 | End: 2023-09-13

## 2023-09-13 RX ORDER — ACETAMINOPHEN 325 MG/1
650 TABLET ORAL ONCE
Status: COMPLETED | OUTPATIENT
Start: 2023-09-13 | End: 2023-09-13

## 2023-09-13 RX ORDER — DIPHENHYDRAMINE HYDROCHLORIDE 50 MG/ML
25 INJECTION INTRAMUSCULAR; INTRAVENOUS ONCE
OUTPATIENT
Start: 2023-10-18

## 2023-09-13 RX ORDER — FAMOTIDINE 10 MG/ML
20 INJECTION, SOLUTION INTRAVENOUS ONCE
OUTPATIENT
Start: 2023-10-18

## 2023-09-13 RX ORDER — DIPHENHYDRAMINE HCL 25 MG
25 CAPSULE ORAL ONCE
OUTPATIENT
Start: 2023-10-18

## 2023-09-13 RX ORDER — MEPERIDINE HYDROCHLORIDE 25 MG/ML
25 INJECTION INTRAMUSCULAR; INTRAVENOUS; SUBCUTANEOUS ONCE
OUTPATIENT
Start: 2023-10-18

## 2023-09-13 RX ADMIN — ACETAMINOPHEN 650 MG: 325 TABLET ORAL at 11:23:00

## 2023-09-13 RX ADMIN — DIPHENHYDRAMINE HCL 25 MG: 25 MG CAPSULE ORAL at 11:23:00

## 2023-09-27 DIAGNOSIS — I50.32 CHRONIC DIASTOLIC HEART FAILURE (HCC): Primary | ICD-10-CM

## 2023-09-29 ENCOUNTER — HOSPITAL ENCOUNTER (OUTPATIENT)
Dept: LAB | Facility: HOSPITAL | Age: 78
Discharge: HOME | End: 2023-09-29
Attending: NURSE PRACTITIONER
Payer: MEDICARE

## 2023-09-29 ENCOUNTER — HOSPITAL ENCOUNTER (OUTPATIENT)
Dept: CARDIOLOGY CLINIC | Facility: HOSPITAL | Age: 78
Discharge: HOME | End: 2023-09-29
Attending: NURSE PRACTITIONER
Payer: MEDICARE

## 2023-09-29 VITALS
SYSTOLIC BLOOD PRESSURE: 105 MMHG | DIASTOLIC BLOOD PRESSURE: 51 MMHG | BODY MASS INDEX: 27 KG/M2 | HEART RATE: 68 BPM | RESPIRATION RATE: 17 BRPM | OXYGEN SATURATION: 100 % | WEIGHT: 193 LBS

## 2023-09-29 DIAGNOSIS — I50.32 CHRONIC DIASTOLIC HEART FAILURE (HCC): ICD-10-CM

## 2023-09-29 DIAGNOSIS — N18.31 STAGE 3A CHRONIC KIDNEY DISEASE (HCC): ICD-10-CM

## 2023-09-29 DIAGNOSIS — I50.812 CHRONIC RIGHT-SIDED CONGESTIVE HEART FAILURE (HCC): Primary | ICD-10-CM

## 2023-09-29 DIAGNOSIS — I48.19 PERSISTENT ATRIAL FIBRILLATION (HCC): ICD-10-CM

## 2023-09-29 DIAGNOSIS — I27.20 PULMONARY HYPERTENSION (HCC): ICD-10-CM

## 2023-09-29 LAB
ANION GAP SERPL CALC-SCNC: 8 MMOL/L (ref 0–18)
BUN BLD-MCNC: 66 MG/DL (ref 7–18)
CALCIUM BLD-MCNC: 9.1 MG/DL (ref 8.5–10.1)
CHLORIDE SERPL-SCNC: 104 MMOL/L (ref 98–112)
CO2 SERPL-SCNC: 27 MMOL/L (ref 21–32)
CREAT BLD-MCNC: 2.08 MG/DL
EGFRCR SERPLBLD CKD-EPI 2021: 32 ML/MIN/1.73M2 (ref 60–?)
FASTING STATUS PATIENT QL REPORTED: NO
GLUCOSE BLD-MCNC: 83 MG/DL (ref 70–99)
NT-PROBNP SERPL-MCNC: 2177 PG/ML (ref ?–450)
OSMOLALITY SERPL CALC.SUM OF ELEC: 306 MOSM/KG (ref 275–295)
POTASSIUM SERPL-SCNC: 4.5 MMOL/L (ref 3.5–5.1)
SODIUM SERPL-SCNC: 139 MMOL/L (ref 136–145)

## 2023-09-29 PROCEDURE — 83880 ASSAY OF NATRIURETIC PEPTIDE: CPT | Performed by: NURSE PRACTITIONER

## 2023-09-29 PROCEDURE — 80048 BASIC METABOLIC PNL TOTAL CA: CPT | Performed by: NURSE PRACTITIONER

## 2023-09-29 PROCEDURE — 99215 OFFICE O/P EST HI 40 MIN: CPT | Performed by: NURSE PRACTITIONER

## 2023-09-29 PROCEDURE — 36415 COLL VENOUS BLD VENIPUNCTURE: CPT | Performed by: NURSE PRACTITIONER

## 2023-09-29 RX ORDER — BUMETANIDE 1 MG/1
TABLET ORAL
COMMUNITY
Start: 2023-09-29

## 2023-09-29 RX ORDER — CETIRIZINE HYDROCHLORIDE 10 MG/1
10 CAPSULE, LIQUID FILLED ORAL DAILY
COMMUNITY
Start: 2023-09-29 | End: 2023-09-29

## 2023-09-29 RX ORDER — CETIRIZINE HYDROCHLORIDE 5 MG/1
5 TABLET ORAL DAILY
COMMUNITY

## 2023-09-29 NOTE — PATIENT INSTRUCTIONS
Heart Failure Discharge Instructions      Activity: Regular exercise and activity is important for your overall health and to help keep your heart strong and functioning as well as possible. Walk at a slow to moderate pace for 15-20 minutes 3-5 days per week. Follow these instructions every day to keep yourself in the [address] you are feeling well and your symptoms are under control                                    Medications  Take your medication every day as instructed  Do not stop taking your medicine or change the amount you are taking without instructions from your doctor or nurse  Do Not take non-steroidal antiinflammatory drugs such as ibuprofen, aleve, advil, or motrin                                    Diet/Fluids  People with heart failure should eat less sodium (salt) and limit fluid. Sodium attracts water and makes the body hold fluid. This extra fluid makes the heart work harder and can worsen the symptoms of heart failure. Diet    2000 mg sodium daily  Fluid restriction    64 ounces daily  (8 oz. = 1 cup)                                     Body Weight  Weigh yourself every day before breakfast and write your weight down  Use the same scale and wear about the same amount of clothes each time  A sudden weight increase is due to fluid retention rather than fat                                         Activity  Pace your activities to avoid getting overtired  Take rest periods as needed  Elevate your feet to reduce ankle swelling when resting                             Signs of Worsening Heart Failure    You are entering the Yellow Zone - this is a warning zone    Call your doctor or nurse if you have any of these signs or symptoms:   You gain 2 or more pounds overnight or 3-5 pounds in 3-7 days  You have more trouble breathing  You get more tired with regular activity, or are limiting activity because of shortness of breath or fatigue  You are short of breath lying down, you need more pillows to breathe comfortably,  or wake up during the night short of breath  You urinate less often during the day and more often at night  You have a bloated feeling, upset stomach, loss of appetite, or your clothes are fitting tighter    GO TO THE EMERGENCY DEPARTMENT or CALL 911 IF: These are signs you are in the RED ZONE - THIS IS AN EMERGENCY  You have tightness or pain in your chest  You are extremely short of breath or can't catch your breath  You cough up frothy pink mucous  You feel confused or can't think clearly  You are traveling and develop symptoms of worsening heart failure     We respect everyone's time and availability. Please be aware that this is not a walk-in clinic and we require appointments in order to facilitate timely care for all patients. We ask you to arrive 30 minutes before your appointment to allow time for you to check-in and have your bloodwork drawn. Please understand if you are late for your appointment, you may be asked to reschedule. If possible, all attempts will be made to accommodate but realize this is no guarantee that this will always be available. We understand there are extenuating circumstances. If you need to cancel or reschedule your appointment, please call the [facility] within 24 hours at [phone]. Thank you for your cooperation, [facility] Staff. IF YOU HAVE QUESTIONS REGARDING YOUR BILL, FEEL FREE TO CONTACT [facility] PATIENT ACCOUNTS -[phone]. IF YOU NEED FINANCIAL ASSISTANCE, PLEASE CALL AN [facility] FINANCIAL COUNSELOR -[phone].              [facility]     [phone]

## 2023-09-29 NOTE — PROGRESS NOTES
Patient assessed. Patient denies bloating and shortness of breath. Patient with no lower extremity edema. Weight down 1 lb at 193.0 lbs. APN notified of all the above information. Labs ordered and drawn by [facility]. Reviewed allergies and list of current medications with patient and updated it in the Electronic Medical Record. Educated patient on low sodium diet and food choices, fluid restriction of 2 liters, and daily weights. Reviewed follow-up appointments and discharge Heart Failure instructions with patient. Patient verbalized an understanding. Patient to return to the clinic in 1 month.

## 2023-10-04 RX ORDER — ALLOPURINOL 300 MG/1
300 TABLET ORAL DAILY
Qty: 90 TABLET | Refills: 3 | OUTPATIENT
Start: 2023-10-04

## 2023-10-04 RX ORDER — PREDNISONE 5 MG/1
5 TABLET ORAL
Qty: 90 TABLET | Refills: 3 | Status: SHIPPED | OUTPATIENT
Start: 2023-10-04

## 2023-10-04 NOTE — TELEPHONE ENCOUNTER
Future Appointments   Date Time Provider Chrissie Aden   10/12/2023  5:30 PM HEART FAILURE APN 1 [facility] HF CLIN Fiorella Eaton   10/18/2023 11:00 AM [facility] [place] RN4 [address] [facility]   10/30/2023  2:30 PM HEART FAILURE APN 1 [facility] HF CLIN Fiorella Eaton   11/7/2389 85:67 AM Loraine Browning MD EMGRHEUMHBSN EMG [place]   11/3/2023 11:15 AM Trevor Swan MD EMG 3 EMG [place]   11/13/2023  5:30 PM HEART FAILURE APN 1 [facility] HF CLIN Fiorella Loveon   12/14/2023  5:30 PM HEART FAILURE APN 1 [facility] HF CLIN Fiorella Loveon   1/19/2024 10:20 AM María Haider MD SGINP ECC SUB GI     LOV  8/1/2023      Last refill 10/13/2022  90 tabs, 3 refills

## 2023-10-12 ENCOUNTER — HOSPITAL ENCOUNTER (OUTPATIENT)
Dept: CARDIOLOGY CLINIC | Facility: HOSPITAL | Age: 78
Discharge: HOME | End: 2023-10-12
Attending: NURSE PRACTITIONER
Payer: MEDICARE

## 2023-10-12 DIAGNOSIS — I50.32 CHRONIC DIASTOLIC HEART FAILURE (HCC): Primary | ICD-10-CM

## 2023-10-12 PROCEDURE — 93264 REM MNTR WRLS P-ART PRS SNR: CPT | Performed by: NURSE PRACTITIONER

## 2023-10-12 NOTE — PROGRESS NOTES
[facility] for [address] pulmonary artery pressure sensor    Remote Monitoring Report Summary    10/12/2023    Rene Davila    : 3/17/1945    Reporting Period- -10/12/23    Diagnosis - HFpEF    Provider- SHERIDAN Martin       The CardioMEMS report for the above date has been reviewed with the following results:        [facility] hemodynamics at time of CardioMEMS impant:    PA   Wedge 8    Acceptable range for Rene Davila      PA Diastolic Pressure Goal: 22 mmHg, Lower 19 mmHg, Upper 25 mmHg     Remote monitoring documentation for the past ~30 days:    10- PAD stable. CT  10- PAD stable. [facility]  2023 Stable PAD. [facility]  2023 PAD stable.  [facility]

## 2023-10-16 ENCOUNTER — TELEPHONE (OUTPATIENT)
Dept: CARDIOLOGY CLINIC | Facility: HOSPITAL | Age: 78
End: 2023-10-16

## 2023-10-16 RX ORDER — ALLOPURINOL 300 MG/1
TABLET ORAL
Qty: 90 TABLET | Refills: 3 | Status: SHIPPED | OUTPATIENT
Start: 2023-10-16

## 2023-10-16 NOTE — TELEPHONE ENCOUNTER
Future Appointments   Date Time Provider Chrissie Burnetti   10/18/2023 11:00 AM [facility] [facility] [place] RN4 [address] [facility]   10/30/2023  2:30 PM HEART FAILURE APN 1 [facility] [facility] HF [facility] AT [facility]   03/3/2105 80:50 AM Colleen Negrete MD EMGRHEUMHBSN EMG [place]   11/3/2023 11:15 AM Cheri Yu MD EMG 3 EMG [place]   11/13/2023  5:30 PM HEART FAILURE APN 1 [address] [facility] HF [facility] AT [facility]   12/14/2023  5:30 PM HEART FAILURE APN 1 [address] [facility] HF [facility] AT [facility]   1/19/2024 10:20 AM Mathew Mascorro MD SGINP ECC SUB GI     Last office visit: 8/1/2023    Last fill: 10/13/2022 90 tab, 3 refills

## 2023-10-18 ENCOUNTER — OFFICE VISIT (OUTPATIENT)
Dept: HEMATOLOGY/ONCOLOGY | Facility: HOSPITAL | Age: 78
End: 2023-10-18
Attending: INTERNAL MEDICINE
Payer: MEDICARE

## 2023-10-18 VITALS
HEIGHT: 70.98 IN | DIASTOLIC BLOOD PRESSURE: 64 MMHG | BODY MASS INDEX: 27.6 KG/M2 | TEMPERATURE: 97 F | SYSTOLIC BLOOD PRESSURE: 96 MMHG | OXYGEN SATURATION: 98 % | HEART RATE: 86 BPM | WEIGHT: 197.19 LBS

## 2023-10-18 DIAGNOSIS — D69.3 IMMUNE THROMBOCYTOPENIC PURPURA (HCC): Primary | ICD-10-CM

## 2023-10-18 DIAGNOSIS — D69.6 THROMBOCYTOPENIA (HCC): ICD-10-CM

## 2023-10-18 PROCEDURE — 96365 THER/PROPH/DIAG IV INF INIT: CPT

## 2023-10-18 PROCEDURE — 96366 THER/PROPH/DIAG IV INF ADDON: CPT

## 2023-10-18 RX ORDER — DIPHENHYDRAMINE HCL 25 MG
25 CAPSULE ORAL ONCE
OUTPATIENT
Start: 2023-11-22

## 2023-10-18 RX ORDER — METHYLPREDNISOLONE SODIUM SUCCINATE 125 MG/2ML
125 INJECTION, POWDER, LYOPHILIZED, FOR SOLUTION INTRAMUSCULAR; INTRAVENOUS ONCE
OUTPATIENT
Start: 2023-11-22

## 2023-10-18 RX ORDER — FAMOTIDINE 10 MG/ML
20 INJECTION, SOLUTION INTRAVENOUS ONCE
OUTPATIENT
Start: 2023-11-22

## 2023-10-18 RX ORDER — METHYLPREDNISOLONE SODIUM SUCCINATE 40 MG/ML
40 INJECTION, POWDER, LYOPHILIZED, FOR SOLUTION INTRAMUSCULAR; INTRAVENOUS ONCE
OUTPATIENT
Start: 2023-11-22

## 2023-10-18 RX ORDER — DIPHENHYDRAMINE HYDROCHLORIDE 50 MG/ML
25 INJECTION INTRAMUSCULAR; INTRAVENOUS ONCE
OUTPATIENT
Start: 2023-11-22

## 2023-10-18 RX ORDER — ACETAMINOPHEN 325 MG/1
650 TABLET ORAL ONCE
Status: COMPLETED | OUTPATIENT
Start: 2023-10-18 | End: 2023-10-18

## 2023-10-18 RX ORDER — MEPERIDINE HYDROCHLORIDE 25 MG/ML
25 INJECTION INTRAMUSCULAR; INTRAVENOUS; SUBCUTANEOUS ONCE
OUTPATIENT
Start: 2023-11-22

## 2023-10-18 RX ORDER — DIPHENHYDRAMINE HCL 25 MG
25 CAPSULE ORAL ONCE
Status: COMPLETED | OUTPATIENT
Start: 2023-10-18 | End: 2023-10-18

## 2023-10-18 RX ORDER — ACETAMINOPHEN 325 MG/1
650 TABLET ORAL ONCE
OUTPATIENT
Start: 2023-11-22

## 2023-10-18 RX ADMIN — ACETAMINOPHEN 650 MG: 325 TABLET ORAL at 11:33:00

## 2023-10-18 RX ADMIN — DIPHENHYDRAMINE HCL 25 MG: 25 MG CAPSULE ORAL at 11:33:00

## 2023-10-18 NOTE — PROGRESS NOTES
Pt arrived amb for IVIG inf.  Given per MD/pharmacy directives. Pt ash well. Next appointment scheduled. Pt left amb without c/o.     Education Record    Learner:  Patient    Disease / Diagnosis: Immune thrombocytopenic purpura    Barriers / Limitations:  None    Method:  Brief focused discussion    General Topics:  Plan of care reviewed    Outcome:  Shows understanding

## 2023-10-27 DIAGNOSIS — I50.32 CHRONIC DIASTOLIC HEART FAILURE (HCC): Primary | ICD-10-CM

## 2023-10-30 ENCOUNTER — HOSPITAL ENCOUNTER (OUTPATIENT)
Dept: CARDIOLOGY CLINIC | Facility: HOSPITAL | Age: 78
End: 2023-10-30
Attending: NURSE PRACTITIONER
Payer: MEDICARE

## 2023-10-30 ENCOUNTER — HOSPITAL ENCOUNTER (OUTPATIENT)
Dept: LAB | Facility: HOSPITAL | Age: 78
Discharge: HOME | End: 2023-10-30
Attending: NURSE PRACTITIONER
Payer: MEDICARE

## 2023-10-30 VITALS
RESPIRATION RATE: 16 BRPM | DIASTOLIC BLOOD PRESSURE: 41 MMHG | HEART RATE: 96 BPM | WEIGHT: 199 LBS | SYSTOLIC BLOOD PRESSURE: 102 MMHG | BODY MASS INDEX: 28 KG/M2 | OXYGEN SATURATION: 99 %

## 2023-10-30 DIAGNOSIS — N18.32 STAGE 3B CHRONIC KIDNEY DISEASE (HCC): ICD-10-CM

## 2023-10-30 DIAGNOSIS — I27.20 PULMONARY HYPERTENSION (HCC): ICD-10-CM

## 2023-10-30 DIAGNOSIS — I48.21 PERMANENT ATRIAL FIBRILLATION (HCC): ICD-10-CM

## 2023-10-30 DIAGNOSIS — I50.32 CHRONIC DIASTOLIC HEART FAILURE (HCC): ICD-10-CM

## 2023-10-30 DIAGNOSIS — I50.32 CHRONIC DIASTOLIC HEART FAILURE (HCC): Primary | ICD-10-CM

## 2023-10-30 LAB
ANION GAP SERPL CALC-SCNC: 6 MMOL/L (ref 0–18)
BUN BLD-MCNC: 49 MG/DL (ref 7–18)
CALCIUM BLD-MCNC: 9.4 MG/DL (ref 8.5–10.1)
CHLORIDE SERPL-SCNC: 108 MMOL/L (ref 98–112)
CO2 SERPL-SCNC: 25 MMOL/L (ref 21–32)
CREAT BLD-MCNC: 1.89 MG/DL
EGFRCR SERPLBLD CKD-EPI 2021: 35 ML/MIN/1.73M2 (ref 60–?)
GLUCOSE BLD-MCNC: 101 MG/DL (ref 70–99)
OSMOLALITY SERPL CALC.SUM OF ELEC: 301 MOSM/KG (ref 275–295)
POTASSIUM SERPL-SCNC: 4.6 MMOL/L (ref 3.5–5.1)
SODIUM SERPL-SCNC: 139 MMOL/L (ref 136–145)

## 2023-10-30 PROCEDURE — 99215 OFFICE O/P EST HI 40 MIN: CPT | Performed by: NURSE PRACTITIONER

## 2023-10-30 PROCEDURE — 80048 BASIC METABOLIC PNL TOTAL CA: CPT | Performed by: NURSE PRACTITIONER

## 2023-10-30 PROCEDURE — 36415 COLL VENOUS BLD VENIPUNCTURE: CPT | Performed by: NURSE PRACTITIONER

## 2023-10-30 RX ORDER — BUMETANIDE 1 MG/1
1 TABLET ORAL DAILY
COMMUNITY

## 2023-10-30 RX ORDER — BUMETANIDE 0.25 MG/ML
2 INJECTION INTRAMUSCULAR; INTRAVENOUS ONCE
Status: COMPLETED | OUTPATIENT
Start: 2023-10-30 | End: 2023-10-30

## 2023-10-30 RX ADMIN — BUMETANIDE 2 MG: 0.25 INJECTION INTRAMUSCULAR; INTRAVENOUS at 15:15:00

## 2023-10-30 NOTE — PATIENT INSTRUCTIONS
Heart Failure Discharge Instructions      Activity: Regular exercise and activity is important for your overall health and to help keep your heart strong and functioning as well as possible. Walk at a slow to moderate pace for 15-20 minutes 3-5 days per week. Follow these instructions every day to keep yourself in the [address] you are feeling well and your symptoms are under control                                    Medications  Take your medication every day as instructed  Do not stop taking your medicine or change the amount you are taking without instructions from your doctor or nurse  Do Not take non-steroidal antiinflammatory drugs such as ibuprofen, aleve, advil, or motrin                                    Diet/Fluids  People with heart failure should eat less sodium (salt) and limit fluid. Sodium attracts water and makes the body hold fluid. This extra fluid makes the heart work harder and can worsen the symptoms of heart failure. Diet    2000 mg sodium daily  Fluid restriction    64 ounces daily  (8 oz. = 1 cup)                                     Body Weight  Weigh yourself every day before breakfast and write your weight down  Use the same scale and wear about the same amount of clothes each time  A sudden weight increase is due to fluid retention rather than fat                                         Activity  Pace your activities to avoid getting overtired  Take rest periods as needed  Elevate your feet to reduce ankle swelling when resting                             Signs of Worsening Heart Failure    You are entering the Yellow Zone - this is a warning zone    Call your doctor or nurse if you have any of these signs or symptoms:   You gain 2 or more pounds overnight or 3-5 pounds in 3-7 days  You have more trouble breathing  You get more tired with regular activity, or are limiting activity because of shortness of breath or fatigue  You are short of breath lying down, you need more pillows to breathe comfortably,  or wake up during the night short of breath  You urinate less often during the day and more often at night  You have a bloated feeling, upset stomach, loss of appetite, or your clothes are fitting tighter    GO TO THE EMERGENCY DEPARTMENT or CALL 911 IF: These are signs you are in the RED ZONE - THIS IS AN EMERGENCY  You have tightness or pain in your chest  You are extremely short of breath or can't catch your breath  You cough up frothy pink mucous  You feel confused or can't think clearly  You are traveling and develop symptoms of worsening heart failure     We respect everyone's time and availability. Please be aware that this is not a walk-in clinic and we require appointments in order to facilitate timely care for all patients. We ask you to arrive 30 minutes before your appointment to allow time for you to check-in and have your bloodwork drawn. Please understand if you are late for your appointment, you may be asked to reschedule. If possible, all attempts will be made to accommodate but realize this is no guarantee that this will always be available. We understand there are extenuating circumstances. If you need to cancel or reschedule your appointment, please call the [facility] within 24 hours at [phone]. Thank you for your cooperation, [facility] Staff. IF YOU HAVE QUESTIONS REGARDING YOUR BILL, FEEL FREE TO CONTACT [facility] PATIENT ACCOUNTS -[phone]. IF YOU NEED FINANCIAL ASSISTANCE, PLEASE CALL AN [facility] FINANCIAL COUNSELOR -[phone]. [facility]     [phone] Heart Failure Discharge Instructions      Activity: Regular exercise and activity is important for your overall health and to help keep your heart strong and functioning as well as possible. Walk at a slow to moderate pace for 15-20 minutes 3-5 days per week.      Follow these instructions every day to keep yourself in the Green Zone The Green Zone means you are feeling well and your symptoms are under control                                    Medications  Take your medication every day as instructed  Do not stop taking your medicine or change the amount you are taking without instructions from your doctor or nurse  Do Not take non-steroidal antiinflammatory drugs such as ibuprofen, aleve, advil, or motrin                                    Diet/Fluids  People with heart failure should eat less sodium (salt) and limit fluid. Sodium attracts water and makes the body hold fluid. This extra fluid makes the heart work harder and can worsen the symptoms of heart failure. Diet    2000 mg sodium daily  Fluid restriction    64 ounces daily  (8 oz. = 1 cup)                                     Body Weight  Weigh yourself every day before breakfast and write your weight down  Use the same scale and wear about the same amount of clothes each time  A sudden weight increase is due to fluid retention rather than fat                                         Activity  Pace your activities to avoid getting overtired  Take rest periods as needed  Elevate your feet to reduce ankle swelling when resting                             Signs of Worsening Heart Failure    You are entering the Yellow Zone - this is a warning zone    Call your doctor or nurse if you have any of these signs or symptoms: You gain 2 or more pounds overnight or 3-5 pounds in 3-7 days  You have more trouble breathing  You get more tired with regular activity, or are limiting activity because of shortness of breath or fatigue  You are short of breath lying down, you need more pillows to breathe comfortably,  or wake up during the night short of breath  You urinate less often during the day and more often at night  You have a bloated feeling, upset stomach, loss of appetite, or your clothes are fitting tighter    GO TO THE EMERGENCY DEPARTMENT or CALL 911 IF:     These are signs you are in the RED ZONE - THIS IS AN EMERGENCY  You have tightness or pain in your chest  You are extremely short of breath or can't catch your breath  You cough up frothy pink mucous  You feel confused or can't think clearly  You are traveling and develop symptoms of worsening heart failure     We respect everyone's time and availability. Please be aware that this is not a walk-in clinic and we require appointments in order to facilitate timely care for all patients. We ask you to arrive 30 minutes before your appointment to allow time for you to check-in and have your bloodwork drawn. Please understand if you are late for your appointment, you may be asked to reschedule. If possible, all attempts will be made to accommodate but realize this is no guarantee that this will always be available. We understand there are extenuating circumstances. If you need to cancel or reschedule your appointment, please call the [facility] [employer] within 24 hours at [phone]. Thank you for your cooperation, [facility] Staff. IF YOU HAVE QUESTIONS REGARDING YOUR BILL, FEEL FREE TO CONTACT [facility] PATIENT ACCOUNTS -[phone]. IF YOU NEED FINANCIAL ASSISTANCE, PLEASE CALL AN [facility] FINANCIAL COUNSELOR -[phone].              [facility] [employer] [facility]     [phone]

## 2023-10-30 NOTE — PROGRESS NOTES
Pt. Assessed. No signs or symptoms of shortness of breath, fatigue, chest pain or edema noted. Weight  199 lbs. Reviewed current list of patient's allergies and medication; updated the Electronic Medical Record. Labs ordered to assess kidney function and drawn by [facility]. Reviewed follow-up appointment and Heart Failure discharge instructions with patient. Patient verbalized an understanding.

## 2023-11-01 ENCOUNTER — OFFICE VISIT (OUTPATIENT)
Dept: RHEUMATOLOGY | Facility: CLINIC | Age: 78
End: 2023-11-01
Payer: MEDICARE

## 2023-11-01 VITALS
TEMPERATURE: 98 F | BODY MASS INDEX: 27.58 KG/M2 | OXYGEN SATURATION: 98 % | SYSTOLIC BLOOD PRESSURE: 108 MMHG | RESPIRATION RATE: 20 BRPM | WEIGHT: 197 LBS | HEIGHT: 71 IN | HEART RATE: 77 BPM | DIASTOLIC BLOOD PRESSURE: 56 MMHG

## 2023-11-01 DIAGNOSIS — M33.20 POLYMYOSITIS (HCC): Chronic | ICD-10-CM

## 2023-11-01 DIAGNOSIS — D69.3 IMMUNE THROMBOCYTOPENIC PURPURA (HCC): ICD-10-CM

## 2023-11-01 DIAGNOSIS — M35.1 MCTD (MIXED CONNECTIVE TISSUE DISEASE) (HCC): Primary | ICD-10-CM

## 2023-11-01 DIAGNOSIS — I50.812 CHRONIC RIGHT-SIDED CONGESTIVE HEART FAILURE (HCC): ICD-10-CM

## 2023-11-01 DIAGNOSIS — N18.31 STAGE 3A CHRONIC KIDNEY DISEASE (HCC): ICD-10-CM

## 2023-11-01 DIAGNOSIS — D69.6 THROMBOCYTOPENIA (HCC): ICD-10-CM

## 2023-11-01 PROCEDURE — 99214 OFFICE O/P EST MOD 30 MIN: CPT | Performed by: INTERNAL MEDICINE

## 2023-11-01 PROCEDURE — 1126F AMNT PAIN NOTED NONE PRSNT: CPT | Performed by: INTERNAL MEDICINE

## 2023-11-01 NOTE — PATIENT INSTRUCTIONS
Continue IV IG monthly. .  Prednisone 7.5 mg per day. Continue heart failure clinic visits. Kidney insufficieny present. Have CBC checked. Return to office 3 months.

## 2023-11-02 ENCOUNTER — PATIENT OUTREACH (OUTPATIENT)
Dept: CASE MANAGEMENT | Age: 78
End: 2023-11-02

## 2023-11-02 DIAGNOSIS — I10 BENIGN ESSENTIAL HYPERTENSION: ICD-10-CM

## 2023-11-02 DIAGNOSIS — I48.19 PERSISTENT ATRIAL FIBRILLATION (HCC): ICD-10-CM

## 2023-11-02 DIAGNOSIS — C44.219 BASAL CELL CARCINOMA (BCC) OF SKIN OF LEFT EAR: Primary | ICD-10-CM

## 2023-11-02 DIAGNOSIS — N18.31 STAGE 3A CHRONIC KIDNEY DISEASE (HCC): ICD-10-CM

## 2023-11-02 DIAGNOSIS — Z87.19 HISTORY OF PANCREATITIS: ICD-10-CM

## 2023-11-02 DIAGNOSIS — M10.9 GOUT, UNSPECIFIED CAUSE, UNSPECIFIED CHRONICITY, UNSPECIFIED SITE: ICD-10-CM

## 2023-11-02 DIAGNOSIS — I27.20 PULMONARY HYPERTENSION (HCC): ICD-10-CM

## 2023-11-02 DIAGNOSIS — I50.812 CHRONIC RIGHT-SIDED CONGESTIVE HEART FAILURE (HCC): ICD-10-CM

## 2023-11-02 DIAGNOSIS — G47.33 OSA (OBSTRUCTIVE SLEEP APNEA): ICD-10-CM

## 2023-11-02 DIAGNOSIS — Z95.818 PRESENCE OF WATCHMAN LEFT ATRIAL APPENDAGE CLOSURE DEVICE: ICD-10-CM

## 2023-11-02 NOTE — PROGRESS NOTES
Spoke to Prince chung for CCM. Updates to patient care team/comments: UTD  Patient reported changes in medications: UTD  Med Adherence  Comment: Pt taking medications as prescribed    Health Maintenance: Colorectal Cancer Screening due on 05/01/2023  Influenza Vaccine(1) due on 10/01/2023  Annual Physical due on 01/05/2024  Annual Depression Screening Completed  Fall Risk Screening (Annual) Completed  Pneumococcal Vaccine: 65+ Years Completed  Zoster Vaccines Completed    Patient updates/concerns:    Pt is continuing to manage some bowel issues. Pt states that over the past several weeks he was having formed yet very frequent stools. Pt states that this type of episode occurred years ago and was attributed to the lupous. Pt states that today his stools have become very loose and remained frequent. Pt has been taking both metamucil or immodium. Pt has an appointment with pcp tomorrow and [facility] in January. Pt states that he feels comfortable in his ability to manage situation until pcp visit. Pt states he is not having any cramping abdominal pain, or fevers. Pt declined [facility] assistance in reaching out to a nurse to discuss. Pt states his condition has not kept him from attending sessions at heart failure clinic. Pt is very diligent in remaining compliant to instructions from cardiac rehab. Pt does not need to use assistive device and feels confident in his balance and gait. Reviewed rx with pt. Pt wanted to ensure that bumex was noted correctly. [facility] confirmed it was accurate. Reviewed dr Casper Spine instructions. [facility] sent pt hard copy order of cbc as reminder to have this completed. Pt already has 3 month follow up scheduled with specialist.    Pt received all of his vaccines Covid, flu and rsv [facility] [place]   Pt takes his bp at home regularly. Pt states it has been running low on occasion.  Pt is very careful about hypotensive events and makes sure to get up slowly, stand up slowly and walk deliberately. Goals/Action Plan: Active goal from previous outreach: exercise    Patient reported progress towards goals: pt participates in cardiac rehab and will be doing that indefinitely, pt performs routines at home               - What: walking           - Where/When/How: When weather permits pt tries to walk at least 1 mile a day. Patient Reported Barriers and Concerns: n/a                   - Plan for overcoming barriers: none    Care Managers Interventions: mail hard copy labs reminder    Future Appointments:   Future Appointments   Date Time Provider Chrissie Aden   11/3/2023 11:15 AM Drea Villagomez MD EMG 3 EM[facility] [place]   11/9/2023  4:00 PM HEART FAILURE APN 1 [facility] HF [facility] AT [facility]   11/13/2023  5:30 PM HEART FAILURE APN 1 [facility] AT [facility]   11/22/2023 11:00 AM [place] [address] [place] [address]   12/14/2023  5:30 PM HEART FAILURE APN 1 [facility]   1/19/2024 10:20 AM Jessica Page MD SGINP ECC SUB GI   0/8/4731  4:49 PM Garcia Ojeda MD [id number] EMG [place]         Next Care Manager Follow Up Date: one month    Reason For Follow Up: review progress and or barriers towards patient's goals. Time Spent This Encounter Total: 28 min medical record review, telephone communication, care plan updates where needed, education, goals, and action plan recreation/update. Provided acknowledgment and validation to patient's concerns.    Monthly Minute Total including today: 28  Physical assessment, complete health history, and need for CCM established by Bina Leal MD.

## 2023-11-02 NOTE — ASSESSMENT & PLAN NOTE
BP shows good control with last BP of 108/56. Continue lifestyle changes, diet, exercise and weight loss. Last K was 4.6 done on 10/30/2023. Last Cr was 1.89 done on 10/30/2023. Hypertension meds include bumetanide 1 MG Oral Tab [[id number]], spironolactone 25 MG Oral Tab [[id number]].

## 2023-11-03 ENCOUNTER — OFFICE VISIT (OUTPATIENT)
Dept: FAMILY MEDICINE CLINIC | Facility: CLINIC | Age: 78
End: 2023-11-03
Payer: MEDICARE

## 2023-11-03 VITALS
DIASTOLIC BLOOD PRESSURE: 66 MMHG | HEART RATE: 76 BPM | SYSTOLIC BLOOD PRESSURE: 94 MMHG | BODY MASS INDEX: 27.63 KG/M2 | HEIGHT: 71 IN | RESPIRATION RATE: 14 BRPM | WEIGHT: 197.38 LBS

## 2023-11-03 DIAGNOSIS — K59.1 FUNCTIONAL DIARRHEA: Primary | ICD-10-CM

## 2023-11-03 DIAGNOSIS — I10 BENIGN ESSENTIAL HYPERTENSION: ICD-10-CM

## 2023-11-03 DIAGNOSIS — I27.20 PULMONARY HYPERTENSION (HCC): ICD-10-CM

## 2023-11-03 DIAGNOSIS — I48.19 PERSISTENT ATRIAL FIBRILLATION (HCC): ICD-10-CM

## 2023-11-03 DIAGNOSIS — K22.70 BARRETT'S ESOPHAGUS WITHOUT DYSPLASIA: ICD-10-CM

## 2023-11-03 DIAGNOSIS — J43.2 CENTRILOBULAR EMPHYSEMA (HCC): ICD-10-CM

## 2023-11-03 PROCEDURE — 99214 OFFICE O/P EST MOD 30 MIN: CPT | Performed by: FAMILY MEDICINE

## 2023-11-06 ENCOUNTER — TELEPHONE (OUTPATIENT)
Dept: CARDIOLOGY CLINIC | Facility: HOSPITAL | Age: 78
End: 2023-11-06

## 2023-11-07 DIAGNOSIS — I50.32 CHRONIC DIASTOLIC HEART FAILURE (HCC): Primary | ICD-10-CM

## 2023-11-09 ENCOUNTER — HOSPITAL ENCOUNTER (OUTPATIENT)
Dept: CARDIOLOGY CLINIC | Facility: HOSPITAL | Age: 78
Discharge: HOME | End: 2023-11-09
Attending: NURSE PRACTITIONER
Payer: MEDICARE

## 2023-11-09 ENCOUNTER — HOSPITAL ENCOUNTER (OUTPATIENT)
Dept: LAB | Facility: HOSPITAL | Age: 78
Discharge: HOME | End: 2023-11-09
Attending: NURSE PRACTITIONER
Payer: MEDICARE

## 2023-11-09 VITALS
RESPIRATION RATE: 21 BRPM | BODY MASS INDEX: 27 KG/M2 | WEIGHT: 196.63 LBS | HEART RATE: 56 BPM | OXYGEN SATURATION: 100 % | SYSTOLIC BLOOD PRESSURE: 92 MMHG | DIASTOLIC BLOOD PRESSURE: 46 MMHG

## 2023-11-09 DIAGNOSIS — I50.32 CHRONIC DIASTOLIC HEART FAILURE (HCC): ICD-10-CM

## 2023-11-09 DIAGNOSIS — I50.812 CHRONIC RIGHT-SIDED CONGESTIVE HEART FAILURE (HCC): ICD-10-CM

## 2023-11-09 DIAGNOSIS — I10 BENIGN ESSENTIAL HYPERTENSION: ICD-10-CM

## 2023-11-09 DIAGNOSIS — E87.5 HYPERKALEMIA: ICD-10-CM

## 2023-11-09 DIAGNOSIS — N18.31 STAGE 3A CHRONIC KIDNEY DISEASE (HCC): Primary | ICD-10-CM

## 2023-11-09 DIAGNOSIS — I48.19 PERSISTENT ATRIAL FIBRILLATION (HCC): ICD-10-CM

## 2023-11-09 LAB
ANION GAP SERPL CALC-SCNC: 3 MMOL/L (ref 0–18)
BUN BLD-MCNC: 60 MG/DL (ref 9–23)
CALCIUM BLD-MCNC: 9.3 MG/DL (ref 8.5–10.1)
CHLORIDE SERPL-SCNC: 109 MMOL/L (ref 98–112)
CO2 SERPL-SCNC: 28 MMOL/L (ref 21–32)
CREAT BLD-MCNC: 2.08 MG/DL
EGFRCR SERPLBLD CKD-EPI 2021: 32 ML/MIN/1.73M2 (ref 60–?)
GLUCOSE BLD-MCNC: 100 MG/DL (ref 70–99)
OSMOLALITY SERPL CALC.SUM OF ELEC: 307 MOSM/KG (ref 275–295)
POTASSIUM SERPL-SCNC: 5.3 MMOL/L (ref 3.5–5.1)
SODIUM SERPL-SCNC: 140 MMOL/L (ref 136–145)

## 2023-11-09 PROCEDURE — 36415 COLL VENOUS BLD VENIPUNCTURE: CPT | Performed by: NURSE PRACTITIONER

## 2023-11-09 PROCEDURE — 80048 BASIC METABOLIC PNL TOTAL CA: CPT | Performed by: NURSE PRACTITIONER

## 2023-11-09 PROCEDURE — 99215 OFFICE O/P EST HI 40 MIN: CPT | Performed by: NURSE PRACTITIONER

## 2023-11-09 RX ORDER — FEXOFENADINE HCL 180 MG/1
180 TABLET ORAL DAILY
COMMUNITY

## 2023-11-09 NOTE — PROGRESS NOTES
Pt. Assessed. No signs or symptoms of shortness of breath, fatigue, chest pain or edema noted. Weight stable and down 2 1/2 pounds on our scale at  196.6 lbs. Pt. States his weight at home is down 4 pounds. Reviewed current list of patient's allergies and medication; updated the Electronic Medical Record. Labs ordered to assess kidney function and drawn by [facility]. [place] 8.4gm given to pt. In water for elevated potassium level. Pt. Tolerated it well. Reviewed follow-up appointment and Heart Failure discharge instructions with patient. Patient verbalized an understanding.

## 2023-11-09 NOTE — PATIENT INSTRUCTIONS
Heart Failure Discharge Instructions    Try to follow a low potassium diet. You will receive a call to schedule blood volume analysis. Please make sure to transmit MEMs reading on the day of the BVA. Have labs on the day of BVA. Activity: Regular exercise and activity is important for your overall health and to help keep your heart strong and functioning as well as possible. Walk at a slow to moderate pace for 15-20 minutes 3-5 days per week. Follow these instructions every day to keep yourself in the [address] you are feeling well and your symptoms are under control                                    Medications  Take your medication every day as instructed  Do not stop taking your medicine or change the amount you are taking without instructions from your doctor or nurse  Do Not take non-steroidal antiinflammatory drugs such as ibuprofen, aleve, advil, or motrin                                    Diet/Fluids  People with heart failure should eat less sodium (salt) and limit fluid. Sodium attracts water and makes the body hold fluid. This extra fluid makes the heart work harder and can worsen the symptoms of heart failure. Diet    2000 mg sodium daily  Fluid restriction    64 ounces daily  (8 oz. = 1 cup)                                     Body Weight  Weigh yourself every day before breakfast and write your weight down  Use the same scale and wear about the same amount of clothes each time  A sudden weight increase is due to fluid retention rather than fat                                         Activity  Pace your activities to avoid getting overtired  Take rest periods as needed  Elevate your feet to reduce ankle swelling when resting                             Signs of Worsening Heart Failure    You are entering the Yellow Zone - this is a warning zone    Call your doctor or nurse if you have any of these signs or symptoms:   You gain 2 or more pounds overnight or 3-5 pounds in 3-7 days  You have more trouble breathing  You get more tired with regular activity, or are limiting activity because of shortness of breath or fatigue  You are short of breath lying down, you need more pillows to breathe comfortably,  or wake up during the night short of breath  You urinate less often during the day and more often at night  You have a bloated feeling, upset stomach, loss of appetite, or your clothes are fitting tighter    GO TO THE EMERGENCY DEPARTMENT or CALL 911 IF: These are signs you are in the RED ZONE - THIS IS AN EMERGENCY  You have tightness or pain in your chest  You are extremely short of breath or can't catch your breath  You cough up frothy pink mucous  You feel confused or can't think clearly  You are traveling and develop symptoms of worsening heart failure     We respect everyone's time and availability. Please be aware that this is not a walk-in clinic and we require appointments in order to facilitate timely care for all patients. We ask you to arrive 30 minutes before your appointment to allow time for you to check-in and have your bloodwork drawn. Please understand if you are late for your appointment, you may be asked to reschedule. If possible, all attempts will be made to accommodate but realize this is no guarantee that this will always be available. We understand there are extenuating circumstances. If you need to cancel or reschedule your appointment, please call the [facility] [employer] within 24 hours at [phone]. Thank you for your cooperation, [facility] Staff. IF YOU HAVE QUESTIONS REGARDING YOUR BILL, FEEL FREE TO CONTACT [facility] PATIENT ACCOUNTS -[phone]. IF YOU NEED FINANCIAL ASSISTANCE, PLEASE CALL AN [facility] FINANCIAL COUNSELOR -[phone].              [facility] [employer] [facility]     [phone]

## 2023-11-10 DIAGNOSIS — I50.9 CHRONIC HEART FAILURE, UNSPECIFIED HEART FAILURE TYPE (HCC): Primary | ICD-10-CM

## 2023-11-13 ENCOUNTER — HOSPITAL ENCOUNTER (OUTPATIENT)
Dept: CARDIOLOGY CLINIC | Facility: HOSPITAL | Age: 78
End: 2023-11-13
Attending: NURSE PRACTITIONER
Payer: MEDICARE

## 2023-11-13 DIAGNOSIS — I50.32 CHRONIC DIASTOLIC HEART FAILURE (HCC): Primary | ICD-10-CM

## 2023-11-13 PROCEDURE — 93264 REM MNTR WRLS P-ART PRS SNR: CPT | Performed by: NURSE PRACTITIONER

## 2023-11-13 NOTE — PROGRESS NOTES
[facility] [address]. pulmonary artery pressure sensor    Remote Monitoring Report Summary    2023    Lynnette Davila    : 3/17/1945    Reporting Period-  10/13-    Diagnosis - HFpEF    Provider- SHERIDAN Parmar       The CardioMEMS report for the above date has been reviewed with the following results:        RHC hemodynamics at time of CardioMEMS impant:    PA 42/18/27  Wedge 8    Acceptable range for Lynnette Davila      PAD range  22 mmHg, Lower 19 mmHg, Upper 25 mmHg      Remote monitoring documentation for the past ~30 days:    2023 PAD 29-31 mmhg CTM  2023 Will contact him to remind him to transmit readings. 10- PAD 31-32mmhg and above baseline, has appt today  10- PAD 28mmhg and coming down. CTM. 10- Remains elevated.  Will contact

## 2023-11-17 ENCOUNTER — HOSPITAL ENCOUNTER (OUTPATIENT)
Dept: CV DIAGNOSTICS | Facility: HOSPITAL | Age: 78
Discharge: HOME | End: 2023-11-17
Attending: NURSE PRACTITIONER
Payer: MEDICARE

## 2023-11-17 ENCOUNTER — HOSPITAL ENCOUNTER (OUTPATIENT)
Dept: LAB | Facility: HOSPITAL | Age: 78
Discharge: HOME | End: 2023-11-17
Attending: NURSE PRACTITIONER
Payer: MEDICARE

## 2023-11-17 ENCOUNTER — TELEPHONE (OUTPATIENT)
Dept: CARDIOLOGY CLINIC | Facility: HOSPITAL | Age: 78
End: 2023-11-17

## 2023-11-17 DIAGNOSIS — N18.31 STAGE 3A CHRONIC KIDNEY DISEASE (HCC): ICD-10-CM

## 2023-11-17 DIAGNOSIS — D69.3 IMMUNE THROMBOCYTOPENIC PURPURA (HCC): ICD-10-CM

## 2023-11-17 DIAGNOSIS — I50.812 CHRONIC RIGHT-SIDED CONGESTIVE HEART FAILURE (HCC): ICD-10-CM

## 2023-11-17 DIAGNOSIS — M35.1 MCTD (MIXED CONNECTIVE TISSUE DISEASE) (HCC): ICD-10-CM

## 2023-11-17 DIAGNOSIS — M33.20 POLYMYOSITIS (HCC): ICD-10-CM

## 2023-11-17 DIAGNOSIS — D69.6 THROMBOCYTOPENIA (HCC): ICD-10-CM

## 2023-11-17 DIAGNOSIS — I50.9 CHRONIC HEART FAILURE, UNSPECIFIED HEART FAILURE TYPE (HCC): ICD-10-CM

## 2023-11-17 LAB
ANION GAP SERPL CALC-SCNC: 3 MMOL/L (ref 0–18)
BASOPHILS # BLD AUTO: 0.03 X10(3) UL (ref 0–0.2)
BASOPHILS NFR BLD AUTO: 0.4 %
BUN BLD-MCNC: 69 MG/DL (ref 9–23)
CALCIUM BLD-MCNC: 9.1 MG/DL (ref 8.5–10.1)
CHLORIDE SERPL-SCNC: 106 MMOL/L (ref 98–112)
CO2 SERPL-SCNC: 29 MMOL/L (ref 21–32)
CREAT BLD-MCNC: 1.93 MG/DL
DEPRECATED HBV CORE AB SER IA-ACNC: 50.9 NG/ML
EGFRCR SERPLBLD CKD-EPI 2021: 35 ML/MIN/1.73M2 (ref 60–?)
EOSINOPHIL # BLD AUTO: 0.15 X10(3) UL (ref 0–0.7)
EOSINOPHIL NFR BLD AUTO: 1.9 %
ERYTHROCYTE [DISTWIDTH] IN BLOOD BY AUTOMATED COUNT: 15.9 %
FASTING STATUS PATIENT QL REPORTED: NO
GLUCOSE BLD-MCNC: 93 MG/DL (ref 70–99)
HCT VFR BLD AUTO: 41.5 %
HGB BLD-MCNC: 13.1 G/DL
IMM GRANULOCYTES # BLD AUTO: 0.02 X10(3) UL (ref 0–1)
IMM GRANULOCYTES NFR BLD: 0.3 %
IRON SATN MFR SERPL: 27 %
IRON SERPL-MCNC: 109 UG/DL
LYMPHOCYTES # BLD AUTO: 0.86 X10(3) UL (ref 1–4)
LYMPHOCYTES NFR BLD AUTO: 11 %
MCH RBC QN AUTO: 34.7 PG (ref 26–34)
MCHC RBC AUTO-ENTMCNC: 31.6 G/DL (ref 31–37)
MCV RBC AUTO: 110.1 FL
MONOCYTES # BLD AUTO: 0.72 X10(3) UL (ref 0.1–1)
MONOCYTES NFR BLD AUTO: 9.2 %
NEUTROPHILS # BLD AUTO: 6.05 X10 (3) UL (ref 1.5–7.7)
NEUTROPHILS # BLD AUTO: 6.05 X10(3) UL (ref 1.5–7.7)
NEUTROPHILS NFR BLD AUTO: 77.2 %
OSMOLALITY SERPL CALC.SUM OF ELEC: 306 MOSM/KG (ref 275–295)
PLATELET # BLD AUTO: 113 10(3)UL (ref 150–450)
POTASSIUM SERPL-SCNC: 4.3 MMOL/L (ref 3.5–5.1)
RBC # BLD AUTO: 3.77 X10(6)UL
SODIUM SERPL-SCNC: 138 MMOL/L (ref 136–145)
TIBC SERPL-MCNC: 410 UG/DL (ref 240–450)
TRANSFERRIN SERPL-MCNC: 275 MG/DL (ref 200–360)
WBC # BLD AUTO: 7.8 X10(3) UL (ref 4–11)

## 2023-11-17 PROCEDURE — 85013 SPUN MICROHEMATOCRIT: CPT | Performed by: NURSE PRACTITIONER

## 2023-11-17 PROCEDURE — 83550 IRON BINDING TEST: CPT | Performed by: NURSE PRACTITIONER

## 2023-11-17 PROCEDURE — 82728 ASSAY OF FERRITIN: CPT | Performed by: NURSE PRACTITIONER

## 2023-11-17 PROCEDURE — 83540 ASSAY OF IRON: CPT | Performed by: NURSE PRACTITIONER

## 2023-11-17 PROCEDURE — 80048 BASIC METABOLIC PNL TOTAL CA: CPT | Performed by: NURSE PRACTITIONER

## 2023-11-17 PROCEDURE — 78122 WHL BLD VOLUME DETERMINATION: CPT | Performed by: NURSE PRACTITIONER

## 2023-11-17 PROCEDURE — 36415 COLL VENOUS BLD VENIPUNCTURE: CPT

## 2023-11-17 PROCEDURE — 85025 COMPLETE CBC W/AUTO DIFF WBC: CPT

## 2023-11-17 NOTE — TELEPHONE ENCOUNTER
Discussed results of BVA with Bill suggestive of severe dehydration and anemia. Hold Bumex the rest of today (3 mg total). Starting tomorrow reduce Bumex to 2 mg BID, eliminate afternoon dose of 1 mg. Added on iron studies for evaluation to see if he would benefit from Venofer. Will follow PAD that was 28 mmHg today. We will see him as scheduled next week. He is scheduled to see GI in January and without melena at this time. Metamucil has helped his recent GI symptoms.

## 2023-11-21 ENCOUNTER — TELEPHONE (OUTPATIENT)
Dept: HEMATOLOGY/ONCOLOGY | Facility: HOSPITAL | Age: 78
End: 2023-11-21

## 2023-11-21 ENCOUNTER — TELEPHONE (OUTPATIENT)
Dept: RHEUMATOLOGY | Facility: CLINIC | Age: 78
End: 2023-11-21

## 2023-11-21 NOTE — PROGRESS NOTES
[facility] Progress Note    Elijah Henley is a [de-identified]year old male who presents to clinic for APN assessment and management of chronic diastolic heart failure and is functional class 2-3. Subjective:  Since his last clinic visit on 11/9; when he was given Veltassa 8.4g, he underwent a BVA that was suggestive of severe dehydration and anemia. His Bumex (3mg) was held on 11/17 and was reduced to 2mg BID. Iron studies revealed iron sat of 27 and ferritin 50.9. CardioMEMs 29mmhg on day of BVA with weigh of 193 lbs. Today, his weight is up 2 lbs. He continues to have fatigue and notes sleeping more the last few months. He also has noticed \"feeling off\" when he stands up out of a chair occasionally. He saw Dr. Dixon Solis and Dr. Agustin Keyes; to continue IVIG monthly, last dose 11/22, on Prednisone 7.5 mg daily. PAD 29 mmhg yesterday. Recent range from 27-31 mmHg as noted below. With PAD higher and BVA suggestive of dehydration, Bill to reach out to Dr. Dixon Solis to further investigate a worsened autoimmune etiology. Review of Systems   Constitutional: Positive for malaise/fatigue and weight gain. HENT:  Positive for congestion (runny nose from allergies). Cardiovascular:  Positive for leg swelling (mild). Respiratory: Negative.          HISTORY:  Past Medical History:   Diagnosis Date    A-fib [facility])     Arrhythmia     atrial fibrillation    Arthritis     Autoimmune disease ([facility] [address]     hepatits, resolved anfd nephritis, resolved    Ramon's esophagus 5/14/2013    Bleeding nose 1970    Gums Treated    Blood disorder     thrombocytopenia    Blood in urine     Blurred vision 2021    cataract due to steroids, surgery in June    Cancer [facility])     skin    Candidiasis of the esophagus 6/29/2012    Due to steroid use for Autoimmune disorder     Cataract     Colon polyps 5/13/2013    Congestive heart disease ([facility] [address]     Diverticulosis of colon 5/14/2013    Esophageal polyp 5/14/2013    Esophageal reflux     Essential hypertension     Fatigue Since 2005    polymyositis and lupus    Gout     Hammer toe, acquired 6/29/2012    Heart palpitations 2017    Afib    Hepatitis     autoimmune induce hepatitis d/t lupus    High blood pressure     IBS (irritable bowel syndrome)     mild d/t lupus    Lupus (HCC)     MCTD (mixed connective tissue disease) ([address]     Obstructive sleep apnea (adult) (pediatric) 11/16/2017    LI (obstructive sleep apnea) HST 11-7-17    AHI 37  Supine AHI 38 non-supine AHI 16 Sao2 Pj 83%     LI (obstructive sleep apnea) PSG 5-22-19    AHI 36 RDI 36 REM AHI 45 Supine AHI 65 non-supine AHI 19 Sao2 Pj 86% CPAP 9cwp    Pain in joints     Personal history of antineoplastic chemotherapy     Pleural effusion     right    Pneumonia due to organism     Polymyositis ([address]     Presence of other cardiac implants and grafts 2017    watchman filter    Problems with swallowing     dysphagia in 2006    Pulmonary hypertension ([address]     Raynaud disease     Raynaud disease     Renal disorder     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    Shortness of breath on and off    mainly due to pm or lupus    Sleep apnea     CPAP    Thrombocytopathia ([address]     Visual impairment     bifocals for reading & computer; distance for driving    Wears glasses       Past Surgical History:   Procedure Laterality Date    APPENDECTOMY  1970    APPENDECTOMY      CARDIAC CATHETERIZATION  09/2019    CATARACT Bilateral     COLONOSCOPY  10/2003 2006 01/2010    x3    COLONOSCOPY  5/14/2013    COLONOSCOPY N/A 5/1/2018    Procedure: COLONOSCOPY;  Surgeon: Jose Franklin MD;  Location: [facility] ENDOSCOPY    COLONOSCOPY WITH BIOPSY  5/14/13    EGD      HAND/FINGER SURGERY UNLISTED  2003    right    NEEDLE BIOPSY LIVER      OTHER  12/2005    needle bipsy of kidney    OTHER SURGICAL HISTORY  2017    watchman filter    PERCUTANEOUS  ANGIOPLASTY  (PTCA)- PBP ONLY  2006    right    [address]    rectal surgery polypectomy    SKIN SURGERY      [facility] [address] R temple 09    SKIN SURGERY  2007    basal ceell carcinoma    SKIN SURGERY  12    BCC-nodular to right superior eyebrow/ MOHS    SKIN SURGERY  07/15/2013    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/MMS    SKIN SURGERY  14    BCC-NOD to right superior pinna, MMS, AB    SKIN SURGERY  2021    BCC- left superior forehead, MMS     SKIN SURGERY  2022    SCC IN SITU RIGHT ANTIHELIX MMS BY DR [facility]    UPPER GI ENDOSCOPY,EXAM  10/2003 2006 2010 , 5/13    x3      Family History   Problem Relation Age of Onset    Heart Disease Father         CHF    Gastro-Intestinal Disorder Father         Diverticulosis    Alcohol and Other Disorders Associated Father     Heart Disease Mother         CHF    Breast Cancer Mother     Stroke Mother     Cancer Paternal Grandfather     Heart Attack Paternal Grandmother     Kidney Disease Son     Other (Ramon's Esophagus) Son     Heart Attack Maternal Grandfather       Social History     Socioeconomic History    Marital status:    Occupational History    Occupation: Retired    Tobacco Use    Smoking status: Former     Packs/day: 0.50     Years: 15.00     Additional pack years: 0.00     Total pack years: 7.50     Types: Cigarettes     Start date: 1958     Quit date: 1973     Years since quittin.3    Smokeless tobacco: Never    Tobacco comments:     5 cigarettes daily, stopped smoking in    Vaping Use    Vaping Use: Never used   Substance and Sexual Activity    Alcohol use:  Yes     Alcohol/week: 8.0 - 10.0 standard drinks of alcohol     Types: 8 - 10 Standard drinks or equivalent per week     Comment: 1 per day wine or liquor    Drug use: Never   Other Topics Concern    Caffeine Concern Yes     Comment: 1 soda per day and decaf coffee    Sleep Concern No    Exercise Yes     Comment: 3-4 times weekly    Seat Belt Yes           Objective:    Cardiac Rhythm: Atrial fibrillation    BP 114/66   Pulse 72   Resp 20   SpO2 100%  WT: 198.8 lbs    Wt Readings from Last 6 Encounters:   11/22/23 196 lb (88.9 kg)   11/09/23 196 lb 9.6 oz (89.2 kg)   11/03/23 197 lb 6.4 oz (89.5 kg)   11/01/23 197 lb (89.4 kg)   10/30/23 199 lb (90.3 kg)   10/18/23 197 lb 3.2 oz (89.4 kg)            Recent Results (from the past 24 hour(s))   Basic Metabolic Panel (8)    Collection Time: 11/24/23  1:29 PM   Result Value Ref Range    Glucose 85 70 - 99 mg/dL    Sodium 140 136 - 145 mmol/L    Potassium 4.9 3.5 - 5.1 mmol/L    Chloride 108 98 - 112 mmol/L    CO2 28.0 21.0 - 32.0 mmol/L    Anion Gap 4 0 - 18 mmol/L    BUN 55 (H) 9 - 23 mg/dL    Creatinine 1.88 (H) 0.70 - 1.30 mg/dL    Calcium, Total 9.1 8.5 - 10.1 mg/dL    Calculated Osmolality 304 (H) 275 - 295 mOsm/kg    eGFR-Cr 36 (L) >=60 mL/min/1.73m2    Patient Fasting for BMP? Patient not present          Current Outpatient Medications:     bumetanide 1 MG Oral Tab, Take 1 tablet (1 mg total) by mouth 2 (two) times daily. , Disp: , Rfl:     fexofenadine 180 MG Oral Tab, Take 1 tablet (180 mg total) by mouth daily. , Disp: , Rfl:     ALLOPURINOL 300 MG Oral Tab, TAKE 1 TABLET BY MOUTH EVERY DAY, Disp: 90 tablet, Rfl: 3    predniSONE 5 MG Oral Tab, Take 1 tablet (5 mg total) by mouth daily with breakfast., Disp: 90 tablet, Rfl: 3    omeprazole 20 MG Oral Capsule Delayed Release, Take 1 capsule (20 mg total) by mouth 2 (two) times daily. , Disp: 180 capsule, Rfl: 1    spironolactone 25 MG Oral Tab, Take 0.5 tablets (12.5 mg total) by mouth daily. , Disp: 45 tablet, Rfl: 3    metoprolol succinate  MG Oral Tablet 24 Hr, Take 1 tablet (100 mg total) by mouth every morning AND 0.5 tablets (50 mg total) every evening., Disp: 60 tablet, Rfl: 3    PREDNISONE 1 MG Oral Tab, TAKE 2 TABLETS (2 MG TOTAL) BY MOUTH DAILY WITH BREAKFAST., Disp: 180 tablet, Rfl: 3    sacubitril-valsartan 49-51 MG Oral Tab, Take 1 tablet by mouth 2 (two) times daily. , Disp: , Rfl:     aspirin 81 MG Oral Tab EC, Take 1 tablet (81 mg total) by mouth daily. , Disp: 30 tablet, Rfl: 0    PREVIDENT 5000 BOOSTER PLUS 1.1 % Dental Paste, , Disp: , Rfl:     Multiple Vitamins-Minerals (TAB-A-KEVIN) Oral Tab, Take 1 tablet by mouth daily. , Disp: , Rfl:     Immune Globulin, Human, 10 g Intravenous Recon Soln, Inject 0.4 g/kg into the vein. Given for treatment of polymyositis, mixed connective tissue disease, and immune thrombocytopenia purpura monthly at [facility]. Every 5 weeks, Disp: 3 each, Rfl: 0    Calcium Citrate-Vitamin D (CITRACAL + D OR), Take 1 tablet by mouth daily. , Disp: , Rfl:     Exam:   General:         Alert, in no apparent distress  HEENT:           no JVD  Lungs:            ctab                     CV:                   irregularly irregular  Abdomen:       mildly distended/round, soft, non-tender, BS+  Extremities:    trace LE edema  Neuro:             A&O x 3  Skin:                Pink, warm, dry    Education:  Patient instructed regarding sodium restricted diet, low sodium foods, fluid restriction, daily weights, medication regimen, s/s HF exacerbation and when to call APN/clinic. Assessment:   Chronic diastolic, RV heart failure - LVEF 52% and stable with borderline normal RV dysfunction per echo 1/4/2023. [address] 7/2022 with PCWP 20, RA 11. S/p CardioMEMs implanted on 8/2022 with 10 mmhg gradient between his PAD and wedge on RHC, with PAD running higher. Goal thought to be ~ 20 mmhg. PAD 27-31 mmHg recently. PAD 29 mmHg yesterday. Last ProBNP 2,177. Off Jardiance, thought to be due to pancreatitis. MRA recently discontinued at [facility] for dehydration and near syncope; restarted but required reduced dose due to hyperkalemia. Recent BVA suggestive of dehydration. Volume status stable. PH, mild combined pre and post capillary - RHC 8/2022 demonstrates mPAP 27 with wedge 8 mmHg, RA 5 mmHg, PVR 2.7 and CI 3.4. RV function borderline normal on echo 1/2023. DPG 10 on RHC when MEMs placed. Moderate MR/Mild-Mod TR/AI - on MCI echo 1/2023 and unchanged. Dilated ascending aorta - 4.2 cm per echo 1/2023. CKD stage 3 - baseline creatinine ~ 1.6-1.8 mg/dl, improved with reduction in diuretics. Follows with Dr. Bong Ba. Chronic Afib - s/p Watchman. Rates controlled. LI - uses cpap. Recurrent bilateral pleural effusions - hx of thoracentesis. Hx Lupus/Mixed CTD/Severe polymyositis - continues IVIG infusions every 5 weeks, last dose 11/22. Follows with Dr. Gasper Leal. On chronic prednisone. Off Imuran due to pancytopenia. Non-obstructive CAD  Chronic thrombocytopenia, immune mediated/recent pancytopenia related to imuran - last PLT 106K. Follows with Dr. Benita Blum. Hx Hyponatremia   HERNANDEZ with biopsy proven stage F0-F1 Fibrosis on US 1/2022. Follows with Dr. Geo Ibarra, Hepatology at [facility]. Would consider future imaging if necessary. Gout - on colchicine prn and Allopurinol. Last uric acid 5.2. Basal/Squamous cell carcinoma - hx Mohs surgery. Mild hyperkalemia -on low dose MRA and ARNI. Suspect hyperkalemia related to worsening renal function. Anemia, last Hgb 13.3 and stable. Recent iron studies normal in July. Plan:   Decrease Bumex from 2 mg BID to 1 mg BID, due to BVA being suggestive of dehydration. Will increase MEMs range from 18-25 mmhg to 29-35 mmhg for now. Return to clinic in 2 weeks. F/U with Dr. Yuri Mead as planned in ~ March with echo, labs. F/U with Dr. Eloy Ford, GI for loose stools. CHF discharge instructions given    45 minutes spent with patient and greater than 50% of the time was spent counseling and coordinating care.     Rajwinder Reyes, APRN

## 2023-11-21 NOTE — TELEPHONE ENCOUNTER
[facility] phoned office, requesting new orders for IVIG.  Orders placed on Dr. Shaylee Dailey desk for approval.

## 2023-11-21 NOTE — TELEPHONE ENCOUNTER
Spoke with Caitlin Denis from Dr. Sylvester Nava office regarding needing new IVIG orders for Mr. Campos Walker to fax new orders today.

## 2023-11-22 ENCOUNTER — OFFICE VISIT (OUTPATIENT)
Dept: HEMATOLOGY/ONCOLOGY | Facility: HOSPITAL | Age: 78
End: 2023-11-22
Attending: INTERNAL MEDICINE
Payer: MEDICARE

## 2023-11-22 VITALS
BODY MASS INDEX: 27.44 KG/M2 | OXYGEN SATURATION: 99 % | WEIGHT: 196 LBS | TEMPERATURE: 97 F | RESPIRATION RATE: 16 BRPM | DIASTOLIC BLOOD PRESSURE: 64 MMHG | HEART RATE: 83 BPM | SYSTOLIC BLOOD PRESSURE: 97 MMHG | HEIGHT: 70.98 IN

## 2023-11-22 DIAGNOSIS — D69.3 IMMUNE THROMBOCYTOPENIC PURPURA (HCC): ICD-10-CM

## 2023-11-22 DIAGNOSIS — I50.32 CHRONIC HEART FAILURE WITH PRESERVED EJECTION FRACTION (HCC): Primary | ICD-10-CM

## 2023-11-22 DIAGNOSIS — M35.1 MIXED CONNECTIVE TISSUE DISEASE (HCC): ICD-10-CM

## 2023-11-22 DIAGNOSIS — M33.20 POLYMYOSITIS (HCC): Primary | ICD-10-CM

## 2023-11-22 PROCEDURE — 96365 THER/PROPH/DIAG IV INF INIT: CPT

## 2023-11-22 PROCEDURE — 96366 THER/PROPH/DIAG IV INF ADDON: CPT

## 2023-11-22 RX ORDER — DIPHENHYDRAMINE HCL 25 MG
25 CAPSULE ORAL ONCE
Status: COMPLETED | OUTPATIENT
Start: 2023-11-22 | End: 2023-11-22

## 2023-11-22 RX ORDER — ACETAMINOPHEN 325 MG/1
650 TABLET ORAL ONCE
OUTPATIENT
Start: 2023-12-27 | End: 2023-12-27

## 2023-11-22 RX ORDER — DIPHENHYDRAMINE HCL 25 MG
25 CAPSULE ORAL ONCE
OUTPATIENT
Start: 2023-12-27 | End: 2023-12-27

## 2023-11-22 RX ORDER — ACETAMINOPHEN 325 MG/1
650 TABLET ORAL ONCE
Status: COMPLETED | OUTPATIENT
Start: 2023-11-22 | End: 2023-11-22

## 2023-11-22 RX ADMIN — ACETAMINOPHEN 650 MG: 325 TABLET ORAL at 11:12:00

## 2023-11-22 RX ADMIN — DIPHENHYDRAMINE HCL 25 MG: 25 MG CAPSULE ORAL at 11:12:00

## 2023-11-22 NOTE — PROGRESS NOTES
Pt here for IVIG infusion . Pt denies any issues or concerns. Ordering MD: Dr. Tarik Chaparro  Order Exp: 99/20/77     Pt tolerated infusion without difficulty or complaint.  Reviewed next apt date/time: 12/27 at 11am      Education Record  Learner:  Patient and Spouse  Disease / Diagnosis: polymyositis  Barriers / Limitations:  None  Method:  Discussion and Printed material  General Topics:  Medication, Side effects and symptom management, Plan of care reviewed, and Fall risk and prevention  Outcome:  Shows understanding

## 2023-11-24 ENCOUNTER — HOSPITAL ENCOUNTER (OUTPATIENT)
Dept: LAB | Facility: HOSPITAL | Age: 78
Discharge: HOME | End: 2023-11-24
Attending: NURSE PRACTITIONER
Payer: MEDICARE

## 2023-11-24 ENCOUNTER — HOSPITAL ENCOUNTER (OUTPATIENT)
Dept: CARDIOLOGY CLINIC | Facility: HOSPITAL | Age: 78
Discharge: HOME | End: 2023-11-24
Attending: NURSE PRACTITIONER
Payer: MEDICARE

## 2023-11-24 VITALS
WEIGHT: 198.81 LBS | BODY MASS INDEX: 28 KG/M2 | OXYGEN SATURATION: 100 % | DIASTOLIC BLOOD PRESSURE: 66 MMHG | SYSTOLIC BLOOD PRESSURE: 114 MMHG | HEART RATE: 72 BPM | RESPIRATION RATE: 20 BRPM

## 2023-11-24 DIAGNOSIS — N18.32 STAGE 3B CHRONIC KIDNEY DISEASE (HCC): ICD-10-CM

## 2023-11-24 DIAGNOSIS — I27.20 PULMONARY HYPERTENSION (HCC): ICD-10-CM

## 2023-11-24 DIAGNOSIS — I48.21 PERMANENT ATRIAL FIBRILLATION (HCC): ICD-10-CM

## 2023-11-24 DIAGNOSIS — I50.32 CHRONIC DIASTOLIC HEART FAILURE (HCC): Primary | ICD-10-CM

## 2023-11-24 DIAGNOSIS — I50.32 CHRONIC HEART FAILURE WITH PRESERVED EJECTION FRACTION (HCC): ICD-10-CM

## 2023-11-24 LAB
ANION GAP SERPL CALC-SCNC: 4 MMOL/L (ref 0–18)
BUN BLD-MCNC: 55 MG/DL (ref 9–23)
CALCIUM BLD-MCNC: 9.1 MG/DL (ref 8.5–10.1)
CHLORIDE SERPL-SCNC: 108 MMOL/L (ref 98–112)
CO2 SERPL-SCNC: 28 MMOL/L (ref 21–32)
CREAT BLD-MCNC: 1.88 MG/DL
EGFRCR SERPLBLD CKD-EPI 2021: 36 ML/MIN/1.73M2 (ref 60–?)
GLUCOSE BLD-MCNC: 85 MG/DL (ref 70–99)
OSMOLALITY SERPL CALC.SUM OF ELEC: 304 MOSM/KG (ref 275–295)
POTASSIUM SERPL-SCNC: 4.9 MMOL/L (ref 3.5–5.1)
SODIUM SERPL-SCNC: 140 MMOL/L (ref 136–145)

## 2023-11-24 PROCEDURE — 99215 OFFICE O/P EST HI 40 MIN: CPT | Performed by: NURSE PRACTITIONER

## 2023-11-24 PROCEDURE — 36415 COLL VENOUS BLD VENIPUNCTURE: CPT | Performed by: NURSE PRACTITIONER

## 2023-11-24 PROCEDURE — 80048 BASIC METABOLIC PNL TOTAL CA: CPT | Performed by: NURSE PRACTITIONER

## 2023-11-24 RX ORDER — BUMETANIDE 1 MG/1
1 TABLET ORAL 2 TIMES DAILY
COMMUNITY
Start: 2023-11-24

## 2023-11-24 NOTE — PROGRESS NOTES
Patient assessed. Patient denies shortness of breath. Patient with trace lower extremity edema. Patient reports mild abdominal bloating. Weight up 2 lbs at 198.8 lbs. APN notified of all the above information. Reviewed current list of patient's allergies and medication; updated the Electronic Medical Record. Labs ordered to assess kidney function and drawn by [facility]. iLbrado Rollinsin brought in completed forms for enrollment in financial assistance for his Jose Briseno and his patient form, along with the provider form was faxed to [employer]. Reviewed follow-up appointment and Heart Failure discharge instructions with patient. Patient verbalized an understanding. Patient to return to the clinic in 2-3 weeks.

## 2023-11-24 NOTE — PATIENT INSTRUCTIONS
Heart Failure Discharge Instructions    Reduce Bumex, take 1mg twice per day. Activity: Regular exercise and activity is important for your overall health and to help keep your heart strong and functioning as well as possible. Walk at a slow to moderate pace for 15-20 minutes 3-5 days per week. Follow these instructions every day to keep yourself in the [address] you are feeling well and your symptoms are under control                                    Medications  Take your medication every day as instructed  Do not stop taking your medicine or change the amount you are taking without instructions from your doctor or nurse  Do Not take non-steroidal antiinflammatory drugs such as ibuprofen, aleve, advil, or motrin                                    Diet/Fluids  People with heart failure should eat less sodium (salt) and limit fluid. Sodium attracts water and makes the body hold fluid. This extra fluid makes the heart work harder and can worsen the symptoms of heart failure. Diet    2000 mg sodium daily  Fluid restriction    64 ounces daily  (8 oz. = 1 cup)                                     Body Weight  Weigh yourself every day before breakfast and write your weight down  Use the same scale and wear about the same amount of clothes each time  A sudden weight increase is due to fluid retention rather than fat                                         Activity  Pace your activities to avoid getting overtired  Take rest periods as needed  Elevate your feet to reduce ankle swelling when resting                             Signs of Worsening Heart Failure    You are entering the Yellow Zone - this is a warning zone    Call your doctor or nurse if you have any of these signs or symptoms:   You gain 2 or more pounds overnight or 3-5 pounds in 3-7 days  You have more trouble breathing  You get more tired with regular activity, or are limiting activity because of shortness of breath or fatigue  You are short of breath lying down, you need more pillows to breathe comfortably,  or wake up during the night short of breath  You urinate less often during the day and more often at night  You have a bloated feeling, upset stomach, loss of appetite, or your clothes are fitting tighter    GO TO THE EMERGENCY DEPARTMENT or CALL 911 IF: These are signs you are in the RED ZONE - THIS IS AN EMERGENCY  You have tightness or pain in your chest  You are extremely short of breath or can't catch your breath  You cough up frothy pink mucous  You feel confused or can't think clearly  You are traveling and develop symptoms of worsening heart failure     We respect everyone's time and availability. Please be aware that this is not a walk-in clinic and we require appointments in order to facilitate timely care for all patients. We ask you to arrive 30 minutes before your appointment to allow time for you to check-in and have your bloodwork drawn. Please understand if you are late for your appointment, you may be asked to reschedule. If possible, all attempts will be made to accommodate but realize this is no guarantee that this will always be available. We understand there are extenuating circumstances. If you need to cancel or reschedule your appointment, please call the [facility] [employer] within 24 hours at [phone]. Thank you for your cooperation, [facility] Staff. IF YOU HAVE QUESTIONS REGARDING YOUR BILL, FEEL FREE TO CONTACT [facility] PATIENT ACCOUNTS -[phone]. IF YOU NEED FINANCIAL ASSISTANCE, PLEASE CALL AN [facility] FINANCIAL COUNSELOR -[phone].              [facility] [employer] [facility]     [phone]

## 2023-11-27 ENCOUNTER — PATIENT MESSAGE (OUTPATIENT)
Dept: RHEUMATOLOGY | Facility: CLINIC | Age: 78
End: 2023-11-27

## 2023-11-27 DIAGNOSIS — E55.9 VITAMIN D DEFICIENCY: ICD-10-CM

## 2023-11-27 DIAGNOSIS — D69.6 THROMBOCYTOPENIA (HCC): ICD-10-CM

## 2023-11-27 DIAGNOSIS — R53.83 OTHER FATIGUE: ICD-10-CM

## 2023-11-27 DIAGNOSIS — M33.20 POLYMYOSITIS (HCC): Chronic | ICD-10-CM

## 2023-11-27 DIAGNOSIS — M35.1 MIXED CONNECTIVE TISSUE DISEASE (HCC): Primary | ICD-10-CM

## 2023-11-30 ENCOUNTER — LAB ENCOUNTER (OUTPATIENT)
Dept: LAB | Age: 78
End: 2023-11-30
Attending: INTERNAL MEDICINE
Payer: MEDICARE

## 2023-11-30 DIAGNOSIS — M35.1 MIXED CONNECTIVE TISSUE DISEASE (HCC): ICD-10-CM

## 2023-11-30 DIAGNOSIS — E55.9 VITAMIN D DEFICIENCY: ICD-10-CM

## 2023-11-30 DIAGNOSIS — R53.83 OTHER FATIGUE: ICD-10-CM

## 2023-11-30 DIAGNOSIS — M33.20 POLYMYOSITIS (HCC): ICD-10-CM

## 2023-11-30 DIAGNOSIS — D69.6 THROMBOCYTOPENIA (HCC): ICD-10-CM

## 2023-11-30 LAB
BASOPHILS # BLD AUTO: 0.04 X10(3) UL (ref 0–0.2)
BASOPHILS NFR BLD AUTO: 0.7 %
C4 SERPL-MCNC: 5 MG/DL (ref 10–40)
CK SERPL-CCNC: 22 U/L
CRP SERPL-MCNC: 0.52 MG/DL (ref ?–0.3)
EOSINOPHIL # BLD AUTO: 0.17 X10(3) UL (ref 0–0.7)
EOSINOPHIL NFR BLD AUTO: 3 %
ERYTHROCYTE [DISTWIDTH] IN BLOOD BY AUTOMATED COUNT: 16.1 %
ERYTHROCYTE [SEDIMENTATION RATE] IN BLOOD: 13 MM/HR
HCT VFR BLD AUTO: 39.4 %
HGB BLD-MCNC: 12.4 G/DL
IMM GRANULOCYTES # BLD AUTO: 0.03 X10(3) UL (ref 0–1)
IMM GRANULOCYTES NFR BLD: 0.5 %
LYMPHOCYTES # BLD AUTO: 0.97 X10(3) UL (ref 1–4)
LYMPHOCYTES NFR BLD AUTO: 17.2 %
MCH RBC QN AUTO: 34.6 PG (ref 26–34)
MCHC RBC AUTO-ENTMCNC: 31.5 G/DL (ref 31–37)
MCV RBC AUTO: 110.1 FL
MONOCYTES # BLD AUTO: 0.51 X10(3) UL (ref 0.1–1)
MONOCYTES NFR BLD AUTO: 9 %
NEUTROPHILS # BLD AUTO: 3.92 X10 (3) UL (ref 1.5–7.7)
NEUTROPHILS # BLD AUTO: 3.92 X10(3) UL (ref 1.5–7.7)
NEUTROPHILS NFR BLD AUTO: 69.6 %
PLATELET # BLD AUTO: 97 10(3)UL (ref 150–450)
RBC # BLD AUTO: 3.58 X10(6)UL
VIT D+METAB SERPL-MCNC: 49.6 NG/ML (ref 30–100)
WBC # BLD AUTO: 5.6 X10(3) UL (ref 4–11)

## 2023-11-30 PROCEDURE — 86225 DNA ANTIBODY NATIVE: CPT

## 2023-11-30 PROCEDURE — 86160 COMPLEMENT ANTIGEN: CPT

## 2023-11-30 PROCEDURE — 86038 ANTINUCLEAR ANTIBODIES: CPT

## 2023-11-30 PROCEDURE — 82306 VITAMIN D 25 HYDROXY: CPT

## 2023-11-30 PROCEDURE — 82550 ASSAY OF CK (CPK): CPT

## 2023-11-30 PROCEDURE — 36415 COLL VENOUS BLD VENIPUNCTURE: CPT

## 2023-11-30 PROCEDURE — 85652 RBC SED RATE AUTOMATED: CPT

## 2023-11-30 PROCEDURE — 86235 NUCLEAR ANTIGEN ANTIBODY: CPT

## 2023-11-30 PROCEDURE — 86140 C-REACTIVE PROTEIN: CPT

## 2023-11-30 PROCEDURE — 85025 COMPLETE CBC W/AUTO DIFF WBC: CPT

## 2023-12-02 LAB
DSDNA IGG SERPL IA-ACNC: 1.6 IU/ML
ENA AB SER QL IA: 1.5 UG/L
ENA AB SER QL IA: POSITIVE

## 2023-12-04 LAB
CENTROMERE IGG SER-ACNC: 0.7 U/ML
ENA JO1 AB SER IA-ACNC: <0.3 U/ML
ENA RNP IGG SER IA-ACNC: 2.9 U/ML
ENA SCL70 IGG SER IA-ACNC: <0.6 U/ML
ENA SM IGG SER IA-ACNC: 0.7 U/ML
ENA SS-A IGG SER IA-ACNC: 6.7 U/ML
ENA SS-B IGG SER IA-ACNC: <0.4 U/ML
U1 SNRNP IGG SER IA-ACNC: 3.8 U/ML

## 2023-12-11 DIAGNOSIS — I50.32 CHRONIC DIASTOLIC HEART FAILURE (HCC): Primary | ICD-10-CM

## 2023-12-12 ENCOUNTER — HOSPITAL ENCOUNTER (OUTPATIENT)
Dept: LAB | Facility: HOSPITAL | Age: 78
Discharge: HOME | End: 2023-12-12
Attending: NURSE PRACTITIONER
Payer: MEDICARE

## 2023-12-12 ENCOUNTER — HOSPITAL ENCOUNTER (OUTPATIENT)
Dept: CARDIOLOGY CLINIC | Facility: HOSPITAL | Age: 78
Discharge: HOME | End: 2023-12-12
Attending: NURSE PRACTITIONER
Payer: MEDICARE

## 2023-12-12 VITALS
SYSTOLIC BLOOD PRESSURE: 111 MMHG | HEART RATE: 79 BPM | WEIGHT: 198 LBS | OXYGEN SATURATION: 100 % | BODY MASS INDEX: 28 KG/M2 | DIASTOLIC BLOOD PRESSURE: 62 MMHG | RESPIRATION RATE: 15 BRPM

## 2023-12-12 DIAGNOSIS — N18.32 STAGE 3B CHRONIC KIDNEY DISEASE (HCC): ICD-10-CM

## 2023-12-12 DIAGNOSIS — I48.21 PERMANENT ATRIAL FIBRILLATION (HCC): ICD-10-CM

## 2023-12-12 DIAGNOSIS — I27.20 PULMONARY HYPERTENSION (HCC): ICD-10-CM

## 2023-12-12 DIAGNOSIS — I50.32 CHRONIC DIASTOLIC HEART FAILURE (HCC): Primary | ICD-10-CM

## 2023-12-12 DIAGNOSIS — I50.32 CHRONIC DIASTOLIC HEART FAILURE (HCC): ICD-10-CM

## 2023-12-12 LAB
ANION GAP SERPL CALC-SCNC: 6 MMOL/L (ref 0–18)
BUN BLD-MCNC: 56 MG/DL (ref 9–23)
CALCIUM BLD-MCNC: 9.2 MG/DL (ref 8.5–10.1)
CHLORIDE SERPL-SCNC: 105 MMOL/L (ref 98–112)
CO2 SERPL-SCNC: 28 MMOL/L (ref 21–32)
CREAT BLD-MCNC: 2.05 MG/DL
EGFRCR SERPLBLD CKD-EPI 2021: 32 ML/MIN/1.73M2 (ref 60–?)
FASTING STATUS PATIENT QL REPORTED: NO
GLUCOSE BLD-MCNC: 117 MG/DL (ref 70–99)
OSMOLALITY SERPL CALC.SUM OF ELEC: 305 MOSM/KG (ref 275–295)
POTASSIUM SERPL-SCNC: 5.2 MMOL/L (ref 3.5–5.1)
SODIUM SERPL-SCNC: 139 MMOL/L (ref 136–145)

## 2023-12-12 PROCEDURE — 99215 OFFICE O/P EST HI 40 MIN: CPT | Performed by: NURSE PRACTITIONER

## 2023-12-12 PROCEDURE — 36415 COLL VENOUS BLD VENIPUNCTURE: CPT | Performed by: NURSE PRACTITIONER

## 2023-12-12 PROCEDURE — 80048 BASIC METABOLIC PNL TOTAL CA: CPT | Performed by: NURSE PRACTITIONER

## 2023-12-12 RX ORDER — BUMETANIDE 1 MG/1
TABLET ORAL
COMMUNITY
Start: 2023-12-12

## 2023-12-12 NOTE — PROGRESS NOTES
Pt. Assessed with wife present. No signs or symptoms of shortness of breath, fatigue, chest pain noted. Trace edema to bilateral lower legs. ABle to do cardiomems at home. Reading today 30. Weight stable at 198 lbs since last [facility] appt. Reviewed current list of patient's allergies and medication; updated the Electronic Medical Record. Labs ordered to assess kidney function and drawn by [facility]. Reviewed follow-up appointment and Heart Failure discharge instructions with patient. Patient verbalized an understanding.        Depression Screening (PHQ-2/PHQ-9): Over the LAST 2 WEEKS   Little interest or pleasure in doing things: Not at all    Feeling down, depressed, or hopeless: Not at all    PHQ-2 SCORE: 0        Declined 6 min walk today    Will RTC in 2 weeks

## 2023-12-12 NOTE — PATIENT INSTRUCTIONS
Heart Failure Discharge Instructions    Take Bumex 2mg in the morning and 1mg in the evening. Activity: Regular exercise and activity is important for your overall health and to help keep your heart strong and functioning as well as possible. Walk at a slow to moderate pace for 15-20 minutes 3-5 days per week. Follow these instructions every day to keep yourself in the [address] you are feeling well and your symptoms are under control                                    Medications  Take your medication every day as instructed  Do not stop taking your medicine or change the amount you are taking without instructions from your doctor or nurse  Do Not take non-steroidal antiinflammatory drugs such as ibuprofen, aleve, advil, or motrin                                    Diet/Fluids  People with heart failure should eat less sodium (salt) and limit fluid. Sodium attracts water and makes the body hold fluid. This extra fluid makes the heart work harder and can worsen the symptoms of heart failure. Diet    2000 mg sodium daily  Fluid restriction    64 ounces daily  (8 oz. = 1 cup)                                     Body Weight  Weigh yourself every day before breakfast and write your weight down  Use the same scale and wear about the same amount of clothes each time  A sudden weight increase is due to fluid retention rather than fat                                         Activity  Pace your activities to avoid getting overtired  Take rest periods as needed  Elevate your feet to reduce ankle swelling when resting                             Signs of Worsening Heart Failure    You are entering the Yellow Zone - this is a warning zone    Call your doctor or nurse if you have any of these signs or symptoms:   You gain 2 or more pounds overnight or 3-5 pounds in 3-7 days  You have more trouble breathing  You get more tired with regular activity, or are limiting activity because of shortness of breath or fatigue  You are short of breath lying down, you need more pillows to breathe comfortably,  or wake up during the night short of breath  You urinate less often during the day and more often at night  You have a bloated feeling, upset stomach, loss of appetite, or your clothes are fitting tighter    GO TO THE EMERGENCY DEPARTMENT or CALL 911 IF: These are signs you are in the RED ZONE - THIS IS AN EMERGENCY  You have tightness or pain in your chest  You are extremely short of breath or can't catch your breath  You cough up frothy pink mucous  You feel confused or can't think clearly  You are traveling and develop symptoms of worsening heart failure     We respect everyone's time and availability. Please be aware that this is not a walk-in clinic and we require appointments in order to facilitate timely care for all patients. We ask you to arrive 30 minutes before your appointment to allow time for you to check-in and have your bloodwork drawn. Please understand if you are late for your appointment, you may be asked to reschedule. If possible, all attempts will be made to accommodate but realize this is no guarantee that this will always be available. We understand there are extenuating circumstances. If you need to cancel or reschedule your appointment, please call the [facility] [employer] within 24 hours at [phone]. Thank you for your cooperation, [facility] Staff. IF YOU HAVE QUESTIONS REGARDING YOUR BILL, FEEL FREE TO CONTACT [facility] PATIENT ACCOUNTS -[phone]. IF YOU NEED FINANCIAL ASSISTANCE, PLEASE CALL AN [facility] FINANCIAL COUNSELOR -[phone].              [facility] [employer] [facility]     [phone]

## 2023-12-14 ENCOUNTER — HOSPITAL ENCOUNTER (OUTPATIENT)
Dept: CARDIOLOGY CLINIC | Facility: HOSPITAL | Age: 78
Discharge: HOME | End: 2023-12-14
Attending: NURSE PRACTITIONER
Payer: MEDICARE

## 2023-12-14 DIAGNOSIS — I50.32 CHRONIC DIASTOLIC HEART FAILURE (HCC): Primary | ICD-10-CM

## 2023-12-14 PROCEDURE — 93264 REM MNTR WRLS P-ART PRS SNR: CPT | Performed by: NURSE PRACTITIONER

## 2023-12-14 NOTE — PROGRESS NOTES
[facility] [address] pulmonary artery pressure sensor    Remote Monitoring Report Summary    2023    Iremily Davila    : 3/17/1945    Reporting Period- -    Diagnosis - HFpEF    Provider- SHERIDAN Connors       The CardioMEMS report for the above date has been reviewed with the following results:        RHC hemodynamics at time of CardioMEMS impant:    PA 42/18/27  Wedge 8    Acceptable range for Irl Serena Davila      PAD range 29-35mmhg    Remote monitoring documentation for the past ~30 days:    2023 Stable. CTM  2023 PAD up to 34 mmHg, but was dry at 30 mmHg. To follow. 2023 PAD stable with reduction in diuretics. 2023 Stable with reduction in diuretics. Will follow.

## 2023-12-21 DIAGNOSIS — I50.32 CHRONIC HEART FAILURE WITH PRESERVED EJECTION FRACTION (HFPEF) (HCC): Primary | ICD-10-CM

## 2023-12-26 ENCOUNTER — HOSPITAL ENCOUNTER (OUTPATIENT)
Dept: CARDIOLOGY CLINIC | Facility: HOSPITAL | Age: 78
Discharge: HOME | End: 2023-12-26
Attending: NURSE PRACTITIONER
Payer: MEDICARE

## 2023-12-26 ENCOUNTER — HOSPITAL ENCOUNTER (OUTPATIENT)
Dept: LAB | Facility: HOSPITAL | Age: 78
Discharge: HOME | End: 2023-12-26
Attending: NURSE PRACTITIONER
Payer: MEDICARE

## 2023-12-26 VITALS
RESPIRATION RATE: 23 BRPM | OXYGEN SATURATION: 100 % | BODY MASS INDEX: 28 KG/M2 | DIASTOLIC BLOOD PRESSURE: 48 MMHG | WEIGHT: 198.19 LBS | SYSTOLIC BLOOD PRESSURE: 109 MMHG | HEART RATE: 71 BPM

## 2023-12-26 DIAGNOSIS — I50.32 CHRONIC HEART FAILURE WITH PRESERVED EJECTION FRACTION (HFPEF) (HCC): ICD-10-CM

## 2023-12-26 DIAGNOSIS — N18.32 STAGE 3B CHRONIC KIDNEY DISEASE (HCC): ICD-10-CM

## 2023-12-26 DIAGNOSIS — I50.32 CHRONIC DIASTOLIC HEART FAILURE (HCC): Primary | ICD-10-CM

## 2023-12-26 DIAGNOSIS — I48.21 PERMANENT ATRIAL FIBRILLATION (HCC): ICD-10-CM

## 2023-12-26 DIAGNOSIS — E87.5 HYPERKALEMIA: ICD-10-CM

## 2023-12-26 LAB
ANION GAP SERPL CALC-SCNC: 2 MMOL/L (ref 0–18)
BUN BLD-MCNC: 64 MG/DL (ref 9–23)
CALCIUM BLD-MCNC: 9.2 MG/DL (ref 8.5–10.1)
CHLORIDE SERPL-SCNC: 106 MMOL/L (ref 98–112)
CO2 SERPL-SCNC: 30 MMOL/L (ref 21–32)
CREAT BLD-MCNC: 2.06 MG/DL
EGFRCR SERPLBLD CKD-EPI 2021: 32 ML/MIN/1.73M2 (ref 60–?)
FASTING STATUS PATIENT QL REPORTED: NO
GLUCOSE BLD-MCNC: 93 MG/DL (ref 70–99)
OSMOLALITY SERPL CALC.SUM OF ELEC: 304 MOSM/KG (ref 275–295)
POTASSIUM SERPL-SCNC: 5.3 MMOL/L (ref 3.5–5.1)
SODIUM SERPL-SCNC: 138 MMOL/L (ref 136–145)

## 2023-12-26 PROCEDURE — 80048 BASIC METABOLIC PNL TOTAL CA: CPT | Performed by: NURSE PRACTITIONER

## 2023-12-26 PROCEDURE — 99215 OFFICE O/P EST HI 40 MIN: CPT | Performed by: NURSE PRACTITIONER

## 2023-12-26 PROCEDURE — 36415 COLL VENOUS BLD VENIPUNCTURE: CPT | Performed by: NURSE PRACTITIONER

## 2023-12-26 NOTE — PATIENT INSTRUCTIONS
Heart Failure Discharge Instructions      Activity: Regular exercise and activity is important for your overall health and to help keep your heart strong and functioning as well as possible. Walk at a slow to moderate pace for 15-20 minutes 3-5 days per week. Follow these instructions every day to keep yourself in the [address] you are feeling well and your symptoms are under control                                    Medications  Take your medication every day as instructed  Do not stop taking your medicine or change the amount you are taking without instructions from your doctor or nurse  Do Not take non-steroidal antiinflammatory drugs such as ibuprofen, aleve, advil, or motrin                                    Diet/Fluids  People with heart failure should eat less sodium (salt) and limit fluid. Sodium attracts water and makes the body hold fluid. This extra fluid makes the heart work harder and can worsen the symptoms of heart failure. Diet    2000 mg sodium daily  Fluid restriction    64 ounces daily  (8 oz. = 1 cup)                                     Body Weight  Weigh yourself every day before breakfast and write your weight down  Use the same scale and wear about the same amount of clothes each time  A sudden weight increase is due to fluid retention rather than fat                                         Activity  Pace your activities to avoid getting overtired  Take rest periods as needed  Elevate your feet to reduce ankle swelling when resting                             Signs of Worsening Heart Failure    You are entering the Yellow Zone - this is a warning zone    Call your doctor or nurse if you have any of these signs or symptoms:   You gain 2 or more pounds overnight or 3-5 pounds in 3-7 days  You have more trouble breathing  You get more tired with regular activity, or are limiting activity because of shortness of breath or fatigue  You are short of breath lying down, you need more pillows to breathe comfortably,  or wake up during the night short of breath  You urinate less often during the day and more often at night  You have a bloated feeling, upset stomach, loss of appetite, or your clothes are fitting tighter    GO TO THE EMERGENCY DEPARTMENT or CALL 911 IF: These are signs you are in the RED ZONE - THIS IS AN EMERGENCY  You have tightness or pain in your chest  You are extremely short of breath or can't catch your breath  You cough up frothy pink mucous  You feel confused or can't think clearly  You are traveling and develop symptoms of worsening heart failure     We respect everyone's time and availability. Please be aware that this is not a walk-in clinic and we require appointments in order to facilitate timely care for all patients. We ask you to arrive 30 minutes before your appointment to allow time for you to check-in and have your bloodwork drawn. Please understand if you are late for your appointment, you may be asked to reschedule. If possible, all attempts will be made to accommodate but realize this is no guarantee that this will always be available. We understand there are extenuating circumstances. If you need to cancel or reschedule your appointment, please call the [facility] within 24 hours at [phone]. Thank you for your cooperation, [facility] Staff. IF YOU HAVE QUESTIONS REGARDING YOUR BILL, FEEL FREE TO CONTACT [facility] PATIENT ACCOUNTS -[phone]. IF YOU NEED FINANCIAL ASSISTANCE, PLEASE CALL AN [facility] FINANCIAL COUNSELOR -[phone].              [facility]     [phone]

## 2023-12-26 NOTE — PROGRESS NOTES
Patient assessed. No signs or symptoms of shortness of breath, fatigue, chest pain or edema noted. Weight stable at 198.2 lbs. Reviewed current list of patient's allergies and medication; updated the Electronic Medical Record. Labs ordered to assess kidney function and drawn by [facility]. Reviewed follow-up appointment and Heart Failure discharge instructions with patient. Patient verbalized an understanding. Veltassa 8.4g given. RTC in 1 month; labs in 2 weeks.

## 2023-12-27 ENCOUNTER — OFFICE VISIT (OUTPATIENT)
Dept: HEMATOLOGY/ONCOLOGY | Facility: HOSPITAL | Age: 78
End: 2023-12-27
Attending: INTERNAL MEDICINE
Payer: MEDICARE

## 2023-12-27 VITALS
OXYGEN SATURATION: 99 % | WEIGHT: 199.5 LBS | SYSTOLIC BLOOD PRESSURE: 94 MMHG | DIASTOLIC BLOOD PRESSURE: 59 MMHG | TEMPERATURE: 97 F | HEIGHT: 70.98 IN | RESPIRATION RATE: 16 BRPM | HEART RATE: 75 BPM | BODY MASS INDEX: 27.93 KG/M2

## 2023-12-27 DIAGNOSIS — M35.1 MIXED CONNECTIVE TISSUE DISEASE (HCC): ICD-10-CM

## 2023-12-27 DIAGNOSIS — D69.3 IMMUNE THROMBOCYTOPENIC PURPURA (HCC): ICD-10-CM

## 2023-12-27 DIAGNOSIS — M33.20 POLYMYOSITIS (HCC): Primary | ICD-10-CM

## 2023-12-27 PROCEDURE — 96365 THER/PROPH/DIAG IV INF INIT: CPT

## 2023-12-27 PROCEDURE — 96366 THER/PROPH/DIAG IV INF ADDON: CPT

## 2023-12-27 RX ORDER — DIPHENHYDRAMINE HCL 25 MG
25 CAPSULE ORAL ONCE
OUTPATIENT
Start: 2024-01-31 | End: 2024-01-31

## 2023-12-27 RX ORDER — DIPHENHYDRAMINE HCL 25 MG
25 CAPSULE ORAL ONCE
Status: COMPLETED | OUTPATIENT
Start: 2023-12-27 | End: 2023-12-27

## 2023-12-27 RX ORDER — ACETAMINOPHEN 325 MG/1
650 TABLET ORAL ONCE
Status: COMPLETED | OUTPATIENT
Start: 2023-12-27 | End: 2023-12-27

## 2023-12-27 RX ORDER — ACETAMINOPHEN 325 MG/1
650 TABLET ORAL ONCE
OUTPATIENT
Start: 2024-01-31 | End: 2024-01-31

## 2023-12-27 RX ADMIN — ACETAMINOPHEN 650 MG: 325 TABLET ORAL at 11:16:00

## 2023-12-27 RX ADMIN — DIPHENHYDRAMINE HCL 25 MG: 25 MG CAPSULE ORAL at 11:16:00

## 2023-12-27 NOTE — PROGRESS NOTES
Pt here for IVIG . Pt denies any issues or concerns. Ordering MD: Meagan Lake Exp: [birth year]. Order good for 3 months from 11/21/23     Pt tolerated infusion without difficulty or complaint. Reviewed next appt date/time: yes, 1/11 with Dr Binu Wu, 1/31 IVIG. Pt uses MyChart. Education Record  Learner:  Patient  Disease / Diagnosis: ITP  Barriers / Limitations:  None  Method:  Brief focused and Discussion  General Topics:  Medication, Side effects and symptom management, and Plan of care reviewed  Outcome:  Shows understanding    Premedications given and IVIG infused without incident. Discharged home in stable condition, no new complaints.

## 2023-12-28 ENCOUNTER — PATIENT OUTREACH (OUTPATIENT)
Dept: CASE MANAGEMENT | Age: 78
End: 2023-12-28

## 2023-12-28 DIAGNOSIS — I48.19 PERSISTENT ATRIAL FIBRILLATION (HCC): Primary | ICD-10-CM

## 2023-12-28 DIAGNOSIS — M10.9 GOUT, UNSPECIFIED CAUSE, UNSPECIFIED CHRONICITY, UNSPECIFIED SITE: ICD-10-CM

## 2023-12-28 DIAGNOSIS — K22.70 BARRETT'S ESOPHAGUS WITHOUT DYSPLASIA: ICD-10-CM

## 2023-12-28 DIAGNOSIS — I27.20 PULMONARY HYPERTENSION (HCC): ICD-10-CM

## 2023-12-28 DIAGNOSIS — G47.33 OSA (OBSTRUCTIVE SLEEP APNEA): ICD-10-CM

## 2023-12-28 DIAGNOSIS — N18.31 STAGE 3A CHRONIC KIDNEY DISEASE (HCC): ICD-10-CM

## 2023-12-28 DIAGNOSIS — C44.219 BASAL CELL CARCINOMA (BCC) OF SKIN OF LEFT EAR: ICD-10-CM

## 2023-12-28 DIAGNOSIS — I10 BENIGN ESSENTIAL HYPERTENSION: ICD-10-CM

## 2023-12-29 ENCOUNTER — LAB ENCOUNTER (OUTPATIENT)
Dept: LAB | Age: 78
End: 2023-12-29
Attending: INTERNAL MEDICINE
Payer: MEDICARE

## 2023-12-29 ENCOUNTER — TELEPHONE (OUTPATIENT)
Dept: FAMILY MEDICINE CLINIC | Facility: CLINIC | Age: 78
End: 2023-12-29

## 2023-12-29 DIAGNOSIS — K75.81 NASH (NONALCOHOLIC STEATOHEPATITIS): Primary | ICD-10-CM

## 2023-12-29 DIAGNOSIS — E78.5 DYSLIPIDEMIA: ICD-10-CM

## 2023-12-29 DIAGNOSIS — E79.0 HYPERURICEMIA: ICD-10-CM

## 2023-12-29 DIAGNOSIS — Z13.6 SCREENING FOR CARDIOVASCULAR CONDITION: ICD-10-CM

## 2023-12-29 DIAGNOSIS — Z13.29 SCREENING FOR THYROID DISORDER: Primary | ICD-10-CM

## 2023-12-29 LAB
ALBUMIN SERPL-MCNC: 3.6 G/DL (ref 3.4–5)
ALBUMIN/GLOB SERPL: 1 {RATIO} (ref 1–2)
ALP LIVER SERPL-CCNC: 44 U/L
ALT SERPL-CCNC: 23 U/L
ANION GAP SERPL CALC-SCNC: 3 MMOL/L (ref 0–18)
AST SERPL-CCNC: 18 U/L (ref 15–37)
BASOPHILS # BLD AUTO: 0.02 X10(3) UL (ref 0–0.2)
BASOPHILS NFR BLD AUTO: 0.4 %
BILIRUB SERPL-MCNC: 0.6 MG/DL (ref 0.1–2)
BUN BLD-MCNC: 52 MG/DL (ref 9–23)
CALCIUM BLD-MCNC: 9 MG/DL (ref 8.5–10.1)
CHLORIDE SERPL-SCNC: 108 MMOL/L (ref 98–112)
CO2 SERPL-SCNC: 31 MMOL/L (ref 21–32)
CREAT BLD-MCNC: 1.75 MG/DL
EGFRCR SERPLBLD CKD-EPI 2021: 39 ML/MIN/1.73M2 (ref 60–?)
EOSINOPHIL # BLD AUTO: 0.15 X10(3) UL (ref 0–0.7)
EOSINOPHIL NFR BLD AUTO: 3 %
ERYTHROCYTE [DISTWIDTH] IN BLOOD BY AUTOMATED COUNT: 15.5 %
FASTING STATUS PATIENT QL REPORTED: YES
GLOBULIN PLAS-MCNC: 3.6 G/DL (ref 2.8–4.4)
GLUCOSE BLD-MCNC: 89 MG/DL (ref 70–99)
HCT VFR BLD AUTO: 41 %
HGB BLD-MCNC: 12.5 G/DL
IMM GRANULOCYTES # BLD AUTO: 0.01 X10(3) UL (ref 0–1)
IMM GRANULOCYTES NFR BLD: 0.2 %
LYMPHOCYTES # BLD AUTO: 0.95 X10(3) UL (ref 1–4)
LYMPHOCYTES NFR BLD AUTO: 19.3 %
MCH RBC QN AUTO: 34.2 PG (ref 26–34)
MCHC RBC AUTO-ENTMCNC: 30.5 G/DL (ref 31–37)
MCV RBC AUTO: 112 FL
MONOCYTES # BLD AUTO: 0.64 X10(3) UL (ref 0.1–1)
MONOCYTES NFR BLD AUTO: 13 %
NEUTROPHILS # BLD AUTO: 3.16 X10 (3) UL (ref 1.5–7.7)
NEUTROPHILS # BLD AUTO: 3.16 X10(3) UL (ref 1.5–7.7)
NEUTROPHILS NFR BLD AUTO: 64.1 %
OSMOLALITY SERPL CALC.SUM OF ELEC: 308 MOSM/KG (ref 275–295)
PLATELET # BLD AUTO: 101 10(3)UL (ref 150–450)
POTASSIUM SERPL-SCNC: 4.7 MMOL/L (ref 3.5–5.1)
PROT SERPL-MCNC: 7.2 G/DL (ref 6.4–8.2)
RBC # BLD AUTO: 3.66 X10(6)UL
SODIUM SERPL-SCNC: 142 MMOL/L (ref 136–145)
WBC # BLD AUTO: 4.9 X10(3) UL (ref 4–11)

## 2023-12-29 PROCEDURE — 85025 COMPLETE CBC W/AUTO DIFF WBC: CPT

## 2023-12-29 PROCEDURE — 84550 ASSAY OF BLOOD/URIC ACID: CPT

## 2023-12-29 PROCEDURE — 80061 LIPID PANEL: CPT

## 2023-12-29 PROCEDURE — 36415 COLL VENOUS BLD VENIPUNCTURE: CPT

## 2023-12-29 PROCEDURE — 84439 ASSAY OF FREE THYROXINE: CPT

## 2023-12-29 PROCEDURE — 80053 COMPREHEN METABOLIC PANEL: CPT

## 2023-12-29 PROCEDURE — 84443 ASSAY THYROID STIM HORMONE: CPT

## 2023-12-29 NOTE — TELEPHONE ENCOUNTER
Future Appointments   Date Time Provider [facility] Hieu Sosa [address] SUB GI       Fiorella Beltran   1/29/2024  1:15 PM Trevor Swan MD EM[facility] 3 [facility] [place]       If pt requires lab work prior to [address] [facility] please review chart and place appropriate orders.     Thank You    Routed to Dr Katharina Yousif

## 2023-12-29 NOTE — PROGRESS NOTES
Spoke to Prince chung for [facility]. Updates to patient care team/comments: UTD  Patient reported changes in medications: UTD  Med Adherence  Comment: Pt taking medications as prescribed     Health Maintenance:   Health Maintenance   Topic Date Due    Colorectal Cancer Screening  05/01/2023    Annual Physical  01/05/2024    Influenza Vaccine  Completed    Annual Depression Screening  Completed    Fall Risk Screening (Annual)  Completed    Pneumococcal Vaccine: 65+ Years  Completed    Zoster Vaccines  Completed       Patient updates/concerns:    Reviewed upcoming appointments with pt. Pt is aware that labs will likely be due ahead of [address] with pcp. [facility] sent TE to pcp for lab order. Pt utilizes [facility] to check orders and will complete labs within two weeks of appointment. Pt states his bowel issues are well controlled. He does not have to alter his diet in any way and he is working off the assumption that this may be lupous related. He has appointment upcoming with dr Zahira Valdovinos and anticipates endoscopy/colonoscopy will be ordered to rule out any gastro causes like collitis. Pt states that once that testing is complete he will follow up with dr Sangeetha Herbert to determine any changes to treatment. Pt manages any issues by using metamucil and imodium as needed. Reviewed with pt his prescriptions to insure accuracy. Pt continues to visit heart clinic. His visits were extended another month. Labs are taken in order to have meds properly calibrated and no changes were made at the visit. Pt is not required to perform any physical assessments regularly. At next visit he will perform six minute walking test.    Pt blood pressure typically runs a little low because of his diuretics. He states that recent readings have been more consistently in the 100's. Pt manages the care of his wife. She does not require assistance with ADL's. His efforts center on keeping her engaged and trying to get her as much social activity as possible. Pt discussed how managing his own care alongside his wifes leaves him with very little energy to perform an exercise routine on a regular basis. CCM listened to and provided support. Goals/Action Plan: Active goal from previous outreach: exercise    Patient reported progress towards goals: Pt gets most of exercise recently with daily activity. Pt manages his wifes care and states that along with his own care he stays busy and does not lead a sedentary life style. He admits to his energy level being low which limits his motivation. CCM will discuss with pt at next outreach about goal modification or setting attainable goal that can be sustained               - What:            - Where/When/How:   Patient Reported Barriers and Concerns: n/a                   - Plan for overcoming barriers: none    Care Managers Interventions: TE to pcp lab orders mawv, continue to provide encouragement and education for healthy coping and dx    Future Appointments:   Future Appointments   Date Time Provider Chrissie Aden   1/11/2024  2:15 PM Kalie Woodard MD [facility] [place]   1/18/2024  5:00 PM HEART FAILURE APN 1 [facility]  [address]   1/19/2024 10:20 AM Elie Garcia MD [facility] SUB GI   1/23/2024  2:00 PM HEART FAILURE APN 1 [facility] HF CLIN [facility] AT [facility]   1/29/2024  1:15 PM Brie Broussard MD EMG 3 [facility] [place]   1/31/2024 10:00 AM [address] [address] [place] [address]   7/6/6193  2:93 PM Alecia Abebe MD [facility] [place]   2/21/2024  5:00 PM HEART FAILURE APN 1 [facility] HF [address] Follow Up Date: one month    Reason For Follow Up: review progress and or barriers towards patient's goals. Time Spent This Encounter Total: 38 min medical record review, telephone communication, care plan updates where needed, education, goals, and action plan recreation/update. Provided acknowledgment and validation to patient's concerns.    Monthly Minute Total including today: 38  Physical assessment, complete health history, and need for CCM established by Luzmaria Pollock MD.

## 2023-12-31 LAB
CHOLEST SERPL-MCNC: 141 MG/DL (ref ?–200)
HDLC SERPL-MCNC: 34 MG/DL (ref 40–59)
LDLC SERPL CALC-MCNC: 79 MG/DL (ref ?–100)
NONHDLC SERPL-MCNC: 107 MG/DL (ref ?–130)
T4 FREE SERPL-MCNC: 0.9 NG/DL (ref 0.8–1.7)
TRIGL SERPL-MCNC: 162 MG/DL (ref 30–149)
TSI SER-ACNC: 3.85 MIU/ML (ref 0.36–3.74)
URATE SERPL-MCNC: 4.2 MG/DL
VLDLC SERPL CALC-MCNC: 25 MG/DL (ref 0–30)

## 2023-12-31 NOTE — TELEPHONE ENCOUNTER
1. Screening for thyroid disorder (Primary)  2. Screening for cardiovascular condition  3. Dyslipidemia  -     Lipid Panel; Future; Expected date: 12/31/2023  -     TSH W Reflex To Free T4; Future; Expected date: 12/31/2023  4. Hyperuricemia  -     Uric Acid; Future; Expected date: 12/31/2023     Added to existing 12/29specimen  OK to notify.  Thanks, Shila Donovan MD

## 2024-01-02 ENCOUNTER — TELEPHONE (OUTPATIENT)
Dept: CARDIOLOGY CLINIC | Facility: HOSPITAL | Age: 79
End: 2024-01-02

## 2024-01-10 ENCOUNTER — LAB ENCOUNTER (OUTPATIENT)
Dept: LAB | Age: 79
End: 2024-01-10
Attending: NURSE PRACTITIONER
Payer: MEDICARE

## 2024-01-10 DIAGNOSIS — E87.5 HYPERKALEMIA: ICD-10-CM

## 2024-01-10 LAB
ANION GAP SERPL CALC-SCNC: 2 MMOL/L (ref 0–18)
BUN BLD-MCNC: 52 MG/DL (ref 9–23)
CALCIUM BLD-MCNC: 8.6 MG/DL (ref 8.5–10.1)
CHLORIDE SERPL-SCNC: 107 MMOL/L (ref 98–112)
CO2 SERPL-SCNC: 30 MMOL/L (ref 21–32)
CREAT BLD-MCNC: 1.77 MG/DL
EGFRCR SERPLBLD CKD-EPI 2021: 38 ML/MIN/1.73M2 (ref 60–?)
FASTING STATUS PATIENT QL REPORTED: NO
GLUCOSE BLD-MCNC: 114 MG/DL (ref 70–99)
OSMOLALITY SERPL CALC.SUM OF ELEC: 303 MOSM/KG (ref 275–295)
POTASSIUM SERPL-SCNC: 4.7 MMOL/L (ref 3.5–5.1)
SODIUM SERPL-SCNC: 139 MMOL/L (ref 136–145)

## 2024-01-10 PROCEDURE — 80048 BASIC METABOLIC PNL TOTAL CA: CPT

## 2024-01-10 PROCEDURE — 36415 COLL VENOUS BLD VENIPUNCTURE: CPT

## 2024-01-11 ENCOUNTER — OFFICE VISIT (OUTPATIENT)
Dept: HEMATOLOGY/ONCOLOGY | Facility: HOSPITAL | Age: 79
End: 2024-01-11
Attending: INTERNAL MEDICINE
Payer: MEDICARE

## 2024-01-11 VITALS
RESPIRATION RATE: 18 BRPM | TEMPERATURE: 97 F | BODY MASS INDEX: 27.89 KG/M2 | WEIGHT: 199.19 LBS | HEART RATE: 99 BPM | DIASTOLIC BLOOD PRESSURE: 69 MMHG | OXYGEN SATURATION: 98 % | SYSTOLIC BLOOD PRESSURE: 112 MMHG | HEIGHT: 70.98 IN

## 2024-01-11 DIAGNOSIS — D63.8 ANEMIA, CHRONIC DISEASE: ICD-10-CM

## 2024-01-11 DIAGNOSIS — D69.3 IMMUNE THROMBOCYTOPENIC PURPURA (HCC): Primary | ICD-10-CM

## 2024-01-11 LAB
BASOPHILS # BLD AUTO: 0.03 X10(3) UL (ref 0–0.2)
BASOPHILS NFR BLD AUTO: 0.4 %
EOSINOPHIL # BLD AUTO: 0.1 X10(3) UL (ref 0–0.7)
EOSINOPHIL NFR BLD AUTO: 1.2 %
ERYTHROCYTE [DISTWIDTH] IN BLOOD BY AUTOMATED COUNT: 15.9 %
HCT VFR BLD AUTO: 40.7 %
HGB BLD-MCNC: 13 G/DL
IMM GRANULOCYTES # BLD AUTO: 0.03 X10(3) UL (ref 0–1)
IMM GRANULOCYTES NFR BLD: 0.4 %
LYMPHOCYTES # BLD AUTO: 0.75 X10(3) UL (ref 1–4)
LYMPHOCYTES NFR BLD AUTO: 9.2 %
MCH RBC QN AUTO: 34.7 PG (ref 26–34)
MCHC RBC AUTO-ENTMCNC: 31.9 G/DL (ref 31–37)
MCV RBC AUTO: 108.5 FL
MONOCYTES # BLD AUTO: 0.45 X10(3) UL (ref 0.1–1)
MONOCYTES NFR BLD AUTO: 5.5 %
NEUTROPHILS # BLD AUTO: 6.76 X10 (3) UL (ref 1.5–7.7)
NEUTROPHILS # BLD AUTO: 6.76 X10(3) UL (ref 1.5–7.7)
NEUTROPHILS NFR BLD AUTO: 83.3 %
PLATELET # BLD AUTO: 104 10(3)UL (ref 150–450)
RBC # BLD AUTO: 3.75 X10(6)UL
WBC # BLD AUTO: 8.1 X10(3) UL (ref 4–11)

## 2024-01-11 PROCEDURE — 99214 OFFICE O/P EST MOD 30 MIN: CPT | Performed by: INTERNAL MEDICINE

## 2024-01-11 NOTE — PROGRESS NOTES
Cancer Center Progress Note    Patient Name: Miguel Davila   YOB: 1945   Medical Record Number: [id number]   CSN: [id number]   Attending Physician: Iris Orr M.D.   Referring Physician: MD Dr. Al Maguire Dr.    Date of Visit: 1/11/2024     Chief Complaint:  Chief Complaint   Patient presents with    Follow - Up     Pt here for f/u and review of recent tests done per cardiology. He is having issues with CHF/PHTN, following with cardiology. He c/o fatigue, denies SOB.            Diagnosis:  1. History of lupus and MCTD (overlap) diagnosed 7/2005. Presented with acute polymyositis at that time and received IVIG, steroids and eventually maintained on steroids and Azathioprine which was tapered off around 2011.      Also with h/o positive sta clot as well as DRVVT, IgM phospholipid antibody (medium positive) and thrombocytopenia w/o evidence of thrombosis or vascular events.      -  Was admitted 4/2021 when he presented with progressively worse WILSON, malaise and fatigue. Labs drawn showed pancytopenia with WBC of 2, Hb 7.8 and platelets of 74. Back in March white count and platelets were normal and had stable thrombocytopenia with counts >100. He denied any bleeding. Pancytopenia felt to be from marrow suppression from Imuran. W/u revealed no hemolysis or nutritional deficiencies. Iron studies were not c/w deficiency.  Further Imuran was held. He was given a dose of Filgrastim. Hb had dropped to 7 and was transfused a unit of PRBCs. By discharge Hb was up to 8.4, WBC 3.3 with an ANC of 2350 and platelets were 82.     2. Immune mediated thrombocytopenia.      - started on prednisone 3/30/17 and received IVIG 4/6-7/17 when his platelets dropped to <5. Tapered off steroids 6/2017. Platelets have stayed >30 and therefore not requiring any further treatment    3. Had watchman placed for A fib and off anticoagulation.    4. Scopes done 5/1/18 which showed Ramon's w/o  evidence of dysplasia, tubular adenomas and candida esophagitis. He was placed on treatment for the Candida.      History of Present Illness:  Followed at HF clinic. They are managing his fluid retention/edema and weight gain with diuretics. BVA suggestive of severe dehydration.  No bleeding.  Denies chest or abdominal pain, change in his bowel or urinary habits, headaches, dizziness or visual symptoms. No fevers. Reports fatigue.       Current Medications:    Current Outpatient Medications:     bumetanide 1 MG Oral Tab, Take 2 tablets (2 mg total) by mouth every morning AND 1 tablet (1 mg total) every evening., Disp: , Rfl:     ALLOPURINOL 300 MG Oral Tab, TAKE 1 TABLET BY MOUTH EVERY DAY, Disp: 90 tablet, Rfl: 3    predniSONE 5 MG Oral Tab, Take 1 tablet (5 mg total) by mouth daily with breakfast., Disp: 90 tablet, Rfl: 3    omeprazole 20 MG Oral Capsule Delayed Release, Take 1 capsule (20 mg total) by mouth 2 (two) times daily., Disp: 180 capsule, Rfl: 1    spironolactone 25 MG Oral Tab, Take 0.5 tablets (12.5 mg total) by mouth daily., Disp: 45 tablet, Rfl: 3    metoprolol succinate  MG Oral Tablet 24 Hr, Take 1 tablet (100 mg total) by mouth every morning AND 0.5 tablets (50 mg total) every evening., Disp: 60 tablet, Rfl: 3    PREDNISONE 1 MG Oral Tab, TAKE 2 TABLETS (2 MG TOTAL) BY MOUTH DAILY WITH BREAKFAST., Disp: 180 tablet, Rfl: 3    sacubitril-valsartan 49-51 MG Oral Tab, Take 1 tablet by mouth 2 (two) times daily., Disp: , Rfl:     aspirin 81 MG Oral Tab EC, Take 1 tablet (81 mg total) by mouth daily., Disp: 30 tablet, Rfl: 0    PREVIDENT 5000 BOOSTER PLUS 1.1 % Dental Paste, , Disp: , Rfl:     Multiple Vitamins-Minerals (TAB-A-KEVIN) Oral Tab, Take 1 tablet by mouth daily., Disp: , Rfl:     Immune Globulin, Human, 10 g Intravenous Recon Soln, Inject 0.4 g/kg into the vein. Given for treatment of polymyositis, mixed connective tissue disease, and immune thrombocytopenia purpura monthly at [facility] center. Every 5 weeks, Disp: 3 each, Rfl: 0    Calcium Citrate-Vitamin D (CITRACAL + D OR), Take 1 tablet by mouth daily., Disp: , Rfl:     Past Medical History:  Past Medical History:   Diagnosis Date    A-fib (HCC)     Arrhythmia     atrial fibrillation    Arthritis     Autoimmune disease (HCC)     hepatits, resolved anfd nephritis, resolved    Ramon's esophagus 5/14/2013    Bleeding nose 1970    Gums Treated    Blood disorder     thrombocytopenia    Blood in urine     Blurred vision 2021    cataract due to steroids, surgery in June    Cancer (HCC)     skin    Candidiasis of the esophagus 6/29/2012    Due to steroid use for Autoimmune disorder     Cataract     Colon polyps 5/13/2013    Congestive heart disease (HCC)     Diverticulosis of colon 5/14/2013    Esophageal polyp 5/14/2013    Esophageal reflux     Essential hypertension     Fatigue Since 2005    polymyositis and lupus    Gout     Hammer toe, acquired 6/29/2012    Heart palpitations 2017    Afib    Hepatitis     autoimmune induce hepatitis d/t lupus    High blood pressure     IBS (irritable bowel syndrome)     mild d/t lupus    Lupus (HCC)     MCTD (mixed connective tissue disease) (HCC)     Obstructive sleep apnea (adult) (pediatric) 11/16/2017    LI (obstructive sleep apnea) HST 11-7-17    AHI 37  Supine AHI 38 non-supine AHI 16 Sao2 Pj 83%     LI (obstructive sleep apnea) PSG 5-22-19    AHI 36 RDI 36 REM AHI 45 Supine AHI 65 non-supine AHI 19 Sao2 Pj 86% CPAP 9cwp    Pain in joints     Personal history of antineoplastic chemotherapy     Pleural effusion     right    Pneumonia due to organism     Polymyositis (HCC)     Presence of other cardiac implants and grafts 2017    watchman filter    Problems with swallowing     dysphagia in 2006    Pulmonary hypertension (HCC)     Raynaud disease     Raynaud disease     Renal disorder     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    Shortness of breath on and off    mainly due to pm or lupus    Sleep apnea      CPAP    Thrombocytopathia (HCC)     Visual impairment     bifocals for reading & computer; distance for driving    Wears glasses        Past Surgical History:  Past Surgical History:   Procedure Laterality Date    APPENDECTOMY  1970    APPENDECTOMY      CARDIAC CATHETERIZATION  09/2019    CATARACT Bilateral     COLONOSCOPY  10/2003 2006 01/2010    x3    COLONOSCOPY  5/14/2013    COLONOSCOPY N/A 5/1/2018    Procedure: COLONOSCOPY;  Surgeon: Isaiah Tobar MD;  Location:  ENDOSCOPY    COLONOSCOPY WITH BIOPSY  5/14/13    EGD      HAND/FINGER SURGERY UNLISTED  2003    right    NEEDLE BIOPSY LIVER      OTHER  12/2005    needle bipsy of kidney    OTHER SURGICAL HISTORY  2017    watchman filter    PERCUTANEOUS  ANGIOPLASTY  (PTCA)- PBP ONLY  2006    right    RECTUM SURGERY PROCEDURE UNLISTED  1946    rectal surgery polypectomy    SKIN SURGERY      MMS BCC R temple 6/24/09    SKIN SURGERY  2007    basal ceell carcinoma    SKIN SURGERY  7-18-12    BCC-nodular to right superior eyebrow/ MOHS    SKIN SURGERY  07/15/2013    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/MMS    SKIN SURGERY  2-19-14    BCC-NOD to right superior pinna, MMS, AB    SKIN SURGERY  02/16/2021    BCC- left superior forehead, MMS     SKIN SURGERY  03/07/2022    SCC IN SITU RIGHT ANTIHELIX MMS BY DR GASTELUM    TONSILLECTOMY  1948    UPPER GI ENDOSCOPY,EXAM  10/2003 2006 01/2010 , 5/13    x3       Family Medical History:  Family History   Problem Relation Age of Onset    Heart Disease Father         CHF    Gastro-Intestinal Disorder Father         Diverticulosis    Alcohol and Other Disorders Associated Father     Heart Disease Mother         CHF    Breast Cancer Mother     Stroke Mother     Cancer Paternal Grandfather     Heart Attack Paternal Grandmother     Kidney Disease Son     Other (Ramon's Esophagus) Son     Heart Attack Maternal Grandfather        Psychosocial History:  Social History     Socioeconomic History    Marital status:      Spouse name:  Not on file    Number of children: Not on file    Years of education: Not on file    Highest education level: Not on file   Occupational History    Occupation: Retired    Tobacco Use    Smoking status: Former     Packs/day: 0.50     Years: 15.00     Additional pack years: 0.00     Total pack years: 7.50     Types: Cigarettes     Start date: 1958     Quit date: 1973     Years since quittin.4    Smokeless tobacco: Never    Tobacco comments:     5 cigarettes daily, stopped smoking in    Vaping Use    Vaping Use: Never used   Substance and Sexual Activity    Alcohol use: Yes     Alcohol/week: 8.0 - 10.0 standard drinks of alcohol     Types: 8 - 10 Standard drinks or equivalent per week     Comment: 1 per day wine or liquor    Drug use: Never    Sexual activity: Not on file   Other Topics Concern     Service Not Asked    Blood Transfusions Not Asked    Caffeine Concern Yes     Comment: 1 soda per day and decaf coffee    Occupational Exposure Not Asked    Hobby Hazards Not Asked    Sleep Concern No    Stress Concern Not Asked    Weight Concern Not Asked    Special Diet Not Asked    Back Care Not Asked    Exercise Yes     Comment: 3-4 times weekly    Bike Helmet Not Asked    Seat Belt Yes    Self-Exams Not Asked   Social History Narrative    Not on file     Social Determinants of Health     Financial Resource Strain: Not on file   Food Insecurity: Not on file   Transportation Needs: Not on file   Physical Activity: Not on file   Stress: Not on file   Social Connections: Not on file   Housing Stability: Not on file         Allergies:  Allergies   Allergen Reactions    Jardiance [Empagliflozin] OTHER (SEE COMMENTS)     Pancreatitis     Amoxicillin RASH    Augmentin [Amoclan] RASH    Doxycycline RASH        Review of Systems:  A 14-point ROS was done with pertinent positives and negative per the HPI    Vital Signs:  /69 (BP Location: Left arm, Patient Position: Sitting, Cuff Size: adult)    Pulse 99   Temp 97.2 °F (36.2 °C) (Temporal)   Resp 18   Ht 1.803 m (5' 10.98\")   Wt 90.4 kg (199 lb 3.2 oz)   SpO2 98%   BMI 27.80 kg/m²       Physical Examination:  General: Patient is alert and oriented x 3, not in acute distress. No bruising.   Psych:  Mood and affect appropriate  HEENT: EOMs intact. PERRL. Oropharynx is clear.   Neck: No JVD. No palpable lymphadenopathy. Neck is supple.  Lymphatics: There is no palpable lymphadenopathy   Chest: Clear to auscultation.  Heart: Regular rate and rhythm.   Abdomen: Some distention but soft, non tender with good bowel sounds.  No palpable mass.  Extremities: Pedal pulses are present. Some BLE edema.  Neurological: Grossly intact.       Laboratory:  Lab Results   Component Value Date    WBC 8.1 01/11/2024    RBC 3.75 (L) 01/11/2024    HGB 13.0 01/11/2024    HCT 40.7 01/11/2024    .0 (L) 01/11/2024    .5 (H) 01/11/2024    MCH 34.7 (H) 01/11/2024    MCHC 31.9 01/11/2024    RDW 15.9 01/11/2024    NEPRELIM 6.76 01/11/2024    NEUTABS 0.83 (L) 04/28/2021    LYMPHABS 0.66 (L) 04/28/2021    EOSABS 0.03 04/28/2021    BASABS 0.00 04/28/2021    NEUT 49 04/28/2021    LYMPH 39 04/28/2021    MON 9 04/28/2021    EOS 2 04/28/2021    BASO 0 04/28/2021    NEPERCENT 83.3 01/11/2024    LYPERCENT 9.2 01/11/2024    MOPERCENT 5.5 01/11/2024    EOPERCENT 1.2 01/11/2024    BAPERCENT 0.4 01/11/2024    NE 6.76 01/11/2024    LYMABS 0.75 (L) 01/11/2024    MOABSO 0.45 01/11/2024    EOABSO 0.10 01/11/2024    BAABSO 0.03 01/11/2024       Lab Results   Component Value Date    WBC 4.9 12/29/2023    RBC 3.66 (L) 12/29/2023    HGB 12.5 (L) 12/29/2023    HCT 41.0 12/29/2023    .0 (L) 12/29/2023    .0 (H) 12/29/2023    MCH 34.2 (H) 12/29/2023    MCHC 30.5 (L) 12/29/2023    RDW 15.5 12/29/2023    NEPRELIM 3.16 12/29/2023    NEUTABS 0.83 (L) 04/28/2021    LYMPHABS 0.66 (L) 04/28/2021    EOSABS 0.03 04/28/2021    BASABS 0.00 04/28/2021    NEUT 49 04/28/2021    LYMPH 39  04/28/2021    MON 9 04/28/2021    EOS 2 04/28/2021    BASO 0 04/28/2021    NEPERCENT 64.1 12/29/2023    LYPERCENT 19.3 12/29/2023    MOPERCENT 13.0 12/29/2023    EOPERCENT 3.0 12/29/2023    BAPERCENT 0.4 12/29/2023    NE 3.16 12/29/2023    LYMABS 0.95 (L) 12/29/2023    MOABSO 0.64 12/29/2023    EOABSO 0.15 12/29/2023    BAABSO 0.02 12/29/2023       Lab Results   Component Value Date    WBC 4.9 12/29/2023    RBC 3.66 (L) 12/29/2023    HGB 12.5 (L) 12/29/2023    HCT 41.0 12/29/2023    .0 (L) 12/29/2023    .0 (H) 12/29/2023    MCH 34.2 (H) 12/29/2023    MCHC 30.5 (L) 12/29/2023    RDW 15.5 12/29/2023    NEPRELIM 3.16 12/29/2023    NEUTABS 0.83 (L) 04/28/2021    LYMPHABS 0.66 (L) 04/28/2021    EOSABS 0.03 04/28/2021    BASABS 0.00 04/28/2021    NEUT 49 04/28/2021    LYMPH 39 04/28/2021    MON 9 04/28/2021    EOS 2 04/28/2021    BASO 0 04/28/2021    NEPERCENT 64.1 12/29/2023    LYPERCENT 19.3 12/29/2023    MOPERCENT 13.0 12/29/2023    EOPERCENT 3.0 12/29/2023    BAPERCENT 0.4 12/29/2023    NE 3.16 12/29/2023    LYMABS 0.95 (L) 12/29/2023    MOABSO 0.64 12/29/2023    EOABSO 0.15 12/29/2023    BAABSO 0.02 12/29/2023         Lab Component   Component Value Date/Time    RED BLOOD COUNT 4.11 08/05/2014 1205    RED BLOOD COUNT 4.02 07/28/2014 0821    RED BLOOD COUNT 4.15 06/04/2014 0831    RBC 3.66 (L) 12/29/2023 1046    RBC 3.58 (L) 11/30/2023 1005    RBC 3.77 (L) 11/17/2023 1300    HGB 12.5 (L) 12/29/2023 1046    HGB 12.4 (L) 11/30/2023 1005    HGB 13.1 11/17/2023 1300    HGB 13.5 08/05/2014 1205    HGB 13.3 07/28/2014 0821    HGB 13.3 06/04/2014 0831    HCT 41.0 12/29/2023 1046    HCT 39.4 11/30/2023 1005    HCT 41.5 11/17/2023 1300    HCT 40.3 08/05/2014 1205    HCT 39.4 07/28/2014 0821    HCT 39.4 06/04/2014 0831    .0 (H) 12/29/2023 1046    .1 (H) 11/30/2023 1005    .1 (H) 11/17/2023 1300    MEAN CELL VOLUME 98.1 08/05/2014 1205    MEAN CELL VOLUME 98.0 07/28/2014 0821    MEAN CELL  VOLUME 94.9 06/04/2014 0831    MCH 34.2 (H) 12/29/2023 1046    MCH 34.6 (H) 11/30/2023 1005    MCH 34.7 (H) 11/17/2023 1300    Mean Corpuscular Hemoglobin 32.8 08/05/2014 1205    Mean Corpuscular Hemoglobin 33.1 07/28/2014 0821    Mean Corpuscular Hemoglobin 32.0 06/04/2014 0831    MCHC 30.5 (L) 12/29/2023 1046    MCHC 31.5 11/30/2023 1005    MCHC 31.6 11/17/2023 1300    Mean Corpuscular HGB Conc 33.5 08/05/2014 1205    Mean Corpuscular HGB Conc 33.8 07/28/2014 0821    Mean Corpuscular HGB Conc 33.8 06/04/2014 0831    RDW 15.5 12/29/2023 1046    RDW 16.1 11/30/2023 1005    RDW 15.9 11/17/2023 1300    RED CELL DISTRIBUTION WIDTH 13.2 08/05/2014 1205    RED CELL DISTRIBUTION WIDTH 13.5 07/28/2014 0821    RED CELL DISTRIBUTION WIDTH 13.7 06/04/2014 0831    PRELIMINARY NEUTROPHIL ABS 1.68 08/05/2014 1205    PRELIMINARY NEUTROPHIL ABS 1.76 11/22/2013 0850    PRELIMINARY NEUTROPHIL ABS 1.65 06/20/2013 0815    Neutrophil Absolute Prelim 3.16 12/29/2023 1046    Neutrophil Absolute Prelim 3.92 11/30/2023 1005    Neutrophil Absolute Prelim 6.05 11/17/2023 1300    WBC 4.9 12/29/2023 1046    WBC 5.6 11/30/2023 1005    WBC 7.8 11/17/2023 1300    WBC 3.3 (L) 08/05/2014 1205    WBC 3.7 (L) 07/28/2014 0821    WBC 3.7 (L) 06/04/2014 0831    .0 (L) 12/29/2023 1046    PLT 97.0 (L) 11/30/2023 1005    .0 (L) 11/17/2023 1300    PLT 46.0 (L) 08/05/2014 1205    PLT 49.0 (L) 07/28/2014 0821    .0 (L) 06/04/2014 0831         Impression and Plan:  1. Thrombocytopenia- immune mediated. On IVIG and prednisone for autoimmune disease. Platelet counts have been good for him. Last several months have been >100. No bleeding.     2. H/o previous pancytopenia- marrow suppression from Imuran.  No evidence of hemolysis or nutritional deficiencies. Counts have been good.      3. Autoimmune disease- management as per rheum. Imuran stopped given (1). Continues on steroids and IVIG    4. Stage I liver fibrosis- last US suggested  stability. Followed by hepatology      5. Chronic diastolic heart failure- followed at HF clinic. Managing with diuretics      RTC 1 year.     Emotional Well Being:  I have assessed the patient's emotional well-being and any concerns about anxiety or depression.  We discussed issues of distress, coping difficulties and social support systems and currently there are no active problems.    MD [facility]

## 2024-01-12 NOTE — PROGRESS NOTES
[facility]     CardioMEMS pulmonary artery pressure sensor    Remote Monitoring Report Summary    2024     Miguel Davila    : 3/17/1945    Reporting Period- 12/15-1/15/2024    Diagnosis - HFpEF    Provider- SHERIDAN Cummins       The CardioMEMS report for the above date has been reviewed with the following results:    RHC hemodynamics at time of CardioMEMS impant:    PA   Wedge 8    Acceptable range for Miguel Griffin Lola      PAD range 29-35mmhg    Remote monitoring documentation for the past ~30 days:      2024 PAD stable.  2024 PAD stable.  2024 Will call to remind him to transmit readings.  2023 PAD 30mmhg and stable  2023 Stable.

## 2024-01-16 ENCOUNTER — TELEPHONE (OUTPATIENT)
Dept: FAMILY MEDICINE CLINIC | Facility: CLINIC | Age: 79
End: 2024-01-16

## 2024-01-16 NOTE — TELEPHONE ENCOUNTER
Please enter lab orders for the patient's upcoming physical appointment.     Physical scheduled:   Your appointments       Date & Time Appointment Department ([place])    Jan 29, 2024  1:15 PM CST Medicare Annual Well Visit with Eric Simon MD [facility] ([facility])              [facility]  [address]  [facility] [address]  [phone]           Preferred lab: [facility] ([facility])     The patient has been notified to complete fasting labs prior to their physical appointment.

## 2024-01-18 ENCOUNTER — HOSPITAL ENCOUNTER (OUTPATIENT)
Dept: CARDIOLOGY CLINIC | Facility: HOSPITAL | Age: 79
Discharge: HOME | End: 2024-01-18
Attending: NURSE PRACTITIONER
Payer: MEDICARE

## 2024-01-18 DIAGNOSIS — I50.812 CHRONIC RIGHT-SIDED CONGESTIVE HEART FAILURE (HCC): Primary | ICD-10-CM

## 2024-01-18 DIAGNOSIS — K22.70 BARRETT'S ESOPHAGUS WITHOUT DYSPLASIA: ICD-10-CM

## 2024-01-18 PROCEDURE — 93264 REM MNTR WRLS P-ART PRS SNR: CPT | Performed by: NURSE PRACTITIONER

## 2024-01-18 RX ORDER — OMEPRAZOLE 20 MG/1
20 CAPSULE, DELAYED RELEASE ORAL 2 TIMES DAILY
Qty: 180 CAPSULE | Refills: 1 | OUTPATIENT
Start: 2024-01-18

## 2024-01-19 DIAGNOSIS — I50.812 CHRONIC RIGHT-SIDED CONGESTIVE HEART FAILURE (HCC): Primary | ICD-10-CM

## 2024-01-22 NOTE — PROGRESS NOTES
[facility] Progress Note    Miguel Davila is a 78 year old male who presents to clinic for APN assessment and management of chronic diastolic heart failure and is functional class 2-3.     Subjective:  Since his last clinic visit on 12/26 when he was given a dose of Veltassa had repeat BMP that revealed improvement in potassium.     He had IVIG infusion 12/27.     He saw Dr. Veronica 1/2; to continue healthy diet. Increase activity through modest exercise. See him in a year.     Saw Dr. Orr 1/15; no changes made.     He saw Dr. Neves; stopped Metamucil and started on Psyllium husk 2 capsule twice a day. Proceed with EGD and colonoscopy.     Today he is doing okay. He admits to feeling more bloated and legs have been more swollen. He injured his right shin so that leg has been more swollen than his left. He feels his diet is unchanged and not high in sodium. He denies increased shortness of breath or dizziness. Home weights have been 192-196 lbs. He was 196 lbs today. He took an extra bumex in the afternoon for a few days about 10 days ago d.t weight gain and leg swelling. Weight initially improved and then increased again. Since he started Psyllium husk he had less frequent bowl movements.      He underwent a BVA 11/17 that was suggestive of severe dehydration and anemia. Iron studies revealed iron sat of 27 and ferritin 50.9. CardioMEMs 29mmhg on day of BVA with weight of 193 lbs.  Since we reduced diuretics and then had to increase them d.t fluid overload.      PAD 29 mmhg today. Recent range from 29-34 mmHg as noted below.           Review of Systems   Constitutional: Negative.   Cardiovascular:  Positive for leg swelling (right > left).   Respiratory: Negative.     Gastrointestinal:  Positive for bloating.       HISTORY:  Past Medical History:   Diagnosis Date    A-fib (HCC)     Arrhythmia     atrial fibrillation    Arthritis     Autoimmune disease (HCC)      hepatits, resolved anfd nephritis, resolved    Ramon's esophagus 05/14/2013    Bleeding nose 1970    Gums Treated    Blood disorder     thrombocytopenia    Blood in urine     Blurred vision 2021    cataract due to steroids, surgery in June    Calculus of kidney 2012    One time    Cancer (HCC)     skin    Candidiasis of the esophagus 06/29/2012    Due to steroid use for Autoimmune disorder     Cataract     Colon polyps 05/13/2013    Congestive heart disease (HCC)     Diarrhea, unspecified Since 2005    Intermittent due to lupus    Diverticulosis of colon 05/14/2013    Easy bruising 2021    On and off prednisone for 20 years    Esophageal polyp 05/14/2013    Esophageal reflux     Essential hypertension     Fatigue Since 2005    polymyositis and lupus    Gout     Hammer toe, acquired 06/29/2012    Heart palpitations 2017    Afib    Hepatitis     autoimmune induce hepatitis d/t lupus    High blood pressure     IBS (irritable bowel syndrome)     mild d/t lupus    Irregular bowel habits     Leg swelling 2021    Heart failure, probably caused by lupus    Lupus (HCC)     MCTD (mixed connective tissue disease) (HCC)     Obstructive sleep apnea (adult) (pediatric) 11/16/2017    LI (obstructive sleep apnea) HST 11-7-17    AHI 37  Supine AHI 38 non-supine AHI 16 Sao2 Pj 83%     LI (obstructive sleep apnea) PSG 5-22-19    AHI 36 RDI 36 REM AHI 45 Supine AHI 65 non-supine AHI 19 Sao2 Pj 86% CPAP 9cwp    Pain in joints     Personal history of antineoplastic chemotherapy     Pleural effusion     right    Pneumonia due to organism     Polymyositis (HCC)     Presence of other cardiac implants and grafts 2017    watchman filter    Problems with swallowing     dysphagia in 2006    Pulmonary hypertension (HCC)     Raynaud disease     Raynaud disease     Renal disorder     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    Shortness of breath on and off    mainly due to pm or lupus    Sleep apnea     CPAP    Thrombocytopathia (HCC)     Visual  impairment     bifocals for reading & computer; distance for driving    Wears glasses       Past Surgical History:   Procedure Laterality Date    APPENDECTOMY  1970    APPENDECTOMY      CARDIAC CATHETERIZATION  09/2019    CATARACT Bilateral     COLONOSCOPY  10/2003 2006 01/2010    x3    COLONOSCOPY  5/14/2013    COLONOSCOPY N/A 5/1/2018    Procedure: COLONOSCOPY;  Surgeon: Isaiah Tobar MD;  Location:  ENDOSCOPY    COLONOSCOPY WITH BIOPSY  5/14/13    EGD      HAND/FINGER SURGERY UNLISTED  2003    right    NEEDLE BIOPSY LIVER      OTHER  12/2005    needle bipsy of kidney    OTHER SURGICAL HISTORY  2017    watchman filter    PERCUTANEOUS  ANGIOPLASTY  (PTCA)- PBP ONLY  2006    right    RECTUM SURGERY PROCEDURE UNLISTED  1946    rectal surgery polypectomy    SKIN SURGERY      MMS BCC R temple 6/24/09    SKIN SURGERY  2007    basal ceell carcinoma    SKIN SURGERY  7-18-12    BCC-nodular to right superior eyebrow/ MOHS    SKIN SURGERY  07/15/2013    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/MMS    SKIN SURGERY  2-19-14    BCC-NOD to right superior pinna, MMS, AB    SKIN SURGERY  02/16/2021    BCC- left superior forehead, MMS     SKIN SURGERY  03/07/2022    SCC IN SITU RIGHT ANTIHELIX MMS BY DR GASTELUM    TONSILLECTOMY  1948    UPPER GI ENDOSCOPY,EXAM  10/2003 2006 01/2010 , 5/13    x3      Family History   Problem Relation Age of Onset    Heart Disease Father         CHF    Gastro-Intestinal Disorder Father         Diverticulosis    Alcohol and Other Disorders Associated Father     Heart Attack Father     Heart Disease Mother         CHF    Breast Cancer Mother     Stroke Mother     Cancer Paternal Grandfather     Heart Attack Paternal Grandmother     Kidney Disease Son     Other (Ramon's Esophagus) Son     Heart Attack Maternal Grandfather       Social History     Socioeconomic History    Marital status:    Occupational History    Occupation: Retired    Tobacco Use    Smoking status: Former     Packs/day: 0.50      Years: 15.00     Additional pack years: 0.00     Total pack years: 7.50     Types: Cigarettes     Start date: 1958     Quit date: 1973     Years since quittin.5    Smokeless tobacco: Never    Tobacco comments:     5 cigarettes daily, stopped smoking in    Vaping Use    Vaping Use: Never used   Substance and Sexual Activity    Alcohol use: Yes     Alcohol/week: 13.0 - 15.0 standard drinks of alcohol     Types: 5 Glasses of wine, 8 - 10 Standard drinks or equivalent per week     Comment: 1 per day wine or liquor    Drug use: Never   Other Topics Concern    Caffeine Concern Yes     Comment: 1 soda per day and decaf coffee    Sleep Concern No    Exercise Yes     Comment: 3-4 times weekly    Seat Belt Yes           Objective:    Telemetry:   Cardiac Rhythm: Atrial fibrillation    /69   Pulse 75   Resp 13   Wt 202 lb (91.6 kg)   SpO2 100%   BMI 28.17 kg/m²     Wt Readings from Last 6 Encounters:   24 202 lb (91.6 kg)   24 197 lb 9.6 oz (89.6 kg)   24 199 lb 3.2 oz (90.4 kg)   23 199 lb 8 oz (90.5 kg)   23 198 lb 3.2 oz (89.9 kg)   23 198 lb (89.8 kg)            Recent Results (from the past 24 hour(s))   Basic Metabolic Panel (8)    Collection Time: 24  1:29 PM   Result Value Ref Range    Glucose 107 (H) 70 - 99 mg/dL    Sodium 140 136 - 145 mmol/L    Potassium 5.0 3.5 - 5.1 mmol/L    Chloride 109 98 - 112 mmol/L    CO2 28.0 21.0 - 32.0 mmol/L    Anion Gap 3 0 - 18 mmol/L    BUN 59 (H) 9 - 23 mg/dL    Creatinine 1.91 (H) 0.70 - 1.30 mg/dL    Calcium, Total 8.5 8.5 - 10.1 mg/dL    Calculated Osmolality 307 (H) 275 - 295 mOsm/kg    eGFR-Cr 35 (L) >=60 mL/min/1.73m2    Patient Fasting for BMP? No    Pro Beta Natriuretic Peptide    Collection Time: 24  1:29 PM   Result Value Ref Range    Pro-Beta Natriuretic Peptide 3,502 (H) <450 pg/mL           Current Outpatient Medications:     bumetanide 1 MG Oral Tab, Take 2 mg of Bumex daily in the AM. Take 1  mg in the PM on Tuesday, Thursday, Saturday and Sunday. Take 2 mg in the PM on Monday, Wednesday and Friday., Disp: , Rfl:     ALLOPURINOL 300 MG Oral Tab, TAKE 1 TABLET BY MOUTH EVERY DAY, Disp: 90 tablet, Rfl: 3    predniSONE 5 MG Oral Tab, Take 1 tablet (5 mg total) by mouth daily with breakfast., Disp: 90 tablet, Rfl: 3    omeprazole 20 MG Oral Capsule Delayed Release, Take 1 capsule (20 mg total) by mouth 2 (two) times daily., Disp: 180 capsule, Rfl: 1    spironolactone 25 MG Oral Tab, Take 0.5 tablets (12.5 mg total) by mouth daily., Disp: 45 tablet, Rfl: 3    metoprolol succinate  MG Oral Tablet 24 Hr, Take 1 tablet (100 mg total) by mouth every morning AND 0.5 tablets (50 mg total) every evening., Disp: 60 tablet, Rfl: 3    PREDNISONE 1 MG Oral Tab, TAKE 2 TABLETS (2 MG TOTAL) BY MOUTH DAILY WITH BREAKFAST., Disp: 180 tablet, Rfl: 3    sacubitril-valsartan 49-51 MG Oral Tab, Take 1 tablet by mouth 2 (two) times daily., Disp: , Rfl:     aspirin 81 MG Oral Tab EC, Take 1 tablet (81 mg total) by mouth daily., Disp: 30 tablet, Rfl: 0    PREVIDENT 5000 BOOSTER PLUS 1.1 % Dental Paste, , Disp: , Rfl:     Multiple Vitamins-Minerals (TAB-A-KEVIN) Oral Tab, Take 1 tablet by mouth daily., Disp: , Rfl:     Immune Globulin, Human, 10 g Intravenous Recon Soln, Inject 0.4 g/kg into the vein. Given for treatment of polymyositis, mixed connective tissue disease, and immune thrombocytopenia purpura monthly at [facility]. Every 5 weeks, Disp: 3 each, Rfl: 0    Calcium Citrate-Vitamin D (CITRACAL + D OR), Take 1 tablet by mouth daily., Disp: , Rfl:     Exam:   General:          Alert, in no apparent distress  HEENT:            elevated JVD  Lungs:            CTAB                     CV:                   irregularly irregular  Abdomen:       Non-distended/round, soft, non-tender  Extremities:    trace RLE edema, worse on left  Neuro:             A&O x 3  Skin:                Pink, warm,  dry    Education:  Patient instructed regarding sodium restricted diet, low sodium foods, fluid restriction, daily weights, medication regimen, s/s HF exacerbation and when to call APN/clinic.    Assessment:   Chronic diastolic, RV heart failure - LVEF 52% and stable with borderline normal RV dysfunction per echo 1/4/2023. RHC 7/2022 with PCWP 20, RA 11. S/p CardioMEMs implanted on 8/2022 with 10 mmhg gradient between his PAD and wedge on RHC, with PAD running higher. Goal thought to be ~ 20 mmhg previously but since goal has been increased with the guidance of clinical exam, renal indices and BVA. Range now 29-35 mmHg but he has been has had evidence of fluid overload in that range. Will continue to follow and adjust at needed. PAD 29-34 mmHg recently. PAD 29 mmHg today and the lowest reading he has had in a while. Last ProBNP 2,177, now up to 3,502 today. Off Jardiance, thought to be due to pancreatitis. MRA recently discontinued at [facility] for dehydration and near syncope; restarted but required reduced dose due to hyperkalemia. BVA 11/2023 suggestive of dehydration.   PH, mild combined pre and post capillary - RHC 8/2022 demonstrates mPAP 27 with wedge 8 mmHg, RA 5 mmHg, PVR 2.7 and CI 3.4. RV function borderline normal on echo 1/2023. DPG 10 on RHC when MEMs placed.   Moderate MR/Mild-Mod TR/AI - on MCI echo 1/2023 and unchanged.   Dilated ascending aorta - 4.2 cm per echo 1/2023.  CKD Stage 3b - baseline creatinine ~ 1.8-2.0 mg/dl, stable. Follows with Dr. Devine.  Chronic Afib - s/p Watchman. Rates controlled.    LI - uses cpap.  Recurrent bilateral pleural effusions - hx of thoracentesis.   Hx Lupus/Mixed CTD/Severe polymyositis - continues IVIG infusions every 5 weeks, last dose 12/27. Follows with Dr. Farmer. On chronic prednisone. Off Imuran due to pancytopenia.  Non-obstructive CAD  Chronic thrombocytopenia, immune mediated/recent pancytopenia related to imuran - last PLT 104K. Follows with   Quejada.  Hx Hyponatremia   HERNANDEZ with biopsy proven stage F0-F1 Fibrosis on US 1/2022. Follows with Dr. Veronica, Hepatology at [facility]. Recently seen, to follow up in a year.   Gout - on colchicine prn and Allopurinol. Last uric acid 4.2.   Basal/Squamous cell carcinoma - hx Mohs surgery.  Mild hyperkalemia - on low dose MRA and ARNI.    Anemia - last Hgb 13 and stable. Recent iron studies with sat 27 and ferritin 50.9 in November.      Plan:     Continue 2 mg of Bumex in the AM daily. Increase PM dose of Bumex to 2 mg on Monday, Wednesday and Friday. On alternate days take 1 mg .  Return to clinic in 2 weeks.   Follow MEMs.   F/U with Dr. Jean as planned in ~ March with echo, labs.   CHF discharge instructions given    40 minutes spent with patient and greater than 50% of the time was spent counseling and coordinating care.    Tasha Barth NP   1/23/2024

## 2024-01-23 ENCOUNTER — HOSPITAL ENCOUNTER (OUTPATIENT)
Dept: LAB | Facility: HOSPITAL | Age: 79
Discharge: HOME | End: 2024-01-23
Attending: NURSE PRACTITIONER
Payer: MEDICARE

## 2024-01-23 ENCOUNTER — HOSPITAL ENCOUNTER (OUTPATIENT)
Dept: CARDIOLOGY CLINIC | Facility: HOSPITAL | Age: 79
Discharge: HOME | End: 2024-01-23
Attending: NURSE PRACTITIONER
Payer: MEDICARE

## 2024-01-23 VITALS
OXYGEN SATURATION: 100 % | WEIGHT: 202 LBS | RESPIRATION RATE: 13 BRPM | SYSTOLIC BLOOD PRESSURE: 106 MMHG | DIASTOLIC BLOOD PRESSURE: 69 MMHG | BODY MASS INDEX: 28 KG/M2 | HEART RATE: 75 BPM

## 2024-01-23 DIAGNOSIS — I50.812 CHRONIC RIGHT-SIDED CONGESTIVE HEART FAILURE (HCC): ICD-10-CM

## 2024-01-23 DIAGNOSIS — N18.31 STAGE 3A CHRONIC KIDNEY DISEASE (HCC): ICD-10-CM

## 2024-01-23 DIAGNOSIS — I50.811 ACUTE RIGHT-SIDED HEART FAILURE (HCC): Primary | ICD-10-CM

## 2024-01-23 DIAGNOSIS — I48.19 PERSISTENT ATRIAL FIBRILLATION (HCC): ICD-10-CM

## 2024-01-23 LAB
ANION GAP SERPL CALC-SCNC: 3 MMOL/L (ref 0–18)
BUN BLD-MCNC: 59 MG/DL (ref 9–23)
CALCIUM BLD-MCNC: 8.5 MG/DL (ref 8.5–10.1)
CHLORIDE SERPL-SCNC: 109 MMOL/L (ref 98–112)
CO2 SERPL-SCNC: 28 MMOL/L (ref 21–32)
CREAT BLD-MCNC: 1.91 MG/DL
EGFRCR SERPLBLD CKD-EPI 2021: 35 ML/MIN/1.73M2 (ref 60–?)
FASTING STATUS PATIENT QL REPORTED: NO
GLUCOSE BLD-MCNC: 107 MG/DL (ref 70–99)
NT-PROBNP SERPL-MCNC: 3502 PG/ML (ref ?–450)
OSMOLALITY SERPL CALC.SUM OF ELEC: 307 MOSM/KG (ref 275–295)
POTASSIUM SERPL-SCNC: 5 MMOL/L (ref 3.5–5.1)
SODIUM SERPL-SCNC: 140 MMOL/L (ref 136–145)

## 2024-01-23 PROCEDURE — 99215 OFFICE O/P EST HI 40 MIN: CPT | Performed by: NURSE PRACTITIONER

## 2024-01-23 PROCEDURE — 83880 ASSAY OF NATRIURETIC PEPTIDE: CPT | Performed by: NURSE PRACTITIONER

## 2024-01-23 PROCEDURE — 80048 BASIC METABOLIC PNL TOTAL CA: CPT | Performed by: NURSE PRACTITIONER

## 2024-01-23 PROCEDURE — 36415 COLL VENOUS BLD VENIPUNCTURE: CPT | Performed by: NURSE PRACTITIONER

## 2024-01-23 RX ORDER — BUMETANIDE 1 MG/1
TABLET ORAL
COMMUNITY
Start: 2024-01-23

## 2024-01-23 NOTE — PATIENT INSTRUCTIONS
Heart Failure Discharge Instructions    Start taking Bumex 2 mg twice a day on Monday, Wednesday and Friday.   Continue 2 mg in the AM and 1 mg in the PM on other days.       Activity: Regular exercise and activity is important for your overall health and to help keep your heart strong and functioning as well as possible.   Walk at a slow to moderate pace for 15-20 minutes 3-5 days per week.     Follow these instructions every day to keep yourself in the Green Zone     The Green Zone means you are feeling well and your symptoms are under control                                    Medications  Take your medication every day as instructed  Do not stop taking your medicine or change the amount you are taking without instructions from your doctor or nurse  Do Not take non-steroidal antiinflammatory drugs such as ibuprofen, aleve, advil, or motrin                                    Diet/Fluids  People with heart failure should eat less sodium (salt) and limit fluid. Sodium attracts water and makes the body hold fluid. This extra fluid makes the heart work harder and can worsen the symptoms of heart failure.     Diet    2000 mg sodium daily  Fluid restriction    64 ounces daily  (8 oz. = 1 cup)                                     Body Weight  Weigh yourself every day before breakfast and write your weight down  Use the same scale and wear about the same amount of clothes each time  A sudden weight increase is due to fluid retention rather than fat                                         Activity  Pace your activities to avoid getting overtired  Take rest periods as needed  Elevate your feet to reduce ankle swelling when resting                             Signs of Worsening Heart Failure    You are entering the Yellow Zone - this is a warning zone    Call your doctor or nurse if you have any of these signs or symptoms:  You gain 2 or more pounds overnight or 3-5 pounds in 3-7 days  You have more trouble breathing  You get  more tired with regular activity, or are limiting activity because of shortness of breath or fatigue  You are short of breath lying down, you need more pillows to breathe comfortably,  or wake up during the night short of breath  You urinate less often during the day and more often at night  You have a bloated feeling, upset stomach, loss of appetite, or your clothes are fitting tighter    GO TO THE EMERGENCY DEPARTMENT or CALL 911 IF:    These are signs you are in the RED ZONE - THIS IS AN EMERGENCY  You have tightness or pain in your chest  You are extremely short of breath or can't catch your breath  You cough up frothy pink mucous  You feel confused or can't think clearly  You are traveling and develop symptoms of worsening heart failure     We respect everyone's time and availability. Please be aware that this is not a walk-in clinic and we require appointments in order to facilitate timely care for all patients. We ask you to arrive 30 minutes before your appointment to allow time for you to check-in and have your bloodwork drawn. Please understand if you are late for your appointment, you may be asked to reschedule. If possible, all attempts will be made to accommodate but realize this is no guarantee that this will always be available. We understand there are extenuating circumstances. If you need to cancel or reschedule your appointment, please call the [facility] within 24 hours at [phone].  Thank you for your cooperation, [facility] Staff.    IF YOU HAVE QUESTIONS REGARDING YOUR BILL, FEEL FREE TO CONTACT [facility] PATIENT ACCOUNTS -[phone]. IF YOU NEED FINANCIAL ASSISTANCE, PLEASE CALL AN [facility] FINANCIAL COUNSELOR -[phone].             [facility]     [phone]

## 2024-01-23 NOTE — PROGRESS NOTES
Pt. Assessed. Pt. complaining of left lower leg swelling and abdominal bloating. Weight 202lbs, up 3 pounds since last visit. Pt. Increased his home bumex to 2 tabs BID for a couple days then backed off, as he had gained weight. APN notified. APN notified of patient's complaint of abdominal bloating and left lower leg edema. Labs ordered and drawn by  Lab. Reviewed allergies and list of current medications with patient and updated it in the Electronic Medical Record. Educated patient on low sodium diet and food choices, fluid restriction of 2 liters, and daily weights. Reviewed follow-up appointments and discharge Heart Failure instructions with patient. Patient verbalized an understanding.

## 2024-01-27 PROBLEM — N18.32 STAGE 3B CHRONIC KIDNEY DISEASE (HCC): Status: ACTIVE | Noted: 2022-01-13

## 2024-01-27 NOTE — ASSESSMENT & PLAN NOTE
Stable, continue present management    Detail Level: Detailed Urine Pregnancy Test Result: negative Performed By: Tisha Hurst LPN

## 2024-01-27 NOTE — ASSESSMENT & PLAN NOTE
Last Platelet value was 104 done 1/11/2024. Lowest level in the last 10 years was 5. Stable, continue present management

## 2024-01-29 ENCOUNTER — OFFICE VISIT (OUTPATIENT)
Dept: FAMILY MEDICINE CLINIC | Facility: CLINIC | Age: 79
End: 2024-01-29
Payer: MEDICARE

## 2024-01-29 VITALS
HEIGHT: 71 IN | HEART RATE: 70 BPM | SYSTOLIC BLOOD PRESSURE: 100 MMHG | DIASTOLIC BLOOD PRESSURE: 68 MMHG | BODY MASS INDEX: 28.11 KG/M2 | RESPIRATION RATE: 10 BRPM | WEIGHT: 200.81 LBS

## 2024-01-29 DIAGNOSIS — I43 CARDIOMYOPATHY AS MANIFESTATION OF UNDERLYING DISEASE (HCC): ICD-10-CM

## 2024-01-29 DIAGNOSIS — E87.6 HYPOPOTASSEMIA: ICD-10-CM

## 2024-01-29 DIAGNOSIS — I27.20 PULMONARY HYPERTENSION (HCC): ICD-10-CM

## 2024-01-29 DIAGNOSIS — I48.19 PERSISTENT ATRIAL FIBRILLATION (HCC): ICD-10-CM

## 2024-01-29 DIAGNOSIS — E78.6 LOW HDL (UNDER 40): ICD-10-CM

## 2024-01-29 DIAGNOSIS — D69.3 IMMUNE THROMBOCYTOPENIC PURPURA (HCC): ICD-10-CM

## 2024-01-29 DIAGNOSIS — D69.6 THROMBOCYTOPENIA (HCC): ICD-10-CM

## 2024-01-29 DIAGNOSIS — M33.20 POLYMYOSITIS (HCC): Chronic | ICD-10-CM

## 2024-01-29 DIAGNOSIS — M35.1 MIXED CONNECTIVE TISSUE DISEASE (HCC): ICD-10-CM

## 2024-01-29 DIAGNOSIS — J43.2 CENTRILOBULAR EMPHYSEMA (HCC): ICD-10-CM

## 2024-01-29 DIAGNOSIS — G47.33 OSA (OBSTRUCTIVE SLEEP APNEA): ICD-10-CM

## 2024-01-29 DIAGNOSIS — Z00.00 ENCOUNTER FOR ANNUAL HEALTH EXAMINATION: ICD-10-CM

## 2024-01-29 DIAGNOSIS — I10 BENIGN ESSENTIAL HYPERTENSION: ICD-10-CM

## 2024-01-29 DIAGNOSIS — D61.818 PANCYTOPENIA (HCC): ICD-10-CM

## 2024-01-29 DIAGNOSIS — K75.4: ICD-10-CM

## 2024-01-29 DIAGNOSIS — I50.812 CHRONIC RIGHT-SIDED CONGESTIVE HEART FAILURE (HCC): ICD-10-CM

## 2024-01-29 DIAGNOSIS — K22.70 BARRETT'S ESOPHAGUS WITHOUT DYSPLASIA: ICD-10-CM

## 2024-01-29 DIAGNOSIS — Z00.00 ANNUAL PHYSICAL EXAM: Primary | ICD-10-CM

## 2024-01-29 DIAGNOSIS — N18.32 STAGE 3B CHRONIC KIDNEY DISEASE (HCC): ICD-10-CM

## 2024-01-29 DIAGNOSIS — I77.810 AORTIC ROOT DILATION (HCC): ICD-10-CM

## 2024-01-29 NOTE — ASSESSMENT & PLAN NOTE
Cholesterol shows Good control. Long term heart-healthy diet and lifestyle discussed and encouraged to reduce risk of cardiovascular disease.  Cholesterol: 141, done on 12/29/2023.  HDL Cholesterol: 34, done on 12/29/2023.  TriGlycerides 162, done on 12/29/2023.  LDL Cholesterol: 79, done on 12/29/2023.   No current Cholesterol medication.

## 2024-01-29 NOTE — ASSESSMENT & PLAN NOTE
BP shows good control with last BP of 100/68. Continue lifestyle changes, diet, exercise and weight loss.   Last K was 5 done on 1/23/2024.  Last Cr was 1.91 done on 1/23/2024.  Last eGFR was 35 on 1/23/2024.  BP Meds: bumetanide Tabs - 1 MG; metoprolol succinate ER Tb24 - 100 MG; sacubitril-valsartan Tabs - 49-51 MG; spironolactone Tabs - 25 MG

## 2024-01-29 NOTE — PROGRESS NOTES
Subjective:   Miguel Davila is a 78 year old male who presents for a Medicare Subsequent Annual Wellness visit (Pt already had Initial Annual Wellness) and scheduled follow up of multiple significant but stable problems.   Still GI issues, saw Dr Sal, awaiting EGD and colon. When he eats, he ahs to go to have BM immediately so getting work up.   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   BP 1/23 was elevated at 3.5K, recently cought foot on tabel, landed on ottoma, but scaapred shin with this  Adjusting diuretics with HF clinic and diuretis.   Here for [facility] med follow up as well as AWV    Wt Readings from Last 5 Encounters:   01/29/24 200 lb 12.8 oz (91.1 kg)   01/23/24 202 lb (91.6 kg)   01/19/24 197 lb 9.6 oz (89.6 kg)   01/11/24 199 lb 3.2 oz (90.4 kg)   12/27/23 199 lb 8 oz (90.5 kg)      History/Other:   Fall Risk Assessment:   He has been screened for Falls and is low risk.      Cognitive Assessment:   He had a completely normal cognitive assessment - see flowsheet entries     Functional Ability/Status:   Miguel Davila has a completely normal functional assessment. See flowsheet for details.      Depression Screening (PHQ-2/PHQ-9): PHQ-2 SCORE: 0  , done 1/29/2024        Advanced Directives:   He has a Living Will on file in [employer]; reviewed and discussed documents with patient (and family/surrogate if present).  He has a Power of  for Health Care on file in [facility].  Discussed Advance Care Planning with patient (and family/surrogate if present). Standard forms made available to patient in After Visit Summary.      Patient Active Problem List   Diagnosis    Personal history of other malignant neoplasm of skin    Hypopotassemia    Ramon's esophagus    Gout    ED (erectile dysfunction)    Benign essential hypertension    Low HDL (under 40)    Thrombocytopenia (HCC)    Basal cell carcinoma, ear    Lupus hepatitis syndrome (HCC)    Splenomegaly     Mixed connective tissue disease (HCC)    Polymyositis (HCC)    Atrial fibrillation (HCC)    Aortic root dilation (HCC)    Incidental lung nodule, less than or equal to 3mm    LI (obstructive sleep apnea)    Pleural effusion, left    Pulmonary hypertension (HCC)    Chronic right-sided congestive heart failure (HCC)    Immune thrombocytopenic purpura (HCC)    Cardiomyopathy as manifestation of underlying disease (HCC)    Presence of Watchman left atrial appendage closure device    Centrilobular emphysema (HCC)    Symptomatic anemia    Pancytopenia (HCC)    Stage 3b chronic kidney disease (HCC)    Drug-induced acute pancreatitis without infection or necrosis    History of Pancreatitis from [facility] 9/2022     Allergies:  He is allergic to empagliflozin, amoxicillin, augmentin [amoclan], and doxycycline.    Current Medications:  Outpatient Medications Marked as Taking for the 1/29/24 encounter (Office Visit) with Eric Simon MD   Medication Sig    bumetanide 1 MG Oral Tab Take 2 mg of Bumex daily in the AM. Take 1 mg in the PM on Tuesday, Thursday, Saturday and Sunday. Take 2 mg in the PM on Monday, Wednesday and Friday.    ALLOPURINOL 300 MG Oral Tab TAKE 1 TABLET BY MOUTH EVERY DAY    predniSONE 5 MG Oral Tab Take 1 tablet (5 mg total) by mouth daily with breakfast.    omeprazole 20 MG Oral Capsule Delayed Release Take 1 capsule (20 mg total) by mouth 2 (two) times daily.    spironolactone 25 MG Oral Tab Take 0.5 tablets (12.5 mg total) by mouth daily.    metoprolol succinate  MG Oral Tablet 24 Hr Take 1 tablet (100 mg total) by mouth every morning AND 0.5 tablets (50 mg total) every evening.    PREDNISONE 1 MG Oral Tab TAKE 2 TABLETS (2 MG TOTAL) BY MOUTH DAILY WITH BREAKFAST.    sacubitril-valsartan 49-51 MG Oral Tab Take 1 tablet by mouth 2 (two) times daily.    aspirin 81 MG Oral Tab EC Take 1 tablet (81 mg total) by mouth daily.    PREVIDENT 5000 BOOSTER PLUS 1.1 % Dental Paste     Multiple  Vitamins-Minerals (TAB-A-KEVIN) Oral Tab Take 1 tablet by mouth daily.    Immune Globulin, Human, 10 g Intravenous Recon Soln Inject 0.4 g/kg into the vein. Given for treatment of polymyositis, mixed connective tissue disease, and immune thrombocytopenia purpura monthly at [facility].  Every 5 weeks    Calcium Citrate-Vitamin D (CITRACAL + D OR) Take 1 tablet by mouth daily.       Medical History:  He  has a past medical history of A-fib ([facility]), Arrhythmia, Arthritis, Autoimmune disease ([facility]), Ramon's esophagus (05/14/2013), Bleeding nose (1970), Blood disorder, Blood in urine, Blurred vision (2021), Calculus of kidney (2012), Cancer ([facility]), Candidiasis of the esophagus (06/29/2012), Cataract, Colon polyps (05/13/2013), Congestive heart disease ([facility]), Diarrhea, unspecified (Since 2005), Diverticulosis of colon (05/14/2013), Easy bruising (2021), Esophageal polyp (05/14/2013), Esophageal reflux, Essential hypertension, Fatigue (Since 2005), Gout, Hammer toe, acquired (06/29/2012), Heart palpitations (2017), Hepatitis, High blood pressure, IBS (irritable bowel syndrome), Irregular bowel habits, Leg swelling (2021), Lupus ([facility]), MCTD (mixed connective tissue disease) ([facility]), Obstructive sleep apnea (adult) (pediatric) (11/16/2017), LI (obstructive sleep apnea) (HST 11-7-17), LI (obstructive sleep apnea) (PSG 5-22-19), Pain in joints, Personal history of antineoplastic chemotherapy, Pleural effusion, Pneumonia due to organism, Polymyositis ([facility]), Presence of other cardiac implants and grafts (2017), Problems with swallowing, Pulmonary hypertension ([facility]), Raynaud disease, Raynaud disease, Renal disorder, Shortness of breath (on and off), Sleep apnea, Thrombocytopathia ([facility]), Visual impairment, and Wears glasses.  Surgical History:  He  has a past surgical history that includes colonoscopy (10/2003 2006 01/2010); percutaneous  angioplasty  (ptca)- [facility] only (2006); appendectomy (1970); upper gi endoscopy,exam  (10/2003 2006 2010 , ); hand/finger surgery unlisted (); rectum surgery procedure unlisted (); tonsillectomy (); colonoscopy with biopsy (13); colonoscopy (2013); other surgical history (); appendectomy; egd; needle biopsy liver; colonoscopy (N/A, 2018); Cardiac catheterization (2019); skin surgery; skin surgery (); skin surgery (12); skin surgery (07/15/2013); skin surgery (14); skin surgery (2021); other (2005); cataract (Bilateral); and skin surgery (2022).   Family History:  His family history includes Alcohol and Other Disorders Associated in his father; Ramon's Esophagus in his son; Breast Cancer in his mother; Cancer in his paternal grandfather; Gastro-Intestinal Disorder in his father; Heart Attack in his father, maternal grandfather, and paternal grandmother; Heart Disease in his father and mother; Kidney Disease in his son; Stroke in his mother.  Social History:  He  reports that he quit smoking about 50 years ago. His smoking use included cigarettes. He started smoking about 65 years ago. He has a 7.5 pack-year smoking history. He has never used smokeless tobacco. He reports current alcohol use of about 13.0 - 15.0 standard drinks of alcohol per week. He reports that he does not use drugs.    Tobacco:  He smoked tobacco in the past but quit greater than 12 months ago.  Social History    Tobacco Use      Smoking status: Former        Packs/day: 0.50        Years: 15.00        Additional pack years: 0.00        Total pack years: 7.50        Types: Cigarettes        Start date: 1958        Quit date: 1973        Years since quittin.5      Smokeless tobacco: Never      Tobacco comments: 5 cigarettes daily, stopped smoking in        CAGE Alcohol Screen:   CAGE screening score of 0 on 2024, showing low risk of alcohol abuse.      Patient Care Team:  Eric Simon MD as PCP - General (Family Practice)  Amari Mora  MD ARPAN (DERMATOLOGY)  Taye Farmer MD (RHEUMATOLOGY)  Iris Orr MD (ONCOLOGY)  Nakul Veronica (Hepatology)  Naseem Dior MD (Cardiovascular Diseases)  Alba Figueroa (OPHTHALMOLOGY)  Iris Jean MD (Cardiovascular Diseases)  Anu Spence CMA as [facility] Care Manager  Isaiah Tobar MD (GASTROENTEROLOGY)  María Serrato NP (Nurse Practitioner)  Eldon Devine MD (NEPHROLOGY)    Review of Systems   Constitutional:  Positive for fatigue. Negative for activity change, appetite change, chills and fever.   HENT: Negative.     Eyes: Negative.    Respiratory: Negative.  Negative for shortness of breath.    Cardiovascular: Negative.  Negative for chest pain and palpitations.   Gastrointestinal: Negative.  Negative for abdominal pain.   Genitourinary: Negative.  Negative for dysuria.   Musculoskeletal:  Negative for arthralgias.   Skin: Negative.  Negative for rash.   Allergic/Immunologic: Negative.    Neurological:  Positive for tremors and weakness.       Objective:   Physical Exam  Vitals and nursing note reviewed.   Constitutional:       General: He is not in acute distress.     Appearance: Normal appearance. He is well-developed.   HENT:      Head: Normocephalic and atraumatic.      Right Ear: Tympanic membrane and external ear normal.      Left Ear: Tympanic membrane and external ear normal.      Nose: Nose normal.      Mouth/Throat:      Mouth: Mucous membranes are moist.   Eyes:      Extraocular Movements: Extraocular movements intact.      Pupils: Pupils are equal, round, and reactive to light.   Cardiovascular:      Rate and Rhythm: Normal rate and regular rhythm.      Pulses: Normal pulses.           Carotid pulses are 2+ on the right side and 2+ on the left side.       Radial pulses are 2+ on the right side and 2+ on the left side.        Dorsalis pedis pulses are 2+ on the right side and 2+ on the left side.        Posterior tibial pulses are 2+ on the right side and 2+ on  the left side.      Heart sounds: Normal heart sounds, S1 normal and S2 normal. No murmur heard.  Pulmonary:      Effort: Pulmonary effort is normal. No respiratory distress.      Breath sounds: Normal breath sounds.   Abdominal:      General: Abdomen is flat. Bowel sounds are normal. There is no distension.      Palpations: Abdomen is soft.   Musculoskeletal:         General: Normal range of motion.      Cervical back: Normal range of motion and neck supple.      Right lower leg: No edema.      Left lower leg: No edema.   Skin:     General: Skin is warm and dry.      Capillary Refill: Capillary refill takes less than 2 seconds.      Findings: No rash.   Neurological:      General: No focal deficit present.      Mental Status: He is alert and oriented to person, place, and time.   Psychiatric:         Mood and Affect: Mood normal.         Behavior: Behavior normal.         Thought Content: Thought content normal.         Judgment: Judgment normal.       /68   Pulse 70   Resp 10   Ht 5' 11\" (1.803 m)   Wt 200 lb 12.8 oz (91.1 kg)   BMI 28.01 kg/m²  Estimated body mass index is 28.01 kg/m² as calculated from the following:    Height as of this encounter: 5' 11\" (1.803 m).    Weight as of this encounter: 200 lb 12.8 oz (91.1 kg).    Medicare Hearing Assessment:   Hearing Screening    Screening Method: Whisper Test  Whisper Test Result: Pass               Visual Acuity:   Right Eye Visual Acuity: Uncorrected Right Eye Chart Acuity: 20/25   Left Eye Visual Acuity: Uncorrected Left Eye Chart Acuity: 20/25   Both Eyes Visual Acuity: Uncorrected Both Eyes Chart Acuity: 20/25   Able To Tolerate Visual Acuity: Yes        Assessment & Plan:   Miguel Davila is a 78 year old male who presents for a Medicare Assessment.     1. Annual physical exam (Primary)  2. Pancytopenia (HCC)  Overview:  Stable per oncology. WBC: 6.4, done on 1/7/2022.  HGB: 14.7, done on 1/7/2022.  PLT: 106, done on 1/7/2022.     Assessment & Plan:  1/11/2024: WBC 8.1; HGB 13.0; .0 (L); .5 (H) stable, continue present management   Orders:  -     Expanded, Low Complexity (59779)  3. Immune thrombocytopenic purpura (HCC)  Overview:  Plt danita to 5 at losest. 106 1/2022  Assessment & Plan:  Stable, continue present management   4. Thrombocytopenia (HCC)  Overview:  Following with Dr Landry at hematology  Assessment & Plan:  Last Platelet value was 104 done 1/11/2024. Lowest level in the last 10 years was 5. Stable, continue present management     5. Pulmonary hypertension (HCC)  Overview:  Diagnosis [facility] at [facility], -At mildly elevated BP there is evidence of moderate post capillary pulmonary hypertension which would be consistent with HFpEF -Interestingly when BP normalized his pulmonary hypertension was reduced to mild severity and there was no reversibility with nitric oxide based on definition -Cardiac output remains preserved         Assessment & Plan:  Stable, continue present management   Orders:  -     Expanded, Low Complexity (37366)  6. Cardiomyopathy as manifestation of underlying disease (HCC)  Assessment & Plan:  Stable, continue present management   7. Persistent atrial fibrillation (HCC)  Overview:   metoprolol ER 25, Dr Dior managing  Watchman placed July 2017  Assessment & Plan:  Stable, continue present management per CV  8. Chronic right-sided congestive heart failure (HCC)  Overview:  Naseem Dior MD managing  Assessment & Plan:  Stable, continue present management per CV  9. Aortic root dilation (HCC)  Overview:  Seen on echo 1.2017, 4.5 CM 8/2019  Assessment & Plan:  Stable, continue present management   10. Centrilobular emphysema (HCC)  Overview:  COPD with centrilobular lucencies in several subpleural blebs predominately upper lobe in distribution 9/2020.    Assessment & Plan:  Stable, continue present management   11. Lupus hepatitis syndrome (HCC)  Overview:  Followed with Dr Farmer, currently  in remission  Assessment & Plan:  Stable, continue present management per rheumatology  12. Stage 3b chronic kidney disease (HCC)  Overview:  GFR 41  Assessment & Plan:  1/23/2024: eGFR-Cr 35 (L); Creatinine 1.91 (H) stable, continue present management   Orders:  -     Expanded, Low Complexity (22226)  13. Mixed connective tissue disease (HCC)  Overview:  Managed by Dr Farmer,   Assessment & Plan:  Stable, continue present management   Orders:  -     Expanded, Low Complexity (48857)  14. Polymyositis ([facility])  Overview:  Dx 2005 with severe muscle weakness, and now in remission, following with Dr Farmer  Assessment & Plan:  Stable, continue present management per rheumatology  15. Low HDL (under 40)  Overview:  HDL 28  Assessment & Plan:  Cholesterol shows Good control. Long term heart-healthy diet and lifestyle discussed and encouraged to reduce risk of cardiovascular disease.  Cholesterol: 141, done on 12/29/2023.  HDL Cholesterol: 34, done on 12/29/2023.  TriGlycerides 162, done on 12/29/2023.  LDL Cholesterol: 79, done on 12/29/2023.   No current Cholesterol medication.     16. Benign essential hypertension  Overview:  Benazepril 40, amlodipine 5, triameteren HCT 37.5-25  Assessment & Plan:  BP shows good control with last BP of 100/68. Continue lifestyle changes, diet, exercise and weight loss.   Last K was 5 done on 1/23/2024.  Last Cr was 1.91 done on 1/23/2024.  Last eGFR was 35 on 1/23/2024.  BP Meds: bumetanide Tabs - 1 MG; metoprolol succinate ER Tb24 - 100 MG; sacubitril-valsartan Tabs - 49-51 MG; spironolactone Tabs - 25 MG    Orders:  -     Expanded, Low Complexity (48204)  17. Ramon's esophagus without dysplasia  Overview:  Omeprazole 20  Assessment & Plan:  Stable, continue present management per GI   18. Hypopotassemia  Overview:  kdur 20 meq, ACE and spironolactone  Assessment & Plan:  1/23/2024: Potassium 5.0 stable, continue present management   19. LI (obstructive sleep apnea)  Assessment &  Plan:  Stable, continue present management   20. Encounter for annual health examination    The patient indicates understanding of these issues and agrees to the plan.  Reinforced healthy diet, lifestyle, and exercise.  Revewied CMP and CBC and BNP     Return in 6 months (on 7/29/2024).     Eric Simon MD, 1/29/2024     Supplementary Documentation:   General Health:  In the past six months, have you lost more than 10 pounds without trying?: 2 - No  Has your appetite been poor?: No  Type of Diet: Balanced;Low Salt  How does the patient maintain a good energy level?: Appropriate Exercise  How would you describe your daily physical activity?: Light  How would you describe your current health state?: Fair  How do you maintain positive mental well-being?: Social Interaction;Visiting Friends;Visiting Family  On a scale of 0 to 10, with 0 being no pain and 10 being severe pain, what is your pain level?: 0 - (None)  In the past six months, have you experienced urine leakage?: 0-No  At any time do you feel concerned for the safety/well-being of yourself and/or your children, in your home or elsewhere?: No  Have you had any immunizations at another office such as Influenza, Hepatitis B, Tetanus, or Pneumococcal?: Yes        Miguel Davila's SCREENING SCHEDULE   Tests on this list are recommended by your physician but may not be covered, or covered at this frequency, by your insurer.   Please check with your insurance carrier before scheduling to verify coverage.   PREVENTATIVE SERVICES FREQUENCY &  COVERAGE DETAILS LAST COMPLETION DATE   Diabetes Screening    Fasting Blood Sugar / Glucose    One screening every 12 months if never tested or if previously tested but not diagnosed with pre-diabetes   One screening every 6 months if diagnosed with pre-diabetes Lab Results   Component Value Date     (H) 01/23/2024        Cardiovascular Disease Screening    Lipid Panel  Cholesterol  Lipoprotein (HDL)  Triglycerides  Covered every 5 years for all Medicare beneficiaries without apparent signs or symptoms of cardiovascular disease Lab Results   Component Value Date    CHOLEST 141 12/29/2023    HDL 34 (L) 12/29/2023    LDL 79 12/29/2023    TRIG 162 (H) 12/29/2023         Electrocardiogram (EKG)   Covered if needed at Welcome to Medicare, and non-screening if indicated for medical reasons 09/20/2022      Ultrasound Screening for Abdominal Aortic Aneurysm (AAA) Covered once in a lifetime for one of the following risk factors    Men who are 65-75 years old and have ever smoked    Anyone with a family history -     Colorectal Cancer Screening  Covered for ages 50-85; only need ONE of the following:    Colonoscopy   Covered every 10 years    Covered every 2 years if patient is at high risk or previous colonoscopy was abnormal 05/01/2018    Health Maintenance   Topic Date Due    Colorectal Cancer Screening  05/01/2023       Flexible Sigmoidoscopy   Covered every 4 years -    Fecal Occult Blood Test Covered annually -   Prostate Cancer Screening    Prostate-Specific Antigen (PSA) Annually No results found for: \"PSA\"  There are no preventive care reminders to display for this patient.   Immunizations    Influenza Covered once per flu season  Please get every year 10/10/2023  No recommendations at this time    Pneumococcal Each vaccine ([id number] & [id number]) covered once after 65 Prevnar 13: 07/28/2016    [id number]: 12/06/2017     No recommendations at this time    Hepatitis B One screening covered for patients with certain risk factors   07/12/2016  No recommendations at this time    Tetanus Toxoid Not covered by Medicare Part B unless medically necessary (cut with metal); may be covered with your pharmacy prescription benefits 01/01/1999    Tetanus, Diptheria and Pertusis TD and TDaP Not covered by Medicare Part B -  No recommendations at this time    Zoster Not covered by Medicare Part B; may be covered with your pharmacy   prescription benefits -  No recommendations at this time     Annual Monitoring of Persistent Medications (ACE/ARB, digoxin diuretics, anticonvulsants)    Potassium Annually Lab Results   Component Value Date    K 5.0 01/23/2024         Creatinine   Annually Lab Results   Component Value Date    CREATSERUM 1.91 (H) 01/23/2024         BUN Annually Lab Results   Component Value Date    BUN 59 (H) 01/23/2024       Drug Serum Conc Annually No results found for: \"DIGOXIN\", \"DIG\", \"VALP\"           Chronic Obstructive Pulmonary Disease (COPD)    Spirometry Annually Spirometry date:

## 2024-01-29 NOTE — PATIENT INSTRUCTIONS
Miguel Davila's SCREENING SCHEDULE   Tests on this list are recommended by your physician but may not be covered, or covered at this frequency, by your insurer.   Please check with your insurance carrier before scheduling to verify coverage.   PREVENTATIVE SERVICES FREQUENCY &  COVERAGE DETAILS LAST COMPLETION DATE   Diabetes Screening    Fasting Blood Sugar / Glucose    One screening every 12 months if never tested or if previously tested but not diagnosed with pre-diabetes   One screening every 6 months if diagnosed with pre-diabetes Lab Results   Component Value Date     (H) 01/23/2024        Cardiovascular Disease Screening    Lipid Panel  Cholesterol  Lipoprotein (HDL)  Triglycerides Covered every 5 years for all Medicare beneficiaries without apparent signs or symptoms of cardiovascular disease Lab Results   Component Value Date    CHOLEST 141 12/29/2023    HDL 34 (L) 12/29/2023    LDL 79 12/29/2023    TRIG 162 (H) 12/29/2023         Electrocardiogram (EKG)   Covered if needed at Welcome to Medicare, and non-screening if indicated for medical reasons 09/20/2022      Ultrasound Screening for Abdominal Aortic Aneurysm (AAA) Covered once in a lifetime for one of the following risk factors   • Men who are 65-75 years old and have ever smoked   • Anyone with a family history -     Colorectal Cancer Screening  Covered for ages 50-85; only need ONE of the following:    Colonoscopy   Covered every 10 years    Covered every 2 years if patient is at high risk or previous colonoscopy was abnormal 05/01/2018    Health Maintenance   Topic Date Due   • Colorectal Cancer Screening  05/01/2023       Flexible Sigmoidoscopy   Covered every 4 years -    Fecal Occult Blood Test Covered annually -   Prostate Cancer Screening    Prostate-Specific Antigen (PSA) Annually No results found for: \"PSA\"  There are no preventive care reminders to display for this patient.   Immunizations    Influenza Covered once per flu  season  Please get every year 10/10/2023  No recommendations at this time    Pneumococcal Each vaccine ([id number] & [id number]) covered once after 65 Prevnar 13: 07/28/2016    [id number]: 12/06/2017     No recommendations at this time    Hepatitis B One screening covered for patients with certain risk factors   07/12/2016  No recommendations at this time    Tetanus Toxoid Not covered by Medicare Part B unless medically necessary (cut with metal); may be covered with your pharmacy prescription benefits 01/01/1999    Tetanus, Diptheria and Pertusis TD and TDaP Not covered by Medicare Part B -  No recommendations at this time    Zoster Not covered by Medicare Part B; may be covered with your pharmacy  prescription benefits -  No recommendations at this time     Annual Monitoring of Persistent Medications (ACE/ARB, digoxin diuretics, anticonvulsants)    Potassium Annually Lab Results   Component Value Date    K 5.0 01/23/2024         Creatinine   Annually Lab Results   Component Value Date    CREATSERUM 1.91 (H) 01/23/2024         BUN Annually Lab Results   Component Value Date    BUN 59 (H) 01/23/2024       Drug Serum Conc Annually No results found for: \"DIGOXIN\", \"DIG\", \"VALP\"         Chronic Obstructive Pulmonary Disease (COPD)    Spirometry Annually Spirometry date:

## 2024-01-31 ENCOUNTER — OFFICE VISIT (OUTPATIENT)
Dept: HEMATOLOGY/ONCOLOGY | Facility: HOSPITAL | Age: 79
End: 2024-01-31
Attending: INTERNAL MEDICINE
Payer: MEDICARE

## 2024-01-31 VITALS
WEIGHT: 202 LBS | OXYGEN SATURATION: 96 % | DIASTOLIC BLOOD PRESSURE: 58 MMHG | BODY MASS INDEX: 28.28 KG/M2 | HEIGHT: 70.98 IN | SYSTOLIC BLOOD PRESSURE: 90 MMHG | RESPIRATION RATE: 18 BRPM | HEART RATE: 88 BPM

## 2024-01-31 DIAGNOSIS — M33.20 POLYMYOSITIS (HCC): Primary | ICD-10-CM

## 2024-01-31 DIAGNOSIS — M35.1 MIXED CONNECTIVE TISSUE DISEASE (HCC): ICD-10-CM

## 2024-01-31 DIAGNOSIS — D69.3 IMMUNE THROMBOCYTOPENIC PURPURA (HCC): ICD-10-CM

## 2024-01-31 PROCEDURE — 96365 THER/PROPH/DIAG IV INF INIT: CPT

## 2024-01-31 PROCEDURE — 96366 THER/PROPH/DIAG IV INF ADDON: CPT

## 2024-01-31 RX ORDER — ACETAMINOPHEN 325 MG/1
650 TABLET ORAL ONCE
Status: CANCELLED | OUTPATIENT
Start: 2024-01-31 | End: 2024-01-31

## 2024-01-31 RX ORDER — DIPHENHYDRAMINE HCL 25 MG
25 CAPSULE ORAL ONCE
Status: CANCELLED | OUTPATIENT
Start: 2024-01-31 | End: 2024-01-31

## 2024-01-31 RX ORDER — ACETAMINOPHEN 325 MG/1
650 TABLET ORAL ONCE
Status: COMPLETED | OUTPATIENT
Start: 2024-01-31 | End: 2024-01-31

## 2024-01-31 RX ORDER — DIPHENHYDRAMINE HCL 25 MG
25 CAPSULE ORAL ONCE
Status: COMPLETED | OUTPATIENT
Start: 2024-01-31 | End: 2024-01-31

## 2024-01-31 RX ADMIN — DIPHENHYDRAMINE HCL 25 MG: 25 MG CAPSULE ORAL at 10:05:00

## 2024-01-31 RX ADMIN — ACETAMINOPHEN 650 MG: 325 TABLET ORAL at 10:05:00

## 2024-01-31 NOTE — PROGRESS NOTES
Pt here for IVIG . Pt denies any issues or concerns.      Ordering MD: Dr. Taye Farmer  Order Exp: Order good for 3 months - expires on 2/22/24     Pt tolerated infusion without difficulty or complaint. Reviewed next apt date/time: Yes - patient aware that not appointment will not be made due to expiring orders. Patient said that he has an appointment with Dr. Farmer soon.      Education Record  Learner:  Patient  Disease / Diagnosis: Mixed Connective Tissue Disease  Barriers / Limitations:  None  Method:  Brief focused and Reinforcement  General Topics:  Plan of care reviewed and Fall risk and prevention  Outcome:  Shows understanding

## 2024-02-01 ENCOUNTER — OFFICE VISIT (OUTPATIENT)
Dept: RHEUMATOLOGY | Facility: CLINIC | Age: 79
End: 2024-02-01
Payer: MEDICARE

## 2024-02-01 ENCOUNTER — PATIENT OUTREACH (OUTPATIENT)
Dept: CASE MANAGEMENT | Age: 79
End: 2024-02-01

## 2024-02-01 VITALS
OXYGEN SATURATION: 98 % | BODY MASS INDEX: 28.14 KG/M2 | SYSTOLIC BLOOD PRESSURE: 102 MMHG | HEIGHT: 71 IN | TEMPERATURE: 97 F | WEIGHT: 201 LBS | HEART RATE: 76 BPM | DIASTOLIC BLOOD PRESSURE: 54 MMHG | RESPIRATION RATE: 16 BRPM

## 2024-02-01 DIAGNOSIS — M33.20 POLYMYOSITIS (HCC): ICD-10-CM

## 2024-02-01 DIAGNOSIS — I43 CARDIOMYOPATHY AS MANIFESTATION OF UNDERLYING DISEASE (HCC): ICD-10-CM

## 2024-02-01 DIAGNOSIS — M35.1 MIXED CONNECTIVE TISSUE DISEASE (HCC): Primary | ICD-10-CM

## 2024-02-01 DIAGNOSIS — D69.6 THROMBOCYTOPENIA (HCC): ICD-10-CM

## 2024-02-01 DIAGNOSIS — I50.812 CHRONIC RIGHT-SIDED CONGESTIVE HEART FAILURE (HCC): ICD-10-CM

## 2024-02-01 NOTE — PATIENT INSTRUCTIONS
Continue IVIG monthly.  Prednisone 7.5 mg per day.  Exercise occasionally.    Follow up with cardiology.  Return to office 3 months.

## 2024-02-01 NOTE — PROGRESS NOTES
[facility]  Dr. Farmer Progress Note     Subjective:   Miguel Davila is a(n) 78 year old male.   Current complaints:   Chief Complaint   Patient presents with    Mixed Connective Tissue Disease     3 month f/u. Feeling ok. Fatigue from the IVIG yesterday. No joint pain or swelling. No new symptoms.     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  Has a cardiomyopathy.  On IVIG monthly which helps.    No chest pain.     On Prednisone 7.5 mg per day.    Today feels so so.  Very tired.  Gets tired after IVIG infusion. Saw Dr Neves for GI issues.  Needs to get scoped.  Stamina so so.  Stength ok.  Banged his right shin nthe other day.  On cardiac intresto double dose. On Bumex 2 mg in morning, occasional some in the afternoon.   Has not worked out.   OBJECTIVE:  /54   Pulse 76   Temp 97.4 °F (36.3 °C)   Resp 16   Ht 5' 11\" (1.803 m)   Wt 201 lb (91.2 kg)   SpO2 98%   BMI 28.03 kg/m²   Lungs clear  Heart nsr     Hand  ok  Hands non deformed.    No leg edema.    1/23/24 glucose 107 sodium 140 potassium 5.0 chloride 109 BUN 59 creatinine 1.91 GFR 35 calcium 8.5 pro beta natruretic peptide 3502  1/11/2024 white count 8.1 hemoglobin 13.0 hematocrit 40.7 platelet count 104,000   Assessment:     Encounter Diagnoses   Name Primary?    Mixed connective tissue disease (HCC) Yes    Polymyositis (HCC)     Thrombocytopenia (HCC)     Cardiomyopathy as manifestation of underlying disease (HCC)     Chronic right-sided congestive heart failure (HCC)      Plan:     Patient Instructions   Continue IVIG monthly.  Prednisone 7.5 mg per day.  Exercise occasionally.    Follow up with cardiology.  Return to office 3 months.            Taye Farmer MD 2/1/2024 2:12 PM

## 2024-02-02 DIAGNOSIS — I50.812 CHRONIC RIGHT-SIDED CONGESTIVE HEART FAILURE (HCC): Primary | ICD-10-CM

## 2024-02-04 NOTE — PROGRESS NOTES
[facility] Patient Status:  Inpatient    3/17/1945 MRN [id number]   [facility] 7NE-A Attending Gustabo Haider MD   Hosp Day # 1 PCP Yaritza Ceja MD     SUBJECTIVE: feels marginally better.  Still gets dyspneic with Patient received discharge order per Dr. Pacheco. Vitals obtained, IV's removed. Discharge education, med rec, and follow-up appointments discussed with patient, all questions answered at bedside. Patient escorted to lobby via Wheelchair at 1127 accompanied by transport. Patient discharged in stable condition.    layering effusions noted L>R with asst compressive atelectasis     ASSESSMENT/PLAN:  1. Dyspnea/hypoxia- due to recurrent pleural effusions in setting of CHF  - wean O2, bronchial hygiene  - diuresis as tolerated  2.  Recurrent pleural effusions- free flowi

## 2024-02-06 ENCOUNTER — HOSPITAL ENCOUNTER (OUTPATIENT)
Dept: CARDIOLOGY CLINIC | Facility: HOSPITAL | Age: 79
Discharge: HOME | End: 2024-02-06
Attending: NURSE PRACTITIONER
Payer: MEDICARE

## 2024-02-06 ENCOUNTER — HOSPITAL ENCOUNTER (OUTPATIENT)
Dept: LAB | Facility: HOSPITAL | Age: 79
Discharge: HOME | End: 2024-02-06
Attending: NURSE PRACTITIONER
Payer: MEDICARE

## 2024-02-06 VITALS
SYSTOLIC BLOOD PRESSURE: 101 MMHG | BODY MASS INDEX: 28 KG/M2 | OXYGEN SATURATION: 100 % | RESPIRATION RATE: 19 BRPM | DIASTOLIC BLOOD PRESSURE: 58 MMHG | WEIGHT: 201 LBS | HEART RATE: 83 BPM

## 2024-02-06 DIAGNOSIS — I50.32 CHRONIC DIASTOLIC HEART FAILURE (HCC): ICD-10-CM

## 2024-02-06 DIAGNOSIS — I27.20 PULMONARY HYPERTENSION (HCC): ICD-10-CM

## 2024-02-06 DIAGNOSIS — N18.32 STAGE 3B CHRONIC KIDNEY DISEASE (HCC): ICD-10-CM

## 2024-02-06 DIAGNOSIS — I48.21 PERMANENT ATRIAL FIBRILLATION (HCC): ICD-10-CM

## 2024-02-06 DIAGNOSIS — I50.812 CHRONIC RIGHT-SIDED CONGESTIVE HEART FAILURE (HCC): ICD-10-CM

## 2024-02-06 DIAGNOSIS — I50.812 CHRONIC RIGHT-SIDED CONGESTIVE HEART FAILURE (HCC): Primary | ICD-10-CM

## 2024-02-06 LAB
ANION GAP SERPL CALC-SCNC: 5 MMOL/L (ref 0–18)
BUN BLD-MCNC: 55 MG/DL (ref 9–23)
CALCIUM BLD-MCNC: 9.1 MG/DL (ref 8.5–10.1)
CHLORIDE SERPL-SCNC: 107 MMOL/L (ref 98–112)
CO2 SERPL-SCNC: 28 MMOL/L (ref 21–32)
CREAT BLD-MCNC: 1.84 MG/DL
EGFRCR SERPLBLD CKD-EPI 2021: 37 ML/MIN/1.73M2 (ref 60–?)
FASTING STATUS PATIENT QL REPORTED: NO
GLUCOSE BLD-MCNC: 93 MG/DL (ref 70–99)
OSMOLALITY SERPL CALC.SUM OF ELEC: 305 MOSM/KG (ref 275–295)
POTASSIUM SERPL-SCNC: 4.6 MMOL/L (ref 3.5–5.1)
SODIUM SERPL-SCNC: 140 MMOL/L (ref 136–145)

## 2024-02-06 PROCEDURE — 99215 OFFICE O/P EST HI 40 MIN: CPT | Performed by: NURSE PRACTITIONER

## 2024-02-06 PROCEDURE — 80048 BASIC METABOLIC PNL TOTAL CA: CPT | Performed by: NURSE PRACTITIONER

## 2024-02-06 PROCEDURE — 36415 COLL VENOUS BLD VENIPUNCTURE: CPT | Performed by: NURSE PRACTITIONER

## 2024-02-06 NOTE — PROGRESS NOTES
[facility] Progress Note    Miguel Davila is a 78 year old male who presents to clinic for APN assessment and management of chronic diastolic heart failure and is functional class 2-3.     Subjective:  Since his last clinic visit on 1/23; when his PM Bumex was increase to 2 mg on Monday, Wednesday and Friday, his weight is down 1lb. His last weight was 202 lbs. Today his weight is 201 lb. Denies SOB with exertion and rest. He reported that he feels only SOB with strenuous  activity. His appetite is good. Reported mild bloating to upper area of abdomen. Mild swelling to BLE. He bump his legs on his ottoman and has a small hematoma to right upper shin area. He sleeps well. Denies dizziness.     He underwent a BVA 11/17 that was suggestive of severe dehydration and anemia. Iron studies revealed iron sat of 27 and ferritin 50.9. CardioMEMs 29mmhg on day of BVA with weight of 193 lbs.  Since we reduced diuretics and then had to increase them d.t fluid overload.       Recent range from 29-34 mmHg as noted below.  PAD 32mmhg today.         Other Specialty visits:    He saw Dr. Farmer on 2/1, to continue IVIG monthly, prednisone 7.5mg daily and encouraged occasional exercise. F/U in 3 months. Last IVIG infusion 1/31.      He saw Dr. Veronica 1/2; to continue healthy diet. Increase activity through modest exercise. See him in a year.      Saw Dr. Orr 1/15; no changes made.      He saw Dr. Neves; stopped Metamucil and started on Psyllium husk 2 capsule twice a day. Proceed with EGD and colonoscopy.       Review of Systems   Constitutional: Negative.   Cardiovascular: Negative.    Respiratory: Negative.     Gastrointestinal:  Positive for bloating.       HISTORY:  Past Medical History:   Diagnosis Date    A-fib (HCC)     Arrhythmia     atrial fibrillation    Arthritis     Autoimmune disease (HCC)     hepatits, resolved anfd nephritis, resolved    Ramon's esophagus 05/14/2013     Bleeding nose 1970    Gums Treated    Blood disorder     thrombocytopenia    Blood in urine     Blurred vision 2021    cataract due to steroids, surgery in June    Calculus of kidney 2012    One time    Cancer (HCC)     skin    Candidiasis of the esophagus 06/29/2012    Due to steroid use for Autoimmune disorder     Cataract     Colon polyps 05/13/2013    Congestive heart disease (HCC)     Diarrhea, unspecified Since 2005    Intermittent due to lupus    Diverticulosis of colon 05/14/2013    Easy bruising 2021    On and off prednisone for 20 years    Esophageal polyp 05/14/2013    Esophageal reflux     Essential hypertension     Fatigue Since 2005    polymyositis and lupus    Gout     Hammer toe, acquired 06/29/2012    Heart palpitations 2017    Afib    Hepatitis     autoimmune induce hepatitis d/t lupus    High blood pressure     IBS (irritable bowel syndrome)     mild d/t lupus    Irregular bowel habits     Leg swelling 2021    Heart failure, probably caused by lupus    Lupus (HCC)     MCTD (mixed connective tissue disease) ([facility])     Obstructive sleep apnea (adult) (pediatric) 11/16/2017    LI (obstructive sleep apnea) HST 11-7-17    AHI 37  Supine AHI 38 non-supine AHI 16 Sao2 Pj 83%     LI (obstructive sleep apnea) PSG 5-22-19    AHI 36 RDI 36 REM AHI 45 Supine AHI 65 non-supine AHI 19 Sao2 Pj 86% CPAP 9cwp    Pain in joints     Personal history of antineoplastic chemotherapy     Pleural effusion     right    Pneumonia due to organism     Polymyositis (HCC)     Presence of other cardiac implants and grafts 2017    watchman filter    Problems with swallowing     dysphagia in 2006    Pulmonary hypertension (HCC)     Raynaud disease     Raynaud disease     Renal disorder     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    Shortness of breath on and off    mainly due to pm or lupus    Sleep apnea     CPAP    Thrombocytopathia (HCC)     Visual impairment     bifocals for reading & computer; distance for driving    Wears glasses        Past Surgical History:   Procedure Laterality Date    APPENDECTOMY  1970    APPENDECTOMY      CARDIAC CATHETERIZATION  09/2019    CATARACT Bilateral     COLONOSCOPY  10/2003 2006 01/2010    x3    COLONOSCOPY  5/14/2013    COLONOSCOPY N/A 5/1/2018    Procedure: COLONOSCOPY;  Surgeon: Isaiah Tobar MD;  Location:  ENDOSCOPY    COLONOSCOPY WITH BIOPSY  5/14/13    EGD      HAND/FINGER SURGERY UNLISTED  2003    right    NEEDLE BIOPSY LIVER      OTHER  12/2005    needle bipsy of kidney    OTHER SURGICAL HISTORY  2017    watchman filter    PERCUTANEOUS  ANGIOPLASTY  (PTCA)- PBP ONLY  2006    right    RECTUM SURGERY PROCEDURE UNLISTED  1946    rectal surgery polypectomy    SKIN SURGERY      MMS BCC R temple 6/24/09    SKIN SURGERY  2007    basal ceell carcinoma    SKIN SURGERY  7-18-12    BCC-nodular to right superior eyebrow/ MOHS    SKIN SURGERY  07/15/2013    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/MMS    SKIN SURGERY  2-19-14    BCC-NOD to right superior pinna, MMS, AB    SKIN SURGERY  02/16/2021    BCC- left superior forehead, MMS     SKIN SURGERY  03/07/2022    SCC IN SITU RIGHT ANTIHELIX MMS BY DR GASTELUM    TONSILLECTOMY  1948    UPPER GI ENDOSCOPY,EXAM  10/2003 2006 01/2010 , 5/13    x3      Family History   Problem Relation Age of Onset    Heart Disease Father         CHF    Gastro-Intestinal Disorder Father         Diverticulosis    Alcohol and Other Disorders Associated Father     Heart Attack Father     Heart Disease Mother         CHF    Breast Cancer Mother     Stroke Mother     Cancer Paternal Grandfather     Heart Attack Paternal Grandmother     Kidney Disease Son     Other (Ramon's Esophagus) Son     Heart Attack Maternal Grandfather       Social History     Socioeconomic History    Marital status:    Occupational History    Occupation: Retired    Tobacco Use    Smoking status: Former     Packs/day: 0.50     Years: 15.00     Additional pack years: 0.00     Total pack years: 7.50     Types:  Cigarettes     Start date: 1958     Quit date: 1973     Years since quittin.5    Smokeless tobacco: Never    Tobacco comments:     5 cigarettes daily, stopped smoking in    Vaping Use    Vaping Use: Never used   Substance and Sexual Activity    Alcohol use: Yes     Alcohol/week: 13.0 - 15.0 standard drinks of alcohol     Types: 5 Glasses of wine, 8 - 10 Standard drinks or equivalent per week     Comment: 1 per day wine or liquor    Drug use: Never   Other Topics Concern    Caffeine Concern Yes     Comment: 1 soda per day and decaf coffee    Sleep Concern No    Exercise Yes     Comment: 3-4 times weekly    Seat Belt Yes           Objective:    Cardiac Rhythm: Atrial fibrillation    /58   Pulse 83   Resp 19   Wt 201 lb (91.2 kg)   SpO2 100%   BMI 28.03 kg/m²     Wt Readings from Last 6 Encounters:   24 201 lb (91.2 kg)   24 201 lb (91.2 kg)   24 202 lb (91.6 kg)   24 200 lb 12.8 oz (91.1 kg)   24 202 lb (91.6 kg)   24 197 lb 9.6 oz (89.6 kg)            Recent Results (from the past 24 hour(s))   Basic Metabolic Panel (8)    Collection Time: 24  1:38 PM   Result Value Ref Range    Glucose 93 70 - 99 mg/dL    Sodium 140 136 - 145 mmol/L    Potassium 4.6 3.5 - 5.1 mmol/L    Chloride 107 98 - 112 mmol/L    CO2 28.0 21.0 - 32.0 mmol/L    Anion Gap 5 0 - 18 mmol/L    BUN 55 (H) 9 - 23 mg/dL    Creatinine 1.84 (H) 0.70 - 1.30 mg/dL    Calcium, Total 9.1 8.5 - 10.1 mg/dL    Calculated Osmolality 305 (H) 275 - 295 mOsm/kg    eGFR-Cr 37 (L) >=60 mL/min/1.73m2    Patient Fasting for BMP? No          Current Outpatient Medications:     bumetanide 1 MG Oral Tab, Take 2 mg of Bumex daily in the AM. Take 1 mg in the PM on Tuesday, Thursday, Saturday and . Take 2 mg in the PM on Monday, Wednesday and Friday., Disp: , Rfl:     ALLOPURINOL 300 MG Oral Tab, TAKE 1 TABLET BY MOUTH EVERY DAY, Disp: 90 tablet, Rfl: 3    predniSONE 5 MG Oral Tab, Take 1 tablet (5  mg total) by mouth daily with breakfast., Disp: 90 tablet, Rfl: 3    omeprazole 20 MG Oral Capsule Delayed Release, Take 1 capsule (20 mg total) by mouth 2 (two) times daily., Disp: 180 capsule, Rfl: 1    spironolactone 25 MG Oral Tab, Take 0.5 tablets (12.5 mg total) by mouth daily., Disp: 45 tablet, Rfl: 3    metoprolol succinate  MG Oral Tablet 24 Hr, Take 1 tablet (100 mg total) by mouth every morning AND 0.5 tablets (50 mg total) every evening., Disp: 60 tablet, Rfl: 3    PREDNISONE 1 MG Oral Tab, TAKE 2 TABLETS (2 MG TOTAL) BY MOUTH DAILY WITH BREAKFAST., Disp: 180 tablet, Rfl: 3    sacubitril-valsartan 49-51 MG Oral Tab, Take 1 tablet by mouth 2 (two) times daily., Disp: , Rfl:     aspirin 81 MG Oral Tab EC, Take 1 tablet (81 mg total) by mouth daily., Disp: 30 tablet, Rfl: 0    PREVIDENT 5000 BOOSTER PLUS 1.1 % Dental Paste, , Disp: , Rfl:     Multiple Vitamins-Minerals (TAB-A-KEVIN) Oral Tab, Take 1 tablet by mouth daily., Disp: , Rfl:     Immune Globulin, Human, 10 g Intravenous Recon Soln, Inject 0.4 g/kg into the vein. Given for treatment of polymyositis, mixed connective tissue disease, and immune thrombocytopenia purpura monthly at [facility]. Every 5 weeks, Disp: 3 each, Rfl: 0    Calcium Citrate-Vitamin D (CITRACAL + D OR), Take 1 tablet by mouth daily., Disp: , Rfl:     Exam:   General:         Alert, in no apparent distress  HEENT:          +6 JVD.  Lungs:            Lungs clear bilateral.                     CV:                 Irregular  Abdomen:       Distended/round, soft, non-tender, BS+  Extremities:    +1 pitting edema to BLE   Neuro:             A&O x 3  Skin:                Pink, warm, dry    Education:  Patient instructed regarding sodium restricted diet, low sodium foods, fluid restriction, daily weights, medication regimen, s/s HF exacerbation and when to call APN/clinic.    Assessment:   Chronic diastolic, RV heart failure - LVEF 52% and stable with borderline normal  RV dysfunction per echo 1/4/2023. RHC 7/2022 with PCWP 20, RA 11. S/p CardioMEMs implanted on 8/2022 with 10 mmhg gradient between his PAD and wedge on RHC, with PAD running higher. Goal thought to be ~ 20 mmhg previously but since goal has been increased with the guidance of clinical exam, renal indices and BVA. Range closer to 29-35 mmHg. Will continue to follow and adjust at needed. PAD 29-34 mmHg recently. PAD 32 mmHg today. Last ProBNP 3,502. Off Jardiance, thought to be due to pancreatitis. MRA recently discontinued at [facility] for dehydration and near syncope; restarted but required reduced dose due to hyperkalemia. BVA 11/2023 suggestive of dehydration.   PH, mild combined pre and post capillary - RHC 8/2022 demonstrates mPAP 27 with wedge 8 mmHg, RA 5 mmHg, PVR 2.7 and CI 3.4. RV function borderline normal on echo 1/2023. DPG 10 on RHC when MEMs placed.   Moderate MR/Mild-Mod TR/AI - on MCI echo 1/2023 and unchanged.   Dilated ascending aorta - 4.2 cm per echo 1/2023.  CKD Stage 3b - baseline creatinine ~ 1.8-2.0 mg/dl, Cr today 1.84 and stable. Follows with Dr. Devine.  Chronic Afib - s/p Watchman. Rates controlled.    LI - uses cpap.  Recurrent bilateral pleural effusions - hx of thoracentesis.   Hx Lupus/Mixed CTD/Severe polymyositis - continues IVIG infusions every 5 weeks, last dose 12/27. Follows with Dr. Farmer. On chronic prednisone. Off Imuran due to pancytopenia.  Non-obstructive CAD  Chronic thrombocytopenia, immune mediated/recent pancytopenia related to imuran - last PLT 104K. Follows with Dr. Orr.  Hx Hyponatremia   HERNANDEZ with biopsy proven stage F0-F1 Fibrosis on US 1/2022. Follows with Dr. Veronica, Hepatology at [facility]. Recently seen, to follow up in a year.   Gout - on colchicine prn and Allopurinol. Last uric acid 4.2.   Basal/Squamous cell carcinoma - hx Mohs surgery.  Mild hyperkalemia - on low dose MRA and ARNI.    Anemia - last Hgb 13 and stable. Recent iron studies with sat 27 and  ferritin 50.9 in November.      Plan:   Continue current medications.   Return to clinic in 1 month.  Follow MEMs.   F/U with Dr. Jean as planned in ~ March with echo, labs.   CHF discharge instructions given    45 minutes spent with patient and greater than 50% of the time was spent counseling and coordinating care.    SHEA [facility] NP Student.

## 2024-02-06 NOTE — PATIENT INSTRUCTIONS
Heart Failure Discharge Instructions      Activity: Regular exercise and activity is important for your overall health and to help keep your heart strong and functioning as well as possible.   Walk at a slow to moderate pace for 15-20 minutes 3-5 days per week.     Follow these instructions every day to keep yourself in the Green Zone     The Green Zone means you are feeling well and your symptoms are under control                                    Medications  Take your medication every day as instructed  Do not stop taking your medicine or change the amount you are taking without instructions from your doctor or nurse  Do Not take non-steroidal antiinflammatory drugs such as ibuprofen, aleve, advil, or motrin                                    Diet/Fluids  People with heart failure should eat less sodium (salt) and limit fluid. Sodium attracts water and makes the body hold fluid. This extra fluid makes the heart work harder and can worsen the symptoms of heart failure.     Diet    2000 mg sodium daily  Fluid restriction    64 ounces daily  (8 oz. = 1 cup)                                     Body Weight  Weigh yourself every day before breakfast and write your weight down  Use the same scale and wear about the same amount of clothes each time  A sudden weight increase is due to fluid retention rather than fat                                         Activity  Pace your activities to avoid getting overtired  Take rest periods as needed  Elevate your feet to reduce ankle swelling when resting                             Signs of Worsening Heart Failure    You are entering the Yellow Zone - this is a warning zone    Call your doctor or nurse if you have any of these signs or symptoms:  You gain 2 or more pounds overnight or 3-5 pounds in 3-7 days  You have more trouble breathing  You get more tired with regular activity, or are limiting activity because of shortness of breath or fatigue  You are short of breath lying  down, you need more pillows to breathe comfortably,  or wake up during the night short of breath  You urinate less often during the day and more often at night  You have a bloated feeling, upset stomach, loss of appetite, or your clothes are fitting tighter    GO TO THE EMERGENCY DEPARTMENT or CALL 911 IF:    These are signs you are in the RED ZONE - THIS IS AN EMERGENCY  You have tightness or pain in your chest  You are extremely short of breath or can't catch your breath  You cough up frothy pink mucous  You feel confused or can't think clearly  You are traveling and develop symptoms of worsening heart failure     We respect everyone's time and availability. Please be aware that this is not a walk-in clinic and we require appointments in order to facilitate timely care for all patients. We ask you to arrive 30 minutes before your appointment to allow time for you to check-in and have your bloodwork drawn. Please understand if you are late for your appointment, you may be asked to reschedule. If possible, all attempts will be made to accommodate but realize this is no guarantee that this will always be available. We understand there are extenuating circumstances. If you need to cancel or reschedule your appointment, please call the [facility] within 24 hours at [phone].  Thank you for your cooperation, [facility] Staff.    IF YOU HAVE QUESTIONS REGARDING YOUR BILL, FEEL FREE TO CONTACT [facility] PATIENT ACCOUNTS -[phone]. IF YOU NEED FINANCIAL ASSISTANCE, PLEASE CALL AN [facility] FINANCIAL COUNSELOR -[phone].             [facility]     [phone]

## 2024-02-06 NOTE — PROGRESS NOTES
Pt. Assessed. Pt. complaining of mild bloating. Patient with mild lower extremity edema. Patient denies issues with shortness of breath. Weight down 1 lb at 201.0 lbs. APN notified of all the above information. Labs ordered and drawn by  Lab. Reviewed allergies and list of current medications with patient and updated it in the Electronic Medical Record.     Educated patient on low sodium diet and food choices, fluid restriction of 2 liters, and daily weights. Reviewed follow-up appointments and discharge Heart Failure instructions with patient. Patient verbalized an understanding. IPatient to return to the clinic in 1 month.

## 2024-02-20 NOTE — PROGRESS NOTES
[facility]     CardioMEMS pulmonary artery pressure sensor    Remote Monitoring Report Summary    24     Miguel Davila    : 3/17/1945    Reporting Period- -24     Diagnosis - HFpEF    Provider- SHERIDAN Cummins       The CardioMEMS report for the above date has been reviewed with the following results:    RHC hemodynamics at time of CardioMEMS impant:    PA 42/  Wedge 8    Acceptable range for Miguel Davila      PAD range 29-35mmhg    Remote monitoring documentation for the past ~30 days:      2024 Not complete since , will call him.  2024 PAD stable. CTM  2024 Stable. CTM  2024 Stable. CTM       Tasha Barth NP

## 2024-02-21 ENCOUNTER — HOSPITAL ENCOUNTER (OUTPATIENT)
Dept: CARDIOLOGY CLINIC | Facility: HOSPITAL | Age: 79
Discharge: HOME | End: 2024-02-21
Attending: NURSE PRACTITIONER
Payer: MEDICARE

## 2024-02-21 DIAGNOSIS — I50.812 CHRONIC RIGHT-SIDED CONGESTIVE HEART FAILURE (HCC): Primary | ICD-10-CM

## 2024-02-21 PROCEDURE — 93264 REM MNTR WRLS P-ART PRS SNR: CPT | Performed by: NURSE PRACTITIONER

## 2024-02-21 NOTE — PROGRESS NOTES
[facility] Progress Note    Miguel Davila is a 78 year old male who presents to clinic for APN assessment and management of chronic diastolic heart failure and is functional class 2-3.     Subjective:  Since his last clinic visit on 2/6; when no changes were made; he is doing well. He completed blood work for Dr. Jean on 3/01: NTproBNP improved to 2,029 which is down from 3,502 in January. He is seeing Dr. Jean tomorrow. He is scheduled an EGD for April.    Weight is 198 lbs today on our scale which is down 3 lbs since last visit at 201 lbs. His weight at home ranges between 191-194 lbs.  He denies sob, abd bloating, and dizziniess/LH. His appetite is good. He does have +1 pitting edema bilaterally but this is likely dependent as he reports that it is not there in the morning. He sleeps well.     He underwent a BVA 11/17 that was suggestive of severe dehydration and anemia. Iron studies revealed iron sat of 27 and ferritin 50.9. CardioMEMs 29mmhg on day of BVA with weight of 193 lbs.  Since we reduced diuretics and then had to increase them d.t fluid overload.  PAD is 31 mmHg today and has been 29-34 mmHg recently.     Other Specialty visits:    He saw Dr. Farmer on 2/1, to continue IVIG monthly, prednisone 7.5mg daily and encouraged occasional exercise. F/U in 3 months. Last IVIG infusion 1/31.       He saw Dr. Neves; stopped Metamucil and started on Psyllium husk 2 capsule twice a day. Proceed with EGD and colonoscopy.     Review of Systems   Constitutional: Positive for weight loss. Negative for malaise/fatigue and weight gain.   Cardiovascular:  Positive for leg swelling. Negative for chest pain, claudication, dyspnea on exertion, irregular heartbeat and palpitations.   Respiratory: Negative.  Negative for shortness of breath and sleep disturbances due to breathing.    Gastrointestinal:  Positive for bloating.   Neurological:  Negative for dizziness,  headaches, light-headedness and weakness.       HISTORY:  Past Medical History:   Diagnosis Date    A-fib (HCC)     Arrhythmia     atrial fibrillation    Arthritis     Autoimmune disease (HCC)     hepatits, resolved anfd nephritis, resolved    Ramon's esophagus 05/14/2013    Bleeding nose 1970    Gums Treated    Blood disorder     thrombocytopenia    Blood in urine     Blurred vision 2021    cataract due to steroids, surgery in June    Calculus of kidney 2012    One time    Cancer (HCC)     skin    Candidiasis of the esophagus 06/29/2012    Due to steroid use for Autoimmune disorder     Cataract     Colon polyps 05/13/2013    Congestive heart disease (HCC)     Diarrhea, unspecified Since 2005    Intermittent due to lupus    Diverticulosis of colon 05/14/2013    Easy bruising 2021    On and off prednisone for 20 years    Esophageal polyp 05/14/2013    Esophageal reflux     Essential hypertension     Fatigue Since 2005    polymyositis and lupus    Gout     Hammer toe, acquired 06/29/2012    Heart palpitations 2017    Afib    Hepatitis     autoimmune induce hepatitis d/t lupus    High blood pressure     IBS (irritable bowel syndrome)     mild d/t lupus    Irregular bowel habits     Leg swelling 2021    Heart failure, probably caused by lupus    Lupus (HCC)     MCTD (mixed connective tissue disease) (HCC)     Obstructive sleep apnea (adult) (pediatric) 11/16/2017    LI (obstructive sleep apnea) HST 11-7-17    AHI 37  Supine AHI 38 non-supine AHI 16 Sao2 Pj 83%     LI (obstructive sleep apnea) PSG 5-22-19    AHI 36 RDI 36 REM AHI 45 Supine AHI 65 non-supine AHI 19 Sao2 Pj 86% CPAP 9cwp    Pain in joints     Personal history of antineoplastic chemotherapy     Pleural effusion     right    Pneumonia due to organism     Polymyositis (HCC)     Presence of other cardiac implants and grafts 2017    watchman filter    Problems with swallowing     dysphagia in 2006    Pulmonary hypertension (HCC)     Raynaud disease      Raynaud disease     Renal disorder     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    Shortness of breath on and off    mainly due to pm or lupus    Sleep apnea     CPAP    Thrombocytopathia (HCC)     Visual impairment     bifocals for reading & computer; distance for driving    Wears glasses       Past Surgical History:   Procedure Laterality Date    APPENDECTOMY  1970    APPENDECTOMY      CARDIAC CATHETERIZATION  09/2019    CATARACT Bilateral     COLONOSCOPY  10/2003 2006 01/2010    x3    COLONOSCOPY  5/14/2013    COLONOSCOPY N/A 5/1/2018    Procedure: COLONOSCOPY;  Surgeon: Isaiah Tobar MD;  Location:  ENDOSCOPY    COLONOSCOPY WITH BIOPSY  5/14/13    EGD      HAND/FINGER SURGERY UNLISTED  2003    right    NEEDLE BIOPSY LIVER      OTHER  12/2005    needle bipsy of kidney    OTHER SURGICAL HISTORY  2017    watchman filter    PERCUTANEOUS  ANGIOPLASTY  (PTCA)- PBP ONLY  2006    right    RECTUM SURGERY PROCEDURE UNLISTED  1946    rectal surgery polypectomy    SKIN SURGERY      MMS BCC R temple 6/24/09    SKIN SURGERY  2007    basal ceell carcinoma    SKIN SURGERY  7-18-12    BCC-nodular to right superior eyebrow/ MOHS    SKIN SURGERY  07/15/2013    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/MMS    SKIN SURGERY  2-19-14    BCC-NOD to right superior pinna, MMS, AB    SKIN SURGERY  02/16/2021    BCC- left superior forehead, MMS     SKIN SURGERY  03/07/2022    SCC IN SITU RIGHT ANTIHELIX MMS BY DR GASTELUM    TONSILLECTOMY  1948    UPPER GI ENDOSCOPY,EXAM  10/2003 2006 01/2010 , 5/13    x3      Family History   Problem Relation Age of Onset    Heart Disease Father         CHF    Gastro-Intestinal Disorder Father         Diverticulosis    Alcohol and Other Disorders Associated Father     Heart Attack Father     Heart Disease Mother         CHF    Breast Cancer Mother     Stroke Mother     Cancer Paternal Grandfather     Heart Attack Paternal Grandmother     Kidney Disease Son     Other (Ramon's Esophagus) Son     Heart Attack Maternal  Grandfather       Social History     Socioeconomic History    Marital status:    Occupational History    Occupation: Retired    Tobacco Use    Smoking status: Former     Packs/day: 0.50     Years: 15.00     Additional pack years: 0.00     Total pack years: 7.50     Types: Cigarettes     Start date: 1958     Quit date: 1973     Years since quittin.6    Smokeless tobacco: Never    Tobacco comments:     5 cigarettes daily, stopped smoking in    Vaping Use    Vaping Use: Never used   Substance and Sexual Activity    Alcohol use: Yes     Alcohol/week: 13.0 - 15.0 standard drinks of alcohol     Types: 5 Glasses of wine, 8 - 10 Standard drinks or equivalent per week     Comment: 1 per day wine or liquor    Drug use: Never   Other Topics Concern    Caffeine Concern Yes     Comment: 1 soda per day and decaf coffee    Sleep Concern No    Exercise Yes     Comment: 3-4 times weekly    Seat Belt Yes           Objective:    Cardiac Rhythm: Atrial fibrillation    BP (!) 89/61   Pulse 72   Resp 19   Wt 198 lb 3.2 oz (89.9 kg)   SpO2 100%   BMI 27.64 kg/m²   107/62   104/51     Wt Readings from Last 6 Encounters:   24 198 lb 3.2 oz (89.9 kg)   24 201 lb (91.2 kg)   24 201 lb (91.2 kg)   24 202 lb (91.6 kg)   24 200 lb 12.8 oz (91.1 kg)   24 202 lb (91.6 kg)        Recent Results (from the past 24 hour(s))   Basic Metabolic Panel (8)    Collection Time: 24  1:32 PM   Result Value Ref Range    Glucose 96 70 - 99 mg/dL    Sodium 133 (L) 136 - 145 mmol/L    Potassium      Chloride 106 98 - 112 mmol/L    CO2 27.0 21.0 - 32.0 mmol/L    Anion Gap 0 0 - 18 mmol/L    BUN 56 (H) 9 - 23 mg/dL    Creatinine 1.91 (H) 0.70 - 1.30 mg/dL    Calcium, Total 9.3 8.5 - 10.1 mg/dL    Calculated Osmolality 291 275 - 295 mOsm/kg    eGFR-Cr 35 (L) >=60 mL/min/1.73m2    Patient Fasting for BMP? No      Component      Latest Ref Rng 3/1/2024   Glucose      70 - 99 mg/dL 98    Sodium       136 - 145 mmol/L 139    Potassium      3.5 - 5.1 mmol/L 4.7    Chloride      98 - 112 mmol/L 106    Carbon Dioxide, Total      21.0 - 32.0 mmol/L 25.0    ANION GAP      0 - 18 mmol/L 8    BUN      9 - 23 mg/dL 64 (H)    CREATININE      0.70 - 1.30 mg/dL 1.87 (H)    CALCIUM      8.5 - 10.1 mg/dL 9.3    CALCULATED OSMOLALITY      275 - 295 mOsm/kg 306 (H)    EGFR      >=60 mL/min/1.73m2 36 (L)    AST (SGOT)      15 - 37 U/L 17    ALT (SGPT)      16 - 61 U/L 17    ALKALINE PHOSPHATASE      45 - 117 U/L 43 (L)    Total Bilirubin      0.1 - 2.0 mg/dL 0.7    PROTEIN, TOTAL      6.4 - 8.2 g/dL 7.0    Albumin      3.4 - 5.0 g/dL 3.8    Globulin      2.8 - 4.4 g/dL 3.2    A/G Ratio      1.0 - 2.0  1.2    Patient Fasting for CMP? Yes         Current Outpatient Medications:     omeprazole 20 MG Oral Capsule Delayed Release, Take 1 capsule (20 mg total) by mouth 2 (two) times daily., Disp: 180 capsule, Rfl: 1    bumetanide 1 MG Oral Tab, Take 2 mg of Bumex daily in the AM. Take 1 mg in the PM on Tuesday, Thursday, Saturday and Sunday. Take 2 mg in the PM on Monday, Wednesday and Friday, Disp: 360 tablet, Rfl: 1    ALLOPURINOL 300 MG Oral Tab, TAKE 1 TABLET BY MOUTH EVERY DAY, Disp: 90 tablet, Rfl: 3    predniSONE 5 MG Oral Tab, Take 1 tablet (5 mg total) by mouth daily with breakfast., Disp: 90 tablet, Rfl: 3    spironolactone 25 MG Oral Tab, Take 0.5 tablets (12.5 mg total) by mouth daily., Disp: 45 tablet, Rfl: 3    metoprolol succinate  MG Oral Tablet 24 Hr, Take 1 tablet (100 mg total) by mouth every morning AND 0.5 tablets (50 mg total) every evening., Disp: 60 tablet, Rfl: 3    PREDNISONE 1 MG Oral Tab, TAKE 2 TABLETS (2 MG TOTAL) BY MOUTH DAILY WITH BREAKFAST., Disp: 180 tablet, Rfl: 3    sacubitril-valsartan 49-51 MG Oral Tab, Take 1 tablet by mouth 2 (two) times daily., Disp: , Rfl:     aspirin 81 MG Oral Tab EC, Take 1 tablet (81 mg total) by mouth daily., Disp: 30 tablet, Rfl: 0    PREVIDENT 5000 BOOSTER PLUS  1.1 % Dental Paste, , Disp: , Rfl:     Multiple Vitamins-Minerals (TAB-A-KEVIN) Oral Tab, Take 1 tablet by mouth daily., Disp: , Rfl:     Immune Globulin, Human, 10 g Intravenous Recon Soln, Inject 0.4 g/kg into the vein. Given for treatment of polymyositis, mixed connective tissue disease, and immune thrombocytopenia purpura monthly at [facility]. Every 5 weeks, Disp: 3 each, Rfl: 0    Calcium Citrate-Vitamin D (CITRACAL + D OR), Take 1 tablet by mouth daily., Disp: , Rfl:     Exam:   General:         Alert, in no apparent distress  HEENT:          No JVD.  Lungs:            Lungs clear bilateral.                     CV:                   Irregular  Abdomen:       Distended/round, soft, non-tender, BS+  Extremities:    +1 pitting edema to BLE   Neuro:             A&O x 3  Skin:                Pink, warm, dry    Education:  Patient instructed regarding sodium restricted diet, low sodium foods, fluid restriction, daily weights, medication regimen, s/s HF exacerbation and when to call APN/clinic.    Assessment:   Chronic diastolic, RV heart failure - LVEF 52% and stable with borderline normal RV dysfunction per echo 1/4/2023. RHC 7/2022 with PCWP 20, RA 11. S/p CardioMEMs implanted on 8/2022 with 10 mmhg gradient between his PAD and wedge on RHC, with PAD running higher. Goal thought to be ~ 20 mmhg previously but since goal has been increased with the guidance of clinical exam, renal indices and BVA. Range closer to 29-35 mmHg. Will continue to follow and adjust as needed. PAD 29-34 mmHg recently. PAD 31 mmHg today. Last ProBNP 2,029 on 3/01 which is improved from 3,502 in January. Off Jardiance, thought to be due to pancreatitis. MRA discontinued at [facility] for dehydration and near syncope; since have restarted but required reduced dose due to hyperkalemia. BVA 11/2023 suggestive of dehydration, PAD 29 on day of BVA.   PH, mild combined pre and post capillary - RHC 8/2022 demonstrates mPAP 27 with wedge 8  mmHg, RA 5 mmHg, PVR 2.7 and CI 3.4. RV function borderline normal on echo 1/2023. DPG 10 on RHC when MEMs placed.   Moderate MR/Mild-Mod TR/AI - on MCI echo 1/2023 and unchanged.   Dilated ascending aorta - 4.2 cm per echo 1/2023.  CKD Stage 3b - baseline creatinine ~ 1.8-2.0 mg/dl, Cr 1.89 today. Follows with Dr. Devine. Of note, blood was hemolyzed today without potassium and Na of 133. Pt completed lab work on 3/01 with creat of 1.87, K of 4.7, and Na of 139.   Chronic Afib - s/p Watchman. Rates controlled.    LI - uses cpap.  Recurrent bilateral pleural effusions - hx of thoracentesis.   Hx Lupus/Mixed CTD/Severe polymyositis - continues IVIG infusions every 5 weeks, last dose 1/31. Follows with Dr. Farmer. On chronic prednisone. Off Imuran due to pancytopenia.  Non-obstructive CAD  Chronic thrombocytopenia, immune mediated/recent pancytopenia related to imuran - last PLT 127K. Follows with Dr. Orr, saw her 1/15, no changes made.   Hx Hyponatremia, Na low today but hemolyzed. Na normal on 3/1  HERNANDEZ with biopsy proven stage F0-F1 Fibrosis on US 1/2022. Follows with Dr. Veronica, Hepatology at [facility]. Seen 1/2, to follow up in a year. Recent LFT's normal.   Gout - on colchicine prn and Allopurinol. Last uric acid 4.2.   Basal/Squamous cell carcinoma - hx Mohs surgery.  Mild hyperkalemia - on low dose MRA and ARNI.    Anemia - last Hgb 13.5 and stable. Recent iron studies with sat 27 and ferritin 50.9 in November.    Elevated TSH, added on t4 to labs from today. TSH 4.3 3/1.     Plan:     Continue current medications.  Continue monitoring daily weights at home.  Follow MEMs.   Added on T4 to labs from today for further evaluation since TSH was mildly elevated 3.1.   Return to clinic in 1 month.  F/U with Dr. Jean as planned tomorrow   CHF discharge instructions given    45 minutes spent with patient and greater than 50% of the time was spent counseling and coordinating care.     Farzaneh  Glory  AGACNP-Student  [facility]  03/06/24    Tasha Barth NP   3/6/2024  3:10 PM

## 2024-02-21 NOTE — ADDENDUM NOTE
Encounter addended by: Tasha Barth APRN on: 2/21/2024 7:05 AM   Actions taken: Clinical Note Signed

## 2024-02-27 DIAGNOSIS — K22.70 BARRETT'S ESOPHAGUS WITHOUT DYSPLASIA: ICD-10-CM

## 2024-02-29 RX ORDER — OMEPRAZOLE 20 MG/1
20 CAPSULE, DELAYED RELEASE ORAL 2 TIMES DAILY
Qty: 180 CAPSULE | Refills: 1 | Status: SHIPPED | OUTPATIENT
Start: 2024-02-29

## 2024-02-29 RX ORDER — BUMETANIDE 1 MG/1
TABLET ORAL
Qty: 360 TABLET | Refills: 1 | Status: SHIPPED | OUTPATIENT
Start: 2024-02-29

## 2024-02-29 NOTE — TELEPHONE ENCOUNTER
Refill request for:    Requested Prescriptions     Pending Prescriptions Disp Refills    OMEPRAZOLE 20 MG Oral Capsule Delayed Release [Pharmacy Med Name: OMEPRAZOLE DR 20 MG CAPSULE] 180 capsule 1     Sig: TAKE 1 CAPSULE BY MOUTH TWICE A DAY        Last Prescribed Quantity Refills   09/05/2023 180 1     LOV 1/29/2024     Patient was asked to follow-up in:    Appointment due:     Appointment scheduled: Visit date not found    Medication not on protocol.     Routed to Dr. Simon

## 2024-03-01 ENCOUNTER — LAB ENCOUNTER (OUTPATIENT)
Dept: LAB | Age: 79
End: 2024-03-01
Attending: INTERNAL MEDICINE
Payer: MEDICARE

## 2024-03-01 DIAGNOSIS — E55.9 VITAMIN D DEFICIENCY: ICD-10-CM

## 2024-03-01 DIAGNOSIS — E78.5 HYPERLIPIDEMIA: ICD-10-CM

## 2024-03-01 DIAGNOSIS — Z87.39 PERSONAL HISTORY OF SCLERODERMA: ICD-10-CM

## 2024-03-01 DIAGNOSIS — I27.20 PULMONARY HYPERTENSION (HCC): ICD-10-CM

## 2024-03-01 DIAGNOSIS — R06.09 DOE (DYSPNEA ON EXERTION): ICD-10-CM

## 2024-03-01 DIAGNOSIS — I50.9 CHF (CONGESTIVE HEART FAILURE) (HCC): Primary | ICD-10-CM

## 2024-03-01 DIAGNOSIS — R53.83 FATIGUE: ICD-10-CM

## 2024-03-01 LAB
ALBUMIN SERPL-MCNC: 3.8 G/DL (ref 3.4–5)
ALBUMIN/GLOB SERPL: 1.2 {RATIO} (ref 1–2)
ALP LIVER SERPL-CCNC: 43 U/L
ALT SERPL-CCNC: 17 U/L
ANION GAP SERPL CALC-SCNC: 8 MMOL/L (ref 0–18)
AST SERPL-CCNC: 17 U/L (ref 15–37)
BASOPHILS # BLD AUTO: 0.05 X10(3) UL (ref 0–0.2)
BASOPHILS NFR BLD AUTO: 0.7 %
BILIRUB SERPL-MCNC: 0.7 MG/DL (ref 0.1–2)
BUN BLD-MCNC: 64 MG/DL (ref 9–23)
CALCIUM BLD-MCNC: 9.3 MG/DL (ref 8.5–10.1)
CHLORIDE SERPL-SCNC: 106 MMOL/L (ref 98–112)
CHOLEST SERPL-MCNC: 161 MG/DL (ref ?–200)
CO2 SERPL-SCNC: 25 MMOL/L (ref 21–32)
CREAT BLD-MCNC: 1.87 MG/DL
EGFRCR SERPLBLD CKD-EPI 2021: 36 ML/MIN/1.73M2 (ref 60–?)
EOSINOPHIL # BLD AUTO: 0.19 X10(3) UL (ref 0–0.7)
EOSINOPHIL NFR BLD AUTO: 2.8 %
ERYTHROCYTE [DISTWIDTH] IN BLOOD BY AUTOMATED COUNT: 16.1 %
EST. AVERAGE GLUCOSE BLD GHB EST-MCNC: 117 MG/DL (ref 68–126)
FASTING PATIENT LIPID ANSWER: YES
FASTING STATUS PATIENT QL REPORTED: YES
GLOBULIN PLAS-MCNC: 3.2 G/DL (ref 2.8–4.4)
GLUCOSE BLD-MCNC: 98 MG/DL (ref 70–99)
HBA1C MFR BLD: 5.7 % (ref ?–5.7)
HCT VFR BLD AUTO: 42.9 %
HDLC SERPL-MCNC: 41 MG/DL (ref 40–59)
HGB BLD-MCNC: 13.5 G/DL
IMM GRANULOCYTES # BLD AUTO: 0.03 X10(3) UL (ref 0–1)
IMM GRANULOCYTES NFR BLD: 0.4 %
LDLC SERPL CALC-MCNC: 96 MG/DL (ref ?–100)
LYMPHOCYTES # BLD AUTO: 1.38 X10(3) UL (ref 1–4)
LYMPHOCYTES NFR BLD AUTO: 20.3 %
MCH RBC QN AUTO: 34 PG (ref 26–34)
MCHC RBC AUTO-ENTMCNC: 31.5 G/DL (ref 31–37)
MCV RBC AUTO: 108.1 FL
MONOCYTES # BLD AUTO: 0.57 X10(3) UL (ref 0.1–1)
MONOCYTES NFR BLD AUTO: 8.4 %
NEUTROPHILS # BLD AUTO: 4.58 X10 (3) UL (ref 1.5–7.7)
NEUTROPHILS # BLD AUTO: 4.58 X10(3) UL (ref 1.5–7.7)
NEUTROPHILS NFR BLD AUTO: 67.4 %
NONHDLC SERPL-MCNC: 120 MG/DL (ref ?–130)
NT-PROBNP SERPL-MCNC: 2029 PG/ML (ref ?–450)
OSMOLALITY SERPL CALC.SUM OF ELEC: 306 MOSM/KG (ref 275–295)
PLATELET # BLD AUTO: 127 10(3)UL (ref 150–450)
POTASSIUM SERPL-SCNC: 4.7 MMOL/L (ref 3.5–5.1)
PROT SERPL-MCNC: 7 G/DL (ref 6.4–8.2)
RBC # BLD AUTO: 3.97 X10(6)UL
SODIUM SERPL-SCNC: 139 MMOL/L (ref 136–145)
TRIGL SERPL-MCNC: 134 MG/DL (ref 30–149)
TSI SER-ACNC: 4.33 MIU/ML (ref 0.36–3.74)
VIT D+METAB SERPL-MCNC: 43.1 NG/ML (ref 30–100)
VLDLC SERPL CALC-MCNC: 22 MG/DL (ref 0–30)
WBC # BLD AUTO: 6.8 X10(3) UL (ref 4–11)

## 2024-03-01 PROCEDURE — 85025 COMPLETE CBC W/AUTO DIFF WBC: CPT

## 2024-03-01 PROCEDURE — 82306 VITAMIN D 25 HYDROXY: CPT

## 2024-03-01 PROCEDURE — 84443 ASSAY THYROID STIM HORMONE: CPT

## 2024-03-01 PROCEDURE — 83880 ASSAY OF NATRIURETIC PEPTIDE: CPT

## 2024-03-01 PROCEDURE — 80061 LIPID PANEL: CPT

## 2024-03-01 PROCEDURE — 83036 HEMOGLOBIN GLYCOSYLATED A1C: CPT

## 2024-03-01 PROCEDURE — 80053 COMPREHEN METABOLIC PANEL: CPT

## 2024-03-01 PROCEDURE — 36415 COLL VENOUS BLD VENIPUNCTURE: CPT

## 2024-03-04 DIAGNOSIS — I50.32 CHRONIC HEART FAILURE WITH PRESERVED EJECTION FRACTION (HFPEF) (HCC): Primary | ICD-10-CM

## 2024-03-06 ENCOUNTER — HOSPITAL ENCOUNTER (OUTPATIENT)
Dept: LAB | Facility: HOSPITAL | Age: 79
Discharge: HOME | End: 2024-03-06
Attending: NURSE PRACTITIONER
Payer: MEDICARE

## 2024-03-06 ENCOUNTER — HOSPITAL ENCOUNTER (OUTPATIENT)
Dept: CARDIOLOGY CLINIC | Facility: HOSPITAL | Age: 79
Discharge: HOME | End: 2024-03-06
Attending: NURSE PRACTITIONER
Payer: MEDICARE

## 2024-03-06 ENCOUNTER — PATIENT MESSAGE (OUTPATIENT)
Dept: RHEUMATOLOGY | Facility: CLINIC | Age: 79
End: 2024-03-06

## 2024-03-06 VITALS
RESPIRATION RATE: 19 BRPM | SYSTOLIC BLOOD PRESSURE: 89 MMHG | BODY MASS INDEX: 28 KG/M2 | DIASTOLIC BLOOD PRESSURE: 61 MMHG | OXYGEN SATURATION: 100 % | WEIGHT: 198.19 LBS | HEART RATE: 72 BPM

## 2024-03-06 DIAGNOSIS — R79.89 ELEVATED TSH: Primary | ICD-10-CM

## 2024-03-06 DIAGNOSIS — J90 PLEURAL EFFUSION, LEFT: ICD-10-CM

## 2024-03-06 DIAGNOSIS — I50.32 CHRONIC HEART FAILURE WITH PRESERVED EJECTION FRACTION (HFPEF) (HCC): ICD-10-CM

## 2024-03-06 DIAGNOSIS — R79.89 ELEVATED TSH: ICD-10-CM

## 2024-03-06 DIAGNOSIS — I10 BENIGN ESSENTIAL HYPERTENSION: ICD-10-CM

## 2024-03-06 DIAGNOSIS — N18.32 STAGE 3B CHRONIC KIDNEY DISEASE (HCC): ICD-10-CM

## 2024-03-06 DIAGNOSIS — I27.20 PULMONARY HYPERTENSION (HCC): ICD-10-CM

## 2024-03-06 DIAGNOSIS — I50.812 CHRONIC RIGHT-SIDED CONGESTIVE HEART FAILURE (HCC): ICD-10-CM

## 2024-03-06 LAB
ANION GAP SERPL CALC-SCNC: 0 MMOL/L (ref 0–18)
BUN BLD-MCNC: 56 MG/DL (ref 9–23)
CALCIUM BLD-MCNC: 9.3 MG/DL (ref 8.5–10.1)
CHLORIDE SERPL-SCNC: 106 MMOL/L (ref 98–112)
CO2 SERPL-SCNC: 27 MMOL/L (ref 21–32)
CREAT BLD-MCNC: 1.91 MG/DL
EGFRCR SERPLBLD CKD-EPI 2021: 35 ML/MIN/1.73M2 (ref 60–?)
FASTING STATUS PATIENT QL REPORTED: NO
GLUCOSE BLD-MCNC: 96 MG/DL (ref 70–99)
OSMOLALITY SERPL CALC.SUM OF ELEC: 291 MOSM/KG (ref 275–295)
SODIUM SERPL-SCNC: 133 MMOL/L (ref 136–145)
T4 FREE SERPL-MCNC: 0.9 NG/DL (ref 0.8–1.7)

## 2024-03-06 PROCEDURE — 84439 ASSAY OF FREE THYROXINE: CPT | Performed by: NURSE PRACTITIONER

## 2024-03-06 PROCEDURE — 99215 OFFICE O/P EST HI 40 MIN: CPT | Performed by: NURSE PRACTITIONER

## 2024-03-06 PROCEDURE — 80048 BASIC METABOLIC PNL TOTAL CA: CPT | Performed by: NURSE PRACTITIONER

## 2024-03-06 PROCEDURE — 36415 COLL VENOUS BLD VENIPUNCTURE: CPT | Performed by: NURSE PRACTITIONER

## 2024-03-06 NOTE — PROGRESS NOTES
Pt. Assessed. No signs or symptoms of shortness of breath, fatigue, chest pain or edema noted. Weight down at 198.2 lbs. Reviewed current list of patient's allergies and medication; updated the Electronic Medical Record. Labs ordered to assess kidney function and drawn by  Lab. Reviewed follow-up appointment and Heart Failure discharge instructions with patient. Patient verbalized an understanding. PAD 31 mm Hg per mems.  [facility] 1 month.

## 2024-03-06 NOTE — PATIENT INSTRUCTIONS
Heart Failure Discharge Instructions      Activity: Regular exercise and activity is important for your overall health and to help keep your heart strong and functioning as well as possible.   Walk at a slow to moderate pace for 15-20 minutes 3-5 days per week.     Follow these instructions every day to keep yourself in the Green Zone     The Green Zone means you are feeling well and your symptoms are under control                                    Medications  Take your medication every day as instructed  Do not stop taking your medicine or change the amount you are taking without instructions from your doctor or nurse  Do Not take non-steroidal antiinflammatory drugs such as ibuprofen, aleve, advil, or motrin                                    Diet/Fluids  People with heart failure should eat less sodium (salt) and limit fluid. Sodium attracts water and makes the body hold fluid. This extra fluid makes the heart work harder and can worsen the symptoms of heart failure.     Diet    2000 mg sodium daily  Fluid restriction    64 ounces daily  (8 oz. = 1 cup)                                     Body Weight  Weigh yourself every day before breakfast and write your weight down  Use the same scale and wear about the same amount of clothes each time  A sudden weight increase is due to fluid retention rather than fat                                         Activity  Pace your activities to avoid getting overtired  Take rest periods as needed  Elevate your feet to reduce ankle swelling when resting                             Signs of Worsening Heart Failure    You are entering the Yellow Zone - this is a warning zone    Call your doctor or nurse if you have any of these signs or symptoms:  You gain 2 or more pounds overnight or 3-5 pounds in 3-7 days  You have more trouble breathing  You get more tired with regular activity, or are limiting activity because of shortness of breath or fatigue  You are short of breath lying  down, you need more pillows to breathe comfortably,  or wake up during the night short of breath  You urinate less often during the day and more often at night  You have a bloated feeling, upset stomach, loss of appetite, or your clothes are fitting tighter    GO TO THE EMERGENCY DEPARTMENT or CALL 911 IF:    These are signs you are in the RED ZONE - THIS IS AN EMERGENCY  You have tightness or pain in your chest  You are extremely short of breath or can't catch your breath  You cough up frothy pink mucous  You feel confused or can't think clearly  You are traveling and develop symptoms of worsening heart failure     We respect everyone's time and availability. Please be aware that this is not a walk-in clinic and we require appointments in order to facilitate timely care for all patients. We ask you to arrive 30 minutes before your appointment to allow time for you to check-in and have your bloodwork drawn. Please understand if you are late for your appointment, you may be asked to reschedule. If possible, all attempts will be made to accommodate but realize this is no guarantee that this will always be available. We understand there are extenuating circumstances. If you need to cancel or reschedule your appointment, please call the [place] [facility] within 24 hours at [phone].  Thank you for your cooperation, [facility] Staff.    IF YOU HAVE QUESTIONS REGARDING YOUR BILL, FEEL FREE TO CONTACT [facility] PATIENT ACCOUNTS -[phone]. IF YOU NEED FINANCIAL ASSISTANCE, PLEASE CALL AN [facility] FINANCIAL COUNSELOR [phone].             [facility]     [phone]

## 2024-03-08 ENCOUNTER — OFFICE VISIT (OUTPATIENT)
Facility: LOCATION | Age: 79
End: 2024-03-08
Payer: MEDICARE

## 2024-03-08 DIAGNOSIS — J30.0 VASOMOTOR RHINITIS: Primary | ICD-10-CM

## 2024-03-08 PROCEDURE — 99203 OFFICE O/P NEW LOW 30 MIN: CPT | Performed by: OTOLARYNGOLOGY

## 2024-03-08 RX ORDER — IPRATROPIUM BROMIDE 42 UG/1
1 SPRAY, METERED NASAL 2 TIMES DAILY
Qty: 1 EACH | Refills: 5 | Status: SHIPPED | OUTPATIENT
Start: 2024-03-08

## 2024-03-08 NOTE — PROGRESS NOTES
Miguel Davila is a 78 year old male. No chief complaint on file.    HPI:   He has a history of autoimmune disease including lupus and polymyositis.  He gets a chronic runny nose.  He has tried antibiotics without relief.  He is on a low-dose prednisone every day.  He also takes IVIG.  Current Outpatient Medications   Medication Sig Dispense Refill    ipratropium 0.06 % Nasal Solution 1 spray by Nasal route 2 (two) times daily. 1 each 5    omeprazole 20 MG Oral Capsule Delayed Release Take 1 capsule (20 mg total) by mouth 2 (two) times daily. 180 capsule 1    bumetanide 1 MG Oral Tab Take 2 mg of Bumex daily in the AM. Take 1 mg in the PM on Tuesday, Thursday, Saturday and Sunday. Take 2 mg in the PM on Monday, Wednesday and Friday 360 tablet 1    ALLOPURINOL 300 MG Oral Tab TAKE 1 TABLET BY MOUTH EVERY DAY 90 tablet 3    predniSONE 5 MG Oral Tab Take 1 tablet (5 mg total) by mouth daily with breakfast. 90 tablet 3    spironolactone 25 MG Oral Tab Take 0.5 tablets (12.5 mg total) by mouth daily. 45 tablet 3    metoprolol succinate  MG Oral Tablet 24 Hr Take 1 tablet (100 mg total) by mouth every morning AND 0.5 tablets (50 mg total) every evening. 60 tablet 3    PREDNISONE 1 MG Oral Tab TAKE 2 TABLETS (2 MG TOTAL) BY MOUTH DAILY WITH BREAKFAST. 180 tablet 3    sacubitril-valsartan 49-51 MG Oral Tab Take 1 tablet by mouth 2 (two) times daily.      aspirin 81 MG Oral Tab EC Take 1 tablet (81 mg total) by mouth daily. 30 tablet 0    PREVIDENT 5000 BOOSTER PLUS 1.1 % Dental Paste       Multiple Vitamins-Minerals (TAB-A-KEVIN) Oral Tab Take 1 tablet by mouth daily.      Immune Globulin, Human, 10 g Intravenous Recon Soln Inject 0.4 g/kg into the vein. Given for treatment of polymyositis, mixed connective tissue disease, and immune thrombocytopenia purpura monthly at [facility].  Every 5 weeks 3 each 0    Calcium Citrate-Vitamin D (CITRACAL + D OR) Take 1 tablet by mouth daily.        Past Medical  History:   Diagnosis Date    A-fib (HCC)     Arrhythmia     atrial fibrillation    Arthritis     Autoimmune disease (HCC)     hepatits, resolved anfd nephritis, resolved    Ramon's esophagus 05/14/2013    Bleeding nose 1970    Gums Treated    Blood disorder     thrombocytopenia    Blood in urine     Blurred vision 2021    cataract due to steroids, surgery in June    Calculus of kidney 2012    One time    Cancer (HCC)     skin    Candidiasis of the esophagus 06/29/2012    Due to steroid use for Autoimmune disorder     Cataract     Colon polyps 05/13/2013    Congestive heart disease (HCC)     Diarrhea, unspecified Since 2005    Intermittent due to lupus    Diverticulosis of colon 05/14/2013    Easy bruising 2021    On and off prednisone for 20 years    Esophageal polyp 05/14/2013    Esophageal reflux     Essential hypertension     Fatigue Since 2005    polymyositis and lupus    Gout     Hammer toe, acquired 06/29/2012    Heart palpitations 2017    Afib    Hepatitis     autoimmune induce hepatitis d/t lupus    High blood pressure     IBS (irritable bowel syndrome)     mild d/t lupus    Irregular bowel habits     Leg swelling 2021    Heart failure, probably caused by lupus    Lupus (HCC)     MCTD (mixed connective tissue disease) (HCC)     Obstructive sleep apnea (adult) (pediatric) 11/16/2017    LI (obstructive sleep apnea) HST 11-7-17    AHI 37  Supine AHI 38 non-supine AHI 16 Sao2 Pj 83%     LI (obstructive sleep apnea) PSG 5-22-19    AHI 36 RDI 36 REM AHI 45 Supine AHI 65 non-supine AHI 19 Sao2 Pj 86% CPAP 9cwp    Pain in joints     Personal history of antineoplastic chemotherapy     Pleural effusion     right    Pneumonia due to organism     Polymyositis (HCC)     Presence of other cardiac implants and grafts 2017    watchman filter    Problems with swallowing     dysphagia in 2006    Pulmonary hypertension (HCC)     Raynaud disease     Raynaud disease     Renal disorder     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     Shortness of breath on and off    mainly due to pm or lupus    Sleep apnea     CPAP    Thrombocytopathia (HCC)     Visual impairment     bifocals for reading & computer; distance for driving    Wears glasses       Social History:  Social History     Socioeconomic History    Marital status:    Occupational History    Occupation: Retired    Tobacco Use    Smoking status: Former     Packs/day: 0.50     Years: 15.00     Additional pack years: 0.00     Total pack years: 7.50     Types: Cigarettes     Start date: 1958     Quit date: 1973     Years since quittin.6    Smokeless tobacco: Never    Tobacco comments:     5 cigarettes daily, stopped smoking in    Vaping Use    Vaping Use: Never used   Substance and Sexual Activity    Alcohol use: Yes     Alcohol/week: 13.0 - 15.0 standard drinks of alcohol     Types: 5 Glasses of wine, 8 - 10 Standard drinks or equivalent per week     Comment: 1 per day wine or liquor    Drug use: Never   Other Topics Concern    Caffeine Concern Yes     Comment: 1 soda per day and decaf coffee    Sleep Concern No    Exercise Yes     Comment: 3-4 times weekly    Seat Belt Yes      Past Surgical History:   Procedure Laterality Date    APPENDECTOMY  1970    APPENDECTOMY      CARDIAC CATHETERIZATION  2019    CATARACT Bilateral     COLONOSCOPY  10/2003 2006 01/2010    x3    COLONOSCOPY  2013    COLONOSCOPY N/A 2018    Procedure: COLONOSCOPY;  Surgeon: Isaiah Tobar MD;  Location:  ENDOSCOPY    COLONOSCOPY WITH BIOPSY  13    EGD      HAND/FINGER SURGERY UNLISTED      right    NEEDLE BIOPSY LIVER      OTHER  2005    needle bipsy of kidney    OTHER SURGICAL HISTORY  2017    watchman filter    PERCUTANEOUS  ANGIOPLASTY  (PTCA)- PBP ONLY      right    RECTUM SURGERY PROCEDURE UNLISTED  194    rectal surgery polypectomy    SKIN SURGERY      MMS BCC R temple 09    SKIN SURGERY  2007    basal ceell carcinoma    SKIN SURGERY  12     BCC-nodular to right superior eyebrow/ [facility]    SKIN SURGERY  07/15/2013    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/[facility]    SKIN SURGERY  2-19-14    BCC-NOD to right superior pinna, MMS, AB    SKIN SURGERY  02/16/2021    BCC- left superior forehead, MMS     SKIN SURGERY  03/07/2022    SCC IN SITU RIGHT ANTIHELIX MMS BY DR GASTELUM    TONSILLECTOMY  1948    UPPER GI ENDOSCOPY,EXAM  10/2003 2006 01/2010 , 5/13    x3         REVIEW OF SYSTEMS:   GENERAL HEALTH: feels well otherwise  GENERAL : denies fever, chills, sweats, weight loss, weight gain  SKIN: denies any unusual skin lesions or rashes  RESPIRATORY: denies shortness of breath with exertion  NEURO: denies headaches    EXAM:   There were no vitals taken for this visit.    System Findings Details   Constitutional  Overall appearance - Normal.   Psychiatric  Orientation - Oriented to time, place, person & situation. Appropriate mood and affect.   Head/Face  Facial features -- Normal. Skull - Normal.   Eyes  Pupils equal ,round ,react to light and accomidate   Ears, Nose, Throat, Neck  Ears clear nose some mild erythema but no polyps oropharynx clear neck no masses   Neurological  Memory - Normal. Cranial nerves - Cranial nerves II through XII grossly intact.   Lymph Detail  Submental. Submandibular. Anterior cervical. Posterior cervical. Supraclavicular.       ASSESSMENT AND PLAN:   1. Vasomotor rhinitis  This is likely the cause.  He will use saline and Atrovent spray daily for 1 month.  If he is not better I will see him back with a dedicated CT sinus.  Given his autoimmune issues there is an outside possibility we are dealing with a chronic sinusitis.      The patient indicates understanding of these issues and agrees to the plan.    No follow-ups on file.    Jimbo Marquez MD  3/8/2024  11:06 AM

## 2024-03-13 ENCOUNTER — APPOINTMENT (OUTPATIENT)
Dept: ADMINISTRATIVE | Facility: HOSPITAL | Age: 79
End: 2024-03-13
Payer: COMMERCIAL

## 2024-03-15 ENCOUNTER — PATIENT OUTREACH (OUTPATIENT)
Dept: CASE MANAGEMENT | Age: 79
End: 2024-03-15

## 2024-03-15 DIAGNOSIS — K22.70 BARRETT'S ESOPHAGUS WITHOUT DYSPLASIA: ICD-10-CM

## 2024-03-15 DIAGNOSIS — I50.812 CHRONIC RIGHT-SIDED CONGESTIVE HEART FAILURE (HCC): ICD-10-CM

## 2024-03-15 DIAGNOSIS — Z87.19 HISTORY OF PANCREATITIS: ICD-10-CM

## 2024-03-15 DIAGNOSIS — I27.20 PULMONARY HYPERTENSION (HCC): ICD-10-CM

## 2024-03-15 DIAGNOSIS — I10 BENIGN ESSENTIAL HYPERTENSION: ICD-10-CM

## 2024-03-15 DIAGNOSIS — M10.9 GOUT, UNSPECIFIED CAUSE, UNSPECIFIED CHRONICITY, UNSPECIFIED SITE: ICD-10-CM

## 2024-03-15 DIAGNOSIS — G47.33 OSA (OBSTRUCTIVE SLEEP APNEA): ICD-10-CM

## 2024-03-15 DIAGNOSIS — C44.219 BASAL CELL CARCINOMA (BCC) OF SKIN OF LEFT EAR: ICD-10-CM

## 2024-03-15 DIAGNOSIS — N18.32 STAGE 3B CHRONIC KIDNEY DISEASE (HCC): ICD-10-CM

## 2024-03-15 DIAGNOSIS — I48.19 PERSISTENT ATRIAL FIBRILLATION (HCC): Primary | ICD-10-CM

## 2024-03-15 RX ORDER — LEVOTHYROXINE SODIUM 0.05 MG/1
TABLET ORAL
COMMUNITY
Start: 2024-03-07

## 2024-03-15 NOTE — PROGRESS NOTES
Spoke to Miguel for [facility].      Updates to patient care team/comments: UTD  Patient reported changes in medications: UTD  Med Adherence  Comment: pt taking medications as prescribed     Health Maintenance:   Health Maintenance   Topic Date Due    Colorectal Cancer Screening  05/01/2023    Annual Physical  01/29/2025    Influenza Vaccine  Completed    Annual Depression Screening  Completed    Fall Risk Screening (Annual)  Completed    Pneumococcal Vaccine: 65+ Years  Completed    Zoster Vaccines  Completed       Patient updates/concerns:    Spoke to pt for monthly outreach   Pt still provides care for his wife suffering from alzheimer's. Pt states that sometimes the stress can really affect his outlook. He has no need to seek out assistance from [facility] right now. There are no physical demands in her care. He has received all of the materials from [facility] about support groups. Pt feels he will benefit greatly from joining one and plans to look into it. At this time he states that he has a very good support system that includes people that have similar experiences.    Pt discussed how caring for his wife has sometimes caused him to neglect his own compliance with cardiology team. Pt has [facility] and he has been inconsistent with his down loads. He states there is no difficulty with transmission, but sometimes the time necessary for down load can be prohibitive. He states at minimum it will take about twenty minutes start to finish. The problem is sometimes it is hard for him to position himself satisfactorily which can stretch the time out. Pt states he is committed to improving this.    Pt is having a cath procedure on April 15th. Pt has had this procedure before and does not have any questions or concerns about the procedure. He is comfortable with instructions.    Pt did not respond well to suggestion to stop metamucil and start psyllium husk tablets. Pt states that the husk made him very gassy and  caused explosive episodes. Pt returned to using metamucil and his condition has improved. Pt has upcoming colonoscopy and he is comfortable with prep and instructions.   Pt is aware that dr mak is leaving and he has already identified a potential replacement. He would like to stay within [facility] but is willing to see what [facility] have to offer. At this time he did not need any additional assistance from pcp for suggestions/recommendations.   Pt is having his mohs procedure again on Monday. They are working on a spot the size of a quarter on his scalp. Pt states that the last time they worked on it he could not comfortably slide the cpap mask on because affected area is right up against strap, and there is no way pt can get relief. This caused him to have to not use cpap for about three weeks. Pt states that he did not have any adverse side effects from pausing the cpap. Pt has already discussed the previous instance with pulmonology and they are aware. Pt did not want there to be an issue with medicare and their required usage. Pt has also informed them that he will likely be pausing use again.   Goals/Action Plan:    Active goal from previous outreach: staying healthy    Patient reported progress towards goals: pt sees providers as needed and takes medications as prescribed pt is trying to improve compliance  with cardio [facility] and he has heart cath and colonoscopy upcoming pt continues to see heart failure clinic               - What: exercise           - Where/When/How: pt is getting much of his exercise with daily activity. CCM will f/u with pt at next outreach to help construct a low impact routine  Patient Reported Barriers and Concerns: n/a                   - Plan for overcoming barriers: none    Care Managers Interventions: continue to provide encouragement and education for healthy coping and dx    Future Appointments:   Future Appointments   Date Time Provider Department Center   3/18/2024  10:30 AM EH TX RN1 EH CHEMO [facility]   4/2/2024  5:30 PM HEART FAILURE APN 1 EH HF CLIN [facility]   4/5/2024  1:30 PM HEART FAILURE APN 1 EH HF CLIN [facility]   4/12/2024  8:45 AM KASTIN, PROCEDURE [facility]IED[facility] None   4/12/2024  9:00 AM KASTIN, PROCEDURE [facility] None   4/15/2024  6:00 AM EH IVS RM 2 EP EH IVS [facility]         Next Care Manager Follow Up Date: one moth    Reason For Follow Up: review progress and or barriers towards patient's goals.     Time Spent This Encounter Total: 35 min medical record review, telephone communication, care plan updates where needed, education, goals, and action plan recreation/update. Provided acknowledgment and validation to patient's concerns.   Monthly Minute Total including today: 35  Physical assessment, complete health history, and need for CCM established by Eric Simon MD.

## 2024-03-18 ENCOUNTER — OFFICE VISIT (OUTPATIENT)
Dept: HEMATOLOGY/ONCOLOGY | Facility: HOSPITAL | Age: 79
End: 2024-03-18
Attending: INTERNAL MEDICINE
Payer: MEDICARE

## 2024-03-18 VITALS
BODY MASS INDEX: 27.83 KG/M2 | RESPIRATION RATE: 18 BRPM | WEIGHT: 198.81 LBS | HEIGHT: 70.98 IN | HEART RATE: 91 BPM | TEMPERATURE: 97 F | DIASTOLIC BLOOD PRESSURE: 63 MMHG | OXYGEN SATURATION: 98 % | SYSTOLIC BLOOD PRESSURE: 99 MMHG

## 2024-03-18 DIAGNOSIS — D69.3 IMMUNE THROMBOCYTOPENIC PURPURA (HCC): Primary | ICD-10-CM

## 2024-03-18 DIAGNOSIS — D69.6 THROMBOCYTOPENIA (HCC): ICD-10-CM

## 2024-03-18 PROCEDURE — 96365 THER/PROPH/DIAG IV INF INIT: CPT

## 2024-03-18 PROCEDURE — 96366 THER/PROPH/DIAG IV INF ADDON: CPT

## 2024-03-18 RX ORDER — ACETAMINOPHEN 325 MG/1
650 TABLET ORAL ONCE
Status: COMPLETED | OUTPATIENT
Start: 2024-03-18 | End: 2024-03-18

## 2024-03-18 RX ORDER — ACETAMINOPHEN 325 MG/1
650 TABLET ORAL ONCE
OUTPATIENT
Start: 2024-04-22

## 2024-03-18 RX ORDER — DIPHENHYDRAMINE HCL 25 MG
25 CAPSULE ORAL ONCE
Status: COMPLETED | OUTPATIENT
Start: 2024-03-18 | End: 2024-03-18

## 2024-03-18 RX ORDER — DIPHENHYDRAMINE HCL 25 MG
25 CAPSULE ORAL ONCE
OUTPATIENT
Start: 2024-04-22

## 2024-03-18 RX ADMIN — DIPHENHYDRAMINE HCL 25 MG: 25 MG CAPSULE ORAL at 10:35:00

## 2024-03-18 RX ADMIN — ACETAMINOPHEN 650 MG: 325 TABLET ORAL at 10:35:00

## 2024-03-18 NOTE — PROGRESS NOTES
Pt here for IVIG . Pt denies any issues or concerns.      Ordering MD: Darian  Order Exp: 6/14/24 (3mo order)     Pt tolerated infusion without difficulty or complaint. Reviewed next apt date/time: 4/24 at 11am      Education Record  Learner:  Patient  Disease / Diagnosis: ITP/lupus  Barriers / Limitations:  None  Method:  Discussion  General Topics:  Medication and Plan of care reviewed  Outcome:  Shows understanding      Here for IVIG. No complaints. M1bvm--ra wanted next appt on 4/24

## 2024-04-02 ENCOUNTER — HOSPITAL ENCOUNTER (OUTPATIENT)
Dept: CARDIOLOGY CLINIC | Facility: HOSPITAL | Age: 79
Discharge: HOME | End: 2024-04-02
Attending: NURSE PRACTITIONER
Payer: MEDICARE

## 2024-04-02 DIAGNOSIS — I50.812 CHRONIC RIGHT-SIDED CONGESTIVE HEART FAILURE (HCC): Primary | ICD-10-CM

## 2024-04-02 PROCEDURE — 93264 REM MNTR WRLS P-ART PRS SNR: CPT | Performed by: NURSE PRACTITIONER

## 2024-04-02 NOTE — PROGRESS NOTES
[facility]     CardioMEMS pulmonary artery pressure sensor    Remote Monitoring Report Summary    2024     Miguel Davila    : 3/17/1945    Reporting Period- -3/17/2024    Diagnosis - HFpEF    Provider- SHERIDAN Cummins       The CardioMEMS report for the above date has been reviewed with the following results:    RHC hemodynamics at time of CardioMEMS impant:    PA 42  Wedge 8    Acceptable range for Miguel Dvaila      PAD range 29-35mmhg    Remote monitoring documentation for the past ~30 days:    2024 Stable. CTM  2024 PAD stable. CTM  2024 PAD 32mmhg and at goal. CTM.  2024 PAD 29-33mmhg recently, and within range. CTM  2024 PAD stable. CTM    Tasha Barth NP   2024

## 2024-04-03 DIAGNOSIS — I50.30 (HFPEF) HEART FAILURE WITH PRESERVED EJECTION FRACTION (HCC): Primary | ICD-10-CM

## 2024-04-04 NOTE — PROGRESS NOTES
[facility] Progress Note    Miguel Davila is a 79 year old male who presents to clinic for APN assessment and management of chronic diastolic heart failure and is functional class 2-3.     Subjective:  Since his last clinic visit on 3/6; when no changes were made he saw Dr. Jean who recommended RHC to assess for worsening precapillary PH since PAD has increased on MEMs from prior baseline and BVA suggested he was hypovolemic. RHC is set up for 4/15. She started him on Synthroid d.t mildly elevated TSH with plan to check labs in 3 months.     He saw Dr. Marquez with c/o runny nose felt to be vasomotor rhinitis. Recommended saline and Atrovent spray, if no relief plan for CT sinuses.    He had MOHs surgery on 3/11.  IVIG on 3/18. He will have EGD on 4/12.     Today he is doing okay.  He admits to feeling more tired but hasn't been able to wear his CPAP lately d.t recent MOHs surgery. Weight is stable, he was down 3 lbs last visit. He denies sob, abd bloating or dizziness/LH. His appetite is good.  He denies any dietary changes or increase in fluid intake.     He underwent a BVA 11/17 that was suggestive of severe dehydration and anemia. Iron studies revealed iron sat of 27 and ferritin 50.9. CardioMEMs 29 mmhg on day of BVA with weight of 193 lbs.  Since we reduced diuretics and then had to increase them d.t fluid overload.  PAD was 33 mmHg yesterday and has been 31-33 mmHg recently and stable. Renal indices are worse today and he is hyperkalemic likely d.t worsening renal function. He has been taking Bumex as prescribed without additional doses.           Review of Systems   Constitutional: Positive for malaise/fatigue.   Cardiovascular:  Positive for leg swelling. Negative for chest pain, dyspnea on exertion, irregular heartbeat and palpitations.   Respiratory: Negative.  Negative for shortness of breath and sleep disturbances due to breathing.    Gastrointestinal:   Negative for bloating.   Neurological:  Negative for dizziness, headaches and light-headedness.     HISTORY:  Past Medical History:   Diagnosis Date    A-fib (HCC)     Arrhythmia     atrial fibrillation    Arthritis     Autoimmune disease (HCC)     hepatits, resolved anfd nephritis, resolved    Ramon's esophagus 05/14/2013    Bleeding nose 1970    Gums Treated    Blood disorder     thrombocytopenia    Blood in urine     Blurred vision 2021    cataract due to steroids, surgery in June    Calculus of kidney 2012    One time    Cancer (HCC)     skin    Candidiasis of the esophagus 06/29/2012    Due to steroid use for Autoimmune disorder     Cataract     Colon polyps 05/13/2013    Congestive heart disease (HCC)     Diarrhea, unspecified Since 2005    Intermittent due to lupus    Diverticulosis of colon 05/14/2013    Easy bruising 2021    On and off prednisone for 20 years    Esophageal polyp 05/14/2013    Esophageal reflux     Essential hypertension     Fatigue Since 2005    polymyositis and lupus    Gout     Hammer toe, acquired 06/29/2012    Heart palpitations 2017    Afib    Hepatitis     autoimmune induce hepatitis d/t lupus    High blood pressure     IBS (irritable bowel syndrome)     mild d/t lupus    Irregular bowel habits     Leg swelling 2021    Heart failure, probably caused by lupus    Lupus (HCC)     MCTD (mixed connective tissue disease) (HCC)     Obstructive sleep apnea (adult) (pediatric) 11/16/2017    LI (obstructive sleep apnea) HST 11-7-17    AHI 37  Supine AHI 38 non-supine AHI 16 Sao2 Pj 83%     LI (obstructive sleep apnea) PSG 5-22-19    AHI 36 RDI 36 REM AHI 45 Supine AHI 65 non-supine AHI 19 Sao2 Pj 86% CPAP 9cwp    Pain in joints     Personal history of antineoplastic chemotherapy     Pleural effusion     right    Pneumonia due to organism     Polymyositis (HCC)     Presence of other cardiac implants and grafts 2017    watchman filter    Problems with swallowing     dysphagia in 2006     Pulmonary hypertension (HCC)     Raynaud disease     Raynaud disease     Renal disorder     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    Shortness of breath on and off    mainly due to pm or lupus    Sleep apnea     CPAP    Thrombocytopathia (HCC)     Visual impairment     bifocals for reading & computer; distance for driving    Wears glasses       Past Surgical History:   Procedure Laterality Date    APPENDECTOMY  1970    APPENDECTOMY      CARDIAC CATHETERIZATION  09/2019    CATARACT Bilateral     COLONOSCOPY  10/2003 2006 01/2010    x3    COLONOSCOPY  5/14/2013    COLONOSCOPY N/A 5/1/2018    Procedure: COLONOSCOPY;  Surgeon: Isaiah Tobar MD;  Location:  ENDOSCOPY    COLONOSCOPY WITH BIOPSY  5/14/13    EGD      HAND/FINGER SURGERY UNLISTED  2003    right    NEEDLE BIOPSY LIVER      OTHER  12/2005    needle bipsy of kidney    OTHER SURGICAL HISTORY  2017    watchman filter    PERCUTANEOUS  ANGIOPLASTY  (PTCA)- PBP ONLY  2006    right    RECTUM SURGERY PROCEDURE UNLISTED  1946    rectal surgery polypectomy    SKIN SURGERY      MMS BCC R temple 6/24/09    SKIN SURGERY  2007    basal ceell carcinoma    SKIN SURGERY  7-18-12    BCC-nodular to right superior eyebrow/ MOHS    SKIN SURGERY  07/15/2013    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/MMS    SKIN SURGERY  2-19-14    BCC-NOD to right superior pinna, MMS, AB    SKIN SURGERY  02/16/2021    BCC- left superior forehead, MMS     SKIN SURGERY  03/07/2022    SCC IN SITU RIGHT ANTIHELIX MMS BY DR GASTELUM    TONSILLECTOMY  1948    UPPER GI ENDOSCOPY,EXAM  10/2003 2006 01/2010 , 5/13    x3      Family History   Problem Relation Age of Onset    Heart Disease Father         CHF    Gastro-Intestinal Disorder Father         Diverticulosis    Alcohol and Other Disorders Associated Father     Heart Attack Father     Heart Disease Mother         CHF    Breast Cancer Mother     Stroke Mother     Cancer Paternal Grandfather     Heart Attack Paternal Grandmother     Kidney Disease Son     Other  (Ramon's Esophagus) Son     Heart Attack Maternal Grandfather       Social History     Socioeconomic History    Marital status:    Occupational History    Occupation: Retired    Tobacco Use    Smoking status: Former     Packs/day: 0.50     Years: 15.00     Additional pack years: 0.00     Total pack years: 7.50     Types: Cigarettes     Start date: 1958     Quit date: 1973     Years since quittin.7    Smokeless tobacco: Never    Tobacco comments:     5 cigarettes daily, stopped smoking in    Vaping Use    Vaping Use: Never used   Substance and Sexual Activity    Alcohol use: Yes     Alcohol/week: 13.0 - 15.0 standard drinks of alcohol     Types: 5 Glasses of wine, 8 - 10 Standard drinks or equivalent per week     Comment: 1 per day wine or liquor    Drug use: Never   Other Topics Concern    Caffeine Concern Yes     Comment: 1 soda per day and decaf coffee    Sleep Concern No    Exercise Yes     Comment: 3-4 times weekly    Seat Belt Yes           Objective:     Telemetry: AF     /63   Pulse 62   Resp 15   Wt 198 lb 12.8 oz (90.2 kg)   SpO2 100%   BMI 27.74 kg/m²     Wt Readings from Last 6 Encounters:   24 198 lb 12.8 oz (90.2 kg)   24 198 lb 12.8 oz (90.2 kg)   24 198 lb 3.2 oz (89.9 kg)   24 201 lb (91.2 kg)   24 193 lb (87.5 kg)   24 201 lb (91.2 kg)        Recent Results (from the past 24 hour(s))   Basic Metabolic Panel (8)    Collection Time: 24  1:04 PM   Result Value Ref Range    Glucose 84 70 - 99 mg/dL    Sodium 138 136 - 145 mmol/L    Potassium 5.6 (H) 3.5 - 5.1 mmol/L    Chloride 106 98 - 112 mmol/L    CO2 26.0 21.0 - 32.0 mmol/L    Anion Gap 6 0 - 18 mmol/L    BUN 63 (H) 9 - 23 mg/dL    Creatinine 2.26 (H) 0.70 - 1.30 mg/dL    Calcium, Total 9.7 8.5 - 10.1 mg/dL    Calculated Osmolality 303 (H) 275 - 295 mOsm/kg    eGFR-Cr 28 (L) >=60 mL/min/1.73m2    Patient Fasting for BMP? No        Current Outpatient Medications:      spironolactone 25 MG Oral Tab, Take 0.5 tablets (12.5 mg total) by mouth daily. Holding for now till potassium is rechecked, Disp: , Rfl:     levothyroxine 50 MCG Oral Tab, levothyroxine 50 mcg tablet, [RxNorm: 580936], Disp: , Rfl:     mupirocin 2 % External Ointment, APPLY TWICE DAILY TO SCALP UNTIL WOUND IS HEALED., Disp: , Rfl:     ipratropium 0.06 % Nasal Solution, 1 spray by Nasal route 2 (two) times daily., Disp: 1 each, Rfl: 5    omeprazole 20 MG Oral Capsule Delayed Release, Take 1 capsule (20 mg total) by mouth 2 (two) times daily., Disp: 180 capsule, Rfl: 1    bumetanide 1 MG Oral Tab, Take 2 mg of Bumex daily in the AM. Take 1 mg in the PM on Tuesday, Thursday, Saturday and Sunday. Take 2 mg in the PM on Monday, Wednesday and Friday, Disp: 360 tablet, Rfl: 1    ALLOPURINOL 300 MG Oral Tab, TAKE 1 TABLET BY MOUTH EVERY DAY, Disp: 90 tablet, Rfl: 3    predniSONE 5 MG Oral Tab, Take 1 tablet (5 mg total) by mouth daily with breakfast., Disp: 90 tablet, Rfl: 3    metoprolol succinate  MG Oral Tablet 24 Hr, Take 1 tablet (100 mg total) by mouth every morning AND 0.5 tablets (50 mg total) every evening., Disp: 60 tablet, Rfl: 3    PREDNISONE 1 MG Oral Tab, TAKE 2 TABLETS (2 MG TOTAL) BY MOUTH DAILY WITH BREAKFAST., Disp: 180 tablet, Rfl: 3    sacubitril-valsartan 49-51 MG Oral Tab, Take 1 tablet by mouth 2 (two) times daily., Disp: , Rfl:     aspirin 81 MG Oral Tab EC, Take 1 tablet (81 mg total) by mouth daily., Disp: 30 tablet, Rfl: 0    PREVIDENT 5000 BOOSTER PLUS 1.1 % Dental Paste, , Disp: , Rfl:     Multiple Vitamins-Minerals (TAB-A-KEVIN) Oral Tab, Take 1 tablet by mouth daily., Disp: , Rfl:     Immune Globulin, Human, 10 g Intravenous Recon Soln, Inject 0.4 g/kg into the vein. Given for treatment of polymyositis, mixed connective tissue disease, and immune thrombocytopenia purpura monthly at [facility]. Every 5 weeks, Disp: 3 each, Rfl: 0    Calcium Citrate-Vitamin D (CITRACAL + D OR),  Take 1 tablet by mouth daily., Disp: , Rfl:     Exam:   General:         Alert, in no apparent distress  HEENT:           No JVD  Lungs:            Lungs clear bilateral                   CV:                   Irregular  Abdomen:       Distended/round, soft, non-tender  Extremities:    +1 pitting edema to BLE  up mid shins   Neuro:             A&O x 3  Skin:                Pink, warm, dry    Education:  Patient instructed regarding sodium restricted diet, low sodium foods, fluid restriction, daily weights, medication regimen, s/s HF exacerbation and when to call APN/clinic.    Assessment:   Chronic diastolic, RV heart failure - LVEF 52% and stable with borderline normal RV dysfunction per echo 1/4/2023. RHC 7/2022 with PCWP 20, RA 11. S/p CardioMEMs implanted on 8/2022 with 10 mmhg gradient between his PAD and wedge on RHC, with PAD running higher. Goal thought to be ~ 20 mmhg previously but since goal has been increased with the guidance of clinical exam, renal indices and BVA. Range thought to be closer to 29-35 mmHg. BVA 11/2023 suggestive of dehydration, PAD 29 on day of BVA. PAD 31-33 mmHg recently and stable.  Last ProBNP 2,029 on 3/01 which is improved from 3,502 in January. Off Jardiance, thought to be due to pancreatitis. MRA discontinued at [facility] for dehydration and near syncope; since have restarted but required reduced dose due to hyperkalemia. Will hold today d.t hyperkalemia.   PH, mild combined pre and post capillary - RHC 8/2022 demonstrates mPAP 27 with wedge 8 mmHg, RA 5 mmHg, PVR 2.7 and CI 3.4. RV function borderline normal on echo 1/2023. DPG 10 on RHC when MEMs placed. Plan for repeat RHC 4/15.   Moderate MR/Mild-Mod TR/AI - on MCI echo 1/2023 and unchanged.   Dilated ascending aorta - 4.2 cm per echo 1/2023.  CKD Stage 3b - baseline creatinine ~ 1.8-2.0 mg/dl, Cr 2.26 today and  elevated. Follows with Dr. Devine.    Chronic Afib - s/p Watchman. Rates controlled.    LI - hasn't  been using  CPAP recently d/t MOHs surgery on his head. Plans to restart wearing CPAP soon. This may also be contributing to higher PA pressures.   Recurrent bilateral pleural effusions - hx of thoracentesis.   Hx Lupus/Mixed CTD/Severe polymyositis - continues IVIG infusions every 5 weeks, last dose 3/18. Follows with Dr. Farmer. On chronic prednisone. Off Imuran due to pancytopenia.  Non-obstructive CAD  Chronic thrombocytopenia, immune mediated/recent pancytopenia related to imuran - last PLT 127K. Follows with Dr. Orr, saw her 1/15, no changes made.   Hx Hyponatremia  HERNANDEZ with biopsy proven stage F0-F1 Fibrosis on US 1/2022. Follows with Dr. Veronica, Hepatology at [facility]. Seen 1/2, to follow up in a year. Recent LFT's normal.   Basal/Squamous cell carcinoma - hx Mohs surgery x2.   Mild hyperkalemia - on low dose MRA and ARNI.  K 5.6 today, likely d.t worsening renal  function.   Anemia - last Hgb 13.5 and stable. Recent iron studies with sat 27 and ferritin 50.9 in November.    Elevated TSH, normal T4. Started on Synthroid per Dr. Jean in March, plan to repeat labs in June.    Plan:     Veltassa 8.4 grams x1 given in the clinic. Discussed  consideration for ongoing treatment with Veltassa if needed to limit risk of hyperkalemia with MRA  that he is agreeable to.   Hold Aldactone over the weekend and repeat labs on Monday when he has echo.   Low potassium diet, handout provided.   Hold Bumex (2 mg) tonight.  Follow MEMs.    Return to clinic after RHC.   F/U with Dr. Jean after RHC on 4/15.   EGD 4/12.   CHF discharge instructions given    45 minutes spent with patient and greater than 50% of the time was spent counseling and coordinating care.    Tasha Barth NP   4/5/2024  2:19 PM

## 2024-04-05 ENCOUNTER — HOSPITAL ENCOUNTER (OUTPATIENT)
Dept: LAB | Facility: HOSPITAL | Age: 79
Discharge: HOME | End: 2024-04-05
Attending: NURSE PRACTITIONER
Payer: MEDICARE

## 2024-04-05 ENCOUNTER — HOSPITAL ENCOUNTER (OUTPATIENT)
Dept: CARDIOLOGY CLINIC | Facility: HOSPITAL | Age: 79
Discharge: HOME | End: 2024-04-05
Attending: NURSE PRACTITIONER
Payer: MEDICARE

## 2024-04-05 VITALS
WEIGHT: 198.81 LBS | HEART RATE: 57 BPM | DIASTOLIC BLOOD PRESSURE: 31 MMHG | SYSTOLIC BLOOD PRESSURE: 90 MMHG | RESPIRATION RATE: 16 BRPM | OXYGEN SATURATION: 100 % | BODY MASS INDEX: 28 KG/M2

## 2024-04-05 DIAGNOSIS — I10 BENIGN ESSENTIAL HYPERTENSION: ICD-10-CM

## 2024-04-05 DIAGNOSIS — N18.32 STAGE 3B CHRONIC KIDNEY DISEASE (HCC): ICD-10-CM

## 2024-04-05 DIAGNOSIS — I50.30 (HFPEF) HEART FAILURE WITH PRESERVED EJECTION FRACTION (HCC): ICD-10-CM

## 2024-04-05 DIAGNOSIS — I50.812 CHRONIC RIGHT-SIDED CONGESTIVE HEART FAILURE (HCC): ICD-10-CM

## 2024-04-05 DIAGNOSIS — G47.33 OSA (OBSTRUCTIVE SLEEP APNEA): ICD-10-CM

## 2024-04-05 DIAGNOSIS — E87.5 HYPERKALEMIA: Primary | ICD-10-CM

## 2024-04-05 LAB
ANION GAP SERPL CALC-SCNC: 6 MMOL/L (ref 0–18)
BUN BLD-MCNC: 63 MG/DL (ref 9–23)
CALCIUM BLD-MCNC: 9.7 MG/DL (ref 8.5–10.1)
CHLORIDE SERPL-SCNC: 106 MMOL/L (ref 98–112)
CO2 SERPL-SCNC: 26 MMOL/L (ref 21–32)
CREAT BLD-MCNC: 2.26 MG/DL
EGFRCR SERPLBLD CKD-EPI 2021: 28 ML/MIN/1.73M2 (ref 60–?)
FASTING STATUS PATIENT QL REPORTED: NO
GLUCOSE BLD-MCNC: 84 MG/DL (ref 70–99)
OSMOLALITY SERPL CALC.SUM OF ELEC: 303 MOSM/KG (ref 275–295)
POTASSIUM SERPL-SCNC: 5.6 MMOL/L (ref 3.5–5.1)
SODIUM SERPL-SCNC: 138 MMOL/L (ref 136–145)

## 2024-04-05 PROCEDURE — 36415 COLL VENOUS BLD VENIPUNCTURE: CPT | Performed by: NURSE PRACTITIONER

## 2024-04-05 PROCEDURE — 99215 OFFICE O/P EST HI 40 MIN: CPT | Performed by: NURSE PRACTITIONER

## 2024-04-05 PROCEDURE — 80048 BASIC METABOLIC PNL TOTAL CA: CPT | Performed by: NURSE PRACTITIONER

## 2024-04-05 RX ORDER — SPIRONOLACTONE 25 MG/1
12.5 TABLET ORAL DAILY
COMMUNITY
Start: 2024-04-05

## 2024-04-05 NOTE — PATIENT INSTRUCTIONS
Heart Failure Discharge Instructions    Hold Bumex 2 mg tonight, then resume usual dose tomorrow.    Hold Aldactone 12.5 mg  over the weekend.   Have labs on Monday.  Limit potassium in your diet.       Activity: Regular exercise and activity is important for your overall health and to help keep your heart strong and functioning as well as possible.   Walk at a slow to moderate pace for 15-20 minutes 3-5 days per week.     Follow these instructions every day to keep yourself in the Green Zone     The Green Zone means you are feeling well and your symptoms are under control                                    Medications  Take your medication every day as instructed  Do not stop taking your medicine or change the amount you are taking without instructions from your doctor or nurse  Do Not take non-steroidal antiinflammatory drugs such as ibuprofen, aleve, advil, or motrin                                    Diet/Fluids  People with heart failure should eat less sodium (salt) and limit fluid. Sodium attracts water and makes the body hold fluid. This extra fluid makes the heart work harder and can worsen the symptoms of heart failure.     Diet    2000 mg sodium daily  Fluid restriction    64 ounces daily  (8 oz. = 1 cup)                                     Body Weight  Weigh yourself every day before breakfast and write your weight down  Use the same scale and wear about the same amount of clothes each time  A sudden weight increase is due to fluid retention rather than fat                                         Activity  Pace your activities to avoid getting overtired  Take rest periods as needed  Elevate your feet to reduce ankle swelling when resting                             Signs of Worsening Heart Failure    You are entering the Yellow Zone - this is a warning zone    Call your doctor or nurse if you have any of these signs or symptoms:  You gain 2 or more pounds overnight or 3-5 pounds in 3-7 days  You have  more trouble breathing  You get more tired with regular activity, or are limiting activity because of shortness of breath or fatigue  You are short of breath lying down, you need more pillows to breathe comfortably,  or wake up during the night short of breath  You urinate less often during the day and more often at night  You have a bloated feeling, upset stomach, loss of appetite, or your clothes are fitting tighter    GO TO THE EMERGENCY DEPARTMENT or CALL 911 IF:    These are signs you are in the RED ZONE - THIS IS AN EMERGENCY  You have tightness or pain in your chest  You are extremely short of breath or can't catch your breath  You cough up frothy pink mucous  You feel confused or can't think clearly  You are traveling and develop symptoms of worsening heart failure     We respect everyone's time and availability. Please be aware that this is not a walk-in clinic and we require appointments in order to facilitate timely care for all patients. We ask you to arrive 30 minutes before your appointment to allow time for you to check-in and have your bloodwork drawn. Please understand if you are late for your appointment, you may be asked to reschedule. If possible, all attempts will be made to accommodate but realize this is no guarantee that this will always be available. We understand there are extenuating circumstances. If you need to cancel or reschedule your appointment, please call the [place] [facility] within 24 hours at [phone].  Thank you for your cooperation, [facility] Staff.    IF YOU HAVE QUESTIONS REGARDING YOUR BILL, FEEL FREE TO CONTACT [facility] PATIENT ACCOUNTS -[phone]. IF YOU NEED FINANCIAL ASSISTANCE, PLEASE CALL AN [facility] FINANCIAL COUNSELOR -[phone].             [facility]     [phone]

## 2024-04-05 NOTE — PROGRESS NOTES
Pt. Assessed. No signs or symptoms of shortness of breath,  chest pain or edema noted. States he is fatigued. Has not been wearing C-PAP as he had a recent Mohs surgery on his head and strap hits on his scab. Will re-attempt this weekend now that site is more healed.       Weight stable at 198.8 lbs. Reviewed current list of patient's allergies and medication; updated the Electronic Medical Record. Labs ordered to assess kidney function and drawn by  Lab. Reviewed follow-up appointment and Heart Failure discharge instructions with patient. Patient verbalized an understanding.     Will RTC in 2 weeks  Labs on Monday      Veltassa 8.4 g oral packet administered as ordered, patient tolerated well.

## 2024-04-08 ENCOUNTER — LAB ENCOUNTER (OUTPATIENT)
Dept: LAB | Age: 79
End: 2024-04-08
Attending: NURSE PRACTITIONER
Payer: MEDICARE

## 2024-04-08 DIAGNOSIS — E87.5 HYPERKALEMIA: ICD-10-CM

## 2024-04-08 LAB
ANION GAP SERPL CALC-SCNC: 4 MMOL/L (ref 0–18)
BUN BLD-MCNC: 59 MG/DL (ref 9–23)
CALCIUM BLD-MCNC: 9 MG/DL (ref 8.5–10.1)
CHLORIDE SERPL-SCNC: 109 MMOL/L (ref 98–112)
CO2 SERPL-SCNC: 28 MMOL/L (ref 21–32)
CREAT BLD-MCNC: 1.95 MG/DL
EGFRCR SERPLBLD CKD-EPI 2021: 34 ML/MIN/1.73M2 (ref 60–?)
FASTING STATUS PATIENT QL REPORTED: NO
GLUCOSE BLD-MCNC: 80 MG/DL (ref 70–99)
OSMOLALITY SERPL CALC.SUM OF ELEC: 308 MOSM/KG (ref 275–295)
POTASSIUM SERPL-SCNC: 4.5 MMOL/L (ref 3.5–5.1)
SODIUM SERPL-SCNC: 141 MMOL/L (ref 136–145)

## 2024-04-08 PROCEDURE — 36415 COLL VENOUS BLD VENIPUNCTURE: CPT

## 2024-04-08 PROCEDURE — 80048 BASIC METABOLIC PNL TOTAL CA: CPT

## 2024-04-09 VITALS — WEIGHT: 193 LBS | HEIGHT: 71 IN | BODY MASS INDEX: 27.02 KG/M2

## 2024-04-09 NOTE — PAT NURSING NOTE
PreOp Instructions     You are scheduled for: a Cardiac Procedure     Date of Procedure: 04/15/24 Monday     Diet Instructions: Do not eat or drink anything after midnight     Medications: Medications you are allowed to take can be taken with a sip of water the morning of your procedure     Medications to Stop: Hold herbal supplements and vitamins on day of procedure     Skin Prep: Shower with antibacterial soap using a clean washcloth, prior to procedure     Arrival Time: The business day prior to your procedure you will receive a phone call before 6:00 pm with your arrival time. If you haven't received a phone call, please check your voicemail messages., If you did not receive a voice mail and it is after 6:00 pm, please call the nursing supervisor at [phone].    Driving After Procedure: If sedation is given, you WILL NOT be able to drive home. You will need a responsible adult  to drive you home., Cannot take uber or cab unless approved by physician     Discharge Teaching: Most people can resume normal activities in 2-3 days, Your nurse will give you specific instructions before discharge, Any questions, please call the physician's office      parking is available starting at 6 am or park in the [place] parking garage at [facility]. Check in at the [facility] reception desk. Our  will be there to check you in for your procedure. Please bring your insurance cards and ID with you.                                                                                                                                      Please DO NOT respond to this message, the inbasket is not monitored for messages. For any questions, please call the physician's office.

## 2024-04-12 ENCOUNTER — HOSPITAL ENCOUNTER (OUTPATIENT)
Facility: HOSPITAL | Age: 79
Setting detail: HOSPITAL OUTPATIENT SURGERY
Discharge: HOME | End: 2024-04-12
Attending: INTERNAL MEDICINE | Admitting: INTERNAL MEDICINE
Payer: MEDICARE

## 2024-04-12 ENCOUNTER — ANESTHESIA (OUTPATIENT)
Dept: ENDOSCOPY | Facility: HOSPITAL | Age: 79
End: 2024-04-12
Payer: MEDICARE

## 2024-04-12 ENCOUNTER — ANESTHESIA EVENT (OUTPATIENT)
Dept: ENDOSCOPY | Facility: HOSPITAL | Age: 79
End: 2024-04-12
Payer: MEDICARE

## 2024-04-12 VITALS
BODY MASS INDEX: 27.02 KG/M2 | TEMPERATURE: 98 F | WEIGHT: 193 LBS | HEART RATE: 89 BPM | HEIGHT: 71 IN | OXYGEN SATURATION: 98 % | RESPIRATION RATE: 16 BRPM | SYSTOLIC BLOOD PRESSURE: 96 MMHG | DIASTOLIC BLOOD PRESSURE: 54 MMHG

## 2024-04-12 DIAGNOSIS — K22.70 BARRETT'S ESOPHAGUS WITHOUT DYSPLASIA: ICD-10-CM

## 2024-04-12 DIAGNOSIS — R19.4 CHANGE IN BOWEL HABITS: ICD-10-CM

## 2024-04-12 DIAGNOSIS — K52.9 CHRONIC DIARRHEA OF UNKNOWN ORIGIN: ICD-10-CM

## 2024-04-12 PROCEDURE — 88305 TISSUE EXAM BY PATHOLOGIST: CPT | Performed by: INTERNAL MEDICINE

## 2024-04-12 PROCEDURE — 0DBB8ZX EXCISION OF ILEUM, VIA NATURAL OR ARTIFICIAL OPENING ENDOSCOPIC, DIAGNOSTIC: ICD-10-PCS | Performed by: INTERNAL MEDICINE

## 2024-04-12 PROCEDURE — 0DB48ZX EXCISION OF ESOPHAGOGASTRIC JUNCTION, VIA NATURAL OR ARTIFICIAL OPENING ENDOSCOPIC, DIAGNOSTIC: ICD-10-PCS | Performed by: INTERNAL MEDICINE

## 2024-04-12 PROCEDURE — 0DBG8ZX EXCISION OF LEFT LARGE INTESTINE, VIA NATURAL OR ARTIFICIAL OPENING ENDOSCOPIC, DIAGNOSTIC: ICD-10-PCS | Performed by: INTERNAL MEDICINE

## 2024-04-12 PROCEDURE — 0DBK8ZX EXCISION OF ASCENDING COLON, VIA NATURAL OR ARTIFICIAL OPENING ENDOSCOPIC, DIAGNOSTIC: ICD-10-PCS | Performed by: INTERNAL MEDICINE

## 2024-04-12 PROCEDURE — 0DBF8ZX EXCISION OF RIGHT LARGE INTESTINE, VIA NATURAL OR ARTIFICIAL OPENING ENDOSCOPIC, DIAGNOSTIC: ICD-10-PCS | Performed by: INTERNAL MEDICINE

## 2024-04-12 PROCEDURE — 0DB78ZX EXCISION OF STOMACH, PYLORUS, VIA NATURAL OR ARTIFICIAL OPENING ENDOSCOPIC, DIAGNOSTIC: ICD-10-PCS | Performed by: INTERNAL MEDICINE

## 2024-04-12 PROCEDURE — 0DBH8ZX EXCISION OF CECUM, VIA NATURAL OR ARTIFICIAL OPENING ENDOSCOPIC, DIAGNOSTIC: ICD-10-PCS | Performed by: INTERNAL MEDICINE

## 2024-04-12 PROCEDURE — 0DBP8ZX EXCISION OF RECTUM, VIA NATURAL OR ARTIFICIAL OPENING ENDOSCOPIC, DIAGNOSTIC: ICD-10-PCS | Performed by: INTERNAL MEDICINE

## 2024-04-12 PROCEDURE — 0DB38ZX EXCISION OF LOWER ESOPHAGUS, VIA NATURAL OR ARTIFICIAL OPENING ENDOSCOPIC, DIAGNOSTIC: ICD-10-PCS | Performed by: INTERNAL MEDICINE

## 2024-04-12 PROCEDURE — 0DB98ZX EXCISION OF DUODENUM, VIA NATURAL OR ARTIFICIAL OPENING ENDOSCOPIC, DIAGNOSTIC: ICD-10-PCS | Performed by: INTERNAL MEDICINE

## 2024-04-12 RX ORDER — HYDROMORPHONE HYDROCHLORIDE 1 MG/ML
0.6 INJECTION, SOLUTION INTRAMUSCULAR; INTRAVENOUS; SUBCUTANEOUS EVERY 5 MIN PRN
OUTPATIENT
Start: 2024-04-12 | End: 2024-04-12

## 2024-04-12 RX ORDER — LIDOCAINE HYDROCHLORIDE 10 MG/ML
INJECTION, SOLUTION EPIDURAL; INFILTRATION; INTRACAUDAL; PERINEURAL AS NEEDED
Status: DISCONTINUED | OUTPATIENT
Start: 2024-04-12 | End: 2024-04-12 | Stop reason: SURG

## 2024-04-12 RX ORDER — LABETALOL HYDROCHLORIDE 5 MG/ML
5 INJECTION, SOLUTION INTRAVENOUS EVERY 5 MIN PRN
OUTPATIENT
Start: 2024-04-12 | End: 2024-04-12

## 2024-04-12 RX ORDER — HYDROMORPHONE HYDROCHLORIDE 1 MG/ML
0.2 INJECTION, SOLUTION INTRAMUSCULAR; INTRAVENOUS; SUBCUTANEOUS EVERY 5 MIN PRN
OUTPATIENT
Start: 2024-04-12 | End: 2024-04-12

## 2024-04-12 RX ORDER — MIDAZOLAM HYDROCHLORIDE 1 MG/ML
1 INJECTION INTRAMUSCULAR; INTRAVENOUS EVERY 5 MIN PRN
OUTPATIENT
Start: 2024-04-12 | End: 2024-04-12

## 2024-04-12 RX ORDER — METOCLOPRAMIDE HYDROCHLORIDE 5 MG/ML
5 INJECTION INTRAMUSCULAR; INTRAVENOUS EVERY 8 HOURS PRN
OUTPATIENT
Start: 2024-04-12

## 2024-04-12 RX ORDER — SODIUM CHLORIDE, SODIUM LACTATE, POTASSIUM CHLORIDE, CALCIUM CHLORIDE 600; 310; 30; 20 MG/100ML; MG/100ML; MG/100ML; MG/100ML
INJECTION, SOLUTION INTRAVENOUS CONTINUOUS
Status: DISCONTINUED | OUTPATIENT
Start: 2024-04-12 | End: 2024-04-12

## 2024-04-12 RX ORDER — METOPROLOL TARTRATE 1 MG/ML
2.5 INJECTION, SOLUTION INTRAVENOUS ONCE
OUTPATIENT
Start: 2024-04-12 | End: 2024-04-12

## 2024-04-12 RX ORDER — ACETAMINOPHEN 500 MG
1000 TABLET ORAL ONCE AS NEEDED
OUTPATIENT
Start: 2024-04-12 | End: 2024-04-12

## 2024-04-12 RX ORDER — ONDANSETRON 2 MG/ML
4 INJECTION INTRAMUSCULAR; INTRAVENOUS EVERY 6 HOURS PRN
OUTPATIENT
Start: 2024-04-12

## 2024-04-12 RX ORDER — HYDROMORPHONE HYDROCHLORIDE 1 MG/ML
0.4 INJECTION, SOLUTION INTRAMUSCULAR; INTRAVENOUS; SUBCUTANEOUS EVERY 5 MIN PRN
OUTPATIENT
Start: 2024-04-12 | End: 2024-04-12

## 2024-04-12 RX ORDER — SODIUM CHLORIDE, SODIUM LACTATE, POTASSIUM CHLORIDE, CALCIUM CHLORIDE 600; 310; 30; 20 MG/100ML; MG/100ML; MG/100ML; MG/100ML
INJECTION, SOLUTION INTRAVENOUS CONTINUOUS
OUTPATIENT
Start: 2024-04-12

## 2024-04-12 RX ORDER — HYDROCODONE BITARTRATE AND ACETAMINOPHEN 5; 325 MG/1; MG/1
1 TABLET ORAL ONCE AS NEEDED
OUTPATIENT
Start: 2024-04-12 | End: 2024-04-12

## 2024-04-12 RX ORDER — HYDROCODONE BITARTRATE AND ACETAMINOPHEN 5; 325 MG/1; MG/1
2 TABLET ORAL ONCE AS NEEDED
OUTPATIENT
Start: 2024-04-12 | End: 2024-04-12

## 2024-04-12 RX ORDER — NALOXONE HYDROCHLORIDE 0.4 MG/ML
80 INJECTION, SOLUTION INTRAMUSCULAR; INTRAVENOUS; SUBCUTANEOUS AS NEEDED
OUTPATIENT
Start: 2024-04-12 | End: 2024-04-12

## 2024-04-12 RX ORDER — MEPERIDINE HYDROCHLORIDE 25 MG/ML
12.5 INJECTION INTRAMUSCULAR; INTRAVENOUS; SUBCUTANEOUS AS NEEDED
OUTPATIENT
Start: 2024-04-12

## 2024-04-12 RX ADMIN — LIDOCAINE HYDROCHLORIDE 25 MG: 10 INJECTION, SOLUTION EPIDURAL; INFILTRATION; INTRACAUDAL; PERINEURAL at 07:07:00

## 2024-04-12 NOTE — OPERATIVE REPORT
Colon operative report  Patient Name: Miguel Davila  Procedure: Colonoscopy with snare polypectomy and cold forceps biopsies  Date of procedure: 4/12/2024    Pre-operative Indication: Chronic diarrhea, personal history of adenomatous colon polyps  Post-operative findings: Colon polyps, diverticulosis  Date of last colonoscopy: 5/1/2018  Attending: Gerardo Neves M.D.  Consent: The risks, benefits, and alternatives were discussed with the patient / POA.  Risks included, but were not limited to, bleeding, perforation, medication effects, cardiac arrhythmias, missed polyps, and aspiration.  After all questions were answered to their satisfaction, a signed, informed, and witnessed consent was obtained.  Timeout:  Prior to initiation of sedation, a formal timeout was performed, confirming the patient's name, date of birth, allergies, correct procedure, and need for antibiotics.  The operating physician and sedating physician was also confirmed prior to initiation of sedation.     Sedation: Monitored Anesthesia Care  Monitoring: Pulsoximetry, pulse, respirations, and blood pressure were monitored throughout the entire procedure    Preparation Quality: Adequate           Calhoun Prep Score:  Right: 2, Middle: 2, Left: 2    Total: 6  Procedure: After achieving adequate sedation, and placing the patient in the left lateral decubitus position, a digital rectal examination was performed.  The lubricated tip of the pediatric colonoscope was then introduced into the rectum and advanced to the terminal ileum.  The appendiceal orifice and ileocecal valve were clearly and distinctly visualized, thus verifying the cecum.  The terminal ileum was intubated and found to be normal to the extent examined.  The endoscope was then carefully withdrawn from the patient with careful visualization of the colonic mucosa revealing no additional pathologic findings.  Air was suctioned to the best of my ability, during withdrawal of the  endoscope.  When the endoscope reached the rectum, it was placed in a retroflexed position, and the rectal bulb was thus visualized.  The endoscope was righted, and air was suctioned from the colon to the best of my ability, as it was during withdrawn from the colon.  The endoscope was then removed from the patient.  The patient tolerated the procedure without apparent procedural complications.  The patient left the procedure room in stable condition for recovery.  Findings:  There were no external or internal hemorrhoids. The anus was patulous and the anal canal was stenotic. There were no masses, fissures, fistulae, or external hemorrhoids.  The mucosa of the colon  was normal, from the rectum to the cecum.  There were no masses, ulcers, or erosions.  Severe diverticulosis was appreciated throughout the entire colon.  In the cecum, 2 sessile polyps (3-5 mm; Nice II) were resected by cold snare, using the standard hexagonal snare.  In the proximal ascending colon, 2 sessile polyps (5 mm; Nice II) were resected by cold snare,  using the standard hexagonal snare.  In the distal transverse colon, at the splenic flexure, a 3 mm sessile polyp (Nice I) was resected by cold snare, using the standard hexagonal snare.  In the rectum, a 3 mm sessile polyp (Nice I) was resected by cold snare, using the standard hexagonal snare. The appendiceal orifice and ileocecal valve were both clearly and distinctly visualized, thus verifying the cecum.  The terminal ileum was intubated and the terminal ileal mucosa was found to be normal to the extent examined.  Cold forces biopsies were obtained from the terminal ileum, right colon, left colon, and rectum to evaluate for microscopic colitis.   Impression: Findings as above.    Recommendations:    1) Follow-up pathology   2) Continue current fiber replacement with Metamucil ( he did not tolerate psyllium husk)   3) Repeat colonoscopy in 3 years - given his expected age and medical  comorbidities, careful consideration of the risks and benefits should be weighed before proceeding.  Repeat Colonoscopy Indicated: 3 years as above

## 2024-04-12 NOTE — ANESTHESIA PREPROCEDURE EVALUATION
PRE-OP EVALUATION    Patient Name: Miguel Griffin Simeral    Admit Diagnosis: Ramon's esophagus without dysplasia [K22.70]  Chronic diarrhea of unknown origin [K52.9]  Change in bowel habits [R19.4]    Pre-op Diagnosis: Ramon's esophagus without dysplasia [K22.70]  Chronic diarrhea of unknown origin [K52.9]  Change in bowel habits [R19.4]    ESOPHAGOGASTRODUODENOSCOPY , COLONOSCOPY    Anesthesia Procedure: ESOPHAGOGASTRODUODENOSCOPY , COLONOSCOPY  .    Surgeons and Role:     * Gerardo Neves MD - Primary    Pre-op vitals reviewed.  Temp: 98.1 °F (36.7 °C)  Pulse: 83  Resp: 18  BP: 110/71  SpO2: 100 %  Body mass index is 26.92 kg/m².    Current medications reviewed.  Hospital Medications:   lactated ringers infusion   Intravenous Continuous       Outpatient Medications:     Facility-Administered Medications Prior to Admission   Medication Dose Route Frequency Provider Last Rate Last Admin    [COMPLETED] patiromer (Velt[place]) 8.4 g oral packet 8.4 g  8.4 g Oral Once Tasha Barth APRN   8.4 g at 04/05/24 1424     Medications Prior to Admission   Medication Sig Dispense Refill Last Dose    spironolactone 25 MG Oral Tab Take 0.5 tablets (12.5 mg total) by mouth daily. Holding for now till potassium is rechecked   4/11/2024    levothyroxine 50 MCG Oral Tab Take 1 tablet (50 mcg total) by mouth before breakfast.   4/11/2024    ipratropium 0.06 % Nasal Solution 1 spray by Nasal route 2 (two) times daily. 1 each 5 4/11/2024    omeprazole 20 MG Oral Capsule Delayed Release Take 1 capsule (20 mg total) by mouth 2 (two) times daily. 180 capsule 1 4/11/2024    bumetanide 1 MG Oral Tab Take 2 mg of Bumex daily in the AM. Take 1 mg in the PM on Tuesday, Thursday, Saturday and Sunday. Take 2 mg in the PM on Monday, Wednesday and Friday 360 tablet 1 4/12/2024    ALLOPURINOL 300 MG Oral Tab TAKE 1 TABLET BY MOUTH EVERY DAY 90 tablet 3 4/11/2024    predniSONE 5 MG Oral Tab Take 1 tablet (5 mg total) by mouth daily with  breakfast. 90 tablet 3 4/11/2024    metoprolol succinate  MG Oral Tablet 24 Hr Take 1 tablet (100 mg total) by mouth every morning AND 0.5 tablets (50 mg total) every evening. 60 tablet 3 4/12/2024    PREDNISONE 1 MG Oral Tab TAKE 2 TABLETS (2 MG TOTAL) BY MOUTH DAILY WITH BREAKFAST. 180 tablet 3 4/11/2024    sacubitril-valsartan 49-51 MG Oral Tab Take 1 tablet by mouth 2 (two) times daily.   4/12/2024    aspirin 81 MG Oral Tab EC Take 1 tablet (81 mg total) by mouth daily. 30 tablet 0 4/7/2024    PREVIDENT 5000 BOOSTER PLUS 1.1 % Dental Paste        Multiple Vitamins-Minerals (TAB-A-KEVIN) Oral Tab Take 1 tablet by mouth daily.   4/11/2024    Calcium Citrate-Vitamin D (CITRACAL + D OR) Take 1 tablet by mouth daily.   4/11/2024    mupirocin 2 % External Ointment APPLY TWICE DAILY TO SCALP UNTIL WOUND IS HEALED.       Immune Globulin, Human, 10 g Intravenous Recon Soln Inject 0.4 g/kg into the vein. Given for treatment of polymyositis, mixed connective tissue disease, and immune thrombocytopenia purpura monthly at [facility].  Every 5 weeks 3 each 0        Allergies: Empagliflozin, Amoxicillin, Augmentin [amoclan], and Doxycycline      Anesthesia Evaluation        Anesthetic Complications           GI/Hepatic/Renal      (+) GERD          (+) liver disease                 Cardiovascular                  (+) hypertension                     (+) CHF                Endo/Other                                  Pulmonary        (+) COPD       (+) shortness of breath     (+) sleep apnea       Neuro/Psych                 (+) neuromuscular disease             Patient Active Problem List:     Personal history of other malignant neoplasm of skin     Hypopotassemia     Ramon's esophagus     Gout     ED (erectile dysfunction)     Benign essential hypertension     Low HDL (under 40)     Thrombocytopenia (HCC)     Basal cell carcinoma, ear     Lupus hepatitis syndrome (HCC)     Splenomegaly     Mixed connective  tissue disease (HCC)     Polymyositis (HCC)     Atrial fibrillation (HCC)     Aortic root dilation (HCC)     Incidental lung nodule, less than or equal to 3mm     LI (obstructive sleep apnea)     Pleural effusion, left     Pulmonary hypertension (HCC)     Chronic right-sided congestive heart failure (HCC)     Immune thrombocytopenic purpura (HCC)     Cardiomyopathy as manifestation of underlying disease (HCC)     Presence of Watchman left atrial appendage closure device     Centrilobular emphysema (HCC)     Symptomatic anemia     Pancytopenia (HCC)     Stage 3b chronic kidney disease (HCC)     Drug-induced acute pancreatitis without infection or necrosis (HCC)     History of Pancreatitis from [facility] 9/2022            Past Surgical History:   Procedure Laterality Date    Appendectomy  1970    Appendectomy      Cardiac catheterization  09/2019    Cataract Bilateral     Colonoscopy  10/2003 2006 01/2010    x3    Colonoscopy  5/14/2013    Colonoscopy N/A 5/1/2018    Procedure: COLONOSCOPY;  Surgeon: Isaiah Tobar MD;  Location:  ENDOSCOPY    Colonoscopy with biopsy  5/14/13    Egd      Hand/finger surgery unlisted  2003    right    Needle biopsy liver      Other  12/2005    needle bipsy of kidney    Other surgical history  2017    watchman filter    Percutaneous  angioplasty  (ptca)- pbp only  2006    right    Rectum surgery procedure unlisted  1946    rectal surgery polypectomy    Skin surgery      MMS BCC R temple 6/24/09    Skin surgery  2007    basal ceell carcinoma    Skin surgery  7-18-12    BCC-nodular to right superior eyebrow/ MOHS    Skin surgery  07/15/2013    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/MMS    Skin surgery  2-19-14    BCC-NOD to right superior pinna, MMS, AB    Skin surgery  02/16/2021    BCC- left superior forehead, MMS     Skin surgery  03/07/2022    SCC IN SITU RIGHT ANTIHELIX MMS BY DR GASTELUM    Tonsillectomy  1948    Upper gi endoscopy,exam  10/2003 2006 01/2010 , 5/13    x3     Social  History     Socioeconomic History    Marital status:    Occupational History    Occupation: Retired    Tobacco Use    Smoking status: Former     Current packs/day: 0.00     Average packs/day: 0.5 packs/day for 15.0 years (7.5 ttl pk-yrs)     Types: Cigarettes     Start date: 1958     Quit date: 1973     Years since quittin.7    Smokeless tobacco: Never    Tobacco comments:     5 cigarettes daily, stopped smoking in    Vaping Use    Vaping status: Never Used   Substance and Sexual Activity    Alcohol use: Yes     Alcohol/week: 13.0 - 15.0 standard drinks of alcohol     Types: 5 Glasses of wine, 8 - 10 Standard drinks or equivalent per week     Comment: 1 per day wine or liquor    Drug use: Never   Other Topics Concern    Caffeine Concern Yes     Comment: 1 soda per day and decaf coffee    Sleep Concern No    Exercise Yes     Comment: 3-4 times weekly    Seat Belt Yes     History   Drug Use Unknown     Available pre-op labs reviewed.  Lab Results   Component Value Date    WBC 6.8 2024    RBC 3.97 2024    HGB 13.5 2024    HCT 42.9 2024    .1 (H) 2024    MCH 34.0 2024    MCHC 31.5 2024    RDW 16.1 2024    .0 (L) 2024     Lab Results   Component Value Date     2024    K 4.5 2024     2024    CO2 28.0 2024    BUN 59 (H) 2024    CREATSERUM 1.95 (H) 2024    GLU 80 2024    CA 9.0 2024            Airway      Mallampati: II  Mouth opening: >3 FB  TM distance: > 6 cm  Neck ROM: full Cardiovascular             Dental    Dentition appears grossly intact         Pulmonary    Pulmonary exam normal.                 Other findings              ASA: 2   Plan: MAC  NPO status verified and         Comment:      Plan/risks discussed with: patient            Plan is MAC anesthesia, which likely will include deep sedation.  Implied that memory of procedure is unlikely although intraop  recall, if it occurs, may be a reasonable and comfortable experience with this anesthetic.  Aware that general anesthesia is not intended though deep sedation may include brief moments of general anesthesia.   Questions answered. Accepts. The consent was signed without further questions.       Present on Admission:  **None**

## 2024-04-12 NOTE — ANESTHESIA POSTPROCEDURE EVALUATION
[facility]    Miguel Davila Patient Status:  Hospital Outpatient Surgery   Age/Gender 79 year old male MRN [id number]   Location [facility] ENDOSCOPY PAIN CENTER Attending Gerardo Neves MD   Hosp Day # 0 PCP Eric Simon MD       Anesthesia Post-op Note    ESOPHAGOGASTRODUODENOSCOPY WITH BIOPSY AND FORCEP POLYPECTOMY , COLONOSCOPY WITH BIOPSY AND COLD SNARE POLYPECTOMY    Procedure Summary       Date: 04/12/24 Room / Location:  ENDOSCOPY 04 /  ENDOSCOPY    Anesthesia Start: 0703 Anesthesia Stop: 0804    Procedures:       ESOPHAGOGASTRODUODENOSCOPY WITH BIOPSY AND FORCEP POLYPECTOMY , COLONOSCOPY WITH BIOPSY AND COLD SNARE POLYPECTOMY      COLONOSCOPY Diagnosis:       Ramon's esophagus without dysplasia      Chronic diarrhea of unknown origin      Change in bowel habits      (GASTRIC POLYP, COLON POLYPS, DIVERTICULOSIS)    Surgeons: Gerardo Neves MD Anesthesiologist: Mauro Astorga MD    Anesthesia Type: MAC ASA Status: 2            Anesthesia Type: MAC    Vitals Value Taken Time   /61 04/12/24 0805   Temp  04/12/24 0807   Pulse 84 04/12/24 0806   Resp 16 04/12/24 0805   SpO2 97 % 04/12/24 0806   Vitals shown include unfiled device data.    Patient Location: Endoscopy    Anesthesia Type: MAC    Airway Patency: patent    Postop Pain Control: adequate    Mental Status: mildly sedated but able to meaningfully participate in the post-anesthesia evaluation    Nausea/Vomiting: none    Cardiopulmonary/Hydration status: stable euvolemic    Complications: no apparent anesthesia related complications    Postop vital signs: stable    Dental Exam: Unchanged from Preop    Patient to be discharged home when criteria met.

## 2024-04-12 NOTE — DISCHARGE INSTRUCTIONS
Home Care Instructions for Colonoscopy and/or Gastroscopy With Sedation    Diet:  - Resume your regular diet as tolerated unless otherwise instructed.  - start with light meals to minimize bloating.  - Do not drink alcohol today.    Medication:  - If you have questions about resuming your normal medications, please contact your Primary Care Physician.    Activities:  - Take it easy today. Do not return to work today.  - Do not drive today.  - Do not operate any machinery today (including kitchen equipment).    Colonoscopy:  - You may notice some rectal \"spotting\" (a little blood on the toilet tissue) for a day or two after the exam. This is normal.  - If you experience any rectal bleeding (not spotting), persistent tenderness or sharp severe abdominal pains, oral temperature over 100 degrees Farenheit, light-headedness or dizziness, or any other problems, contact your doctor.    Gastroscopy:  - You may have a sore throat for 2-3 days following the exam. This is normal. Gargling with warm salt water (1/2 tsp salt to 1 glass warm water) or using throat lozenges will help.  - If you experience any sharp pain in your neck, abdomen or chest, vomiting of blood, oral temperature over 100 degrees Farenheit, light-headedness or dizziness, or any other problems, contact your doctor.    **If unable to reach your doctor, please go to the [facility] Emergency Room**    - Your referring physician will receive a full report of your examination.  - If you do not hear from your doctor's office within two weeks of your biopsy, please call them for your results.    Additional Comments/Instructions (if applicable):

## 2024-04-12 NOTE — OPERATIVE REPORT
EGD operative report  Patient Name: Miguel Davila  Procedure: Esophagogastroduodenoscopy with cold forceps biopsy   Date of procedure: [id number]    Pre-operative Indication: Ramon's esophagus, chronic diarrhea  Post-operative findings: Irregular z-line, gastric erosions, fundic gland polyps  Attending: Gerardo Neves M.D.  Consent:  The risks, benefits, and alternatives were discussed with the patient / POA.  Risks included, but were not limited to, bleeding, perforation, medication effects, cardiac arrhythmias, and aspiration.  After all questions were answered to their satisfaction, a signed, informed, and witnessed consent was obtained.  Timeout:  Prior to initiation of sedation, a formal timeout was performed, confirming the patient's name, date of birth, allergies, correct procedure, and need for antibiotics.  The operating physician and sedating physician was also confirmed prior to initiation of sedation.     Sedation: Monitored Anesthesia Care.  Monitoring:  Pulsoximetry, pulse, respirations, and blood pressure were monitored throughout the entire procedure  Procedure: After achieving adequate sedation and placing the patient in the left lateral decubitus position, the lubricated upper endoscope was introduced into the mouth and advanced to the descending duodenum.  The endoscope was then withdrawn into the gastric antrum and placed in a retroflexed position.  The endoscope was then righted, and air was suctioned from the stomach.  The endoscope was then withdrawn from the patient, with careful visual inspection of the mucosa revealing no additional pathologic findings.  The patient tolerated the procedure without apparent procedural complications.  The patient left the procedure room in stable condition for recovery.  Findings: Esophagus: The mucosa was normal, without masses, polyps, ulcers, erosions, diverticula, or varices.  Scattered white mucosal plaques were appreciated consistent with  likely fungal esophagitis.  The squamocolumnar junction was appreciated at 40 cm from the incisors.  The esophagogastric junction was appreciated at 40.5 cm from the incisors.  The diaphragmatic impression was appreciated at 43 cm from the incisors.  Cold forces biopsies were obtained from 40-41 cm to evaluate for Ramon's esophagus.  However, the overall appearance was more consistent with an irregular z-line with gastric folds generally approximating the SCJxn.  In addition, cold forceps were obtained of the esophagus plaques.     Stomach:  The gastric body, antrum revealed scattered antral erosions.  In addition, several small fundic gland polyps were appreicated  in the proximal body and fundus.  A representative cold forceps biopsy was obtained. The fundus, cardia, and angularis were normal, without masses, ulcers, erosions, diverticula, or varices.  Cold forceps biopsies were obtained from the antrum, body, and fundus, to evaluate for H.pylori.   Duodenum: The duodenal bulb, post-bulbar duodenum, and descending duodenum were normal, without masses, polyps, ulcers, erosions, diverticula, or varices.  Cold forceps biopsies were obtained from the distal and proximal duodenum to evaluate for Celiac sprue.  Impression: Findings as above  Recommendations:   1) Follow-up pathology  2) Continue current PPI for heartburn management - however, the overall appearance of the z-line suggests a normal variant rather than Ramon's esophagus.   3) A repeat EGD would be recommended in 5 years.  However, given  his expected age and medical comorbidities, I would favor no further surveillance.  4) Initiate Nystatin 500,000 units po swish and swallow qid x 14 days

## 2024-04-12 NOTE — H&P
[facility]  History & Physical    Miguel Davila Patient Status:  Hospital Outpatient Surgery    3/17/1945 MRN [id number]   Location [facility] ENDOSCOPY PAIN CENTER Attending Gerardo Neves MD   Hosp Day # 0 PCP Eric Simon MD     History of Present Illness:  Miguel Davila is a(n) 79 year old male with a history of Ramon's espohagus, adenomatous colon polyps and diarrhea presenting for EGD/Colonoscopy.    History:  Past Medical History:    A-fib (HCC)    Arrhythmia    atrial fibrillation    Arthritis    Autoimmune disease (HCC)    hepatits, resolved anfd nephritis, resolved    Ramon's esophagus    Bleeding nose    Gums Treated    Blood disorder    thrombocytopenia    Blood in urine    Blurred vision    cataract due to steroids, surgery in     Calculus of kidney    One time    Cancer (HCC)    skin    Candidiasis of the esophagus    Due to steroid use for Autoimmune disorder     Cataract    Colon polyps    Congestive heart disease (HCC)    Diarrhea, unspecified    Intermittent due to lupus    Diverticulosis of colon    Easy bruising    On and off prednisone for 20 years    Esophageal polyp    Esophageal reflux    Essential hypertension    Fatigue    polymyositis and lupus    Gout    Hammer toe, acquired    Heart palpitations    Afib    Hepatitis    autoimmune induce hepatitis d/t lupus    High blood pressure    IBS (irritable bowel syndrome)    mild d/t lupus    Irregular bowel habits    Leg swelling    Heart failure, probably caused by lupus    Lupus (HCC)    MCTD (mixed connective tissue disease) (HCC)    Obstructive sleep apnea (adult) (pediatric)    LI (obstructive sleep apnea)    AHI 37  Supine AHI 38 non-supine AHI 16 Sao2 Pj 83%     LI (obstructive sleep apnea)    AHI 36 RDI 36 REM AHI 45 Supine AHI 65 non-supine AHI 19 Sao2 Pj 86% CPAP 9cwp    Pain in joints    Personal history of antineoplastic chemotherapy    Pleural effusion    right    Pneumonia due to  organism    Polymyositis (HCC)    Presence of other cardiac implants and grafts    watchman filter    Problems with swallowing    dysphagia in 2006    Pulmonary hypertension (HCC)    Raynaud disease    Raynaud disease    Renal disorder    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    Shortness of breath    mainly due to pm or lupus    Sleep apnea    CPAP    Thrombocytopathia (HCC)    Visual impairment    bifocals for reading & computer; distance for driving    Wears glasses     Past Surgical History:   Procedure Laterality Date    Appendectomy  1970    Appendectomy      Cardiac catheterization  09/2019    Cataract Bilateral     Colonoscopy  10/2003 2006 01/2010    x3    Colonoscopy  5/14/2013    Colonoscopy N/A 5/1/2018    Procedure: COLONOSCOPY;  Surgeon: Isaiah Tobar MD;  Location:  ENDOSCOPY    Colonoscopy with biopsy  5/14/13    Egd      Hand/finger surgery unlisted  2003    right    Needle biopsy liver      Other  12/2005    needle bipsy of kidney    Other surgical history  2017    watchman filter    Percutaneous  angioplasty  (ptca)- pbp only  2006    right    Rectum surgery procedure unlisted  1946    rectal surgery polypectomy    Skin surgery      MMS BCC R temple 6/24/09    Skin surgery  2007    basal ceell carcinoma    Skin surgery  7-18-12    BCC-nodular to right superior eyebrow/ MOHS    Skin surgery  07/15/2013    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/MMS    Skin surgery  2-19-14    BCC-NOD to right superior pinna, MMS, AB    Skin surgery  02/16/2021    BCC- left superior forehead, MMS     Skin surgery  03/07/2022    SCC IN SITU RIGHT ANTIHELIX MMS BY DR GASTELUM    Tonsillectomy  1948    Upper gi endoscopy,exam  10/2003 2006 01/2010 , 5/13    x3     Family History   Problem Relation Age of Onset    Heart Disease Father         CHF    Gastro-Intestinal Disorder Father         Diverticulosis    Alcohol and Other Disorders Associated Father     Heart Attack Father     Heart Disease Mother         CHF    Breast Cancer Mother      Stroke Mother     Cancer Paternal Grandfather     Heart Attack Paternal Grandmother     Kidney Disease Son     Other (Ramon's Esophagus) Son     Heart Attack Maternal Grandfather       reports that he quit smoking about 50 years ago. His smoking use included cigarettes. He started smoking about 65 years ago. He has a 7.5 pack-year smoking history. He has never used smokeless tobacco. He reports current alcohol use of about 13.0 - 15.0 standard drinks of alcohol per week. He reports that he does not use drugs.  Allergies   Allergen Reactions    Empagliflozin OTHER (SEE COMMENTS)     Pancreatitis    \"pancreatitis\" per pt    Amoxicillin RASH    Augmentin [Amoclan] RASH    Doxycycline RASH        lactated ringers infusion   Intravenous Continuous     No current outpatient medications on file.    Review of Systems:  Gastrointestinal: Denies positive test for blood stool, heartburn/indigestion/reflux, belching, difficulty swallowing, irregular bowel habits, painful swallowing, diarrhea, abdominal pain, constipation, nausea, incontinence of stool, vomiting, black stools, get full quickly at meals, blood in stools, abdominal distention, jaundice, flatulence, vomiting blood, bloating, hernia, laxative use, food/milk intolerance, pain with bowel movement, hemorrhoids.  General: Denies fatigue, chills/fever, night sweats, weight loss, loss of appetite, weight gain, sleep disturbance.  Neurological: Denies frequent headaches, history of stroke, recent passing out, recent dizziness, convulsions/seizures, dementia.  Cardiovascular: Denies history of heart murmur, leg swelling, history of rheumatic fever, pacemaker, chest pain or pressure after eating or when upset, automatic defibrillator, angina, other implanted devices, irregular heart rate/palpitations, high cholesterol or triglycerides, coronary stents.  Respiratory: Denies shortness of breath, chronic/frequent hoarseness, wheezing, exposure to tuberculosis, chronic  cough, spitting up blood, cough up sputum, sleep apnea.  Genitourinary: Denies kidney stones, painful/difficult urination, frequent urinary infections, frequent urination, blood in urine, incontinence, kidney failure, prostate problems.  Endocrine: Denies thyroid disease, denies diabetes.  Female complaints: Denies endometriosis, painful menstrual periods, heavy menstrual periods.  Patient is not pregnant.  Psychosocial: Denies history of mental illness, denies usually feeling lonely or depressed, denies history of depression, anxiety, history of physical or sexual abuse, stress, history of eating disorder.  Skin: Denies severe itching, unusual moles, rash, flushing, change in hair or nails.  Bone/joint: Denies arthritis, back pain, joint pain, osteoporosis.  Heme/Lymphatic: Denies easy bruising, anemia, excessive bleeding, enlarging or painful lymph nodes.  Allergy: Denies medication allergy, latex/rubber allergy, anaphylactic or other reaction to anesthesia, food allergy.   Eyes: Denies blurred/double vision, eye disease, glasses or contacts, glaucoma.  ENT: Denies nose or gums bleeding, mouth sores, bad breath or bad taste in mouth, hearing loss.    Physical Exam:   General: alert, cooperative, oriented.  No respiratory distress.   Head: Normocephalic, without obvious abnormality, atraumatic.   Eyes: Conjunctivae/corneas clear.  No scleral icterus.    Extremity: no edema, no cyanosis   Skin: No rashes or lesions.    Neurological: Alert, interactive, no focal deficits    ASA Score: 4-Patient with severe systemic disease that is constant threat to life.    Plan: EGD/Colonoscopy  Risk/Benefits:  The risks and benefits of the procedure were discussed in detail with the patient, including the risk of bleeding, infection, pain, sedation, and perforation.  The patient was also informed that polyps and tumors can be missed on rare occasions, which may require future procedures. The patient indicates understanding of  these issues and agrees to proceed with the scheduled procedure.   Gerardo Neves MD  4/12/2024  7:00 AM

## 2024-04-15 ENCOUNTER — HOSPITAL ENCOUNTER (OUTPATIENT)
Dept: INTERVENTIONAL RADIOLOGY/VASCULAR | Facility: HOSPITAL | Age: 79
Discharge: HOME | End: 2024-04-15
Attending: INTERNAL MEDICINE
Payer: MEDICARE

## 2024-04-15 DIAGNOSIS — I50.812 CHRONIC RIGHT-SIDED CONGESTIVE HEART FAILURE (HCC): Primary | ICD-10-CM

## 2024-04-17 ENCOUNTER — HOSPITAL ENCOUNTER (OUTPATIENT)
Dept: LAB | Facility: HOSPITAL | Age: 79
Discharge: HOME | End: 2024-04-17
Attending: NURSE PRACTITIONER
Payer: MEDICARE

## 2024-04-17 ENCOUNTER — HOSPITAL ENCOUNTER (OUTPATIENT)
Dept: CARDIOLOGY CLINIC | Facility: HOSPITAL | Age: 79
Discharge: HOME | End: 2024-04-17
Attending: NURSE PRACTITIONER
Payer: MEDICARE

## 2024-04-17 VITALS
SYSTOLIC BLOOD PRESSURE: 112 MMHG | BODY MASS INDEX: 28 KG/M2 | WEIGHT: 198.19 LBS | DIASTOLIC BLOOD PRESSURE: 41 MMHG | RESPIRATION RATE: 12 BRPM | HEART RATE: 72 BPM | OXYGEN SATURATION: 100 %

## 2024-04-17 DIAGNOSIS — I50.812 CHRONIC RIGHT-SIDED CONGESTIVE HEART FAILURE (HCC): ICD-10-CM

## 2024-04-17 DIAGNOSIS — N18.32 STAGE 3B CHRONIC KIDNEY DISEASE (HCC): ICD-10-CM

## 2024-04-17 DIAGNOSIS — I48.21 PERMANENT ATRIAL FIBRILLATION (HCC): ICD-10-CM

## 2024-04-17 DIAGNOSIS — I27.20 PULMONARY HYPERTENSION (HCC): ICD-10-CM

## 2024-04-17 DIAGNOSIS — I50.32 CHRONIC DIASTOLIC HEART FAILURE (HCC): Primary | ICD-10-CM

## 2024-04-17 LAB
ANION GAP SERPL CALC-SCNC: 7 MMOL/L (ref 0–18)
BUN BLD-MCNC: 54 MG/DL (ref 9–23)
CALCIUM BLD-MCNC: 9.6 MG/DL (ref 8.5–10.1)
CHLORIDE SERPL-SCNC: 109 MMOL/L (ref 98–112)
CO2 SERPL-SCNC: 25 MMOL/L (ref 21–32)
CREAT BLD-MCNC: 1.77 MG/DL
EGFRCR SERPLBLD CKD-EPI 2021: 38 ML/MIN/1.73M2 (ref 60–?)
FASTING STATUS PATIENT QL REPORTED: NO
GLUCOSE BLD-MCNC: 119 MG/DL (ref 70–99)
OSMOLALITY SERPL CALC.SUM OF ELEC: 308 MOSM/KG (ref 275–295)
POTASSIUM SERPL-SCNC: 5 MMOL/L (ref 3.5–5.1)
SODIUM SERPL-SCNC: 141 MMOL/L (ref 136–145)

## 2024-04-17 PROCEDURE — 99215 OFFICE O/P EST HI 40 MIN: CPT | Performed by: NURSE PRACTITIONER

## 2024-04-17 PROCEDURE — 36415 COLL VENOUS BLD VENIPUNCTURE: CPT | Performed by: NURSE PRACTITIONER

## 2024-04-17 PROCEDURE — 80048 BASIC METABOLIC PNL TOTAL CA: CPT | Performed by: NURSE PRACTITIONER

## 2024-04-17 NOTE — PATIENT INSTRUCTIONS
Heart Failure Discharge Instructions      Activity: Regular exercise and activity is important for your overall health and to help keep your heart strong and functioning as well as possible.   Walk at a slow to moderate pace for 15-20 minutes 3-5 days per week.     Follow these instructions every day to keep yourself in the Green Zone     The Green Zone means you are feeling well and your symptoms are under control                                    Medications  Take your medication every day as instructed  Do not stop taking your medicine or change the amount you are taking without instructions from your doctor or nurse  Do Not take non-steroidal antiinflammatory drugs such as ibuprofen, aleve, advil, or motrin                                    Diet/Fluids  People with heart failure should eat less sodium (salt) and limit fluid. Sodium attracts water and makes the body hold fluid. This extra fluid makes the heart work harder and can worsen the symptoms of heart failure.     Diet    2000 mg sodium daily  Fluid restriction    64 ounces daily  (8 oz. = 1 cup)                                     Body Weight  Weigh yourself every day before breakfast and write your weight down  Use the same scale and wear about the same amount of clothes each time  A sudden weight increase is due to fluid retention rather than fat                                         Activity  Pace your activities to avoid getting overtired  Take rest periods as needed  Elevate your feet to reduce ankle swelling when resting                             Signs of Worsening Heart Failure    You are entering the Yellow Zone - this is a warning zone    Call your doctor or nurse if you have any of these signs or symptoms:  You gain 2 or more pounds overnight or 3-5 pounds in 3-7 days  You have more trouble breathing  You get more tired with regular activity, or are limiting activity because of shortness of breath or fatigue  You are short of breath lying  down, you need more pillows to breathe comfortably,  or wake up during the night short of breath  You urinate less often during the day and more often at night  You have a bloated feeling, upset stomach, loss of appetite, or your clothes are fitting tighter    GO TO THE EMERGENCY DEPARTMENT or CALL 911 IF:    These are signs you are in the RED ZONE - THIS IS AN EMERGENCY  You have tightness or pain in your chest  You are extremely short of breath or can't catch your breath  You cough up frothy pink mucous  You feel confused or can't think clearly  You are traveling and develop symptoms of worsening heart failure     We respect everyone's time and availability. Please be aware that this is not a walk-in clinic and we require appointments in order to facilitate timely care for all patients. We ask you to arrive 30 minutes before your appointment to allow time for you to check-in and have your bloodwork drawn. Please understand if you are late for your appointment, you may be asked to reschedule. If possible, all attempts will be made to accommodate but realize this is no guarantee that this will always be available. We understand there are extenuating circumstances. If you need to cancel or reschedule your appointment, please call the [facility] within 24 hours at [phone].  Thank you for your cooperation, [facility] Staff.    IF YOU HAVE QUESTIONS REGARDING YOUR BILL, FEEL FREE TO CONTACT [facility] PATIENT ACCOUNTS -[phone]. IF YOU NEED FINANCIAL ASSISTANCE, PLEASE CALL AN [facility] FINANCIAL COUNSELOR -[phone].             [facility]     [phone]

## 2024-04-17 NOTE — PROGRESS NOTES
Patient assessed. No signs or symptoms of shortness of breath or bloating noted. Patient with mild pitting edema to bilateral lower extremities. Weight stable at 198/2 lbs. Patient reports having excessive fatigue and sleep after his endoscopy and colonoscopy on Friday. Patient declined completing the 6 minute walk today. APN notified of all the above information. Reviewed current list of patient's allergies and medication; updated the Electronic Medical Record. Labs ordered to assess kidney function and drawn by  Lab. Reviewed follow-up appointment and Heart Failure discharge instructions with patient. Patient verbalized an understanding. Patient to return to the clinic in 1 month.

## 2024-04-24 ENCOUNTER — OFFICE VISIT (OUTPATIENT)
Dept: HEMATOLOGY/ONCOLOGY | Facility: HOSPITAL | Age: 79
End: 2024-04-24
Attending: INTERNAL MEDICINE
Payer: MEDICARE

## 2024-04-24 VITALS
BODY MASS INDEX: 27.72 KG/M2 | OXYGEN SATURATION: 98 % | TEMPERATURE: 97 F | WEIGHT: 198 LBS | HEIGHT: 70.98 IN | DIASTOLIC BLOOD PRESSURE: 72 MMHG | SYSTOLIC BLOOD PRESSURE: 110 MMHG | HEART RATE: 54 BPM

## 2024-04-24 DIAGNOSIS — D69.3 IMMUNE THROMBOCYTOPENIC PURPURA (HCC): Primary | ICD-10-CM

## 2024-04-24 DIAGNOSIS — D69.6 THROMBOCYTOPENIA (HCC): ICD-10-CM

## 2024-04-24 PROCEDURE — 96365 THER/PROPH/DIAG IV INF INIT: CPT

## 2024-04-24 PROCEDURE — 96366 THER/PROPH/DIAG IV INF ADDON: CPT

## 2024-04-24 RX ORDER — DIPHENHYDRAMINE HCL 25 MG
25 CAPSULE ORAL ONCE
OUTPATIENT
Start: 2024-05-29

## 2024-04-24 RX ORDER — ACETAMINOPHEN 325 MG/1
650 TABLET ORAL ONCE
Status: COMPLETED | OUTPATIENT
Start: 2024-04-24 | End: 2024-04-24

## 2024-04-24 RX ORDER — ACETAMINOPHEN 325 MG/1
650 TABLET ORAL ONCE
OUTPATIENT
Start: 2024-05-29

## 2024-04-24 RX ORDER — DIPHENHYDRAMINE HCL 25 MG
25 CAPSULE ORAL ONCE
Status: COMPLETED | OUTPATIENT
Start: 2024-04-24 | End: 2024-04-24

## 2024-04-24 RX ADMIN — DIPHENHYDRAMINE HCL 25 MG: 25 MG CAPSULE ORAL at 10:54:00

## 2024-04-24 RX ADMIN — ACETAMINOPHEN 650 MG: 325 TABLET ORAL at 10:54:00

## 2024-04-24 NOTE — PROGRESS NOTES
Education Record    Learner:  Patient and Spouse    Disease / Diagnosis: pt here for IVIG    Barriers / Limitations:  None   Comments:    Method:  Brief focused   Comments:    General Topics:  Side effects and symptom management   Comments:    Outcome:  Shows understanding   Comments:

## 2024-05-03 NOTE — PROGRESS NOTES
[facility]     CardioMEMS pulmonary artery pressure sensor    Remote Monitoring Report Summary    24    Miguel Davila    : 3/17/1945    Reporting Period- 3/18-24     Diagnosis - HFpEF    Provider- SHERIDAN Cummins       The CardioMEMS report for the above date has been reviewed with the following results:    RHC hemodynamics at time of CardioMEMS impant:    PA   Wedge 8    Acceptable range for Miguel Davila      PAD range 29-35mmhg    Remote monitoring documentation for the past ~30 days:    04- PAD stable. CTM  2024 PAD stable. CTM  2024 PAD stable. CTM  2024 PAD stable. CTM  2024 Stable. CTM

## 2024-05-06 ENCOUNTER — LAB ENCOUNTER (OUTPATIENT)
Dept: LAB | Age: 79
End: 2024-05-06
Attending: INTERNAL MEDICINE
Payer: MEDICARE

## 2024-05-06 ENCOUNTER — PATIENT OUTREACH (OUTPATIENT)
Dept: CASE MANAGEMENT | Age: 79
End: 2024-05-06

## 2024-05-06 DIAGNOSIS — J43.2 CENTRILOBULAR EMPHYSEMA (HCC): ICD-10-CM

## 2024-05-06 DIAGNOSIS — K22.70 BARRETT'S ESOPHAGUS WITHOUT DYSPLASIA: ICD-10-CM

## 2024-05-06 DIAGNOSIS — I48.19 PERSISTENT ATRIAL FIBRILLATION (HCC): ICD-10-CM

## 2024-05-06 DIAGNOSIS — M33.20 POLYMYOSITIS (HCC): ICD-10-CM

## 2024-05-06 DIAGNOSIS — I50.812 CHRONIC RIGHT-SIDED CONGESTIVE HEART FAILURE (HCC): ICD-10-CM

## 2024-05-06 DIAGNOSIS — N18.32 STAGE 3B CHRONIC KIDNEY DISEASE (HCC): ICD-10-CM

## 2024-05-06 DIAGNOSIS — G47.33 OSA (OBSTRUCTIVE SLEEP APNEA): Primary | ICD-10-CM

## 2024-05-06 DIAGNOSIS — M10.9 GOUT, UNSPECIFIED CAUSE, UNSPECIFIED CHRONICITY, UNSPECIFIED SITE: ICD-10-CM

## 2024-05-06 DIAGNOSIS — M35.1 MIXED CONNECTIVE TISSUE DISEASE (HCC): ICD-10-CM

## 2024-05-06 DIAGNOSIS — I43 CARDIOMYOPATHY AS MANIFESTATION OF UNDERLYING DISEASE (HCC): ICD-10-CM

## 2024-05-06 DIAGNOSIS — Z87.19 HISTORY OF PANCREATITIS: ICD-10-CM

## 2024-05-06 DIAGNOSIS — C44.219 BASAL CELL CARCINOMA (BCC) OF SKIN OF LEFT EAR: ICD-10-CM

## 2024-05-06 DIAGNOSIS — D69.6 THROMBOCYTOPENIA (HCC): ICD-10-CM

## 2024-05-06 DIAGNOSIS — I10 BENIGN ESSENTIAL HYPERTENSION: ICD-10-CM

## 2024-05-06 LAB
ALBUMIN SERPL-MCNC: 3.4 G/DL (ref 3.4–5)
ALBUMIN/GLOB SERPL: 1 {RATIO} (ref 1–2)
ALP LIVER SERPL-CCNC: 45 U/L
ALT SERPL-CCNC: 17 U/L
ANION GAP SERPL CALC-SCNC: 5 MMOL/L (ref 0–18)
AST SERPL-CCNC: 11 U/L (ref 15–37)
BASOPHILS # BLD AUTO: 0.04 X10(3) UL (ref 0–0.2)
BASOPHILS NFR BLD AUTO: 0.6 %
BILIRUB SERPL-MCNC: 0.8 MG/DL (ref 0.1–2)
BUN BLD-MCNC: 45 MG/DL (ref 9–23)
CALCIUM BLD-MCNC: 9.2 MG/DL (ref 8.5–10.1)
CHLORIDE SERPL-SCNC: 106 MMOL/L (ref 98–112)
CK SERPL-CCNC: 18 U/L
CO2 SERPL-SCNC: 28 MMOL/L (ref 21–32)
CREAT BLD-MCNC: 1.73 MG/DL
EGFRCR SERPLBLD CKD-EPI 2021: 39 ML/MIN/1.73M2 (ref 60–?)
EOSINOPHIL # BLD AUTO: 0.15 X10(3) UL (ref 0–0.7)
EOSINOPHIL NFR BLD AUTO: 2.2 %
ERYTHROCYTE [DISTWIDTH] IN BLOOD BY AUTOMATED COUNT: 16.8 %
ERYTHROCYTE [SEDIMENTATION RATE] IN BLOOD: 9 MM/HR
FASTING STATUS PATIENT QL REPORTED: YES
GLOBULIN PLAS-MCNC: 3.5 G/DL (ref 2.8–4.4)
GLUCOSE BLD-MCNC: 93 MG/DL (ref 70–99)
HCT VFR BLD AUTO: 39.7 %
HGB BLD-MCNC: 12.7 G/DL
IMM GRANULOCYTES # BLD AUTO: 0.03 X10(3) UL (ref 0–1)
IMM GRANULOCYTES NFR BLD: 0.4 %
LYMPHOCYTES # BLD AUTO: 1.44 X10(3) UL (ref 1–4)
LYMPHOCYTES NFR BLD AUTO: 20.7 %
MCH RBC QN AUTO: 34.8 PG (ref 26–34)
MCHC RBC AUTO-ENTMCNC: 32 G/DL (ref 31–37)
MCV RBC AUTO: 108.8 FL
MONOCYTES # BLD AUTO: 0.51 X10(3) UL (ref 0.1–1)
MONOCYTES NFR BLD AUTO: 7.3 %
NEUTROPHILS # BLD AUTO: 4.77 X10 (3) UL (ref 1.5–7.7)
NEUTROPHILS # BLD AUTO: 4.77 X10(3) UL (ref 1.5–7.7)
NEUTROPHILS NFR BLD AUTO: 68.8 %
OSMOLALITY SERPL CALC.SUM OF ELEC: 299 MOSM/KG (ref 275–295)
PLATELET # BLD AUTO: 111 10(3)UL (ref 150–450)
POTASSIUM SERPL-SCNC: 4.4 MMOL/L (ref 3.5–5.1)
PROT SERPL-MCNC: 6.9 G/DL (ref 6.4–8.2)
RBC # BLD AUTO: 3.65 X10(6)UL
SODIUM SERPL-SCNC: 139 MMOL/L (ref 136–145)
WBC # BLD AUTO: 6.9 X10(3) UL (ref 4–11)

## 2024-05-06 PROCEDURE — 80053 COMPREHEN METABOLIC PANEL: CPT

## 2024-05-06 PROCEDURE — 85652 RBC SED RATE AUTOMATED: CPT

## 2024-05-06 PROCEDURE — 36415 COLL VENOUS BLD VENIPUNCTURE: CPT

## 2024-05-06 PROCEDURE — 82550 ASSAY OF CK (CPK): CPT

## 2024-05-06 PROCEDURE — 85025 COMPLETE CBC W/AUTO DIFF WBC: CPT

## 2024-05-06 NOTE — PROGRESS NOTES
Spoke to Miguel for CCM.      Updates to patient care team/comments: UTD  Patient reported changes in medications: UTD  Med Adherence  Comment: pt taking medications as prescribed     Health Maintenance:   Health Maintenance   Topic Date Due    Annual Physical  01/29/2025    Colorectal Cancer Screening  04/12/2027    Influenza Vaccine  Completed    Annual Depression Screening  Completed    Fall Risk Screening (Annual)  Completed    Pneumococcal Vaccine: 65+ Years  Completed    Zoster Vaccines  Completed       Patient updates/concerns:    Spoke to pt for monthly outreach   Pt states that his struggles in providing care for his wife continue to affect him but he feels that has become more resilient. Pt states his wifes condition was starting to negatively influence his ability to care for his own condition. Pt states he has seen a Counsellor in the past and does not rule out the potential need for one in the future, but at this time he feels that his support system of family and friends is helping him immensely. CCM discussed [facility] over the phone and pt verbalized understanding and will f/u with ccm if he decides to pursue that route. Pt states he would like to find a caregiver support group first to see how effective that is. CCM will f/u if one is located conveniently to him.   Pt completed colonoscopy and a number of polyps were biopsied pt states that the results were negative.   Pt had to postpone cath procedure. Pt staes that the sedation for colonoscopy affected him more significantly and he was apprehensive about begin sedated again without recovering fully from previous sedation. Pt states he has been in contact with cardiologist office and is awaiting a response for new date. Pt states that he does not require ccm assistance in contacting cardiac team. Pt will f/u   Pt is more consistently using cpap. He states that mohs area has healed and he can comfortably wear the mask without  disturbing a healing wound. Pt is falling asleep staying asleep and waking well rested.   Pt has been more consistent with cardio lawrence machine . Pt does not have follow up scheduled with cardiology because cardiologist is leaving practice . Pt states he has name and contact information for recommended replacement.   Pt did not have any new questions or concerns.     Goals/Action Plan:    Active goal from previous outreach:   Staying healthy   Patient reported progress towards goals: pt continues to take medications as needed and sees providers and specialists as requested               - What: exercise           - Where/When/How: pt is getting most of his exercise from daily activity and has prioritized finding time to get more strenuous activity. 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     Time Spent This Encounter Total: 29 min medical record review, telephone communication, care plan updates where needed, education, goals, and action plan recreation/update. Provided acknowledgment and validation to patient's concerns.   Monthly Minute Total including today: 29  Physical assessment, complete health history, and need for CCM established by Eric Simon MD.

## 2024-05-07 ENCOUNTER — OFFICE VISIT (OUTPATIENT)
Dept: RHEUMATOLOGY | Facility: CLINIC | Age: 79
End: 2024-05-07
Payer: MEDICARE

## 2024-05-07 ENCOUNTER — HOSPITAL ENCOUNTER (OUTPATIENT)
Dept: CARDIOLOGY CLINIC | Facility: HOSPITAL | Age: 79
Discharge: HOME | End: 2024-05-07
Attending: NURSE PRACTITIONER
Payer: MEDICARE

## 2024-05-07 VITALS
HEART RATE: 73 BPM | TEMPERATURE: 98 F | DIASTOLIC BLOOD PRESSURE: 60 MMHG | OXYGEN SATURATION: 100 % | BODY MASS INDEX: 27.86 KG/M2 | SYSTOLIC BLOOD PRESSURE: 94 MMHG | WEIGHT: 199 LBS | RESPIRATION RATE: 16 BRPM | HEIGHT: 71 IN

## 2024-05-07 DIAGNOSIS — I43 CARDIOMYOPATHY AS MANIFESTATION OF UNDERLYING DISEASE (HCC): ICD-10-CM

## 2024-05-07 DIAGNOSIS — D69.6 THROMBOCYTOPENIA (HCC): ICD-10-CM

## 2024-05-07 DIAGNOSIS — I50.812 CHRONIC RIGHT-SIDED CONGESTIVE HEART FAILURE (HCC): Primary | ICD-10-CM

## 2024-05-07 DIAGNOSIS — M35.1 MIXED CONNECTIVE TISSUE DISEASE (HCC): Primary | ICD-10-CM

## 2024-05-07 DIAGNOSIS — I50.812 CHRONIC RIGHT-SIDED CONGESTIVE HEART FAILURE (HCC): ICD-10-CM

## 2024-05-07 DIAGNOSIS — Z95.818 PRESENCE OF WATCHMAN LEFT ATRIAL APPENDAGE CLOSURE DEVICE: ICD-10-CM

## 2024-05-07 DIAGNOSIS — D69.3 IMMUNE THROMBOCYTOPENIC PURPURA (HCC): ICD-10-CM

## 2024-05-07 DIAGNOSIS — M33.20 POLYMYOSITIS (HCC): Chronic | ICD-10-CM

## 2024-05-07 PROCEDURE — 99214 OFFICE O/P EST MOD 30 MIN: CPT | Performed by: INTERNAL MEDICINE

## 2024-05-07 PROCEDURE — 93264 REM MNTR WRLS P-ART PRS SNR: CPT | Performed by: NURSE PRACTITIONER

## 2024-05-07 NOTE — PROGRESS NOTES
[facility]  Dr. Farmer Progress Note     Subjective:   Miguel Davila is a(n) 79 year old male.   Current complaints:   Chief Complaint   Patient presents with    Follow - Up     LOV  2/1/24 Mixed connective tissue disease - Pt is doing ok but had severe fatigue after colonoscopy procedure in April.  Pt is taking IVIG monthly, ALLOPURINOL 300 MG per day, Prednisone 7.5 mg per day   History of mixed connective tissue disease, with overlap of polymyositis, lupus, and thrombocytopenia.  Now dealing with a cardiomyopathy.  On IVIG monthly which is definitely helped this condition.  On prednisone 7.5 mg/day.  Feeling ok.  Had a colonoscopy and gastroscopy - was very tired after it.  Colonoscopy results ok.  Gastroscopy ok to.   Polyps removed benign.   Next IV IG  end of the month.    Strength pretty good, stamina so so.  Tough dealing with wife's dementia.     On Eneresto.  l  Objective:   BP 94/60   Pulse 73   Temp 97.9 °F (36.6 °C) (Temporal)   Resp 16   Ht 5' 11\" (1.803 m)   Wt 199 lb (90.3 kg)   SpO2 100%   BMI 27.75 kg/m²   Lungs clear  Heart nsr   Hands ok   Ext  no edema   No tender joints.   5/6/2024 glucose 93 sodium 139 potassium 4.4 chloride 106 BUN 45 creatinine 1.73 calcium 9.2 GFR 39  Alkaline phosphatase 45 AST 11 ALT 17 bilirubin 0.8 globulin 3.5 total protein 6.2 albumin 3.4 CK normal at 18  White count 6.9 hemoglobin 12.7 Plats 111,000  Sed rate 9    Assessment:     Encounter Diagnoses   Name Primary?    Mixed connective tissue disease (HCC) Yes    Polymyositis (HCC)     Presence of Watchman left atrial appendage closure device     Cardiomyopathy as manifestation of underlying disease (HCC)     Chronic right-sided congestive heart failure (HCC)     Immune thrombocytopenic purpura (HCC)     Thrombocytopenia (HCC)      Plan:     Patient Instructions   Continue IVIG treatments once a month.  Continue prednisone 7.5 mg daily.  Activity as tolerated.  On Aaron via cardiology  service.  Labs show mild thrombocytopenia platelet count 111,000.  Hemoglobin 12.7 just trace anemic.  CPK level normal.  Strength level normal.  Polymyositis quiet.  Return to office for recheck 3 months with new labs.        Taye Farmer MD 5/7/2024 11:07 AM

## 2024-05-07 NOTE — PATIENT INSTRUCTIONS
Continue IVIG treatments once a month.  Continue prednisone 7.5 mg daily.  Activity as tolerated.  On Aaron via cardiology service.  Labs show mild thrombocytopenia platelet count 111,000.  Hemoglobin 12.7 just trace anemic.  CPK level normal.  Strength level normal.  Polymyositis quiet.  Return to office for recheck 3 months with new labs.

## 2024-05-13 ENCOUNTER — EKG ENCOUNTER (OUTPATIENT)
Dept: LAB | Facility: HOSPITAL | Age: 79
End: 2024-05-13
Attending: INTERNAL MEDICINE
Payer: MEDICARE

## 2024-05-13 ENCOUNTER — HOSPITAL ENCOUNTER (OUTPATIENT)
Dept: CV DIAGNOSTICS | Facility: HOSPITAL | Age: 79
End: 2024-05-13
Attending: INTERNAL MEDICINE

## 2024-05-13 ENCOUNTER — HOSPITAL ENCOUNTER (OUTPATIENT)
Dept: GENERAL RADIOLOGY | Facility: HOSPITAL | Age: 79
Discharge: HOME | End: 2024-05-13
Attending: INTERNAL MEDICINE

## 2024-05-13 DIAGNOSIS — Z01.818 PREOPERATIVE EXAMINATION, UNSPECIFIED: ICD-10-CM

## 2024-05-13 DIAGNOSIS — Z01.818 PRE-PROCEDURAL EXAMINATION: Primary | ICD-10-CM

## 2024-05-13 DIAGNOSIS — Z01.812 PRE-OPERATIVE LABORATORY EXAMINATION: ICD-10-CM

## 2024-05-13 PROCEDURE — 71046 X-RAY EXAM CHEST 2 VIEWS: CPT | Performed by: INTERNAL MEDICINE

## 2024-05-14 ENCOUNTER — LAB ENCOUNTER (OUTPATIENT)
Dept: LAB | Age: 79
End: 2024-05-14
Attending: INTERNAL MEDICINE

## 2024-05-14 DIAGNOSIS — I50.9 HEART FAILURE, UNSPECIFIED (HCC): Primary | ICD-10-CM

## 2024-05-14 DIAGNOSIS — Z01.818 PREOPERATIVE EXAMINATION, UNSPECIFIED: ICD-10-CM

## 2024-05-14 DIAGNOSIS — R73.9 BLOOD GLUCOSE ELEVATED: ICD-10-CM

## 2024-05-14 DIAGNOSIS — Z01.812 PRE-PROCEDURE LAB EXAM: ICD-10-CM

## 2024-05-14 LAB
ANION GAP SERPL CALC-SCNC: 5 MMOL/L (ref 0–18)
ATRIAL RATE: 110 BPM
BASOPHILS # BLD AUTO: 0.03 X10(3) UL (ref 0–0.2)
BASOPHILS NFR BLD AUTO: 0.5 %
BUN BLD-MCNC: 52 MG/DL (ref 9–23)
CALCIUM BLD-MCNC: 8.9 MG/DL (ref 8.5–10.1)
CHLORIDE SERPL-SCNC: 113 MMOL/L (ref 98–112)
CO2 SERPL-SCNC: 27 MMOL/L (ref 21–32)
CREAT BLD-MCNC: 2.03 MG/DL
EGFRCR SERPLBLD CKD-EPI 2021: 32 ML/MIN/1.73M2 (ref 60–?)
EOSINOPHIL # BLD AUTO: 0.15 X10(3) UL (ref 0–0.7)
EOSINOPHIL NFR BLD AUTO: 2.5 %
ERYTHROCYTE [DISTWIDTH] IN BLOOD BY AUTOMATED COUNT: 16.7 %
EST. AVERAGE GLUCOSE BLD GHB EST-MCNC: 108 MG/DL (ref 68–126)
FASTING STATUS PATIENT QL REPORTED: YES
GLUCOSE BLD-MCNC: 104 MG/DL (ref 70–99)
HBA1C MFR BLD: 5.4 % (ref ?–5.7)
HCT VFR BLD AUTO: 40.5 %
HGB BLD-MCNC: 13 G/DL
IMM GRANULOCYTES # BLD AUTO: 0.02 X10(3) UL (ref 0–1)
IMM GRANULOCYTES NFR BLD: 0.3 %
LYMPHOCYTES # BLD AUTO: 1.34 X10(3) UL (ref 1–4)
LYMPHOCYTES NFR BLD AUTO: 21.9 %
MCH RBC QN AUTO: 35 PG (ref 26–34)
MCHC RBC AUTO-ENTMCNC: 32.1 G/DL (ref 31–37)
MCV RBC AUTO: 109.2 FL
MONOCYTES # BLD AUTO: 0.55 X10(3) UL (ref 0.1–1)
MONOCYTES NFR BLD AUTO: 9 %
NEUTROPHILS # BLD AUTO: 4.03 X10 (3) UL (ref 1.5–7.7)
NEUTROPHILS # BLD AUTO: 4.03 X10(3) UL (ref 1.5–7.7)
NEUTROPHILS NFR BLD AUTO: 65.8 %
OSMOLALITY SERPL CALC.SUM OF ELEC: 314 MOSM/KG (ref 275–295)
PLATELET # BLD AUTO: 118 10(3)UL (ref 150–450)
POTASSIUM SERPL-SCNC: 4.5 MMOL/L (ref 3.5–5.1)
Q-T INTERVAL: 398 MS
QRS DURATION: 100 MS
QTC CALCULATION (BEZET): 464 MS
R AXIS: 25 DEGREES
RBC # BLD AUTO: 3.71 X10(6)UL
SODIUM SERPL-SCNC: 145 MMOL/L (ref 136–145)
T AXIS: 6 DEGREES
VENTRICULAR RATE: 82 BPM
WBC # BLD AUTO: 6.1 X10(3) UL (ref 4–11)

## 2024-05-14 PROCEDURE — 83036 HEMOGLOBIN GLYCOSYLATED A1C: CPT

## 2024-05-14 PROCEDURE — 36415 COLL VENOUS BLD VENIPUNCTURE: CPT

## 2024-05-14 PROCEDURE — 85025 COMPLETE CBC W/AUTO DIFF WBC: CPT

## 2024-05-14 PROCEDURE — 80048 BASIC METABOLIC PNL TOTAL CA: CPT

## 2024-05-15 DIAGNOSIS — I50.32 CHRONIC DIASTOLIC HEART FAILURE (HCC): Primary | ICD-10-CM

## 2024-05-15 RX ORDER — MELATONIN
1000 DAILY
COMMUNITY

## 2024-05-15 RX ORDER — FEXOFENADINE HCL 180 MG/1
180 TABLET ORAL DAILY PRN
COMMUNITY

## 2024-05-15 NOTE — PAT NURSING NOTE
PreOp Instructions     You are scheduled for: a Cardiac Procedure     Date of Procedure: 05/20/24 Monday     Diet Instructions: Do not eat or drink anything after midnight     Medications: Medications you are allowed to take can be taken with a sip of water the morning of your procedure     Medications to Stop: Hold herbal supplements and vitamins morning of procedure    Sleep Apnea: If you have sleep apnea, please bring your mask and tubing     Skin Prep: Shower with antibacterial soap using a clean washcloth, prior to procedure     Arrival Time: The business day prior to your procedure you will receive a phone call before 6:00 pm with your arrival time. If you haven't received a phone call, please check your voicemail messages., If you did not receive a voice mail and it is after 6:00 pm, please call the nursing supervisor at [phone].    Driving After Procedure: If sedation is given, you WILL NOT be able to drive home. You will need a responsible adult  to drive you home., Cannot take uber or cab unless approved by physician     Discharge Teaching: Your nurse will give you specific instructions before discharge, Any questions, please call the physician's office, Most people can resume normal activities in 2-3 days      parking is available starting at 6 am or park in the [place] parking garage at [facility]. Check in at the [facility] reception desk. Our  will be there to check you in for your procedure. Please bring your insurance cards and ID with you.                                                                                                                                      Please DO NOT respond to this message, the inbasket is not monitored for messages. For any questions, please call the physician's office.

## 2024-05-16 NOTE — PROGRESS NOTES
[facility] Progress Note    Miguel Davila is a 79 year old male who presents to clinic for APN assessment and management of chronic diastolic heart failure and is functional class 2-3.     Subjective:  Since his last clinic visit on 4/17 when no changes were made he rescheduled RHC for 5/20. RHC has been recommended to assess for worsening precapillary PH given rise in PAD and BVA that suggested he is hypovolemic. He cancelled prior one d.t increased fatigue after EGD.     He saw Dr. Farmer 5/7; to continue IVIG once a month. To follow up in 3 months. No changes made. Last IVIG 4/24.     Today, his weight is up a couple lbs. He feels energy levels and abdominal bloating have improved. Denies leg swelling. Breathing and appetite have been good. He finally feels like lupus flare is over.      He had a EGD and colonoscopy by Dr. Neves on 4/12 and had shown fungal esophagitis and several polyps. Recommended Nystatin and no further surveillance due to his age and co morbidities.     He underwent a BVA 11/17 that was suggestive of severe dehydration and anemia. Iron studies revealed iron sat of 27 and ferritin 50.9. CardioMEMs 29 mmhg on day of BVA with weight of 193 lbs.  Since we reduced diuretics and then had to increase them d.t fluid overload.  PAD has been 29-34 mmHg in the last week and stable.     He covered 900ft on 6 minute walk today with no desaturation. The same distance he covered 5/2023.          5/12/2023 12/12/2023   6 MIN WALK     Resting SpO2 (minimum) 100 %     Resting HR (max) 85     Resting Oxygen Device None     Unable to perform 6 min walk  Patient refused    SpO2 with exertion (minimum) 93     HR with exertion (max) 91     Intervention oxygen device None     SpO2 post intervention     Number of laps made 18 laps     Distance Ambulated (ft) 900 ft     Level < 984 ft     Minutes required to return to resting level 0     Recovery SpO2 95 %     Recovery HR  72     Recovery oxygen device None     Exertion Scale Very light     Angina Scale 0     Dyspnea Scale 0        5/17/2024   6 MIN WALK    Resting SpO2 (minimum) 100 %    Resting HR (max) 81    Resting Oxygen Device None    Unable to perform 6 min walk    SpO2 with exertion (minimum) 95    HR with exertion (max) 120    Intervention oxygen device None    SpO2 post intervention 98    Number of laps made 18 laps    Distance Ambulated (ft) 900 ft    Level < 984 ft    Minutes required to return to resting level 1    Recovery SpO2 100 %    Recovery HR 93    Recovery oxygen device None    Exertion Scale Very light    Angina Scale 0    Dyspnea Scale 0      Review of Systems   Constitutional: Positive for malaise/fatigue (improved energy) and weight gain.   Cardiovascular:  Positive for leg swelling (mild).   Respiratory: Negative.     Gastrointestinal: Negative.    Neurological:  Negative for dizziness.     HISTORY:  Past Medical History:    A-fib (HCC)    Arrhythmia    atrial fibrillation    Arthritis    Autoimmune disease (HCC)    hepatits, resolved anfd nephritis, resolved    Ramon's esophagus    Bleeding nose    Gums Treated    Blood disorder    thrombocytopenia    Blood in urine    Blurred vision    cataract due to steroids, surgery in June    Calculus of kidney    One time    Cancer (HCC)    skin    Candidiasis of the esophagus    Due to steroid use for Autoimmune disorder     Cataract    Colon polyps    Congestive heart disease (HCC)    Diarrhea, unspecified    Intermittent due to lupus    Diverticulosis of colon    Easy bruising    On and off prednisone for 20 years    Esophageal polyp    Esophageal reflux    Essential hypertension    Fatigue    polymyositis and lupus    Gout    Hammer toe, acquired    Heart palpitations    Afib    Hepatitis    autoimmune induce hepatitis d/t lupus    High blood pressure    IBS (irritable bowel syndrome)    mild d/t lupus    Irregular bowel habits    Leg swelling    Heart failure,  probably caused by lupus    Lupus (HCC)    MCTD (mixed connective tissue disease) (HCC)    Obstructive sleep apnea (adult) (pediatric)    LI (obstructive sleep apnea)    AHI 37  Supine AHI 38 non-supine AHI 16 Sao2 Pj 83%     LI (obstructive sleep apnea)    AHI 36 RDI 36 REM AHI 45 Supine AHI 65 non-supine AHI 19 Sao2 Pj 86% CPAP 9cwp    Pain in joints    Personal history of antineoplastic chemotherapy    Pleural effusion    right    Pneumonia due to organism    Polymyositis (HCC)    Presence of other cardiac implants and grafts    watchman filter    Problems with swallowing    dysphagia in 2006    Pulmonary hypertension (HCC)    Raynaud disease    Raynaud disease    Renal disorder    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    Shortness of breath    mainly due to pm or lupus    Sleep apnea    CPAP    Thrombocytopathia (HCC)    Visual impairment    bifocals for reading & computer; distance for driving    Wears glasses      Past Surgical History:   Procedure Laterality Date    Appendectomy  1970    Appendectomy      Cardiac catheterization  09/2019    Cataract Bilateral     Colonoscopy  10/2003 2006 01/2010    x3    Colonoscopy  5/14/2013    Colonoscopy N/A 5/1/2018    Procedure: COLONOSCOPY;  Surgeon: Isaiah Tobar MD;  Location:  ENDOSCOPY    Colonoscopy N/A 4/12/2024    Procedure: COLONOSCOPY;  Surgeon: Gerardo Neves MD;  Location:  ENDOSCOPY    Colonoscopy with biopsy  5/14/13    Egd      Hand/finger surgery unlisted  2003    right    Needle biopsy liver      Other  12/2005    needle bipsy of kidney    Other surgical history  2017    watchman filter    Percutaneous  angioplasty  (ptca)- pbp only  2006    right    Rectum surgery procedure unlisted  1946    rectal surgery polypectomy    Skin surgery      MMS BCC R temple 6/24/09    Skin surgery  2007    basal ceell carcinoma    Skin surgery  7-18-12    BCC-nodular to right superior eyebrow/ MOHS    Skin surgery  07/15/2013    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/MMS     Skin surgery  14    BCC-NOD to right superior pinna, MMS, AB    Skin surgery  2021    BCC- left superior forehead, MMS     Skin surgery  2022    SCC IN SITU RIGHT ANTIHELIX MMS BY DR GASTELUM    Tonsillectomy      Upper gi endoscopy,exam  10/2003 2006 2010 , 5/13    x3      Family History   Problem Relation Age of Onset    Heart Disease Father         CHF    Gastro-Intestinal Disorder Father         Diverticulosis    Alcohol and Other Disorders Associated Father     Heart Attack Father     Heart Disease Mother         CHF    Breast Cancer Mother     Stroke Mother     Cancer Paternal Grandfather     Heart Attack Paternal Grandmother     Kidney Disease Son     Other (Ramon's Esophagus) Son     Heart Attack Maternal Grandfather       Social History     Socioeconomic History    Marital status:    Occupational History    Occupation: Retired    Tobacco Use    Smoking status: Former     Current packs/day: 0.00     Average packs/day: 0.5 packs/day for 15.0 years (7.5 ttl pk-yrs)     Types: Cigarettes     Start date: 1958     Quit date: 1973     Years since quittin.8    Smokeless tobacco: Never    Tobacco comments:     5 cigarettes daily, stopped smoking in    Vaping Use    Vaping status: Never Used   Substance and Sexual Activity    Alcohol use: Yes     Alcohol/week: 13.0 - 15.0 standard drinks of alcohol     Types: 5 Glasses of wine, 8 - 10 Standard drinks or equivalent per week     Comment: 1 per day wine or liquor    Drug use: Never   Other Topics Concern    Caffeine Concern Yes     Comment: 1 soda per day and decaf coffee    Sleep Concern No    Exercise Yes     Comment: 3-4 times weekly    Seat Belt Yes           Objective:  Telemetry: AF       /80   Pulse 77   Resp 21   Wt 200 lb (90.7 kg)   SpO2 100%   BMI 27.89 kg/m²     Wt Readings from Last 6 Encounters:   24 200 lb (90.7 kg)   24 199 lb (90.3 kg)   24 198 lb (89.8 kg)   24 198 lb  3.2 oz (89.9 kg)   04/09/24 193 lb (87.5 kg)   03/13/24 193 lb (87.5 kg)     4/8 Echo:   Impressions  The study quality is average.    Impression - TTE  The left ventricle is normal in size. The left ventricular ejection fraction is 58%. Left ventricular diastolic function is indeterminate. Noted left ventricular hypertrophy. Concentric left ventricular hypertrophy is present. It is mild.    Impression - TTE  Noted evidence of aortic regurgitation. Mild-to-moderate aortic regurgitation.   Impression - TTE  Mitral regurgitation is noted. At least moderate mitral regurgitation   Impression - TTE  The left atrial diameter is severely increased.   Impression - TTE  The right ventricle is moderately enlarged. Right ventricular systolic function is normal.   Impression - TTE  Evidence of tricuspid regurgitation is present. Mild to moderate (1-2+) tricuspid regurgitation.        Recent Results (from the past 24 hour(s))   Basic Metabolic Panel (8)    Collection Time: 05/17/24  1:26 PM   Result Value Ref Range    Glucose 78 70 - 99 mg/dL    Sodium 141 136 - 145 mmol/L    Potassium 4.7 3.5 - 5.1 mmol/L    Chloride 108 98 - 112 mmol/L    CO2 30.0 21.0 - 32.0 mmol/L    Anion Gap 3 0 - 18 mmol/L    BUN 49 (H) 9 - 23 mg/dL    Creatinine 1.88 (H) 0.70 - 1.30 mg/dL    Calcium, Total 9.2 8.5 - 10.1 mg/dL    Calculated Osmolality 304 (H) 275 - 295 mOsm/kg    eGFR-Cr 35 (L) >=60 mL/min/1.73m2    Patient Fasting for BMP? No        Current Outpatient Medications:     fexofenadine 180 MG Oral Tab, Take 1 tablet (180 mg total) by mouth daily as needed., Disp: , Rfl:     cholecalciferol 25 MCG (1000 UT) Oral Tab, Take 1 tablet (1,000 Units total) by mouth daily., Disp: , Rfl:     spironolactone 25 MG Oral Tab, Take 0.5 tablets (12.5 mg total) by mouth daily. Holding for now till potassium is rechecked, Disp: , Rfl:     levothyroxine 50 MCG Oral Tab, Take 1 tablet (50 mcg total) by mouth before breakfast., Disp: , Rfl:     mupirocin 2 %  External Ointment, APPLY TWICE DAILY TO SCALP UNTIL WOUND IS HEALED., Disp: , Rfl:     ipratropium 0.06 % Nasal Solution, 1 spray by Nasal route 2 (two) times daily., Disp: 1 each, Rfl: 5    omeprazole 20 MG Oral Capsule Delayed Release, Take 1 capsule (20 mg total) by mouth 2 (two) times daily., Disp: 180 capsule, Rfl: 1    bumetanide 1 MG Oral Tab, Take 2 mg of Bumex daily in the AM. Take 1 mg in the PM on Tuesday, Thursday, Saturday and Sunday. Take 2 mg in the PM on Monday, Wednesday and Friday, Disp: 360 tablet, Rfl: 1    ALLOPURINOL 300 MG Oral Tab, TAKE 1 TABLET BY MOUTH EVERY DAY, Disp: 90 tablet, Rfl: 3    predniSONE 5 MG Oral Tab, Take 1 tablet (5 mg total) by mouth daily with breakfast., Disp: 90 tablet, Rfl: 3    metoprolol succinate  MG Oral Tablet 24 Hr, Take 1 tablet (100 mg total) by mouth every morning AND 0.5 tablets (50 mg total) every evening., Disp: 60 tablet, Rfl: 3    PREDNISONE 1 MG Oral Tab, TAKE 2 TABLETS (2 MG TOTAL) BY MOUTH DAILY WITH BREAKFAST., Disp: 180 tablet, Rfl: 3    sacubitril-valsartan 49-51 MG Oral Tab, Take 1 tablet by mouth 2 (two) times daily., Disp: , Rfl:     aspirin 81 MG Oral Tab EC, Take 1 tablet (81 mg total) by mouth daily., Disp: 30 tablet, Rfl: 0    PREVIDENT 5000 BOOSTER PLUS 1.1 % Dental Paste, , Disp: , Rfl:     Multiple Vitamins-Minerals (TAB-A-KEVIN) Oral Tab, Take 1 tablet by mouth daily., Disp: , Rfl:     Immune Globulin, Human, 10 g Intravenous Recon Soln, Inject 0.4 g/kg into the vein. Given for treatment of polymyositis, mixed connective tissue disease, and immune thrombocytopenia purpura monthly at [facility]. Every 5 weeks, Disp: 3 each, Rfl: 0    Calcium Citrate-Vitamin D (CITRACAL + D OR), Take 1 tablet by mouth daily., Disp: , Rfl:     Exam:   General:         Alert, in no apparent distress  HEENT:           no JVD   Lungs:            CTAB                     CV:                   irregularly irregular  Abdomen:       round, soft,  non-tender  Extremities:    trace-+1 LE edema  Neuro:             A&O x 3  Skin:                Pink, warm, dry    Education:  Patient instructed regarding sodium restricted diet, low sodium foods, fluid restriction, daily weights, medication regimen, s/s HF exacerbation and when to call APN/clinic.    Assessment:   Chronic diastolic, RV heart failure - LVEF 58% and stable with normal RV function per echo 4/8. RHC 7/2022 with PCWP 20, RA 11. S/p CardioMEMs implanted on 8/2022 with 10 mmhg gradient between his PAD and wedge on RHC, with PAD running higher. Goal thought to be ~ 20 mmhg previously but since goal has been increased with the guidance of clinical exam, renal indices and BVA. Range thought to be closer to 29-35 mmHg. BVA 11/2023 suggestive of dehydration, PAD 29 on day of BVA. PAD 29-34 mmHg recently and stable.  Last ProBNP 2,029 on 3/01 which is improved from 3,502 in January. Off Jardiance, thought to be due to pancreatitis. MRA discontinued at [facility] for dehydration and near syncope; since have restarted but required reduced dose due to hyperkalemia.   PH, mild combined pre and post capillary - RHC 8/2022 demonstrates mPAP 27 with wedge 8 mmHg, RA 5 mmHg, PVR 2.7 and CI 3.4. RV function normal in April. DPG 10 on RHC when MEMs placed. Plan for RHC 5/20.   Moderate MR/Mild-Mod TR/AI - on MCI echo 4/2024 and unchanged.   Dilated ascending aorta - 4.1 cm per echo 4/2024.  CKD Stage 3b - baseline creatinine ~ 1.8-2.0 mg/dl, stable. Follows with Dr. Devine.    Chronic Afib - s/p Watchman. Rates controlled.    LI - hasn't  been using CPAP recently d/t MOHs surgery on his head. Plans to restart wearing CPAP soon. This may also have contributed to higher PA pressures.   Recurrent bilateral pleural effusions - hx of thoracentesis.   Hx Lupus/Mixed CTD/Severe polymyositis - continues IVIG infusions every 5 weeks, last dose 4/24. Follows with Dr. Farmer. On chronic prednisone. Off Imuran due to  pancytopenia.  Non-obstructive CAD  Chronic thrombocytopenia, immune mediated. Last PLT 118K. Follows with Dr. Orr.  Hx Hyponatremia  HERNANDEZ with biopsy proven stage F0-F1 Fibrosis on US 1/2022. Follows with Dr. Veronica, Hepatology at [facility]. Seen 1/2, to follow up in a year. Recent LFT's normal.   Basal/Squamous cell carcinoma - hx Mohs surgery x2.   Mild hyperkalemia - on low dose MRA and ARNI. Has required Veltassa in the past. K 4.7 today.   Anemia - last Hgb 13.0 and stable. Recent iron studies with sat 27 and ferritin 50.9 in November.    Elevated TSH, normal T4. Started on Synthroid per Dr. Jean in March, plan to repeat labs in June.    Plan:     Continue current medications.    RHC 5/20.   Continue to follow MEMs readings.   Return to clinic 2 weeks after RHC.   CHF discharge instructions given    40 minutes spent with patient and greater than 50% of the time was spent counseling and coordinating care.       Tasha Barth NP   5/17/2024

## 2024-05-17 ENCOUNTER — HOSPITAL ENCOUNTER (OUTPATIENT)
Dept: CARDIOLOGY CLINIC | Facility: HOSPITAL | Age: 79
Discharge: HOME | End: 2024-05-17
Attending: NURSE PRACTITIONER

## 2024-05-17 ENCOUNTER — HOSPITAL ENCOUNTER (OUTPATIENT)
Dept: LAB | Facility: HOSPITAL | Age: 79
Discharge: HOME | End: 2024-05-17
Attending: NURSE PRACTITIONER

## 2024-05-17 VITALS
DIASTOLIC BLOOD PRESSURE: 80 MMHG | OXYGEN SATURATION: 100 % | RESPIRATION RATE: 21 BRPM | HEART RATE: 77 BPM | SYSTOLIC BLOOD PRESSURE: 110 MMHG | BODY MASS INDEX: 28 KG/M2 | WEIGHT: 200 LBS

## 2024-05-17 DIAGNOSIS — I43 CARDIOMYOPATHY AS MANIFESTATION OF UNDERLYING DISEASE (HCC): ICD-10-CM

## 2024-05-17 DIAGNOSIS — I50.812 CHRONIC RIGHT-SIDED CONGESTIVE HEART FAILURE (HCC): Primary | ICD-10-CM

## 2024-05-17 DIAGNOSIS — I50.32 CHRONIC DIASTOLIC HEART FAILURE (HCC): ICD-10-CM

## 2024-05-17 DIAGNOSIS — I48.19 PERSISTENT ATRIAL FIBRILLATION (HCC): ICD-10-CM

## 2024-05-17 DIAGNOSIS — N18.32 STAGE 3B CHRONIC KIDNEY DISEASE (HCC): ICD-10-CM

## 2024-05-17 LAB
ANION GAP SERPL CALC-SCNC: 3 MMOL/L (ref 0–18)
BUN BLD-MCNC: 49 MG/DL (ref 9–23)
CALCIUM BLD-MCNC: 9.2 MG/DL (ref 8.5–10.1)
CHLORIDE SERPL-SCNC: 108 MMOL/L (ref 98–112)
CO2 SERPL-SCNC: 30 MMOL/L (ref 21–32)
CREAT BLD-MCNC: 1.88 MG/DL
EGFRCR SERPLBLD CKD-EPI 2021: 35 ML/MIN/1.73M2 (ref 60–?)
FASTING STATUS PATIENT QL REPORTED: NO
GLUCOSE BLD-MCNC: 78 MG/DL (ref 70–99)
OSMOLALITY SERPL CALC.SUM OF ELEC: 304 MOSM/KG (ref 275–295)
POTASSIUM SERPL-SCNC: 4.7 MMOL/L (ref 3.5–5.1)
SODIUM SERPL-SCNC: 141 MMOL/L (ref 136–145)

## 2024-05-17 PROCEDURE — 99215 OFFICE O/P EST HI 40 MIN: CPT | Performed by: NURSE PRACTITIONER

## 2024-05-17 PROCEDURE — 80048 BASIC METABOLIC PNL TOTAL CA: CPT | Performed by: NURSE PRACTITIONER

## 2024-05-17 PROCEDURE — 36415 COLL VENOUS BLD VENIPUNCTURE: CPT | Performed by: NURSE PRACTITIONER

## 2024-05-17 NOTE — PATIENT INSTRUCTIONS
Heart Failure Discharge Instructions      Activity: Regular exercise and activity is important for your overall health and to help keep your heart strong and functioning as well as possible.   Walk at a slow to moderate pace for 15-20 minutes 3-5 days per week.     Follow these instructions every day to keep yourself in the Green Zone     The Green Zone means you are feeling well and your symptoms are under control                                    Medications  Take your medication every day as instructed  Do not stop taking your medicine or change the amount you are taking without instructions from your doctor or nurse  Do Not take non-steroidal antiinflammatory drugs such as ibuprofen, aleve, advil, or motrin                                    Diet/Fluids  People with heart failure should eat less sodium (salt) and limit fluid. Sodium attracts water and makes the body hold fluid. This extra fluid makes the heart work harder and can worsen the symptoms of heart failure.     Diet    2000 mg sodium daily  Fluid restriction    64 ounces daily  (8 oz. = 1 cup)                                     Body Weight  Weigh yourself every day before breakfast and write your weight down  Use the same scale and wear about the same amount of clothes each time  A sudden weight increase is due to fluid retention rather than fat                                         Activity  Pace your activities to avoid getting overtired  Take rest periods as needed  Elevate your feet to reduce ankle swelling when resting                             Signs of Worsening Heart Failure    You are entering the Yellow Zone - this is a warning zone    Call your doctor or nurse if you have any of these signs or symptoms:  You gain 2 or more pounds overnight or 3-5 pounds in 3-7 days  You have more trouble breathing  You get more tired with regular activity, or are limiting activity because of shortness of breath or fatigue  You are short of breath lying  down, you need more pillows to breathe comfortably,  or wake up during the night short of breath  You urinate less often during the day and more often at night  You have a bloated feeling, upset stomach, loss of appetite, or your clothes are fitting tighter    GO TO THE EMERGENCY DEPARTMENT or CALL 911 IF:    These are signs you are in the RED ZONE - THIS IS AN EMERGENCY  You have tightness or pain in your chest  You are extremely short of breath or can't catch your breath  You cough up frothy pink mucous  You feel confused or can't think clearly  You are traveling and develop symptoms of worsening heart failure     We respect everyone's time and availability. Please be aware that this is not a walk-in clinic and we require appointments in order to facilitate timely care for all patients. We ask you to arrive 30 minutes before your appointment to allow time for you to check-in and have your bloodwork drawn. Please understand if you are late for your appointment, you may be asked to reschedule. If possible, all attempts will be made to accommodate but realize this is no guarantee that this will always be available. We understand there are extenuating circumstances. If you need to cancel or reschedule your appointment, please call the [place] [facility] within 24 hours at [phone].  Thank you for your cooperation, [facility] Staff.    IF YOU HAVE QUESTIONS REGARDING YOUR BILL, FEEL FREE TO CONTACT [facility] PATIENT ACCOUNTS -[phone]. IF YOU NEED FINANCIAL ASSISTANCE, PLEASE CALL AN [facility] FINANCIAL COUNSELOR -[phone].             [facility]     [phone]

## 2024-05-17 NOTE — PROGRESS NOTES
Patient assessed. No signs or symptoms of shortness of breath, fatigue, chest pain or edema noted. Weight stable at 200 lbs. Reviewed current list of patient's allergies and medication; updated the Electronic Medical Record. Labs ordered to assess kidney function and drawn by  Lab. Reviewed follow-up appointment and Heart Failure discharge instructions with patient. Patient verbalized an understanding.     RTC in 2 weeks.    6 Minute Walk    Results at Rest  Resting SpO2 (minimum): 100 %  Resting HR (max): 81  Resting Oxygen Device: None    Ambulatory Results     SpO2 with exertion (minimum): 95  HR with exertion (max): 120  Intervention oxygen device: None     SpO2 post intervention: 98  Number of laps made: 18 laps  Distance Ambulated (ft): 900 ft  Level: Level I    Recovery Results  Minutes required to return to resting level: 1  Recovery SpO2: 100 %  Recovery HR: 93      6 Min Walk Results  Exertion Scale: Very light  Angina Scale: None  Dyspnea Scale: No Dyspnea

## 2024-05-20 ENCOUNTER — HOSPITAL ENCOUNTER (OUTPATIENT)
Dept: INTERVENTIONAL RADIOLOGY/VASCULAR | Facility: HOSPITAL | Age: 79
Discharge: HOME | End: 2024-05-20
Attending: INTERNAL MEDICINE | Admitting: INTERNAL MEDICINE

## 2024-05-20 VITALS
TEMPERATURE: 98 F | SYSTOLIC BLOOD PRESSURE: 100 MMHG | OXYGEN SATURATION: 96 % | DIASTOLIC BLOOD PRESSURE: 69 MMHG | WEIGHT: 194 LBS | HEART RATE: 76 BPM | RESPIRATION RATE: 21 BRPM | HEIGHT: 71 IN | BODY MASS INDEX: 27.16 KG/M2

## 2024-05-20 DIAGNOSIS — I50.9 CHF (CONGESTIVE HEART FAILURE) (HCC): ICD-10-CM

## 2024-05-20 DIAGNOSIS — R53.83 FATIGUE: ICD-10-CM

## 2024-05-20 LAB
ARTERIAL PATENCY WRIST A: NEGATIVE
BASE EXCESS BLDA CALC-SCNC: 4 MMOL/L (ref ?–2)
BODY TEMPERATURE: 98.6 F
COHGB MFR BLD: 1.9 % SAT (ref 0–3)
FIO2: 21 %
HCO3 BLDA-SCNC: 27.9 MEQ/L (ref 21–27)
HGB BLD-MCNC: 12.5 G/DL
METHGB MFR BLD: 0.4 % SAT (ref 0.4–1.5)
OXYHGB MFR BLDA: 92.2 % (ref 92–100)
PCO2 BLDA: 36 MM HG (ref 35–45)
PH BLDA: 7.49 [PH] (ref 7.35–7.45)
PO2 BLDA: 65 MM HG (ref 80–100)

## 2024-05-20 PROCEDURE — 83050 HGB METHEMOGLOBIN QUAN: CPT | Performed by: INTERNAL MEDICINE

## 2024-05-20 PROCEDURE — 36600 WITHDRAWAL OF ARTERIAL BLOOD: CPT | Performed by: INTERNAL MEDICINE

## 2024-05-20 PROCEDURE — 93451 RIGHT HEART CATH: CPT | Performed by: INTERNAL MEDICINE

## 2024-05-20 PROCEDURE — 4A023N6 MEASUREMENT OF CARDIAC SAMPLING AND PRESSURE, RIGHT HEART, PERCUTANEOUS APPROACH: ICD-10-PCS | Performed by: INTERNAL MEDICINE

## 2024-05-20 PROCEDURE — 82803 BLOOD GASES ANY COMBINATION: CPT | Performed by: INTERNAL MEDICINE

## 2024-05-20 PROCEDURE — 85018 HEMOGLOBIN: CPT | Performed by: INTERNAL MEDICINE

## 2024-05-20 PROCEDURE — 82375 ASSAY CARBOXYHB QUANT: CPT | Performed by: INTERNAL MEDICINE

## 2024-05-20 RX ORDER — HEPARIN SODIUM 5000 [USP'U]/ML
INJECTION, SOLUTION INTRAVENOUS; SUBCUTANEOUS
Status: COMPLETED
Start: 2024-05-20 | End: 2024-05-20

## 2024-05-20 RX ORDER — LIDOCAINE HYDROCHLORIDE 10 MG/ML
INJECTION, SOLUTION EPIDURAL; INFILTRATION; INTRACAUDAL; PERINEURAL
Status: COMPLETED
Start: 2024-05-20 | End: 2024-05-20

## 2024-05-20 NOTE — DISCHARGE INSTRUCTIONS
Start taking Bumex dose only in the AM until you follow up with Dr Jean in office.    Continue all other home medications as directed.  Remove right neck dressing in 24 hours.    Home Care Instructions Following Venous Access Procedures: myocardial biopsy, Right Heart Catheterization, Venogram, IVC Filter Insertion, Closure of ASD/PFO.    Activity:  Do not drive after the procedure. You may resume driving late the following day according to the nurse or MD instructions.  Plan on resting and relaxing tonight and tomorrow.  Do not lift anything over 10 pounds for the next 24 hours.  Avoid sexual activity for the next 24 hours.  Avoid repeated stair use and excessive walking for the next 24 hours.  Avoid drinking alcohol for the next 24 hours.  Resume your normal activity after 48 hours, or as instructed by your MD.    What is normal:  The procedure site may appear bruised and discolored.  There may be a small amount of drainage on the bandage.  There may be mild tenderness to the procedure site when touched.    Special instructions:  The bandage is to remain in place for 24 hours.  Keep the bandage clean and dry.  Do not submerge the site for 72 hours (no tub baths or pools).  After 24 hours, you must remove the bandage.  Wash the procedure site gently with soap and water. (If you choose to wear a bandage for a few days, make sure it remains clean and dry and that it is changed daily).  The day after the procedure, you may shower (but no tub baths).    When you should NOTIFY YOUR PHYSICIAN:  If you have shortness of breath or a persistent cough.  If you have chest pain (angina).  If you have persistent pain at the procedure site.  If you experience signs of a fever, temperature > 101 degrees, chills, infection (redness/swelling/thick yellow drainage/or a foul odor from procedure site).    Other:  You may resume your present diet, unless otherwise specified by your MD.  You may resume all of your medications as  prescribed, unless otherwise directed by your MD.  A list of your medications was provided to you at discharge.  Please call your MDs office for a follow up appointment.  You should be seen in 3-4 weeks.

## 2024-05-20 NOTE — H&P
[facility]  History & Physical    Miguel Davila Patient Status:  Outpatient    3/17/1945 MRN [id number]   Location [facility] INTERVENTIONAL SUITES Attending Iris Jean MD   Hosp Day # 0 PCP Eric Simon MD     History of Present Illness:  Miguel Davila is a(n) 79 year old male who had discordant hemodynamics by BVA and [facility].   Past Surgical/Medical History:  Past Medical History:    A-fib (HCC)    Arrhythmia    atrial fibrillation    Arthritis    Autoimmune disease (HCC)    hepatits, resolved anfd nephritis, resolved    Ramon's esophagus    Bleeding nose    Gums Treated    Blood disorder    thrombocytopenia    Blood in urine    Blurred vision    cataract due to steroids, surgery in     Calculus of kidney    One time    Cancer (HCC)    skin    Candidiasis of the esophagus    Due to steroid use for Autoimmune disorder     Cataract    Colon polyps    Congestive heart disease (HCC)    Diarrhea, unspecified    Intermittent due to lupus    Diverticulosis of colon    Easy bruising    On and off prednisone for 20 years    Esophageal polyp    Esophageal reflux    Essential hypertension    Fatigue    polymyositis and lupus    Gout    Hammer toe, acquired    Heart palpitations    Afib    Hepatitis    autoimmune induce hepatitis d/t lupus    High blood pressure    IBS (irritable bowel syndrome)    mild d/t lupus    Irregular bowel habits    Leg swelling    Heart failure, probably caused by lupus    Lupus (HCC)    MCTD (mixed connective tissue disease) (HCC)    Obstructive sleep apnea (adult) (pediatric)    LI (obstructive sleep apnea)    AHI 37  Supine AHI 38 non-supine AHI 16 Sao2 Pj 83%     LI (obstructive sleep apnea)    AHI 36 RDI 36 REM AHI 45 Supine AHI 65 non-supine AHI 19 Sao2 Pj 86% CPAP 9cwp    Pain in joints    Personal history of antineoplastic chemotherapy    Pleural effusion    right    Pneumonia due to organism    Polymyositis (HCC)    Presence of other  cardiac implants and grafts    watchman filter    Problems with swallowing    dysphagia in 2006    Pulmonary hypertension (HCC)    Raynaud disease    Raynaud disease    Renal disorder    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    Shortness of breath    mainly due to pm or lupus    Sleep apnea    CPAP    Thrombocytopathia (HCC)    Visual impairment    bifocals for reading & computer; distance for driving    Wears glasses     Past Surgical History:   Procedure Laterality Date    Appendectomy  1970    Appendectomy      Cardiac catheterization  09/2019    Cataract Bilateral     Colonoscopy  10/2003 2006 01/2010    x3    Colonoscopy  5/14/2013    Colonoscopy N/A 5/1/2018    Procedure: COLONOSCOPY;  Surgeon: Isaiah Tobar MD;  Location:  ENDOSCOPY    Colonoscopy N/A 4/12/2024    Procedure: COLONOSCOPY;  Surgeon: Gerardo Neves MD;  Location:  ENDOSCOPY    Colonoscopy with biopsy  5/14/13    Egd      Hand/finger surgery unlisted  2003    right    Needle biopsy liver      Other  12/2005    needle bipsy of kidney    Other surgical history  2017    watchman filter    Percutaneous  angioplasty  (ptca)- pbp only  2006    right    Rectum surgery procedure unlisted  1946    rectal surgery polypectomy    Skin surgery      MMS BCC R temple 6/24/09    Skin surgery  2007    basal ceell carcinoma    Skin surgery  7-18-12    BCC-nodular to right superior eyebrow/ MOHS    Skin surgery  07/15/2013    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/MMS    Skin surgery  2-19-14    BCC-NOD to right superior pinna, MMS, AB    Skin surgery  02/16/2021    BCC- left superior forehead, MMS     Skin surgery  03/07/2022    SCC IN SITU RIGHT ANTIHELIX MMS BY DR GASTELUM    Tonsillectomy  1948    Upper gi endoscopy,exam  10/2003 2006 01/2010 , 5/13    x3       Social History:  Social History     Socioeconomic History    Marital status:    Occupational History    Occupation: Retired    Tobacco Use    Smoking status: Former     Current packs/day: 0.00     Average  packs/day: 0.5 packs/day for 15.0 years (7.5 ttl pk-yrs)     Types: Cigarettes     Start date: 1958     Quit date: 1973     Years since quittin.8    Smokeless tobacco: Never    Tobacco comments:     5 cigarettes daily, stopped smoking in    Vaping Use    Vaping status: Never Used   Substance and Sexual Activity    Alcohol use: Yes     Alcohol/week: 13.0 - 15.0 standard drinks of alcohol     Types: 5 Glasses of wine, 8 - 10 Standard drinks or equivalent per week     Comment: 1 per day wine or liquor    Drug use: Never   Other Topics Concern    Caffeine Concern Yes     Comment: 1 soda per day and decaf coffee    Sleep Concern No    Exercise Yes     Comment: 3-4 times weekly    Seat Belt Yes        Family Hostory:  Family History   Problem Relation Age of Onset    Heart Disease Father         CHF    Gastro-Intestinal Disorder Father         Diverticulosis    Alcohol and Other Disorders Associated Father     Heart Attack Father     Heart Disease Mother         CHF    Breast Cancer Mother     Stroke Mother     Cancer Paternal Grandfather     Heart Attack Paternal Grandmother     Kidney Disease Son     Other (Ramon's Esophagus) Son     Heart Attack Maternal Grandfather         Medications:  No current outpatient medications on file.     No current facility-administered medications for this encounter.       Allergies:  Allergies   Allergen Reactions    Empagliflozin OTHER (SEE COMMENTS)     Pancreatitis    \"pancreatitis\" per pt    Amoxicillin RASH    Augmentin [Amoclan] RASH    Doxycycline RASH       Review of Systems:  All systems were reviewed and are negative except as described above in HPI.    Physical Exam:    /79   Pulse 67   Temp 97.5 °F (36.4 °C) (Temporal)   Resp 18   Ht 5' 11\" (1.803 m)   Wt 194 lb (88 kg)   SpO2 100%   BMI 27.06 kg/m²   Temp (24hrs), Av.5 °F (36.4 °C), Min:97.5 °F (36.4 °C), Max:97.5 °F (36.4 °C)     No intake or output data in the 24 hours ending 24  1018  Wt Readings from Last 3 Encounters:   05/15/24 194 lb (88 kg)   05/17/24 200 lb (90.7 kg)   05/07/24 199 lb (90.3 kg)     Telemetry: Atrial fibrillation  General: Alert and oriented in no apparent distress.  HEENT: No focal deficits.  Neck: No JVD, carotids 2+ no bruits.  Cardiac: Irregularly Irregular rhythm; 2/6HSM LSB  Lungs: crepitations  Abdomen: Soft, non-tender.   Extremities: Without clubbing, cyanosis or edema.  Peripheral pulses are 2+.  Neurologic: Alert and oriented, normal affect.  Skin: Warm and dry.     Laboratory and Data:    Labs:     Lab Results   Component Value Date    INR 1.04 04/27/2021    INR 1.20 (H) 09/19/2019    INR 1.06 08/15/2019     No results for input(s): \"BNPML\" in the last 168 hours.  BUN (mg/dL)   Date Value   05/17/2024 49 (H)   05/14/2024 52 (H)   05/06/2024 45 (H)   07/28/2014 16   06/04/2014 21 (H)   11/22/2013 18     CREATININE (mg/dL)   Date Value   07/28/2014 0.80   06/04/2014 0.72   11/22/2013 0.91     Creatinine (mg/dL)   Date Value   05/17/2024 1.88 (H)   05/14/2024 2.03 (H)   05/06/2024 1.73 (H)       Assessment/Plan:  78 yo wm with hx of polymyositis and PAH. Proceed with RHC.  5/20/2024  10:18 AM    Iris Jean M.D., F.A.C.C., F.A.H.A., F.E.S.C.  Medical Director, [facility]  [facility] 4th Floor  [address] [facility], [address] [place] [address]  Phone: [phone]  Fax: [phone]  E-mail: [email or web address]    5/20/2024  10:21 AM

## 2024-05-20 NOTE — PROGRESS NOTES
S/p RHC, right int jug dressing soft C/D/I. Pt arrived via bed awake without complaints, denies pain 0/10 on pain scale.  Bedrest completed, tolerating PO intake. Up in room without complaints, voiding without complaints.  MD speaking with pt/family.  Discharge instructions given/explained to pt/family, all questions answered, verbalized understanding.  Tele dc'd, IV dc'd catheter intact, patient discharged via w/c instable condition.

## 2024-05-20 NOTE — OPERATIVE REPORT
Right Heart Catheterization    Procedures:    Right Heart Catheterization    Preoperative diagnosis: dehydration vs. Increased cardiac filling pressures    Access Site: Right Internal Jugular Vein      Sheath Size (Fr): 7    Complications:  None    Findings:  BP: 130/89 mmHg  RA: 9 mmHg  RV: 48/1 mmHg  PA: 47/18/31 mmHg  PCWP: 14 mmHg  TP mmHg      Lillie  CO: 7.9 liters/min  CI: 3.8 liters/min/m2  PVR: 2.2 Wood units  PVRI: 4.5 Wood units/m2    Postop Diagnosis: mixed PAH; No evidence of significant fluid overload. Recommend increasing Sacubitril /valsartan, consider addition of SGLT2i and later increase spironolactone to 25 mg daily    Iris Jean M.D., F.A.C.C., F.A.H.A., F.E.S.C.  Medical Director, Heart Failure Research, [facility]  Medical Director, [facility]  [facility] 4th Floor  [address] [place] [address] [address] [place] [address]  Phone: [phone]  Fax: [phone]  E-mail: [email or web address]

## 2024-05-28 ENCOUNTER — LAB ENCOUNTER (OUTPATIENT)
Dept: LAB | Age: 79
End: 2024-05-28
Attending: INTERNAL MEDICINE
Payer: MEDICARE

## 2024-05-28 DIAGNOSIS — R79.89 ABNORMAL THYROID STIMULATING HORMONE LEVEL: Primary | ICD-10-CM

## 2024-05-28 DIAGNOSIS — R53.83 FATIGUE: ICD-10-CM

## 2024-05-28 LAB
T4 FREE SERPL-MCNC: 1.1 NG/DL (ref 0.8–1.7)
TSI SER-ACNC: 2.19 MIU/ML (ref 0.36–3.74)

## 2024-05-28 PROCEDURE — 84443 ASSAY THYROID STIM HORMONE: CPT

## 2024-05-28 PROCEDURE — 84439 ASSAY OF FREE THYROXINE: CPT

## 2024-05-28 PROCEDURE — 36415 COLL VENOUS BLD VENIPUNCTURE: CPT

## 2024-05-29 ENCOUNTER — OFFICE VISIT (OUTPATIENT)
Dept: HEMATOLOGY/ONCOLOGY | Facility: HOSPITAL | Age: 79
End: 2024-05-29
Attending: INTERNAL MEDICINE

## 2024-05-29 VITALS
WEIGHT: 201 LBS | TEMPERATURE: 97 F | HEIGHT: 70.98 IN | DIASTOLIC BLOOD PRESSURE: 68 MMHG | BODY MASS INDEX: 28.14 KG/M2 | HEART RATE: 94 BPM | RESPIRATION RATE: 16 BRPM | SYSTOLIC BLOOD PRESSURE: 100 MMHG | OXYGEN SATURATION: 100 %

## 2024-05-29 DIAGNOSIS — D69.3 IMMUNE THROMBOCYTOPENIC PURPURA (HCC): Primary | ICD-10-CM

## 2024-05-29 DIAGNOSIS — D69.6 THROMBOCYTOPENIA (HCC): ICD-10-CM

## 2024-05-29 PROCEDURE — 96365 THER/PROPH/DIAG IV INF INIT: CPT

## 2024-05-29 PROCEDURE — 96366 THER/PROPH/DIAG IV INF ADDON: CPT

## 2024-05-29 RX ORDER — ACETAMINOPHEN 325 MG/1
650 TABLET ORAL ONCE
OUTPATIENT
Start: 2024-05-29

## 2024-05-29 RX ORDER — DIPHENHYDRAMINE HCL 25 MG
25 CAPSULE ORAL ONCE
Status: COMPLETED | OUTPATIENT
Start: 2024-05-29 | End: 2024-05-29

## 2024-05-29 RX ORDER — DIPHENHYDRAMINE HCL 25 MG
25 CAPSULE ORAL ONCE
OUTPATIENT
Start: 2024-05-29

## 2024-05-29 RX ORDER — ACETAMINOPHEN 325 MG/1
650 TABLET ORAL ONCE
Status: COMPLETED | OUTPATIENT
Start: 2024-05-29 | End: 2024-05-29

## 2024-05-29 RX ADMIN — ACETAMINOPHEN 650 MG: 325 TABLET ORAL at 10:19:00

## 2024-05-29 RX ADMIN — DIPHENHYDRAMINE HCL 25 MG: 25 MG CAPSULE ORAL at 10:19:00

## 2024-05-29 NOTE — PROGRESS NOTES
Pt here for IVIG . Pt denies any issues or concerns.      Ordering MD: Darian  Order Exp: after todays dose     Pt tolerated infusion without difficulty or complaint. Reviewed next apt date/time: no appt made until new orders are received      Education Record  Learner:  Patient  Disease / Diagnosis: ITP  Barriers / Limitations:  None  Method:  Discussion and Reinforcement  General Topics:  Medication and Plan of care reviewed  Outcome:  Shows understanding

## 2024-05-30 DIAGNOSIS — I50.32 CHRONIC DIASTOLIC HEART FAILURE (HCC): Primary | ICD-10-CM

## 2024-06-04 ENCOUNTER — HOSPITAL ENCOUNTER (OUTPATIENT)
Age: 79
Discharge: HOME | DRG: 853 | End: 2024-06-04
Payer: MEDICARE

## 2024-06-04 ENCOUNTER — HOSPITAL ENCOUNTER (OUTPATIENT)
Age: 79
Discharge: HOME | End: 2024-06-04
Payer: MEDICARE

## 2024-06-04 VITALS
SYSTOLIC BLOOD PRESSURE: 103 MMHG | WEIGHT: 195 LBS | DIASTOLIC BLOOD PRESSURE: 64 MMHG | OXYGEN SATURATION: 95 % | HEART RATE: 103 BPM | RESPIRATION RATE: 26 BRPM | TEMPERATURE: 98 F | BODY MASS INDEX: 27.3 KG/M2 | HEIGHT: 71 IN

## 2024-06-04 DIAGNOSIS — K52.9 GASTROENTERITIS: Primary | ICD-10-CM

## 2024-06-04 PROCEDURE — 99213 OFFICE O/P EST LOW 20 MIN: CPT | Performed by: PHYSICIAN ASSISTANT

## 2024-06-04 RX ORDER — DICYCLOMINE HCL 20 MG
20 TABLET ORAL 2 TIMES DAILY
Qty: 10 TABLET | Refills: 0 | Status: SHIPPED | OUTPATIENT
Start: 2024-06-04

## 2024-06-04 RX ORDER — DICYCLOMINE HYDROCHLORIDE 10 MG/5ML
20 SOLUTION ORAL ONCE
Status: COMPLETED | OUTPATIENT
Start: 2024-06-04 | End: 2024-06-04

## 2024-06-04 NOTE — ED INITIAL ASSESSMENT (HPI)
Diarrhea x 3 days  No fever  Chills  Denies black or bloody stools  Having one episode of diarrhea per day

## 2024-06-04 NOTE — ED PROVIDER NOTES
Patient Seen in: [facility]      History     Chief Complaint   Patient presents with    Nausea/Vomiting/Diarrhea     Stated Complaint: Eval g    Subjective:   HPI    Miguel Davila is a 79 year old male, with extensive pmhx, who presents to the ED with generalized abdominal pain cramping with 2 bouts of loose stools x 2 days and one episode of retching 24 hours Pain is a 2/10.  Patient reports improvement in symptoms over the past 48 hours however of concern to both patient and spouse he did not notice 2 separate episodes of increased tremors to bilateral hands.  Both patient and spouse deny slurred speech, headache, vertigo blurred vision, visual loss.  Additionally patient denies chest pain,  fevers, chills, flank pain, urinary symptoms, chest pain, throat pain,  hematochezia, bloody/ tarry stools, pain with bowel movements, rectal pain/ bleeding.  Last BM was this morning and described as loose in nature. Patient reports tolerating PO intake. Last PO intake was lunch this afternoon.   No additional concerns, symptoms, or modifying factors reported by the patient at this time.      Objective:   Past Medical History:    A-fib (HCC)    Arrhythmia    atrial fibrillation    Arthritis    Autoimmune disease (HCC)    hepatits, resolved anfd nephritis, resolved    Ramon's esophagus    Bleeding nose    Gums Treated    Blood disorder    thrombocytopenia    Blood in urine    Blurred vision    cataract due to steroids, surgery in June    Calculus of kidney    One time    Cancer (HCC)    skin    Candidiasis of the esophagus    Due to steroid use for Autoimmune disorder     Cataract    Colon polyps    Congestive heart disease (HCC)    Diarrhea, unspecified    Intermittent due to lupus    Diverticulosis of colon    Easy bruising    On and off prednisone for 20 years    Esophageal polyp    Esophageal reflux    Essential hypertension    Fatigue    polymyositis and lupus    Gout    Hammer toe,  acquired    Heart palpitations    Afib    Hepatitis    autoimmune induce hepatitis d/t lupus    High blood pressure    IBS (irritable bowel syndrome)    mild d/t lupus    Irregular bowel habits    Leg swelling    Heart failure, probably caused by lupus    Lupus (HCC)    MCTD (mixed connective tissue disease) (HCC)    Obstructive sleep apnea (adult) (pediatric)    LI (obstructive sleep apnea)    AHI 37  Supine AHI 38 non-supine AHI 16 Sao2 Pj 83%     LI (obstructive sleep apnea)    AHI 36 RDI 36 REM AHI 45 Supine AHI 65 non-supine AHI 19 Sao2 Pj 86% CPAP 9cwp    Pain in joints    Personal history of antineoplastic chemotherapy    Pleural effusion    right    Pneumonia due to organism    Polymyositis (HCC)    Presence of other cardiac implants and grafts    watchman filter    Problems with swallowing    dysphagia in 2006    Pulmonary hypertension (HCC)    Raynaud disease    Raynaud disease    Renal disorder    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    Shortness of breath    mainly due to pm or lupus    Sleep apnea    CPAP    Thrombocytopathia (HCC)    Visual impairment    bifocals for reading & computer; distance for driving    Wears glasses              Past Surgical History:   Procedure Laterality Date    Appendectomy  1970    Appendectomy      Cardiac catheterization  09/2019    Cataract Bilateral     Colonoscopy  10/2003 2006 01/2010    x3    Colonoscopy  5/14/2013    Colonoscopy N/A 5/1/2018    Procedure: COLONOSCOPY;  Surgeon: Isaiah Tobar MD;  Location:  ENDOSCOPY    Colonoscopy N/A 4/12/2024    Procedure: COLONOSCOPY;  Surgeon: Gerardo Neves MD;  Location:  ENDOSCOPY    Colonoscopy with biopsy  5/14/13    Egd      Hand/finger surgery unlisted  2003    right    Needle biopsy liver      Other  12/2005    needle bipsy of kidney    Other surgical history  2017    watchman filter    Percutaneous  angioplasty  (ptca)- pbp only  2006    right    Rectum surgery procedure unlisted  1946    rectal surgery  polypectomy    Skin surgery      MMS BCC R temple 09    Skin surgery  2007    basal ceell carcinoma    Skin surgery  12    BCC-nodular to right superior eyebrow/ MOHS    Skin surgery  07/15/2013    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/MMS    Skin surgery  14    BCC-NOD to right superior pinna, MMS, AB    Skin surgery  2021    BCC- left superior forehead, MMS     Skin surgery  2022    SCC IN SITU RIGHT ANTIHELIX MMS BY DR GASTELUM    Tonsillectomy      Upper gi endoscopy,exam  10/2003 2006 2010 , 5/13    x3                Social History     Socioeconomic History    Marital status:    Occupational History    Occupation: Retired    Tobacco Use    Smoking status: Former     Current packs/day: 0.00     Average packs/day: 0.5 packs/day for 15.0 years (7.5 ttl pk-yrs)     Types: Cigarettes     Start date: 1958     Quit date: 1973     Years since quittin.8    Smokeless tobacco: Never    Tobacco comments:     5 cigarettes daily, stopped smoking in    Vaping Use    Vaping status: Never Used   Substance and Sexual Activity    Alcohol use: Yes     Alcohol/week: 13.0 - 15.0 standard drinks of alcohol     Types: 5 Glasses of wine, 8 - 10 Standard drinks or equivalent per week     Comment: 1 per day wine or liquor    Drug use: Never   Other Topics Concern    Caffeine Concern Yes     Comment: 1 soda per day and decaf coffee    Sleep Concern No    Exercise Yes     Comment: 3-4 times weekly    Seat Belt Yes              Review of Systems   All other systems reviewed and are negative.      Positive for stated complaint: Eval g  Other systems are as noted in HPI.  Constitutional and vital signs reviewed.      All other systems reviewed and negative except as noted above.    Physical Exam     ED Triage Vitals [24 1754]   /64   Pulse 103   Resp 26   Temp 98.2 °F (36.8 °C)   Temp src Temporal   SpO2 95 %   O2 Device None (Room air)       Current Vitals:   Vital Signs  BP:  103/64  Pulse: 103  Resp: 26  Temp: 98.2 °F (36.8 °C)  Temp src: Temporal    Oxygen Therapy  SpO2: 95 %  O2 Device: None (Room air)            Physical Exam  Vitals and nursing note reviewed.   Constitutional:       General: He is not in acute distress.     Appearance: Normal appearance. He is not ill-appearing, toxic-appearing or diaphoretic.   HENT:      Head: Normocephalic and atraumatic.      Right Ear: Tympanic membrane, ear canal and external ear normal.      Left Ear: Tympanic membrane, ear canal and external ear normal.      Nose: Nose normal.      Mouth/Throat:      Mouth: Mucous membranes are moist.      Pharynx: Oropharynx is clear. No oropharyngeal exudate or posterior oropharyngeal erythema.   Eyes:      General:         Right eye: No discharge.         Left eye: No discharge.      Extraocular Movements: Extraocular movements intact.      Conjunctiva/sclera: Conjunctivae normal.      Pupils: Pupils are equal, round, and reactive to light.   Neck:      Vascular: No carotid bruit.   Cardiovascular:      Rate and Rhythm: Normal rate.      Pulses: Normal pulses.      Heart sounds: Normal heart sounds.   Pulmonary:      Effort: Pulmonary effort is normal. No respiratory distress.      Breath sounds: No stridor. No wheezing, rhonchi or rales.   Chest:      Chest wall: No tenderness.   Abdominal:      General: Abdomen is flat. Bowel sounds are normal. There is no distension.      Palpations: There is no mass.      Tenderness: There is no abdominal tenderness. There is no guarding or rebound.      Hernia: No hernia is present.   Musculoskeletal:         General: No swelling, tenderness, deformity or signs of injury. Normal range of motion.      Cervical back: Normal range of motion. No rigidity or tenderness.      Right lower leg: No edema.   Lymphadenopathy:      Cervical: No cervical adenopathy.   Skin:     General: Skin is warm.      Coloration: Skin is not jaundiced or pale.      Findings: No bruising,  erythema, lesion or rash.   Neurological:      General: No focal deficit present.      Mental Status: He is alert. Mental status is at baseline.      Cranial Nerves: No cranial nerve deficit.      Sensory: No sensory deficit.      Motor: No weakness.      Coordination: Coordination normal.      Gait: Gait normal.      Deep Tendon Reflexes: Reflexes normal.               ED Course   Labs Reviewed - No data to display                   MDM                                        Medical Decision Making  79-year-old male presents with acute onset of diarrhea that started over 48 hours prior to arrival.  Considerations include viral gastroenteritis versus small bowel obstruction versus infectious diarrhea.  Plan  -This provider lengthy discussion as to the neck step for the patient's care.  Advised about the limitations of immediate care and recommended ED evaluation, if they are truly concerned about an acute process.  Patient is overall well-appearing and is at his baseline according to both the patient and spouse.  -Bentyl 20 milligrams p.o. now  -Reassess  -Discharge home  -Rx: Bentyl 20mg PO bid   - encourage oral fluid rehydration   -Advised patient to go straight to the ED if symptoms should worsen.        Disposition and Plan     Clinical Impression:  1. Gastroenteritis         Disposition:  Discharge  6/4/2024  7:10 pm    Follow-up:  Eric Simon MD  [address]  [facility] [address]  [phone]          Immediate Care [address] [id number] [phone]              Medications Prescribed:  Current Discharge Medication List        START taking these medications    Details   dicyclomine 20 MG Oral Tab Take 1 tablet (20 mg total) by mouth in the morning and 1 tablet (20 mg total) before bedtime.  Qty: 10 tablet, Refills: 0

## 2024-06-06 ENCOUNTER — APPOINTMENT (OUTPATIENT)
Dept: MRI IMAGING | Facility: HOSPITAL | Age: 79
DRG: 853 | End: 2024-06-06
Attending: INTERNAL MEDICINE
Payer: MEDICARE

## 2024-06-06 ENCOUNTER — ANESTHESIA (OUTPATIENT)
Dept: SURGERY | Facility: HOSPITAL | Age: 79
End: 2024-06-06
Payer: MEDICARE

## 2024-06-06 ENCOUNTER — APPOINTMENT (OUTPATIENT)
Dept: CT IMAGING | Facility: HOSPITAL | Age: 79
DRG: 853 | End: 2024-06-06
Attending: EMERGENCY MEDICINE
Payer: MEDICARE

## 2024-06-06 ENCOUNTER — HOSPITAL ENCOUNTER (INPATIENT)
Facility: HOSPITAL | Age: 79
LOS: 4 days | Discharge: HOME | DRG: 853 | End: 2024-06-10
Attending: EMERGENCY MEDICINE | Admitting: HOSPITALIST
Payer: MEDICARE

## 2024-06-06 ENCOUNTER — ANESTHESIA EVENT (OUTPATIENT)
Dept: SURGERY | Facility: HOSPITAL | Age: 79
End: 2024-06-06
Payer: MEDICARE

## 2024-06-06 DIAGNOSIS — R10.11 ABDOMINAL PAIN, RIGHT UPPER QUADRANT: Primary | ICD-10-CM

## 2024-06-06 DIAGNOSIS — N17.9 ACUTE RENAL FAILURE, UNSPECIFIED ACUTE RENAL FAILURE TYPE (HCC): ICD-10-CM

## 2024-06-06 DIAGNOSIS — K81.0 ACUTE CHOLECYSTITIS: ICD-10-CM

## 2024-06-06 LAB
ALBUMIN SERPL-MCNC: 3 G/DL (ref 3.4–5)
ALBUMIN/GLOB SERPL: 0.7 {RATIO} (ref 1–2)
ALP LIVER SERPL-CCNC: 95 U/L
ALT SERPL-CCNC: 32 U/L
ANION GAP SERPL CALC-SCNC: 8 MMOL/L (ref 0–18)
AST SERPL-CCNC: 42 U/L (ref 15–37)
BASOPHILS # BLD AUTO: 0.01 X10(3) UL (ref 0–0.2)
BASOPHILS NFR BLD AUTO: 0.1 %
BILIRUB SERPL-MCNC: 2.2 MG/DL (ref 0.1–2)
BILIRUB UR QL STRIP.AUTO: NEGATIVE
BUN BLD-MCNC: 56 MG/DL (ref 9–23)
CALCIUM BLD-MCNC: 8.8 MG/DL (ref 8.5–10.1)
CHLORIDE SERPL-SCNC: 106 MMOL/L (ref 98–112)
CO2 SERPL-SCNC: 26 MMOL/L (ref 21–32)
COLOR UR AUTO: YELLOW
CREAT BLD-MCNC: 2.76 MG/DL
EGFRCR SERPLBLD CKD-EPI 2021: 22 ML/MIN/1.73M2 (ref 60–?)
EOSINOPHIL # BLD AUTO: 0 X10(3) UL (ref 0–0.7)
EOSINOPHIL NFR BLD AUTO: 0 %
ERYTHROCYTE [DISTWIDTH] IN BLOOD BY AUTOMATED COUNT: 15.9 %
GLOBULIN PLAS-MCNC: 4.1 G/DL (ref 2.8–4.4)
GLUCOSE BLD-MCNC: 121 MG/DL (ref 70–99)
GLUCOSE UR STRIP.AUTO-MCNC: NORMAL MG/DL
HAV IGM SER QL: NONREACTIVE
HBV CORE IGM SER QL: NONREACTIVE
HBV SURFACE AG SERPL QL IA: NONREACTIVE
HCT VFR BLD AUTO: 38.6 %
HCV AB SERPL QL IA: NONREACTIVE
HGB BLD-MCNC: 12.2 G/DL
HYALINE CASTS #/AREA URNS AUTO: PRESENT /LPF
IMM GRANULOCYTES # BLD AUTO: 0.17 X10(3) UL (ref 0–1)
IMM GRANULOCYTES NFR BLD: 1.6 %
KETONES UR STRIP.AUTO-MCNC: NEGATIVE MG/DL
LACTATE SERPL-SCNC: 2 MMOL/L (ref 0.4–2)
LEUKOCYTE ESTERASE UR QL STRIP.AUTO: 250
LIPASE SERPL-CCNC: 17 U/L (ref 13–75)
LYMPHOCYTES # BLD AUTO: 0.71 X10(3) UL (ref 1–4)
LYMPHOCYTES NFR BLD AUTO: 6.7 %
MCH RBC QN AUTO: 34.4 PG (ref 26–34)
MCHC RBC AUTO-ENTMCNC: 31.6 G/DL (ref 31–37)
MCV RBC AUTO: 108.7 FL
MONOCYTES # BLD AUTO: 0.73 X10(3) UL (ref 0.1–1)
MONOCYTES NFR BLD AUTO: 6.9 %
NEUTROPHILS # BLD AUTO: 8.99 X10 (3) UL (ref 1.5–7.7)
NEUTROPHILS # BLD AUTO: 8.99 X10(3) UL (ref 1.5–7.7)
NEUTROPHILS NFR BLD AUTO: 84.7 %
NITRITE UR QL STRIP.AUTO: NEGATIVE
OSMOLALITY SERPL CALC.SUM OF ELEC: 307 MOSM/KG (ref 275–295)
PH UR STRIP.AUTO: 5 [PH] (ref 5–8)
PLATELET # BLD AUTO: 83 10(3)UL (ref 150–450)
POTASSIUM SERPL-SCNC: 4.4 MMOL/L (ref 3.5–5.1)
PROT SERPL-MCNC: 7.1 G/DL (ref 6.4–8.2)
PROT UR STRIP.AUTO-MCNC: 30 MG/DL
RBC # BLD AUTO: 3.55 X10(6)UL
SODIUM SERPL-SCNC: 140 MMOL/L (ref 136–145)
SP GR UR STRIP.AUTO: 1.02 (ref 1–1.03)
UROBILINOGEN UR STRIP.AUTO-MCNC: NORMAL MG/DL
WBC # BLD AUTO: 10.6 X10(3) UL (ref 4–11)

## 2024-06-06 PROCEDURE — 99222 1ST HOSP IP/OBS MODERATE 55: CPT | Performed by: STUDENT IN AN ORGANIZED HEALTH CARE EDUCATION/TRAINING PROGRAM

## 2024-06-06 PROCEDURE — 76376 3D RENDER W/INTRP POSTPROCES: CPT | Performed by: INTERNAL MEDICINE

## 2024-06-06 PROCEDURE — 74183 MRI ABD W/O CNTR FLWD CNTR: CPT | Performed by: INTERNAL MEDICINE

## 2024-06-06 PROCEDURE — 99223 1ST HOSP IP/OBS HIGH 75: CPT | Performed by: STUDENT IN AN ORGANIZED HEALTH CARE EDUCATION/TRAINING PROGRAM

## 2024-06-06 PROCEDURE — 74176 CT ABD & PELVIS W/O CONTRAST: CPT | Performed by: EMERGENCY MEDICINE

## 2024-06-06 RX ORDER — METOPROLOL SUCCINATE 100 MG/1
100 TABLET, EXTENDED RELEASE ORAL EVERY MORNING
Status: DISCONTINUED | OUTPATIENT
Start: 2024-06-07 | End: 2024-06-10

## 2024-06-06 RX ORDER — SODIUM CHLORIDE 9 MG/ML
125 INJECTION, SOLUTION INTRAVENOUS CONTINUOUS
Status: DISCONTINUED | OUTPATIENT
Start: 2024-06-06 | End: 2024-06-06

## 2024-06-06 RX ORDER — LEVOTHYROXINE SODIUM 0.05 MG/1
50 TABLET ORAL
Status: DISCONTINUED | OUTPATIENT
Start: 2024-06-06 | End: 2024-06-10

## 2024-06-06 RX ORDER — SODIUM CHLORIDE 9 MG/ML
125 INJECTION, SOLUTION INTRAVENOUS CONTINUOUS
Status: CANCELLED | OUTPATIENT
Start: 2024-06-06

## 2024-06-06 RX ORDER — SODIUM CHLORIDE, SODIUM LACTATE, POTASSIUM CHLORIDE, CALCIUM CHLORIDE 600; 310; 30; 20 MG/100ML; MG/100ML; MG/100ML; MG/100ML
INJECTION, SOLUTION INTRAVENOUS CONTINUOUS
Status: DISCONTINUED | OUTPATIENT
Start: 2024-06-06 | End: 2024-06-07

## 2024-06-06 RX ORDER — HYDROMORPHONE HYDROCHLORIDE 1 MG/ML
0.8 INJECTION, SOLUTION INTRAMUSCULAR; INTRAVENOUS; SUBCUTANEOUS EVERY 2 HOUR PRN
Status: DISCONTINUED | OUTPATIENT
Start: 2024-06-06 | End: 2024-06-10

## 2024-06-06 RX ORDER — MELATONIN
1000 DAILY
Status: DISCONTINUED | OUTPATIENT
Start: 2024-06-06 | End: 2024-06-10

## 2024-06-06 RX ORDER — HYDROMORPHONE HYDROCHLORIDE 1 MG/ML
0.4 INJECTION, SOLUTION INTRAMUSCULAR; INTRAVENOUS; SUBCUTANEOUS EVERY 2 HOUR PRN
Status: DISCONTINUED | OUTPATIENT
Start: 2024-06-06 | End: 2024-06-10

## 2024-06-06 RX ORDER — ONDANSETRON 2 MG/ML
4 INJECTION INTRAMUSCULAR; INTRAVENOUS EVERY 6 HOURS PRN
Status: DISCONTINUED | OUTPATIENT
Start: 2024-06-06 | End: 2024-06-06

## 2024-06-06 RX ORDER — ONDANSETRON 2 MG/ML
4 INJECTION INTRAMUSCULAR; INTRAVENOUS ONCE
Status: COMPLETED | OUTPATIENT
Start: 2024-06-06 | End: 2024-06-06

## 2024-06-06 RX ORDER — MORPHINE SULFATE 4 MG/ML
4 INJECTION, SOLUTION INTRAMUSCULAR; INTRAVENOUS ONCE
Status: COMPLETED | OUTPATIENT
Start: 2024-06-06 | End: 2024-06-06

## 2024-06-06 RX ORDER — ONDANSETRON 2 MG/ML
INJECTION INTRAMUSCULAR; INTRAVENOUS
Status: COMPLETED
Start: 2024-06-06 | End: 2024-06-06

## 2024-06-06 RX ORDER — HYDROMORPHONE HYDROCHLORIDE 1 MG/ML
0.2 INJECTION, SOLUTION INTRAMUSCULAR; INTRAVENOUS; SUBCUTANEOUS EVERY 2 HOUR PRN
Status: DISCONTINUED | OUTPATIENT
Start: 2024-06-06 | End: 2024-06-10

## 2024-06-06 RX ORDER — ONDANSETRON 2 MG/ML
4 INJECTION INTRAMUSCULAR; INTRAVENOUS EVERY 6 HOURS PRN
Status: DISCONTINUED | OUTPATIENT
Start: 2024-06-06 | End: 2024-06-10

## 2024-06-06 RX ORDER — PANTOPRAZOLE SODIUM 20 MG/1
20 TABLET, DELAYED RELEASE ORAL
Status: DISCONTINUED | OUTPATIENT
Start: 2024-06-06 | End: 2024-06-10

## 2024-06-06 RX ORDER — METOPROLOL SUCCINATE 50 MG/1
50 TABLET, EXTENDED RELEASE ORAL NIGHTLY
Status: DISCONTINUED | OUTPATIENT
Start: 2024-06-06 | End: 2024-06-10

## 2024-06-06 RX ORDER — ACETAMINOPHEN 10 MG/ML
1000 INJECTION, SOLUTION INTRAVENOUS EVERY 6 HOURS
Status: COMPLETED | OUTPATIENT
Start: 2024-06-06 | End: 2024-06-07

## 2024-06-06 RX ORDER — HEPARIN SODIUM 5000 [USP'U]/ML
5000 INJECTION, SOLUTION INTRAVENOUS; SUBCUTANEOUS EVERY 12 HOURS SCHEDULED
Status: DISCONTINUED | OUTPATIENT
Start: 2024-06-06 | End: 2024-06-10

## 2024-06-06 RX ORDER — ASPIRIN 81 MG/1
81 TABLET ORAL DAILY
Status: DISCONTINUED | OUTPATIENT
Start: 2024-06-06 | End: 2024-06-10

## 2024-06-06 RX ORDER — GADOTERATE MEGLUMINE 376.9 MG/ML
20 INJECTION INTRAVENOUS
Status: COMPLETED | OUTPATIENT
Start: 2024-06-06 | End: 2024-06-06

## 2024-06-06 RX ORDER — ONDANSETRON 4 MG/1
4 TABLET, ORALLY DISINTEGRATING ORAL EVERY 6 HOURS PRN
Status: DISCONTINUED | OUTPATIENT
Start: 2024-06-06 | End: 2024-06-10

## 2024-06-06 NOTE — CONSULTS
[facility]                       Gastroenterology Consultation-[facility]    Miguel Davila Patient Status:  Emergency    3/17/1945 MRN [id number]   Location [facility] EMERGENCY DEPARTMENT Attending Kandice Gutierres,    Hosp Day # 0 PCP Eric Simon MD         Reason for consultation: abdominal pain, elevated lfts    HPI: 78 yo M w/multiple autoimmune issues including polymyositis, immune thrombocytopenia purpura, and mixed connective tissue disorder c/b pulmonary HTN and RV dysfunction (IVIG monthly + Prednisone+ Imuran), AF s/p Watchman, and Ramon's esophagus with recent right heart catheterization presenting with abdominal pain and elevated LFTs.  Patient began having abdominal pain today along with worsening of chronic diarrhea since .  Patient also with nausea but no vomiting.  Denies any hematochezia, melena, hematemesis.  CT abdomen pelvis consistent with acute cholecystitis with cholelithiasis.  No biliary dilation. LFTs remarkable for AST 42, ALT 32, total bilirubin 2.2, AP 95.     PMHx:   Past Medical History:    A-fib (HCC)    Arrhythmia    atrial fibrillation    Arthritis    Autoimmune disease (HCC)    hepatits, resolved anfd nephritis, resolved    Ramon's esophagus    Bleeding nose    Gums Treated    Blood disorder    thrombocytopenia    Blood in urine    Blurred vision    cataract due to steroids, surgery in     Calculus of kidney    One time    Cancer (HCC)    skin    Candidiasis of the esophagus    Due to steroid use for Autoimmune disorder     Cataract    Colon polyps    Congestive heart disease (HCC)    Diarrhea, unspecified    Intermittent due to lupus    Diverticulosis of colon    Easy bruising    On and off prednisone for 20 years    Esophageal polyp    Esophageal reflux    Essential hypertension    Fatigue    polymyositis and lupus    Gout    Hammer toe, acquired    Heart palpitations    Afib    Hepatitis    autoimmune induce  hepatitis d/t lupus    High blood pressure    IBS (irritable bowel syndrome)    mild d/t lupus    Irregular bowel habits    Leg swelling    Heart failure, probably caused by lupus    Lupus (HCC)    MCTD (mixed connective tissue disease) (HCC)    Obstructive sleep apnea (adult) (pediatric)    LI (obstructive sleep apnea)    AHI 37  Supine AHI 38 non-supine AHI 16 Sao2 Pj 83%     LI (obstructive sleep apnea)    AHI 36 RDI 36 REM AHI 45 Supine AHI 65 non-supine AHI 19 Sao2 Pj 86% CPAP 9cwp    Pain in joints    Personal history of antineoplastic chemotherapy    Pleural effusion    right    Pneumonia due to organism    Polymyositis (HCC)    Presence of other cardiac implants and grafts    watchman filter    Problems with swallowing    dysphagia in 2006    Pulmonary hypertension (HCC)    Raynaud disease    Raynaud disease    Renal disorder    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    Shortness of breath    mainly due to pm or lupus    Sleep apnea    CPAP    Thrombocytopathia (HCC)    Visual impairment    bifocals for reading & computer; distance for driving    Wears glasses       PSHx:   Past Surgical History:   Procedure Laterality Date    Appendectomy  1970    Appendectomy      Cardiac catheterization  09/2019    Cataract Bilateral     Colonoscopy  10/2003 2006 01/2010    x3    Colonoscopy  5/14/2013    Colonoscopy N/A 5/1/2018    Procedure: COLONOSCOPY;  Surgeon: Isaiah Tobar MD;  Location:  ENDOSCOPY    Colonoscopy N/A 4/12/2024    Procedure: COLONOSCOPY;  Surgeon: Gerardo Neves MD;  Location:  ENDOSCOPY    Colonoscopy with biopsy  5/14/13    [facility]      Hand/finger surgery unlisted  2003    right    Needle biopsy liver      Other  12/2005    needle bipsy of kidney    Other surgical history  2017    watchman filter    Percutaneous  angioplasty  (ptca)- pbp only  2006    right    Rectum surgery procedure unlisted  1946    rectal surgery polypectomy    Skin surgery      MMS BCC R temple 6/24/09    Skin surgery       basal ceell carcinoma    Skin surgery  12    BCC-nodular to right superior eyebrow/ MOHS    Skin surgery  07/15/2013    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/MMS    Skin surgery  14    BCC-NOD to right superior pinna, MMS, AB    Skin surgery  2021    BCC- left superior forehead, MMS     Skin surgery  2022    SCC IN SITU RIGHT ANTIHELIX MMS BY DR GASTELUM    Tonsillectomy  194    Upper gi endoscopy,exam  10/2003 2006 2010 , 5/13    x3       Medications:    sodium chloride 0.9% infusion  125 mL/hr Intravenous Continuous    [COMPLETED] morphINE PF 4 MG/ML injection 4 mg  4 mg Intravenous Once    [COMPLETED] ondansetron (Zofran) 4 MG/2ML injection 4 mg  4 mg Intravenous Once    cefTRIAXone (Rocephin) 1 g in D5W 100 mL IVPB-ADD  1 g Intravenous Once       Allergies:   Allergies   Allergen Reactions    Empagliflozin OTHER (SEE COMMENTS)     Pancreatitis    \"pancreatitis\" per pt    Amoxicillin RASH    Augmentin [Amoclan] RASH    Doxycycline RASH       SocHx:    Social History     Socioeconomic History    Marital status:    Occupational History    Occupation: Retired    Tobacco Use    Smoking status: Former     Current packs/day: 0.00     Average packs/day: 0.5 packs/day for 15.0 years (7.5 ttl pk-yrs)     Types: Cigarettes     Start date: 1958     Quit date: 1973     Years since quittin.8    Smokeless tobacco: Never    Tobacco comments:     5 cigarettes daily, stopped smoking in    Vaping Use    Vaping status: Never Used   Substance and Sexual Activity    Alcohol use: Yes     Alcohol/week: 13.0 - 15.0 standard drinks of alcohol     Types: 5 Glasses of wine, 8 - 10 Standard drinks or equivalent per week     Comment: 1 per day wine or liquor    Drug use: Never   Other Topics Concern    Caffeine Concern Yes     Comment: 1 soda per day and decaf coffee    Sleep Concern No    Exercise Yes     Comment: 3-4 times weekly    Seat Belt Yes         FamHx:    Family History   Problem  Relation Age of Onset    Heart Disease Father         CHF    Gastro-Intestinal Disorder Father         Diverticulosis    Alcohol and Other Disorders Associated Father     Heart Attack Father     Heart Disease Mother         CHF    Breast Cancer Mother     Stroke Mother     Cancer Paternal Grandfather     Heart Attack Paternal Grandmother     Kidney Disease Son     Other (Ramon's Esophagus) Son     Heart Attack Maternal Grandfather          ROS:  In addition to the pertinent positives described above:            Infectious Disease:  No chronic infections or recent fevers prior to the acute illness            Cardiovascular: No history of CAD, prior MI, chest pain, or palpitations            Respiratory: No shortness of breath, asthma, copd, recurrent pneumonia            Hematologic: The patient reports no easy bruising, frequent gum bleeding or nose bleeding;  The patient has no history of known chronic anemia            Dermatologic: The patient reports no recent rashes or chronic skin disorders            Rheumatologic: The patient reports no history of chronic arthritis, myalgias, arthralgias            Genitourinary:  The patient reports no history of recurrent urinary tract infections, hematuria, dysuria, or nephrolithiasis           Psychiatric: The patient reports no history of depression, anxiety, suicidal ideation, or homicidal ideation           Oncologic: The patient reports on history of prior solid tumor or hematologic malignancy           ENT: The patient reports no hoarseness of voice, hearing loss, sinus congestion, tinnitus           Neurologic: The patient reports no history of seizure, stroke, or frequent headaches      PE: /69   Pulse 92   Temp 97.2 °F (36.2 °C) (Temporal)   Resp (!) 32   Ht 5' 11\" (1.803 m)   Wt 195 lb (88.5 kg)   SpO2 100%   BMI 27.20 kg/m²     Gen: AAO x 3, NAD   CV: Regular rate and rhythm  Resp: Clear to auscultation bilaterally  Abdomen: Soft, TTP in RUQ  non-distended with the presence of bowel sounds; No hepatosplenomegaly; no rebound or guarding; No ascites is clinically apparent; no tympany to percussion  Ext: No peripheral edema or cyanosis  Skin: Warm and dry  Psychiatric: Appropriate mood and congruent affect without obvious depression or anxiety    Labs:   Lab Results   Component Value Date    WBC 10.6 06/06/2024    HGB 12.2 06/06/2024    HCT 38.6 06/06/2024    PLT 83.0 06/06/2024    CREATSERUM 2.76 06/06/2024    BUN 56 06/06/2024     06/06/2024    K 4.4 06/06/2024     06/06/2024    CO2 26.0 06/06/2024     06/06/2024    CA 8.8 06/06/2024    ALB 3.0 06/06/2024    ALKPHO 95 06/06/2024    BILT 2.2 06/06/2024    AST 42 06/06/2024    ALT 32 06/06/2024    LIP 17 06/06/2024     Recent Labs   Lab 06/06/24  1001   *   BUN 56*   CREATSERUM 2.76*   CA 8.8      K 4.4      CO2 26.0     Recent Labs   Lab 06/06/24  1001   RBC 3.55*   HGB 12.2*   HCT 38.6*   .7*   MCH 34.4*   MCHC 31.6   RDW 15.9   NEPRELIM 8.99*   WBC 10.6   PLT 83.0*       Recent Labs   Lab 06/06/24  1001   ALT 32   AST 42*         Assessment and Plan:   -Patient's combination of clinical presentation and imaging/lab findings is consistent with acute cholecystitis   -Agree with general surgery consultation for possible cholecystectomy  -No signs of choledocholithiasis on CT imaging; if there is suspicion, can consider MRCP versus lap basil with IOC  -GI PCR panel, C. difficile given watery diarrhea  -Acute hepatitis panel, autoimmune serologies  -Daily LFTs    Thank you for the consultation, we will follow the patient with you.    Johnson Landon MD  12:17 PM  6/6/2024  [facility]  [phone]

## 2024-06-06 NOTE — ED INITIAL ASSESSMENT (HPI)
Abdominal/flank pain, diarrhea and nausea since Sunday. Seen at IC Monday and says medication they gave him has given no relief. Pt has 10/10 pain on palpation. PTA given 4 of Zofran and 50 of Fentyl.

## 2024-06-06 NOTE — ED QUICK NOTES
Patient out of department for additional testing.    Pt room ready will have pt transported to inpatient room when done with MRI. PT wife transported to inpatient room to wait.

## 2024-06-06 NOTE — ED QUICK NOTES
Orders for admission, patient is aware of plan and ready to go upstairs. Any questions, please call ED RN Monique at extension [phone].     Patient Covid vaccination status: Fully vaccinated     COVID Test Ordered in ED: None    COVID Suspicion at Admission: N/A    Running Infusions:    sodium chloride 125 mL/hr (06/06/24 1001)        Mental Status/LOC at time of transport: x4    Other pertinent information: pt still needs urine sample  CIWA score: N/A   NIH score:  N/A

## 2024-06-06 NOTE — ED QUICK NOTES
Pt reports watery diarrhea since Sunday. Pt seen at  w/ no relief. Pt reports R sided flank pain. Pt reports nausea but no vomiting. Pt reports increased weakness recently that has caused him to start using a cane.

## 2024-06-06 NOTE — ED PROVIDER NOTES
Patient Seen in: [facility] Emergency Department      History     Chief Complaint   Patient presents with    Abdomen/Flank Pain     Stated Complaint: abd pain    Subjective:   HPI    This is an 79-year-old male past medical history of atrial fibrillation status post watchman not on anticoagulation, Ramon's esophagus, autoimmune hepatitis, heart failure, gout, thrombocytopenia, connective tissue disease on chronic prednisone with IVIG treatments,, obstructive sleep apnea pancreatitis who presents with abdominal pain and diarrhea.  Patient was initially seen at urgent care and referred here.  Patient reports he started feeling ill on Sunday.  He was nauseous states he had multiple episodes of diarrhea, loose watery brown.  He has been nauseous but has not vomited.  Yesterday had a temperature at home of 100.3.  He now complains that he is got right flank abdominal pain that radiates to the back which began about 4 hours ago.  It is constant and severe.  No dysuria, urgency or frequency.  He rates it as a 4/10 after receiving fentanyl in the ambulance.  He states it feels like when he had pancreatitis.  He was hospitalized previously for pancreatitis which he thought was medication related and stopped that medication.  He is not on anticoagulants.  He takes an aspirin a day.  Per abdominal surgery includes appendectomy.  He presents here via ambulance for further evaluation.    Objective:   Past Medical History:    A-fib (HCC)    Arrhythmia    atrial fibrillation    Arthritis    Autoimmune disease (HCC)    hepatits, resolved anfd nephritis, resolved    Ramon's esophagus    Bleeding nose    Gums Treated    Blood disorder    thrombocytopenia    Blood in urine    Blurred vision    cataract due to steroids, surgery in June    Calculus of kidney    One time    Cancer (HCC)    skin    Candidiasis of the esophagus    Due to steroid use for Autoimmune disorder     Cataract    Colon polyps    Congestive heart disease  (HCC)    Diarrhea, unspecified    Intermittent due to lupus    Diverticulosis of colon    Easy bruising    On and off prednisone for 20 years    Esophageal polyp    Esophageal reflux    Essential hypertension    Fatigue    polymyositis and lupus    Gout    Hammer toe, acquired    Heart palpitations    Afib    Hepatitis    autoimmune induce hepatitis d/t lupus    High blood pressure    IBS (irritable bowel syndrome)    mild d/t lupus    Irregular bowel habits    Leg swelling    Heart failure, probably caused by lupus    Lupus (HCC)    MCTD (mixed connective tissue disease) (HCC)    Obstructive sleep apnea (adult) (pediatric)    LI (obstructive sleep apnea)    AHI 37  Supine AHI 38 non-supine AHI 16 Sao2 Pj 83%     LI (obstructive sleep apnea)    AHI 36 RDI 36 REM AHI 45 Supine AHI 65 non-supine AHI 19 Sao2 Pj 86% CPAP 9cwp    Pain in joints    Personal history of antineoplastic chemotherapy    Pleural effusion    right    Pneumonia due to organism    Polymyositis (HCC)    Presence of other cardiac implants and grafts    watchman filter    Problems with swallowing    dysphagia in 2006    Pulmonary hypertension (HCC)    Raynaud disease    Raynaud disease    Renal disorder    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    Shortness of breath    mainly due to pm or lupus    Sleep apnea    CPAP    Thrombocytopathia (HCC)    Visual impairment    bifocals for reading & computer; distance for driving    Wears glasses              Past Surgical History:   Procedure Laterality Date    Appendectomy  1970    Appendectomy      Cardiac catheterization  09/2019    Cataract Bilateral     Colonoscopy  10/2003 2006 01/2010    x3    Colonoscopy  5/14/2013    Colonoscopy N/A 5/1/2018    Procedure: COLONOSCOPY;  Surgeon: Isaiah Tobar MD;  Location:  ENDOSCOPY    Colonoscopy N/A 4/12/2024    Procedure: COLONOSCOPY;  Surgeon: Gerardo Neves MD;  Location:  ENDOSCOPY    Colonoscopy with biopsy  5/14/13    [facility]      Hand/finger surgery  unlisted      right    Needle biopsy liver      Other  2005    needle bipsy of kidney    Other surgical history  2017    watchman filter    Percutaneous  angioplasty  (ptca)- pbp only  2006    right    Rectum surgery procedure unlisted  194    rectal surgery polypectomy    Skin surgery      MMS BCC R temple 09    Skin surgery  2007    basal ceell carcinoma    Skin surgery  12    BCC-nodular to right superior eyebrow/ MOHS    Skin surgery  07/15/2013    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/MMS    Skin surgery  14    BCC-NOD to right superior pinna, MMS, AB    Skin surgery  2021    BCC- left superior forehead, MMS     Skin surgery  2022    SCC IN SITU RIGHT ANTIHELIX MMS BY DR GASTELUM    Tonsillectomy      Upper gi endoscopy,exam  10/2003 2006 2010 , 5/13    x3                Social History     Socioeconomic History    Marital status:    Occupational History    Occupation: Retired    Tobacco Use    Smoking status: Former     Current packs/day: 0.00     Average packs/day: 0.5 packs/day for 15.0 years (7.5 ttl pk-yrs)     Types: Cigarettes     Start date: 1958     Quit date: 1973     Years since quittin.8    Smokeless tobacco: Never    Tobacco comments:     5 cigarettes daily, stopped smoking in    Vaping Use    Vaping status: Never Used   Substance and Sexual Activity    Alcohol use: Yes     Alcohol/week: 13.0 - 15.0 standard drinks of alcohol     Types: 5 Glasses of wine, 8 - 10 Standard drinks or equivalent per week     Comment: 1 per day wine or liquor    Drug use: Never   Other Topics Concern    Caffeine Concern Yes     Comment: 1 soda per day and decaf coffee    Sleep Concern No    Exercise Yes     Comment: 3-4 times weekly    Seat Belt Yes              Review of Systems    Positive for stated complaint: abd pain  Other systems are as noted in HPI.  Constitutional and vital signs reviewed.      All other systems reviewed and negative except as noted  above.    Physical Exam     ED Triage Vitals   BP 06/06/24 0946 108/63   Pulse 06/06/24 0946 95   Resp 06/06/24 0946 21   Temp 06/06/24 0946 97.2 °F (36.2 °C)   Temp src 06/06/24 0946 Temporal   SpO2 06/06/24 1005 99 %   O2 Device 06/06/24 1005 None (Room air)       Current Vitals:   Vital Signs  BP: 99/67  Pulse: 91  Resp: (!) 29  Temp: 97.2 °F (36.2 °C)  Temp src: Temporal  MAP (mmHg): 78    Oxygen Therapy  SpO2: 95 %  O2 Device: Nasal cannula  O2 Flow Rate (L/min): 1 L/min            Physical Exam    GENERAL: Awake, alert oriented x3, nontoxic appearing.   SKIN: Normal, warm, and dry.  HEENT:  Pupils equally round and reactive to light. Conjuctiva clear.  Mucous membranes dry.  Lungs: Clear to auscultation bilaterally with no rales, no retractions, and no wheezing.  HEART:  Regular rate and rhythm. S1 and S2. No murmurs, no rubs or gallops.   ABDOMEN: Soft, lightly distended, tender in the right upper, right lower quadrant.  Somewhat in the left lower quadrant.  Right greater than left.. Normoactive bowel sounds. No rebound. No guarding.   EXTREMITIES: Warm with brisk capillary refill.         ED Course     Labs Reviewed   COMP METABOLIC PANEL (14) - Abnormal; Notable for the following components:       Result Value    Glucose 121 (*)     BUN 56 (*)     Creatinine 2.76 (*)     Calculated Osmolality 307 (*)     eGFR-Cr 22 (*)     AST 42 (*)     Bilirubin, Total 2.2 (*)     Albumin 3.0 (*)     A/G Ratio 0.7 (*)     All other components within normal limits   CBC W/ DIFFERENTIAL - Abnormal; Notable for the following components:    RBC 3.55 (*)     HGB 12.2 (*)     HCT 38.6 (*)     PLT 83.0 (*)     .7 (*)     MCH 34.4 (*)     Neutrophil Absolute Prelim 8.99 (*)     Neutrophil Absolute 8.99 (*)     Lymphocyte Absolute 0.71 (*)     All other components within normal limits   LIPASE - Normal   CBC WITH DIFFERENTIAL WITH PLATELET    Narrative:     The following orders were created for panel order CBC With  Differential With Platelet.  Procedure                               Abnormality         Status                     ---------                               -----------         ------                     CBC W/ DIFFERENTIAL[[id number]]          Abnormal            Final result                 Please view results for these tests on the individual orders.   SCAN SLIDE   URINALYSIS WITH CULTURE REFLEX   HEPATITIS PANEL, ACUTE (4)   ACTIN (SMOOTH MUSCLE) ANTIBODY   MITOCHONDRIAL (M2) ANTIBODY   C. DIFFICILE(TOXIGENIC)PCR   GI STOOL PANEL BY PCR           CT ABDOMEN+PELVIS(CPT=74176)    Result Date: 6/6/2024  PROCEDURE:  CT ABDOMEN+PELVIS (CPT=74176)  COMPARISON:  [facility] , CT, CT ABDOMEN+PELVIS(CPT=74176), 9/21/2022, 2:31 PM.  INDICATIONS:  Patient presents with abdominal pain, nausea and diarrhea.  Previous history of pancreatitis.  TECHNIQUE:  Unenhanced multislice CT scanning was performed from the dome of the diaphragm to the pubic symphysis.  Dose reduction techniques were used. Dose information is transmitted to the ACR (American College of Radiology) NRDR (National Radiology Data Registry) which includes the Dose Index Registry.  PATIENT STATED HISTORY: (As transcribed by Technologist)  Patient is here for abdominal pain, nausea, and diarrhea.    FINDINGS:  There is a minimal right basilar pleural effusion and minimal passive atelectasis of the right lung base.  There is a stable low-density cyst along the dome of the right lobe of the liver peripherally measuring 1.9 cm.  There is cholelithiasis with mild gallbladder wall thickening and mild pericholecystic fat stranding, concerning for acute cholecystitis.  No biliary ductal dilatation noted.  Diffuse pancreatic atrophy.  No evidence of acute pancreatitis.  The spleen and adrenal glands are unremarkable.  There are multiple bilateral renal cysts, some of which are low-density and some of which are of slightly higher density consistent with proteinaceous cysts.   These appears stable and unchanged from the previous exam, with the largest cyst extending exophytically from the left superior pole measuring 4.5 cm.  The stomach, duodenum sweep and small bowel are unremarkable.  There is diffuse diverticulosis of the colon, without discrete evidence of acute diverticulitis.             CONCLUSION:  1. Findings suspicious for acute cholecystitis with fat stranding surrounding the gallbladder. 2. Uncomplicated diffuse diverticulosis of the colon. 3. Stable appearing multiple low-density and proteinaceous cysts of the kidneys. 4. Minimal right basilar pleural effusion.   LOCATION:  [place]   Dictated by (CST): Angy Claros DO on 6/06/2024 at 11:32 AM     Finalized by (CST): Angy Claros DO on 6/06/2024 at 11:36 AM              MDM            This is an 79-year-old male past medical history of atrial fibrillation status post watchman not on anticoagulation, Ramon's esophagus, autoimmune hepatitis, heart failure, gout, thrombocytopenia, connective tissue disease on chronic prednisone with IVIG treatments,, obstructive sleep apnea, pancreatitis who presents with abdominal pain and diarrhea.  Differential includes pancreatitis, colitis, viral diarrhea.      IV line was established of normal saline.  He was given a 1 L bolus.  Patient was given morphine for pain.  He was kept NPO.  Basic labs were obtained.  CBC: White blood cell count 10.6.  Hemoglobin 12.2.  Platelet count not available as of yet.  CMP: BUN 56.  Creatinine 2.76.  Previous BUN and creatinine 49/1.88 on 5/17/2024 glucose 121.  Bicarb 26.  Lipase 17.  AST 42.  ALT 32.  Alk phos 95.  T. bili 2.2.      CT scan abdomen/pelvis was obtained and demonstrated findings suspicious for acute cholecystitis with fat stranding around the gallbladder.    Patient was given 1 g of Rocephin IV.      12:07 PM Case discussed with general surgery Dr. Christensen    12:12 PM Case discussed with [facility] dr Landon    Discussed with  Ad[facility] hospitalist.      Patient will be admitted for further management.    Plan for admission discussed with patient and family they expressed understanding            Disposition and Plan     Clinical Impression:  1. Abdominal pain, right upper quadrant    2. Acute renal failure, unspecified acute renal failure type (HCC)         Disposition:  Admit  6/6/2024 12:36 pm    Follow-up:  No follow-up provider specified.        Medications Prescribed:  Current Discharge Medication List                            Hospital Problems       Present on Admission  Date Reviewed: 5/7/2024            ICD-10-CM Noted POA    * (Principal) Abdominal pain, right upper quadrant R10.11 6/6/2024 Unknown

## 2024-06-06 NOTE — H&P
[facility]  [facility]     Miguel Davila Patient Status:  Emergency    3/17/1945 MRN [id number]   [facility] EMERGENCY DEPARTMENT Attending Kandice Gutierres,    Hosp Day # 0 PCP Eric Simon MD     Chief Complaint: abdominal pain    Subjective:    History of Present Illness:     Miguel Davila is a 79 year old male with hx SLE/ polymyositis/ A-fib (s/p Watchman)/ HTN/ IBS/ LI/ pulmonary HTN who presented to the hospital for abdominal pain. His symptoms originally started on  with generalized malaise, diarrhea approx 5 times daily which was non-bloody, nausea, and weakness. He went to an urgent care on Monday and was diagnosed with IBS and started on dicyclomine. This morning he developed sudden RUQ abdominal pain which was a constant sharp pain that radiated to his RLQ. His pain was rated 10/10 and was decreased with certain movements but had no exacerbating factors. He had associated nausea without emesis. His pain was gradually getting worse throughout the morning prompting him to seek medical eval. He denied any prior history of gallbladder problems. He reports having an EGD and colonoscopy about 1 year ago which were normal.    History/Other:    Past Medical History:  Past Medical History:    A-fib (HCC)    Arrhythmia    atrial fibrillation    Arthritis    Autoimmune disease (HCC)    hepatits, resolved anfd nephritis, resolved    Ramon's esophagus    Bleeding nose    Gums Treated    Blood disorder    thrombocytopenia    Blood in urine    Blurred vision    cataract due to steroids, surgery in     Calculus of kidney    One time    Cancer (HCC)    skin    Candidiasis of the esophagus    Due to steroid use for Autoimmune disorder     Cataract    Colon polyps    Congestive heart disease (HCC)    Diarrhea, unspecified    Intermittent due to lupus    Diverticulosis of colon    Easy bruising    On and off prednisone for 20 years    Esophageal polyp     Esophageal reflux    Essential hypertension    Fatigue    polymyositis and lupus    Gout    Hammer toe, acquired    Heart palpitations    Afib    Hepatitis    autoimmune induce hepatitis d/t lupus    High blood pressure    IBS (irritable bowel syndrome)    mild d/t lupus    Irregular bowel habits    Leg swelling    Heart failure, probably caused by lupus    Lupus (HCC)    MCTD (mixed connective tissue disease) (HCC)    Obstructive sleep apnea (adult) (pediatric)    LI (obstructive sleep apnea)    AHI 37  Supine AHI 38 non-supine AHI 16 Sao2 Pj 83%     LI (obstructive sleep apnea)    AHI 36 RDI 36 REM AHI 45 Supine AHI 65 non-supine AHI 19 Sao2 Pj 86% CPAP 9cwp    Pain in joints    Personal history of antineoplastic chemotherapy    Pleural effusion    right    Pneumonia due to organism    Polymyositis (HCC)    Presence of other cardiac implants and grafts    watchman filter    Problems with swallowing    dysphagia in 2006    Pulmonary hypertension (HCC)    Raynaud disease    Raynaud disease    Renal disorder    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    Shortness of breath    mainly due to pm or lupus    Sleep apnea    CPAP    Thrombocytopathia (HCC)    Visual impairment    bifocals for reading & computer; distance for driving    Wears glasses     Past Surgical History:   Past Surgical History:   Procedure Laterality Date    Appendectomy  1970    Appendectomy      Cardiac catheterization  09/2019    Cataract Bilateral     Colonoscopy  10/2003 2006 01/2010    x3    Colonoscopy  5/14/2013    Colonoscopy N/A 5/1/2018    Procedure: COLONOSCOPY;  Surgeon: Isaiah Tobar MD;  Location:  ENDOSCOPY    Colonoscopy N/A 4/12/2024    Procedure: COLONOSCOPY;  Surgeon: Gerardo Neves MD;  Location:  ENDOSCOPY    Colonoscopy with biopsy  5/14/13    Egd      Hand/finger surgery unlisted  2003    right    Needle biopsy liver      Other  12/2005    needle bipsy of kidney    Other surgical history  2017    watchman filter     Percutaneous  angioplasty  (ptca)- pbp only  2006    right    Rectum surgery procedure unlisted  1946    rectal surgery polypectomy    Skin surgery      MMS BCC R temple 6/24/09    Skin surgery  2007    basal ceell carcinoma    Skin surgery  7-18-12    BCC-nodular to right superior eyebrow/ MOHS    Skin surgery  07/15/2013    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/MMS    Skin surgery  2-19-14    BCC-NOD to right superior pinna, MMS, AB    Skin surgery  02/16/2021    BCC- left superior forehead, MMS     Skin surgery  03/07/2022    SCC IN SITU RIGHT ANTIHELIX MMS BY DR GASTELUM    Tonsillectomy  1948    Upper gi endoscopy,exam  10/2003 2006 01/2010 , 5/13    x3      Family History:   Family History   Problem Relation Age of Onset    Heart Disease Father         CHF    Gastro-Intestinal Disorder Father         Diverticulosis    Alcohol and Other Disorders Associated Father     Heart Attack Father     Heart Disease Mother         CHF    Breast Cancer Mother     Stroke Mother     Cancer Paternal Grandfather     Heart Attack Paternal Grandmother     Kidney Disease Son     Other (Ramon's Esophagus) Son     Heart Attack Maternal Grandfather      Social History:    reports that he quit smoking about 50 years ago. His smoking use included cigarettes. He started smoking about 65 years ago. He has a 7.5 pack-year smoking history. He has never used smokeless tobacco. He reports current alcohol use of about 13.0 - 15.0 standard drinks of alcohol per week. He reports that he does not use drugs.     Allergies:   Allergies   Allergen Reactions    Empagliflozin OTHER (SEE COMMENTS)     Pancreatitis    \"pancreatitis\" per pt    Amoxicillin RASH    Augmentin [Amoclan] RASH    Doxycycline RASH       Medications:    Current Facility-Administered Medications on File Prior to Encounter   Medication Dose Route Frequency Provider Last Rate Last Admin    [COMPLETED] dicyclomine (Bentyl) 10 MG/5ML oral syrup 20 mg  20 mg Oral Once Mann David PA-C   20  mg at 06/04/24 1849    [COMPLETED] acetaminophen (Tylenol) tab 650 mg  650 mg Oral Once Taye Farmer MD   650 mg at 05/29/24 1019    [COMPLETED] diphenhydrAMINE (Benadryl) cap/tab 25 mg  25 mg Oral Once Taye Farmer MD   25 mg at 05/29/24 1019    [COMPLETED] immune globulin (Gammagard) 10% infusion 35 g  0.4 g/kg Intravenous Once Taye Farmer MD   Stopped at 05/29/24 1301    [COMPLETED] lidocaine PF (Xylocaine-MPF) 1 % injection             [COMPLETED] heparin (Porcine) 5000 UNIT/ML injection             [COMPLETED] heparin (Porcine) 5000 UNIT/ML injection             [COMPLETED] acetaminophen (Tylenol) tab 650 mg  650 mg Oral Once Taye Farmer MD   650 mg at 04/24/24 1054    [COMPLETED] diphenhydrAMINE (Benadryl) cap/tab 25 mg  25 mg Oral Once Taye Farmer MD   25 mg at 04/24/24 1054    [COMPLETED] immune globulin (Gammagard) 10% infusion 35 g  0.4 g/kg Intravenous Once Taye Farmer MD   Stopped at 04/24/24 1345    [COMPLETED] patiromer (Veltassa) 8.4 g oral packet 8.4 g  8.4 g Oral Once Tasha Barth APRN   8.4 g at 04/05/24 1424    [COMPLETED] acetaminophen (Tylenol) tab 650 mg  650 mg Oral Once Taye Farmer MD   650 mg at 03/18/24 1035    [COMPLETED] diphenhydrAMINE (Benadryl) cap/tab 25 mg  25 mg Oral Once Taye Farmer MD   25 mg at 03/18/24 1035    [COMPLETED] immune globulin (Gammagard) 10% infusion 35 g  0.4 g/kg Intravenous Once Taye Farmer MD   Stopped at 03/18/24 1340     Current Outpatient Medications on File Prior to Encounter   Medication Sig Dispense Refill    dicyclomine 20 MG Oral Tab Take 1 tablet (20 mg total) by mouth in the morning and 1 tablet (20 mg total) before bedtime. 10 tablet 0    fexofenadine 180 MG Oral Tab Take 1 tablet (180 mg total) by mouth daily as needed.      cholecalciferol 25 MCG (1000 UT) Oral Tab Take 1 tablet (1,000 Units total) by mouth daily.      spironolactone 25 MG Oral Tab Take 0.5 tablets (12.5 mg total) by  mouth daily. Holding for now till potassium is rechecked      levothyroxine 50 MCG Oral Tab Take 1 tablet (50 mcg total) by mouth before breakfast.      mupirocin 2 % External Ointment APPLY TWICE DAILY TO SCALP UNTIL WOUND IS HEALED.      ipratropium 0.06 % Nasal Solution 1 spray by Nasal route 2 (two) times daily. 1 each 5    omeprazole 20 MG Oral Capsule Delayed Release Take 1 capsule (20 mg total) by mouth 2 (two) times daily. 180 capsule 1    bumetanide 1 MG Oral Tab Take 2 mg of Bumex daily in the AM. Take 1 mg in the PM on Tuesday, Thursday, Saturday and Sunday. Take 2 mg in the PM on Monday, Wednesday and Friday 360 tablet 1    ALLOPURINOL 300 MG Oral Tab TAKE 1 TABLET BY MOUTH EVERY DAY 90 tablet 3    predniSONE 5 MG Oral Tab Take 1 tablet (5 mg total) by mouth daily with breakfast. 90 tablet 3    metoprolol succinate  MG Oral Tablet 24 Hr Take 1 tablet (100 mg total) by mouth every morning AND 0.5 tablets (50 mg total) every evening. 60 tablet 3    PREDNISONE 1 MG Oral Tab TAKE 2 TABLETS (2 MG TOTAL) BY MOUTH DAILY WITH BREAKFAST. 180 tablet 3    sacubitril-valsartan 49-51 MG Oral Tab Take 1 tablet by mouth 2 (two) times daily.      aspirin 81 MG Oral Tab EC Take 1 tablet (81 mg total) by mouth daily. 30 tablet 0    PREVIDENT 5000 BOOSTER PLUS 1.1 % Dental Paste       Multiple Vitamins-Minerals (TAB-A-KEVIN) Oral Tab Take 1 tablet by mouth daily.      Immune Globulin, Human, 10 g Intravenous Recon Soln Inject 0.4 g/kg into the vein. Given for treatment of polymyositis, mixed connective tissue disease, and immune thrombocytopenia purpura monthly at [facility].  Every 5 weeks 3 each 0    Calcium Citrate-Vitamin D (CITRACAL + D OR) Take 1 tablet by mouth daily.         Review of Systems:   A comprehensive review of systems was completed.    Pertinent positives and negatives noted in the HPI.    Objective:   Physical Exam:    BP 99/67   Pulse 91   Temp 97.2 °F (36.2 °C) (Temporal)   Resp  (!) 29   Ht 5' 11\" (1.803 m)   Wt 195 lb (88.5 kg)   SpO2 95%   BMI 27.20 kg/m²   General: No acute distress, Alert  Respiratory: No rhonchi, no wheezes  Cardiovascular: S1, S2. irregular rate and rhythm  Abdomen: Soft, tenderness to palpation of RUQ/RLQ, mild distention, positive bowel sounds  Neuro: No new focal deficits  Extremities: No edema      Results:    Labs:      Labs Last 24 Hours:    Recent Labs   Lab 06/06/24  1001   RBC 3.55*   HGB 12.2*   HCT 38.6*   .7*   MCH 34.4*   MCHC 31.6   RDW 15.9   NEPRELIM 8.99*   WBC 10.6   PLT 83.0*       Recent Labs   Lab 06/06/24  1001   *   BUN 56*   CREATSERUM 2.76*   EGFRCR 22*   CA 8.8   ALB 3.0*      K 4.4      CO2 26.0   ALKPHO 95   AST 42*   ALT 32   BILT 2.2*   TP 7.1       Lab Results   Component Value Date    INR 1.04 04/27/2021    INR 1.20 (H) 09/19/2019    INR 1.06 08/15/2019       No results for input(s): \"TROP\", \"TROPHS\", \"CK\" in the last 168 hours.    No results for input(s): \"TROP\", \"PBNP\" in the last 168 hours.    No results for input(s): \"PCT\" in the last 168 hours.    Imaging: Imaging data reviewed in Epic.    Assessment & Plan:      #Acute cholecystitis  -CT findings consistent with acute cholecystitis, pt met 2 SIRS criteria: HR 93, RR 32 and has an NICKI likely 2/2 dehydration, given cholecystitis pt meets severe sepsis criteria but lactate WNL  -LFT only mildly abnormal at 42, doubt choledocholithiasis, GI on consult and no recommendation for further testing at this time  -general surgery on c/s  -antibiotics: rocephin  -cont to trend LFT's  -pain control: morphine prn  -blood cx pending    #Severe Sepsis    #Hyperbilirubinemia    #NICKI on CKD  -Cr 2.76 with baseline 1.88  -Bun:Cr ratio >20 suggestive of pre-renal etiology, pt also dry on exam  -LR @100 mls/hr  -strict I/O, avoid nephrotoxins  -cont to monitor BMP    #Macrocytic anemia  -hgb 12.2: baseline 13  -no signs bleeding  -if persistently low check vit B12, folic  acid, retic count    #Pleural effusion  -trace effusion present which is asymptomatic and chronic  -no further interventions at this point  -monitor oxygen requirements  -incentive spirometry    #Polymyositis  #SLE  #HTN  #LI  #emphysema    #A-fib  -not on AC with Watchman in place  -cont home meds      Plan of care discussed with patient and his wife at bedside    Jesika Garcia DO    Supplementary Documentation:     The 21st Century Cures Act makes medical notes like these available to patients in the interest of transparency. Please be advised this is a medical document. Medical documents are intended to carry relevant information, facts as evident, and the clinical opinion of the practitioner. The medical note is intended as peer to peer communication and may appear blunt or direct. It is written in medical language and may contain abbreviations or verbiage that are unfamiliar.

## 2024-06-06 NOTE — PLAN OF CARE
Pt A&Ox4, resting in bed.  Resp: RA  GI/: voids normally  Activity/Safety: up w/asst & cane  Skin: intact  Pain: RUQ pain  Pt and wife updated on and agreeable to POC; IV fluids, NPO, cardiac clearance ahead of lap basil tonyadiel with Dr. Valencia.

## 2024-06-06 NOTE — CONSULTS
[facility]  Report of Consultation    Miguel Davila Patient Status:  Inpatient    3/17/1945 MRN [id number]   Location [facility] 3NW-A Attending Faviola Steele MD   Hosp Day # 0 PCP Eric Simon MD     Requesting Physician:  This patient was seen at the request of ED physician, Dr. Gutierres    Reason for Consultation:  cholecystitis    Chief Complaint:  Abdominal pain    History of Present Illness:  Miguel Davila is a 79 year old male with pmh hx SLE/ polymyositis/ A-fib (s/p Watchman)/ HTN/ IBS/ LI/ pulmonary HTN, CHF, who presented to immediate care today with abdominal pain, and ultimately sent to ED for further evaluation.  Patient states he started to feel ill on  with multiple episodes of diarrhea.  He was seen at an immediate care on Monday, and presumed to have a \"stomach bug \".  He reports Tuesday evening he had a fever of 100.3.  Wednesday, he awoke with acute right upper quadrant pain.  Patient states right upper quadrant pain has been persistent since.  He states it is constant and severe.  He reports associated nausea, though no vomiting.     Upon presentation to the emergency department, patient was hemodynamically stable and afebrile.  CBC without leukocytosis, hemoglobin 12.2, platelets 83.  Lipase within normal limits.  CMP with elevated T. bili at 2.2, remainder of LFTs unremarkable.  Creatinine 2.76.  No significant electrolyte derangements.  Patient had a CT abdomen and pelvis which revealed findings consistent with acute cholecystitis, fat stranding around the gallbladder.  He subsequently underwent MRCP which again showed findings consistent with acute cholecystitis.  No intra or extrahepatic biliary ductal dilation seen.    Past medical history as above.  Patient is not anticoagulated.  He does take daily prednisone.    Past abdominal surgical history includes appendectomy, G-tube      History:  Past Medical History:    A-fib (HCC)    Arrhythmia    atrial  fibrillation    Arthritis    Autoimmune disease (HCC)    hepatits, resolved anfd nephritis, resolved    Ramon's esophagus    Bleeding nose    Gums Treated    Blood disorder    thrombocytopenia    Blood in urine    Blurred vision    cataract due to steroids, surgery in June    Calculus of kidney    One time    Cancer (HCC)    skin    Candidiasis of the esophagus    Due to steroid use for Autoimmune disorder     Cataract    Colon polyps    Congestive heart disease (HCC)    Diarrhea, unspecified    Intermittent due to lupus    Diverticulosis of colon    Easy bruising    On and off prednisone for 20 years    Esophageal polyp    Esophageal reflux    Essential hypertension    Fatigue    polymyositis and lupus    Gout    Hammer toe, acquired    Heart palpitations    Afib    Hepatitis    autoimmune induce hepatitis d/t lupus    High blood pressure    IBS (irritable bowel syndrome)    mild d/t lupus    Irregular bowel habits    Leg swelling    Heart failure, probably caused by lupus    Lupus (HCC)    MCTD (mixed connective tissue disease) (HCC)    Obstructive sleep apnea (adult) (pediatric)    LI (obstructive sleep apnea)    AHI 37  Supine AHI 38 non-supine AHI 16 Sao2 Pj 83%     LI (obstructive sleep apnea)    AHI 36 RDI 36 REM AHI 45 Supine AHI 65 non-supine AHI 19 Sao2 Pj 86% CPAP 9cwp    Pain in joints    Personal history of antineoplastic chemotherapy    Pleural effusion    right    Pneumonia due to organism    Polymyositis (HCC)    Presence of other cardiac implants and grafts    watchman filter    Problems with swallowing    dysphagia in 2006    Pulmonary hypertension (HCC)    Raynaud disease    Raynaud disease    Renal disorder    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    Shortness of breath    mainly due to pm or lupus    Sleep apnea    CPAP    Thrombocytopathia (HCC)    Visual impairment    bifocals for reading & computer; distance for driving    Wears glasses     Past Surgical History:   Procedure Laterality Date     Appendectomy  1970    Appendectomy      Cardiac catheterization  09/2019    Cataract Bilateral     Colonoscopy  10/2003 2006 01/2010    x3    Colonoscopy  5/14/2013    Colonoscopy N/A 5/1/2018    Procedure: COLONOSCOPY;  Surgeon: Isaiah Tobar MD;  Location:  ENDOSCOPY    Colonoscopy N/A 4/12/2024    Procedure: COLONOSCOPY;  Surgeon: Gerardo Neves MD;  Location:  ENDOSCOPY    Colonoscopy with biopsy  5/14/13    [facility]      Hand/finger surgery unlisted  2003    right    Needle biopsy liver      Other  12/2005    needle bipsy of kidney    Other surgical history  2017    watchman filter    Percutaneous  angioplasty  (ptca)- pbp only  2006    right    Rectum surgery procedure unlisted  1946    rectal surgery polypectomy    Skin surgery      MMS BCC R temple 6/24/09    Skin surgery  2007    basal ceell carcinoma    Skin surgery  7-18-12    BCC-nodular to right superior eyebrow/ MOHS    Skin surgery  07/15/2013    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/MMS    Skin surgery  2-19-14    BCC-NOD to right superior pinna, MMS, AB    Skin surgery  02/16/2021    BCC- left superior forehead, MMS     Skin surgery  03/07/2022    SCC IN SITU RIGHT ANTIHELIX MMS BY DR GASTELUM    Tonsillectomy  1948    Upper gi endoscopy,exam  10/2003 2006 01/2010 , 5/13    x3     Family History   Problem Relation Age of Onset    Heart Disease Father         CHF    Gastro-Intestinal Disorder Father         Diverticulosis    Alcohol and Other Disorders Associated Father     Heart Attack Father     Heart Disease Mother         CHF    Breast Cancer Mother     Stroke Mother     Cancer Paternal Grandfather     Heart Attack Paternal Grandmother     Kidney Disease Son     Other (Ramon's Esophagus) Son     Heart Attack Maternal Grandfather       reports that he quit smoking about 50 years ago. His smoking use included cigarettes. He started smoking about 65 years ago. He has a 7.5 pack-year smoking history. He has never used smokeless tobacco. He reports  current alcohol use of about 13.0 - 15.0 standard drinks of alcohol per week. He reports that he does not use drugs.    Allergies:  Allergies   Allergen Reactions    Empagliflozin OTHER (SEE COMMENTS)     Pancreatitis    \"pancreatitis\" per pt    Amoxicillin RASH    Augmentin [Amoclan] RASH    Doxycycline RASH       Medications:  Medications Prior to Admission   Medication Sig    dicyclomine 20 MG Oral Tab Take 1 tablet (20 mg total) by mouth in the morning and 1 tablet (20 mg total) before bedtime.    fexofenadine 180 MG Oral Tab Take 1 tablet (180 mg total) by mouth daily as needed.    cholecalciferol 25 MCG (1000 UT) Oral Tab Take 1 tablet (1,000 Units total) by mouth daily.    spironolactone 25 MG Oral Tab Take 0.5 tablets (12.5 mg total) by mouth daily. Holding for now till potassium is rechecked    levothyroxine 50 MCG Oral Tab Take 1 tablet (50 mcg total) by mouth before breakfast.    ipratropium 0.06 % Nasal Solution 1 spray by Nasal route 2 (two) times daily.    omeprazole 20 MG Oral Capsule Delayed Release Take 1 capsule (20 mg total) by mouth 2 (two) times daily.    bumetanide 1 MG Oral Tab Take 2 mg of Bumex daily in the AM. Take 1 mg in the PM on Tuesday, Thursday, Saturday and Sunday. Take 2 mg in the PM on Monday, Wednesday and Friday    ALLOPURINOL 300 MG Oral Tab TAKE 1 TABLET BY MOUTH EVERY DAY    predniSONE 5 MG Oral Tab Take 1 tablet (5 mg total) by mouth daily with breakfast.    metoprolol succinate  MG Oral Tablet 24 Hr Take 1 tablet (100 mg total) by mouth every morning AND 0.5 tablets (50 mg total) every evening.    PREDNISONE 1 MG Oral Tab TAKE 2 TABLETS (2 MG TOTAL) BY MOUTH DAILY WITH BREAKFAST.    sacubitril-valsartan 49-51 MG Oral Tab Take 1 tablet by mouth 2 (two) times daily.    aspirin 81 MG Oral Tab EC Take 1 tablet (81 mg total) by mouth daily.    PREVIDENT 5000 BOOSTER PLUS 1.1 % Dental Paste     Multiple Vitamins-Minerals (TAB-A-KEVIN) Oral Tab Take 1 tablet by mouth daily.     Immune Globulin, Human, 10 g Intravenous Recon Soln Inject 0.4 g/kg into the vein. Given for treatment of polymyositis, mixed connective tissue disease, and immune thrombocytopenia purpura monthly at [facility].  Every 5 weeks    Calcium Citrate-Vitamin D (CITRACAL + D OR) Take 1 tablet by mouth daily.         Current Facility-Administered Medications:     aspirin DR tab 81 mg, 81 mg, Oral, Daily    cholecalciferol (Vitamin D3) tab 1,000 Units, 1,000 Units, Oral, Daily    levothyroxine (Synthroid) tab 50 mcg, 50 mcg, Oral, Before breakfast    metoprolol succinate ER (Toprol XL) 24 hr tab 100 mg, 100 mg, Oral, QAM **AND** metoprolol succinate ER (Toprol XL) 24 hr tab 50 mg, 50 mg, Oral, QPM    pantoprazole (Protonix) DR tab 20 mg, 20 mg, Oral, BID AC    lactated ringers infusion, , Intravenous, Continuous    heparin (Porcine) 5000 UNIT/ML injection 5,000 Units, 5,000 Units, Subcutaneous, 2 times per day    [START ON 6/7/2024] cefTRIAXone (Rocephin) 1 g in D5W 100 mL IVPB-ADD, 1 g, Intravenous, Q24H    ondansetron (Zofran) 4 MG/2ML injection 4 mg, 4 mg, Intravenous, Q6H PRN    Review of Systems:  Review of Systems   Constitutional:  Positive for chills and fever. Negative for unexpected weight change.   HENT:  Negative for trouble swallowing.    Respiratory:  Negative for chest tightness and shortness of breath.    Cardiovascular:  Negative for chest pain and leg swelling.   Gastrointestinal:  Positive for nausea, abdominal pain and diarrhea. Negative for heartburn, vomiting, constipation, blood in stool, abdominal distention, anal bleeding and rectal pain.   Genitourinary:  Negative for dysuria, flank pain, scrotal swelling and testicular pain.   Musculoskeletal:  Negative for back pain and neck stiffness.   Skin:  Negative for color change and rash.   Neurological:  Negative for dizziness, weakness and light-headedness.       Physical Exam:  /62 (BP Location: Right arm)   Pulse 89   Temp 98.6 °F  (37 °C) (Oral)   Resp (!) 37   Ht 71\"   Wt 195 lb (88.5 kg)   SpO2 100%   BMI 27.20 kg/m²   Physical Exam  Constitutional:       General: He is not in acute distress.     Appearance: Normal appearance. He is not ill-appearing or toxic-appearing.   HENT:      Head: Normocephalic and atraumatic.      Nose: Nose normal.      Mouth/Throat:      Mouth: Mucous membranes are moist.   Eyes:      Conjunctiva/sclera: Conjunctivae normal.   Cardiovascular:      Rate and Rhythm: Normal rate and regular rhythm.   Pulmonary:      Effort: Pulmonary effort is normal. No respiratory distress.   Abdominal:      General: There is no distension.      Palpations: Abdomen is soft.      Tenderness: There is abdominal tenderness (RUQ). There is no guarding or rebound.      Hernia: No hernia is present.   Musculoskeletal:         General: No swelling. Normal range of motion.      Cervical back: Neck supple.   Skin:     General: Skin is warm and dry.      Coloration: Skin is not jaundiced.   Neurological:      General: No focal deficit present.      Mental Status: He is alert.   Psychiatric:         Mood and Affect: Mood normal.         Behavior: Behavior normal.         Laboratory Data:  Recent Labs   Lab 06/06/24  1001   RBC 3.55*   HGB 12.2*   HCT 38.6*   .7*   MCH 34.4*   MCHC 31.6   RDW 15.9   NEPRELIM 8.99*   WBC 10.6   PLT 83.0*       Recent Labs   Lab 06/06/24  1001   *   BUN 56*   CREATSERUM 2.76*   CA 8.8   ALB 3.0*      K 4.4      CO2 26.0   ALKPHO 95   AST 42*   ALT 32   BILT 2.2*   TP 7.1         No results for input(s): \"PTP\", \"INR\", \"PTT\" in the last 168 hours.      MRI ABDOMEN AND MRCP W/3D (W+W0)(CPT=74183/40339)    Result Date: 6/6/2024  CONCLUSION:  1. Findings concerning for acute cholecystitis. 2. Small bilateral pleural effusions. 3. Simple to mildly complex cysts are noted in the kidneys.  No abnormal enhancing lesions are present.  LOCATION:  [facility]    Dictated by (CST): Ghassan Caraballo MD  on 6/06/2024 at 3:48 PM     Finalized by (CST): Ghassan Caraballo MD on 6/06/2024 at 3:52 PM       CT ABDOMEN+PELVIS(CPT=74176)    Result Date: 6/6/2024  CONCLUSION:  1. Findings suspicious for acute cholecystitis with fat stranding surrounding the gallbladder. 2. Uncomplicated diffuse diverticulosis of the colon. 3. Stable appearing multiple low-density and proteinaceous cysts of the kidneys. 4. Minimal right basilar pleural effusion.   LOCATION:  [facility]   Dictated by (CST): Angy Claros DO on 6/06/2024 at 11:32 AM     Finalized by (CST): Angy Claros DO on 6/06/2024 at 11:36 AM          Candy Barrera PA-C  6/6/2024  5:36 PM    Is this a shared or split note between Advanced Practice Provider and Physician? Yes       Medical Decision Making         Impression:  Patient Active Problem List   Diagnosis    Personal history of other malignant neoplasm of skin    Hypopotassemia    Ramon's esophagus    Gout    ED (erectile dysfunction)    Benign essential hypertension    Low HDL (under 40)    Thrombocytopenia (HCC)    Basal cell carcinoma, ear    Lupus hepatitis syndrome (HCC)    Splenomegaly    Mixed connective tissue disease (HCC)    Polymyositis (HCC)    Atrial fibrillation (HCC)    Aortic root dilation (HCC)    Incidental lung nodule, less than or equal to 3mm    LI (obstructive sleep apnea)    Pleural effusion, left    Pulmonary hypertension (HCC)    Chronic right-sided congestive heart failure (HCC)    Immune thrombocytopenic purpura (HCC)    Cardiomyopathy as manifestation of underlying disease (HCC)    Presence of Watchman left atrial appendage closure device    Centrilobular emphysema (HCC)    Symptomatic anemia    Pancytopenia (HCC)    Stage 3b chronic kidney disease (HCC)    Drug-induced acute pancreatitis without infection or necrosis (HCC)    [facility]    Abdominal pain, right upper quadrant    Acute renal failure, unspecified acute renal failure type (HCC)       This  is an 79-year-old gentleman with acute cholecystitis    Patient presented with right upper quadrant abdominal pain  On serology, he had a normal white count but had elevated T. bili to 2  CT imaging were reviewed personally by me and shows cholelithiasis with some fat stranding around the gallbladder concerning for acute cholecystitis  MRCP images were reviewed personally by me and shows cholelithiasis with pericholecystic fluid consistent with acute cholecystitis    On interview, patient reports continued abdominal pain  He has significant past medical history for congestive heart failure, A-fib with watchman in place, pulmonary hypertension, lupus, obstructive sleep apnea, and hypertension  I recommend that we do proceed with cholecystectomy but patient will need both medical and cardiac clearance prior  Patient can have clear liquids tonight, n.p.o. at midnight  Trend LFTs    If patient is cleared by medicine and cardiology, will plan for laparoscopic cholecystectomy with cholangiogram tomorrow  Procedure explained to the patient  Risks including bleeding, pain, infection, bile leak, injury to the common bile duct, and need for further intervention all discussed with the patient  Postoperative restrictions also discussed, these include no heavy lifting greater than 15 to 20 pounds for 4-6 weeks  Patient acknowledged understanding and wishes to proceed    Surgery will continue to follow  Rest of care per primary    Thank you for letting me participate in the care of this patient    Tita Valencia MD  [facility] General Surgery  6/6/2024  9:15 PM

## 2024-06-07 ENCOUNTER — APPOINTMENT (OUTPATIENT)
Dept: GENERAL RADIOLOGY | Facility: HOSPITAL | Age: 79
DRG: 853 | End: 2024-06-07
Attending: STUDENT IN AN ORGANIZED HEALTH CARE EDUCATION/TRAINING PROGRAM
Payer: MEDICARE

## 2024-06-07 LAB
ACTIN SMOOTH MUSCLE AB: 5 UNITS
ALBUMIN SERPL-MCNC: 2.4 G/DL (ref 3.4–5)
ALBUMIN/GLOB SERPL: 0.7 {RATIO} (ref 1–2)
ALP LIVER SERPL-CCNC: 66 U/L
ALT SERPL-CCNC: 27 U/L
ANION GAP SERPL CALC-SCNC: 9 MMOL/L (ref 0–18)
AST SERPL-CCNC: 20 U/L (ref 15–37)
ATRIAL RATE: 89 BPM
BASOPHILS # BLD AUTO: 0.01 X10(3) UL (ref 0–0.2)
BASOPHILS NFR BLD AUTO: 0.1 %
BILIRUB SERPL-MCNC: 1.3 MG/DL (ref 0.1–2)
BUN BLD-MCNC: 63 MG/DL (ref 9–23)
CALCIUM BLD-MCNC: 8.2 MG/DL (ref 8.5–10.1)
CHLORIDE SERPL-SCNC: 107 MMOL/L (ref 98–112)
CO2 SERPL-SCNC: 24 MMOL/L (ref 21–32)
CREAT BLD-MCNC: 2.62 MG/DL
EGFRCR SERPLBLD CKD-EPI 2021: 24 ML/MIN/1.73M2 (ref 60–?)
EOSINOPHIL # BLD AUTO: 0.04 X10(3) UL (ref 0–0.7)
EOSINOPHIL NFR BLD AUTO: 0.5 %
ERYTHROCYTE [DISTWIDTH] IN BLOOD BY AUTOMATED COUNT: 15.9 %
GLOBULIN PLAS-MCNC: 3.4 G/DL (ref 2.8–4.4)
GLUCOSE BLD-MCNC: 100 MG/DL (ref 70–99)
HCT VFR BLD AUTO: 35 %
HGB BLD-MCNC: 11.3 G/DL
IMM GRANULOCYTES # BLD AUTO: 0.02 X10(3) UL (ref 0–1)
IMM GRANULOCYTES NFR BLD: 0.2 %
LACTATE SERPL-SCNC: 1.8 MMOL/L (ref 0.4–2)
LYMPHOCYTES # BLD AUTO: 0.5 X10(3) UL (ref 1–4)
LYMPHOCYTES NFR BLD AUTO: 6.1 %
M2 MITOCHONDRIAL AB: <20 UNITS
MAGNESIUM SERPL-MCNC: 2.4 MG/DL (ref 1.6–2.6)
MCH RBC QN AUTO: 34.7 PG (ref 26–34)
MCHC RBC AUTO-ENTMCNC: 32.3 G/DL (ref 31–37)
MCV RBC AUTO: 107.4 FL
MONOCYTES # BLD AUTO: 0.41 X10(3) UL (ref 0.1–1)
MONOCYTES NFR BLD AUTO: 5 %
NEUTROPHILS # BLD AUTO: 7.22 X10 (3) UL (ref 1.5–7.7)
NEUTROPHILS # BLD AUTO: 7.22 X10(3) UL (ref 1.5–7.7)
NEUTROPHILS NFR BLD AUTO: 88.1 %
OSMOLALITY SERPL CALC.SUM OF ELEC: 308 MOSM/KG (ref 275–295)
PLATELET # BLD AUTO: 70 10(3)UL (ref 150–450)
POTASSIUM SERPL-SCNC: 4.1 MMOL/L (ref 3.5–5.1)
PROT SERPL-MCNC: 5.8 G/DL (ref 6.4–8.2)
Q-T INTERVAL: 362 MS
QRS DURATION: 96 MS
QTC CALCULATION (BEZET): 447 MS
R AXIS: 4 DEGREES
RBC # BLD AUTO: 3.26 X10(6)UL
SODIUM SERPL-SCNC: 140 MMOL/L (ref 136–145)
T AXIS: 1 DEGREES
VENTRICULAR RATE: 92 BPM
WBC # BLD AUTO: 8.2 X10(3) UL (ref 4–11)

## 2024-06-07 PROCEDURE — 0FT44ZZ RESECTION OF GALLBLADDER, PERCUTANEOUS ENDOSCOPIC APPROACH: ICD-10-PCS | Performed by: STUDENT IN AN ORGANIZED HEALTH CARE EDUCATION/TRAINING PROGRAM

## 2024-06-07 PROCEDURE — 47562 LAPAROSCOPIC CHOLECYSTECTOMY: CPT | Performed by: STUDENT IN AN ORGANIZED HEALTH CARE EDUCATION/TRAINING PROGRAM

## 2024-06-07 PROCEDURE — 99233 SBSQ HOSP IP/OBS HIGH 50: CPT | Performed by: STUDENT IN AN ORGANIZED HEALTH CARE EDUCATION/TRAINING PROGRAM

## 2024-06-07 PROCEDURE — 74300 X-RAY BILE DUCTS/PANCREAS: CPT | Performed by: STUDENT IN AN ORGANIZED HEALTH CARE EDUCATION/TRAINING PROGRAM

## 2024-06-07 RX ORDER — ACETAMINOPHEN 500 MG
1000 TABLET ORAL ONCE AS NEEDED
Status: DISCONTINUED | OUTPATIENT
Start: 2024-06-07 | End: 2024-06-07 | Stop reason: HOSPADM

## 2024-06-07 RX ORDER — NALOXONE HYDROCHLORIDE 0.4 MG/ML
80 INJECTION, SOLUTION INTRAMUSCULAR; INTRAVENOUS; SUBCUTANEOUS AS NEEDED
Status: DISCONTINUED | OUTPATIENT
Start: 2024-06-07 | End: 2024-06-07 | Stop reason: HOSPADM

## 2024-06-07 RX ORDER — SODIUM CHLORIDE, SODIUM LACTATE, POTASSIUM CHLORIDE, CALCIUM CHLORIDE 600; 310; 30; 20 MG/100ML; MG/100ML; MG/100ML; MG/100ML
INJECTION, SOLUTION INTRAVENOUS CONTINUOUS PRN
Status: DISCONTINUED | OUTPATIENT
Start: 2024-06-07 | End: 2024-06-07 | Stop reason: SURG

## 2024-06-07 RX ORDER — SODIUM CHLORIDE 9 MG/ML
INJECTION, SOLUTION INTRAVENOUS CONTINUOUS
Status: DISCONTINUED | OUTPATIENT
Start: 2024-06-07 | End: 2024-06-10

## 2024-06-07 RX ORDER — LABETALOL HYDROCHLORIDE 5 MG/ML
5 INJECTION, SOLUTION INTRAVENOUS EVERY 5 MIN PRN
Status: DISCONTINUED | OUTPATIENT
Start: 2024-06-07 | End: 2024-06-07 | Stop reason: HOSPADM

## 2024-06-07 RX ORDER — ONDANSETRON 2 MG/ML
4 INJECTION INTRAMUSCULAR; INTRAVENOUS EVERY 6 HOURS PRN
Status: DISCONTINUED | OUTPATIENT
Start: 2024-06-07 | End: 2024-06-07 | Stop reason: HOSPADM

## 2024-06-07 RX ORDER — DEXAMETHASONE SODIUM PHOSPHATE 4 MG/ML
VIAL (ML) INJECTION AS NEEDED
Status: DISCONTINUED | OUTPATIENT
Start: 2024-06-07 | End: 2024-06-07 | Stop reason: SURG

## 2024-06-07 RX ORDER — MORPHINE SULFATE 4 MG/ML
4 INJECTION, SOLUTION INTRAMUSCULAR; INTRAVENOUS EVERY 30 MIN PRN
Status: DISCONTINUED | OUTPATIENT
Start: 2024-06-07 | End: 2024-06-07

## 2024-06-07 RX ORDER — SODIUM CHLORIDE, SODIUM LACTATE, POTASSIUM CHLORIDE, CALCIUM CHLORIDE 600; 310; 30; 20 MG/100ML; MG/100ML; MG/100ML; MG/100ML
INJECTION, SOLUTION INTRAVENOUS CONTINUOUS
Status: DISCONTINUED | OUTPATIENT
Start: 2024-06-07 | End: 2024-06-07 | Stop reason: HOSPADM

## 2024-06-07 RX ORDER — CEFAZOLIN SODIUM 1 G/3ML
INJECTION, POWDER, FOR SOLUTION INTRAMUSCULAR; INTRAVENOUS AS NEEDED
Status: DISCONTINUED | OUTPATIENT
Start: 2024-06-07 | End: 2024-06-07 | Stop reason: SURG

## 2024-06-07 RX ORDER — HYDROMORPHONE HYDROCHLORIDE 1 MG/ML
0.6 INJECTION, SOLUTION INTRAMUSCULAR; INTRAVENOUS; SUBCUTANEOUS EVERY 5 MIN PRN
Status: DISCONTINUED | OUTPATIENT
Start: 2024-06-07 | End: 2024-06-07 | Stop reason: HOSPADM

## 2024-06-07 RX ORDER — ALBUTEROL SULFATE 2.5 MG/3ML
2.5 SOLUTION RESPIRATORY (INHALATION) AS NEEDED
Status: DISCONTINUED | OUTPATIENT
Start: 2024-06-07 | End: 2024-06-07 | Stop reason: HOSPADM

## 2024-06-07 RX ORDER — DEXTROSE MONOHYDRATE AND SODIUM CHLORIDE 5; .45 G/100ML; G/100ML
INJECTION, SOLUTION INTRAVENOUS CONTINUOUS
Status: DISCONTINUED | OUTPATIENT
Start: 2024-06-07 | End: 2024-06-07

## 2024-06-07 RX ORDER — ETOMIDATE 2 MG/ML
INJECTION INTRAVENOUS AS NEEDED
Status: DISCONTINUED | OUTPATIENT
Start: 2024-06-07 | End: 2024-06-07 | Stop reason: SURG

## 2024-06-07 RX ORDER — HYDROMORPHONE HYDROCHLORIDE 1 MG/ML
0.2 INJECTION, SOLUTION INTRAMUSCULAR; INTRAVENOUS; SUBCUTANEOUS EVERY 5 MIN PRN
Status: DISCONTINUED | OUTPATIENT
Start: 2024-06-07 | End: 2024-06-07 | Stop reason: HOSPADM

## 2024-06-07 RX ORDER — HYDROMORPHONE HYDROCHLORIDE 1 MG/ML
0.4 INJECTION, SOLUTION INTRAMUSCULAR; INTRAVENOUS; SUBCUTANEOUS EVERY 5 MIN PRN
Status: DISCONTINUED | OUTPATIENT
Start: 2024-06-07 | End: 2024-06-07 | Stop reason: HOSPADM

## 2024-06-07 RX ORDER — HYDROCODONE BITARTRATE AND ACETAMINOPHEN 5; 325 MG/1; MG/1
2 TABLET ORAL ONCE AS NEEDED
Status: DISCONTINUED | OUTPATIENT
Start: 2024-06-07 | End: 2024-06-07 | Stop reason: HOSPADM

## 2024-06-07 RX ORDER — BUPIVACAINE HYDROCHLORIDE 2.5 MG/ML
INJECTION, SOLUTION EPIDURAL; INFILTRATION; INTRACAUDAL AS NEEDED
Status: DISCONTINUED | OUTPATIENT
Start: 2024-06-07 | End: 2024-06-07 | Stop reason: HOSPADM

## 2024-06-07 RX ORDER — LIDOCAINE HYDROCHLORIDE 10 MG/ML
INJECTION, SOLUTION EPIDURAL; INFILTRATION; INTRACAUDAL; PERINEURAL AS NEEDED
Status: DISCONTINUED | OUTPATIENT
Start: 2024-06-07 | End: 2024-06-07 | Stop reason: SURG

## 2024-06-07 RX ORDER — ROCURONIUM BROMIDE 10 MG/ML
INJECTION, SOLUTION INTRAVENOUS AS NEEDED
Status: DISCONTINUED | OUTPATIENT
Start: 2024-06-07 | End: 2024-06-07 | Stop reason: SURG

## 2024-06-07 RX ORDER — HYDROCODONE BITARTRATE AND ACETAMINOPHEN 5; 325 MG/1; MG/1
1 TABLET ORAL ONCE AS NEEDED
Status: DISCONTINUED | OUTPATIENT
Start: 2024-06-07 | End: 2024-06-07 | Stop reason: HOSPADM

## 2024-06-07 RX ORDER — METOPROLOL TARTRATE 1 MG/ML
5 INJECTION, SOLUTION INTRAVENOUS EVERY 4 HOURS PRN
Status: DISCONTINUED | OUTPATIENT
Start: 2024-06-07 | End: 2024-06-07

## 2024-06-07 RX ORDER — METOPROLOL TARTRATE 1 MG/ML
INJECTION, SOLUTION INTRAVENOUS AS NEEDED
Status: DISCONTINUED | OUTPATIENT
Start: 2024-06-07 | End: 2024-06-07 | Stop reason: SURG

## 2024-06-07 RX ORDER — METOCLOPRAMIDE HYDROCHLORIDE 5 MG/ML
INJECTION INTRAMUSCULAR; INTRAVENOUS AS NEEDED
Status: DISCONTINUED | OUTPATIENT
Start: 2024-06-07 | End: 2024-06-07 | Stop reason: SURG

## 2024-06-07 RX ORDER — ONDANSETRON 2 MG/ML
INJECTION INTRAMUSCULAR; INTRAVENOUS AS NEEDED
Status: DISCONTINUED | OUTPATIENT
Start: 2024-06-07 | End: 2024-06-07 | Stop reason: SURG

## 2024-06-07 RX ORDER — SODIUM CHLORIDE, SODIUM LACTATE, POTASSIUM CHLORIDE, CALCIUM CHLORIDE 600; 310; 30; 20 MG/100ML; MG/100ML; MG/100ML; MG/100ML
INJECTION, SOLUTION INTRAVENOUS CONTINUOUS
Status: DISCONTINUED | OUTPATIENT
Start: 2024-06-07 | End: 2024-06-07

## 2024-06-07 RX ORDER — METOCLOPRAMIDE HYDROCHLORIDE 5 MG/ML
5 INJECTION INTRAMUSCULAR; INTRAVENOUS EVERY 8 HOURS PRN
Status: DISCONTINUED | OUTPATIENT
Start: 2024-06-07 | End: 2024-06-07 | Stop reason: HOSPADM

## 2024-06-07 RX ORDER — PHENYLEPHRINE HCL 10 MG/ML
VIAL (ML) INJECTION AS NEEDED
Status: DISCONTINUED | OUTPATIENT
Start: 2024-06-07 | End: 2024-06-07 | Stop reason: SURG

## 2024-06-07 RX ORDER — METOPROLOL TARTRATE 1 MG/ML
5 INJECTION, SOLUTION INTRAVENOUS EVERY 4 HOURS
Status: DISCONTINUED | OUTPATIENT
Start: 2024-06-07 | End: 2024-06-08

## 2024-06-07 RX ADMIN — ROCURONIUM BROMIDE 10 MG: 10 INJECTION, SOLUTION INTRAVENOUS at 18:30:00

## 2024-06-07 RX ADMIN — SODIUM CHLORIDE, SODIUM LACTATE, POTASSIUM CHLORIDE, CALCIUM CHLORIDE: 600; 310; 30; 20 INJECTION, SOLUTION INTRAVENOUS at 18:21:00

## 2024-06-07 RX ADMIN — ETOMIDATE 10 MG: 2 INJECTION INTRAVENOUS at 18:30:00

## 2024-06-07 RX ADMIN — METOCLOPRAMIDE HYDROCHLORIDE 10 MG: 5 INJECTION INTRAMUSCULAR; INTRAVENOUS at 18:35:00

## 2024-06-07 RX ADMIN — DEXAMETHASONE SODIUM PHOSPHATE 4 MG: 4 MG/ML VIAL (ML) INJECTION at 18:35:00

## 2024-06-07 RX ADMIN — CEFAZOLIN SODIUM 2 G: 1 INJECTION, POWDER, FOR SOLUTION INTRAMUSCULAR; INTRAVENOUS at 18:40:00

## 2024-06-07 RX ADMIN — ROCURONIUM BROMIDE 10 MG: 10 INJECTION, SOLUTION INTRAVENOUS at 19:30:00

## 2024-06-07 RX ADMIN — ONDANSETRON 4 MG: 2 INJECTION INTRAMUSCULAR; INTRAVENOUS at 21:30:00

## 2024-06-07 RX ADMIN — PHENYLEPHRINE HCL 100 MCG: 10 MG/ML VIAL (ML) INJECTION at 18:30:00

## 2024-06-07 RX ADMIN — METOPROLOL TARTRATE 5 MG: 1 INJECTION, SOLUTION INTRAVENOUS at 19:00:01

## 2024-06-07 RX ADMIN — ROCURONIUM BROMIDE 10 MG: 10 INJECTION, SOLUTION INTRAVENOUS at 19:00:00

## 2024-06-07 RX ADMIN — SODIUM CHLORIDE, SODIUM LACTATE, POTASSIUM CHLORIDE, CALCIUM CHLORIDE: 600; 310; 30; 20 INJECTION, SOLUTION INTRAVENOUS at 20:00:00

## 2024-06-07 RX ADMIN — ROCURONIUM BROMIDE 30 MG: 10 INJECTION, SOLUTION INTRAVENOUS at 18:45:00

## 2024-06-07 RX ADMIN — LIDOCAINE HYDROCHLORIDE 50 MG: 10 INJECTION, SOLUTION EPIDURAL; INFILTRATION; INTRACAUDAL; PERINEURAL at 18:30:00

## 2024-06-07 RX ADMIN — ROCURONIUM BROMIDE 10 MG: 10 INJECTION, SOLUTION INTRAVENOUS at 20:30:00

## 2024-06-07 NOTE — PROGRESS NOTES
This is an 79-year-old gentleman with history of multiple medical problems who was evaluated by my partner, Dr. Valencia, on 6/6/2024 with concern for acute cholecystitis.  I have personally reviewed his workup and imaging findings are indeed suspicious for acute cholecystitis.  Dr. Valencia asked me to assume surgical care of this patient due to lack of OR availability until this evening.    I met the patient in preoperative holding.  History, exam and imaging do indeed appear consistent with acute cholecystitis.  I recommend proceeding with laparoscopic cholecystectomy with intraoperative cholangiogram, possible open.  The details of the procedure were discussed including the expected recovery time, risks, benefits and alternatives.  Patient expressed understanding and was agreeable to proceed with surgery.  Consent was signed.  All questions answered.    Curtis Harry MD  [facility] General Surgery

## 2024-06-07 NOTE — DISCHARGE INSTRUCTIONS
[facility] Hosting Alzheimer's Support Group  Meetings will be held at 5:30 p.m. on the second Wednesday of each month at the [facility], [address] [place] [address].  If you are interested in attending, please RSVP to Jonathan Ivey at [phone] or Aurelia Galindo at [phone] or for more information.    [employer].  Our Support Groups are offered in a variety of ways for your convenience and safety.  - Virtual: via Webinar or Phone  - In-Person: Following all state and local safely guidelines  Call our 24-hour Helpline at [phone]     [facility] - Meals on Wheels  [address] , [place] [address] [phone]    [facility] Ending Hunger   [address] [place] [address] Grocery Distribution: Tuesday, Wednesday, Thursday & Saturday # [phone]           Cholecystectomy  Dr. Curtis Harry     MEDICATIONS  For post-operative pain control,  you may use Motrin or Tylenol over the counter.   The patient can take tylenol 1g three times a day and ibuprofen 400-600mg in between up to 4 times a day as needed for pain .  The patient can also take miralax or colace as needed for constipation. Please ask your surgeon before resuming blood thinners such as aspirin, Plavix or Coumadin.  All other home medications may be resumed as scheduled.       DIET  The patient may resume a general diet immediately.  This is not a good time to eat excessively.  The patient should eat in moderation and stick with foods the patient feels are easy to digest.  Avoid high fat foods in the immediate post-operative time period as this may still cause some problems.  The patient may eat anything in small amounts but foods rich in dairy products, fatty foods or fried foods may cause an upset stomach for up to six weeks after surgery.  There should be no alcohol consumption in the immediate recovery time period or within six  hours of taking narcotics.    WOUND CARE  The dressing is usually a dissolvable skin glue which protects the wound. The patient can take a shower starting the day after surgery, but please, no baths or soaking. Please do not put any creams or ointments on the surgical incisions.    When showering, soap can get on the wounds but do not scrub over the wounds.  No hair dye or chemicals of any kind should get in the wounds.  Avoid tub baths, swimming or sitting in a hot tub for two weeks.  Most wounds will be closed with dissolving suture underneath the skin.  These sutures will dissolve on their own.  You also have a  drain  present .  Please make sure you receives drain care instructions from the nurse prior to discharge.      ACTIVITY  Every day the patient should be up, showered and dressed.  Each day the patient should be up and around the house.  The patient should not lie in bed and should not stay in pajamas.  We count on the patient being up, coughing, walking and deep breathing to avoid pneumonia and blood clots in the legs.  Once a day the patient should get out of the house and go shopping, go to the mall, the [employer] store, the [employer] or a restaurant.  The patient may ride in a car but should not drive the car for at least one week.  Patients should be off narcotics for at least 8 hours prior to being a .  The average time off work is 10 to 14 days; most adults will be seeing the surgeon prior to returning to work.  Students will return to school within 1-5 days after discharge from the hospital but will be off gym, sports, and indoors for recess for one month.  Patients may go up and down stairs and lift up to five pounds but no bending, pushing or pulling.  Nothing called work or exercise until the follow up visit.  No ‘stair-master’, power walking, jogging or workouts until the follow up visit.  Patients should seek further activity limits at the time of their appointment.    APPOINTMENT  Please  call our office for an appointment within seven to ten days of discharge.  Any fever greater than 100.5, chills, nausea, vomiting, or severe diarrhea please call our office.  If the wound turns red, hot, swollen, becomes increasingly painful, or drains pus call us immediately at [phone].  For life threatening emergencies call 911.  For non-emergent care please call our office after 8:30 a.m. Monday through Friday.  The number listed above is our office number; our phone automatically switches to our answering service if we are not there.  Please do not use the emergency room for non-urgent care.    Thank you for entrusting us with your care.  [facility]--General Surgery                 HOW TO EMPTY YOUR DRAIN    Wash hands with soap and water  Hold drain so plug is upright and release it  Turn drain upside down into measuring container and squeeze drain until all the liquid is removed  While squeezing the drain, replace the plug into drain  Measure the liquid and record the amount in your diary twice a day  Be sure to bring your diary to your next visit with Dr. Harry                It has been a pleasure taking care of you!  Best wishes for a speedy recovery!  Lilli JARAMILLO

## 2024-06-07 NOTE — ANESTHESIA PREPROCEDURE EVALUATION
PRE-OP EVALUATION    Patient Name: Miguel Davila    Admit Diagnosis: Abdominal pain, right upper quadrant [R10.11]  Acute renal failure, unspecified acute renal failure type (HCC) [N17.9]    Pre-op Diagnosis: Acute cholecystitis [K81.0]    LAPAROSCOPIC CHOLECYSTECTOMY WITH INTRAOPERATIVE CHOLANGIOGRAM    Anesthesia Procedure: LAPAROSCOPIC CHOLECYSTECTOMY WITH INTRAOPERATIVE CHOLANGIOGRAM    Surgeons and Role:     * Curtis Harry MD - Primary    Pre-op vitals reviewed.  Temp: 97.3 °F (36.3 °C)  Pulse: 115  Resp: 20  BP: 98/55  SpO2: 93 %  Body mass index is 27.59 kg/m².    Current medications reviewed.  Hospital Medications:   [Transfer Hold] metoprolol (Lopressor) 5 mg/5mL injection 5 mg  5 mg Intravenous Q4H    sodium chloride 0.9% infusion   Intravenous Continuous    [COMPLETED] morphINE PF 4 MG/ML injection 4 mg  4 mg Intravenous Once    [COMPLETED] ondansetron (Zofran) 4 MG/2ML injection 4 mg  4 mg Intravenous Once    [COMPLETED] cefTRIAXone (Rocephin) 1 g in D5W 100 mL IVPB-ADD  1 g Intravenous Once    [Transfer Hold] aspirin DR tab 81 mg  81 mg Oral Daily    [Transfer Hold] cholecalciferol (Vitamin D3) tab 1,000 Units  1,000 Units Oral Daily    [Transfer Hold] levothyroxine (Synthroid) tab 50 mcg  50 mcg Oral Before breakfast    [Transfer Hold] metoprolol succinate ER (Toprol XL) 24 hr tab 100 mg  100 mg Oral QAM    And    [Transfer Hold] metoprolol succinate ER (Toprol XL) 24 hr tab 50 mg  50 mg Oral Nightly    [Transfer Hold] pantoprazole (Protonix) DR tab 20 mg  20 mg Oral BID AC    [Transfer Hold] heparin (Porcine) 5000 UNIT/ML injection 5,000 Units  5,000 Units Subcutaneous 2 times per day    cefTRIAXone (Rocephin) 1 g in D5W 100 mL IVPB-ADD  1 g Intravenous Q24H    [COMPLETED] gadoterate meglumine (Dotarem) 10 MMOL/20ML injection 20 mL  20 mL Intravenous ONCE PRN    [Transfer Hold] HYDROmorphone (Dilaudid) 1 MG/ML injection 0.2 mg  0.2 mg Intravenous Q2H PRN    Or    [Transfer Hold]  HYDROmorphone (Dilaudid) 1 MG/ML injection 0.4 mg  0.4 mg Intravenous Q2H PRN    Or    [Transfer Hold] HYDROmorphone (Dilaudid) 1 MG/ML injection 0.8 mg  0.8 mg Intravenous Q2H PRN    [Transfer Hold] ondansetron (Zofran-ODT) disintegrating tab 4 mg  4 mg Oral Q6H PRN    Or    [Transfer Hold] ondansetron (Zofran) 4 MG/2ML injection 4 mg  4 mg Intravenous Q6H PRN    [COMPLETED] acetaminophen (Ofirmev) 10 mg/mL infusion premix 1,000 mg  1,000 mg Intravenous Q6H       Outpatient Medications:     Facility-Administered Medications Prior to Admission   Medication Dose Route Frequency Provider Last Rate Last Admin    [COMPLETED] patiromer (Veltassa) 8.4 g oral packet 8.4 g  8.4 g Oral Once Tasha BarthSHEA   8.4 g at 04/05/24 1424     Medications Prior to Admission   Medication Sig Dispense Refill Last Dose    dicyclomine 20 MG Oral Tab Take 1 tablet (20 mg total) by mouth in the morning and 1 tablet (20 mg total) before bedtime. 10 tablet 0 Past Week    fexofenadine 180 MG Oral Tab Take 1 tablet (180 mg total) by mouth daily as needed.   6/5/2024    cholecalciferol 25 MCG (1000 UT) Oral Tab Take 1 tablet (1,000 Units total) by mouth daily.   6/5/2024    spironolactone 25 MG Oral Tab Take 0.5 tablets (12.5 mg total) by mouth daily. Holding for now till potassium is rechecked   6/5/2024    levothyroxine 50 MCG Oral Tab Take 1 tablet (50 mcg total) by mouth before breakfast.   6/5/2024    ipratropium 0.06 % Nasal Solution 1 spray by Nasal route 2 (two) times daily. 1 each 5 6/5/2024    omeprazole 20 MG Oral Capsule Delayed Release Take 1 capsule (20 mg total) by mouth 2 (two) times daily. 180 capsule 1 6/5/2024    bumetanide 1 MG Oral Tab Take 2 mg of Bumex daily in the AM. Take 1 mg in the PM on Tuesday, Thursday, Saturday and Sunday. Take 2 mg in the PM on Monday, Wednesday and Friday 360 tablet 1 6/5/2024    ALLOPURINOL 300 MG Oral Tab TAKE 1 TABLET BY MOUTH EVERY DAY 90 tablet 3 6/5/2024    predniSONE 5 MG Oral Tab  Take 1 tablet (5 mg total) by mouth daily with breakfast. 90 tablet 3 6/5/2024    metoprolol succinate  MG Oral Tablet 24 Hr Take 1 tablet (100 mg total) by mouth every morning AND 0.5 tablets (50 mg total) every evening. 60 tablet 3 6/5/2024    PREDNISONE 1 MG Oral Tab TAKE 2 TABLETS (2 MG TOTAL) BY MOUTH DAILY WITH BREAKFAST. 180 tablet 3 6/5/2024    sacubitril-valsartan 49-51 MG Oral Tab Take 1 tablet by mouth 2 (two) times daily.   6/5/2024    aspirin 81 MG Oral Tab EC Take 1 tablet (81 mg total) by mouth daily. 30 tablet 0 6/5/2024    PREVIDENT 5000 BOOSTER PLUS 1.1 % Dental Paste    Past Week    Multiple Vitamins-Minerals (TAB-A-KEVIN) Oral Tab Take 1 tablet by mouth daily.   6/5/2024    Immune Globulin, Human, 10 g Intravenous Recon Soln Inject 0.4 g/kg into the vein. Given for treatment of polymyositis, mixed connective tissue disease, and immune thrombocytopenia purpura monthly at [facility].  Every 5 weeks 3 each 0 Past Month    Calcium Citrate-Vitamin D (CITRACAL + D OR) Take 1 tablet by mouth daily.   6/5/2024       Allergies: Empagliflozin, Amoxicillin, Augmentin [amoclan], and Doxycycline      Anesthesia Evaluation    Patient summary reviewed.    Anesthetic Complications  (-) history of anesthetic complications         GI/Hepatic/Renal      (+) GERD       (+) chronic renal disease and CRI  (+) liver disease                 Cardiovascular        Exercise tolerance: good     MET: >4      (+) hypertension                  (+) dysrhythmias and atrial fibrillation  (+) CHF                Endo/Other    Negative endo/other ROS.                              Pulmonary        (+) COPD       (+) shortness of breath     (+) sleep apnea       Neuro/Psych                 (+) neuromuscular disease                     Past Surgical History:   Procedure Laterality Date    Appendectomy  1970    Appendectomy      Cardiac catheterization  09/2019    Cataract Bilateral     Colonoscopy  10/2003 2006 01/2010     x3    Colonoscopy  2013    Colonoscopy N/A 2018    Procedure: COLONOSCOPY;  Surgeon: Isaiah Tobar MD;  Location:  ENDOSCOPY    Colonoscopy N/A 2024    Procedure: COLONOSCOPY;  Surgeon: Gerardo Neves MD;  Location:  ENDOSCOPY    Colonoscopy with biopsy  13    Egd      Hand/finger surgery unlisted      right    Needle biopsy liver      Other  2005    needle bipsy of kidney    Other surgical history  2017    watchman filter    Percutaneous  angioplasty  (ptca)- pbp only      right    Rectum surgery procedure unlisted  194    rectal surgery polypectomy    Skin surgery      MMS BCC R temple 09    Skin surgery  2007    basal ceell carcinoma    Skin surgery  12    BCC-nodular to right superior eyebrow/ MOHS    Skin surgery  07/15/2013    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/MMS    Skin surgery  14    BCC-NOD to right superior pinna, MMS, AB    Skin surgery  2021    BCC- left superior forehead, MMS     Skin surgery  2022    SCC IN SITU RIGHT ANTIHELIX MMS BY DR GASTELUM    Tonsillectomy      Upper gi endoscopy,exam  10/2003 2006 2010 , 5/13    x3     Social History     Socioeconomic History    Marital status:    Occupational History    Occupation: Retired    Tobacco Use    Smoking status: Former     Current packs/day: 0.00     Average packs/day: 0.5 packs/day for 15.0 years (7.5 ttl pk-yrs)     Types: Cigarettes     Start date: 1958     Quit date: 1973     Years since quittin.8    Smokeless tobacco: Never    Tobacco comments:     5 cigarettes daily, stopped smoking in    Vaping Use    Vaping status: Never Used   Substance and Sexual Activity    Alcohol use: Yes     Alcohol/week: 13.0 - 15.0 standard drinks of alcohol     Types: 5 Glasses of wine, 8 - 10 Standard drinks or equivalent per week     Comment: 1 per day wine or liquor    Drug use: Never   Other Topics Concern    Caffeine Concern Yes     Comment: 1 soda per day and  decaf coffee    Sleep Concern No    Exercise Yes     Comment: 3-4 times weekly    Seat Belt Yes     History   Drug Use Unknown     Available pre-op labs reviewed.  Lab Results   Component Value Date    WBC 8.2 06/07/2024    RBC 3.26 (L) 06/07/2024    HGB 11.3 (L) 06/07/2024    HCT 35.0 (L) 06/07/2024    .4 (H) 06/07/2024    MCH 34.7 (H) 06/07/2024    MCHC 32.3 06/07/2024    RDW 15.9 06/07/2024    PLT 70.0 (L) 06/07/2024     Lab Results   Component Value Date     06/07/2024    K 4.1 06/07/2024     06/07/2024    CO2 24.0 06/07/2024    BUN 63 (H) 06/07/2024    CREATSERUM 2.62 (H) 06/07/2024     (H) 06/07/2024    CA 8.2 (L) 06/07/2024            Airway      Mallampati: III  Mouth opening: >3 FB  TM distance: > 6 cm  Neck ROM: full Cardiovascular    Cardiovascular exam normal.  Rhythm: regular  Rate: normal     Dental    Dentition appears grossly intact         Pulmonary    Pulmonary exam normal.  Breath sounds clear to auscultation bilaterally.               Other findings              ASA: 3   Plan: general  NPO status verified and patient meets guidelines.  Patient has not taken beta blockers in last 24 hours.  Post-procedure pain management plan discussed with surgeon and patient.    Comment: I spoke with the patient and discussed the risks of general anesthesia, which include nausea and vomiting; sore throat; injury to the lips, gums, teeth, and eyes; cardiac, pulmonary, and neurologic events; aspiration; and allergic reactions. The patient understands these risks and consents to receiving general anesthesia for this procedure.  Plan/risks discussed with: patient                Present on Admission:  **None**

## 2024-06-07 NOTE — PROGRESS NOTES
Cardiomems reading performed with good signal strength and waveform. PAD 29 on reading 1 and PAD 30 on reading 2. Patient tolerated it well.

## 2024-06-07 NOTE — PROGRESS NOTES
[facility] Gastroenterology Progress Note    CC: acute cholecystitis   S: Improving abd pain   O: BP 90/63 (BP Location: Right arm)   Pulse 63   Temp 98.2 °F (36.8 °C) (Oral)   Resp 16   Ht 5' 11\" (1.803 m)   Wt 195 lb (88.5 kg)   SpO2 92%   BMI 27.20 kg/m²     Gen: NAD  Abd: (+)BS, soft, non-tender, non-distended; no rebound or guarding    Laboratory/Imaging Data:     No results for input(s): \"PGLU\" in the last 168 hours.  No results for input(s): \"INR\" in the last 168 hours.      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 CT with concern for acute cholecystitis. MRCP done with no CBD dilation and concern for acute cholecystitis.  - General surgery following; plan for lap basil pending cardiac/medical clearance per notes  - Lfts improving   - Acute hepatitis panel negative; autoimmune serologies pending  - OV in 4 weeks   GI will sign off at this time. Please call back with any questions or concerns.       Johnson Landon MD, 06/07/24, 8:12 AM  [facility] Gastroenterology

## 2024-06-07 NOTE — PROGRESS NOTES
[facility]   part of [facility]     Hospitalist Progress Note     Miguel Griffin [facility] Patient Status:  Inpatient    3/17/1945 MRN [id number]   Location [facility] 3NW-A Attending Fvaiola Steele MD   Hosp Day # 1 PCP Eric Simon MD     Chief Complaint: abdominal pain    Subjective:     Patient seen and examined at bedside. He reports he is feeling better than yesterday. He continues to have R sided abdominal pain rated 3/10 which is sharp. He denied any alleviating or exacerbating factors. He sill has intermittent nausea but denied any emesis. He denied any fever or chills. He did go into A-fib RVR at 107 this AM and was started on IV lopressor per cardiology. He denied any chest pan, palpitations, dyspnea. He also had a brief episode of hypotension with MAP of 56.    Objective:    Review of Systems:   A comprehensive review of systems was completed; pertinent positive and negatives stated in subjective.    Vital signs:  Temp:  [97.5 °F (36.4 °C)-98.6 °F (37 °C)] 98 °F (36.7 °C)  Pulse:  [] 107  Resp:  [12-37] 24  BP: ()/(48-77) 97/53  SpO2:  [92 %-100 %] 93 %    Physical Exam:    General: No acute distress  Respiratory: No wheezes, no rhonchi  Cardiovascular: S1, S2, irregular rate  Abdomen: Soft, tenderness to palpation of RUQ, non-distended, positive bowel sounds  Neuro: No new focal deficits.   Extremities: No edema    Diagnostic Data:    Labs:  Recent Labs   Lab 24  1001 24  0612   WBC 10.6 8.2   HGB 12.2* 11.3*   .7* 107.4*   PLT 83.0* 70.0*       Recent Labs   Lab 24  1001 24  0612   * 100*   BUN 56* 63*   CREATSERUM 2.76* 2.62*   CA 8.8 8.2*   ALB 3.0* 2.4*    140   K 4.4 4.1    107   CO2 26.0 24.0   ALKPHO 95 66   AST 42* 20   ALT 32 27   BILT 2.2* 1.3   TP 7.1 5.8*       Estimated Creatinine Clearance: 23.6 mL/min (A) (based on SCr of 2.62 mg/dL (H)).    No results for input(s): \"TROP\", \"TROPHS\", \"CK\" in the last 168  hours.    No results for input(s): \"PTP\", \"INR\" in the last 168 hours.               Microbiology    Hospital Encounter on 06/06/24   1. Urine Culture, Routine     Status: Abnormal (Preliminary result)    Collection Time: 06/06/24  5:26 PM    Specimen: Urine, clean catch   Result Value Ref Range    Urine Culture >100,000 CFU/ML Escherichia coli (A) N/A         Imaging: Reviewed in Epic.    Medications:    metoprolol  5 mg Intravenous Q4H    aspirin  81 mg Oral Daily    cholecalciferol  1,000 Units Oral Daily    levothyroxine  50 mcg Oral Before breakfast    metoprolol succinate ER  100 mg Oral QAM    And    metoprolol succinate ER  50 mg Oral Nightly    pantoprazole  20 mg Oral BID AC    heparin  5,000 Units Subcutaneous 2 times per day    cefTRIAXone  1 g Intravenous Q24H    acetaminophen  1,000 mg Intravenous Q6H       Assessment & Plan:      #Acute cholecystitis  -CT findings consistent with acute cholecystitis, pt met 2 SIRS criteria: HR 93, RR 32 and has an NICKI likely 2/2 dehydration, given cholecystitis pt meets severe sepsis criteria but lactate WNL  -MRI negative for choledocholithiasis  -LFT improving today  -revised cardiac risk index score of 1 with 6% 30 day risk of cardiac event, with rate control of his a-fib he is currently medically optimized, cardiology following and cleared for OR  -general surgery on c/s with plan for OR this afternoon  -antibiotics: rocephin  -cont to trend LFT's  -pain control: morphine prn  -blood cx pending     #Severe Sepsis  -resolved     #Hyperbilirubinemia  -resolved     # A-fib RVR  -brief episode of RVR this AM in 100's after his PM metoprolol dose ws held for OR  -started on IV lopressor q4 hr per cardiology  -monitor on telemetry with goal HR <100  -S/p Watchman, not on AC    #NICKI on CKD  -Cr slightly improving to 2.62 from 2.76,  baseline 1.88  -Bun:Cr ratio still >20 suggestive of pre-renal etiology, now euvolemic on exam with normal CardioMEMS exam this  AM  -probable component of ATN as well with intermittent hypotension  -LR @100 mls/hr with caution given CHF and prior hx of easily going into overloaded state, DC as soon as blood pressure remains stable with goal MAP >65  -strict I/O, avoid nephrotoxins  -cont to monitor BMP  -hold home allopurinol, entresto, spironolactone Bumex, patiromater    #Thrombocytopenia  -plt 70 (83): baseline WNL  -hx thrombocytopenia in past 2/2 SLE  -suspect 2/2 acute infection  -ok for pharm vte ppx as long as >50  -cont to monitor plt closely       #Macrocytic anemia  -hgb 11.3 (12.2): baseline 13  -no signs bleeding  -check vit B12, folic acid, retic count     #Pleural effusion  -trace effusion present which is asymptomatic and chronic  -no further interventions at this point  -monitor oxygen requirements  -incentive spirometry     #Polymyositis  #SLE  -holding home prednisone to help with surgical healing    #HTN  #LI  #emphysema    #Hypothyroidism  -cont home levothyroxine         Jesika Garcia, DO    Supplementary Documentation:     Quality:  DVT Mechanical Prophylaxis: VIN hose,      DVT Pharmacologic Prophylaxis   Medication    heparin (Porcine) 5000 UNIT/ML injection 5,000 Units         DVT Pharmacologic prophylaxis: Aspirin 81 mg      Code Status: Full Code  Reyes: No urinary catheter in place  Reyes Duration (in days):   Central line:    SHANNAN:     Discharge is dependent on: surgery, resolution of NICKI, BP stabilization, control of A-fib  At this point Mr. Davila is expected to be discharge to: home    The 21st Century Cures Act makes medical notes like these available to patients in the interest of transparency. Please be advised this is a medical document. Medical documents are intended to carry relevant information, facts as evident, and the clinical opinion of the practitioner. The medical note is intended as peer to peer communication and may appear blunt or direct. It is written in medical language and may contain  abbreviations or verbiage that are unfamiliar.

## 2024-06-07 NOTE — IMAGING NOTE
[facility]  Outside Information  Continuity of Care Document  4/8/2024  Miguel Davila - 79 y.o. Male; born [birth year]Trans Thoracic Echocardiogram, generated on May 13, 2024May 13, 2024   Findings    Findings - Right Atrium  The right atrium is normal in size. Right atrial diameter is 3.9 cms.    Findings - Left Ventricle  The left ventricle is normal in size. Left ventricular diastolic dimension is 4.7 cms. Left ventricular systolic dimension is 3.2 cms. Left ventricular diastolic septal thickness is 1.2 cms. Left ventricular diastolic postero basal free wall thickness is 1.3 cms. The left ventricular fractional shortening is 30.5%. The left ventricular ejection fraction is 58%. Left ventricular diastolic function is indeterminate. Noted left ventricular hypertrophy. Concentric left ventricular hypertrophy is present. It is mild. Left ventricular outflow tract diameter is 2.3 cms. Left ventricular outflow tract VTI is 13.9 cms. Left ventricular outflow tract peak velocity is 0.7 m/s. Left ventricular peak gradient is 2 mmHg. Left ventricular outflow tract mean velocity is 0.5 m/s. Left ventricular mean gradient is 1 mmHg.    Findings - Right Ventricle  The right ventricle is moderately enlarged. Right ventricular systolic function is normal. Right ventricular diastolic dimension is 4.8 cms. Right ventricular systolic pressure is 46 mmHg.    Findings - Atrial Septum  The atrial septum is normal.    Findings - Ventricular Septum  The ventricular septum is normal.    Findings - Pulmonary Artery  The pulmonary artery systolic pressure is 46 mmHg.    Findings - Pulmonary Vein  The pulmonary vein appears normal.    Findings - IVC  The inferior vena cava is normal.    Findings - Pulmonic Valve  The peak velocity is 1.1 m/s. The mean velocity is 0.6 m/s. The peak trans pulmonic gradient is 5.0 mmHg. The mean trans pulmonic gradient is 2.0 mmHg.    Findings - Tricuspid  Valve  Evidence of tricuspid regurgitation is present. Mild to moderate (1-2+) tricuspid regurgitation. The peak tricuspid regurgitant velocity is 3.3 m/s. The peak trans tricuspid gradient is 43.0 mmHg.    Findings - Mitral Valve  The area by pressure half time is 5.4 cm². The mean trans mitral gradient is 3.0 mmHg. Mitral regurgitation is noted. Moderate (2+) mitral regurgitation. The pressure half time is 41 ms. The deceleration time is 140 ms. The peak mitral gradient is 7 mmHg. The E velocity is 1.1 m/s.    Findings - Aortic Valve  Aortic valve area continuity equation is 2.0 cm². Noted evidence of aortic regurgitation. Moderate (2+) aortic regurgitation. The trans-aortic peak velocity is 1.3 m/s. The trans-aortic peak gradient is 7 mmHg. The trans-aortic mean velocity is 0.9 m/s. The trans-aortic mean gradient is 4.0 mmHg. The regurgitation pressure half time is 475 ms. Aortic valve VTI measures 28.4 cms. LVOT Diameter is 2.3 cms.    Findings - Aorta  Aortic root diameter is 4.1 cms. Ascending aorta diameter is 4.1 cms.    Findings - Pericardium  The pericardium is normal in appearance with no pericardial effusion noted..    Impressions    Impression - TTE  The study quality is average.    Impression - TTE  The left ventricle is normal in size. The left ventricular ejection fraction is 58%. Left ventricular diastolic function is indeterminate. Noted left ventricular hypertrophy. Concentric left ventricular hypertrophy is present. It is mild.    Impression - TTE  Noted evidence of aortic regurgitation. Mild-to-moderate aortic regurgitation.   Impression - TTE  Mitral regurgitation is noted. At least moderate mitral regurgitation   Impression - TTE  The left atrial diameter is severely increased.   Impression - TTE  The right ventricle is moderately enlarged. Right ventricular systolic function is normal.   Impression - TTE  Evidence of tricuspid regurgitation is present. Mild to moderate (1-2+) tricuspid  regurgitation.   Patient Demographics    Patient Demographics  Patient Address Patient Name Communication   [address] [place] [address] Miguel Davila [phone] (Home)     Patient Demographics  Language Race / Ethnicity Marital Status   Unknown Unknown / Unknown Unknown     Document Information    Service Providers Document Coverage Dates   Iris Jean MD  [phone] (Work)  [address] [place] [address] Apr. 08, 2024April 08, 2024

## 2024-06-07 NOTE — CM/SW NOTE
06/07/24 1100   CM/SW Referral Data   Referral Source Social Work (self-referral)   Reason for Referral Discharge planning   Informant EMR;Clinical Staff Member;Patient   Medical Hx   Does patient have an established PCP? Yes   Patient Info   Patient's Current Mental Status at Time of Assessment Alert;Oriented   Patient's Home Environment [facility]   Number of Levels in Home 2   Number of Stair in Home 14   Patient lives with Spouse/Significant other   Patient Status Prior to Admission   Independent with ADLs and Mobility Yes     Pt is an 80 y/o male admitted due to acute cholecystitis. SW noted and acknowledged order for PHQ-4 concerns.    SW met with pt and pt's wife at bedside to discuss discharge planning and PHQ-4 concerns. Pt reported that he live with his wife in a two level town house. Pt reports there are 14 stairs in the home and that they own a walker, cane, and a shower chair. Pt reports he is typically independent with his ADLs at home. Pt did express interest in potentially getting help with meals for him and his wife. SW stated she would look into Meals on Wheels and report back with any information for pt.    SW inquired about concerns regarding feelings of anxiety/nervousness and little interest/pleasure in doing things. Pt stated he is not someone who is prone to being anxious and tends to be very optimistic and having a lot of motivation. Pt stated that he has concerns regarding his health, but stated that it is to be expected as he wants to get better in order to take care of his wife. Pt stated she was diagnosed with Alzheimer's 2 years ago and thankfully it has not progressed rapidly, but pt is the one who takes care of her at home. Pt sated he would be interested in receiving resources for support groups for caretakers of people with Alzheimer's, but stated he feels that no other mental health services are needed. SW stated she would included resources for support groups in pt's AVS. MIKAELA  also stated that she will look into Meal on Wheels services and other potential meal resources for pt.     to remain available for support and/or discharge planning.    Addendum: SW call Mariam Jeffers([phone]), and informed her of pt's desire for Meals on Wheels services. MIKAELA provided Mariam with pt's information and Mariam stated they would work on this for pt. MIKAELA also added support group resources and meals resources in pt's AVS.    Chatne Mustafa JOAN  Discharge Planner  [phone]

## 2024-06-07 NOTE — PLAN OF CARE
Pt A&Ox4, resting in bed with wife at bedside.  Resp: RA  Cardiac: Afib, low 100s  GI/: voids normally; occasional diarrhea  Activity/Safety: up w/cane  Skin: intact  Pain: none reported at this time  Pt and wife updated on and agreeable to POC; IV fluids, pain management, lap basil today with Dr. Valencia at 1455.

## 2024-06-07 NOTE — CONSULTS
[facility]  Report of Consultation    Miguel Davila Patient Status:  Inpatient    3/17/1945 MRN [id number]   Location [facility] 3NW-A Attending Faviola Steele MD   Hosp Day # 1 PCP Eric Simon MD     Reason for Consultation:  Cardiac clearance prior to cholecystectomy in patient with extensive cardiac history.  Presented with abdominal pain and nausea and loose stools.    Now in rapid A-fib but beta-blocker was held due to n.p.o. since yesterday evening.    History of Present Illness:  Miguel Davila is a a(n) 79 year old male, presented with nausea and upper abdominal pain.  Some loose stools.  He was recently for the same symptoms at [facility] few days ago.  Tender upper abdomen especially right upper quadrant.  Got Zofran in ER and fentanyl as well.  He feels better.  He states cardiac history he is medication induced pancreatitis from [place] 2 years ago and mixed connective tissue disease getting IVIG treatments besides Imuran and maintenance of prednisone.  Low-grade fever 103.  Generalized weakness.  Urine analysis and culture positive for UTI with E. Coli -he was unaware of it.    Now in rapid A-fib but beta-blocker was held due to n.p.o. since yesterday evening.    MRI of the abdomen -concern for acute cholecystitis with irregular gallbladder wall thickening with gallstones with adjacent inflammatory changes.  No intra or extrahepatic biliary duct dilation.  No AAA.    CT of the abdomen -suspected acute cholecystitis with inflammatory changes surrounding gallbladder.  No biliary duct dilation.  Diffuse pancreatic atrophy with no acute pancreatitis now.    Chest x-ray images -I reviewed myself -no congestion.    The patient has been followed by Dr. Iris Jean and EP Dr. Dior, with permanent A-fib, status post Watchman device, no anticoagulation, diastolic LV dysfunction and chronic right-sided heart failure, obstructive sleep apnea,  hypertension, secondary pulm hypertension, CardioMEMS sensor, chronic kidney disease stage III, ectatic aortic root, mixed connective tissue disease with polymyositis, Ramon's esophagus, history of medication induced pancreatitis, ITP, COPD.  Anemia of chronic disease with iron deficiency component.    Patient unable to give good history with his dementia being nonverbal essentially.  History obtained from epic notes and reviewing other electronic medical records.      He had prior BVA and CardioMEMS measurements and they do not go together.  BVA showing hypovolemia on CardioMEMS not reviewing notes from  and .  No decompensation now.    CardioMEMS goal per clinic notes -PAD 29 to 35 mmHg.  Recently PAD was stable between 31 and 33 mmHg.  Baseline proBNP at .      Spironolactone was discontinued due to near syncopal episode at [facility] recently.    Bumex dose was lowered to 2 mg p.o. daily by Dr. Jean after recent right heart catheterization as per patient.    On IV Ig monthly infusions and maintenance dose of prednisone low-dose for polymyositis followed by rheumatologist Dr. Farmer.    Echo Doppler (Dr. Rivers) from office 2024 -LVEF 58% with LVH, mild.  Mild to moderate AI and moderate MR with severely dilated left atrium and moderately dilated right ventricle with preserved RV function and mild to moderate TR.    Telemetry: Atrial fibrillation, controlled    EKG: Atrial fibrillation 92 bpm with incomplete right bundle branch block and no acute ischemia but minor nonspecific changes.  Narrow QRS.    RHC with Dr. Jean May 20, 2024:  BP: 130/89 mmHg  RA: 9 mmHg  RV: 48/1 mmHg  PA: 47/18/31 mmHg  PCWP: 14 mmHg  TP mmHg  The test for considering wedge of 14  Lillie  CO: 7.9 liters/min  CI: 3.8 liters/min/m2  PVR: 2.2 Wood units  PVRI: 4.5 Wood units/m2    RHC 2022 -PA 42/18/27 and wedge 8 initially with delta of 10 mmHg the time of CardioMEMS implant.      History:  Past Medical  History:    A-fib (HCC)    Arrhythmia    atrial fibrillation    Arthritis    Autoimmune disease (HCC)    hepatits, resolved anfd nephritis, resolved    Ramon's esophagus    Bleeding nose    Gums Treated    Blood disorder    thrombocytopenia    Blood in urine    Blurred vision    cataract due to steroids, surgery in June    Calculus of kidney    One time    Cancer (HCC)    skin    Candidiasis of the esophagus    Due to steroid use for Autoimmune disorder     Cataract    Colon polyps    Congestive heart disease (HCC)    Diarrhea, unspecified    Intermittent due to lupus    Diverticulosis of colon    Easy bruising    On and off prednisone for 20 years    Esophageal polyp    Esophageal reflux    Essential hypertension    Fatigue    polymyositis and lupus    Gout    Hammer toe, acquired    Heart palpitations    Afib    Hepatitis    autoimmune induce hepatitis d/t lupus    High blood pressure    IBS (irritable bowel syndrome)    mild d/t lupus    Irregular bowel habits    Leg swelling    Heart failure, probably caused by lupus    Lupus (HCC)    MCTD (mixed connective tissue disease) (HCC)    Obstructive sleep apnea (adult) (pediatric)    LI (obstructive sleep apnea)    AHI 37  Supine AHI 38 non-supine AHI 16 Sao2 Pj 83%     LI (obstructive sleep apnea)    AHI 36 RDI 36 REM AHI 45 Supine AHI 65 non-supine AHI 19 Sao2 Pj 86% CPAP 9cwp    Pain in joints    Personal history of antineoplastic chemotherapy    Pleural effusion    right    Pneumonia due to organism    Polymyositis (HCC)    Presence of other cardiac implants and grafts    watchman filter    Problems with swallowing    dysphagia in 2006    Pulmonary hypertension (HCC)    Raynaud disease    Raynaud disease    Renal disorder    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    Shortness of breath    mainly due to pm or lupus    Sleep apnea    CPAP    Thrombocytopathia (HCC)    Visual impairment    bifocals for reading & computer; distance for driving    Wears glasses     Past  Surgical History:   Procedure Laterality Date    Appendectomy  1970    Appendectomy      Cardiac catheterization  09/2019    Cataract Bilateral     Colonoscopy  10/2003 2006 01/2010    x3    Colonoscopy  5/14/2013    Colonoscopy N/A 5/1/2018    Procedure: COLONOSCOPY;  Surgeon: Isaiah Tobar MD;  Location:  ENDOSCOPY    Colonoscopy N/A 4/12/2024    Procedure: COLONOSCOPY;  Surgeon: Gerardo Neves MD;  Location:  ENDOSCOPY    Colonoscopy with biopsy  5/14/13    [facility]      Hand/finger surgery unlisted  2003    right    Needle biopsy liver      Other  12/2005    needle bipsy of kidney    Other surgical history  2017    watchman filter    Percutaneous  angioplasty  (ptca)- pbp only  2006    right    Rectum surgery procedure unlisted  1946    rectal surgery polypectomy    Skin surgery      MMS BCC R temple 6/24/09    Skin surgery  2007    basal ceell carcinoma    Skin surgery  7-18-12    BCC-nodular to right superior eyebrow/ MOHS    Skin surgery  07/15/2013    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/MMS    Skin surgery  2-19-14    BCC-NOD to right superior pinna, MMS, AB    Skin surgery  02/16/2021    BCC- left superior forehead, MMS     Skin surgery  03/07/2022    SCC IN SITU RIGHT ANTIHELIX MMS BY DR GASTELUM    Tonsillectomy  1948    Upper gi endoscopy,exam  10/2003 2006 01/2010 , 5/13    x3     Family History   Problem Relation Age of Onset    Heart Disease Father         CHF    Gastro-Intestinal Disorder Father         Diverticulosis    Alcohol and Other Disorders Associated Father     Heart Attack Father     Heart Disease Mother         CHF    Breast Cancer Mother     Stroke Mother     Cancer Paternal Grandfather     Heart Attack Paternal Grandmother     Kidney Disease Son     Other (Ramon's Esophagus) Son     Heart Attack Maternal Grandfather        Social History:   reports that he quit smoking about 50 years ago. His smoking use included cigarettes. He started smoking about 65 years ago. He has a 7.5 pack-year  smoking history. He has never used smokeless tobacco. He reports current alcohol use of about 13.0 - 15.0 standard drinks of alcohol per week. He reports that he does not use drugs.    Allergies:  Allergies   Allergen Reactions    Empagliflozin OTHER (SEE COMMENTS)     Pancreatitis    \"pancreatitis\" per pt    Amoxicillin RASH    Augmentin [Amoclan] RASH    Doxycycline RASH       Medications:    Current Facility-Administered Medications:     aspirin DR tab 81 mg, 81 mg, Oral, Daily    cholecalciferol (Vitamin D3) tab 1,000 Units, 1,000 Units, Oral, Daily    levothyroxine (Synthroid) tab 50 mcg, 50 mcg, Oral, Before breakfast    metoprolol succinate ER (Toprol XL) 24 hr tab 100 mg, 100 mg, Oral, QAM **AND** metoprolol succinate ER (Toprol XL) 24 hr tab 50 mg, 50 mg, Oral, Nightly    pantoprazole (Protonix) DR tab 20 mg, 20 mg, Oral, BID AC    lactated ringers infusion, , Intravenous, Continuous    heparin (Porcine) 5000 UNIT/ML injection 5,000 Units, 5,000 Units, Subcutaneous, 2 times per day    cefTRIAXone (Rocephin) 1 g in D5W 100 mL IVPB-ADD, 1 g, Intravenous, Q24H    HYDROmorphone (Dilaudid) 1 MG/ML injection 0.2 mg, 0.2 mg, Intravenous, Q2H PRN **OR** HYDROmorphone (Dilaudid) 1 MG/ML injection 0.4 mg, 0.4 mg, Intravenous, Q2H PRN **OR** HYDROmorphone (Dilaudid) 1 MG/ML injection 0.8 mg, 0.8 mg, Intravenous, Q2H PRN    ondansetron (Zofran-ODT) disintegrating tab 4 mg, 4 mg, Oral, Q6H PRN **OR** ondansetron (Zofran) 4 MG/2ML injection 4 mg, 4 mg, Intravenous, Q6H PRN    acetaminophen (Ofirmev) 10 mg/mL infusion premix 1,000 mg, 1,000 mg, Intravenous, Q6H    Review of Systems:     Constitutional: Generalized weakness.  Eyes: Patient denies significant visual changes.  Ears, Nose, Mouth, Throat:  Negative for any new hearing loss. No sore throat. No nasal congestion.  Cardiovascular: see HPI -diastolic heart failure and right-sided heart failure, permanent A-fib, status post watchman, CardioMEMS.  Respiratory: No  cough, wheezing or hemoptysis, no dyspnea.  Hematologic/Lymphatic: No easy bruising or bleeding.   Integumentary: No rash or suspicious lesions on the skin.   Musculoskeletal: Arthritis.   Gastrointestinal: Upper abdominal pain especially right upper quadrant with loose stools.  Nausea.  Genitourinary: No hematuria.   Neurological: Alert oriented x 3, no neurological focal findings.  Generalized weakness.  Allergic/Immunologic: no rhinitis or environmental allergies      Physical Exam:  Blood pressure 97/53, pulse 107, temperature 98 °F (36.7 °C), temperature source Oral, resp. rate 24, height 71\", weight 197 lb 12.8 oz (89.7 kg), SpO2 93%.  Temp (24hrs), Av.1 °F (36.7 °C), Min:97.5 °F (36.4 °C), Max:98.6 °F (37 °C)    Wt Readings from Last 3 Encounters:   24 197 lb 12.8 oz (89.7 kg)   24 195 lb (88.5 kg)   24 201 lb (91.2 kg)       Constitutional: Generalized weakness.  Low-grade fever.  Eyes: Moist conjunctivae, PERRLA.  Ears, Nose, Mouth, Throat:  Moist mucosa. Anicteric sclera. Oropharynx clear.  Head and Neck: Upper limit of normal JVD, carotids 2+ no bruits. Neck supple. No thyromegaly or adenopathy. Normocephalic head.  Cardiovascular: Tachycardia, irregularly irregular, 2 out of 6 holosystolic murmur at the left and a border with radiation to left axilla.  Murmur, rub or gallop.  Respiratory: Clear lungs without wheezes, rales, rhonchi or dullness.  Normal excursions and effort.  Gastrointestinal: Soft, non-tender abdomen.  Minimal upper abdominal discomfort.  Extremities: Without clubbing, cyanosis or edema.  Peripheral pulses are 2+.  Neurologic: Severe dementia, basically nonverbal.  Musculoskeletal: Generalized weakness.  Uses devices to walk.  Integumentary: Warm and dry skin. No rashes.  Psychiatric: Depression and flat affect.  Dementia.    Laboratories and Data:    Imaging:  MRI ABDOMEN AND MRCP W/3D (W+W0)(CPT=74183/52157)    Result Date: 2024  PROCEDURE:  MRI ABDOMEN&MRCP  W/3D (W+W0)(CPT=74183/92748)  COMPARISON:  [facility]           CONCLUSION:  1. Findings concerning for acute cholecystitis. 2. Small bilateral pleural effusions. 3. Simple to mildly complex cysts are noted in the kidneys.  No abnormal enhancing lesions are present.  LOCATION:  [facility]    Dictated by (CST): Ghassan Caraballo MD on 6/06/2024 at 3:48 PM     Finalized by (CST): Ghassan Caraballo MD on 6/06/2024 at 3:52 PM       CT ABDOMEN+PELVIS(CPT=74176)    Result Date: 6/6/2024  PROCEDURE:  CT ABDOMEN+PELVIS (CPT=74176)         CONCLUSION:  1. Findings suspicious for acute cholecystitis with fat stranding surrounding the gallbladder. 2. Uncomplicated diffuse diverticulosis of the colon. 3. Stable appearing multiple low-density and proteinaceous cysts of the kidneys. 4. Minimal right basilar pleural effusion.       Labs:     Lab Results   Component Value Date    INR 1.04 04/27/2021    INR 1.20 (H) 09/19/2019    INR 1.06 08/15/2019        Lab Results   Component Value Date    WBC 8.2 06/07/2024    HGB 11.3 06/07/2024    HCT 35.0 06/07/2024    PLT 70.0 06/07/2024    CREATSERUM 2.62 06/07/2024    BUN 63 06/07/2024     06/07/2024    K 4.1 06/07/2024     06/07/2024    CO2 24.0 06/07/2024     06/07/2024    CA 8.2 06/07/2024    ALB 2.4 06/07/2024    ALKPHO 66 06/07/2024    BILT 1.3 06/07/2024    TP 5.8 06/07/2024    AST 20 06/07/2024    ALT 27 06/07/2024    MG 2.4 06/07/2024     Impression:  Cardiac clearance prior to acute cholecystitis in patient with extensive cardiac history.  Admitted with upper abdominal discomfort.  Initial abdominal imaging suspecting cholecystitis.  GI was consulted.  Chronic diastolic heart failure with chronic right-sided heart failure -managed by Dr. Jean outpatient -patient has CardioMEMS sensor - CardioMEMS goal per clinic notes -PAD 29 to 35 mmHg.  Recently PAD was stable between 31 and 33 mmHg.  Baseline proBNP at 2000.  Discrepancy between BVA and CardioMEMS and outpatient fluid  management on more objective CardioMEMS.  Rapid A-fib but beta-blocker was held due to n.p.o. since yesterday evening.  Known permanent atrial fibrillation status post Watchman and no need for anticoagulation -managed by Dr. Dior outpatient.  Obstructive sleep apnea -untreated due to severe dementia.  Secondary pulm hypertension  CardioMEMS sensor.  Acute on chronic kidney disease stage III -baseline creatinine 1.8-2.1.  Ongoing diarrhea.  Anemia of chronic disease with iron deficiency component.  Mixed connective tissue disease with severe polymyositis and history of lupus -on monthly IVIG infusions and low-dose of prednisone for maintenance followed by rheumatologist Dr. Farmer.  Nonobstructive CAD.  ITP/chronic thrombocytopenia -apparently related to Imuran treatment -followed by hematologist Dr. Orr.  HERNANDEZ from liver biopsy/borderline fibrotic changes-followed by hepatologist from [facility] Dr. Veronica.  COPD stable.  History of medication induced pancreatitis.  Ramon's esophagus.  UTI with E. coli.    Echo Doppler (Dr. Rivers) from office April 8, 2024 -LVEF 58% with LVH, mild.  Mild to moderate AI and moderate MR with severely dilated left atrium and moderately dilated right ventricle with preserved RV function and mild to moderate TR.    Important labs: Creatinine 2.6 potassium 4.1 sodium 140 glucose 100 normal liver enzymes and AST normalized from 42 to normal negative lipase albumin 2.4 magnesium 2.4 normal lactic acid.  Negative hepatitis panel.  Hemoglobin 11.3 platelet count 70    Urine culture showed E. coli UTI.    Recent syncopal syncope and near syncope at [facility] and spironolactone was discontinued as well as Entresto.    Plan:  -I reviewed cardiovascular history and testing extensively  - patient is cleared cardiac wise for cholecystectomy with elevated but acceptable risk-I reviewed all cardiac history in detail and there is no absolute contraindication for cholecystectomy if needed   -No need  for additional cardiac testing prior to it  -No anticoagulation in case patient needs surgery since patient has watchman for permanent A-fib  -He has rapid A-fib now and I do not want surgery to be canceled.  He is beta-blocker was held since last night because of n.p.o. for cholecystectomy  -For this reason we will use IV Lopressor 5 mg every 4 hours till is able to take p.o. beta-blocker   -If difficult to control with beta-blocker we can always go to IV Cardizem drip but no need at present time  -In addition we will need to treat UTI with E. coli by urine culture  -Levothyroxine for hypothyroidism and check TSH with recent normal TSH panel  -No change in home cardiac medications  -No need for IV diuresis -with his compensated chronic heart failure  -Ambulate as able-PT  -Follow metabolic panel  -Acute on chronic renal insufficiency with loose stools-outpatient p.o. Bumex was held by primary service-resume Bumex gradually probably tomorrow otherwise easily goes to fluid overload -got IV fluids overnight and is still getting IV fluids preop.  Watch for iatrogenic fluid overload.  -CardioMEMS checked this morning consistent with euvolemic state.  Objective finding.  -Ruling out C. difficile with diarrhea  -GI and surgery were consulted by hospitalist and ER  -No need for another echo-recently done in office and described above.  -Follow-up with Dr. Jean as scheduled after discharge    Discussed with the patient, his wife and the nursing staff.  Thank you for consult.    Genaro Craig M.D., F.A.C.C.  [facility]    6/7/2024  10:06 AM  C5

## 2024-06-07 NOTE — PLAN OF CARE
Miguel [facility] Patient Status:  Inpatient    3/17/1945 MRN [id number]   Location [facility] 3NW-A Attending Faviola Steele MD   Hosp Day # 1 PCP Eric Simon MD       Cardiology Nocturnal APN Note    Page Received: ELLA Wiggins    HPI:     Patient is a 79 year old male with PMH of atrial fibrillation s/p Watchman, HTN, HLD, pulmonary hypertension, HFpEF s/p CardioMEMS , aortic root dilation, cardoimyopathy, immune thrombocytopenic purpura, CKD stage III,  Lupus, LI and polymyocitis who presented to the ED with abdominal pain and diarrhea. Pt was subsequently admitted for acute cholecystitis. Per general surgery plan for laparascopic cholecystectomy pending optimization from cardiology. [facility] consulted for pre surgical optimization.  HPI obtained from chart review and information provided by ED physician.           Assessment/Plan:    -  Continue to monitor overnight  - Formal cardiology consult to follow in AM.       SHEA Loera  [facility]  2024  1:41 AM

## 2024-06-07 NOTE — ANESTHESIA PROCEDURE NOTES
Airway  Date/Time: 6/7/2024 6:31 PM  Urgency: elective    Airway not difficult    General Information and Staff    Patient location during procedure: OR  Anesthesiologist: Chase Triplett MD  Performed: anesthesiologist   Performed by: Chase Triplett MD  Authorized by: Chase Triplett MD      Indications and Patient Condition  Indications for airway management: anesthesia  Sedation level: deep  Preoxygenated: yes  Patient position: sniffing  Mask difficulty assessment: 1 - vent by mask    Final Airway Details  Final airway type: endotracheal airway      Successful airway: ETT  Cuffed: yes   Successful intubation technique: Video laryngoscopy  Facilitating devices/methods: intubating stylet  Endotracheal tube insertion site: oral  Blade: GlideScope  Blade size: #3  ETT size (mm): 7.5    Cormack-Lehane Classification: grade I - full view of glottis  Placement verified by: capnometry   Measured from: lips  ETT to lips (cm): 23  Number of attempts at approach: 1  Number of other approaches attempted: 0

## 2024-06-07 NOTE — HISTORICAL OFFICE NOTE
[facility]  Outside Information  Continuity of Care Document  1/21/2024  Miguel Davila - 79 y.o. Male; born [birth year]Summary of episode note, generated on Jun. 07, 2024June 07, 2024   CHIEF COMPLAINT    CHIEF COMPLAINT  Reason for Visit/Chief Complaint   Yearly follow up   Miguel Davila is a 78 year old man with chronic atrial fibrillation treated with rate control, status post watchman placement. Has a history of intermittent thrombocytopenia.He has complex auto-immune illness, polymyositis, on prednisone 7 mg. Most recent echocardiogram demonstrates normal LV structure and function. He has restrictive LV filling, AI and MR, pulmonary HTN. He had pancreatitis secondary to Jardiance.     PROBLEMS  Reconcile with Patient's ChartPROBLEMS  Problem Effective Dates Date resolved Problem Status   Other ascites, [SNOMED-CT: 336781573] 12/7/2022 - Active   Abdominal bloating, [SNOMED-CT: 741050695] 12/7/2022 - Active   Presence of CardioMEMS HF system, [SNOMED-CT: 060634995] 8/17/2022 - Active   Pre-op testing, [SNOMED-CT: 206333863] 8/3/2022 - Active   Encounter for procedure, [SNOMED-CT: 196934561] 3/29/2022 - Active   Hyperlipidemia (unspecified), [SNOMED-CT: 16498482] 2/24/2022 - Active   WILSON (dyspnea on exertion), [SNOMED-CT: 26299417] 2/24/2022 - Active   Fatigue, [SNOMED-CT: 34999326] 2/24/2022 - Active   Atrial fibrillation, permanent - AFib, [SNOMED-CT: 395159537] 2/24/2022 - Active   Vitamin D deficiency, [SNOMED-CT: 13881963] 10/20/2021 - Active   Polymyositis, [SNOMED-CT: 57813589] 10/20/2021 - Active   H/O scleroderma, [SNOMED-CT: 831765404] 10/20/2021 - Active   History of tonsillectomy - Hx, [SNOMED-CT: 689876558] 8/16/2021 8/16/2021 Resolved   History of liver biopsy, [SNOMED-CT: 901324313] 8/16/2021 8/16/2021 Resolved   History of appendectomy - Hx, [SNOMED-CT: 164215108] 8/16/2021 8/16/2021 Resolved   Cancer of skin, [SNOMED-CT: 665369645] 8/16/2021 -  Active   Obstructive sleep apnea - LI, [SNOMED-CT: 34574592] 8/16/2021 - Active   History of lupus, [SNOMED-CT: 803822872047028] 8/16/2021 - Active   Gout, unspecified, [SNOMED-CT: 07791960] 8/16/2021 - Active   Hypertension (HTN), primary, [SNOMED-CT: 76228776] 8/16/2021 - Active   Diverticulosis, [SNOMED-CT: 472402258] 8/16/2021 - Active   CHF (congestive heart failure), [SNOMED-CT: 16341610] 8/16/2021 - Active   Pulmonary hypertension, not otherwise specified or secondary, [SNOMED-CT: 40414202] 8/16/2021 - Active   History of atrial fibrillation, [SNOMED-CT: 363380271] 7/31/2019 - Active   Presence of Watchman left atrial appendage closure device, [SNOMED-CT: 556964950] 3/18/2020 11/22/2021 Resolved     ENCOUNTER DIAGNOSIS    ENCOUNTER DIAGNOSIS  Problem Effective Dates Date resolved Problem Status   Presence of CardioMEMS HF system, [SNOMED-CT: 914964568] 8/17/2022 - Active   Polymyositis, [SNOMED-CT: 48223815] 10/20/2021 - Active   Obstructive sleep apnea - LI, [SNOMED-CT: 88012202] 8/16/2021 - Active   CHF (congestive heart failure), [SNOMED-CT: 94796838] 8/16/2021 - Active   Pulmonary hypertension, not otherwise specified or secondary, [SNOMED-CT: 36904589] 8/16/2021 - Active   Presence of Watchman left atrial appendage closure device, [SNOMED-CT: 003105023] 3/18/2020 11/22/2021 Resolved     VITAL SIGNS    VITAL SIGNS  Date / Time: 1/22/2024   BP Systolic 90 mmHg   BP Diastolic 60 mmHg   Height 71 inches   Weight 202 lbs   Pulse Rate 81 bpm   BSA (Body Surface Area) 2.2 cc/m2   BMI (Body Mass Index) 28.2 cc/m2   Blood Pressure 90 / 60 mmHg     PHYSICAL EXAMINATION    PHYSICAL EXAMINATION  Header Details   Vitals Right Arm Sitting BP 90 / 60 mmHg, Pulse rate 81 bpm, Regular, Height in 5' 11\", BMI: 28.2, Weight in 202.83 lbs (or) 92 kgs, BSA : 2.16 cc/m²   General Appearance No Acute Distress   Head/Eyes/Ears/Nose/Mouth/Throat Conjunctiva pink, Sclera Clear, Mucous membranes Moist   Neck Normal carotid  pulsations, No carotid bruits, No JVD   Respiratory Unlabored, Lungs clear with normal breath sounds, Equal bilaterally   Cardiovascular Intact distal pulses, Regular rhythm. Normal rate present. Normal and normal S1 and S2     ALLERGIES, ADVERSE REACTIONS, ALERTS    ALLERGIES, ADVERSE REACTIONS, ALERTS  Type Substance Reaction Severity Status   Allergies Amoxicillin, [RxNorm: 5622125]   Mild Active   Allergies Doxycycline, [RxNorm: 8454979]   Mild Active   Allergies Jardiance - Drug Class . Severe Active     MEDICATIONS ADMINISTERED DURING VISIT    No data available    MEDICATIONS    MEDICATIONS  Medication Start Date Route/Frequency Status   Allegra Allergy 180 mg tablet, [RxNorm: 065810] 8/16/2021 Take 1 tablet orally once a day as needed. Active   allopurinoL 300 mg tablet, [RxNorm: 403954] 10/20/2021 Take 1 tablet orally once a day. Active   bumetanide 1 mg tablet, [RxNorm: 980872] 9/14/2023 Take 2 tablets orally in am, 1 tablet in mid day and 2 mg in afternoon. Active   cholecalciferol (VITAMIN D3) 1000 UNITS tablet, [RxNorm: 450469] 8/12/2021 one tablet daily Active   Entresto 49 mg-51 mg tablet, [RxNorm: 7904817] 11/13/2023 Take 1 tablet orally 2 times a day. Active   metoprolol succinate  mg tablet,extended release 24 hr, [RxNorm: 636945] 9/14/2023 Take 1 tablet orally once in the AM and 1/2 tablet in the PM Active   Multiple Vitamins-Minerals (MULTIVITAMIN ADULT) Tab, [RxNorm: 0] 8/12/2021 one tablet daily Active   omeprazole (PRILOSEC) 20 MG capsule, [RxNorm: 709892] 8/12/2021 one capsule twice daily Active   predniSONE 1 mg tablet, [RxNorm: 144170] 2/24/2022 Take a total of 7 mg daily Active   predniSONE 5 mg tablet, [RxNorm: 394531] 2/24/2022 Take 1 tablet orally once a day. Active     ASSESSMENT    79 y/o male with HFpEF, watchman, chronic AF, tendency for orthostatic hypotension. Recent medical history of pancreatitis due to Jardiance. 1. Chronic AF  2. Orthostatic hypotension  3. HFpEF Status  post cardiomems placement  4. Pulmonary HTN  5. Status post WM device placement. - Continue rate control for AF.  - Not on anticoagulation due to bleeding risk, status post watchman device, not on aspirin due to low platelets.  - Follow up in a year or sooner if needed.  - He also follows with Dr. Jean.     FAMILY HISTORY    FAMILY HISTORY  Relationship Age Diagnosis   Father 0 Heart valve replaced; History of CHF (congestive heart failure)     GENERAL STATUS    No data available    PAST MEDICAL HISTORY    PAST MEDICAL HISTORY  Problem Date diagonsed Date resolved Status   Other ascites, [SNOMED-CT: 498113421] 12/7/2022 - Active   Abdominal bloating, [SNOMED-CT: 686069804] 12/7/2022 - Active   Pre-op testing, [SNOMED-CT: 621078398] 8/3/2022 - Active   Encounter for procedure, [SNOMED-CT: 573370635] 3/29/2022 - Active   Hyperlipidemia (unspecified), [SNOMED-CT: 06141928] 2/24/2022 - Active   WILSON (dyspnea on exertion), [SNOMED-CT: 85733741] 2/24/2022 - Active   Fatigue, [SNOMED-CT: 72915217] 2/24/2022 - Active   Atrial fibrillation, permanent - AFib, [SNOMED-CT: 871589198] 2/24/2022 - Active   Vitamin D deficiency, [SNOMED-CT: 14450421] 10/20/2021 - Active   Polymyositis, [SNOMED-CT: 24892439] 10/20/2021 - Active   H/O scleroderma, [SNOMED-CT: 267255957] 10/20/2021 - Active   Cancer of skin, [SNOMED-CT: 177015327] 8/16/2021 - Active   Obstructive sleep apnea - LI, [SNOMED-CT: 19303356] 8/16/2021 - Active   History of lupus, [SNOMED-CT: 497384035188427] 8/16/2021 - Active   Gout, unspecified, [SNOMED-CT: 19551171] 8/16/2021 - Active   Hypertension (HTN), primary, [SNOMED-CT: 14532267] 8/16/2021 - Active   Diverticulosis, [SNOMED-CT: 675970049] 8/16/2021 - Active   CHF (congestive heart failure), [SNOMED-CT: 27276500] 8/16/2021 - Active   Pulmonary hypertension, not otherwise specified or secondary, [SNOMED-CT: 26907973] 8/16/2021 - Active   History of atrial fibrillation, [SNOMED-CT: 952544265] 7/31/2019 - Active      HISTORY OF PRESENT ILLNESS    Miguel Davila is a 78 year old man with chronic atrial fibrillation treated with rate control, status post watchman placement. Has a history of intermittent thrombocytopenia.He has complex auto-immune illness, polymyositis, on prednisone 7 mg. Most recent echocardiogram demonstrates normal LV structure and function. He has restrictive LV filling, AI and MR, pulmonary HTN. He had pancreatitis secondary to Jardiance.     IMMUNIZATIONS    No data available    PLAN OF CARE    PLAN OF CARE  Planned Care Date   EKG (electrocardiogram) 1/1/1900   Follow up visit - Naseem Dior MD 1/1/1900     PROCEDURES    No data available    RESULTS    RESULTS  Name Result Date Location - Ordered By   HGBA1C [LOINC: 4548-4] 5.4 % 06/26/2023 09:29:00 AM [facility] ([facility])  Address: [address] [place] [address]  tel:   ESTIMATED AVERAGE GLUCOSE [LOINC: 06366-1] 108 mg/dL 06/26/2023 09:29:00 AM [facility] ([facility])  Address: [address] [place] [address]  tel:   25-HYDROXYVITAMIN D (TOTAL) [LOINC: 37123-5] 56.9 ng/mL 06/26/2023 09:29:00 AM [facility] ([facility])  Address: [address] [place] [address]  tel:   GLUCOSE [LOINC: 2339-0] 96 mg/dL 06/26/2023 09:29:00 AM [facility] ([facility])  Address: [address] [place] [address]  tel:   SODIUM [LOINC: 2951-2] 140 mmol/L 06/26/2023 09:29:00 AM [facility] ([facility])  Address: [address] [place] [address]  tel:   POTASSIUM [LOINC: 2823-3] 5.1 mmol/L 06/26/2023 09:29:00 AM [facility] ([facility])  Address: [address] [place] [address]  tel:   CHLORIDE [LOINC: 2075-0] 106 mmol/L 06/26/2023 09:29:00 AM [facility] ([facility])  Address: [address] [place] [address]  [address]  tel:   CO2 [LOINC: 2028-9] 27.0  mmol/L 06/26/2023 09:29:00 AM [facility] ([facility])  Address: [address] [place] [address]  [address]  tel:   ANION GAP [LOINC: 33037-3] 7 mmol/L 06/26/2023 09:29:00 AM [facility] ([facility])  Address: [address] [place] [address]  [address]  tel:   BUN [LOINC: 6299-2] 72 mg/dL [High] 06/26/2023 09:29:00 AM [facility] ([facility])  Address: [address] [place] [address]  [address]  tel:   CREATININE [LOINC: 44415-3] 2.03 mg/dL [High] 06/26/2023 09:29:00 AM [facility] ([facility])  Address: [address] [place] [address]  tel:   CALCIUM [LOINC: 24221-3] 9.1 mg/dL 06/26/2023 09:29:00 AM [facility] ([facility])  Address: [address] [place] [address]  [address]  tel:   OSMOLALITY CALCULATED [LOINC: 00915-7] 311 mOsm/kg [High] 06/26/2023 09:29:00 AM [facility] ([facility])  Address: [address] [place] [address]  [address]  tel:   E GFR CR [LOINC: 08823-5] 33 mL/min/1.73m2 [Low] 06/26/2023 09:29:00 AM [facility] ([facility])  Address: [address] [place] [address]  [address]  tel:   AST [LOINC: 1920-8] 17 U/L 06/26/2023 09:29:00 AM [facility] ([facility])  Address: [address] [place] [address]  [address]  tel:   ALT [LOINC: 1742-6] 17 U/L 06/26/2023 09:29:00 AM [facility] ([facility])  Address: [address] [place] [address]  [address]  tel:   ALKALINE PHOSPHATASE [LOINC: 1779-8] 48 U/L 06/26/2023 09:29:00 AM [facility] ([facility])  Address: [address] [place] [address]  tel:   BILIRUBIN, TOTAL [LOINC: 1975-2] 0.6 mg/dL 06/26/2023 09:29:00 AM [facility] ([facility])  Address: [address] [place] [address]  [address]  tel:   TOTAL PROTEIN [LOINC: 2885-2] 6.8 g/dL 06/26/2023 09:29:00 AM [facility] ([facility])  Address: [address] [place] [address]  [address]  tel:   ALBUMIN [LOINC: 1751-7] 3.2 g/dL [Low] 06/26/2023 09:29:00 AM [facility] ([facility])  Address: [address] [place] [address]  [address]  tel:   GLOBULIN [LOINC: 01923-1] 3.6 g/dL 06/26/2023 09:29:00 AM [facility] ([facility])  Address: [address] [place] [address]  [address]  tel:   A/G RATIO [LOINC: 1759-0] 0.9 [Low] 06/26/2023 09:29:00 AM [facility] ([facility])  Address: [address] [place] [address]  [address]  tel:   FASTING PATIENT CMP ANSWER [LOINC: 10940-5] Yes 06/26/2023 09:29:00 AM [facility] ([facility])  Address: [address] [place] [address]  [address]  tel:   CHOLESTEROL [LOINC: 2093-3] 148 mg/dL 06/26/2023 09:29:00 AM [facility] ([facility])  Address: [address] [place] [address]  [address]  tel:   HDL CHOL [LOINC: 2085-9] 34 mg/dL [Low] 06/26/2023 09:29:00 AM [facility] ([facility])  Address: [address] [place] [address]  [address]  tel:   TRIGLYCERIDES [LOINC: 2571-8] 109 mg/dL 06/26/2023 09:29:00 AM [facility] ([facility])  Address: [address] [place] [address]  [address]  tel:   LDL CHOLESTROL [LOINC: 95096-5] 94 mg/dL 06/26/2023 09:29:00 AM [facility] ([facility])  Address: [address] [place] [address]  [address]  tel:   VLDL [LOINC: 02069-8] 18 mg/dL 06/26/2023 09:29:00 AM [facility] ([facility])  Address: [address] [place] [address]  tel:   NON HDL CHOL [LOINC: 35518-2] 114 mg/dL 06/26/2023 09:29:00 AM [facility] ([facility])  Address: [address] [place] [address]  tel:   FASTING PATIENT LIPID ANSWER [LOINC: 94959245] Yes 06/26/2023 09:29:00 AM [facility] ([facility])  Address: [address] [place], [place] [address]  tel:    TSH [LOINC: 17367-5] 3.000 mIU/mL 06/26/2023 09:29:00 AM [facility] ([facility])  Address: [address] [place] [address]  [address]  tel:   PRO-BETA NATRIURETIC PEPTIDE [LOINC: 34595-9] 4922 pg/mL [High] 06/26/2023 09:29:00 AM [facility] ([facility])  Address: [address] [place] [address]  [address]  tel:   WBC [LOINC: 6690-2] 6.6 x10(3) uL 06/26/2023 09:29:00 AM [facility] ([facility])  Address: [address] [place] [address]  [address]  tel:   RED BLOOD COUNT [LOINC: 789-8] 3.61 x10(6)uL [Low] 06/26/2023 09:29:00 AM [facility] ([facility])  Address: [address] [place] [address]  [address]  tel:   HGB [LOINC: 718-7] 12.6 g/dL [Low] 06/26/2023 09:29:00 AM [facility] ([facility])  Address: [address] [place] [address]  [address]  tel:   HCT [LOINC: 4544-3] 40.0 % 06/26/2023 09:29:00 AM [facility] ([facility])  Address: [address] [place] [address]  [address]  tel:   PLATELETS [LOINC: 777-3] 120.0 10(3)uL [Low] 06/26/2023 09:29:00 AM [facility] ([facility])  Address: [address] [place] [address]  [address]  tel:   MEAN CELL VOLUME [LOINC: 787-2] 110.8 fL [High] 06/26/2023 09:29:00 AM [facility] ([facility])  Address: [address] [place] [address]  [address]  tel:   MEAN CORPUSCULAR HEMOGLOBIN [LOINC: 785-6] 34.9 pg [High] 06/26/2023 09:29:00 AM [facility] ([facility])  Address: [address] [place] [address]  [address]  tel:   MEAN CORPUSCULAR HGB CONC [LOINC: 786-4] 31.5 g/dL 06/26/2023 09:29:00 AM [facility] ([facility])  Address: [address] [place] [address]  [address]  tel:   RED CELL DISTRIBUTION WIDTH CV [LOINC: 788-0] 15.9 % 06/26/2023 09:29:00 AM [facility] ([facility])  Address: [address] [place] [address]  [address]  tel:    NEUTROPHIL ABS PRELIM [LOINC: 751-8] 4.89 x10 (3) uL 06/26/2023 09:29:00 AM [facility] ([facility])  Address: [address] [place] [address]  [address]  tel:   NEUTROPHIL ABSOLUTE [LOINC: 751-8] 4.89 x10(3) uL 06/26/2023 09:29:00 AM [facility] ([facility])  Address: [address] [place] [address]  [address]  tel:   LYMPHOCYTE ABSOLUTE [LOINC: 731-0] 0.87 x10(3) uL [Low] 06/26/2023 09:29:00 AM [facility] ([facility])  Address: [address] [place] [address]  [address]  tel:   MONOCYTE ABSOLUTE [LOINC: 742-7] 0.57 x10(3) uL 06/26/2023 09:29:00 AM [facility] ([facility])  Address: [address] [place] [address]  [address]  tel:   EOSINOPHIL ABSOLUTE [LOINC: 711-2] 0.21 x10(3) uL 06/26/2023 09:29:00 AM [facility] ([facility])  Address: [address] [place] [address]  [address]  tel:   BASOPHIL ABSOLUTE [LOINC: 704-7] 0.04 x10(3) uL 06/26/2023 09:29:00 AM [facility] ([facility])  Address: [address] [place] [address]  [address]  tel:   IMMATURE GRANULOCYTE COUNT [LOINC: 06630-5] 0.02 x10(3) uL 06/26/2023 09:29:00 AM [facility] ([facility])  Address: [address] [place] [address]  [address]  tel:   NEUTROPHIL % [LOINC: 770-8] 74.1 % 06/26/2023 09:29:00 AM [facility] ([facility])  Address: [address] [place] [address]  [address]  tel:   LYMPHOCYTE % [LOINC: 736-9] 13.2 % 06/26/2023 09:29:00 AM [facility] ([facility])  Address: [address] [place] [address]  [address]  tel:   MONOCYTE % [LOINC: 5905-5] 8.6 % 06/26/2023 09:29:00 AM [facility] ([facility])  Address: [address] [place] [address]  tel:   EOSINOPHIL % [LOINC: 713-8] 3.2 % 06/26/2023 09:29:00 AM [facility] ([facility])  Address: [address] [place] [address]  [address]  tel:    BASOPHIL % [LOINC: 706-2] 0.6 % 06/26/2023 09:29:00 AM [facility] ([facility])  Address: [address] [place] [address]  [address]  tel:   IMMATURE GRANULOCYTE RATIO % [LOINC: 08330-0] 0.3 % 06/26/2023 09:29:00 AM [facility] ([facility])  Address: [address] [place] [address]  [address]  tel:   Trans Thoracic Echocardiogram 1.The study quality is good. 2.The left ventricle is normal in size. Global left ventricular systolic function is borderline normal. The left ventricular ejection fraction is 52%. Left ventricular diastolic function is indeterminate.Mild concentric left ventricular hypertrophy is present. No evidence for RWMA.3.The right ventricle is mildly enlarged. The right ventricular septum flattens in both systole and diastole. 4.The left atrial diameter is severely increased. 5.The right atrium is severely enlarged. 6.Mild-to-moderate (2+) aortic regurgitation. Mild calcification of the aortic valve is noted with adequate cuspal excursion. 7.Mild mitral annular calcification is noted. Mild calcification of the mitral valve is noted. Moderate (2+) mitral regurgitation. 8.Mild to moderate (1-2+) tricuspid regurgitation.9.The estimated pulmonary artery systolic pressure is 43 mmHg assuming a right atrial pressure of 8 mmHg. Evidence of pulmonary hypertension is noted. 1/4/2023 2:30:00 PM Chauncey Rivers MD     REVIEW OF SYSTEMS    REVIEW OF SYSTEMS  Header Details   Cardiovascular No history of Chest pain, WILSON, Palpitations, Syncope, PND, Orthopnea, Edema, Claudication     SOCIAL HISTORY    SOCIAL HISTORY  Social History Element Description Effective Dates   Smoking status Former smoker -     FUNCTIONAL STATUS    No data available    MEDICAL EQUIPMENT    No data available    Goals Sections    No data available    REASON FOR REFERRAL         Health Concerns Section    No data available    COGNITIVE/MENTAL STATUS    No data available    Patient  Demographics    Patient Demographics  Patient Address Patient Name Communication   [address] [place] [address] Miguel Davila [phone] (Mobile)     Patient Demographics  Language Race / Ethnicity Marital Status   English - Spoken (Preferred) White / Not  or       Document Information    Primary Care Provider Other Service Providers Document Coverage Dates   Naseem Dior  NPI: [id number]  [phone] (Work)  [address] [place] [address]  [place] [address]  Interpreting Physicians  [facility]  [phone] (Work)  [address] [place] [address] Iris Leyva  NPI: [id number]  [phone] (Work)  [address] [place] [address]  [place] [address]  Nurses     Yazmin Johnson  NPI: [id number]  [phone] (Work)  [address] [place] [address]  [place] [address]  Nurses Jan. 22, 2024January 22, 2024      Organization   [facility]  [phone] (Work)  [address] [place] [address]  [place] [address]     Encounter Providers Encounter Date    Jan. 22, 2024January 22, 2024     Legal Authenticator    Naseem Dior  NPI: [id number]  [phone] (Work)  [address] [place] [id number]  [place] [address]

## 2024-06-07 NOTE — PROGRESS NOTES
Clear liquid diet, NPO midnight per Dr. Valencia  EKG obtained per order    Afib on telemetry, 90s. 1L O2 via NC. Denies dyspnea.    Alert and oriented x4  Pain with improvement after IV Ofirmev administration, rated 4/10.  Hypoactive bowel sounds  Contact isolation, due to collect stool studies    0330  Up to void. No bowel movement. Steady gait. Cane and standby assist.

## 2024-06-08 LAB
ADENOVIRUS F 40/41 PCR: NEGATIVE
ALBUMIN SERPL-MCNC: 2.1 G/DL (ref 3.4–5)
ALBUMIN/GLOB SERPL: 0.6 {RATIO} (ref 1–2)
ALP LIVER SERPL-CCNC: 76 U/L
ALT SERPL-CCNC: 37 U/L
ANION GAP SERPL CALC-SCNC: 7 MMOL/L (ref 0–18)
AST SERPL-CCNC: 43 U/L (ref 15–37)
ASTROVIRUS PCR: NEGATIVE
BASOPHILS # BLD AUTO: 0.01 X10(3) UL (ref 0–0.2)
BASOPHILS NFR BLD AUTO: 0.1 %
BILIRUB SERPL-MCNC: 1 MG/DL (ref 0.1–2)
BUN BLD-MCNC: 67 MG/DL (ref 9–23)
C CAYETANENSIS DNA SPEC QL NAA+PROBE: NEGATIVE
C DIFF TOX B STL QL: NEGATIVE
CALCIUM BLD-MCNC: 8.1 MG/DL (ref 8.5–10.1)
CAMPY SP DNA.DIARRHEA STL QL NAA+PROBE: NEGATIVE
CHLORIDE SERPL-SCNC: 114 MMOL/L (ref 98–112)
CO2 SERPL-SCNC: 22 MMOL/L (ref 21–32)
CREAT BLD-MCNC: 2.07 MG/DL
CRYPTOSP DNA SPEC QL NAA+PROBE: NEGATIVE
EAEC PAA PLAS AGGR+AATA ST NAA+NON-PRB: NEGATIVE
EC STX1+STX2 + H7 FLIC SPEC NAA+PROBE: NEGATIVE
EGFRCR SERPLBLD CKD-EPI 2021: 32 ML/MIN/1.73M2 (ref 60–?)
ENTAMOEBA HISTOLYTICA PCR: NEGATIVE
EOSINOPHIL # BLD AUTO: 0 X10(3) UL (ref 0–0.7)
EOSINOPHIL NFR BLD AUTO: 0 %
EPEC EAE GENE STL QL NAA+NON-PROBE: NEGATIVE
ERYTHROCYTE [DISTWIDTH] IN BLOOD BY AUTOMATED COUNT: 15.8 %
ETEC LTA+ST1A+ST1B TOX ST NAA+NON-PROBE: NEGATIVE
FOLATE SERPL-MCNC: 55.9 NG/ML (ref 8.7–?)
GIARDIA LAMBLIA PCR: NEGATIVE
GLOBULIN PLAS-MCNC: 3.6 G/DL (ref 2.8–4.4)
GLUCOSE BLD-MCNC: 93 MG/DL (ref 70–99)
HCT VFR BLD AUTO: 36.4 %
HGB BLD-MCNC: 11 G/DL
HGB RETIC QN AUTO: 29.8 PG (ref 28.2–36.6)
IMM GRANULOCYTES # BLD AUTO: 0.04 X10(3) UL (ref 0–1)
IMM GRANULOCYTES NFR BLD: 0.6 %
IMM RETICS NFR: 0.14 RATIO (ref 0.1–0.3)
LYMPHOCYTES # BLD AUTO: 0.28 X10(3) UL (ref 1–4)
LYMPHOCYTES NFR BLD AUTO: 4 %
MCH RBC QN AUTO: 33.6 PG (ref 26–34)
MCHC RBC AUTO-ENTMCNC: 30.2 G/DL (ref 31–37)
MCV RBC AUTO: 111.3 FL
MONOCYTES # BLD AUTO: 0.19 X10(3) UL (ref 0.1–1)
MONOCYTES NFR BLD AUTO: 2.7 %
NEUTROPHILS # BLD AUTO: 6.41 X10 (3) UL (ref 1.5–7.7)
NEUTROPHILS # BLD AUTO: 6.41 X10(3) UL (ref 1.5–7.7)
NEUTROPHILS NFR BLD AUTO: 92.6 %
NOROVIRUS GI/GII PCR: NEGATIVE
OSMOLALITY SERPL CALC.SUM OF ELEC: 315 MOSM/KG (ref 275–295)
P SHIGELLOIDES DNA STL QL NAA+PROBE: NEGATIVE
PLATELET # BLD AUTO: 63 10(3)UL (ref 150–450)
POTASSIUM SERPL-SCNC: 4.7 MMOL/L (ref 3.5–5.1)
PROT SERPL-MCNC: 5.7 G/DL (ref 6.4–8.2)
RBC # BLD AUTO: 3.27 X10(6)UL
RETICS # AUTO: 38.7 X10(3) UL (ref 22.5–147.5)
RETICS/RBC NFR AUTO: 1.2 %
ROTAVIRUS A PCR: NEGATIVE
SALMONELLA DNA SPEC QL NAA+PROBE: NEGATIVE
SAPOVIRUS PCR: NEGATIVE
SHIGELLA SP+EIEC IPAH ST NAA+NON-PROBE: NEGATIVE
SODIUM SERPL-SCNC: 143 MMOL/L (ref 136–145)
V CHOLERAE DNA SPEC QL NAA+PROBE: NEGATIVE
VIBRIO DNA SPEC NAA+PROBE: NEGATIVE
VIT B12 SERPL-MCNC: 1699 PG/ML (ref 193–986)
WBC # BLD AUTO: 6.9 X10(3) UL (ref 4–11)
YERSINIA DNA SPEC NAA+PROBE: NEGATIVE

## 2024-06-08 PROCEDURE — 99233 SBSQ HOSP IP/OBS HIGH 50: CPT | Performed by: HOSPITALIST

## 2024-06-08 RX ORDER — ALLOPURINOL 300 MG/1
300 TABLET ORAL DAILY
Status: DISCONTINUED | OUTPATIENT
Start: 2024-06-09 | End: 2024-06-10

## 2024-06-08 RX ORDER — METRONIDAZOLE 500 MG/100ML
500 INJECTION, SOLUTION INTRAVENOUS EVERY 8 HOURS
Status: DISCONTINUED | OUTPATIENT
Start: 2024-06-08 | End: 2024-06-09

## 2024-06-08 NOTE — PLAN OF CARE
2313: pt back from sx    Pt vitals stable, A&O x4. Denied chest pain and shortness of breath. IVF infusing. Tolerating diet without nausea. No pain at this time. Reyes intact and draining yellow urine. Bed locked in low position, call light in reach, rounding provided.        Addressed K 3.8 with rise in Cr 2.0 today. Discussed with Dr Yamila Pedersen. Per Cardiology, Cardiology will discuss with Dr James Bolton about getting renal involved. Pt previously was seen by Dr Robert Camacho in the past.     [id number] Page Luciano to hold off on electrolyte coverage at this time.

## 2024-06-08 NOTE — PROGRESS NOTES
[facility]  Progress Note    [facility]:  [facility]    3/17/1945 MRN [id number]   Location [facility] 3NW-A Attending Hieu Presley MD   Hosp Day # 2 PCP Eric Simon MD     Subjective:  The patient was seen and examined at bedside. He states he feels like he was \"hit by a truck\" today. He states his abdominal pain is tolerable. He is tolerating a a clear liquid diet. He denies nausea or vomiting. He did have a small bowel movement.     Objective/Physical Exam:  /53 (BP Location: Right arm)   Pulse (!) 122   Temp 97.8 °F (36.6 °C) (Oral)   Resp 18   Ht 71\"   Wt 200 lb 12.8 oz (91.1 kg)   SpO2 96%   BMI 28.01 kg/m²     Intake/Output Summary (Last 24 hours) at 2024 1110  Last data filed at 2024 0722  Gross per 24 hour   Intake 2504 ml   Output 1085 ml   Net 1419 ml         General: Alert, oriented x3. No acute distress.  HEENT: Normocephalic, atraumatic. No scleral icterus.  Pulmonary: No respiratory distress, effort normal.   Abdomen: Mildly-distended, without tympany to percussion. Ascites. Soft, non-tender to palpation. No rebound or guarding. No peritoneal signs.   Incision: laparoscopic incision sites are clean, dry, intact without surrounding erythema or cellulitis. Skin glue is in place.   Extremities: No lower extremity edema. No clubbing or cyanosis.   Skin: Warm, dry. No jaundice.   Drain: 75 ml thin bloody output since the time of his operation      Labs:  Lab Results   Component Value Date    WBC 6.9 2024    HGB 11.0 2024    HCT 36.4 2024    PLT 63.0 2024      Lab Results   Component Value Date    INR 1.04 2021    INR 1.20 (H) 2019    INR 1.06 08/15/2019     Lab Results   Component Value Date     2024    K 4.7 2024     2024    CO2 22.0 2024    BUN 67 2024    CREATSERUM 2.07 2024    GLU 93 2024    CA 8.1 2024    ALKPHO 76 2024    ALT 37  06/08/2024    AST 43 06/08/2024    BILT 1.0 06/08/2024    ALB 2.1 06/08/2024    TP 5.7 06/08/2024          Assessment:  Patient Active Problem List   Diagnosis    Personal history of other malignant neoplasm of skin    Hypopotassemia    Ramon's esophagus    Gout    ED (erectile dysfunction)    Benign essential hypertension    Low HDL (under 40)    Thrombocytopenia (HCC)    Basal cell carcinoma, ear    Lupus hepatitis syndrome (HCC)    Splenomegaly    Mixed connective tissue disease (HCC)    Polymyositis (HCC)    Atrial fibrillation (HCC)    Aortic root dilation (HCC)    Lupus (HCC)    Incidental lung nodule, less than or equal to 3mm    LI (obstructive sleep apnea)    Pleural effusion, left    Pulmonary hypertension (HCC)    Chronic right-sided congestive heart failure (HCC)    Immune thrombocytopenic purpura (HCC)    Cardiomyopathy as manifestation of underlying disease (HCC)    Presence of Watchman left atrial appendage closure device    Centrilobular emphysema (HCC)    Symptomatic anemia    Pancytopenia (HCC)    Stage 3b chronic kidney disease (HCC)    Drug-induced acute pancreatitis without infection or necrosis (HCC)    [facility]    Abdominal pain, right upper quadrant    Acute renal failure, unspecified acute renal failure type (HCC)    Acute cholecystitis     POD 1 laparoscopic cholecystectomy    Plan:  The patient is doing well post-operatively.  Advance to full liquid diet.  The patient may advance diet as tolerated.  Okay to remove Reyes catheter once patient is ambulating more  Antiemetics and analgesics as needed  Drain care  Encourage ambulation and up to chair-PT on consult  Continue IV antibiotics-rocephin and flagyl  Encourage incentive spirometer  DVT prophylaxis with heparin  GI prophylaxis with Protonix    My total face time with this patient was 25 minutes. Greater than half of the visit was spent in counseling the patient on the above listed diagnoses and  treatment options.     Shantell Morgan PA-C  6/8/2024  11:10 AM    This note was initiated by Shantell Morgan PA-C. The PA saw the patient in conjunction with me. The PA performed a history, exam, and developed the assessment and plan. I agree with the mentioned assessment and plan and have provided further documentation of my own, if necessary.    Intraoperative findings and surgical course discussed with patient.  He is doing well.  Advance to regular diet.  Reyes catheter removed this afternoon.  Keep Eric drain in place today and continue to monitor output.  I agree with continuing IV antibiotics given degree of intraoperative contamination.  Anticipate a total of 3-5 days of antibiotics.  Encourage out of bed as able.  Continue DVT prophylaxis.  Possible discharge home tomorrow if patient continues to do well.    Curtis Harry MD  [facility] General Surgery

## 2024-06-08 NOTE — PLAN OF CARE
A&Ox4. VSS. RA. . Denies chest pain and SOB.   Telemetry: A fib - baseline   GI: Abdomen soft, nondistended. No gas present. Belching reported.   Denies nausea.   : Post op. Indwelling parrish catheter in place draining clear yellow urine. Orders to remove once pt starts to ambulate   Pain controlled with PRN pain medications.   Up with standby assist/cane   Drains: Parrish, RLQ FRANCIE drain   Incisions: x3 abd lap sites with derma-bond. CDI   Diet: Full liquid diet - ADAT   IVF running per order. All appropriate safety measures in place. All questions and concerns addressed.

## 2024-06-08 NOTE — PROGRESS NOTES
[facility]   part of [facility]     Hospitalist Progress Note     Miguel Griffin [facility] Patient Status:  Inpatient    3/17/1945 MRN [id number]   [facility] 3NW-A Attending Faviola Steele MD   Hosp Day # 2 PCP Eric Simon MD     Chief Complaint: abdominal pain    Subjective:     Feeling ok today; postop pain tolerable.  No nausea or sob.    Objective:    Review of Systems:   A 5 point review of systems was completed; pertinent positive and negatives stated in subjective.    Vital signs:  Temp:  [97.3 °F (36.3 °C)-98.5 °F (36.9 °C)] 97.8 °F (36.6 °C)  Pulse:  [] 122  Resp:  [11-24] 18  BP: ()/(53-75) 109/53  SpO2:  [90 %-99 %] 96 %    Physical Exam:    General: No acute distress  Respiratory: No wheezes, no rhonchi  Cardiovascular: S1, S2, irregular rate  Abdomen: Soft, lap sites c/d/i  Neuro: No new focal deficits.   Extremities: No edema    Diagnostic Data:    Labs:  Recent Labs   Lab 24  1001 24  0612 24  0541   WBC 10.6 8.2 6.9   HGB 12.2* 11.3* 11.0*   .7* 107.4* 111.3*   PLT 83.0* 70.0* 63.0*       Recent Labs   Lab 24  1001 24  0612 24  0541   * 100* 93   BUN 56* 63* 67*   CREATSERUM 2.76* 2.62* 2.07*   CA 8.8 8.2* 8.1*   ALB 3.0* 2.4* 2.1*    140 143   K 4.4 4.1 4.7    107 114*   CO2 26.0 24.0 22.0   ALKPHO 95 66 76   AST 42* 20 43*   ALT 32 27 37   BILT 2.2* 1.3 1.0   TP 7.1 5.8* 5.7*       Estimated Creatinine Clearance: 29.8 mL/min (A) (based on SCr of 2.07 mg/dL (H)).    No results for input(s): \"TROP\", \"TROPHS\", \"CK\" in the last 168 hours.    No results for input(s): \"PTP\", \"INR\" in the last 168 hours.               Microbiology    Hospital Encounter on 24   1. Urine Culture, Routine     Status: Abnormal    Collection Time: 24  5:26 PM    Specimen: Urine, clean catch   Result Value Ref Range    Urine Culture >100,000 CFU/ML Escherichia coli (A) N/A       Susceptibility    Escherichia coli -   (no method available)     Ampicillin >=32 Resistant      Ampicillin + Sulbactam 16 Intermediate      Cefazolin <=4 Sensitive      Ciprofloxacin <=0.25 Sensitive      Gentamicin <=1 Sensitive      Meropenem <=0.25 Sensitive      Levofloxacin <=0.12 Sensitive      Nitrofurantoin 64 Intermediate      Piperacillin + Tazobactam <=4 Sensitive      Trimethoprim/Sulfa <=20 Sensitive          Imaging: Reviewed in Epic.    Medications:    predniSONE  7 mg Oral Daily    metRONIDAZOLE  500 mg Intravenous Q8H    aspirin  81 mg Oral Daily    cholecalciferol  1,000 Units Oral Daily    levothyroxine  50 mcg Oral Before breakfast    metoprolol succinate ER  100 mg Oral QAM    And    metoprolol succinate ER  50 mg Oral Nightly    pantoprazole  20 mg Oral BID AC    heparin  5,000 Units Subcutaneous 2 times per day    cefTRIAXone  1 g Intravenous Q24H       Assessment & Plan:      #Acute cholecystitis pod1 s/p lap basil for Perforated cholecystitis with localized biliary peritonitis     #Severe Sepsis  -resolved     #Hyperbilirubinemia  -improved     # A-fib RVR  -oral BB  -S/p Watchman, not on AC    #NICKI on CKD  -NICKI seemingly better.  Near baseline Cr now  -hold home allopurinol, entresto, spironolactone Bumex, patiromater    #Thrombocytopenia; cautiously continue heparin prophy; if plts<50k tomorrow, will need to hold    #Macrocytic anemia-monitor  -no signs bleeding    #Pleural effusion  -trace effusion present which is asymptomatic and chronic  -no further interventions at this point       #Polymyositis  #SLE  -resume 7mg oral daily prednisone; consider stress dosed steroids if any low bp; would prefer to avoid high dose steroids to facilitate postop healing and in context of peritonitis    #HTN  #LI  #emphysema    #Hypothyroidism  -cont home levothyroxine     Hieu Presley MD  [facility]  Internal Medicine Hospitalist      Supplementary Documentation:     Quality:  DVT Mechanical Prophylaxis: VIN hose,      DVT  Pharmacologic Prophylaxis   Medication    heparin (Porcine) 5000 UNIT/ML injection 5,000 Units         DVT Pharmacologic prophylaxis: Aspirin 81 mg      Code Status: Full Code  Reyes: Reyes catheter in place  Reyes Duration (in days):   Central line:    SHANNAN:     Discharge is dependent on: surgery, resolution of NICKI, BP stabilization, control of A-fib  At this point Mr. Davila is expected to be discharge to: home    The 21st Century Cures Act makes medical notes like these available to patients in the interest of transparency. Please be advised this is a medical document. Medical documents are intended to carry relevant information, facts as evident, and the clinical opinion of the practitioner. The medical note is intended as peer to peer communication and may appear blunt or direct. It is written in medical language and may contain abbreviations or verbiage that are unfamiliar.

## 2024-06-08 NOTE — ANESTHESIA POSTPROCEDURE EVALUATION
[facility]    Miguel Davila Patient Status:  Inpatient   Age/Gender 79 year old male MRN [id number]   Location [facility] 3NW-A Attending Genaro Caceres MD   Hosp Day # 1 PCP Eric Simon MD       Anesthesia Post-op Note    LAPAROSCOPIC CHOLECYSTECTOMY WITH INTRAOPERATIVE CHOLANGIOGRAM    Procedure Summary       Date: 06/07/24 Room / Location:  MAIN OR 14 / [facility] MAIN OR    Anesthesia Start: 1821 Anesthesia Stop: 2212    Procedure: LAPAROSCOPIC CHOLECYSTECTOMY WITH INTRAOPERATIVE CHOLANGIOGRAM (Abdomen) Diagnosis:       Acute cholecystitis      (Acute cholecystitis [K81.0])    Surgeons: Curtis Harry MD Anesthesiologist: Chase Triplett MD    Anesthesia Type: general ASA Status: 3            Anesthesia Type: general    Vitals Value Taken Time   BP 99/57 06/07/24 2215   Temp 98.5 °F (36.9 °C) 06/07/24 2215   Pulse 92 06/07/24 2215   Resp 20 06/07/24 2215   SpO2 97 % 06/07/24 2215   Vitals shown include unfiled device data.    Patient Location: PACU    Anesthesia Type: general    Airway Patency: patent and extubated    Postop Pain Control: adequate    Mental Status: mildly sedated but able to meaningfully participate in the post-anesthesia evaluation    Nausea/Vomiting: none    Cardiopulmonary/Hydration status: stable euvolemic    Complications: no apparent anesthesia related complications    Postop vital signs: stable    Dental Exam: Unchanged from Preop    Patient to be discharged from PACU when criteria met.

## 2024-06-08 NOTE — OPERATIVE REPORT
[facility]  Operative Note    Miguel Davila Location: OR   CSN [id number] MRN [id number]    3/17/1945 Age 79 year old   Admission Date 2024 Operation Date 2024   Attending Physician Genaro Caceres MD Operating Physician Curtis Harry MD   PCP Eric Simon MD          Patient Name: Miguel Davila    Preoperative Diagnosis: Acute cholecystitis [K81.0]    Postoperative Diagnosis: Perforated cholecystitis with localized biliary peritonitis    Primary Surgeon: Curtis Harry MD    Assistant: Candy Brarera PA-C, Tita Valencia MD (their assistance was essential for the safe conduct of this case especially in assisting in dissection in close triangle and being able to obtain a critical view of safety)    Anesthesia: General    Procedures: Laparoscopic cholecystectomy    Implants: None    Specimen: Gallbladder    Drains: 19 Occitan Eric drain    Estimated Blood Loss: 300 cc    Complications: None immediate    Condition: Stable    Indications for Surgery:   This is an 79-year-old gentleman with history of multiple medical problems who was evaluated by my partner, Dr. Valencia, on 2024 with concern for acute cholecystitis.  I have personally reviewed his workup and imaging findings are indeed suspicious for acute cholecystitis.  Dr. Valencia asked me to assume surgical care of this patient due to lack of OR availability until this evening.     I met the patient in preoperative holding.  History, exam and imaging do indeed appear consistent with acute cholecystitis.  I recommend proceeding with laparoscopic cholecystectomy with intraoperative cholangiogram, possible open.  The details of the procedure were discussed including the expected recovery time, risks, benefits and alternatives.  Patient expressed understanding and was agreeable to proceed with surgery.  Consent was signed.  All questions answered.    Surgical Findings:   Bilious fluid with fibrinous exudate and inflammatory  changes in the right upper quadrant around the liver consistent with bile peritonitis.  Severely inflamed and friable gallbladder filled with purulent bilious fluid and many small gallstones.    Description of Procedure:   Patient was brought to the operating room and positioned supine on a pink foam pad. Bilateral sequential compression device were placed.  Preoperative antibiotics were given.  Patient was induced under general endotracheal anesthesia.  The abdomen was prepped and draped in the usual sterile fashion.  A timeout was performed.     A 12 mm transverse skin incision was made just above the umbilicus. Pneumoperitoneum was established using a Veress needle at this site.  There was appropriately low opening pressure.  The Veress needle was removed and a 12 mm optical trocar was inserted into the peritoneal cavity under direct vision. There was no evidence of underlying visceral injury.  A 5 mm epigastric trocar was placed under direct vision.  Patient was positioned in reverse Trendelenburg and tilted with right side up.  2 additional 5 mm trocars were placed under direct vision beneath the right costal margin.    There was a severe inflammatory reaction in the right upper quadrant with bilious fluid and fibrinous exudate.  The bilious fluid was suctioned out.  The fundus of the gallbladder was identified beneath the liver.  There were inflammatory adhesions between the omentum and entire gallbladder.  These were divided using blunt and sharp dissection with a suction  device and hook electrocautery.     The gallbladder fundus was retracted cephalad over the liver.  I began by scoring the peritoneum overlying the neck and infundibulum of the gallbladder using hook electrocautery.  The peritoneum and fibrotic inflammatory tissue overlying the neck and infundibulum of the gallbladder was dissected off using primarily blunt dissection with a suction  device and also combination of sharp and  blunt dissection with hook electrocautery and a [place] dissector.  This dissection was difficult and tedious.  All the tissue around the gallbladder was markedly inflamed, friable and bled on contact.  There was continuous oozing from small vessel bleeding from the liver bed and around the gallbladder.  The gallbladder wall was rather friable and continued to tear with any grasping.  There was spillage of purulent bilious fluid and gallstones from the tears in the gallbladder.    Eventually, I was able to establish a critical view of safety. The cystic artery was ligated between metal clips and divided, two on the stay side and one on the specimen side.  There were 2 other small vessels going directly into the gallbladder which were ligated using metal clips and divided. The cystic duct was ligated close to its exit from the gallbladder using a metal clip.  The cystic duct was then partially divided and a cholangiocatheter was attempted to be passed into the cystic duct and biliary system.  We were able to advance the cholangiocatheter for distance of 3 to 4 cm.  Despite multiple attempts and clamping maneuvers, there was a persistent leak of saline and contrast.  Patient continued continued to have oozing from the friable tissue around the gallbladder.  We were also able to obtain a critical view of safety.  Therefore, I decided to abort any further attempts for an intraoperative cholangiogram.    The stay portion of the cystic duct was ligated using 3 metal clips.  The cystic duct was then fully divided. The remaining attachments between the gallbladder and liver bed were divided using hook electrocautery. Any remaining small bleeding vessels encountered during this dissection were controlled with electrocautery. The gallbladder was placed in a specimen bag to be retrieved through the periumbilical port. The surgical field was copiously irrigated and suctioned.  The surgical field and liver bed appeared  hemostatic.  A piece of Surgicel was placed beneath the liver and the gallbladder fossa to aid with ongoing hemostasis.  All visualized small gallstones that were spilled were retrieved.  A 19 Colombian Eric drain was introduced through the right lateral trocar site and placed beneath the liver.  The drain was secured in place using a 2-0 nylon drain stitch.     The patient was leveled. The gallbladder was successfully retrieved intact within the specimen bag. The fascia at the extraction site was closed using an interrupted 0 PDS suture with the assistance of a Koko-Zay device. The remaining trocars were removed under direct vision and appeared hemostatic. Pneumoperitoneum was evacuated.  Each skin incision was anesthetized using a total of 30 cc of 0.25% Marcaine.  Each skin incision was closed using 4-0 Monocryl in a subcuticular fashion.  The skin was cleaned and dried and skin glue was placed over each incision as a final dressing.  A drain sponge and tape was placed around the drain.     The patient was awakened from anesthesia, extubated and transported to the postanesthesia care unit in stable condition. All sponge, needle and instrument counts were correct at the end of the case.  I was present for the entire case.      Curtis Harry MD  6/7/2024  10:09 PM

## 2024-06-08 NOTE — PROGRESS NOTES
Pt resting in bed.  Wife @ bedside.  Wife A&O x 1.   Wife going into other pt's rooms.   Wife states 'i am looking for my car.  It is just outside the door & around the corner'.    Wife escorted back to pt's room.   Explained to pt & wife that while a pt is in the hospital, they are not able to be responsible for anyone else.   Pt states that he spoke with his sister @ [phone], and stated she is on her way to the hospital.   Pt states his sister lives in [facility].   Security called & spoke with pt & wife along with this rn.     Call placed to pt's sister, anderson chester @ [phone] & voice mail left for sister to call back.

## 2024-06-09 PROBLEM — D64.9 ANEMIA: Status: ACTIVE | Noted: 2021-04-27

## 2024-06-09 PROBLEM — I50.9 CHRONIC CONGESTIVE HEART FAILURE (HCC): Status: ACTIVE | Noted: 2019-08-27

## 2024-06-09 LAB
ALBUMIN SERPL-MCNC: 2.1 G/DL (ref 3.4–5)
ALBUMIN/GLOB SERPL: 0.6 {RATIO} (ref 1–2)
ALP LIVER SERPL-CCNC: 73 U/L
ALT SERPL-CCNC: 27 U/L
ANION GAP SERPL CALC-SCNC: 7 MMOL/L (ref 0–18)
AST SERPL-CCNC: 28 U/L (ref 15–37)
BASOPHILS # BLD AUTO: 0 X10(3) UL (ref 0–0.2)
BASOPHILS NFR BLD AUTO: 0 %
BILIRUB SERPL-MCNC: 0.8 MG/DL (ref 0.1–2)
BUN BLD-MCNC: 61 MG/DL (ref 9–23)
CALCIUM BLD-MCNC: 7.8 MG/DL (ref 8.5–10.1)
CHLORIDE SERPL-SCNC: 111 MMOL/L (ref 98–112)
CO2 SERPL-SCNC: 21 MMOL/L (ref 21–32)
CREAT BLD-MCNC: 1.82 MG/DL
EGFRCR SERPLBLD CKD-EPI 2021: 37 ML/MIN/1.73M2 (ref 60–?)
EOSINOPHIL # BLD AUTO: 0 X10(3) UL (ref 0–0.7)
EOSINOPHIL NFR BLD AUTO: 0 %
ERYTHROCYTE [DISTWIDTH] IN BLOOD BY AUTOMATED COUNT: 15.5 %
GLOBULIN PLAS-MCNC: 3.4 G/DL (ref 2.8–4.4)
GLUCOSE BLD-MCNC: 98 MG/DL (ref 70–99)
HCT VFR BLD AUTO: 33.1 %
HGB BLD-MCNC: 10.3 G/DL
IMM GRANULOCYTES # BLD AUTO: 0.06 X10(3) UL (ref 0–1)
IMM GRANULOCYTES NFR BLD: 0.7 %
LYMPHOCYTES # BLD AUTO: 0.37 X10(3) UL (ref 1–4)
LYMPHOCYTES NFR BLD AUTO: 4.4 %
MCH RBC QN AUTO: 33.7 PG (ref 26–34)
MCHC RBC AUTO-ENTMCNC: 31.1 G/DL (ref 31–37)
MCV RBC AUTO: 108.2 FL
MONOCYTES # BLD AUTO: 0.36 X10(3) UL (ref 0.1–1)
MONOCYTES NFR BLD AUTO: 4.2 %
NEUTROPHILS # BLD AUTO: 7.69 X10 (3) UL (ref 1.5–7.7)
NEUTROPHILS # BLD AUTO: 7.69 X10(3) UL (ref 1.5–7.7)
NEUTROPHILS NFR BLD AUTO: 90.7 %
OSMOLALITY SERPL CALC.SUM OF ELEC: 305 MOSM/KG (ref 275–295)
PLATELET # BLD AUTO: 71 10(3)UL (ref 150–450)
POTASSIUM SERPL-SCNC: 4.4 MMOL/L (ref 3.5–5.1)
PROT SERPL-MCNC: 5.5 G/DL (ref 6.4–8.2)
RBC # BLD AUTO: 3.06 X10(6)UL
SODIUM SERPL-SCNC: 139 MMOL/L (ref 136–145)
WBC # BLD AUTO: 8.5 X10(3) UL (ref 4–11)

## 2024-06-09 PROCEDURE — 99232 SBSQ HOSP IP/OBS MODERATE 35: CPT | Performed by: HOSPITALIST

## 2024-06-09 NOTE — PHYSICAL THERAPY NOTE
PHYSICAL THERAPY EVALUATION - INPATIENT     Room Number: 303/303-A  Evaluation Date: 2024  Type of Evaluation: Initial  Physician Order: PT Eval and Treat    Presenting Problem: abdominal flank pain, nausea, diarrhea  Co-Morbidities : SLE, polymyositis, afib (s/p Watchman), HTN, IBS, LI, pulmonary HTN, CHF  Reason for Therapy: Mobility Dysfunction and Discharge Planning    PHYSICAL THERAPY ASSESSMENT   Patient is a 79 year old male admitted 2024 for abdominal flank pain.   Patient is currently functioning at baseline with bed mobility, transfers, gait, and stair negotiation. Prior to admission, patient's baseline is modified independent.     PLAN  Patient has been evaluated and presents with no skilled Physical Therapy needs at this time.  Patient discharged from Physical Therapy services.  Please re-order if a new functional limitation presents during this admission.    GOALS  Patient was able to achieve the following goals ...    Patient was able to transfer At previous, functional level  Safely and independently   Patient able to ambulate on level surfaces At previous, functional level  Safely and independently         HOME SITUATION  Type of Home: House   Home Layout: Multi-level  Stairs to Enter : 2  Railing: Yes  Stairs to Bedroom: 14  Railing: Yes  Lives With: Spouse  Drives: Yes  Patient Owned Equipment: Rolling walker;Cane  Patient Regularly Uses: None    Prior Level of [place]: Pt reports living in a multi level home with spouse who has dementia and he is the primary caregiver. Pt reports modified independent in all functional mobility.     SUBJECTIVE  \" I can walk but I have been in bed for a while\"      OBJECTIVE  Precautions: Abdominal protective strategies;FRANCIE tube  Fall Risk: Standard fall risk    WEIGHT BEARING RESTRICTION  Weight Bearing Restriction: None      PAIN ASSESSMENT  Ratin  Location: incision site  Management Techniques: Body mechanics;Breathing  techniques;Relaxation;Repositioning    COGNITION  Overall Cognitive Status:  WFL - within functional limits    RANGE OF MOTION AND STRENGTH ASSESSMENT  Upper extremity ROM and strength are within functional limits     Lower extremity ROM is within functional limits     Lower extremity strength is within functional limits       BALANCE  Static Sitting: Good  Dynamic Sitting: Good  Static Standing: Fair +  Dynamic Standing: Fair      AM-PAC '6-Clicks' INPATIENT SHORT FORM - BASIC MOBILITY  How much difficulty does the patient currently have...  Patient Difficulty: Turning over in bed (including adjusting bedclothes, sheets and blankets)?: None   Patient Difficulty: Sitting down on and standing up from a chair with arms (e.g., wheelchair, bedside commode, etc.): None   Patient Difficulty: Moving from lying on back to sitting on the side of the bed?: None   How much help from another person does the patient currently need...   Help from Another: Moving to and from a bed to a chair (including a wheelchair)?: None   Help from Another: Need to walk in hospital room?: None   Help from Another: Climbing 3-5 steps with a railing?: None       AM-PAC Score:  Raw Score: 24   Approx Degree of Impairment: 0%   Standardized Score (AM-PAC Scale): 61.14   CMS Modifier (G-Code): CH    FUNCTIONAL ABILITY STATUS  Gait Assessment   Functional Mobility/Gait Assessment  Gait Assistance: Supervision  Distance (ft): 150  Assistive Device: Cane  Pattern: Within Functional Limits  Stairs: Stairs  How Many Stairs: 4  Device: 1 Rail;Cane  Assist: Supervision  Pattern: Ascend and Descend  Ascend and Descend : Reciprocal    Skilled Therapy Provided     Bed Mobility:  Rolling: modified independent  Supine to sit: modified independent   Sit to supine: modified independent     Transfer Mobility:  Sit to stand: supervision   Stand to sit: supervision  Gait = supervision    Therapist's comments:RN cleared pt for session. Pt received elevated supine in  bed, agreeable to evaluation. Pt performed above functional mobility. Stair negotiation limited due to IV line. Educated pt on ambulating the hallway with staff to increase endurance and to sit up for meals. Educated pt on performing LE exercises in bed and when in chair. Pt verbalized understanding and agreement to all teaching. Pt returned to elevated supine in bed with FRANCIE drain intact.     Exercise/Education Provided:  Bed mobility  Body mechanics  Energy conservation  Functional activity tolerated  Lower therapeutic exercise:  Alternating marching  Ankle pumps  LAQ  Transfer training    Patient End of Session: In bed;Needs met;Call light within reach;RN aware of session/findings;All patient questions and concerns addressed    Patient Evaluation Complexity Level:  History Moderate - 1 or 2 personal factors and/or co-morbidities   Examination of body systems Low - addressing 1-2 elements   Clinical Presentation Low - Stable   Clinical Decision Making Low Complexity       PT Session Time: 25 minutes  Gait Training: 10 minutes  Therapeutic Activity: 10 minutes  Neuromuscular Re-education:  minutes  Therapeutic Exercise:  minutes

## 2024-06-09 NOTE — PLAN OF CARE
Pt alert x 4. VSS n Ra. No complaints of pain. FRANCIE drain with minimal output. Tolerating diet. Able to ambulate with assist. Tele in place. Voiding without difficulty. IVF with ABX infusing. POC discussed. All current needs met. Call light in reach

## 2024-06-09 NOTE — PROGRESS NOTES
[facility]  Progress Note    Miguel Davila Patient Status:  Inpatient    3/17/1945 MRN [id number]   Location [facility] 3NW-A Attending Hieu Presley MD   Hosp Day # 3 PCP Eric Simon MD     Subjective:  Patient states he is feeling much better today.  He reports incisional pain is mild and well-controlled.  He is tolerating a soft diet without any nausea or vomiting.  Passing flatus.  Denies any fevers or chills.    Objective/Physical Exam:  General: Alert, orientated x3.  Cooperative.  No apparent distress.  Vital Signs:  Blood pressure 103/57, pulse 106, temperature 98 °F (36.7 °C), temperature source Oral, resp. rate 18, height 71\", weight 205 lb 4.8 oz (93.1 kg), SpO2 94%.  Lungs: No respiratory distress.  Cardiac: Regular rate and rhythm.   Abdomen:  Soft, mildly distended, mild incisional tenderness, with no rebound or guarding.  No peritoneal signs.   Extremities:  No lower extremity edema noted.    Incision: Clean, dry, intact, no erythema  Drain: 165 cc serosanguineous    Labs:  Lab Results   Component Value Date    WBC 8.5 2024    HGB 10.3 2024    HCT 33.1 2024    PLT 71.0 2024     Lab Results   Component Value Date     2024    K 4.4 2024     2024    CO2 21.0 2024    BUN 61 2024    CREATSERUM 1.82 2024    GLU 98 2024    CA 7.8 2024    ALKPHO 73 2024    ALT 27 2024    AST 28 2024    BILT 0.8 2024    ALB 2.1 2024    TP 5.5 2024     Lab Results   Component Value Date    INR 1.04 2021    INR 1.20 (H) 2019    INR 1.06 08/15/2019       I/O last 3 completed shifts:  In: 3184 [P.O.:780; I.V.:2404]  Out: 2245 [Urine:1705; Drains:240; Blood:300]  I/O this shift:  In: 240 [P.O.:240]  Out: 225 [Urine:200; Drains:25]    Assessment  Patient Active Problem List   Diagnosis    Personal history of other malignant neoplasm of skin    Hypopotassemia    Ramon's  esophagus    Gout    ED (erectile dysfunction)    Benign essential hypertension    Low HDL (under 40)    Thrombocytopenia (HCC)    Basal cell carcinoma, ear    Lupus hepatitis syndrome (HCC)    Splenomegaly    Mixed connective tissue disease (HCC)    Polymyositis (HCC)    Atrial fibrillation (HCC)    Aortic root dilation (HCC)    Lupus (HCC)    Incidental lung nodule, less than or equal to 3mm    LI (obstructive sleep apnea)    Pleural effusion, left    Pulmonary hypertension (HCC)    Chronic congestive heart failure (HCC)    Immune thrombocytopenic purpura (HCC)    Cardiomyopathy as manifestation of underlying disease (HCC)    Presence of Watchman left atrial appendage closure device    Centrilobular emphysema (HCC)    Anemia    Pancytopenia (HCC)    Stage 3b chronic kidney disease (HCC)    Drug-induced acute pancreatitis without infection or necrosis (HCC)    History of Pancreatitis from [facility] 9/2022    Abdominal pain, right upper quadrant    Acute renal failure, unspecified acute renal failure type (HCC)    Acute cholecystitis       POD 2 laparoscopic cholecystectomy      Plan:  The patient continues to do well postoperatively.  He may continue with a diet as tolerated.  Continue drain care.  We discussed that patient will follow-up in 1 week for drain removal.  WBC within normal limits, no fevers.  Can discontinue antibiotics.  Analgesia and antiemetics as needed  Activity restrictions discussed  Patient is cleared for discharge from a general surgery standpoint.  Per hospitalist note, anticipate discharge tomorrow.      Candy Barrera PA-C  6/9/2024  10:15 AM     This note was initiated by Candy Barrera PA-C.  The PA saw the patient in conjunction with me. The PA performed a history, exam, and developed the assessment and plan. I agree with the mentioned assessment and plan and have provided further documentation of my own, if necessary.    Curtis Harry MD  [facility]

## 2024-06-09 NOTE — PROGRESS NOTES
Progress Note  Miguel Davila Patient Status:  Inpatient    3/17/1945 MRN [id number]   Location [facility] 3NW-A Attending Hieu Presley MD   Hosp Day # 3 PCP Eric Simon MD     Subjective:  POD # 2 Lap Cholecystectomy.     Objective:  /57 (BP Location: Right arm)   Pulse 106   Temp 98 °F (36.7 °C) (Oral)   Resp 18   Ht 5' 11\" (1.803 m)   Wt 205 lb 4.8 oz (93.1 kg)   SpO2 94%   BMI 28.63 kg/m²     Telemetry: AF 88 BPM.      Intake/Output:    Intake/Output Summary (Last 24 hours) at 2024 1200  Last data filed at 2024 1154  Gross per 24 hour   Intake 920 ml   Output 1425 ml   Net -505 ml       Last 3 Weights   24 0519 205 lb 4.8 oz (93.1 kg)   24 0630 200 lb 12.8 oz (91.1 kg)   24 0905 197 lb 12.8 oz (89.7 kg)   24 0022 195 lb (88.5 kg)   24 0946 195 lb (88.5 kg)   24 1754 195 lb (88.5 kg)   24 1004 201 lb (91.2 kg)       Labs:  Recent Labs   Lab 24  0612 24  0541 24  0613   * 93 98   BUN 63* 67* 61*   CREATSERUM 2.62* 2.07* 1.82*   EGFRCR 24* 32* 37*   CA 8.2* 8.1* 7.8*    143 139   K 4.1 4.7 4.4    114* 111   CO2 24.0 22.0 21.0     Recent Labs   Lab 24  0612 24  0541 24  0613   RBC 3.26* 3.27* 3.06*   HGB 11.3* 11.0* 10.3*   HCT 35.0* 36.4* 33.1*   .4* 111.3* 108.2*   MCH 34.7* 33.6 33.7   MCHC 32.3 30.2* 31.1   RDW 15.9 15.8 15.5   NEPRELIM 7.22 6.41 7.69   WBC 8.2 6.9 8.5   PLT 70.0* 63.0* 71.0*         No results for input(s): \"TROP\", \"TROPHS\", \"CK\" in the last 168 hours.      24:  EKG:  AF 92 BPM, QRS: 96 ms, QTc: 447 ms.   24: Echo:  LVEF: 58 % with LVH. Mild mod AI, mod MR, severely dilated LA, mod dilated RV, moderate TR.     Review of Systems:   Constitutional: No fevers, chills, fatigue or night sweats.  ENT: No mouth pain, neck pain, running nose, headaches or swollen glands.  Skin: No rashes, pruritus or skin changes,  Respiratory: Denies cough, wheezing  or shortness of breath.  CV: Denies chest pain, palpitations, orthopnea, PND or dizziness.  Musculoskeletal: No joint pain, stiffness or swelling.  GI: No nausea, vomiting or diarrhea. No blood in stools.  Neurologic: No seizures, tremors, weakness or numbness.     Physical Exam:  Gen: alert, oriented x 3, NAD  Heent: pupils equal, reactive. Mucous membranes moist.   Neck: no jvd  Cardiac: Irregular rate and rhythm, normal S1,S2,  II/VI TARA LSB, no  gallop or rub.   Lungs: CTA  Abd: soft, NT/ND +bs  Ext: no edema  Skin: Warm, dry  Neuro: No focal deficits    Medications:     predniSONE  7 mg Oral Daily    allopurinol  300 mg Oral Daily    aspirin  81 mg Oral Daily    cholecalciferol  1,000 Units Oral Daily    levothyroxine  50 mcg Oral Before breakfast    metoprolol succinate ER  100 mg Oral QAM    And    metoprolol succinate ER  50 mg Oral Nightly    pantoprazole  20 mg Oral BID AC    heparin  5,000 Units Subcutaneous 2 times per day      sodium chloride 100 mL/hr at 06/07/24 1157     Assessment:  Acute Michelle cystitis: Initially with sepsis.   POD #  2 Lap michelle.   Perm AF:    Rate control Toprol 100 mg every day.  Hx Watchman.   HFpEF/Right sided/valvular HF:   EF: 58 %, moderate TR and MR .   Compensated.   Aldactone 12.5 mg, Bumex 2 mg am and 1 mg pm, and Entresto 49-51 mg - remain on hold.   Compensated. Cardiomems.   Mod PHTN  HTN: Controlled. On the lower side.   Mod MR  CKD stage 3:  Cr:  1.82  GFR: 37. Baseline Cr:  1.8.   Chronic immune thrombocytopenia:  Plates:  77 (100). Prednisone.   Polymyositis: Monthly IV Ig infusions.   COPD/Emphysema.     Plan:  POD # 2 Lap michelle.   Try to resume HF meds slowly tomorrow- Entresto.  May need lower doses initially.   Anticipate discharge to home tomorrow.     Plan of care discussed with patient, RN.    SHEA Antonio  6/9/2024  12:00 PM  [phone]  [facility] [phone]

## 2024-06-09 NOTE — PLAN OF CARE
A&Ox4. VSS. RA. . Denies chest pain and SOB.   Telemetry: A fib - baseline   GI: Abdomen soft, nondistended. Passing gas. Belching present.   Denies nausea.   : Voids.   Pain controlled with PRN pain medications.   Up with standby assist/cane   Drains: RLQ FRANCIE drain CDI   Incisions: x3 abd lap sites - CDI   Diet: Soft/low fiber diet - tolerating well   IVF running per order. All appropriate safety measures in place. All questions and concerns addressed.

## 2024-06-09 NOTE — PROGRESS NOTES
[facility]   [facility] of [facility]     Hospitalist Progress Note     Miguel Griffin [facility] Patient Status:  Inpatient    3/17/1945 MRN [id number]   Location [facility] 3NW-A Attending Faviola Steele MD   Hosp Day # 3 PCP Eric Simon MD     Chief Complaint: abdominal pain    Subjective:     Feeling ok today; postop pain tolerable.  No nausea or sob.    Objective:    Review of Systems:   A 5 point review of systems was completed; pertinent positive and negatives stated in subjective.    Vital signs:  Temp:  [97.5 °F (36.4 °C)-98.1 °F (36.7 °C)] 98 °F (36.7 °C)  Pulse:  [] 96  Resp:  [16-18] 18  BP: ()/(51-66) 103/57  SpO2:  [94 %-98 %] 94 %    Physical Exam:    General: No acute distress  Respiratory: No wheezes, no rhonchi  Cardiovascular: S1, S2, irregular rate  Abdomen: Soft, lap sites c/d/i  Neuro: No new focal deficits.   Extremities: No edema    Diagnostic Data:    Labs:  Recent Labs   Lab 24  1001 24  0612 24  0541 24  0613   WBC 10.6 8.2 6.9 8.5   HGB 12.2* 11.3* 11.0* 10.3*   .7* 107.4* 111.3* 108.2*   PLT 83.0* 70.0* 63.0* 71.0*       Recent Labs   Lab 24  0612 24  0541 24  0613   * 93 98   BUN 63* 67* 61*   CREATSERUM 2.62* 2.07* 1.82*   CA 8.2* 8.1* 7.8*   ALB 2.4* 2.1* 2.1*    143 139   K 4.1 4.7 4.4    114* 111   CO2 24.0 22.0 21.0   ALKPHO 66 76 73   AST 20 43* 28   ALT 27 37 27   BILT 1.3 1.0 0.8   TP 5.8* 5.7* 5.5*       Estimated Creatinine Clearance: 33.9 mL/min (A) (based on SCr of 1.82 mg/dL (H)).    No results for input(s): \"TROP\", \"TROPHS\", \"CK\" in the last 168 hours.    No results for input(s): \"PTP\", \"INR\" in the last 168 hours.               Microbiology    Hospital Encounter on 24   1. Urine Culture, Routine     Status: Abnormal    Collection Time: 24  5:26 PM    Specimen: Urine, clean catch   Result Value Ref Range    Urine Culture >100,000 CFU/ML Escherichia coli (A) N/A        Susceptibility    Escherichia coli -  (no method available)     Ampicillin >=32 Resistant      Ampicillin + Sulbactam 16 Intermediate      Cefazolin <=4 Sensitive      Ciprofloxacin <=0.25 Sensitive      Gentamicin <=1 Sensitive      Meropenem <=0.25 Sensitive      Levofloxacin <=0.12 Sensitive      Nitrofurantoin 64 Intermediate      Piperacillin + Tazobactam <=4 Sensitive      Trimethoprim/Sulfa <=20 Sensitive          Imaging: Reviewed in Epic.    Medications:    predniSONE  7 mg Oral Daily    metRONIDAZOLE  500 mg Intravenous Q8H    allopurinol  300 mg Oral Daily    aspirin  81 mg Oral Daily    cholecalciferol  1,000 Units Oral Daily    levothyroxine  50 mcg Oral Before breakfast    metoprolol succinate ER  100 mg Oral QAM    And    metoprolol succinate ER  50 mg Oral Nightly    pantoprazole  20 mg Oral BID AC    heparin  5,000 Units Subcutaneous 2 times per day    cefTRIAXone  1 g Intravenous Q24H       Assessment & Plan:      #Acute cholecystitis pod2 s/p lap basil for Perforated cholecystitis with localized biliary peritonitis   Discussed with surgery; plan probably 5 days abx.  Continue iv rocephin/flagyl    #Severe Sepsis  -resolved     #Hyperbilirubinemia  -improved     # A-fib RVR  -oral BB  -S/p Watchman, not on AC    #NICKI on CKD  -NICKI seemingly better.  Near baseline Cr now  -holding home  entresto, spironolactone, Bumex, patiromater  -defer held meds to cardiology    #Thrombocytopenia; cautiously continue heparin prophy; plts stable today    #Macrocytic anemia-monitor  -no gross bleeding visible.  However, mild drop in h/h on AM labs today  -check B12 and folate in AM    #Pleural effusion  -trace effusion present which is asymptomatic and chronic  -no further interventions at this point     #Polymyositis  #SLE  -resume 7mg oral daily prednisone; no stress dosed steroids need IMO at this time    #HTN  #LI  #emphysema    #Hypothyroidism  -cont home levothyroxine    Patient would like to go home but he  probably needs another night to monitor BP and CBC.  Also need input from cards about held chf meds.    Recheck labs in AM     Hieu Presley MD  [facility]  Internal Medicine Hospitalist      Supplementary Documentation:     Quality:  DVT Mechanical Prophylaxis: VIN hose,      DVT Pharmacologic Prophylaxis   Medication    heparin (Porcine) 5000 UNIT/ML injection 5,000 Units         DVT Pharmacologic prophylaxis: Aspirin 81 mg      Code Status: Full Code  Reyes: No urinary catheter in place  Reyes Duration (in days):   Central line:    SHANNAN:     Discharge is dependent on: surgery, resolution of NICKI, BP stabilization, control of A-fib  At this point Mr. Davila is expected to be discharge to: home    The 21st Century Cures Act makes medical notes like these available to patients in the interest of transparency. Please be advised this is a medical document. Medical documents are intended to carry relevant information, facts as evident, and the clinical opinion of the practitioner. The medical note is intended as peer to peer communication and may appear blunt or direct. It is written in medical language and may contain abbreviations or verbiage that are unfamiliar.

## 2024-06-10 VITALS
RESPIRATION RATE: 16 BRPM | BODY MASS INDEX: 28.98 KG/M2 | HEART RATE: 66 BPM | TEMPERATURE: 97 F | HEIGHT: 71 IN | WEIGHT: 207 LBS | SYSTOLIC BLOOD PRESSURE: 100 MMHG | OXYGEN SATURATION: 100 % | DIASTOLIC BLOOD PRESSURE: 47 MMHG

## 2024-06-10 LAB
ANION GAP SERPL CALC-SCNC: 5 MMOL/L (ref 0–18)
BASOPHILS # BLD AUTO: 0.01 X10(3) UL (ref 0–0.2)
BASOPHILS NFR BLD AUTO: 0.1 %
BUN BLD-MCNC: 64 MG/DL (ref 9–23)
CALCIUM BLD-MCNC: 8.1 MG/DL (ref 8.5–10.1)
CHLORIDE SERPL-SCNC: 111 MMOL/L (ref 98–112)
CO2 SERPL-SCNC: 23 MMOL/L (ref 21–32)
CREAT BLD-MCNC: 1.68 MG/DL
EGFRCR SERPLBLD CKD-EPI 2021: 41 ML/MIN/1.73M2 (ref 60–?)
EOSINOPHIL # BLD AUTO: 0.06 X10(3) UL (ref 0–0.7)
EOSINOPHIL NFR BLD AUTO: 0.9 %
ERYTHROCYTE [DISTWIDTH] IN BLOOD BY AUTOMATED COUNT: 15.5 %
FOLATE SERPL-MCNC: 36.5 NG/ML (ref 8.7–?)
GLUCOSE BLD-MCNC: 89 MG/DL (ref 70–99)
HCT VFR BLD AUTO: 35.1 %
HGB BLD-MCNC: 10.9 G/DL
IMM GRANULOCYTES # BLD AUTO: 0.04 X10(3) UL (ref 0–1)
IMM GRANULOCYTES NFR BLD: 0.6 %
LYMPHOCYTES # BLD AUTO: 0.6 X10(3) UL (ref 1–4)
LYMPHOCYTES NFR BLD AUTO: 8.7 %
MCH RBC QN AUTO: 34.2 PG (ref 26–34)
MCHC RBC AUTO-ENTMCNC: 31.1 G/DL (ref 31–37)
MCV RBC AUTO: 110 FL
MONOCYTES # BLD AUTO: 0.37 X10(3) UL (ref 0.1–1)
MONOCYTES NFR BLD AUTO: 5.4 %
NEUTROPHILS # BLD AUTO: 5.82 X10 (3) UL (ref 1.5–7.7)
NEUTROPHILS # BLD AUTO: 5.82 X10(3) UL (ref 1.5–7.7)
NEUTROPHILS NFR BLD AUTO: 84.3 %
OSMOLALITY SERPL CALC.SUM OF ELEC: 306 MOSM/KG (ref 275–295)
PLATELET # BLD AUTO: 98 10(3)UL (ref 150–450)
POTASSIUM SERPL-SCNC: 4.6 MMOL/L (ref 3.5–5.1)
RBC # BLD AUTO: 3.19 X10(6)UL
SODIUM SERPL-SCNC: 139 MMOL/L (ref 136–145)
VIT B12 SERPL-MCNC: 1494 PG/ML (ref 193–986)
WBC # BLD AUTO: 6.9 X10(3) UL (ref 4–11)

## 2024-06-10 PROCEDURE — 99239 HOSP IP/OBS DSCHRG MGMT >30: CPT | Performed by: HOSPITALIST

## 2024-06-10 RX ORDER — BUMETANIDE 1 MG/1
TABLET ORAL
Status: SHIPPED | COMMUNITY
Start: 2024-06-10

## 2024-06-10 NOTE — PROGRESS NOTES
Progress Note  Miguel Davila Patient Status:  Inpatient    3/17/1945 MRN [id number]   Location [facility] 3NW-A Attending Hieu Presley MD   Hosp Day # 4 PCP Eric Simon MD     Subjective:  Pt denies complaint, sitting up in chair. Eager to go home.     Objective:  /47 (BP Location: Right arm)   Pulse 66   Temp 97 °F (36.1 °C) (Axillary)   Resp 16   Ht 5' 11\" (1.803 m)   Wt 207 lb (93.9 kg)   SpO2 100%   BMI 28.87 kg/m²     Telemetry: afib 80's    Intake/Output:    Intake/Output Summary (Last 24 hours) at 6/10/2024 1311  Last data filed at 6/10/2024 1216  Gross per 24 hour   Intake 3732 ml   Output 540 ml   Net 3192 ml       Last 3 Weights   06/10/24 0810 207 lb (93.9 kg)   24 0519 205 lb 4.8 oz (93.1 kg)   24 0630 200 lb 12.8 oz (91.1 kg)   24 0905 197 lb 12.8 oz (89.7 kg)   24 0022 195 lb (88.5 kg)   24 0946 195 lb (88.5 kg)   24 1754 195 lb (88.5 kg)   24 1004 201 lb (91.2 kg)       Labs:  Recent Labs   Lab 24  0541 24  0613 06/10/24  0532   GLU 93 98 89   BUN 67* 61* 64*   CREATSERUM 2.07* 1.82* 1.68*   EGFRCR 32* 37* 41*   CA 8.1* 7.8* 8.1*    139 139   K 4.7 4.4 4.6   * 111 111   CO2 22.0 21.0 23.0     Recent Labs   Lab 24  0541 24  0613 06/10/24  0532   RBC 3.27* 3.06* 3.19*   HGB 11.0* 10.3* 10.9*   HCT 36.4* 33.1* 35.1*   .3* 108.2* 110.0*   MCH 33.6 33.7 34.2*   MCHC 30.2* 31.1 31.1   RDW 15.8 15.5 15.5   NEPRELIM 6.41 7.69 5.82   WBC 6.9 8.5 6.9   PLT 63.0* 71.0* 98.0*         No results for input(s): \"TROP\", \"TROPHS\", \"CK\" in the last 168 hours.    Diagnostics:  No results found.   Review of Systems   Cardiovascular:  Negative for chest pain, dyspnea on exertion and leg swelling.   Respiratory: Negative.     Gastrointestinal:  Negative for abdominal pain.       Physical Exam:    Gen: alert, oriented x 3, NAD  Heent: pupils equal, reactive. Mucous membranes moist.   Neck: no jvd  Cardiac:  irregular rate and rhythm, normal S1,S2, no murmur, clicks, rub or gallop  Lungs: CTA  Abd: soft, NT/ND +bs  Ext: no edema  Skin: Warm, dry  Neuro: No focal deficits      Medications:     predniSONE  7 mg Oral Daily    allopurinol  300 mg Oral Daily    aspirin  81 mg Oral Daily    cholecalciferol  1,000 Units Oral Daily    levothyroxine  50 mcg Oral Before breakfast    metoprolol succinate ER  100 mg Oral QAM    And    metoprolol succinate ER  50 mg Oral Nightly    pantoprazole  20 mg Oral BID AC    heparin  5,000 Units Subcutaneous 2 times per day      sodium chloride 10 mL/hr at 06/10/24 0701         Assessment:  Acute Perforated Cholecystitis, Localized Peritonitis s/p Lap Michelle  POD 3 - Mgmt per Gen surgery  Eric drain in place  Chronic HFpEF, Pulm HTN  4/2024 Echo w/LVEF 58%, mild-mod AR, mod MR, LAE, RV size increased, mild-mod TR  Cardiomems PAD 31-32 at goal   On toprol 100mg AM, 50mg PM  Historically on entresto 49/51, spironolactone 12.5mg po daily, po bumex at home - all currently on hold   Appears euvolemic on exam  BP 90's-100's  NICKI on CKD  Improving - Cr 1.68 today  Chronic Atrial Fibrillation  Initially w/RVR, now rate controlled  On toprol  Not on A/C sp Watchman device  Hx SLE/Polymyositis - IVIG, steroids  Chronic Anemia - Hgb stable  Chronic Immune Thrombocytopenia - Plts 98  Hypothyroidism - levothyroxine    Plan:  Continue toprol as discussed above  Continue to hold spironolactone for now with hyperkalemia earlier in admission  BP stable but on low side - will hold entresto for now and hopefully resume as OP as renal function and BP allow.   Will resume bumex 1mg po daily on discharge (lower dose than usual with decreased intake post op and currently euvolemic)  Pt is stable for discharge from cardiology standpoint. Pt is scheduled for f/u visit to see Dr Jean this week on 6/14 at 1pm. Can obtain BMP that morning.   Follow up in [facility] after discharge per usual routine    Plan of care  discussed with patient, RN.    SHEA Calles  6/10/2024  1:11 PM  [phone] [facility]  [phone] [facility]        Shared decision making completed.  Note reviewed and labs reviewed. Agree with above assessment and plan.  79-year-old male who presented with acute perforated cholecystitis with localized peritonitis s/p laparoscopic cholecystectomy.  S/p Eric drain. CardioMEMS evaluation at baseline for patient.  Would recommend resumption of Entresto, aldosterone antagonist as an outpatient as blood pressure is still labile.  Recommend BMP as an outpatient.  Okay to discharge from cardiology perspective with outpatient follow-up this week.        Reymundo Ferrell DO  Cardiologist  [facility]  6/10/2024 2:29 PM      Note to the patient: The 21st Century Cures Act makes medical notes like these available to patients in the interest of transparency. However, be advised that this is a medical document. It is intended as peer to peer communication. It is written in medical language and may contain abbreviations or verbiage that are unfamiliar. It may appear blunt or direct. Medical documents are intended to carry relevant information, facts as evident, and clinical opinion of the practitioner.     Disclaimer: Components of this note were documented using voice recognition system and are subject to errors not corrected at proofreading. Contact the author of this note for any clarifications.

## 2024-06-10 NOTE — DISCHARGE SUMMARY
[facility]     Miguel Davila Patient Status:  Inpatient    3/17/1945 MRN [id number]   Location [facility] 3NW-A Attending Hieu Presley MD   Hosp Day # 4 PCP Eric Simon MD     Date of Admission: 2024  Date of Discharge: 6/10/2024  Discharge Disposition: Home or Self Care  Chief complaint:   Chief Complaint   Patient presents with    Abdomen/Flank Pain         Discharge Diagnoses and Brief Synopsis:  #Acute cholecystitis pod3 s/p lap basil for Perforated cholecystitis with localized biliary peritonitis   Briefly on shourt course abx postop; Abx stopped per surgery prior to discharge.     #Severe Sepsis-due to above; resolved  #Hyperbilirubinemia  #HFpEF/Right sided/valvular HF;  BP meds reduced during stay and upon discharge per cards; see med list below  # A-fib RVR-S/p Watchman, not on AC  #NICKI on CKD  #Thrombocytopenia; consumptive  #Macrocytic anemia  #Pleural effusion  #Polymyositis  #SLE-on 7mg oral daily prednisone as OP  #HTN  #LI  #emphysema  #Hypothyroidism      Lab/Test results pending at Discharge:   none        Admission History of Present Illness (author of HPI not necessarily myself; see H&P author): Miguel Davila is a 79 year old male with hx SLE/ polymyositis/ A-fib (s/p Watchman)/ HTN/ IBS/ LI/ pulmonary HTN who presented to the hospital for abdominal pain. His symptoms originally started on  with generalized malaise, diarrhea approx 5 times daily which was non-bloody, nausea, and weakness. He went to an urgent care on Monday and was diagnosed with IBS and started on dicyclomine. This morning he developed sudden RUQ abdominal pain which was a constant sharp pain that radiated to his RLQ. His pain was rated 10/10 and was decreased with certain movements but had no exacerbating factors. He had associated nausea without emesis. His pain was gradually getting worse throughout the morning prompting him to seek medical eval. He denied any  prior history of gallbladder problems. He reports having an EGD and colonoscopy about 1 year ago which were normal.       Lace+ Score: 74  59-90 High Risk  29-58 Medium Risk  0-28   Low Risk  Patient was referred to the [facility].    TCM Follow-Up Recommendation:  LACE > 58: High Risk of readmission after discharge from the hospital.      Discharge Medication List:     Discharge Medications        CHANGE how you take these medications        Instructions Prescription details   bumetanide 1 MG Tabs  Commonly known as: Bumex  What changed: additional instructions      Take 1mg po daily   Refills: 0            CONTINUE taking these medications        Instructions Prescription details   allopurinol 300 MG Tabs  Commonly known as: Zyloprim      TAKE 1 TABLET BY MOUTH EVERY DAY   Quantity: 90 tablet  Refills: 3     aspirin 81 MG Tbec      Take 1 tablet (81 mg total) by mouth daily.   Quantity: 30 tablet  Refills: 0     cholecalciferol 25 MCG (1000 UT) Tabs  Commonly known as: Vitamin D3      Take 1 tablet (1,000 Units total) by mouth daily.   Refills: 0     CITRACAL + D OR      Take 1 tablet by mouth daily.   Refills: 0     dicyclomine 20 MG Tabs  Commonly known as: Bentyl      Take 1 tablet (20 mg total) by mouth in the morning and 1 tablet (20 mg total) before bedtime.   Quantity: 10 tablet  Refills: 0     fexofenadine 180 MG Tabs  Commonly known as: Allegra      Take 1 tablet (180 mg total) by mouth daily as needed.   Refills: 0     Immune Globulin (Human) 10 g Solr  Commonly known as: GAMMAGARD      Inject 0.4 g/kg into the vein. Given for treatment of polymyositis, mixed connective tissue disease, and immune thrombocytopenia purpura monthly at [facility].  Every 5 weeks   Quantity: 3 each  Refills: 0     ipratropium 0.06 % Soln  Commonly known as: Atrovent      1 spray by Nasal route 2 (two) times daily.   Quantity: 1 each  Refills: 5     levothyroxine 50 MCG Tabs  Commonly known as:  Synthroid      Take 1 tablet (50 mcg total) by mouth before breakfast.   Refills: 0     metoprolol succinate  MG Tb24  Commonly known as: Toprol XL      Take 1 tablet (100 mg total) by mouth every morning AND 0.5 tablets (50 mg total) every evening.   Quantity: 60 tablet  Refills: 3     omeprazole 20 MG Cpdr  Commonly known as: PriLOSEC      Take 1 capsule (20 mg total) by mouth 2 (two) times daily.   Quantity: 180 capsule  Refills: 1     predniSONE 1 MG Tabs  Commonly known as: Deltasone      TAKE 2 TABLETS (2 MG TOTAL) BY MOUTH DAILY WITH BREAKFAST.   Quantity: 180 tablet  Refills: 3     predniSONE 5 MG Tabs  Commonly known as: Deltasone      Take 1 tablet (5 mg total) by mouth daily with breakfast.   Quantity: 90 tablet  Refills: 3     PreviDent 5000 Booster Plus 1.1 % Pste  Generic drug: Sodium Fluoride       Refills: 0     Tab-A-Nick Tabs      Take 1 tablet by mouth daily.   Refills: 0            STOP taking these medications      sacubitril-valsartan 49-51 MG Tabs  Commonly known as: Entresto        spironolactone 25 MG Tabs  Commonly known as: Aldactone                 [facility] reviewed: yes    Follow-up appointment:   [facility]  [address] [facility] [address] [phone]    Follow up in 1 week(s)  For wound re-check & drain removal in 7-10 days    Eric Simon MD  [address]  [place] [address]  [phone]    Follow up in 1 week(s)      Iris Jean MD  [address] [id number] [phone]    Follow up  Follow up as scheduled on 6/14. Obtain BMP that morning prior to visit    [facility]  [address]  [facility] [address] [phone]  Schedule an appointment as soon as possible for a visit in 3 day(s)      Curtis Harry MD  [address] [facility] [address] [phone]    Follow up  As needed    Appointments for Next 30 Days 6/11/2024 - 7/11/2024        Date Arrival Time Visit Type Length  Department Provider     6/11/2024  5:30 PM   CARDIOMEMS VISIT [9805] 30 min [facility] Cardiac [facility] HEART FAILURE APN 1    Patient Instructions:         Location Instructions:     Your appointment is scheduled at [facility]. The address is&nbsp; [address]. To reach Registration, park in the [place] Parking Garage. Enter through the revolving doors located on the ground floor. Veer left past the Information Desk and proceed to the [facility] Registration desk.  Masks are optional for all patients and visitors, unless otherwise indicated.                      Vital signs:  Temp:  [97 °F (36.1 °C)-97.8 °F (36.6 °C)] 97 °F (36.1 °C)  Pulse:  [] 66  Resp:  [16-18] 16  BP: (100-114)/(47-72) 100/47  SpO2:  [100 %] 100 %    Physical Exam:    General: No acute distress  Respiratory: No wheezes, no rhonchi  Cardiovascular: S1, S2, irregular rate  Abdomen: Soft, lap sites c/d/i  Neuro: No new focal deficits.   Extremities: No edema  -----------------------------------------------------------------------------------------------  PATIENT DISCHARGE INSTRUCTIONS: See electronic chart    Hieu Lopez MD    Time spent:   35 minutes

## 2024-06-10 NOTE — PAT NURSING NOTE
Spoke with Claudia at [facility] office; will have RN call back. Thanked her for the assistance.    Spoke with Andra RN at 0950; Other RN (Gayla) called patient today and left message. Discussed message left for PAT from 6/8 about pt not feeling like he will be able to do procedure 6/12; this RN wanted to report call. Andra/Jesus will f/u when they hear from patient; thanked her/them for their time and f/u.

## 2024-06-10 NOTE — PROGRESS NOTES
Cardiomems reading performed twice with good signal strength and waveform. PAD reading 1 was 31 mmHg and reading 2 was 32 mmHg. PAD goal is 32. Patient tolerated it well.

## 2024-06-10 NOTE — PROGRESS NOTES
[facility]   part of [facility]     Hospitalist Progress Note     Miguel Griffin [facility] Patient Status:  Inpatient    3/17/1945 MRN [id number]   Location [facility] 3NW-A Attending Favoila Steele MD   Hosp Day # 4 PCP Eric Simon MD     Chief Complaint: abdominal pain    Subjective:     Feeling ok today; postop pain tolerable.  No nausea or sob.    Objective:    Review of Systems:   A 5 point review of systems was completed; pertinent positive and negatives stated in subjective.    Vital signs:  Temp:  [97.2 °F (36.2 °C)-98.1 °F (36.7 °C)] 97.5 °F (36.4 °C)  Pulse:  [] 82  Resp:  [16-18] 16  BP: ()/(52-79) 114/72  SpO2:  [93 %-100 %] 100 %    Physical Exam:    General: No acute distress  Respiratory: No wheezes, no rhonchi  Cardiovascular: S1, S2, irregular rate  Abdomen: Soft, lap sites c/d/i  Neuro: No new focal deficits.   Extremities: No edema    Diagnostic Data:    Labs:  Recent Labs   Lab 24  1001 24  0612 24  0541 24  0613 06/10/24  0532   WBC 10.6 8.2 6.9 8.5 6.9   HGB 12.2* 11.3* 11.0* 10.3* 10.9*   .7* 107.4* 111.3* 108.2* 110.0*   PLT 83.0* 70.0* 63.0* 71.0* 98.0*       Recent Labs   Lab 24  0612 24  0541 24  0613 06/10/24  0532   * 93 98 89   BUN 63* 67* 61* 64*   CREATSERUM 2.62* 2.07* 1.82* 1.68*   CA 8.2* 8.1* 7.8* 8.1*   ALB 2.4* 2.1* 2.1*  --     143 139 139   K 4.1 4.7 4.4 4.6    114* 111 111   CO2 24.0 22.0 21.0 23.0   ALKPHO 66 76 73  --    AST 20 43* 28  --    ALT 27 37 27  --    BILT 1.3 1.0 0.8  --    TP 5.8* 5.7* 5.5*  --        Estimated Creatinine Clearance: 36.7 mL/min (A) (based on SCr of 1.68 mg/dL (H)).    No results for input(s): \"TROP\", \"TROPHS\", \"CK\" in the last 168 hours.    No results for input(s): \"PTP\", \"INR\" in the last 168 hours.               Microbiology    Hospital Encounter on 24   1. Urine Culture, Routine     Status: Abnormal    Collection Time: 24  5:26 PM     Specimen: Urine, clean catch   Result Value Ref Range    Urine Culture >100,000 CFU/ML Escherichia coli (A) N/A       Susceptibility    Escherichia coli -  (no method available)     Ampicillin >=32 Resistant      Ampicillin + Sulbactam 16 Intermediate      Cefazolin <=4 Sensitive      Ciprofloxacin <=0.25 Sensitive      Gentamicin <=1 Sensitive      Meropenem <=0.25 Sensitive      Levofloxacin <=0.12 Sensitive      Nitrofurantoin 64 Intermediate      Piperacillin + Tazobactam <=4 Sensitive      Trimethoprim/Sulfa <=20 Sensitive          Imaging: Reviewed in Epic.    Medications:    predniSONE  7 mg Oral Daily    allopurinol  300 mg Oral Daily    aspirin  81 mg Oral Daily    cholecalciferol  1,000 Units Oral Daily    levothyroxine  50 mcg Oral Before breakfast    metoprolol succinate ER  100 mg Oral QAM    And    metoprolol succinate ER  50 mg Oral Nightly    pantoprazole  20 mg Oral BID AC    heparin  5,000 Units Subcutaneous 2 times per day       Assessment & Plan:      #Acute cholecystitis pod3 s/p lap basil for Perforated cholecystitis with localized biliary peritonitis   Abx stopped per surgery    #Severe Sepsis  -resolved     #Hyperbilirubinemia  -improved     # A-fib RVR  -oral BB  -S/p Watchman, not on AC    #NICKI on CKD  -NICKI seemingly better.  Near baseline Cr now  -CHF/BP meds per cards    #Thrombocytopenia; improved    #Macrocytic anemia-monitor  -no gross bleeding visible.  Stable h/h  -B12 and folate OK    #Pleural effusion  -trace effusion present which is asymptomatic and chronic  -no further interventions at this point     #Polymyositis  #SLE  -resume 7mg oral daily prednisone; no stress dosed steroids need IMO at this time    #HTN  #LI  #emphysema    #Hypothyroidism  -cont home levothyroxine    Discharge if ok with cards and surgery; defer bp/chf meds on med rec to cards.     Hieu Presley MD  [facility]  Internal Medicine Hospitalist      Supplementary Documentation:     Quality:  DVT  Mechanical Prophylaxis: VIN hose,      DVT Pharmacologic Prophylaxis   Medication    heparin (Porcine) 5000 UNIT/ML injection 5,000 Units         DVT Pharmacologic prophylaxis: Aspirin 81 mg      Code Status: Full Code  Reyes: No urinary catheter in place  Reyes Duration (in days):   Central line:    SHANNAN: 6/10/2024    Discharge is dependent on: surgery, resolution of NICKI, BP stabilization, control of A-fib  At this point Mr. Davila is expected to be discharge to: home    The 21st Century Cures Act makes medical notes like these available to patients in the interest of transparency. Please be advised this is a medical document. Medical documents are intended to carry relevant information, facts as evident, and the clinical opinion of the practitioner. The medical note is intended as peer to peer communication and may appear blunt or direct. It is written in medical language and may contain abbreviations or verbiage that are unfamiliar.

## 2024-06-10 NOTE — PLAN OF CARE
Pt a&o x 4.  Pleasant & cooperative w/ care  Pt states he is eager to go home & be with his wife  Tolerating regular diet  Denies N&V  Voiding w/out difficulty  Denies c/o pain  Spoke with surgical team.  States ok for discharge, per surgical services.   Home with FRANCIE drain.  F/u in 7-10 days  OK to discharge if OK w/ cardiology.   Spoke with cardiology team & aware.  State they will be by to see pt this afternoon

## 2024-06-10 NOTE — PLAN OF CARE
Pt discharged home.  Discharge instructions given to pt & pt's sister, including:  Tylenol & motrin otc prn pain  Review of home meds to continue  Informed pt to stop entresto & aldactone  Bmp prior to card visit  Diet & hydration  Wound care  FRANCIE drain care   Activity  Hygiene  Follow up care  Pt verbalized understanding of all instructions  Left unit stable via w/c

## 2024-06-10 NOTE — PLAN OF CARE
A&Ox4. VSS. RA. . Denies chest pain and SOB.   Telemetry: NSR/ H/O Afib   GI: Abdomen soft, nondistended. Passing gas.   Denies nausea.   : Voids.   Denies pain during shift   Up with standby assist and cane  Drains: Rt FRANCIE drain to bulb suction (serosang drainage)- (dressing changed-C/D/I)  Incisions: 3 lap sites with skinglue   Diet: Soft diet  IVF running per order.   All appropriate safety measures in place. All questions and concerns addressed.

## 2024-06-10 NOTE — PROGRESS NOTES
[facility]  Progress Note    Miguel Davila Patient Status:  Inpatient    3/17/1945 MRN [id number]   Location [facility] 3NW-A Attending Hieu Presley MD   Hosp Day # 4 PCP Eric Simon MD     Subjective:  He is seen and examined resting up in chair. He reports incisional pain that is worse with movement. He states he has not been taking any pain medication. He is tolerating diet without nausea or vomiting. He is passing flatus and voiding without difficulty. He reports he is eager to discharge home today.     Objective/Physical Exam:  /47 (BP Location: Right arm)   Pulse 66   Temp 97 °F (36.1 °C) (Axillary)   Resp 16   Ht 71\"   Wt 207 lb   SpO2 100%   BMI 28.87 kg/m²     Intake/Output Summary (Last 24 hours) at 6/10/2024 1347  Last data filed at 6/10/2024 1216  Gross per 24 hour   Intake 3732 ml   Output 540 ml   Net 3192 ml         General: Awake, Alert,  Cooperative.  No apparent distress.  Pulm: no respiratory distress, no increased work of breathing  Abdomen:  Soft, non-distended,appropriate incisional tenderness, with no rebound or guarding.  No peritoneal signs.  Incision:  Clean, dry, intact without erythema.  Dermabond in place  Drains: FRANCIE drain to RUQ with serosanguinous output.     Labs:  Lab Results   Component Value Date    WBC 6.9 06/10/2024    HGB 10.9 06/10/2024    HCT 35.1 06/10/2024    PLT 98.0 06/10/2024      Lab Results   Component Value Date    INR 1.04 2021    INR 1.20 (H) 2019    INR 1.06 08/15/2019     Lab Results   Component Value Date     06/10/2024    K 4.6 06/10/2024     06/10/2024    CO2 23.0 06/10/2024    BUN 64 06/10/2024    CREATSERUM 1.68 06/10/2024    GLU 89 06/10/2024    CA 8.1 06/10/2024            Assessment:  Patient Active Problem List   Diagnosis    Personal history of other malignant neoplasm of skin    Hypopotassemia    Ramon's esophagus    Gout    ED (erectile dysfunction)    Benign essential hypertension    Low  HDL (under 40)    Thrombocytopenia (HCC)    Basal cell carcinoma, ear    Lupus hepatitis syndrome (HCC)    Splenomegaly    Mixed connective tissue disease (HCC)    Polymyositis (HCC)    Atrial fibrillation (HCC)    Aortic root dilation (HCC)    Lupus (HCC)    Incidental lung nodule, less than or equal to 3mm    LI (obstructive sleep apnea)    Pleural effusion, left    Pulmonary hypertension (HCC)    Chronic congestive heart failure (HCC)    Immune thrombocytopenic purpura (HCC)    Cardiomyopathy as manifestation of underlying disease (HCC)    Presence of Watchman left atrial appendage closure device    Centrilobular emphysema (HCC)    Anemia    Pancytopenia (HCC)    Stage 3b chronic kidney disease (HCC)    Drug-induced acute pancreatitis without infection or necrosis (HCC)    History of Pancreatitis from [facility] 9/2022    Abdominal pain, right upper quadrant    Acute renal failure, unspecified acute renal failure type (HCC)    Acute cholecystitis       POD 3 laparoscopic cholecystectomy     Plan:  He is stable for discharge home today from a general surgery standpoint  Analgesics and antiemetics as needed; add abdominal binder  Discharge instructions discussed  He will discharge home with FRANCIE drain.   Follow up with [facility] general surgery in 7 days for drain removal, sooner if needed      Gayathri Wilkerson PA-C  6/10/2024  1:47 PM

## 2024-06-11 ENCOUNTER — HOSPITAL ENCOUNTER (OUTPATIENT)
Dept: CARDIOLOGY CLINIC | Facility: HOSPITAL | Age: 79
Discharge: HOME | End: 2024-06-11
Attending: NURSE PRACTITIONER
Payer: MEDICARE

## 2024-06-11 ENCOUNTER — PATIENT OUTREACH (OUTPATIENT)
Dept: CASE MANAGEMENT | Age: 79
End: 2024-06-11

## 2024-06-11 DIAGNOSIS — R10.11 ABDOMINAL PAIN, RIGHT UPPER QUADRANT: Primary | ICD-10-CM

## 2024-06-11 DIAGNOSIS — I50.9 CHRONIC CONGESTIVE HEART FAILURE, UNSPECIFIED HEART FAILURE TYPE (HCC): Primary | ICD-10-CM

## 2024-06-11 DIAGNOSIS — Z02.9 ENCOUNTERS FOR ADMINISTRATIVE PURPOSE: ICD-10-CM

## 2024-06-11 PROCEDURE — 1111F DSCHRG MED/CURRENT MED MERGE: CPT

## 2024-06-11 PROCEDURE — 93264 REM MNTR WRLS P-ART PRS SNR: CPT | Performed by: NURSE PRACTITIONER

## 2024-06-11 NOTE — PROGRESS NOTES
[facility]     CardioMEMS pulmonary artery pressure sensor    Remote Monitoring Report Summary    2024    Miguel Davila    : 3/17/1945    Reporting Period- -2024    Diagnosis - HFpEF    Provider- SHERIDAN Cummins       The CardioMEMS report for the above date has been reviewed with the following results:    RHC hemodynamics at time of CardioMEMS impant:    PA 42/18/27  Wedge 8    Acceptable range for Miguel Carlsoncharbel      PAD range 29-35mmhg    Remote monitoring documentation for the past ~30 days:    2024 Stable. RHC today. Will review and adjust range if necessary.  2024 PAD stable. CTM  2024 PAD stable.  2024 PAD stable. CTM      Tasha Barth NP

## 2024-06-11 NOTE — PROGRESS NOTES
Attempted to contact pt for TCM however no answer. Call continued to ring and then disconnected. Unable to leave a VM at this time. NCM to try again at a later time.

## 2024-06-12 ENCOUNTER — HOSPITAL ENCOUNTER (OUTPATIENT)
Dept: INTERVENTIONAL RADIOLOGY/VASCULAR | Facility: HOSPITAL | Age: 79
Discharge: HOME | End: 2024-06-12
Attending: INTERNAL MEDICINE
Payer: MEDICARE

## 2024-06-12 ENCOUNTER — TELEPHONE (OUTPATIENT)
Dept: FAMILY MEDICINE CLINIC | Facility: CLINIC | Age: 79
End: 2024-06-12

## 2024-06-12 NOTE — PROGRESS NOTES
Initial Post Discharge Follow Up   Discharge Date: 6/10/24  Contact Date: 6/11/2024    Consent Verification:  Assessment Completed With: Patient  HIPAA Verified?  Yes    Discharge Dx:   Abdominal pain, right upper quadrant        General:   How have you been since your discharge from the hospital?. I'm better. I have not had any diarrhea since very early this morning.  Do you have any pain since discharge?  No    When you were leaving the hospital were your discharge instructions reviewed with you? Yes  How well were your discharge instructions explained to you?   On a scale of 1-5   1- Very Poor and 5- Very well   Very Well  Do you have any questions about your discharge instructions?  No  Before leaving the hospital was your diagnoses explained to you? Yes  Do you have any questions about your diagnoses? No  Are you able to perform normal daily activities of living as you have prior to your hospital stay (dressing, bathing, ambulating to the bathroom, etc)? yes  (NCM) Was patient given a different diet per AVS? no      Medications:   Current Outpatient Medications   Medication Sig Dispense Refill    bumetanide 1 MG Oral Tab Take 1mg po daily      dicyclomine 20 MG Oral Tab Take 1 tablet (20 mg total) by mouth in the morning and 1 tablet (20 mg total) before bedtime. 10 tablet 0    fexofenadine 180 MG Oral Tab Take 1 tablet (180 mg total) by mouth daily as needed.      cholecalciferol 25 MCG (1000 UT) Oral Tab Take 1 tablet (1,000 Units total) by mouth daily.      levothyroxine 50 MCG Oral Tab Take 1 tablet (50 mcg total) by mouth before breakfast.      ipratropium 0.06 % Nasal Solution 1 spray by Nasal route 2 (two) times daily. 1 each 5    omeprazole 20 MG Oral Capsule Delayed Release Take 1 capsule (20 mg total) by mouth 2 (two) times daily. 180 capsule 1    ALLOPURINOL 300 MG Oral Tab TAKE 1 TABLET BY MOUTH EVERY DAY 90 tablet 3    predniSONE 5 MG Oral Tab Take 1 tablet (5 mg total) by mouth daily with  breakfast. 90 tablet 3    metoprolol succinate  MG Oral Tablet 24 Hr Take 1 tablet (100 mg total) by mouth every morning AND 0.5 tablets (50 mg total) every evening. 60 tablet 3    PREDNISONE 1 MG Oral Tab TAKE 2 TABLETS (2 MG TOTAL) BY MOUTH DAILY WITH BREAKFAST. 180 tablet 3    aspirin 81 MG Oral Tab EC Take 1 tablet (81 mg total) by mouth daily. 30 tablet 0    PREVIDENT 5000 BOOSTER PLUS 1.1 % Dental Paste       Multiple Vitamins-Minerals (TAB-A-KEVIN) Oral Tab Take 1 tablet by mouth daily.      Immune Globulin, Human, 10 g Intravenous Recon Soln Inject 0.4 g/kg into the vein. Given for treatment of polymyositis, mixed connective tissue disease, and immune thrombocytopenia purpura monthly at [facility].  Every 5 weeks 3 each 0    Calcium Citrate-Vitamin D (CITRACAL + D OR) Take 1 tablet by mouth daily.       Were there any changes to your current medication(s) noted on the AVS? Yes  If so, were these medication changes discussed with you prior to leaving the hospital? Yes  If a new medication was prescribed:  n/a  Let's go over your medications together to make sure we are not missing anything. Medications Reviewed  Are there any reasons that keep you from taking your medication as prescribed? No  Are you having any concerns with constipation? No      Discharge medications reviewed/discussed/and reconciled against outpatient medications with patient.  Any changes or updates to medications sent to PCP.  Patient Acknowledged     Referrals/orders at D/C:  Referrals/orders placed at D/C? no    DME ordered at D/C? No      Discharge orders, AVS reviewed and discussed with patient. Any changes or updates to orders sent to PCP.  Patient Acknowledged      SDOH:   Transportation Needs: No Transportation Needs (6/6/2024)    Transportation Needs     Lack of Transportation: No     Car Seat: Not on file     Financial Resource Strain: Low Risk  (6/12/2024)    Financial Resource Strain     Difficulty of Paying  Living Expenses: Not very hard     Med Affordability: No       Follow up appointments:      Your appointments       Date & Time Appointment Department ([place])    Jul 16, 2024 5:00 PM CDT  Cardiomems Visit with HEART FAILURE APN 1 [facility] ([facility])        Aug 27, 2024 2:20 PM CDT Exam - Established with Taye Farmer MD [facility] ([facility])              [facility]  [facility]  [address] [facility] [address]  [phone] [facility]  [address]  [facility] [address]  [phone]            TCC  Was TCC ordered: Yes  Was TCC scheduled: No, Explain-pt prefers to see PCP      PCP (If no TCC appointment)  Does patient already have a PCP appointment scheduled? No  NCM Attempted to schedule PCP office TCM appointment with patient   If no appointment scheduled: Explain-pt states that he does not have his schedule by him and therefor he will call the office to schedule.     Specialist    Does the patient have any other follow up appointment(s) needing to be scheduled? Yes  If yes: NCM reviewed upcoming specialist appointment with patient: Yes  Does the patient need assistance scheduling appointment(s): No, pt seeing cards on 6/14 and states that he will schedule with surgeon.     Is there any reason as to why you cannot make your appointment(s)?  No     Needs post D/C:   Now that you are home, are there any needs or concerns you need addressed before your next visit with your PCP?  (DME, meds, questions, etc.): No    Interventions by NCM:   NCM reviewed discharge instructions and when to seek medical attention with the patient. He states that he had a rough night as he had diarrhea. He states that he has not had any further diarrhea since early this morning.  He denied having any pain. NCM instructed on s/s of infection; he v/u and states that the incisions and drain site look good. He denied having any fever, n/v, sob, pain, lightheadedness, HA or any new or worsening symptoms. Med review completed. He denied having any questions or concerns at this time.       [facility] referral placed:    No    BOOK BY DATE: 6/25/24

## 2024-06-12 NOTE — TELEPHONE ENCOUNTER
AZALEA, Spoke to patient for TCM today.  Patient states that he will call the office back to schedule an appt.  TCM appointment recommended by 6/18/24 as patient is a High risk for readmission.      BOOK BY DATE (last date for TCM): 6/25/24

## 2024-06-12 NOTE — PROGRESS NOTES
SYLVESTER s/w patient who states that he just woke up. He was agreeable to a cb later today. SYLVESTER will call back later.

## 2024-06-13 ENCOUNTER — PATIENT OUTREACH (OUTPATIENT)
Dept: CASE MANAGEMENT | Age: 79
End: 2024-06-13

## 2024-06-14 ENCOUNTER — PATIENT OUTREACH (OUTPATIENT)
Dept: CASE MANAGEMENT | Age: 79
End: 2024-06-14

## 2024-06-14 DIAGNOSIS — I50.32 CHRONIC HEART FAILURE WITH PRESERVED EJECTION FRACTION (HFPEF) (HCC): Primary | ICD-10-CM

## 2024-06-17 NOTE — PROGRESS NOTES
Hospital follow up.    TCC request.    Patient will call back to schedule.  Confirmed with patient.    Closing encounter.

## 2024-06-26 PROBLEM — I50.32 CHRONIC HEART FAILURE WITH PRESERVED EJECTION FRACTION (HCC): Status: ACTIVE | Noted: 2019-08-27

## 2024-06-26 NOTE — ASSESSMENT & PLAN NOTE
Last Platelet value was 98 done 6/10/2024. Lowest level in the last 10 years was 5. Staebilized condition. Likely related to SLE

## 2024-06-26 NOTE — PROGRESS NOTES
Subjective:   Miguel Davila is a 79 year old male who presents for hospital follow up.   He was discharged from Inpatient hospital, [facility] to Home   Admit Date: 6/6 6  Discharge Date: 6/10/2024  Hospital Discharge Diagnosis: acute GB with perforation    Interactive contact within 2 business days post discharge first initiated on Date: 6/11/2024    During the visit, the following was completed:  Obtained and reviewed discharge summary, continuity of care documents, Hospitalist notes, and Surgery Notes  Reviewed Labs (CBC, CMP), Labs (Pathology), Labs (Microbiology), CT radiology results, and Operative reports: Surgery    HPI: suden onset pain consistent with GB, better but still weak and swolen LE>     History/Other:   Current Medications:  Medication Reconciliation:  I am aware of an inpatient discharge within the last 30 days.  The discharge medication list has been reconciled with the patient's current medication list and reviewed by me.  See medication list for additions of new medication, and changes to current doses of medications and discontinued medications.  Outpatient Medications Marked as Taking for the 6/28/24 encounter (Office Visit) with Eric Simon MD   Medication Sig    sacubitril-valsartan (ENTRESTO) 24-26 MG Oral Tab Entresto 24 mg-26 mg tablet, [RxNorm: [id number]]    bumetanide 1 MG Oral Tab Take 1mg po daily    dicyclomine 20 MG Oral Tab Take 1 tablet (20 mg total) by mouth in the morning and 1 tablet (20 mg total) before bedtime.    fexofenadine 180 MG Oral Tab Take 1 tablet (180 mg total) by mouth daily as needed.    cholecalciferol 25 MCG (1000 UT) Oral Tab Take 1 tablet (1,000 Units total) by mouth daily.    levothyroxine 50 MCG Oral Tab Take 1 tablet (50 mcg total) by mouth before breakfast.    ipratropium 0.06 % Nasal Solution 1 spray by Nasal route 2 (two) times daily.    omeprazole 20 MG Oral Capsule Delayed Release Take 1 capsule (20 mg total) by mouth 2 (two) times  daily.    ALLOPURINOL 300 MG Oral Tab TAKE 1 TABLET BY MOUTH EVERY DAY    predniSONE 5 MG Oral Tab Take 1 tablet (5 mg total) by mouth daily with breakfast.    metoprolol succinate  MG Oral Tablet 24 Hr Take 1 tablet (100 mg total) by mouth every morning AND 0.5 tablets (50 mg total) every evening.    PREDNISONE 1 MG Oral Tab TAKE 2 TABLETS (2 MG TOTAL) BY MOUTH DAILY WITH BREAKFAST.    aspirin 81 MG Oral Tab EC Take 1 tablet (81 mg total) by mouth daily.    PREVIDENT 5000 BOOSTER PLUS 1.1 % Dental Paste     Multiple Vitamins-Minerals (TAB-A-KEVIN) Oral Tab Take 1 tablet by mouth daily.    Immune Globulin, Human, 10 g Intravenous Recon Soln Inject 0.4 g/kg into the vein. Given for treatment of polymyositis, mixed connective tissue disease, and immune thrombocytopenia purpura monthly at [facility].  Every 5 weeks    Calcium Citrate-Vitamin D (CITRACAL + D OR) Take 1 tablet by mouth daily.       Review of Systems   Constitutional: Negative.  Negative for activity change, appetite change, chills and fever.   HENT: Negative.     Eyes: Negative.    Respiratory: Negative.  Negative for shortness of breath.    Cardiovascular: Negative.  Negative for chest pain and palpitations.   Gastrointestinal: Negative.  Negative for abdominal pain.   Genitourinary: Negative.  Negative for dysuria.   Musculoskeletal:  Negative for arthralgias.   Skin: Negative.  Negative for rash.   Allergic/Immunologic: Negative.    Neurological:  Positive for weakness.     Objective:   Physical Exam  Vitals and nursing note reviewed.   Constitutional:       General: He is not in acute distress.     Appearance: Normal appearance. He is well-developed.   HENT:      Head: Normocephalic and atraumatic.   Cardiovascular:      Rate and Rhythm: Normal rate and regular rhythm.      Pulses:           Posterior tibial pulses are 2+ on the right side and 2+ on the left side.      Heart sounds: Normal heart sounds. No murmur heard.  Pulmonary:       Effort: Pulmonary effort is normal. No respiratory distress.      Breath sounds: Normal breath sounds. No wheezing.   Abdominal:      General: Bowel sounds are normal.      Palpations: Abdomen is soft.      Tenderness: There is no abdominal tenderness.   Musculoskeletal:         General: Normal range of motion.      Cervical back: Normal range of motion.      Right lower leg: No edema.      Left lower leg: No edema.   Skin:     General: Skin is warm and dry.      Findings: No rash.   Neurological:      Mental Status: He is alert and oriented to person, place, and time.   Psychiatric:         Mood and Affect: Mood normal.         Behavior: Behavior normal.         Thought Content: Thought content normal.         Judgment: Judgment normal.       /70   Pulse 74   Resp 14   Ht 5' 11\" (1.803 m)   Wt 196 lb 3.2 oz (89 kg)   BMI 27.36 kg/m²  Estimated body mass index is 27.36 kg/m² as calculated from the following:    Height as of this encounter: 5' 11\" (1.803 m).    Weight as of this encounter: 196 lb 3.2 oz (89 kg).    Assessment & Plan:   1. Acute cholecystitis (Primary)  2. Sepsis due to Escherichia coli with acute renal failure, unspecified acute renal failure type, unspecified whether septic shock present (HCC)  3. Pancytopenia (HCC)  Overview:  Stable per oncology. WBC: 6.4, done on 1/7/2022.  HGB: 14.7, done on 1/7/2022.  PLT: 106, done on 1/7/2022.    Assessment & Plan:  6/10/2024: WBC 6.9; HGB 10.9 (L); PLT 98.0 (L); .0 (H) low levels. Stablilzed after surgery  4. Immune thrombocytopenic purpura (HCC)  Overview:  Plt danita to 5 at [facility] [address] [facility]. 106 1/2022  Assessment & Plan:  Last Platelet value was 98 done 6/10/2024. Lowest level in the last 10 years was 5. Staebilized condition. Likely related to SLE  5. Pulmonary hypertension (HCC)  Overview:  Diagnosis 2020 at [facility], -At mildly elevated BP there is evidence of moderate post capillary pulmonary hypertension which would be consistent with  HFpEF -Interestingly when BP normalized his pulmonary hypertension was reduced to mild severity and there was no reversibility with nitric oxide based on definition -Cardiac output remains preserved         Assessment & Plan:  Stable, source of pulm issues during stay  6. Cardiomyopathy as manifestation of underlying disease (HCC)  Assessment & Plan:  Stable, continue present management and continue to monitor for progression per CV  7. Persistent atrial fibrillation (HCC)  Overview:   metoprolol ER 25, Dr Dior managing  Watchman placed July 2017  Assessment & Plan:  Stable, continue present management and continue to monitor for progression per CV  8. Chronic heart failure with preserved ejection fraction (HCC)  Overview:  Naseem Dior MD managing  Assessment & Plan:  Stable, continue present management and continue to monitor for progression per CV. Last echo in April (EF not listed).   9. Centrilobular emphysema (HCC)  Overview:  COPD with centrilobular lucencies in several subpleural blebs predominately upper lobe in distribution 9/2020.    Assessment & Plan:  Stable, continue present management and continue to monitor for progression   10. Aortic root dilation (HCC)  Overview:  Seen on echo 1.2017, 4.5 CM 8/2019  Assessment & Plan:  Stable size on echo from [facility] 4/2024 in there system  11. Stage 3b chronic kidney disease (HCC)  Overview:  GFR 41  12. Benign essential hypertension  Overview:  Benazepril 40, amlodipine 5, triameteren HCT 37.5-25  Assessment & Plan:  Stabilized BP with lowered meds during stay.   13. Acute on chronic diastolic heart failure (HCC)  Assessment & Plan:  stable diastolic heart failure continue to follow-up with cardiology  Complicated gentleman with recent gallbladder surgery now 3 weeks out, gradually improving, discussed physical therapy, keep working on endurance, stable heart failure and stable pulmonary symptoms      Return in about 3 months (around 9/28/2024) for recheck.

## 2024-06-26 NOTE — ASSESSMENT & PLAN NOTE
Stable, continue present management and continue to monitor for progression per CV. Last echo in April (EF not listed).

## 2024-06-28 ENCOUNTER — OFFICE VISIT (OUTPATIENT)
Dept: FAMILY MEDICINE CLINIC | Facility: CLINIC | Age: 79
End: 2024-06-28

## 2024-06-28 ENCOUNTER — OFFICE VISIT (OUTPATIENT)
Facility: LOCATION | Age: 79
End: 2024-06-28

## 2024-06-28 VITALS
SYSTOLIC BLOOD PRESSURE: 118 MMHG | HEART RATE: 74 BPM | DIASTOLIC BLOOD PRESSURE: 70 MMHG | WEIGHT: 196.19 LBS | RESPIRATION RATE: 14 BRPM | HEIGHT: 71 IN | BODY MASS INDEX: 27.47 KG/M2

## 2024-06-28 VITALS — TEMPERATURE: 97 F

## 2024-06-28 DIAGNOSIS — I48.19 PERSISTENT ATRIAL FIBRILLATION (HCC): ICD-10-CM

## 2024-06-28 DIAGNOSIS — J43.2 CENTRILOBULAR EMPHYSEMA (HCC): ICD-10-CM

## 2024-06-28 DIAGNOSIS — K81.0 ACUTE CHOLECYSTITIS: Primary | ICD-10-CM

## 2024-06-28 DIAGNOSIS — Z98.890 POST-OPERATIVE STATE: Primary | ICD-10-CM

## 2024-06-28 DIAGNOSIS — I50.32 CHRONIC HEART FAILURE WITH PRESERVED EJECTION FRACTION (HCC): ICD-10-CM

## 2024-06-28 DIAGNOSIS — D61.818 PANCYTOPENIA (HCC): ICD-10-CM

## 2024-06-28 DIAGNOSIS — I50.33 ACUTE ON CHRONIC DIASTOLIC HEART FAILURE (HCC): ICD-10-CM

## 2024-06-28 DIAGNOSIS — A41.51 SEPSIS DUE TO ESCHERICHIA COLI WITH ACUTE RENAL FAILURE, UNSPECIFIED ACUTE RENAL FAILURE TYPE, UNSPECIFIED WHETHER SEPTIC SHOCK PRESENT (HCC): ICD-10-CM

## 2024-06-28 DIAGNOSIS — I27.20 PULMONARY HYPERTENSION (HCC): ICD-10-CM

## 2024-06-28 DIAGNOSIS — I77.810 AORTIC ROOT DILATION (HCC): ICD-10-CM

## 2024-06-28 DIAGNOSIS — I10 BENIGN ESSENTIAL HYPERTENSION: ICD-10-CM

## 2024-06-28 DIAGNOSIS — R65.20 SEPSIS DUE TO ESCHERICHIA COLI WITH ACUTE RENAL FAILURE, UNSPECIFIED ACUTE RENAL FAILURE TYPE, UNSPECIFIED WHETHER SEPTIC SHOCK PRESENT (HCC): ICD-10-CM

## 2024-06-28 DIAGNOSIS — N17.9 SEPSIS DUE TO ESCHERICHIA COLI WITH ACUTE RENAL FAILURE, UNSPECIFIED ACUTE RENAL FAILURE TYPE, UNSPECIFIED WHETHER SEPTIC SHOCK PRESENT (HCC): ICD-10-CM

## 2024-06-28 DIAGNOSIS — D69.3 IMMUNE THROMBOCYTOPENIC PURPURA (HCC): ICD-10-CM

## 2024-06-28 DIAGNOSIS — I43 CARDIOMYOPATHY AS MANIFESTATION OF UNDERLYING DISEASE (HCC): ICD-10-CM

## 2024-06-28 DIAGNOSIS — N18.32 STAGE 3B CHRONIC KIDNEY DISEASE (HCC): ICD-10-CM

## 2024-06-28 PROCEDURE — 99024 POSTOP FOLLOW-UP VISIT: CPT | Performed by: PHYSICIAN ASSISTANT

## 2024-06-28 PROCEDURE — G2211 COMPLEX E/M VISIT ADD ON: HCPCS | Performed by: FAMILY MEDICINE

## 2024-06-28 PROCEDURE — 99499 UNLISTED E&M SERVICE: CPT | Performed by: FAMILY MEDICINE

## 2024-06-28 PROCEDURE — 99214 OFFICE O/P EST MOD 30 MIN: CPT | Performed by: FAMILY MEDICINE

## 2024-06-28 RX ORDER — SACUBITRIL AND VALSARTAN 24; 26 MG/1; MG/1
TABLET, FILM COATED ORAL
COMMUNITY
Start: 2024-06-14

## 2024-06-28 NOTE — PROGRESS NOTES
Post Operative Visit Note       Active Problems  1. Post-operative state         Chief Complaint   Chief Complaint   Patient presents with    Post-Op     PO 6/7 LAPAROSCOPIC CHOLECYSTECTOMY WITH INTRAOPERATIVE CHOLANGIOGRAM w/MEGGAN           History of Present Illness   79 year old male who presents today for postoperative visit following laparoscopic cholecystectomy on 6/7/2024 by Dr. Harry.    The patient states that since his surgery that he is overall doing very well.  He denies complaints today.  He denies nausea or vomiting.  He denies diarrhea or constipation.  He denies fever or chills.  He states that his incisions are healing well.  There is no redness or drainage noted.            Allergies  Miguel is allergic to empagliflozin, amoxicillin, augmentin [amoclan], and doxycycline.    Past Medical / Surgical / Social / Family History    The past medical and past surgical history have been reviewed by me today.     Past Medical History:    A-fib (HCC)    Arrhythmia    atrial fibrillation    Arthritis    Autoimmune disease (HCC)    hepatits, resolved anfd nephritis, resolved    Ramon's esophagus    Bleeding nose    Gums Treated    Blood disorder    thrombocytopenia    Blood in urine    Blurred vision    cataract due to steroids, surgery in June    Calculus of kidney    One time    Cancer (HCC)    skin    Candidiasis of the esophagus    Due to steroid use for Autoimmune disorder     Cataract    Colon polyps    Congestive heart disease (HCC)    Diarrhea, unspecified    Intermittent due to lupus    Diverticulosis of colon    Easy bruising    On and off prednisone for 20 years    Esophageal polyp    Esophageal reflux    Essential hypertension    Fatigue    polymyositis and lupus    Gout    Hammer toe, acquired    Heart palpitations    Afib    Hepatitis    autoimmune induce hepatitis d/t lupus    High blood pressure    IBS (irritable bowel syndrome)    mild d/t lupus    Irregular bowel habits    Leg swelling     Heart failure, probably caused by lupus    Lupus (HCC)    MCTD (mixed connective tissue disease) (HCC)    Obstructive sleep apnea (adult) (pediatric)    LI (obstructive sleep apnea)    AHI 37  Supine AHI 38 non-supine AHI 16 Sao2 Pj 83%     LI (obstructive sleep apnea)    AHI 36 RDI 36 REM AHI 45 Supine AHI 65 non-supine AHI 19 Sao2 Pj 86% CPAP 9cwp    Pain in joints    Personal history of antineoplastic chemotherapy    Pleural effusion    right    Pneumonia due to organism    Polymyositis (HCC)    Presence of other cardiac implants and grafts    watchman filter    Problems with swallowing    dysphagia in 2006    Pulmonary hypertension (HCC)    Raynaud disease    Raynaud disease    Renal disorder    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    Shortness of breath    mainly due to pm or lupus    Sleep apnea    CPAP    Thrombocytopathia (HCC)    Visual impairment    bifocals for reading & computer; distance for driving    Wears glasses     Past Surgical History:   Procedure Laterality Date    Appendectomy  1970    Appendectomy      Cardiac catheterization  09/2019    Cataract Bilateral     Colonoscopy  10/2003 2006 01/2010    x3    Colonoscopy  5/14/2013    Colonoscopy N/A 5/1/2018    Procedure: COLONOSCOPY;  Surgeon: Isaiah Tobar MD;  Location:  ENDOSCOPY    Colonoscopy N/A 4/12/2024    Procedure: COLONOSCOPY;  Surgeon: Gerardo Neves MD;  Location:  ENDOSCOPY    Colonoscopy with biopsy  5/14/13    [facility]      Hand/finger surgery unlisted  2003    right    Needle biopsy liver      Other  12/2005    needle bipsy of kidney    Other surgical history  2017    watchman filter    Percutaneous  angioplasty  (ptca)- pbp only  2006    right    Rectum surgery procedure unlisted  1946    rectal surgery polypectomy    Skin surgery      MMS BCC R temple 6/24/09    Skin surgery  2007    basal ceell carcinoma    Skin surgery  7-18-12    BCC-nodular to right superior eyebrow/ MOHS    Skin surgery  07/15/2013    SCCIS to left  superior tragus/MMS    Skin surgery  14    BCC-NOD to right superior pinna, MMS, AB    Skin surgery  2021    BCC- left superior forehead, MMS     Skin surgery  2022    SCC IN SITU RIGHT ANTIHELIX MMS BY DR GASTELUM    Tonsillectomy      Upper gi endoscopy,exam  10/2003 2006 2010 , 5/13    x3       The family history and social history have been reviewed by me today.    Family History   Problem Relation Age of Onset    Heart Disease Father         CHF    Gastro-Intestinal Disorder Father         Diverticulosis    Alcohol and Other Disorders Associated Father     Heart Attack Father     Heart Disease Mother         CHF    Breast Cancer Mother     Stroke Mother     Cancer Paternal Grandfather     Heart Attack Paternal Grandmother     Kidney Disease Son     Other (Ramon's Esophagus) Son     Heart Attack Maternal Grandfather      Social History     Socioeconomic History    Marital status:    Occupational History    Occupation: Retired    Tobacco Use    Smoking status: Former     Current packs/day: 0.00     Average packs/day: 0.5 packs/day for 15.0 years (7.5 ttl pk-yrs)     Types: Cigarettes     Start date: 1958     Quit date: 1973     Years since quittin.9    Smokeless tobacco: Never    Tobacco comments:     5 cigarettes daily, stopped smoking in    Vaping Use    Vaping status: Never Used   Substance and Sexual Activity    Alcohol use: Yes     Alcohol/week: 13.0 - 15.0 standard drinks of alcohol     Types: 5 Glasses of wine, 8 - 10 Standard drinks or equivalent per week     Comment: 1 per day wine or liquor    Drug use: Never   Other Topics Concern    Caffeine Concern Yes     Comment: 1 soda per day and decaf coffee    Sleep Concern No    Exercise Yes     Comment: 3-4 times weekly    Seat Belt Yes        Current Outpatient Medications:     sacubitril-valsartan (ENTRESTO) 24-26 MG Oral Tab, Entresto 24 mg-26 mg tablet, [RxNorm: 8897881], Disp: , Rfl:     bumetanide 1 MG  Oral Tab, Take 1mg po daily, Disp: , Rfl:     dicyclomine 20 MG Oral Tab, Take 1 tablet (20 mg total) by mouth in the morning and 1 tablet (20 mg total) before bedtime., Disp: 10 tablet, Rfl: 0    fexofenadine 180 MG Oral Tab, Take 1 tablet (180 mg total) by mouth daily as needed., Disp: , Rfl:     cholecalciferol 25 MCG (1000 UT) Oral Tab, Take 1 tablet (1,000 Units total) by mouth daily., Disp: , Rfl:     levothyroxine 50 MCG Oral Tab, Take 1 tablet (50 mcg total) by mouth before breakfast., Disp: , Rfl:     ipratropium 0.06 % Nasal Solution, 1 spray by Nasal route 2 (two) times daily., Disp: 1 each, Rfl: 5    omeprazole 20 MG Oral Capsule Delayed Release, Take 1 capsule (20 mg total) by mouth 2 (two) times daily., Disp: 180 capsule, Rfl: 1    ALLOPURINOL 300 MG Oral Tab, TAKE 1 TABLET BY MOUTH EVERY DAY, Disp: 90 tablet, Rfl: 3    predniSONE 5 MG Oral Tab, Take 1 tablet (5 mg total) by mouth daily with breakfast., Disp: 90 tablet, Rfl: 3    metoprolol succinate  MG Oral Tablet 24 Hr, Take 1 tablet (100 mg total) by mouth every morning AND 0.5 tablets (50 mg total) every evening., Disp: 60 tablet, Rfl: 3    PREDNISONE 1 MG Oral Tab, TAKE 2 TABLETS (2 MG TOTAL) BY MOUTH DAILY WITH BREAKFAST., Disp: 180 tablet, Rfl: 3    aspirin 81 MG Oral Tab EC, Take 1 tablet (81 mg total) by mouth daily., Disp: 30 tablet, Rfl: 0    PREVIDENT 5000 BOOSTER PLUS 1.1 % Dental Paste, , Disp: , Rfl:     Multiple Vitamins-Minerals (TAB-A-KEVIN) Oral Tab, Take 1 tablet by mouth daily., Disp: , Rfl:     Immune Globulin, Human, 10 g Intravenous Recon Soln, Inject 0.4 g/kg into the vein. Given for treatment of polymyositis, mixed connective tissue disease, and immune thrombocytopenia purpura monthly at [facility]. Every 5 weeks, Disp: 3 each, Rfl: 0    Calcium Citrate-Vitamin D (CITRACAL + D OR), Take 1 tablet by mouth daily., Disp: , Rfl:       Review of Systems  A 10 point Review of Systems has been completed by me today  and is negative except as above in the HPI.    Physical Findings   Temp 97.4 °F (36.3 °C) (Temporal)   Gen/psych: alert and oriented, cooperative, no apparent distress  Cardiovascular: regular rate  Respiratory: respirations unlabored, no wheeze  Abdominal: soft, non-tender, non-distended, no guarding/rebound  Incisions:  Incisions have healed well.  There is no surrounding erythema or induration.  Dermabond is in place.  Eric drain in place with serous output noted.  Eric drain was removed at the bedside today without difficulty.  Dry dressing to cover.       Assessment/Plan  1. Post-operative state        The patient is doing well following laparoscopic cholecystectomy.  The patient is to continue with diet as tolerated.  The patient is to continue with lifting restrictions of no more than 20 pounds for 4 weeks from the date of the surgery.  Surgical pathology was discussed with the patient.  All questions and concerns were answered.  The patient is return to the clinic as needed.          No orders of the defined types were placed in this encounter.       Imaging & Referrals   None    Follow Up  Return if symptoms worsen or fail to improve.    Diamond Salmeron PA-C  [facility]  6/28/2024  2:25 PM

## 2024-07-01 DIAGNOSIS — I50.9 CHRONIC CONGESTIVE HEART FAILURE, UNSPECIFIED HEART FAILURE TYPE (HCC): Primary | ICD-10-CM

## 2024-07-08 ENCOUNTER — HOSPITAL ENCOUNTER (INPATIENT)
Facility: HOSPITAL | Age: 79
LOS: 4 days | Discharge: HOME HEALTH | End: 2024-07-12
Attending: STUDENT IN AN ORGANIZED HEALTH CARE EDUCATION/TRAINING PROGRAM | Admitting: HOSPITALIST
Payer: MEDICARE

## 2024-07-08 ENCOUNTER — APPOINTMENT (OUTPATIENT)
Dept: GENERAL RADIOLOGY | Facility: HOSPITAL | Age: 79
End: 2024-07-08
Attending: STUDENT IN AN ORGANIZED HEALTH CARE EDUCATION/TRAINING PROGRAM
Payer: MEDICARE

## 2024-07-08 ENCOUNTER — APPOINTMENT (OUTPATIENT)
Dept: ULTRASOUND IMAGING | Facility: HOSPITAL | Age: 79
End: 2024-07-08
Attending: HOSPITALIST
Payer: MEDICARE

## 2024-07-08 DIAGNOSIS — L03.115 CELLULITIS OF RIGHT LOWER EXTREMITY: Primary | ICD-10-CM

## 2024-07-08 LAB
ALBUMIN SERPL-MCNC: 2.9 G/DL (ref 3.4–5)
ALBUMIN/GLOB SERPL: 0.9 {RATIO} (ref 1–2)
ALP LIVER SERPL-CCNC: 81 U/L
ALT SERPL-CCNC: 28 U/L
ANION GAP SERPL CALC-SCNC: 10 MMOL/L (ref 0–18)
AST SERPL-CCNC: 19 U/L (ref 15–37)
BASOPHILS # BLD AUTO: 0.01 X10(3) UL (ref 0–0.2)
BASOPHILS NFR BLD AUTO: 0.1 %
BILIRUB SERPL-MCNC: 1.1 MG/DL (ref 0.1–2)
BUN BLD-MCNC: 52 MG/DL (ref 9–23)
C DIFF TOX B STL QL: NEGATIVE
CALCIUM BLD-MCNC: 8.5 MG/DL (ref 8.5–10.1)
CHLORIDE SERPL-SCNC: 103 MMOL/L (ref 98–112)
CO2 SERPL-SCNC: 25 MMOL/L (ref 21–32)
CREAT BLD-MCNC: 1.75 MG/DL
EGFRCR SERPLBLD CKD-EPI 2021: 39 ML/MIN/1.73M2 (ref 60–?)
EOSINOPHIL # BLD AUTO: 0.02 X10(3) UL (ref 0–0.7)
EOSINOPHIL NFR BLD AUTO: 0.2 %
ERYTHROCYTE [DISTWIDTH] IN BLOOD BY AUTOMATED COUNT: 16.6 %
GLOBULIN PLAS-MCNC: 3.4 G/DL (ref 2.8–4.4)
GLUCOSE BLD-MCNC: 85 MG/DL (ref 70–99)
HCT VFR BLD AUTO: 38.6 %
HGB BLD-MCNC: 12.3 G/DL
IMM GRANULOCYTES # BLD AUTO: 0.04 X10(3) UL (ref 0–1)
IMM GRANULOCYTES NFR BLD: 0.5 %
LACTATE SERPL-SCNC: 1.3 MMOL/L (ref 0.4–2)
LACTATE SERPL-SCNC: 2.7 MMOL/L (ref 0.4–2)
LYMPHOCYTES # BLD AUTO: 0.92 X10(3) UL (ref 1–4)
LYMPHOCYTES NFR BLD AUTO: 10.7 %
MCH RBC QN AUTO: 33.2 PG (ref 26–34)
MCHC RBC AUTO-ENTMCNC: 31.9 G/DL (ref 31–37)
MCV RBC AUTO: 104 FL
MONOCYTES # BLD AUTO: 0.44 X10(3) UL (ref 0.1–1)
MONOCYTES NFR BLD AUTO: 5.1 %
NEUTROPHILS # BLD AUTO: 7.2 X10 (3) UL (ref 1.5–7.7)
NEUTROPHILS # BLD AUTO: 7.2 X10(3) UL (ref 1.5–7.7)
NEUTROPHILS NFR BLD AUTO: 83.4 %
OSMOLALITY SERPL CALC.SUM OF ELEC: 299 MOSM/KG (ref 275–295)
PLATELET # BLD AUTO: 127 10(3)UL (ref 150–450)
POTASSIUM SERPL-SCNC: 3.5 MMOL/L (ref 3.5–5.1)
PROT SERPL-MCNC: 6.3 G/DL (ref 6.4–8.2)
RBC # BLD AUTO: 3.71 X10(6)UL
SODIUM SERPL-SCNC: 138 MMOL/L (ref 136–145)
WBC # BLD AUTO: 8.6 X10(3) UL (ref 4–11)

## 2024-07-08 PROCEDURE — 93970 EXTREMITY STUDY: CPT | Performed by: HOSPITALIST

## 2024-07-08 PROCEDURE — 71045 X-RAY EXAM CHEST 1 VIEW: CPT | Performed by: STUDENT IN AN ORGANIZED HEALTH CARE EDUCATION/TRAINING PROGRAM

## 2024-07-08 PROCEDURE — 99223 1ST HOSP IP/OBS HIGH 75: CPT | Performed by: HOSPITALIST

## 2024-07-08 RX ORDER — IPRATROPIUM BROMIDE 42 UG/1
1 SPRAY, METERED NASAL 2 TIMES DAILY
Status: DISCONTINUED | OUTPATIENT
Start: 2024-07-08 | End: 2024-07-12

## 2024-07-08 RX ORDER — ONDANSETRON 2 MG/ML
4 INJECTION INTRAMUSCULAR; INTRAVENOUS EVERY 6 HOURS PRN
Status: DISCONTINUED | OUTPATIENT
Start: 2024-07-08 | End: 2024-07-12

## 2024-07-08 RX ORDER — BUMETANIDE 0.25 MG/ML
2 INJECTION INTRAMUSCULAR; INTRAVENOUS
Status: DISCONTINUED | OUTPATIENT
Start: 2024-07-08 | End: 2024-07-09

## 2024-07-08 RX ORDER — SENNOSIDES 8.6 MG
17.2 TABLET ORAL NIGHTLY PRN
Status: DISCONTINUED | OUTPATIENT
Start: 2024-07-08 | End: 2024-07-12

## 2024-07-08 RX ORDER — BISACODYL 10 MG
10 SUPPOSITORY, RECTAL RECTAL
Status: DISCONTINUED | OUTPATIENT
Start: 2024-07-08 | End: 2024-07-12

## 2024-07-08 RX ORDER — ENEMA 19; 7 G/133ML; G/133ML
1 ENEMA RECTAL ONCE AS NEEDED
Status: DISCONTINUED | OUTPATIENT
Start: 2024-07-08 | End: 2024-07-12

## 2024-07-08 RX ORDER — LEVOTHYROXINE SODIUM 0.05 MG/1
50 TABLET ORAL
Status: DISCONTINUED | OUTPATIENT
Start: 2024-07-09 | End: 2024-07-12

## 2024-07-08 RX ORDER — PREDNISONE 5 MG/1
5 TABLET ORAL
Status: DISCONTINUED | OUTPATIENT
Start: 2024-07-09 | End: 2024-07-12

## 2024-07-08 RX ORDER — ALLOPURINOL 300 MG/1
300 TABLET ORAL DAILY
Status: DISCONTINUED | OUTPATIENT
Start: 2024-07-09 | End: 2024-07-12

## 2024-07-08 RX ORDER — PANTOPRAZOLE SODIUM 20 MG/1
20 TABLET, DELAYED RELEASE ORAL
Status: DISCONTINUED | OUTPATIENT
Start: 2024-07-09 | End: 2024-07-12

## 2024-07-08 RX ORDER — ENOXAPARIN SODIUM 100 MG/ML
40 INJECTION SUBCUTANEOUS NIGHTLY
Status: DISCONTINUED | OUTPATIENT
Start: 2024-07-08 | End: 2024-07-12

## 2024-07-08 RX ORDER — METOCLOPRAMIDE HYDROCHLORIDE 5 MG/ML
5 INJECTION INTRAMUSCULAR; INTRAVENOUS EVERY 8 HOURS PRN
Status: DISCONTINUED | OUTPATIENT
Start: 2024-07-08 | End: 2024-07-12

## 2024-07-08 RX ORDER — PREDNISONE 1 MG/1
2 TABLET ORAL
Status: DISCONTINUED | OUTPATIENT
Start: 2024-07-09 | End: 2024-07-12

## 2024-07-08 RX ORDER — ACETAMINOPHEN 500 MG
500 TABLET ORAL EVERY 4 HOURS PRN
Status: DISCONTINUED | OUTPATIENT
Start: 2024-07-08 | End: 2024-07-12

## 2024-07-08 RX ORDER — ASPIRIN 81 MG/1
81 TABLET ORAL DAILY
Status: DISCONTINUED | OUTPATIENT
Start: 2024-07-09 | End: 2024-07-12

## 2024-07-08 RX ORDER — POLYETHYLENE GLYCOL 3350 17 G/17G
17 POWDER, FOR SOLUTION ORAL DAILY PRN
Status: DISCONTINUED | OUTPATIENT
Start: 2024-07-08 | End: 2024-07-12

## 2024-07-08 NOTE — ED PROVIDER NOTES
Patient Seen in: [facility] Emergency Department      History     Chief Complaint   Patient presents with    Swelling Edema     Weeping edema     Stated Complaint: Bilateral lower leg swelling, weeping    Subjective:   HPI    Patient is a 79-year-old male presented to the emergency room with bilateral lower extremity swelling with the right now having erythema and actually weeping with blisters.  Patient tells me he had his gallbladder removed Suni 10 and subsequently blew up like a water balloon.  He tells me that he had changes to his diuretic medications after his right heart cath showed decreased pulmonary artery pressures.  The reason why he came today is because of the onset of redness with blisters forming on the right lower extremity and weeping.  He also tells me over the last few days he has had decreased appetite and just felt slightly weaker in general.  He has had no chest pain or shortness of breath.  Had no fevers or chills.    Objective:   Past Medical History:    A-fib (HCC)    Arrhythmia    atrial fibrillation    Arthritis    Autoimmune disease (HCC)    hepatits, resolved anfd nephritis, resolved    Ramon's esophagus    Bleeding nose    Gums Treated    Blood disorder    thrombocytopenia    Blood in urine    Blurred vision    cataract due to steroids, surgery in June    Calculus of kidney    One time    Cancer (HCC)    skin    Candidiasis of the esophagus    Due to steroid use for Autoimmune disorder     Cataract    Colon polyps    Congestive heart disease (HCC)    Diarrhea, unspecified    Intermittent due to lupus    Diverticulosis of colon    Easy bruising    On and off prednisone for 20 years    Esophageal polyp    Esophageal reflux    Essential hypertension    Fatigue    polymyositis and lupus    Gout    Hammer toe, acquired    Heart palpitations    Afib    Hepatitis    autoimmune induce hepatitis d/t lupus    High blood pressure    IBS (irritable bowel syndrome)    mild d/t lupus     Irregular bowel habits    Leg swelling    Heart failure, probably caused by lupus    Lupus (HCC)    MCTD (mixed connective tissue disease) (HCC)    Obstructive sleep apnea (adult) (pediatric)    LI (obstructive sleep apnea)    AHI 37  Supine AHI 38 non-supine AHI 16 Sao2 Pj 83%     LI (obstructive sleep apnea)    AHI 36 RDI 36 REM AHI 45 Supine AHI 65 non-supine AHI 19 Sao2 Pj 86% CPAP 9cwp    Pain in joints    Personal history of antineoplastic chemotherapy    Pleural effusion    right    Pneumonia due to organism    Polymyositis (HCC)    Presence of other cardiac implants and grafts    watchman filter    Problems with swallowing    dysphagia in 2006    Pulmonary hypertension (HCC)    Raynaud disease    Raynaud disease    Renal disorder    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    Shortness of breath    mainly due to pm or lupus    Sleep apnea    CPAP    Thrombocytopathia (HCC)    Visual impairment    bifocals for reading & computer; distance for driving    Wears glasses              Past Surgical History:   Procedure Laterality Date    Appendectomy  1970    Appendectomy      Cardiac catheterization  09/2019    Cataract Bilateral     Colonoscopy  10/2003 2006 01/2010    x3    Colonoscopy  5/14/2013    Colonoscopy N/A 5/1/2018    Procedure: COLONOSCOPY;  Surgeon: Isaiah Tobar MD;  Location:  ENDOSCOPY    Colonoscopy N/A 4/12/2024    Procedure: COLONOSCOPY;  Surgeon: Gerardo Neves MD;  Location:  ENDOSCOPY    Colonoscopy with biopsy  5/14/13    Egd      Hand/finger surgery unlisted  2003    right    Needle biopsy liver      Other  12/2005    needle bipsy of kidney    Other surgical history  2017    watchman filter    Percutaneous  angioplasty  (ptca)- pbp only  2006    right    Rectum surgery procedure unlisted  1946    rectal surgery polypectomy    Skin surgery      MMS BCC R temple 6/24/09    Skin surgery  2007    basal ceell carcinoma    Skin surgery  7-18-12    BCC-nodular to right superior eyebrow/ MOHS     Skin surgery  07/15/2013    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/MMS    Skin surgery  14    BCC-NOD to right superior pinna, MMS, AB    Skin surgery  2021    BCC- left superior forehead, MMS     Skin surgery  2022    SCC IN SITU RIGHT ANTIHELIX MMS BY DR GASTELUM    Tonsillectomy      Upper gi endoscopy,exam  10/2003 2006 2010 , 5/13    x3                Social History     Socioeconomic History    Marital status:    Occupational History    Occupation: Retired    Tobacco Use    Smoking status: Former     Current packs/day: 0.00     Average packs/day: 0.5 packs/day for 15.0 years (7.5 ttl pk-yrs)     Types: Cigarettes     Start date: 1958     Quit date: 1973     Years since quittin.9    Smokeless tobacco: Never    Tobacco comments:     5 cigarettes daily, stopped smoking in    Vaping Use    Vaping status: Never Used   Substance and Sexual Activity    Alcohol use: Yes     Alcohol/week: 13.0 - 15.0 standard drinks of alcohol     Types: 5 Glasses of wine, 8 - 10 Standard drinks or equivalent per week     Comment: 1 per day wine or liquor    Drug use: Never   Other Topics Concern    Caffeine Concern Yes     Comment: 1 soda per day and decaf coffee    Sleep Concern No    Exercise Yes     Comment: 3-4 times weekly    Seat Belt Yes     Social Determinants of Health     Financial Resource Strain: Low Risk  (2024)    Financial Resource Strain     Difficulty of Paying Living Expenses: Not very hard     Med Affordability: No   Food Insecurity: No Food Insecurity (2024)    Food Insecurity     Food Insecurity: Never true   Transportation Needs: No Transportation Needs (2024)    Transportation Needs     Lack of Transportation: No   Housing Stability: Low Risk  (2024)    Housing Stability     Housing Instability: No              Review of Systems    Positive for stated Chief Complaint: Swelling Edema (Weeping edema)    Other systems are as noted in HPI.  Constitutional and  vital signs reviewed.      All other systems reviewed and negative except as noted above.    Physical Exam     ED Triage Vitals [07/08/24 1134]   BP 97/60   Pulse 80   Resp 18   Temp 97 °F (36.1 °C)   Temp src Temporal   SpO2 100 %   O2 Device None (Room air)       Current Vitals:   Vital Signs  BP: 98/65  Pulse: 74  Resp: 16  Temp: 97 °F (36.1 °C)  Temp src: Temporal  MAP (mmHg): 77    Oxygen Therapy  SpO2: 100 %  O2 Device: None (Room air)            Physical Exam  Vitals and nursing note reviewed.   Constitutional:       General: He is not in acute distress.     Appearance: Normal appearance.   HENT:      Head: Normocephalic.      Nose: Nose normal.      Mouth/Throat:      Mouth: Mucous membranes are moist.   Eyes:      Extraocular Movements: Extraocular movements intact.      Pupils: Pupils are equal, round, and reactive to light.   Cardiovascular:      Rate and Rhythm: Normal rate and regular rhythm.      Pulses: Normal pulses.   Pulmonary:      Effort: Pulmonary effort is normal.   Abdominal:      General: Abdomen is flat. Bowel sounds are normal. There is no distension.      Palpations: Abdomen is soft.      Tenderness: There is no abdominal tenderness. There is no right CVA tenderness, left CVA tenderness, guarding or rebound.      Hernia: No hernia is present.   Musculoskeletal:         General: No swelling or tenderness. Normal range of motion.      Cervical back: Normal range of motion.      Right lower leg: Edema present.      Left lower leg: Edema present.   Skin:     General: Skin is warm and dry.      Comments: Right lower extremity with erythema from the mid to down towards the right foot.  There is 1 blister on the anteromedial aspect of the right lower extremity that is weeping.  There is no palpable crepitus.  2+ bilateral pedal pulses.   Neurological:      Mental Status: He is alert and oriented to person, place, and time. Mental status is at baseline.   Psychiatric:         Mood and Affect: Mood  normal.               ED Course     Labs Reviewed   COMP METABOLIC PANEL (14) - Abnormal; Notable for the following components:       Result Value    BUN 52 (*)     Creatinine 1.75 (*)     Calculated Osmolality 299 (*)     eGFR-Cr 39 (*)     Total Protein 6.3 (*)     Albumin 2.9 (*)     A/G Ratio 0.9 (*)     All other components within normal limits   LACTIC ACID, PLASMA - Abnormal; Notable for the following components:    Lactic Acid 2.7 (*)     All other components within normal limits   CBC W/ DIFFERENTIAL - Abnormal; Notable for the following components:    RBC 3.71 (*)     HGB 12.3 (*)     HCT 38.6 (*)     .0 (*)     .0 (*)     Lymphocyte Absolute 0.92 (*)     All other components within normal limits   CBC WITH DIFFERENTIAL WITH PLATELET    Narrative:     The following orders were created for panel order CBC With Differential With Platelet.  Procedure                               Abnormality         Status                     ---------                               -----------         ------                     CBC W/ DIFFERENTIAL[[id number]]          Abnormal            Final result                 Please view results for these tests on the individual orders.   LACTIC ACID REFLEX POST POSTIVE   RAINBOW DRAW LAVENDER   RAINBOW DRAW LIGHT GREEN   RAINBOW DRAW GOLD   RAINBOW DRAW BLUE   BLOOD CULTURE   BLOOD CULTURE       MDM      Medical Decision Making  Differential for patient's presentation includes heart failure exacerbation, cellulitis, have also considered DVT however patient has watchman and also his clinical picture appears more consistent with cellulitis and overload.    Patient's labs reveal normal white blood cell count but his lactic is slightly elevated at 2.7.  Patient's blood pressure slightly low on arrival therefore he was given a small amount of fluids.  He has not been bolused the 30 cc/kg IV fluids for suspected sepsis given that I still believe there is a component of fluid  overload.  This could be related to his recent change in diuretics.  Ultimately patient given IV Ancef and admitted to the [place] hospitalist with [facility] on consult after I discussed his case with the [facility] DURANN.      Disposition and Plan     Clinical Impression:  1. Cellulitis of right lower extremity         Disposition:  Admit  7/8/2024  4:20 pm    Follow-up:  No follow-up provider specified.        Medications Prescribed:  Current Discharge Medication List                            Hospital Problems       Present on Admission  Date Reviewed: 6/28/2024            ICD-10-CM Noted POA    * (Principal) Cellulitis of right lower extremity L03.115 7/8/2024 Unknown

## 2024-07-08 NOTE — ED QUICK NOTES
Orders for admission, patient is aware of plan and ready to go upstairs. Any questions, please call ED RN Jeronimo RN at extension [phone].     Patient Covid vaccination status: Fully vaccinated     COVID Test Ordered in ED: None    COVID Suspicion at Admission: N/A    Running Infusions:  None    Mental Status/LOC at time of transport: a/o x 4, patient's spouse and sister at bedside.     Other pertinent information:   CIWA score: N/A   NIH score:  N/A

## 2024-07-08 NOTE — CONSULTS
Cardiology Consultation      Miguel Davila Patient Status:  Emergency    3/17/1945 MRN [id number]   [facility] EMERGENCY DEPARTMENT Attending Kay Florentino MD   Hosp Day # 0 PCP Eric Simon MD     Reason for Consultation:  Lower extremity swelling, concern for volume overload    History of Present Illness:  Miguel Davila is a(n) 79 year old male with chronic medical conditions including Chronic atrial fibrillation, history of SLE/pyomyositis, chronic anemia, chronic immune thrombocytopenia, chronic HFpEF, pulmonary hypertension who presents with worsening swelling in bilateral lower extremities.  He states that he initially started doing well after being discharged from his hospitalization but over the last 10 days has noted significant swelling in his lower extremities as well as weeping from his right lower leg most recently.  Also noted reddish discoloration developing.  He admits to some mild abdominal distention as well.  He notes that he has been taking increased dosages of p.o. Bumex without significant improvement in his symptoms.  Denies chest pain, dyspnea compared to his baseline, fevers, chills, nausea, emesis.  Cardiology asked to evaluate.    History:  Past Medical History:    A-fib (HCC)    Arrhythmia    atrial fibrillation    Arthritis    Autoimmune disease (HCC)    hepatits, resolved anfd nephritis, resolved    Ramon's esophagus    Bleeding nose    Gums Treated    Blood disorder    thrombocytopenia    Blood in urine    Blurred vision    cataract due to steroids, surgery in     Calculus of kidney    One time    Cancer (HCC)    skin    Candidiasis of the esophagus    Due to steroid use for Autoimmune disorder     Cataract    Colon polyps    Congestive heart disease (HCC)    Diarrhea, unspecified    Intermittent due to lupus    Diverticulosis of colon    Easy bruising    On and off prednisone for 20 years    Esophageal polyp    Esophageal reflux    Essential  hypertension    Fatigue    polymyositis and lupus    Gout    Hammer toe, acquired    Heart palpitations    Afib    Hepatitis    autoimmune induce hepatitis d/t lupus    High blood pressure    IBS (irritable bowel syndrome)    mild d/t lupus    Irregular bowel habits    Leg swelling    Heart failure, probably caused by lupus    Lupus (HCC)    MCTD (mixed connective tissue disease) (HCC)    Obstructive sleep apnea (adult) (pediatric)    LI (obstructive sleep apnea)    AHI 37  Supine AHI 38 non-supine AHI 16 Sao2 Pj 83%     LI (obstructive sleep apnea)    AHI 36 RDI 36 REM AHI 45 Supine AHI 65 non-supine AHI 19 Sao2 Pj 86% CPAP 9cwp    Pain in joints    Personal history of antineoplastic chemotherapy    Pleural effusion    right    Pneumonia due to organism    Polymyositis (HCC)    Presence of other cardiac implants and grafts    watchman filter    Problems with swallowing    dysphagia in 2006    Pulmonary hypertension (HCC)    Raynaud disease    Raynaud disease    Renal disorder    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    Shortness of breath    mainly due to pm or lupus    Sleep apnea    CPAP    Thrombocytopathia (HCC)    Visual impairment    bifocals for reading & computer; distance for driving    Wears glasses     Past Surgical History:   Procedure Laterality Date    Appendectomy  1970    Appendectomy      Cardiac catheterization  09/2019    Cataract Bilateral     Colonoscopy  10/2003 2006 01/2010    x3    Colonoscopy  5/14/2013    Colonoscopy N/A 5/1/2018    Procedure: COLONOSCOPY;  Surgeon: Isaiah Tobar MD;  Location:  ENDOSCOPY    Colonoscopy N/A 4/12/2024    Procedure: COLONOSCOPY;  Surgeon: Gerardo Neves MD;  Location:  ENDOSCOPY    Colonoscopy with biopsy  5/14/13    Egd      Hand/finger surgery unlisted  2003    right    Needle biopsy liver      Other  12/2005    needle bipsy of kidney    Other surgical history  2017    watchman filter    Percutaneous  angioplasty  (ptca)- pbp only  2006    right     Rectum surgery procedure unlisted  1946    rectal surgery polypectomy    Skin surgery      MMS BCC R temple 6/24/09    Skin surgery  2007    basal ceell carcinoma    Skin surgery  7-18-12    BCC-nodular to right superior eyebrow/ MOHS    Skin surgery  07/15/2013    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/MMS    Skin surgery  2-19-14    BCC-NOD to right superior pinna, MMS, AB    Skin surgery  02/16/2021    BCC- left superior forehead, MMS     Skin surgery  03/07/2022    SCC IN SITU RIGHT ANTIHELIX MMS BY DR GASTELUM    Tonsillectomy  1948    Upper gi endoscopy,exam  10/2003 2006 01/2010 , 5/13    x3     Family History   Problem Relation Age of Onset    Heart Disease Father         CHF    Gastro-Intestinal Disorder Father         Diverticulosis    Alcohol and Other Disorders Associated Father     Heart Attack Father     Heart Disease Mother         CHF    Breast Cancer Mother     Stroke Mother     Cancer Paternal Grandfather     Heart Attack Paternal Grandmother     Kidney Disease Son     Other (Ramon's Esophagus) Son     Heart Attack Maternal Grandfather       reports that he quit smoking about 50 years ago. His smoking use included cigarettes. He started smoking about 65 years ago. He has a 7.5 pack-year smoking history. He has never used smokeless tobacco. He reports current alcohol use of about 13.0 - 15.0 standard drinks of alcohol per week. He reports that he does not use drugs.    Allergies:  Allergies   Allergen Reactions    Empagliflozin OTHER (SEE COMMENTS)     Pancreatitis    \"pancreatitis\" per pt    Amoxicillin RASH    Augmentin [Amoclan] RASH    Doxycycline RASH       Medications:  No current facility-administered medications for this encounter.    Review of Systems:  A comprehensive review of systems was negative if not otherwise mention in above HPI.    BP 99/61   Pulse 82   Temp 97 °F (36.1 °C) (Temporal)   Resp 23   Ht 5' 11\" (1.803 m)   Wt 194 lb (88 kg)   SpO2 98%   BMI 27.06 kg/m²   Temp (24hrs),  Av °F (36.1 °C), Min:97 °F (36.1 °C), Max:97 °F (36.1 °C)       Intake/Output Summary (Last 24 hours) at 2024 1551  Last data filed at 2024 1422  Gross per 24 hour   Intake 10 ml   Output --   Net 10 ml     Wt Readings from Last 3 Encounters:   24 194 lb (88 kg)   24 196 lb 3.2 oz (89 kg)   06/10/24 207 lb (93.9 kg)       Physical Exam:   General: Alert and oriented x 3. No apparent distress. No respiratory or constitutional distress.  HEENT: Normocephalic, anicteric sclera, neck supple.  Neck: No JVD  Cardiac: Irregularly irregular.  Systolic grade 2 murmur.  Lungs: Clear anteriorly bilaterally.  Abdomen: Soft, non-tender.  Distended.  Extremities: Without clubbing, cyanosis.  Significant edema bilateral lower extremities with significant erythema to right shin with weeping noted.  Neurologic: Alert and oriented, normal affect.  Skin: Warm and dry.     Laboratory Data:  Lab Results   Component Value Date    WBC 8.6 2024    HGB 12.3 2024    HCT 38.6 2024    .0 2024    CREATSERUM 1.75 2024    BUN 52 2024     2024    K 3.5 2024     2024    CO2 25.0 2024    GLU 85 2024    CA 8.5 2024    ALB 2.9 2024    ALKPHO 81 2024    BILT 1.1 2024    TP 6.3 2024    AST 19 2024    ALT 28 2024       Imaging/results:  Lactate 2.7  CXR-no signs of acute cardiopulmonary disease  EKG A-fib, incomplete RBBB  RHC 2024:  BP: 130/89 mmHg  RA: 9 mmHg  RV: 48/1 mmHg  PA: 47/18/31 mmHg  PCWP: 14 mmHg  TP mmHg  CO: 7.9 liters/min  CI: 3.8 liters/min/m2  PVR: 2.2 Wood units  PVRI: 4.5 Wood units/m2      Assessment:  Volume overload possibly 2/2 acute on chronic HFpEF  Pulmonary hypertension, mixed   Chronic atrial fibrillation  History of SLE/polymyositis   Chronic anemia   Chronic immune thrombocytopenia  Recent hospitalization for acute perforated cholecystitis C/B localized peritonitis  s/p katarzyna gracia 5/20/2024      Plan:  Interrogate CardioMEMS device  Check proBNP, ultrasound venous lower extremities  ECHO pending   Agree with IV bumex - close urine output monitoring  ABX and wound care per primary.  Agree with blood cultures.  Further recs to follow        Thank you for allowing me to participate in the care of your patient.      Reymundo Ferrell DO  Cardiologist  [facility]  7/8/2024 3:51 PM      Note to the patient: The 21st Century Cures Act makes medical notes like these available to patients in the interest of transparency. However, be advised that this is a medical document. It is intended as peer to peer communication. It is written in medical language and may contain abbreviations or verbiage that are unfamiliar. It may appear blunt or direct. Medical documents are intended to carry relevant information, facts as evident, and clinical opinion of the practitioner.     Disclaimer: Components of this note were documented using voice recognition system and are subject to errors not corrected at proofreading. Contact the author of this note for any clarifications.

## 2024-07-08 NOTE — H&P
[facility]     Miguel Davila Patient Status:  Emergency    3/17/1945 MRN [id number]   Location [facility] EMERGENCY DEPARTMENT Attending Kay Florentino MD   Hosp Day # 0 PCP Eric Simon MD     Chief Complaint: RLE redness and swelling     Subjective:    History of Present Illness:     Miguel Davila is a 79 year old male with essential hypertension, chronic heart failure with preserved EF, pulmonary hypertension, atrial fibrillation sp Watchman, chronic kidney disease, SLE, polymyositis, chronic steroid use, hypothyroidism, LI who presents with RLE redness and swelling.     Patient underwent RHC in May d/t high cardiomems read. PAP not as elevated on RHC therefore meds adjusted: Metoprolol, Bumex and Entresto doses decreased.     In , patient admitted for sepsis d/t acute cholecystitis with perforation and peritonitis sp lap basil.    Patient has had difficulty with lower ext edema and abdominal bloating - symptoms consistent with prior volume overload. Patient increased his Bumex on his own to 2mg BID x 1 week. He has not noted significant change. In addition to lower ext swelling, patient noted RLE redness, blistering and weeping.    No fever.    No chest pain. Admits to dyspnea on exertion. No nausea, vomiting, diarrhea.       History/Other:    Past Medical History:  Past Medical History:    A-fib (HCC)    Arrhythmia    atrial fibrillation    Arthritis    Autoimmune disease (HCC)    hepatits, resolved anfd nephritis, resolved    Ramon's esophagus    Bleeding nose    Gums Treated    Blood disorder    thrombocytopenia    Blood in urine    Blurred vision    cataract due to steroids, surgery in     Calculus of kidney    One time    Cancer (HCC)    skin    Candidiasis of the esophagus    Due to steroid use for Autoimmune disorder     Cataract    Colon polyps    Congestive heart disease (HCC)    Diarrhea, unspecified    Intermittent due to lupus     Diverticulosis of colon    Easy bruising    On and off prednisone for 20 years    Esophageal polyp    Esophageal reflux    Essential hypertension    Fatigue    polymyositis and lupus    Gout    Hammer toe, acquired    Heart palpitations    Afib    Hepatitis    autoimmune induce hepatitis d/t lupus    High blood pressure    IBS (irritable bowel syndrome)    mild d/t lupus    Irregular bowel habits    Leg swelling    Heart failure, probably caused by lupus    Lupus (HCC)    MCTD (mixed connective tissue disease) (HCC)    Obstructive sleep apnea (adult) (pediatric)    LI (obstructive sleep apnea)    AHI 37  Supine AHI 38 non-supine AHI 16 Sao2 Pj 83%     LI (obstructive sleep apnea)    AHI 36 RDI 36 REM AHI 45 Supine AHI 65 non-supine AHI 19 Sao2 Pj 86% CPAP 9cwp    Pain in joints    Personal history of antineoplastic chemotherapy    Pleural effusion    right    Pneumonia due to organism    Polymyositis (HCC)    Presence of other cardiac implants and grafts    watchman filter    Problems with swallowing    dysphagia in 2006    Pulmonary hypertension (HCC)    Raynaud disease    Raynaud disease    Renal disorder    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    Shortness of breath    mainly due to pm or lupus    Sleep apnea    CPAP    Thrombocytopathia (HCC)    Visual impairment    bifocals for reading & computer; distance for driving    Wears glasses     Past Surgical History:   Past Surgical History:   Procedure Laterality Date    Appendectomy  1970    Appendectomy      Cardiac catheterization  09/2019    Cataract Bilateral     Cholecystectomy      Colonoscopy  10/2003 2006 01/2010    x3    Colonoscopy  05/14/2013    Colonoscopy N/A 05/01/2018    Procedure: COLONOSCOPY;  Surgeon: Isaiah Tobar MD;  Location:  ENDOSCOPY    Colonoscopy N/A 04/12/2024    Procedure: COLONOSCOPY;  Surgeon: Gerardo Neves MD;  Location:  ENDOSCOPY    Colonoscopy with biopsy  05/14/2013    Egd      Hand/finger surgery unlisted  2003     right    Needle biopsy liver      Other  12/2005    needle bipsy of kidney    Other surgical history  2017    watchman filter    Percutaneous  angioplasty  (ptca)- pbp only  2006    right    Rectum surgery procedure unlisted  1946    rectal surgery polypectomy    Skin surgery      MMS BCC R temple 6/24/09    Skin surgery  2007    basal ceell carcinoma    Skin surgery  07/18/2012    BCC-nodular to right superior eyebrow/ MOHS    Skin surgery  07/15/2013    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/MMS    Skin surgery  02/19/2014    BCC-NOD to right superior pinna, MMS, AB    Skin surgery  02/16/2021    BCC- left superior forehead, MMS     Skin surgery  03/07/2022    SCC IN SITU RIGHT ANTIHELIX MMS BY DR GASTELUM    Tonsillectomy  1948    Upper gi endoscopy,exam  10/2003 2006 01/2010 , 5/13    x3      Family History:   Family History   Problem Relation Age of Onset    Heart Disease Father         CHF    Gastro-Intestinal Disorder Father         Diverticulosis    Alcohol and Other Disorders Associated Father     Heart Attack Father     Heart Disease Mother         CHF    Breast Cancer Mother     Stroke Mother     Cancer Paternal Grandfather     Heart Attack Paternal Grandmother     Kidney Disease Son     Other (Ramon's Esophagus) Son     Heart Attack Maternal Grandfather      Social History:    reports that he quit smoking about 50 years ago. His smoking use included cigarettes. He started smoking about 65 years ago. He has a 7.5 pack-year smoking history. He has never used smokeless tobacco. He reports current alcohol use of about 13.0 - 15.0 standard drinks of alcohol per week. He reports that he does not use drugs.     Allergies:   Allergies   Allergen Reactions    Empagliflozin OTHER (SEE COMMENTS)     Pancreatitis    \"pancreatitis\" per pt       Medications:    Current Facility-Administered Medications on File Prior to Encounter   Medication Dose Route Frequency Provider Last Rate Last Admin    [COMPLETED] morphINE PF 4 MG/ML  injection 4 mg  4 mg Intravenous Once Kandice Gutierres, DO   4 mg at 06/06/24 1033    [COMPLETED] ondansetron (Zofran) 4 MG/2ML injection 4 mg  4 mg Intravenous Once Kandice Gutierres DO   4 mg at 06/06/24 0949    [COMPLETED] cefTRIAXone (Rocephin) 1 g in D5W 100 mL IVPB-ADD  1 g Intravenous Once Kandice Gutierres, DO   Stopped at 06/06/24 1244    [COMPLETED] gadoterate meglumine (Dotarem) 10 MMOL/20ML injection 20 mL  20 mL Intravenous ONCE PRN Kandcie Gutierres, DO   18 mL at 06/06/24 1502    [COMPLETED] acetaminophen (Ofirmev) 10 mg/mL infusion premix 1,000 mg  1,000 mg Intravenous Q6H Candy Barrera PA-C 400 mL/hr at 06/07/24 1146 1,000 mg at 06/07/24 1146    [COMPLETED] dicyclomine (Bentyl) 10 MG/5ML oral syrup 20 mg  20 mg Oral Once Mann David PA-C   20 mg at 06/04/24 1849    [COMPLETED] acetaminophen (Tylenol) tab 650 mg  650 mg Oral Once Taye Farmer MD   650 mg at 05/29/24 1019    [COMPLETED] diphenhydrAMINE (Benadryl) cap/tab 25 mg  25 mg Oral Once Taye Farmer MD   25 mg at 05/29/24 1019    [COMPLETED] immune globulin (Gammagard) 10% infusion 35 g  0.4 g/kg Intravenous Once Taye Farmer MD   Stopped at 05/29/24 1301    [COMPLETED] lidocaine PF (Xylocaine-MPF) 1 % injection             [COMPLETED] heparin (Porcine) 5000 UNIT/ML injection             [COMPLETED] heparin (Porcine) 5000 UNIT/ML injection             [COMPLETED] acetaminophen (Tylenol) tab 650 mg  650 mg Oral Once Taye Farmer MD   650 mg at 04/24/24 1054    [COMPLETED] diphenhydrAMINE (Benadryl) cap/tab 25 mg  25 mg Oral Once Taye Farmer MD   25 mg at 04/24/24 1054    [COMPLETED] immune globulin (Gammagard) 10% infusion 35 g  0.4 g/kg Intravenous Once Taye Farmer MD   Stopped at 04/24/24 8873     Current Outpatient Medications on File Prior to Encounter   Medication Sig Dispense Refill    sacubitril-valsartan (ENTRESTO) 24-26 MG Oral Tab Take 1 tablet by mouth 2 (two) times daily.      bumetanide 1 MG Oral  Tab Take 2 tablets (2 mg total) by mouth BID (Diuretic). Take 1mg po daily      fexofenadine 180 MG Oral Tab Take 1 tablet (180 mg total) by mouth daily as needed.      levothyroxine 50 MCG Oral Tab Take 1 tablet (50 mcg total) by mouth before breakfast.      ipratropium 0.06 % Nasal Solution 1 spray by Nasal route 2 (two) times daily. 1 each 5    omeprazole 20 MG Oral Capsule Delayed Release Take 1 capsule (20 mg total) by mouth 2 (two) times daily. 180 capsule 1    ALLOPURINOL 300 MG Oral Tab TAKE 1 TABLET BY MOUTH EVERY DAY 90 tablet 3    predniSONE 5 MG Oral Tab Take 1 tablet (5 mg total) by mouth daily with breakfast. 90 tablet 3    metoprolol succinate  MG Oral Tablet 24 Hr Take 1 tablet (100 mg total) by mouth every morning AND 0.5 tablets (50 mg total) every evening. 60 tablet 3    PREDNISONE 1 MG Oral Tab TAKE 2 TABLETS (2 MG TOTAL) BY MOUTH DAILY WITH BREAKFAST. 180 tablet 3    aspirin 81 MG Oral Tab EC Take 1 tablet (81 mg total) by mouth daily. 30 tablet 0    Multiple Vitamins-Minerals (TAB-A-KEVIN) Oral Tab Take 1 tablet by mouth daily.      Immune Globulin, Human, 10 g Intravenous Recon Soln Inject 0.4 g/kg into the vein. Given for treatment of polymyositis, mixed connective tissue disease, and immune thrombocytopenia purpura monthly at [facility].  Every 5 weeks 3 each 0    Calcium Citrate-Vitamin D (CITRACAL + D OR) Take 1 tablet by mouth daily.      PREVIDENT 5000 BOOSTER PLUS 1.1 % Dental Paste          Review of Systems:   A comprehensive review of systems was completed.    Pertinent positives and negatives noted in the HPI.    Objective:   Physical Exam:    BP (!) 84/56 (BP Location: Right arm)   Pulse 76   Temp 97.7 °F (36.5 °C) (Oral)   Resp 22   Ht 5' 11\" (1.803 m)   Wt 191 lb 4.8 oz (86.8 kg)   SpO2 99%   BMI 26.68 kg/m²   General: No acute distress, Alert  Respiratory: Faint crackles   Cardiovascular: S1, S2.   Abdomen: Soft, Non-tender, non-distended, positive bowel  sounds  Neuro: Moves all ext  Extremities: + edema bilaterally, RLE erythema, blister and weeping noted    Results:    Labs:      Labs Last 24 Hours:    Recent Labs   Lab 07/08/24  1338   RBC 3.71*   HGB 12.3*   HCT 38.6*   .0*   MCH 33.2   MCHC 31.9   RDW 16.6   NEPRELIM 7.20   WBC 8.6   .0*       Recent Labs   Lab 07/08/24  1339   GLU 85   BUN 52*   CREATSERUM 1.75*   EGFRCR 39*   CA 8.5   ALB 2.9*      K 3.5      CO2 25.0   ALKPHO 81   AST 19   ALT 28   BILT 1.1   TP 6.3*       Lab Results   Component Value Date    INR 1.04 04/27/2021    INR 1.20 (H) 09/19/2019    INR 1.06 08/15/2019       No results for input(s): \"TROP\", \"TROPHS\", \"CK\" in the last 168 hours.    No results for input(s): \"TROP\", \"PBNP\" in the last 168 hours.    No results for input(s): \"PCT\" in the last 168 hours.    Imaging: Imaging data reviewed in Epic.    Assessment & Plan:      #RLE cellulitis  -IV abx  -Wound care  -Follow-up cultures    #Acute on chronic heart failure with preserved EF  #Pulmonary hypertension  #Essential hypertension  #Atrial fibrillation sp Watchman  -Bumex IV  -Hold Toprol, Entresto  -Monitor I/O  -Daily weight  -Monitor hemodynamics  -Telemetry  -Check cardiomems reading  -Cardiology consult    #Chronic kidney disease  -Monitor UOP, creatinine and electrolytes    #SLE  #Polymyositis  #Chronic steroid use  -Prednisone  -Check AM cortisol    #Hypothyroidism  -Synthroid    #GERD  -PPI    #LI  -LI protocol    #DVT Px: Lovenox    Plan of care discussed with patient, family and ER.    Gigi Rojas MD    Supplementary Documentation:     The 21st Century Cures Act makes medical notes like these available to patients in the interest of transparency. Please be advised this is a medical document. Medical documents are intended to carry relevant information, facts as evident, and the clinical opinion of the practitioner. The medical note is intended as peer to peer communication and may appear blunt or  direct. It is written in medical language and may contain abbreviations or verbiage that are unfamiliar.

## 2024-07-08 NOTE — ED INITIAL ASSESSMENT (HPI)
BLE swelling + weeping, hx of CHF, +2 pitting edema, currently on Bumex 4mg, had perf'd gallbladder sx 6/7/2024.

## 2024-07-08 NOTE — HISTORICAL OFFICE NOTE
[facility]  [address] [place] [address], 4th [place] [address]  [phone]      Miguel Davila  Progress Note  Demographics:  Name: Miguel Davila YOB: 1945  Age: 79, Male Medical Record No: [id number]  Visited Date/Time: 06/14/2024 01:00 PM    Chief Complaints  Hospital follow up: Admission 06/06-06/10 nause/upper abd pain, loose stools, cholecystectomy done, entresto and spironolactone discontinued until OV  Last OV 03/07: + Levothyroxine 50 mcg QD, RHC done 05/20, echo done 04/08  Rheumatology 05/07: continue IVIG 1x month, f/u 3 mos  Cardiomems recalibration cancelled due to admission  History of Present Illness  Following the right heart catheterization on May 20, 2024 when I documented that the patient's pulmonary artery diastolic pressure was 18 mmHg and not 30 as measured by the CardioMEMS device the patient experienced 3 days of diarrhea without significant abdominal pain.  However on June 7 the patient had severe excruciating right-sided abdominal pain which led him to call the ambulance that brought him to [facility].  This was fortunate indeed because the patient was found to have acute cholecystitis and at the time of surgery it was found that the gallbladder had perforated and there was localized biliary peritonitis.  The patient has a drain to this day although it appears to be well below 20 cc in a 24-hour period.  The patient's consultation was done in the hospital by Dr. Craig and I am happy for this since he will continue to follow the patient after my longterm.  Currently has NYHA Class 3 symptoms.  Currently has CCS Class 1 symptoms.  Cardiac risk factors Hypertension, Obesity and Former smoker  Past Medical History  1.Pre-procedure lab exam  2.Pre-procedural examination  3.Abnormal thyroid stimulating hormone (TSH) level  4.Elevated blood sugar level  5.Other ascites  6.Abdominal bloating  7.Pre-op testing  8.Encounter for  procedure  9.Hyperlipidemia (unspecified)  10.WILSON (dyspnea on exertion)  11.Fatigue  12.Atrial fibrillation, permanent - AFib  13.Vitamin D deficiency  14.Polymyositis  15.H/O scleroderma  16.Cancer of skin  17.Obstructive sleep apnea - LI  18.History of lupus  19.Gout, unspecified  20.Hypertension (HTN), primary  21.Diverticulosis  22.CHF (congestive heart failure)  23.Pulmonary hypertension, not otherwise specified or secondary  24.History of atrial fibrillation  Past Surgical History  1.Presence of CardioMEMS HF system  2.History of tonsillectomy - Hx  3.History of liver biopsy  4.History of appendectomy - Hx  5.Presence of Watchman left atrial appendage closure device  Family History  1. Father - Heart valve replaced; History of CHF (congestive heart failure)  Social History  Smoking status Former [id number] (End Date)  Tobacco usage - No  Physical Examination  Vitals Right Arm Sitting  / 62 mmHg, Pulse rate 66 bpm, Irregular, Height in 5' 11\", BMI: 27.8, Weight in 199 lbs (or) 90.26 kgs and BSA : 2.14 cc/m²  General Appearance No Acute Distress, Well groomed, Normal Body habitus and Frail in appearance  Head/Eyes/Ears/Nose/Mouth/Throat Conjunctiva pink, Sclera Clear, PERRLA and Mucous membranes Moist  Neck No carotid bruits and No JVD  Respiratory Equal bilaterally  Cardiovascular Intact distal pulses and Irregularly irregular rhythm. Normal rate present. Normal S2    Gastrointestinal less than 20 cc in the post-surgical drain, Abdomen soft, Normoactive bowel sounds, No organomegaly and Abdomen tender  Gait Unsteady gait  Strength and tone Abnormal muscle strength and Abnormal muscle tone  Upper Extremities No edema  Lower Extremities Pulses 2+ and equal bilaterally and Edema 2+  Skin multiple ecchimoses and Warm and dry  Neurologic / Psychiatric Alert and Oriented and Non-focal  Speech Normal speech  Allergies  1.Amoxicillin(Reaction:, Severity:Mild)  2.Doxycycline(Reaction:,  Severity:Mild)  3.Jardiance - Drug Class(Reaction:., Severity:Severe)  Medications  1.Allegra Allergy 180 mg tablet, Take 1 tablet orally once a day as needed.  2.allopurinoL 300 mg tablet, Take 1 tablet orally once a day.  3.aspirin 81 mg tablet,delayed release, Take 1 tablet orally once a day.  4.bumetanide 1 mg tablet, Take 1 tablet orally once a day.  5.cholecalciferol (VITAMIN D3) 1000 UNITS tablet, one tablet daily  6.Entresto 24 mg-26 mg tablet, Take 1 tablet orally 2 times a day.  7.levothyroxine 50 mcg tablet, Take 1 tablet orally once in the morning ( before breakfast).  8.metoprolol succinate  mg tablet,extended release 24 hr, Take 1 tablet orally once in the AM and 1/2 tablet in the PM  9.Multiple Vitamins-Minerals (MULTIVITAMIN ADULT) Tab, one tablet daily  10.omeprazole (PRILOSEC) 20 MG capsule, one capsule twice daily  11.predniSONE 1 mg tablet, Take a total of 7 mg daily  12.spironolactone 25 mg tablet, Take one-half tablet orally once a day.  Impression  1.Presence of CardioMEMS HF system  2.Polymyositis  3.Obstructive sleep apnea - LI  4.CHF (congestive heart failure)  5.Pulmonary hypertension, not otherwise specified or secondary  6.Presence of Watchman left atrial appendage closure device  Assessment & Plan  The patient will need recalibration of the pulmonary artery sensor but this has now to to be postponed because of the recent abdominal surgery.  We have resumed the patient's sacubitril/valsartan and spironolactone.  The patient has permanent atrial fibrillation and has a Watchman device which explains the absence of systemic anticoagulation.  I have referred his care to .  We have made an appointment for 3 months from now.  The patient will also continue to see the nurse practitioners in the [facility].  I reminded the patient that being on chronic corticosteroid therapy he must pay attention to the slightest symptoms because corticosteroids attenuate pain and  often prevent the appearance of a febrile response.  Future appointments  1.Referral Visit - Eric Simon ([email or web address]) : (Today)  2.Referral Visit - Tasha Barth ([email or web address]) : (Today)  3.Follow up visit - Filemon Craig MD (3 Months)  Miscellaneous  1.Weight monitoring (regime/therapy)  Nurses documentation  Triage - Nursing Doc:  Upcoming surgeries: no   Use of assistive devices(s): cane, CPAP   Refills: no  Verbal order for EKG: no  Triage & medication list reviewed by: MM   Patient instructions  Medications:   Restart Entresto 24/26 mg twice a day.   Restart Spironolactone 12.5 mg ( Half a 25 mg tablet)  once  a day.       Diagnostic:  Please call us when you are completely healed from your cholecystectomy procedure to schedule you for Cardiomems calibration. [phone] procedure team.     Lab:   we will F/U with labs from [facility].     Provider Follow Up:  1. Follow up with Dr. Craig in 3 months.   2. Follow up with [facility] in few weeks. [phone]        Please bring in you medication bottles or updated medicine list to your next appointment.  Call [facility] if you have any problems or concerns at [phone]    Per verbal orders by Dr. Jean.    MA, RN      Lab Details  TSH+FREE T4  05/28/2024 12:04:59 PM  FREE T4 1.1 0.8-1.7 ng/dL  F  TSH 2.190 0.358-3.740 mIU/mL  F  ARTERIAL BLOOD GAS  05/20/2024 09:31:51 AM  ARTERIAL BLOOD GAS PH 7.49 7.35-7.45 H F  ARTERIAL BLOOD GAS PCO2 36 35-45 mm Hg  F  ARTERIAL BLOOD GAS PO2 65  mm Hg L F  ARTERIAL BLOOD GAS HCO3 27.9 21.0-27.0 mEq/L H F  ARTERIAL BLOOD GAS BASE EXCESS 4.0 -2.0-2.0 mmol/L H F  ARTERIAL BLOOD GAS O2HB 92.2 92.0-100.0 %  F  PATIENT TEMPERATURE 98.6 F  F  TOTAL HEMOGLOBIN 12.5 13.0-17.5 g/dL L F  CARBOXYHEMOGLOBIN 1.9 0.0-3.0 % SAT  F  METHEMOGLOBIN 0.4 0.4-1.5 % SAT  F  ALLENS TEST Negative   F  SAMPLE SITE Right Brachial   F  ARTERIAL BLOOD GAS DEVICE Room  Air   F  FIO2 21 %  F  HEMOGLOBIN A1C  05/14/2024 01:21:52 PM  HGBA1C 5.4 <5.7 %  F  ESTIMATED AVERAGE GLUCOSE 108  mg/dL  F  BASIC METABOLIC PANEL (8)  05/14/2024 01:09:53 PM  GLUCOSE 104 70-99 mg/dL H F  SODIUM 145 136-145 mmol/L  F  POTASSIUM 4.5 3.5-5.1 mmol/L  F  CHLORIDE 113  mmol/L H F  CO2 27.0 21.0-32.0 mmol/L  F  ANION GAP 5 0-18 mmol/L  F  BUN 52 9-23 mg/dL H F  CREATININE 2.03 0.70-1.30 mg/dL H F  CALCIUM 8.9 8.5-10.1 mg/dL  F  OSMOLALITY CALCULATED 314 275-295 mOsm/kg H F  E GFR CR 32 >=60 mL/min/1.73m2 L F  FASTING PATIENT BMP ANSWER Yes   F  CBC W/ DIFFERENTIAL  05/14/2024 12:33:31 PM  WBC 6.1 4.0-11.0 x10(3) uL  F  RED BLOOD COUNT 3.71 3.80-5.80 x10(6)uL L F  HGB 13.0 13.0-17.5 g/dL  F  HCT 40.5 39.0-53.0 %  F  PLATELETS 118.0 150.0-450.0 10(3)uL L F  MEAN CELL VOLUME 109.2 80.0-100.0 fL H F  MEAN CORPUSCULAR HEMOGLOBIN 35.0 26.0-34.0 pg H F  MEAN CORPUSCULAR HGB CONC 32.1 31.0-37.0 g/dL  F  RED CELL DISTRIBUTION WIDTH CV 16.7 %  F  NEUTROPHIL ABS PRELIM 4.03 1.50-7.70 x10 (3) uL  F  NEUTROPHIL ABSOLUTE 4.03 1.50-7.70 x10(3) uL  F  LYMPHOCYTE ABSOLUTE 1.34 1.00-4.00 x10(3) uL  F  MONOCYTE ABSOLUTE 0.55 0.10-1.00 x10(3) uL  F  EOSINOPHIL ABSOLUTE 0.15 0.00-0.70 x10(3) uL  F  BASOPHIL ABSOLUTE 0.03 0.00-0.20 x10(3) uL  F  IMMATURE GRANULOCYTE COUNT 0.02 0.00-1.00 x10(3) uL  F  NEUTROPHIL % 65.8 %  F  LYMPHOCYTE % 21.9 %  F  MONOCYTE % 9.0 %  F  EOSINOPHIL % 2.5 %  F  BASOPHIL % 0.5 %  F  IMMATURE GRANULOCYTE RATIO % 0.3 %  F  COMP METABOLIC PANEL (14)  03/01/2024 04:16:58 PM  GLUCOSE 98 70-99 mg/dL  F  SODIUM 139 136-145 mmol/L  F  POTASSIUM 4.7 3.5-5.1 mmol/L  F  CHLORIDE 106  mmol/L  F  CO2 25.0 21.0-32.0 mmol/L  F  ANION GAP 8 0-18 mmol/L  F  BUN 64 9-23 mg/dL H F  CREATININE 1.87 0.70-1.30 mg/dL H F  CALCIUM 9.3 8.5-10.1 mg/dL  F  OSMOLALITY CALCULATED 306 275-295 mOsm/kg H F  E GFR CR 36 >=60 mL/min/1.73m2 L F  AST 17 15-37 U/L  F  ALT 17 16-61 U/L  F  ALKALINE  PHOSPHATASE 43  U/L L F  BILIRUBIN, TOTAL 0.7 0.1-2.0 mg/dL  F  TOTAL PROTEIN 7.0 6.4-8.2 g/dL  F  ALBUMIN 3.8 3.4-5.0 g/dL  F  GLOBULIN 3.2 2.8-4.4 g/dL  F  A/G RATIO 1.2 1.0-2.0  F  FASTING PATIENT CMP ANSWER Yes   F  LIPID PANEL  03/01/2024 04:16:58 PM  CHOLESTEROL 161 <200 mg/dL  F  HDL CHOL 41 40-59 mg/dL  F  TRIGLYCERIDES 134  mg/dL  F  LDL CHOLESTROL 96 <100 mg/dL  F  VLDL 22 0-30 mg/dL  F  NON HDL CHOL 120 <130 mg/dL  F  FASTING PATIENT LIPID ANSWER Yes   F  ASSAY, THYROID STIM HORMONE  03/01/2024 04:16:58 PM  TSH 4.330 0.358-3.740 mIU/mL H F  PRO BETA NATRIURETIC PEPTIDE  03/01/2024 04:16:58 PM  PRO-BETA NATRIURETIC PEPTIDE 2029 <450 pg/mL H F  VITAMIN D, 25-HYDROXY  03/01/2024 02:16:08 PM  25-HYDROXYVITAMIN D (TOTAL) 43.1 30.0-100.0 ng/mL  F  Diagnostics Details  Trans Thoracic Echocardiogram 04/08/2024  1.The study quality is average.    2.The left ventricle is normal in size. The left ventricular ejection fraction is 58%. Left ventricular diastolic function is indeterminate. Noted left ventricular hypertrophy. Concentric left ventricular hypertrophy is present. It is mild.    3.Noted evidence of aortic regurgitation. Mild-to-moderate aortic regurgitation.    4.Mitral regurgitation is noted. At least moderate mitral regurgitation    5.The left atrial diameter is severely increased.    6.The right ventricle is moderately enlarged. Right ventricular systolic function is normal.    7.Evidence of tricuspid regurgitation is present. Mild to moderate (1-2+) tricuspid regurgitation.    Care Providers: Iris Jean MD, Mike Borges RN and Iris Marina CMA  Electronically Authenticated by  Iris Jean MD  06/14/2024 02:11:13 PM  Disclaimer: Components of this note were documented using voice recognition system and are subject to errors not corrected at proofreading. Contact the author of this note for any clarifications.

## 2024-07-09 ENCOUNTER — APPOINTMENT (OUTPATIENT)
Dept: HEMATOLOGY/ONCOLOGY | Facility: HOSPITAL | Age: 79
End: 2024-07-09
Attending: INTERNAL MEDICINE
Payer: MEDICARE

## 2024-07-09 ENCOUNTER — APPOINTMENT (OUTPATIENT)
Dept: CV DIAGNOSTICS | Facility: HOSPITAL | Age: 79
End: 2024-07-09
Attending: HOSPITALIST
Payer: MEDICARE

## 2024-07-09 LAB
ALBUMIN SERPL-MCNC: 2.2 G/DL (ref 3.4–5)
ALBUMIN SERPL-MCNC: 2.9 G/DL (ref 3.2–4.8)
ALBUMIN/GLOB SERPL: 0.8 {RATIO} (ref 1–2)
ALP LIVER SERPL-CCNC: 63 U/L
ALT SERPL-CCNC: 16 U/L
ANION GAP SERPL CALC-SCNC: 7 MMOL/L (ref 0–18)
AST SERPL-CCNC: 12 U/L (ref 15–37)
BASOPHILS # BLD AUTO: 0.02 X10(3) UL (ref 0–0.2)
BASOPHILS NFR BLD AUTO: 0.4 %
BILIRUB SERPL-MCNC: 0.6 MG/DL (ref 0.1–2)
BUN BLD-MCNC: 51 MG/DL (ref 9–23)
CALCIUM BLD-MCNC: 8.2 MG/DL (ref 8.5–10.1)
CHLORIDE SERPL-SCNC: 108 MMOL/L (ref 98–112)
CO2 SERPL-SCNC: 28 MMOL/L (ref 21–32)
CORTIS SERPL-MCNC: 9.3 UG/DL
CREAT BLD-MCNC: 1.53 MG/DL
EGFRCR SERPLBLD CKD-EPI 2021: 45 ML/MIN/1.73M2 (ref 60–?)
EOSINOPHIL # BLD AUTO: 0.15 X10(3) UL (ref 0–0.7)
EOSINOPHIL NFR BLD AUTO: 2.7 %
ERYTHROCYTE [DISTWIDTH] IN BLOOD BY AUTOMATED COUNT: 16.4 %
GLOBULIN PLAS-MCNC: 2.8 G/DL (ref 2.8–4.4)
GLUCOSE BLD-MCNC: 82 MG/DL (ref 70–99)
HCT VFR BLD AUTO: 32.5 %
HGB BLD-MCNC: 10.7 G/DL
IMM GRANULOCYTES # BLD AUTO: 0.01 X10(3) UL (ref 0–1)
IMM GRANULOCYTES NFR BLD: 0.2 %
LYMPHOCYTES # BLD AUTO: 0.87 X10(3) UL (ref 1–4)
LYMPHOCYTES NFR BLD AUTO: 15.5 %
MCH RBC QN AUTO: 33.9 PG (ref 26–34)
MCHC RBC AUTO-ENTMCNC: 32.9 G/DL (ref 31–37)
MCV RBC AUTO: 102.8 FL
MONOCYTES # BLD AUTO: 0.49 X10(3) UL (ref 0.1–1)
MONOCYTES NFR BLD AUTO: 8.7 %
NEUTROPHILS # BLD AUTO: 4.09 X10 (3) UL (ref 1.5–7.7)
NEUTROPHILS # BLD AUTO: 4.09 X10(3) UL (ref 1.5–7.7)
NEUTROPHILS NFR BLD AUTO: 72.5 %
OSMOLALITY SERPL CALC.SUM OF ELEC: 309 MOSM/KG (ref 275–295)
PLATELET # BLD AUTO: 111 10(3)UL (ref 150–450)
POTASSIUM SERPL-SCNC: 3.1 MMOL/L (ref 3.5–5.1)
POTASSIUM SERPL-SCNC: 4.1 MMOL/L (ref 3.5–5.1)
PROT SERPL-MCNC: 5 G/DL (ref 6.4–8.2)
RBC # BLD AUTO: 3.16 X10(6)UL
SODIUM SERPL-SCNC: 143 MMOL/L (ref 136–145)
WBC # BLD AUTO: 5.6 X10(3) UL (ref 4–11)

## 2024-07-09 PROCEDURE — 93306 TTE W/DOPPLER COMPLETE: CPT | Performed by: HOSPITALIST

## 2024-07-09 PROCEDURE — 99233 SBSQ HOSP IP/OBS HIGH 50: CPT | Performed by: HOSPITALIST

## 2024-07-09 RX ORDER — METOPROLOL SUCCINATE 50 MG/1
50 TABLET, EXTENDED RELEASE ORAL EVERY EVENING
Status: DISCONTINUED | OUTPATIENT
Start: 2024-07-09 | End: 2024-07-12

## 2024-07-09 RX ORDER — POTASSIUM CHLORIDE 20 MEQ/1
40 TABLET, EXTENDED RELEASE ORAL EVERY 4 HOURS
Status: COMPLETED | OUTPATIENT
Start: 2024-07-09 | End: 2024-07-09

## 2024-07-09 RX ORDER — METOPROLOL SUCCINATE 100 MG/1
100 TABLET, EXTENDED RELEASE ORAL EVERY MORNING
Status: DISCONTINUED | OUTPATIENT
Start: 2024-07-10 | End: 2024-07-12

## 2024-07-09 NOTE — PLAN OF CARE
Patient admitted via 1710.  Oriented to room. Safety precautions initiated. Bed in low position. Call light demonstration completed. Skin assessment completed per unit protocol with Allie MUÑOZ.    Patient a&o x 4. Afib on tele. On room air. Continent of b&b. Denies pain, sob, or chest pain/discomfort. Pt up with SBA. Bed alarm on. Pt instructed to call for assistance.     Plan of care reviewed with patient. All questions answered at this time.

## 2024-07-09 NOTE — DIETARY NOTE
[facility]   part of [facility]   CLINICAL NUTRITION    Miguel Davila     Admitting diagnosis:  Cellulitis of right lower extremity [L03.115]    Ht: 180.3 cm (5' 11\")  Wt: 85.9 kg (189 lb 6 oz).   Body mass index is 26.41 kg/m².  IBW: 78.2kg    Wt Readings from Last 6 Encounters:   07/09/24 85.9 kg (189 lb 6 oz)   06/28/24 89 kg (196 lb 3.2 oz)   06/10/24 93.9 kg (207 lb)   06/04/24 88.5 kg (195 lb)   05/29/24 91.2 kg (201 lb)   05/15/24 88 kg (194 lb)        Labs/Meds reviewed    Diet:       Procedures    Cardiac diet Sodium Restriction: 2 GM NA; Fluid Restriction: 2000 ml; Is Patient on Accuchecks? No     Percent Meals Eaten (last 3 days)       Date/Time Percent Meals Eaten (%)    07/08/24 1940 100 %              Pt chart reviewed d/t HF diet ed consult.  Na and fluid guidelines reviewed, pt well aware.   Patient reports good appetite at this time.  Nursing notes reports Percent Meals Eaten (%): 100 % intake for last meal.  Tolerating po diet without diarrhea, emesis, or constipation.   Wt fluctuation due to fluid.    Patient is at low nutrition risk at this time.    Please consult if patient status changes or nutrition issues arise.    Jing An RD, LDN  Clinical Nutrition  [id number]

## 2024-07-09 NOTE — RESPIRATORY THERAPY NOTE
PATIENT HAS HISTORY OF LI. PATIENT REFUSES TO USE CPAP DURING HOSPITAL STAY. LI PROTOCOL IN CHART.

## 2024-07-09 NOTE — CONSULTS
[facility]  Report of Inpatient Wound Care Consultation    Miguel Davila Patient Status:  Inpatient    3/17/1945 MRN [id number]   Location [facility] 8NE-A Attending Gigi Rojas MD   Hosp Day # 1 PCP Eric Simon MD     Reason for Consultation:  RLE wound    History of Present Illness:  Miguel Davila is a a(n) 79 year old male. Patient states the wound is a result of swelling that occurred after a cholecystectomy he had earlier in the year.  Patient with multiple comorbidities, with skin breakdown described below.       History:  Past Medical History:    A-fib (HCC)    Arrhythmia    atrial fibrillation    Arthritis    Autoimmune disease (HCC)    hepatits, resolved anfd nephritis, resolved    Ramon's esophagus    Bleeding nose    Gums Treated    Blood disorder    thrombocytopenia    Blood in urine    Blurred vision    cataract due to steroids, surgery in     Calculus of kidney    One time    Cancer (HCC)    skin    Candidiasis of the esophagus    Due to steroid use for Autoimmune disorder     Cataract    Colon polyps    Congestive heart disease (HCC)    Diarrhea, unspecified    Intermittent due to lupus    Diverticulosis of colon    Easy bruising    On and off prednisone for 20 years    Esophageal polyp    Esophageal reflux    Essential hypertension    Fatigue    polymyositis and lupus    Gout    Hammer toe, acquired    Heart palpitations    Afib    Hepatitis    autoimmune induce hepatitis d/t lupus    High blood pressure    IBS (irritable bowel syndrome)    mild d/t lupus    Irregular bowel habits    Leg swelling    Heart failure, probably caused by lupus    Lupus (HCC)    MCTD (mixed connective tissue disease) (HCC)    Obstructive sleep apnea (adult) (pediatric)    LI (obstructive sleep apnea)    AHI 37  Supine AHI 38 non-supine AHI 16 Sao2 Pj 83%     LI (obstructive sleep apnea)    AHI 36 RDI 36 REM AHI 45 Supine AHI 65 non-supine AHI 19 Sao2 Pj 86% CPAP 9cwp    Pain  in joints    Personal history of antineoplastic chemotherapy    Pleural effusion    right    Pneumonia due to organism    Polymyositis (HCC)    Presence of other cardiac implants and grafts    watchman filter    Problems with swallowing    dysphagia in 2006    Pulmonary hypertension (HCC)    Raynaud disease    Raynaud disease    Renal disorder    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    Shortness of breath    mainly due to pm or lupus    Sleep apnea    CPAP    Thrombocytopathia (HCC)    Visual impairment    bifocals for reading & computer; distance for driving    Wears glasses     Past Surgical History:   Procedure Laterality Date    Appendectomy  1970    Appendectomy      Cardiac catheterization  09/2019    Cataract Bilateral     Cholecystectomy      Colonoscopy  10/2003 2006 01/2010    x3    Colonoscopy  05/14/2013    Colonoscopy N/A 05/01/2018    Procedure: COLONOSCOPY;  Surgeon: Isaiah Tobar MD;  Location:  ENDOSCOPY    Colonoscopy N/A 04/12/2024    Procedure: COLONOSCOPY;  Surgeon: Gerardo Neves MD;  Location:  ENDOSCOPY    Colonoscopy with biopsy  05/14/2013    Egd      Hand/finger surgery unlisted  2003    right    Needle biopsy liver      Other  12/2005    needle bipsy of kidney    Other surgical history  2017    watchman filter    Percutaneous  angioplasty  (ptca)- pbp only  2006    right    Rectum surgery procedure unlisted  1946    rectal surgery polypectomy    Skin surgery      MMS BCC R temple 6/24/09    Skin surgery  2007    basal ceell carcinoma    Skin surgery  07/18/2012    BCC-nodular to right superior eyebrow/ MOHS    Skin surgery  07/15/2013    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/MMS    Skin surgery  02/19/2014    BCC-NOD to right superior pinna, MMS, AB    Skin surgery  02/16/2021    BCC- left superior forehead, MMS     Skin surgery  03/07/2022    SCC IN SITU RIGHT ANTIHELIX MMS BY DR GASTELUM    Tonsillectomy  1948    Upper gi endoscopy,exam  10/2003 2006 01/2010 , 5/13    x3      reports that he quit  smoking about 50 years ago. His smoking use included cigarettes. He started smoking about 65 years ago. He has a 7.5 pack-year smoking history. He has never used smokeless tobacco. He reports current alcohol use of about 13.0 - 15.0 standard drinks of alcohol per week. He reports that he does not use drugs.      Allergies:  @ALLERGY    Laboratory Data:    Recent Labs   Lab 07/08/24  1338 07/08/24  1339 07/09/24  0522   WBC 8.6  --  5.6   HGB 12.3*  --  10.7*   HCT 38.6*  --  32.5*   .0*  --  111.0*   CREATSERUM  --  1.75* 1.53*   BUN  --  52* 51*   GLU  --  85 82   CA  --  8.5 8.2*   ALB  --  2.9* 2.9*  2.2*   TP  --  6.3* 5.0*         ASSESSMENT:  Wound 07/08/24 Tibial Right (Active)   Date First Assessed/Time First Assessed: 07/08/24 1510   Present on Original Admission: Yes  Primary Wound Type: Skin Tear  Location: Tibial  Wound Location Orientation: Right      Assessments 7/9/2024  3:05 PM   Wound Image      Drainage Amount Moderate   Drainage Description Serous   Dressing Xeroform;ABD Pad;Kerlix roll   Dressing Changed Changed   Dressing Status Clean;Dry;Intact   Wound Length (cm) 1 cm   Wound Width (cm) 3 cm   Wound Surface Area (cm^2) 3 cm^2   Wound Depth (cm) 0.1 cm   Wound Volume (cm^3) 0.3 cm^3   Margins Well-defined edges   Non-staged Wound Description Partial thickness   Shelley-wound Assessment Non-blanchable erythema;Painful   Wound Granulation Tissue Pink;Firm   Wound Bed Granulation (%) 100 %   Wound Odor None     Capillary refill: >3  Pedal pulse: doppler  Edema : +1 = Mild pitting, sl indentation    Wound Cleaning and Dressings:  Wound cleansing:  normal saline  Wound cleaning frequency: Every 48 hours and PRN  Wound product: xeroform, abd, kerlix, tape  Dressing change frequency:  Change dressing every other day and/or as needed    Miscellaneous/Additional Orders:  Off loading: pillow for comfort    If patient is Diabetic: want to make sure blood sugars are within a controled range for wound  healing     Protein intake: depending on providers recommendations and patients kidney functions - if kidneys are good then recommend patient to increase protein intake (Boost, Damion, Ensure, Premiere Protein)       Thank you for this consultation and for allowing me to participate in the care of your patient.  Please call [phone] if you have any questions about this consultation and plan of care.     Time Spent 30 Minutes.    Thank you,  Chele Boykin RN  Wound/Ostomy/Continence nurse    7/9/2024  4:34 PM

## 2024-07-09 NOTE — PLAN OF CARE
Patient alert and oriented x 4. On room air. Afib  on cardiac monitor. Patient denies any chest pain or chest discomfort at this time. Patient voids, last BM 7/8, stool specimen send , cdiff negative. Iv ancef continued, Plan of care updated, all questions answered. Safety precautions in place. Bed alarm on. Will continue to monitor tele/labs/vital signs closely.     Plan:  daily weight, diuretic, iv ancef, monitor labs, v/s    Problem: CARDIOVASCULAR - ADULT  Goal: Maintains optimal cardiac output and hemodynamic stability  Description: INTERVENTIONS:  - Monitor vital signs, rhythm, and trends  - Monitor for bleeding, hypotension and signs of decreased cardiac output  - Evaluate effectiveness of vasoactive medications to optimize hemodynamic stability  - Monitor arterial and/or venous puncture sites for bleeding and/or hematoma  - Assess quality of pulses, skin color and temperature  - Assess for signs of decreased coronary artery perfusion - ex. Angina  - Evaluate fluid balance, assess for edema, trend weights  Outcome: Progressing  Goal: Absence of cardiac arrhythmias or at baseline  Description: INTERVENTIONS:  - Continuous cardiac monitoring, monitor vital signs, obtain 12 lead EKG if indicated  - Evaluate effectiveness of antiarrhythmic and heart rate control medications as ordered  - Initiate emergency measures for life threatening arrhythmias  - Monitor electrolytes and administer replacement therapy as ordered  Outcome: Progressing     Problem: RESPIRATORY - ADULT  Goal: Achieves optimal ventilation and oxygenation  Description: INTERVENTIONS:  - Assess for changes in respiratory status  - Assess for changes in mentation and behavior  - Position to facilitate oxygenation and minimize respiratory effort  - Oxygen supplementation based on oxygen saturation or ABGs  - Provide Smoking Cessation handout, if applicable  - Encourage broncho-pulmonary hygiene including cough, deep breathe, Incentive Spirometry  -  Assess the need for suctioning and perform as needed  - Assess and instruct to report SOB or any respiratory difficulty  - Respiratory Therapy support as indicated  - Manage/alleviate anxiety  - Monitor for signs/symptoms of CO2 retention  Outcome: Progressing     Problem: GASTROINTESTINAL - ADULT  Goal: Minimal or absence of nausea and vomiting  Description: INTERVENTIONS:  - Maintain adequate hydration with IV or PO as ordered and tolerated  - Nasogastric tube to low intermittent suction as ordered  - Evaluate effectiveness of ordered antiemetic medications  - Provide nonpharmacologic comfort measures as appropriate  - Advance diet as tolerated, if ordered  - Obtain nutritional consult as needed  - Evaluate fluid balance  Outcome: Progressing  Goal: Maintains or returns to baseline bowel function  Description: INTERVENTIONS:  - Assess bowel function  - Maintain adequate hydration with IV or PO as ordered and tolerated  - Evaluate effectiveness of GI medications  - Encourage mobilization and activity  - Obtain nutritional consult as needed  - Establish a toileting routine/schedule  - Consider collaborating with pharmacy to review patient's medication profile  Outcome: Progressing  Goal: Maintains adequate nutritional intake (undernourished)  Description: INTERVENTIONS:  - Monitor percentage of each meal consumed  - Identify factors contributing to decreased intake, treat as appropriate  - Assist with meals as needed  - Monitor I&O, WT and lab values  - Obtain nutritional consult as needed  - Optimize oral hygiene and moisture  - Encourage food from home; allow for food preferences  - Enhance eating environment  Outcome: Progressing  Goal: Achieves appropriate nutritional intake (bariatric)  Description: INTERVENTIONS:  - Monitor for over-consumption  - Identify factors contributing to increased intake, treat as appropriate  - Monitor I&O, WT and lab values  - Obtain nutritional consult as needed  - Evaluate  psychosocial factors contributing to over-consumption  Outcome: Progressing     Problem: GENITOURINARY - ADULT  Goal: Absence of urinary retention  Description: INTERVENTIONS:  - Assess patient’s ability to void and empty bladder  - Monitor intake/output and perform bladder scan as needed  - Follow urinary retention protocol/standard of care  - Consider collaborating with pharmacy to review patient's medication profile  - Implement strategies to promote bladder emptying  Outcome: Progressing     Problem: METABOLIC/FLUID AND ELECTROLYTES - ADULT  Goal: Electrolytes maintained within normal limits  Description: INTERVENTIONS:  - Monitor labs and rhythm and assess patient for signs and symptoms of electrolyte imbalances  - Administer electrolyte replacement as ordered  - Monitor response to electrolyte replacements, including rhythm and repeat lab results as appropriate  - Fluid restriction as ordered  - Instruct patient on fluid and nutrition restrictions as appropriate  Outcome: Progressing     Problem: SKIN/TISSUE INTEGRITY - ADULT  Goal: Skin integrity remains intact  Description: INTERVENTIONS  - Assess and document risk factors for pressure ulcer development  - Assess and document skin integrity  - Monitor for areas of redness and/or skin breakdown  - Initiate interventions, skin care algorithm/standards of care as needed  Outcome: Progressing  Goal: Incision(s), wounds(s) or drain site(s) healing without S/S of infection  Description: INTERVENTIONS:  - Assess and document risk factors for pressure ulcer development  - Assess and document skin integrity  - Assess and document dressing/incision, wound bed, drain sites and surrounding tissue  - Implement wound care per orders  - Initiate isolation precautions as appropriate  - Initiate Pressure Ulcer prevention bundle as indicated  Outcome: Progressing  Goal: Oral mucous membranes remain intact  Description: INTERVENTIONS  - Assess oral mucosa and hygiene  practices  - Implement preventative oral hygiene regimen  - Implement oral medicated treatments as ordered  Outcome: Progressing     Problem: MUSCULOSKELETAL - ADULT  Goal: Return mobility to safest level of function  Description: INTERVENTIONS:  - Assess patient stability and activity tolerance for standing, transferring and ambulating w/ or w/o assistive devices  - Assist with transfers and ambulation using safe patient handling equipment as needed  - Ensure adequate protection for wounds/incisions during mobilization  - Obtain PT/OT consults as needed  - Advance activity as appropriate  - Communicate ordered activity level and limitations with patient/family  Outcome: Progressing     Problem: Impaired Functional Mobility  Goal: Achieve highest/safest level of mobility/gait  Description: Interventions:  - Assess patient's functional ability and stability  - Promote increasing activity/tolerance for mobility and gait  - Educate and engage patient/family in tolerated activity level and precautions  Outcome: Progressing     Problem: Impaired Activities of Daily Living  Goal: Achieve highest/safest level of independence in self care  Description: Interventions:  - Assess ability and encourage patient to participate in ADLs to maximize function  - Promote sitting position while performing ADLs such as feeding, grooming, and bathing  - Educate and encourage patient/family in tolerated functional activity level and precautions during self-care    Outcome: Progressing     Problem: SAFETY ADULT - FALL  Goal: Free from fall injury  Description: INTERVENTIONS:  - Assess pt frequently for physical needs  - Identify cognitive and physical deficits and behaviors that affect risk of falls.  - [place] fall precautions as indicated by assessment.  - Educate pt/family on patient safety including physical limitations  - Instruct pt to call for assistance with activity based on assessment  - Modify environment to reduce risk of  injury  - Provide assistive devices as appropriate  - Consider OT/PT consult to assist with strengthening/mobility  - Encourage toileting schedule  Outcome: Progressing     Problem: PAIN - ADULT  Goal: Verbalizes/displays adequate comfort level or patient's stated pain goal  Description: INTERVENTIONS:  - Encourage pt to monitor pain and request assistance  - Assess pain using appropriate pain scale  - Administer analgesics based on type and severity of pain and evaluate response  - Implement non-pharmacological measures as appropriate and evaluate response  - Consider cultural and social influences on pain and pain management  - Manage/alleviate anxiety  - Utilize distraction and/or relaxation techniques  - Monitor for opioid side effects  - Notify MD/LIP if interventions unsuccessful or patient reports new pain  - Anticipate increased pain with activity and pre-medicate as appropriate  Outcome: Progressing

## 2024-07-09 NOTE — PROGRESS NOTES
Cardiomems reading performed twice with good signal strength and waveform. PAD for both readings was 21 mmHg. Patient tolerated it well.

## 2024-07-09 NOTE — PLAN OF CARE
Received patient at 0730. Patient alert and oriented x4. Tele Rhythm A- Fib - has watchman. O2 sats on RA.  Lungs clear. Bed is locked and low position. Call light & personal belongings within reach. Denies chest pain at this time. Patient voiding WNL. Patient tolerating ambulation w/ cane & SBA.  Skin- right leg - cellulitis & weeping - mepilex in place. Reviewed plan of care with patient and verbalized understanding.     POC: Cards following  Cardiomems done today - 19 today - no Bumex today  IV ancef  Metoprolol restarted for tonight w/ parameters - BP soft today asymptomatic    Problem: CARDIOVASCULAR - ADULT  Goal: Maintains optimal cardiac output and hemodynamic stability  Description: INTERVENTIONS:  - Monitor vital signs, rhythm, and trends  - Monitor for bleeding, hypotension and signs of decreased cardiac output  - Evaluate effectiveness of vasoactive medications to optimize hemodynamic stability  - Monitor arterial and/or venous puncture sites for bleeding and/or hematoma  - Assess quality of pulses, skin color and temperature  - Assess for signs of decreased coronary artery perfusion - ex. Angina  - Evaluate fluid balance, assess for edema, trend weights  Outcome: Progressing  Goal: Absence of cardiac arrhythmias or at baseline  Description: INTERVENTIONS:  - Continuous cardiac monitoring, monitor vital signs, obtain 12 lead EKG if indicated  - Evaluate effectiveness of antiarrhythmic and heart rate control medications as ordered  - Initiate emergency measures for life threatening arrhythmias  - Monitor electrolytes and administer replacement therapy as ordered  Outcome: Progressing

## 2024-07-09 NOTE — PROGRESS NOTES
[facility] Cardiology Progress Note    Miguel Davila Patient Status:  Inpatient    3/17/1945 MRN [id number]   Location [facility] 8NE-A Attending Gigi Rojas MD   Hosp Day # 1 PCP Eric Simon MD     Subjective:  Hypotensive overnight, but deneis dizziness/ LH. Tells me home sbp runs ~ 100.   \"I feel a little better\", leg swellign improved  Denies sob, orthopnea     Objective:  BP (!) 83/48 (BP Location: Left arm)   Pulse 75   Temp 97.6 °F (36.4 °C) (Oral)   Resp 19   Ht 5' 11\" (1.803 m)   Wt 189 lb 6 oz (85.9 kg)   SpO2 96%   BMI 26.41 kg/m²     Telemetry: afib      Intake/Output:    Intake/Output Summary (Last 24 hours) at 2024 0842  Last data filed at 2024 0600  Gross per 24 hour   Intake 250 ml   Output 325 ml   Net -75 ml       Last 3 Weights   24 0600 189 lb 6 oz (85.9 kg)   24 1723 191 lb 4.8 oz (86.8 kg)   24 1134 194 lb (88 kg)   24 1030 196 lb 3.2 oz (89 kg)   06/10/24 0810 207 lb (93.9 kg)   24 0519 205 lb 4.8 oz (93.1 kg)   24 0630 200 lb 12.8 oz (91.1 kg)   24 0905 197 lb 12.8 oz (89.7 kg)   24 0022 195 lb (88.5 kg)   24 0946 195 lb (88.5 kg)       Labs:  Recent Labs   Lab 24  1339 24  0522   GLU 85 82   BUN 52* 51*   CREATSERUM 1.75* 1.53*   EGFRCR 39* 45*   CA 8.5 8.2*    143   K 3.5 3.1*    108   CO2 25.0 28.0     Recent Labs   Lab 24  1338 24  0522   RBC 3.71* 3.16*   HGB 12.3* 10.7*   HCT 38.6* 32.5*   .0* 102.8*   MCH 33.2 33.9   MCHC 31.9 32.9   RDW 16.6 16.4   NEPRELIM 7.20 4.09   WBC 8.6 5.6   .0* 111.0*      [facility] Reference Range & Units 22 13:42 22 08:10 22 13:11 22 12:26 23 14:40 23 09:29 23 12:25 24 13:29 24 09:18   pro-BETA NATRIURETIC PEPTIDE <450 pg/mL 2,366 (H) 2,278 (H) 1,451 (H) 3,070 (H) 2,275 (H) 4,922 (H) 2,177 (H) 3,502 (H) 2,029 (H)   (H): Data is abnormally high    No results for  input(s): \"TROP\", \"TROPHS\", \"CK\" in the last 168 hours.    Diagnostics:       [facility] 2024:  BP: 130/89 mmHg  RA: 9 mmHg  RV: 48/1 mmHg  PA: 47/18/31 mmHg  PCWP: 14 mmHg  TP mmHg  CO: 7.9 liters/min  CI: 3.8 liters/min/m2  PVR: 2.2 Wood units  PVRI: 4.5 Wood units/m2      Physical Exam:    Physical Exam  Constitutional:       General: He is not in acute distress.  Neck:      Comments: Jvd with visual fluctuation   Cardiovascular:      Rate and Rhythm: Normal rate. Rhythm irregular.   Pulmonary:      Effort: Pulmonary effort is normal.      Breath sounds: No wheezing, rhonchi or rales.   Abdominal:      Palpations: Abdomen is soft.   Musculoskeletal:      Right lower leg: No edema.      Left lower leg: No edema.      Comments: Wounds to RLE dressed   Skin:     General: Skin is warm and dry.   Neurological:      Mental Status: He is alert and oriented to person, place, and time.         Medications:   potassium chloride  40 mEq Oral Q4H    ceFAZolin  2 g Intravenous Q8H    bumetanide  2 mg Intravenous BID (Diuretic)    enoxaparin  40 mg Subcutaneous Nightly    allopurinol  300 mg Oral Daily    aspirin  81 mg Oral Daily    ipratropium  1 spray Nasal BID    levothyroxine  50 mcg Oral Daily @ 0700    predniSONE  2 mg Oral Daily with breakfast    predniSONE  5 mg Oral Daily with breakfast    pantoprazole  20 mg Oral BID AC         Assessment:  80yo presented with increasing LE edema/weeping, despite increased oral bumex. Recent hospitalization for perforated cholecystitis with perotonitis.       Volume overload/ LE edema  Cardiomems=21 today, baseline 29-35  BNP chronically elevated, currently at baseline ~ 2000  Weight below baseline and now hypotensive overight with IV diuresis  US negative for DVT     Chronic HFpEF/Right sided HF w Mixed pulmonary HTN, mod TR and MR  Permanent Afib  Rate controlled  S/p Watchmann for stroke prevention   COPD/Emphysema  CKD 3  At baseline   SLE/polymositis  Monthly IV Ig  Chronic  anemia/immune thrombocytopenia  At baseline     Plan:    Stop IV bumex, Cardiomems is below baseline and pt likely intravascularly depleted. This is not an acute HF exacerbation  Add Albumin to am labs  Hold entresto given hypotension  Resume metoprolol, but hold for sbp < 100.   Follows in [facility] chronically     Gaby Hernandez, APRN  7/9/2024  8:42 AM  Ph [phone] ([place])  Ph [phone] ([place])        Patient seen and examined independently.  Note reviewed and labs reviewed. Agree with above assessment and plan. 79 year old male who presented with volume overload with swelling of bilateral lower extremities with weeping.  Also suspected to have cellulitis of right lower extremity.  CardioMEMS reading today shows 21 mmHg.  Baseline reads are approximately 29 to 35 mmHg.  Ultrasound of bilateral lower extremities negative for DVT.  Recommend holding IV Bumex. ECHO pending. Further recs to follow.         Reymundo Ferrell DO  Cardiologist  [facility]  7/9/2024 1:01 PM      Note to the patient: The 21st Century Cures Act makes medical notes like these available to patients in the interest of transparency. However, be advised that this is a medical document. It is intended as peer to peer communication. It is written in medical language and may contain abbreviations or verbiage that are unfamiliar. It may appear blunt or direct. Medical documents are intended to carry relevant information, facts as evident, and clinical opinion of the practitioner.     Disclaimer: Components of this note were documented using voice recognition system and are subject to errors not corrected at proofreading. Contact the author of this note for any clarifications.

## 2024-07-10 LAB
ANION GAP SERPL CALC-SCNC: 4 MMOL/L (ref 0–18)
BUN BLD-MCNC: 41 MG/DL (ref 9–23)
CALCIUM BLD-MCNC: 8.3 MG/DL (ref 8.5–10.1)
CHLORIDE SERPL-SCNC: 108 MMOL/L (ref 98–112)
CO2 SERPL-SCNC: 28 MMOL/L (ref 21–32)
CREAT BLD-MCNC: 1.54 MG/DL
EGFRCR SERPLBLD CKD-EPI 2021: 45 ML/MIN/1.73M2 (ref 60–?)
GLUCOSE BLD-MCNC: 91 MG/DL (ref 70–99)
OSMOLALITY SERPL CALC.SUM OF ELEC: 300 MOSM/KG (ref 275–295)
POTASSIUM SERPL-SCNC: 3.7 MMOL/L (ref 3.5–5.1)
SODIUM SERPL-SCNC: 140 MMOL/L (ref 136–145)

## 2024-07-10 PROCEDURE — 99232 SBSQ HOSP IP/OBS MODERATE 35: CPT | Performed by: INTERNAL MEDICINE

## 2024-07-10 RX ORDER — POTASSIUM CHLORIDE 20 MEQ/1
40 TABLET, EXTENDED RELEASE ORAL ONCE
Status: COMPLETED | OUTPATIENT
Start: 2024-07-10 | End: 2024-07-10

## 2024-07-10 RX ORDER — BUMETANIDE 0.25 MG/ML
1 INJECTION INTRAMUSCULAR; INTRAVENOUS ONCE
Status: COMPLETED | OUTPATIENT
Start: 2024-07-10 | End: 2024-07-10

## 2024-07-10 RX ORDER — ALBUMIN (HUMAN) 12.5 G/50ML
25 SOLUTION INTRAVENOUS ONCE
Status: COMPLETED | OUTPATIENT
Start: 2024-07-10 | End: 2024-07-10

## 2024-07-10 RX ORDER — SPIRONOLACTONE 25 MG/1
12.5 TABLET ORAL DAILY
Status: DISCONTINUED | OUTPATIENT
Start: 2024-07-10 | End: 2024-07-12

## 2024-07-10 NOTE — OCCUPATIONAL THERAPY NOTE
OCCUPATIONAL THERAPY EVALUATION - INPATIENT    Room Number: 8627/8627-A  Evaluation Date: 7/10/2024     Type of Evaluation: Initial  Presenting Problem: Cellulitis of RLE    Physician Order: IP Consult to Occupational Therapy  Reason for Therapy:  ADL/IADL Dysfunction and Discharge Planning      OCCUPATIONAL THERAPY ASSESSMENT   Patient is a 79 year old male admitted on 7/8/2024 with Presenting Problem: Cellulitis of RLE. Co-Morbidities : afib, CHF, HTN, thrombocytopenia, lupus, hepatitis, polymyositis, CKD  Patient is currently functioning near baseline with toileting, bathing, upper body dressing, lower body dressing, grooming, bed mobility, transfers, static sitting balance, dynamic sitting balance, static standing balance, dynamic standing balance, and functional standing tolerance.  Prior to admission, patient's baseline is Mod I.  Patient met all OT goals at SUP level.  Patient reports no further questions/concerns at this time.         WEIGHT BEARING RESTRICTION  Weight Bearing Restriction: None                Recommendations for nursing staff:   Transfers: Sup with RW  Toileting location: Toilet    EVALUATION SESSION:  Patient at start of session: Pt. Semi-supine in bed   FUNCTIONAL TRANSFER ASSESSMENT  Sit to Stand: Edge of Bed  Edge of Bed: Supervision (using RW)  Toilet Transfer: Supervision (Pt. simulated toilet transfer using chair similar height to std toilet)    BED MOBILITY  Supine to Sit : Independent  Scooting: Scooting to EOB independently    BALANCE ASSESSMENT  Static Sitting: Independent  Sitting Bilateral: Independent  Static Standing: Modified Independent  Standing Bilateral: Supervision    FUNCTIONAL ADL ASSESSMENT  LB Dressing Seated: Independent (Pt. simulated LB dressing seated at EOB adjusting socks using figure four method.)  Toileting Seated: Not Tested (Pt. declined using bathroom at this time)      ACTIVITY TOLERANCE: Vitals stable. Pt. Denied lightheadedness and dizziness during  session.             BP: 95/56  BP Location: Left arm  BP Method: Automatic  Patient Position: Sitting    O2 SATURATIONS       COGNITION  Overall Cognitive Status:  WFL - within functional limits  COGNITION ASSESSMENTS       Upper Extremity:   ROM: within functional limits   Strength: is within functional limits   Coordination:  Gross motor: WFL  Fine motor: WFL  Sensation: Light touch:  intact    EDUCATION PROVIDED  Patient: Role of Occupational Therapy; Plan of Care; Discharge Recommendations  Patient's Response to Education: Verbalized Understanding; Returned Demonstration    Equipment used: rw  Demonstrates functional use    Therapist comments:     Patient End of Session: Up in chair;Needs met;Call light within reach;All patient questions and concerns addressed;Alarm set    OCCUPATIONAL PROFILE    HOME SITUATION  Type of Home: House  Home Layout: Multi-level  Lives With: Spouse (pt is CG for spouse with dementia)    Toilet and Equipment: Standard height toilet  Shower/Tub and Equipment: Tub-shower combo;Tub transfer bench  Other Equipment: Other (Comment) (Has RW and cane)          Drives: Yes  Patient Regularly Uses: Glasses    Prior Level of Function: Pt. Is IND in ADLs and IADLs. Pt. Is the caregiver for wife, reporting she does not currently require physical assistance.    SUBJECTIVE  \"I've recovered from a bag of bones\"     PAIN ASSESSMENT  Rating: Unable to rate  Location: Reported soreness in RLE  Management Techniques: Relaxation;Repositioning;Activity promotion    OBJECTIVE  Precautions: Bed/chair alarm  Fall Risk: High fall risk    WEIGHT BEARING RESTRICTION  Weight Bearing Restriction: None                AM-PAC ‘6-Clicks’ Inpatient Daily Activity Short Form  -   Putting on and taking off regular lower body clothing?: None  -   Bathing (including washing, rinsing, drying)?: A Little  -   Toileting, which includes using toilet, bedpan or urinal? : None  -   Putting on and taking off regular upper body  clothing?: None  -   Taking care of personal grooming such as brushing teeth?: None  -   Eating meals?: None    AM-PAC Score:  Score: 23  Approx Degree of Impairment: 15.86%  Standardized Score (AM-PAC Scale): 51.12      ADDITIONAL TESTS     NEUROLOGICAL FINDINGS        PLAN   Patient has been evaluated and presents with no skilled Occupational Therapy needs at this time.  Patient discharged from Occupational Therapy services.  Please re-order if a new functional limitation presents during this admission.      Patient Evaluation Complexity Level:   Occupational Profile/Medical History LOW - Brief history including review of medical or therapy records    Specific performance deficits impacting engagement in ADL/IADL LOW  1 - 3 performance deficits    Client Assessment/Performance Deficits LOW - No comorbidities nor modifications of tasks    Clinical Decision Making LOW - Analysis of occupational profile, problem-focused assessments, limited treatment options    Overall Complexity LOW     OT Session Time: 20 minutes  Self-Care Home Management: 0 minutes  Therapeutic Activity: 8 minutes  Neuromuscular Re-education: 0 minutes  Therapeutic Exercise: 0 minutes  Cognitive Skills: 0 minutes  Sensory Integrative: 0 minutes  Orthotic Management and Trainin minutes  Can add/delete any of these

## 2024-07-10 NOTE — CM/SW NOTE
07/10/24 1408   Choice of Post-Acute Provider   Informed patient of right to choose their preferred provider Yes   List of appropriate post-acute services provided to patient/family with quality data Yes   Patient/family choice [facility]   Information given to Patient     SW provided pt with the choice list for HHC. Agreeable to [facility]. Reserved in [place]. AVS updated w/ their contact information.     [facility]  P:[phone]  F:[phone]    BECKA Aguirre, LSW  Discharge Planner

## 2024-07-10 NOTE — PROGRESS NOTES
[facility] Progress Note    Miguel Davila Patient Status:  Inpatient    3/17/1945 MRN [id number]   Location [facility] 8NE-A Attending Lala Darnell MD   Hosp Day # 2 PCP Eric Simon MD     Subjective:  No acute events overnight  Denies dizziness, LH  Sob resolved  LE edema resolved     Objective:  BP 92/61 (BP Location: Left arm)   Pulse 102   Temp 98.1 °F (36.7 °C) (Oral)   Resp 18   Ht 5' 11\" (1.803 m)   Wt 190 lb 4.8 oz (86.3 kg)   SpO2 99%   BMI 26.54 kg/m²     Telemetry: Afib      Intake/Output:    Intake/Output Summary (Last 24 hours) at 7/10/2024 0938  Last data filed at 7/10/2024 0753  Gross per 24 hour   Intake 250 ml   Output 1130 ml   Net -880 ml       Last 3 Weights   07/10/24 0415 190 lb 4.8 oz (86.3 kg)   24 0600 189 lb 6 oz (85.9 kg)   24 1723 191 lb 4.8 oz (86.8 kg)   24 1134 194 lb (88 kg)   24 1030 196 lb 3.2 oz (89 kg)   06/10/24 0810 207 lb (93.9 kg)   24 0519 205 lb 4.8 oz (93.1 kg)   24 0630 200 lb 12.8 oz (91.1 kg)   24 0905 197 lb 12.8 oz (89.7 kg)   24 0022 195 lb (88.5 kg)   24 0946 195 lb (88.5 kg)       Labs:  Recent Labs   Lab 24  1339 24  0522 24  1928   GLU 85 82  --    BUN 52* 51*  --    CREATSERUM 1.75* 1.53*  --    EGFRCR 39* 45*  --    CA 8.5 8.2*  --     143  --    K 3.5 3.1* 4.1    108  --    CO2 25.0 28.0  --      Recent Labs   Lab 24  1338 24  0522   RBC 3.71* 3.16*   HGB 12.3* 10.7*   HCT 38.6* 32.5*   .0* 102.8*   MCH 33.2 33.9   MCHC 31.9 32.9   RDW 16.6 16.4   NEPRELIM 7.20 4.09   WBC 8.6 5.6   .0* 111.0*         No results for input(s): \"TROP\", \"TROPHS\", \"CK\" in the last 168 hours.    Diagnostics:             Physical Exam:    Physical Exam  Cardiovascular:      Rate and Rhythm: Normal rate. Rhythm irregular.   Pulmonary:      Effort: Pulmonary effort is normal.      Breath sounds: No rales.   Abdominal:       Palpations: Abdomen is soft.   Musculoskeletal:      Right lower leg: No edema.      Left lower leg: No edema.   Neurological:      Mental Status: He is alert and oriented to person, place, and time.         Medications:   metoprolol succinate ER  100 mg Oral QAM    And    metoprolol succinate ER  50 mg Oral QPM    ceFAZolin  2 g Intravenous Q8H    enoxaparin  40 mg Subcutaneous Nightly    allopurinol  300 mg Oral Daily    aspirin  81 mg Oral Daily    ipratropium  1 spray Nasal BID    levothyroxine  50 mcg Oral Daily @ 0700    predniSONE  2 mg Oral Daily with breakfast    predniSONE  5 mg Oral Daily with breakfast    pantoprazole  20 mg Oral BID AC         Assessment:  80yo presented with increasing LE edema/weeping, despite increased oral bumex. Recent hospitalization for perforated cholecystitis with perotonitis.        Volume overload/ LE edema  Cardiomems= 25 today  today, baseline 29-35; that being said on [facility] 5/20 Padiastolc was 18 and cardiomems measured 30 and will need recalibration of Cardiomems   BNP chronically elevated, currently at baseline ~ 2000  Prior BVA c/w hypovolemia  US negative for DVT   LE edema resolved      Chronic HFpEF/Right sided HF w Mixed pulmonary HTN, mod TR and MR, NYHA class III  Permanent Afib  Rate controlled  S/p Watchmann for stroke prevention   COPD/Emphysema  CKD 3  At baseline   SLE/polymositis  Monthly IV Ig  Chronic anemia/immune thrombocytopenia  At baseline   RLE cellulitis    Plan:    Retrial entresto but half dose 24/26 bid  Retrial aldactone 12.5mg daily  Will contact outpatient procedure team to schedule Cardiomems recalibration  May need prn diuretics outpatient   Check [facility]     Gaby Hernandez, APRN  7/10/2024  9:38 AM  Ph [phone] ([place])  Ph [phone] ([place])      Patient seen and examined independently.  Note reviewed and labs reviewed. Agree with above assessment and plan.  79-year-old male who presented with bilateral lower extremity  swelling with weeping and concern for cellulitis. It appears as though CardioMEMS readings may be inaccurate.  Would recommend outpatient recalibration.  He was not on any diuretic therapy at home prior to arrival.  Has tolerated diuresis while here with improving lower extremity swelling.  He is working with physical therapy.  Today we will resume his Entresto at a lower dose as well as Aldactone.  He will likely need maintenance p.o. diuretic regimen as an outpatient.        Reymundo Ferrell DO  Cardiologist  [facility]  7/10/2024 2:27 PM      Note to the patient: The 21st Century Cures Act makes medical notes like these available to patients in the interest of transparency. However, be advised that this is a medical document. It is intended as peer to peer communication. It is written in medical language and may contain abbreviations or verbiage that are unfamiliar. It may appear blunt or direct. Medical documents are intended to carry relevant information, facts as evident, and clinical opinion of the practitioner.     Disclaimer: Components of this note were documented using voice recognition system and are subject to errors not corrected at proofreading. Contact the author of this note for any clarifications.

## 2024-07-10 NOTE — HOME CARE LIAISON
Received referral via [facility] for Home Health services. Spoke w/ patient at the bedside along with wife and is agreeable for all home health care services. Updated AVS with contact information

## 2024-07-10 NOTE — PHYSICAL THERAPY NOTE
PHYSICAL THERAPY EVALUATION - INPATIENT     Room Number: 8627/8627-A  Evaluation Date: 7/10/2024  Type of Evaluation: Initial  Physician Order: PT Eval and Treat    Presenting Problem: RLE cellulitis, acute on chronic CHF  Co-Morbidities : afib, CHF, HTN, thrombocytopenia, lupus, hepatitis, polymyositis, CKD  Reason for Therapy: Mobility Dysfunction and Discharge Planning    PHYSICAL THERAPY ASSESSMENT   Patient is a 79 year old male admitted 7/8/2024 for RLE cellulitis and acute on chronic CHF. Patient is s/p recent lap basil on 6/7/24.   Patient is currently functioning near baseline with bed mobility, transfers, and gait. Prior to admission, patient's baseline is independent with no AD.     Patient is functioning near baseline and does not have skilled PT needs upon DC.     PLAN  Patient has been evaluated and presents with no skilled Physical Therapy needs at this time.  Patient discharged from Physical Therapy services.  Please re-order if a new functional limitation presents during this admission.    GOALS  Patient was able to achieve the following goals ...    Patient was able to transfer Safely with supervision   Patient able to ambulate on level surfaces Safely with supervision         HOME SITUATION  Type of Home: House   Home Layout: Multi-level  Stairs to Enter : 2  Railing: Yes  Stairs to Bedroom: 14  Railing: Yes    Lives With: Spouse (pt is CG for spouse with dementia)  Drives: Yes  Patient Owned Equipment: Rolling walker;Cane       Prior Level of [place]: Pt is typically independent with ADLs, ambulates with no AD, and drives. Pt cares for his wife with dementia but does not have to physically assist her.     SUBJECTIVE  \"I have recovered from way worse than this, I know what to do\"       OBJECTIVE  Precautions: Bed/chair alarm  Fall Risk: High fall risk    WEIGHT BEARING RESTRICTION  Weight Bearing Restriction: None                PAIN ASSESSMENT  Rating: Unable to rate  Location:  RLE  Management Techniques: Activity promotion;Repositioning;Relaxation    COGNITION  Overall Cognitive Status:  WFL - within functional limits    RANGE OF MOTION AND STRENGTH ASSESSMENT  Upper extremity ROM and strength are within functional limits     Lower extremity ROM is within functional limits     Lower extremity strength is within functional limits, except RLE limited by pain      BALANCE  Static Sitting: Good  Dynamic Sitting: Good  Static Standing: Fair -  Dynamic Standing: Fair -    ADDITIONAL TESTS                                    ACTIVITY TOLERANCE   BP sitting EOB: 95/56, RN notified   Pt denies dizziness                   O2 WALK       NEUROLOGICAL FINDINGS                        AM-PAC '6-Clicks' INPATIENT SHORT FORM - BASIC MOBILITY  How much difficulty does the patient currently have...  Patient Difficulty: Turning over in bed (including adjusting bedclothes, sheets and blankets)?: None   Patient Difficulty: Sitting down on and standing up from a chair with arms (e.g., wheelchair, bedside commode, etc.): None   Patient Difficulty: Moving from lying on back to sitting on the side of the bed?: None   How much help from another person does the patient currently need...   Help from Another: Moving to and from a bed to a chair (including a wheelchair)?: None   Help from Another: Need to walk in hospital room?: A Little   Help from Another: Climbing 3-5 steps with a railing?: A Little       AM-PAC Score:  Raw Score: 22   Approx Degree of Impairment: 20.91%   Standardized Score (AM-PAC Scale): 53.28   CMS Modifier (G-Code): CJ    FUNCTIONAL ABILITY STATUS  Gait Assessment   Functional Mobility/Gait Assessment  Gait Assistance: Supervision;Modified independent  Distance (ft): 150  Assistive Device: Rolling walker  Pattern: R Decreased stance time    Skilled Therapy Provided: Per RN okay to work with pt. Pt received in supine and was agreeable to PT session.     Bed Mobility:  Rolling: NT  Supine to sit:  mod ind    Sit to supine: NT      Transfer Mobility:  Sit to stand: mod ind    Stand to sit: mod ind   Gait = pt ambulated with RW and supervision that progressed to mod ind     Pt reports he feels more confident using the RW at this time due to RLE pain. Pt declines attempting stairs.     Therapist's comments:Pt educated on role of therapy, goals for session, safety, fall prevention, energy conservation, and activity recommendations.     Exercise/Education Provided:  Bed mobility  Energy conservation  Functional activity tolerated  Gait training  Posture  Strengthening  Transfer training    Patient End of Session: Up in chair;Needs met;Call light within reach;RN aware of session/findings;All patient questions and concerns addressed;Alarm set    Patient Evaluation Complexity Level:  History Moderate - 1 or 2 personal factors and/or co-morbidities   Examination of body systems Low - addressing 1-2 elements   Clinical Presentation Low - Stable   Clinical Decision Making Low Complexity       PT Session Time: 25 minutes  Gait Trainin minutes

## 2024-07-10 NOTE — PROGRESS NOTES
[facility]   part of [facility]     Hospitalist Progress Note     Miguel [facility] Patient Status:  Inpatient    3/17/1945 MRN [id number]   Location [facility] 8NE-A Attending Gigi Rojas MD   Hosp Day # 2 PCP Eric Siomn MD     Chief Complaint: Bilateral leg swelling with oozing, right leg redness and swelling and warmth consistent with cellulitis    Subjective:     Shortness of breath improving.  Pedal edema improving but still present.  Oozing from the legs stopped according to patient.  Right leg erythema improving, right leg in dressing    Objective:    Review of Systems:   A comprehensive review of systems was completed; pertinent positive and negatives stated in subjective.    Vital signs:  Temp:  [97 °F (36.1 °C)-98.4 °F (36.9 °C)] 98.1 °F (36.7 °C)  Pulse:  [] 102  Resp:  [18-20] 18  BP: ()/(52-66) 92/61  SpO2:  [92 %-100 %] 99 %    Physical Exam:    /59 (BP Location: Left arm)   Pulse 78   Temp 98 °F (36.7 °C) (Oral)   Resp 20   Ht 5' 11\" (1.803 m)   Wt 190 lb 4.8 oz (86.3 kg)   SpO2 93%   BMI 26.54 kg/m²     General: No acute distress  Respiratory: Diminished BS b/l  Cardiovascular: S1, S2, regular rate and rhythm  Abdomen: Soft, Non-tender, non-distended, positive bowel sounds  Neuro: Awake alert, no new focal deficits.   Extremities: RLE erythema improving, right leg in dressing, b/l edema improving since admission    Diagnostic Data:    Labs:  Recent Labs   Lab 24   WBC 8.6 5.6   HGB 12.3* 10.7*   .0* 102.8*   .0* 111.0*       Recent Labs   Lab 24  13324  0522 24  1928   GLU 85 82  --    BUN 52* 51*  --    CREATSERUM 1.75* 1.53*  --    CA 8.5 8.2*  --    ALB 2.9* 2.9*  2.2*  --     143  --    K 3.5 3.1* 4.1    108  --    CO2 25.0 28.0  --    ALKPHO 81 63  --    AST 19 12*  --    ALT 28 16  --    BILT 1.1 0.6  --    TP 6.3* 5.0*  --        Estimated Creatinine Clearance: 40.3  mL/min (A) (based on SCr of 1.53 mg/dL (H)).    No results for input(s): \"TROP\", \"TROPHS\", \"CK\" in the last 168 hours.    No results for input(s): \"PTP\", \"INR\" in the last 168 hours.               Microbiology    Hospital Encounter on 07/08/24   1. Blood Culture     Status: None (Preliminary result)    Collection Time: 07/08/24  1:39 PM    Specimen: Blood,peripheral   Result Value Ref Range    Blood Culture Result No Growth 1 Day N/A         Imaging: Reviewed in Epic.    Medications:    spironolactone  12.5 mg Oral Daily    sacubitril-valsartan  0.5 tablet Oral BID    metoprolol succinate ER  100 mg Oral QAM    And    metoprolol succinate ER  50 mg Oral QPM    ceFAZolin  2 g Intravenous Q8H    enoxaparin  40 mg Subcutaneous Nightly    allopurinol  300 mg Oral Daily    aspirin  81 mg Oral Daily    ipratropium  1 spray Nasal BID    levothyroxine  50 mcg Oral Daily @ 0700    predniSONE  2 mg Oral Daily with breakfast    predniSONE  5 mg Oral Daily with breakfast    pantoprazole  20 mg Oral BID AC       Assessment & Plan:      #RLE cellulitis  -Continue IV antibiotics with IV Ancef  -Wound care  -Blood culture with no growth  -     #Acute on chronic diastolic heart failure with preserved EF  # bilateral pitting pedal edema due to CHF and hypoalbuminemia  #Pulmonary hypertension  # Mild hypotension, history of essential hypertension  #Atrial fibrillation sp prior Watchman  -Status post Bumex IV which was held since due to low blood pressure  -On metoprolol and Entresto and spironolactone per cardiology  -Monitor I/O  -Daily weight  -Monitor hemodynamics  - cardiomems per cardiology  -2D echo done on 7/9/2024 with EF of 55 to 60%.  Left atrium volume increased and right atrium  dilated  -Cardiology following  -Keep legs elevated  -Advised high-protein diet    # Hypoalbuminemia, bilateral pitting pedal edema, hypotension  -Advised high-protein diet  -Albumin infusion followed by Bumex  -Follow clinically     #Chronic  kidney disease  -Monitor UOP, creatinine and electrolytes  -Cr improved overnight     #SLE  #Polymyositis  #Chronic steroid use  -Continue home prednisone  -cortisol level normal at 9.3     #Hypothyroidism  -Synthroid  -TSH normal at 2.190 on 5/28/2024 on chart review     #GERD  -PPI     #LI  -LI protocol    Lala Darnell MD  7/10/2024      Supplementary Documentation:     Quality:  DVT Mechanical Prophylaxis:        DVT Pharmacologic Prophylaxis   Medication    enoxaparin (Lovenox) 40 MG/0.4ML SUBQ injection 40 mg         DVT Pharmacologic prophylaxis: Aspirin 81 mg      Code Status: Full Code  Reyes: No urinary catheter in place  Reyes Duration (in days):   Central line:    SHANNAN: 7/11/2024    Discharge is dependent on: progress  At this point Mr. Davila is expected to be discharge to: home    The 21st Century Cures Act makes medical notes like these available to patients in the interest of transparency. Please be advised this is a medical document. Medical documents are intended to carry relevant information, facts as evident, and the clinical opinion of the practitioner. The medical note is intended as peer to peer communication and may appear blunt or direct. It is written in medical language and may contain abbreviations or verbiage that are unfamiliar.             **Certification      PHYSICIAN Certification of Need for Inpatient Hospitalization - Initial Certification    Patient will require inpatient services that will reasonably be expected to span two midnight's based on the clinical documentation in H+P.   Based on patients current state of illness, I anticipate that, after discharge, patient will require TStatus postBD.

## 2024-07-10 NOTE — DISCHARGE INSTRUCTIONS
Sometimes managing your health at home requires assistance.   The [facility] team has recognized your preference to use [facility].  They can be reached by phone at [phone].  The fax number for your reference is [phone].  A representative from the home health agency will contact you or your family to schedule your first visit.

## 2024-07-10 NOTE — PLAN OF CARE
Problem: Patient/Family Goals  Goal: Patient/Family Long Term Goal  Description: Patient's Long Term Goal: will be able to go home without complication    Interventions:  -   - See additional Care Plan goals for specific interventions  Outcome: Progressing  Goal: Patient/Family Short Term Goal  Description: Patient's Short Term Goal: cellulitis in right leg will show signs of healing    Interventions:   - routine vs, antibiotic as ordered, diuretic as ordered, wound care  - See additional Care Plan goals for specific interventions  Outcome: Progressing     Problem: CARDIOVASCULAR - ADULT  Goal: Maintains optimal cardiac output and hemodynamic stability  Description: INTERVENTIONS:  - Monitor vital signs, rhythm, and trends  - Monitor for bleeding, hypotension and signs of decreased cardiac output  - Evaluate effectiveness of vasoactive medications to optimize hemodynamic stability  - Monitor arterial and/or venous puncture sites for bleeding and/or hematoma  - Assess quality of pulses, skin color and temperature  - Assess for signs of decreased coronary artery perfusion - ex. Angina  - Evaluate fluid balance, assess for edema, trend weights  Outcome: Progressing  Goal: Absence of cardiac arrhythmias or at baseline  Description: INTERVENTIONS:  - Continuous cardiac monitoring, monitor vital signs, obtain 12 lead EKG if indicated  - Evaluate effectiveness of antiarrhythmic and heart rate control medications as ordered  - Initiate emergency measures for life threatening arrhythmias  - Monitor electrolytes and administer replacement therapy as ordered  Outcome: Progressing     Problem: RESPIRATORY - ADULT  Goal: Achieves optimal ventilation and oxygenation  Description: INTERVENTIONS:  - Assess for changes in respiratory status  - Assess for changes in mentation and behavior  - Position to facilitate oxygenation and minimize respiratory effort  - Oxygen supplementation based on oxygen saturation or ABGs  - Provide  Smoking Cessation handout, if applicable  - Encourage broncho-pulmonary hygiene including cough, deep breathe, Incentive Spirometry  - Assess the need for suctioning and perform as needed  - Assess and instruct to report SOB or any respiratory difficulty  - Respiratory Therapy support as indicated  - Manage/alleviate anxiety  - Monitor for signs/symptoms of CO2 retention  Outcome: Progressing     Problem: GASTROINTESTINAL - ADULT  Goal: Minimal or absence of nausea and vomiting  Description: INTERVENTIONS:  - Maintain adequate hydration with IV or PO as ordered and tolerated  - Nasogastric tube to low intermittent suction as ordered  - Evaluate effectiveness of ordered antiemetic medications  - Provide nonpharmacologic comfort measures as appropriate  - Advance diet as tolerated, if ordered  - Obtain nutritional consult as needed  - Evaluate fluid balance  Outcome: Progressing  Goal: Maintains or returns to baseline bowel function  Description: INTERVENTIONS:  - Assess bowel function  - Maintain adequate hydration with IV or PO as ordered and tolerated  - Evaluate effectiveness of GI medications  - Encourage mobilization and activity  - Obtain nutritional consult as needed  - Establish a toileting routine/schedule  - Consider collaborating with pharmacy to review patient's medication profile  Outcome: Progressing  Goal: Maintains adequate nutritional intake (undernourished)  Description: INTERVENTIONS:  - Monitor percentage of each meal consumed  - Identify factors contributing to decreased intake, treat as appropriate  - Assist with meals as needed  - Monitor I&O, WT and lab values  - Obtain nutritional consult as needed  - Optimize oral hygiene and moisture  - Encourage food from home; allow for food preferences  - Enhance eating environment  Outcome: Progressing  Goal: Achieves appropriate nutritional intake (bariatric)  Description: INTERVENTIONS:  - Monitor for over-consumption  - Identify factors contributing  to increased intake, treat as appropriate  - Monitor I&O, WT and lab values  - Obtain nutritional consult as needed  - Evaluate psychosocial factors contributing to over-consumption  Outcome: Progressing     Problem: GENITOURINARY - ADULT  Goal: Absence of urinary retention  Description: INTERVENTIONS:  - Assess patient’s ability to void and empty bladder  - Monitor intake/output and perform bladder scan as needed  - Follow urinary retention protocol/standard of care  - Consider collaborating with pharmacy to review patient's medication profile  - Implement strategies to promote bladder emptying  Outcome: Progressing     Problem: METABOLIC/FLUID AND ELECTROLYTES - ADULT  Goal: Electrolytes maintained within normal limits  Description: INTERVENTIONS:  - Monitor labs and rhythm and assess patient for signs and symptoms of electrolyte imbalances  - Administer electrolyte replacement as ordered  - Monitor response to electrolyte replacements, including rhythm and repeat lab results as appropriate  - Fluid restriction as ordered  - Instruct patient on fluid and nutrition restrictions as appropriate  Outcome: Progressing     Problem: SKIN/TISSUE INTEGRITY - ADULT  Goal: Skin integrity remains intact  Description: INTERVENTIONS  - Assess and document risk factors for pressure ulcer development  - Assess and document skin integrity  - Monitor for areas of redness and/or skin breakdown  - Initiate interventions, skin care algorithm/standards of care as needed  Outcome: Progressing  Goal: Incision(s), wounds(s) or drain site(s) healing without S/S of infection  Description: INTERVENTIONS:  - Assess and document risk factors for pressure ulcer development  - Assess and document skin integrity  - Assess and document dressing/incision, wound bed, drain sites and surrounding tissue  - Implement wound care per orders  - Initiate isolation precautions as appropriate  - Initiate Pressure Ulcer prevention bundle as  indicated  Outcome: Progressing  Goal: Oral mucous membranes remain intact  Description: INTERVENTIONS  - Assess oral mucosa and hygiene practices  - Implement preventative oral hygiene regimen  - Implement oral medicated treatments as ordered  Outcome: Progressing     Problem: PAIN - ADULT  Goal: Verbalizes/displays adequate comfort level or patient's stated pain goal  Description: INTERVENTIONS:  - Encourage pt to monitor pain and request assistance  - Assess pain using appropriate pain scale  - Administer analgesics based on type and severity of pain and evaluate response  - Implement non-pharmacological measures as appropriate and evaluate response  - Consider cultural and social influences on pain and pain management  - Manage/alleviate anxiety  - Utilize distraction and/or relaxation techniques  - Monitor for opioid side effects  - Notify MD/LIP if interventions unsuccessful or patient reports new pain  - Anticipate increased pain with activity and pre-medicate as appropriate  Outcome: Progressing     Problem: SAFETY ADULT - FALL  Goal: Free from fall injury  Description: INTERVENTIONS:  - Assess pt frequently for physical needs  - Identify cognitive and physical deficits and behaviors that affect risk of falls.  - [place] fall precautions as indicated by assessment.  - Educate pt/family on patient safety including physical limitations  - Instruct pt to call for assistance with activity based on assessment  - Modify environment to reduce risk of injury  - Provide assistive devices as appropriate  - Consider OT/PT consult to assist with strengthening/mobility  - Encourage toileting schedule  Outcome: Progressing

## 2024-07-10 NOTE — CM/SW NOTE
07/10/24 1100   CM/SW Referral Data   Referral Source Social Work (self-referral)   Reason for Referral Discharge planning;Readmission   Informant Patient   Readmission Assessment   Factors that patient feels contributed to this readmission Acute/Chronic Clinical Presentation   Pt's living situation prior to admission? Home with family   Medical Hx   Does patient have an established PCP? Yes   Patient Info   Patient's Current Mental Status at Time of Assessment Oriented;Alert   Patient's Home Environment House   Patient lives with Spouse/Significant other   Patient Status Prior to Admission   Independent with ADLs and Mobility Yes   Discharge Needs   Anticipated D/C needs Home health care       SW met with pt at bedside to discuss discharge plan and readmission. Pt is alert and oriented. Pt was admitted for cellulitis of LE. Pt resides at home w/ spouse. Discussed wound care needs for pt at discharge, agreeable to having HHC at discharge for RN management of wounds. SW sent HH referral in [facility]. Order/f2f entered. Will provide choice list once available.       Wound Cleaning and Dressings:  Wound cleansing:  normal saline  Wound cleaning frequency: Every 48 hours and PRN  Wound product: xeroform, abd, kerlix, tape  Dressing change frequency:  Change dressing every other day and/or as needed    Included wound care orders in the HH referral.      &  to remain available and supportive for discharge planning needs.    Mary Grace Fraga, MSW, LSW  Discharge Planner

## 2024-07-10 NOTE — PROGRESS NOTES
Cardiomems reading performed with good signal strength and waveform. PAD 25 & 26. Patient tolerated it well.

## 2024-07-10 NOTE — PLAN OF CARE
Received patient at 0730. Alert and oriented x4. Tele rhythm afib. O2 saturation 92%. Breath sounds clear. Bed is locked and in low position. Call light and personal belongings in reach. No c/o chest pain or shortness of breath. Pt voiding with no issue. Pt ambulated in hallway with no issues. Wound care completed 7/9, dressing clean, dry, intact. Care plan reviewed, pt verbalizes understanding.        Problem: CARDIOVASCULAR - ADULT  Goal: Maintains optimal cardiac output and hemodynamic stability  Description: INTERVENTIONS:  - Monitor vital signs, rhythm, and trends  - Monitor for bleeding, hypotension and signs of decreased cardiac output  - Evaluate effectiveness of vasoactive medications to optimize hemodynamic stability  - Monitor arterial and/or venous puncture sites for bleeding and/or hematoma  - Assess quality of pulses, skin color and temperature  - Assess for signs of decreased coronary artery perfusion - ex. Angina  - Evaluate fluid balance, assess for edema, trend weights  Outcome: Progressing  Goal: Absence of cardiac arrhythmias or at baseline  Description: INTERVENTIONS:  - Continuous cardiac monitoring, monitor vital signs, obtain 12 lead EKG if indicated  - Evaluate effectiveness of antiarrhythmic and heart rate control medications as ordered  - Initiate emergency measures for life threatening arrhythmias  - Monitor electrolytes and administer replacement therapy as ordered  Outcome: Progressing     Problem: RESPIRATORY - ADULT  Goal: Achieves optimal ventilation and oxygenation  Description: INTERVENTIONS:  - Assess for changes in respiratory status  - Assess for changes in mentation and behavior  - Position to facilitate oxygenation and minimize respiratory effort  - Oxygen supplementation based on oxygen saturation or ABGs  - Provide Smoking Cessation handout, if applicable  - Encourage broncho-pulmonary hygiene including cough, deep breathe, Incentive Spirometry  - Assess the need for  suctioning and perform as needed  - Assess and instruct to report SOB or any respiratory difficulty  - Respiratory Therapy support as indicated  - Manage/alleviate anxiety  - Monitor for signs/symptoms of CO2 retention  Outcome: Progressing     Problem: GASTROINTESTINAL - ADULT  Goal: Minimal or absence of nausea and vomiting  Description: INTERVENTIONS:  - Maintain adequate hydration with IV or PO as ordered and tolerated  - Nasogastric tube to low intermittent suction as ordered  - Evaluate effectiveness of ordered antiemetic medications  - Provide nonpharmacologic comfort measures as appropriate  - Advance diet as tolerated, if ordered  - Obtain nutritional consult as needed  - Evaluate fluid balance  Outcome: Progressing  Goal: Maintains or returns to baseline bowel function  Description: INTERVENTIONS:  - Assess bowel function  - Maintain adequate hydration with IV or PO as ordered and tolerated  - Evaluate effectiveness of GI medications  - Encourage mobilization and activity  - Obtain nutritional consult as needed  - Establish a toileting routine/schedule  - Consider collaborating with pharmacy to review patient's medication profile  Outcome: Progressing  Goal: Maintains adequate nutritional intake (undernourished)  Description: INTERVENTIONS:  - Monitor percentage of each meal consumed  - Identify factors contributing to decreased intake, treat as appropriate  - Assist with meals as needed  - Monitor I&O, WT and lab values  - Obtain nutritional consult as needed  - Optimize oral hygiene and moisture  - Encourage food from home; allow for food preferences  - Enhance eating environment  Outcome: Progressing  Goal: Achieves appropriate nutritional intake (bariatric)  Description: INTERVENTIONS:  - Monitor for over-consumption  - Identify factors contributing to increased intake, treat as appropriate  - Monitor I&O, WT and lab values  - Obtain nutritional consult as needed  - Evaluate psychosocial factors  contributing to over-consumption  Outcome: Progressing     Problem: GENITOURINARY - ADULT  Goal: Absence of urinary retention  Description: INTERVENTIONS:  - Assess patient’s ability to void and empty bladder  - Monitor intake/output and perform bladder scan as needed  - Follow urinary retention protocol/standard of care  - Consider collaborating with pharmacy to review patient's medication profile  - Implement strategies to promote bladder emptying  Outcome: Progressing     Problem: METABOLIC/FLUID AND ELECTROLYTES - ADULT  Goal: Electrolytes maintained within normal limits  Description: INTERVENTIONS:  - Monitor labs and rhythm and assess patient for signs and symptoms of electrolyte imbalances  - Administer electrolyte replacement as ordered  - Monitor response to electrolyte replacements, including rhythm and repeat lab results as appropriate  - Fluid restriction as ordered  - Instruct patient on fluid and nutrition restrictions as appropriate  Outcome: Progressing     Problem: SKIN/TISSUE INTEGRITY - ADULT  Goal: Skin integrity remains intact  Description: INTERVENTIONS  - Assess and document risk factors for pressure ulcer development  - Assess and document skin integrity  - Monitor for areas of redness and/or skin breakdown  - Initiate interventions, skin care algorithm/standards of care as needed  Outcome: Progressing  Goal: Incision(s), wounds(s) or drain site(s) healing without S/S of infection  Description: INTERVENTIONS:  - Assess and document risk factors for pressure ulcer development  - Assess and document skin integrity  - Assess and document dressing/incision, wound bed, drain sites and surrounding tissue  - Implement wound care per orders  - Initiate isolation precautions as appropriate  - Initiate Pressure Ulcer prevention bundle as indicated  Outcome: Progressing  Goal: Oral mucous membranes remain intact  Description: INTERVENTIONS  - Assess oral mucosa and hygiene practices  - Implement  preventative oral hygiene regimen  - Implement oral medicated treatments as ordered  Outcome: Progressing     Problem: MUSCULOSKELETAL - ADULT  Goal: Return mobility to safest level of function  Description: INTERVENTIONS:  - Assess patient stability and activity tolerance for standing, transferring and ambulating w/ or w/o assistive devices  - Assist with transfers and ambulation using safe patient handling equipment as needed  - Ensure adequate protection for wounds/incisions during mobilization  - Obtain PT/OT consults as needed  - Advance activity as appropriate  - Communicate ordered activity level and limitations with patient/family  Outcome: Progressing     Problem: Impaired Functional Mobility  Goal: Achieve highest/safest level of mobility/gait  Description: Interventions:  - Assess patient's functional ability and stability  - Promote increasing activity/tolerance for mobility and gait  - Educate and engage patient/family in tolerated activity level and precautions    Outcome: Progressing     Problem: Impaired Activities of Daily Living  Goal: Achieve highest/safest level of independence in self care  Description: Interventions:  - Assess ability and encourage patient to participate in ADLs to maximize function  - Promote sitting position while performing ADLs such as feeding, grooming, and bathing  - Educate and encourage patient/family in tolerated functional activity level and precautions during self-care    Outcome: Progressing     Problem: SAFETY ADULT - FALL  Goal: Free from fall injury  Description: INTERVENTIONS:  - Assess pt frequently for physical needs  - Identify cognitive and physical deficits and behaviors that affect risk of falls.  - [place] fall precautions as indicated by assessment.  - Educate pt/family on patient safety including physical limitations  - Instruct pt to call for assistance with activity based on assessment  - Modify environment to reduce risk of injury  - Provide  assistive devices as appropriate  - Consider OT/PT consult to assist with strengthening/mobility  - Encourage toileting schedule  Outcome: Progressing     Problem: PAIN - ADULT  Goal: Verbalizes/displays adequate comfort level or patient's stated pain goal  Description: INTERVENTIONS:  - Encourage pt to monitor pain and request assistance  - Assess pain using appropriate pain scale  - Administer analgesics based on type and severity of pain and evaluate response  - Implement non-pharmacological measures as appropriate and evaluate response  - Consider cultural and social influences on pain and pain management  - Manage/alleviate anxiety  - Utilize distraction and/or relaxation techniques  - Monitor for opioid side effects  - Notify MD/LIP if interventions unsuccessful or patient reports new pain  - Anticipate increased pain with activity and pre-medicate as appropriate  Outcome: Progressing     Problem: Diabetes/Glucose Control  Goal: Glucose maintained within prescribed range  Description: INTERVENTIONS:  - Monitor Blood Glucose as ordered  - Assess for signs and symptoms of hyperglycemia and hypoglycemia  - Administer ordered medications to maintain glucose within target range  - Assess barriers to adequate nutritional intake and initiate nutrition consult as needed  - Instruct patient on self management of diabetes  Outcome: Progressing

## 2024-07-10 NOTE — CARDIAC REHAB
Home activity discussed with patient.  Patient referred to outpatient pulmonary rehab.  Patient to contact department and schedule appointment once current issues resolved.

## 2024-07-11 DIAGNOSIS — I50.32 CHRONIC DIASTOLIC HEART FAILURE (HCC): Primary | ICD-10-CM

## 2024-07-11 LAB
ANION GAP SERPL CALC-SCNC: 8 MMOL/L (ref 0–18)
BASOPHILS # BLD AUTO: 0.02 X10(3) UL (ref 0–0.2)
BASOPHILS NFR BLD AUTO: 0.5 %
BUN BLD-MCNC: 32 MG/DL (ref 9–23)
CALCIUM BLD-MCNC: 8.4 MG/DL (ref 8.5–10.1)
CHLORIDE SERPL-SCNC: 111 MMOL/L (ref 98–112)
CO2 SERPL-SCNC: 25 MMOL/L (ref 21–32)
CREAT BLD-MCNC: 1.3 MG/DL
EGFRCR SERPLBLD CKD-EPI 2021: 55 ML/MIN/1.73M2 (ref 60–?)
EOSINOPHIL # BLD AUTO: 0.2 X10(3) UL (ref 0–0.7)
EOSINOPHIL NFR BLD AUTO: 4.8 %
ERYTHROCYTE [DISTWIDTH] IN BLOOD BY AUTOMATED COUNT: 16.4 %
GLUCOSE BLD-MCNC: 119 MG/DL (ref 70–99)
HCT VFR BLD AUTO: 32.9 %
HGB BLD-MCNC: 10.3 G/DL
IMM GRANULOCYTES # BLD AUTO: 0.02 X10(3) UL (ref 0–1)
IMM GRANULOCYTES NFR BLD: 0.5 %
LYMPHOCYTES # BLD AUTO: 0.96 X10(3) UL (ref 1–4)
LYMPHOCYTES NFR BLD AUTO: 23 %
MCH RBC QN AUTO: 33.2 PG (ref 26–34)
MCHC RBC AUTO-ENTMCNC: 31.3 G/DL (ref 31–37)
MCV RBC AUTO: 106.1 FL
MONOCYTES # BLD AUTO: 0.33 X10(3) UL (ref 0.1–1)
MONOCYTES NFR BLD AUTO: 7.9 %
NEUTROPHILS # BLD AUTO: 2.64 X10 (3) UL (ref 1.5–7.7)
NEUTROPHILS # BLD AUTO: 2.64 X10(3) UL (ref 1.5–7.7)
NEUTROPHILS NFR BLD AUTO: 63.3 %
OSMOLALITY SERPL CALC.SUM OF ELEC: 306 MOSM/KG (ref 275–295)
PLATELET # BLD AUTO: 113 10(3)UL (ref 150–450)
POTASSIUM SERPL-SCNC: 3.9 MMOL/L (ref 3.5–5.1)
POTASSIUM SERPL-SCNC: 4.8 MMOL/L (ref 3.5–5.1)
RBC # BLD AUTO: 3.1 X10(6)UL
SODIUM SERPL-SCNC: 144 MMOL/L (ref 136–145)
WBC # BLD AUTO: 4.2 X10(3) UL (ref 4–11)

## 2024-07-11 PROCEDURE — 99232 SBSQ HOSP IP/OBS MODERATE 35: CPT | Performed by: INTERNAL MEDICINE

## 2024-07-11 RX ORDER — BUMETANIDE 1 MG/1
1 TABLET ORAL DAILY
Status: DISCONTINUED | OUTPATIENT
Start: 2024-07-11 | End: 2024-07-12

## 2024-07-11 RX ORDER — SPIRONOLACTONE 25 MG/1
12.5 TABLET ORAL DAILY
Qty: 15 TABLET | Refills: 0 | Status: SHIPPED | OUTPATIENT
Start: 2024-07-12

## 2024-07-11 RX ORDER — BUMETANIDE 1 MG/1
1 TABLET ORAL DAILY
Status: SHIPPED | COMMUNITY
Start: 2024-07-11 | End: 2024-07-15

## 2024-07-11 NOTE — PLAN OF CARE
Received patient at 1930.  Alert and oriented x 4. Tele Rhythm A.fib, oxygen maintained on room air. Breath sounds are clear, diminished. Skin -woundcare done-dressings reinforced. Last bowel movement 07/9. Patient voiding with no issues. Patient reports no cardiac symptoms, No chest pain or shortness of breath. Bed is locked and in low position. Call light and personal items within reach. Pt up stand by with cane.  Reviewed plan of care and patient verbalizes understanding.           Problem: CARDIOVASCULAR - ADULT  Goal: Maintains optimal cardiac output and hemodynamic stability  Description: INTERVENTIONS:  - Monitor vital signs, rhythm, and trends  - Monitor for bleeding, hypotension and signs of decreased cardiac output  - Evaluate effectiveness of vasoactive medications to optimize hemodynamic stability  - Monitor arterial and/or venous puncture sites for bleeding and/or hematoma  - Assess quality of pulses, skin color and temperature  - Assess for signs of decreased coronary artery perfusion - ex. Angina  - Evaluate fluid balance, assess for edema, trend weights  7/11/2024 0749 by Amanda Martin RN  Outcome: Progressing  7/11/2024 0749 by Amanda Martin RN  Outcome: Progressing  Goal: Absence of cardiac arrhythmias or at baseline  Description: INTERVENTIONS:  - Continuous cardiac monitoring, monitor vital signs, obtain 12 lead EKG if indicated  - Evaluate effectiveness of antiarrhythmic and heart rate control medications as ordered  - Initiate emergency measures for life threatening arrhythmias  - Monitor electrolytes and administer replacement therapy as ordered  7/11/2024 0749 by Amanda Martin RN  Outcome: Progressing  7/11/2024 0749 by Amanda Martin RN  Outcome: Progressing     Problem: RESPIRATORY - ADULT  Goal: Achieves optimal ventilation and oxygenation  Description: INTERVENTIONS:  - Assess for changes in respiratory status  - Assess for changes in mentation  and behavior  - Position to facilitate oxygenation and minimize respiratory effort  - Oxygen supplementation based on oxygen saturation or ABGs  - Provide Smoking Cessation handout, if applicable  - Encourage broncho-pulmonary hygiene including cough, deep breathe, Incentive Spirometry  - Assess the need for suctioning and perform as needed  - Assess and instruct to report SOB or any respiratory difficulty  - Respiratory Therapy support as indicated  - Manage/alleviate anxiety  - Monitor for signs/symptoms of CO2 retention  7/11/2024 0749 by Amanda Martin RN  Outcome: Progressing  7/11/2024 0749 by Amanda Martin RN  Outcome: Progressing     Problem: GASTROINTESTINAL - ADULT  Goal: Minimal or absence of nausea and vomiting  Description: INTERVENTIONS:  - Maintain adequate hydration with IV or PO as ordered and tolerated  - Nasogastric tube to low intermittent suction as ordered  - Evaluate effectiveness of ordered antiemetic medications  - Provide nonpharmacologic comfort measures as appropriate  - Advance diet as tolerated, if ordered  - Obtain nutritional consult as needed  - Evaluate fluid balance  7/11/2024 0749 by Amanda Martin RN  Outcome: Progressing  7/11/2024 0749 by Amanda Martin RN  Outcome: Progressing  Goal: Maintains or returns to baseline bowel function  Description: INTERVENTIONS:  - Assess bowel function  - Maintain adequate hydration with IV or PO as ordered and tolerated  - Evaluate effectiveness of GI medications  - Encourage mobilization and activity  - Obtain nutritional consult as needed  - Establish a toileting routine/schedule  - Consider collaborating with pharmacy to review patient's medication profile  7/11/2024 0749 by Amanda Martin RN  Outcome: Progressing  7/11/2024 0749 by Amanda Martin RN  Outcome: Progressing  Goal: Maintains adequate nutritional intake (undernourished)  Description: INTERVENTIONS:  - Monitor  percentage of each meal consumed  - Identify factors contributing to decreased intake, treat as appropriate  - Assist with meals as needed  - Monitor I&O, WT and lab values  - Obtain nutritional consult as needed  - Optimize oral hygiene and moisture  - Encourage food from home; allow for food preferences  - Enhance eating environment  7/11/2024 0749 by Amanda Martin RN  Outcome: Progressing  7/11/2024 0749 by Amanda Martin RN  Outcome: Progressing  Goal: Achieves appropriate nutritional intake (bariatric)  Description: INTERVENTIONS:  - Monitor for over-consumption  - Identify factors contributing to increased intake, treat as appropriate  - Monitor I&O, WT and lab values  - Obtain nutritional consult as needed  - Evaluate psychosocial factors contributing to over-consumption  7/11/2024 0749 by Amanda Martin RN  Outcome: Progressing  7/11/2024 0749 by Amanda Martin RN  Outcome: Progressing     Problem: GENITOURINARY - ADULT  Goal: Absence of urinary retention  Description: INTERVENTIONS:  - Assess patient’s ability to void and empty bladder  - Monitor intake/output and perform bladder scan as needed  - Follow urinary retention protocol/standard of care  - Consider collaborating with pharmacy to review patient's medication profile  - Implement strategies to promote bladder emptying  7/11/2024 0749 by Amanda Martin RN  Outcome: Progressing  7/11/2024 0749 by Amanda Martin RN  Outcome: Progressing     Problem: METABOLIC/FLUID AND ELECTROLYTES - ADULT  Goal: Electrolytes maintained within normal limits  Description: INTERVENTIONS:  - Monitor labs and rhythm and assess patient for signs and symptoms of electrolyte imbalances  - Administer electrolyte replacement as ordered  - Monitor response to electrolyte replacements, including rhythm and repeat lab results as appropriate  - Fluid restriction as ordered  - Instruct patient on fluid and nutrition  restrictions as appropriate  7/11/2024 0749 by Amanda Martin RN  Outcome: Progressing  7/11/2024 0749 by Amanda Martin RN  Outcome: Progressing     Problem: SKIN/TISSUE INTEGRITY - ADULT  Goal: Skin integrity remains intact  Description: INTERVENTIONS  - Assess and document risk factors for pressure ulcer development  - Assess and document skin integrity  - Monitor for areas of redness and/or skin breakdown  - Initiate interventions, skin care algorithm/standards of care as needed  7/11/2024 0749 by Amanda Martin RN  Outcome: Progressing  7/11/2024 0749 by Amanda Martin RN  Outcome: Progressing  Goal: Incision(s), wounds(s) or drain site(s) healing without S/S of infection  Description: INTERVENTIONS:  - Assess and document risk factors for pressure ulcer development  - Assess and document skin integrity  - Assess and document dressing/incision, wound bed, drain sites and surrounding tissue  - Implement wound care per orders  - Initiate isolation precautions as appropriate  - Initiate Pressure Ulcer prevention bundle as indicated  7/11/2024 0749 by Amanda Martin RN  Outcome: Progressing  7/11/2024 0749 by Amanda Martin RN  Outcome: Progressing  Goal: Oral mucous membranes remain intact  Description: INTERVENTIONS  - Assess oral mucosa and hygiene practices  - Implement preventative oral hygiene regimen  - Implement oral medicated treatments as ordered  7/11/2024 0749 by Amanda Martin RN  Outcome: Progressing  7/11/2024 0749 by Amanda Martin RN  Outcome: Progressing     Problem: MUSCULOSKELETAL - ADULT  Goal: Return mobility to safest level of function  Description: INTERVENTIONS:  - Assess patient stability and activity tolerance for standing, transferring and ambulating w/ or w/o assistive devices  - Assist with transfers and ambulation using safe patient handling equipment as needed  - Ensure adequate protection for  wounds/incisions during mobilization  - Obtain PT/OT consults as needed  - Advance activity as appropriate  - Communicate ordered activity level and limitations with patient/family  7/11/2024 0749 by Amanda Martin RN  Outcome: Progressing  7/11/2024 0749 by Amanda Martin RN  Outcome: Progressing     Problem: Impaired Functional Mobility  Goal: Achieve highest/safest level of mobility/gait  Description: Interventions:  - Assess patient's functional ability and stability  - Promote increasing activity/tolerance for mobility and gait  - Educate and engage patient/family in tolerated activity level and   Problem: Impaired Activities of Daily Living  Goal: Achieve highest/safest level of independence in self care  Description: Interventions:  - Assess ability and encourage patient to participate in ADLs to maximize function  - Promote sitting position while performing ADLs such as feeding, grooming, and bathing  - Educate and encourage patient/family in tolerated functional activity      Problem: SAFETY ADULT - FALL  Goal: Free from fall injury  Description: INTERVENTIONS:  - Assess pt frequently for physical needs  - Identify cognitive and physical deficits and behaviors that affect risk of falls.  - [place] fall precautions as indicated by assessment.  - Educate pt/family on patient safety including physical limitations  - Instruct pt to call for assistance with activity based on assessment  - Modify environment to reduce risk of injury  - Provide assistive devices as appropriate  - Consider OT/PT consult to assist with strengthening/mobility  - Encourage toileting schedule  7/11/2024 0749 by Amanda Martin RN  Outcome: Progressing  7/11/2024 0749 by Amanda Martin RN  Outcome: Progressing     Problem: PAIN - ADULT  Goal: Verbalizes/displays adequate comfort level or patient's stated pain goal  Description: INTERVENTIONS:  - Encourage pt to monitor pain and request  assistance  - Assess pain using appropriate pain scale  - Administer analgesics based on type and severity of pain and evaluate response  - Implement non-pharmacological measures as appropriate and evaluate response  - Consider cultural and social influences on pain and pain management  - Manage/alleviate anxiety  - Utilize distraction and/or relaxation techniques  - Monitor for opioid side effects  - Notify MD/LIP if interventions unsuccessful or patient reports new pain  - Anticipate increased pain with activity and pre-medicate as appropriate  7/11/2024 0749 by Amanda Martin, RN  Outcome: Progressing  7/11/2024 0749 by Amanda Martin, RN  Outcome: Progressing     Problem: Diabetes/Glucose Control  Goal: Glucose maintained within prescribed range  Description: INTERVENTIONS:  - Monitor Blood Glucose as ordered  - Assess for signs and symptoms of hyperglycemia and hypoglycemia  - Administer ordered medications to maintain glucose within target range  - Assess barriers to adequate nutritional intake and initiate nutrition consult as needed  - Instruct patient on self management of diabetes  7/11/2024 0749 by Amanda Martin, RN  Outcome: Progressing  7/11/2024 0749 by Amanda Martin, RN  Outcome: Progressing

## 2024-07-11 NOTE — PLAN OF CARE
Assumed pt care at approximately 0730. A&Ox4. Room air, pt denies any pain or shortness of breath, vital signs stable, Afib on tele. Voids. Up with standby/cane.     Plan of care: wound care, possible dc home    Plan of care updated with patient. Questions answered, pt verbalized understanding. Call light is within reach, all needs met at this time.    Problem: CARDIOVASCULAR - ADULT  Goal: Maintains optimal cardiac output and hemodynamic stability  Description: INTERVENTIONS:  - Monitor vital signs, rhythm, and trends  - Monitor for bleeding, hypotension and signs of decreased cardiac output  - Evaluate effectiveness of vasoactive medications to optimize hemodynamic stability  - Monitor arterial and/or venous puncture sites for bleeding and/or hematoma  - Assess quality of pulses, skin color and temperature  - Assess for signs of decreased coronary artery perfusion - ex. Angina  - Evaluate fluid balance, assess for edema, trend weights  Outcome: Progressing  Goal: Absence of cardiac arrhythmias or at baseline  Description: INTERVENTIONS:  - Continuous cardiac monitoring, monitor vital signs, obtain 12 lead EKG if indicated  - Evaluate effectiveness of antiarrhythmic and heart rate control medications as ordered  - Initiate emergency measures for life threatening arrhythmias  - Monitor electrolytes and administer replacement therapy as ordered  Outcome: Progressing     Problem: RESPIRATORY - ADULT  Goal: Achieves optimal ventilation and oxygenation  Description: INTERVENTIONS:  - Assess for changes in respiratory status  - Assess for changes in mentation and behavior  - Position to facilitate oxygenation and minimize respiratory effort  - Oxygen supplementation based on oxygen saturation or ABGs  - Provide Smoking Cessation handout, if applicable  - Encourage broncho-pulmonary hygiene including cough, deep breathe, Incentive Spirometry  - Assess the need for suctioning and perform as needed  - Assess and instruct to  report SOB or any respiratory difficulty  - Respiratory Therapy support as indicated  - Manage/alleviate anxiety  - Monitor for signs/symptoms of CO2 retention  Outcome: Progressing     Problem: GASTROINTESTINAL - ADULT  Goal: Minimal or absence of nausea and vomiting  Description: INTERVENTIONS:  - Maintain adequate hydration with IV or PO as ordered and tolerated  - Nasogastric tube to low intermittent suction as ordered  - Evaluate effectiveness of ordered antiemetic medications  - Provide nonpharmacologic comfort measures as appropriate  - Advance diet as tolerated, if ordered  - Obtain nutritional consult as needed  - Evaluate fluid balance  Outcome: Progressing  Goal: Maintains or returns to baseline bowel function  Description: INTERVENTIONS:  - Assess bowel function  - Maintain adequate hydration with IV or PO as ordered and tolerated  - Evaluate effectiveness of GI medications  - Encourage mobilization and activity  - Obtain nutritional consult as needed  - Establish a toileting routine/schedule  - Consider collaborating with pharmacy to review patient's medication profile  Outcome: Progressing  Goal: Maintains adequate nutritional intake (undernourished)  Description: INTERVENTIONS:  - Monitor percentage of each meal consumed  - Identify factors contributing to decreased intake, treat as appropriate  - Assist with meals as needed  - Monitor I&O, WT and lab values  - Obtain nutritional consult as needed  - Optimize oral hygiene and moisture  - Encourage food from home; allow for food preferences  - Enhance eating environment  Outcome: Progressing  Goal: Achieves appropriate nutritional intake (bariatric)  Description: INTERVENTIONS:  - Monitor for over-consumption  - Identify factors contributing to increased intake, treat as appropriate  - Monitor I&O, WT and lab values  - Obtain nutritional consult as needed  - Evaluate psychosocial factors contributing to over-consumption  Outcome: Progressing      Problem: CARDIOVASCULAR - ADULT  Goal: Maintains optimal cardiac output and hemodynamic stability  Description: INTERVENTIONS:  - Monitor vital signs, rhythm, and trends  - Monitor for bleeding, hypotension and signs of decreased cardiac output  - Evaluate effectiveness of vasoactive medications to optimize hemodynamic stability  - Monitor arterial and/or venous puncture sites for bleeding and/or hematoma  - Assess quality of pulses, skin color and temperature  - Assess for signs of decreased coronary artery perfusion - ex. Angina  - Evaluate fluid balance, assess for edema, trend weights  Outcome: Progressing  Goal: Absence of cardiac arrhythmias or at baseline  Description: INTERVENTIONS:  - Continuous cardiac monitoring, monitor vital signs, obtain 12 lead EKG if indicated  - Evaluate effectiveness of antiarrhythmic and heart rate control medications as ordered  - Initiate emergency measures for life threatening arrhythmias  - Monitor electrolytes and administer replacement therapy as ordered  Outcome: Progressing

## 2024-07-11 NOTE — PROGRESS NOTES
[facility] Cardiology Progress Note    Miguel Davila Patient Status:  Inpatient    3/17/1945 MRN [id number]   Location [facility] [place] 8NE-A Attending Lala Darnell MD   Hosp Day # 3 PCP Eric Simon MD     Subjective:  No acute events overnight  Up walking without dizziness, LH  Denies orthopnea  LE edema resolved     Objective:  /62 (BP Location: Left arm)   Pulse 92   Temp 97.6 °F (36.4 °C) (Oral)   Resp 18   Ht 5' 11\" (1.803 m)   Wt 189 lb 12.8 oz (86.1 kg)   SpO2 98%   BMI 26.47 kg/m²     Telemetry: afib       Intake/Output:    Intake/Output Summary (Last 24 hours) at 2024 1121  Last data filed at 2024 0802  Gross per 24 hour   Intake 320 ml   Output 2300 ml   Net -1980 ml       Last 3 Weights   24 0346 189 lb 12.8 oz (86.1 kg)   07/10/24 0415 190 lb 4.8 oz (86.3 kg)   24 0600 189 lb 6 oz (85.9 kg)   24 1723 191 lb 4.8 oz (86.8 kg)   24 1134 194 lb (88 kg)   24 1030 196 lb 3.2 oz (89 kg)   06/10/24 0810 207 lb (93.9 kg)   24 0519 205 lb 4.8 oz (93.1 kg)   24 0630 200 lb 12.8 oz (91.1 kg)   24 0905 197 lb 12.8 oz (89.7 kg)   24 0022 195 lb (88.5 kg)   24 0946 195 lb (88.5 kg)       Labs:  Recent Labs   Lab 24  0522 24  1928 07/10/24  1019 24  0506 24  0956   GLU 82  --  91  --  119*   BUN 51*  --  41*  --  32*   CREATSERUM 1.53*  --  1.54*  --  1.30   EGFRCR 45*  --  45*  --  55*   CA 8.2*  --  8.3*  --  8.4*     --  140  --  144   K 3.1*   < > 3.7 4.8 3.9     --  108  --  111   CO2 28.0  --  28.0  --  25.0    < > = values in this interval not displayed.     Recent Labs   Lab 24  1338 24  0522 24  0956   RBC 3.71* 3.16* 3.10*   HGB 12.3* 10.7* 10.3*   HCT 38.6* 32.5* 32.9*   .0* 102.8* 106.1*   MCH 33.2 33.9 33.2   MCHC 31.9 32.9 31.3   RDW 16.6 16.4 16.4   NEPRELIM 7.20 4.09 2.64   WBC 8.6 5.6 4.2   .0* 111.0* 113.0*         No results for  input(s): \"TROP\", \"TROPHS\", \"CK\" in the last 168 hours.    Diagnostics:             Physical Exam:    Physical Exam  Cardiovascular:      Rate and Rhythm: Normal rate. Rhythm irregular.   Pulmonary:      Effort: Pulmonary effort is normal.      Breath sounds: No wheezing, rhonchi or rales.   Musculoskeletal:      Right lower leg: No edema.      Left lower leg: No edema.   Neurological:      Mental Status: He is alert and oriented to person, place, and time.         Medications:   spironolactone  12.5 mg Oral Daily    sacubitril-valsartan  0.5 tablet Oral BID    metoprolol succinate ER  100 mg Oral QAM    And    metoprolol succinate ER  50 mg Oral QPM    ceFAZolin  2 g Intravenous Q8H    enoxaparin  40 mg Subcutaneous Nightly    allopurinol  300 mg Oral Daily    aspirin  81 mg Oral Daily    ipratropium  1 spray Nasal BID    levothyroxine  50 mcg Oral Daily @ 0700    predniSONE  2 mg Oral Daily with breakfast    predniSONE  5 mg Oral Daily with breakfast    pantoprazole  20 mg Oral BID AC     LVEF:55-60  NYHA Class: 2  BB:x  ACE/ARB/ARNI: x  MRA:x  SGLT2-inhibitor:  ICD/Device:  Discharge/dry weight: 189  Cardiomems candidate: needs recalibration  Heart Failure Clinic Referral Placed: follows   Inpatient HF orderset: really don't think that was acute HF      Assessment:  80yo presented with increasing LE edema/weeping, despite increased oral bumex. Recent hospitalization for perforated cholecystitis with perotonitis.        Volume overload/ LE edema  Cardiomems= 25 yesterday , baseline 29-35; that being said on [facility] 5/20 Padiastolc was 18 and cardiomems measured 30 and will need recalibration of Cardiomems, order sent to office to schedule with Dr. Craig   BNP chronically elevated, currently at baseline ~ 2000  Prior BVA c/w hypovolemia  US negative for DVT   LE edema resolved      Chronic HFpEF/Right sided HF w Mixed pulmonary HTN, mod TR and MR, NYHA class III  Permanent Afib  Rate controlled  S/p Watchmann for  stroke prevention   COPD/Emphysema  CKD 3  At baseline   SLE/polymositis  Monthly IV Ig  Chronic anemia/immune thrombocytopenia  At baseline   RLE cellulitis       Plan:    Tolerating current meds, resume prior to admission dose of entresto  Per office notes was also taking aldactone prior to admission, continue.    Will need a low dose of loop diuretic, start bumex 1mg daily.  May even use on a prn basis.  Patient is very in tune with his condition.   Follows in [facility] clinic and is scheduled for next week.   Ok to discharge from cardiology perspective      Gaby Hernandez, APRN  7/11/2024  11:21 AM  Ph [phone] ([place])  Ph [phone] ([place])        Note reviewed and labs reviewed. Agree with above assessment and plan. MDM per me. Volume status improving. Cardiomems suspected to be inaccurate - recommend re-calibration outpatient. I will also recommend daily loop diuretic at discharge with outpatient BMP and timely follow up in [facility]. Patient is sharp when it comes to his volume status and is aware of weight monitoring. Ok to discharge from cardiology perspective with outpatient follow up.        Reymundo Ferrell DO  Cardiologist  [facility]  7/11/2024 2:04 PM      Note to the patient: The 21st Century Cures Act makes medical notes like these available to patients in the interest of transparency. However, be advised that this is a medical document. It is intended as peer to peer communication. It is written in medical language and may contain abbreviations or verbiage that are unfamiliar. It may appear blunt or direct. Medical documents are intended to carry relevant information, facts as evident, and clinical opinion of the practitioner.     Disclaimer: Components of this note were documented using voice recognition system and are subject to errors not corrected at proofreading. Contact the author of this note for any clarifications.

## 2024-07-11 NOTE — PROGRESS NOTES
[facility]   part of [facility]     Hospitalist Progress Note     Miguel Griffin [facility] Patient Status:  Inpatient    3/17/1945 MRN [id number]   Location [facility] 8NE-A Attending Gigi Rojas MD   Hosp Day # 3 PCP Eric Simon MD     Chief Complaint: Bilateral leg swelling with oozing, right leg redness and swelling and warmth consistent with cellulitis    Subjective:     Shortness of breath improving.  Pedal edema improving significantly.  Oozing from the legs stopped according to patient.  Right leg erythema improving,still has r leg pain, right leg in dressing. Had 2 large BM 2 days back    Objective:    Review of Systems:   A comprehensive review of systems was completed; pertinent positive and negatives stated in subjective.    Vital signs:  Temp:  [97.4 °F (36.3 °C)-98.3 °F (36.8 °C)] 97.6 °F (36.4 °C)  Pulse:  [] 92  Resp:  [18-20] 18  BP: ()/(59-74) 104/62  SpO2:  [93 %-100 %] 98 %    Physical Exam:    /62 (BP Location: Left arm)   Pulse 92   Temp 97.6 °F (36.4 °C) (Oral)   Resp 18   Ht 5' 11\" (1.803 m)   Wt 189 lb 12.8 oz (86.1 kg)   SpO2 98%   BMI 26.47 kg/m²     General: No acute distress  Respiratory: Diminished BS b/l  Cardiovascular: S1, S2, regular rate and rhythm  Abdomen: Soft, Non-tender, non-distended, positive bowel sounds  Neuro: Awake alert, no new focal deficits.   Extremities: RLE erythema improving, right leg in dressing, b/l edema improving since admission    Diagnostic Data:    Labs:  Recent Labs   Lab 24  1338 24  0522   WBC 8.6 5.6   HGB 12.3* 10.7*   .0* 102.8*   .0* 111.0*       Recent Labs   Lab 24  1339 24  0522 24  1928 07/10/24  1019 24  0506   GLU 85 82  --  91  --    BUN 52* 51*  --  41*  --    CREATSERUM 1.75* 1.53*  --  1.54*  --    CA 8.5 8.2*  --  8.3*  --    ALB 2.9* 2.9*  2.2*  --   --   --     143  --  140  --    K 3.5 3.1* 4.1 3.7 4.8    108  --  108  --    CO2  25.0 28.0  --  28.0  --    ALKPHO 81 63  --   --   --    AST 19 12*  --   --   --    ALT 28 16  --   --   --    BILT 1.1 0.6  --   --   --    TP 6.3* 5.0*  --   --   --        Estimated Creatinine Clearance: 40.1 mL/min (A) (based on SCr of 1.54 mg/dL (H)).    No results for input(s): \"TROP\", \"TROPHS\", \"CK\" in the last 168 hours.    No results for input(s): \"PTP\", \"INR\" in the last 168 hours.               Microbiology    Hospital Encounter on 07/08/24   1. Blood Culture     Status: None (Preliminary result)    Collection Time: 07/08/24  1:39 PM    Specimen: Blood,peripheral   Result Value Ref Range    Blood Culture Result No Growth 2 Days N/A         Imaging: Reviewed in Epic.    Medications:    spironolactone  12.5 mg Oral Daily    sacubitril-valsartan  0.5 tablet Oral BID    metoprolol succinate ER  100 mg Oral QAM    And    metoprolol succinate ER  50 mg Oral QPM    ceFAZolin  2 g Intravenous Q8H    enoxaparin  40 mg Subcutaneous Nightly    allopurinol  300 mg Oral Daily    aspirin  81 mg Oral Daily    ipratropium  1 spray Nasal BID    levothyroxine  50 mcg Oral Daily @ 0700    predniSONE  2 mg Oral Daily with breakfast    predniSONE  5 mg Oral Daily with breakfast    pantoprazole  20 mg Oral BID AC       Assessment & Plan:      #RLE cellulitis  -Continue IV antibiotics with IV Ancef  -Wound care  -Blood culture with no growth  -     #Acute on chronic diastolic heart failure with preserved EF  # bilateral pitting pedal edema due to CHF and hypoalbuminemia  #Pulmonary hypertension  # Mild hypotension, history of essential hypertension  #Atrial fibrillation sp prior Watchman  -Status post Bumex IV which was held since due to low blood pressure  -On metoprolol and Entresto and spironolactone per cardiology  -Monitor I/O  -Daily weight  -Monitor hemodynamics  - cardiomems per cardiology  -2D echo done on 7/9/2024 with EF of 55 to 60%.  Left atrium volume increased and right atrium  dilated  -Cardiology  following  -Keep legs elevated  -Advised high-protein diet    # Hypoalbuminemia, bilateral pitting pedal edema, hypotension  -Advised high-protein diet  -Albumin infusion followed by Bumex given 7/10/2024  -Follow clinically     #Chronic kidney disease  -Monitor UOP, creatinine and electrolytes  -Cr improved overnight     #SLE  #Polymyositis  #Chronic steroid use  -Continue home prednisone  -cortisol level normal at 9.3     #Hypothyroidism  -Synthroid  -TSH normal at 2.190 on 5/28/2024 on chart review     #GERD  -PPI     #LI  -LI protocol    Lala Darnell MD  7/11/2024      Supplementary Documentation:     Quality:  DVT Mechanical Prophylaxis:        DVT Pharmacologic Prophylaxis   Medication    enoxaparin (Lovenox) 40 MG/0.4ML SUBQ injection 40 mg         DVT Pharmacologic prophylaxis: Aspirin 81 mg      Code Status: Full Code  Reyes: No urinary catheter in place  Reyes Duration (in days):   Central line:    SHANNAN: 7/11/2024    Discharge is dependent on: progress  At this point Mr. Davila is expected to be discharge to: home    The 21st Century Cures Act makes medical notes like these available to patients in the interest of transparency. Please be advised this is a medical document. Medical documents are intended to carry relevant information, facts as evident, and the clinical opinion of the practitioner. The medical note is intended as peer to peer communication and may appear blunt or direct. It is written in medical language and may contain abbreviations or verbiage that are unfamiliar.             **Certification      PHYSICIAN Certification of Need for Inpatient Hospitalization - Initial Certification    Patient will require inpatient services that will reasonably be expected to span two midnight's based on the clinical documentation in H+P.   Based on patients current state of illness, I anticipate that, after discharge, patient will require TStatus postBD.

## 2024-07-12 VITALS
HEIGHT: 71 IN | HEART RATE: 83 BPM | WEIGHT: 189.63 LBS | SYSTOLIC BLOOD PRESSURE: 101 MMHG | TEMPERATURE: 98 F | OXYGEN SATURATION: 99 % | BODY MASS INDEX: 26.55 KG/M2 | RESPIRATION RATE: 18 BRPM | DIASTOLIC BLOOD PRESSURE: 63 MMHG

## 2024-07-12 LAB
ANION GAP SERPL CALC-SCNC: 4 MMOL/L (ref 0–18)
BASOPHILS # BLD AUTO: 0.03 X10(3) UL (ref 0–0.2)
BASOPHILS NFR BLD AUTO: 0.6 %
BUN BLD-MCNC: 29 MG/DL (ref 9–23)
CALCIUM BLD-MCNC: 8.5 MG/DL (ref 8.5–10.1)
CHLORIDE SERPL-SCNC: 108 MMOL/L (ref 98–112)
CO2 SERPL-SCNC: 29 MMOL/L (ref 21–32)
CREAT BLD-MCNC: 1.18 MG/DL
EGFRCR SERPLBLD CKD-EPI 2021: 62 ML/MIN/1.73M2 (ref 60–?)
EOSINOPHIL # BLD AUTO: 0.23 X10(3) UL (ref 0–0.7)
EOSINOPHIL NFR BLD AUTO: 4.3 %
ERYTHROCYTE [DISTWIDTH] IN BLOOD BY AUTOMATED COUNT: 16.1 %
GLUCOSE BLD-MCNC: 78 MG/DL (ref 70–99)
HCT VFR BLD AUTO: 33.9 %
HGB BLD-MCNC: 10.9 G/DL
IMM GRANULOCYTES # BLD AUTO: 0.04 X10(3) UL (ref 0–1)
IMM GRANULOCYTES NFR BLD: 0.7 %
LYMPHOCYTES # BLD AUTO: 1.43 X10(3) UL (ref 1–4)
LYMPHOCYTES NFR BLD AUTO: 26.4 %
MCH RBC QN AUTO: 33.6 PG (ref 26–34)
MCHC RBC AUTO-ENTMCNC: 32.2 G/DL (ref 31–37)
MCV RBC AUTO: 104.6 FL
MONOCYTES # BLD AUTO: 0.49 X10(3) UL (ref 0.1–1)
MONOCYTES NFR BLD AUTO: 9.1 %
NEUTROPHILS # BLD AUTO: 3.19 X10 (3) UL (ref 1.5–7.7)
NEUTROPHILS # BLD AUTO: 3.19 X10(3) UL (ref 1.5–7.7)
NEUTROPHILS NFR BLD AUTO: 58.9 %
OSMOLALITY SERPL CALC.SUM OF ELEC: 297 MOSM/KG (ref 275–295)
PLATELET # BLD AUTO: 125 10(3)UL (ref 150–450)
POTASSIUM SERPL-SCNC: 4.4 MMOL/L (ref 3.5–5.1)
RBC # BLD AUTO: 3.24 X10(6)UL
SODIUM SERPL-SCNC: 141 MMOL/L (ref 136–145)
WBC # BLD AUTO: 5.4 X10(3) UL (ref 4–11)

## 2024-07-12 PROCEDURE — 99239 HOSP IP/OBS DSCHRG MGMT >30: CPT | Performed by: INTERNAL MEDICINE

## 2024-07-12 RX ORDER — CEPHALEXIN 500 MG/1
500 CAPSULE ORAL 3 TIMES DAILY
Qty: 15 CAPSULE | Refills: 0 | Status: SHIPPED | OUTPATIENT
Start: 2024-07-12 | End: 2024-07-17

## 2024-07-12 NOTE — PLAN OF CARE
Received patient at 1930.  Alert and oriented x 4. Tele Rhythm A.fib, oxygen maintained on room air. Breath sounds are clear, diminished. Skin -woundcare done  7/11-dressings reinforced. Last bowel movement 07/11. Patient voiding with no issues. Patient reports no cardiac symptoms, No chest pain or shortness of breath. Bed is locked and in low position. Call light and personal items within reach. Pt up stand by with cane.  Reviewed plan of care and patient verbalizes understanding.        Problem: CARDIOVASCULAR - ADULT  Goal: Maintains optimal cardiac output and hemodynamic stability  Description: INTERVENTIONS:  - Monitor vital signs, rhythm, and trends  - Monitor for bleeding, hypotension and signs of decreased cardiac output  - Evaluate effectiveness of vasoactive medications to optimize hemodynamic stability  - Monitor arterial and/or venous puncture sites for bleeding and/or hematoma  - Assess quality of pulses, skin color and temperature  - Assess for signs of decreased coronary artery perfusion - ex. Angina  - Evaluate fluid balance, assess for edema, trend weights  Outcome: Progressing  Goal: Absence of cardiac arrhythmias or at baseline  Description: INTERVENTIONS:  - Continuous cardiac monitoring, monitor vital signs, obtain 12 lead EKG if indicated  - Evaluate effectiveness of antiarrhythmic and heart rate control medications as ordered  - Initiate emergency measures for life threatening arrhythmias  - Monitor electrolytes and administer replacement therapy as ordered  Outcome: Progressing     Problem: RESPIRATORY - ADULT  Goal: Achieves optimal ventilation and oxygenation  Description: INTERVENTIONS:  - Assess for changes in respiratory status  - Assess for changes in mentation and behavior  - Position to facilitate oxygenation and minimize respiratory effort  - Oxygen supplementation based on oxygen saturation or ABGs  - Provide Smoking Cessation handout, if applicable  - Encourage broncho-pulmonary  hygiene including cough, deep breathe, Incentive Spirometry  - Assess the need for suctioning and perform as needed  - Assess and instruct to report SOB or any respiratory difficulty  - Respiratory Therapy support as indicated  - Manage/alleviate anxiety  - Monitor for signs/symptoms of CO2 retention  Outcome: Progressing     Problem: GASTROINTESTINAL - ADULT  Goal: Minimal or absence of nausea and vomiting  Description: INTERVENTIONS:  - Maintain adequate hydration with IV or PO as ordered and tolerated  - Nasogastric tube to low intermittent suction as ordered  - Evaluate effectiveness of ordered antiemetic medications  - Provide nonpharmacologic comfort measures as appropriate  - Advance diet as tolerated, if ordered  - Obtain nutritional consult as needed  - Evaluate fluid balance  Outcome: Progressing  Goal: Maintains or returns to baseline bowel function  Description: INTERVENTIONS:  - Assess bowel function  - Maintain adequate hydration with IV or PO as ordered and tolerated  - Evaluate effectiveness of GI medications  - Encourage mobilization and activity  - Obtain nutritional consult as needed  - Establish a toileting routine/schedule  - Consider collaborating with pharmacy to review patient's medication profile  Outcome: Progressing  Goal: Maintains adequate nutritional intake (undernourished)  Description: INTERVENTIONS:  - Monitor percentage of each meal consumed  - Identify factors contributing to decreased intake, treat as appropriate  - Assist with meals as needed  - Monitor I&O, WT and lab values  - Obtain nutritional consult as needed  - Optimize oral hygiene and moisture  - Encourage food from home; allow for food preferences  - Enhance eating environment  Outcome: Progressing  Goal: Achieves appropriate nutritional intake (bariatric)  Description: INTERVENTIONS:  - Monitor for over-consumption  - Identify factors contributing to increased intake, treat as appropriate  - Monitor I&O, WT and lab  values  - Obtain nutritional consult as needed  - Evaluate psychosocial factors contributing to over-consumption  Outcome: Progressing     Problem: GENITOURINARY - ADULT  Goal: Absence of urinary retention  Description: INTERVENTIONS:  - Assess patient’s ability to void and empty bladder  - Monitor intake/output and perform bladder scan as needed  - Follow urinary retention protocol/standard of care  - Consider collaborating with pharmacy to review patient's medication profile  - Implement strategies to promote bladder emptying  Outcome: Progressing     Problem: METABOLIC/FLUID AND ELECTROLYTES - ADULT  Goal: Electrolytes maintained within normal limits  Description: INTERVENTIONS:  - Monitor labs and rhythm and assess patient for signs and symptoms of electrolyte imbalances  - Administer electrolyte replacement as ordered  - Monitor response to electrolyte replacements, including rhythm and repeat lab results as appropriate  - Fluid restriction as ordered  - Instruct patient on fluid and nutrition restrictions as appropriate  Outcome: Progressing     Problem: SKIN/TISSUE INTEGRITY - ADULT  Goal: Skin integrity remains intact  Description: INTERVENTIONS  - Assess and document risk factors for pressure ulcer development  - Assess and document skin integrity  - Monitor for areas of redness and/or skin breakdown  - Initiate interventions, skin care algorithm/standards of care as needed  Outcome: Progressing  Goal: Incision(s), wounds(s) or drain site(s) healing without S/S of infection  Description: INTERVENTIONS:  - Assess and document risk factors for pressure ulcer development  - Assess and document skin integrity  - Assess and document dressing/incision, wound bed, drain sites and surrounding tissue  - Implement wound care per orders  - Initiate isolation precautions as appropriate  - Initiate Pressure Ulcer prevention bundle as indicated  Outcome: Progressing  Goal: Oral mucous membranes remain  intact  Description: INTERVENTIONS  - Assess oral mucosa and hygiene practices  - Implement preventative oral hygiene regimen  - Implement oral medicated treatments as ordered  Outcome: Progressing     Problem: MUSCULOSKELETAL - ADULT  Goal: Return mobility to safest level of function  Description: INTERVENTIONS:  - Assess patient stability and activity tolerance for standing, transferring and ambulating w/ or w/o assistive devices  - Assist with transfers and ambulation using safe patient handling equipment as needed  - Ensure adequate protection for wounds/incisions during mobilization  - Obtain PT/OT consults as needed  - Advance activity as appropriate  - Communicate ordered activity level and limitations with patient/family  Outcome: Progressing     Problem: Impaired Functional Mobility  Goal: Achieve highest/safest level of mobility/gait  Description: Interventions:  - Assess patient's functional ability and stability  - Promote increasing activity/tolerance for mobility and gait  - Educate and engage patient/family in tolerated activity level and      Problem: SAFETY ADULT - FALL  Goal: Free from fall injury  Description: INTERVENTIONS:  - Assess pt frequently for physical needs  - Identify cognitive and physical deficits and behaviors that affect risk of falls.  - [place] fall precautions as indicated by assessment.  - Educate pt/family on patient safety including physical limitations  - Instruct pt to call for assistance with activity based on assessment  - Modify environment to reduce risk of injury  - Provide assistive devices as appropriate  - Consider OT/PT consult to assist with strengthening/mobility  - Encourage toileting schedule  Outcome: Progressing     Problem: PAIN - ADULT  Goal: Verbalizes/displays adequate comfort level or patient's stated pain goal  Description: INTERVENTIONS:  - Encourage pt to monitor pain and request assistance  - Assess pain using appropriate pain scale  - Administer  analgesics based on type and severity of pain and evaluate response  - Implement non-pharmacological measures as appropriate and evaluate response  - Consider cultural and social influences on pain and pain management  - Manage/alleviate anxiety  - Utilize distraction and/or relaxation techniques  - Monitor for opioid side effects  - Notify MD/LIP if interventions unsuccessful or patient reports new pain  - Anticipate increased pain with activity and pre-medicate as appropriate  Outcome: Progressing

## 2024-07-12 NOTE — DISCHARGE SUMMARY
[facility]  Discharge Summary    Miguel Davila Patient Status:  Inpatient    3/17/1945 MRN [id number]   Location [facility] 8NE-A Attending No att. providers found   Hosp Day # 4 PCP Eric Simon MD     Date of Admission: 2024    Date of Discharge: 2024      Disposition: Home or Self Care    Discharge Diagnosis:   #RLE cellulitis  #Acute on chronic diastolic heart failure with preserved EF  # bilateral pitting pedal edema due to CHF and hypoalbuminemia  #Pulmonary hypertension  # Mild hypotension on admission-resolved   #essential hypertension  #Atrial fibrillation sp prior Watchman  # Hypoalbuminemia, bilateral pitting pedal edema, hypotension  #Chronic kidney disease  #SLE  #Polymyositis  #Chronic steroid use  #Hypothyroidism  #GERD  #LI    Chief Complaint:   Chief Complaint   Patient presents with    Swelling Edema     Weeping edema       History of Present Illness:   Miguel Davila is a 79 year old male with essential hypertension, chronic heart failure with preserved EF, pulmonary hypertension, atrial fibrillation sp Watchman, chronic kidney disease, SLE, polymyositis, chronic steroid use, hypothyroidism, LI who presents with RLE redness and swelling.      Patient underwent RHC in May d/t high cardiomems read. PAP not as elevated on RHC therefore meds adjusted: Metoprolol, Bumex and Entresto doses decreased.      In , patient admitted for sepsis d/t acute cholecystitis with perforation and peritonitis sp lap basil.     Patient has had difficulty with lower ext edema and abdominal bloating - symptoms consistent with prior volume overload. Patient increased his Bumex on his own to 2mg BID x 1 week. He has not noted significant change. In addition to lower ext swelling, patient noted RLE redness, blistering and weeping.     No fever.     No chest pain. Admits to dyspnea on exertion. No nausea, vomiting, diarrhea.   Please refer to history and physical done by Dr. Rojas  for details on admission    Brief Synopsis:   #RLE cellulitis  -Treated with IV antibiotics with IV Ancef  -Seen by wound care  -Blood culture with no growth     #Acute on chronic diastolic heart failure with preserved EF  # bilateral pitting pedal edema due to CHF and hypoalbuminemia  #Pulmonary hypertension  # Mild hypotension, history of essential hypertension  #Atrial fibrillation sp prior Watchman  -Status post Bumex IV initially which was held since due to low blood pressure  -Blood pressure improved  -Started on metoprolol and Entresto and spironolactone per cardiology  -Monitored I/O and daily weight  -Monitored hemodynamics   - cardiomems per cardiology  -2D echo done on 7/9/2024 with EF of 55 to 60%.  Left atrium volume increased and right atrium  dilated  -Cardiology following  -Advised to keep legs elevated  -Advised high-protein diet     # Hypoalbuminemia, bilateral pitting pedal edema, hypotension  -Advised high-protein diet  -Albumin infusion followed by Bumex given 7/10/2024  -Patient improved significantly after this     #Chronic kidney disease  -Monitored UOP, creatinine and electrolytes  -Cr improved      #SLE  #Polymyositis  #Chronic steroid use  -Continued home prednisone  -cortisol level normal at 9.3     #Hypothyroidism  -Continued Synthroid  -TSH normal at 2.190 on 5/28/2024 on chart review     #GERD  -Continued PPI     #LI  -LI protocol     Patient improved clinically  Discharged today on cardiac medications per cardiology.  Follow-up with cardiology as outpatient as directed.  Follow-up with regular outpatient primary care physician within 1 week in office.        TCM Diagnosis at discharge from Hospital: Other: See above; still recommend for TCM follow-up    Lace+ Score: 81  59-90 High Risk  29-58 Medium Risk  0-28   Low Risk.     TCM Follow-Up Recommendation:Miguel Davila   LACE > 58: High Risk of readmission after discharge from the hospital.      PCP: Eric Simon,  MD    Consultations:   Consultants         Provider   Role Specialty     Reymundo Ferrell DO       Consulting Physician Cardiology      Chele Boykin, Wound Care RN       Procedures during hospitalization:   None    Incidental or significant findings and recommendations (brief descriptions):  None    Lab/Test results pending at Discharge:   None      Discharge Medications:        Discharge Medications        START taking these medications        Instructions Prescription details   cephalexin 500 MG Caps  Commonly known as: Keflex      Take 1 capsule (500 mg total) by mouth 3 (three) times daily for 5 days.   Stop taking on: July 17, 2024  Quantity: 15 capsule  Refills: 0     spironolactone 25 MG Tabs  Commonly known as: Aldactone      Take 0.5 tablets (12.5 mg total) by mouth daily.   Quantity: 15 tablet  Refills: 0            CHANGE how you take these medications        Instructions Prescription details   bumetanide 1 MG Tabs  Commonly known as: Bumex  What changed:   how much to take  when to take this      Take 1 tablet (1 mg total) by mouth daily. Take 1mg po daily   Refills: 0            CONTINUE taking these medications        Instructions Prescription details   allopurinol 300 MG Tabs  Commonly known as: Zyloprim      TAKE 1 TABLET BY MOUTH EVERY DAY   Quantity: 90 tablet  Refills: 3     aspirin 81 MG Tbec      Take 1 tablet (81 mg total) by mouth daily.   Quantity: 30 tablet  Refills: 0     CITRACAL + D OR      Take 1 tablet by mouth daily.   Refills: 0     Entresto 24-26 MG Tabs  Generic drug: sacubitril-valsartan      Take 1 tablet by mouth 2 (two) times daily.   Refills: 0     fexofenadine 180 MG Tabs  Commonly known as: Allegra      Take 1 tablet (180 mg total) by mouth daily as needed.   Refills: 0     Immune Globulin (Human) 10 g Solr  Commonly known as: GAMMAGARD      Inject 0.4 g/kg into the vein. Given for treatment of polymyositis, mixed connective tissue disease, and immune thrombocytopenia  mary ann monthly at [facility].  Every 5 weeks   Quantity: 3 each  Refills: 0     ipratropium 0.06 % Soln  Commonly known as: Atrovent      1 spray by Nasal route 2 (two) times daily.   Quantity: 1 each  Refills: 5     levothyroxine 50 MCG Tabs  Commonly known as: Synthroid      Take 1 tablet (50 mcg total) by mouth before breakfast.   Refills: 0     metoprolol succinate  MG Tb24  Commonly known as: Toprol XL      Take 1 tablet (100 mg total) by mouth every morning AND 0.5 tablets (50 mg total) every evening.   Quantity: 60 tablet  Refills: 3     omeprazole 20 MG Cpdr  Commonly known as: PriLOSEC      Take 1 capsule (20 mg total) by mouth 2 (two) times daily.   Quantity: 180 capsule  Refills: 1     predniSONE 1 MG Tabs  Commonly known as: Deltasone      TAKE 2 TABLETS (2 MG TOTAL) BY MOUTH DAILY WITH BREAKFAST.   Quantity: 180 tablet  Refills: 3     predniSONE 5 MG Tabs  Commonly known as: Deltasone      Take 1 tablet (5 mg total) by mouth daily with breakfast.   Quantity: 90 tablet  Refills: 3     PreviDent 5000 Booster Plus 1.1 % Pste  Generic drug: Sodium Fluoride       Refills: 0     Tab-A-Nick Tabs      Take 1 tablet by mouth daily.   Refills: 0               Where to Get Your Medications        These medications were sent to [facility]/pharmacy #[id number] - [place], [place] - [address] [phone], [phone]  [address] [address]      Phone: [phone]   cephalexin 500 MG Caps  spironolactone 25 MG Tabs          [place] prescription monitoring program data reviewed in epic before prescribing narcotics/controlled substances: Not applicable as no narcotics prescribed    Follow up Visits: Follow-up with Eric Simon MD in 1 week    [facility]  [address]  [facility] [address] [phone]  Schedule an appointment as soon as possible for a visit      Eric Simon MD  [address]  [facility]  [address]  [phone]    Schedule an appointment as soon as possible for a visit      [facility]    Follow up on 7/15/2024  3:00pm    [facility]  [address] [address]  [phone]  Schedule an appointment as soon as possible for a visit in 1 week(s)  For wound re-check    [facility]  [address] [id number] [phone]  Schedule an appointment as soon as possible for a visit in 2 week(s)      Appointments for Next 30 Days 7/12/2024 - 8/11/2024        Date Arrival Time Visit Type Length Department Provider     7/15/2024  3:00 PM  HEART FAILURE CLINIC VISIT [2110] 30 min [facility] Tasha Barth APRN    Patient Instructions:     Please come in through the [place] Entrance, stop at [facility] registration desk in the [facility] lobby to check-in for the appointment.  Then walk through the [facility] lobby to the gold elevators beyond the staircase, and take the elevator to 2nd floor. The clinic is about 12 feet to the right, just inside the entrance to the hospital floor. It's across from the main desk you see as soon as you enter into the unit.        Location Instructions:     Your appointment is scheduled at [facility]. The address is&nbsp; [address] [place] [address]. To reach Registration, park in the [place] Parking Garage. Enter through the revolving doors located on the ground floor. Veer left past the Information Desk and proceed to the [facility] Registration desk.  Masks are optional for all patients and visitors, unless otherwise indicated.                        Physical Exam:  /63 (BP Location: Left arm)   Pulse 83   Temp 97.9 °F (36.6 °C) (Oral)   Resp 18   Ht 5' 11\" (1.803 m)   Wt 189 lb 9.5 oz (86 kg)   SpO2 99%   BMI 26.44 kg/m²     General: No acute distress  Respiratory: Diminished BS b/l  Cardiovascular: S1, S2, regular rate and  rhythm  Abdomen: Soft, Non-tender, non-distended, positive bowel sounds  Neuro: Awake alert, no new focal deficits.   Extremities: RLE erythema improving, right leg in dressing, b/l edema improving since admission         Discharge Condition: stable    Patient Discharge Instructions: See electronic chart      More than 30 minutes on discharge    Lala Darnell MD  7/12/2024

## 2024-07-12 NOTE — PROGRESS NOTES
[facility]   part of [facility]     Hospitalist Progress Note     [facility]iguel [facility] Patient Status:  Inpatient    3/17/1945 MRN [id number]   Location [facility] 8NE-A Attending Gigi Rojas MD   Hosp Day # 4 PCP Eric Simon MD     Chief Complaint: Bilateral leg swelling with oozing, right leg redness and swelling and warmth consistent with cellulitis    Subjective:   Feeling better today, eager to go home.  Okayed by cardiology for discharge already.  Discussed with RN, dressing change in the legs and leg look better.  Right leg erythema resolved.  Tenderness improving.  Wound scabbed over.  Shortness of breath improving.  Pedal edema improving significantly.     Objective:    Review of Systems:   A comprehensive review of systems was completed; pertinent positive and negatives stated in subjective.    Vital signs:  Temp:  [97.3 °F (36.3 °C)-97.9 °F (36.6 °C)] 97.9 °F (36.6 °C)  Pulse:  [] 83  Resp:  [18-20] 18  BP: ()/(47-83) 101/63  SpO2:  [91 %-99 %] 99 %    Physical Exam:    /63 (BP Location: Left arm)   Pulse 83   Temp 97.9 °F (36.6 °C) (Oral)   Resp 18   Ht 5' 11\" (1.803 m)   Wt 189 lb 9.5 oz (86 kg)   SpO2 99%   BMI 26.44 kg/m²     General: No acute distress  Respiratory: Diminished BS b/l  Cardiovascular: S1, S2, regular rate and rhythm  Abdomen: Soft, Non-tender, non-distended, positive bowel sounds  Neuro: Awake alert, no new focal deficits.   Extremities: RLE erythema improving, right leg in dressing, b/l edema improving since admission    Diagnostic Data:    Labs:  Recent Labs   Lab 24  1338 24  0522 24  0956 24  0606   WBC 8.6 5.6 4.2 5.4   HGB 12.3* 10.7* 10.3* 10.9*   .0* 102.8* 106.1* 104.6*   .0* 111.0* 113.0* 125.0*       Recent Labs   Lab 24  1339 24  0522 24  1928 07/10/24  1019 24  0506 24  0956 24  0606   GLU 85 82  --  91  --  119* 78   BUN 52* 51*  --  41*  --  32* 29*    CREATSERUM 1.75* 1.53*  --  1.54*  --  1.30 1.18   CA 8.5 8.2*  --  8.3*  --  8.4* 8.5   ALB 2.9* 2.9*  2.2*  --   --   --   --   --     143  --  140  --  144 141   K 3.5 3.1*   < > 3.7 4.8 3.9 4.4    108  --  108  --  111 108   CO2 25.0 28.0  --  28.0  --  25.0 29.0   ALKPHO 81 63  --   --   --   --   --    AST 19 12*  --   --   --   --   --    ALT 28 16  --   --   --   --   --    BILT 1.1 0.6  --   --   --   --   --    TP 6.3* 5.0*  --   --   --   --   --     < > = values in this interval not displayed.       Estimated Creatinine Clearance: 52.3 mL/min (based on SCr of 1.18 mg/dL).    No results for input(s): \"TROP\", \"TROPHS\", \"CK\" in the last 168 hours.    No results for input(s): \"PTP\", \"INR\" in the last 168 hours.               Microbiology    Hospital Encounter on 07/08/24   1. Blood Culture     Status: None (Preliminary result)    Collection Time: 07/08/24  1:39 PM    Specimen: Blood,peripheral   Result Value Ref Range    Blood Culture Result No Growth 3 Days N/A         Imaging: Reviewed in Epic.    Medications:    bumetanide  1 mg Oral Daily    spironolactone  12.5 mg Oral Daily    sacubitril-valsartan  0.5 tablet Oral BID    metoprolol succinate ER  100 mg Oral QAM    And    metoprolol succinate ER  50 mg Oral QPM    ceFAZolin  2 g Intravenous Q8H    enoxaparin  40 mg Subcutaneous Nightly    allopurinol  300 mg Oral Daily    aspirin  81 mg Oral Daily    ipratropium  1 spray Nasal BID    levothyroxine  50 mcg Oral Daily @ 0700    predniSONE  2 mg Oral Daily with breakfast    predniSONE  5 mg Oral Daily with breakfast    pantoprazole  20 mg Oral BID AC       Assessment & Plan:      #RLE cellulitis  -Continue IV antibiotics with IV Ancef  -Wound care  -Blood culture with no growth  -     #Acute on chronic diastolic heart failure with preserved EF  # bilateral pitting pedal edema due to CHF and hypoalbuminemia  #Pulmonary hypertension  # Mild hypotension, history of essential  hypertension  #Atrial fibrillation sp prior Watchman  -Status post Bumex IV which was held since due to low blood pressure  -On metoprolol and Entresto and spironolactone per cardiology  -Monitor I/O  -Daily weight  -Monitor hemodynamics  - cardiomems per cardiology  -2D echo done on 7/9/2024 with EF of 55 to 60%.  Left atrium volume increased and right atrium  dilated  -Cardiology following  -Keep legs elevated  -Advised high-protein diet    # Hypoalbuminemia, bilateral pitting pedal edema, hypotension  -Advised high-protein diet  -Albumin infusion followed by Bumex given 7/10/2024  -Follow clinically     #Chronic kidney disease  -Monitor UOP, creatinine and electrolytes  -Cr improved overnight     #SLE  #Polymyositis  #Chronic steroid use  -Continue home prednisone  -cortisol level normal at 9.3     #Hypothyroidism  -Synthroid  -TSH normal at 2.190 on 5/28/2024 on chart review     #GERD  -PPI     #LI  -LI protocol    Discharge today on cardiac medications per cardiology.  Follow-up with cardiology as outpatient as directed.  Follow-up with regular outpatient primary care physician within 1 week in office.    Lala Darnell MD        Supplementary Documentation:     Quality:  DVT Mechanical Prophylaxis:        DVT Pharmacologic Prophylaxis   Medication    enoxaparin (Lovenox) 40 MG/0.4ML SUBQ injection 40 mg         DVT Pharmacologic prophylaxis: Aspirin 81 mg      Code Status: Full Code  Reyes: No urinary catheter in place  Reyes Duration (in days):   Central line:    SHANNAN: 7/12/2024    Discharge is dependent on: progress  At this point Mr. Davila is expected to be discharge to: home    The 21st Century Cures Act makes medical notes like these available to patients in the interest of transparency. Please be advised this is a medical document. Medical documents are intended to carry relevant information, facts as evident, and the clinical opinion of the practitioner. The medical note is intended as peer to peer  communication and may appear blunt or direct. It is written in medical language and may contain abbreviations or verbiage that are unfamiliar.             **Certification      PHYSICIAN Certification of Need for Inpatient Hospitalization - Initial Certification    Patient will require inpatient services that will reasonably be expected to span two midnight's based on the clinical documentation in H+P.   Based on patients current state of illness, I anticipate that, after discharge, patient will require TStatus postBD.

## 2024-07-12 NOTE — PLAN OF CARE
Assumed pt care at approximately 0730. A&Ox4. Room air, pt denies any pain or shortness of breath, vital signs stable, Afib on tele. Voids. Up with standby/cane. RLE dressing changed yesterday (7/11), wound dressing change ordered for Q48H.     Plan of care: OR home     Plan of care updated with patient. Questions answered, pt verbalized understanding. Call light is within reach, all needs met at this time.    Problem: CARDIOVASCULAR - ADULT  Goal: Maintains optimal cardiac output and hemodynamic stability  Description: INTERVENTIONS:  - Monitor vital signs, rhythm, and trends  - Monitor for bleeding, hypotension and signs of decreased cardiac output  - Evaluate effectiveness of vasoactive medications to optimize hemodynamic stability  - Monitor arterial and/or venous puncture sites for bleeding and/or hematoma  - Assess quality of pulses, skin color and temperature  - Assess for signs of decreased coronary artery perfusion - ex. Angina  - Evaluate fluid balance, assess for edema, trend weights  Outcome: Progressing  Goal: Absence of cardiac arrhythmias or at baseline  Description: INTERVENTIONS:  - Continuous cardiac monitoring, monitor vital signs, obtain 12 lead EKG if indicated  - Evaluate effectiveness of antiarrhythmic and heart rate control medications as ordered  - Initiate emergency measures for life threatening arrhythmias  - Monitor electrolytes and administer replacement therapy as ordered  Outcome: Progressing     Problem: RESPIRATORY - ADULT  Goal: Achieves optimal ventilation and oxygenation  Description: INTERVENTIONS:  - Assess for changes in respiratory status  - Assess for changes in mentation and behavior  - Position to facilitate oxygenation and minimize respiratory effort  - Oxygen supplementation based on oxygen saturation or ABGs  - Provide Smoking Cessation handout, if applicable  - Encourage broncho-pulmonary hygiene including cough, deep breathe, Incentive Spirometry  - Assess the need for  suctioning and perform as needed  - Assess and instruct to report SOB or any respiratory difficulty  - Respiratory Therapy support as indicated  - Manage/alleviate anxiety  - Monitor for signs/symptoms of CO2 retention  Outcome: Progressing     Problem: GASTROINTESTINAL - ADULT  Goal: Minimal or absence of nausea and vomiting  Description: INTERVENTIONS:  - Maintain adequate hydration with IV or PO as ordered and tolerated  - Nasogastric tube to low intermittent suction as ordered  - Evaluate effectiveness of ordered antiemetic medications  - Provide nonpharmacologic comfort measures as appropriate  - Advance diet as tolerated, if ordered  - Obtain nutritional consult as needed  - Evaluate fluid balance  Outcome: Progressing  Goal: Maintains or returns to baseline bowel function  Description: INTERVENTIONS:  - Assess bowel function  - Maintain adequate hydration with IV or PO as ordered and tolerated  - Evaluate effectiveness of GI medications  - Encourage mobilization and activity  - Obtain nutritional consult as needed  - Establish a toileting routine/schedule  - Consider collaborating with pharmacy to review patient's medication profile  Outcome: Progressing  Goal: Maintains adequate nutritional intake (undernourished)  Description: INTERVENTIONS:  - Monitor percentage of each meal consumed  - Identify factors contributing to decreased intake, treat as appropriate  - Assist with meals as needed  - Monitor I&O, WT and lab values  - Obtain nutritional consult as needed  - Optimize oral hygiene and moisture  - Encourage food from home; allow for food preferences  - Enhance eating environment  Outcome: Progressing  Goal: Achieves appropriate nutritional intake (bariatric)  Description: INTERVENTIONS:  - Monitor for over-consumption  - Identify factors contributing to increased intake, treat as appropriate  - Monitor I&O, WT and lab values  - Obtain nutritional consult as needed  - Evaluate psychosocial factors  contributing to over-consumption  Outcome: Progressing     Problem: GENITOURINARY - ADULT  Goal: Absence of urinary retention  Description: INTERVENTIONS:  - Assess patient’s ability to void and empty bladder  - Monitor intake/output and perform bladder scan as needed  - Follow urinary retention protocol/standard of care  - Consider collaborating with pharmacy to review patient's medication profile  - Implement strategies to promote bladder emptying  Outcome: Progressing

## 2024-07-12 NOTE — BRIEF PROCEDURE NOTE
Cardiomems reading performed with good signal strength and waveform. PAD 25/26 on three consecutive readings. Patient tolerated it well. \"The plan is to go home today.\"

## 2024-07-15 ENCOUNTER — HOSPITAL ENCOUNTER (OUTPATIENT)
Dept: CARDIOLOGY CLINIC | Facility: HOSPITAL | Age: 79
End: 2024-07-15
Attending: NURSE PRACTITIONER
Payer: MEDICARE

## 2024-07-15 ENCOUNTER — PATIENT OUTREACH (OUTPATIENT)
Dept: CASE MANAGEMENT | Age: 79
End: 2024-07-15

## 2024-07-15 ENCOUNTER — HOSPITAL ENCOUNTER (OUTPATIENT)
Dept: LAB | Facility: HOSPITAL | Age: 79
Discharge: HOME | End: 2024-07-15
Attending: NURSE PRACTITIONER
Payer: MEDICARE

## 2024-07-15 ENCOUNTER — APPOINTMENT (OUTPATIENT)
Dept: LAB | Facility: HOSPITAL | Age: 79
End: 2024-07-15
Attending: NURSE PRACTITIONER
Payer: MEDICARE

## 2024-07-15 VITALS
BODY MASS INDEX: 27 KG/M2 | HEART RATE: 79 BPM | WEIGHT: 193.38 LBS | RESPIRATION RATE: 18 BRPM | DIASTOLIC BLOOD PRESSURE: 46 MMHG | SYSTOLIC BLOOD PRESSURE: 87 MMHG | OXYGEN SATURATION: 100 %

## 2024-07-15 DIAGNOSIS — N18.32 STAGE 3B CHRONIC KIDNEY DISEASE (HCC): ICD-10-CM

## 2024-07-15 DIAGNOSIS — I50.33 ACUTE ON CHRONIC DIASTOLIC HEART FAILURE (HCC): ICD-10-CM

## 2024-07-15 DIAGNOSIS — D64.9 ANEMIA, UNSPECIFIED TYPE: ICD-10-CM

## 2024-07-15 DIAGNOSIS — I50.32 CHRONIC HEART FAILURE WITH PRESERVED EJECTION FRACTION (HCC): ICD-10-CM

## 2024-07-15 DIAGNOSIS — I50.9 CHRONIC CONGESTIVE HEART FAILURE, UNSPECIFIED HEART FAILURE TYPE (HCC): ICD-10-CM

## 2024-07-15 DIAGNOSIS — D61.818 PANCYTOPENIA (HCC): ICD-10-CM

## 2024-07-15 DIAGNOSIS — L03.115 CELLULITIS OF RIGHT LOWER EXTREMITY: Primary | ICD-10-CM

## 2024-07-15 DIAGNOSIS — I50.32 CHRONIC DIASTOLIC HEART FAILURE (HCC): ICD-10-CM

## 2024-07-15 LAB
ALBUMIN SERPL-MCNC: 2.9 G/DL (ref 3.4–5)
ANION GAP SERPL CALC-SCNC: 8 MMOL/L (ref 0–18)
BUN BLD-MCNC: 36 MG/DL (ref 9–23)
CALCIUM BLD-MCNC: 8.4 MG/DL (ref 8.5–10.1)
CHLORIDE SERPL-SCNC: 106 MMOL/L (ref 98–112)
CO2 SERPL-SCNC: 25 MMOL/L (ref 21–32)
CREAT BLD-MCNC: 1.65 MG/DL
DEPRECATED HBV CORE AB SER IA-ACNC: 170.8 NG/ML
EGFRCR SERPLBLD CKD-EPI 2021: 41 ML/MIN/1.73M2 (ref 60–?)
ERYTHROCYTE [DISTWIDTH] IN BLOOD BY AUTOMATED COUNT: 16.8 %
GLUCOSE BLD-MCNC: 100 MG/DL (ref 70–99)
HCT VFR BLD AUTO: 36 %
HGB BLD-MCNC: 11.4 G/DL
IRON SATN MFR SERPL: 14 %
IRON SERPL-MCNC: 48 UG/DL
MCH RBC QN AUTO: 33.8 PG (ref 26–34)
MCHC RBC AUTO-ENTMCNC: 31.7 G/DL (ref 31–37)
MCV RBC AUTO: 106.8 FL
NT-PROBNP SERPL-MCNC: 3583 PG/ML (ref ?–450)
OSMOLALITY SERPL CALC.SUM OF ELEC: 296 MOSM/KG (ref 275–295)
PLATELET # BLD AUTO: 209 10(3)UL (ref 150–450)
POTASSIUM SERPL-SCNC: 4.5 MMOL/L (ref 3.5–5.1)
RBC # BLD AUTO: 3.37 X10(6)UL
SODIUM SERPL-SCNC: 139 MMOL/L (ref 136–145)
TIBC SERPL-MCNC: 332 UG/DL (ref 240–450)
TRANSFERRIN SERPL-MCNC: 223 MG/DL (ref 200–360)
WBC # BLD AUTO: 7.2 X10(3) UL (ref 4–11)

## 2024-07-15 PROCEDURE — 83550 IRON BINDING TEST: CPT | Performed by: NURSE PRACTITIONER

## 2024-07-15 PROCEDURE — 83880 ASSAY OF NATRIURETIC PEPTIDE: CPT | Performed by: NURSE PRACTITIONER

## 2024-07-15 PROCEDURE — 85027 COMPLETE CBC AUTOMATED: CPT | Performed by: NURSE PRACTITIONER

## 2024-07-15 PROCEDURE — 36415 COLL VENOUS BLD VENIPUNCTURE: CPT | Performed by: NURSE PRACTITIONER

## 2024-07-15 PROCEDURE — 1111F DSCHRG MED/CURRENT MED MERGE: CPT

## 2024-07-15 PROCEDURE — 80048 BASIC METABOLIC PNL TOTAL CA: CPT | Performed by: NURSE PRACTITIONER

## 2024-07-15 PROCEDURE — 82728 ASSAY OF FERRITIN: CPT | Performed by: NURSE PRACTITIONER

## 2024-07-15 PROCEDURE — 83540 ASSAY OF IRON: CPT | Performed by: NURSE PRACTITIONER

## 2024-07-15 PROCEDURE — 99215 OFFICE O/P EST HI 40 MIN: CPT | Performed by: NURSE PRACTITIONER

## 2024-07-15 RX ORDER — BUMETANIDE 1 MG/1
2 TABLET ORAL DAILY
COMMUNITY
Start: 2024-07-15

## 2024-07-15 RX ORDER — BUMETANIDE 0.25 MG/ML
2 INJECTION INTRAMUSCULAR; INTRAVENOUS ONCE
Status: COMPLETED | OUTPATIENT
Start: 2024-07-15 | End: 2024-07-15

## 2024-07-15 RX ORDER — SPIRONOLACTONE 25 MG/1
12.5 TABLET ORAL DAILY
COMMUNITY

## 2024-07-15 RX ADMIN — BUMETANIDE 2 MG: 0.25 INJECTION INTRAMUSCULAR; INTRAVENOUS at 15:45:00

## 2024-07-15 NOTE — PROGRESS NOTES
[facility] Progress Note    Miguel Davila is a 79 year old male who presents to clinic for APN assessment and management of chronic diastolic heart failure and is functional class 2-3.     Subjective:  Since his last clinic visit on 5/17;     He had RHC on 5/20 that shows RA 9 mmHg, mPAP 31 mmhg, wedge 14 mmHg, CI 3.8 and PVR 2.2 wood units. Dr. Jean recommended increasing Entresto with consideration for retrying SGLT2i and increase in MRA at a later date. CardioMEMs sensor was noted to be inaccurate and re-calibration was recommended.      He had IVIG infusion 5/29.     He was admitted with acute cholecystitis with localized peritonitis and underwent cholecystectomy 6/7. Treated with antibiotics. Aldactone, Bumex and Entresto were held during hospital stay d.t low BP. He was discharged on Bumex 1 mg, remained off ARNI and MRA.     Admitted again 7/8-7/12 with leg swelling, weeping and abdominal distension. He was felt to be fluid overloaded on admission, but then became hypotensive with diuresis. Albumin level was low and felt to be a contributing factor to leg swelling and hypotension. Subsequently, he was given an albumin infusion followed by diuretics and started to improve. LE US negative for DVT. Treated with antibiotics for cellulitis. GDMT started at low doses. To take PRN Bumex on discharge. Discharge weight of 189 lbs.     Since discharge he has been doing okay. Weight is down 7 lbs since May. He has not been weighing himself but knows he should. His appetite is good and he thinks he is eating enough protein. He has been following BP at home that has been in the 100's and he takes it at various times. He is tired today but overall feels energy levels are improving. He has been trying to do more daily. He has home care nursing with wound care. He will be following up with the wound clinic on Thursday.     Upon discussion, he increased Bumex to 2 mg daily over  the weekend d.t increased leg swelling with mild improvement.  He has been taking the full 24/26 mg of Entresto BID not 1/2 tablet BID since he has 49/53 mg BID at home.     Review of Systems   Constitutional: Positive for malaise/fatigue (improved energy) and weight loss.   Cardiovascular:  Positive for leg swelling (worse).   Respiratory: Negative.     Gastrointestinal: Negative.    Neurological:  Negative for dizziness.     HISTORY:  Past Medical History:    A-fib (HCC)    Arrhythmia    atrial fibrillation    Arthritis    Autoimmune disease (HCC)    hepatits, resolved anfd nephritis, resolved    Raomn's esophagus    Bleeding nose    Gums Treated    Blood disorder    thrombocytopenia    Blood in urine    Blurred vision    cataract due to steroids, surgery in June    Calculus of kidney    One time    Cancer (HCC)    skin    Candidiasis of the esophagus    Due to steroid use for Autoimmune disorder     Cataract    Colon polyps    Congestive heart disease (HCC)    Diarrhea, unspecified    Intermittent due to lupus    Diverticulosis of colon    Easy bruising    On and off prednisone for 20 years    Esophageal polyp    Esophageal reflux    Essential hypertension    Fatigue    polymyositis and lupus    Gout    Hammer toe, acquired    Heart palpitations    Afib    Hepatitis    autoimmune induce hepatitis d/t lupus    High blood pressure    IBS (irritable bowel syndrome)    mild d/t lupus    Irregular bowel habits    Leg swelling    Heart failure, probably caused by lupus    Lupus (HCC)    MCTD (mixed connective tissue disease) (HCC)    Obstructive sleep apnea (adult) (pediatric)    LI (obstructive sleep apnea)    AHI 37  Supine AHI 38 non-supine AHI 16 Sao2 Pj 83%     LI (obstructive sleep apnea)    AHI 36 RDI 36 REM AHI 45 Supine AHI 65 non-supine AHI 19 Sao2 Pj 86% CPAP 9cwp    Pain in joints    Personal history of antineoplastic chemotherapy    Pleural effusion    right    Pneumonia due to organism     Polymyositis (HCC)    Presence of other cardiac implants and grafts    watchman filter    Problems with swallowing    dysphagia in 2006    Pulmonary hypertension (HCC)    Raynaud disease    Raynaud disease    Renal disorder    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    Shortness of breath    mainly due to pm or lupus    Sleep apnea    CPAP    Thrombocytopathia (HCC)    Visual impairment    bifocals for reading & computer; distance for driving    Wears glasses      Past Surgical History:   Procedure Laterality Date    Appendectomy  1970    Appendectomy      Cardiac catheterization  09/2019    Cataract Bilateral     Cholecystectomy      Colonoscopy  10/2003 2006 01/2010    x3    Colonoscopy  05/14/2013    Colonoscopy N/A 05/01/2018    Procedure: COLONOSCOPY;  Surgeon: Isaiah Tobar MD;  Location:  ENDOSCOPY    Colonoscopy N/A 04/12/2024    Procedure: COLONOSCOPY;  Surgeon: Gerardo Neves MD;  Location:  ENDOSCOPY    Colonoscopy with biopsy  05/14/2013    Egd      Hand/finger surgery unlisted  2003    right    Needle biopsy liver      Other  12/2005    needle bipsy of kidney    Other surgical history  2017    watchman filter    Percutaneous  angioplasty  (ptca)- pbp only  2006    right    Rectum surgery procedure unlisted  1946    rectal surgery polypectomy    Skin surgery      MMS BCC R temple 6/24/09    Skin surgery  2007    basal ceell carcinoma    Skin surgery  07/18/2012    BCC-nodular to right superior eyebrow/ MOHS    Skin surgery  07/15/2013    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/MMS    Skin surgery  02/19/2014    BCC-NOD to right superior pinna, MMS, AB    Skin surgery  02/16/2021    BCC- left superior forehead, MMS     Skin surgery  03/07/2022    SCC IN SITU RIGHT ANTIHELIX MMS BY DR GASTELUM    Tonsillectomy  1948    Upper gi endoscopy,exam  10/2003 2006 01/2010 , 5/13    x3      Family History   Problem Relation Age of Onset    Heart Disease Father         CHF    Gastro-Intestinal Disorder Father          Diverticulosis    Alcohol and Other Disorders Associated Father     Heart Attack Father     Heart Disease Mother         CHF    Breast Cancer Mother     Stroke Mother     Cancer Paternal Grandfather     Heart Attack Paternal Grandmother     Kidney Disease Son     Other (Ramon's Esophagus) Son     Heart Attack Maternal Grandfather       Social History     Socioeconomic History    Marital status:    Occupational History    Occupation: Retired    Tobacco Use    Smoking status: Former     Current packs/day: 0.00     Average packs/day: 0.5 packs/day for 15.0 years (7.5 ttl pk-yrs)     Types: Cigarettes     Start date: 1958     Quit date: 1973     Years since quittin.0    Smokeless tobacco: Never    Tobacco comments:     5 cigarettes daily, stopped smoking in    Vaping Use    Vaping status: Never Used   Substance and Sexual Activity    Alcohol use: Yes     Alcohol/week: 13.0 - 15.0 standard drinks of alcohol     Types: 5 Glasses of wine, 8 - 10 Standard drinks or equivalent per week     Comment: 1 per day wine or liquor    Drug use: Never   Other Topics Concern    Caffeine Concern Yes     Comment: 1 soda per day and decaf coffee    Sleep Concern No    Exercise Yes     Comment: 3-4 times weekly    Seat Belt Yes     Social Determinants of Health     Financial Resource Strain: Low Risk  (2024)    Financial Resource Strain     Difficulty of Paying Living Expenses: Not very hard     Med Affordability: No   Food Insecurity: No Food Insecurity (2024)    Food Insecurity     Food Insecurity: Never true   Transportation Needs: No Transportation Needs (2024)    Transportation Needs     Lack of Transportation: No   Housing Stability: Low Risk  (2024)    Housing Stability     Housing Instability: No           Objective:  Telemetry: AF        BP (!) 87/46   Pulse 79   Resp 18   Wt 193 lb 6.4 oz (87.7 kg)   SpO2 100%   BMI 26.97 kg/m²     Wt Readings from Last 6 Encounters:   07/15/24 193  lb 6.4 oz (87.7 kg)   07/12/24 189 lb 9.5 oz (86 kg)   06/28/24 196 lb 3.2 oz (89 kg)   06/10/24 207 lb (93.9 kg)   06/04/24 195 lb (88.5 kg)   05/29/24 201 lb (91.2 kg)     Lab Results   Component Value Date    ALB 2.9 07/15/2024   7/9/2024 Echo:  1.  Left ventricle: The cavity size was normal. Wall thickness was normal.       Systolic function was at the lower limits of normal. The estimated       ejection fraction was 55-60%, by visual assessment. Unable to assess LV       diastolic function.   2.  Right ventricle: The cavity size was increased. Systolic pressure was       mildly increased.   3.  Left atrium: The left atrial volume was markedly increased.   4.  Right atrium: The atrium was markedly dilated.   5.  Aortic valve: There was moderate regurgitation.   6.  Aortic root: The aortic root was 4.0cm diameter.   7.  Ascending aorta: The ascending aorta was 4.2cm diameter.   8.  Aortic arch: The aortic arch was 3.6cm diameter.   9.  Mitral valve: There was mild to moderate regurgitation, directed       eccentrically.   10. Tricuspid valve: There was mild-moderate regurgitation.   11. Pulmonary arteries: Systolic pressure was mildly increased, in the range       of 35mm Hg to 40mm Hg.   Impressions:  This study is compared with previous dated 5/10/22: No   significant change since prior study.     Recent Results (from the past 24 hour(s))   CBC, Platelet; No Differential    Collection Time: 07/15/24  2:28 PM   Result Value Ref Range    WBC 7.2 4.0 - 11.0 x10(3) uL    RBC 3.37 (L) 3.80 - 5.80 x10(6)uL    HGB 11.4 (L) 13.0 - 17.5 g/dL    HCT 36.0 (L) 39.0 - 53.0 %    .0 150.0 - 450.0 10(3)uL    .8 (H) 80.0 - 100.0 fL    MCH 33.8 26.0 - 34.0 pg    MCHC 31.7 31.0 - 37.0 g/dL    RDW 16.8 %   Basic Metabolic Panel (8)    Collection Time: 07/15/24  2:28 PM   Result Value Ref Range    Glucose 100 (H) 70 - 99 mg/dL    Sodium 139 136 - 145 mmol/L    Potassium 4.5 3.5 - 5.1 mmol/L    Chloride 106 98 - 112  mmol/L    CO2 25.0 21.0 - 32.0 mmol/L    Anion Gap 8 0 - 18 mmol/L    BUN 36 (H) 9 - 23 mg/dL    Creatinine 1.65 (H) 0.70 - 1.30 mg/dL    Calcium, Total 8.4 (L) 8.5 - 10.1 mg/dL    Calculated Osmolality 296 (H) 275 - 295 mOsm/kg    eGFR-Cr 41 (L) >=60 mL/min/1.73m2    Patient Fasting for BMP? Patient not present    Pro Beta Natriuretic Peptide    Collection Time: 07/15/24  2:28 PM   Result Value Ref Range    Pro-Beta Natriuretic Peptide 3,583 (H) <450 pg/mL   Albumin Serum    Collection Time: 07/15/24  2:28 PM   Result Value Ref Range    Albumin 2.9 (L) 3.4 - 5.0 g/dL         Current Outpatient Medications:     bumetanide 1 MG Oral Tab, Take 2 tablets (2 mg total) by mouth daily. Take 1mg po daily, Disp: , Rfl:     spironolactone 25 MG Oral Tab, Take 0.5 tablets (12.5 mg total) by mouth daily., Disp: , Rfl:     cephalexin 500 MG Oral Cap, Take 1 capsule (500 mg total) by mouth 3 (three) times daily for 5 days., Disp: 15 capsule, Rfl: 0    spironolactone 25 MG Oral Tab, Take 0.5 tablets (12.5 mg total) by mouth daily., Disp: 15 tablet, Rfl: 0    fexofenadine 180 MG Oral Tab, Take 1 tablet (180 mg total) by mouth daily as needed., Disp: , Rfl:     levothyroxine 50 MCG Oral Tab, Take 1 tablet (50 mcg total) by mouth before breakfast., Disp: , Rfl:     ipratropium 0.06 % Nasal Solution, 1 spray by Nasal route 2 (two) times daily., Disp: 1 each, Rfl: 5    omeprazole 20 MG Oral Capsule Delayed Release, Take 1 capsule (20 mg total) by mouth 2 (two) times daily., Disp: 180 capsule, Rfl: 1    ALLOPURINOL 300 MG Oral Tab, TAKE 1 TABLET BY MOUTH EVERY DAY, Disp: 90 tablet, Rfl: 3    predniSONE 5 MG Oral Tab, Take 1 tablet (5 mg total) by mouth daily with breakfast., Disp: 90 tablet, Rfl: 3    metoprolol succinate  MG Oral Tablet 24 Hr, Take 1 tablet (100 mg total) by mouth every morning AND 0.5 tablets (50 mg total) every evening., Disp: 60 tablet, Rfl: 3    PREDNISONE 1 MG Oral Tab, TAKE 2 TABLETS (2 MG TOTAL) BY MOUTH  DAILY WITH BREAKFAST., Disp: 180 tablet, Rfl: 3    aspirin 81 MG Oral Tab EC, Take 1 tablet (81 mg total) by mouth daily., Disp: 30 tablet, Rfl: 0    PREVIDENT 5000 BOOSTER PLUS 1.1 % Dental Paste, , Disp: , Rfl:     Multiple Vitamins-Minerals (TAB-A-KEVIN) Oral Tab, Take 1 tablet by mouth daily., Disp: , Rfl:     Immune Globulin, Human, 10 g Intravenous Recon Soln, Inject 0.4 g/kg into the vein. Given for treatment of polymyositis, mixed connective tissue disease, and immune thrombocytopenia purpura monthly at [facility]. Every 5 weeks, Disp: 3 each, Rfl: 0    Calcium Citrate-Vitamin D (CITRACAL + D OR), Take 1 tablet by mouth daily., Disp: , Rfl:     Exam:   General:         Alert, in no apparent distress  HEENT:           no JVD   Lungs:            bibasilar crackles                    CV:                   irregularly irregular  Abdomen:       round, soft, non-tender  Extremities:    bilateral LE edema, right leg wrapped. +2-3 on left leg   Neuro:             A&O x 3  Skin:                no visible sores     Education:  Patient instructed regarding sodium restricted diet, low sodium foods, fluid restriction, daily weights, medication regimen, s/s HF exacerbation and when to call APN/clinic.    Assessment:   Chronic diastolic, RV heart failure - LVEF 55-60% and stable on recent echo, low normal RV function. RHC 5/20 with PCWP 14, RA 9. S/p CardioMEMs; needs re-calibration per recent RHC. Off Jardiance, thought to be due to pancreatitis. MRA discontinued at [facility] for dehydration and near syncope; since have restarted but required reduced dose due to hyperkalemia. During hospital stay GDMT has been adjusted recently d.t low BP, NICKI. Last ProBNP 2,029 in March, 3,583 today. Hypervolemic.   PH, mild combined pre and post capillary - RHC 5/20  demonstrates mPAP 31 with wedge14 mmHg, RA 9 mmHg, PVR 2.2 and CI 3.8. RV function low normal.   Mild to Moderate MR/Mild-Mod TR/ Moderate AI - on echo in July.    Dilated ascending aorta - 4.2 cm per echo 7/9  CKD Stage 3b - baseline creatinine ~ 1.8-2.0 mg/dl, creatinine 1.6 up from 3 days ago at 1.18. Follows with Dr. Devine.    Chronic Afib - s/p Watchman. Rates controlled.    LI - on CPAP.   Recurrent bilateral pleural effusions - hx of thoracentesis.   Hx Lupus/Mixed CTD/Severe polymyositis - continues IVIG infusions every 5 weeks. Follows with Dr. Farmer. On chronic prednisone. Off Imuran due to pancytopenia.   Non-obstructive CAD  Chronic thrombocytopenia, immune mediated. Last  K. Follows with Dr. Orr.  Hx Hyponatremia  HERNANDEZ with biopsy proven stage F0-F1 Fibrosis on US 1/2022. Follows with Dr. Veronica, Hepatology at [facility]. Seen 1/2, to follow up in a year. Recent LFT's normal.   Mild hyperkalemia - on low dose MRA and ARNI. Has required Veltassa in the past.  Anemia - Hgb 11.4 g/dL and stable. No recent iron studies.   Elevated TSH, normal T4. Started on Synthroid per Dr. Jean in March, plan to repeat labs in June.  Hypoalbuminemia, albumin 2.9 and stable. Encouraged increased protein intake.   Cellulitis, on antibiotics. To see wound care this week.     Plan:     IVP Bumex 2 mg, will diurese slowly with borderline BP while protein intake is increased.   Recommended compression wraps to LE's.   Encouraged increased protein in diet. If albumin level drops will consider infusion with diuretics.   Continue Bumex 2 mg daily and Spironolactone 12.5 mg daily  Stop Entresto for now.   Add on iron studies.   F/U in one week.   Continue home health nursing with wound care. To let us know if he would like to add PT.   Plan for eventual RHC and recalibration of CardioMEMs.   CHF discharge instructions given    40 minutes spent with patient and greater than 50% of the time was spent counseling and coordinating care.    Tasha Barth NP     Iron indices low,. Will give Venofer next week and discuss.

## 2024-07-15 NOTE — PATIENT INSTRUCTIONS
Heart Failure Discharge Instructions    Stop Entresto for now and monitor BP.   Increase protein in your diet.   Continue Bumex 2 mg daily with Aldactone 12.5 mg daily.   Try to wrap your legs and elevate them when you can.       Activity: Regular exercise and activity is important for your overall health and to help keep your heart strong and functioning as well as possible.   Walk at a slow to moderate pace for 15-20 minutes 3-5 days per week.     Follow these instructions every day to keep yourself in the Green Zone     The Green Zone means you are feeling well and your symptoms are under control                                    Medications  Take your medication every day as instructed  Do not stop taking your medicine or change the amount you are taking without instructions from your doctor or nurse  Do Not take non-steroidal antiinflammatory drugs such as ibuprofen, aleve, advil, or motrin                                    Diet/Fluids  People with heart failure should eat less sodium (salt) and limit fluid. Sodium attracts water and makes the body hold fluid. This extra fluid makes the heart work harder and can worsen the symptoms of heart failure.     Diet    2000 mg sodium daily  Fluid restriction    64 ounces daily  (8 oz. = 1 cup)                                     Body Weight  Weigh yourself every day before breakfast and write your weight down  Use the same scale and wear about the same amount of clothes each time  A sudden weight increase is due to fluid retention rather than fat                                         Activity  Pace your activities to avoid getting overtired  Take rest periods as needed  Elevate your feet to reduce ankle swelling when resting                             Signs of Worsening Heart Failure    You are entering the Yellow Zone - this is a warning zone    Call your doctor or nurse if you have any of these signs or symptoms:  You gain 2 or more pounds overnight or 3-5  pounds in 3-7 days  You have more trouble breathing  You get more tired with regular activity, or are limiting activity because of shortness of breath or fatigue  You are short of breath lying down, you need more pillows to breathe comfortably,  or wake up during the night short of breath  You urinate less often during the day and more often at night  You have a bloated feeling, upset stomach, loss of appetite, or your clothes are fitting tighter    GO TO THE EMERGENCY DEPARTMENT or CALL 911 IF:    These are signs you are in the RED ZONE - THIS IS AN EMERGENCY  You have tightness or pain in your chest  You are extremely short of breath or can't catch your breath  You cough up frothy pink mucous  You feel confused or can't think clearly  You are traveling and develop symptoms of worsening heart failure     We respect everyone's time and availability. Please be aware that this is not a walk-in clinic and we require appointments in order to facilitate timely care for all patients. We ask you to arrive 30 minutes before your appointment to allow time for you to check-in and have your bloodwork drawn. Please understand if you are late for your appointment, you may be asked to reschedule. If possible, all attempts will be made to accommodate but realize this is no guarantee that this will always be available. We understand there are extenuating circumstances. If you need to cancel or reschedule your appointment, please call the [facility] within 24 hours at [phone].  Thank you for your cooperation, [facility] Staff.    If you are currently [employer] active, starting July 1st 2024, we will be utilizing [employer] messaging ONLY to confirm your appointment.    IF YOU HAVE QUESTIONS REGARDING YOUR BILL, FEEL FREE TO CONTACT [facility] PATIENT ACCOUNTS -[phone]. IF YOU NEED FINANCIAL ASSISTANCE, PLEASE CALL AN [facility] FINANCIAL COUNSELOR -[phone].             [facility]     [phone]

## 2024-07-15 NOTE — PROGRESS NOTES
Patient assessed. Patient complaining of swollen legs and low energy. Weight 193 lbs. APN notified of patient's complaint of all of the above. Labs ordered and drawn by  Lab. Reviewed allergies and list of current medications with patient and updated it in the Electronic Medical Record. IV established per protocol. IV 2 mg bumex given. Patient tolerated it well. Educated patient on low sodium diet and food choices, fluid restriction of 2 liters, and daily weights. Reviewed follow-up appointments and discharge Heart Failure instructions with patient. Patient verbalized an understanding. IV discontinued; catheter intact. Pressure held and gauze applied.     RTC next week.

## 2024-07-16 ENCOUNTER — TELEPHONE (OUTPATIENT)
Dept: FAMILY MEDICINE CLINIC | Facility: CLINIC | Age: 79
End: 2024-07-16

## 2024-07-16 NOTE — TELEPHONE ENCOUNTER
AZALEA, Spoke to patient for TCM today.  Patient has an appt with PCP on 8/5/24 and declined a sooner one.  Per guidelines patient should  be seen within seven days by 7/19/24.     Otherwise, last date for TCM: 7/26/24

## 2024-07-16 NOTE — PROGRESS NOTES
NCM attempted to reach the patient to complete a TCM/HFU call. Left message to call back. [facility] provided direct contact info at [phone].

## 2024-07-16 NOTE — PROGRESS NOTES
Initial Post Discharge Follow Up   Discharge Date: 7/12/24  Contact Date: 7/15/2024    Consent Verification:  Assessment Completed With: Patient  HIPAA Verified?  Yes    Discharge Dx:   Cellulitis of right lower extremity        General:   How have you been since your discharge from the hospital?  I'm doing fine, the nurse was here the second time today since I got home. The right leg looks way better. I'm still fighting edema but I was just at the heart clinic yesterday and they are working on that. I do have some weeping again but not too bad.   Do you have any pain since discharge?  No  , there's no real pain. The wound area is just sore. Redness is much better. I feel well though.   When you were leaving the hospital were your discharge instructions reviewed with you? Yes  How well were your discharge instructions explained to you?   On a scale of 1-5   1- Very Poor and 5- Very well   Very Well  Do you have any questions about your discharge instructions?  No  Before leaving the hospital was your diagnoses explained to you? Yes  Do you have any questions about your diagnoses? No  Are you able to perform normal daily activities of living as you have prior to your hospital stay (dressing, bathing, ambulating to the bathroom, etc)? yes  (NCM) Was patient given a different diet per AVS? no    Medications:   Current Outpatient Medications   Medication Sig Dispense Refill    spironolactone 25 MG Oral Tab Take 0.5 tablets (12.5 mg total) by mouth daily.      bumetanide 1 MG Oral Tab Take 2 tablets (2 mg total) by mouth daily. Take 1mg po daily      cephalexin 500 MG Oral Cap Take 1 capsule (500 mg total) by mouth 3 (three) times daily for 5 days. 15 capsule 0    spironolactone 25 MG Oral Tab Take 0.5 tablets (12.5 mg total) by mouth daily. 15 tablet 0    fexofenadine 180 MG Oral Tab Take 1 tablet (180 mg total) by mouth daily as needed.      levothyroxine 50 MCG Oral Tab Take 1 tablet (50 mcg total) by mouth before  breakfast.      ipratropium 0.06 % Nasal Solution 1 spray by Nasal route 2 (two) times daily. 1 each 5    omeprazole 20 MG Oral Capsule Delayed Release Take 1 capsule (20 mg total) by mouth 2 (two) times daily. 180 capsule 1    ALLOPURINOL 300 MG Oral Tab TAKE 1 TABLET BY MOUTH EVERY DAY 90 tablet 3    predniSONE 5 MG Oral Tab Take 1 tablet (5 mg total) by mouth daily with breakfast. 90 tablet 3    metoprolol succinate  MG Oral Tablet 24 Hr Take 1 tablet (100 mg total) by mouth every morning AND 0.5 tablets (50 mg total) every evening. 60 tablet 3    PREDNISONE 1 MG Oral Tab TAKE 2 TABLETS (2 MG TOTAL) BY MOUTH DAILY WITH BREAKFAST. 180 tablet 3    aspirin 81 MG Oral Tab EC Take 1 tablet (81 mg total) by mouth daily. 30 tablet 0    PREVIDENT 5000 BOOSTER PLUS 1.1 % Dental Paste       Multiple Vitamins-Minerals (TAB-A-KEVIN) Oral Tab Take 1 tablet by mouth daily.      Immune Globulin, Human, 10 g Intravenous Recon Soln Inject 0.4 g/kg into the vein. Given for treatment of polymyositis, mixed connective tissue disease, and immune thrombocytopenia purpura monthly at [facility].  Every 5 weeks 3 each 0    Calcium Citrate-Vitamin D (CITRACAL + D OR) Take 1 tablet by mouth daily.       Were there any changes to your current medication(s) noted on the AVS? Yes  If so, were these medication changes discussed with you prior to leaving the hospital? Yes  If a new medication was prescribed:    Was the new medication's purpose & side effects reviewed? Yes  Do you have any questions about your new medication? No  Did you  your discharge medications when you left the hospital? Yes  Let's go over your medications together to make sure we are not missing anything. Medications Reviewed  Are there any reasons that keep you from taking your medication as prescribed? No  Are you having any concerns with constipation? No      Discharge medications reviewed/discussed/and reconciled against outpatient medications  with patient.  Any changes or updates to medications sent to PCP.  Patient Acknowledged     Referrals/orders at D/C:  Referrals/orders placed at D/C? yes  What services:   Home health   (If HH was ordered) Has HH been set up?  Yes    If Yes: With Whom: [facility]    DME ordered at D/C? No      Discharge orders, AVS reviewed and discussed with patient. Any changes or updates to orders sent to PCP.  Patient Acknowledged      SDOH:   Transportation Needs: No Transportation Needs (7/8/2024)    Transportation Needs     Lack of Transportation: No     Car Seat: Not on file     Financial Resource Strain: Low Risk  (6/12/2024)    Financial Resource Strain     Difficulty of Paying Living Expenses: Not very hard     Med Affordability: No         Diagnosis specifics: CHF:   With your CHF diagnosis weighing yourself is very important  How often are you weighing yourself? Not consistently stating that he is usually around 189 lbs and also follows with the HF clinic  Is there any reason you are unable to weigh yourself daily?  No  What was your weight yesterday? Was weighed at HF clinic   Today? He did not weigh himself yet today.   Were you told about any fluid restrictions? No, pt states that he has a lot of conditions so he knows when he is dry or not.   Have you noticed any shortness of breath or waking up short of breath? no    Do you notice any pain or swelling in your abdomen?   no      Ankles or Legs?   yes, but better.         Follow up appointments:      Your appointments       Date & Time Appointment Department (Center)    Jul 18, 2024 2:00 PM CDT Wound Care Initial Wound Consult with Charisma Morrow APRN [facility] ([facility])        Jul 24, 2024 2:30 PM CDT EDWCardio Lab with EH CARD PHLEBOTOMY RM1 [facility] Card[facility]agn[facility] ([facility])            Jul 24, 2024 3:00 PM CDT  (Arrive by 2:30 PM) [facility] Visit with HEART  FAILURE APN 1 [facility] ([facility])    Please come in through the North Entrance, stop at [facility] registration desk in the [facility] lobby to check-in for the appointment.  Then walk through the [facility] lobby to the gold elevators beyond the staircase, and take the elevator to 2nd floor. The clinic is about 12 feet to the right, just inside the entrance to the hospital floor. It's across from the main desk you see as soon as you enter into the unit.        Aug 05, 2024 3:30 PM CDT Follow Up Visit with Eric Simon MD [facility] ([facility])    Contact your primary care provider if your insurance requires a referral.    Please arrive 15 minutes prior to your scheduled appointment. Be sure to bring your current Insurance card, Photo ID, and medication bottles or a list of your current medications.      A 24 hour notice is required to cancel any appointment or you may be charged a $40 No Show Fee.     Important: 24 hour notice is required to cancel any appointment or you may be charged a $40 No Show Fee. Please notify your physician office.         Aug 23, 2024 5:00 PM CDT  Cardiomems Visit with HEART FAILURE APN 1 [facility] ([facility])        Aug 27, 2024 2:20 PM CDT Exam - Established with Taye Farmer MD [facility] ([facility])              [facility]  [facility]  [address] [address]  [phone] [facility] Cardiodiagnostics Lab  [facility]  [address] [address]  [phone] [facility]  [facility]  [address] [id number] [phone]    [facility]  [facility]  [address]  [facility] [address] [phone] [facility], [place], [facility] [place]  [address]  [facility] [id number] [phone]            TCC  Was TCC ordered: Yes  Was TCC scheduled: No, Explain -pt declined       PCP (If no TCC appointment)  Does patient already have a PCP appointment scheduled? Yes  NCM Attempted to schedule PCP office TCM appointment with patient   If no appointment scheduled: Explain-pt has an appt on 8/5 and declined a sooner one, NCM sent TE to PCP office.     Specialist    Does the patient have any other follow up appointment(s) needing to be scheduled? Yes  If yes: NCM reviewed upcoming specialist appointment with patient: Yes  Does the patient need assistance scheduling appointment(s): No, pt has appts with cardiology already and wound care.     Is there any reason as to why you cannot make your appointment(s)?  No     Needs post D/C:   Now that you are home, are there any needs or concerns you need addressed before your next visit with your PCP?  (DME, meds, questions, etc.): No    Interventions by NCM:   NCM reviewed discharge instructions and when to seek medical attention with the patient. He states that he is feeling good. He states that the redness is much better in his right leg. He states that it did start weeping again but he was at the HF clinic yesterday and they are working on getting it better. He states that he does not weigh himself on a consistent basis as he is usually around 189. He also does not follow fluid restrictions stating that he has some conditions that encourage fluid intake and others that restrict and that he knows his body and when he is dry or not. He denied having any fever, n/v/c/d, sob, pain, lightheadedness, HA or any other new or worsening symptoms. Med review completed. He denied having any questions or concerns at this time.        CCM referral placed:    Not Applicable, pt is enrolled.     BOOK BY DATE: 7/26/24

## 2024-07-17 NOTE — PROGRESS NOTES
CHIEF COMPLAINT:     Chief Complaint   Patient presents with    Wound Care     Patient is here for a right leg wound from approximately 5 weeks ago.  Patient was in the hospital for gallbladder surgery and his legs swelled and he developed blisters and was weeping, diagnosed with cellulitis.  Antibiotics finished yesterday.     HPI:   Information obtained from patient and chart  7-18-24 INITIAL: 79 year old male with essential hypertension, chronic heart failure with preserved EF, pulmonary hypertension, atrial fibrillation sp Watchman, chronic kidney disease, SLE, polymyositis, chronic steroid use, hypothyroidism, LI who was admitted from July 8-12 for RLE redness and swelling.  His albumin level was low and felt to be a contributing factor to his leg swelling and hypotension.  He was given an albumin infusion. His discharge weight was 189. He followed up with heart failure clinic earlier this week and his weight was down 7# since may.  It was noted that he was hypotensive at the visit and he had stated he had increased his bumex to 2 mg over the weekend and taking the full tablet of entresto instead of the recommended 1/2.  At visit he was to stop the entresto and compression wraps were recommended.   His next appointment with heart failure is next wednesday.    Since his hospitalization he has been sleeping in a recliner and therefore not utilizing his cpap.  He states he will try to go back to his bed this weekend.  He denies intermittent claudication or rest pain.  Patient has a weeping area on the right lateral leg.  No s/s of infection or c/o pain.   He is here with his sister.    MEDICATIONS:     Current Outpatient Medications:     spironolactone 25 MG Oral Tab, Take 0.5 tablets (12.5 mg total) by mouth daily., Disp: , Rfl:     bumetanide 1 MG Oral Tab, Take 2 tablets (2 mg total) by mouth daily. Take 1mg po daily, Disp: , Rfl:     spironolactone 25 MG Oral Tab, Take 0.5 tablets (12.5 mg total) by mouth  daily., Disp: 15 tablet, Rfl: 0    fexofenadine 180 MG Oral Tab, Take 1 tablet (180 mg total) by mouth daily as needed., Disp: , Rfl:     levothyroxine 50 MCG Oral Tab, Take 1 tablet (50 mcg total) by mouth before breakfast., Disp: , Rfl:     ipratropium 0.06 % Nasal Solution, 1 spray by Nasal route 2 (two) times daily., Disp: 1 each, Rfl: 5    omeprazole 20 MG Oral Capsule Delayed Release, Take 1 capsule (20 mg total) by mouth 2 (two) times daily., Disp: 180 capsule, Rfl: 1    ALLOPURINOL 300 MG Oral Tab, TAKE 1 TABLET BY MOUTH EVERY DAY, Disp: 90 tablet, Rfl: 3    predniSONE 5 MG Oral Tab, Take 1 tablet (5 mg total) by mouth daily with breakfast., Disp: 90 tablet, Rfl: 3    metoprolol succinate  MG Oral Tablet 24 Hr, Take 1 tablet (100 mg total) by mouth every morning AND 0.5 tablets (50 mg total) every evening., Disp: 60 tablet, Rfl: 3    PREDNISONE 1 MG Oral Tab, TAKE 2 TABLETS (2 MG TOTAL) BY MOUTH DAILY WITH BREAKFAST., Disp: 180 tablet, Rfl: 3    aspirin 81 MG Oral Tab EC, Take 1 tablet (81 mg total) by mouth daily., Disp: 30 tablet, Rfl: 0    PREVIDENT 5000 BOOSTER PLUS 1.1 % Dental Paste, , Disp: , Rfl:     Multiple Vitamins-Minerals (TAB-A-KEVIN) Oral Tab, Take 1 tablet by mouth daily., Disp: , Rfl:     Immune Globulin, Human, 10 g Intravenous Recon Soln, Inject 0.4 g/kg into the vein. Given for treatment of polymyositis, mixed connective tissue disease, and immune thrombocytopenia purpura monthly at [facility]. Every 5 weeks, Disp: 3 each, Rfl: 0    Calcium Citrate-Vitamin D (CITRACAL + D OR), Take 1 tablet by mouth daily., Disp: , Rfl:   ALLERGIES:     Allergies   Allergen Reactions    Empagliflozin OTHER (SEE COMMENTS)     Pancreatitis    \"pancreatitis\" per pt      REVIEW OF SYSTEMS:   This information was obtained from the patient/family and chart.    See HPI for pertinent positives, otherwise 10 pt ROS negative.    HISTORY:     Past Medical History:    A-fib (HCC)    Arrhythmia     atrial fibrillation    Arthritis    Autoimmune disease (HCC)    hepatits, resolved anfd nephritis, resolved    Ramon's esophagus    Bleeding nose    Gums Treated    Blood disorder    thrombocytopenia    Blood in urine    Blurred vision    cataract due to steroids, surgery in June    Calculus of kidney    One time    Cancer (HCC)    skin    Candidiasis of the esophagus    Due to steroid use for Autoimmune disorder     Cataract    Colon polyps    Congestive heart disease (HCC)    Diarrhea, unspecified    Intermittent due to lupus    Diverticulosis of colon    Easy bruising    On and off prednisone for 20 years    Esophageal polyp    Esophageal reflux    Essential hypertension    Fatigue    polymyositis and lupus    Gout    Hammer toe, acquired    Heart palpitations    Afib    Hepatitis    autoimmune induce hepatitis d/t lupus    High blood pressure    IBS (irritable bowel syndrome)    mild d/t lupus    Irregular bowel habits    Leg swelling    Heart failure, probably caused by lupus    Lupus (HCC)    MCTD (mixed connective tissue disease) (HCC)    Obstructive sleep apnea (adult) (pediatric)    LI (obstructive sleep apnea)    AHI 37  Supine AHI 38 non-supine AHI 16 Sao2 Pj 83%     LI (obstructive sleep apnea)    AHI 36 RDI 36 REM AHI 45 Supine AHI 65 non-supine AHI 19 Sao2 Pj 86% CPAP 9cwp    Pain in joints    Personal history of antineoplastic chemotherapy    Pleural effusion    right    Pneumonia due to organism    Polymyositis (HCC)    Presence of other cardiac implants and grafts    watchman filter    Problems with swallowing    dysphagia in 2006    Pulmonary hypertension (HCC)    Raynaud disease    Raynaud disease    Renal disorder    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    Shortness of breath    mainly due to pm or lupus    Sleep apnea    CPAP    Thrombocytopathia (HCC)    Visual impairment    bifocals for reading & computer; distance for driving    Wears glasses     Past Surgical History:   Procedure Laterality  Date    Appendectomy  1970    Appendectomy      Cardiac catheterization  2019    Cataract Bilateral     Cholecystectomy      Colonoscopy  10/2003 2006 01/2010    x3    Colonoscopy  2013    Colonoscopy N/A 2018    Procedure: COLONOSCOPY;  Surgeon: Isaiah Tobar MD;  Location:  ENDOSCOPY    Colonoscopy N/A 2024    Procedure: COLONOSCOPY;  Surgeon: Gerardo Neves MD;  Location:  ENDOSCOPY    Colonoscopy with biopsy  2013    Egd      Hand/finger surgery unlisted      right    Needle biopsy liver      Other  2005    needle bipsy of kidney    Other surgical history  2017    watchman filter    Percutaneous  angioplasty  (ptca)- pbp only      right    Rectum surgery procedure unlisted  1946    rectal surgery polypectomy    Skin surgery      MMS BCC R temple 09    Skin surgery  2007    basal ceell carcinoma    Skin surgery  2012    BCC-nodular to right superior eyebrow/ MOHS    Skin surgery  07/15/2013    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/MMS    Skin surgery  2014    BCC-NOD to right superior pinna, MMS, AB    Skin surgery  2021    BCC- left superior forehead, MMS     Skin surgery  2022    SCC IN SITU RIGHT ANTIHELIX MMS BY DR GASTELUM    Tonsillectomy  194    Upper gi endoscopy,exam  10/2003 2006 2010 , 5/13    x3      Social History     Socioeconomic History    Marital status:    Occupational History    Occupation: Retired    Tobacco Use    Smoking status: Former     Current packs/day: 0.00     Average packs/day: 0.5 packs/day for 15.0 years (7.5 ttl pk-yrs)     Types: Cigarettes     Start date: 1958     Quit date: 1973     Years since quittin.0    Smokeless tobacco: Never    Tobacco comments:     5 cigarettes daily, stopped smoking in    Vaping Use    Vaping status: Never Used   Substance and Sexual Activity    Alcohol use: Yes     Alcohol/week: 13.0 - 15.0 standard drinks of alcohol     Types: 5 Glasses of wine, 8 - 10  Standard drinks or equivalent per week     Comment: 1 per day wine or liquor    Drug use: Never   Other Topics Concern    Caffeine Concern Yes     Comment: 1 soda per day and decaf coffee    Sleep Concern No    Exercise Yes     Comment: 3-4 times weekly    Seat Belt Yes     Social Determinants of Health     Financial Resource Strain: Low Risk  (6/12/2024)    Financial Resource Strain     Difficulty of Paying Living Expenses: Not very hard     Med Affordability: No   Food Insecurity: No Food Insecurity (7/8/2024)    Food Insecurity     Food Insecurity: Never true   Transportation Needs: No Transportation Needs (7/8/2024)    Transportation Needs     Lack of Transportation: No   Housing Stability: Low Risk  (7/8/2024)    Housing Stability     Housing Instability: No     PHYSICAL EXAM:     Vitals:    07/18/24 1407   BP: 105/59   Pulse: 88   Resp: 16   Temp: 97.8 °F (36.6 °C)   TempSrc: Temporal   Weight: 189 lb (85.7 kg)   Height: 71\"      Estimated body mass index is 26.36 kg/m² as calculated from the following:    Height as of this encounter: 71\".    Weight as of this encounter: 189 lb (85.7 kg).   No results found for: \"PGLU\"    Vital signs reviewed.Appears stated age, well groomed.    Constitutional:  Bp wnl for patient. Pulse Regular and wnl for patient. Respirations easy and unlabored. Temperature wnl. Weight normal for height. Appearance neat and clean. Appears in no acute distress. Well nourished and well developed.    Cardiovascular:  Heart rhythm and rate irregularly irregular with murmur  Lower extremity:  dp weakly palpable bilaterally, he has pulsatile signals at the dp/pt bilaterally, at the base of the 2nd digit on the left and I could not auscultate signals at the distal digits on the right.  Extremities free of varicosities, +edema, + hemosideran staining and very atrophic skin. Capillary refill < 3 seconds. Digits are warm, but with rubor. toenails are thin, brittle with adequate length and hygeine.  Skin hydration wnl. no hairgrowth on legs.  Musculoskeletal:  Gait and station stable   Integumentary:  refer to wound characteristics and images   Psychiatric:  Judgment and insight intact. Alert and oriented times 3. No evidence of depression, anxiety, or agitation. Calm, cooperative, and communicative.   EDEMA:   Calf  Point of Measurement - Left Calf: 37  Point of Measurement - Right Calf: 37  Left Calf from:: Heel  Calf Left cm:: 38  Right Calf from:: Heel  Right Calf cm:: 37.9  Ankle  Point of Measurement - Left Ankle: 13  Point of Measurement - Right Ankle: 13  Left Ankle from:: Heel  Left Ankle cm:: 25.2     Right Ankle from:: Heel  Right Ankle cm:: 23.2     DIAGNOSTICS:     Lab Results   Component Value Date    BUN 36 (H) 07/15/2024    CREATSERUM 1.65 (H) 07/15/2024    ALB 2.9 (L) 07/15/2024    TP 5.0 (L) 07/09/2024    A1C 5.4 05/14/2024       WOUND ASSESSMENT:     Wound 07/18/24 #1 Right Leg Leg Right (Active)   Date First Assessed/Time First Assessed: 07/18/24 1415    Wound Number (Wound Clinic Only): #1 Right Leg  Primary Wound Type: Venous Ulcer  Location: Leg  Wound Location Orientation: Right      Assessments 7/18/2024  2:18 PM   Wound Image     Drainage Amount Moderate   Drainage Description Serous;Clear   Wound Length (cm) 4 cm   Wound Width (cm) 3.4 cm   Wound Surface Area (cm^2) 13.6 cm^2   Wound Depth (cm) 0.1 cm   Wound Volume (cm^3) 1.36 cm^3   Margins Well-defined edges   Non-staged Wound Description Full thickness   Wound Granulation Tissue Pink;Pale Freedman;Firm   Wound Bed Granulation (%) 60 %   Wound Bed Epithelium (%) 40 %   Wound Odor None   Shape clustered   Tunneling? No   Undermining? No   Sinus Tracts? No       No associated orders.       Wound 07/18/24 #2 Right Lateral Second Toe Toe (Comment which one) Dorsal;Right (Active)   Date First Assessed/Time First Assessed: 07/18/24 1415    Wound Number (Wound Clinic Only): #2 Right Lateral Second Toe  Primary Wound Type: Pressure Injury   Location: Toe (Comment which one)  Wound Location Orientation: Dorsal;Right      Assessments 7/18/2024  2:19 PM   Wound Image     Drainage Amount None   Wound Length (cm) 0.3 cm   Wound Width (cm) 0.2 cm   Wound Surface Area (cm^2) 0.06 cm^2   Wound Depth (cm) 0 cm   Wound Volume (cm^3) 0 cm^3   Margins Well-defined edges   Non-staged Wound Description Full thickness   Shelley-wound Assessment Edema   Wound Bed Slough (%) 100 %   Wound Odor None   Tunneling? No   Undermining? No   Sinus Tracts? No   Pressure Injury Stage Stage 3       No associated orders.       Wound 07/18/24 #3 Right Medial Third Toe Toe (Comment which one) Dorsal;Right (Active)   Date First Assessed/Time First Assessed: 07/18/24 1416    Wound Number (Wound Clinic Only): #3 Right Medial Third Toe  Primary Wound Type: Venous Ulcer  Location: Toe (Comment which one)  Wound Location Orientation: Dorsal;Right      Assessments 7/18/2024  2:21 PM   Wound Image     Drainage Amount None   Wound Length (cm) 0.2 cm   Wound Width (cm) 0.4 cm   Wound Surface Area (cm^2) 0.08 cm^2   Wound Depth (cm) 0.1 cm   Wound Volume (cm^3) 0.008 cm^3   Margins Well-defined edges   Non-staged Wound Description Full thickness   Shelley-wound Assessment Edema   Wound Granulation Tissue Pink;Firm   Wound Bed Granulation (%) 50 %   Wound Bed Epithelium (%) 50 %   Wound Odor None   Tunneling? No   Undermining? No   Sinus Tracts? No   Pressure Injury Stage Stage 3       No associated orders.          ASSESSMENT AND PLAN:    1. 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 Lupus (HCC)  - US ARTERIAL DUPLEX LOWER EXTREMITY BILATERAL (CPT=93925); Future    3. Chronic venous hypertension with ulcer and inflammation, right (HCC)  - US ARTERIAL DUPLEX LOWER EXTREMITY BILATERAL (CPT=93925); Future          Discussed with patient the aspects of wound healing including:  blood  flow in/out (arterial vs venous) and managing edema with appropriate compression, wound bed optimization including moist wound healing, removal of necrosis, bioburden control, monitoring for infection, and finally the patient as a whole including nutrition and increased protein intake and blood glucose control.  I discussed that although I think it is unlikely that his issues are being caused by arterial insufficiency, since I am unable to auscultate signals at distal digitis on the right, along with color of digits I think it is prudent to rule out with arterial duplex. Patient is agreeable.       Risks, benefits, and alternatives of current treatment plan discussed in detail.  Questions and concerns addressed. Red flags to RTC or ED reviewed.  Patient (or parent) agrees to plan.      NOTE TO PATIENT: The 21st Century Cures Act makes clinical notes like these available to patients in the interest of transparency. Clinical notes are medical documents used by physicians and care providers to communicate with each other. These documents include medical language and terminology, abbreviations, and treatment information that may sound technical and at times possibly unfamiliar. In addition, at times, the verbiage may appear blunt or direct. These documents are one tool providers use to communicate relevant information and clinical opinions of the care providers in a way that allows common understanding of the clinical context.   Patient is new to clinic, pcp is hood.  I spent   40   minutes with the patient. This time included:    preparing to see the patient (eg, review notes and recent diagnostics),  seeing the patient, obtaining and/or reviewing separately obtained history, performing a medically appropriate examination and/or evaluation, counseling and educating the patient, documenting in the record.  diagnostics  DISCHARGE:      Patient Instructions   Please return:Monday at 415pm for RN visit for wound assessment,  edema management, and if applicable reapplication of multilayer compression bandage system.    Thursday with Charisma    Things to do:  Imaging:  US ARTERIAL DUPLEX LOWER EXTREMITY BILATERAL (CPT=93925)    Patient discharge and wound care instructions  Miguel Davila  7/18/2024     You may shower with protection of the wound (ie a cast cover or similar).     RIGHT toes: paint with betadine daily and cover with bandage    Cleansing/dressing:     In clinic, with each dressing change:    please cleanse the limb, foot, and between toes with soap, water and washcloth.   Dry thoroughly.    Cleanse/soak the wound with VASHE (or other hypochlorous wound cleanser), dab dry.    Apply the following dressings:      RIGHT: transfer>babydiaper>light 20-30mmhg  LEFT:  spandagrip    Managing your edema:  Avoid prolonged standing in one place. It is better to have your calf muscles moving to pump fluid out of the legs      Elevate leg(s) above the level of the heart when sitting or as much as possible.  Take you diuretics as directed by your physician. Do not skip doses or change doses unless instructed to do so by your physician.  Decrease salt intake, follow recommended 2 grams daily.  Do not get leg(s) with compression wrap wet. If wraps are too tight as indicated by pain, numbness/tingling or discoloration of toes remove wrap completely and call the wound center @ [phone].  Refer to the \"Multi-layer compression bandage application patient information sheet\" given to you in clinic.    Nutrition and blood sugar control:  Focus on the following:  Protein: Meats, beans, eggs, milk and yogurt particularly Greek yogurt), tofu, soy nuts, soy protein products (Follow the protein handout in your welcome folder)  Vitamin C: Citrus fruits and juices, strawberries, tomatoes, tomato juice, peppers, baked potatoes, spinach, broccoli, cauliflower, Hubbard sprouts, cabbage  Vitamin A: Dark green, leafy vegetables, orange or yellow  vegetables, cantaloupe, fortified dairy products, liver, fortified cereals  Zinc: Fortified cereals, red meats, seafood  Consider supplementing with Damion by [employer]. It can be purchased on amazon, [employer] website, or local pharmacy may be able to order it for you.  (These are essential branch chain amino acids that help with tissue building and wound healing).   When your blood sugar is consistently elevated greater than 180 your body can't heal or fight infection.     Concerns:  Signs of infection may include the following:  Increase in redness  Red \"streaks\" from wound  Increase in swelling  Fever  Unusual odor  Change in the amount of wound drainage     Should you experience any significant changes in your wound(s) or have any questions regarding your home care instructions please contact the [facility] @ [phone] If after regular business hours, please call your family doctor or local emergency room. The treatment plan has been discussed at length between you and your provider. Follow all instructions carefully, it is very important. If you do not follow all instructions you are at risk of your wound not healing, infection, possible loss of limb and even loss of life.       Charisma Morrow FNP-C, CWCN-AP, CFCN, CSWS, WCC, DWC  7/18/2024

## 2024-07-18 ENCOUNTER — OFFICE VISIT (OUTPATIENT)
Dept: WOUND CARE | Facility: HOSPITAL | Age: 79
End: 2024-07-18
Attending: NURSE PRACTITIONER
Payer: MEDICARE

## 2024-07-18 VITALS
DIASTOLIC BLOOD PRESSURE: 59 MMHG | TEMPERATURE: 98 F | SYSTOLIC BLOOD PRESSURE: 105 MMHG | BODY MASS INDEX: 26.46 KG/M2 | RESPIRATION RATE: 16 BRPM | HEIGHT: 71 IN | WEIGHT: 189 LBS | HEART RATE: 88 BPM

## 2024-07-18 DIAGNOSIS — I87.331 CHRONIC VENOUS HYPERTENSION WITH ULCER AND INFLAMMATION, RIGHT (HCC): ICD-10-CM

## 2024-07-18 DIAGNOSIS — M32.9 LUPUS (HCC): ICD-10-CM

## 2024-07-18 DIAGNOSIS — L97.812 NON-PRESSURE CHRONIC ULCER OF OTHER PART OF RIGHT LOWER LEG WITH FAT LAYER EXPOSED (HCC): Primary | ICD-10-CM

## 2024-07-18 PROCEDURE — 99215 OFFICE O/P EST HI 40 MIN: CPT | Performed by: NURSE PRACTITIONER

## 2024-07-18 NOTE — PROGRESS NOTES
.Weekly Wound Education Note    Teaching Provided To: Patient;Family  Training Topics: Discharge instructions;Dressing;Cleasing and general instructions;Edema control;Compression;Off-loading  Training Method: Explain/Verbal;Written  Training Response: Patient responds and understands            Betadine to toe wounds daily, cover with bandaid.  Hydrofera transfer, baby diaper, calamine unna boot 20-30mmHg.  Will return for a nurse visit Monday.   1

## 2024-07-18 NOTE — PATIENT INSTRUCTIONS
Please return:Monday at 415pm for RN visit for wound assessment, edema management, and if applicable reapplication of multilayer compression bandage system.    Thursday with Charisma    Things to do:  Imaging:  US ARTERIAL DUPLEX LOWER EXTREMITY BILATERAL (CPT=93925)    Patient discharge and wound care instructions  Miguel Carlsoncharbel  7/18/2024     You may shower with protection of the wound (ie a cast cover or similar).     RIGHT toes: paint with betadine daily and cover with bandage    Cleansing/dressing:     In clinic, with each dressing change:    please cleanse the limb, foot, and between toes with soap, water and washcloth.   Dry thoroughly.    Cleanse/soak the wound with VASHE (or other hypochlorous wound cleanser), dab dry.    Apply the following dressings:      RIGHT: transfer>babydiaper>light 20-30mmhg  LEFT:  spandagrip    Managing your edema:  Avoid prolonged standing in one place. It is better to have your calf muscles moving to pump fluid out of the legs      Elevate leg(s) above the level of the heart when sitting or as much as possible.  Take you diuretics as directed by your physician. Do not skip doses or change doses unless instructed to do so by your physician.  Decrease salt intake, follow recommended 2 grams daily.  Do not get leg(s) with compression wrap wet. If wraps are too tight as indicated by pain, numbness/tingling or discoloration of toes remove wrap completely and call the wound center @ [phone].  Refer to the \"Multi-layer compression bandage application patient information sheet\" given to you in clinic.    Nutrition and blood sugar control:  Focus on the following:  Protein: Meats, beans, eggs, milk and yogurt particularly Greek yogurt), tofu, soy nuts, soy protein products (Follow the protein handout in your welcome folder)  Vitamin C: Citrus fruits and juices, strawberries, tomatoes, tomato juice, peppers, baked potatoes, spinach, broccoli, cauliflower, [place] sprouts,  cabbage  Vitamin A: Dark green, leafy vegetables, orange or yellow vegetables, cantaloupe, fortified dairy products, liver, fortified cereals  Zinc: Fortified cereals, red meats, seafood  Consider supplementing with Damion by [employer]. It can be purchased on amazon, [employer] website, or local pharmacy may be able to order it for you.  (These are essential branch chain amino acids that help with tissue building and wound healing).   When your blood sugar is consistently elevated greater than 180 your body can't heal or fight infection.     Concerns:  Signs of infection may include the following:  Increase in redness  Red \"streaks\" from wound  Increase in swelling  Fever  Unusual odor  Change in the amount of wound drainage     Should you experience any significant changes in your wound(s) or have any questions regarding your home care instructions please contact the [facility] @ [phone] If after regular business hours, please call your family doctor or local emergency room. The treatment plan has been discussed at length between you and your provider. Follow all instructions carefully, it is very important. If you do not follow all instructions you are at risk of your wound not healing, infection, possible loss of limb and even loss of life.

## 2024-07-19 DIAGNOSIS — M32.9 LUPUS (HCC): ICD-10-CM

## 2024-07-19 DIAGNOSIS — M35.1 MIXED CONNECTIVE TISSUE DISEASE (HCC): Primary | ICD-10-CM

## 2024-07-19 RX ORDER — PREDNISONE 1 MG/1
2 TABLET ORAL
Qty: 180 TABLET | Refills: 0 | Status: SHIPPED | OUTPATIENT
Start: 2024-07-19

## 2024-07-19 NOTE — TELEPHONE ENCOUNTER
LOV:  005/07/2024     Future Appointments   Date Time Provider Department Center   7/22/2024  4:15 PM WOUND CARE NURSE EH Wound [facility]   7/24/2024  2:30 PM EH CARD PHLEBOTOMY RM1 EH CARD [facility]   7/24/2024  3:00 PM HEART FAILURE APN 1 EH HF CLIN [facility]   7/25/2024  3:30 PM Charisma Morrow APRN EH Wound [facility]   8/5/2024  3:30 PM Eric Simon MD EMG 3 [facility]   8/23/2024  5:00 PM HEART FAILURE APN 1 EH HF CLIN [facility]   8/27/2024  2:20 PM Taye Farmer MD EMGRHEUMHBSN [facility]       LF:  04/27/2023    QTY:   180    Refills:   3    LABS:  NO CMP DRAWN  Component      Latest Ref Rng 7/15/2024   Glucose      70 - 99 mg/dL 100 (H)    Sodium      136 - 145 mmol/L 139    Potassium      3.5 - 5.1 mmol/L 4.5    Chloride      98 - 112 mmol/L 106    Carbon Dioxide, Total      21.0 - 32.0 mmol/L 25.0    ANION GAP      0 - 18 mmol/L 8    BUN      9 - 23 mg/dL 36 (H)    CREATININE      0.70 - 1.30 mg/dL 1.65 (H)    CALCIUM      8.5 - 10.1 mg/dL 8.4 (L)    CALCULATED OSMOLALITY      275 - 295 mOsm/kg 296 (H)    EGFR      >=60 mL/min/1.73m2 41 (L)    Patient Fasting for BMP? Patient not present    WBC      4.0 - 11.0 x10(3) uL 7.2    RBC      3.80 - 5.80 x10(6)uL 3.37 (L)    Hemoglobin      13.0 - 17.5 g/dL 11.4 (L)    Hematocrit      39.0 - 53.0 % 36.0 (L)    Platelet Count      150.0 - 450.0 10(3)uL 209.0    MCV      80.0 - 100.0 fL 106.8 (H)    MCH      26.0 - 34.0 pg 33.8    MCHC      31.0 - 37.0 g/dL 31.7    RDW      % 16.8       Legend:  (H) High  (L) Low

## 2024-07-22 ENCOUNTER — OFFICE VISIT (OUTPATIENT)
Dept: WOUND CARE | Facility: HOSPITAL | Age: 79
End: 2024-07-22
Attending: NURSE PRACTITIONER
Payer: MEDICARE

## 2024-07-22 VITALS
RESPIRATION RATE: 16 BRPM | HEART RATE: 83 BPM | DIASTOLIC BLOOD PRESSURE: 66 MMHG | SYSTOLIC BLOOD PRESSURE: 124 MMHG | TEMPERATURE: 98 F

## 2024-07-22 DIAGNOSIS — L97.812 NON-PRESSURE CHRONIC ULCER OF OTHER PART OF RIGHT LOWER LEG WITH FAT LAYER EXPOSED (HCC): Primary | ICD-10-CM

## 2024-07-22 PROCEDURE — 29581 APPL MULTLAYER CMPRN SYS LEG: CPT

## 2024-07-22 NOTE — PROGRESS NOTES
Chief Complaint   Patient presents with    Wound Care     Pt here for nurse visit to right medial third toe and right lateral 3rd toe and right lower leg wound. Pt denies any concerns or issues. Pt denies any pain in wounds at this time.            Current Outpatient Medications:     predniSONE 1 MG Oral Tab, Take 2 tablets (2 mg total) by mouth daily with breakfast., Disp: 180 tablet, Rfl: 0    spironolactone 25 MG Oral Tab, Take 0.5 tablets (12.5 mg total) by mouth daily., Disp: , Rfl:     bumetanide 1 MG Oral Tab, Take 2 tablets (2 mg total) by mouth daily. Take 1mg po daily, Disp: , Rfl:     spironolactone 25 MG Oral Tab, Take 0.5 tablets (12.5 mg total) by mouth daily., Disp: 15 tablet, Rfl: 0    fexofenadine 180 MG Oral Tab, Take 1 tablet (180 mg total) by mouth daily as needed., Disp: , Rfl:     levothyroxine 50 MCG Oral Tab, Take 1 tablet (50 mcg total) by mouth before breakfast., Disp: , Rfl:     ipratropium 0.06 % Nasal Solution, 1 spray by Nasal route 2 (two) times daily., Disp: 1 each, Rfl: 5    omeprazole 20 MG Oral Capsule Delayed Release, Take 1 capsule (20 mg total) by mouth 2 (two) times daily., Disp: 180 capsule, Rfl: 1    ALLOPURINOL 300 MG Oral Tab, TAKE 1 TABLET BY MOUTH EVERY DAY, Disp: 90 tablet, Rfl: 3    predniSONE 5 MG Oral Tab, Take 1 tablet (5 mg total) by mouth daily with breakfast., Disp: 90 tablet, Rfl: 3    metoprolol succinate  MG Oral Tablet 24 Hr, Take 1 tablet (100 mg total) by mouth every morning AND 0.5 tablets (50 mg total) every evening., Disp: 60 tablet, Rfl: 3    aspirin 81 MG Oral Tab EC, Take 1 tablet (81 mg total) by mouth daily., Disp: 30 tablet, Rfl: 0    PREVIDENT 5000 BOOSTER PLUS 1.1 % Dental Paste, , Disp: , Rfl:     Multiple Vitamins-Minerals (TAB-A-KEVIN) Oral Tab, Take 1 tablet by mouth daily., Disp: , Rfl:     Immune Globulin, Human, 10 g Intravenous Recon Soln, Inject 0.4 g/kg into the vein. Given for treatment of polymyositis, mixed connective tissue  disease, and immune thrombocytopenia purpura monthly at [facility]. Every 5 weeks, Disp: 3 each, Rfl: 0    Calcium Citrate-Vitamin D (CITRACAL + D OR), Take 1 tablet by mouth daily., Disp: , Rfl:     Allergies   Allergen Reactions    Empagliflozin OTHER (SEE COMMENTS)     Pancreatitis    \"pancreatitis\" per pt          HISTORY:     Past medical, surgical, family and social history updated where appropriate.    PHYSICAL EXAM:   /66   Pulse 83   Temp 98.2 °F (36.8 °C)   Resp 16        Vital signs reviewed.      Calf     Point of Measurement - Right Calf: 37        Right Calf from:: Heel  Right Calf cm:: 32.4    Ankle     Point of Measurement - Right Ankle: 13           Right Ankle from:: Heel  Right Ankle cm:: 22       Wound 07/18/24 #1 Right Leg Leg Right (Active)   Date First Assessed/Time First Assessed: 07/18/24 1415    Wound Number (Wound Clinic Only): #1 Right Leg  Primary Wound Type: Venous Ulcer  Location: Leg  Wound Location Orientation: Right      Assessments 7/18/2024  2:18 PM 7/22/2024  4:39 PM   Wound Image       Drainage Amount Moderate Moderate   Drainage Description Serous;Clear Serosanguineous   Treatments Compression Compression   Wound Length (cm) 4 cm 4 cm   Wound Width (cm) 3.4 cm 3.1 cm   Wound Surface Area (cm^2) 13.6 cm^2 12.4 cm^2   Wound Depth (cm) 0.1 cm 0.1 cm   Wound Volume (cm^3) 1.36 cm^3 1.24 cm^3   Wound Healing % -- 9   Margins Well-defined edges Well-defined edges   Non-staged Wound Description Full thickness Full thickness   Shelley-wound Assessment -- Edema;Hemosiderin staining;Moist;Purple   Wound Granulation Tissue Pink;Pale Grey;Firm Pink;Firm;Red   Wound Bed Granulation (%) 60 % 80 %   Wound Bed Epithelium (%) 40 % 20 %   Wound Odor None None   Shape clustered clustered   Tunneling? No --   Undermining? No --   Sinus Tracts? No --       No associated orders.       Wound 07/18/24 #2 Right Lateral Second Toe Toe (Comment which one) Dorsal;Right (Active)   Date  First Assessed/Time First Assessed: 07/18/24 1415    Wound Number (Wound Clinic Only): #2 Right Lateral Second Toe  Primary Wound Type: Pressure Injury  Location: Toe (Comment which one)  Wound Location Orientation: Dorsal;Right      Assessments 7/18/2024  2:19 PM 7/22/2024  4:41 PM   Wound Image       Drainage Amount None None   Wound Length (cm) 0.3 cm 0 cm   Wound Width (cm) 0.2 cm 0 cm   Wound Surface Area (cm^2) 0.06 cm^2 0 cm^2   Wound Depth (cm) 0 cm 0 cm   Wound Volume (cm^3) 0 cm^3 0 cm^3   Margins Well-defined edges Flat and Intact   Non-staged Wound Description Full thickness Full thickness   Shelley-wound Assessment Edema Edema   Wound Bed Epithelium (%) -- 100 %   Wound Bed Slough (%) 100 % --   Wound Odor None None   Tunneling? No --   Undermining? No --   Sinus Tracts? No --   Pressure Injury Stage Stage 3 Stage 3       No associated orders.       Wound 07/18/24 #3 Right Medial Third Toe Toe (Comment which one) Dorsal;Right (Active)   Date First Assessed/Time First Assessed: 07/18/24 1416    Wound Number (Wound Clinic Only): #3 Right Medial Third Toe  Primary Wound Type: Venous Ulcer  Location: Toe (Comment which one)  Wound Location Orientation: Dorsal;Right      Assessments 7/18/2024  2:21 PM 7/22/2024  4:38 PM   Wound Image       Drainage Amount None None   Wound Length (cm) 0.2 cm 0.1 cm   Wound Width (cm) 0.4 cm 0.1 cm   Wound Surface Area (cm^2) 0.08 cm^2 0.01 cm^2   Wound Depth (cm) 0.1 cm 0.1 cm   Wound Volume (cm^3) 0.008 cm^3 0.001 cm^3   Wound Healing % -- 88   Margins Well-defined edges Well-defined edges   Non-staged Wound Description Full thickness Full thickness   Shelley-wound Assessment Edema Edema   Wound Granulation Tissue Pink;Firm Pink;Firm   Wound Bed Granulation (%) 50 % 100 %   Wound Bed Epithelium (%) 50 % --   Wound Odor None None   Tunneling? No --   Undermining? No --   Sinus Tracts? No --   Pressure Injury Stage Stage 3 --       No associated orders.       Compression Wrap  07/18/24 Leg Anterior;Right (Active)   Placement Date/Time: 07/18/24 1614   Location: Leg  Wound Location Orientation: Anterior;Right      Assessments 7/18/2024  4:15 PM 7/22/2024  4:38 PM   Response to Treatment Well tolerated Well tolerated   Compression Layers Multilayer Multilayer   Compression Product Type Unna Boot Unna Boot   Dressing Applied Yes Yes   Compression Wrap Location Toes to Knee Toes to Knee   Compression Wrap Status Dry;Clean Dry;Clean       No associated orders.          ASSESSMENT AND PLAN:        Risks, benefits, and alternatives of current treatment plan discussed in detail.  Questions and concerns addressed. Red flags to RTC or ED reviewed.  Patient (or parent) agrees to plan.      No follow-ups on file.  Weekly Wound Education Note    Teaching Provided To: Patient;Family (wife)  Training Topics: Compression;Cleasing and general instructions;Edema control;Dressing;Discharge instructions  Training Method: Demonstration;Explain/Verbal  Training Response: Reinforcement needed        Notes: Pt here for nurse visit to right medial third toe and right lateral 3rd toe and right lower leg wound. Pt denies any concerns or issues. Pt denies any pain in wounds at this time. Pt arrived with calamine unn aoot to RLE intact and in place. Edema measurement decreased. All wound measurements decreased. Continue with betadine to right 2nd lateral toe and right 3rd medial toe covered with bandaid. Continue with hydrofera transfer, baby diaper to right lower leg wound. Wrapped RLE in calamine unna boot 20-30mmHg. Pt to follow up with provider on Thursday 7/25/24.        Jose SOMMER RN   7/22/2024  4:45 PM

## 2024-07-23 NOTE — PROGRESS NOTES
[facility] Progress Note    Miguel Davila is a 79 year old male who presents to clinic for APN assessment and management of chronic diastolic heart failure and is functional class 2-3.     Subjective:  Since his last clinic visit on 7/15; when Entresto was stopped, his weight is up a couple pounds but he has noticed a significant decrease in LE edema. R>L edema currently. He has noticed increased urination from his Bumex the last few days. His appetite is good. He continues to have fatigue, but has been improving. He is doing more around the home and going to the store. He will be following up with the wound clinic for his RLE wound. He has compression on both legs. He is due for his IVIG infusion.     He had RHC on 5/20 that shows RA 9 mmHg, mPAP 31 mmhg, wedge 14 mmHg, CI 3.8 and PVR 2.2 wood units. Dr. Jean recommended increasing Entresto with consideration for retrying SGLT2i and increase in MRA at a later date. CardioMEMs sensor was noted to be inaccurate and re-calibration was recommended.      Last Hospitalizations/ED visits:    He was admitted from 6/6-6/10 with acute cholecystitis with localized peritonitis and underwent cholecystectomy 6/7. Treated with antibiotics. Aldactone, Bumex and Entresto were held during hospital stay d.t low BP. He was discharged on Bumex 1 mg, remained off ARNI and MRA.      Admitted again 7/8-7/12 with leg swelling, weeping and abdominal distension. He was felt to be fluid overloaded on admission, but then became hypotensive with diuresis. Albumin level was low and felt to be a contributing factor to leg swelling and hypotension. Subsequently, he was given an albumin infusion followed by diuretics and started to improve. LE US negative for DVT. Treated with antibiotics for cellulitis. GDMT started at low doses. To take PRN Bumex on discharge. Discharge weight of 189 lbs.     Review of Systems   Constitutional: Negative.    Cardiovascular:  Positive for dyspnea on exertion (improved) and leg swelling (improved).   Respiratory: Negative.     Gastrointestinal:  Positive for bloating (mild).       HISTORY:  Past Medical History:    A-fib (HCC)    Arrhythmia    atrial fibrillation    Arthritis    Autoimmune disease (HCC)    hepatits, resolved anfd nephritis, resolved    Ramon's esophagus    Bleeding nose    Gums Treated    Blood disorder    thrombocytopenia    Blood in urine    Blurred vision    cataract due to steroids, surgery in June    Calculus of kidney    One time    Cancer (HCC)    skin    Candidiasis of the esophagus    Due to steroid use for Autoimmune disorder     Cataract    Colon polyps    Congestive heart disease (HCC)    Diarrhea, unspecified    Intermittent due to lupus    Diverticulosis of colon    Easy bruising    On and off prednisone for 20 years    Esophageal polyp    Esophageal reflux    Essential hypertension    Fatigue    polymyositis and lupus    Gout    Hammer toe, acquired    Heart palpitations    Afib    Hepatitis    autoimmune induce hepatitis d/t lupus    High blood pressure    IBS (irritable bowel syndrome)    mild d/t lupus    Irregular bowel habits    Leg swelling    Heart failure, probably caused by lupus    Lupus (HCC)    MCTD (mixed connective tissue disease) (HCC)    Obstructive sleep apnea (adult) (pediatric)    LI (obstructive sleep apnea)    AHI 37  Supine AHI 38 non-supine AHI 16 Sao2 Pj 83%     LI (obstructive sleep apnea)    AHI 36 RDI 36 REM AHI 45 Supine AHI 65 non-supine AHI 19 Sao2 Pj 86% CPAP 9cwp    Pain in joints    Personal history of antineoplastic chemotherapy    Pleural effusion    right    Pneumonia due to organism    Polymyositis (HCC)    Presence of other cardiac implants and grafts    watchman filter    Problems with swallowing    dysphagia in 2006    Pulmonary hypertension (HCC)    Raynaud disease    Raynaud disease    Renal disorder    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     Shortness of breath    mainly due to pm or lupus    Sleep apnea    CPAP    Thrombocytopathia (HCC)    Visual impairment    bifocals for reading & computer; distance for driving    Wears glasses      Past Surgical History:   Procedure Laterality Date    Appendectomy  1970    Appendectomy      Cardiac catheterization  09/2019    Cataract Bilateral     Cholecystectomy      Colonoscopy  10/2003 2006 01/2010    x3    Colonoscopy  05/14/2013    Colonoscopy N/A 05/01/2018    Procedure: COLONOSCOPY;  Surgeon: Isaiah Tobar MD;  Location:  ENDOSCOPY    Colonoscopy N/A 04/12/2024    Procedure: COLONOSCOPY;  Surgeon: Gerardo Neves MD;  Location:  ENDOSCOPY    Colonoscopy with biopsy  05/14/2013    [facility]      Hand/finger surgery unlisted  2003    right    Needle biopsy liver      Other  12/2005    needle bipsy of kidney    Other surgical history  2017    watchman filter    Percutaneous  angioplasty  (ptca)- pbp only  2006    right    Rectum surgery procedure unlisted  1946    rectal surgery polypectomy    Skin surgery      MMS BCC R temple 6/24/09    Skin surgery  2007    basal ceell carcinoma    Skin surgery  07/18/2012    BCC-nodular to right superior eyebrow/ MOHS    Skin surgery  07/15/2013    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/MMS    Skin surgery  02/19/2014    BCC-NOD to right superior pinna, MMS, AB    Skin surgery  02/16/2021    BCC- left superior forehead, MMS     Skin surgery  03/07/2022    SCC IN SITU RIGHT ANTIHELIX MMS BY DR GASTELUM    Tonsillectomy  1948    Upper gi endoscopy,exam  10/2003 2006 01/2010 , 5/13    x3      Family History   Problem Relation Age of Onset    Heart Disease Father         CHF    Gastro-Intestinal Disorder Father         Diverticulosis    Alcohol and Other Disorders Associated Father     Heart Attack Father     Heart Disease Mother         CHF    Breast Cancer Mother     Stroke Mother     Cancer Paternal Grandfather     Heart Attack Paternal Grandmother     Kidney Disease Son     Other  (Ramon's Esophagus) Son     Heart Attack Maternal Grandfather       Social History     Socioeconomic History    Marital status:    Occupational History    Occupation: Retired    Tobacco Use    Smoking status: Former     Current packs/day: 0.00     Average packs/day: 0.5 packs/day for 15.0 years (7.5 ttl pk-yrs)     Types: Cigarettes     Start date: 1958     Quit date: 1973     Years since quittin.0    Smokeless tobacco: Never    Tobacco comments:     5 cigarettes daily, stopped smoking in    Vaping Use    Vaping status: Never Used   Substance and Sexual Activity    Alcohol use: Yes     Alcohol/week: 13.0 - 15.0 standard drinks of alcohol     Types: 5 Glasses of wine, 8 - 10 Standard drinks or equivalent per week     Comment: 1 per day wine or liquor    Drug use: Never   Other Topics Concern    Caffeine Concern Yes     Comment: 1 soda per day and decaf coffee    Sleep Concern No    Exercise Yes     Comment: 3-4 times weekly    Seat Belt Yes     Social Determinants of Health     Financial Resource Strain: Low Risk  (2024)    Financial Resource Strain     Difficulty of Paying Living Expenses: Not very hard     Med Affordability: No   Food Insecurity: No Food Insecurity (2024)    Food Insecurity     Food Insecurity: Never true   Transportation Needs: No Transportation Needs (2024)    Transportation Needs     Lack of Transportation: No   Housing Stability: Low Risk  (2024)    Housing Stability     Housing Instability: No           Objective:    Telemetry: Afib       /64   Pulse 82   Resp 13   Wt 196 lb 12.8 oz (89.3 kg)   SpO2 100%   BMI 27.45 kg/m²     Wt Readings from Last 6 Encounters:   24 196 lb 12.8 oz (89.3 kg)   24 189 lb (85.7 kg)   07/15/24 193 lb 6.4 oz (87.7 kg)   24 189 lb 9.5 oz (86 kg)   24 196 lb 3.2 oz (89 kg)   06/10/24 207 lb (93.9 kg)            Recent Results (from the past 24 hour(s))   Basic Metabolic Panel (8)     Collection Time: 07/24/24  2:17 PM   Result Value Ref Range    Glucose 86 70 - 99 mg/dL    Sodium 139 136 - 145 mmol/L    Potassium 4.6 3.5 - 5.1 mmol/L    Chloride 104 98 - 112 mmol/L    CO2 30.0 21.0 - 32.0 mmol/L    Anion Gap 5 0 - 18 mmol/L    BUN 37 (H) 9 - 23 mg/dL    Creatinine 1.45 (H) 0.70 - 1.30 mg/dL    Calcium, Total 9.1 8.7 - 10.4 mg/dL    Calculated Osmolality 296 (H) 275 - 295 mOsm/kg    eGFR-Cr 48 (L) >=60 mL/min/1.73m2    Patient Fasting for BMP? No    Albumin Serum    Collection Time: 07/24/24  2:17 PM   Result Value Ref Range    Albumin 3.9 3.2 - 4.8 g/dL         Current Outpatient Medications:     predniSONE 1 MG Oral Tab, Take 2 tablets (2 mg total) by mouth daily with breakfast., Disp: 180 tablet, Rfl: 0    bumetanide 1 MG Oral Tab, Take 2 tablets (2 mg total) by mouth daily., Disp: , Rfl:     spironolactone 25 MG Oral Tab, Take 0.5 tablets (12.5 mg total) by mouth daily., Disp: 15 tablet, Rfl: 0    fexofenadine 180 MG Oral Tab, Take 1 tablet (180 mg total) by mouth daily as needed., Disp: , Rfl:     levothyroxine 50 MCG Oral Tab, Take 1 tablet (50 mcg total) by mouth before breakfast., Disp: , Rfl:     ipratropium 0.06 % Nasal Solution, 1 spray by Nasal route 2 (two) times daily., Disp: 1 each, Rfl: 5    omeprazole 20 MG Oral Capsule Delayed Release, Take 1 capsule (20 mg total) by mouth 2 (two) times daily., Disp: 180 capsule, Rfl: 1    ALLOPURINOL 300 MG Oral Tab, TAKE 1 TABLET BY MOUTH EVERY DAY, Disp: 90 tablet, Rfl: 3    predniSONE 5 MG Oral Tab, Take 1 tablet (5 mg total) by mouth daily with breakfast., Disp: 90 tablet, Rfl: 3    metoprolol succinate  MG Oral Tablet 24 Hr, Take 1 tablet (100 mg total) by mouth every morning AND 0.5 tablets (50 mg total) every evening., Disp: 60 tablet, Rfl: 3    aspirin 81 MG Oral Tab EC, Take 1 tablet (81 mg total) by mouth daily., Disp: 30 tablet, Rfl: 0    PREVIDENT 5000 BOOSTER PLUS 1.1 % Dental Paste, , Disp: , Rfl:     Multiple  Vitamins-Minerals (TAB-A-KEVIN) Oral Tab, Take 1 tablet by mouth daily., Disp: , Rfl:     Immune Globulin, Human, 10 g Intravenous Recon Soln, Inject 0.4 g/kg into the vein. Given for treatment of polymyositis, mixed connective tissue disease, and immune thrombocytopenia purpura monthly at [facility]. Every 5 weeks, Disp: 3 each, Rfl: 0    Calcium Citrate-Vitamin D (CITRACAL + D OR), Take 1 tablet by mouth daily., Disp: , Rfl:     Exam:   General:         Alert, in no apparent distress  HEENT:          no JVD  Lungs:            mildly diminished RLL                     CV:                   irregularly irregular  Abdomen:       mildly distended, soft  Extremities:    +2 LLE edema; +1 RLE edema above wraps  Neuro:             A&O x 3  Skin:                Pink, warm, dry    Education:  Patient instructed regarding sodium restricted diet, low sodium foods, fluid restriction, daily weights, medication regimen, s/s HF exacerbation and when to call APN/clinic.    Assessment:   Chronic diastolic, RV heart failure - LVEF 55-60% and stable on recent echo, low normal RV function. RHC 5/20 with PCWP 14, RA 9. S/p CardioMEMs; needs re-calibration per recent RHC. Off Jardiance, thought to be due to pancreatitis. MRA discontinued at [facility] for dehydration and near syncope; since have restarted but required reduced dose due to hyperkalemia. During hospital stay GDMT has been adjusted recently due tolow BP, NICKI. Last ProBNP 3,583. Volume status improving.    PH, mild combined pre and post capillary - RHC 5/20  demonstrates mPAP 31 with wedge14 mmHg, RA 9 mmHg, PVR 2.2 and CI 3.8. RV function low normal.   Mild to Moderate MR/Mild-Mod TR/ Moderate AI - on echo in July.   Dilated ascending aorta - 4.2 cm per echo 7/9  CKD Stage 3b - baseline creatinine ~ 1.8-2.0 mg/dl, Cr 1.45 today. Follows with Dr. Devine.    Chronic Afib - s/p Watchman. Rates controlled.    LI - on CPAP.   Recurrent bilateral pleural effusions - hx of  thoracentesis.   Hx Lupus/Mixed CTD/Severe polymyositis - continues IVIG infusions every 5 weeks. Follows with Dr. Farmer. On chronic prednisone. Off Imuran due to pancytopenia.   Non-obstructive CAD  Chronic thrombocytopenia, immune mediated. Last  K. Follows with Dr. Orr.  Hx Hyponatremia  HERNANDEZ with biopsy proven stage F0-F1 Fibrosis on US 1/2022. Follows with Dr. Veronica, Hepatology at [facility]. Seen 1/2, to follow up in a year. Recent LFT's normal.   Mild hyperkalemia - on low dose MRA and ARNI. Has required Veltassa in the past.  Anemia - last Hgb 11.4 g/dL and stable. Last iron studies with sat 14 and ferritin 170.8. Repeat next visit with CBC.  Hypothyroidism - started on Synthroid per Dr. Jean in March, repeat TSH 2.19 and free T4 1.1 in May.   Hypoalbuminemia - last albumin 2.9, improved to 3.9 today.  Hx Cellulitis - s/p antibiotics. Following in the Wound care clinic.     Plan:   Continue current medications. Starting to respond well to diuretics the last few days.    Return to clinic in 2-3 weeks.  Will repeat iron studies and CBC next visit with improving albumin and appetite.   Continue home health nursing with wound care.   Plan for eventual RHC and recalibration of CardioMEMs  Previously seen by Dr. Jean before her MCC, referred to Dr. Craig going forward.   CHF discharge instructions given    45 minutes spent with patient and greater than 50% of the time was spent counseling and coordinating care.    SHEA Fernandez

## 2024-07-24 ENCOUNTER — HOSPITAL ENCOUNTER (OUTPATIENT)
Dept: LAB | Facility: HOSPITAL | Age: 79
Discharge: HOME | End: 2024-07-24
Attending: NURSE PRACTITIONER
Payer: MEDICARE

## 2024-07-24 ENCOUNTER — HOSPITAL ENCOUNTER (OUTPATIENT)
Dept: CARDIOLOGY CLINIC | Facility: HOSPITAL | Age: 79
Discharge: HOME | End: 2024-07-24
Attending: NURSE PRACTITIONER
Payer: MEDICARE

## 2024-07-24 VITALS
SYSTOLIC BLOOD PRESSURE: 101 MMHG | OXYGEN SATURATION: 100 % | RESPIRATION RATE: 13 BRPM | BODY MASS INDEX: 27 KG/M2 | HEART RATE: 82 BPM | WEIGHT: 196.81 LBS | DIASTOLIC BLOOD PRESSURE: 64 MMHG

## 2024-07-24 DIAGNOSIS — I50.32 CHRONIC DIASTOLIC HEART FAILURE (HCC): Primary | ICD-10-CM

## 2024-07-24 DIAGNOSIS — I50.812 CHRONIC RIGHT-SIDED CONGESTIVE HEART FAILURE (HCC): ICD-10-CM

## 2024-07-24 DIAGNOSIS — I50.32 CHRONIC HEART FAILURE WITH PRESERVED EJECTION FRACTION (HCC): ICD-10-CM

## 2024-07-24 DIAGNOSIS — N18.30 STAGE 3 CHRONIC KIDNEY DISEASE, UNSPECIFIED WHETHER STAGE 3A OR 3B CKD (HCC): ICD-10-CM

## 2024-07-24 LAB
ALBUMIN SERPL-MCNC: 3.9 G/DL (ref 3.2–4.8)
ANION GAP SERPL CALC-SCNC: 5 MMOL/L (ref 0–18)
BUN BLD-MCNC: 37 MG/DL (ref 9–23)
CALCIUM BLD-MCNC: 9.1 MG/DL (ref 8.7–10.4)
CHLORIDE SERPL-SCNC: 104 MMOL/L (ref 98–112)
CO2 SERPL-SCNC: 30 MMOL/L (ref 21–32)
CREAT BLD-MCNC: 1.45 MG/DL
EGFRCR SERPLBLD CKD-EPI 2021: 48 ML/MIN/1.73M2 (ref 60–?)
FASTING STATUS PATIENT QL REPORTED: NO
GLUCOSE BLD-MCNC: 86 MG/DL (ref 70–99)
OSMOLALITY SERPL CALC.SUM OF ELEC: 296 MOSM/KG (ref 275–295)
POTASSIUM SERPL-SCNC: 4.6 MMOL/L (ref 3.5–5.1)
SODIUM SERPL-SCNC: 139 MMOL/L (ref 136–145)

## 2024-07-24 PROCEDURE — 99215 OFFICE O/P EST HI 40 MIN: CPT | Performed by: NURSE PRACTITIONER

## 2024-07-24 PROCEDURE — 82040 ASSAY OF SERUM ALBUMIN: CPT | Performed by: NURSE PRACTITIONER

## 2024-07-24 PROCEDURE — 36415 COLL VENOUS BLD VENIPUNCTURE: CPT | Performed by: NURSE PRACTITIONER

## 2024-07-24 PROCEDURE — 80048 BASIC METABOLIC PNL TOTAL CA: CPT | Performed by: NURSE PRACTITIONER

## 2024-07-24 NOTE — PROGRESS NOTES
Pt. Assessed with wife present. States he has his baseline shortness of breath with exertion. Going to wound care 2 times a week- legs clean and dry and wrapped. States he has noticed an improvement in leg swelling, and has been urinating more the past few days.      Weight stable at 196.8 lbs up 3 lbs since last [facility] appt. Reviewed current list of patient's allergies and medication; updated the Electronic Medical Record. Labs ordered to assess kidney function and drawn by  Lab. Reviewed follow-up appointment and Heart Failure discharge instructions with patient. Patient verbalized an understanding.     Will rtc in 3 weeks      Unable to do cardio mems, as his device needs to  be recalibrated. Will be dinw when infections clear and wounds are healed.

## 2024-07-24 NOTE — PATIENT INSTRUCTIONS
Heart Failure Discharge Instructions      Activity: Regular exercise and activity is important for your overall health and to help keep your heart strong and functioning as well as possible.   Walk at a slow to moderate pace for 15-20 minutes 3-5 days per week.     Follow these instructions every day to keep yourself in the Green Zone     The Green Zone means you are feeling well and your symptoms are under control                                    Medications  Take your medication every day as instructed  Do not stop taking your medicine or change the amount you are taking without instructions from your doctor or nurse  Do Not take non-steroidal antiinflammatory drugs such as ibuprofen, aleve, advil, or motrin                                    Diet/Fluids  People with heart failure should eat less sodium (salt) and limit fluid. Sodium attracts water and makes the body hold fluid. This extra fluid makes the heart work harder and can worsen the symptoms of heart failure.     Diet    2000 mg sodium daily  Fluid restriction    64 ounces daily  (8 oz. = 1 cup)                                     Body Weight  Weigh yourself every day before breakfast and write your weight down  Use the same scale and wear about the same amount of clothes each time  A sudden weight increase is due to fluid retention rather than fat                                         Activity  Pace your activities to avoid getting overtired  Take rest periods as needed  Elevate your feet to reduce ankle swelling when resting                             Signs of Worsening Heart Failure    You are entering the Yellow Zone - this is a warning zone    Call your doctor or nurse if you have any of these signs or symptoms:  You gain 2 or more pounds overnight or 3-5 pounds in 3-7 days  You have more trouble breathing  You get more tired with regular activity, or are limiting activity because of shortness of breath or fatigue  You are short of breath lying  down, you need more pillows to breathe comfortably,  or wake up during the night short of breath  You urinate less often during the day and more often at night  You have a bloated feeling, upset stomach, loss of appetite, or your clothes are fitting tighter    GO TO THE EMERGENCY DEPARTMENT or CALL 911 IF:    These are signs you are in the RED ZONE - THIS IS AN EMERGENCY  You have tightness or pain in your chest  You are extremely short of breath or can't catch your breath  You cough up frothy pink mucous  You feel confused or can't think clearly  You are traveling and develop symptoms of worsening heart failure     We respect everyone's time and availability. Please be aware that this is not a walk-in clinic and we require appointments in order to facilitate timely care for all patients. We ask you to arrive 30 minutes before your appointment to allow time for you to check-in and have your bloodwork drawn. Please understand if you are late for your appointment, you may be asked to reschedule. If possible, all attempts will be made to accommodate but realize this is no guarantee that this will always be available. We understand there are extenuating circumstances. If you need to cancel or reschedule your appointment, please call the [facility] within 24 hours at [phone].  Thank you for your cooperation, [facility] Staff.    If you are currently [employer] active, starting July 1st 2024, we will be utilizing [employer] messaging ONLY to confirm your appointment.    IF YOU HAVE QUESTIONS REGARDING YOUR BILL, FEEL FREE TO CONTACT [facility] PATIENT ACCOUNTS -[phone]. IF YOU NEED FINANCIAL ASSISTANCE, PLEASE CALL AN [facility] FINANCIAL COUNSELOR -[phone].             [facility]     [phone]

## 2024-07-25 ENCOUNTER — OFFICE VISIT (OUTPATIENT)
Dept: WOUND CARE | Facility: HOSPITAL | Age: 79
End: 2024-07-25
Attending: NURSE PRACTITIONER
Payer: MEDICARE

## 2024-07-25 VITALS
TEMPERATURE: 98 F | SYSTOLIC BLOOD PRESSURE: 120 MMHG | DIASTOLIC BLOOD PRESSURE: 75 MMHG | HEART RATE: 132 BPM | RESPIRATION RATE: 17 BRPM

## 2024-07-25 DIAGNOSIS — I87.331 CHRONIC VENOUS HYPERTENSION WITH ULCER AND INFLAMMATION, RIGHT (HCC): ICD-10-CM

## 2024-07-25 DIAGNOSIS — M32.9 LUPUS (HCC): ICD-10-CM

## 2024-07-25 DIAGNOSIS — L97.812 NON-PRESSURE CHRONIC ULCER OF OTHER PART OF RIGHT LOWER LEG WITH FAT LAYER EXPOSED (HCC): Primary | ICD-10-CM

## 2024-07-25 PROCEDURE — 99214 OFFICE O/P EST MOD 30 MIN: CPT | Performed by: NURSE PRACTITIONER

## 2024-07-25 NOTE — PROGRESS NOTES
.Weekly Wound Education Note    Teaching Provided To: Patient;Family  Training Topics: Dressing;Edema control;Cleasing and general instructions;Compression;Discharge instructions  Training Method: Explain/Verbal;Written  Training Response: Patient responds and understands        Notes: Wounds improving. Dressing changed to man, folded xeroform, and calamine unna boot 20-30mmHg. Open ABD pad in between pink and brown layer.

## 2024-07-25 NOTE — PATIENT INSTRUCTIONS
Please return:  Wednesday or Thursday with [facility] HOLLINGSWORTH'S JOBS FOR THE WEEK:  GO ON [employer] and look for 15-20mmhg compression stockings that are long enough for you.  (If you are between 2 sizes then size up).    Sleep in your bed with your cpap this week      Patient discharge and wound care instructions  Miguel Davila  7/25/2024       You may shower with protection of the wound (ie a cast cover or similar).     RIGHT toes: paint with betadine daily and cover with bandage    Cleansing/dressing:     In clinic, with each dressing change:    please cleanse the limb, foot, and between toes with soap, water and washcloth.   Dry thoroughly.    Cleanse/soak the wound with VASHE (or other hypochlorous wound cleanser), dab dry.    Apply the following dressings:      RIGHT: man>xeroform>light 20-30mmhg (open abd pad between the layers)  LEFT:  spandagrip    Managing your edema:  Avoid prolonged standing in one place. It is better to have your calf muscles moving to pump fluid out of the legs      Elevate leg(s) above the level of the heart when sitting or as much as possible.  Take you diuretics as directed by your physician. Do not skip doses or change doses unless instructed to do so by your physician.  Decrease salt intake, follow recommended 2 grams daily.  Do not get leg(s) with compression wrap wet. If wraps are too tight as indicated by pain, numbness/tingling or discoloration of toes remove wrap completely and call the wound center @ [phone].  Refer to the \"Multi-layer compression bandage application patient information sheet\" given to you in clinic.    Nutrition and blood sugar control:  Focus on the following:  Protein: Meats, beans, eggs, milk and yogurt particularly Greek yogurt), tofu, soy nuts, soy protein products (Follow the protein handout in your welcome folder)  Vitamin C: Citrus fruits and juices, strawberries, tomatoes, tomato juice, peppers, baked potatoes, spinach, broccoli,  cauliflower, [place] sprouts, cabbage  Vitamin A: Dark green, leafy vegetables, orange or yellow vegetables, cantaloupe, fortified dairy products, liver, fortified cereals  Zinc: Fortified cereals, red meats, seafood  Consider supplementing with Damion by [employer]. It can be purchased on amazon, [employer] website, or local pharmacy may be able to order it for you.  (These are essential branch chain amino acids that help with tissue building and wound healing).   When your blood sugar is consistently elevated greater than 180 your body can't heal or fight infection.     Concerns:  Signs of infection may include the following:  Increase in redness  Red \"streaks\" from wound  Increase in swelling  Fever  Unusual odor  Change in the amount of wound drainage     Should you experience any significant changes in your wound(s) or have any questions regarding your home care instructions please contact the wound center [facility] @ [phone] If after regular business hours, please call your family doctor or local emergency room. The treatment plan has been discussed at length between you and your provider. Follow all instructions carefully, it is very important. If you do not follow all instructions you are at risk of your wound not healing, infection, possible loss of limb and even loss of life.

## 2024-07-25 NOTE — PROGRESS NOTES
CHIEF COMPLAINT:     Chief Complaint   Patient presents with    Wound Care     Patient arrives for a wound care follow up appointment. Patient arrives with an unna wrap to the right leg. Patient arrives with a Spandagrip to the left leg. Patient reports very mild pain. Patient has hydrafera transfer and a baby diaper to the leg wound. Patient has band aids to the toe wounds.      HPI:   Information obtained from patient and chart  7-18-24 INITIAL: 79 year old male with essential hypertension, chronic heart failure with preserved EF, pulmonary hypertension, atrial fibrillation sp Watchman, chronic kidney disease, SLE, polymyositis, chronic steroid use, hypothyroidism, LI who was admitted from July 8-12 for RLE redness and swelling.  His albumin level was low and felt to be a contributing factor to his leg swelling and hypotension.  He was given an albumin infusion. His discharge weight was 189. He followed up with heart failure clinic earlier this week and his weight was down 7# since may.  It was noted that he was hypotensive at the visit and he had stated he had increased his bumex to 2 mg over the weekend and taking the full tablet of entresto instead of the recommended 1/2.  At visit he was to stop the entresto and compression wraps were recommended.   His next appointment with heart failure is next wednesday.    Since his hospitalization he has been sleeping in a recliner and therefore not utilizing his cpap.  He states he will try to go back to his bed this weekend.  He denies intermittent claudication or rest pain.  Patient has a weeping area on the right lateral leg.  No s/s of infection or c/o pain.   He is here with his sister.    7-25-24 patient returns.  At rn visit his right calf edema measurement had reduced by 5 cm and another 3 cm today. He saw heart failure clinic yesterday and it was noted his weight was up by 7# since last visit on the 18th, however it was felt he was starting to respond to the  diuretics based on patient report and therefore patient was to continue current medications. Albumin was 3.9 yesterday. Wound is improved, he has tolerated compression. No s/s of infection or c/o pain.  Patient states he did not go back to his bed this weekend because he was concerned he might still be leaking.  He states he will try this weekend. We discussed compression for post healing, patient will look on amazon. We printed out his measurements for him.     MEDICATIONS:     Current Outpatient Medications:     predniSONE 1 MG Oral Tab, Take 2 tablets (2 mg total) by mouth daily with breakfast., Disp: 180 tablet, Rfl: 0    bumetanide 1 MG Oral Tab, Take 2 tablets (2 mg total) by mouth daily., Disp: , Rfl:     spironolactone 25 MG Oral Tab, Take 0.5 tablets (12.5 mg total) by mouth daily., Disp: 15 tablet, Rfl: 0    fexofenadine 180 MG Oral Tab, Take 1 tablet (180 mg total) by mouth daily as needed., Disp: , Rfl:     levothyroxine 50 MCG Oral Tab, Take 1 tablet (50 mcg total) by mouth before breakfast., Disp: , Rfl:     ipratropium 0.06 % Nasal Solution, 1 spray by Nasal route 2 (two) times daily., Disp: 1 each, Rfl: 5    omeprazole 20 MG Oral Capsule Delayed Release, Take 1 capsule (20 mg total) by mouth 2 (two) times daily., Disp: 180 capsule, Rfl: 1    ALLOPURINOL 300 MG Oral Tab, TAKE 1 TABLET BY MOUTH EVERY DAY, Disp: 90 tablet, Rfl: 3    predniSONE 5 MG Oral Tab, Take 1 tablet (5 mg total) by mouth daily with breakfast., Disp: 90 tablet, Rfl: 3    metoprolol succinate  MG Oral Tablet 24 Hr, Take 1 tablet (100 mg total) by mouth every morning AND 0.5 tablets (50 mg total) every evening., Disp: 60 tablet, Rfl: 3    aspirin 81 MG Oral Tab EC, Take 1 tablet (81 mg total) by mouth daily., Disp: 30 tablet, Rfl: 0    PREVIDENT 5000 BOOSTER PLUS 1.1 % Dental Paste, , Disp: , Rfl:     Multiple Vitamins-Minerals (TAB-A-KEVIN) Oral Tab, Take 1 tablet by mouth daily., Disp: , Rfl:     Immune Globulin, Human, 10 g  Intravenous Recon Soln, Inject 0.4 g/kg into the vein. Given for treatment of polymyositis, mixed connective tissue disease, and immune thrombocytopenia purpura monthly at [facility]. Every 5 weeks, Disp: 3 each, Rfl: 0    Calcium Citrate-Vitamin D (CITRACAL + D OR), Take 1 tablet by mouth daily., Disp: , Rfl:   ALLERGIES:     Allergies   Allergen Reactions    Empagliflozin OTHER (SEE COMMENTS)     Pancreatitis    \"pancreatitis\" per pt      REVIEW OF SYSTEMS:   This information was obtained from the patient/family and chart.    See HPI for pertinent positives, otherwise 10 pt ROS negative.  HISTORY:   Past medical, surgical, family and social history updated where appropriate.      PHYSICAL EXAM:     Vitals:    07/25/24 1100   BP: 120/75   Pulse: (!) 132   Resp: 17   Temp: 98 °F (36.7 °C)        Estimated body mass index is 27.45 kg/m² as calculated from the following:    Height as of 7/18/24: 71\".    Weight as of 7/24/24: 196 lb 12.8 oz (89.3 kg).   No results found for: \"PGLU\"    Vital signs reviewed.Appears stated age, well groomed.    Constitutional:  Bp wnl for patient. Pulse Regular and wnl for patient. Respirations easy and unlabored. Temperature wnl. Weight normal for height. Appearance neat and clean. Appears in no acute distress. Well nourished and well developed.  Lower extremity:  dp weakly palpable right.  Right lower extremity free of varicosities, +edema with significant reduction over the last week, + hemosideran staining and very atrophic skin. Capillary refill < 3 seconds. Digits are warm, but with rubor. toenails are thin, brittle with adequate length and hygeine. Skin hydration wnl. no hairgrowth on legs.  Musculoskeletal:  Gait and station stable   Integumentary:  refer to wound characteristics and images   Psychiatric:  Judgment and insight intact. Alert and oriented times 3. No evidence of depression, anxiety, or agitation. Calm, cooperative, and communicative.   EDEMA:   Calf      Point of Measurement - Right Calf: 37        Right Calf from:: Heel  Right Calf cm:: 29  Ankle     Point of Measurement - Right Ankle: 13           Right Ankle from:: Heel  Right Ankle cm:: 22     DIAGNOSTICS:     Lab Results   Component Value Date    BUN 37 (H) 07/24/2024    CREATSERUM 1.45 (H) 07/24/2024    ALB 3.9 07/24/2024    TP 5.0 (L) 07/09/2024    A1C 5.4 05/14/2024       WOUND ASSESSMENT:     Wound 07/18/24 #1 Right Leg Leg Right (Active)   Date First Assessed/Time First Assessed: 07/18/24 1415    Wound Number (Wound Clinic Only): #1 Right Leg  Primary Wound Type: Venous Ulcer  Location: Leg  Wound Location Orientation: Right      Assessments 7/18/2024  2:18 PM 7/25/2024  2:57 PM   Wound Image       Drainage Amount Moderate Moderate   Drainage Description Serous;Clear Serosanguineous   Treatments Compression --   Wound Length (cm) 4 cm 3.8 cm   Wound Width (cm) 3.4 cm 3 cm   Wound Surface Area (cm^2) 13.6 cm^2 11.4 cm^2   Wound Depth (cm) 0.1 cm 0.1 cm   Wound Volume (cm^3) 1.36 cm^3 1.14 cm^3   Wound Healing % -- 16   Margins Well-defined edges Well-defined edges   Non-staged Wound Description Full thickness Full thickness   Shelley-wound Assessment -- Edema;Hemosiderin staining   Wound Granulation Tissue Pink;Pale Grey;Firm Pink;Firm;Red   Wound Bed Granulation (%) 60 % 70 %   Wound Bed Epithelium (%) 40 % 30 %   Wound Odor None None   Shape clustered clustered, blister to periwound   Tunneling? No No   Undermining? No No   Sinus Tracts? No No       No associated orders.       Wound 07/18/24 #2 Right Lateral Second Toe Toe (Comment which one) Dorsal;Right (Active)   Date First Assessed/Time First Assessed: 07/18/24 1415    Wound Number (Wound Clinic Only): #2 Right Lateral Second Toe  Primary Wound Type: Pressure Injury  Location: Toe (Comment which one)  Wound Location Orientation: Dorsal;Right      Assessments 7/18/2024  2:19 PM 7/25/2024  2:55 PM   Wound Image       Drainage Amount None None   Wound  Length (cm) 0.3 cm 0 cm   Wound Width (cm) 0.2 cm 0 cm   Wound Surface Area (cm^2) 0.06 cm^2 0 cm^2   Wound Depth (cm) 0 cm 0 cm   Wound Volume (cm^3) 0 cm^3 0 cm^3   Margins Well-defined edges Flat and Intact   Non-staged Wound Description Full thickness Full thickness   Shelley-wound Assessment Edema Clean   Wound Bed Epithelium (%) -- 100 %   Wound Bed Slough (%) 100 % --   Wound Odor None None   Tunneling? No --   Undermining? No --   Sinus Tracts? No --   Pressure Injury Stage Stage 3 Stage 3       No associated orders.       Wound 07/18/24 #3 Right Medial Third Toe Toe (Comment which one) Dorsal;Right (Active)   Date First Assessed/Time First Assessed: 07/18/24 1416    Wound Number (Wound Clinic Only): #3 Right Medial Third Toe  Primary Wound Type: Venous Ulcer  Location: Toe (Comment which one)  Wound Location Orientation: Dorsal;Right      Assessments 7/18/2024  2:21 PM 7/25/2024  2:56 PM   Wound Image       Drainage Amount None None   Wound Length (cm) 0.2 cm 0.1 cm   Wound Width (cm) 0.4 cm 0.1 cm   Wound Surface Area (cm^2) 0.08 cm^2 0.01 cm^2   Wound Depth (cm) 0.1 cm 0.1 cm   Wound Volume (cm^3) 0.008 cm^3 0.001 cm^3   Wound Healing % -- 88   Margins Well-defined edges Well-defined edges   Non-staged Wound Description Full thickness Full thickness   Shelley-wound Assessment Edema --   Wound Granulation Tissue Pink;Firm --   Wound Bed Granulation (%) 50 % --   Wound Bed Epithelium (%) 50 % 100 %   Wound Odor None --   Tunneling? No --   Undermining? No --   Sinus Tracts? No --   Pressure Injury Stage Stage 3 --       No associated orders.       Compression Wrap 07/18/24 Leg Anterior;Right (Active)   Placement Date/Time: 07/18/24 1614   Location: Leg  Wound Location Orientation: Anterior;Right      Assessments 7/18/2024  4:15 PM 7/22/2024  4:38 PM   Response to Treatment Well tolerated Well tolerated   Compression Layers Multilayer Multilayer   Compression Product Type Unna Boot Unna Boot   Dressing Applied Yes  Yes   Compression Wrap Location Toes to Knee Toes to Knee   Compression Wrap Status Dry;Clean Dry;Clean       No associated orders.          ASSESSMENT AND PLAN:    1. 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 Lupus (HCC)    3. Chronic venous hypertension with ulcer and inflammation, right (HCC)      Risks, benefits, and alternatives of current treatment plan discussed in detail.  Questions and concerns addressed. Red flags to RTC or ED reviewed.  Patient (or parent) agrees to plan.      NOTE TO PATIENT: The 21st Century Cures Act makes clinical notes like these available to patients in the interest of transparency. Clinical notes are medical documents used by physicians and care providers to communicate with each other. These documents include medical language and terminology, abbreviations, and treatment information that may sound technical and at times possibly unfamiliar. In addition, at times, the verbiage may appear blunt or direct. These documents are one tool providers use to communicate relevant information and clinical opinions of the care providers in a way that allows common understanding of the clinical context.   I spent   30 minutes with the patient. This time included:    preparing to see the patient (eg, review notes and recent diagnostics),  seeing the patient, obtaining and/or reviewing separately obtained history, performing a medically appropriate examination and/or evaluation, counseling and educating the patient, documenting in the record.    DISCHARGE:      Patient Instructions   Please return:  Wednesday or Thursday with [facility]LLINGSWORTH[employer]S JOBS FOR THE WEEK:  GO ON [employer] and look for 15-20mmhg compression stockings that are long enough for you.  (If you are between 2 sizes then size up).    Sleep in your bed with your cpap this week      Patient discharge and wound care instructions  Miguel Davila  7/25/2024       You may shower  with protection of the wound (ie a cast cover or similar).     RIGHT toes: paint with betadine daily and cover with bandage    Cleansing/dressing:     In clinic, with each dressing change:    please cleanse the limb, foot, and between toes with soap, water and washcloth.   Dry thoroughly.    Cleanse/soak the wound with VASHE (or other hypochlorous wound cleanser), dab dry.    Apply the following dressings:      RIGHT: man>xeroform>light 20-30mmhg (open abd pad between the layers)  LEFT:  spandagrip    Managing your edema:  Avoid prolonged standing in one place. It is better to have your calf muscles moving to pump fluid out of the legs      Elevate leg(s) above the level of the heart when sitting or as much as possible.  Take you diuretics as directed by your physician. Do not skip doses or change doses unless instructed to do so by your physician.  Decrease salt intake, follow recommended 2 grams daily.  Do not get leg(s) with compression wrap wet. If wraps are too tight as indicated by pain, numbness/tingling or discoloration of toes remove wrap completely and call the wound center @ [phone].  Refer to the \"Multi-layer compression bandage application patient information sheet\" given to you in clinic.    Nutrition and blood sugar control:  Focus on the following:  Protein: Meats, beans, eggs, milk and yogurt particularly Greek yogurt), tofu, soy nuts, soy protein products (Follow the protein handout in your welcome folder)  Vitamin C: Citrus fruits and juices, strawberries, tomatoes, tomato juice, peppers, baked potatoes, spinach, broccoli, cauliflower, [place] sprouts, cabbage  Vitamin A: Dark green, leafy vegetables, orange or yellow vegetables, cantaloupe, fortified dairy products, liver, fortified cereals  Zinc: Fortified cereals, red meats, seafood  Consider supplementing with Damion by [employer]. It can be purchased on amazon, [employer] website, or local pharmacy may be able to order it for you.   (These are essential branch chain amino acids that help with tissue building and wound healing).   When your blood sugar is consistently elevated greater than 180 your body can't heal or fight infection.     Concerns:  Signs of infection may include the following:  Increase in redness  Red \"streaks\" from wound  Increase in swelling  Fever  Unusual odor  Change in the amount of wound drainage     Should you experience any significant changes in your wound(s) or have any questions regarding your home care instructions please contact the [facility] @ [phone] If after regular business hours, please call your family doctor or local emergency room. The treatment plan has been discussed at length between you and your provider. Follow all instructions carefully, it is very important. If you do not follow all instructions you are at risk of your wound not healing, infection, possible loss of limb and even loss of life.       Charisma Morrow FNP-C, CWCN-AP, CFCN, CSWS, WCC, DWC  7/25/2024

## 2024-07-30 NOTE — PROGRESS NOTES
CHIEF COMPLAINT:     Chief Complaint   Patient presents with    Wound Care     Patient is here for a wound care follow up. He denies any pain or new wound concerns.     HPI:   Information obtained from patient and chart  7-18-24 INITIAL: 79 year old male with essential hypertension, chronic heart failure with preserved EF, pulmonary hypertension, atrial fibrillation sp Watchman, chronic kidney disease, SLE, polymyositis, chronic steroid use, hypothyroidism, LI who was admitted from July 8-12 for RLE redness and swelling.  His albumin level was low and felt to be a contributing factor to his leg swelling and hypotension.  He was given an albumin infusion. His discharge weight was 189. He followed up with heart failure clinic earlier this week and his weight was down 7# since may.  It was noted that he was hypotensive at the visit and he had stated he had increased his bumex to 2 mg over the weekend and taking the full tablet of entresto instead of the recommended 1/2.  At visit he was to stop the entresto and compression wraps were recommended.   His next appointment with heart failure is next wednesday.    Since his hospitalization he has been sleeping in a recliner and therefore not utilizing his cpap.  He states he will try to go back to his bed this weekend.  He denies intermittent claudication or rest pain.  Patient has a weeping area on the right lateral leg.  No s/s of infection or c/o pain.   He is here with his sister.    7-25-24 patient returns.  At rn visit his right calf edema measurement had reduced by 5 cm and another 3 cm today. He saw heart failure clinic yesterday and it was noted his weight was up by 7# since last visit on the 18th, however it was felt he was starting to respond to the diuretics based on patient report and therefore patient was to continue current medications. Albumin was 3.9 yesterday. Wound is improved, he has tolerated compression. No s/s of infection or c/o pain.  Patient  states he did not go back to his bed this weekend because he was concerned he might still be leaking.  He states he will try this weekend. We discussed compression for post healing, patient will look on amazon. We printed out his measurements for him.       7-31-24 patient returns. He states he did sleep in his bed last noc and he will be getting his cpap adjusted.  He did not get comrpession because there were so many choices.  I will give him some brand names as well as other sites to purchase.  I also said he could go [facility] to purchase.  Wound is smaller, no s/s of infection or c/o pain. Edema with slight increase at calf.  MEDICATIONS:     Current Outpatient Medications:     predniSONE 1 MG Oral Tab, Take 2 tablets (2 mg total) by mouth daily with breakfast., Disp: 180 tablet, Rfl: 0    bumetanide 1 MG Oral Tab, Take 2 tablets (2 mg total) by mouth daily., Disp: , Rfl:     spironolactone 25 MG Oral Tab, Take 0.5 tablets (12.5 mg total) by mouth daily., Disp: 15 tablet, Rfl: 0    fexofenadine 180 MG Oral Tab, Take 1 tablet (180 mg total) by mouth daily as needed., Disp: , Rfl:     levothyroxine 50 MCG Oral Tab, Take 1 tablet (50 mcg total) by mouth before breakfast., Disp: , Rfl:     ipratropium 0.06 % Nasal Solution, 1 spray by Nasal route 2 (two) times daily., Disp: 1 each, Rfl: 5    omeprazole 20 MG Oral Capsule Delayed Release, Take 1 capsule (20 mg total) by mouth 2 (two) times daily., Disp: 180 capsule, Rfl: 1    ALLOPURINOL 300 MG Oral Tab, TAKE 1 TABLET BY MOUTH EVERY DAY, Disp: 90 tablet, Rfl: 3    predniSONE 5 MG Oral Tab, Take 1 tablet (5 mg total) by mouth daily with breakfast., Disp: 90 tablet, Rfl: 3    metoprolol succinate  MG Oral Tablet 24 Hr, Take 1 tablet (100 mg total) by mouth every morning AND 0.5 tablets (50 mg total) every evening., Disp: 60 tablet, Rfl: 3    aspirin 81 MG Oral Tab EC, Take 1 tablet (81 mg total) by mouth daily., Disp: 30 tablet, Rfl: 0    PREVIDENT  5000 BOOSTER PLUS 1.1 % Dental Paste, , Disp: , Rfl:     Multiple Vitamins-Minerals (TAB-A-KEVIN) Oral Tab, Take 1 tablet by mouth daily., Disp: , Rfl:     Immune Globulin, Human, 10 g Intravenous Recon Soln, Inject 0.4 g/kg into the vein. Given for treatment of polymyositis, mixed connective tissue disease, and immune thrombocytopenia purpura monthly at [facility]. Every 5 weeks, Disp: 3 each, Rfl: 0    Calcium Citrate-Vitamin D (CITRACAL + D OR), Take 1 tablet by mouth daily., Disp: , Rfl:   ALLERGIES:     Allergies   Allergen Reactions    Empagliflozin OTHER (SEE COMMENTS)     Pancreatitis    \"pancreatitis\" per pt      REVIEW OF SYSTEMS:   This information was obtained from the patient/family and chart.    See HPI for pertinent positives, otherwise 10 pt ROS negative.  HISTORY:   Past medical, surgical, family and social history updated where appropriate.      PHYSICAL EXAM:     Vitals:    07/31/24 1448   BP: 117/66   Pulse: 93   Resp: 16   Temp: 98.4 °F (36.9 °C)     Estimated body mass index is 27.45 kg/m² as calculated from the following:    Height as of 7/18/24: 71\".    Weight as of 7/24/24: 196 lb 12.8 oz (89.3 kg).   No results found for: \"PGLU\"    Vital signs reviewed.Appears stated age, well groomed.    Constitutional:  Bp wnl for patient. Pulse Regular and wnl for patient. Respirations easy and unlabored. Temperature wnl. Weight normal for height. Appearance neat and clean. Appears in no acute distress. Well nourished and well developed.  Lower extremity:  dp weakly palpable right.  Right lower extremity free of varicosities, +edema slight increase at calf, + hemosideran staining and very atrophic skin. Capillary refill < 3 seconds. Digits are warm. toenails are thin, brittle with adequate length and hygeine. Skin hydration wnl. no hairgrowth on legs.  Musculoskeletal:  Gait and station stable   Integumentary:  refer to wound characteristics and images   Psychiatric:  Judgment and insight  intact. Alert and oriented times 3. No evidence of depression, anxiety, or agitation. Calm, cooperative, and communicative.   EDEMA:   Calf     Point of Measurement - Right Calf: 37        Right Calf from:: Heel  Right Calf cm:: 30.6  Ankle     Point of Measurement - Right Ankle: 13           Right Ankle from:: Heel  Right Ankle cm:: 22     DIAGNOSTICS:     Lab Results   Component Value Date    BUN 37 (H) 07/24/2024    CREATSERUM 1.45 (H) 07/24/2024    ALB 3.9 07/24/2024    TP 5.0 (L) 07/09/2024    A1C 5.4 05/14/2024       WOUND ASSESSMENT:     Wound 07/18/24 #1 Right Leg Leg Right (Active)   Date First Assessed/Time First Assessed: 07/18/24 1415    Wound Number (Wound Clinic Only): #1 Right Leg  Primary Wound Type: Venous Ulcer  Location: Leg  Wound Location Orientation: Right      Assessments 7/18/2024  2:18 PM 7/31/2024  2:54 PM   Wound Image       Drainage Amount Moderate Small   Drainage Description Serous;Clear Serosanguineous   Treatments Compression --   Wound Length (cm) 4 cm 2.8 cm   Wound Width (cm) 3.4 cm 2.3 cm   Wound Surface Area (cm^2) 13.6 cm^2 6.44 cm^2   Wound Depth (cm) 0.1 cm 0.1 cm   Wound Volume (cm^3) 1.36 cm^3 0.644 cm^3   Wound Healing % -- 53   Margins Well-defined edges Well-defined edges   Non-staged Wound Description Full thickness Full thickness   Shelley-wound Assessment -- Edema;Hemosiderin staining   Wound Granulation Tissue Pink;Pale Grey;Firm Firm;Pink   Wound Bed Granulation (%) 60 % 10 %   Wound Bed Epithelium (%) 40 % 80 %   Wound Bed Slough (%) -- 10 %   Wound Odor None None   Shape clustered --   Tunneling? No No   Undermining? No No   Sinus Tracts? No No       No associated orders.       Compression Wrap 07/18/24 Leg Anterior;Right (Active)   Placement Date/Time: 07/18/24 1614   Location: Leg  Wound Location Orientation: Anterior;Right      Assessments 7/18/2024  4:15 PM 7/25/2024  2:57 PM   Response to Treatment Well tolerated Well tolerated   Compression Layers Multilayer  Multilayer   Compression Product Type Unna Boot Unna Boot   Dressing Applied Yes Yes   Compression Wrap Location Toes to Knee Toes to Knee   Compression Wrap Status Dry;Clean Dry;Clean       No associated orders.          ASSESSMENT AND PLAN:    1. 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 Lupus (HCC)    3. Chronic venous hypertension with ulcer and inflammation, right (HCC)        Risks, benefits, and alternatives of current treatment plan discussed in detail.  Questions and concerns addressed. Red flags to RTC or ED reviewed.  Patient (or parent) agrees to plan.      NOTE TO PATIENT: The 21st Century Cures Act makes clinical notes like these available to patients in the interest of transparency. Clinical notes are medical documents used by physicians and care providers to communicate with each other. These documents include medical language and terminology, abbreviations, and treatment information that may sound technical and at times possibly unfamiliar. In addition, at times, the verbiage may appear blunt or direct. These documents are one tool providers use to communicate relevant information and clinical opinions of the care providers in a way that allows common understanding of the clinical context.   I spent  29 minutes with the patient. This time included:    preparing to see the patient (eg, review notes and recent diagnostics),  seeing the patient, obtaining and/or reviewing separately obtained history, performing a medically appropriate examination and/or evaluation, counseling and educating the patient, documenting in the record.    DISCHARGE:      Patient Instructions   Please return:  1 weel with Charisma HOLLINGSWORTH'S JOBS FOR THE WEEK:  GO ON [employer] and look for 15-20mmhg compression stockings that are long enough for you.  (If you are between 2 sizes then size up).- some common brands are Juzo, jobst, mediven  You can also look on  [email or web address] or [email or web address]        Patient discharge and wound care instructions  Miguel Davila  7/31/2024          You may shower with protection of the wound (ie a cast cover or similar).     Cleansing/dressing:     In clinic, with each dressing change:    please cleanse the limb, foot, and between toes with soap, water and washcloth.   Dry thoroughly.    Cleanse/soak the wound with VASHE (or other hypochlorous wound cleanser), dab dry.    Apply the following dressings:      RIGHT: man>xeroform>light 20-30mmhg (folded abd pad between the layers)  LEFT:  spandagrip    Managing your edema:  Avoid prolonged standing in one place. It is better to have your calf muscles moving to pump fluid out of the legs      Elevate leg(s) above the level of the heart when sitting or as much as possible.  Take you diuretics as directed by your physician. Do not skip doses or change doses unless instructed to do so by your physician.  Decrease salt intake, follow recommended 2 grams daily.  Do not get leg(s) with compression wrap wet. If wraps are too tight as indicated by pain, numbness/tingling or discoloration of toes remove wrap completely and call the wound center @ [phone].  Refer to the \"Multi-layer compression bandage application patient information sheet\" given to you in clinic.    Nutrition and blood sugar control:  Focus on the following:  Protein: Meats, beans, eggs, milk and yogurt particularly Greek yogurt), tofu, soy nuts, soy protein products (Follow the protein handout in your welcome folder)  Vitamin C: Citrus fruits and juices, strawberries, tomatoes, tomato juice, peppers, baked potatoes, spinach, broccoli, cauliflower, [place] sprouts, cabbage  Vitamin A: Dark green, leafy vegetables, orange or yellow vegetables, cantaloupe, fortified dairy products, liver, fortified cereals  Zinc: Fortified cereals, red meats, seafood  Consider supplementing with Damion by ABODO. It  can be purchased on amazon, [employer] website, or local pharmacy may be able to order it for you.  (These are essential branch chain amino acids that help with tissue building and wound healing).   When your blood sugar is consistently elevated greater than 180 your body can't heal or fight infection.     Concerns:  Signs of infection may include the following:  Increase in redness  Red \"streaks\" from wound  Increase in swelling  Fever  Unusual odor  Change in the amount of wound drainage     Should you experience any significant changes in your wound(s) or have any questions regarding your home care instructions please contact the [facility] @ [phone] If after regular business hours, please call your family doctor or local emergency room. The treatment plan has been discussed at length between you and your provider. Follow all instructions carefully, it is very important. If you do not follow all instructions you are at risk of your wound not healing, infection, possible loss of limb and even loss of life.       Charisma Morrow FNP-C, CWCN-AP, CFCN, CSWS, WCC, DWC  7/31/2024

## 2024-07-31 ENCOUNTER — OFFICE VISIT (OUTPATIENT)
Dept: WOUND CARE | Facility: HOSPITAL | Age: 79
End: 2024-07-31
Attending: NURSE PRACTITIONER
Payer: MEDICARE

## 2024-07-31 VITALS
SYSTOLIC BLOOD PRESSURE: 117 MMHG | DIASTOLIC BLOOD PRESSURE: 66 MMHG | RESPIRATION RATE: 16 BRPM | TEMPERATURE: 98 F | HEART RATE: 93 BPM

## 2024-07-31 DIAGNOSIS — L97.812 NON-PRESSURE CHRONIC ULCER OF OTHER PART OF RIGHT LOWER LEG WITH FAT LAYER EXPOSED (HCC): Primary | ICD-10-CM

## 2024-07-31 DIAGNOSIS — I87.331 CHRONIC VENOUS HYPERTENSION WITH ULCER AND INFLAMMATION, RIGHT (HCC): ICD-10-CM

## 2024-07-31 DIAGNOSIS — M32.9 LUPUS (HCC): ICD-10-CM

## 2024-07-31 PROCEDURE — 99213 OFFICE O/P EST LOW 20 MIN: CPT | Performed by: NURSE PRACTITIONER

## 2024-07-31 NOTE — PATIENT INSTRUCTIONS
Please return:  1 weel with Charisma HOLLINGSWORTH'S JOBS FOR THE WEEK:  GO ON [employer] and look for 15-20mmhg compression stockings that are long enough for you.  (If you are between 2 sizes then size up).- some common brands are Juzo, jobst, mediven  You can also look on [email or web address] or [email or web address]        Patient discharge and wound care instructions  Miguel Griffin Lola  7/31/2024          You may shower with protection of the wound (ie a cast cover or similar).     Cleansing/dressing:     In clinic, with each dressing change:    please cleanse the limb, foot, and between toes with soap, water and washcloth.   Dry thoroughly.    Cleanse/soak the wound with VASHE (or other hypochlorous wound cleanser), dab dry.    Apply the following dressings:      RIGHT: man>xeroform>light 20-30mmhg (folded abd pad between the layers)  LEFT:  spandagrip    Managing your edema:  Avoid prolonged standing in one place. It is better to have your calf muscles moving to pump fluid out of the legs      Elevate leg(s) above the level of the heart when sitting or as much as possible.  Take you diuretics as directed by your physician. Do not skip doses or change doses unless instructed to do so by your physician.  Decrease salt intake, follow recommended 2 grams daily.  Do not get leg(s) with compression wrap wet. If wraps are too tight as indicated by pain, numbness/tingling or discoloration of toes remove wrap completely and call the wound center @ [phone].  Refer to the \"Multi-layer compression bandage application patient information sheet\" given to you in clinic.    Nutrition and blood sugar control:  Focus on the following:  Protein: Meats, beans, eggs, milk and yogurt particularly Greek yogurt), tofu, soy nuts, soy protein products (Follow the protein handout in your welcome folder)  Vitamin C: Citrus fruits and juices, strawberries, tomatoes, tomato juice, peppers, baked potatoes, spinach, broccoli,  cauliflower, [place] sprouts, cabbage  Vitamin A: Dark green, leafy vegetables, orange or yellow vegetables, cantaloupe, fortified dairy products, liver, fortified cereals  Zinc: Fortified cereals, red meats, seafood  Consider supplementing with Damion by [employer]. It can be purchased on amazon, [employer] website, or local pharmacy may be able to order it for you.  (These are essential branch chain amino acids that help with tissue building and wound healing).   When your blood sugar is consistently elevated greater than 180 your body can't heal or fight infection.     Concerns:  Signs of infection may include the following:  Increase in redness  Red \"streaks\" from wound  Increase in swelling  Fever  Unusual odor  Change in the amount of wound drainage     Should you experience any significant changes in your wound(s) or have any questions regarding your home care instructions please contact the wound center [facility] @ [phone] If after regular business hours, please call your family doctor or local emergency room. The treatment plan has been discussed at length between you and your provider. Follow all instructions carefully, it is very important. If you do not follow all instructions you are at risk of your wound not healing, infection, possible loss of limb and even loss of life.

## 2024-07-31 NOTE — PROGRESS NOTES
.Weekly Wound Education Note    Teaching Provided To: Patient  Training Topics: Discharge instructions;Dressing;Edema control;Compression  Training Method: Explain/Verbal;Written  Training Response: Patient responds and understands            Danielle Ag, folded xeroform to wound.  Calamine unna boot 20-30mmHg with abd pad folded in between layers of wrap.  Patient to shop for compression socks 20-30mmHg.

## 2024-08-01 ENCOUNTER — PATIENT OUTREACH (OUTPATIENT)
Dept: CASE MANAGEMENT | Age: 79
End: 2024-08-01

## 2024-08-01 DIAGNOSIS — K22.70 BARRETT'S ESOPHAGUS WITHOUT DYSPLASIA: ICD-10-CM

## 2024-08-01 DIAGNOSIS — N18.32 STAGE 3B CHRONIC KIDNEY DISEASE (HCC): ICD-10-CM

## 2024-08-01 DIAGNOSIS — K75.4: Primary | ICD-10-CM

## 2024-08-01 DIAGNOSIS — I48.19 PERSISTENT ATRIAL FIBRILLATION (HCC): ICD-10-CM

## 2024-08-01 DIAGNOSIS — C44.219 BASAL CELL CARCINOMA (BCC) OF SKIN OF LEFT EAR: ICD-10-CM

## 2024-08-01 DIAGNOSIS — I50.32 CHRONIC HEART FAILURE WITH PRESERVED EJECTION FRACTION (HCC): ICD-10-CM

## 2024-08-01 DIAGNOSIS — G47.33 OSA (OBSTRUCTIVE SLEEP APNEA): ICD-10-CM

## 2024-08-01 DIAGNOSIS — I10 BENIGN ESSENTIAL HYPERTENSION: ICD-10-CM

## 2024-08-01 DIAGNOSIS — M32.9 LUPUS (HCC): ICD-10-CM

## 2024-08-01 NOTE — PROGRESS NOTES
Spoke to Miguel for Chronic Care Management.      Updates to patient care team/comments: UTD  Patient reported changes in medications: UTD  Med Adherence  Comment: pt taking medications as prescribed     Health Maintenance:   Health Maintenance   Topic Date Due    Influenza Vaccine (1) 10/01/2024    Annual Physical  01/29/2025    Colorectal Cancer Screening  04/12/2027    Annual Depression Screening  Completed    Fall Risk Screening (Annual)  Completed    Pneumococcal Vaccine: 65+ Years  Completed    Zoster Vaccines  Completed       Patient updates/concerns:    Spoke to pt for monthly outreach   Pt has recovered well from cardiac procedure in may.  He will be following with dr gar and has already discussed an recalibration of the cardio lawrence device. He has appt scheduled on sept 18th and has already discussed with nurses his hope that any necessary cardiac procedures can be delayed until he fully recovers from this current bout of cellulitis.    Pt has not had to modify his eating habits very much since issues with perforated gallbladder. Pt states he was already trying to eat smart, but now pays a little more attention to the fat content in foods. He has not experienced any gall bladder issues since returning from hospital.   Following hospital stay for gallbladder pt started to experience swelling in abdomen and legs. The swelling quickly became irritated and turned red and pt started to experience weeping for the first time and this compelled the ER visit. Pt states that since discharge his swelling has drastically improved in abdomen and right leg, but remains somewhat in left leg. Pt is in the process of ordering compression stockings.   He is seeing both wound care and home health nurse. Pt states that neither are wrapping him and he feels the visits have become redundant and will discuss continuing only with wound care and not home health. Pt is not managing the wound with topicals or wrapping at home  either. Pt has been coached on when to return to the ER.    Pt states that this bout of cellulitis really drained him physically and he is only now starting to return to his normal day to day activity. Pt was able to rely on sister for a while to provide transportation and help with care of his wife. Pt is now strong enough to drive and manage all of his previous household responsibilities.   Pt feels that a little more recovery time might be necessary but he intends to return to a moderately paced exercise routine that involves both cardio and strength training.    Pt discussed some difficulties he is having managing his wifes condition.  He feels that he is getting appropriate support from his family and they are working cooperatively to update POA forms for management of finances. They will also be planning out what type of care may be needed in the future and the appropriate residential settings. CCM discussed with pt [place] senior advisors. Pt welcomed the resource and will follow up about this in the spring.   Goals/Action Plan:    Active goal from previous outreach:   Staying healthy  Patient reported progress towards goals: pt is appropriately recovering from recent hospital stays and has no questions for nurses. Pt has hfu  scheduled with pcp               - What: exercise           - Where/When/How: pt plans to re start a routine involving cardio and weights once he fully recovers from cellulitis  Patient Reported Barriers and Concerns: n/a                   - Plan for overcoming barriers: none    Care Managers Interventions: continue to provide encouragement and education for healthy  coping and dx    Future Appointments:   Future Appointments   Date Time Provider Department Center   8/5/2024  3:30 PM Eric Simon MD EMG 3 [facility]   8/6/2024 11:00 AM  [place] RN3  CHEMO [facility]   8/7/2024  4:15 PM Charisma Morrow APRN  Wound [facility]   8/15/2024  1:30 PM  CARD PHLEBOTOMY RM1  CARD LA  [facility]   8/15/2024  2:00 PM HEART FAILURE APN 1 EH HF CLIN [facility]   8/23/2024  5:00 PM HEART FAILURE APN 1 EH HF CLIN [facility]   8/27/2024  2:20 PM Taye Farmer MD EMGRHEUMHBSN [facility]         Next Care Manager Follow Up Date: one month    Reason For Follow Up: review progress and or barriers towards patient's goals.     Time Spent This Encounter Total: 29 min medical record review, telephone communication, care plan updates where needed, education, goals, and action plan recreation/update. Provided acknowledgment and validation to patient's concerns.   Monthly Minute Total including today: 29  Physical assessment, complete health history, and need for CCM established by Eric Simon MD.

## 2024-08-03 NOTE — PROGRESS NOTES
Subjective:   Miguel Davila is a 79 year old male who presents for hospital follow up.   He was discharged from Inpatient hospital, [facility] to Home   Admit Date: 7/8  Discharge Date: 7/12/20204  Hospital Discharge Diagnosis: cellulitis of RLE    Interactive contact within 2 business days post discharge first initiated on Date: 7/15/2024    During the visit, the following was completed:  Obtained and reviewed discharge summary, continuity of care documents, Hospitalist notes, and Cardiology notes  Reviewed Labs (CBC, CMP), Labs (Cardiac markers), and ID notes    HPI: LE edema after recent surgery leading to cellulitis, issues with acute on chronic CHF.  Still low energy, seeing wound clinic Wednesday, still low grade cough.     Weight went from 189 up about 6 lbs.   History/Other:   Current Medications:  Medication Reconciliation:  I am aware of an inpatient discharge within the last 30 days.  The discharge medication list has been reconciled with the patient's current medication list and reviewed by me.  See medication list for additions of new medication, and changes to current doses of medications and discontinued medications.  Outpatient Medications Marked as Taking for the 8/5/24 encounter (Office Visit) with Eric Simon MD   Medication Sig    predniSONE 1 MG Oral Tab Take 2 tablets (2 mg total) by mouth daily with breakfast.    bumetanide 1 MG Oral Tab Take 2 tablets (2 mg total) by mouth daily.    spironolactone 25 MG Oral Tab Take 0.5 tablets (12.5 mg total) by mouth daily.    fexofenadine 180 MG Oral Tab Take 1 tablet (180 mg total) by mouth daily as needed.    levothyroxine 50 MCG Oral Tab Take 1 tablet (50 mcg total) by mouth before breakfast.    ipratropium 0.06 % Nasal Solution 1 spray by Nasal route 2 (two) times daily.    omeprazole 20 MG Oral Capsule Delayed Release Take 1 capsule (20 mg total) by mouth 2 (two) times daily.    ALLOPURINOL 300 MG Oral Tab TAKE 1 TABLET BY MOUTH EVERY  DAY    predniSONE 5 MG Oral Tab Take 1 tablet (5 mg total) by mouth daily with breakfast.    metoprolol succinate  MG Oral Tablet 24 Hr Take 1 tablet (100 mg total) by mouth every morning AND 0.5 tablets (50 mg total) every evening.    aspirin 81 MG Oral Tab EC Take 1 tablet (81 mg total) by mouth daily.    PREVIDENT 5000 BOOSTER PLUS 1.1 % Dental Paste     Multiple Vitamins-Minerals (TAB-A-KEVIN) Oral Tab Take 1 tablet by mouth daily.    Immune Globulin, Human, 10 g Intravenous Recon Soln Inject 0.4 g/kg into the vein. Given for treatment of polymyositis, mixed connective tissue disease, and immune thrombocytopenia purpura monthly at [facility].  Every 5 weeks    Calcium Citrate-Vitamin D (CITRACAL + D OR) Take 1 tablet by mouth daily.       Review of Systems      Objective:   Physical Exam  Vitals and nursing note reviewed.   Constitutional:       General: He is not in acute distress.     Appearance: Normal appearance. He is well-developed.   HENT:      Head: Normocephalic and atraumatic.   Cardiovascular:      Rate and Rhythm: Normal rate and regular rhythm.      Pulses:           Posterior tibial pulses are 2+ on the right side and 2+ on the left side.      Heart sounds: Normal heart sounds. No murmur heard.  Pulmonary:      Effort: Pulmonary effort is normal. No respiratory distress.      Breath sounds: Normal breath sounds. No wheezing.   Abdominal:      General: Bowel sounds are normal.      Palpations: Abdomen is soft.      Tenderness: There is no abdominal tenderness.   Musculoskeletal:         General: Normal range of motion.      Cervical back: Normal range of motion.      Right lower leg: No edema.      Left lower leg: No edema.   Skin:     General: Skin is warm and dry.      Findings: No rash.   Neurological:      Mental Status: He is alert and oriented to person, place, and time.   Psychiatric:         Mood and Affect: Mood normal.         Behavior: Behavior normal.         Thought  Content: Thought content normal.         Judgment: Judgment normal.     Mild swelling both legs, compression socks on the right side covering the wound.  Left toes were still red and swollen from the skin likely related to his long-term mixed connective tissue disorder    /80   Pulse 78   Resp 16   Ht 5' 11\" (1.803 m)   Wt 201 lb 9.6 oz (91.4 kg)   BMI 28.12 kg/m²  Estimated body mass index is 28.12 kg/m² as calculated from the following:    Height as of this encounter: 5' 11\" (1.803 m).    Weight as of this encounter: 201 lb 9.6 oz (91.4 kg).    Assessment & Plan:   1. Cellulitis of right lower extremity (Primary)  2. Pulmonary hypertension (HCC)  Overview:  Diagnosis [facility] at [facility], -At mildly elevated BP there is evidence of moderate post capillary pulmonary hypertension which would be consistent with HFpEF -Interestingly when BP normalized his pulmonary hypertension was reduced to mild severity and there was no reversibility with nitric oxide based on definition -Cardiac output remains preserved         3. Chronic heart failure with preserved ejection fraction (HCC)  Comments:  BNP 3K to 6K, contine to work with CV.  Overview:  Naseem Dior MD managing  Cellulitis greatly improved, continue to work with wound clinic.  Acute on chronic heart failure that may be making him retain some fluid, still with some increased BNP levels but doing better, continue to follow with stable pulmonary hypertension but contributing to some of the fluid in the legs along with the chronic heart failure      Return in about 3 months (around 11/5/2024) for recheck.

## 2024-08-05 ENCOUNTER — OFFICE VISIT (OUTPATIENT)
Dept: FAMILY MEDICINE CLINIC | Facility: CLINIC | Age: 79
End: 2024-08-05
Payer: MEDICARE

## 2024-08-05 VITALS
HEIGHT: 71 IN | WEIGHT: 201.63 LBS | DIASTOLIC BLOOD PRESSURE: 80 MMHG | RESPIRATION RATE: 16 BRPM | HEART RATE: 78 BPM | BODY MASS INDEX: 28.23 KG/M2 | SYSTOLIC BLOOD PRESSURE: 124 MMHG

## 2024-08-05 DIAGNOSIS — L03.115 CELLULITIS OF RIGHT LOWER EXTREMITY: Primary | ICD-10-CM

## 2024-08-05 DIAGNOSIS — I27.20 PULMONARY HYPERTENSION (HCC): ICD-10-CM

## 2024-08-05 DIAGNOSIS — I50.32 CHRONIC HEART FAILURE WITH PRESERVED EJECTION FRACTION (HCC): ICD-10-CM

## 2024-08-05 PROCEDURE — G2211 COMPLEX E/M VISIT ADD ON: HCPCS | Performed by: FAMILY MEDICINE

## 2024-08-05 PROCEDURE — 99214 OFFICE O/P EST MOD 30 MIN: CPT | Performed by: FAMILY MEDICINE

## 2024-08-06 ENCOUNTER — OFFICE VISIT (OUTPATIENT)
Dept: HEMATOLOGY/ONCOLOGY | Facility: HOSPITAL | Age: 79
End: 2024-08-06
Attending: INTERNAL MEDICINE
Payer: MEDICARE

## 2024-08-06 VITALS
DIASTOLIC BLOOD PRESSURE: 69 MMHG | HEIGHT: 70.98 IN | TEMPERATURE: 98 F | RESPIRATION RATE: 18 BRPM | BODY MASS INDEX: 28.02 KG/M2 | OXYGEN SATURATION: 98 % | SYSTOLIC BLOOD PRESSURE: 109 MMHG | HEART RATE: 83 BPM | WEIGHT: 200.19 LBS

## 2024-08-06 DIAGNOSIS — D69.3 IMMUNE THROMBOCYTOPENIC PURPURA (HCC): Primary | ICD-10-CM

## 2024-08-06 DIAGNOSIS — D69.6 THROMBOCYTOPENIA (HCC): ICD-10-CM

## 2024-08-06 PROCEDURE — 96365 THER/PROPH/DIAG IV INF INIT: CPT

## 2024-08-06 PROCEDURE — 96366 THER/PROPH/DIAG IV INF ADDON: CPT

## 2024-08-06 RX ORDER — DIPHENHYDRAMINE HCL 25 MG
25 CAPSULE ORAL ONCE
OUTPATIENT
Start: 2024-09-10

## 2024-08-06 RX ORDER — ACETAMINOPHEN 325 MG/1
650 TABLET ORAL ONCE
Status: COMPLETED | OUTPATIENT
Start: 2024-08-06 | End: 2024-08-06

## 2024-08-06 RX ORDER — ACETAMINOPHEN 325 MG/1
650 TABLET ORAL ONCE
OUTPATIENT
Start: 2024-09-10

## 2024-08-06 RX ORDER — DIPHENHYDRAMINE HCL 25 MG
25 CAPSULE ORAL ONCE
Status: COMPLETED | OUTPATIENT
Start: 2024-08-06 | End: 2024-08-06

## 2024-08-06 RX ADMIN — DIPHENHYDRAMINE HCL 25 MG: 25 MG CAPSULE ORAL at 11:19:00

## 2024-08-06 RX ADMIN — ACETAMINOPHEN 650 MG: 325 TABLET ORAL at 11:19:00

## 2024-08-06 NOTE — PROGRESS NOTES
Education Record    Learner:  Patient    Disease / Diagnosis: Immune thrombocytopenia purpura, systemic lupus erythematosus    Barriers / Limitations:  None   Comments:    Method:  Brief focused   Comments:    General Topics:  Medication, Plan of care reviewed, and Fall risk and prevention   Comments:    Outcome:  Shows understanding   Comments:

## 2024-08-06 NOTE — PROGRESS NOTES
CHIEF COMPLAINT:     Chief Complaint   Patient presents with    Wound Care     Patient is here for a wound care follow up. He denies any pain to the wound currently.      HPI:   Information obtained from patient and chart  7-18-24 INITIAL: 79 year old male with essential hypertension, chronic heart failure with preserved EF, pulmonary hypertension, atrial fibrillation sp Watchman, chronic kidney disease, SLE, polymyositis, chronic steroid use, hypothyroidism, LI who was admitted from July 8-12 for RLE redness and swelling.  His albumin level was low and felt to be a contributing factor to his leg swelling and hypotension.  He was given an albumin infusion. His discharge weight was 189. He followed up with heart failure clinic earlier this week and his weight was down 7# since may.  It was noted that he was hypotensive at the visit and he had stated he had increased his bumex to 2 mg over the weekend and taking the full tablet of entresto instead of the recommended 1/2.  At visit he was to stop the entresto and compression wraps were recommended.   His next appointment with heart failure is next wednesday.    Since his hospitalization he has been sleeping in a recliner and therefore not utilizing his cpap.  He states he will try to go back to his bed this weekend.  He denies intermittent claudication or rest pain.  Patient has a weeping area on the right lateral leg.  No s/s of infection or c/o pain.   He is here with his sister.    7-25-24 patient returns.  At rn visit his right calf edema measurement had reduced by 5 cm and another 3 cm today. He saw heart failure clinic yesterday and it was noted his weight was up by 7# since last visit on the 18th, however it was felt he was starting to respond to the diuretics based on patient report and therefore patient was to continue current medications. Albumin was 3.9 yesterday. Wound is improved, he has tolerated compression. No s/s of infection or c/o pain.  Patient  states he did not go back to his bed this weekend because he was concerned he might still be leaking.  He states he will try this weekend. We discussed compression for post healing, patient will look on amazon. We printed out his measurements for him.     7-31-24 patient returns. He states he did sleep in his bed last noc and he will be getting his cpap adjusted.  He did not get comrpession because there were so many choices.  I will give him some brand names as well as other sites to purchase.  I also said he could go to [facility] pharmacy to purchase.  Wound is smaller, no s/s of infection or c/o pain. Edema with slight increase at calf.    8-7-24 patient returns.  Patient followed up primary care.  He got ivig yesterday so he has had an increase in edema.  He did get compression stockings however feels they are too \"heavy\" material.  His f/u with heart failure clinic is next week.  He states he knows how to increase his bumex prn for weight gain.  His left foot rubbed on his sandal strap and he has a wound there now.  The right lower extremity wound is improved.  MEDICATIONS:     Current Outpatient Medications:     predniSONE 1 MG Oral Tab, Take 2 tablets (2 mg total) by mouth daily with breakfast., Disp: 180 tablet, Rfl: 0    bumetanide 1 MG Oral Tab, Take 2 tablets (2 mg total) by mouth daily., Disp: , Rfl:     spironolactone 25 MG Oral Tab, Take 0.5 tablets (12.5 mg total) by mouth daily., Disp: 15 tablet, Rfl: 0    fexofenadine 180 MG Oral Tab, Take 1 tablet (180 mg total) by mouth daily as needed., Disp: , Rfl:     levothyroxine 50 MCG Oral Tab, Take 1 tablet (50 mcg total) by mouth before breakfast., Disp: , Rfl:     ipratropium 0.06 % Nasal Solution, 1 spray by Nasal route 2 (two) times daily., Disp: 1 each, Rfl: 5    omeprazole 20 MG Oral Capsule Delayed Release, Take 1 capsule (20 mg total) by mouth 2 (two) times daily., Disp: 180 capsule, Rfl: 1    ALLOPURINOL 300 MG Oral Tab, TAKE 1 TABLET BY MOUTH EVERY  DAY, Disp: 90 tablet, Rfl: 3    predniSONE 5 MG Oral Tab, Take 1 tablet (5 mg total) by mouth daily with breakfast., Disp: 90 tablet, Rfl: 3    metoprolol succinate  MG Oral Tablet 24 Hr, Take 1 tablet (100 mg total) by mouth every morning AND 0.5 tablets (50 mg total) every evening., Disp: 60 tablet, Rfl: 3    aspirin 81 MG Oral Tab EC, Take 1 tablet (81 mg total) by mouth daily., Disp: 30 tablet, Rfl: 0    PREVIDENT 5000 BOOSTER PLUS 1.1 % Dental Paste, , Disp: , Rfl:     Multiple Vitamins-Minerals (TAB-A-KEVIN) Oral Tab, Take 1 tablet by mouth daily., Disp: , Rfl:     Immune Globulin, Human, 10 g Intravenous Recon Soln, Inject 0.4 g/kg into the vein. Given for treatment of polymyositis, mixed connective tissue disease, and immune thrombocytopenia purpura monthly at [facility]. Every 5 weeks, Disp: 3 each, Rfl: 0    Calcium Citrate-Vitamin D (CITRACAL + D OR), Take 1 tablet by mouth daily., Disp: , Rfl:   ALLERGIES:     Allergies   Allergen Reactions    Empagliflozin OTHER (SEE COMMENTS)     Pancreatitis    \"pancreatitis\" per pt      REVIEW OF SYSTEMS:   This information was obtained from the patient/family and chart.    See HPI for pertinent positives, otherwise 10 pt ROS negative.  HISTORY:   Past medical, surgical, family and social history updated where appropriate.  PHYSICAL EXAM:     Vitals:    08/07/24 1606   BP: 113/80   Pulse: 81   Resp: 16   Temp: 98.1 °F (36.7 °C)       Estimated body mass index is 27.93 kg/m² as calculated from the following:    Height as of 8/6/24: 70.98\".    Weight as of 8/6/24: 200 lb 3.2 oz (90.8 kg).   No results found for: \"PGLU\"    Vital signs reviewed.Appears stated age, well groomed.    Constitutional:  Bp wnl for patient. Pulse Regular and wnl for patient. Respirations easy and unlabored. Temperature wnl. Weight normal for height. Appearance neat and clean. Appears in no acute distress. Well nourished and well developed.  Lower extremity:  dp palpable  bilaterally. Bilateral lower extremity free of varicosities, +edema is stable on the right, pitting on the left, + hemosideran staining and very atrophic skin. Capillary refill < 3 seconds. Digits are warm. toenails are thin, brittle with adequate length and hygeine. Skin is dry and flaky. no hairgrowth on legs.  Musculoskeletal:  Gait and station stable   Integumentary:  refer to wound characteristics and images   Psychiatric:  Judgment and insight intact. Alert and oriented times 3. No evidence of depression, anxiety, or agitation. Calm, cooperative, and communicative.   EDEMA:   Calf  Point of Measurement - Left Calf: 37  Point of Measurement - Right Calf: 37  Left Calf from:: Heel  Calf Left cm:: 38.8  Right Calf from:: Heel  Right Calf cm:: 31.5  Ankle  Point of Measurement - Left Ankle: 13  Point of Measurement - Right Ankle: 13  Left Ankle from:: Heel  Left Ankle cm:: 24.5     Right Ankle from:: Heel  Right Ankle cm:: 22.3     DIAGNOSTICS:     Lab Results   Component Value Date    BUN 37 (H) 07/24/2024    CREATSERUM 1.45 (H) 07/24/2024    ALB 3.9 07/24/2024    TP 5.0 (L) 07/09/2024    A1C 5.4 05/14/2024       WOUND ASSESSMENT:     Wound 07/18/24 #1 Right Leg Leg Right (Active)   Date First Assessed/Time First Assessed: 07/18/24 1415    Wound Number (Wound Clinic Only): #1 Right Leg  Primary Wound Type: Venous Ulcer  Location: Leg  Wound Location Orientation: Right      Assessments 7/18/2024  2:18 PM 8/7/2024  4:14 PM   Wound Image       Drainage Amount Moderate Scant   Drainage Description Serous;Clear Serosanguineous   Treatments Compression --   Wound Length (cm) 4 cm 0.2 cm   Wound Width (cm) 3.4 cm 0.2 cm   Wound Surface Area (cm^2) 13.6 cm^2 0.04 cm^2   Wound Depth (cm) 0.1 cm 0.1 cm   Wound Volume (cm^3) 1.36 cm^3 0.004 cm^3   Wound Healing % -- 100   Margins Well-defined edges Well-defined edges   Non-staged Wound Description Full thickness Full thickness   Shelley-wound Assessment -- Edema;Hemosiderin  staining   Wound Granulation Tissue Pink;Pale Grey;Firm Firm;Pink;Red   Wound Bed Granulation (%) 60 % 5 %   Wound Bed Epithelium (%) 40 % --   Wound Bed Slough (%) -- 95 %   Wound Odor None None   Shape clustered --   Tunneling? No No   Undermining? No No   Sinus Tracts? No No       No associated orders.       Compression Wrap 07/18/24 Leg Anterior;Right (Active)   Placement Date/Time: 07/18/24 1614   Location: Leg  Wound Location Orientation: Anterior;Right      Assessments 7/18/2024  4:15 PM 7/31/2024  4:00 PM   Response to Treatment Well tolerated Well tolerated   Compression Layers Multilayer Multilayer   Compression Product Type Unna Boot Unna Boot   Dressing Applied Yes Yes   Compression Wrap Location Toes to Knee Toes to Knee   Compression Wrap Status Dry;Clean Clean;Dry       No associated orders.       Wound 08/07/24 #2 Left lateral foot Foot Left;Lateral (Active)   Date First Assessed/Time First Assessed: 08/07/24 1610    Wound Number (Wound Clinic Only): #2 Left lateral foot  Primary Wound Type: Pressure Injury  Location: Foot  Wound Location Orientation: Left;Lateral      Assessments 8/7/2024  4:15 PM   Wound Image     Drainage Amount Unable to assess   Wound Length (cm) 0.6 cm   Wound Width (cm) 0.7 cm   Wound Surface Area (cm^2) 0.42 cm^2   Wound Depth (cm) 0 cm   Wound Volume (cm^3) 0 cm^3   Margins Well-defined edges   Non-staged Wound Description Full thickness   Shelley-wound Assessment Edema;Blanchable erythema;Dry   Wound Odor None   Shape Scabbed, unable to view wound bed.   Tunneling? No   Undermining? No   Sinus Tracts? No   Pressure Injury Stage Unstageable       No associated orders.         ASSESSMENT AND PLAN:    1. 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 Chronic venous hypertension with ulcer and inflammation, right (HCC)    3. Pressure injury of left foot, unstageable (HCC)    4. Lupus ([facility])    5. Bilateral lower extremity  edema          Risks, benefits, and alternatives of current treatment plan discussed in detail.  Questions and concerns addressed. Red flags to RTC or ED reviewed.  Patient (or parent) agrees to plan.      NOTE TO PATIENT: The 21st Century Cures Act makes clinical notes like these available to patients in the interest of transparency. Clinical notes are medical documents used by physicians and care providers to communicate with each other. These documents include medical language and terminology, abbreviations, and treatment information that may sound technical and at times possibly unfamiliar. In addition, at times, the verbiage may appear blunt or direct. These documents are one tool providers use to communicate relevant information and clinical opinions of the care providers in a way that allows common understanding of the clinical context.   I spent  30 minutes with the patient. This time included:    preparing to see the patient (eg, review notes and recent diagnostics),  seeing the patient, obtaining and/or reviewing separately obtained history, performing a medically appropriate examination and/or evaluation, counseling and educating the patient, documenting in the record.  New wound  DISCHARGE:      Patient Instructions   Please return:  1 weel with Charisma HOLLINGSWORTH'S JOBS FOR THE WEEK:  GO ON [employer] and look for 15-20mmhg compression stockings that are long enough for you.  (If you are between 2 sizes then size up).- some common brands are Juzo, jobst, mediven  You can also look on [email or web address] or [email or web address]        Patient discharge and wound care instructions  Miguel Gaby Davila  8/7/2024             You may shower with protection of the wound (ie a cast cover or similar).     Cleansing/dressing:     In clinic, with each dressing change:    please cleanse the limb, foot, and between toes with soap, water and washcloth.   Dry thoroughly.    Cleanse/soak the wound with VASHE (or  other hypochlorous wound cleanser), dab dry.    Apply the following dressings:      RIGHT: xeroform>light 20-30mmhg (folded abd pad between the layers)  LEFT foot:  honey gel>non border foam>light 20-30mmhg    Managing your edema:  Avoid prolonged standing in one place. It is better to have your calf muscles moving to pump fluid out of the legs      Elevate leg(s) above the level of the heart when sitting or as much as possible.  Take you diuretics as directed by your physician. Do not skip doses or change doses unless instructed to do so by your physician.  Decrease salt intake, follow recommended 2 grams daily.  Do not get leg(s) with compression wrap wet. If wraps are too tight as indicated by pain, numbness/tingling or discoloration of toes remove wrap completely and call the wound center @ [phone].  Refer to the \"Multi-layer compression bandage application patient information sheet\" given to you in clinic.    Nutrition and blood sugar control:  Focus on the following:  Protein: Meats, beans, eggs, milk and yogurt particularly Greek yogurt), tofu, soy nuts, soy protein products (Follow the protein handout in your welcome folder)  Vitamin C: Citrus fruits and juices, strawberries, tomatoes, tomato juice, peppers, baked potatoes, spinach, broccoli, cauliflower, [place] sprouts, cabbage  Vitamin A: Dark green, leafy vegetables, orange or yellow vegetables, cantaloupe, fortified dairy products, liver, fortified cereals  Zinc: Fortified cereals, red meats, seafood  Consider supplementing with Damion by [employer]. It can be purchased on amazon, [employer] website, or local pharmacy may be able to order it for you.  (These are essential branch chain amino acids that help with tissue building and wound healing).   When your blood sugar is consistently elevated greater than 180 your body can't heal or fight infection.     Concerns:  Signs of infection may include the following:  Increase in redness  Red \"streaks\" from  wound  Increase in swelling  Fever  Unusual odor  Change in the amount of wound drainage     Should you experience any significant changes in your wound(s) or have any questions regarding your home care instructions please contact the [facility] @ [phone] If after regular business hours, please call your family doctor or local emergency room. The treatment plan has been discussed at length between you and your provider. Follow all instructions carefully, it is very important. If you do not follow all instructions you are at risk of your wound not healing, infection, possible loss of limb and even loss of life.       Charisma Morrow FNP-C, CWCN-AP, CFCN, CSWS, WCC, DWC  8/7/2024

## 2024-08-07 ENCOUNTER — OFFICE VISIT (OUTPATIENT)
Dept: WOUND CARE | Facility: HOSPITAL | Age: 79
End: 2024-08-07
Attending: NURSE PRACTITIONER
Payer: MEDICARE

## 2024-08-07 VITALS
HEART RATE: 81 BPM | DIASTOLIC BLOOD PRESSURE: 80 MMHG | RESPIRATION RATE: 16 BRPM | SYSTOLIC BLOOD PRESSURE: 113 MMHG | TEMPERATURE: 98 F

## 2024-08-07 DIAGNOSIS — I87.331 CHRONIC VENOUS HYPERTENSION WITH ULCER AND INFLAMMATION, RIGHT (HCC): ICD-10-CM

## 2024-08-07 DIAGNOSIS — R60.0 BILATERAL LOWER EXTREMITY EDEMA: ICD-10-CM

## 2024-08-07 DIAGNOSIS — M32.9 LUPUS (HCC): ICD-10-CM

## 2024-08-07 DIAGNOSIS — L89.890 PRESSURE INJURY OF LEFT FOOT, UNSTAGEABLE (HCC): ICD-10-CM

## 2024-08-07 DIAGNOSIS — L97.812 NON-PRESSURE CHRONIC ULCER OF OTHER PART OF RIGHT LOWER LEG WITH FAT LAYER EXPOSED (HCC): Primary | ICD-10-CM

## 2024-08-07 PROCEDURE — 99214 OFFICE O/P EST MOD 30 MIN: CPT | Performed by: NURSE PRACTITIONER

## 2024-08-07 NOTE — PATIENT INSTRUCTIONS
Please return:  1 weel with Charisma [employer]LLINGSWORTH[employer]S JOBS FOR THE WEEK:  GO ON [employer] and look for 15-20mmhg compression stockings that are long enough for you.  (If you are between 2 sizes then size up).- some common brands are Juzo, jobst, mediven  You can also look on [email or web address] or [email or web address]        Patient discharge and wound care instructions  Miguel Griffin Lola  8/7/2024             You may shower with protection of the wound (ie a cast cover or similar).     Cleansing/dressing:     In clinic, with each dressing change:    please cleanse the limb, foot, and between toes with soap, water and washcloth.   Dry thoroughly.    Cleanse/soak the wound with VASHE (or other hypochlorous wound cleanser), dab dry.    Apply the following dressings:      RIGHT: xeroform>light 20-30mmhg (folded abd pad between the layers)  LEFT foot:  honey gel>non border foam>light 20-30mmhg    Managing your edema:  Avoid prolonged standing in one place. It is better to have your calf muscles moving to pump fluid out of the legs      Elevate leg(s) above the level of the heart when sitting or as much as possible.  Take you diuretics as directed by your physician. Do not skip doses or change doses unless instructed to do so by your physician.  Decrease salt intake, follow recommended 2 grams daily.  Do not get leg(s) with compression wrap wet. If wraps are too tight as indicated by pain, numbness/tingling or discoloration of toes remove wrap completely and call the wound center @ [phone].  Refer to the \"Multi-layer compression bandage application patient information sheet\" given to you in clinic.    Nutrition and blood sugar control:  Focus on the following:  Protein: Meats, beans, eggs, milk and yogurt particularly Greek yogurt), tofu, soy nuts, soy protein products (Follow the protein handout in your welcome folder)  Vitamin C: Citrus fruits and juices, strawberries, tomatoes, tomato juice, peppers, baked  potatoes, spinach, broccoli, cauliflower, Novelty sprouts, cabbage  Vitamin A: Dark green, leafy vegetables, orange or yellow vegetables, cantaloupe, fortified dairy products, liver, fortified cereals  Zinc: Fortified cereals, red meats, seafood  Consider supplementing with Damion by [employer]. It can be purchased on amazon, [employer] website, or local pharmacy may be able to order it for you.  (These are essential branch chain amino acids that help with tissue building and wound healing).   When your blood sugar is consistently elevated greater than 180 your body can't heal or fight infection.     Concerns:  Signs of infection may include the following:  Increase in redness  Red \"streaks\" from wound  Increase in swelling  Fever  Unusual odor  Change in the amount of wound drainage     Should you experience any significant changes in your wound(s) or have any questions regarding your home care instructions please contact the wound center [facility] @ [phone] If after regular business hours, please call your family doctor or local emergency room. The treatment plan has been discussed at length between you and your provider. Follow all instructions carefully, it is very important. If you do not follow all instructions you are at risk of your wound not healing, infection, possible loss of limb and even loss of life.

## 2024-08-07 NOTE — PROGRESS NOTES
.Weekly Wound Education Note    Teaching Provided To: Patient  Training Topics: Discharge instructions;Dressing;Cleasing and general instructions;Edema control;Compression;Off-loading  Training Method: Explain/Verbal;Written  Training Response: Patient responds and understands            New wound noted to left foot, patient states it resulted from his sandals.  Wound treated with honey gel, thick foam.  Xeroform (folded) to right leg.   Calamine unna boot 20-30mmHg to BLE.  New Darco shoe given for left foot, cut out to avoid rubbing.

## 2024-08-12 ENCOUNTER — HOSPITAL ENCOUNTER (OUTPATIENT)
Dept: GENERAL RADIOLOGY | Age: 79
Discharge: HOME | End: 2024-08-12
Attending: FAMILY MEDICINE
Payer: MEDICARE

## 2024-08-12 DIAGNOSIS — R05.2 SUBACUTE COUGH: ICD-10-CM

## 2024-08-12 PROCEDURE — 71046 X-RAY EXAM CHEST 2 VIEWS: CPT | Performed by: FAMILY MEDICINE

## 2024-08-13 DIAGNOSIS — I50.812 CHRONIC RIGHT-SIDED CONGESTIVE HEART FAILURE (HCC): ICD-10-CM

## 2024-08-13 DIAGNOSIS — D50.9 IRON DEFICIENCY ANEMIA, UNSPECIFIED IRON DEFICIENCY ANEMIA TYPE: Primary | ICD-10-CM

## 2024-08-13 NOTE — PROGRESS NOTES
CHIEF COMPLAINT:     Chief Complaint   Patient presents with    Wound Care     Follow up for right leg and left foot wound. Pt stated that he stopped wearing the darco shoe on left foot about 2-3 days ago because it was causing him pain.      HPI:   Information obtained from patient and chart  7-18-24 INITIAL: 79 year old male with essential hypertension, chronic heart failure with preserved EF, pulmonary hypertension, atrial fibrillation sp Watchman, chronic kidney disease, SLE, polymyositis, chronic steroid use, hypothyroidism, LI who was admitted from July 8-12 for RLE redness and swelling.  His albumin level was low and felt to be a contributing factor to his leg swelling and hypotension.  He was given an albumin infusion. His discharge weight was 189. He followed up with heart failure clinic earlier this week and his weight was down 7# since may.  It was noted that he was hypotensive at the visit and he had stated he had increased his bumex to 2 mg over the weekend and taking the full tablet of entresto instead of the recommended 1/2.  At visit he was to stop the entresto and compression wraps were recommended.   His next appointment with heart failure is next wednesday.    Since his hospitalization he has been sleeping in a recliner and therefore not utilizing his cpap.  He states he will try to go back to his bed this weekend.  He denies intermittent claudication or rest pain.  Patient has a weeping area on the right lateral leg.  No s/s of infection or c/o pain.   He is here with his sister.    7-25-24 patient returns.  At rn visit his right calf edema measurement had reduced by 5 cm and another 3 cm today. He saw heart failure clinic yesterday and it was noted his weight was up by 7# since last visit on the 18th, however it was felt he was starting to respond to the diuretics based on patient report and therefore patient was to continue current medications. Albumin was 3.9 yesterday. Wound is improved, he  has tolerated compression. No s/s of infection or c/o pain.  Patient states he did not go back to his bed this weekend because he was concerned he might still be leaking.  He states he will try this weekend. We discussed compression for post healing, patient will look on amazon. We printed out his measurements for him.     7-31-24 patient returns. He states he did sleep in his bed last noc and he will be getting his cpap adjusted.  He did not get comrpession because there were so many choices.  I will give him some brand names as well as other sites to purchase.  I also said he could go to [facility] pharmacy to purchase.  Wound is smaller, no s/s of infection or c/o pain. Edema with slight increase at calf.    8-7-24 patient returns.  Patient followed up primary care.  He got ivig yesterday so he has had an increase in edema.  He did get compression stockings however feels they are too \"heavy\" material.  His f/u with heart failure clinic is next week.  He states he knows how to increase his bumex prn for weight gain.  His left foot rubbed on his sandal strap and he has a wound there now.  The right lower extremity wound is improved.    8-14-24 patient returns.  He has continued with chronic cough, got cxr and also f/u with pulmonology. He has heart failure appointment tomorrow. His edema has reduced since last week, he states he has not had a chance to look for new shoes and the darco shoes were hurting his foot, so he returned to the sandals that was the offender that created the wound on left lateral foot. We did pad his sandal with a thick foam after marking the seam that was causing the friction in the area. He states he did find stockings that he thinks will work. I asked him to bring them next week. No s/s of infection or c/o pain  MEDICATIONS:     Current Outpatient Medications:     predniSONE 1 MG Oral Tab, Take 2 tablets (2 mg total) by mouth daily with breakfast., Disp: 180 tablet, Rfl: 0    bumetanide 1 MG  Oral Tab, Take 2 tablets (2 mg total) by mouth daily., Disp: , Rfl:     spironolactone 25 MG Oral Tab, Take 0.5 tablets (12.5 mg total) by mouth daily., Disp: 15 tablet, Rfl: 0    fexofenadine 180 MG Oral Tab, Take 1 tablet (180 mg total) by mouth daily as needed., Disp: , Rfl:     levothyroxine 50 MCG Oral Tab, Take 1 tablet (50 mcg total) by mouth before breakfast., Disp: , Rfl:     ipratropium 0.06 % Nasal Solution, 1 spray by Nasal route 2 (two) times daily., Disp: 1 each, Rfl: 5    omeprazole 20 MG Oral Capsule Delayed Release, Take 1 capsule (20 mg total) by mouth 2 (two) times daily., Disp: 180 capsule, Rfl: 1    ALLOPURINOL 300 MG Oral Tab, TAKE 1 TABLET BY MOUTH EVERY DAY, Disp: 90 tablet, Rfl: 3    predniSONE 5 MG Oral Tab, Take 1 tablet (5 mg total) by mouth daily with breakfast., Disp: 90 tablet, Rfl: 3    metoprolol succinate  MG Oral Tablet 24 Hr, Take 1 tablet (100 mg total) by mouth every morning AND 0.5 tablets (50 mg total) every evening., Disp: 60 tablet, Rfl: 3    aspirin 81 MG Oral Tab EC, Take 1 tablet (81 mg total) by mouth daily., Disp: 30 tablet, Rfl: 0    PREVIDENT 5000 BOOSTER PLUS 1.1 % Dental Paste, , Disp: , Rfl:     Multiple Vitamins-Minerals (TAB-A-KEVIN) Oral Tab, Take 1 tablet by mouth daily., Disp: , Rfl:     Immune Globulin, Human, 10 g Intravenous Recon Soln, Inject 0.4 g/kg into the vein. Given for treatment of polymyositis, mixed connective tissue disease, and immune thrombocytopenia purpura monthly at [facility]. Every 5 weeks, Disp: 3 each, Rfl: 0    Calcium Citrate-Vitamin D (CITRACAL + D OR), Take 1 tablet by mouth daily., Disp: , Rfl:   ALLERGIES:     Allergies   Allergen Reactions    Empagliflozin OTHER (SEE COMMENTS)     Pancreatitis    \"pancreatitis\" per pt      REVIEW OF SYSTEMS:   This information was obtained from the patient/family and chart.    See HPI for pertinent positives, otherwise 10 pt ROS negative.  HISTORY:   Past medical, surgical, family  and social history updated where appropriate.  PHYSICAL EXAM:     Vitals:    08/14/24 1500   BP: 123/76   Pulse: 84   Resp: 14   Temp: 98 °F (36.7 °C)         Estimated body mass index is 27.93 kg/m² as calculated from the following:    Height as of 8/6/24: 70.98\".    Weight as of 8/6/24: 200 lb 3.2 oz (90.8 kg).   No results found for: \"PGLU\"    Vital signs reviewed.Appears stated age, well groomed.    Constitutional:  Bp wnl for patient. Pulse Regular and wnl for patient. Respirations easy and unlabored. Temperature wnl. Weight normal for height. Appearance neat and clean. Appears in no acute distress. Well nourished and well developed.  Lower extremity:  dp palpable bilaterally. Bilateral lower extremity free of varicosities, +edema reduction bilaterally, + hemosideran staining and very atrophic skin. Capillary refill < 3 seconds. Digits are warm. toenails are thin, brittle with adequate length and hygeine. Skin is dry and flaky. no hairgrowth on legs.  Musculoskeletal:  Gait and station stable   Integumentary:  refer to wound characteristics and images   Psychiatric:  Judgment and insight intact. Alert and oriented times 3. No evidence of depression, anxiety, or agitation. Calm, cooperative, and communicative.   EDEMA:   Calf  Point of Measurement - Left Calf: 37  Point of Measurement - Right Calf: 37  Left Calf from:: Heel  Calf Left cm:: 34.4  Right Calf from:: Heel  Right Calf cm:: 29  Ankle  Point of Measurement - Left Ankle: 13  Point of Measurement - Right Ankle: 13  Left Ankle from:: Heel  Left Ankle cm:: 23.5     Right Ankle from:: Heel  Right Ankle cm:: 21.7     DIAGNOSTICS:     Lab Results   Component Value Date    BUN 37 (H) 07/24/2024    CREATSERUM 1.45 (H) 07/24/2024    ALB 3.9 07/24/2024    TP 5.0 (L) 07/09/2024    A1C 5.4 05/14/2024       WOUND ASSESSMENT:     Wound 07/18/24 #1 Right Leg Leg Right (Active)   Date First Assessed/Time First Assessed: 07/18/24 0211    Wound Number (Wound Clinic  Only): #1 Right Leg  Primary Wound Type: Venous Ulcer  Location: Leg  Wound Location Orientation: Right      Assessments 7/18/2024  2:18 PM 8/14/2024  3:31 PM   Wound Image       Drainage Amount Moderate Small   Drainage Description Serous;Clear Serosanguineous   Treatments Compression --   Wound Length (cm) 4 cm 0.4 cm   Wound Width (cm) 3.4 cm 0.3 cm   Wound Surface Area (cm^2) 13.6 cm^2 0.12 cm^2   Wound Depth (cm) 0.1 cm 0.1 cm   Wound Volume (cm^3) 1.36 cm^3 0.012 cm^3   Wound Healing % -- 99   Margins Well-defined edges Well-defined edges   Non-staged Wound Description Full thickness Full thickness   Shelley-wound Assessment -- Edema;Hemosiderin staining   Wound Granulation Tissue Pink;Pale Grey;Firm Pink;Pale Freedman;Firm   Wound Bed Granulation (%) 60 % 100 %   Wound Bed Epithelium (%) 40 % --   Wound Odor None None   Shape clustered --   Tunneling? No --   Undermining? No --   Sinus Tracts? No --       No associated orders.       Compression Wrap 07/18/24 Leg Anterior;Right (Active)   Placement Date/Time: 07/18/24 1614   Location: Leg  Wound Location Orientation: Anterior;Right      Assessments 7/18/2024  4:15 PM 8/7/2024  5:04 PM   Response to Treatment Well tolerated Well tolerated   Compression Layers Multilayer Multilayer   Compression Product Type Unna Boot Unna Boot   Dressing Applied Yes Yes   Compression Wrap Location Toes to Knee Toes to Knee   Compression Wrap Status Dry;Clean Clean;Dry       No associated orders.       Wound 08/07/24 #2 Left lateral foot Foot Left;Lateral (Active)   Date First Assessed/Time First Assessed: 08/07/24 1610    Wound Number (Wound Clinic Only): #2 Left lateral foot  Primary Wound Type: Pressure Injury  Location: Foot  Wound Location Orientation: Left;Lateral      Assessments 8/7/2024  4:15 PM 8/14/2024  3:31 PM   Wound Image       Drainage Amount Unable to assess Small   Drainage Description -- Serosanguineous   Treatments Compression --   Wound Length (cm) 0.6 cm 0.6 cm    Wound Width (cm) 0.7 cm 0.7 cm   Wound Surface Area (cm^2) 0.42 cm^2 0.42 cm^2   Wound Depth (cm) 0 cm 0.1 cm   Wound Volume (cm^3) 0 cm^3 0.042 cm^3   Margins Well-defined edges Well-defined edges   Non-staged Wound Description Full thickness Full thickness   Shelley-wound Assessment Edema;Blanchable erythema;Dry Edema   Wound Granulation Tissue -- Pink;Firm   Wound Bed Granulation (%) -- 5 %   Wound Bed Slough (%) -- 95 %   Wound Odor None None   Shape Scabbed, unable to view wound bed. --   Tunneling? No --   Undermining? No --   Sinus Tracts? No --   Pressure Injury Stage Unstageable Unstageable       No associated orders.         ASSESSMENT AND PLAN:    1. 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 Chronic venous hypertension with ulcer and inflammation, right (HCC)    3. Pressure injury of left foot, unstageable (HCC)    4. Lupus (HCC)    5. Bilateral lower extremity edema            Risks, benefits, and alternatives of current treatment plan discussed in detail.  Questions and concerns addressed. Red flags to RTC or ED reviewed.  Patient (or parent) agrees to plan.      NOTE TO PATIENT: The 21st Century Cures Act makes clinical notes like these available to patients in the interest of transparency. Clinical notes are medical documents used by physicians and care providers to communicate with each other. These documents include medical language and terminology, abbreviations, and treatment information that may sound technical and at times possibly unfamiliar. In addition, at times, the verbiage may appear blunt or direct. These documents are one tool providers use to communicate relevant information and clinical opinions of the care providers in a way that allows common understanding of the clinical context.   I spent  30 minutes with the patient. This time included:    preparing to see the patient (eg, review notes and recent diagnostics),  seeing the  patient, obtaining and/or reviewing separately obtained history, performing a medically appropriate examination and/or evaluation, counseling and educating the patient, documenting in the record.    DISCHARGE:      Patient Instructions   Please return:  1 week with [facility] (can be Thursday) - bring stockings          Patient discharge and wound care instructions  Miguel Davila  8/14/2024     You may shower with protection of the wound (ie a cast cover or similar).     Cleansing/dressing:     In clinic, with each dressing change:    please cleanse the limb, foot, and between toes with soap, water and washcloth.   Dry thoroughly.    Cleanse/soak the wound with VASHE (or other hypochlorous wound cleanser), dab dry.    Apply the following dressings:      RIGHT: man>xeroform>light 20-30mmhg (folded abd pad between the layers)  LEFT foot:  prismal>silver non border foam>light 20-30mmhg    Managing your edema:  Avoid prolonged standing in one place. It is better to have your calf muscles moving to pump fluid out of the legs      Elevate leg(s) above the level of the heart when sitting or as much as possible.  Take you diuretics as directed by your physician. Do not skip doses or change doses unless instructed to do so by your physician.  Decrease salt intake, follow recommended 2 grams daily.  Do not get leg(s) with compression wrap wet. If wraps are too tight as indicated by pain, numbness/tingling or discoloration of toes remove wrap completely and call the wound center @ [phone].  Refer to the \"Multi-layer compression bandage application patient information sheet\" given to you in clinic.    Nutrition and blood sugar control:  Focus on the following:  Protein: Meats, beans, eggs, milk and yogurt particularly Greek yogurt), tofu, soy nuts, soy protein products (Follow the protein handout in your welcome folder)  Vitamin C: Citrus fruits and juices, strawberries, tomatoes, tomato juice, peppers, baked  potatoes, spinach, broccoli, cauliflower, [place] sprouts, cabbage  Vitamin A: Dark green, leafy vegetables, orange or yellow vegetables, cantaloupe, fortified dairy products, liver, fortified cereals  Zinc: Fortified cereals, red meats, seafood  Consider supplementing with Damion by [employer]. It can be purchased on amazon, [employer] website, or local pharmacy may be able to order it for you.  (These are essential branch chain amino acids that help with tissue building and wound healing).   When your blood sugar is consistently elevated greater than 180 your body can't heal or fight infection.     Concerns:  Signs of infection may include the following:  Increase in redness  Red \"streaks\" from wound  Increase in swelling  Fever  Unusual odor  Change in the amount of wound drainage     Should you experience any significant changes in your wound(s) or have any questions regarding your home care instructions please contact the [facility] @ [phone] If after regular business hours, please call your family doctor or local emergency room. The treatment plan has been discussed at length between you and your provider. Follow all instructions carefully, it is very important. If you do not follow all instructions you are at risk of your wound not healing, infection, possible loss of limb and even loss of life.       Charisma Morrow FNP-C, CWCN-AP, CFCN, CSWS, WCC, DWC  8/14/2024

## 2024-08-13 NOTE — PROGRESS NOTES
[facility] Progress Note    Miguel Davila is a 79 year old male who presents to clinic for APN assessment and management of chronic diastolic heart failure and is functional class 2-3.     Subjective:  Since his last clinic visit on 7/24 when no changes were made;     He saw María Serrato with [facility]. She reminded him to use CPAP nightly for at least 4 hrs with a minimum of 6 hrs of sleep.     He has continued to follow up in the wound care clinic. On last visit 8/7 leg swelling was worse. He had IVIG 8/6. He has been on contact with Dr. Simon regarding cough. Cough is exacerbated by laying down. CXR was done that suggest venous pulmonary hypertension without overt pulmonary edema.     His weight is up 5 lbs on our scale and at home.  Leg swelling is worse and into his thighs.  His appetite is good and he doesn't feel he is eating any differently, however he does not cook at all. He continues to have fatigue and notes dyspnea with exertion. He legs remain wrapped up to his knees. In addition, he has developed mostly a dry cough. He isn't sleeping well.     He had RHC on 5/20 that shows RA 9 mmHg, mPAP 31 mmhg, wedge 14 mmHg, CI 3.8 and PVR 2.2 wood units. Dr. Jean recommended increasing Entresto with consideration for retrying SGLT2i and increase in MRA at a later date. CardioMEMs sensor was noted to be inaccurate and re-calibration was recommended. He as requested this be postponed since he had cholecystitis and has been slow to recover.       Last Hospitalizations/ED visits:    He was admitted from 6/6-6/10 with acute cholecystitis with localized peritonitis and underwent cholecystectomy 6/7. Treated with antibiotics. Aldactone, Bumex and Entresto were held during hospital stay d.t low BP. He was discharged on Bumex 1 mg, remained off ARNI and MRA.      Admitted again 7/8-7/12 with leg swelling, weeping and abdominal distension. He was  felt to be fluid overloaded on admission, but then became hypotensive with diuresis. Albumin level was low and felt to be a contributing factor to leg swelling and hypotension. Subsequently, he was given an albumin infusion followed by diuretics and started to improve. LE US negative for DVT. Treated with antibiotics for cellulitis. GDMT started at low doses. To take PRN Bumex on discharge. Discharge weight of 189 lbs.     Review of Systems   Constitutional: Positive for malaise/fatigue and weight gain.   Cardiovascular:  Positive for dyspnea on exertion and leg swelling (worse).   Respiratory:  Positive for shortness of breath.    Gastrointestinal:  Positive for bloating.     HISTORY:  Past Medical History:    A-fib (HCC)    Arrhythmia    atrial fibrillation    Arthritis    Autoimmune disease (HCC)    hepatits, resolved anfd nephritis, resolved    Ramon's esophagus    Bleeding nose    Gums Treated    Blood disorder    thrombocytopenia    Blood in urine    Blurred vision    cataract due to steroids, surgery in June    Calculus of kidney    One time    Cancer (HCC)    skin    Candidiasis of the esophagus    Due to steroid use for Autoimmune disorder     Cataract    Colon polyps    Congestive heart disease (HCC)    Diarrhea, unspecified    Intermittent due to lupus    Diverticulosis of colon    Easy bruising    On and off prednisone for 20 years    Esophageal polyp    Esophageal reflux    Essential hypertension    Fatigue    polymyositis and lupus    Gout    Hammer toe, acquired    Heart palpitations    Afib    Hepatitis    autoimmune induce hepatitis d/t lupus    High blood pressure    IBS (irritable bowel syndrome)    mild d/t lupus    Irregular bowel habits    Leg swelling    Heart failure, probably caused by lupus    Lupus (HCC)    MCTD (mixed connective tissue disease) (HCC)    Obstructive sleep apnea (adult) (pediatric)    LI (obstructive sleep apnea)    AHI 37  Supine AHI 38 non-supine AHI 16 Sao2 Pj 83%      LI (obstructive sleep apnea)    AHI 36 RDI 36 REM AHI 45 Supine AHI 65 non-supine AHI 19 Sao2 Jp 86% CPAP 9cwp    Pain in joints    Personal history of antineoplastic chemotherapy    Pleural effusion    right    Pneumonia due to organism    Polymyositis (HCC)    Presence of other cardiac implants and grafts    watchman filter    Problems with swallowing    dysphagia in 2006    Pulmonary hypertension (HCC)    Raynaud disease    Raynaud disease    Renal disorder    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    Shortness of breath    mainly due to pm or lupus    Sleep apnea    CPAP    Thrombocytopathia (HCC)    Visual impairment    bifocals for reading & computer; distance for driving    Wears glasses      Past Surgical History:   Procedure Laterality Date    Appendectomy  1970    Appendectomy      Cardiac catheterization  09/2019    Cataract Bilateral     Cholecystectomy      Colonoscopy  10/2003 2006 01/2010    x3    Colonoscopy  05/14/2013    Colonoscopy N/A 05/01/2018    Procedure: COLONOSCOPY;  Surgeon: Isaiah Tobar MD;  Location:  ENDOSCOPY    Colonoscopy N/A 04/12/2024    Procedure: COLONOSCOPY;  Surgeon: Gerardo Neves MD;  Location:  ENDOSCOPY    Colonoscopy with biopsy  05/14/2013    [facility]      Hand/finger surgery unlisted  2003    right    Needle biopsy liver      Other  12/2005    needle bipsy of kidney    Other surgical history  2017    watchman filter    Percutaneous  angioplasty  (ptca)- pbp only  2006    right    Rectum surgery procedure unlisted  1946    rectal surgery polypectomy    Skin surgery      MMS BCC R temple 6/24/09    Skin surgery  2007    basal ceell carcinoma    Skin surgery  07/18/2012    BCC-nodular to right superior eyebrow/ MOHS    Skin surgery  07/15/2013    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/MMS    Skin surgery  02/19/2014    BCC-NOD to right superior pinna, MMS, AB    Skin surgery  02/16/2021    BCC- left superior forehead, MMS     Skin surgery  03/07/2022    SCC IN SITU RIGHT ANTIHELIX  MMS BY DR GASTELUM    Tonsillectomy      Upper gi endoscopy,exam  10/2003 2006 2010 , 5/13    x3      Family History   Problem Relation Age of Onset    Heart Disease Father         CHF    Gastro-Intestinal Disorder Father         Diverticulosis    Alcohol and Other Disorders Associated Father     Heart Attack Father     Heart Disease Mother         CHF    Breast Cancer Mother     Stroke Mother     Cancer Paternal Grandfather     Heart Attack Paternal Grandmother     Kidney Disease Son     Other (Ramon's Esophagus) Son     Heart Attack Maternal Grandfather       Social History     Socioeconomic History    Marital status:    Occupational History    Occupation: Retired    Tobacco Use    Smoking status: Former     Current packs/day: 0.00     Average packs/day: 0.5 packs/day for 15.0 years (7.5 ttl pk-yrs)     Types: Cigarettes     Start date: 1958     Quit date: 1973     Years since quittin.0    Smokeless tobacco: Never    Tobacco comments:     5 cigarettes daily, stopped smoking in    Vaping Use    Vaping status: Never Used   Substance and Sexual Activity    Alcohol use: Yes     Alcohol/week: 13.0 - 15.0 standard drinks of alcohol     Types: 5 Glasses of wine, 8 - 10 Standard drinks or equivalent per week     Comment: 1 per day wine or liquor    Drug use: Never   Other Topics Concern    Caffeine Concern Yes     Comment: 1 soda per day and decaf coffee    Sleep Concern No    Exercise Yes     Comment: 3-4 times weekly    Seat Belt Yes     Social Determinants of Health     Financial Resource Strain: Low Risk  (2024)    Financial Resource Strain     Difficulty of Paying Living Expenses: Not very hard     Med Affordability: No   Food Insecurity: No Food Insecurity (2024)    Food Insecurity     Food Insecurity: Never true   Transportation Needs: No Transportation Needs (2024)    Transportation Needs     Lack of Transportation: No   Housing Stability: Low Risk  (2024)    Housing  Stability     Housing Instability: No           Objective:  Telemetry: Afib     /70   Pulse 70   Resp 21   Wt 201 lb 6.4 oz (91.4 kg)   SpO2 100%   BMI 28.10 kg/m²     Wt Readings from Last 6 Encounters:   08/15/24 201 lb 6.4 oz (91.4 kg)   08/06/24 200 lb 3.2 oz (90.8 kg)   08/05/24 201 lb 9.6 oz (91.4 kg)   07/24/24 196 lb 12.8 oz (89.3 kg)   07/18/24 189 lb (85.7 kg)   07/15/24 193 lb 6.4 oz (87.7 kg)        Recent Results (from the past 24 hour(s))   Basic Metabolic Panel (8)    Collection Time: 08/15/24  1:24 PM   Result Value Ref Range    Glucose 90 70 - 99 mg/dL    Sodium 142 136 - 145 mmol/L    Potassium 3.9 3.5 - 5.1 mmol/L    Chloride 107 98 - 112 mmol/L    CO2 28.0 21.0 - 32.0 mmol/L    Anion Gap 7 0 - 18 mmol/L    BUN 29 (H) 9 - 23 mg/dL    Creatinine 1.31 (H) 0.70 - 1.30 mg/dL    Calcium, Total 9.4 8.7 - 10.4 mg/dL    Calculated Osmolality 299 (H) 275 - 295 mOsm/kg    eGFR-Cr 55 (L) >=60 mL/min/1.73m2    Patient Fasting for BMP? No    CBC W Differential W Platelet    Collection Time: 08/15/24  1:24 PM   Result Value Ref Range    WBC 8.7 4.0 - 11.0 x10(3) uL    RBC 3.80 3.80 - 5.80 x10(6)uL    HGB 12.0 (L) 13.0 - 17.5 g/dL    HCT 40.0 39.0 - 53.0 %    .0 150.0 - 450.0 10(3)uL    .3 (H) 80.0 - 100.0 fL    MCH 31.6 26.0 - 34.0 pg    MCHC 30.0 (L) 31.0 - 37.0 g/dL    RDW 16.4 %    Neutrophil Absolute Prelim 5.57 1.50 - 7.70 x10 (3) uL    Neutrophil Absolute 5.57 1.50 - 7.70 x10(3) uL    Lymphocyte Absolute 1.93 1.00 - 4.00 x10(3) uL    Monocyte Absolute 0.88 0.10 - 1.00 x10(3) uL    Eosinophil Absolute 0.19 0.00 - 0.70 x10(3) uL    Basophil Absolute 0.04 0.00 - 0.20 x10(3) uL    Immature Granulocyte Absolute 0.04 0.00 - 1.00 x10(3) uL    Neutrophil % 64.3 %    Lymphocyte % 22.3 %    Monocyte % 10.2 %    Eosinophil % 2.2 %    Basophil % 0.5 %    Immature Granulocyte % 0.5 %   Iron And Tibc    Collection Time: 08/15/24  1:24 PM   Result Value Ref Range    Iron 40 (L) 65 - 175 ug/dL     Transferrin 282 215 - 365 mg/dL    Total Iron Binding Capacity 372 250 - 425 ug/dL    % Saturation 11 (L) 20 - 50 %   Ferritin    Collection Time: 08/15/24  1:24 PM   Result Value Ref Range    Ferritin 42.5 (L) 50.0 - 336.0 ng/mL         Current Outpatient Medications:     predniSONE 1 MG Oral Tab, Take 2 tablets (2 mg total) by mouth daily with breakfast., Disp: 180 tablet, Rfl: 0    bumetanide 1 MG Oral Tab, Take 2 tablets (2 mg total) by mouth daily., Disp: , Rfl:     spironolactone 25 MG Oral Tab, Take 0.5 tablets (12.5 mg total) by mouth daily., Disp: 15 tablet, Rfl: 0    fexofenadine 180 MG Oral Tab, Take 1 tablet (180 mg total) by mouth daily as needed., Disp: , Rfl:     levothyroxine 50 MCG Oral Tab, Take 1 tablet (50 mcg total) by mouth before breakfast., Disp: , Rfl:     ipratropium 0.06 % Nasal Solution, 1 spray by Nasal route 2 (two) times daily., Disp: 1 each, Rfl: 5    omeprazole 20 MG Oral Capsule Delayed Release, Take 1 capsule (20 mg total) by mouth 2 (two) times daily., Disp: 180 capsule, Rfl: 1    ALLOPURINOL 300 MG Oral Tab, TAKE 1 TABLET BY MOUTH EVERY DAY, Disp: 90 tablet, Rfl: 3    predniSONE 5 MG Oral Tab, Take 1 tablet (5 mg total) by mouth daily with breakfast., Disp: 90 tablet, Rfl: 3    metoprolol succinate  MG Oral Tablet 24 Hr, Take 1 tablet (100 mg total) by mouth every morning AND 0.5 tablets (50 mg total) every evening., Disp: 60 tablet, Rfl: 3    aspirin 81 MG Oral Tab EC, Take 1 tablet (81 mg total) by mouth daily., Disp: 30 tablet, Rfl: 0    PREVIDENT 5000 BOOSTER PLUS 1.1 % Dental Paste, , Disp: , Rfl:     Multiple Vitamins-Minerals (TAB-A-KEVIN) Oral Tab, Take 1 tablet by mouth daily., Disp: , Rfl:     Immune Globulin, Human, 10 g Intravenous Recon Soln, Inject 0.4 g/kg into the vein. Given for treatment of polymyositis, mixed connective tissue disease, and immune thrombocytopenia purpura monthly at [facility]. Every 5 weeks, Disp: 3 each, Rfl: 0    Calcium  Citrate-Vitamin D (CITRACAL + D OR), Take 1 tablet by mouth daily., Disp: , Rfl:     Exam:   General:         Alert, periorbital edema   HEENT:          elevated JVD  Lungs:            diminished bases                      CV:                   irregularly irregular  Abdomen:       distended, semi-firm, non-tender   Extremities:    +2 edema in thighs above leg wraps   Neuro:             A&O x 3  Skin:                no visible open sores     Education:  Patient instructed regarding sodium restricted diet, low sodium foods, fluid restriction, daily weights, medication regimen, s/s HF exacerbation and when to call APN/clinic.    Assessment:   Chronic diastolic, RV heart failure - LVEF 55-60% and stable on recent echo, low normal RV function. RHC 5/20 with PCWP 14, RA 9. S/p CardioMEMs; needs re-calibration per recent RHC. Off Jardiance, thought to be due to pancreatitis. MRA discontinued at [facility] for dehydration and near syncope; since have restarted but required reduced dose due to hyperkalemia. During hospital stay GDMT has been adjusted recently due tolow BP, NICKI. Last ProBNP 3,583 up from 2,029. Grossly hypervolemic.   PH, mild combined pre and post capillary - RHC 5/20/24  demonstrates mPAP 31 with wedge14 mmHg, RA 9 mmHg, PVR 2.2 and CI 3.8. RV function low normal.   Mild to Moderate MR/Mild-Mod TR/ Moderate AI - on echo in July.   CKD Stage 3b - baseline creatinine ~ 1.8-2.0 mg/dl, Cr 1.3  mg/dL today due to volume overload. Follows with Dr. Devine.    Chronic Afib - s/p Watchman. Rates moderately controlled today.   LI - on CPAP. Wears 22% of the nights according to recent download. Has been encouraged to wear more.   Recurrent bilateral pleural effusions - hx of thoracentesis.   Hx Lupus/Mixed CTD/Severe polymyositis - continues IVIG infusions every 5 weeks. Follows with Dr. Farmer. On chronic prednisone. Off Imuran due to pancytopenia.   Non-obstructive CAD  Chronic thrombocytopenia, immune mediated. PLT  154 today. Follows with Dr. Orr.  Hx Hyponatremia  HERNANDEZ with biopsy proven stage F0-F1 Fibrosis on US 1/2022. Follows with Dr. Veronica, Hepatology.  Mild hyperkalemia - on low dose MRA and ARNI. Has required Veltassa in the past.  Anemia - Hgb 12 g/dL; stable. Iron sat 11 and ferritin 42.5 today. Will supplement with 3 doses of Venofer, 1st dose today.   Hypothyroidism - started on Synthroid per Dr. Jean in March, repeat TSH 2.19 and free T4 1.1 in May.   Hypoalbuminemia - last albumin 2.9, improved to 3.9 last visit.   Hx Cellulitis - s/p antibiotics. Following in the Wound care clinic.     Plan:     IVP Diuril 250 mg x1.  IVP Bumex 4 mg x1.  Kdur 40 meq PO x1.  Venofer 200 mg dose 1 of 3.   Advised to increase Bumex to 3 mg daily if he doesn't lose 5 lbs by Sunday.   Follow up in a week.   Continue home health nursing with wound care.   Plan for eventual RHC and recalibration of CardioMEMs, to discuss at next OV with Dr. Craig.   Follow up with Dr. Craig in September.   CHF discharge instructions given    I have spent 60 min total time on the day of the encounter, including: preparing to see the patient, obtaining and/or reviewing separately obtained history, performing a medically appropriate examination and/or evaluation, counseling and educating the patient/family caregiver, ordering medication, tests, or procedures, referring and communicating with other health care professionals, documenting clinical information in Epic, and independently interpreting results and communicating results to the patient/family caregiver    Tasha Barth NP

## 2024-08-14 ENCOUNTER — OFFICE VISIT (OUTPATIENT)
Dept: WOUND CARE | Facility: HOSPITAL | Age: 79
End: 2024-08-14
Attending: NURSE PRACTITIONER
Payer: MEDICARE

## 2024-08-14 VITALS
TEMPERATURE: 98 F | HEART RATE: 84 BPM | SYSTOLIC BLOOD PRESSURE: 123 MMHG | DIASTOLIC BLOOD PRESSURE: 76 MMHG | RESPIRATION RATE: 14 BRPM

## 2024-08-14 DIAGNOSIS — R60.0 BILATERAL LOWER EXTREMITY EDEMA: ICD-10-CM

## 2024-08-14 DIAGNOSIS — M32.9 LUPUS (HCC): ICD-10-CM

## 2024-08-14 DIAGNOSIS — I87.331 CHRONIC VENOUS HYPERTENSION WITH ULCER AND INFLAMMATION, RIGHT (HCC): ICD-10-CM

## 2024-08-14 DIAGNOSIS — L97.812 NON-PRESSURE CHRONIC ULCER OF OTHER PART OF RIGHT LOWER LEG WITH FAT LAYER EXPOSED (HCC): Primary | ICD-10-CM

## 2024-08-14 DIAGNOSIS — L89.890 PRESSURE INJURY OF LEFT FOOT, UNSTAGEABLE (HCC): ICD-10-CM

## 2024-08-14 PROCEDURE — 99214 OFFICE O/P EST MOD 30 MIN: CPT | Performed by: NURSE PRACTITIONER

## 2024-08-14 NOTE — PROGRESS NOTES
.Weekly Wound Education Note    Teaching Provided To: Patient  Training Topics: Discharge instructions;Dressing;Edema control;Compression;Off-loading  Training Method: Explain/Verbal;Written  Training Response: Patient responds and understands;Reinforcement needed            Danielle Ag, folded xeroform to right leg wound.  Danielle Ag, silver foam to left foot wound.  Calamine unna boot 20-30mmHg to BLE.  Sandal on left foot padded with thick foam to help offload the wound.  Patient instructed to bring his compression garments to the next visit.

## 2024-08-14 NOTE — PATIENT INSTRUCTIONS
Please return:  1 week with [facility] (can be Thursday) - bring stockings          Patient discharge and wound care instructions  Miguel Griffin Lola  8/14/2024     You may shower with protection of the wound (ie a cast cover or similar).     Cleansing/dressing:     In clinic, with each dressing change:    please cleanse the limb, foot, and between toes with soap, water and washcloth.   Dry thoroughly.    Cleanse/soak the wound with VASHE (or other hypochlorous wound cleanser), dab dry.    Apply the following dressings:      RIGHT: man>xeroform>light 20-30mmhg (folded abd pad between the layers)  LEFT foot:  prismal>silver non border foam>light 20-30mmhg    Managing your edema:  Avoid prolonged standing in one place. It is better to have your calf muscles moving to pump fluid out of the legs      Elevate leg(s) above the level of the heart when sitting or as much as possible.  Take you diuretics as directed by your physician. Do not skip doses or change doses unless instructed to do so by your physician.  Decrease salt intake, follow recommended 2 grams daily.  Do not get leg(s) with compression wrap wet. If wraps are too tight as indicated by pain, numbness/tingling or discoloration of toes remove wrap completely and call the wound center @ [phone].  Refer to the \"Multi-layer compression bandage application patient information sheet\" given to you in clinic.    Nutrition and blood sugar control:  Focus on the following:  Protein: Meats, beans, eggs, milk and yogurt particularly Greek yogurt), tofu, soy nuts, soy protein products (Follow the protein handout in your welcome folder)  Vitamin C: Citrus fruits and juices, strawberries, tomatoes, tomato juice, peppers, baked potatoes, spinach, broccoli, cauliflower, Royal sprouts, cabbage  Vitamin A: Dark green, leafy vegetables, orange or yellow vegetables, cantaloupe, fortified dairy products, liver, fortified cereals  Zinc: Fortified cereals, red meats,  seafood  Consider supplementing with Damion by [email or web address]. It can be purchased on amazon, [employer] website, or local pharmacy may be able to order it for you.  (These are essential branch chain amino acids that help with tissue building and wound healing).   When your blood sugar is consistently elevated greater than 180 your body can't heal or fight infection.     Concerns:  Signs of infection may include the following:  Increase in redness  Red \"streaks\" from wound  Increase in swelling  Fever  Unusual odor  Change in the amount of wound drainage     Should you experience any significant changes in your wound(s) or have any questions regarding your home care instructions please contact the wound center [facility] @ [phone] If after regular business hours, please call your family doctor or local emergency room. The treatment plan has been discussed at length between you and your provider. Follow all instructions carefully, it is very important. If you do not follow all instructions you are at risk of your wound not healing, infection, possible loss of limb and even loss of life.

## 2024-08-15 ENCOUNTER — HOSPITAL ENCOUNTER (OUTPATIENT)
Dept: LAB | Facility: HOSPITAL | Age: 79
Discharge: HOME | End: 2024-08-15
Attending: NURSE PRACTITIONER
Payer: MEDICARE

## 2024-08-15 ENCOUNTER — HOSPITAL ENCOUNTER (OUTPATIENT)
Dept: CARDIOLOGY CLINIC | Facility: HOSPITAL | Age: 79
Discharge: HOME | End: 2024-08-15
Attending: NURSE PRACTITIONER
Payer: MEDICARE

## 2024-08-15 VITALS
SYSTOLIC BLOOD PRESSURE: 120 MMHG | WEIGHT: 201.38 LBS | DIASTOLIC BLOOD PRESSURE: 70 MMHG | BODY MASS INDEX: 28 KG/M2 | RESPIRATION RATE: 21 BRPM | OXYGEN SATURATION: 100 % | HEART RATE: 70 BPM

## 2024-08-15 DIAGNOSIS — I50.33 ACUTE ON CHRONIC DIASTOLIC HEART FAILURE (HCC): Primary | ICD-10-CM

## 2024-08-15 DIAGNOSIS — I48.19 PERSISTENT ATRIAL FIBRILLATION (HCC): ICD-10-CM

## 2024-08-15 DIAGNOSIS — D50.9 IRON DEFICIENCY ANEMIA, UNSPECIFIED IRON DEFICIENCY ANEMIA TYPE: ICD-10-CM

## 2024-08-15 DIAGNOSIS — N18.32 STAGE 3B CHRONIC KIDNEY DISEASE (HCC): ICD-10-CM

## 2024-08-15 DIAGNOSIS — D69.6 THROMBOCYTOPENIA (HCC): ICD-10-CM

## 2024-08-15 DIAGNOSIS — I50.812 CHRONIC RIGHT-SIDED CONGESTIVE HEART FAILURE (HCC): ICD-10-CM

## 2024-08-15 LAB
ANION GAP SERPL CALC-SCNC: 7 MMOL/L (ref 0–18)
BASOPHILS # BLD AUTO: 0.04 X10(3) UL (ref 0–0.2)
BASOPHILS NFR BLD AUTO: 0.5 %
BUN BLD-MCNC: 29 MG/DL (ref 9–23)
CALCIUM BLD-MCNC: 9.4 MG/DL (ref 8.7–10.4)
CHLORIDE SERPL-SCNC: 107 MMOL/L (ref 98–112)
CO2 SERPL-SCNC: 28 MMOL/L (ref 21–32)
CREAT BLD-MCNC: 1.31 MG/DL
DEPRECATED HBV CORE AB SER IA-ACNC: 42.5 NG/ML
EGFRCR SERPLBLD CKD-EPI 2021: 55 ML/MIN/1.73M2 (ref 60–?)
EOSINOPHIL # BLD AUTO: 0.19 X10(3) UL (ref 0–0.7)
EOSINOPHIL NFR BLD AUTO: 2.2 %
ERYTHROCYTE [DISTWIDTH] IN BLOOD BY AUTOMATED COUNT: 16.4 %
FASTING STATUS PATIENT QL REPORTED: NO
GLUCOSE BLD-MCNC: 90 MG/DL (ref 70–99)
HCT VFR BLD AUTO: 40 %
HGB BLD-MCNC: 12 G/DL
IMM GRANULOCYTES # BLD AUTO: 0.04 X10(3) UL (ref 0–1)
IMM GRANULOCYTES NFR BLD: 0.5 %
IRON SATN MFR SERPL: 11 %
IRON SERPL-MCNC: 40 UG/DL
LYMPHOCYTES # BLD AUTO: 1.93 X10(3) UL (ref 1–4)
LYMPHOCYTES NFR BLD AUTO: 22.3 %
MCH RBC QN AUTO: 31.6 PG (ref 26–34)
MCHC RBC AUTO-ENTMCNC: 30 G/DL (ref 31–37)
MCV RBC AUTO: 105.3 FL
MONOCYTES # BLD AUTO: 0.88 X10(3) UL (ref 0.1–1)
MONOCYTES NFR BLD AUTO: 10.2 %
NEUTROPHILS # BLD AUTO: 5.57 X10 (3) UL (ref 1.5–7.7)
NEUTROPHILS # BLD AUTO: 5.57 X10(3) UL (ref 1.5–7.7)
NEUTROPHILS NFR BLD AUTO: 64.3 %
OSMOLALITY SERPL CALC.SUM OF ELEC: 299 MOSM/KG (ref 275–295)
PLATELET # BLD AUTO: 154 10(3)UL (ref 150–450)
POTASSIUM SERPL-SCNC: 3.9 MMOL/L (ref 3.5–5.1)
RBC # BLD AUTO: 3.8 X10(6)UL
SODIUM SERPL-SCNC: 142 MMOL/L (ref 136–145)
TOTAL IRON BINDING CAPACITY: 372 UG/DL (ref 250–425)
TRANSFERRIN SERPL-MCNC: 282 MG/DL (ref 215–365)
WBC # BLD AUTO: 8.7 X10(3) UL (ref 4–11)

## 2024-08-15 PROCEDURE — 83550 IRON BINDING TEST: CPT | Performed by: NURSE PRACTITIONER

## 2024-08-15 PROCEDURE — 83540 ASSAY OF IRON: CPT | Performed by: NURSE PRACTITIONER

## 2024-08-15 PROCEDURE — 36415 COLL VENOUS BLD VENIPUNCTURE: CPT | Performed by: NURSE PRACTITIONER

## 2024-08-15 PROCEDURE — 85025 COMPLETE CBC W/AUTO DIFF WBC: CPT | Performed by: NURSE PRACTITIONER

## 2024-08-15 PROCEDURE — 80048 BASIC METABOLIC PNL TOTAL CA: CPT | Performed by: NURSE PRACTITIONER

## 2024-08-15 PROCEDURE — 82728 ASSAY OF FERRITIN: CPT | Performed by: NURSE PRACTITIONER

## 2024-08-15 PROCEDURE — G2212 PROLONG OUTPT/OFFICE VIS: HCPCS | Performed by: NURSE PRACTITIONER

## 2024-08-15 PROCEDURE — 99215 OFFICE O/P EST HI 40 MIN: CPT | Performed by: NURSE PRACTITIONER

## 2024-08-15 RX ORDER — BUMETANIDE 0.25 MG/ML
4 INJECTION INTRAMUSCULAR; INTRAVENOUS ONCE
Status: COMPLETED | OUTPATIENT
Start: 2024-08-15 | End: 2024-08-15

## 2024-08-15 RX ORDER — POTASSIUM CHLORIDE 20 MEQ/1
40 TABLET, EXTENDED RELEASE ORAL ONCE
Status: COMPLETED | OUTPATIENT
Start: 2024-08-15 | End: 2024-08-15

## 2024-08-15 RX ADMIN — POTASSIUM CHLORIDE 40 MEQ: 20 TABLET, EXTENDED RELEASE ORAL at 14:30:00

## 2024-08-15 RX ADMIN — BUMETANIDE 4 MG: 0.25 INJECTION INTRAMUSCULAR; INTRAVENOUS at 14:15:00

## 2024-08-15 NOTE — PATIENT INSTRUCTIONS
Heart Failure Discharge Instructions    If you don't lose 5 lbs by Sunday increase Bumex to 3 mg daily.       Activity: Regular exercise and activity is important for your overall health and to help keep your heart strong and functioning as well as possible.   Walk at a slow to moderate pace for 15-20 minutes 3-5 days per week.     Follow these instructions every day to keep yourself in the Green Zone     The Green Zone means you are feeling well and your symptoms are under control                                    Medications  Take your medication every day as instructed  Do not stop taking your medicine or change the amount you are taking without instructions from your doctor or nurse  Do Not take non-steroidal antiinflammatory drugs such as ibuprofen, aleve, advil, or motrin                                    Diet/Fluids  People with heart failure should eat less sodium (salt) and limit fluid. Sodium attracts water and makes the body hold fluid. This extra fluid makes the heart work harder and can worsen the symptoms of heart failure.     Diet    2000 mg sodium daily  Fluid restriction    64 ounces daily  (8 oz. = 1 cup)                                     Body Weight  Weigh yourself every day before breakfast and write your weight down  Use the same scale and wear about the same amount of clothes each time  A sudden weight increase is due to fluid retention rather than fat                                         Activity  Pace your activities to avoid getting overtired  Take rest periods as needed  Elevate your feet to reduce ankle swelling when resting                             Signs of Worsening Heart Failure    You are entering the Yellow Zone - this is a warning zone    Call your doctor or nurse if you have any of these signs or symptoms:  You gain 2 or more pounds overnight or 3-5 pounds in 3-7 days  You have more trouble breathing  You get more tired with regular activity, or are limiting activity  because of shortness of breath or fatigue  You are short of breath lying down, you need more pillows to breathe comfortably,  or wake up during the night short of breath  You urinate less often during the day and more often at night  You have a bloated feeling, upset stomach, loss of appetite, or your clothes are fitting tighter    GO TO THE EMERGENCY DEPARTMENT or CALL 911 IF:    These are signs you are in the RED ZONE - THIS IS AN EMERGENCY  You have tightness or pain in your chest  You are extremely short of breath or can't catch your breath  You cough up frothy pink mucous  You feel confused or can't think clearly  You are traveling and develop symptoms of worsening heart failure     We respect everyone's time and availability. Please be aware that this is not a walk-in clinic and we require appointments in order to facilitate timely care for all patients. We ask you to arrive 30 minutes before your appointment to allow time for you to check-in and have your bloodwork drawn. Please understand if you are late for your appointment, you may be asked to reschedule. If possible, all attempts will be made to accommodate but realize this is no guarantee that this will always be available. We understand there are extenuating circumstances. If you need to cancel or reschedule your appointment, please call the [facility] within 24 hours at [phone].  Thank you for your cooperation, [facility] Staff.    If you are currently [facility] active, starting July 1st 2024, we will be utilizing [facility] messaging ONLY to confirm your appointment.    IF YOU HAVE QUESTIONS REGARDING YOUR BILL, FEEL FREE TO CONTACT [facility] PATIENT ACCOUNTS -[phone]. IF YOU NEED FINANCIAL ASSISTANCE, PLEASE CALL AN [facility] FINANCIAL COUNSELOR -[phone].             [facility]     [phone]

## 2024-08-15 NOTE — PROGRESS NOTES
Patient assessed. Pt. complaining of upper leg edema, abdominal bloating, occasional shortness of breath. Weight 201.4; up 5 lbs. APN notified of patient's complaint of all of the above. Labs ordered and drawn by  Lab. Reviewed allergies and list of current medications with patient and updated it in the Electronic Medical Record.     IV established per protocol. IV 4 mg bumex,  mg diuril,  mg venofer & 40 meq oral potassium given. Patient tolerated it well. Educated patient on low sodium diet and food choices, fluid restriction of 2 liters, and daily weights. Reviewed follow-up appointments and discharge Heart Failure instructions with patient. Patient verbalized an understanding. IV discontinued; catheter intact. Pressure held and gauze applied.     RTC next week.

## 2024-08-20 ENCOUNTER — TELEPHONE (OUTPATIENT)
Dept: FAMILY MEDICINE CLINIC | Facility: CLINIC | Age: 79
End: 2024-08-20

## 2024-08-20 NOTE — TELEPHONE ENCOUNTER
Finn is calling from [facility] stating the patient wants to be discharged because he is having the wound care taking care of at the wound clinic.  Please call with questions.

## 2024-08-21 DIAGNOSIS — I50.812 CHRONIC RIGHT-SIDED CONGESTIVE HEART FAILURE (HCC): ICD-10-CM

## 2024-08-21 DIAGNOSIS — I50.33 ACUTE ON CHRONIC DIASTOLIC HEART FAILURE (HCC): Primary | ICD-10-CM

## 2024-08-21 NOTE — PROGRESS NOTES
CHIEF COMPLAINT:     Chief Complaint   Patient presents with    Wound Care     Here for follow up visit. No new complaints. Unna boots intact.     HPI:   Information obtained from patient and chart  7-18-24 INITIAL: 79 year old male with essential hypertension, chronic heart failure with preserved EF, pulmonary hypertension, atrial fibrillation sp [place], chronic kidney disease, SLE, polymyositis, chronic steroid use, hypothyroidism, LI who was admitted from July 8-12 for RLE redness and swelling.  His albumin level was low and felt to be a contributing factor to his leg swelling and hypotension.  He was given an albumin infusion. His discharge weight was 189. He followed up with heart failure clinic earlier this week and his weight was down 7# since may.  It was noted that he was hypotensive at the visit and he had stated he had increased his bumex to 2 mg over the weekend and taking the full tablet of entresto instead of the recommended 1/2.  At visit he was to stop the entresto and compression wraps were recommended.   His next appointment with heart failure is next wednesday.    Since his hospitalization he has been sleeping in a recliner and therefore not utilizing his cpap.  He states he will try to go back to his bed this weekend.  He denies intermittent claudication or rest pain.  Patient has a weeping area on the right lateral leg.  No s/s of infection or c/o pain.   He is here with his sister.    7-25-24 patient returns.  At rn visit his right calf edema measurement had reduced by 5 cm and another 3 cm today. He saw heart failure clinic yesterday and it was noted his weight was up by 7# since last visit on the 18th, however it was felt he was starting to respond to the diuretics based on patient report and therefore patient was to continue current medications. Albumin was 3.9 yesterday. Wound is improved, he has tolerated compression. No s/s of infection or c/o pain.  Patient states he did not go back  to his bed this weekend because he was concerned he might still be leaking.  He states he will try this weekend. We discussed compression for post healing, patient will look on amazon. We printed out his measurements for him.     7-31-24 patient returns. He states he did sleep in his bed last noc and he will be getting his cpap adjusted.  He did not get comrpession because there were so many choices.  I will give him some brand names as well as other sites to purchase.  I also said he could go to [facility] to purchase.  Wound is smaller, no s/s of infection or c/o pain. Edema with slight increase at calf.    8-7-24 patient returns.  Patient followed up primary care.  He got ivig yesterday so he has had an increase in edema.  He did get compression stockings however feels they are too \"heavy\" material.  His f/u with heart failure clinic is next week.  He states he knows how to increase his bumex prn for weight gain.  His left foot rubbed on his sandal strap and he has a wound there now.  The right lower extremity wound is improved.    8-14-24 patient returns.  He has continued with chronic cough, got cxr and also f/u with pulmonology. He has heart failure appointment tomorrow. His edema has reduced since last week, he states he has not had a chance to look for new shoes and the darco shoes were hurting his foot, so he returned to the sandals that was the offender that created the wound on left lateral foot. We did pad his sandal with a thick foam after marking the seam that was causing the friction in the area. He states he did find stockings that he thinks will work. I asked him to bring them next week. No s/s of infection or c/o pain.    8-22-24 patient returns.  He did see heart failure clinic and his weight was up 5#, he did get ivp diuretics and was advised that if he did not lose the 5# by Sunday to increase his bumex to 3 mg daily.  He states he has lost 10# since last week, his f/u with heart failure  clinic is tomorrow.  Last week we utilized a thick foam to pad the side of his sandal that caused the ulceration on the left foot. He states he is still feeling friction and states he is going to try and go to [employer] today.  Today the right leg is resolved. He did bring stockings and we will transition the right leg to his personal compression.  The left foot is about the same, I debrided and we will attempt to utilize a foam to help offload. Will also run insurance for [facility]  MEDICATIONS:     Current Outpatient Medications:     predniSONE 1 MG Oral Tab, Take 2 tablets (2 mg total) by mouth daily with breakfast., Disp: 180 tablet, Rfl: 0    bumetanide 1 MG Oral Tab, Take 3 tablets (3 mg total) by mouth daily., Disp: , Rfl:     spironolactone 25 MG Oral Tab, Take 0.5 tablets (12.5 mg total) by mouth daily., Disp: 15 tablet, Rfl: 0    fexofenadine 180 MG Oral Tab, Take 1 tablet (180 mg total) by mouth daily as needed., Disp: , Rfl:     levothyroxine 50 MCG Oral Tab, Take 1 tablet (50 mcg total) by mouth before breakfast., Disp: , Rfl:     ipratropium 0.06 % Nasal Solution, 1 spray by Nasal route 2 (two) times daily., Disp: 1 each, Rfl: 5    omeprazole 20 MG Oral Capsule Delayed Release, Take 1 capsule (20 mg total) by mouth 2 (two) times daily., Disp: 180 capsule, Rfl: 1    ALLOPURINOL 300 MG Oral Tab, TAKE 1 TABLET BY MOUTH EVERY DAY, Disp: 90 tablet, Rfl: 3    predniSONE 5 MG Oral Tab, Take 1 tablet (5 mg total) by mouth daily with breakfast., Disp: 90 tablet, Rfl: 3    metoprolol succinate  MG Oral Tablet 24 Hr, Take 1 tablet (100 mg total) by mouth every morning AND 0.5 tablets (50 mg total) every evening., Disp: 60 tablet, Rfl: 3    aspirin 81 MG Oral Tab EC, Take 1 tablet (81 mg total) by mouth daily., Disp: 30 tablet, Rfl: 0    PREVIDENT 5000 BOOSTER PLUS 1.1 % Dental Paste, , Disp: , Rfl:     Multiple Vitamins-Minerals (TAB-A-KEVIN) Oral Tab, Take 1 tablet by mouth daily., Disp: , Rfl:     Immune  Globulin, Human, 10 g Intravenous Recon Soln, Inject 0.4 g/kg into the vein. Given for treatment of polymyositis, mixed connective tissue disease, and immune thrombocytopenia purpura monthly at [facility]. Every 5 weeks, Disp: 3 each, Rfl: 0    Calcium Citrate-Vitamin D (CITRACAL + D OR), Take 1 tablet by mouth daily., Disp: , Rfl:   ALLERGIES:     Allergies   Allergen Reactions    Empagliflozin OTHER (SEE COMMENTS)     Pancreatitis    \"pancreatitis\" per pt      REVIEW OF SYSTEMS:   This information was obtained from the patient/family and chart.    See HPI for pertinent positives, otherwise 10 pt ROS negative.  HISTORY:   Past medical, surgical, family and social history updated where appropriate.  PHYSICAL EXAM:     Vitals:    08/22/24 1336   BP: 122/75   Pulse: 84   Resp: 14   Temp: 97.9 °F (36.6 °C)     Estimated body mass index is 28.1 kg/m² as calculated from the following:    Height as of 8/6/24: 70.98\".    Weight as of 8/15/24: 201 lb 6.4 oz (91.4 kg).   No results found for: \"PGLU\"    Vital signs reviewed.Appears stated age, well groomed.    Constitutional:  Bp wnl for patient. Pulse Regular and wnl for patient. Respirations easy and unlabored. Temperature wnl. Weight normal for height. Appearance neat and clean. Appears in no acute distress. Well nourished and well developed.  Lower extremity:  dp palpable bilaterally. Bilateral lower extremity free of varicosities, +edema reduction bilaterally, + hemosideran staining and very atrophic skin. Capillary refill < 3 seconds. Digits are warm. toenails are thin, brittle with adequate length and hygeine. Skin is dry and flaky. no hairgrowth on legs.  Musculoskeletal:  Gait and station stable   Integumentary:  refer to wound characteristics and images   Psychiatric:  Judgment and insight intact. Alert and oriented times 3. No evidence of depression, anxiety, or agitation. Calm, cooperative, and communicative.   EDEMA:   Calf  Point of Measurement -  Left Calf: 37  Point of Measurement - Right Calf: 37  Left Calf from:: Heel  Calf Left cm:: 32.7  Right Calf from:: Heel  Right Calf cm:: 30.9  Ankle  Point of Measurement - Left Ankle: 13  Point of Measurement - Right Ankle: 13  Left Ankle from:: Heel  Left Ankle cm:: 22     Right Ankle from:: Heel  Right Ankle cm:: 20.6     DIAGNOSTICS:     Lab Results   Component Value Date    BUN 29 (H) 08/15/2024    CREATSERUM 1.31 (H) 08/15/2024    ALB 3.9 07/24/2024    TP 5.0 (L) 07/09/2024    A1C 5.4 05/14/2024       WOUND ASSESSMENT:     Wound 07/18/24 #1 Right Leg Leg Right (Active)   Date First Assessed/Time First Assessed: 07/18/24 1415    Wound Number (Wound Clinic Only): #1 Right Leg  Primary Wound Type: Venous Ulcer  Location: Leg  Wound Location Orientation: Right      Assessments 7/18/2024  2:18 PM 8/22/2024  1:39 PM   Wound Image       Drainage Amount Moderate None   Drainage Description Serous;Clear --   Treatments Compression --   Wound Length (cm) 4 cm 0.1 cm   Wound Width (cm) 3.4 cm 0.1 cm   Wound Surface Area (cm^2) 13.6 cm^2 0.01 cm^2   Wound Depth (cm) 0.1 cm 0.1 cm   Wound Volume (cm^3) 1.36 cm^3 0.001 cm^3   Wound Healing % -- 100   Margins Well-defined edges Attached edges   Non-staged Wound Description Full thickness Full thickness   Shelley-wound Assessment -- Hemosiderin staining   Wound Granulation Tissue Pink;Pale Grey;Firm --   Wound Bed Granulation (%) 60 % --   Wound Bed Epithelium (%) 40 % --   Wound Odor None None   Shape clustered --   Tunneling? No No   Undermining? No No   Sinus Tracts? No No       No associated orders.       Compression Wrap 07/18/24 Leg Anterior;Right (Active)   Placement Date/Time: 07/18/24 1614   Location: Leg  Wound Location Orientation: Anterior;Right      Assessments 7/18/2024  4:15 PM 8/14/2024  3:59 PM   Response to Treatment Well tolerated Well tolerated   Compression Layers Multilayer Multilayer   Compression Product Type Unna Boot Unna Boot   Dressing Applied Yes  Yes   Compression Wrap Location Toes to Knee Toes to Knee   Compression Wrap Status Dry;Clean Clean;Dry       No associated orders.       Wound 08/07/24 #2 Left lateral foot Foot Left;Lateral (Active)   Date First Assessed/Time First Assessed: 08/07/24 1610    Wound Number (Wound Clinic Only): #2 Left lateral foot  Primary Wound Type: Pressure Injury  Location: Foot  Wound Location Orientation: Left;Lateral      Assessments 8/7/2024  4:15 PM 8/22/2024  1:41 PM   Wound Image        Drainage Amount Unable to assess Small   Drainage Description -- Yellow   Treatments Compression --   Wound Length (cm) 0.6 cm 0.4 cm   Wound Width (cm) 0.7 cm 0.5 cm   Wound Surface Area (cm^2) 0.42 cm^2 0.2 cm^2   Wound Depth (cm) 0 cm 0.1 cm   Wound Volume (cm^3) 0 cm^3 0.02 cm^3   Margins Well-defined edges Well-defined edges   Non-staged Wound Description Full thickness Full thickness   Shelley-wound Assessment Edema;Blanchable erythema;Dry Edema;Moist   Wound Granulation Tissue -- Pink;Firm;Pale Grey   Wound Bed Granulation (%) -- 100 %   Wound Odor None None   Shape Scabbed, unable to view wound bed. --   Tunneling? No No   Undermining? No No   Sinus Tracts? No No   Pressure Injury Stage Unstageable --       Active Orders   Date Order Priority Status Authorizing Provider   08/22/24 1410 Debridement Pressure Injury Left;Lateral Foot Routine Active Charisma Morrow APRN       Compression Wrap 08/14/24 Foot Dorsal;Left (Active)   Placement Date/Time: 08/14/24 1559   Location: Foot  Wound Location Orientation: Dorsal;Left      Assessments 8/14/2024  3:59 PM   Response to Treatment Well tolerated   Compression Layers Multilayer   Compression Product Type Unna Boot   Dressing Applied Yes   Compression Wrap Location Toes to Knee   Compression Wrap Status Dry;Clean       No associated orders.     PROCEDURE:      Left foot:  This procedure was medically necessary to promote wound healing by removing nonviable tissue, decrease chance of  infection, and return the wound to an acute state.  See rn pxt note    ASSESSMENT AND PLAN:    1. 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 Chronic venous hypertension with ulcer and inflammation, right (HCC)    3. Pressure injury of left foot, unstageable (HCC)    4. Lupus (HCC)    5. Bilateral lower extremity edema      Risks, benefits, and alternatives of current treatment plan discussed in detail.  Questions and concerns addressed. Red flags to RTC or ED reviewed.  Patient (or parent) agrees to plan.      NOTE TO PATIENT: The 21st Century Cures Act makes clinical notes like these available to patients in the interest of transparency. Clinical notes are medical documents used by physicians and care providers to communicate with each other. These documents include medical language and terminology, abbreviations, and treatment information that may sound technical and at times possibly unfamiliar. In addition, at times, the verbiage may appear blunt or direct. These documents are one tool providers use to communicate relevant information and clinical opinions of the care providers in a way that allows common understanding of the clinical context.   I spent  35 minutes with the patient. This time included:    preparing to see the patient (eg, review notes and recent diagnostics),  seeing the patient, obtaining and/or reviewing separately obtained history, performing a medically appropriate examination and/or evaluation, counseling and educating the patient, documenting in the record.  Bill full thickness debridement.  DISCHARGE:      Patient Instructions   Please return:  1 week with Charisma     Patient discharge and wound care instructions  Miguel Davila  8/22/2024       You may shower with protection of the wound (ie a cast cover or similar).     Cleansing/dressing:     In clinic, with each dressing change:    please cleanse the limb, foot, and between  toes with soap, water and washcloth.   Dry thoroughly.    Cleanse/soak the wound with VASHE (or other hypochlorous wound cleanser), dab dry.    Apply the following dressings:      RIGHT:your personal compression stocking  LEFT foot:  non border foam with offloading [place]>  Endoform>transfer>gauze>light 20-30mmhg    Managing your edema:  Avoid prolonged standing in one place. It is better to have your calf muscles moving to pump fluid out of the legs      Elevate leg(s) above the level of the heart when sitting or as much as possible.  Take you diuretics as directed by your physician. Do not skip doses or change doses unless instructed to do so by your physician.  Decrease salt intake, follow recommended 2 grams daily.  Do not get leg(s) with compression wrap wet. If wraps are too tight as indicated by pain, numbness/tingling or discoloration of toes remove wrap completely and call the wound center @ [phone].  Refer to the \"Multi-layer compression bandage application patient information sheet\" given to you in clinic.    Nutrition and blood sugar control:  Focus on the following:  Protein: Meats, beans, eggs, milk and yogurt particularly Greek yogurt), tofu, soy nuts, soy protein products (Follow the protein handout in your welcome folder)  Vitamin C: Citrus fruits and juices, strawberries, tomatoes, tomato juice, peppers, baked potatoes, spinach, broccoli, cauliflower, [place] sprouts, cabbage  Vitamin A: Dark green, leafy vegetables, orange or yellow vegetables, cantaloupe, fortified dairy products, liver, fortified cereals  Zinc: Fortified cereals, red meats, seafood  Consider supplementing with Damion by Personally. It can be purchased on amazon, [employer] website, or local pharmacy may be able to order it for you.  (These are essential branch chain amino acids that help with tissue building and wound healing).   When your blood sugar is consistently elevated greater than 180 your body can't heal or fight  infection.     Concerns:  Signs of infection may include the following:  Increase in redness  Red \"streaks\" from wound  Increase in swelling  Fever  Unusual odor  Change in the amount of wound drainage     Should you experience any significant changes in your wound(s) or have any questions regarding your home care instructions please contact the [facility] @ [phone] If after regular business hours, please call your family doctor or local emergency room. The treatment plan has been discussed at length between you and your provider. Follow all instructions carefully, it is very important. If you do not follow all instructions you are at risk of your wound not healing, infection, possible loss of limb and even loss of life.       Charisma Morrow FNP-C, CWCN-AP, CFCN, CSWS, WCC, DWC  8/22/2024

## 2024-08-22 ENCOUNTER — OFFICE VISIT (OUTPATIENT)
Dept: WOUND CARE | Facility: HOSPITAL | Age: 79
End: 2024-08-22
Attending: NURSE PRACTITIONER
Payer: MEDICARE

## 2024-08-22 VITALS
SYSTOLIC BLOOD PRESSURE: 122 MMHG | TEMPERATURE: 98 F | RESPIRATION RATE: 14 BRPM | DIASTOLIC BLOOD PRESSURE: 75 MMHG | HEART RATE: 84 BPM

## 2024-08-22 DIAGNOSIS — L97.812 NON-PRESSURE CHRONIC ULCER OF OTHER PART OF RIGHT LOWER LEG WITH FAT LAYER EXPOSED (HCC): Primary | ICD-10-CM

## 2024-08-22 DIAGNOSIS — I87.331 CHRONIC VENOUS HYPERTENSION WITH ULCER AND INFLAMMATION, RIGHT (HCC): ICD-10-CM

## 2024-08-22 DIAGNOSIS — L89.890 PRESSURE INJURY OF LEFT FOOT, UNSTAGEABLE (HCC): ICD-10-CM

## 2024-08-22 DIAGNOSIS — R60.0 BILATERAL LOWER EXTREMITY EDEMA: ICD-10-CM

## 2024-08-22 DIAGNOSIS — M32.9 LUPUS (HCC): ICD-10-CM

## 2024-08-22 PROCEDURE — 11042 DBRDMT SUBQ TIS 1ST 20SQCM/<: CPT | Performed by: NURSE PRACTITIONER

## 2024-08-22 NOTE — PROGRESS NOTES
Patient ID: Sterling Davila is a 79 year old male.    Debridement Pressure Injury Left;Lateral Foot   Wound 08/07/24 #2 Left lateral foot Foot Left;Lateral    Performed by: Charisma Morrow APRN  Authorized by: Charisma Morrow APRN      Consent   Consent obtained? verbal  Consent given by: patient    Debridement Details  Performed by: NP  Debridement type: surgical  Level of debridement: subcutaneous tissue    Pre-debridement measurements  Length (cm): 0.4  Width (cm): 0.5  Depth (cm): 0.1  Surface Area (cm^2): 0.2    Post-debridement measurements  Length (cm): 0.6  Width (cm): 0.7  Depth (cm): 0.2  Percent debrided: 100%  Surface Area (cm^2): 0.42  Area Debrided (cm^2): 0.42  Volume (cm^3): 0.08    Tissue and other material debrided: subcutaneous tissue  Devitalized tissue debrided: biofilm and slough  Instrument(s) utilized: blade  Bleeding: medium  Hemostasis obtained with: pressure  Procedural pain (0-10): insensate  Post-procedural pain: insensate   Response to treatment: procedure was tolerated well

## 2024-08-22 NOTE — PROGRESS NOTES
.Weekly Wound Education Note    Teaching Provided To: Patient;Family  Training Topics: Dressing;Edema control;Cleasing and general instructions;Compression;Discharge instructions  Training Method: Explain/Verbal;Written  Training Response: Patient responds and understands        Notes: Per provider, right leg wound is healed, pt transitioned into compression stocking. Dressing changed to endoform, hydrofera transfer, and gauze to left foot. Foam dressing with off loading cut out applied to left lateral foot. [place] unna boot 20-30mmhg applied.

## 2024-08-22 NOTE — PATIENT INSTRUCTIONS
Please return:  1 week with Charisma     Patient discharge and wound care instructions  Miguel Carlsoncharbel  8/22/2024       You may shower with protection of the wound (ie a cast cover or similar).     Cleansing/dressing:     In clinic, with each dressing change:    please cleanse the limb, foot, and between toes with soap, water and washcloth.   Dry thoroughly.    Cleanse/soak the wound with VASHE (or other hypochlorous wound cleanser), dab dry.    Apply the following dressings:      RIGHT:your personal compression stocking  LEFT foot:  non border foam with offloading [place]>  Endoform>transfer>gauze>light 20-30mmhg    Managing your edema:  Avoid prolonged standing in one place. It is better to have your calf muscles moving to pump fluid out of the legs      Elevate leg(s) above the level of the heart when sitting or as much as possible.  Take you diuretics as directed by your physician. Do not skip doses or change doses unless instructed to do so by your physician.  Decrease salt intake, follow recommended 2 grams daily.  Do not get leg(s) with compression wrap wet. If wraps are too tight as indicated by pain, numbness/tingling or discoloration of toes remove wrap completely and call the wound center @ [phone].  Refer to the \"Multi-layer compression bandage application patient information sheet\" given to you in clinic.    Nutrition and blood sugar control:  Focus on the following:  Protein: Meats, beans, eggs, milk and yogurt particularly Greek yogurt), tofu, soy nuts, soy protein products (Follow the protein handout in your welcome folder)  Vitamin C: Citrus fruits and juices, strawberries, tomatoes, tomato juice, peppers, baked potatoes, spinach, broccoli, cauliflower, [place] sprouts, cabbage  Vitamin A: Dark green, leafy vegetables, orange or yellow vegetables, cantaloupe, fortified dairy products, liver, fortified cereals  Zinc: Fortified cereals, red meats, seafood  Consider supplementing with Damion  by [employer]ax [employer]. It can be purchased on amazon, [employer] website, or local pharmacy may be able to order it for you.  (These are essential branch chain amino acids that help with tissue building and wound healing).   When your blood sugar is consistently elevated greater than 180 your body can't heal or fight infection.     Concerns:  Signs of infection may include the following:  Increase in redness  Red \"streaks\" from wound  Increase in swelling  Fever  Unusual odor  Change in the amount of wound drainage     Should you experience any significant changes in your wound(s) or have any questions regarding your home care instructions please contact the wound center [facility] @ [phone] If after regular business hours, please call your family doctor or local emergency room. The treatment plan has been discussed at length between you and your provider. Follow all instructions carefully, it is very important. If you do not follow all instructions you are at risk of your wound not healing, infection, possible loss of limb and even loss of life.

## 2024-08-23 ENCOUNTER — APPOINTMENT (OUTPATIENT)
Dept: CARDIOLOGY CLINIC | Facility: HOSPITAL | Age: 79
End: 2024-08-23
Attending: NURSE PRACTITIONER
Payer: MEDICARE

## 2024-08-23 ENCOUNTER — HOSPITAL ENCOUNTER (OUTPATIENT)
Dept: LAB | Facility: HOSPITAL | Age: 79
Discharge: HOME | End: 2024-08-23
Attending: NURSE PRACTITIONER
Payer: MEDICARE

## 2024-08-23 ENCOUNTER — HOSPITAL ENCOUNTER (OUTPATIENT)
Dept: CARDIOLOGY CLINIC | Facility: HOSPITAL | Age: 79
Discharge: HOME | End: 2024-08-23
Attending: NURSE PRACTITIONER
Payer: MEDICARE

## 2024-08-23 VITALS
RESPIRATION RATE: 20 BRPM | BODY MASS INDEX: 27 KG/M2 | DIASTOLIC BLOOD PRESSURE: 59 MMHG | OXYGEN SATURATION: 100 % | WEIGHT: 192 LBS | SYSTOLIC BLOOD PRESSURE: 106 MMHG | HEART RATE: 70 BPM

## 2024-08-23 DIAGNOSIS — I50.812 CHRONIC RIGHT-SIDED HEART FAILURE (HCC): Primary | ICD-10-CM

## 2024-08-23 DIAGNOSIS — I27.20 PULMONARY HYPERTENSION (HCC): ICD-10-CM

## 2024-08-23 DIAGNOSIS — I48.21 PERMANENT ATRIAL FIBRILLATION (HCC): ICD-10-CM

## 2024-08-23 DIAGNOSIS — I50.812 CHRONIC RIGHT-SIDED CONGESTIVE HEART FAILURE (HCC): ICD-10-CM

## 2024-08-23 DIAGNOSIS — N18.30 STAGE 3 CHRONIC KIDNEY DISEASE, UNSPECIFIED WHETHER STAGE 3A OR 3B CKD (HCC): ICD-10-CM

## 2024-08-23 LAB
ANION GAP SERPL CALC-SCNC: 5 MMOL/L (ref 0–18)
BUN BLD-MCNC: 31 MG/DL (ref 9–23)
CALCIUM BLD-MCNC: 9.4 MG/DL (ref 8.7–10.4)
CHLORIDE SERPL-SCNC: 106 MMOL/L (ref 98–112)
CO2 SERPL-SCNC: 32 MMOL/L (ref 21–32)
CREAT BLD-MCNC: 1.35 MG/DL
EGFRCR SERPLBLD CKD-EPI 2021: 53 ML/MIN/1.73M2 (ref 60–?)
FASTING STATUS PATIENT QL REPORTED: NO
GLUCOSE BLD-MCNC: 97 MG/DL (ref 70–99)
OSMOLALITY SERPL CALC.SUM OF ELEC: 302 MOSM/KG (ref 275–295)
POTASSIUM SERPL-SCNC: 3.9 MMOL/L (ref 3.5–5.1)
SODIUM SERPL-SCNC: 143 MMOL/L (ref 136–145)

## 2024-08-23 PROCEDURE — 80048 BASIC METABOLIC PNL TOTAL CA: CPT | Performed by: NURSE PRACTITIONER

## 2024-08-23 PROCEDURE — G2212 PROLONG OUTPT/OFFICE VIS: HCPCS | Performed by: NURSE PRACTITIONER

## 2024-08-23 PROCEDURE — 99215 OFFICE O/P EST HI 40 MIN: CPT | Performed by: NURSE PRACTITIONER

## 2024-08-23 PROCEDURE — 36415 COLL VENOUS BLD VENIPUNCTURE: CPT | Performed by: NURSE PRACTITIONER

## 2024-08-23 RX ORDER — POTASSIUM CHLORIDE 1500 MG/1
40 TABLET, EXTENDED RELEASE ORAL ONCE
Status: COMPLETED | OUTPATIENT
Start: 2024-08-23 | End: 2024-08-23

## 2024-08-23 RX ORDER — BUMETANIDE 0.25 MG/ML
3 INJECTION INTRAMUSCULAR; INTRAVENOUS ONCE
Status: COMPLETED | OUTPATIENT
Start: 2024-08-23 | End: 2024-08-23

## 2024-08-23 RX ORDER — BUMETANIDE 1 MG/1
TABLET ORAL
COMMUNITY
Start: 2024-08-23

## 2024-08-23 RX ADMIN — BUMETANIDE 3 MG: 0.25 INJECTION INTRAMUSCULAR; INTRAVENOUS at 17:15:00

## 2024-08-23 RX ADMIN — POTASSIUM CHLORIDE 40 MEQ: 1500 TABLET, EXTENDED RELEASE ORAL at 17:20:00

## 2024-08-23 NOTE — PROGRESS NOTES
Patient assessed. Pt. complaining of edematous legs. Weight 192 lbs; down 9 lbs. APN notified of patient's complaint of all of the above. Labs ordered and drawn by  Lab. Reviewed allergies and list of current medications with patient and updated it in the Electronic Medical Record. IV established per protocol. IV 3 mg bumex,  mg diuril,  mg venofer & oral 40 meq potassium  given. Patient tolerated it well. Educated patient on low sodium diet and food choices, fluid restriction of 2 liters, and daily weights. Reviewed follow-up appointments and discharge Heart Failure instructions with patient. Patient verbalized an understanding. IV discontinued; catheter intact. Pressure held and gauze applied. .    RTC in 2 weeks.

## 2024-08-23 NOTE — PROGRESS NOTES
[facility] Progress Note    Miguel Davila is a 79 year old male who presents to clinic for APN assessment and management of chronic diastolic heart failure and is functional class 2-3.     Subjective:  Since his last clinic visit on 8/15; when he was given IV Diuril 250mg, IV Bumex 4mg, Kdur 40meq PO and Venofer dose 1/3, also advised to increase Bumex to 3 mg daily if he doesn't lose 5 lbs by that following Sunday, his weight is down about 11 lbs. His LE edema and abdominal has improved, but still fluid overloaded. LLE is wrapped. He continues to have fatigue.     He saw Maraí Serrato with [facility]. She reminded him to use CPAP nightly for at least 4 hrs with a minimum of 6 hrs of sleep.      He has continued to follow up in the wound care clinic. Last visit 8/22 with noted improvement in LE edema. He had IVIG 8/6.      He had RHC on 5/20 that shows RA 9 mmHg, mPAP 31 mmhg, wedge 14 mmHg, CI 3.8 and PVR 2.2 wood units. Dr. Jean recommended increasing Entresto with consideration for retrying SGLT2i and increase in MRA at a later date. CardioMEMs sensor was noted to be inaccurate and re-calibration was recommended. He as requested this be postponed since he had cholecystitis and has been slow to recover.       Last Hospitalizations/ED visits:     He was admitted from 6/6-6/10 with acute cholecystitis with localized peritonitis and underwent cholecystectomy 6/7. Treated with antibiotics. Aldactone, Bumex and Entresto were held during hospital stay d.t low BP. He was discharged on Bumex 1 mg, remained off ARNI and MRA.      Admitted again 7/8-7/12 with leg swelling, weeping and abdominal distension. He was felt to be fluid overloaded on admission, but then became hypotensive with diuresis. Albumin level was low and felt to be a contributing factor to leg swelling and hypotension. Subsequently, he was given an albumin infusion followed by  diuretics and started to improve. LE US negative for DVT. Treated with antibiotics for cellulitis. GDMT started at low doses. To take PRN Bumex on discharge. Discharge weight of 189 lbs.      Review of Systems   Constitutional: Positive for weight loss.   Cardiovascular:  Positive for dyspnea on exertion and leg swelling.   Respiratory: Negative.     Gastrointestinal:  Positive for bloating.       HISTORY:  Past Medical History:    A-fib (HCC)    Arrhythmia    atrial fibrillation    Arthritis    Autoimmune disease (HCC)    hepatits, resolved anfd nephritis, resolved    Ramon's esophagus    Bleeding nose    Gums Treated    Blood disorder    thrombocytopenia    Blood in urine    Blurred vision    cataract due to steroids, surgery in June    Calculus of kidney    One time    Cancer (HCC)    skin    Candidiasis of the esophagus    Due to steroid use for Autoimmune disorder     Cataract    Colon polyps    Congestive heart disease (HCC)    Diarrhea, unspecified    Intermittent due to lupus    Diverticulosis of colon    Easy bruising    On and off prednisone for 20 years    Esophageal polyp    Esophageal reflux    Essential hypertension    Fatigue    polymyositis and lupus    Gout    Hammer toe, acquired    Heart palpitations    Afib    Hepatitis    autoimmune induce hepatitis d/t lupus    High blood pressure    IBS (irritable bowel syndrome)    mild d/t lupus    Irregular bowel habits    Leg swelling    Heart failure, probably caused by lupus    Lupus (HCC)    MCTD (mixed connective tissue disease) (HCC)    Obstructive sleep apnea (adult) (pediatric)    LI (obstructive sleep apnea)    AHI 37  Supine AHI 38 non-supine AHI 16 Sao2 Pj 83%     LI (obstructive sleep apnea)    AHI 36 RDI 36 REM AHI 45 Supine AHI 65 non-supine AHI 19 Sao2 Pj 86% CPAP 9cwp    Pain in joints    Personal history of antineoplastic chemotherapy    Pleural effusion    right    Pneumonia due to organism    Polymyositis (HCC)    Presence of  other cardiac implants and grafts    watchman filter    Problems with swallowing    dysphagia in 2006    Pulmonary hypertension (HCC)    Raynaud disease    Raynaud disease    Renal disorder    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    Shortness of breath    mainly due to pm or lupus    Sleep apnea    CPAP    Thrombocytopathia (HCC)    Visual impairment    bifocals for reading & computer; distance for driving    Wears glasses      Past Surgical History:   Procedure Laterality Date    Appendectomy  1970    Appendectomy      Cardiac catheterization  09/2019    Cataract Bilateral     Cholecystectomy      Colonoscopy  10/2003 2006 01/2010    x3    Colonoscopy  05/14/2013    Colonoscopy N/A 05/01/2018    Procedure: COLONOSCOPY;  Surgeon: Isaiah Tobar MD;  Location:  ENDOSCOPY    Colonoscopy N/A 04/12/2024    Procedure: COLONOSCOPY;  Surgeon: Gerardo Neves MD;  Location:  ENDOSCOPY    Colonoscopy with biopsy  05/14/2013    [facility]      Hand/finger surgery unlisted  2003    right    Needle biopsy liver      Other  12/2005    needle bipsy of kidney    Other surgical history  2017    watchman filter    Percutaneous  angioplasty  (ptca)- pbp only  2006    right    Rectum surgery procedure unlisted  1946    rectal surgery polypectomy    Skin surgery      MMS BCC R temple 6/24/09    Skin surgery  2007    basal ceell carcinoma    Skin surgery  07/18/2012    BCC-nodular to right superior eyebrow/ MOHS    Skin surgery  07/15/2013    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/MMS    Skin surgery  02/19/2014    BCC-NOD to right superior pinna, MMS, AB    Skin surgery  02/16/2021    BCC- left superior forehead, MMS     Skin surgery  03/07/2022    SCC IN SITU RIGHT ANTIHELIX MMS BY DR GASTELUM    Tonsillectomy  1948    Upper gi endoscopy,exam  10/2003 2006 01/2010 , 5/13    x3      Family History   Problem Relation Age of Onset    Heart Disease Father         CHF    Gastro-Intestinal Disorder Father         Diverticulosis    Alcohol and Other Disorders  Associated Father     Heart Attack Father     Heart Disease Mother         CHF    Breast Cancer Mother     Stroke Mother     Cancer Paternal Grandfather     Heart Attack Paternal Grandmother     Kidney Disease Son     Other (Ramon's Esophagus) Son     Heart Attack Maternal Grandfather       Social History     Socioeconomic History    Marital status:    Occupational History    Occupation: Retired    Tobacco Use    Smoking status: Former     Current packs/day: 0.00     Average packs/day: 0.5 packs/day for 15.0 years (7.5 ttl pk-yrs)     Types: Cigarettes     Start date: 1958     Quit date: 1973     Years since quittin.1    Smokeless tobacco: Never    Tobacco comments:     5 cigarettes daily, stopped smoking in    Vaping Use    Vaping status: Never Used   Substance and Sexual Activity    Alcohol use: Yes     Alcohol/week: 13.0 - 15.0 standard drinks of alcohol     Types: 5 Glasses of wine, 8 - 10 Standard drinks or equivalent per week     Comment: 1 per day wine or liquor    Drug use: Never   Other Topics Concern    Caffeine Concern Yes     Comment: 1 soda per day and decaf coffee    Sleep Concern No    Exercise Yes     Comment: 3-4 times weekly    Seat Belt Yes     Social Determinants of Health     Financial Resource Strain: Low Risk  (2024)    Financial Resource Strain     Difficulty of Paying Living Expenses: Not very hard     Med Affordability: No   Food Insecurity: No Food Insecurity (2024)    Food Insecurity     Food Insecurity: Never true   Transportation Needs: No Transportation Needs (2024)    Transportation Needs     Lack of Transportation: No   Housing Stability: Low Risk  (2024)    Housing Stability     Housing Instability: No           Objective:    Telemetry: Afib         /59   Pulse 70   Resp 20   Wt 192 lb (87.1 kg)   SpO2 100%   BMI 26.79 kg/m²     Wt Readings from Last 6 Encounters:   24 192 lb (87.1 kg)   08/15/24 201 lb 6.4 oz (91.4 kg)    08/06/24 200 lb 3.2 oz (90.8 kg)   08/05/24 201 lb 9.6 oz (91.4 kg)   07/24/24 196 lb 12.8 oz (89.3 kg)   07/18/24 189 lb (85.7 kg)            Recent Results (from the past 24 hour(s))   Basic Metabolic Panel (8)    Collection Time: 08/23/24  3:22 PM   Result Value Ref Range    Glucose 97 70 - 99 mg/dL    Sodium 143 136 - 145 mmol/L    Potassium 3.9 3.5 - 5.1 mmol/L    Chloride 106 98 - 112 mmol/L    CO2 32.0 21.0 - 32.0 mmol/L    Anion Gap 5 0 - 18 mmol/L    BUN 31 (H) 9 - 23 mg/dL    Creatinine 1.35 (H) 0.70 - 1.30 mg/dL    Calcium, Total 9.4 8.7 - 10.4 mg/dL    Calculated Osmolality 302 (H) 275 - 295 mOsm/kg    eGFR-Cr 53 (L) >=60 mL/min/1.73m2    Patient Fasting for BMP? No          Current Outpatient Medications:     bumetanide 1 MG Oral Tab, Take 2 tablets (2 mg total) by mouth every morning AND 1 tablet (1 mg total) Every afternoon at 2:00 pm., Disp: , Rfl:     predniSONE 1 MG Oral Tab, Take 2 tablets (2 mg total) by mouth daily with breakfast., Disp: 180 tablet, Rfl: 0    spironolactone 25 MG Oral Tab, Take 0.5 tablets (12.5 mg total) by mouth daily., Disp: 15 tablet, Rfl: 0    fexofenadine 180 MG Oral Tab, Take 1 tablet (180 mg total) by mouth daily as needed., Disp: , Rfl:     levothyroxine 50 MCG Oral Tab, Take 1 tablet (50 mcg total) by mouth before breakfast., Disp: , Rfl:     ipratropium 0.06 % Nasal Solution, 1 spray by Nasal route 2 (two) times daily., Disp: 1 each, Rfl: 5    omeprazole 20 MG Oral Capsule Delayed Release, Take 1 capsule (20 mg total) by mouth 2 (two) times daily., Disp: 180 capsule, Rfl: 1    ALLOPURINOL 300 MG Oral Tab, TAKE 1 TABLET BY MOUTH EVERY DAY, Disp: 90 tablet, Rfl: 3    predniSONE 5 MG Oral Tab, Take 1 tablet (5 mg total) by mouth daily with breakfast., Disp: 90 tablet, Rfl: 3    metoprolol succinate  MG Oral Tablet 24 Hr, Take 1 tablet (100 mg total) by mouth every morning AND 0.5 tablets (50 mg total) every evening., Disp: 60 tablet, Rfl: 3    aspirin 81 MG Oral  Tab EC, Take 1 tablet (81 mg total) by mouth daily., Disp: 30 tablet, Rfl: 0    PREVIDENT 5000 BOOSTER PLUS 1.1 % Dental Paste, , Disp: , Rfl:     Multiple Vitamins-Minerals (TAB-A-KEVIN) Oral Tab, Take 1 tablet by mouth daily., Disp: , Rfl:     Immune Globulin, Human, 10 g Intravenous Recon Soln, Inject 0.4 g/kg into the vein. Given for treatment of polymyositis, mixed connective tissue disease, and immune thrombocytopenia purpura monthly at [facility]. Every 5 weeks, Disp: 3 each, Rfl: 0    Calcium Citrate-Vitamin D (CITRACAL + D OR), Take 1 tablet by mouth daily., Disp: , Rfl:     Exam:   General:         Alert, in no apparent distress  HEENT:          +8cm JVD  Lungs:            LLL diminished                     CV:                   irregularly irregular  Abdomen:       distended, soft  Extremities:    +1 RLE edema up into thighs, LLE wrapped with edema above wraps  Neuro:             A&O x 3  Skin:                Pink, warm, dry    Education:  Patient instructed regarding sodium restricted diet, low sodium foods, fluid restriction, daily weights, medication regimen, s/s HF exacerbation and when to call APN/clinic.    Assessment:   Chronic diastolic, RV heart failure - LVEF 55-60% and stable on recent echo, low normal RV function. RHC 5/20 with PCWP 14, RA 9. S/p CardioMEMs; needs re-calibration per recent RHC. Off Jardiance, thought to be due to pancreatitis. MRA discontinued at [facility] for dehydration and near syncope; since have restarted but required reduced dose due to hyperkalemia. During hospital stay GDMT has been adjusted recently due tolow BP, NICKI. Last ProBNP 3,583 up from 2,029. Hypervolemic, but improving fluid status.   PH, mild combined pre and post capillary - RHC 5/20/24  demonstrates mPAP 31 with wedge14 mmHg, RA 9 mmHg, PVR 2.2 and CI 3.8. RV function low normal.   Mild to Moderate MR/Mild-Mod TR/ Moderate AI - on echo in July.   CKD Stage 3b - baseline creatinine ~ 1.8-2.0 mg/dl, Cr  1.35  mg/dL today due to volume overload. Follows with Dr. Devine.    Chronic Afib - s/p Watchman. Rates moderately controlled today.   LI - on CPAP. Wears 22% of the nights according to recent download. Has been encouraged to wear more.   Recurrent bilateral pleural effusions - hx of thoracentesis.   Hx Lupus/Mixed CTD/Severe polymyositis - continues IVIG infusions every 5 weeks. Follows with Dr. Farmer. On chronic prednisone. Off Imuran due to pancytopenia.   Non-obstructive CAD  Chronic thrombocytopenia, immune mediated.  today. Follows with Dr. Orr.  Hx Hyponatremia  HERNANDEZ with biopsy proven stage F0-F1 Fibrosis on US 1/2022. Follows with Dr. Veronica, Hepatology.  Mild hyperkalemia - on low dose MRA and ARNI. Has required Veltassa in the past.  Anemia - last Hgb 12 g/dL; stable. Iron sat 11 and ferritin 42.5. Supplementing with 3 doses of Venofer.  Hypothyroidism - started on Synthroid per Dr. Jean in March, repeat TSH 2.19 and free T4 1.1 in May.   Hypoalbuminemia - last albumin 2.9, improved to 3.9 recently.  Hx Cellulitis - s/p antibiotics. Following in the Wound care clinic.     Plan:   IVP Diuril 250 mg x1.  IVP Bumex 4 mg x1.  Kdur 40 meq PO x1.  Venofer 200 mg dose 2 of 3.   Continue taking Bumex 2mg in the AM and 1mg in the PM.   Return to clinic in 1-2 weeks.  Continue home health nursing with wound care.   Plan for eventual RHC and recalibration of CardioMEMs, to discuss at next OV with Dr. Craig.   Follow up with Dr. Craig in September.  CHF discharge instructions given    I have spent >55 min total time on the day of the encounter, including: preparing to see the patient, obtaining and/or reviewing separately obtained history, performing a medically appropriate examination and/or evaluation, counseling and educating the patient/family caregiver, ordering medication, tests, or procedures, documenting clinical information in Epic, and independently interpreting results and  communicating results to the patient/family caregiver     SHEA Fernandez

## 2024-08-23 NOTE — PATIENT INSTRUCTIONS
Heart Failure Discharge Instructions    Continue taking Bumex 2mg in the morning and 1mg in the afternoon.     Activity: Regular exercise and activity is important for your overall health and to help keep your heart strong and functioning as well as possible.   Walk at a slow to moderate pace for 15-20 minutes 3-5 days per week.     Follow these instructions every day to keep yourself in the Green Zone     The Green Zone means you are feeling well and your symptoms are under control                                    Medications  Take your medication every day as instructed  Do not stop taking your medicine or change the amount you are taking without instructions from your doctor or nurse  Do Not take non-steroidal antiinflammatory drugs such as ibuprofen, aleve, advil, or motrin                                    Diet/Fluids  People with heart failure should eat less sodium (salt) and limit fluid. Sodium attracts water and makes the body hold fluid. This extra fluid makes the heart work harder and can worsen the symptoms of heart failure.     Diet    2000 mg sodium daily  Fluid restriction    64 ounces daily  (8 oz. = 1 cup)                                     Body Weight  Weigh yourself every day before breakfast and write your weight down  Use the same scale and wear about the same amount of clothes each time  A sudden weight increase is due to fluid retention rather than fat                                         Activity  Pace your activities to avoid getting overtired  Take rest periods as needed  Elevate your feet to reduce ankle swelling when resting                             Signs of Worsening Heart Failure    You are entering the Yellow Zone - this is a warning zone    Call your doctor or nurse if you have any of these signs or symptoms:  You gain 2 or more pounds overnight or 3-5 pounds in 3-7 days  You have more trouble breathing  You get more tired with regular activity, or are limiting activity  because of shortness of breath or fatigue  You are short of breath lying down, you need more pillows to breathe comfortably,  or wake up during the night short of breath  You urinate less often during the day and more often at night  You have a bloated feeling, upset stomach, loss of appetite, or your clothes are fitting tighter    GO TO THE EMERGENCY DEPARTMENT or CALL 911 IF:    These are signs you are in the RED ZONE - THIS IS AN EMERGENCY  You have tightness or pain in your chest  You are extremely short of breath or can't catch your breath  You cough up frothy pink mucous  You feel confused or can't think clearly  You are traveling and develop symptoms of worsening heart failure     We respect everyone's time and availability. Please be aware that this is not a walk-in clinic and we require appointments in order to facilitate timely care for all patients. We ask you to arrive 30 minutes before your appointment to allow time for you to check-in and have your bloodwork drawn. Please understand if you are late for your appointment, you may be asked to reschedule. If possible, all attempts will be made to accommodate but realize this is no guarantee that this will always be available. We understand there are extenuating circumstances. If you need to cancel or reschedule your appointment, please call the [facility] within 24 hours at [phone].  Thank you for your cooperation, [facility] Staff.    If you are currently [employer] active, starting July 1st 2024, we will be utilizing [employer] messaging ONLY to confirm your appointment.    IF YOU HAVE QUESTIONS REGARDING YOUR BILL, FEEL FREE TO CONTACT [facility] PATIENT ACCOUNTS -[phone]. IF YOU NEED FINANCIAL ASSISTANCE, PLEASE CALL AN [facility] FINANCIAL COUNSELOR -[phone].             [facility]     [phone]

## 2024-08-27 ENCOUNTER — OFFICE VISIT (OUTPATIENT)
Dept: RHEUMATOLOGY | Facility: CLINIC | Age: 79
End: 2024-08-27
Payer: MEDICARE

## 2024-08-27 VITALS
DIASTOLIC BLOOD PRESSURE: 56 MMHG | RESPIRATION RATE: 16 BRPM | SYSTOLIC BLOOD PRESSURE: 102 MMHG | HEART RATE: 92 BPM | OXYGEN SATURATION: 98 %

## 2024-08-27 DIAGNOSIS — I50.33 ACUTE ON CHRONIC DIASTOLIC HEART FAILURE (HCC): ICD-10-CM

## 2024-08-27 DIAGNOSIS — I43 CARDIOMYOPATHY AS MANIFESTATION OF UNDERLYING DISEASE (HCC): ICD-10-CM

## 2024-08-27 DIAGNOSIS — M33.20 POLYMYOSITIS (HCC): Chronic | ICD-10-CM

## 2024-08-27 DIAGNOSIS — N18.31 STAGE 3A CHRONIC KIDNEY DISEASE (HCC): ICD-10-CM

## 2024-08-27 DIAGNOSIS — M35.1 MIXED CONNECTIVE TISSUE DISEASE (HCC): Primary | ICD-10-CM

## 2024-08-27 DIAGNOSIS — M32.9 LUPUS (HCC): ICD-10-CM

## 2024-08-27 PROBLEM — N18.32 STAGE 3B CHRONIC KIDNEY DISEASE (HCC): Status: RESOLVED | Noted: 2022-01-13 | Resolved: 2024-08-27

## 2024-08-27 PROCEDURE — 99214 OFFICE O/P EST MOD 30 MIN: CPT | Performed by: INTERNAL MEDICINE

## 2024-08-27 RX ORDER — PREDNISONE 5 MG/1
5 TABLET ORAL DAILY
Qty: 90 TABLET | Refills: 2 | Status: CANCELLED
Start: 2024-08-27

## 2024-08-27 RX ORDER — ALLOPURINOL 300 MG/1
300 TABLET ORAL DAILY
Qty: 90 TABLET | Refills: 2 | Status: CANCELLED
Start: 2024-08-27

## 2024-08-27 NOTE — PROGRESS NOTES
[facility]  Dr. Farmer Progress Note     Subjective:   Miguel Davila is a(n) 79 year old male.   Current complaints:  some weakness. History of mixed connective tissue disease, with overlap with polymyositis, lupus, and immune thrombocytopenia.  Now has a significant cardiomyopathy.  On IVIG monthly which  definitely has helped his condition overall.  Also on prednisone 7.5 mg/day.   Feeling all right today.    Early May severe abdominal pain.  Called 911 - Had a perforated GB.   Had laproscaopic cholecystectomy May 2024.  Gained weight after surgery.  3 weeks later had fluid weeping out of right leg.  Then got cellulitis.  Hospitalized for 5 days at [facility] again for the cellulits.  Then heart clinic twice used IV diuretics - lost 15 pounds.   At [facility] for the cellulitis - wound right lower leg has healed. Had a wound left side of left foot. .  Feels weak.  Waking with a cane just in case.  Granddaughter getting  in September.  Breathing ok.  No chest paoin.  Overall a bit weak.   Put slowly getting stronger.    Wearing a compression wrap on the left lower leg.  Has had 2 iron infusions thus far, a 3rd iron infusion next week.  Now seeing Dr. Craig of cardiology.  Off Aaron.   Objective:   /56   Pulse 92   Resp 16   SpO2 98%   Lungs clear  Heart nsr  Abd soft, trace protruberant  Left lower leg in a compression dressing.  Hand  ok   8/23/2024 glucose 97 sodium 143 potassium 3.9 chloride 106 BUN 31 creatinine 1.35 calcium 9.4 GFR 53 ferritin 42.5 iron 40 TIBC 372 saturation 11% white count 8.7 hemoglobin 12.0 hematocrit 40.0 platelet count 154,000 .  7/24/2024 albumin 3.9.  7/15/24  Pro Beta Nutriuretic Peptide 3,583.    Assessment:     Encounter Diagnoses   Name Primary?    Mixed connective tissue disease (HCC) Yes    Lupus (HCC)     Cardiomyopathy as manifestation of underlying disease (HCC)     Acute on chronic diastolic heart failure (HCC)     Polymyositis  (HCC)     Stage 3a chronic kidney disease (HCC)    Currently stable.   Plan:     Patient Instructions   Continue IVIG treatments monthly  Prednisone 7 mg per day.  Continue cardiology visits regularly.    Off Aaron.  Continue wound care visits.  Return to office 3 months.        Taye Farmer MD 8/27/2024 2:28 PM

## 2024-08-27 NOTE — PATIENT INSTRUCTIONS
Continue IVIG treatments monthly  Prednisone 7 mg per day.  Continue cardiology visits regularly.    Off Aaron.  Continue wound care visits.  Return to office 3 months.

## 2024-08-27 NOTE — PROGRESS NOTES
CHIEF COMPLAINT:     Chief Complaint   Patient presents with    Wound Recheck     Patient arrives for wound care follow up appointment, arrives with left leg in compression wrap. No new concerns.     HPI:   Information obtained from patient and chart  7-18-24 INITIAL: 79 year old male with essential hypertension, chronic heart failure with preserved EF, pulmonary hypertension, atrial fibrillation sp Watch[place], chronic kidney disease, SLE, polymyositis, chronic steroid use, hypothyroidism, LI who was admitted from July 8-12 for RLE redness and swelling.  His albumin level was low and felt to be a contributing factor to his leg swelling and hypotension.  He was given an albumin infusion. His discharge weight was 189. He followed up with heart failure clinic earlier this week and his weight was down 7# since may.  It was noted that he was hypotensive at the visit and he had stated he had increased his bumex to 2 mg over the weekend and taking the full tablet of entresto instead of the recommended 1/2.  At visit he was to stop the entresto and compression wraps were recommended.   His next appointment with heart failure is next wednesday.    Since his hospitalization he has been sleeping in a recliner and therefore not utilizing his cpap.  He states he will try to go back to his bed this weekend.  He denies intermittent claudication or rest pain.  Patient has a weeping area on the right lateral leg.  No s/s of infection or c/o pain.   He is here with his sister.    HPI PRIOR TO AUGUST 2024 SEE INDIVIDUAL PROGRESS NOTES  8-7-24 patient returns.  Patient followed up primary care.  He got ivig yesterday so he has had an increase in edema.  He did get compression stockings however feels they are too \"heavy\" material.  His f/u with heart failure clinic is next week.  He states he knows how to increase his bumex prn for weight gain.  His left foot rubbed on his sandal strap and he has a wound there now.  The right lower  extremity wound is improved.    8-14-24 patient returns.  He has continued with chronic cough, got cxr and also f/u with pulmonology. He has heart failure appointment tomorrow. His edema has reduced since last week, he states he has not had a chance to look for new shoes and the darco shoes were hurting his foot, so he returned to the sandals that was the offender that created the wound on left lateral foot. We did pad his sandal with a thick foam after marking the seam that was causing the friction in the area. He states he did find stockings that he thinks will work. I asked him to bring them next week. No s/s of infection or c/o pain.    8-22-24 patient returns.  He did see heart failure clinic and his weight was up 5#, he did get ivp diuretics and was advised that if he did not lose the 5# by Sunday to increase his bumex to 3 mg daily.  He states he has lost 10# since last week, his f/u with heart failure clinic is tomorrow.  Last week we utilized a thick foam to pad the side of his sandal that caused the ulceration on the left foot. He states he is still feeling friction and states he is going to try and go to [employer] today.  Today the right leg is resolved. He did bring stockings and we will transition the right leg to his personal compression.  The left foot is about the same, I debrided and we will attempt to utilize a foam to help offload. Will also run insurance for CTP.    8-28-24 patient returns.  Epifix is approved. Patient saw hf clinic last Friday and and got ivp diuretics-his weight was 192. He did go to [employer] but tried on a bunch of shoes and all of them placed pressure on his lateral foot.  last week we transitioned the right leg to his personal compression, he states he couldn't get it off, so he brought a different type of compression stocking today to try on the right. Offloading has been an issue to the right foot, we did an offloading donut with a foam dressing last week which the  patient said felt good but it seemed to create undermining. This was removed today and epifix placed. No acute s/s of infection. Patient states he did not sleep well last noc as his spouse with alzheimers was up.   MEDICATIONS:     Current Outpatient Medications:     bumetanide 1 MG Oral Tab, Take 2 tablets (2 mg total) by mouth every morning AND 1 tablet (1 mg total) Every afternoon at 2:00 pm., Disp: , Rfl:     predniSONE 1 MG Oral Tab, Take 2 tablets (2 mg total) by mouth daily with breakfast., Disp: 180 tablet, Rfl: 0    spironolactone 25 MG Oral Tab, Take 0.5 tablets (12.5 mg total) by mouth daily., Disp: 15 tablet, Rfl: 0    fexofenadine 180 MG Oral Tab, Take 1 tablet (180 mg total) by mouth daily as needed., Disp: , Rfl:     levothyroxine 50 MCG Oral Tab, Take 1 tablet (50 mcg total) by mouth before breakfast., Disp: , Rfl:     ipratropium 0.06 % Nasal Solution, 1 spray by Nasal route 2 (two) times daily., Disp: 1 each, Rfl: 5    omeprazole 20 MG Oral Capsule Delayed Release, Take 1 capsule (20 mg total) by mouth 2 (two) times daily., Disp: 180 capsule, Rfl: 1    ALLOPURINOL 300 MG Oral Tab, TAKE 1 TABLET BY MOUTH EVERY DAY, Disp: 90 tablet, Rfl: 3    predniSONE 5 MG Oral Tab, Take 1 tablet (5 mg total) by mouth daily with breakfast., Disp: 90 tablet, Rfl: 3    metoprolol succinate  MG Oral Tablet 24 Hr, Take 1 tablet (100 mg total) by mouth every morning AND 0.5 tablets (50 mg total) every evening., Disp: 60 tablet, Rfl: 3    aspirin 81 MG Oral Tab EC, Take 1 tablet (81 mg total) by mouth daily., Disp: 30 tablet, Rfl: 0    PREVIDENT 5000 BOOSTER PLUS 1.1 % Dental Paste, , Disp: , Rfl:     Multiple Vitamins-Minerals (TAB-A-KEVIN) Oral Tab, Take 1 tablet by mouth daily., Disp: , Rfl:     Immune Globulin, Human, 10 g Intravenous Recon Soln, Inject 0.4 g/kg into the vein. Given for treatment of polymyositis, mixed connective tissue disease, and immune thrombocytopenia purpura monthly at [facility]  center. Every 5 weeks, Disp: 3 each, Rfl: 0    Calcium Citrate-Vitamin D (CITRACAL + D OR), Take 1 tablet by mouth daily., Disp: , Rfl:   ALLERGIES:     Allergies   Allergen Reactions    Empagliflozin OTHER (SEE COMMENTS)     Pancreatitis    \"pancreatitis\" per pt      REVIEW OF SYSTEMS:   This information was obtained from the patient/family and chart.    See HPI for pertinent positives, otherwise 10 pt ROS negative.  HISTORY:   Past medical, surgical, family and social history updated where appropriate.  PHYSICAL EXAM:     Vitals:    08/28/24 0700   BP: 111/66   Pulse: 79   Resp: 16   Temp: 98.1 °F (36.7 °C)       Estimated body mass index is 26.79 kg/m² as calculated from the following:    Height as of 8/6/24: 70.98\".    Weight as of 8/23/24: 192 lb (87.1 kg).   No results found for: \"PGLU\"    Vital signs reviewed.Appears stated age, well groomed.    Constitutional:  Bp wnl for patient. Pulse Regular and wnl for patient. Respirations easy and unlabored. Temperature wnl. Weight normal for height. Appearance neat and clean. Appears in no acute distress. Well nourished and well developed.  Lower extremity:  dp palpable left. Left lower extremity free of varicosities, +edema stable, + hemosideran staining and very atrophic skin. Capillary refill < 3 seconds. Digits are warm. toenails are thin, brittle with adequate length and hygeine. Skin is dry and flaky. no hairgrowth on legs.  Musculoskeletal:  Gait and station stable   Integumentary:  refer to wound characteristics and images   Psychiatric:  Judgment and insight intact. Alert and oriented times 3. No evidence of depression, anxiety, or agitation. Calm, cooperative, and communicative.   EDEMA:   Calf  Point of Measurement - Left Calf: 37     Left Calf from:: Heel  Calf Left cm:: 30.5        Ankle  Point of Measurement - Left Ankle: 13     Left Ankle from:: Heel  Left Ankle cm:: 22              DIAGNOSTICS:     Lab Results   Component Value Date    BUN 31 (H)  08/23/2024    CREATSERUM 1.35 (H) 08/23/2024    ALB 3.9 07/24/2024    TP 5.0 (L) 07/09/2024    A1C 5.4 05/14/2024       WOUND ASSESSMENT:     Wound 08/07/24 #2 Left lateral foot Foot Left;Lateral (Active)   Date First Assessed/Time First Assessed: 08/07/24 1610    Wound Number (Wound Clinic Only): #2 Left lateral foot  Primary Wound Type: Pressure Injury  Location: Foot  Wound Location Orientation: Left;Lateral      Assessments 8/7/2024  4:15 PM 8/28/2024 10:19 AM   Wound Image        Drainage Amount Unable to assess Small   Drainage Description -- Yellow   Treatments Compression Compression   Wound Length (cm) 0.6 cm 0.3 cm   Wound Width (cm) 0.7 cm 0.4 cm   Wound Surface Area (cm^2) 0.42 cm^2 0.12 cm^2   Wound Depth (cm) 0 cm 0.1 cm   Wound Volume (cm^3) 0 cm^3 0.012 cm^3   Margins Well-defined edges Well-defined edges   Non-staged Wound Description Full thickness Full thickness   Shelley-wound Assessment Edema;Blanchable erythema;Dry Edema;Dry   Wound Granulation Tissue -- Firm;Pink   Wound Bed Granulation (%) -- 100 %   Wound Odor None None   Shape Scabbed, unable to view wound bed. --   Tunneling? No No   Undermining? No No   Sinus Tracts? No No   Pressure Injury Stage Unstageable --       Active Orders   Date Order Priority Status Authorizing Provider   08/28/24 1049 Cellular tissue product application Pressure Injury Left;Lateral Foot Routine Active Charisma Morrow APRN   08/28/24 1048 Debridement Pressure Injury Left;Lateral Foot Routine Active Charisma Morrow APRN       Inactive Orders   Date Order Priority Status Authorizing Provider   08/22/24 1410 Debridement Pressure Injury Left;Lateral Foot Routine Completed Charisma Morrow APRN       Compression Wrap 08/14/24 Foot Dorsal;Left (Active)   Placement Date/Time: 08/14/24 1559   Location: Foot  Wound Location Orientation: Dorsal;Left      Assessments 8/14/2024  3:59 PM 8/28/2024 10:47 AM   Response to Treatment Well tolerated Well tolerated   Compression  Layers Multilayer Multilayer   Compression Product Type Unna Boot Unna Boot   Dressing Applied Yes Yes   Compression Wrap Location Toes to Knee Toes to Knee   Compression Wrap Status Dry;Clean Clean;Dry       No associated orders.     PROCEDURE:      This procedure was medically necessary to promote wound healing by removing nonviable tissue, decrease chance of infection, and return the wound to an acute state.  This procedure is medical necessary to nourish the wound bed with extracellular matrix proteins, growth factors, cytokines and other specialty proteins to assist in re-epitheliazation and more rapid healing, decreasing the risk of infection.  See rn px note    ASSESSMENT AND PLAN:    1. Pressure injury of left foot, stage 3 (HCC)    2. Lupus (HCC)    3. Bilateral lower extremity edema        Risks, benefits, and alternatives of current treatment plan discussed in detail.  Questions and concerns addressed. Red flags to RTC or ED reviewed.  Patient (or parent) agrees to plan.      NOTE TO PATIENT: The 21st Century Cures Act makes clinical notes like these available to patients in the interest of transparency. Clinical notes are medical documents used by physicians and care providers to communicate with each other. These documents include medical language and terminology, abbreviations, and treatment information that may sound technical and at times possibly unfamiliar. In addition, at times, the verbiage may appear blunt or direct. These documents are one tool providers use to communicate relevant information and clinical opinions of the care providers in a way that allows common understanding of the clinical context.   I spent  29 minutes with the patient. This time included:    preparing to see the patient (eg, review notes and recent diagnostics),  seeing the patient, obtaining and/or reviewing separately obtained history, performing a medically appropriate examination and/or evaluation, counseling and  educating the patient, documenting in the record.  Bill epifix placement only  DISCHARGE:      Patient Instructions   Please return:   1 week with Charisma     Patient discharge and wound care instructions  Miguel Griffin Lola  8/28/2024          You may shower with protection of the wound (ie a cast cover or similar).     Cleansing/dressing:     In clinic, with each dressing change:    please cleanse the limb, foot, and between toes with soap, water and washcloth.   Dry thoroughly.    Cleanse/soak the wound with VASHE (or other hypochlorous wound cleanser), dab dry.    Apply the following dressings:      RIGHT:your personal compression stocking  LEFT foot:  epifix (stacked)>silicone contact>entire piece of transfer>light 20-30mmhg    Managing your edema:  Avoid prolonged standing in one place. It is better to have your calf muscles moving to pump fluid out of the legs      Elevate leg(s) above the level of the heart when sitting or as much as possible.  Take you diuretics as directed by your physician. Do not skip doses or change doses unless instructed to do so by your physician.  Decrease salt intake, follow recommended 2 grams daily.  Do not get leg(s) with compression wrap wet. If wraps are too tight as indicated by pain, numbness/tingling or discoloration of toes remove wrap completely and call the wound center @ [phone].  Refer to the \"Multi-layer compression bandage application patient information sheet\" given to you in clinic.    Nutrition and blood sugar control:  Focus on the following:  Protein: Meats, beans, eggs, milk and yogurt particularly Greek yogurt), tofu, soy nuts, soy protein products (Follow the protein handout in your welcome folder)  Vitamin C: Citrus fruits and juices, strawberries, tomatoes, tomato juice, peppers, baked potatoes, spinach, broccoli, cauliflower, Berry sprouts, cabbage  Vitamin A: Dark green, leafy vegetables, orange or yellow vegetables, cantaloupe, fortified dairy  products, liver, fortified cereals  Zinc: Fortified cereals, red meats, seafood  Consider supplementing with Damion by [employer]. It can be purchased on amazon, [employer] website, or local pharmacy may be able to order it for you.  (These are essential branch chain amino acids that help with tissue building and wound healing).   When your blood sugar is consistently elevated greater than 180 your body can't heal or fight infection.     Concerns:  Signs of infection may include the following:  Increase in redness  Red \"streaks\" from wound  Increase in swelling  Fever  Unusual odor  Change in the amount of wound drainage     Should you experience any significant changes in your wound(s) or have any questions regarding your home care instructions please contact the [facility] @ [phone] If after regular business hours, please call your family doctor or local emergency room. The treatment plan has been discussed at length between you and your provider. Follow all instructions carefully, it is very important. If you do not follow all instructions you are at risk of your wound not healing, infection, possible loss of limb and even loss of life.       Charisma Morrow FNP-C, CWCN-AP, CFCN, CSWS, WCC, DWC  8/28/2024

## 2024-08-28 ENCOUNTER — OFFICE VISIT (OUTPATIENT)
Dept: WOUND CARE | Facility: HOSPITAL | Age: 79
End: 2024-08-28
Attending: NURSE PRACTITIONER
Payer: MEDICARE

## 2024-08-28 VITALS
SYSTOLIC BLOOD PRESSURE: 111 MMHG | RESPIRATION RATE: 16 BRPM | TEMPERATURE: 98 F | DIASTOLIC BLOOD PRESSURE: 66 MMHG | HEART RATE: 79 BPM

## 2024-08-28 DIAGNOSIS — R60.0 BILATERAL LOWER EXTREMITY EDEMA: ICD-10-CM

## 2024-08-28 DIAGNOSIS — M32.9 LUPUS (HCC): ICD-10-CM

## 2024-08-28 DIAGNOSIS — L89.893 PRESSURE INJURY OF LEFT FOOT, STAGE 3 (HCC): Primary | ICD-10-CM

## 2024-08-28 PROCEDURE — 15275 SKIN SUB GRAFT FACE/NK/HF/G: CPT | Performed by: NURSE PRACTITIONER

## 2024-08-28 NOTE — PROGRESS NOTES
Patient ID: Sterling Davila is a 79 year old male.    Debridement Pressure Injury Left;Lateral Foot   Wound 08/07/24 #2 Left lateral foot Foot Left;Lateral    Performed by: Charisma Morrow APRN  Authorized by: Charisma Morrow APRN      Consent   Consent obtained? verbal  Consent given by: patient    Debridement Details  Performed by: NP  Debridement type: surgical  Level of debridement: subcutaneous tissue  Pain control: lidocaine 4%  Pain control administration type: topical    Pre-debridement measurements  Length (cm): 0.3  Width (cm): 0.4  Depth (cm): 0.1  Surface Area (cm^2): 0.12    Post-debridement measurements  Length (cm): 0.7  Width (cm): 0.6  Depth (cm): 0.1  Percent debrided: 100%  Surface Area (cm^2): 0.42  Area Debrided (cm^2): 0.42  Volume (cm^3): 0.04    Tissue and other material debrided: subcutaneous tissue  Devitalized tissue debrided: biofilm and slough  Instrument(s) utilized: nippers and scissors  Bleeding: small  Hemostasis obtained with: pressure  Procedural pain (0-10): 0  Post-procedural pain: 0   Response to treatment: procedure was tolerated well

## 2024-08-28 NOTE — PROGRESS NOTES
.Weekly Wound Education Note    Teaching Provided To: Patient  Training Topics: Discharge instructions;Dressing;Edema control;Compression;Off-loading  Training Method: Explain/Verbal;Written  Training Response: Patient responds and understands            First application of Epifix to foot wound, covered with contact layer, hydrofera transfer.  Calamine unna boot 30-40mmHg.

## 2024-08-28 NOTE — PATIENT INSTRUCTIONS
Please return:   1 week with Charisma     Patient discharge and wound care instructions  Miguel Carlsoncharbel  8/28/2024          You may shower with protection of the wound (ie a cast cover or similar).     Cleansing/dressing:     In clinic, with each dressing change:    please cleanse the limb, foot, and between toes with soap, water and washcloth.   Dry thoroughly.    Cleanse/soak the wound with VASHE (or other hypochlorous wound cleanser), dab dry.    Apply the following dressings:      RIGHT:your personal compression stocking  LEFT foot:  epifix (stacked)>silicone contact>entire piece of transfer>light 20-30mmhg    Managing your edema:  Avoid prolonged standing in one place. It is better to have your calf muscles moving to pump fluid out of the legs      Elevate leg(s) above the level of the heart when sitting or as much as possible.  Take you diuretics as directed by your physician. Do not skip doses or change doses unless instructed to do so by your physician.  Decrease salt intake, follow recommended 2 grams daily.  Do not get leg(s) with compression wrap wet. If wraps are too tight as indicated by pain, numbness/tingling or discoloration of toes remove wrap completely and call the wound center @ [phone].  Refer to the \"Multi-layer compression bandage application patient information sheet\" given to you in clinic.    Nutrition and blood sugar control:  Focus on the following:  Protein: Meats, beans, eggs, milk and yogurt particularly Greek yogurt), tofu, soy nuts, soy protein products (Follow the protein handout in your welcome folder)  Vitamin C: Citrus fruits and juices, strawberries, tomatoes, tomato juice, peppers, baked potatoes, spinach, broccoli, cauliflower, [place] sprouts, cabbage  Vitamin A: Dark green, leafy vegetables, orange or yellow vegetables, cantaloupe, fortified dairy products, liver, fortified cereals  Zinc: Fortified cereals, red meats, seafood  Consider supplementing with Damion by  [employer]. It can be purchased on amazon, [employer] website, or local pharmacy may be able to order it for you.  (These are essential branch chain amino acids that help with tissue building and wound healing).   When your blood sugar is consistently elevated greater than 180 your body can't heal or fight infection.     Concerns:  Signs of infection may include the following:  Increase in redness  Red \"streaks\" from wound  Increase in swelling  Fever  Unusual odor  Change in the amount of wound drainage     Should you experience any significant changes in your wound(s) or have any questions regarding your home care instructions please contact the wound center [facility] @ [phone] If after regular business hours, please call your family doctor or local emergency room. The treatment plan has been discussed at length between you and your provider. Follow all instructions carefully, it is very important. If you do not follow all instructions you are at risk of your wound not healing, infection, possible loss of limb and even loss of life.

## 2024-08-28 NOTE — PROGRESS NOTES
Patient ID: Sterling Davila is a 79 year old male.    Cellular tissue product application Pressure Injury Left;Lateral Foot    Date/Time: 8/28/2024 10:49 AM    Performed by: Charisma Morrow APRN  Authorized by: Charisma Morrow APRN  Associated wounds:   Wound 08/07/24 #2 Left lateral foot Foot Left;Lateral  Consent:     Consent obtained:  Verbal    Consent given by:  Patient  Anesthesia (see MAR for exact dosages):     Anesthesia method:  Topical application  Procedure details:     Location:  head/hands/feet/digits/genitalia    Product applied:  Epifix    Product lot #:  [id number]    Product expiration:  4/1/2029    Amount used (cm^2):  2    Amount wasted (cm^2):  0    Secured/Fixated: Yes      Secured/Fixated with:  Contact layer, hydrofera transfer  Post-procedure details:     Patient tolerance of procedure:  Tolerated well, no immediate complications

## 2024-09-04 ENCOUNTER — OFFICE VISIT (OUTPATIENT)
Dept: WOUND CARE | Facility: HOSPITAL | Age: 79
End: 2024-09-04
Attending: NURSE PRACTITIONER
Payer: MEDICARE

## 2024-09-04 ENCOUNTER — PATIENT OUTREACH (OUTPATIENT)
Dept: CASE MANAGEMENT | Age: 79
End: 2024-09-04

## 2024-09-04 VITALS
HEART RATE: 76 BPM | RESPIRATION RATE: 14 BRPM | TEMPERATURE: 98 F | SYSTOLIC BLOOD PRESSURE: 107 MMHG | DIASTOLIC BLOOD PRESSURE: 58 MMHG

## 2024-09-04 DIAGNOSIS — M32.9 LUPUS (HCC): ICD-10-CM

## 2024-09-04 DIAGNOSIS — L89.893 PRESSURE INJURY OF LEFT FOOT, STAGE 3 (HCC): Primary | ICD-10-CM

## 2024-09-04 PROCEDURE — 99213 OFFICE O/P EST LOW 20 MIN: CPT | Performed by: NURSE PRACTITIONER

## 2024-09-04 NOTE — PROGRESS NOTES
Opened pt outreach encounter. Pt was visiting wound care, outreach will be attempted at a later date

## 2024-09-04 NOTE — PROGRESS NOTES
.Weekly Wound Education Note    Teaching Provided To: Patient  Training Topics: Discharge instructions;Dressing;Cleasing and general instructions;Skin substitute;Edema control;Compression;Off-loading  Training Method: Explain/Verbal;Written  Training Response: Patient responds and understands;Reinforcement needed         Remainder of Epifix applied to wound, cover with contact layer, hydrofera ready.  Calamine unna boot 30-40mmHg.  Offload as much as possible.

## 2024-09-04 NOTE — PATIENT INSTRUCTIONS
Please return:   1 week with Charisma      Patient discharge and wound care instructions  Miguel Griffin Lola  9/4/2024        You may shower with protection of the wound (ie a cast cover or similar).     Cleansing/dressing:     In clinic, with each dressing change:    please cleanse the limb, foot, and between toes with soap, water and washcloth.   Dry thoroughly.    Cleanse/soak the wound with VASHE (or other hypochlorous wound cleanser), dab dry.    Apply the following dressings:      RIGHT:your personal compression stocking  LEFT foot:  epifix (sample)>silicone contact>entire piece of ready>light 20-30mmhg    Managing your edema:  Avoid prolonged standing in one place. It is better to have your calf muscles moving to pump fluid out of the legs      Elevate leg(s) above the level of the heart when sitting or as much as possible.  Take you diuretics as directed by your physician. Do not skip doses or change doses unless instructed to do so by your physician.  Decrease salt intake, follow recommended 2 grams daily.  Do not get leg(s) with compression wrap wet. If wraps are too tight as indicated by pain, numbness/tingling or discoloration of toes remove wrap completely and call the wound center @ [phone].  Refer to the \"Multi-layer compression bandage application patient information sheet\" given to you in clinic.    Nutrition and blood sugar control:  Focus on the following:  Protein: Meats, beans, eggs, milk and yogurt particularly Greek yogurt), tofu, soy nuts, soy protein products (Follow the protein handout in your welcome folder)  Vitamin C: Citrus fruits and juices, strawberries, tomatoes, tomato juice, peppers, baked potatoes, spinach, broccoli, cauliflower, [place] sprouts, cabbage  Vitamin A: Dark green, leafy vegetables, orange or yellow vegetables, cantaloupe, fortified dairy products, liver, fortified cereals  Zinc: Fortified cereals, red meats, seafood  Consider supplementing with Damion by [employer]  labs. It can be purchased on amazon, [employer] website, or local pharmacy may be able to order it for you.  (These are essential branch chain amino acids that help with tissue building and wound healing).   When your blood sugar is consistently elevated greater than 180 your body can't heal or fight infection.     Concerns:  Signs of infection may include the following:  Increase in redness  Red \"streaks\" from wound  Increase in swelling  Fever  Unusual odor  Change in the amount of wound drainage     Should you experience any significant changes in your wound(s) or have any questions regarding your home care instructions please contact the [facility] @ [phone] If after regular business hours, please call your family doctor or local emergency room. The treatment plan has been discussed at length between you and your provider. Follow all instructions carefully, it is very important. If you do not follow all instructions you are at risk of your wound not healing, infection, possible loss of limb and even loss of life.

## 2024-09-05 ENCOUNTER — APPOINTMENT (OUTPATIENT)
Dept: WOUND CARE | Facility: HOSPITAL | Age: 79
End: 2024-09-05
Attending: NURSE PRACTITIONER
Payer: MEDICARE

## 2024-09-06 DIAGNOSIS — I50.32 CHRONIC DIASTOLIC HEART FAILURE (HCC): Primary | ICD-10-CM

## 2024-09-10 ENCOUNTER — OFFICE VISIT (OUTPATIENT)
Dept: HEMATOLOGY/ONCOLOGY | Facility: HOSPITAL | Age: 79
End: 2024-09-10
Attending: INTERNAL MEDICINE
Payer: MEDICARE

## 2024-09-10 VITALS
BODY MASS INDEX: 26.46 KG/M2 | RESPIRATION RATE: 16 BRPM | TEMPERATURE: 98 F | HEIGHT: 70.98 IN | DIASTOLIC BLOOD PRESSURE: 69 MMHG | OXYGEN SATURATION: 99 % | HEART RATE: 84 BPM | SYSTOLIC BLOOD PRESSURE: 111 MMHG | WEIGHT: 189 LBS

## 2024-09-10 DIAGNOSIS — D69.6 THROMBOCYTOPENIA (HCC): ICD-10-CM

## 2024-09-10 DIAGNOSIS — D69.3 IMMUNE THROMBOCYTOPENIC PURPURA (HCC): Primary | ICD-10-CM

## 2024-09-10 PROCEDURE — 96365 THER/PROPH/DIAG IV INF INIT: CPT

## 2024-09-10 PROCEDURE — 96366 THER/PROPH/DIAG IV INF ADDON: CPT

## 2024-09-10 RX ORDER — DIPHENHYDRAMINE HCL 25 MG
25 CAPSULE ORAL ONCE
OUTPATIENT
Start: 2024-10-15

## 2024-09-10 RX ORDER — DIPHENHYDRAMINE HCL 25 MG
25 CAPSULE ORAL ONCE
Status: COMPLETED | OUTPATIENT
Start: 2024-09-10 | End: 2024-09-10

## 2024-09-10 RX ORDER — ACETAMINOPHEN 325 MG/1
650 TABLET ORAL ONCE
Status: COMPLETED | OUTPATIENT
Start: 2024-09-10 | End: 2024-09-10

## 2024-09-10 RX ORDER — ACETAMINOPHEN 325 MG/1
650 TABLET ORAL ONCE
OUTPATIENT
Start: 2024-10-15

## 2024-09-10 RX ADMIN — DIPHENHYDRAMINE HCL 25 MG: 25 MG CAPSULE ORAL at 10:23:00

## 2024-09-10 RX ADMIN — ACETAMINOPHEN 650 MG: 325 TABLET ORAL at 10:23:00

## 2024-09-10 NOTE — PROGRESS NOTES
CHIEF COMPLAINT:     Chief Complaint   Patient presents with    Wound Care     Patient is here for a wound care follow up. He complains of \"tightness\" to his wrap that wound cause pain at night. He denies current pain.     HPI:   Information obtained from patient and chart  7-18-24 INITIAL: 79 year old male with essential hypertension, chronic heart failure with preserved EF, pulmonary hypertension, atrial fibrillation sp [place], chronic kidney disease, SLE, polymyositis, chronic steroid use, hypothyroidism, LI who was admitted from July 8-12 for RLE redness and swelling.  His albumin level was low and felt to be a contributing factor to his leg swelling and hypotension.  He was given an albumin infusion. His discharge weight was 189. He followed up with heart failure clinic earlier this week and his weight was down 7# since may.  It was noted that he was hypotensive at the visit and he had stated he had increased his bumex to 2 mg over the weekend and taking the full tablet of entresto instead of the recommended 1/2.  At visit he was to stop the entresto and compression wraps were recommended.   His next appointment with heart failure is next wednesday.    Since his hospitalization he has been sleeping in a recliner and therefore not utilizing his cpap.  He states he will try to go back to his bed this weekend.  He denies intermittent claudication or rest pain.  Patient has a weeping area on the right lateral leg.  No s/s of infection or c/o pain.   He is here with his sister.    HPI PRIOR TO AUGUST 2024 SEE INDIVIDUAL PROGRESS NOTES  8-7-24 patient returns.  Patient followed up primary care.  He got ivig yesterday so he has had an increase in edema.  He did get compression stockings however feels they are too \"heavy\" material.  His f/u with heart failure clinic is next week.  He states he knows how to increase his bumex prn for weight gain.  His left foot rubbed on his sandal strap and he has a wound there now.   The right lower extremity wound is improved.    8-14-24 patient returns.  He has continued with chronic cough, got cxr and also f/u with pulmonology. He has heart failure appointment tomorrow. His edema has reduced since last week, he states he has not had a chance to look for new shoes and the darco shoes were hurting his foot, so he returned to the sandals that was the offender that created the wound on left lateral foot. We did pad his sandal with a thick foam after marking the seam that was causing the friction in the area. He states he did find stockings that he thinks will work. I asked him to bring them next week. No s/s of infection or c/o pain.    8-22-24 patient returns.  He did see heart failure clinic and his weight was up 5#, he did get ivp diuretics and was advised that if he did not lose the 5# by Sunday to increase his bumex to 3 mg daily.  He states he has lost 10# since last week, his f/u with heart failure clinic is tomorrow.  Last week we utilized a thick foam to pad the side of his sandal that caused the ulceration on the left foot. He states he is still feeling friction and states he is going to try and go to [employer] today.  Today the right leg is resolved. He did bring stockings and we will transition the right leg to his personal compression.  The left foot is about the same, I debrided and we will attempt to utilize a foam to help offload. Will also run insurance for CTP.    8-28-24 patient returns.  Epifix is approved. Patient saw hf clinic last Friday and and got ivp diuretics-his weight was 192. He did go to [employer] but tried on a bunch of shoes and all of them placed pressure on his lateral foot.  last week we transitioned the right leg to his personal compression, he states he couldn't get it off, so he brought a different type of compression stocking today to try on the right. Offloading has been an issue to the left* foot, we did an offloading donut with a foam dressing last  week which the patient said felt good but it seemed to create undermining. This was removed today and epifix placed. No acute s/s of infection. Patient states he did not sleep well last noc as his spouse with alzheimers was up.     9-4-24 Patient returns.  Epifix #1 was placed last week to the left lateral foot.  We applied compression stocking to the right.  Today, he presents his wound is smaller, no s/s of infection. He is seeing primary today with his spouse as she fell yesterday and fx her clavicle. There is not any s/s of infection or c/o pain. Patient has padded his sandal with a foam dressing. Left over epifix placed today.    9-11-24 patient returns. Spouse is tolerating the sling with reminders from patient.   We have placed epifix to his left lateral foot wound.  wound is dry with adherent scab. Patient does have a new pressure area more proximal on the lateral side.  D/w patient again regarding different footwear. No s/s of infection. Will utilize spandagrip. He has heart failure clinic next week. He received ivig yesterday.   MEDICATIONS:     Current Outpatient Medications:     bumetanide 1 MG Oral Tab, Take 2 tablets (2 mg total) by mouth every morning AND 1 tablet (1 mg total) Every afternoon at 2:00 pm., Disp: , Rfl:     predniSONE 1 MG Oral Tab, Take 2 tablets (2 mg total) by mouth daily with breakfast., Disp: 180 tablet, Rfl: 0    spironolactone 25 MG Oral Tab, Take 0.5 tablets (12.5 mg total) by mouth daily., Disp: 15 tablet, Rfl: 0    fexofenadine 180 MG Oral Tab, Take 1 tablet (180 mg total) by mouth daily as needed., Disp: , Rfl:     levothyroxine 50 MCG Oral Tab, Take 1 tablet (50 mcg total) by mouth before breakfast., Disp: , Rfl:     ipratropium 0.06 % Nasal Solution, 1 spray by Nasal route 2 (two) times daily., Disp: 1 each, Rfl: 5    omeprazole 20 MG Oral Capsule Delayed Release, Take 1 capsule (20 mg total) by mouth 2 (two) times daily., Disp: 180 capsule, Rfl: 1    ALLOPURINOL 300 MG  Oral Tab, TAKE 1 TABLET BY MOUTH EVERY DAY, Disp: 90 tablet, Rfl: 3    predniSONE 5 MG Oral Tab, Take 1 tablet (5 mg total) by mouth daily with breakfast., Disp: 90 tablet, Rfl: 3    metoprolol succinate  MG Oral Tablet 24 Hr, Take 1 tablet (100 mg total) by mouth every morning AND 0.5 tablets (50 mg total) every evening., Disp: 60 tablet, Rfl: 3    aspirin 81 MG Oral Tab EC, Take 1 tablet (81 mg total) by mouth daily., Disp: 30 tablet, Rfl: 0    PREVIDENT 5000 BOOSTER PLUS 1.1 % Dental Paste, , Disp: , Rfl:     Multiple Vitamins-Minerals (TAB-A-KEVIN) Oral Tab, Take 1 tablet by mouth daily., Disp: , Rfl:     Immune Globulin, Human, 10 g Intravenous Recon Soln, Inject 0.4 g/kg into the vein. Given for treatment of polymyositis, mixed connective tissue disease, and immune thrombocytopenia purpura monthly at [facility]. Every 5 weeks, Disp: 3 each, Rfl: 0    Calcium Citrate-Vitamin D (CITRACAL + D OR), Take 1 tablet by mouth daily., Disp: , Rfl:   ALLERGIES:     Allergies   Allergen Reactions    Empagliflozin OTHER (SEE COMMENTS)     Pancreatitis    \"pancreatitis\" per pt      REVIEW OF SYSTEMS:   This information was obtained from the patient/family and chart.    See HPI for pertinent positives, otherwise 10 pt ROS negative.  HISTORY:   Past medical, surgical, family and social history updated where appropriate.  PHYSICAL EXAM:     Vitals:    09/11/24 1023   BP: 124/69   Pulse: 62   Resp: 16   Temp: 97.9 °F (36.6 °C)     Estimated body mass index is 26.37 kg/m² as calculated from the following:    Height as of 9/10/24: 70.98\".    Weight as of 9/10/24: 189 lb (85.7 kg).   No results found for: \"PGLU\"    Vital signs reviewed.Appears stated age, well groomed.    Constitutional:  Bp wnl for patient. Pulse Regular and wnl for patient. Respirations easy and unlabored. Temperature wnl. Weight normal for height. Appearance neat and clean. Appears in no acute distress. Well nourished and well developed.  Lower  extremity:  dp palpable left. Left lower extremity free of varicosities, +edema stable, + hemosideran staining and very atrophic skin. Capillary refill < 3 seconds. Digits are warm. toenails are thin, brittle with adequate length and hygeine. Skin is dry and flaky. no hairgrowth on legs.  Musculoskeletal:  Gait and station stable   Integumentary:  refer to wound characteristics and images   Psychiatric:  Judgment and insight intact. Alert and oriented times 3. No evidence of depression, anxiety, or agitation. Calm, cooperative, and communicative.   EDEMA:   Calf  Point of Measurement - Left Calf: 37     Left Calf from:: Heel  Calf Left cm:: 30.9        Ankle  Point of Measurement - Left Ankle: 13     Left Ankle from:: Heel  Left Ankle cm:: 21.3              DIAGNOSTICS:     Lab Results   Component Value Date    BUN 31 (H) 08/23/2024    CREATSERUM 1.35 (H) 08/23/2024    ALB 3.9 07/24/2024    TP 5.0 (L) 07/09/2024    A1C 5.4 05/14/2024       WOUND ASSESSMENT:     Wound 08/07/24 #2 Left lateral foot Foot Left;Lateral (Active)   Date First Assessed/Time First Assessed: 08/07/24 1610    Wound Number (Wound Clinic Only): #2 Left lateral foot  Primary Wound Type: Pressure Injury  Location: Foot  Wound Location Orientation: Left;Lateral      Assessments 8/7/2024  4:15 PM 9/11/2024 10:29 AM   Wound Image        Drainage Amount Unable to assess Scant   Drainage Description -- Serous;Yellow   Treatments Compression Compression   Wound Length (cm) 0.6 cm 0.4 cm   Wound Width (cm) 0.7 cm 0.3 cm   Wound Surface Area (cm^2) 0.42 cm^2 0.12 cm^2   Wound Depth (cm) 0 cm 0 cm   Wound Volume (cm^3) 0 cm^3 0 cm^3   Margins Well-defined edges Well-defined edges   Non-staged Wound Description Full thickness Full thickness   Shelley-wound Assessment Edema;Blanchable erythema;Dry Dry   Wound Odor None None   Shape Scabbed, unable to view wound bed. Scabbed, unable to view wound bed.   Tunneling? No No   Undermining? No No   Sinus Tracts? No No    Pressure Injury Stage Unstageable Stage 3       Inactive Orders   Date Order Priority Status Authorizing Provider   08/28/24 1049 Cellular tissue product application Pressure Injury Left;Lateral Foot Routine Completed Charisma Morrow APRN   08/28/24 1048 Debridement Pressure Injury Left;Lateral Foot Routine Completed Charisma Morrow, APRN   08/22/24 1410 Debridement Pressure Injury Left;Lateral Foot Routine Completed Charisma Morrow, APRN       Compression Wrap 08/14/24 Foot Dorsal;Left (Active)   Placement Date/Time: 08/14/24 1559   Location: Foot  Wound Location Orientation: Dorsal;Left      Assessments 8/14/2024  3:59 PM 9/4/2024 12:01 PM   Response to Treatment Well tolerated Well tolerated   Compression Layers Multilayer Multilayer   Compression Product Type Unna Boot Unna Boot   Dressing Applied Yes Yes   Compression Wrap Location Toes to Knee Toes to Knee   Compression Wrap Status Dry;Clean Clean;Dry       No associated orders.         ASSESSMENT AND PLAN:    1. Pressure injury of left foot, stage 3 (HCC)    2. Lupus (HCC)            Risks, benefits, and alternatives of current treatment plan discussed in detail.  Questions and concerns addressed. Red flags to RTC or ED reviewed.  Patient (or parent) agrees to plan.      NOTE TO PATIENT: The 21st Century Cures Act makes clinical notes like these available to patients in the interest of transparency. Clinical notes are medical documents used by physicians and care providers to communicate with each other. These documents include medical language and terminology, abbreviations, and treatment information that may sound technical and at times possibly unfamiliar. In addition, at times, the verbiage may appear blunt or direct. These documents are one tool providers use to communicate relevant information and clinical opinions of the care providers in a way that allows common understanding of the clinical context.   I [id number] with the patient. This time  included:    preparing to see the patient (eg, review notes and recent diagnostics),  seeing the patient, obtaining and/or reviewing separately obtained history, performing a medically appropriate examination and/or evaluation, counseling and educating the patient, documenting in the record.    DISCHARGE:      Patient Instructions   Please return:   1 week with Charisma  (any day of the week)    Patient discharge and wound care instructions  Miguel Davila  9/11/2024             You may shower with protection of the wound (ie a cast cover or similar).     Cleansing/dressing:     In clinic, with each dressing change:    please cleanse the limb, foot, and between toes with soap, water and washcloth.   Dry thoroughly.    Cleanse/soak the wound with VASHE (or other hypochlorous wound cleanser), dab dry.    Apply the following dressings:      RIGHT:your personal compression stocking  LEFT foot:  xeroform>bandaid>spandagrip    Managing your edema:  Avoid prolonged standing in one place. It is better to have your calf muscles moving to pump fluid out of the legs      Elevate leg(s) above the level of the heart when sitting or as much as possible.  Take you diuretics as directed by your physician. Do not skip doses or change doses unless instructed to do so by your physician.  Decrease salt intake, follow recommended 2 grams daily.  Do not get leg(s) with compression wrap wet. If wraps are too tight as indicated by pain, numbness/tingling or discoloration of toes remove wrap completely and call the wound center @ [phone].  Refer to the \"Multi-layer compression bandage application patient information sheet\" given to you in clinic.    Nutrition and blood sugar control:  Focus on the following:  Protein: Meats, beans, eggs, milk and yogurt particularly Greek yogurt), tofu, soy nuts, soy protein products (Follow the protein handout in your welcome folder)  Vitamin C: Citrus fruits and juices, strawberries, tomatoes,  tomato juice, peppers, baked potatoes, spinach, broccoli, cauliflower, [place] sprouts, cabbage  Vitamin A: Dark green, leafy vegetables, orange or yellow vegetables, cantaloupe, fortified dairy products, liver, fortified cereals  Zinc: Fortified cereals, red meats, seafood  Consider supplementing with Damion by [employer]. It can be purchased on amazon, [employer] website, or local pharmacy may be able to order it for you.  (These are essential branch chain amino acids that help with tissue building and wound healing).   When your blood sugar is consistently elevated greater than 180 your body can't heal or fight infection.     Concerns:  Signs of infection may include the following:  Increase in redness  Red \"streaks\" from wound  Increase in swelling  Fever  Unusual odor  Change in the amount of wound drainage     Should you experience any significant changes in your wound(s) or have any questions regarding your home care instructions please contact the [facility] center [facility] @ [phone] If after regular business hours, please call your family doctor or local emergency room. The treatment plan has been discussed at length between you and your provider. Follow all instructions carefully, it is very important. If you do not follow all instructions you are at risk of your wound not healing, infection, possible loss of limb and even loss of life.       Charisma Morrow FNP-C, CWCARMEN-AP, CFCN, CSWS, WCC, DWC  9/11/2024

## 2024-09-11 ENCOUNTER — OFFICE VISIT (OUTPATIENT)
Dept: WOUND CARE | Facility: HOSPITAL | Age: 79
End: 2024-09-11
Attending: NURSE PRACTITIONER
Payer: MEDICARE

## 2024-09-11 VITALS
HEART RATE: 62 BPM | DIASTOLIC BLOOD PRESSURE: 69 MMHG | SYSTOLIC BLOOD PRESSURE: 124 MMHG | RESPIRATION RATE: 16 BRPM | TEMPERATURE: 98 F

## 2024-09-11 DIAGNOSIS — L89.893 PRESSURE INJURY OF LEFT FOOT, STAGE 3 (HCC): Primary | ICD-10-CM

## 2024-09-11 DIAGNOSIS — M32.9 LUPUS: ICD-10-CM

## 2024-09-11 PROCEDURE — 99213 OFFICE O/P EST LOW 20 MIN: CPT | Performed by: NURSE PRACTITIONER

## 2024-09-11 NOTE — PROGRESS NOTES
.Weekly Wound Education Note    Teaching Provided To: Patient  Training Topics: Discharge instructions;Dressing;Cleasing and general instructions;Off-loading;Compression;Edema control  Training Method: Explain/Verbal;Written  Training Response: Patient responds and understands            Folded xeroform over wound, covered with bandaid.  Patient may leave the dressing in place, extra provided if needed.  Spanda  size E.  Patient will wear his hiking shoe and will monitor new dark pink, blanchable area.

## 2024-09-11 NOTE — PATIENT INSTRUCTIONS
Please return:   1 week with Charisma  (any day of the week)    Patient discharge and wound care instructions  Miguel Davila  9/11/2024             You may shower with protection of the wound (ie a cast cover or similar).     Cleansing/dressing:     In clinic, with each dressing change:    please cleanse the limb, foot, and between toes with soap, water and washcloth.   Dry thoroughly.    Cleanse/soak the wound with VASHE (or other hypochlorous wound cleanser), dab dry.    Apply the following dressings:      RIGHT:your personal compression stocking  LEFT foot:  xeroform>bandaid>spandagrip    Managing your edema:  Avoid prolonged standing in one place. It is better to have your calf muscles moving to pump fluid out of the legs      Elevate leg(s) above the level of the heart when sitting or as much as possible.  Take you diuretics as directed by your physician. Do not skip doses or change doses unless instructed to do so by your physician.  Decrease salt intake, follow recommended 2 grams daily.  Do not get leg(s) with compression wrap wet. If wraps are too tight as indicated by pain, numbness/tingling or discoloration of toes remove wrap completely and call the wound center @ [phone].  Refer to the \"Multi-layer compression bandage application patient information sheet\" given to you in clinic.    Nutrition and blood sugar control:  Focus on the following:  Protein: Meats, beans, eggs, milk and yogurt particularly Greek yogurt), tofu, soy nuts, soy protein products (Follow the protein handout in your welcome folder)  Vitamin C: Citrus fruits and juices, strawberries, tomatoes, tomato juice, peppers, baked potatoes, spinach, broccoli, cauliflower, [place] sprouts, cabbage  Vitamin A: Dark green, leafy vegetables, orange or yellow vegetables, cantaloupe, fortified dairy products, liver, fortified cereals  Zinc: Fortified cereals, red meats, seafood  Consider supplementing with Damion by [employer]. It can be  purchased on amazon, [employer], or local pharmacy may be able to order it for you.  (These are essential branch chain amino acids that help with tissue building and wound healing).   When your blood sugar is consistently elevated greater than 180 your body can't heal or fight infection.     Concerns:  Signs of infection may include the following:  Increase in redness  Red \"streaks\" from wound  Increase in swelling  Fever  Unusual odor  Change in the amount of wound drainage     Should you experience any significant changes in your wound(s) or have any questions regarding your home care instructions please contact the wound center [facility] @ [phone] If after regular business hours, please call your family doctor or local emergency room. The treatment plan has been discussed at length between you and your provider. Follow all instructions carefully, it is very important. If you do not follow all instructions you are at risk of your wound not healing, infection, possible loss of limb and even loss of life.

## 2024-09-12 ENCOUNTER — PATIENT OUTREACH (OUTPATIENT)
Dept: CASE MANAGEMENT | Age: 79
End: 2024-09-12

## 2024-09-12 DIAGNOSIS — G47.33 OSA (OBSTRUCTIVE SLEEP APNEA): ICD-10-CM

## 2024-09-12 DIAGNOSIS — Z87.19 HISTORY OF PANCREATITIS: ICD-10-CM

## 2024-09-12 DIAGNOSIS — I10 BENIGN ESSENTIAL HYPERTENSION: ICD-10-CM

## 2024-09-12 DIAGNOSIS — N18.31 STAGE 3A CHRONIC KIDNEY DISEASE (HCC): Primary | ICD-10-CM

## 2024-09-12 DIAGNOSIS — J43.2 CENTRILOBULAR EMPHYSEMA (HCC): ICD-10-CM

## 2024-09-12 DIAGNOSIS — K22.70 BARRETT'S ESOPHAGUS WITHOUT DYSPLASIA: ICD-10-CM

## 2024-09-12 DIAGNOSIS — I27.20 PULMONARY HYPERTENSION (HCC): ICD-10-CM

## 2024-09-12 NOTE — PROGRESS NOTES
Spoke to Miguel for Chronic Care Management.      Updates to patient care team/comments: UTD  Patient reported changes in medications: UTD  Med Adherence  Comment: pt taking medications as prescribed     Health Maintenance:   Health Maintenance   Topic Date Due    Influenza Vaccine (1) 10/01/2024    Annual Physical  01/29/2025    Colorectal Cancer Screening  04/12/2027    Annual Depression Screening  Completed    Fall Risk Screening (Annual)  Completed    Pneumococcal Vaccine: 65+ Years  Completed    Zoster Vaccines  Completed       Patient updates/concerns:    Spoke to pt for monthly outreach   Pt continues to follow up with wound care. The wound on right foot is completely healed. The left foot is still being treated and wrapped and pt estimates maybe 1 or 2 more visits to wound care. Pt does not manage wound at home and keeps the wrapping on all week. Pt is not getting in the shower but opting to take sponge bathes to ensure dressing stays dry.    Pt is familiar with the instructions on watching for infections.   Pt has not been able to maintain a regular walking routine for the past few months. He has been getting his exercise with daily activity because he had to wear sandals. Pt swelling has improved to the point that he can fit his feet into a shoe for the first time today and he feels in a few weeks he will be able to be much more active.   Discussed the nutritional advice on wound care AVS and pt states he follows the guidance the majority of the time. Pt does not salt food. He does not count sodium or carbs but he is versed on making smart choices. Pt does not have much of a taste for things that are salty or too sweet and this makes it easy for pt to avoid them.    Pt will be getting flu and Covid shots [facility]   Pt will be seeing dr gar on sept 19th  Goals/Action Plan:    Active goal from previous outreach:   Staying healthy   Patient reported progress towards goals: pt continues to see providers as  needed and follows up with specialists routinely and takes medications as prescribed               - What: exercise           - Where/When/How: pt will be discharged from wound care in the next few weeks, he is finally able to get a shoe on and he has been getting exercise with daily activity making sure to use the stairs often to keep leg strength and balance appropriate  Patient Reported Barriers and Concerns: n/a                   - Plan for overcoming barriers: none    Care Managers Interventions: continue to provide encouragement and education for healthy coping and dx    Future Appointments:   Future Appointments   Date Time Provider Department Center   9/16/2024  2:00 PM EH CARD PHLEBOTOMY RM1 EH CARD LA [facility]   9/16/2024  2:30 PM HEART FAILURE APN 1 EH HF CLIN [facility]   9/18/2024  3:30 PM Charisma Morrow APRN EH Wound [facility]   9/24/2024  5:00 PM HEART FAILURE APN 1 EH HF CLIN [facility]   9/25/2024  3:00 PM Charisma Morrow APRN EH Wound [facility]   10/9/2024  3:30 PM Charisma Morrow APRN EH Wound [facility]   10/15/2024 10:30 AM EH TX RN1 EH CHEMO [facility]   10/16/2024  3:00 PM Charisma Morrow APRN EH Wound [facility]   10/23/2024  3:00 PM Charisma Morrow APRN EH Wound [facility]   10/30/2024  3:00 PM Charisma Morrow APRN EH Wound [facility]   11/27/2024 11:00 AM Taye Farmer MD [id number] [facility]         Next Care Manager Follow Up Date: one month    Reason For Follow Up: review progress and or barriers towards patient's goals.     Time Spent This Encounter Total: 26 min medical record review, telephone communication, care plan updates where needed, education, goals, and action plan recreation/update. Provided acknowledgment and validation to patient's concerns.   Monthly Minute Total including today: 26  Physical assessment, complete health history, and need for CCM established by Eric Simon MD.

## 2024-09-13 NOTE — PROGRESS NOTES
[facility] Progress Note    Miguel Davila is a 79 year old male who presents to clinic for APN assessment and management of chronic diastolic heart failure and is functional class 2-3.     Subjective:  Since his last clinic visit on 8/23; when he was given IV Diuril 250mg, IV Bumex 4mg, Kdur 40meq PO and Venofer dose 2/3, his weight is down 6 lbs. He increased his Bumex to 2mg BID for the last 5 days due to ongoing LE edema. His LE edema and abdominal bloating continues to improve. LE wounds are almost completing healed per patient. He has plans to obtain compression stockings that are easier to apply and remove. Has been following in the wound care clinic.     His wife fell and broke her clavicle. He continues to take great care of her.      He had RHC on 5/20 that shows RA 9 mmHg, mPAP 31 mmhg, wedge 14 mmHg, CI 3.8 and PVR 2.2 wood units. Dr. Jean recommended increasing Entresto with consideration for retrying SGLT2i and increase in MRA at a later date. CardioMEMs sensor was noted to be inaccurate and re-calibration was recommended. He as requested this be postponed since he had cholecystitis and has been slow to recover.       Last Hospitalizations/ED visits:     He was admitted from 6/6-6/10 with acute cholecystitis with localized peritonitis and underwent cholecystectomy 6/7. Treated with antibiotics. Aldactone, Bumex and Entresto were held during hospital stay d.t low BP. He was discharged on Bumex 1 mg, remained off ARNI and MRA.      Admitted again 7/8-7/12 with leg swelling, weeping and abdominal distension. He was felt to be fluid overloaded on admission, but then became hypotensive with diuresis. Albumin level was low and felt to be a contributing factor to leg swelling and hypotension. Subsequently, he was given an albumin infusion followed by diuretics and started to improve. LE US negative for DVT. Treated with antibiotics for cellulitis. GDMT started at  low doses. To take PRN Bumex on discharge. Discharge weight of 189 lbs.     Other Specialty visits:    He saw María Serrato with [facility]. She reminded him to use CPAP nightly for at least 4 hrs with a minimum of 6 hrs of sleep.     He saw Dr. Farmer on 8/27, continued monthly IVIG treatments and 7mg of Prednisone daily.     Review of Systems   Constitutional: Positive for weight loss.   Cardiovascular:  Positive for leg swelling.   Respiratory: Negative.     Gastrointestinal:  Positive for bloating.       HISTORY:  Past Medical History:    A-fib (HCC)    Arrhythmia    atrial fibrillation    Arthritis    Autoimmune disease (HCC)    hepatits, resolved anfd nephritis, resolved    Ramon's esophagus    Bleeding nose    Gums Treated    Blood disorder    thrombocytopenia    Blood in urine    Blurred vision    cataract due to steroids, surgery in June    Calculus of kidney    One time    Cancer (HCC)    skin    Candidiasis of the esophagus    Due to steroid use for Autoimmune disorder     Cataract    Colon polyps    Congestive heart disease (HCC)    Diarrhea, unspecified    Intermittent due to lupus    Diverticulosis of colon    Easy bruising    On and off prednisone for 20 years    Esophageal polyp    Esophageal reflux    Essential hypertension    Fatigue    polymyositis and lupus    Gout    Hammer toe, acquired    Heart palpitations    Afib    Hepatitis    autoimmune induce hepatitis d/t lupus    High blood pressure    IBS (irritable bowel syndrome)    mild d/t lupus    Irregular bowel habits    Leg swelling    Heart failure, probably caused by lupus    Lupus (HCC)    MCTD (mixed connective tissue disease) (HCC)    Obstructive sleep apnea (adult) (pediatric)    LI (obstructive sleep apnea)    AHI 37  Supine AHI 38 non-supine AHI 16 Sao2 Pj 83%     LI (obstructive sleep apnea)    AHI 36 RDI 36 REM AHI 45 Supine AHI 65 non-supine AHI 19 Sao2 Pj 86% CPAP 9cwp    Pain in joints     Personal history of antineoplastic chemotherapy    Pleural effusion    right    Pneumonia due to organism    Polymyositis (HCC)    Presence of other cardiac implants and grafts    watchman filter    Problems with swallowing    dysphagia in 2006    Pulmonary hypertension (HCC)    Raynaud disease    Raynaud disease    Renal disorder    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    Shortness of breath    mainly due to pm or lupus    Sleep apnea    CPAP    Thrombocytopathia (HCC)    Visual impairment    bifocals for reading & computer; distance for driving    Wears glasses      Past Surgical History:   Procedure Laterality Date    Appendectomy  1970    Appendectomy      Cardiac catheterization  09/2019    Cataract Bilateral     Cholecystectomy      Colonoscopy  10/2003 2006 01/2010    x3    Colonoscopy  05/14/2013    Colonoscopy N/A 05/01/2018    Procedure: COLONOSCOPY;  Surgeon: Isaiah Tobar MD;  Location:  ENDOSCOPY    Colonoscopy N/A 04/12/2024    Procedure: COLONOSCOPY;  Surgeon: Gerardo Neves MD;  Location:  ENDOSCOPY    Colonoscopy with biopsy  05/14/2013    [facility]      Hand/finger surgery unlisted  2003    right    Needle biopsy liver      Other  12/2005    needle bipsy of kidney    Other surgical history  2017    watchman filter    Percutaneous  angioplasty  (ptca)- pbp only  2006    right    Rectum surgery procedure unlisted  1946    rectal surgery polypectomy    Skin surgery      MMS BCC R temple 6/24/09    Skin surgery  2007    basal ceell carcinoma    Skin surgery  07/18/2012    BCC-nodular to right superior eyebrow/ MOHS    Skin surgery  07/15/2013    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/MMS    Skin surgery  02/19/2014    BCC-NOD to right superior pinna, MMS, AB    Skin surgery  02/16/2021    BCC- left superior forehead, MMS     Skin surgery  03/07/2022    SCC IN SITU RIGHT ANTIHELIX MMS BY DR GASTELUM    Tonsillectomy  1948    Upper gi endoscopy,exam  10/2003 2006 01/2010 , 5/13    x3      Family History   Problem  Relation Age of Onset    Heart Disease Father         CHF    Gastro-Intestinal Disorder Father         Diverticulosis    Alcohol and Other Disorders Associated Father     Heart Attack Father     Heart Disease Mother         CHF    Breast Cancer Mother     Stroke Mother     Cancer Paternal Grandfather     Heart Attack Paternal Grandmother     Kidney Disease Son     Other (Ramon's Esophagus) Son     Heart Attack Maternal Grandfather       Social History     Socioeconomic History    Marital status:    Occupational History    Occupation: Retired    Tobacco Use    Smoking status: Former     Current packs/day: 0.00     Average packs/day: 0.5 packs/day for 15.0 years (7.5 ttl pk-yrs)     Types: Cigarettes     Start date: 1958     Quit date: 1973     Years since quittin.1    Smokeless tobacco: Never    Tobacco comments:     5 cigarettes daily, stopped smoking in    Vaping Use    Vaping status: Never Used   Substance and Sexual Activity    Alcohol use: Yes     Alcohol/week: 13.0 - 15.0 standard drinks of alcohol     Types: 5 Glasses of wine, 8 - 10 Standard drinks or equivalent per week     Comment: 1 per day wine or liquor    Drug use: Never   Other Topics Concern    Caffeine Concern Yes     Comment: 1 soda per day and decaf coffee    Sleep Concern No    Exercise Yes     Comment: 3-4 times weekly    Seat Belt Yes     Social Determinants of Health     Financial Resource Strain: Low Risk  (2024)    Financial Resource Strain     Difficulty of Paying Living Expenses: Not very hard     Med Affordability: No   Food Insecurity: No Food Insecurity (2024)    Food Insecurity     Food Insecurity: Never true   Transportation Needs: No Transportation Needs (2024)    Transportation Needs     Lack of Transportation: No   Housing Stability: Low Risk  (2024)    Housing Stability     Housing Instability: No           Objective:    Telemetry:          There were no vitals taken for this visit.    Wt  Readings from Last 6 Encounters:   09/10/24 189 lb (85.7 kg)   08/23/24 192 lb (87.1 kg)   08/15/24 201 lb 6.4 oz (91.4 kg)   08/06/24 200 lb 3.2 oz (90.8 kg)   08/05/24 201 lb 9.6 oz (91.4 kg)   07/24/24 196 lb 12.8 oz (89.3 kg)            Recent Results (from the past 24 hour(s))   Basic Metabolic Panel (8)    Collection Time: 09/16/24  2:01 PM   Result Value Ref Range    Glucose 87 70 - 99 mg/dL    Sodium 144 136 - 145 mmol/L    Potassium 3.7 3.5 - 5.1 mmol/L    Chloride 104 98 - 112 mmol/L    CO2 34.0 (H) 21.0 - 32.0 mmol/L    Anion Gap 6 0 - 18 mmol/L    BUN 25 (H) 9 - 23 mg/dL    Creatinine 1.30 0.70 - 1.30 mg/dL    Calcium, Total 9.3 8.7 - 10.4 mg/dL    Calculated Osmolality 302 (H) 275 - 295 mOsm/kg    eGFR-Cr 55 (L) >=60 mL/min/1.73m2    Patient Fasting for BMP? Patient not present    Pro Beta Natriuretic Peptide    Collection Time: 09/16/24  2:01 PM   Result Value Ref Range    Pro-Beta Natriuretic Peptide 5,498 (H) <450 pg/mL   CBC, Platelet; No Differential    Collection Time: 09/16/24  2:01 PM   Result Value Ref Range    WBC 7.2 4.0 - 11.0 x10(3) uL    RBC 3.98 3.80 - 5.80 x10(6)uL    HGB 12.8 (L) 13.0 - 17.5 g/dL    HCT 41.1 39.0 - 53.0 %    .0 (L) 150.0 - 450.0 10(3)uL    .3 (H) 80.0 - 100.0 fL    MCH 32.2 26.0 - 34.0 pg    MCHC 31.1 31.0 - 37.0 g/dL    RDW 17.6 %         Current Outpatient Medications:     bumetanide 1 MG Oral Tab, Take 2 tablets (2 mg total) by mouth every morning AND 2 tablets (2 mg total) Every afternoon at 2:00 pm., Disp: , Rfl:     predniSONE 1 MG Oral Tab, Take 2 tablets (2 mg total) by mouth daily with breakfast., Disp: 180 tablet, Rfl: 0    spironolactone 25 MG Oral Tab, Take 0.5 tablets (12.5 mg total) by mouth daily., Disp: 15 tablet, Rfl: 0    fexofenadine 180 MG Oral Tab, Take 1 tablet (180 mg total) by mouth daily as needed., Disp: , Rfl:     levothyroxine 50 MCG Oral Tab, Take 1 tablet (50 mcg total) by mouth before breakfast., Disp: , Rfl:     ipratropium  0.06 % Nasal Solution, 1 spray by Nasal route 2 (two) times daily., Disp: 1 each, Rfl: 5    omeprazole 20 MG Oral Capsule Delayed Release, Take 1 capsule (20 mg total) by mouth 2 (two) times daily., Disp: 180 capsule, Rfl: 1    ALLOPURINOL 300 MG Oral Tab, TAKE 1 TABLET BY MOUTH EVERY DAY, Disp: 90 tablet, Rfl: 3    predniSONE 5 MG Oral Tab, Take 1 tablet (5 mg total) by mouth daily with breakfast., Disp: 90 tablet, Rfl: 3    metoprolol succinate  MG Oral Tablet 24 Hr, Take 1 tablet (100 mg total) by mouth every morning AND 0.5 tablets (50 mg total) every evening., Disp: 60 tablet, Rfl: 3    aspirin 81 MG Oral Tab EC, Take 1 tablet (81 mg total) by mouth daily., Disp: 30 tablet, Rfl: 0    PREVIDENT 5000 BOOSTER PLUS 1.1 % Dental Paste, , Disp: , Rfl:     Multiple Vitamins-Minerals (TAB-A-KEVIN) Oral Tab, Take 1 tablet by mouth daily., Disp: , Rfl:     Immune Globulin, Human, 10 g Intravenous Recon Soln, Inject 0.4 g/kg into the vein. Given for treatment of polymyositis, mixed connective tissue disease, and immune thrombocytopenia purpura monthly at [facility]. Every 5 weeks, Disp: 3 each, Rfl: 0    Calcium Citrate-Vitamin D (CITRACAL + D OR), Take 1 tablet by mouth daily., Disp: , Rfl:     Exam:   General:         Alert, in no apparent distress  HEENT:           +7cm JVD  Lungs:            Slightly diminished at the bases                     CV:                  irregular  Abdomen:       mildly distended, soft  Extremities:    +1 pitting RLE edema; +1-2 pitting LLE edema  Neuro:             A&O x 3  Skin:                Pink, warm, dry    Education:  Patient instructed regarding sodium restricted diet, low sodium foods, fluid restriction, daily weights, medication regimen, s/s HF exacerbation and when to call APN/clinic.    [x] CardioMEMs  [] ARNi/ACEi/ARB - hypotension  [x] BB  [x] MRA  [] SGLT2i - possible pancreatitis    Assessment:   Chronic diastolic, RV heart failure - LVEF 55-60% and stable  on recent echo, low normal RV function. RHC 5/20 with PCWP 14, RA 9. S/p CardioMEMs; needs re-calibration per recent RHC. Off Jardiance, thought to be due to pancreatitis. MRA discontinued at [facility] for dehydration and near syncope; since have restarted but required reduced dose due to hyperkalemia. During hospital stay GDMT has been adjusted recently due to low BP, NICKI. Last ProBNP 3,583, now up to 5,498. Hypervolemic.  PH, mild combined pre and post capillary - RHC 5/20/24  demonstrates mPAP 31 with wedge14 mmHg, RA 9 mmHg, PVR 2.2 and CI 3.8. RV function low normal.   Mild to Moderate MR/Mild-Mod TR/ Moderate AI - on echo in July.   CKD Stage 3b - baseline creatinine ~ 1.8-2.0 mg/dl, lower than baseline due to volume overload. Follows with Dr. Devine.    Chronic Afib - s/p Watchman. Rates moderately controlled today.   LI - on CPAP. Wears 22% of the nights according to recent download. Has been encouraged to wear more.   Recurrent bilateral pleural effusions - hx of thoracentesis.   Hx Lupus/Mixed CTD/Severe polymyositis - continues IVIG infusions every 5 weeks. Follows with Dr. Farmer. On chronic prednisone. Off Imuran due to pancytopenia.   Non-obstructive CAD  Chronic thrombocytopenia, immune mediated.  today. Follows with Dr. Orr.  Hx Hyponatremia  HERNANDEZ with biopsy proven stage F0-F1 Fibrosis on US 1/2022. Follows with Dr. Veronica, Hepatology.  Mild hyperkalemia - on low dose MRA and ARNI. Has required Veltassa in the past.  Anemia - Hgb 12.8 g/dL; stable. Iron sat 11 and ferritin 42.5. Supplementing with 3 doses of Venofer.  Hypothyroidism - started on Synthroid per Dr. Jean in March, repeat TSH 2.19 and free T4 1.1 in May.   Hypoalbuminemia - last albumin 2.9, improved to 3.9 recently.  Hx Cellulitis - s/p antibiotics. Following in the Wound care clinic.     Plan:   IV Venofer 200 mg dose 3 of 3.    Continue taking increased dose of Bumex at 2mg BID.   Return to clinic in 1 week.  Plan for  eventual RHC and recalibration of CardioMEMs, to discuss at next OV with Dr. Craig.   Follow up with Dr. Craig on 9/19 as planned.   CHF discharge instructions given    I have spent 45 min total time on the day of the encounter, including: preparing to see the patient, obtaining and/or reviewing separately obtained history, performing a medically appropriate examination and/or evaluation, counseling and educating the patient/family caregiver, ordering medication, tests, or procedures, documenting clinical information in Epic, and independently interpreting results and communicating results to the patient/family caregiver     SHEA Fernandez

## 2024-09-16 ENCOUNTER — HOSPITAL ENCOUNTER (OUTPATIENT)
Dept: CARDIOLOGY CLINIC | Facility: HOSPITAL | Age: 79
End: 2024-09-16
Attending: NURSE PRACTITIONER
Payer: MEDICARE

## 2024-09-16 ENCOUNTER — HOSPITAL ENCOUNTER (OUTPATIENT)
Dept: LAB | Facility: HOSPITAL | Age: 79
Discharge: HOME | End: 2024-09-16
Attending: NURSE PRACTITIONER
Payer: MEDICARE

## 2024-09-16 VITALS
HEART RATE: 74 BPM | DIASTOLIC BLOOD PRESSURE: 61 MMHG | RESPIRATION RATE: 20 BRPM | BODY MASS INDEX: 26 KG/M2 | WEIGHT: 185.63 LBS | SYSTOLIC BLOOD PRESSURE: 107 MMHG | OXYGEN SATURATION: 100 %

## 2024-09-16 DIAGNOSIS — I50.33 ACUTE ON CHRONIC DIASTOLIC HEART FAILURE (HCC): Primary | ICD-10-CM

## 2024-09-16 DIAGNOSIS — I50.812 CHRONIC RIGHT-SIDED HEART FAILURE (HCC): ICD-10-CM

## 2024-09-16 DIAGNOSIS — I50.9 CHRONIC CONGESTIVE HEART FAILURE, UNSPECIFIED HEART FAILURE TYPE (HCC): ICD-10-CM

## 2024-09-16 DIAGNOSIS — I48.20 CHRONIC A-FIB (HCC): ICD-10-CM

## 2024-09-16 DIAGNOSIS — N18.32 STAGE 3B CHRONIC KIDNEY DISEASE (HCC): ICD-10-CM

## 2024-09-16 LAB
ANION GAP SERPL CALC-SCNC: 6 MMOL/L (ref 0–18)
BUN BLD-MCNC: 25 MG/DL (ref 9–23)
CALCIUM BLD-MCNC: 9.3 MG/DL (ref 8.7–10.4)
CHLORIDE SERPL-SCNC: 104 MMOL/L (ref 98–112)
CO2 SERPL-SCNC: 34 MMOL/L (ref 21–32)
CREAT BLD-MCNC: 1.3 MG/DL
EGFRCR SERPLBLD CKD-EPI 2021: 55 ML/MIN/1.73M2 (ref 60–?)
ERYTHROCYTE [DISTWIDTH] IN BLOOD BY AUTOMATED COUNT: 17.6 %
GLUCOSE BLD-MCNC: 87 MG/DL (ref 70–99)
HCT VFR BLD AUTO: 41.1 %
HGB BLD-MCNC: 12.8 G/DL
MCH RBC QN AUTO: 32.2 PG (ref 26–34)
MCHC RBC AUTO-ENTMCNC: 31.1 G/DL (ref 31–37)
MCV RBC AUTO: 103.3 FL
NT-PROBNP SERPL-MCNC: 5498 PG/ML (ref ?–450)
OSMOLALITY SERPL CALC.SUM OF ELEC: 302 MOSM/KG (ref 275–295)
PLATELET # BLD AUTO: 107 10(3)UL (ref 150–450)
POTASSIUM SERPL-SCNC: 3.7 MMOL/L (ref 3.5–5.1)
RBC # BLD AUTO: 3.98 X10(6)UL
SODIUM SERPL-SCNC: 144 MMOL/L (ref 136–145)
WBC # BLD AUTO: 7.2 X10(3) UL (ref 4–11)

## 2024-09-16 PROCEDURE — 83880 ASSAY OF NATRIURETIC PEPTIDE: CPT | Performed by: NURSE PRACTITIONER

## 2024-09-16 PROCEDURE — 36415 COLL VENOUS BLD VENIPUNCTURE: CPT | Performed by: NURSE PRACTITIONER

## 2024-09-16 PROCEDURE — 99215 OFFICE O/P EST HI 40 MIN: CPT | Performed by: NURSE PRACTITIONER

## 2024-09-16 PROCEDURE — 80048 BASIC METABOLIC PNL TOTAL CA: CPT | Performed by: NURSE PRACTITIONER

## 2024-09-16 PROCEDURE — 85027 COMPLETE CBC AUTOMATED: CPT | Performed by: NURSE PRACTITIONER

## 2024-09-16 RX ORDER — BUMETANIDE 1 MG/1
TABLET ORAL
COMMUNITY
Start: 2024-09-16

## 2024-09-16 NOTE — PROGRESS NOTES
Patient assessed. No signs or symptoms of shortness of breath, fatigue, chest pain or edema noted. Says he feels great and can't wait to start working out again. Weight stable at 185.6 lbs; down 6 lbs from last visit. Reviewed current list of patient's allergies and medication; updated the Electronic Medical Record.     IV established per policy.  IV Venofer 200mg  given per order over 2-5 minutes, dose 3 of 3. Patient tolerated it well. Discontinued IV and held pressure with gauze.     Labs ordered to assess kidney function and drawn by  Lab. Reviewed follow-up appointment and Heart Failure discharge instructions with patient. Patient verbalized an understanding.     RTC next week.

## 2024-09-16 NOTE — PATIENT INSTRUCTIONS
Heart Failure Discharge Instructions      Activity: Regular exercise and activity is important for your overall health and to help keep your heart strong and functioning as well as possible.   Walk at a slow to moderate pace for 15-20 minutes 3-5 days per week.     Follow these instructions every day to keep yourself in the Green Zone     The Green Zone means you are feeling well and your symptoms are under control                                    Medications  Take your medication every day as instructed  Do not stop taking your medicine or change the amount you are taking without instructions from your doctor or nurse  Do Not take non-steroidal antiinflammatory drugs such as ibuprofen, aleve, advil, or motrin                                    Diet/Fluids  People with heart failure should eat less sodium (salt) and limit fluid. Sodium attracts water and makes the body hold fluid. This extra fluid makes the heart work harder and can worsen the symptoms of heart failure.     Diet    2000 mg sodium daily  Fluid restriction    64 ounces daily  (8 oz. = 1 cup)                                     Body Weight  Weigh yourself every day before breakfast and write your weight down  Use the same scale and wear about the same amount of clothes each time  A sudden weight increase is due to fluid retention rather than fat                                         Activity  Pace your activities to avoid getting overtired  Take rest periods as needed  Elevate your feet to reduce ankle swelling when resting                             Signs of Worsening Heart Failure    You are entering the Yellow Zone - this is a warning zone    Call your doctor or nurse if you have any of these signs or symptoms:  You gain 2 or more pounds overnight or 3-5 pounds in 3-7 days  You have more trouble breathing  You get more tired with regular activity, or are limiting activity because of shortness of breath or fatigue  You are short of breath lying  down, you need more pillows to breathe comfortably,  or wake up during the night short of breath  You urinate less often during the day and more often at night  You have a bloated feeling, upset stomach, loss of appetite, or your clothes are fitting tighter    GO TO THE EMERGENCY DEPARTMENT or CALL 911 IF:    These are signs you are in the RED ZONE - THIS IS AN EMERGENCY  You have tightness or pain in your chest  You are extremely short of breath or can't catch your breath  You cough up frothy pink mucous  You feel confused or can't think clearly  You are traveling and develop symptoms of worsening heart failure     We respect everyone's time and availability. Please be aware that this is not a walk-in clinic and we require appointments in order to facilitate timely care for all patients. We ask you to arrive 30 minutes before your appointment to allow time for you to check-in and have your bloodwork drawn. Please understand if you are late for your appointment, you may be asked to reschedule. If possible, all attempts will be made to accommodate but realize this is no guarantee that this will always be available. We understand there are extenuating circumstances. If you need to cancel or reschedule your appointment, please call the [facility] within 24 hours at [phone].  Thank you for your cooperation, [facility] Staff.    If you are currently [employer] active, starting July 1st 2024, we will be utilizing [employer] messaging ONLY to confirm your appointment.    IF YOU HAVE QUESTIONS REGARDING YOUR BILL, FEEL FREE TO CONTACT [facility] PATIENT ACCOUNTS -[phone]. IF YOU NEED FINANCIAL ASSISTANCE, PLEASE CALL AN [facility] FINANCIAL COUNSELOR -[phone].             [facility]     [phone]

## 2024-09-18 ENCOUNTER — OFFICE VISIT (OUTPATIENT)
Dept: WOUND CARE | Facility: HOSPITAL | Age: 79
End: 2024-09-18
Attending: NURSE PRACTITIONER
Payer: MEDICARE

## 2024-09-18 VITALS
TEMPERATURE: 98 F | DIASTOLIC BLOOD PRESSURE: 69 MMHG | SYSTOLIC BLOOD PRESSURE: 122 MMHG | HEART RATE: 82 BPM | RESPIRATION RATE: 16 BRPM

## 2024-09-18 DIAGNOSIS — R60.0 BILATERAL LOWER EXTREMITY EDEMA: ICD-10-CM

## 2024-09-18 DIAGNOSIS — M32.9 LUPUS: ICD-10-CM

## 2024-09-18 DIAGNOSIS — L89.893 PRESSURE INJURY OF LEFT FOOT, STAGE 3 (HCC): Primary | ICD-10-CM

## 2024-09-18 PROCEDURE — 99213 OFFICE O/P EST LOW 20 MIN: CPT | Performed by: NURSE PRACTITIONER

## 2024-09-18 NOTE — PROGRESS NOTES
CHIEF COMPLAINT:     Chief Complaint   Patient presents with    Wound Care     Patient is here for a wound care follow up. He denies any pain or new wound concerns.     HPI:   Information obtained from patient and chart  7-18-24 INITIAL: 79 year old male with essential hypertension, chronic heart failure with preserved EF, pulmonary hypertension, atrial fibrillation sp [place], chronic kidney disease, SLE, polymyositis, chronic steroid use, hypothyroidism, LI who was admitted from July 8-12 for RLE redness and swelling.  His albumin level was low and felt to be a contributing factor to his leg swelling and hypotension.  He was given an albumin infusion. His discharge weight was 189. He followed up with heart failure clinic earlier this week and his weight was down 7# since may.  It was noted that he was hypotensive at the visit and he had stated he had increased his bumex to 2 mg over the weekend and taking the full tablet of entresto instead of the recommended 1/2.  At visit he was to stop the entresto and compression wraps were recommended.   His next appointment with heart failure is next wednesday.    Since his hospitalization he has been sleeping in a recliner and therefore not utilizing his cpap.  He states he will try to go back to his bed this weekend.  He denies intermittent claudication or rest pain.  Patient has a weeping area on the right lateral leg.  No s/s of infection or c/o pain.   He is here with his sister.    HPI PRIOR TO AUGUST 2024 SEE INDIVIDUAL PROGRESS NOTES  8-7-24 patient returns.  Patient followed up primary care.  He got ivig yesterday so he has had an increase in edema.  He did get compression stockings however feels they are too \"heavy\" material.  His f/u with heart failure clinic is next week.  He states he knows how to increase his bumex prn for weight gain.  His left foot rubbed on his sandal strap and he has a wound there now.  The right lower extremity wound is  improved.    8-14-24 patient returns.  He has continued with chronic cough, got cxr and also f/u with pulmonology. He has heart failure appointment tomorrow. His edema has reduced since last week, he states he has not had a chance to look for new shoes and the darco shoes were hurting his foot, so he returned to the sandals that was the offender that created the wound on left lateral foot. We did pad his sandal with a thick foam after marking the seam that was causing the friction in the area. He states he did find stockings that he thinks will work. I asked him to bring them next week. No s/s of infection or c/o pain.    8-22-24 patient returns.  He did see heart failure clinic and his weight was up 5#, he did get ivp diuretics and was advised that if he did not lose the 5# by Sunday to increase his bumex to 3 mg daily.  He states he has lost 10# since last week, his f/u with heart failure clinic is tomorrow.  Last week we utilized a thick foam to pad the side of his sandal that caused the ulceration on the left foot. He states he is still feeling friction and states he is going to try and go to [employer] today.  Today the right leg is resolved. He did bring stockings and we will transition the right leg to his personal compression.  The left foot is about the same, I debrided and we will attempt to utilize a foam to help offload. Will also run insurance for CTP.    8-28-24 patient returns.  Epifix is approved. Patient saw hf clinic last Friday and and got ivp diuretics-his weight was 192. He did go to [employer] store but tried on a bunch of shoes and all of them placed pressure on his lateral foot.  last week we transitioned the right leg to his personal compression, he states he couldn't get it off, so he brought a different type of compression stocking today to try on the right. Offloading has been an issue to the left* foot, we did an offloading donut with a foam dressing last week which the patient said felt  good but it seemed to create undermining. This was removed today and epifix placed. No acute s/s of infection. Patient states he did not sleep well last noc as his spouse with alzheimers was up.     9-4-24 Patient returns.  Epifix #1 was placed last week to the left lateral foot.  We applied compression stocking to the right.  Today, he presents his wound is smaller, no s/s of infection. He is seeing primary today with his spouse as she fell yesterday and fx her clavicle. There is not any s/s of infection or c/o pain. Patient has padded his sandal with a foam dressing. Left over epifix placed today.    9-11-24 patient returns. Spouse is tolerating the sling with reminders from patient.   We have placed epifix to his left lateral foot wound.  wound is dry with adherent scab. Patient does have a new pressure area more proximal on the lateral side.  D/w patient again regarding different footwear. No s/s of infection. Will utilize spandagrip. He has heart failure clinic next week. He received ivig yesterday.     9-18-24 patient returns.  Last week patient had another pressure area from his sandals on the left lateral foot.  He had increased his bumex last week due to continued lower extremity edema, he followed up with heart failure clinic earlier this week and will be folllowing up next week as well.  Today his edema is increased, his wound is resolve, he is wearing a hiking type shoe (not the sandals).  We discussed continuing to moisturize, wear compression, wear the shoes, return as needed to clinic.  MEDICATIONS:     Current Outpatient Medications:     bumetanide 1 MG Oral Tab, Take 2 tablets (2 mg total) by mouth every morning AND 2 tablets (2 mg total) Every afternoon at 2:00 pm., Disp: , Rfl:     predniSONE 1 MG Oral Tab, Take 2 tablets (2 mg total) by mouth daily with breakfast., Disp: 180 tablet, Rfl: 0    spironolactone 25 MG Oral Tab, Take 0.5 tablets (12.5 mg total) by mouth daily., Disp: 15 tablet, Rfl:  0    fexofenadine 180 MG Oral Tab, Take 1 tablet (180 mg total) by mouth daily as needed., Disp: , Rfl:     levothyroxine 50 MCG Oral Tab, Take 1 tablet (50 mcg total) by mouth before breakfast., Disp: , Rfl:     ipratropium 0.06 % Nasal Solution, 1 spray by Nasal route 2 (two) times daily., Disp: 1 each, Rfl: 5    omeprazole 20 MG Oral Capsule Delayed Release, Take 1 capsule (20 mg total) by mouth 2 (two) times daily., Disp: 180 capsule, Rfl: 1    ALLOPURINOL 300 MG Oral Tab, TAKE 1 TABLET BY MOUTH EVERY DAY, Disp: 90 tablet, Rfl: 3    predniSONE 5 MG Oral Tab, Take 1 tablet (5 mg total) by mouth daily with breakfast., Disp: 90 tablet, Rfl: 3    metoprolol succinate  MG Oral Tablet 24 Hr, Take 1 tablet (100 mg total) by mouth every morning AND 0.5 tablets (50 mg total) every evening., Disp: 60 tablet, Rfl: 3    aspirin 81 MG Oral Tab EC, Take 1 tablet (81 mg total) by mouth daily., Disp: 30 tablet, Rfl: 0    PREVIDENT 5000 BOOSTER PLUS 1.1 % Dental Paste, , Disp: , Rfl:     Multiple Vitamins-Minerals (TAB-A-KEVIN) Oral Tab, Take 1 tablet by mouth daily., Disp: , Rfl:     Immune Globulin, Human, 10 g Intravenous Recon Soln, Inject 0.4 g/kg into the vein. Given for treatment of polymyositis, mixed connective tissue disease, and immune thrombocytopenia purpura monthly at [facility]. Every 5 weeks, Disp: 3 each, Rfl: 0    Calcium Citrate-Vitamin D (CITRACAL + D OR), Take 1 tablet by mouth daily., Disp: , Rfl:   ALLERGIES:     Allergies   Allergen Reactions    Empagliflozin OTHER (SEE COMMENTS)     Pancreatitis    \"pancreatitis\" per pt      REVIEW OF SYSTEMS:   This information was obtained from the patient/family and chart.    See HPI for pertinent positives, otherwise 10 pt ROS negative.  HISTORY:   Past medical, surgical, family and social history updated where appropriate.  PHYSICAL EXAM:     Vitals:    09/18/24 1543   BP: 122/69   Pulse: 82   Resp: 16   Temp: 97.9 °F (36.6 °C)       Estimated body  mass index is 25.9 kg/m² as calculated from the following:    Height as of 9/10/24: 70.98\".    Weight as of 9/16/24: 185 lb 9.6 oz (84.2 kg).   No results found for: \"PGLU\"    Vital signs reviewed.Appears stated age, well groomed.    Constitutional:  Bp wnl for patient. Pulse Regular and wnl for patient. Respirations easy and unlabored. Temperature wnl. Weight normal for height. Appearance neat and clean. Appears in no acute distress. Well nourished and well developed.  Lower extremity:  dp palpable left. Left lower extremity free of varicosities, +edema increased, + hemosideran staining and very atrophic skin. Capillary refill < 3 seconds. Digits are warm. toenails are thin, brittle with adequate length and hygeine. Skin is dry and flaky. no hairgrowth on legs.  Musculoskeletal:  Gait and station stable   Integumentary:  refer to wound characteristics and images   Psychiatric:  Judgment and insight intact. Alert and oriented times 3. No evidence of depression, anxiety, or agitation. Calm, cooperative, and communicative.   EDEMA:   Calf  Point of Measurement - Left Calf: 37     Left Calf from:: Heel  Calf Left cm:: 34        Ankle  Point of Measurement - Left Ankle: 13     Left Ankle from:: Heel  Left Ankle cm:: 24              DIAGNOSTICS:     Lab Results   Component Value Date    BUN 25 (H) 09/16/2024    CREATSERUM 1.30 09/16/2024    ALB 3.9 07/24/2024    TP 5.0 (L) 07/09/2024    A1C 5.4 05/14/2024       WOUND ASSESSMENT:     Wound 08/07/24 #2 Left lateral foot Foot Left;Lateral (Active)   Date First Assessed/Time First Assessed: 08/07/24 1610    Wound Number (Wound Clinic Only): #2 Left lateral foot  Primary Wound Type: Pressure Injury  Location: Foot  Wound Location Orientation: Left;Lateral      Assessments 8/7/2024  4:15 PM 9/18/2024  3:42 PM   Wound Image       Drainage Amount Unable to assess None   Treatments Compression --   Wound Length (cm) 0.6 cm 0 cm   Wound Width (cm) 0.7 cm 0 cm   Wound Surface Area  (cm^2) 0.42 cm^2 0 cm^2   Wound Depth (cm) 0 cm 0 cm   Wound Volume (cm^3) 0 cm^3 0 cm^3   Margins Well-defined edges Well-defined edges   Non-staged Wound Description Full thickness Full thickness   Shelley-wound Assessment Edema;Blanchable erythema;Dry Dry   Wound Bed Epithelium (%) -- 100 %   Wound Odor None None   Shape Scabbed, unable to view wound bed. --   Tunneling? No No   Undermining? No No   Sinus Tracts? No No   Pressure Injury Stage Unstageable Stage 3       Inactive Orders   Date Order Priority Status Authorizing Provider   08/28/24 1049 Cellular tissue product application Pressure Injury Left;Lateral Foot Routine Completed JiCharisma leal, APRN   08/28/24 1048 Debridement Pressure Injury Left;Lateral Foot Routine Completed Charisma Morrow, APRN   08/22/24 1410 Debridement Pressure Injury Left;Lateral Foot Routine Completed Charisma Morrow, APRN       Compression Wrap 08/14/24 Foot Dorsal;Left (Active)   Placement Date/Time: 08/14/24 1551   Location: Foot  Wound Location Orientation: Dorsal;Left      Assessments 8/14/2024  3:59 PM 9/4/2024 12:01 PM   Response to Treatment Well tolerated Well tolerated   Compression Layers Multilayer Multilayer   Compression Product Type Unna Boot Unna Boot   Dressing Applied Yes Yes   Compression Wrap Location Toes to Knee Toes to Knee   Compression Wrap Status Dry;Clean Clean;Dry       No associated orders.         ASSESSMENT AND PLAN:    1. Pressure injury of left foot, stage 3 (HCC)    2. Lupus (HCC)    3. Bilateral lower extremity edema              Risks, benefits, and alternatives of current treatment plan discussed in detail.  Questions and concerns addressed. Red flags to RTC or ED reviewed.  Patient (or parent) agrees to plan.      NOTE TO PATIENT: The 21st Century Cures Act makes clinical notes like these available to patients in the interest of transparency. Clinical notes are medical documents used by physicians and care providers to communicate with each other.  These documents include medical language and terminology, abbreviations, and treatment information that may sound technical and at times possibly unfamiliar. In addition, at times, the verbiage may appear blunt or direct. These documents are one tool providers use to communicate relevant information and clinical opinions of the care providers in a way that allows common understanding of the clinical context.   I [id number] with the patient. This time included:    preparing to see the patient (eg, review notes and recent diagnostics),  seeing the patient, obtaining and/or reviewing separately obtained history, performing a medically appropriate examination and/or evaluation, counseling and educating the patient, documenting in the record.    DISCHARGE:      Patient Instructions   Patient discharge and wound care instructions  Miguel Davila  9/18/2024     Discharge from clinic    Monitor for drainage    Wear compression socks    Moisturize    Return if needed.      Charisma Mrorow FNP-C, CWCN-AP, CFCN, CSWS, WCC, DWC  9/18/2024

## 2024-09-18 NOTE — PROGRESS NOTES
.Weekly Wound Education Note    Teaching Provided To: Patient  Training Topics: Skin care;Discharge instructions  Training Method: Explain/Verbal;Written  Training Response: Patient responds and understands        Notes: Per provider, wound is healed. Pt is to moisturize area, ok to cover with bandaid. Pt discharged from clinic at this time.

## 2024-09-18 NOTE — PATIENT INSTRUCTIONS
Patient discharge and wound care instructions  Miguel Davila  9/18/2024     Discharge from clinic    Monitor for drainage    Wear compression socks    Moisturize    Return if needed.

## 2024-09-19 DIAGNOSIS — I50.32 CHRONIC DIASTOLIC HEART FAILURE (HCC): Primary | ICD-10-CM

## 2024-09-25 ENCOUNTER — APPOINTMENT (OUTPATIENT)
Dept: WOUND CARE | Facility: HOSPITAL | Age: 79
End: 2024-09-25
Attending: NURSE PRACTITIONER
Payer: MEDICARE

## 2024-09-26 NOTE — PROGRESS NOTES
[facility] Progress Note    Miguel Davila is a 79 year old male who presents to clinic for APN assessment and management of chronic diastolic heart failure and is functional class 2-3.     Subjective:  Since his last clinic visit on 9/16; when no changes were made;     He saw Dr. Craig 9/18; no changes made. He will see him in 4 months. He didn't feel strongly about recalibration of CardioMEMs since he feels great. He will see him in January.     He was discharged from the wound care clinic 9/18.     His weight is up a couple lbs. He was down 6 lbs last visit. Right leg has minimal swelling in the AM that progresses throughout the day. Left leg swelling is overall improved. Abdominal bloating has improved.  LE wounds are healed. He has more energy lately. He has not been wearing CPAP and was encouraged too.      His wife fell and broke her clavicle but is doing better and was present for the visit today. He continues to take great care of her.      He had RHC on 5/20 that shows RA 9 mmHg, mPAP 31 mmhg, wedge 14 mmHg, CI 3.8 and PVR 2.2 wood units. Dr. Jean recommended increasing Entresto with consideration for retrying SGLT2i and increase in MRA at a later date. CardioMEMs sensor was noted to be inaccurate and re-calibration was recommended. He as requested this be postponed since he had cholecystitis and has been slow to recover.       Last Hospitalizations/ED visits:     He was admitted from 6/6-6/10 with acute cholecystitis with localized peritonitis and underwent cholecystectomy 6/7. Treated with antibiotics. Aldactone, Bumex and Entresto were held during hospital stay d.t low BP. He was discharged on Bumex 1 mg, remained off ARNI and MRA.      Admitted again 7/8-7/12 with leg swelling, weeping and abdominal distension. He was felt to be fluid overloaded on admission, but then became hypotensive with diuresis. Albumin level was low and felt to be a contributing  factor to leg swelling and hypotension. Subsequently, he was given an albumin infusion followed by diuretics and started to improve. LE US negative for DVT. Treated with antibiotics for cellulitis. GDMT started at low doses. To take PRN Bumex on discharge. Discharge weight of 189 lbs.     Review of Systems   Constitutional: Positive for weight loss.   Cardiovascular:  Positive for leg swelling (improved, L>R).   Respiratory: Negative.     Gastrointestinal:  Negative for bloating.       HISTORY:  Past Medical History:    A-fib (HCC)    Arrhythmia    atrial fibrillation    Arthritis    Autoimmune disease (HCC)    hepatits, resolved anfd nephritis, resolved    Ramon's esophagus    Bleeding nose    Gums Treated    Blood disorder    thrombocytopenia    Blood in urine    Blurred vision    cataract due to steroids, surgery in June    Calculus of kidney    One time    Cancer (HCC)    skin    Candidiasis of the esophagus    Due to steroid use for Autoimmune disorder     Cataract    Colon polyps    Congestive heart disease (HCC)    Diarrhea, unspecified    Intermittent due to lupus    Diverticulosis of colon    Easy bruising    On and off prednisone for 20 years    Esophageal polyp    Esophageal reflux    Essential hypertension    Fatigue    polymyositis and lupus    Gout    Hammer toe, acquired    Heart palpitations    Afib    Hepatitis    autoimmune induce hepatitis d/t lupus    High blood pressure    IBS (irritable bowel syndrome)    mild d/t lupus    Irregular bowel habits    Leg swelling    Heart failure, probably caused by lupus    Lupus (HCC)    MCTD (mixed connective tissue disease) (HCC)    Obstructive sleep apnea (adult) (pediatric)    LI (obstructive sleep apnea)    AHI 37  Supine AHI 38 non-supine AHI 16 Sao2 Pj 83%     LI (obstructive sleep apnea)    AHI 36 RDI 36 REM AHI 45 Supine AHI 65 non-supine AHI 19 Sao2 Pj 86% CPAP 9cwp    Pain in joints    Personal history of antineoplastic chemotherapy     Pleural effusion    right    Pneumonia due to organism    Polymyositis (HCC)    Presence of other cardiac implants and grafts    watchman filter    Problems with swallowing    dysphagia in 2006    Pulmonary hypertension (HCC)    Raynaud disease    Raynaud disease    Renal disorder    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    Shortness of breath    mainly due to pm or lupus    Sleep apnea    CPAP    Thrombocytopathia (HCC)    Visual impairment    bifocals for reading & computer; distance for driving    Wears glasses      Past Surgical History:   Procedure Laterality Date    Appendectomy  1970    Appendectomy      Cardiac catheterization  09/2019    Cataract Bilateral     Cholecystectomy      Colonoscopy  10/2003 2006 01/2010    x3    Colonoscopy  05/14/2013    Colonoscopy N/A 05/01/2018    Procedure: COLONOSCOPY;  Surgeon: Isaiah Tobar MD;  Location:  ENDOSCOPY    Colonoscopy N/A 04/12/2024    Procedure: COLONOSCOPY;  Surgeon: Gerardo Neves MD;  Location:  ENDOSCOPY    Colonoscopy with biopsy  05/14/2013    Egd      Hand/finger surgery unlisted  2003    right    Needle biopsy liver      Other  12/2005    needle bipsy of kidney    Other surgical history  2017    watchman filter    Percutaneous  angioplasty  (ptca)- pbp only  2006    right    Rectum surgery procedure unlisted  1946    rectal surgery polypectomy    Skin surgery      MMS BCC R temple 6/24/09    Skin surgery  2007    basal ceell carcinoma    Skin surgery  07/18/2012    BCC-nodular to right superior eyebrow/ MOHS    Skin surgery  07/15/2013    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/MMS    Skin surgery  02/19/2014    BCC-NOD to right superior pinna, MMS, AB    Skin surgery  02/16/2021    BCC- left superior forehead, MMS     Skin surgery  03/07/2022    SCC IN SITU RIGHT ANTIHELIX MMS BY DR GASTELUM    Tonsillectomy  1948    Upper gi endoscopy,exam  10/2003 2006 01/2010 , 5/13    x3      Family History   Problem Relation Age of Onset    Heart Disease Father         CHF     Gastro-Intestinal Disorder Father         Diverticulosis    Alcohol and Other Disorders Associated Father     Heart Attack Father     Heart Disease Mother         CHF    Breast Cancer Mother     Stroke Mother     Cancer Paternal Grandfather     Heart Attack Paternal Grandmother     Kidney Disease Son     Other (Ramon's Esophagus) Son     Heart Attack Maternal Grandfather       Social History     Socioeconomic History    Marital status:    Occupational History    Occupation: Retired    Tobacco Use    Smoking status: Former     Current packs/day: 0.00     Average packs/day: 0.5 packs/day for 15.0 years (7.5 ttl pk-yrs)     Types: Cigarettes     Start date: 1958     Quit date: 1973     Years since quittin.2    Smokeless tobacco: Never    Tobacco comments:     5 cigarettes daily, stopped smoking in    Vaping Use    Vaping status: Never Used   Substance and Sexual Activity    Alcohol use: Yes     Alcohol/week: 13.0 - 15.0 standard drinks of alcohol     Types: 5 Glasses of wine, 8 - 10 Standard drinks or equivalent per week     Comment: 1 per day wine or liquor    Drug use: Never   Other Topics Concern    Caffeine Concern Yes     Comment: 1 soda per day and decaf coffee    Sleep Concern No    Exercise Yes     Comment: 3-4 times weekly    Seat Belt Yes     Social Determinants of Health     Financial Resource Strain: Low Risk  (2024)    Financial Resource Strain     Difficulty of Paying Living Expenses: Not very hard     Med Affordability: No   Food Insecurity: No Food Insecurity (2024)    Food Insecurity     Food Insecurity: Never true   Transportation Needs: No Transportation Needs (2024)    Transportation Needs     Lack of Transportation: No   Housing Stability: Low Risk  (2024)    Housing Stability     Housing Instability: No           Objective:    Telemetry:     Cardiac Rhythm: Atrial fibrillation    /63   Pulse 62   Resp 20   Wt 187 lb 6.4 oz (85 kg)   SpO2 98%    BMI 26.15 kg/m²     Wt Readings from Last 6 Encounters:   09/27/24 187 lb 6.4 oz (85 kg)   09/16/24 185 lb 9.6 oz (84.2 kg)   09/10/24 189 lb (85.7 kg)   08/23/24 192 lb (87.1 kg)   08/15/24 201 lb 6.4 oz (91.4 kg)   08/06/24 200 lb 3.2 oz (90.8 kg)            Recent Results (from the past 24 hour(s))   Basic Metabolic Panel (8)    Collection Time: 09/27/24  3:25 PM   Result Value Ref Range    Glucose 86 70 - 99 mg/dL    Sodium 142 136 - 145 mmol/L    Potassium 3.4 (L) 3.5 - 5.1 mmol/L    Chloride 105 98 - 112 mmol/L    CO2 31.0 21.0 - 32.0 mmol/L    Anion Gap 6 0 - 18 mmol/L    BUN 26 (H) 9 - 23 mg/dL    Creatinine 1.29 0.70 - 1.30 mg/dL    Calcium, Total 9.4 8.7 - 10.4 mg/dL    Calculated Osmolality 298 (H) 275 - 295 mOsm/kg    eGFR-Cr 56 (L) >=60 mL/min/1.73m2    Patient Fasting for BMP? No            Current Outpatient Medications:     spironolactone 25 MG Oral Tab, Take 1 tablet (25 mg total) by mouth daily., Disp: , Rfl:     bumetanide 1 MG Oral Tab, Take 2 tablets (2 mg total) by mouth every morning AND 2 tablets (2 mg total) Every afternoon at 2:00 pm., Disp: , Rfl:     predniSONE 1 MG Oral Tab, Take 2 tablets (2 mg total) by mouth daily with breakfast., Disp: 180 tablet, Rfl: 0    fexofenadine 180 MG Oral Tab, Take 1 tablet (180 mg total) by mouth daily as needed., Disp: , Rfl:     levothyroxine 50 MCG Oral Tab, Take 1 tablet (50 mcg total) by mouth before breakfast., Disp: , Rfl:     ipratropium 0.06 % Nasal Solution, 1 spray by Nasal route 2 (two) times daily., Disp: 1 each, Rfl: 5    omeprazole 20 MG Oral Capsule Delayed Release, Take 1 capsule (20 mg total) by mouth 2 (two) times daily., Disp: 180 capsule, Rfl: 1    ALLOPURINOL 300 MG Oral Tab, TAKE 1 TABLET BY MOUTH EVERY DAY, Disp: 90 tablet, Rfl: 3    predniSONE 5 MG Oral Tab, Take 1 tablet (5 mg total) by mouth daily with breakfast., Disp: 90 tablet, Rfl: 3    metoprolol succinate  MG Oral Tablet 24 Hr, Take 1 tablet (100 mg total) by mouth  every morning AND 0.5 tablets (50 mg total) every evening., Disp: 60 tablet, Rfl: 3    aspirin 81 MG Oral Tab EC, Take 1 tablet (81 mg total) by mouth daily., Disp: 30 tablet, Rfl: 0    PREVIDENT 5000 BOOSTER PLUS 1.1 % Dental Paste, , Disp: , Rfl:     Multiple Vitamins-Minerals (TAB-A-KEVIN) Oral Tab, Take 1 tablet by mouth daily., Disp: , Rfl:     Immune Globulin, Human, 10 g Intravenous Recon Soln, Inject 0.4 g/kg into the vein. Given for treatment of polymyositis, mixed connective tissue disease, and immune thrombocytopenia purpura monthly at [facility]. Every 5 weeks, Disp: 3 each, Rfl: 0    Calcium Citrate-Vitamin D (CITRACAL + D OR), Take 1 tablet by mouth daily., Disp: , Rfl:     Exam:   General:         Alert, in no apparent distress  HEENT:           +8cm JVD  Lungs:            Slightly diminished at the bases                     CV:                  irregular  Abdomen:       mildly distended, soft  Extremities:    +1 pitting RLE edema; +2 pitting LLE edema  Neuro:             A&O x 3  Skin:                Pink, warm, dry    Education:  Patient instructed regarding sodium restricted diet, low sodium foods, fluid restriction, daily weights, medication regimen, s/s HF exacerbation and when to call APN/clinic.    [x] CardioMEMs  [] ARNi/ACEi/ARB - hypotension  [x] BB  [x] MRA  [] SGLT2i - possible pancreatitis    Assessment:   Chronic diastolic, RV heart failure - LVEF 55-60% and stable on echo 7/2024, low normal RV function. RHC 5/2024 with PCWP 14, RA 9. S/p CardioMEMs; needs re-calibration per recent RHC. Off Jardiance, thought to be due to pancreatitis. MRA discontinued at [facility] for dehydration and near syncope; since have restarted but required reduced dose due to hyperkalemia. During hospital stay GDMT has been adjusted recently due to low BP, NICKI. Last ProBNP up to 5,498 from 3,583. Hypervolemic.   PH, mild combined pre and post capillary - RHC 5/20/24  demonstrates mPAP 31 with wedge14 mmHg,  RA 9 mmHg, PVR 2.2 and CI 3.8. RV function low normal.   Mild to Moderate MR/Mild-Mod TR/ Moderate AI - on echo in July.   CKD Stage 3b - prior baseline creatinine ~ 1.8-2.0 mg/dl,  below baseline but still fluid overloaded.  Follows with Dr. Devine.    Chronic Afib - s/p Watchman. Rates controlled.   LI - on CPAP. Wears 22% of the nights according to recent download. Endorsed not wearing at all today. Was encouraged to wear more   Recurrent bilateral pleural effusions - hx of thoracentesis.   Hx Lupus/Mixed CTD/Severe polymyositis - continues IVIG infusions every 5 weeks. Follows with Dr. Farmer. On chronic prednisone. Off Imuran due to pancytopenia.   Non-obstructive CAD  Chronic thrombocytopenia, immune mediated. Last  today. Follows with Dr. Orr.  Hx Hyponatremia, will follow with increase in MRA.   HERNANDEZ with biopsy proven stage F0-F1 Fibrosis on US 1/2022. Follows with Dr. Veronica, Hepatology.  Mild hyperkalemia - on low dose MRA and ARNI. Has required Veltassa in the past.  Anemia - last Hgb 12.8 g/dL; stable. Iron sat 11 and ferritin 42.5. Supplemented with 3 doses of Venofer, last dose 9/16.   Hypothyroidism - started on Synthroid per Dr. Jean in March, repeat TSH 2.19 and free T4 1.1 in May.   Hypoalbuminemia - last albumin improved to 3.9.  Hx Cellulitis - s/p antibiotics. Following in the Wound care clinic.     Plan:     Continue Bumex 2 mg BID.   Increase Spironolactone from 12.5 mg to 25 mg daily.   Kdur 20 meq PO x1 given in the clinic today.   Return to clinic in 2 weeks.  Encouraged to wear CPAP more.   To consider RHC and recalibration of CardioMEMs with worsening fluid overload.   Follow up with Dr. Craig in January.   CHF discharge instructions given    I have spent 45 min total time on the day of the encounter, including: preparing to see the patient, obtaining and/or reviewing separately obtained history, performing a medically appropriate examination and/or evaluation,  counseling and educating the patient/family caregiver, ordering medication, tests, or procedures, documenting clinical information in Epic, and independently interpreting results and communicating results to the patient/family caregiver     Tasha Barth NP

## 2024-09-27 ENCOUNTER — HOSPITAL ENCOUNTER (OUTPATIENT)
Dept: LAB | Facility: HOSPITAL | Age: 79
Discharge: HOME | End: 2024-09-27
Attending: NURSE PRACTITIONER
Payer: MEDICARE

## 2024-09-27 ENCOUNTER — HOSPITAL ENCOUNTER (OUTPATIENT)
Dept: CARDIOLOGY CLINIC | Facility: HOSPITAL | Age: 79
Discharge: HOME | End: 2024-09-27
Attending: NURSE PRACTITIONER
Payer: MEDICARE

## 2024-09-27 VITALS
BODY MASS INDEX: 26 KG/M2 | WEIGHT: 187.38 LBS | RESPIRATION RATE: 20 BRPM | OXYGEN SATURATION: 98 % | DIASTOLIC BLOOD PRESSURE: 63 MMHG | SYSTOLIC BLOOD PRESSURE: 104 MMHG | HEART RATE: 62 BPM

## 2024-09-27 DIAGNOSIS — N18.31 STAGE 3A CHRONIC KIDNEY DISEASE (HCC): ICD-10-CM

## 2024-09-27 DIAGNOSIS — I50.32 CHRONIC DIASTOLIC HEART FAILURE (HCC): ICD-10-CM

## 2024-09-27 DIAGNOSIS — I50.33 ACUTE ON CHRONIC DIASTOLIC HEART FAILURE (HCC): ICD-10-CM

## 2024-09-27 DIAGNOSIS — E87.6 HYPOKALEMIA: Primary | ICD-10-CM

## 2024-09-27 DIAGNOSIS — I48.19 PERSISTENT ATRIAL FIBRILLATION (HCC): ICD-10-CM

## 2024-09-27 DIAGNOSIS — G47.33 OSA (OBSTRUCTIVE SLEEP APNEA): ICD-10-CM

## 2024-09-27 LAB
ANION GAP SERPL CALC-SCNC: 6 MMOL/L (ref 0–18)
BUN BLD-MCNC: 26 MG/DL (ref 9–23)
CALCIUM BLD-MCNC: 9.4 MG/DL (ref 8.7–10.4)
CHLORIDE SERPL-SCNC: 105 MMOL/L (ref 98–112)
CO2 SERPL-SCNC: 31 MMOL/L (ref 21–32)
CREAT BLD-MCNC: 1.29 MG/DL
EGFRCR SERPLBLD CKD-EPI 2021: 56 ML/MIN/1.73M2 (ref 60–?)
FASTING STATUS PATIENT QL REPORTED: NO
GLUCOSE BLD-MCNC: 86 MG/DL (ref 70–99)
OSMOLALITY SERPL CALC.SUM OF ELEC: 298 MOSM/KG (ref 275–295)
POTASSIUM SERPL-SCNC: 3.4 MMOL/L (ref 3.5–5.1)
SODIUM SERPL-SCNC: 142 MMOL/L (ref 136–145)

## 2024-09-27 PROCEDURE — 99215 OFFICE O/P EST HI 40 MIN: CPT | Performed by: NURSE PRACTITIONER

## 2024-09-27 PROCEDURE — 80048 BASIC METABOLIC PNL TOTAL CA: CPT | Performed by: NURSE PRACTITIONER

## 2024-09-27 PROCEDURE — 36415 COLL VENOUS BLD VENIPUNCTURE: CPT | Performed by: NURSE PRACTITIONER

## 2024-09-27 RX ORDER — POTASSIUM CHLORIDE 1500 MG/1
60 TABLET, EXTENDED RELEASE ORAL ONCE
Status: DISCONTINUED | OUTPATIENT
Start: 2024-09-27 | End: 2024-09-27

## 2024-09-27 RX ORDER — POTASSIUM CHLORIDE 1500 MG/1
20 TABLET, EXTENDED RELEASE ORAL ONCE
Status: COMPLETED | OUTPATIENT
Start: 2024-09-27 | End: 2024-09-27

## 2024-09-27 RX ORDER — SPIRONOLACTONE 25 MG/1
25 TABLET ORAL DAILY
COMMUNITY
Start: 2024-09-27

## 2024-09-27 RX ADMIN — POTASSIUM CHLORIDE 20 MEQ: 1500 TABLET, EXTENDED RELEASE ORAL at 16:41:00

## 2024-09-27 NOTE — PROGRESS NOTES
Patient assessed. Patient complaining of lower extremity edema with left>right. Patient denies issues with shortness of breath and bloating. Weight down 2 lbs at 187.4 lbs. APN notified of all the above information. Labs ordered and drawn by  Lab. Reviewed allergies and list of current medications with patient and updated it in the Electronic Medical Record.     Serum potassium 3.4 today. PO 20 meq potassium given. Patient tolerated it well. Educated patient on low sodium diet and food choices, fluid restriction of 2 liters, and daily weights. Reviewed follow-up appointments and discharge Heart Failure instructions with patient. Patient verbalized an understanding. Patient to return to the clinic in 2 weeks.

## 2024-09-27 NOTE — PATIENT INSTRUCTIONS
Heart Failure Discharge Instructions    Increase Spironolactone to 25 mg daily from 12.5 mg daily.   Start wearing CPAP.     Activity: Regular exercise and activity is important for your overall health and to help keep your heart strong and functioning as well as possible.   Walk at a slow to moderate pace for 15-20 minutes 3-5 days per week.     Follow these instructions every day to keep yourself in the Green Zone     The Green Zone means you are feeling well and your symptoms are under control                                    Medications  Take your medication every day as instructed  Do not stop taking your medicine or change the amount you are taking without instructions from your doctor or nurse  Do Not take non-steroidal antiinflammatory drugs such as ibuprofen, aleve, advil, or motrin                                    Diet/Fluids  People with heart failure should eat less sodium (salt) and limit fluid. Sodium attracts water and makes the body hold fluid. This extra fluid makes the heart work harder and can worsen the symptoms of heart failure.     Diet    2000 mg sodium daily  Fluid restriction    64 ounces daily  (8 oz. = 1 cup)                                     Body Weight  Weigh yourself every day before breakfast and write your weight down  Use the same scale and wear about the same amount of clothes each time  A sudden weight increase is due to fluid retention rather than fat                                         Activity  Pace your activities to avoid getting overtired  Take rest periods as needed  Elevate your feet to reduce ankle swelling when resting                             Signs of Worsening Heart Failure    You are entering the Yellow Zone - this is a warning zone    Call your doctor or nurse if you have any of these signs or symptoms:  You gain 2 or more pounds overnight or 3-5 pounds in 3-7 days  You have more trouble breathing  You get more tired with regular activity, or are limiting  activity because of shortness of breath or fatigue  You are short of breath lying down, you need more pillows to breathe comfortably,  or wake up during the night short of breath  You urinate less often during the day and more often at night  You have a bloated feeling, upset stomach, loss of appetite, or your clothes are fitting tighter    GO TO THE EMERGENCY DEPARTMENT or CALL 911 IF:    These are signs you are in the RED ZONE - THIS IS AN EMERGENCY  You have tightness or pain in your chest  You are extremely short of breath or can't catch your breath  You cough up frothy pink mucous  You feel confused or can't think clearly  You are traveling and develop symptoms of worsening heart failure     We respect everyone's time and availability. Please be aware that this is not a walk-in clinic and we require appointments in order to facilitate timely care for all patients. We ask you to arrive 30 minutes before your appointment to allow time for you to check-in and have your bloodwork drawn. Please understand if you are late for your appointment, you may be asked to reschedule. If possible, all attempts will be made to accommodate but realize this is no guarantee that this will always be available. We understand there are extenuating circumstances. If you need to cancel or reschedule your appointment, please call the [facility] within 24 hours at [phone].  Thank you for your cooperation, [facility] Staff.    If you are currently [employer] active, starting July 1st 2024, we will be utilizing [employer] messaging ONLY to confirm your appointment.    IF YOU HAVE QUESTIONS REGARDING YOUR BILL, FEEL FREE TO CONTACT [facility] PATIENT ACCOUNTS -[phone]. IF YOU NEED FINANCIAL ASSISTANCE, PLEASE CALL AN [facility] FINANCIAL COUNSELOR -[phone].             [facility]     [phone]

## 2024-10-09 ENCOUNTER — APPOINTMENT (OUTPATIENT)
Dept: WOUND CARE | Facility: HOSPITAL | Age: 79
End: 2024-10-09
Attending: NURSE PRACTITIONER
Payer: MEDICARE

## 2024-10-09 DIAGNOSIS — I50.32 CHRONIC HEART FAILURE WITH PRESERVED EJECTION FRACTION (HFPEF) (HCC): Primary | ICD-10-CM

## 2024-10-14 ENCOUNTER — HOSPITAL ENCOUNTER (OUTPATIENT)
Dept: CARDIOLOGY CLINIC | Facility: HOSPITAL | Age: 79
End: 2024-10-14
Attending: NURSE PRACTITIONER
Payer: MEDICARE

## 2024-10-14 ENCOUNTER — HOSPITAL ENCOUNTER (OUTPATIENT)
Dept: LAB | Facility: HOSPITAL | Age: 79
Discharge: HOME | End: 2024-10-14
Payer: MEDICARE

## 2024-10-14 VITALS
SYSTOLIC BLOOD PRESSURE: 102 MMHG | OXYGEN SATURATION: 100 % | HEART RATE: 77 BPM | WEIGHT: 185 LBS | RESPIRATION RATE: 16 BRPM | BODY MASS INDEX: 26 KG/M2 | DIASTOLIC BLOOD PRESSURE: 58 MMHG

## 2024-10-14 DIAGNOSIS — I50.812 CHRONIC RIGHT-SIDED HEART FAILURE (HCC): ICD-10-CM

## 2024-10-14 DIAGNOSIS — I50.32 CHRONIC DIASTOLIC HEART FAILURE (HCC): Primary | ICD-10-CM

## 2024-10-14 DIAGNOSIS — I48.20 CHRONIC A-FIB (HCC): ICD-10-CM

## 2024-10-14 DIAGNOSIS — N18.32 STAGE 3B CHRONIC KIDNEY DISEASE (HCC): ICD-10-CM

## 2024-10-14 DIAGNOSIS — M35.1 MIXED CONNECTIVE TISSUE DISEASE (HCC): ICD-10-CM

## 2024-10-14 DIAGNOSIS — I50.32 CHRONIC HEART FAILURE WITH PRESERVED EJECTION FRACTION (HFPEF) (HCC): ICD-10-CM

## 2024-10-14 DIAGNOSIS — I50.32 CHRONIC HEART FAILURE WITH PRESERVED EJECTION FRACTION (HFPEF) (HCC): Primary | ICD-10-CM

## 2024-10-14 DIAGNOSIS — M35.1 MIXED CONNECTIVE TISSUE DISEASE (HCC): Primary | ICD-10-CM

## 2024-10-14 LAB
ANION GAP SERPL CALC-SCNC: 4 MMOL/L (ref 0–18)
BUN BLD-MCNC: 31 MG/DL (ref 9–23)
CALCIUM BLD-MCNC: 9.7 MG/DL (ref 8.7–10.4)
CHLORIDE SERPL-SCNC: 105 MMOL/L (ref 98–112)
CO2 SERPL-SCNC: 32 MMOL/L (ref 21–32)
CREAT BLD-MCNC: 1.32 MG/DL
EGFRCR SERPLBLD CKD-EPI 2021: 54 ML/MIN/1.73M2 (ref 60–?)
FASTING STATUS PATIENT QL REPORTED: NO
GLUCOSE BLD-MCNC: 95 MG/DL (ref 70–99)
OSMOLALITY SERPL CALC.SUM OF ELEC: 298 MOSM/KG (ref 275–295)
POTASSIUM SERPL-SCNC: 4.1 MMOL/L (ref 3.5–5.1)
SODIUM SERPL-SCNC: 141 MMOL/L (ref 136–145)

## 2024-10-14 PROCEDURE — 36415 COLL VENOUS BLD VENIPUNCTURE: CPT | Performed by: NURSE PRACTITIONER

## 2024-10-14 PROCEDURE — 99215 OFFICE O/P EST HI 40 MIN: CPT | Performed by: NURSE PRACTITIONER

## 2024-10-14 PROCEDURE — 80048 BASIC METABOLIC PNL TOTAL CA: CPT | Performed by: NURSE PRACTITIONER

## 2024-10-14 NOTE — PROGRESS NOTES
Pt. Assessed. No signs or symptoms of shortness of breath, fatigue, chest pain noted. +2 edema to lower legs notes- patient states improved from last visit.  Weight stable at 185 lbs. Reviewed current list of patient's allergies and medication; updated the Electronic Medical Record. Labs ordered to assess kidney function and drawn by  Lab. Reviewed follow-up appointment and Heart Failure discharge instructions with patient. Patient verbalized an understanding.     Will rtc in 1 month

## 2024-10-14 NOTE — PROGRESS NOTES
[facility] Progress Note    Miguel Davila is a 79 year old male who presents to clinic for APN assessment and management of chronic diastolic heart failure and is functional class 2-3.     Subjective:  Since his last clinic visit on 9/27; when his Spironolactone was increased from 12.5mg daily to 25mg daily and given Kdur 20meq PO in the clinic, his weight is down 2 lbs with some improvement in LE edema. He states he has no edema in the morning and worsening throughout the day. He denies shortness of breath. He thinks he is having a mild lupus flare with his cheeks being more red today. He otherwise feels fine.     He saw Dr. Craig 9/18; no changes made. He will see him in 4 months. He didn't feel strongly about recalibration of CardioMEMs since he feels great. He will see him in January.      He had RHC on 5/20 that shows RA 9 mmHg, mPAP 31 mmhg, wedge 14 mmHg, CI 3.8 and PVR 2.2 wood units. Dr. Jean recommended increasing Entresto with consideration for retrying SGLT2i and increase in MRA at a later date. CardioMEMs sensor was noted to be inaccurate and re-calibration was recommended. He as requested this be postponed since he had cholecystitis and has been slow to recover.       Last Hospitalizations/ED visits:     He was admitted from 6/6-6/10 with acute cholecystitis with localized peritonitis and underwent cholecystectomy 6/7. Treated with antibiotics. Aldactone, Bumex and Entresto were held during hospital stay d.t low BP. He was discharged on Bumex 1 mg, remained off ARNI and MRA.      Admitted again 7/8-7/12 with leg swelling, weeping and abdominal distension. He was felt to be fluid overloaded on admission, but then became hypotensive with diuresis. Albumin level was low and felt to be a contributing factor to leg swelling and hypotension. Subsequently, he was given an albumin infusion followed by diuretics and started to improve. LE US negative for DVT.  Treated with antibiotics for cellulitis. GDMT started at low doses. To take PRN Bumex on discharge. Discharge weight of 189 lbs.     Review of Systems   Constitutional: Positive for weight loss.   Cardiovascular:  Positive for leg swelling (improved).   Respiratory: Negative.     Gastrointestinal: Negative.        HISTORY:  Past Medical History:    A-fib (HCC)    Arrhythmia    atrial fibrillation    Arthritis    Autoimmune disease (HCC)    hepatits, resolved anfd nephritis, resolved    Ramon's esophagus    Bleeding nose    Gums Treated    Blood disorder    thrombocytopenia    Blood in urine    Blurred vision    cataract due to steroids, surgery in June    Calculus of kidney    One time    Cancer (HCC)    skin    Candidiasis of the esophagus    Due to steroid use for Autoimmune disorder     Cataract    Colon polyps    Congestive heart disease (HCC)    Diarrhea, unspecified    Intermittent due to lupus    Diverticulosis of colon    Easy bruising    On and off prednisone for 20 years    Esophageal polyp    Esophageal reflux    Essential hypertension    Fatigue    polymyositis and lupus    Gout    Hammer toe, acquired    Heart palpitations    Afib    Hepatitis    autoimmune induce hepatitis d/t lupus    High blood pressure    IBS (irritable bowel syndrome)    mild d/t lupus    Irregular bowel habits    Leg swelling    Heart failure, probably caused by lupus    Lupus (HCC)    MCTD (mixed connective tissue disease) (HCC)    Obstructive sleep apnea (adult) (pediatric)    LI (obstructive sleep apnea)    AHI 37  Supine AHI 38 non-supine AHI 16 Sao2 Pj 83%     LI (obstructive sleep apnea)    AHI 36 RDI 36 REM AHI 45 Supine AHI 65 non-supine AHI 19 Sao2 Pj 86% CPAP 9cwp    Pain in joints    Personal history of antineoplastic chemotherapy    Pleural effusion    right    Pneumonia due to organism    Polymyositis (HCC)    Presence of other cardiac implants and grafts    watchman filter    Problems with swallowing     dysphagia in 2006    Pulmonary hypertension (HCC)    Raynaud disease    Raynaud disease    Renal disorder    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    Shortness of breath    mainly due to pm or lupus    Sleep apnea    CPAP    Thrombocytopathia (HCC)    Visual impairment    bifocals for reading & computer; distance for driving    Wears glasses      Past Surgical History:   Procedure Laterality Date    Appendectomy  1970    Appendectomy      Cardiac catheterization  09/2019    Cataract Bilateral     Cholecystectomy      Colonoscopy  10/2003 2006 01/2010    x3    Colonoscopy  05/14/2013    Colonoscopy N/A 05/01/2018    Procedure: COLONOSCOPY;  Surgeon: Isaiah Tobar MD;  Location:  ENDOSCOPY    Colonoscopy N/A 04/12/2024    Procedure: COLONOSCOPY;  Surgeon: Gerardo Neves MD;  Location:  ENDOSCOPY    Colonoscopy with biopsy  05/14/2013    Egd      Hand/finger surgery unlisted  2003    right    Needle biopsy liver      Other  12/2005    needle bipsy of kidney    Other surgical history  2017    watchman filter    Percutaneous  angioplasty  (ptca)- pbp only  2006    right    Rectum surgery procedure unlisted  1946    rectal surgery polypectomy    Skin surgery      MMS BCC R temple 6/24/09    Skin surgery  2007    basal ceell carcinoma    Skin surgery  07/18/2012    BCC-nodular to right superior eyebrow/ MOHS    Skin surgery  07/15/2013    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/MMS    Skin surgery  02/19/2014    BCC-NOD to right superior pinna, MMS, AB    Skin surgery  02/16/2021    BCC- left superior forehead, MMS     Skin surgery  03/07/2022    SCC IN SITU RIGHT ANTIHELIX MMS BY DR GASTELUM    Tonsillectomy  1948    Upper gi endoscopy,exam  10/2003 2006 01/2010 , 5/13    x3      Family History   Problem Relation Age of Onset    Heart Disease Father         CHF    Gastro-Intestinal Disorder Father         Diverticulosis    Alcohol and Other Disorders Associated Father     Heart Attack Father     Heart Disease Mother         CHF     Breast Cancer Mother     Stroke Mother     Cancer Paternal Grandfather     Heart Attack Paternal Grandmother     Kidney Disease Son     Other (Ramon's Esophagus) Son     Heart Attack Maternal Grandfather       Social History     Socioeconomic History    Marital status:    Occupational History    Occupation: Retired    Tobacco Use    Smoking status: Former     Current packs/day: 0.00     Average packs/day: 0.5 packs/day for 15.0 years (7.5 ttl pk-yrs)     Types: Cigarettes     Start date: 1958     Quit date: 1973     Years since quittin.2    Smokeless tobacco: Never    Tobacco comments:     5 cigarettes daily, stopped smoking in    Vaping Use    Vaping status: Never Used   Substance and Sexual Activity    Alcohol use: Yes     Alcohol/week: 13.0 - 15.0 standard drinks of alcohol     Types: 5 Glasses of wine, 8 - 10 Standard drinks or equivalent per week     Comment: 1 per day wine or liquor    Drug use: Never   Other Topics Concern    Caffeine Concern Yes     Comment: 1 soda per day and decaf coffee    Sleep Concern No    Exercise Yes     Comment: 3-4 times weekly    Seat Belt Yes     Social Drivers of Health     Financial Resource Strain: Low Risk  (2024)    Financial Resource Strain     Difficulty of Paying Living Expenses: Not very hard     Med Affordability: No   Food Insecurity: No Food Insecurity (2024)    Food Insecurity     Food Insecurity: Never true   Transportation Needs: No Transportation Needs (2024)    Transportation Needs     Lack of Transportation: No   Housing Stability: Low Risk  (2024)    Housing Stability     Housing Instability: No           Objective:    Telemetry: Afib         /58   Pulse 82   Resp 13   Wt 185 lb (83.9 kg)   SpO2 100%   BMI 25.81 kg/m²     Wt Readings from Last 6 Encounters:   10/14/24 185 lb (83.9 kg)   24 187 lb 6.4 oz (85 kg)   24 185 lb 9.6 oz (84.2 kg)   09/10/24 189 lb (85.7 kg)   24 192 lb (87.1 kg)    08/15/24 201 lb 6.4 oz (91.4 kg)            Recent Results (from the past 24 hours)   Basic Metabolic Panel (8)    Collection Time: 10/14/24  2:57 PM   Result Value Ref Range    Glucose 95 70 - 99 mg/dL    Sodium 141 136 - 145 mmol/L    Potassium 4.1 3.5 - 5.1 mmol/L    Chloride 105 98 - 112 mmol/L    CO2 32.0 21.0 - 32.0 mmol/L    Anion Gap 4 0 - 18 mmol/L    BUN 31 (H) 9 - 23 mg/dL    Creatinine 1.32 (H) 0.70 - 1.30 mg/dL    Calcium, Total 9.7 8.7 - 10.4 mg/dL    Calculated Osmolality 298 (H) 275 - 295 mOsm/kg    eGFR-Cr 54 (L) >=60 mL/min/1.73m2    Patient Fasting for BMP? No          Current Outpatient Medications:     spironolactone 25 MG Oral Tab, Take 1 tablet (25 mg total) by mouth daily., Disp: , Rfl:     bumetanide 1 MG Oral Tab, Take 2 tablets (2 mg total) by mouth every morning AND 2 tablets (2 mg total) Every afternoon at 2:00 pm., Disp: , Rfl:     predniSONE 1 MG Oral Tab, Take 2 tablets (2 mg total) by mouth daily with breakfast., Disp: 180 tablet, Rfl: 0    fexofenadine 180 MG Oral Tab, Take 1 tablet (180 mg total) by mouth daily as needed., Disp: , Rfl:     levothyroxine 50 MCG Oral Tab, Take 1 tablet (50 mcg total) by mouth before breakfast., Disp: , Rfl:     ipratropium 0.06 % Nasal Solution, 1 spray by Nasal route 2 (two) times daily., Disp: 1 each, Rfl: 5    omeprazole 20 MG Oral Capsule Delayed Release, Take 1 capsule (20 mg total) by mouth 2 (two) times daily., Disp: 180 capsule, Rfl: 1    ALLOPURINOL 300 MG Oral Tab, TAKE 1 TABLET BY MOUTH EVERY DAY, Disp: 90 tablet, Rfl: 3    predniSONE 5 MG Oral Tab, Take 1 tablet (5 mg total) by mouth daily with breakfast., Disp: 90 tablet, Rfl: 3    metoprolol succinate  MG Oral Tablet 24 Hr, Take 1 tablet (100 mg total) by mouth every morning AND 0.5 tablets (50 mg total) every evening., Disp: 60 tablet, Rfl: 3    aspirin 81 MG Oral Tab EC, Take 1 tablet (81 mg total) by mouth daily., Disp: 30 tablet, Rfl: 0    PREVIDENT 5000 BOOSTER PLUS 1.1 %  Dental Paste, , Disp: , Rfl:     Multiple Vitamins-Minerals (TAB-A-KEVIN) Oral Tab, Take 1 tablet by mouth daily., Disp: , Rfl:     Immune Globulin, Human, 10 g Intravenous Recon Soln, Inject 0.4 g/kg into the vein. Given for treatment of polymyositis, mixed connective tissue disease, and immune thrombocytopenia purpura monthly at [facility]. Every 5 weeks, Disp: 3 each, Rfl: 0    Calcium Citrate-Vitamin D (CITRACAL + D OR), Take 1 tablet by mouth daily., Disp: , Rfl:     Exam:   General:         Alert, in no apparent distress  HEENT:          no JVD  Lungs:            CTAB                     CV:                   irregularly irregular  Abdomen:       Non-distended/round, soft  Extremities:     +1 LE edema  Neuro:             A&O x 3  Skin:                Pink, warm, dry    Education:  Patient instructed regarding sodium restricted diet, low sodium foods, fluid restriction, daily weights, medication regimen, s/s HF exacerbation and when to call APN/clinic.       [x] CardioMEMs  [] ARNi/ACEi/ARB - hypotension  [x] BB  [x] MRA  [] SGLT2i - possible pancreatitis       Assessment:   Chronic diastolic, RV heart failure - LVEF 55-60% and stable on echo 7/2024, low normal RV function. RHC 5/2024 with PCWP 14, RA 9. S/p CardioMEMs; needs re-calibration per recent RHC. Off Jardiance, thought to be due to pancreatitis. MRA discontinued at [facility] for dehydration and near syncope; since have restarted but required reduced dose due to hyperkalemia. During hospital stay GDMT has been adjusted recently due to low BP, NICKI. Last ProBNP up to 5,498 from 3,583. Hypervolemic.   PH, mild combined pre and post capillary - RHC 5/20/24  demonstrates mPAP 31 with wedge14 mmHg, RA 9 mmHg, PVR 2.2 and CI 3.8. RV function low normal.   Mild to Moderate MR/Mild-Mod TR/ Moderate AI - on echo in July.   CKD Stage 3b - prior baseline creatinine ~ 1.8-2.0 mg/dl,  below baseline but still fluid overloaded.  Follows with Dr. Devine.     Chronic Afib - s/p Watchman. Rates controlled.   LI - on CPAP. Wears 22% of the nights according to recent download. Endorsed not wearing at all today. Was encouraged to wear more   Recurrent bilateral pleural effusions - hx of thoracentesis.   Hx Lupus/Mixed CTD/Severe polymyositis - continues IVIG infusions every 5 weeks. Follows with Dr. Farmer. On chronic prednisone. Off Imuran due to pancytopenia.   Non-obstructive CAD  Chronic thrombocytopenia, immune mediated. Last  today. Follows with Dr. Orr.  Hx Hyponatremia, will follow with increase in MRA.   HERNANDEZ with biopsy proven stage F0-F1 Fibrosis on US 1/2022. Follows with Dr. Veronica, Hepatology.  Mild hyperkalemia - on low dose MRA and ARNI. Has required Veltassa in the past.  Anemia - last Hgb 12.8 g/dL; stable. Iron sat 11 and ferritin 42.5. Supplemented with 3 doses of Venofer, last dose 9/16.   Hypothyroidism - started on Synthroid per Dr. Jean in March, repeat TSH 2.19 and free T4 1.1 in May.   Hypoalbuminemia - last albumin improved to 3.9.  Hx Cellulitis - s/p antibiotics. Following in the Wound care clinic.    Plan:   Continue current medications.   Return to clinic in 1 month.   F/U with Dr. Craig as planned in January.  CHF discharge instructions given    I have spent 45 min total time on the day of the encounter, including: preparing to see the patient, obtaining and/or reviewing separately obtained history, performing a medically appropriate examination and/or evaluation, counseling and educating the patient/family caregiver, ordering medication, tests, or procedures, documenting clinical information in Epic, and independently interpreting results and communicating results to the patient/family caregiver     SHEA Fernandez

## 2024-10-14 NOTE — PATIENT INSTRUCTIONS
Heart Failure Discharge Instructions      Activity: Regular exercise and activity is important for your overall health and to help keep your heart strong and functioning as well as possible.   Walk at a slow to moderate pace for 15-20 minutes 3-5 days per week.     Follow these instructions every day to keep yourself in the Green Zone     The Green Zone means you are feeling well and your symptoms are under control                                    Medications  Take your medication every day as instructed  Do not stop taking your medicine or change the amount you are taking without instructions from your doctor or nurse  Do Not take non-steroidal antiinflammatory drugs such as ibuprofen, aleve, advil, or motrin                                    Diet/Fluids  People with heart failure should eat less sodium (salt) and limit fluid. Sodium attracts water and makes the body hold fluid. This extra fluid makes the heart work harder and can worsen the symptoms of heart failure.     Diet    2000 mg sodium daily  Fluid restriction    64 ounces daily  (8 oz. = 1 cup)                                     Body Weight  Weigh yourself every day before breakfast and write your weight down  Use the same scale and wear about the same amount of clothes each time  A sudden weight increase is due to fluid retention rather than fat                                         Activity  Pace your activities to avoid getting overtired  Take rest periods as needed  Elevate your feet to reduce ankle swelling when resting                             Signs of Worsening Heart Failure    You are entering the Yellow Zone - this is a warning zone    Call your doctor or nurse if you have any of these signs or symptoms:  You gain 2 or more pounds overnight or 3-5 pounds in 3-7 days  You have more trouble breathing  You get more tired with regular activity, or are limiting activity because of shortness of breath or fatigue  You are short of breath lying  down, you need more pillows to breathe comfortably,  or wake up during the night short of breath  You urinate less often during the day and more often at night  You have a bloated feeling, upset stomach, loss of appetite, or your clothes are fitting tighter    GO TO THE EMERGENCY DEPARTMENT or CALL 911 IF:    These are signs you are in the RED ZONE - THIS IS AN EMERGENCY  You have tightness or pain in your chest  You are extremely short of breath or can't catch your breath  You cough up frothy pink mucous  You feel confused or can't think clearly  You are traveling and develop symptoms of worsening heart failure     We respect everyone's time and availability. Please be aware that this is not a walk-in clinic and we require appointments in order to facilitate timely care for all patients. We ask you to arrive 30 minutes before your appointment to allow time for you to check-in and have your bloodwork drawn. Please understand if you are late for your appointment, you may be asked to reschedule. If possible, all attempts will be made to accommodate but realize this is no guarantee that this will always be available. We understand there are extenuating circumstances. If you need to cancel or reschedule your appointment, please call the [facility] within 24 hours at [phone].  Thank you for your cooperation, [facility] Staff.    If you are currently [employer] active, starting July 1st 2024, we will be utilizing [employer] messaging ONLY to confirm your appointment.    IF YOU HAVE QUESTIONS REGARDING YOUR BILL, FEEL FREE TO CONTACT [facility] PATIENT ACCOUNTS -[phone]. IF YOU NEED FINANCIAL ASSISTANCE, PLEASE CALL AN [facility] FINANCIAL COUNSELOR -[phone].             [facility]     [phone]

## 2024-10-15 ENCOUNTER — APPOINTMENT (OUTPATIENT)
Dept: HEMATOLOGY/ONCOLOGY | Facility: HOSPITAL | Age: 79
End: 2024-10-15
Attending: INTERNAL MEDICINE
Payer: MEDICARE

## 2024-10-15 RX ORDER — IPRATROPIUM BROMIDE 42 UG/1
1 SPRAY, METERED NASAL 2 TIMES DAILY
Qty: 1 EACH | Refills: 5 | Status: SHIPPED | OUTPATIENT
Start: 2024-10-15

## 2024-10-15 RX ORDER — PREDNISONE 5 MG/1
5 TABLET ORAL
Qty: 90 TABLET | Refills: 1 | Status: SHIPPED | OUTPATIENT
Start: 2024-10-15

## 2024-10-15 RX ORDER — PREDNISONE 1 MG/1
2 TABLET ORAL
Qty: 180 TABLET | Refills: 0 | Status: SHIPPED | OUTPATIENT
Start: 2024-10-15

## 2024-10-15 NOTE — TELEPHONE ENCOUNTER
Future Appointments   Date Time Provider Department Center   10/21/2024 11:00 AM EH TX RN5 EH CHEMO [facility]   10/28/2024  5:30 PM HEART FAILURE APN 1 EH HF CLIN [facility]   11/14/2024  1:30 PM EH CARD PHLEBOTOMY RM1 EH CARD LA [facility]   11/14/2024  2:00 PM HEART FAILURE APN 1 E[facility] HF CLIN [facility]   12/2/2024  5:30 PM HEART FAILURE APN 1 E[facility] HF CLIN [facility]   12/30/2024 11:40 AM Taye Farmer MD [facility]     Last office visit: 8/27/2024    Last fill:  4/27/2023 180 tab, 3 refills    Taking 2 mg with 5 mg tab to total 5 mg

## 2024-10-15 NOTE — TELEPHONE ENCOUNTER
Future Appointments   Date Time Provider Department Center   10/21/2024 11:00 AM EH TX RN5 EH CHEMO [facility]   10/28/2024  5:30 PM HEART FAILURE APN 1 E[facility] HF CLIN [facility]   11/14/2024  1:30 PM EH CARD PHLEBOTOMY RM1 EH CARD LA [facility]   11/14/2024  2:00 PM HEART FAILURE APN 1 E[facility] HF CLIN [facility]   12/2/2024  5:30 PM HEART FAILURE APN 1 E[facility] HF CLIN [facility]   12/30/2024 11:40 AM Taye Farmer MD [facility]     Last office visit: 8/27/2024    Last fill: 10/4/2023 90 tab, 3 refills    Taking 5 mg tablet along with 2 mg to total 7 mg

## 2024-10-15 NOTE — TELEPHONE ENCOUNTER
Requested Prescriptions     Pending Prescriptions Disp Refills    IPRATROPIUM 0.06 % Nasal Solution [Pharmacy Med Name: IPRATROPIUM 0.06% SPRAY]  5     Sig: SPRAY 1 SPRAY INTO EACH NOSTRIL TWICE A DAY       FILLED- 3/8/24  LOV- 3/8/24    No f/u schedu;ed

## 2024-10-16 ENCOUNTER — APPOINTMENT (OUTPATIENT)
Dept: WOUND CARE | Facility: HOSPITAL | Age: 79
End: 2024-10-16
Attending: NURSE PRACTITIONER
Payer: MEDICARE

## 2024-10-20 DIAGNOSIS — M10.9 GOUT, UNSPECIFIED CAUSE, UNSPECIFIED CHRONICITY, UNSPECIFIED SITE: Primary | ICD-10-CM

## 2024-10-20 DIAGNOSIS — K22.70 BARRETT'S ESOPHAGUS WITHOUT DYSPLASIA: ICD-10-CM

## 2024-10-21 ENCOUNTER — TELEPHONE (OUTPATIENT)
Dept: HEMATOLOGY/ONCOLOGY | Facility: HOSPITAL | Age: 79
End: 2024-10-21

## 2024-10-21 ENCOUNTER — OFFICE VISIT (OUTPATIENT)
Dept: HEMATOLOGY/ONCOLOGY | Facility: HOSPITAL | Age: 79
End: 2024-10-21
Attending: INTERNAL MEDICINE
Payer: MEDICARE

## 2024-10-21 VITALS
OXYGEN SATURATION: 98 % | RESPIRATION RATE: 18 BRPM | WEIGHT: 185.63 LBS | SYSTOLIC BLOOD PRESSURE: 125 MMHG | HEART RATE: 91 BPM | BODY MASS INDEX: 25.99 KG/M2 | HEIGHT: 70.98 IN | DIASTOLIC BLOOD PRESSURE: 77 MMHG | TEMPERATURE: 97 F

## 2024-10-21 DIAGNOSIS — D69.6 THROMBOCYTOPENIA (HCC): ICD-10-CM

## 2024-10-21 DIAGNOSIS — D69.3 IMMUNE THROMBOCYTOPENIC PURPURA (HCC): Primary | ICD-10-CM

## 2024-10-21 PROCEDURE — 96365 THER/PROPH/DIAG IV INF INIT: CPT

## 2024-10-21 PROCEDURE — 96366 THER/PROPH/DIAG IV INF ADDON: CPT

## 2024-10-21 RX ORDER — ALLOPURINOL 300 MG/1
TABLET ORAL
Qty: 90 TABLET | Refills: 3 | Status: SHIPPED | OUTPATIENT
Start: 2024-10-21

## 2024-10-21 RX ORDER — DIPHENHYDRAMINE HCL 25 MG
25 CAPSULE ORAL ONCE
Status: COMPLETED | OUTPATIENT
Start: 2024-10-21 | End: 2024-10-21

## 2024-10-21 RX ORDER — ACETAMINOPHEN 325 MG/1
650 TABLET ORAL ONCE
Status: COMPLETED | OUTPATIENT
Start: 2024-10-21 | End: 2024-10-21

## 2024-10-21 RX ORDER — DIPHENHYDRAMINE HCL 25 MG
25 CAPSULE ORAL ONCE
Status: CANCELLED | OUTPATIENT
Start: 2024-10-21

## 2024-10-21 RX ORDER — ACETAMINOPHEN 325 MG/1
650 TABLET ORAL ONCE
Status: CANCELLED | OUTPATIENT
Start: 2024-10-21

## 2024-10-21 RX ADMIN — ACETAMINOPHEN 650 MG: 325 TABLET ORAL at 11:14:00

## 2024-10-21 RX ADMIN — DIPHENHYDRAMINE HCL 25 MG: 25 MG CAPSULE ORAL at 11:14:00

## 2024-10-21 NOTE — TELEPHONE ENCOUNTER
Future Appointments   Date Time Provider Department Center   10/28/2024  5:30 PM HEART FAILURE APN 1 E[facility] HF CLIN [facility]   11/14/2024  1:30 PM EH CARD PHLEBOTOMY RM1 EH CARD [facility]   11/14/2024  2:00 PM HEART FAILURE APN 1 EH HF CLIN [facility]   12/2/2024  5:30 PM HEART FAILURE APN 1 E[facility] HF CLIN [facility]   12/30/2024 11:40 AM Taye Farmer MD [facility]     Last office visit: 8/27/2024    Last fill: 10/16/2023 90 tab, 3 refills

## 2024-10-21 NOTE — PROGRESS NOTES
Pt here for IVIG . Pt denies any issues or concerns.      Ordering Provider: Dr. Farmer  Order Exp: 11/6/24     Pt tolerated infusion without difficulty or complaint. Reviewed next apt date/time: No appointment set up at this time as patient will need new orders from Dr. Farmer. Patient aware of this and verbalized understanding. Patient will call to make his next appointment once a new order with valid PA has been obtained.      Education Record  Learner:  Patient and Spouse  Disease / Diagnosis: Lupus; Mixed Connective Tissue Disease  Barriers / Limitations:  None  Method:  Brief focused and Reinforcement  General Topics:  Medication, Side effects and symptom management, Plan of care reviewed, and Fall risk and prevention  Outcome:  Shows understanding

## 2024-10-22 ENCOUNTER — PATIENT MESSAGE (OUTPATIENT)
Dept: RHEUMATOLOGY | Facility: CLINIC | Age: 79
End: 2024-10-22

## 2024-10-23 ENCOUNTER — APPOINTMENT (OUTPATIENT)
Dept: WOUND CARE | Facility: HOSPITAL | Age: 79
End: 2024-10-23
Attending: NURSE PRACTITIONER
Payer: MEDICARE

## 2024-10-24 ENCOUNTER — TELEPHONE (OUTPATIENT)
Dept: HEMATOLOGY/ONCOLOGY | Facility: HOSPITAL | Age: 79
End: 2024-10-24

## 2024-10-24 ENCOUNTER — PATIENT OUTREACH (OUTPATIENT)
Dept: CASE MANAGEMENT | Age: 79
End: 2024-10-24

## 2024-10-30 ENCOUNTER — APPOINTMENT (OUTPATIENT)
Dept: WOUND CARE | Facility: HOSPITAL | Age: 79
End: 2024-10-30
Attending: NURSE PRACTITIONER
Payer: MEDICARE

## 2024-10-31 ENCOUNTER — PATIENT OUTREACH (OUTPATIENT)
Dept: CASE MANAGEMENT | Age: 79
End: 2024-10-31

## 2024-10-31 DIAGNOSIS — K22.70 BARRETT'S ESOPHAGUS WITHOUT DYSPLASIA: ICD-10-CM

## 2024-10-31 DIAGNOSIS — I10 BENIGN ESSENTIAL HYPERTENSION: ICD-10-CM

## 2024-10-31 DIAGNOSIS — G47.33 OSA (OBSTRUCTIVE SLEEP APNEA): ICD-10-CM

## 2024-10-31 DIAGNOSIS — I27.20 PULMONARY HYPERTENSION (HCC): ICD-10-CM

## 2024-10-31 DIAGNOSIS — I50.32 CHRONIC HEART FAILURE WITH PRESERVED EJECTION FRACTION (HCC): ICD-10-CM

## 2024-10-31 DIAGNOSIS — I48.19 PERSISTENT ATRIAL FIBRILLATION (HCC): ICD-10-CM

## 2024-10-31 DIAGNOSIS — C44.219 BASAL CELL CARCINOMA (BCC) OF SKIN OF LEFT EAR: Primary | ICD-10-CM

## 2024-10-31 DIAGNOSIS — N18.31 STAGE 3A CHRONIC KIDNEY DISEASE (HCC): ICD-10-CM

## 2024-10-31 PROCEDURE — 99490 CHRNC CARE MGMT STAFF 1ST 20: CPT

## 2024-10-31 NOTE — PROGRESS NOTES
Spoke to Miguel for Chronic Care Management.      Updates to patient care team/comments: UTD  Patient reported changes in medications: UTD  Med Adherence  Comment: pt taking medications      Health Maintenance:   Health Maintenance   Topic Date Due    Influenza Vaccine (1) 10/01/2024    Annual Physical  01/29/2025    Colorectal Cancer Screening  04/12/2027    Annual Depression Screening  Completed    Fall Risk Screening (Annual)  Completed    Pneumococcal Vaccine: 65+ Years  Completed    Zoster Vaccines  Completed       Patient updates/concerns:    Spoke to pt for monthly outreach   Pt feels the small wound on his foot has healed nicely. He is no longer treating it in any way and has been able to comfortably wear a shoe.  Pt no longer visits wound care.    Pt has seen cardiology and there were no changes made to his plan of care or changes to rx. Pt was advised his condition is stable and pulmonary hypertension is lower than the cardio mems detected. Specialist decided against recalibrating mems device at this time because of the invasive nature of the procedure requiring a stent and that his condition does not deem it necessary. Pt was told that if his pulmonary tension worsens and his excessive fatigue and SOB return than f/u to synchronize mems device.    Pt is always checking the swelling in his feet.  He is taking his diuretics as prescribed and estimates he has lost 25lbs. He weighs himself every day and does not want to lose anymore weight. He will be following up to discuss diuretic use and estimates he will be decreasing the amount.    Pt appetite is improving and he is eating more, feels confident in his ability to maintain weight or gain a little.   Pt is still managing his wifes care and is not having any difficulty caring for her without neglecting his own health.   Pt did not have any new questions or concerns for pcp or clinical staff.   Goals/Action Plan:    Active goal from previous outreach:  staying healthy    Patient reported progress towards goals: pt continues to see providers and specialist as needed and takes meds as prescribed               - What: exercise           - Where/When/How: pt has been discharged from wound care and is comfortably wearing a shoe again. Pt is staying active all day and using the stairs comfortably but has not started a regular exercise or walking routine yet.   Patient Reported Barriers and Concerns: n/a                   - Plan for overcoming barriers: none    Care Managers Interventions: continue to provide encouragement and education for healthy coping and dx    Future Appointments:   Future Appointments   Date Time Provider Department Center   11/14/2024  1:30 PM EH CARD PHLEBOTOMY RM1 EH CARD [facility]   11/14/2024  2:00 PM HEART FAILURE APN 1 EH HF CLIN [facility]   11/25/2024 11:00 AM EH TX RN1 EH CHEMO [facility]   12/2/2024  5:30 PM HEART FAILURE APN 1 E[facility] HF CLIN [facility]   12/30/2024 11:40 AM Taye Farmer MD [facility]         [facility] Care Manager Follow Up Date: one month    Reason For Follow Up: review progress and or barriers towards patient's goals.     Time Spent This Encounter Total: 25 min medical record review, telephone communication, care plan updates where needed, education, goals, and action plan recreation/update. Provided acknowledgment and validation to patient's concerns.   Monthly Minute Total including today: 25  Physical assessment, complete health history, and need for CCM established by Eric Simon MD.    Friendly reminder - 2025 Benefits Open Enrollment is here!   We encourage you to review your current Medicare coverage and explore your options during this period. We have developed a Medicare Information Page on our website to serve as a resource for guidance and answers to any questions you might have.   You can access it by visiting, [email or web address]

## 2024-11-13 RX ORDER — BUMETANIDE 1 MG/1
TABLET ORAL
Qty: 120 TABLET | Refills: 2 | Status: SHIPPED | OUTPATIENT
Start: 2024-11-13 | End: 2024-11-14

## 2024-11-14 ENCOUNTER — HOSPITAL ENCOUNTER (OUTPATIENT)
Dept: LAB | Facility: HOSPITAL | Age: 79
Discharge: HOME | End: 2024-11-14
Attending: NURSE PRACTITIONER
Payer: MEDICARE

## 2024-11-14 ENCOUNTER — HOSPITAL ENCOUNTER (OUTPATIENT)
Dept: CARDIOLOGY CLINIC | Facility: HOSPITAL | Age: 79
Discharge: HOME | End: 2024-11-14
Attending: NURSE PRACTITIONER
Payer: MEDICARE

## 2024-11-14 VITALS
DIASTOLIC BLOOD PRESSURE: 52 MMHG | OXYGEN SATURATION: 99 % | SYSTOLIC BLOOD PRESSURE: 106 MMHG | HEART RATE: 67 BPM | BODY MASS INDEX: 26 KG/M2 | WEIGHT: 183 LBS | RESPIRATION RATE: 32 BRPM

## 2024-11-14 DIAGNOSIS — I50.812 CHRONIC RIGHT-SIDED HEART FAILURE (HCC): ICD-10-CM

## 2024-11-14 DIAGNOSIS — I50.812 CHRONIC RIGHT-SIDED HEART FAILURE (HCC): Primary | ICD-10-CM

## 2024-11-14 DIAGNOSIS — I48.20 CHRONIC A-FIB (HCC): ICD-10-CM

## 2024-11-14 DIAGNOSIS — N18.32 STAGE 3B CHRONIC KIDNEY DISEASE (HCC): ICD-10-CM

## 2024-11-14 DIAGNOSIS — I50.32 CHRONIC HEART FAILURE WITH PRESERVED EJECTION FRACTION (HFPEF) (HCC): ICD-10-CM

## 2024-11-14 DIAGNOSIS — I50.32 CHRONIC DIASTOLIC HEART FAILURE (HCC): Primary | ICD-10-CM

## 2024-11-14 LAB
ANION GAP SERPL CALC-SCNC: 4 MMOL/L (ref 0–18)
BUN BLD-MCNC: 43 MG/DL (ref 9–23)
CALCIUM BLD-MCNC: 9.5 MG/DL (ref 8.7–10.4)
CHLORIDE SERPL-SCNC: 101 MMOL/L (ref 98–112)
CO2 SERPL-SCNC: 34 MMOL/L (ref 21–32)
CREAT BLD-MCNC: 1.56 MG/DL
EGFRCR SERPLBLD CKD-EPI 2021: 44 ML/MIN/1.73M2 (ref 60–?)
FASTING STATUS PATIENT QL REPORTED: NO
GLUCOSE BLD-MCNC: 83 MG/DL (ref 70–99)
OSMOLALITY SERPL CALC.SUM OF ELEC: 298 MOSM/KG (ref 275–295)
POTASSIUM SERPL-SCNC: 4.8 MMOL/L (ref 3.5–5.1)
SODIUM SERPL-SCNC: 139 MMOL/L (ref 136–145)

## 2024-11-14 PROCEDURE — 36415 COLL VENOUS BLD VENIPUNCTURE: CPT | Performed by: NURSE PRACTITIONER

## 2024-11-14 PROCEDURE — 99215 OFFICE O/P EST HI 40 MIN: CPT | Performed by: NURSE PRACTITIONER

## 2024-11-14 PROCEDURE — 80048 BASIC METABOLIC PNL TOTAL CA: CPT | Performed by: NURSE PRACTITIONER

## 2024-11-14 RX ORDER — BUMETANIDE 1 MG/1
TABLET ORAL
COMMUNITY
Start: 2024-11-14

## 2024-11-14 NOTE — PROGRESS NOTES
[facility] Progress Note    Miguel Davila is a 79 year old male who presents to clinic for APN assessment and management of chronic diastolic heart failure and is functional class 2-3.     Subjective:  Since his last clinic visit on 10/14; his weight is down 2 lbs with some improvement in LE edema. He denies shortness of breath. Appetite is good. He recently did not feel well in the morning and drank more water and juice, by afternoon felt better. He thought he may have been dehydrated.     He saw Dr. Craig 9/18; no changes made. He will see him in 4 months. He didn't feel strongly about recalibration of CardioMEMs since he feels great. He will see him in January.      He had RHC on 5/20 that shows RA 9 mmHg, mPAP 31 mmhg, wedge 14 mmHg, CI 3.8 and PVR 2.2 wood units. Dr. Jean recommended increasing Entresto with consideration for retrying SGLT2i and increase in MRA at a later date. CardioMEMs sensor was noted to be inaccurate and re-calibration was recommended. He as requested this be postponed since he had cholecystitis and has been slow to recover.       Last Hospitalizations/ED visits:     He was admitted from 6/6-6/10 with acute cholecystitis with localized peritonitis and underwent cholecystectomy 6/7. Treated with antibiotics. Aldactone, Bumex and Entresto were held during hospital stay d.t low BP. He was discharged on Bumex 1 mg, remained off ARNI and MRA.      Admitted again 7/8-7/12 with leg swelling, weeping and abdominal distension. He was felt to be fluid overloaded on admission, but then became hypotensive with diuresis. Albumin level was low and felt to be a contributing factor to leg swelling and hypotension. Subsequently, he was given an albumin infusion followed by diuretics and started to improve. LE US negative for DVT. Treated with antibiotics for cellulitis. GDMT started at low doses. To take PRN Bumex on discharge. Discharge weight of 189 lbs.      Review of Systems   Constitutional: Positive for weight loss.   Cardiovascular:  Positive for leg swelling (minimal).   Respiratory: Negative.     Gastrointestinal: Negative.        HISTORY:  Past Medical History:    A-fib (HCC)    Arrhythmia    atrial fibrillation    Arthritis    Autoimmune disease (HCC)    hepatits, resolved anfd nephritis, resolved    Ramon's esophagus    Bleeding nose    Gums Treated    Blood disorder    thrombocytopenia    Blood in urine    Blurred vision    cataract due to steroids, surgery in June    Calculus of kidney    One time    Cancer (HCC)    skin    Candidiasis of the esophagus    Due to steroid use for Autoimmune disorder     Cataract    Colon polyps    Congestive heart disease (HCC)    Diarrhea, unspecified    Intermittent due to lupus    Diverticulosis of colon    Easy bruising    On and off prednisone for 20 years    Esophageal polyp    Esophageal reflux    Essential hypertension    Fatigue    polymyositis and lupus    Gout    Hammer toe, acquired    Heart palpitations    Afib    Hepatitis    autoimmune induce hepatitis d/t lupus    High blood pressure    IBS (irritable bowel syndrome)    mild d/t lupus    Irregular bowel habits    Leg swelling    Heart failure, probably caused by lupus    Lupus (HCC)    MCTD (mixed connective tissue disease) (HCC)    Obstructive sleep apnea (adult) (pediatric)    LI (obstructive sleep apnea)    AHI 37  Supine AHI 38 non-supine AHI 16 Sao2 Pj 83%     LI (obstructive sleep apnea)    AHI 36 RDI 36 REM AHI 45 Supine AHI 65 non-supine AHI 19 Sao2 Pj 86% CPAP 9cwp    Pain in joints    Personal history of antineoplastic chemotherapy    Pleural effusion    right    Pneumonia due to organism    Polymyositis (HCC)    Presence of other cardiac implants and grafts    watchman filter    Problems with swallowing    dysphagia in 2006    Pulmonary hypertension (HCC)    Raynaud disease    Raynaud disease    Renal disorder    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    Shortness of breath    mainly due to pm or lupus    Sleep apnea    CPAP    Thrombocytopathia (HCC)    Visual impairment    bifocals for reading & computer; distance for driving    Wears glasses      Past Surgical History:   Procedure Laterality Date    Appendectomy  1970    Appendectomy      Cardiac catheterization  09/2019    Cataract Bilateral     Cholecystectomy      Colonoscopy  10/2003 2006 01/2010    x3    Colonoscopy  05/14/2013    Colonoscopy N/A 05/01/2018    Procedure: COLONOSCOPY;  Surgeon: Isaiah Tobar MD;  Location:  ENDOSCOPY    Colonoscopy N/A 04/12/2024    Procedure: COLONOSCOPY;  Surgeon: Gerardo Neves MD;  Location:  ENDOSCOPY    Colonoscopy with biopsy  05/14/2013    Egd      Hand/finger surgery unlisted  2003    right    Needle biopsy liver      Other  12/2005    needle bipsy of kidney    Other surgical history  2017    watchman filter    Percutaneous  angioplasty  (ptca)- pbp only  2006    right    Rectum surgery procedure unlisted  1946    rectal surgery polypectomy    Skin surgery      MMS BCC R temple 6/24/09    Skin surgery  2007    basal ceell carcinoma    Skin surgery  07/18/2012    BCC-nodular to right superior eyebrow/ MOHS    Skin surgery  07/15/2013    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/MMS    Skin surgery  02/19/2014    BCC-NOD to right superior pinna, MMS, AB    Skin surgery  02/16/2021    BCC- left superior forehead, MMS     Skin surgery  03/07/2022    SCC IN SITU RIGHT ANTIHELIX MMS BY DR GASTELUM    Tonsillectomy  1948    Upper gi endoscopy,exam  10/2003 2006 01/2010 , 5/13    x3      Family History   Problem Relation Age of Onset    Heart Disease Father         CHF    Gastro-Intestinal Disorder Father         Diverticulosis    Alcohol and Other Disorders Associated Father     Heart Attack Father     Heart Disease Mother         CHF    Breast Cancer Mother     Stroke Mother     Cancer Paternal Grandfather     Heart Attack Paternal Grandmother     Kidney Disease  Son     Other (Ramon's Esophagus) Son     Heart Attack Maternal Grandfather       Social History     Socioeconomic History    Marital status:    Occupational History    Occupation: Retired    Tobacco Use    Smoking status: Former     Current packs/day: 0.00     Average packs/day: 0.5 packs/day for 15.0 years (7.5 ttl pk-yrs)     Types: Cigarettes     Start date: 1958     Quit date: 1973     Years since quittin.3    Smokeless tobacco: Never    Tobacco comments:     5 cigarettes daily, stopped smoking in    Vaping Use    Vaping status: Never Used   Substance and Sexual Activity    Alcohol use: Yes     Alcohol/week: 13.0 - 15.0 standard drinks of alcohol     Types: 5 Glasses of wine, 8 - 10 Standard drinks or equivalent per week     Comment: 1 per day wine or liquor    Drug use: Never   Other Topics Concern    Caffeine Concern Yes     Comment: 1 soda per day and decaf coffee    Sleep Concern No    Exercise Yes     Comment: 3-4 times weekly    Seat Belt Yes     Social Drivers of Health     Financial Resource Strain: Low Risk  (2024)    Financial Resource Strain     Difficulty of Paying Living Expenses: Not very hard     Med Affordability: No   Food Insecurity: No Food Insecurity (2024)    Food Insecurity     Food Insecurity: Never true   Transportation Needs: No Transportation Needs (2024)    Transportation Needs     Lack of Transportation: No   Housing Stability: Low Risk  (2024)    Housing Stability     Housing Instability: No           Objective:    Cardiac Rhythm: Atrial fibrillation    /52   Pulse 67   Resp (!) 32   Wt 183 lb (83 kg)   SpO2 99%   BMI 25.53 kg/m²     Wt Readings from Last 6 Encounters:   24 183 lb (83 kg)   10/21/24 185 lb 9.6 oz (84.2 kg)   10/14/24 185 lb (83.9 kg)   24 187 lb 6.4 oz (85 kg)   24 185 lb 9.6 oz (84.2 kg)   09/10/24 189 lb (85.7 kg)            Recent Results (from the past 24 hours)   Basic Metabolic Panel (8)     Collection Time: 11/14/24  1:19 PM   Result Value Ref Range    Glucose 83 70 - 99 mg/dL    Sodium 139 136 - 145 mmol/L    Potassium 4.8 3.5 - 5.1 mmol/L    Chloride 101 98 - 112 mmol/L    CO2 34.0 (H) 21.0 - 32.0 mmol/L    Anion Gap 4 0 - 18 mmol/L    BUN 43 (H) 9 - 23 mg/dL    Creatinine 1.56 (H) 0.70 - 1.30 mg/dL    Calcium, Total 9.5 8.7 - 10.4 mg/dL    Calculated Osmolality 298 (H) 275 - 295 mOsm/kg    eGFR-Cr 44 (L) >=60 mL/min/1.73m2    Patient Fasting for BMP? No          Current Outpatient Medications:     bumetanide 1 MG Oral Tab, Take 2 tablets (2 mg total) by mouth every morning AND 1 tablet (1 mg total) Every afternoon at 2:00 pm., Disp: , Rfl:     omeprazole 20 MG Oral Capsule Delayed Release, TAKE 1 CAPSULE BY MOUTH TWICE A DAY, Disp: 180 capsule, Rfl: 3    ALLOPURINOL 300 MG Oral Tab, TAKE 1 TABLET BY MOUTH EVERY DAY, Disp: 90 tablet, Rfl: 3    PREDNISONE 1 MG Oral Tab, TAKE 2 TABLETS (2 MG TOTAL) BY MOUTH DAILY WITH BREAKFAST, Disp: 180 tablet, Rfl: 0    ipratropium 0.06 % Nasal Solution, SPRAY 1 SPRAY INTO EACH NOSTRIL TWICE A DAY, Disp: 1 each, Rfl: 5    predniSONE 5 MG Oral Tab, TAKE 1 TABLET BY MOUTH EVERY DAY WITH BREAKFAST, Disp: 90 tablet, Rfl: 1    spironolactone 25 MG Oral Tab, Take 1 tablet (25 mg total) by mouth daily., Disp: , Rfl:     fexofenadine 180 MG Oral Tab, Take 1 tablet (180 mg total) by mouth daily as needed., Disp: , Rfl:     levothyroxine 50 MCG Oral Tab, Take 1 tablet (50 mcg total) by mouth before breakfast., Disp: , Rfl:     metoprolol succinate  MG Oral Tablet 24 Hr, Take 1 tablet (100 mg total) by mouth every morning AND 0.5 tablets (50 mg total) every evening., Disp: 60 tablet, Rfl: 3    aspirin 81 MG Oral Tab EC, Take 1 tablet (81 mg total) by mouth daily., Disp: 30 tablet, Rfl: 0    PREVIDENT 5000 BOOSTER PLUS 1.1 % Dental Paste, , Disp: , Rfl:     Multiple Vitamins-Minerals (TAB-A-KEVIN) Oral Tab, Take 1 tablet by mouth daily., Disp: , Rfl:     Immune  Globulin, Human, 10 g Intravenous Recon Soln, Inject 0.4 g/kg into the vein. Given for treatment of polymyositis, mixed connective tissue disease, and immune thrombocytopenia purpura monthly at [facility]. Every 5 weeks, Disp: 3 each, Rfl: 0    Calcium Citrate-Vitamin D (CITRACAL + D OR), Take 1 tablet by mouth daily., Disp: , Rfl:     Exam:   General:         Alert, in no apparent distress  HEENT:           no JVD  Lungs:            CTAB                     CV:                  irregularly irregular  Abdomen:       Non-distended, soft  Extremities:    trace LE edema  Neuro:             A&O x 3  Skin:                Pink, warm, dry    Education:  Patient instructed regarding sodium restricted diet, low sodium foods, fluid restriction, daily weights, medication regimen, s/s HF exacerbation and when to call APN/clinic.         [x] CardioMEMs  [] ARNi/ACEi/ARB - hypotension  [x] BB  [x] MRA  [] SGLT2i - possible pancreatitis     Assessment:   Chronic diastolic, RV heart failure - LVEF 55-60% and stable on echo 7/2024, low normal RV function. RHC 5/2024 with PCWP 14, RA 9. S/p CardioMEMs; needs re-calibration per recent RHC. Off Jardiance, thought to be due to pancreatitis. MRA discontinued at [facility] for dehydration and near syncope; since have restarted but required reduced dose due to hyperkalemia. During hospital stay GDMT has been adjusted recently due to low BP, NICKI. Last ProBNP up to 5,498 from 3,583. Volume status and improved.   PH, mild combined pre and post capillary - RHC 5/20/24  demonstrates mPAP 31 with wedge 14 mmHg, RA 9 mmHg, PVR 2.2 and CI 3.8. RV function low normal.   Mild to Moderate MR/Mild-Mod TR/ Moderate AI - on echo in July.   CKD Stage 3b - prior baseline creatinine ~ 1.8-2.0 mg/dl,  now baseline ~1.4 mg/dl.  Follows with Dr. Devine.    Chronic Afib - s/p Watchman. Rates controlled.   LI - on CPAP. Wears 22% of the nights according to recent download. Endorsed not wearing at all  today. Was encouraged to wear more   Recurrent bilateral pleural effusions - hx of thoracentesis.   Hx Lupus/Mixed CTD/Severe polymyositis - continues IVIG infusions every 5 weeks. Last dose 10/21. Follows with Dr. Farmer. On chronic prednisone. Off Imuran due to pancytopenia.   Non-obstructive CAD  Chronic thrombocytopenia, immune mediated. Last  today. Follows with Dr. Orr.  Hx Hyponatremia, will follow with increase in MRA.   HERNANDEZ with biopsy proven stage F0-F1 Fibrosis on US 1/2022. Follows with Dr. Veronica, Hepatology.  Mild hyperkalemia - on low dose MRA and ARNI. Has required Veltassa in the past.  Anemia - last Hgb 12.8 g/dL; stable. Iron sat 11 and ferritin 42.5. Supplemented with 3 doses of Venofer, last dose 9/16.   Hypothyroidism - started on Synthroid per Dr. Jean in March, repeat TSH 2.19 and free T4 1.1 in May.   Hypoalbuminemia - last albumin improved to 3.9.  Hx Cellulitis - s/p antibiotics. Following in the Wound care clinic.    Plan:   Reduce Bumex from 2mg BID to 2mg in the am and 1mg in the pm.   Return to clinic in 3 weeks.  F/U with Dr. Craig as planned in January. Would consider discussing recalibration again at that time. Would benefit from CardioMEMS readings.   CHF discharge instructions given    I have spent 40 min total time on the day of the encounter, including: preparing to see the patient, obtaining and/or reviewing separately obtained history, performing a medically appropriate examination and/or evaluation, counseling and educating the patient/family caregiver, ordering medication, tests, or procedures, documenting clinical information in Epic, and independently interpreting results and communicating results to the patient/family caregiver     SHEA Fernandez

## 2024-11-14 NOTE — PATIENT INSTRUCTIONS
Heart Failure Discharge Instructions    Decrease Bumex to 2mg in AM and 1mg in PM. Call the [facility] if you start to gain weight.        Activity: Regular exercise and activity is important for your overall health and to help keep your heart strong and functioning as well as possible.   Walk at a slow to moderate pace for 15-20 minutes 3-5 days per week.     Follow these instructions every day to keep yourself in the Green Zone     The Green Zone means you are feeling well and your symptoms are under control                                    Medications  Take your medication every day as instructed  Do not stop taking your medicine or change the amount you are taking without instructions from your doctor or nurse  Do Not take non-steroidal antiinflammatory drugs such as ibuprofen, aleve, advil, or motrin                                    Diet/Fluids  People with heart failure should eat less sodium (salt) and limit fluid. Sodium attracts water and makes the body hold fluid. This extra fluid makes the heart work harder and can worsen the symptoms of heart failure.     Diet    2000 mg sodium daily  Fluid restriction    64 ounces daily  (8 oz. = 1 cup)                                     Body Weight  Weigh yourself every day before breakfast and write your weight down  Use the same scale and wear about the same amount of clothes each time  A sudden weight increase is due to fluid retention rather than fat                                         Activity  Pace your activities to avoid getting overtired  Take rest periods as needed  Elevate your feet to reduce ankle swelling when resting                             Signs of Worsening Heart Failure    You are entering the Yellow Zone - this is a warning zone    Call your doctor or nurse if you have any of these signs or symptoms:  You gain 2 or more pounds overnight or 3-5 pounds in 3-7 days  You have more trouble breathing  You get more tired with regular activity, or are  limiting activity because of shortness of breath or fatigue  You are short of breath lying down, you need more pillows to breathe comfortably,  or wake up during the night short of breath  You urinate less often during the day and more often at night  You have a bloated feeling, upset stomach, loss of appetite, or your clothes are fitting tighter    GO TO THE EMERGENCY DEPARTMENT or CALL 911 IF:    These are signs you are in the RED ZONE - THIS IS AN EMERGENCY  You have tightness or pain in your chest  You are extremely short of breath or can't catch your breath  You cough up frothy pink mucous  You feel confused or can't think clearly  You are traveling and develop symptoms of worsening heart failure     We respect everyone's time and availability. Please be aware that this is not a walk-in clinic and we require appointments in order to facilitate timely care for all patients. We ask you to arrive 30 minutes before your appointment to allow time for you to check-in and have your bloodwork drawn. Please understand if you are late for your appointment, you may be asked to reschedule. If possible, all attempts will be made to accommodate but realize this is no guarantee that this will always be available. We understand there are extenuating circumstances. If you need to cancel or reschedule your appointment, please call the [facility] within 24 hours at [phone].  Thank you for your cooperation, [facility] Staff.    If you are currently [employer] active, starting July 1st 2024, we will be utilizing [employer] messaging ONLY to confirm your appointment.    IF YOU HAVE QUESTIONS REGARDING YOUR BILL, FEEL FREE TO CONTACT [facility] PATIENT ACCOUNTS -[phone]. IF YOU NEED FINANCIAL ASSISTANCE, PLEASE CALL AN [facility] FINANCIAL COUNSELOR -[phone].             [facility]     [phone]

## 2024-11-25 ENCOUNTER — OFFICE VISIT (OUTPATIENT)
Dept: HEMATOLOGY/ONCOLOGY | Facility: HOSPITAL | Age: 79
End: 2024-11-25
Attending: INTERNAL MEDICINE
Payer: MEDICARE

## 2024-11-25 VITALS
HEART RATE: 84 BPM | TEMPERATURE: 97 F | HEIGHT: 70.98 IN | WEIGHT: 185.38 LBS | SYSTOLIC BLOOD PRESSURE: 122 MMHG | BODY MASS INDEX: 25.95 KG/M2 | RESPIRATION RATE: 18 BRPM | OXYGEN SATURATION: 97 % | DIASTOLIC BLOOD PRESSURE: 71 MMHG

## 2024-11-25 DIAGNOSIS — M35.1 MIXED CONNECTIVE TISSUE DISEASE (HCC): ICD-10-CM

## 2024-11-25 DIAGNOSIS — D69.3 IMMUNE THROMBOCYTOPENIC PURPURA (HCC): Primary | ICD-10-CM

## 2024-11-25 PROCEDURE — 96366 THER/PROPH/DIAG IV INF ADDON: CPT

## 2024-11-25 PROCEDURE — 96365 THER/PROPH/DIAG IV INF INIT: CPT

## 2024-11-25 RX ORDER — DIPHENHYDRAMINE HCL 25 MG
25 CAPSULE ORAL ONCE
OUTPATIENT
Start: 2024-12-30

## 2024-11-25 RX ORDER — ACETAMINOPHEN 325 MG/1
650 TABLET ORAL ONCE
OUTPATIENT
Start: 2024-12-30

## 2024-11-25 RX ORDER — DIPHENHYDRAMINE HCL 25 MG
25 CAPSULE ORAL ONCE
Status: COMPLETED | OUTPATIENT
Start: 2024-11-25 | End: 2024-11-25

## 2024-11-25 RX ORDER — ACETAMINOPHEN 325 MG/1
650 TABLET ORAL ONCE
Status: COMPLETED | OUTPATIENT
Start: 2024-11-25 | End: 2024-11-25

## 2024-11-25 RX ADMIN — ACETAMINOPHEN 650 MG: 325 TABLET ORAL at 11:25:00

## 2024-11-25 RX ADMIN — DIPHENHYDRAMINE HCL 25 MG: 25 MG CAPSULE ORAL at 11:27:00

## 2024-11-25 NOTE — PROGRESS NOTES
Pt here for IVIG . Pt denies any issues or concerns.      Ordering Provider: Dr Farmer  Order Exp: 10/23/25     Pt tolerated infusion without difficulty or complaint. Reviewed next apt date/time: 1/2/25 @11AM      Education Record  Learner:  Patient  Disease / Diagnosis: ITP  Barriers / Limitations:  None  Method:  Brief focused  General Topics:  Plan of care reviewed  Outcome:  Shows understanding    Pt discharged in stable condition.

## 2024-12-03 NOTE — PROGRESS NOTES
[address]  Heart Failure Clinic Progress Note    Primitivo Sánchez is a 66year old male who presents to clinic for APN assessment and management of chronic diastolic/RV heart failure and is functional class 2-3.      Subjective:  Gunjan Bui returns for rou Spoke with emil at Dr. Lopez's regarding staple removal. She states \" I will try to get a hold of someone and will get you a callback as soon as I can. John is in a major surgery right now and the nurse practitioner is out as well right now.\"    thrombocytopenia   • Blood in urine    • Cancer (HCC)     skin   • Candidiasis of the esophagus 6/29/2012    Due to steroid use for Autoimmune disorder    • Colon polyps 5/13/2013   • Congestive heart disease ([facility] [address]    • Diverticulosis of colon 5/14/2013 left superior tragus/MMS   • SKIN SURGERY  2-19-14    BCC-NOD to right superior pinna, MMS, AB   • SKIN SURGERY  02/16/2021    BCC- left superior forehead, MMS    • TONSILLECTOMY  1948   • UPPER GI ENDOSCOPY,EXAM  10/2003 2006 01/2010 , 5/13    x3   • [facility] [place] Latest Ref Range: 0 - 18 mmol/L 5   CALCULATED OSMOLALITY Latest Ref Range: 275 - 295 mOsm/kg 301 (H)   Patient Fasting?  Unknown Patient not present          predniSONE 1 MG Oral Tab, Take 2 mg by mouth daily with breakfast., Disp: , Rfl:   predniSONE 5 MG Rfl: 3  Calcium Citrate-Vitamin D (CITRACAL + D OR), Take 1 tablet by mouth daily. , Disp: , Rfl:   MULTI VITAMIN MENS OR, Take 1 tablet by mouth daily. , Disp: , Rfl:     No current facility-administered medications on file prior to encounter.     Exam: platelet AFBZN 035; stable. Follows with Dr. Jewel Butterfield. · Hx Hyponatremia   · HERNANDEZ with biopsy proven stage I Fibrosis.  Follows with Dr. Filomena Avila, Hepatology at [facility]  · Gout - on Colchicine now BID as of earlier this month and allopurinol 100mg PO daily add

## 2024-12-05 ENCOUNTER — HOSPITAL ENCOUNTER (OUTPATIENT)
Dept: LAB | Facility: HOSPITAL | Age: 79
Discharge: HOME | End: 2024-12-05
Attending: NURSE PRACTITIONER
Payer: MEDICARE

## 2024-12-05 ENCOUNTER — HOSPITAL ENCOUNTER (OUTPATIENT)
Dept: CARDIOLOGY CLINIC | Facility: HOSPITAL | Age: 79
Discharge: HOME | End: 2024-12-05
Attending: NURSE PRACTITIONER
Payer: MEDICARE

## 2024-12-05 VITALS
HEART RATE: 73 BPM | RESPIRATION RATE: 16 BRPM | BODY MASS INDEX: 26 KG/M2 | SYSTOLIC BLOOD PRESSURE: 110 MMHG | DIASTOLIC BLOOD PRESSURE: 72 MMHG | OXYGEN SATURATION: 100 % | WEIGHT: 185.38 LBS

## 2024-12-05 DIAGNOSIS — I50.32 CHRONIC DIASTOLIC HEART FAILURE (HCC): Primary | ICD-10-CM

## 2024-12-05 DIAGNOSIS — N18.32 STAGE 3B CHRONIC KIDNEY DISEASE (HCC): ICD-10-CM

## 2024-12-05 DIAGNOSIS — I50.812 CHRONIC RIGHT-SIDED HEART FAILURE (HCC): ICD-10-CM

## 2024-12-05 DIAGNOSIS — I48.20 CHRONIC A-FIB (HCC): ICD-10-CM

## 2024-12-05 LAB
ANION GAP SERPL CALC-SCNC: 9 MMOL/L (ref 0–18)
BUN BLD-MCNC: 39 MG/DL (ref 9–23)
CALCIUM BLD-MCNC: 9.4 MG/DL (ref 8.7–10.4)
CHLORIDE SERPL-SCNC: 102 MMOL/L (ref 98–112)
CO2 SERPL-SCNC: 28 MMOL/L (ref 21–32)
CREAT BLD-MCNC: 1.48 MG/DL
EGFRCR SERPLBLD CKD-EPI 2021: 47 ML/MIN/1.73M2 (ref 60–?)
ERYTHROCYTE [DISTWIDTH] IN BLOOD BY AUTOMATED COUNT: 16.4 %
GLUCOSE BLD-MCNC: 92 MG/DL (ref 70–99)
HCT VFR BLD AUTO: 42.7 %
HGB BLD-MCNC: 13.8 G/DL
MCH RBC QN AUTO: 34.1 PG (ref 26–34)
MCHC RBC AUTO-ENTMCNC: 32.3 G/DL (ref 31–37)
MCV RBC AUTO: 105.4 FL
NT-PROBNP SERPL-MCNC: 4620 PG/ML (ref ?–450)
OSMOLALITY SERPL CALC.SUM OF ELEC: 297 MOSM/KG (ref 275–295)
PLATELET # BLD AUTO: 91 10(3)UL (ref 150–450)
POTASSIUM SERPL-SCNC: 4.4 MMOL/L (ref 3.5–5.1)
RBC # BLD AUTO: 4.05 X10(6)UL
SODIUM SERPL-SCNC: 139 MMOL/L (ref 136–145)
WBC # BLD AUTO: 6.6 X10(3) UL (ref 4–11)

## 2024-12-05 PROCEDURE — 80048 BASIC METABOLIC PNL TOTAL CA: CPT | Performed by: NURSE PRACTITIONER

## 2024-12-05 PROCEDURE — 99215 OFFICE O/P EST HI 40 MIN: CPT | Performed by: NURSE PRACTITIONER

## 2024-12-05 PROCEDURE — 85027 COMPLETE CBC AUTOMATED: CPT | Performed by: NURSE PRACTITIONER

## 2024-12-05 PROCEDURE — 83880 ASSAY OF NATRIURETIC PEPTIDE: CPT | Performed by: NURSE PRACTITIONER

## 2024-12-05 PROCEDURE — 36415 COLL VENOUS BLD VENIPUNCTURE: CPT | Performed by: NURSE PRACTITIONER

## 2024-12-05 NOTE — PROGRESS NOTES
Patient assessed. No signs or symptoms of shortness of breath, fatigue, or bloating noted. Patient with mild pitting lower extremity edema with left> right. Weight up 2 lbs at 185.4 lbs. Reviewed current list of patient's allergies and medication; updated the Electronic Medical Record. Labs ordered to assess kidney function and drawn by  Lab.     Reviewed follow-up appointment and Heart Failure discharge instructions with patient. Patient verbalized an understanding. Patient completed the Fall Risk assessment with APN notified of results. Patient to return to the clinic in 1 month.      Fall Risk Assessment  Do you feel unsteady when standing or walking?: No  Do you worry about falling?: No  Have you fallen in the past year?: No

## 2024-12-05 NOTE — PROGRESS NOTES
[facility] Progress Note    Miguel Davila is a 79 year old male who presents to clinic for APN assessment and management of chronic diastolic heart failure and is functional class 2-3.     Subjective:  Since his last clinic visit on 11/14; when his Bumex was reduced from 2mg BID to 2mg in the am and 1mg in the pm, his weight is up 2 lbs. His home weights have been stable around 181 lbs most recently. His LE edema is stable and mild. He denies shortness of breath. Appetite is good.      He saw Dr. Craig 9/18; no changes made. He will see him in 4 months. He didn't feel strongly about recalibration of CardioMEMs since he feels great. He will see him in January.      He had RHC on 5/20 that shows RA 9 mmHg, mPAP 31 mmhg, wedge 14 mmHg, CI 3.8 and PVR 2.2 wood units. Dr. Jean recommended increasing Entresto with consideration for retrying SGLT2i and increase in MRA at a later date. CardioMEMs sensor was noted to be inaccurate and re-calibration was recommended. He as requested this be postponed since he had cholecystitis and has been slow to recover.       Last Hospitalizations/ED visits:     He was admitted from 6/6-6/10 with acute cholecystitis with localized peritonitis and underwent cholecystectomy 6/7. Treated with antibiotics. Aldactone, Bumex and Entresto were held during hospital stay d.t low BP. He was discharged on Bumex 1 mg, remained off ARNI and MRA.      Admitted again 7/8-7/12 with leg swelling, weeping and abdominal distension. He was felt to be fluid overloaded on admission, but then became hypotensive with diuresis. Albumin level was low and felt to be a contributing factor to leg swelling and hypotension. Subsequently, he was given an albumin infusion followed by diuretics and started to improve. LE US negative for DVT. Treated with antibiotics for cellulitis. GDMT started at low doses. To take PRN Bumex on discharge. Discharge weight of 189 lbs.      Review of Systems   Constitutional: Positive for weight gain.   Cardiovascular:  Positive for leg swelling (mild).   Respiratory: Negative.     Gastrointestinal: Negative.        HISTORY:  Past Medical History:    A-fib (HCC)    Arrhythmia    atrial fibrillation    Arthritis    Autoimmune disease (HCC)    hepatits, resolved anfd nephritis, resolved    Ramon's esophagus    Bleeding nose    Gums Treated    Blood disorder    thrombocytopenia    Blood in urine    Blurred vision    cataract due to steroids, surgery in June    Calculus of kidney    One time    Cancer (HCC)    skin    Candidiasis of the esophagus    Due to steroid use for Autoimmune disorder     Cataract    Colon polyps    Congestive heart disease (HCC)    Diarrhea, unspecified    Intermittent due to lupus    Diverticulosis of colon    Easy bruising    On and off prednisone for 20 years    Esophageal polyp    Esophageal reflux    Essential hypertension    Fatigue    polymyositis and lupus    Gout    Hammer toe, acquired    Heart palpitations    Afib    Hepatitis    autoimmune induce hepatitis d/t lupus    High blood pressure    IBS (irritable bowel syndrome)    mild d/t lupus    Irregular bowel habits    Leg swelling    Heart failure, probably caused by lupus    Lupus (HCC)    MCTD (mixed connective tissue disease) (HCC)    Obstructive sleep apnea (adult) (pediatric)    LI (obstructive sleep apnea)    AHI 37  Supine AHI 38 non-supine AHI 16 Sao2 Pj 83%     LI (obstructive sleep apnea)    AHI 36 RDI 36 REM AHI 45 Supine AHI 65 non-supine AHI 19 Sao2 Pj 86% CPAP 9cwp    Pain in joints    Personal history of antineoplastic chemotherapy    Pleural effusion    right    Pneumonia due to organism    Polymyositis (HCC)    Presence of other cardiac implants and grafts    watchman filter    Problems with swallowing    dysphagia in 2006    Pulmonary hypertension (HCC)    Raynaud disease    Raynaud disease    Renal disorder    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     Shortness of breath    mainly due to pm or lupus    Sleep apnea    CPAP    Thrombocytopathia (HCC)    Visual impairment    bifocals for reading & computer; distance for driving    Wears glasses      Past Surgical History:   Procedure Laterality Date    Appendectomy  1970    Appendectomy      Cardiac catheterization  09/2019    Cataract Bilateral     Cholecystectomy      Colonoscopy  10/2003 2006 01/2010    x3    Colonoscopy  05/14/2013    Colonoscopy N/A 05/01/2018    Procedure: COLONOSCOPY;  Surgeon: Isaiah Tobar MD;  Location:  ENDOSCOPY    Colonoscopy N/A 04/12/2024    Procedure: COLONOSCOPY;  Surgeon: Gerardo Neves MD;  Location:  ENDOSCOPY    Colonoscopy with biopsy  05/14/2013    E[facility]      Hand/finger surgery unlisted  2003    right    Needle biopsy liver      Other  12/2005    needle bipsy of kidney    Other surgical history  2017    watchman filter    Percutaneous  angioplasty  (ptca)- pbp only  2006    right    Rectum surgery procedure unlisted  1946    rectal surgery polypectomy    Skin surgery      MMS BCC R temple 6/24/09    Skin surgery  2007    basal ceell carcinoma    Skin surgery  07/18/2012    BCC-nodular to right superior eyebrow/ MOHS    Skin surgery  07/15/2013    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/MMS    Skin surgery  02/19/2014    BCC-NOD to right superior pinna, MMS, AB    Skin surgery  02/16/2021    BCC- left superior forehead, MMS     Skin surgery  03/07/2022    SCC IN SITU RIGHT ANTIHELIX MMS BY DR GASTELUM    Tonsillectomy  1948    Upper gi endoscopy,exam  10/2003 2006 01/2010 , 5/13    x3      Family History   Problem Relation Age of Onset    Heart Disease Father         CHF    Gastro-Intestinal Disorder Father         Diverticulosis    Alcohol and Other Disorders Associated Father     Heart Attack Father     Heart Disease Mother         CHF    Breast Cancer Mother     Stroke Mother     Cancer Paternal Grandfather     Heart Attack Paternal Grandmother     Kidney Disease Son      Other (Ramon's Esophagus) Son     Heart Attack Maternal Grandfather       Social History     Socioeconomic History    Marital status:    Occupational History    Occupation: Retired    Tobacco Use    Smoking status: Former     Current packs/day: 0.00     Average packs/day: 0.5 packs/day for 15.0 years (7.5 ttl pk-yrs)     Types: Cigarettes     Start date: 1958     Quit date: 1973     Years since quittin.3    Smokeless tobacco: Never    Tobacco comments:     5 cigarettes daily, stopped smoking in    Vaping Use    Vaping status: Never Used   Substance and Sexual Activity    Alcohol use: Yes     Alcohol/week: 13.0 - 15.0 standard drinks of alcohol     Types: 5 Glasses of wine, 8 - 10 Standard drinks or equivalent per week     Comment: 1 per day wine or liquor    Drug use: Never   Other Topics Concern    Caffeine Concern Yes     Comment: 1 soda per day and decaf coffee    Sleep Concern No    Exercise Yes     Comment: 3-4 times weekly    Seat Belt Yes     Social Drivers of Health     Financial Resource Strain: Low Risk  (2024)    Financial Resource Strain     Difficulty of Paying Living Expenses: Not very hard     Med Affordability: No   Food Insecurity: No Food Insecurity (2024)    Food Insecurity     Food Insecurity: Never true   Transportation Needs: No Transportation Needs (2024)    Transportation Needs     Lack of Transportation: No   Housing Stability: Low Risk  (2024)    Housing Stability     Housing Instability: No           Objective:    Cardiac Rhythm: Atrial fibrillation    /72   Pulse 73   Resp 16   Wt 185 lb 6.4 oz (84.1 kg)   SpO2 100%   BMI 25.87 kg/m²     Wt Readings from Last 6 Encounters:   24 185 lb 6.4 oz (84.1 kg)   24 185 lb 6.4 oz (84.1 kg)   24 183 lb (83 kg)   10/21/24 185 lb 9.6 oz (84.2 kg)   10/14/24 185 lb (83.9 kg)   24 187 lb 6.4 oz (85 kg)            Recent Results (from the past 24 hours)   Basic Metabolic Panel  (8)    Collection Time: 12/05/24 12:56 PM   Result Value Ref Range    Glucose 92 70 - 99 mg/dL    Sodium 139 136 - 145 mmol/L    Potassium 4.4 3.5 - 5.1 mmol/L    Chloride 102 98 - 112 mmol/L    CO2 28.0 21.0 - 32.0 mmol/L    Anion Gap 9 0 - 18 mmol/L    BUN 39 (H) 9 - 23 mg/dL    Creatinine 1.48 (H) 0.70 - 1.30 mg/dL    Calcium, Total 9.4 8.7 - 10.4 mg/dL    Calculated Osmolality 297 (H) 275 - 295 mOsm/kg    eGFR-Cr 47 (L) >=60 mL/min/1.73m2    Patient Fasting for BMP? Patient not present    Pro Beta Natriuretic Peptide    Collection Time: 12/05/24 12:56 PM   Result Value Ref Range    Pro-Beta Natriuretic Peptide 4,620 (H) <450 pg/mL   CBC, Platelet; No Differential    Collection Time: 12/05/24 12:56 PM   Result Value Ref Range    WBC 6.6 4.0 - 11.0 x10(3) uL    RBC 4.05 3.80 - 5.80 x10(6)uL    HGB 13.8 13.0 - 17.5 g/dL    HCT 42.7 39.0 - 53.0 %    PLT 91.0 (L) 150.0 - 450.0 10(3)uL    .4 (H) 80.0 - 100.0 fL    MCH 34.1 (H) 26.0 - 34.0 pg    MCHC 32.3 31.0 - 37.0 g/dL    RDW 16.4 %   Scan slide    Collection Time: 12/05/24 12:56 PM   Result Value Ref Range    Slide Review       Slide reviewed, normal WBC, RBC, and platelet morphology.         Current Outpatient Medications:     bumetanide 1 MG Oral Tab, Take 2 tablets (2 mg total) by mouth every morning AND 1 tablet (1 mg total) Every afternoon at 2:00 pm., Disp: , Rfl:     omeprazole 20 MG Oral Capsule Delayed Release, TAKE 1 CAPSULE BY MOUTH TWICE A DAY, Disp: 180 capsule, Rfl: 3    ALLOPURINOL 300 MG Oral Tab, TAKE 1 TABLET BY MOUTH EVERY DAY, Disp: 90 tablet, Rfl: 3    PREDNISONE 1 MG Oral Tab, TAKE 2 TABLETS (2 MG TOTAL) BY MOUTH DAILY WITH BREAKFAST, Disp: 180 tablet, Rfl: 0    ipratropium 0.06 % Nasal Solution, SPRAY 1 SPRAY INTO EACH NOSTRIL TWICE A DAY, Disp: 1 each, Rfl: 5    predniSONE 5 MG Oral Tab, TAKE 1 TABLET BY MOUTH EVERY DAY WITH BREAKFAST, Disp: 90 tablet, Rfl: 1    spironolactone 25 MG Oral Tab, Take 1 tablet (25 mg total) by mouth  daily., Disp: , Rfl:     fexofenadine 180 MG Oral Tab, Take 1 tablet (180 mg total) by mouth daily as needed., Disp: , Rfl:     levothyroxine 50 MCG Oral Tab, Take 1 tablet (50 mcg total) by mouth before breakfast., Disp: , Rfl:     metoprolol succinate  MG Oral Tablet 24 Hr, Take 1 tablet (100 mg total) by mouth every morning AND 0.5 tablets (50 mg total) every evening., Disp: 60 tablet, Rfl: 3    aspirin 81 MG Oral Tab EC, Take 1 tablet (81 mg total) by mouth daily., Disp: 30 tablet, Rfl: 0    PREVIDENT 5000 BOOSTER PLUS 1.1 % Dental Paste, , Disp: , Rfl:     Multiple Vitamins-Minerals (TAB-A-KEVIN) Oral Tab, Take 1 tablet by mouth daily., Disp: , Rfl:     Immune Globulin, Human, 10 g Intravenous Recon Soln, Inject 0.4 g/kg into the vein. Given for treatment of polymyositis, mixed connective tissue disease, and immune thrombocytopenia purpura monthly at [facility]. Every 5 weeks, Disp: 3 each, Rfl: 0    Calcium Citrate-Vitamin D (CITRACAL + D OR), Take 1 tablet by mouth daily., Disp: , Rfl:     Exam:   General:         Alert, in no apparent distress  HEENT:           no JVD  Lungs:            CTAB                     CV:                  irregularly irregular  Abdomen:       Non-distended, soft  Extremities:    trace-+1 LE edema   Neuro:             A&O x 3  Skin:                Pink, warm, dry    Education:  Patient instructed regarding sodium restricted diet, low sodium foods, fluid restriction, daily weights, medication regimen, s/s HF exacerbation and when to call APN/clinic.         [x] CardioMEMs  [] ARNi/ACEi/ARB - hypotension  [x] BB  [x] MRA  [] SGLT2i - possible pancreatitis       Assessment:   Chronic diastolic, RV heart failure - LVEF 55-60% and stable on echo 7/2024, low normal RV function. RHC 5/2024 with PCWP 14, RA 9. S/p CardioMEMs; needs re-calibration per recent RHC. Off Jardiance, thought to be due to pancreatitis. MRA discontinued at [facility] for dehydration and near syncope; since  have restarted but required reduced dose due to hyperkalemia. During hospital stay GDMT has been adjusted recently due to low BP, NICKI. Last ProBNP up to 5,498 from 3,583. Volume status stable.  PH, mild combined pre and post capillary - RHC 5/20/24  demonstrates mPAP 31 with wedge 14 mmHg, RA 9 mmHg, PVR 2.2 and CI 3.8. RV function low normal.   Mild to Moderate MR/Mild-Mod TR/ Moderate AI - on echo in July.   CKD Stage 3b - prior baseline creatinine ~ 1.8-2.0 mg/dl,  now baseline ~1.4 mg/dl. Stable. Follows with Dr. Devine.    Chronic Afib - s/p Watchman. Rates controlled.   LI - on CPAP. Wears 22% of the nights according to recent download. Endorsed not wearing at all today. Was encouraged to wear more   Recurrent bilateral pleural effusions - hx of thoracentesis.   Hx Lupus/Mixed CTD/Severe polymyositis - continues IVIG infusions every 5 weeks. Last dose 11/25. Follows with Dr. Farmer. On chronic prednisone. Off Imuran due to pancytopenia.   Non-obstructive CAD  Chronic thrombocytopenia, immune mediated. Last  today. Follows with Dr. Orr.  Hx Hyponatremia, will follow with increase in MRA.   HERNANDEZ with biopsy proven stage F0-F1 Fibrosis on US 1/2022. Follows with Dr. Veronica, Hepatology.  Mild hyperkalemia - on low dose MRA and ARNI. Has required Veltassa in the past.  Anemia - last Hgb 12.8 g/dL; stable. Iron sat 11 and ferritin 42.5. Supplemented with 3 doses of Venofer, last dose 9/16.   Hypothyroidism - started on Synthroid per Dr. Jean in March, repeat TSH 2.19 and free T4 1.1 in May.   Hypoalbuminemia - last albumin improved to 3.9.  Hx Cellulitis - s/p antibiotics and wound care clinic.    Plan:   Continue current medications.    Return to clinic in 1 month.   F/U with Dr. Craig as planned in January. Would consider discussing recalibration again at that time. Would benefit from CardioMEMS readings.   CHF discharge instructions given    I have spent 40 min total time on the day of the  encounter, including: preparing to see the patient, obtaining and/or reviewing separately obtained history, performing a medically appropriate examination and/or evaluation, counseling and educating the patient/family caregiver, ordering medication, tests, or procedures, documenting clinical information in Epic, and independently interpreting results and communicating results to the patient/family caregiver     SHEA Fernandez

## 2024-12-05 NOTE — PATIENT INSTRUCTIONS
Heart Failure Discharge Instructions      Activity: Regular exercise and activity is important for your overall health and to help keep your heart strong and functioning as well as possible.   Walk at a slow to moderate pace for 15-20 minutes 3-5 days per week.     Follow these instructions every day to keep yourself in the Green Zone     The Green Zone means you are feeling well and your symptoms are under control                                    Medications  Take your medication every day as instructed  Do not stop taking your medicine or change the amount you are taking without instructions from your doctor or nurse  Do Not take non-steroidal antiinflammatory drugs such as ibuprofen, aleve, advil, or motrin                                    Diet/Fluids  People with heart failure should eat less sodium (salt) and limit fluid. Sodium attracts water and makes the body hold fluid. This extra fluid makes the heart work harder and can worsen the symptoms of heart failure.     Diet    2000 mg sodium daily  Fluid restriction    64 ounces daily  (8 oz. = 1 cup)                                     Body Weight  Weigh yourself every day before breakfast and write your weight down  Use the same scale and wear about the same amount of clothes each time  A sudden weight increase is due to fluid retention rather than fat                                         Activity  Pace your activities to avoid getting overtired  Take rest periods as needed  Elevate your feet to reduce ankle swelling when resting                             Signs of Worsening Heart Failure    You are entering the Yellow Zone - this is a warning zone    Call your doctor or nurse if you have any of these signs or symptoms:  You gain 2 or more pounds overnight or 3-5 pounds in 3-7 days  You have more trouble breathing  You get more tired with regular activity, or are limiting activity because of shortness of breath or fatigue  You are short of breath lying  down, you need more pillows to breathe comfortably,  or wake up during the night short of breath  You urinate less often during the day and more often at night  You have a bloated feeling, upset stomach, loss of appetite, or your clothes are fitting tighter    GO TO THE EMERGENCY DEPARTMENT or CALL 911 IF:    These are signs you are in the RED ZONE - THIS IS AN EMERGENCY  You have tightness or pain in your chest  You are extremely short of breath or can't catch your breath  You cough up frothy pink mucous  You feel confused or can't think clearly  You are traveling and develop symptoms of worsening heart failure     We respect everyone's time and availability. Please be aware that this is not a walk-in clinic and we require appointments in order to facilitate timely care for all patients. We ask you to arrive 30 minutes before your appointment to allow time for you to check-in and have your bloodwork drawn. Please understand if you are late for your appointment, you may be asked to reschedule. If possible, all attempts will be made to accommodate but realize this is no guarantee that this will always be available. We understand there are extenuating circumstances. If you need to cancel or reschedule your appointment, please call the [facility] within 24 hours at [phone].  Thank you for your cooperation, [facility] Staff.    If you are currently [employer] active, starting July 1st 2024, we will be utilizing [employer] messaging ONLY to confirm your appointment.    IF YOU HAVE QUESTIONS REGARDING YOUR BILL, FEEL FREE TO CONTACT [facility] PATIENT ACCOUNTS -[phone]. IF YOU NEED FINANCIAL ASSISTANCE, PLEASE CALL AN [facility] FINANCIAL COUNSELOR -[phone].             [facility]     [phone]

## 2024-12-06 ENCOUNTER — PATIENT OUTREACH (OUTPATIENT)
Dept: CASE MANAGEMENT | Age: 79
End: 2024-12-06

## 2024-12-06 ENCOUNTER — PATIENT MESSAGE (OUTPATIENT)
Dept: RHEUMATOLOGY | Facility: CLINIC | Age: 79
End: 2024-12-06

## 2024-12-06 DIAGNOSIS — K22.70 BARRETT'S ESOPHAGUS WITHOUT DYSPLASIA: ICD-10-CM

## 2024-12-06 DIAGNOSIS — C44.219 BASAL CELL CARCINOMA (BCC) OF SKIN OF LEFT EAR: ICD-10-CM

## 2024-12-06 DIAGNOSIS — Z87.19 HISTORY OF PANCREATITIS: ICD-10-CM

## 2024-12-06 DIAGNOSIS — N18.31 STAGE 3A CHRONIC KIDNEY DISEASE (HCC): ICD-10-CM

## 2024-12-06 DIAGNOSIS — I27.20 PULMONARY HYPERTENSION (HCC): ICD-10-CM

## 2024-12-06 DIAGNOSIS — M10.9 GOUT, UNSPECIFIED CAUSE, UNSPECIFIED CHRONICITY, UNSPECIFIED SITE: ICD-10-CM

## 2024-12-06 DIAGNOSIS — I10 BENIGN ESSENTIAL HYPERTENSION: Primary | ICD-10-CM

## 2024-12-06 DIAGNOSIS — I50.32 CHRONIC HEART FAILURE WITH PRESERVED EJECTION FRACTION (HCC): ICD-10-CM

## 2024-12-06 DIAGNOSIS — G47.33 OSA (OBSTRUCTIVE SLEEP APNEA): ICD-10-CM

## 2024-12-06 DIAGNOSIS — I48.19 PERSISTENT ATRIAL FIBRILLATION (HCC): ICD-10-CM

## 2024-12-06 NOTE — PROGRESS NOTES
Spoke to Miguel for Chronic Care Management.      Updates to patient care team/comments: UTD  Patient reported changes in medications: UTD  Med Adherence  Comment: pt taking medications as prescribed     Health Maintenance:   Health Maintenance   Topic Date Due    Annual Physical  01/29/2025    Colorectal Cancer Screening  04/12/2027    Influenza Vaccine  Completed    Annual Depression Screening  Completed    Fall Risk Screening (Annual)  Completed    Pneumococcal Vaccine: 65+ Years  Completed    Zoster Vaccines  Completed       Patient updates/concerns:    Spoke to pt for monthly outreach   Reviewed care gaps with pt and reminded him that annual physical is due. Pt verbalized understanding and declined ccm assistance in scheduling appt. Pt states that he will contact the office and schedule.    Pt has visit upcoming with dr ivey. Pt will send my chart message to specialist to determine if lab work is necessary. Pt states he is not having any acute concerns and visit is routine.    Pt has seen heart clinic. She states that they are satisfied with his condition. He continues to closely monitor swelling and will be following up again at heart clinic in one month. Pt states that he is noticing a decrease in his fatigue.    Pt is still getting his exercise with daily activity. He has noticed that his capacity for activity has increased and he feels that he is getting stronger. He discussed his surprise at how slow his recovery has been but he is satisfied that his condition is not in decline.  He says he will still have days when he is not as strong but those days are becoming fewer and further between,    Pt has visit scheduled for dr gar early in the new year. Pt states that visit is routine andhe is not experiencing any acute symptoms or change in condition   Pt manages all aspects of his wifes care and states that his ability to care for her remains unchanged and he can safely manage all of the demands.   Pt  did not have any new questions or concerns for pcp or clinical staff and his prescriptions are current and up to date.   Goals/Action Plan:    Active goal from previous outreach:   Manage healthcare  Patient reported progress towards goals: pt continues to see providers as needed and takes medications as prescribed               - What: exercise           - Where/When/How: pt is getting his exercise with daily activity and managing wifes care.   Patient Reported Barriers and Concerns: n/a                   - Plan for overcoming barriers: none    Care Managers Interventions: continue to provide encouragement and education for healthy coping and dx    Future Appointments:   Future Appointments   Date Time Provider Department Center   12/30/2024 11:40 AM Taye Farmer MD [id number]   1/2/2025 11:00 AM EH TX RN3 EH CHEMO [facility]   1/14/2025  1:30 PM EH CARD PHLEBOTOMY RM1 EH CARD LA [facility]   1/14/2025  2:00 PM HEART FAILURE APN 1 EH HF CLIN [facility]         Next Care Manager Follow Up Date: one month    Reason For Follow Up: review progress and or barriers towards patient's goals.     Time Spent This Encounter Total: 20 min medical record review, telephone communication, care plan updates where needed, education, goals, and action plan recreation/update. Provided acknowledgment and validation to patient's concerns.   Monthly Minute Total including today: 20  Physical assessment, complete health history, and need for CCM established by Eric Simon MD.    Friendly reminder - 2025 Benefits Open Enrollment is here!   We encourage you to review your current Medicare coverage and explore your options during this period. We have developed a Medicare Information Page on our website to serve as a resource for guidance and answers to any questions you might have.   You can access it by visiting, [email or web address]

## 2024-12-07 ENCOUNTER — TELEPHONE (OUTPATIENT)
Dept: FAMILY MEDICINE CLINIC | Facility: CLINIC | Age: 79
End: 2024-12-07

## 2024-12-07 DIAGNOSIS — Z13.1 SCREENING FOR DIABETES MELLITUS: ICD-10-CM

## 2024-12-07 DIAGNOSIS — D50.9 IRON DEFICIENCY ANEMIA, UNSPECIFIED IRON DEFICIENCY ANEMIA TYPE: ICD-10-CM

## 2024-12-07 DIAGNOSIS — Z79.899 LONG-TERM USE OF HIGH-RISK MEDICATION: ICD-10-CM

## 2024-12-07 DIAGNOSIS — E78.6 LOW HDL (UNDER 40): ICD-10-CM

## 2024-12-07 DIAGNOSIS — I50.32 CHRONIC HEART FAILURE WITH PRESERVED EJECTION FRACTION (HCC): Primary | ICD-10-CM

## 2024-12-07 DIAGNOSIS — E79.0 HYPERURICEMIA: ICD-10-CM

## 2024-12-07 DIAGNOSIS — E61.1 IRON DEFICIENCY: ICD-10-CM

## 2024-12-07 DIAGNOSIS — Z13.0 SCREENING FOR IRON DEFICIENCY ANEMIA: ICD-10-CM

## 2024-12-07 DIAGNOSIS — N18.31 STAGE 3A CHRONIC KIDNEY DISEASE (CKD) (HCC): ICD-10-CM

## 2024-12-07 DIAGNOSIS — N18.31 STAGE 3A CHRONIC KIDNEY DISEASE (HCC): ICD-10-CM

## 2024-12-07 DIAGNOSIS — Z13.29 SCREENING FOR THYROID DISORDER: ICD-10-CM

## 2024-12-07 NOTE — TELEPHONE ENCOUNTER
Please enter lab orders for the patient's upcoming physical appointment.     Physical scheduled:   Your appointments       Date & Time Appointment Department ([place])    Jan 17, 2025 2:00 PM CST Medicare Annual Well Visit with Eric Simon MD [facility] ([facility])              [facility]  [address]  [facility] [address]  [phone]           Preferred lab: [facility] ([facility])     The patient has been notified to complete fasting labs prior to their physical appointment.

## 2024-12-08 NOTE — TELEPHONE ENCOUNTER
1. Chronic heart failure with preserved ejection fraction (HCC) (Primary)  Overview:  Naseem Dior MD managing  Orders:  -     TSH W Reflex To Free T4; Future; Expected date: 12/07/2024  2. Stage 3a chronic kidney disease (HCC)  -     CBC With Differential With Platelet; Future; Expected date: 12/07/2024  3. Iron deficiency anemia, unspecified iron deficiency anemia type  -     Ferritin; Future; Expected date: 12/07/2024  -     Iron And Tibc; Future; Expected date: 12/07/2024  -     CBC With Differential With Platelet; Future; Expected date: 12/07/2024  4. Low HDL (under 40)  Overview:  HDL 28  Orders:  -     Lipid Panel; Future; Expected date: 12/07/2024  5. Screening for diabetes mellitus  -     Comp Metabolic Panel (14); Future; Expected date: 12/07/2024  6. Screening for iron deficiency anemia  7. Screening for thyroid disorder  -     TSH W Reflex To Free T4; Future; Expected date: 12/07/2024  8. Long-term use of high-risk medication  -     Comp Metabolic Panel (14); Future; Expected date: 12/07/2024  9. Stage 3a chronic kidney disease (CKD) (HCC)  -     Comp Metabolic Panel (14); Future; Expected date: 12/07/2024  10. Hyperuricemia  -     Uric Acid; Future; Expected date: 12/07/2024  -     Comp Metabolic Panel (14); Future; Expected date: 12/07/2024  11. Iron deficiency  -     Ferritin; Future; Expected date: 12/07/2024  -     Iron And Tibc; Future; Expected date: 12/07/2024       OK to notify. Thanks, Rohit Simon MD

## 2024-12-19 RX ORDER — SPIRONOLACTONE 25 MG/1
25 TABLET ORAL DAILY
Qty: 90 TABLET | Refills: 1 | Status: SHIPPED | OUTPATIENT
Start: 2024-12-19

## 2024-12-26 ENCOUNTER — LAB ENCOUNTER (OUTPATIENT)
Dept: LAB | Age: 79
End: 2024-12-26
Attending: INTERNAL MEDICINE
Payer: MEDICARE

## 2024-12-26 DIAGNOSIS — M35.1 MIXED CONNECTIVE TISSUE DISEASE (HCC): ICD-10-CM

## 2024-12-26 DIAGNOSIS — M33.20 POLYMYOSITIS (HCC): Chronic | ICD-10-CM

## 2024-12-26 DIAGNOSIS — Z95.818 PRESENCE OF WATCHMAN LEFT ATRIAL APPENDAGE CLOSURE DEVICE: ICD-10-CM

## 2024-12-26 DIAGNOSIS — K76.0 NAFLD (NONALCOHOLIC FATTY LIVER DISEASE): Primary | ICD-10-CM

## 2024-12-26 LAB
ALBUMIN SERPL-MCNC: 4 G/DL (ref 3.2–4.8)
ALBUMIN/GLOB SERPL: 1.2 {RATIO} (ref 1–2)
ALP LIVER SERPL-CCNC: 60 U/L
ALT SERPL-CCNC: 19 U/L
ANION GAP SERPL CALC-SCNC: 4 MMOL/L (ref 0–18)
AST SERPL-CCNC: 23 U/L (ref ?–34)
BASOPHILS # BLD AUTO: 0.06 X10(3) UL (ref 0–0.2)
BASOPHILS NFR BLD AUTO: 0.6 %
BILIRUB SERPL-MCNC: 0.9 MG/DL (ref 0.2–1.1)
BUN BLD-MCNC: 40 MG/DL (ref 9–23)
CALCIUM BLD-MCNC: 9.4 MG/DL (ref 8.7–10.4)
CHLORIDE SERPL-SCNC: 104 MMOL/L (ref 98–112)
CK SERPL-CCNC: 15 U/L
CO2 SERPL-SCNC: 32 MMOL/L (ref 21–32)
CREAT BLD-MCNC: 1.6 MG/DL
EGFRCR SERPLBLD CKD-EPI 2021: 43 ML/MIN/1.73M2 (ref 60–?)
EOSINOPHIL # BLD AUTO: 0.21 X10(3) UL (ref 0–0.7)
EOSINOPHIL NFR BLD AUTO: 2.1 %
ERYTHROCYTE [DISTWIDTH] IN BLOOD BY AUTOMATED COUNT: 16.4 %
ERYTHROCYTE [SEDIMENTATION RATE] IN BLOOD: 21 MM/HR
FASTING STATUS PATIENT QL REPORTED: YES
GLOBULIN PLAS-MCNC: 3.3 G/DL (ref 2–3.5)
GLUCOSE BLD-MCNC: 96 MG/DL (ref 70–99)
HCT VFR BLD AUTO: 46.4 %
HGB BLD-MCNC: 14.7 G/DL
IMM GRANULOCYTES # BLD AUTO: 0.04 X10(3) UL (ref 0–1)
IMM GRANULOCYTES NFR BLD: 0.4 %
LYMPHOCYTES # BLD AUTO: 2.05 X10(3) UL (ref 1–4)
LYMPHOCYTES NFR BLD AUTO: 20.7 %
MCH RBC QN AUTO: 34.5 PG (ref 26–34)
MCHC RBC AUTO-ENTMCNC: 31.7 G/DL (ref 31–37)
MCV RBC AUTO: 108.9 FL
MONOCYTES # BLD AUTO: 0.85 X10(3) UL (ref 0.1–1)
MONOCYTES NFR BLD AUTO: 8.6 %
NEUTROPHILS # BLD AUTO: 6.71 X10 (3) UL (ref 1.5–7.7)
NEUTROPHILS # BLD AUTO: 6.71 X10(3) UL (ref 1.5–7.7)
NEUTROPHILS NFR BLD AUTO: 67.6 %
OSMOLALITY SERPL CALC.SUM OF ELEC: 300 MOSM/KG (ref 275–295)
PLATELET # BLD AUTO: 129 10(3)UL (ref 150–450)
POTASSIUM SERPL-SCNC: 4.9 MMOL/L (ref 3.5–5.1)
PROT SERPL-MCNC: 7.3 G/DL (ref 5.7–8.2)
RBC # BLD AUTO: 4.26 X10(6)UL
SODIUM SERPL-SCNC: 140 MMOL/L (ref 136–145)
WBC # BLD AUTO: 9.9 X10(3) UL (ref 4–11)

## 2024-12-26 PROCEDURE — 85025 COMPLETE CBC W/AUTO DIFF WBC: CPT

## 2024-12-26 PROCEDURE — 80053 COMPREHEN METABOLIC PANEL: CPT

## 2024-12-26 PROCEDURE — 82550 ASSAY OF CK (CPK): CPT

## 2024-12-26 PROCEDURE — 85652 RBC SED RATE AUTOMATED: CPT

## 2024-12-26 PROCEDURE — 36415 COLL VENOUS BLD VENIPUNCTURE: CPT

## 2024-12-30 ENCOUNTER — OFFICE VISIT (OUTPATIENT)
Dept: RHEUMATOLOGY | Facility: CLINIC | Age: 79
End: 2024-12-30
Payer: MEDICARE

## 2024-12-30 VITALS
TEMPERATURE: 98 F | RESPIRATION RATE: 16 BRPM | WEIGHT: 185 LBS | HEIGHT: 70 IN | SYSTOLIC BLOOD PRESSURE: 122 MMHG | BODY MASS INDEX: 26.48 KG/M2 | OXYGEN SATURATION: 98 % | HEART RATE: 67 BPM | DIASTOLIC BLOOD PRESSURE: 64 MMHG

## 2024-12-30 DIAGNOSIS — I43 CARDIOMYOPATHY AS MANIFESTATION OF UNDERLYING DISEASE (HCC): ICD-10-CM

## 2024-12-30 DIAGNOSIS — I50.32 CHRONIC HEART FAILURE WITH PRESERVED EJECTION FRACTION (HCC): ICD-10-CM

## 2024-12-30 DIAGNOSIS — M35.1 MIXED CONNECTIVE TISSUE DISEASE (HCC): Primary | ICD-10-CM

## 2024-12-30 DIAGNOSIS — D69.6 THROMBOCYTOPENIA (HCC): ICD-10-CM

## 2024-12-30 DIAGNOSIS — M33.20 POLYMYOSITIS (HCC): Chronic | ICD-10-CM

## 2024-12-30 DIAGNOSIS — N18.31 STAGE 3A CHRONIC KIDNEY DISEASE (HCC): ICD-10-CM

## 2024-12-30 PROCEDURE — 99214 OFFICE O/P EST MOD 30 MIN: CPT | Performed by: INTERNAL MEDICINE

## 2024-12-30 NOTE — PROGRESS NOTES
[facility]  Dr. Farmer Progress Note     Subjective:   Miguel Davila is a(n) 79 year old male.   Current complaints:   Chief Complaint   Patient presents with    Follow - Up     LOV: 8/27/24  Patient is here for a follow up visit. Patient states that he's been good over the last couple of months especially. No pain or flares of any kind  Rapid 3:    History of mixed connective tissue disease, with overlap of polymyositis, lupus, and immune thrombocytopenia purpura.  Over the years has developed significant cardiomyopathy.  And IVIG monthly which is definitely helped his myositis and autoimmune disease.  Also on prednisone 7.5 mg/day.  Followed regularly by cardiology.   Doing ok, gradually feels stronger.  Rough almaz after GB surgery.   Some fatigue.    Wife Chandler has Alzheimer's.  Bruises easily- on Prednisone 7 mg per day.    Objective:   /64   Pulse 67   Temp 97.6 °F (36.4 °C)   Resp 16   Ht 5' 10\" (1.778 m)   Wt 185 lb (83.9 kg)   SpO2 98%   BMI 26.54 kg/m²   Lungs clear  Heart nsr  No leg edema.   Strength ok  12/26/2024 glucose 96 sodium 140 potassium 4.9 chloride 104 BUN 40 creatinine 1.6 GFR 43 calcium 9.4 alkaline phosphatase 60 AST 23 ALT 19 bilirubin 0.9 globulin 3.3  CK15.  Total protein 7.3 albumin 4.0  White count 9.9 hemoglobin 14.7 hematocrit 46.4 platelet count 129,000   Sed rate 21  Assessment:     Encounter Diagnoses   Name Primary?    Mixed connective tissue disease (HCC) Yes    Polymyositis (HCC)     Stage 3a chronic kidney disease (HCC)     Thrombocytopenia (HCC)     Cardiomyopathy as manifestation of underlying disease (HCC)      Plan:     Patient Instructions   Continue IVIG monthly.  Prednisone 7 mg per day.    Continue cardiology visits regularly.   Exercise regularly.  Present labs are stable.  Platelets 129.000/.  Sed rate21.  Return to office 3 months.          Taye Farmer MD 12/30/2024 2:25 PM

## 2024-12-30 NOTE — PATIENT INSTRUCTIONS
Continue IVIG monthly.  Prednisone 7 mg per day.    Continue cardiology visits regularly.   Exercise regularly.  Present labs are stable.  Platelets 129.000/.  Sed rate21.  Return to office 3 months.

## 2025-01-02 ENCOUNTER — OFFICE VISIT (OUTPATIENT)
Age: 80
End: 2025-01-02
Attending: INTERNAL MEDICINE
Payer: MEDICARE

## 2025-01-02 VITALS
OXYGEN SATURATION: 100 % | WEIGHT: 185 LBS | TEMPERATURE: 97 F | DIASTOLIC BLOOD PRESSURE: 73 MMHG | HEART RATE: 80 BPM | HEIGHT: 70.98 IN | BODY MASS INDEX: 25.9 KG/M2 | RESPIRATION RATE: 16 BRPM | SYSTOLIC BLOOD PRESSURE: 119 MMHG

## 2025-01-02 DIAGNOSIS — M35.1 MIXED CONNECTIVE TISSUE DISEASE (HCC): ICD-10-CM

## 2025-01-02 DIAGNOSIS — D69.3 IMMUNE THROMBOCYTOPENIC PURPURA (HCC): Primary | ICD-10-CM

## 2025-01-02 RX ORDER — ACETAMINOPHEN 325 MG/1
650 TABLET ORAL ONCE
Status: CANCELLED | OUTPATIENT
Start: 2025-01-02

## 2025-01-02 RX ORDER — DIPHENHYDRAMINE HCL 25 MG
25 CAPSULE ORAL ONCE
Status: COMPLETED | OUTPATIENT
Start: 2025-01-02 | End: 2025-01-02

## 2025-01-02 RX ORDER — ACETAMINOPHEN 325 MG/1
650 TABLET ORAL ONCE
Status: COMPLETED | OUTPATIENT
Start: 2025-01-02 | End: 2025-01-02

## 2025-01-02 RX ORDER — DIPHENHYDRAMINE HCL 25 MG
25 CAPSULE ORAL ONCE
Status: CANCELLED | OUTPATIENT
Start: 2025-01-02

## 2025-01-02 RX ADMIN — DIPHENHYDRAMINE HCL 25 MG: 25 MG CAPSULE ORAL at 11:06:00

## 2025-01-02 RX ADMIN — ACETAMINOPHEN 650 MG: 325 TABLET ORAL at 11:06:00

## 2025-01-02 NOTE — PROGRESS NOTES
Pt here for IVIG . Pt denies any issues or concerns.      Ordering Provider: Darian Campbell Exp: last dose today     Pt tolerated infusion without difficulty or complaint. Reviewed next apt date/time: due in 5 weeks      Education Record  Learner:  Patient  Disease / Diagnosis: ITP  Barriers / Limitations:  None  Method:  Discussion  General Topics:  Medication and Plan of care reviewed  Outcome:  Shows understanding      Here for IVIG. No complaints. Tolerated infusion. Last dose of 3mo order. Pt aware and will reach out to MD.

## 2025-01-08 ENCOUNTER — TELEPHONE (OUTPATIENT)
Age: 80
End: 2025-01-08

## 2025-01-08 ENCOUNTER — LAB ENCOUNTER (OUTPATIENT)
Dept: LAB | Age: 80
End: 2025-01-08
Attending: INTERNAL MEDICINE
Payer: MEDICARE

## 2025-01-08 DIAGNOSIS — D69.3 IMMUNE THROMBOCYTOPENIC PURPURA (HCC): ICD-10-CM

## 2025-01-08 DIAGNOSIS — D63.8 ANEMIA, CHRONIC DISEASE: ICD-10-CM

## 2025-01-08 LAB
BASOPHILS # BLD AUTO: 0.04 X10(3) UL (ref 0–0.2)
BASOPHILS NFR BLD AUTO: 0.5 %
EOSINOPHIL # BLD AUTO: 0.16 X10(3) UL (ref 0–0.7)
EOSINOPHIL NFR BLD AUTO: 2 %
ERYTHROCYTE [DISTWIDTH] IN BLOOD BY AUTOMATED COUNT: 16.2 %
HCT VFR BLD AUTO: 44.2 %
HGB BLD-MCNC: 14 G/DL
IMM GRANULOCYTES # BLD AUTO: 0.04 X10(3) UL (ref 0–1)
IMM GRANULOCYTES NFR BLD: 0.5 %
LDH SERPL L TO P-CCNC: 221 U/L
LYMPHOCYTES # BLD AUTO: 1.79 X10(3) UL (ref 1–4)
LYMPHOCYTES NFR BLD AUTO: 22 %
MCH RBC QN AUTO: 34.3 PG (ref 26–34)
MCHC RBC AUTO-ENTMCNC: 31.7 G/DL (ref 31–37)
MCV RBC AUTO: 108.3 FL
MONOCYTES # BLD AUTO: 0.62 X10(3) UL (ref 0.1–1)
MONOCYTES NFR BLD AUTO: 7.6 %
NEUTROPHILS # BLD AUTO: 5.48 X10 (3) UL (ref 1.5–7.7)
NEUTROPHILS # BLD AUTO: 5.48 X10(3) UL (ref 1.5–7.7)
NEUTROPHILS NFR BLD AUTO: 67.4 %
PLATELET # BLD AUTO: 110 10(3)UL (ref 150–450)
RBC # BLD AUTO: 4.08 X10(6)UL
WBC # BLD AUTO: 8.1 X10(3) UL (ref 4–11)

## 2025-01-08 PROCEDURE — 36415 COLL VENOUS BLD VENIPUNCTURE: CPT

## 2025-01-08 PROCEDURE — 85025 COMPLETE CBC W/AUTO DIFF WBC: CPT

## 2025-01-08 PROCEDURE — 83615 LACTATE (LD) (LDH) ENZYME: CPT

## 2025-01-13 DIAGNOSIS — M35.1 MIXED CONNECTIVE TISSUE DISEASE (HCC): Primary | ICD-10-CM

## 2025-01-13 DIAGNOSIS — I50.812 CHRONIC RIGHT-SIDED CONGESTIVE HEART FAILURE (HCC): Primary | ICD-10-CM

## 2025-01-13 RX ORDER — PREDNISONE 1 MG/1
2 TABLET ORAL
Qty: 180 TABLET | Refills: 0 | Status: SHIPPED | OUTPATIENT
Start: 2025-01-13

## 2025-01-13 NOTE — TELEPHONE ENCOUNTER
Last office visit: 12/30/24    Next Rheum Apt:3/13/2025 Taye Farmer MD    Last fill: 10/15/24    Labs:   Lab Results   Component Value Date    CREATSERUM 1.60 (H) 12/26/2024    GFR 64 09/27/2017    ALKPHO 60 12/26/2024    AST 23 12/26/2024    ALT 19 12/26/2024    BILT 0.9 12/26/2024    TP 7.3 12/26/2024    ALB 4.0 12/26/2024       Lab Results   Component Value Date    WBC 8.1 01/08/2025    HGB 14.0 01/08/2025    .0 (L) 01/08/2025    NEPRELIM 5.48 01/08/2025    NEUTABS 0.83 (L) 04/28/2021    LYMPHABS 0.66 (L) 04/28/2021    NEUT 49 04/28/2021    LYMPH 39 04/28/2021    NEPERCENT 67.4 01/08/2025    LYPERCENT 22.0 01/08/2025    NE 5.48 01/08/2025    LYMABS 1.79 01/08/2025

## 2025-01-13 NOTE — TELEPHONE ENCOUNTER
Prednisone refill approved 1 mg, take 2 daily, 3-month supply, 180.  Sent to [facility] pharmacy weakness.

## 2025-01-14 ENCOUNTER — HOSPITAL ENCOUNTER (OUTPATIENT)
Dept: LAB | Facility: HOSPITAL | Age: 80
Discharge: HOME | End: 2025-01-14
Attending: NURSE PRACTITIONER
Payer: MEDICARE

## 2025-01-14 ENCOUNTER — HOSPITAL ENCOUNTER (OUTPATIENT)
Dept: CARDIOLOGY CLINIC | Facility: HOSPITAL | Age: 80
Discharge: HOME | End: 2025-01-14
Attending: NURSE PRACTITIONER
Payer: MEDICARE

## 2025-01-14 ENCOUNTER — LAB ENCOUNTER (OUTPATIENT)
Dept: LAB | Age: 80
End: 2025-01-14
Attending: FAMILY MEDICINE
Payer: MEDICARE

## 2025-01-14 VITALS
DIASTOLIC BLOOD PRESSURE: 77 MMHG | SYSTOLIC BLOOD PRESSURE: 120 MMHG | HEART RATE: 74 BPM | OXYGEN SATURATION: 100 % | BODY MASS INDEX: 26 KG/M2 | WEIGHT: 185.81 LBS | RESPIRATION RATE: 13 BRPM

## 2025-01-14 DIAGNOSIS — I50.32 CHRONIC DIASTOLIC HEART FAILURE (HCC): Primary | ICD-10-CM

## 2025-01-14 DIAGNOSIS — I50.33 ACUTE ON CHRONIC DIASTOLIC HEART FAILURE (HCC): ICD-10-CM

## 2025-01-14 DIAGNOSIS — N18.31 STAGE 3A CHRONIC KIDNEY DISEASE (HCC): ICD-10-CM

## 2025-01-14 DIAGNOSIS — E61.1 IRON DEFICIENCY: ICD-10-CM

## 2025-01-14 DIAGNOSIS — N18.31 STAGE 3A CHRONIC KIDNEY DISEASE (CKD) (HCC): ICD-10-CM

## 2025-01-14 DIAGNOSIS — I50.32 CHRONIC HEART FAILURE WITH PRESERVED EJECTION FRACTION (HCC): ICD-10-CM

## 2025-01-14 DIAGNOSIS — I50.32 CHRONIC DIASTOLIC HEART FAILURE (HCC): ICD-10-CM

## 2025-01-14 DIAGNOSIS — Z13.29 SCREENING FOR THYROID DISORDER: ICD-10-CM

## 2025-01-14 DIAGNOSIS — N18.32 STAGE 3B CHRONIC KIDNEY DISEASE (HCC): ICD-10-CM

## 2025-01-14 DIAGNOSIS — E78.6 LOW HDL (UNDER 40): ICD-10-CM

## 2025-01-14 DIAGNOSIS — I27.20 PULMONARY HYPERTENSION (HCC): ICD-10-CM

## 2025-01-14 DIAGNOSIS — Z13.1 SCREENING FOR DIABETES MELLITUS: ICD-10-CM

## 2025-01-14 DIAGNOSIS — Z79.899 LONG-TERM USE OF HIGH-RISK MEDICATION: ICD-10-CM

## 2025-01-14 DIAGNOSIS — I50.812 CHRONIC RIGHT-SIDED HEART FAILURE (HCC): ICD-10-CM

## 2025-01-14 DIAGNOSIS — I50.812 CHRONIC RIGHT-SIDED CONGESTIVE HEART FAILURE (HCC): ICD-10-CM

## 2025-01-14 DIAGNOSIS — I48.20 CHRONIC A-FIB (HCC): ICD-10-CM

## 2025-01-14 DIAGNOSIS — E79.0 HYPERURICEMIA: ICD-10-CM

## 2025-01-14 DIAGNOSIS — D50.9 IRON DEFICIENCY ANEMIA, UNSPECIFIED IRON DEFICIENCY ANEMIA TYPE: ICD-10-CM

## 2025-01-14 LAB
ALBUMIN SERPL-MCNC: 4.3 G/DL (ref 3.2–4.8)
ALBUMIN/GLOB SERPL: 1.3 {RATIO} (ref 1–2)
ALP LIVER SERPL-CCNC: 57 U/L
ALT SERPL-CCNC: 19 U/L
ANION GAP SERPL CALC-SCNC: 1 MMOL/L (ref 0–18)
ANION GAP SERPL CALC-SCNC: 6 MMOL/L (ref 0–18)
AST SERPL-CCNC: 25 U/L (ref ?–34)
BASOPHILS # BLD AUTO: 0.05 X10(3) UL (ref 0–0.2)
BASOPHILS NFR BLD AUTO: 0.6 %
BILIRUB SERPL-MCNC: 1 MG/DL (ref 0.2–1.1)
BUN BLD-MCNC: 43 MG/DL (ref 9–23)
BUN BLD-MCNC: 46 MG/DL (ref 9–23)
CALCIUM BLD-MCNC: 9.8 MG/DL (ref 8.7–10.6)
CALCIUM BLD-MCNC: 9.9 MG/DL (ref 8.7–10.6)
CHLORIDE SERPL-SCNC: 106 MMOL/L (ref 98–112)
CHLORIDE SERPL-SCNC: 109 MMOL/L (ref 98–112)
CHOLEST SERPL-MCNC: 151 MG/DL (ref ?–200)
CO2 SERPL-SCNC: 29 MMOL/L (ref 21–32)
CO2 SERPL-SCNC: 30 MMOL/L (ref 21–32)
CREAT BLD-MCNC: 1.51 MG/DL
CREAT BLD-MCNC: 1.61 MG/DL
DEPRECATED HBV CORE AB SER IA-ACNC: 97 NG/ML
EGFRCR SERPLBLD CKD-EPI 2021: 43 ML/MIN/1.73M2 (ref 60–?)
EGFRCR SERPLBLD CKD-EPI 2021: 46 ML/MIN/1.73M2 (ref 60–?)
EOSINOPHIL # BLD AUTO: 0.18 X10(3) UL (ref 0–0.7)
EOSINOPHIL NFR BLD AUTO: 2 %
ERYTHROCYTE [DISTWIDTH] IN BLOOD BY AUTOMATED COUNT: 16.5 %
FASTING PATIENT LIPID ANSWER: YES
FASTING STATUS PATIENT QL REPORTED: YES
GLOBULIN PLAS-MCNC: 3.2 G/DL (ref 2–3.5)
GLUCOSE BLD-MCNC: 84 MG/DL (ref 70–99)
GLUCOSE BLD-MCNC: 94 MG/DL (ref 70–99)
HCT VFR BLD AUTO: 45.2 %
HDLC SERPL-MCNC: 37 MG/DL (ref 40–59)
HGB BLD-MCNC: 14.4 G/DL
IMM GRANULOCYTES # BLD AUTO: 0.05 X10(3) UL (ref 0–1)
IMM GRANULOCYTES NFR BLD: 0.6 %
IRON SATN MFR SERPL: 28 %
IRON SERPL-MCNC: 104 UG/DL
LDLC SERPL CALC-MCNC: 94 MG/DL (ref ?–100)
LYMPHOCYTES # BLD AUTO: 1.82 X10(3) UL (ref 1–4)
LYMPHOCYTES NFR BLD AUTO: 20.3 %
MCH RBC QN AUTO: 34.5 PG (ref 26–34)
MCHC RBC AUTO-ENTMCNC: 31.9 G/DL (ref 31–37)
MCV RBC AUTO: 108.4 FL
MONOCYTES # BLD AUTO: 0.71 X10(3) UL (ref 0.1–1)
MONOCYTES NFR BLD AUTO: 7.9 %
NEUTROPHILS # BLD AUTO: 6.14 X10 (3) UL (ref 1.5–7.7)
NEUTROPHILS # BLD AUTO: 6.14 X10(3) UL (ref 1.5–7.7)
NEUTROPHILS NFR BLD AUTO: 68.6 %
NONHDLC SERPL-MCNC: 114 MG/DL (ref ?–130)
NT-PROBNP SERPL-MCNC: 3617 PG/ML (ref ?–450)
OSMOLALITY SERPL CALC.SUM OF ELEC: 299 MOSM/KG (ref 275–295)
OSMOLALITY SERPL CALC.SUM OF ELEC: 305 MOSM/KG (ref 275–295)
PLATELET # BLD AUTO: 122 10(3)UL (ref 150–450)
POTASSIUM SERPL-SCNC: 4.2 MMOL/L (ref 3.5–5.1)
POTASSIUM SERPL-SCNC: 4.8 MMOL/L (ref 3.5–5.1)
PROT SERPL-MCNC: 7.5 G/DL (ref 5.7–8.2)
RBC # BLD AUTO: 4.17 X10(6)UL
SODIUM SERPL-SCNC: 139 MMOL/L (ref 136–145)
SODIUM SERPL-SCNC: 142 MMOL/L (ref 136–145)
TOTAL IRON BINDING CAPACITY: 369 UG/DL (ref 250–425)
TRANSFERRIN SERPL-MCNC: 280 MG/DL (ref 215–365)
TRIGL SERPL-MCNC: 106 MG/DL (ref 30–149)
TSI SER-ACNC: 3.08 UIU/ML (ref 0.55–4.78)
URATE SERPL-MCNC: 5.3 MG/DL
VLDLC SERPL CALC-MCNC: 17 MG/DL (ref 0–30)
WBC # BLD AUTO: 9 X10(3) UL (ref 4–11)

## 2025-01-14 PROCEDURE — 83540 ASSAY OF IRON: CPT

## 2025-01-14 PROCEDURE — 80061 LIPID PANEL: CPT

## 2025-01-14 PROCEDURE — 82728 ASSAY OF FERRITIN: CPT

## 2025-01-14 PROCEDURE — 36415 COLL VENOUS BLD VENIPUNCTURE: CPT

## 2025-01-14 PROCEDURE — 84550 ASSAY OF BLOOD/URIC ACID: CPT

## 2025-01-14 PROCEDURE — 84443 ASSAY THYROID STIM HORMONE: CPT

## 2025-01-14 PROCEDURE — 85025 COMPLETE CBC W/AUTO DIFF WBC: CPT

## 2025-01-14 PROCEDURE — 99215 OFFICE O/P EST HI 40 MIN: CPT | Performed by: NURSE PRACTITIONER

## 2025-01-14 PROCEDURE — 83550 IRON BINDING TEST: CPT

## 2025-01-14 PROCEDURE — 80053 COMPREHEN METABOLIC PANEL: CPT

## 2025-01-14 PROCEDURE — 83880 ASSAY OF NATRIURETIC PEPTIDE: CPT | Performed by: NURSE PRACTITIONER

## 2025-01-14 NOTE — PROGRESS NOTES
Pt. Assessed. No signs or symptoms of shortness of breath, fatigue, chest pain or edema noted. Weight stable at 185.8 lbs. Pt. Stated,\"This is the best I have felt in a long time.\" Trace pedal edema noted bilaterally. Reviewed current list of patient's allergies and medication; updated the Electronic Medical Record. Labs ordered to assess kidney function and drawn by  Lab. Pt. Is starting to do his Cardiomems readings again now that he feels better. Reviewed follow-up appointment and Heart Failure discharge instructions with patient. Patient verbalized an understanding.     Depression Screening (PHQ-2/PHQ-9): Over the LAST 2 WEEKS   Little interest or pleasure in doing things: Not at all    Feeling down, depressed, or hopeless: Not at all    PHQ-2 SCORE: 0

## 2025-01-14 NOTE — PROGRESS NOTES
[facility] Progress Note    Miguel Davila is a 79 year old male who presents to clinic for APN assessment and management of chronic diastolic heart failure and is functional class 2-3.     Subjective:  Since his last clinic visit on 12/5; his weight is unchanged. His home weights have been stable around 179-180 lbs. His LE edema is stable and mild. Noted above sock line. He denies shortness of breath. Appetite is very good. States he feels the best that he has in years.      He saw Dr. Craig 1/9/25; no changes made. He will see him in 4 months. He didn't feel strongly about recalibration of CardioMEMs and does not think its needed. Encouraged Bill to restart checking MEMs. Thought to be euvolemic at visit. F/U in 4 months with echo prior.      He had RHC on 5/20 that shows RA 9 mmHg, PA 47/18/31 mmhg, wedge 14 mmHg, CI 3.8 and PVR 2.2 wood units. Dr. Jean recommended increasing Entresto with consideration for retrying SGLT2i and increase in MRA at a later date. CardioMEMs sensor was noted to be inaccurate and re-calibration was recommended. He as requested this be postponed since he had cholecystitis and has been slow to recover.           Last Hospitalizations/ED visits:     He was admitted from 6/6-6/10 with acute cholecystitis with localized peritonitis and underwent cholecystectomy 6/7. Treated with antibiotics. Aldactone, Bumex and Entresto were held during hospital stay d.t low BP. He was discharged on Bumex 1 mg, remained off ARNI and MRA.      Admitted again 7/8-7/12 with leg swelling, weeping and abdominal distension. He was felt to be fluid overloaded on admission, but then became hypotensive with diuresis. Albumin level was low and felt to be a contributing factor to leg swelling and hypotension. Subsequently, he was given an albumin infusion followed by diuretics and started to improve. LE US negative for DVT. Treated with antibiotics for cellulitis. GDMT  started at low doses. To take PRN Bumex on discharge. Discharge weight of 189 lbs.     Review of Systems   Constitutional: Negative.   Cardiovascular:  Positive for leg swelling (mild and stable).   Respiratory: Negative.     Gastrointestinal: Negative.        HISTORY:  Past Medical History:    A-fib (HCC)    Arrhythmia    atrial fibrillation    Arthritis    Autoimmune disease (HCC)    hepatits, resolved anfd nephritis, resolved    Ramon's esophagus    Bleeding nose    Gums Treated    Blood disorder    thrombocytopenia    Blood in urine    Blurred vision    cataract due to steroids, surgery in June    Calculus of kidney    One time    Cancer (HCC)    skin    Candidiasis of the esophagus    Due to steroid use for Autoimmune disorder     Cataract    Colon polyps    Congestive heart disease (HCC)    Diarrhea, unspecified    Intermittent due to lupus    Diverticulosis of colon    Easy bruising    On and off prednisone for 20 years    Esophageal polyp    Esophageal reflux    Essential hypertension    Fatigue    polymyositis and lupus    Gout    Hammer toe, acquired    Heart palpitations    Afib    Hepatitis    autoimmune induce hepatitis d/t lupus    High blood pressure    IBS (irritable bowel syndrome)    mild d/t lupus    Irregular bowel habits    Leg swelling    Heart failure, probably caused by lupus    Lupus    MCTD (mixed connective tissue disease) (HCC)    Obstructive sleep apnea (adult) (pediatric)    LI (obstructive sleep apnea)    AHI 37  Supine AHI 38 non-supine AHI 16 Sao2 Pj 83%     LI (obstructive sleep apnea)    AHI 36 RDI 36 REM AHI 45 Supine AHI 65 non-supine AHI 19 Sao2 Pj 86% CPAP 9cwp    Pain in joints    Personal history of antineoplastic chemotherapy    Pleural effusion    right    Pneumonia due to organism    Polymyositis (HCC)    Presence of other cardiac implants and grafts    watchman filter    Problems with swallowing    dysphagia in 2006    Pulmonary hypertension (HCC)    Raynaud  disease    Raynaud disease    Renal disorder    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    Shortness of breath    mainly due to pm or lupus    Sleep apnea    CPAP    Thrombocytopathia (HCC)    Visual impairment    bifocals for reading & computer; distance for driving    Wears glasses      Past Surgical History:   Procedure Laterality Date    Appendectomy  1970    Appendectomy      Cardiac catheterization  09/2019    Cataract Bilateral     Cholecystectomy      Colonoscopy  10/2003 2006 01/2010    x3    Colonoscopy  05/14/2013    Colonoscopy N/A 05/01/2018    Procedure: COLONOSCOPY;  Surgeon: Isaiah Tobar MD;  Location:  ENDOSCOPY    Colonoscopy N/A 04/12/2024    Procedure: COLONOSCOPY;  Surgeon: Gerardo Neves MD;  Location:  ENDOSCOPY    Colonoscopy with biopsy  05/14/2013    Egd      Hand/finger surgery unlisted  2003    right    Needle biopsy liver      Other  12/2005    needle bipsy of kidney    Other surgical history  2017    watchman filter    Percutaneous  angioplasty  (ptca)- pbp only  2006    right    Rectum surgery procedure unlisted  1946    rectal surgery polypectomy    Skin surgery      MMS BCC R temple 6/24/09    Skin surgery  2007    basal ceell carcinoma    Skin surgery  07/18/2012    BCC-nodular to right superior eyebrow/ MOHS    Skin surgery  07/15/2013    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/MMS    Skin surgery  02/19/2014    BCC-NOD to right superior pinna, MMS, AB    Skin surgery  02/16/2021    BCC- left superior forehead, MMS     Skin surgery  03/07/2022    SCC IN SITU RIGHT ANTIHELIX MMS BY DR GASTELUM    Tonsillectomy  1948    Upper gi endoscopy,exam  10/2003 2006 01/2010 , 5/13    x3      Family History   Problem Relation Age of Onset    Heart Disease Father         CHF    Gastro-Intestinal Disorder Father         Diverticulosis    Alcohol and Other Disorders Associated Father     Heart Attack Father     Heart Disease Mother         CHF    Breast Cancer Mother     Stroke Mother     Cancer Paternal  Grandfather     Heart Attack Paternal Grandmother     Kidney Disease Son     Other (Ramon's Esophagus) Son     Heart Attack Maternal Grandfather       Social History     Socioeconomic History    Marital status:    Occupational History    Occupation: Retired    Tobacco Use    Smoking status: Former     Current packs/day: 0.00     Average packs/day: 0.5 packs/day for 15.0 years (7.5 ttl pk-yrs)     Types: Cigarettes     Start date: 1958     Quit date: 1973     Years since quittin.5    Smokeless tobacco: Never    Tobacco comments:     5 cigarettes daily, stopped smoking in    Vaping Use    Vaping status: Never Used   Substance and Sexual Activity    Alcohol use: Yes     Alcohol/week: 13.0 - 15.0 standard drinks of alcohol     Types: 5 Glasses of wine, 8 - 10 Standard drinks or equivalent per week     Comment: 1 per day wine or liquor    Drug use: Never   Other Topics Concern    Caffeine Concern Yes     Comment: 1 soda per day and decaf coffee    Sleep Concern No    Exercise Yes     Comment: 3-4 times weekly    Seat Belt Yes     Social Drivers of Health     Financial Resource Strain: Low Risk  (2024)    Financial Resource Strain     Difficulty of Paying Living Expenses: Not very hard     Med Affordability: No   Food Insecurity: No Food Insecurity (2024)    Food Insecurity     Food Insecurity: Never true   Transportation Needs: No Transportation Needs (2024)    Transportation Needs     Lack of Transportation: No   Housing Stability: Low Risk  (2024)    Housing Stability     Housing Instability: No           Objective:    Telemetry: Afib         /77   Pulse 74   Resp 13   Wt 185 lb 12.8 oz (84.3 kg)   SpO2 100%   BMI 25.93 kg/m²     Wt Readings from Last 6 Encounters:   25 185 lb 12.8 oz (84.3 kg)   25 185 lb (83.9 kg)   24 185 lb (83.9 kg)   24 185 lb 6.4 oz (84.1 kg)   24 185 lb 6.4 oz (84.1 kg)   24 183 lb (83 kg)             Recent Results (from the past 24 hours)   Uric Acid, Serum    Collection Time: 01/14/25  9:08 AM   Result Value Ref Range    Uric Acid 5.3 3.7 - 9.2 mg/dL   Ferritin    Collection Time: 01/14/25  9:08 AM   Result Value Ref Range    Ferritin 97 50 - 336 ng/mL   Iron And Tibc    Collection Time: 01/14/25  9:08 AM   Result Value Ref Range    Iron 104 65 - 175 ug/dL    Transferrin 280 215 - 365 mg/dL    Total Iron Binding Capacity 369 250 - 425 ug/dL    % Saturation 28 20 - 50 %   Comp Metabolic Panel (14) [[id number]]    Collection Time: 01/14/25  9:08 AM   Result Value Ref Range    Glucose 94 70 - 99 mg/dL    Sodium 139 136 - 145 mmol/L    Potassium 4.2 3.5 - 5.1 mmol/L    Chloride 109 98 - 112 mmol/L    CO2 29.0 21.0 - 32.0 mmol/L    Anion Gap 1 0 - 18 mmol/L    BUN 43 (H) 9 - 23 mg/dL    Creatinine 1.51 (H) 0.70 - 1.30 mg/dL    Calcium, Total 9.9 8.7 - 10.6 mg/dL    Calculated Osmolality 299 (H) 275 - 295 mOsm/kg    eGFR-Cr 46 (L) >=60 mL/min/1.73m2    AST 25 <34 U/L    ALT 19 10 - 49 U/L    Alkaline Phosphatase 57 45 - 117 U/L    Bilirubin, Total 1.0 0.2 - 1.1 mg/dL    Total Protein 7.5 5.7 - 8.2 g/dL    Albumin 4.3 3.2 - 4.8 g/dL    Globulin  3.2 2.0 - 3.5 g/dL    A/G Ratio 1.3 1.0 - 2.0    Patient Fasting for CMP? Yes    CBC With Differential With Platelet    Collection Time: 01/14/25  9:08 AM   Result Value Ref Range    WBC 9.0 4.0 - 11.0 x10(3) uL    RBC 4.17 3.80 - 5.80 x10(6)uL    HGB 14.4 13.0 - 17.5 g/dL    HCT 45.2 39.0 - 53.0 %    .0 (L) 150.0 - 450.0 10(3)uL    .4 (H) 80.0 - 100.0 fL    MCH 34.5 (H) 26.0 - 34.0 pg    MCHC 31.9 31.0 - 37.0 g/dL    RDW 16.5 %    Neutrophil Absolute Prelim 6.14 1.50 - 7.70 x10 (3) uL    Neutrophil Absolute 6.14 1.50 - 7.70 x10(3) uL    Lymphocyte Absolute 1.82 1.00 - 4.00 x10(3) uL    Monocyte Absolute 0.71 0.10 - 1.00 x10(3) uL    Eosinophil Absolute 0.18 0.00 - 0.70 x10(3) uL    Basophil Absolute 0.05 0.00 - 0.20 x10(3) uL    Immature Granulocyte Absolute  0.05 0.00 - 1.00 x10(3) uL    Neutrophil % 68.6 %    Lymphocyte % 20.3 %    Monocyte % 7.9 %    Eosinophil % 2.0 %    Basophil % 0.6 %    Immature Granulocyte % 0.6 %   Lipid Panel    Collection Time: 01/14/25  9:08 AM   Result Value Ref Range    Cholesterol, Total 151 <200 mg/dL    HDL Cholesterol 37 (L) 40 - 59 mg/dL    Triglycerides 106 30 - 149 mg/dL    LDL Cholesterol 94 <100 mg/dL    VLDL 17 0 - 30 mg/dL    Non HDL Chol 114 <130 mg/dL    Patient Fasting for Lipid? Yes    TSH W Reflex To Free T4    Collection Time: 01/14/25  9:08 AM   Result Value Ref Range    TSH 3.077 0.550 - 4.780 uIU/mL   Basic Metabolic Panel (8)    Collection Time: 01/14/25  1:39 PM   Result Value Ref Range    Glucose 84 70 - 99 mg/dL    Sodium 142 136 - 145 mmol/L    Potassium 4.8 3.5 - 5.1 mmol/L    Chloride 106 98 - 112 mmol/L    CO2 30.0 21.0 - 32.0 mmol/L    Anion Gap 6 0 - 18 mmol/L    BUN 46 (H) 9 - 23 mg/dL    Creatinine 1.61 (H) 0.70 - 1.30 mg/dL    Calcium, Total 9.8 8.7 - 10.6 mg/dL    Calculated Osmolality 305 (H) 275 - 295 mOsm/kg    eGFR-Cr 43 (L) >=60 mL/min/1.73m2    Patient Fasting for BMP? Patient not present    Pro Beta Natriuretic Peptide    Collection Time: 01/14/25  1:39 PM   Result Value Ref Range    Pro-Beta Natriuretic Peptide 3,617 (H) <450 pg/mL         Current Outpatient Medications:     PREDNISONE 1 MG Oral Tab, TAKE 2 TABLETS (2 MG TOTAL) BY MOUTH DAILY WITH BREAKFAST, Disp: 180 tablet, Rfl: 0    spironolactone 25 MG Oral Tab, Take 1 tablet (25 mg total) by mouth daily., Disp: 90 tablet, Rfl: 1    bumetanide 1 MG Oral Tab, Take 2 tablets (2 mg total) by mouth every morning AND 1 tablet (1 mg total) Every afternoon at 2:00 pm., Disp: , Rfl:     omeprazole 20 MG Oral Capsule Delayed Release, TAKE 1 CAPSULE BY MOUTH TWICE A DAY, Disp: 180 capsule, Rfl: 3    ALLOPURINOL 300 MG Oral Tab, TAKE 1 TABLET BY MOUTH EVERY DAY, Disp: 90 tablet, Rfl: 3    ipratropium 0.06 % Nasal Solution, SPRAY 1 SPRAY INTO EACH NOSTRIL  TWICE A DAY, Disp: 1 each, Rfl: 5    predniSONE 5 MG Oral Tab, TAKE 1 TABLET BY MOUTH EVERY DAY WITH BREAKFAST, Disp: 90 tablet, Rfl: 1    fexofenadine 180 MG Oral Tab, Take 1 tablet (180 mg total) by mouth daily as needed. (Patient not taking: Reported on 12/30/2024), Disp: , Rfl:     levothyroxine 50 MCG Oral Tab, Take 1 tablet (50 mcg total) by mouth before breakfast., Disp: , Rfl:     metoprolol succinate  MG Oral Tablet 24 Hr, Take 1 tablet (100 mg total) by mouth every morning AND 0.5 tablets (50 mg total) every evening., Disp: 60 tablet, Rfl: 3    aspirin 81 MG Oral Tab EC, Take 1 tablet (81 mg total) by mouth daily., Disp: 30 tablet, Rfl: 0    PREVIDENT 5000 BOOSTER PLUS 1.1 % Dental Paste, , Disp: , Rfl:     Multiple Vitamins-Minerals (TAB-A-KEVIN) Oral Tab, Take 1 tablet by mouth daily., Disp: , Rfl:     Immune Globulin, Human, 10 g Intravenous Recon Soln, Inject 0.4 g/kg into the vein. Given for treatment of polymyositis, mixed connective tissue disease, and immune thrombocytopenia purpura monthly at [facility]. Every 5 weeks, Disp: 3 each, Rfl: 0    Calcium Citrate-Vitamin D (CITRACAL + D OR), Take 1 tablet by mouth daily., Disp: , Rfl:     Exam:   General:         Alert, in no apparent distress  HEENT:           +6-7cm JVD  Lungs:            CTAB                     CV:                   irregularly irregular  Abdomen:       Non-distended/round, soft    Extremities:    trace-+1 LE edema right above sock line  Neuro:             A&O x 3  Skin:                Pink, warm, dry    Education:  Patient instructed regarding sodium restricted diet, low sodium foods, fluid restriction, daily weights, medication regimen, s/s HF exacerbation and when to call APN/clinic.       [x] CardioMEMs  [] ARNi/ACEi/ARB - hypotension  [x] BB  [x] MRA  [] SGLT2i - possible pancreatitis       Assessment:   Chronic diastolic, RV heart failure - LVEF 55-60% and stable on echo 7/2024, low normal RV function. RHC  5/2024 with PCWP 14, RA 9. S/p CardioMEMs. Restarted CardioMEMs readings on 1/13/25. PAD 34 mmhg. Off Jardiance, thought to be due to pancreatitis. MRA discontinued at [facility] for dehydration and near syncope; since have restarted but required reduced dose due to hyperkalemia. Last ProBNP 4,620 and now down to 3,617 (highest 5,927). Volume status stable.   PH, mild combined pre and post capillary - RHC 5/20/24  demonstrates mPAP 31 with wedge 14 mmHg, RA 9 mmHg, PVR 2.2 and CI 3.8. RV function low normal.   Mild to Moderate MR/Mild-Mod TR/ Moderate AI - on echo in July.   CKD Stage 3b - prior baseline creatinine ~ 1.8-2.0 mg/dl,  now baseline ~1.4 mg/dl. Creatinine 1.61 today and stable. Follows with Dr. Devine.    Chronic Afib - s/p Watchman. Rates controlled.   LI - on CPAP. Wears 22% of the nights according to recent download. Endorsed not wearing at all today. Was encouraged to wear more   Recurrent bilateral pleural effusions - hx of thoracentesis.   Hx Lupus/Mixed CTD/Severe polymyositis - continues IVIG infusions monthly. Last dose 1/2. Follows with Dr. Farmer. On chronic prednisone. Off Imuran due to pancytopenia.   Non-obstructive CAD  Chronic thrombocytopenia, immune mediated. PLTs stable. Follows with Dr. Orr.  Hx Hyponatremia, will follow with increase in MRA.   HERNANDEZ with biopsy proven stage F0-F1 Fibrosis on US 1/2022. Plan for CBC and CMP in 1 year with liver US in 6 months. Follows with Dr. Veronica, Hepatology.  Mild hyperkalemia - on low dose MRA and ARNI. Has required Veltassa in the past.  Anemia - Hgb 14.4 g/dL; stable. Iron sat 11 and ferritin 42.5. Supplemented with 3 doses of Venofer, last dose 9/16.   Hypothyroidism - on Synthroid. Last TSH 2.19 and free T4 1.1 on 5/2024.   Hypoalbuminemia - last albumin 4.3  Hx Cellulitis - s/p antibiotics and wound care clinic.    Plan:   Continue current medications.   Will trend CardioMEMs readings, he recently restarted checking per Dr. Craig.     Return to clinic in 3 weeks.  F/U with Dr. Craig as planned in 4 months with echo prior.   CHF discharge instructions given    I have spent 40 min total time on the day of the encounter, including: preparing to see the patient, obtaining and/or reviewing separately obtained history, performing a medically appropriate examination and/or evaluation, counseling and educating the patient/family caregiver, ordering medication, tests, or procedures, documenting clinical information in Epic, and independently interpreting results and communicating results to the patient/family caregiver     SHEA Fernandez

## 2025-01-14 NOTE — PATIENT INSTRUCTIONS
Heart Failure Discharge Instructions      Activity: Regular exercise and activity is important for your overall health and to help keep your heart strong and functioning as well as possible.   Walk at a slow to moderate pace for 15-20 minutes 3-5 days per week.     Follow these instructions every day to keep yourself in the Green Zone     The Green Zone means you are feeling well and your symptoms are under control                                    Medications  Take your medication every day as instructed  Do not stop taking your medicine or change the amount you are taking without instructions from your doctor or nurse  Do Not take non-steroidal antiinflammatory drugs such as ibuprofen, aleve, advil, or motrin                                    Diet/Fluids  People with heart failure should eat less sodium (salt) and limit fluid. Sodium attracts water and makes the body hold fluid. This extra fluid makes the heart work harder and can worsen the symptoms of heart failure.     Diet    2000 mg sodium daily  Fluid restriction    64 ounces daily  (8 oz. = 1 cup)                                     Body Weight  Weigh yourself every day before breakfast and write your weight down  Use the same scale and wear about the same amount of clothes each time  A sudden weight increase is due to fluid retention rather than fat                                         Activity  Pace your activities to avoid getting overtired  Take rest periods as needed  Elevate your feet to reduce ankle swelling when resting                             Signs of Worsening Heart Failure    You are entering the Yellow Zone - this is a warning zone    Call your doctor or nurse if you have any of these signs or symptoms:  You gain 2 or more pounds overnight or 3-5 pounds in 3-7 days  You have more trouble breathing  You get more tired with regular activity, or are limiting activity because of shortness of breath or fatigue  You are short of breath lying  down, you need more pillows to breathe comfortably,  or wake up during the night short of breath  You urinate less often during the day and more often at night  You have a bloated feeling, upset stomach, loss of appetite, or your clothes are fitting tighter    GO TO THE EMERGENCY DEPARTMENT or CALL 911 IF:    These are signs you are in the RED ZONE - THIS IS AN EMERGENCY  You have tightness or pain in your chest  You are extremely short of breath or can't catch your breath  You cough up frothy pink mucous  You feel confused or can't think clearly  You are traveling and develop symptoms of worsening heart failure     We respect everyone's time and availability. Please be aware that this is not a walk-in clinic and we require appointments in order to facilitate timely care for all patients. We ask you to arrive 30 minutes before your appointment to allow time for you to check-in and have your bloodwork drawn. Please understand if you are late for your appointment, you may be asked to reschedule. If possible, all attempts will be made to accommodate but realize this is no guarantee that this will always be available. We understand there are extenuating circumstances. If you need to cancel or reschedule your appointment, please call the [facility] within 24 hours at [phone].  Thank you for your cooperation, [facility] Staff.    If you are currently [employer] active, starting July 1st 2024, we will be utilizing [employer] messaging ONLY to confirm your appointment.    IF YOU HAVE QUESTIONS REGARDING YOUR BILL, FEEL FREE TO CONTACT [facility] PATIENT ACCOUNTS -[phone]. IF YOU NEED FINANCIAL ASSISTANCE, PLEASE CALL AN [facility] FINANCIAL COUNSELOR -[phone].             [facility]     [phone]

## 2025-01-15 PROBLEM — N18.32 STAGE 3B CHRONIC KIDNEY DISEASE (HCC): Status: ACTIVE | Noted: 2024-08-27

## 2025-01-15 PROBLEM — IMO0002 LUPUS: Status: RESOLVED | Noted: 2017-03-30 | Resolved: 2025-01-15

## 2025-01-15 PROBLEM — N17.9 ACUTE RENAL FAILURE, UNSPECIFIED ACUTE RENAL FAILURE TYPE (HCC): Status: RESOLVED | Noted: 2024-06-06 | Resolved: 2025-01-15

## 2025-01-15 PROBLEM — N17.9 ACUTE RENAL FAILURE, UNSPECIFIED ACUTE RENAL FAILURE TYPE: Status: RESOLVED | Noted: 2024-06-06 | Resolved: 2025-01-15

## 2025-01-16 NOTE — ASSESSMENT & PLAN NOTE
Last Glomerular Filtration Rate: CKD 3b (GFR 43). .   1/14/2025: Creatinine 1.61 (H) CKD etiologies : Hypertension  Plan/Management: Control Hypertension/Diabetes

## 2025-01-16 NOTE — ASSESSMENT & PLAN NOTE
BP shows good control with last BP of 120/77. Continue lifestyle changes, diet, exercise and weight loss.   1/14/2025: Potassium 4.8; Creatinine 1.61 (H); eGFR-Cr 43 (L)  BP Meds: bumetanide Tabs - 1 MG; metoprolol succinate ER Tb24 - 100 MG; spironolactone Tabs - 25 MG

## 2025-01-16 NOTE — ASSESSMENT & PLAN NOTE
Stable cpopd diagnosis in 2020. Stable, continue present management and continue to monitor for progression

## 2025-01-16 NOTE — ASSESSMENT & PLAN NOTE
Stable, continue present management and continue to monitor for progression on PPI. Continue to monitor and continue present management

## 2025-01-16 NOTE — ASSESSMENT & PLAN NOTE
Last Platelet value was 122 done 1/14/2025. Lowest level in the last 10 years was 5. Stable, continue present management and continue to monitor for progression

## 2025-01-16 NOTE — ASSESSMENT & PLAN NOTE
CV managing. With EF stable at 97717% in 7.2024. stable, continue present management and continue to monitor for progression

## 2025-01-16 NOTE — ASSESSMENT & PLAN NOTE
Stable, continue present management and continue to monitor for progression labs per rheumatology.

## 2025-01-16 NOTE — ASSESSMENT & PLAN NOTE
1/14/2025: WBC 9.0; HGB 14.4; .0 (L); .4 (H) stable, continue present management and continue to monitor for progression

## 2025-01-16 NOTE — ASSESSMENT & PLAN NOTE
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 Stable, continue present management and continue to monitor for progression EF 55-60%

## 2025-01-16 NOTE — ASSESSMENT & PLAN NOTE
Cholesterol shows Good control. Long term heart-healthy diet and lifestyle discussed and encouraged to reduce risk of cardiovascular disease.  1/14/2025: Cholesterol, Total 151; HDL Cholesterol 37 (L); Triglycerides 106; LDL Cholesterol 94  No current Cholesterol medication. stable  Continue with current treatment plan

## 2025-01-16 NOTE — ASSESSMENT & PLAN NOTE
Stable, continue present management and continue to monitor for progression per Dr Farmer, stable labs

## 2025-01-16 NOTE — ASSESSMENT & PLAN NOTE
Seen 2017. 4.5 cm. Stable, 4.2 on echo 7/2024  continue present management and continue to monitor for progression

## 2025-01-17 ENCOUNTER — OFFICE VISIT (OUTPATIENT)
Dept: FAMILY MEDICINE CLINIC | Facility: CLINIC | Age: 80
End: 2025-01-17
Payer: MEDICARE

## 2025-01-17 VITALS
HEIGHT: 70 IN | SYSTOLIC BLOOD PRESSURE: 122 MMHG | BODY MASS INDEX: 26.34 KG/M2 | DIASTOLIC BLOOD PRESSURE: 70 MMHG | HEART RATE: 78 BPM | RESPIRATION RATE: 14 BRPM | WEIGHT: 184 LBS

## 2025-01-17 DIAGNOSIS — G47.33 OSA (OBSTRUCTIVE SLEEP APNEA): ICD-10-CM

## 2025-01-17 DIAGNOSIS — I43 CARDIOMYOPATHY AS MANIFESTATION OF UNDERLYING DISEASE (HCC): ICD-10-CM

## 2025-01-17 DIAGNOSIS — E78.6 LOW HDL (UNDER 40): ICD-10-CM

## 2025-01-17 DIAGNOSIS — M35.1 MIXED CONNECTIVE TISSUE DISEASE (HCC): ICD-10-CM

## 2025-01-17 DIAGNOSIS — Z00.00 ANNUAL PHYSICAL EXAM: Primary | ICD-10-CM

## 2025-01-17 DIAGNOSIS — I50.32 CHRONIC DIASTOLIC HEART FAILURE (HCC): ICD-10-CM

## 2025-01-17 DIAGNOSIS — K22.70 BARRETT'S ESOPHAGUS WITHOUT DYSPLASIA: ICD-10-CM

## 2025-01-17 DIAGNOSIS — Z95.818 PRESENCE OF WATCHMAN LEFT ATRIAL APPENDAGE CLOSURE DEVICE: ICD-10-CM

## 2025-01-17 DIAGNOSIS — I50.32 CHRONIC HEART FAILURE WITH PRESERVED EJECTION FRACTION (HCC): ICD-10-CM

## 2025-01-17 DIAGNOSIS — D69.3 IMMUNE THROMBOCYTOPENIC PURPURA (HCC): ICD-10-CM

## 2025-01-17 DIAGNOSIS — I77.810 AORTIC ROOT DILATION (HCC): ICD-10-CM

## 2025-01-17 DIAGNOSIS — D61.818 PANCYTOPENIA (HCC): ICD-10-CM

## 2025-01-17 DIAGNOSIS — M33.20 POLYMYOSITIS (HCC): Chronic | ICD-10-CM

## 2025-01-17 DIAGNOSIS — D69.6 THROMBOCYTOPENIA (HCC): ICD-10-CM

## 2025-01-17 DIAGNOSIS — I27.20 PULMONARY HYPERTENSION (HCC): ICD-10-CM

## 2025-01-17 DIAGNOSIS — Z00.00 ENCOUNTER FOR ANNUAL HEALTH EXAMINATION: ICD-10-CM

## 2025-01-17 DIAGNOSIS — J43.2 CENTRILOBULAR EMPHYSEMA (HCC): ICD-10-CM

## 2025-01-17 DIAGNOSIS — N18.32 STAGE 3B CHRONIC KIDNEY DISEASE (HCC): ICD-10-CM

## 2025-01-17 DIAGNOSIS — K75.4: ICD-10-CM

## 2025-01-17 DIAGNOSIS — Z87.19 HISTORY OF PANCREATITIS: ICD-10-CM

## 2025-01-17 DIAGNOSIS — I48.19 PERSISTENT ATRIAL FIBRILLATION (HCC): ICD-10-CM

## 2025-01-17 DIAGNOSIS — I10 BENIGN ESSENTIAL HYPERTENSION: ICD-10-CM

## 2025-01-17 NOTE — PROGRESS NOTES
Subjective:   Miguel Davila is a 79 year old male who presents for a Subsequent Annual Wellness visit (Pt already had Initial Annual Wellness) and scheduled follow up of multiple significant but stable problems.   Stable overall, CHF diagnosis last year.       History/Other:   Fall Risk Assessment:   He has been screened for Falls and is low risk.      Cognitive Assessment:   He had a completely normal cognitive assessment - see flowsheet entries     Functional Ability/Status:   Miguel Davila has a completely normal functional assessment. See flowsheet for details.      Depression Screening (PHQ):  PHQ-2 SCORE: 0  , done 1/17/2025   If you checked off any problems, how difficult have these problems made it for you to do your work, take care of things at home, or get along with other people?: Not difficult at all         Advanced Directives:   He has a Living Will on file in [employer]; reviewed and discussed documents with patient (and family/surrogate if present).  He has a Power of  for Health Care on file in [employer].  Discussed Advance Care Planning with patient (and family/surrogate if present). Standard forms made available to patient in After Visit Summary.      Patient Active Problem List   Diagnosis    Personal history of other malignant neoplasm of skin    Hypopotassemia    Ramon's esophagus    Gout    ED (erectile dysfunction)    Benign essential hypertension    Low HDL (under 40)    Thrombocytopenia (HCC)    Basal cell carcinoma, ear    Lupus hepatitis syndrome (HCC)    Splenomegaly    Mixed connective tissue disease (HCC)    Polymyositis (HCC)    Atrial fibrillation (HCC)    Aortic root dilation (HCC)    Incidental lung nodule, less than or equal to 3mm    LI (obstructive sleep apnea)    Pleural effusion, left    Pulmonary hypertension (HCC)    Chronic heart failure with preserved ejection fraction (HCC)    Immune thrombocytopenic purpura (HCC)    Cardiomyopathy as manifestation of  underlying disease (HCC)    Presence of Watchman left atrial appendage closure device    Centrilobular emphysema (HCC)    Anemia    Pancytopenia (HCC)    Drug-induced acute pancreatitis without infection or necrosis (HCC)    History of Pancreatitis from [facility] 9/2022    Abdominal pain, right upper quadrant    Acute cholecystitis    Chronic diastolic heart failure (HCC)    Cellulitis of right lower extremity    Stage 3b chronic kidney disease (HCC)     Allergies:  He is allergic to empagliflozin.    Current Medications:  Outpatient Medications Marked as Taking for the 1/17/25 encounter (Office Visit) with Eric Simon MD   Medication Sig    PREDNISONE 1 MG Oral Tab TAKE 2 TABLETS (2 MG TOTAL) BY MOUTH DAILY WITH BREAKFAST    spironolactone 25 MG Oral Tab Take 1 tablet (25 mg total) by mouth daily.    bumetanide 1 MG Oral Tab Take 2 tablets (2 mg total) by mouth every morning AND 1 tablet (1 mg total) Every afternoon at 2:00 pm.    omeprazole 20 MG Oral Capsule Delayed Release TAKE 1 CAPSULE BY MOUTH TWICE A DAY    ALLOPURINOL 300 MG Oral Tab TAKE 1 TABLET BY MOUTH EVERY DAY    ipratropium 0.06 % Nasal Solution SPRAY 1 SPRAY INTO EACH NOSTRIL TWICE A DAY    predniSONE 5 MG Oral Tab TAKE 1 TABLET BY MOUTH EVERY DAY WITH BREAKFAST    fexofenadine 180 MG Oral Tab Take 1 tablet (180 mg total) by mouth daily as needed.    levothyroxine 50 MCG Oral Tab Take 1 tablet (50 mcg total) by mouth before breakfast.    metoprolol succinate  MG Oral Tablet 24 Hr Take 1 tablet (100 mg total) by mouth every morning AND 0.5 tablets (50 mg total) every evening.    aspirin 81 MG Oral Tab EC Take 1 tablet (81 mg total) by mouth daily.    PREVIDENT 5000 BOOSTER PLUS 1.1 % Dental Paste     Multiple Vitamins-Minerals (TAB-A-KEVIN) Oral Tab Take 1 tablet by mouth daily.    Immune Globulin, Human, 10 g Intravenous Recon Soln Inject 0.4 g/kg into the vein. Given for treatment of polymyositis, mixed connective tissue disease, and immune  thrombocytopenia purpura monthly at [facility].  Every 5 weeks    Calcium Citrate-Vitamin D (CITRACAL + D OR) Take 1 tablet by mouth daily.       Medical History:  He  has a past medical history of A-fib ([facility]), Arrhythmia, Arthritis, Autoimmune disease (HCC), Ramon's esophagus (05/14/2013), Bleeding nose (1970), Blood disorder, Blood in urine, Blurred vision (2021), Calculus of kidney (2012), Cancer (HCC), Candidiasis of the esophagus (06/29/2012), Cataract, Colon polyps (05/13/2013), Congestive heart disease (HCC), Diarrhea, unspecified (Since 2005), Diverticulosis of colon (05/14/2013), Easy bruising (2021), Esophageal polyp (05/14/2013), Esophageal reflux, Essential hypertension, Fatigue (Since 2005), Gout, Hammer toe, acquired (06/29/2012), Heart palpitations (2017), Hepatitis, High blood pressure, IBS (irritable bowel syndrome), Irregular bowel habits, Leg swelling (2021), Lupus, MCTD (mixed connective tissue disease) ([facility]), Obstructive sleep apnea (adult) (pediatric) (11/16/2017), LI (obstructive sleep apnea) (HST 11-7-17), LI (obstructive sleep apnea) (PSG 5-22-19), Pain in joints, Personal history of antineoplastic chemotherapy, Pleural effusion, Pneumonia due to organism, Polymyositis ([facility]), Presence of other cardiac implants and grafts (2017), Problems with swallowing, Pulmonary hypertension ([facility]), Raynaud disease, Raynaud disease, Renal disorder, Shortness of breath (on and off), Sleep apnea, Thrombocytopathia ([facility]), Visual impairment, and Wears glasses.  Surgical History:  He  has a past surgical history that includes colonoscopy (10/2003 2006 01/2010); percutaneous  angioplasty  (ptca)- pbp only (2006); appendectomy (1970); upper gi endoscopy,exam (10/2003 2006 01/2010 , 5/13); hand/finger surgery unlisted (2003); rectum surgery procedure unlisted (1946); tonsillectomy (1948); colonoscopy with biopsy (05/14/2013); colonoscopy (05/14/2013); other surgical history (2017); appendectomy;  egd; needle biopsy liver; colonoscopy (N/A, 2018); Cardiac catheterization (2019); skin surgery; skin surgery (); skin surgery (2012); skin surgery (07/15/2013); skin surgery (2014); skin surgery (2021); other (2005); cataract (Bilateral); skin surgery (2022); colonoscopy (N/A, 2024); and cholecystectomy.   Family History:  His family history includes Alcohol and Other Disorders Associated in his father; Ramon's Esophagus in his son; Breast Cancer in his mother; Cancer in his paternal grandfather; Gastro-Intestinal Disorder in his father; Heart Attack in his father, maternal grandfather, and paternal grandmother; Heart Disease in his father and mother; Kidney Disease in his son; Stroke in his mother.  Social History:  He  reports that he quit smoking about 51 years ago. His smoking use included cigarettes. He started smoking about 66 years ago. He has a 7.5 pack-year smoking history. He has never used smokeless tobacco. He reports current alcohol use of about 13.0 - 15.0 standard drinks of alcohol per week. He reports that he does not use drugs.    Tobacco:  He smoked tobacco in the past but quit greater than 12 months ago.  Social History     Tobacco Use   Smoking Status Former    Current packs/day: 0.00    Average packs/day: 0.5 packs/day for 15.0 years (7.5 ttl pk-yrs)    Types: Cigarettes    Start date: 1958    Quit date: 1973    Years since quittin.5   Smokeless Tobacco Never   Tobacco Comments    5 cigarettes daily, stopped smoking in         CAGE Alcohol Screen:   CAGE screening score of 0 on 2025, showing low risk of alcohol abuse.      Patient Care Team:  Eric Simon MD as PCP - General (Family Practice)  Amari Mora MD (DERMATOLOGY)  Taye Farmer MD (RHEUMATOLOGY)  Iris Orr MD (ONCOLOGY)  Nakul Veronica (Hepatology)  Naseem Dior MD (Cardiovascular Diseases)  Alba Figueroa (OPHTHALMOLOGY)  Iris Jean  MD VEL (Cardiovascular Diseases)  Anu Spence CMA as [facility] Care Manager  Isaiah Tobar MD (GASTROENTEROLOGY)  María Serrato NP (Nurse Practitioner)  Eldon Devine MD (NEPHROLOGY)    Review of Systems   Constitutional: Negative.  Negative for activity change, appetite change, chills and fever.   HENT: Negative.     Eyes: Negative.    Respiratory: Negative.  Negative for shortness of breath.    Cardiovascular: Negative.  Negative for chest pain and palpitations.   Gastrointestinal: Negative.  Negative for abdominal pain.   Genitourinary: Negative.  Negative for dysuria.   Musculoskeletal:  Negative for arthralgias.   Skin: Negative.  Negative for rash.   Allergic/Immunologic: Negative.    Neurological: Negative.        Objective:   Physical Exam  Vitals and nursing note reviewed.   Constitutional:       General: He is not in acute distress.     Appearance: Normal appearance. He is well-developed.   HENT:      Head: Normocephalic and atraumatic.      Right Ear: Tympanic membrane and external ear normal.      Left Ear: Tympanic membrane and external ear normal.      Nose: Nose normal.      Mouth/Throat:      Mouth: Mucous membranes are moist.   Eyes:      Extraocular Movements: Extraocular movements intact.      Pupils: Pupils are equal, round, and reactive to light.   Cardiovascular:      Rate and Rhythm: Normal rate and regular rhythm.      Pulses: Normal pulses.           Carotid pulses are 2+ on the right side and 2+ on the left side.       Radial pulses are 2+ on the right side and 2+ on the left side.        Dorsalis pedis pulses are 2+ on the right side and 2+ on the left side.        Posterior tibial pulses are 2+ on the right side and 2+ on the left side.      Heart sounds: Normal heart sounds, S1 normal and S2 normal. No murmur heard.  Pulmonary:      Effort: Pulmonary effort is normal. No respiratory distress.      Breath sounds: Normal breath sounds.   Abdominal:      General: Abdomen is  flat. Bowel sounds are normal. There is no distension.      Palpations: Abdomen is soft.   Musculoskeletal:         General: Normal range of motion.      Cervical back: Normal range of motion and neck supple.      Right lower leg: No edema.      Left lower leg: No edema.   Skin:     General: Skin is warm and dry.      Capillary Refill: Capillary refill takes less than 2 seconds.      Findings: No rash.   Neurological:      General: No focal deficit present.      Mental Status: He is alert and oriented to person, place, and time.   Psychiatric:         Mood and Affect: Mood normal.         Behavior: Behavior normal.         Thought Content: Thought content normal.         Judgment: Judgment normal.       /70   Pulse 78   Resp 14   Ht 5' 10\" (1.778 m)   Wt 184 lb (83.5 kg)   BMI 26.40 kg/m²  Estimated body mass index is 26.4 kg/m² as calculated from the following:    Height as of this encounter: 5' 10\" (1.778 m).    Weight as of this encounter: 184 lb (83.5 kg).    Medicare Hearing Assessment:   Hearing Screening    Screening Method: Whisper Test  Whisper Test Result: Pass         Visual Acuity:   Right Eye Visual Acuity: Uncorrected Right Eye Chart Acuity: 20/25   Left Eye Visual Acuity: Uncorrected Left Eye Chart Acuity: 20/20   Both Eyes Visual Acuity: Uncorrected Both Eyes Chart Acuity: 20/20   Able To Tolerate Visual Acuity: Yes        Assessment & Plan:   Miguel Davila is a 79 year old male who presents for a Medicare Assessment.     1. Annual physical exam (Primary)  2. Lupus hepatitis syndrome (HCC)  Overview:  Followed with Dr Farmer, currently in remission  Assessment & Plan:  Stable, continue present management and continue to monitor for progression per Dr Farmer, stable labs  Orders:  -     Detailed, Mod Complex (46566)  3. Thrombocytopenia (HCC)  Overview:  Following with Dr Landry at hematology  Assessment & Plan:  Last Platelet value was 122 done 1/14/2025. Lowest level in the last  10 years was 5. Stable, continue present management and continue to monitor for progression   Orders:  -     Detailed, Mod Complex (54552)  4. Mixed connective tissue disease (HCC)  Overview:  Managed by Dr Farmer,   Assessment & Plan:  Stable, continue present management and continue to monitor for progression labs per rheumatology.   Orders:  -     Detailed, Mod Complex (03286)  5. Chronic heart failure with preserved ejection fraction (HCC)  Overview:  Naseem Dior MD managing, EF 55-6-% 7/2024  Assessment & Plan:  CV managing. With EF stable at 48179% in 7.2024. stable, continue present management and continue to monitor for progression   Orders:  -     Detailed, Mod Complex (28110)  6. Pancytopenia (HCC)  Overview:  Stable per oncology. WBC: 6.4, done on 1/7/2022.  HGB: 14.7, done on 1/7/2022.  PLT: 106, done on 1/7/2022.    Assessment & Plan:  1/14/2025: WBC 9.0; HGB 14.4; .0 (L); .4 (H) stable, continue present management and continue to monitor for progression     Orders:  -     Detailed, Mod Complex (31009)  7. Immune thrombocytopenic purpura (HCC)  Overview:  Plt danita to 5 at losest. 106 1/2022  Assessment & Plan:  Plt down to 5 at lowest, better but low now  8. 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 Stable, continue present management and continue to monitor for progression EF 55-60%  9. Persistent atrial fibrillation (HCC)  Overview:   metoprolol ER 25, Dr Dior managing  Watchman placed July 2017  Assessment & Plan:  Metoprolol for this with watchmand.     Orders:  -     Detailed, Mod Complex (34710)  10. Centrilobular emphysema (HCC)  Overview:  COPD with centrilobular lucencies in several subpleural blebs predominately upper lobe in distribution 9/2020.    Assessment & Plan:  Stable cpopd diagnosis in 2020. Stable, continue present management and continue to monitor for progression   Orders:  -      Detailed, Mod Complex (84733)  11. Aortic root dilation (HCC)  Overview:  Seen on echo 1.2017, 4.5 CM 8/2019  Assessment & Plan:  Seen 2017. 4.5 cm. Stable, 4.2 on echo 7/2024  continue present management and continue to monitor for progression   12. Polymyositis (HCC)  Overview:  Dx 2005 with severe muscle weakness, and now in remission, following with Dr Farmer  Assessment & Plan:  Diagnosis 2005, stable after management with Dr Farmer  13. Pulmonary hypertension (HCC)  Overview:  Diagnosis 2020 at [facility] and S, -At mildly elevated BP there is evidence of moderate post capillary pulmonary hypertension which would be consistent with HFpEF -Interestingly when BP normalized his pulmonary hypertension was reduced to mild severity and there was no reversibility with nitric oxide based on definition -Cardiac output remains preserved         Assessment & Plan:  [facility]pt[facility] at [facility], stable on meds. Continue to monitor and continue present management   Orders:  -     Detailed, Mod Complex (15092)  14. Stage 3b chronic kidney disease ([facility])  Assessment & Plan:  Last Glomerular Filtration Rate: CKD 3b (GFR 43). .   1/14/2025: Creatinine 1.61 (H) CKD etiologies : Hypertension  Plan/Management: Control Hypertension/Diabetes   Orders:  -     Detailed, Mod Complex (50859)  15. Presence of Watchman left atrial appendage closure device  Assessment & Plan:  Stable, continue present management and continue to monitor for progression   16. Low HDL (under 40)  Overview:  HDL 28  Assessment & Plan:  Cholesterol shows Good control. Long term heart-healthy diet and lifestyle discussed and encouraged to reduce risk of cardiovascular disease.  1/14/2025: Cholesterol, Total 151; HDL Cholesterol 37 (L); Triglycerides 106; LDL Cholesterol 94  No current Cholesterol medication. stable  Continue with current treatment plan   Orders:  -     Detailed, Mod Complex (12905)  17. Benign essential hypertension  Overview:  Benazepril 40, amlodipine 5,  triameteren HCT 37.5-25  Assessment & Plan:  BP shows good control with last BP of 120/77. Continue lifestyle changes, diet, exercise and weight loss.   1/14/2025: Potassium 4.8; Creatinine 1.61 (H); eGFR-Cr 43 (L)  BP Meds: bumetanide Tabs - 1 MG; metoprolol succinate ER Tb24 - 100 MG; spironolactone Tabs - 25 MG      Orders:  -     Detailed, Mod Complex (51863)  18. History of Pancreatitis from [facility] 9/2022  Assessment & Plan:  Stable, continue present management and continue to monitor for progression   19. Ramon's esophagus without dysplasia  Overview:  Omeprazole 20  Assessment & Plan:  Stable, continue present management and continue to monitor for progression on PPI. Continue to monitor and continue present management   20. LI (obstructive sleep apnea)  Assessment & Plan:  Stable, continue present management and continue to monitor for progression   21. Chronic diastolic heart failure (HCC)  Overview:  EF 55-60% 7/2024, Dr Craig managing.   22. Encounter for annual health examination    The patient indicates understanding of these issues and agrees to the plan.  Reinforced healthy diet, lifestyle, and exercise.      Return in about 6 months (around 7/17/2025) for recheck.     Eric Simon MD, 1/17/2025     Supplementary Documentation:   General Health:  In the past six months, have you lost more than 10 pounds without trying?: 2 - No  Has your appetite been poor?: No  Type of Diet: Balanced;Low Salt  How does the patient maintain a good energy level?: Stretching  How would you describe your daily physical activity?: Moderate  How would you describe your current health state?: Good  How do you maintain positive mental well-being?: Social Interaction;Visiting Friends;Visiting Family  On a scale of 0 to 10, with 0 being no pain and 10 being severe pain, what is your pain level?: 0 - (None)  In the past six months, have you experienced urine leakage?: 0-No  At any time do you feel concerned for the  safety/well-being of yourself and/or your children, in your home or elsewhere?: No  Have you had any immunizations at another office such as Influenza, Hepatitis B, Tetanus, or Pneumococcal?: Yes    Health Maintenance   Topic Date Due    Annual Physical  01/29/2025    Colorectal Cancer Screening  04/12/2027    Influenza Vaccine  Completed    Annual Depression Screening  Completed    Fall Risk Screening (Annual)  Completed    Pneumococcal Vaccine: 50+ Years  Completed    Zoster Vaccines  Completed    Meningococcal B Vaccine  Aged Out

## 2025-01-17 NOTE — PATIENT INSTRUCTIONS
Miguel Davila's SCREENING SCHEDULE   Tests on this list are recommended by your physician but may not be covered, or covered at this frequency, by your insurer.   Please check with your insurance carrier before scheduling to verify coverage.   PREVENTATIVE SERVICES FREQUENCY &  COVERAGE DETAILS LAST COMPLETION DATE   Diabetes Screening    Fasting Blood Sugar / Glucose    One screening every 12 months if never tested or if previously tested but not diagnosed with pre-diabetes   One screening every 6 months if diagnosed with pre-diabetes Lab Results   Component Value Date    GLU 84 01/14/2025        Cardiovascular Disease Screening    Lipid Panel  Cholesterol  Lipoprotein (HDL)  Triglycerides Covered every 5 years for all Medicare beneficiaries without apparent signs or symptoms of cardiovascular disease Lab Results   Component Value Date    CHOLEST 151 01/14/2025    HDL 37 (L) 01/14/2025    LDL 94 01/14/2025    TRIG 106 01/14/2025         Electrocardiogram (EKG)   Covered if needed at Welcome to Medicare, and non-screening if indicated for medical reasons 06/07/2024      Ultrasound Screening for Abdominal Aortic Aneurysm (AAA) Covered once in a lifetime for one of the following risk factors   • Men who are 65-75 years old and have ever smoked   • Anyone with a family history -     Colorectal Cancer Screening  Covered for ages 50-85; only need ONE of the following:    Colonoscopy   Covered every 10 years    Covered every 2 years if patient is at high risk or previous colonoscopy was abnormal 04/12/2024    Health Maintenance   Topic Date Due   • Colorectal Cancer Screening  04/12/2027       Flexible Sigmoidoscopy   Covered every 4 years -    Fecal Occult Blood Test Covered annually -   Prostate Cancer Screening    Prostate-Specific Antigen (PSA) Annually No results found for: \"PSA\"  There are no preventive care reminders to display for this patient.   Immunizations    Influenza Covered once per flu  season  Please get every year 11/12/2024  No recommendations at this time    Pneumococcal Each vaccine ([id number] & [id number]) covered once after 65 Prevnar 13: 07/28/2016    [id number]: 12/06/2017     No recommendations at this time    Hepatitis B One screening covered for patients with certain risk factors   07/12/2016  No recommendations at this time    Tetanus Toxoid Not covered by Medicare Part B unless medically necessary (cut with metal); may be covered with your pharmacy prescription benefits 01/01/1999    Tetanus, Diptheria and Pertusis TD and TDaP Not covered by Medicare Part B -  No recommendations at this time    Zoster Not covered by Medicare Part B; may be covered with your pharmacy  prescription benefits -  No recommendations at this time     Annual Monitoring of Persistent Medications (ACE/ARB, digoxin diuretics, anticonvulsants)    Potassium Annually Lab Results   Component Value Date    K 4.8 01/14/2025         Creatinine   Annually Lab Results   Component Value Date    CREATSERUM 1.61 (H) 01/14/2025         BUN Annually Lab Results   Component Value Date    BUN 46 (H) 01/14/2025       Drug Serum Conc Annually No results found for: \"DIGOXIN\", \"DIG\", \"VALP\"         Chronic Obstructive Pulmonary Disease (COPD)    Spirometry Annually Spirometry date:       none

## 2025-01-27 ENCOUNTER — TELEPHONE (OUTPATIENT)
Dept: CARDIOLOGY CLINIC | Facility: HOSPITAL | Age: 80
End: 2025-01-27

## 2025-01-29 DIAGNOSIS — K22.70 BARRETT'S ESOPHAGUS WITHOUT DYSPLASIA: ICD-10-CM

## 2025-01-30 ENCOUNTER — OFFICE VISIT (OUTPATIENT)
Age: 80
End: 2025-01-30
Attending: INTERNAL MEDICINE
Payer: MEDICARE

## 2025-01-30 VITALS
HEIGHT: 70.98 IN | TEMPERATURE: 96 F | WEIGHT: 186 LBS | OXYGEN SATURATION: 99 % | RESPIRATION RATE: 18 BRPM | HEART RATE: 72 BPM | DIASTOLIC BLOOD PRESSURE: 73 MMHG | BODY MASS INDEX: 26.04 KG/M2 | SYSTOLIC BLOOD PRESSURE: 111 MMHG

## 2025-01-30 DIAGNOSIS — M35.1 MIXED CONNECTIVE TISSUE DISEASE (HCC): ICD-10-CM

## 2025-01-30 DIAGNOSIS — D69.3 IMMUNE THROMBOCYTOPENIC PURPURA (HCC): Primary | ICD-10-CM

## 2025-01-30 NOTE — PROGRESS NOTES
Cancer Center Progress Note    Patient Name: Miguel Davila   YOB: 1945   Medical Record Number: [id number]   CSN: [id number]   Attending Physician: Iris Orr M.D.   Referring Physician: MD Dr. Al Maguire Dr.    Date of Visit: 1/30/2025     Chief Complaint:  Chief Complaint   Patient presents with    Follow - Up     Thrombopenia pt here for f/u. IDenies bleeding/bruising. In May he had emergency cholecystostomy  followed by cellulitis in right leg. Recovered now.        Diagnosis:  1. History of lupus and MCTD (overlap) diagnosed 7/2005. Presented with acute polymyositis at that time and received IVIG, steroids and eventually maintained on steroids and Azathioprine which was tapered off around 2011.      Also with h/o positive sta clot as well as DRVVT, IgM phospholipid antibody (medium positive) and thrombocytopenia w/o evidence of thrombosis or vascular events.      -  Was admitted 4/2021 when he presented with progressively worse WILSON, malaise and fatigue. Labs drawn showed pancytopenia with WBC of 2, Hb 7.8 and platelets of 74. Back in March white count and platelets were normal and had stable thrombocytopenia with counts >100. He denied any bleeding. Pancytopenia felt to be from marrow suppression from Imuran. W/u revealed no hemolysis or nutritional deficiencies. Iron studies were not c/w deficiency.  Further Imuran was held. He was given a dose of Filgrastim. Hb had dropped to 7 and was transfused a unit of PRBCs. By discharge Hb was up to 8.4, WBC 3.3 with an ANC of 2350 and platelets were 82.     2. Immune mediated thrombocytopenia.      - started on prednisone 3/30/17 and received IVIG 4/6-7/17 when his platelets dropped to <5. Tapered off steroids 6/2017. Platelets have stayed >30 and therefore not requiring any further treatment    3. Had watchman placed for A fib and off anticoagulation.    4. Scopes done 5/1/18 which showed Ramon's w/o evidence  of dysplasia, tubular adenomas and candida esophagitis. He was placed on treatment for the Candida.        History of Present Illness:  Presented to the ED 6/6/24 with right sided abdominal pain and elevated LFTs. CT showed findings c/w acute cholecystitis with cholelithiasis. Surgery was consulted and he underwent lap basil 6/7/24. Was found to have perforated cholecystitis with localized biliary peritonitis. He was placed on a course of IV antibiotics and was discharged home on 6/10/24. He was back at the ED on 7/8/24 for RLE cellulitis and volume overload. He responded well to diuretics and IV antibiotics. Dopplers were negative for DVT    He feels much better. He denies any bleeding or increased bruising.  Denies chest or abdominal pain, change in his bowel or urinary habits, headaches, dizziness or visual symptoms. No fevers. Reports fatigue. He continue son IVIG monthly and prednisone.       Current Medications:    Current Outpatient Medications:     PREDNISONE 1 MG Oral Tab, TAKE 2 TABLETS (2 MG TOTAL) BY MOUTH DAILY WITH BREAKFAST, Disp: 180 tablet, Rfl: 0    spironolactone 25 MG Oral Tab, Take 1 tablet (25 mg total) by mouth daily., Disp: 90 tablet, Rfl: 1    bumetanide 1 MG Oral Tab, Take 2 tablets (2 mg total) by mouth every morning AND 1 tablet (1 mg total) Every afternoon at 2:00 pm., Disp: , Rfl:     omeprazole 20 MG Oral Capsule Delayed Release, TAKE 1 CAPSULE BY MOUTH TWICE A DAY, Disp: 180 capsule, Rfl: 3    ALLOPURINOL 300 MG Oral Tab, TAKE 1 TABLET BY MOUTH EVERY DAY, Disp: 90 tablet, Rfl: 3    ipratropium 0.06 % Nasal Solution, SPRAY 1 SPRAY INTO EACH NOSTRIL TWICE A DAY, Disp: 1 each, Rfl: 5    predniSONE 5 MG Oral Tab, TAKE 1 TABLET BY MOUTH EVERY DAY WITH BREAKFAST, Disp: 90 tablet, Rfl: 1    fexofenadine 180 MG Oral Tab, Take 1 tablet (180 mg total) by mouth daily as needed., Disp: , Rfl:     levothyroxine 50 MCG Oral Tab, Take 1 tablet (50 mcg total) by mouth before breakfast., Disp: , Rfl:      metoprolol succinate  MG Oral Tablet 24 Hr, Take 1 tablet (100 mg total) by mouth every morning AND 0.5 tablets (50 mg total) every evening., Disp: 60 tablet, Rfl: 3    aspirin 81 MG Oral Tab EC, Take 1 tablet (81 mg total) by mouth daily., Disp: 30 tablet, Rfl: 0    PREVIDENT 5000 BOOSTER PLUS 1.1 % Dental Paste, , Disp: , Rfl:     Multiple Vitamins-Minerals (TAB-A-KEVIN) Oral Tab, Take 1 tablet by mouth daily., Disp: , Rfl:     Immune Globulin, Human, 10 g Intravenous Recon Soln, Inject 0.4 g/kg into the vein. Given for treatment of polymyositis, mixed connective tissue disease, and immune thrombocytopenia purpura monthly at [facility]. Every 5 weeks, Disp: 3 each, Rfl: 0    Calcium Citrate-Vitamin D (CITRACAL + D OR), Take 1 tablet by mouth daily., Disp: , Rfl:     Past Medical History:  Past Medical History:    A-fib (HCC)    Arrhythmia    atrial fibrillation    Arthritis    Autoimmune disease (HCC)    hepatits, resolved anfd nephritis, resolved    Ramon's esophagus    Bleeding nose    Gums Treated    Blood disorder    thrombocytopenia    Blood in urine    Blurred vision    cataract due to steroids, surgery in June    Calculus of kidney    One time    Cancer (HCC)    skin    Candidiasis of the esophagus    Due to steroid use for Autoimmune disorder     Cataract    Colon polyps    Congestive heart disease (HCC)    Diarrhea, unspecified    Intermittent due to lupus    Diverticulosis of colon    Easy bruising    On and off prednisone for 20 years    Esophageal polyp    Esophageal reflux    Essential hypertension    Fatigue    polymyositis and lupus    Gout    Hammer toe, acquired    Heart palpitations    Afib    Hepatitis    autoimmune induce hepatitis d/t lupus    High blood pressure    IBS (irritable bowel syndrome)    mild d/t lupus    Irregular bowel habits    Leg swelling    Heart failure, probably caused by lupus    Lupus    MCTD (mixed connective tissue disease) (HCC)    Obstructive  sleep apnea (adult) (pediatric)    LI (obstructive sleep apnea)    AHI 37  Supine AHI 38 non-supine AHI 16 Sao2 Pj 83%     LI (obstructive sleep apnea)    AHI 36 RDI 36 REM AHI 45 Supine AHI 65 non-supine AHI 19 Sao2 Pj 86% CPAP 9cwp    Pain in joints    Personal history of antineoplastic chemotherapy    Pleural effusion    right    Pneumonia due to organism    Polymyositis (HCC)    Presence of other cardiac implants and grafts    watchman filter    Problems with swallowing    dysphagia in 2006    Pulmonary hypertension (HCC)    Raynaud disease    Raynaud disease    Renal disorder    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    Shortness of breath    mainly due to pm or lupus    Sleep apnea    CPAP    Thrombocytopathia (HCC)    Visual impairment    bifocals for reading & computer; distance for driving    Wears glasses       Past Surgical History:  Past Surgical History:   Procedure Laterality Date    Appendectomy  1970    Appendectomy      Cardiac catheterization  09/2019    Cataract Bilateral     Cholecystectomy      Colonoscopy  10/2003 2006 01/2010    x3    Colonoscopy  05/14/2013    Colonoscopy N/A 05/01/2018    Procedure: COLONOSCOPY;  Surgeon: Isaiah Tobar MD;  Location:  ENDOSCOPY    Colonoscopy N/A 04/12/2024    Procedure: COLONOSCOPY;  Surgeon: Gerardo Neves MD;  Location:  ENDOSCOPY    Colonoscopy with biopsy  05/14/2013    Egd      Hand/finger surgery unlisted  2003    right    Needle biopsy liver      Other  12/2005    needle bipsy of kidney    Other surgical history  2017    watchman filter    Percutaneous  angioplasty  (ptca)- pbp only  2006    right    Rectum surgery procedure unlisted  1946    rectal surgery polypectomy    Skin surgery      MMS BCC R temple 6/24/09    Skin surgery  2007    basal ceell carcinoma    Skin surgery  07/18/2012    BCC-nodular to right superior eyebrow/ MOHS    Skin surgery  07/15/2013    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/MMS    Skin surgery  02/19/2014    BCC-NOD to  right superior pinna, MMS, AB    Skin surgery  2021    BCC- left superior forehead, MMS     Skin surgery  2022    SCC IN SITU RIGHT ANTIHELIX MMS BY DR GASTELUM    Tonsillectomy  194    Upper gi endoscopy,exam  10/2003 2006 2010 , 5/13    x3       Family Medical History:  Family History   Problem Relation Age of Onset    Heart Disease Father         CHF    Gastro-Intestinal Disorder Father         Diverticulosis    Alcohol and Other Disorders Associated Father     Heart Attack Father     Heart Disease Mother         CHF    Breast Cancer Mother     Stroke Mother     Cancer Paternal Grandfather     Heart Attack Paternal Grandmother     Kidney Disease Son     Other (Ramon's Esophagus) Son     Heart Attack Maternal Grandfather        Psychosocial History:  Social History     Socioeconomic History    Marital status:      Spouse name: Not on file    Number of children: Not on file    Years of education: Not on file    Highest education level: Not on file   Occupational History    Occupation: Retired    Tobacco Use    Smoking status: Former     Current packs/day: 0.00     Average packs/day: 0.5 packs/day for 15.0 years (7.5 ttl pk-yrs)     Types: Cigarettes     Start date: 1958     Quit date: 1973     Years since quittin.5    Smokeless tobacco: Never    Tobacco comments:     5 cigarettes daily, stopped smoking in    Vaping Use    Vaping status: Never Used   Substance and Sexual Activity    Alcohol use: Yes     Alcohol/week: 13.0 - 15.0 standard drinks of alcohol     Types: 5 Glasses of wine, 8 - 10 Standard drinks or equivalent per week     Comment: 1 per day wine or liquor    Drug use: Never    Sexual activity: Not on file   Other Topics Concern     Service Not Asked    Blood Transfusions Not Asked    Caffeine Concern Yes     Comment: 1 soda per day and decaf coffee    Occupational Exposure Not Asked    Hobby Hazards Not Asked    Sleep Concern No    Stress Concern Not Asked     Weight Concern Not Asked    Special Diet Not Asked    Back Care Not Asked    Exercise Yes     Comment: 3-4 times weekly    Bike Helmet Not Asked    Seat Belt Yes    Self-Exams Not Asked   Social History Narrative    Not on file     Social Drivers of Health     Financial Resource Strain: Low Risk  (6/12/2024)    Financial Resource Strain     Difficulty of Paying Living Expenses: Not very hard     Med Affordability: No   Food Insecurity: No Food Insecurity (1/17/2025)    NCSS - Food Insecurity     Worried About Running Out of Food in the Last Year: No     Ran Out of Food in the Last Year: No   Transportation Needs: No Transportation Needs (1/17/2025)    NCSS - Transportation     Lack of Transportation: No   Physical Activity: Not on file   Stress: Not on file   Social Connections: Not on file   Housing Stability: Not At Risk (1/17/2025)    NCSS - Housing/Utilities     Has Housing: Yes     Worried About Losing Housing: No     Unable to Get Utilities: No         Allergies:  Allergies   Allergen Reactions    Empagliflozin OTHER (SEE COMMENTS)     Pancreatitis    \"pancreatitis\" per pt        Review of Systems:  A 14-point ROS was done with pertinent positives and negative per the HPI    Vital Signs:  /73 (BP Location: Left arm, Patient Position: Sitting, Cuff Size: large)   Pulse 72   Temp (!) 95.9 °F (35.5 °C) (Tympanic)   Resp 18   Ht 1.803 m (5' 10.98\")   Wt 84.4 kg (186 lb)   SpO2 99%   BMI 25.95 kg/m²       Physical Examination:  General: Patient is alert and oriented x 3, not in acute distress. No bruising.   Psych:  Mood and affect appropriate  HEENT: EOMs intact. PERRL. Oropharynx is clear.   Neck: No JVD. No palpable lymphadenopathy. Neck is supple.  Lymphatics: There is no palpable lymphadenopathy   Chest: Clear to auscultation.  Heart: Regular rate and rhythm.   Abdomen: Some distention but soft, non tender with good bowel sounds.  No palpable mass.  Extremities: Pedal pulses are present. Some  BLE edema.  Neurological: Grossly intact.       Laboratory:  Lab Results   Component Value Date    WBC 9.0 01/14/2025    RBC 4.17 01/14/2025    HGB 14.4 01/14/2025    HCT 45.2 01/14/2025    .0 (L) 01/14/2025    .4 (H) 01/14/2025    MCH 34.5 (H) 01/14/2025    MCHC 31.9 01/14/2025    RDW 16.5 01/14/2025    NEPRELIM 6.14 01/14/2025    NEUTABS 0.83 (L) 04/28/2021    LYMPHABS 0.66 (L) 04/28/2021    EOSABS 0.03 04/28/2021    BASABS 0.00 04/28/2021    NEUT 49 04/28/2021    LYMPH 39 04/28/2021    MON 9 04/28/2021    EOS 2 04/28/2021    BASO 0 04/28/2021    NEPERCENT 68.6 01/14/2025    LYPERCENT 20.3 01/14/2025    MOPERCENT 7.9 01/14/2025    EOPERCENT 2.0 01/14/2025    BAPERCENT 0.6 01/14/2025    NE 6.14 01/14/2025    LYMABS 1.82 01/14/2025    MOABSO 0.71 01/14/2025    EOABSO 0.18 01/14/2025    BAABSO 0.05 01/14/2025       Lab Results   Component Value Date    WBC 9.0 01/14/2025    RBC 4.17 01/14/2025    HGB 14.4 01/14/2025    HCT 45.2 01/14/2025    .0 (L) 01/14/2025    .4 (H) 01/14/2025    MCH 34.5 (H) 01/14/2025    MCHC 31.9 01/14/2025    RDW 16.5 01/14/2025    NEPRELIM 6.14 01/14/2025    NEUTABS 0.83 (L) 04/28/2021    LYMPHABS 0.66 (L) 04/28/2021    EOSABS 0.03 04/28/2021    BASABS 0.00 04/28/2021    NEUT 49 04/28/2021    LYMPH 39 04/28/2021    MON 9 04/28/2021    EOS 2 04/28/2021    BASO 0 04/28/2021    NEPERCENT 68.6 01/14/2025    LYPERCENT 20.3 01/14/2025    MOPERCENT 7.9 01/14/2025    EOPERCENT 2.0 01/14/2025    BAPERCENT 0.6 01/14/2025    NE 6.14 01/14/2025    LYMABS 1.82 01/14/2025    MOABSO 0.71 01/14/2025    EOABSO 0.18 01/14/2025    BAABSO 0.05 01/14/2025       Lab Results   Component Value Date    WBC 9.0 01/14/2025    RBC 4.17 01/14/2025    HGB 14.4 01/14/2025    HCT 45.2 01/14/2025    .0 (L) 01/14/2025    .4 (H) 01/14/2025    MCH 34.5 (H) 01/14/2025    MCHC 31.9 01/14/2025    RDW 16.5 01/14/2025    NEPRELIM 6.14 01/14/2025    NEUTABS 0.83 (L) 04/28/2021    LYMPHABS  0.66 (L) 04/28/2021    EOSABS 0.03 04/28/2021    BASABS 0.00 04/28/2021    NEUT 49 04/28/2021    LYMPH 39 04/28/2021    MON 9 04/28/2021    EOS 2 04/28/2021    BASO 0 04/28/2021    NEPERCENT 68.6 01/14/2025    LYPERCENT 20.3 01/14/2025    MOPERCENT 7.9 01/14/2025    EOPERCENT 2.0 01/14/2025    BAPERCENT 0.6 01/14/2025    NE 6.14 01/14/2025    LYMABS 1.82 01/14/2025    MOABSO 0.71 01/14/2025    EOABSO 0.18 01/14/2025    BAABSO 0.05 01/14/2025         Impression and Plan:  1. Thrombocytopenia- immune mediated. On IVIG and prednisone for autoimmune disease. Platelet counts have been good for him. Last several months have been >100. No bleeding. Had no problems with bleeding with recent surgery.     2. H/o previous pancytopenia- marrow suppression from Imuran. No evidence of hemolysis or nutritional deficiencies. Counts have been good.      3. Autoimmune disease- management as per rheum. Imuran stopped given (1). Continues on steroids and IVIG    4. Stage I liver fibrosis- last US suggested stability. Followed by hepatology      5. Chronic diastolic heart failure- followed at HF clinic. Managing with diuretics      RTC 1 year.     Emotional Well Being:  I have assessed the patient's emotional well-being and any concerns about anxiety or depression.  We discussed issues of distress, coping difficulties and social support systems and currently there are no active problems.    Iris Orr MD  [facility]

## 2025-01-30 NOTE — PROGRESS NOTES
[facility] Progress Note    Miguel Davila is a 79 year old male who presents to clinic for APN assessment and management of chronic diastolic heart failure and is functional class 2-3.     Subjective:  Since his last clinic visit on 1/14 when no changes were made; he saw Dr. Acevedo for annual visit and Dr. Orr 1/29.     His weight is unchanged. His home weights have been stable. His LE edema is stable and mild. He denies shortness of breath. Appetite is good. He has not been wearing CPAP. He thinks it is drying out his mouth. He has follow up with pulmonary to discuss. States he feels the best that he has in years.      He saw Dr. Craig 1/9/25; no changes made. He will see him in 4 months. He didn't feel strongly about recalibration of CardioMEMs and does not think its needed. Encouraged Bill to restart transmitting MEMs readings. Thought to be euvolemic at visit. F/U in 4 months with echo prior.      He had RHC on 5/20/24 that shows RA 9 mmHg, PA 47/18/31 mmhg, wedge 14 mmHg, CI 3.8 and PVR 2.2 wood units. PAD on MEMs around that time was 31-32 mmHg.  Dr. Jean recommended increasing Entresto with consideration for retrying SGLT2i and increase in MRA at a later date. CardioMEMs sensor was noted to be inaccurate and re-calibration was recommended. He had requested this be postponed since he had cholecystitis and has been slow to recover.  Due to inaccuracy he was not transmitting readings up until recently when he was encouraged to by Dr. Craig. Dr. Craig did not feel recalibration was needed. PAD has been 31-35 mmHg since he was last seen.          Last Hospitalizations/ED visits:     He was admitted from 6/6-6/10 with acute cholecystitis with localized peritonitis and underwent cholecystectomy 6/7. Treated with antibiotics. Aldactone, Bumex and Entresto were held during hospital stay d.t low BP. He was discharged on Bumex 1 mg, remained off ARNI and MRA.       Admitted again 7/8-7/12 with leg swelling, weeping and abdominal distension. He was felt to be fluid overloaded on admission, but then became hypotensive with diuresis. Albumin level was low and felt to be a contributing factor to leg swelling and hypotension. Subsequently, he was given an albumin infusion followed by diuretics and started to improve. LE US negative for DVT. Treated with antibiotics for cellulitis. GDMT started at low doses. To take PRN Bumex on discharge. Discharge weight of 189 lbs.     Review of Systems   Constitutional: Negative.   Cardiovascular:  Positive for leg swelling (mild and stable).   Respiratory: Negative.     Gastrointestinal: Negative.      HISTORY:  Past Medical History:    A-fib (HCC)    Arrhythmia    atrial fibrillation    Arthritis    Autoimmune disease (HCC)    hepatits, resolved anfd nephritis, resolved    Ramon's esophagus    Bleeding nose    Gums Treated    Blood disorder    thrombocytopenia    Blood in urine    Blurred vision    cataract due to steroids, surgery in June    Calculus of kidney    One time    Cancer (HCC)    skin    Candidiasis of the esophagus    Due to steroid use for Autoimmune disorder     Cataract    Colon polyps    Congestive heart disease (HCC)    Diarrhea, unspecified    Intermittent due to lupus    Diverticulosis of colon    Easy bruising    On and off prednisone for 20 years    Esophageal polyp    Esophageal reflux    Essential hypertension    Fatigue    polymyositis and lupus    Gout    Hammer toe, acquired    Heart palpitations    Afib    Hepatitis    autoimmune induce hepatitis d/t lupus    High blood pressure    IBS (irritable bowel syndrome)    mild d/t lupus    Irregular bowel habits    Leg swelling    Heart failure, probably caused by lupus    Lupus    MCTD (mixed connective tissue disease) (HCC)    Obstructive sleep apnea (adult) (pediatric)    LI (obstructive sleep apnea)    AHI 37  Supine AHI 38 non-supine AHI 16 Sao2 Pj 83%     LI  (obstructive sleep apnea)    AHI 36 RDI 36 REM AHI 45 Supine AHI 65 non-supine AHI 19 Sao2 Pj 86% CPAP 9cwp    Pain in joints    Personal history of antineoplastic chemotherapy    Pleural effusion    right    Pneumonia due to organism    Polymyositis (HCC)    Presence of other cardiac implants and grafts    watchman filter    Problems with swallowing    dysphagia in 2006    Pulmonary hypertension (HCC)    Raynaud disease    Raynaud disease    Renal disorder    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    Shortness of breath    mainly due to pm or lupus    Sleep apnea    CPAP    Thrombocytopathia (HCC)    Visual impairment    bifocals for reading & computer; distance for driving    Wears glasses      Past Surgical History:   Procedure Laterality Date    Appendectomy  1970    Appendectomy      Cardiac catheterization  09/2019    Cataract Bilateral     Cholecystectomy      Colonoscopy  10/2003 2006 01/2010    x3    Colonoscopy  05/14/2013    Colonoscopy N/A 05/01/2018    Procedure: COLONOSCOPY;  Surgeon: Isaiah Tobar MD;  Location:  ENDOSCOPY    Colonoscopy N/A 04/12/2024    Procedure: COLONOSCOPY;  Surgeon: Gerardo Neves MD;  Location:  ENDOSCOPY    Colonoscopy with biopsy  05/14/2013    [facility]      Hand/finger surgery unlisted  2003    right    Needle biopsy liver      Other  12/2005    needle bipsy of kidney    Other surgical history  2017    watchman filter    Percutaneous  angioplasty  (ptca)- pbp only  2006    right    Rectum surgery procedure unlisted  1946    rectal surgery polypectomy    Skin surgery      MMS BCC R temple 6/24/09    Skin surgery  2007    basal ceell carcinoma    Skin surgery  07/18/2012    BCC-nodular to right superior eyebrow/ MOHS    Skin surgery  07/15/2013    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/MMS    Skin surgery  02/19/2014    BCC-NOD to right superior pinna, MMS, AB    Skin surgery  02/16/2021    BCC- left superior forehead, MMS     Skin surgery  03/07/2022    SCC IN SITU RIGHT ANTIHELIX MMS BY  DR GASTELUM    Tonsillectomy      Upper gi endoscopy,exam  10/2003 2006 2010 , 5/13    x3      Family History   Problem Relation Age of Onset    Heart Disease Father         CHF    Gastro-Intestinal Disorder Father         Diverticulosis    Alcohol and Other Disorders Associated Father     Heart Attack Father     Heart Disease Mother         CHF    Breast Cancer Mother     Stroke Mother     Cancer Paternal Grandfather     Heart Attack Paternal Grandmother     Kidney Disease Son     Other (Ramon's Esophagus) Son     Heart Attack Maternal Grandfather       Social History     Socioeconomic History    Marital status:    Occupational History    Occupation: Retired    Tobacco Use    Smoking status: Former     Current packs/day: 0.00     Average packs/day: 0.5 packs/day for 15.0 years (7.5 ttl pk-yrs)     Types: Cigarettes     Start date: 1958     Quit date: 1973     Years since quittin.5    Smokeless tobacco: Never    Tobacco comments:     5 cigarettes daily, stopped smoking in    Vaping Use    Vaping status: Never Used   Substance and Sexual Activity    Alcohol use: Yes     Alcohol/week: 13.0 - 15.0 standard drinks of alcohol     Types: 5 Glasses of wine, 8 - 10 Standard drinks or equivalent per week     Comment: 1 per day wine or liquor    Drug use: Never   Other Topics Concern    Caffeine Concern Yes     Comment: 1 soda per day and decaf coffee    Sleep Concern No    Exercise Yes     Comment: 3-4 times weekly    Seat Belt Yes     Social Drivers of Health     Financial Resource Strain: Low Risk  (2024)    Financial Resource Strain     Difficulty of Paying Living Expenses: Not very hard     Med Affordability: No   Food Insecurity: No Food Insecurity (2025)    NCSS - Food Insecurity     Worried About Running Out of Food in the Last Year: No     Ran Out of Food in the Last Year: No   Transportation Needs: No Transportation Needs (2025)    NCSS - Transportation     Lack of  Transportation: No   Housing Stability: Not At Risk (1/17/2025)    NCSS - Housing/Utilities     Has Housing: Yes     Worried About Losing Housing: No     Unable to Get Utilities: No           Objective:    Telemetry: Afib     /86   Wt 185 lb 3.2 oz (84 kg)   BMI 25.84 kg/m²     Wt Readings from Last 6 Encounters:   02/03/25 185 lb 3.2 oz (84 kg)   01/30/25 186 lb (84.4 kg)   01/17/25 184 lb (83.5 kg)   01/14/25 185 lb 12.8 oz (84.3 kg)   01/02/25 185 lb (83.9 kg)   12/30/24 185 lb (83.9 kg)        Recent Results (from the past 24 hours)   Basic Metabolic Panel (8)    Collection Time: 02/03/25  1:26 PM   Result Value Ref Range    Glucose 98 70 - 99 mg/dL    Sodium 140 136 - 145 mmol/L    Potassium 4.8 3.5 - 5.1 mmol/L    Chloride 102 98 - 112 mmol/L    CO2 32.0 21.0 - 32.0 mmol/L    Anion Gap 6 0 - 18 mmol/L    BUN 44 (H) 9 - 23 mg/dL    Creatinine 1.72 (H) 0.70 - 1.30 mg/dL    Calcium, Total 9.6 8.7 - 10.6 mg/dL    Calculated Osmolality 301 (H) 275 - 295 mOsm/kg    eGFR-Cr 39 (L) >=60 mL/min/1.73m2    Patient Fasting for BMP? No            Current Outpatient Medications:     PREDNISONE 1 MG Oral Tab, TAKE 2 TABLETS (2 MG TOTAL) BY MOUTH DAILY WITH BREAKFAST, Disp: 180 tablet, Rfl: 0    spironolactone 25 MG Oral Tab, Take 1 tablet (25 mg total) by mouth daily., Disp: 90 tablet, Rfl: 1    bumetanide 1 MG Oral Tab, Take 2 tablets (2 mg total) by mouth every morning AND 1 tablet (1 mg total) Every afternoon at 2:00 pm., Disp: , Rfl:     omeprazole 20 MG Oral Capsule Delayed Release, TAKE 1 CAPSULE BY MOUTH TWICE A DAY, Disp: 180 capsule, Rfl: 3    ALLOPURINOL 300 MG Oral Tab, TAKE 1 TABLET BY MOUTH EVERY DAY, Disp: 90 tablet, Rfl: 3    ipratropium 0.06 % Nasal Solution, SPRAY 1 SPRAY INTO EACH NOSTRIL TWICE A DAY, Disp: 1 each, Rfl: 5    predniSONE 5 MG Oral Tab, TAKE 1 TABLET BY MOUTH EVERY DAY WITH BREAKFAST, Disp: 90 tablet, Rfl: 1    fexofenadine 180 MG Oral Tab, Take 1 tablet (180 mg total) by mouth daily  as needed., Disp: , Rfl:     levothyroxine 50 MCG Oral Tab, Take 1 tablet (50 mcg total) by mouth before breakfast., Disp: , Rfl:     metoprolol succinate  MG Oral Tablet 24 Hr, Take 1 tablet (100 mg total) by mouth every morning AND 0.5 tablets (50 mg total) every evening., Disp: 60 tablet, Rfl: 3    aspirin 81 MG Oral Tab EC, Take 1 tablet (81 mg total) by mouth daily., Disp: 30 tablet, Rfl: 0    PREVIDENT 5000 BOOSTER PLUS 1.1 % Dental Paste, , Disp: , Rfl:     Multiple Vitamins-Minerals (TAB-A-KEVIN) Oral Tab, Take 1 tablet by mouth daily., Disp: , Rfl:     Immune Globulin, Human, 10 g Intravenous Recon Soln, Inject 0.4 g/kg into the vein. Given for treatment of polymyositis, mixed connective tissue disease, and immune thrombocytopenia purpura monthly at [facility]. Every 5 weeks, Disp: 3 each, Rfl: 0    Calcium Citrate-Vitamin D (CITRACAL + D OR), Take 1 tablet by mouth daily., Disp: , Rfl:     Exam:   General:         Alert, in no apparent distress  HEENT:           no JVD  Lungs:            CTAB                     CV:                   irregularly irregular  Abdomen:       Non-distended/round, soft    Extremities:    trace-+1 LE edema right above sock line  Neuro:             A&O x 3  Skin:                Pink, warm, dry    Education:  Patient instructed regarding sodium restricted diet, low sodium foods, fluid restriction, daily weights, medication regimen, s/s HF exacerbation and when to call APN/clinic.       [x] CardioMEMs  [] ARNi/ACEi/ARB - hypotension  [x] BB  [x] MRA  [] SGLT2i - possible pancreatitis     Assessment:   Chronic diastolic, RV heart failure - LVEF 55-60% and stable on echo 7/2024, low normal RV function. RHC 5/2024 with PCWP 14, RA 9. S/p CardioMEMs. Recently told to restart transmitting CardioMEMs readings, previously felt to be inaccurate and recalibration recommended. PAD 31 mmhg today. Off Jardiance, thought to have contributed to pancreatitis. MRA discontinued at  [facility] for dehydration and near syncope; since have restarted but required reduced dose due to hyperkalemia. Last ProBNP down to 3,617 (highest 5,927). Volume status stable.   PH, mild combined pre and post capillary - RHC 5/20/24 demonstrates mPAP 31 with wedge 14 mmHg, RA 9 mmHg, PVR 2.2 and CI 3.8. RV function low normal. PAD 31-32 mmHg on day of RHC per CardioMEMs.   Mild to Moderate MR/Mild-Mod TR/ Moderate AI - on echo in July.   CKD Stage 3b - prior baseline creatinine ~ 1.8-2.0 mg/dl,  now baseline ~1.4 mg/dl. Creatinine 1.72 today and mildly up. Follows with Dr. Devine.    Chronic Afib - s/p Watchman. Rates controlled.   LI - on CPAP. Wears 22% of the nights according to recent download. Endorsed not wearing at all today and plans to discuss with pulmonary.   Recurrent bilateral pleural effusions - hx of thoracentesis.   Hx Lupus/Mixed CTD/Severe polymyositis - continues IVIG infusions monthly. Last dose 1/2. Follows with Dr. Farmer. On chronic prednisone. Off Imuran due to pancytopenia.   Non-obstructive CAD  Chronic thrombocytopenia, immune mediated. PLTs stable. Follows with Dr. Orr, last seen 1/30.  Hx Hyponatremia, Na 140 today.   HERNANDEZ with biopsy proven stage F0-F1 Fibrosis on US 1/2022. Follows with Dr. Veronica, Hepatology.  Mild hyperkalemia - on low dose MRA and ARNI. Has required Veltassa in the past.  Anemia - Hgb 14.4 g/dL; stable. Iron sat 28 and ferritin 97 1/2025. Supplemented with 3 doses of Venofer, last dose 9/16.   Hypothyroidism - on Synthroid. TSH normal 1/2025, Dr. Simon following.       Plan:     Continue current medications.   Follow MEMs, encouraged to continue to transmit readings.   Pending clinical course can consider repeat BVA to help establish ideal range on MEMs.   Return to clinic in 4 weeks.  F/U with pulmonary 2/12 to discuss management of sleep apnea.   F/U with Dr. Craig as planned in ~ May with echo prior.   CHF discharge instructions given    I have spent 40 min  total time on the day of the encounter, including: preparing to see the patient, obtaining and/or reviewing separately obtained history, performing a medically appropriate examination and/or evaluation, counseling and educating the patient/family caregiver, ordering medication, tests, or procedures, documenting clinical information in Epic, and independently interpreting results and communicating results to the patient/family caregiver        Tasha Barth NP

## 2025-02-03 ENCOUNTER — HOSPITAL ENCOUNTER (OUTPATIENT)
Dept: LAB | Facility: HOSPITAL | Age: 80
Discharge: HOME | End: 2025-02-03
Attending: NURSE PRACTITIONER
Payer: MEDICARE

## 2025-02-03 ENCOUNTER — HOSPITAL ENCOUNTER (OUTPATIENT)
Dept: CARDIOLOGY CLINIC | Facility: HOSPITAL | Age: 80
End: 2025-02-03
Attending: NURSE PRACTITIONER
Payer: MEDICARE

## 2025-02-03 VITALS — WEIGHT: 185.19 LBS | DIASTOLIC BLOOD PRESSURE: 86 MMHG | SYSTOLIC BLOOD PRESSURE: 120 MMHG | BODY MASS INDEX: 26 KG/M2

## 2025-02-03 DIAGNOSIS — I50.32 CHRONIC DIASTOLIC HEART FAILURE (HCC): ICD-10-CM

## 2025-02-03 DIAGNOSIS — I50.32 CHRONIC DIASTOLIC HEART FAILURE (HCC): Primary | ICD-10-CM

## 2025-02-03 DIAGNOSIS — N18.32 STAGE 3B CHRONIC KIDNEY DISEASE (HCC): ICD-10-CM

## 2025-02-03 DIAGNOSIS — D69.6 THROMBOCYTOPENIA: ICD-10-CM

## 2025-02-03 LAB
ANION GAP SERPL CALC-SCNC: 6 MMOL/L (ref 0–18)
BUN BLD-MCNC: 44 MG/DL (ref 9–23)
CALCIUM BLD-MCNC: 9.6 MG/DL (ref 8.7–10.6)
CHLORIDE SERPL-SCNC: 102 MMOL/L (ref 98–112)
CO2 SERPL-SCNC: 32 MMOL/L (ref 21–32)
CREAT BLD-MCNC: 1.72 MG/DL
EGFRCR SERPLBLD CKD-EPI 2021: 39 ML/MIN/1.73M2 (ref 60–?)
FASTING STATUS PATIENT QL REPORTED: NO
GLUCOSE BLD-MCNC: 98 MG/DL (ref 70–99)
OSMOLALITY SERPL CALC.SUM OF ELEC: 301 MOSM/KG (ref 275–295)
POTASSIUM SERPL-SCNC: 4.8 MMOL/L (ref 3.5–5.1)
SODIUM SERPL-SCNC: 140 MMOL/L (ref 136–145)

## 2025-02-03 PROCEDURE — 99215 OFFICE O/P EST HI 40 MIN: CPT | Performed by: NURSE PRACTITIONER

## 2025-02-03 PROCEDURE — 80048 BASIC METABOLIC PNL TOTAL CA: CPT | Performed by: NURSE PRACTITIONER

## 2025-02-03 PROCEDURE — 36415 COLL VENOUS BLD VENIPUNCTURE: CPT | Performed by: NURSE PRACTITIONER

## 2025-02-03 NOTE — PROGRESS NOTES
Pt. Assessed. No signs or symptoms of shortness of breath, fatigue, chest pain or edema noted. Weight stable at 185.2 lbs.   Reviewed current list of patient's allergies and medication; updated the Electronic Medical Record. Labs ordered to assess kidney function and drawn by  Lab. Reviewed follow-up appointment and Heart Failure discharge instructions with patient. Patient verbalized an understanding.     RTC in 1 month.

## 2025-02-03 NOTE — PATIENT INSTRUCTIONS
Heart Failure Discharge Instructions    Activity: Regular exercise and activity is important for your overall health and to help keep your heart strong and functioning as well as possible.   Walk at a slow to moderate pace for 15-20 minutes 3-5 days per week.     Follow these instructions every day to keep yourself in the Green Zone     The Green Zone means you are feeling well and your symptoms are under control                                    Medications  Take your medication every day as instructed  Do not stop taking your medicine or change the amount you are taking without instructions from your doctor or nurse  Do Not take non-steroidal antiinflammatory drugs such as ibuprofen, aleve, advil, or motrin                                    Diet/Fluids  People with heart failure should eat less sodium (salt) and limit fluid. Sodium attracts water and makes the body hold fluid. This extra fluid makes the heart work harder and can worsen the symptoms of heart failure.     Diet    2000 mg sodium daily  Fluid restriction    64 ounces daily  (8 oz. = 1 cup)                                     Body Weight  Weigh yourself every day before breakfast and write your weight down  Use the same scale and wear about the same amount of clothes each time  A sudden weight increase is due to fluid retention rather than fat                                         Activity  Pace your activities to avoid getting overtired  Take rest periods as needed  Elevate your feet to reduce ankle swelling when resting                             Signs of Worsening Heart Failure    You are entering the Yellow Zone - this is a warning zone    Call your doctor or nurse if you have any of these signs or symptoms:  You gain 2 or more pounds overnight or 3-5 pounds in 3-7 days  You have more trouble breathing  You get more tired with regular activity, or are limiting activity because of shortness of breath or fatigue  You are short of breath lying  down, you need more pillows to breathe comfortably,  or wake up during the night short of breath  You urinate less often during the day and more often at night  You have a bloated feeling, upset stomach, loss of appetite, or your clothes are fitting tighter    GO TO THE EMERGENCY DEPARTMENT or CALL 911 IF:    These are signs you are in the RED ZONE - THIS IS AN EMERGENCY  You have tightness or pain in your chest  You are extremely short of breath or can't catch your breath  You cough up frothy pink mucous  You feel confused or can't think clearly  You are traveling and develop symptoms of worsening heart failure     We respect everyone's time and availability. Please be aware that this is not a walk-in clinic and we require appointments in order to facilitate timely care for all patients. We ask you to arrive 30 minutes before your appointment to allow time for you to check-in and have your bloodwork drawn. Please understand if you are late for your appointment, you may be asked to reschedule. If possible, all attempts will be made to accommodate but realize this is no guarantee that this will always be available. We understand there are extenuating circumstances. If you need to cancel or reschedule your appointment, please call the [facility] within 24 hours at [phone].  Thank you for your cooperation, [facility] Staff.    If you are currently [employer] active, starting July 1st 2024, we will be utilizing [employer] messaging ONLY to confirm your appointment.    IF YOU HAVE QUESTIONS REGARDING YOUR BILL, FEEL FREE TO CONTACT [facility] PATIENT ACCOUNTS -[phone]. IF YOU NEED FINANCIAL ASSISTANCE, PLEASE CALL AN [facility] FINANCIAL COUNSELOR -[phone].             [facility]     [phone]

## 2025-02-04 ENCOUNTER — PATIENT OUTREACH (OUTPATIENT)
Dept: CASE MANAGEMENT | Age: 80
End: 2025-02-04

## 2025-02-04 DIAGNOSIS — K22.70 BARRETT'S ESOPHAGUS WITHOUT DYSPLASIA: Primary | ICD-10-CM

## 2025-02-04 DIAGNOSIS — I50.32 CHRONIC HEART FAILURE WITH PRESERVED EJECTION FRACTION (HCC): ICD-10-CM

## 2025-02-04 DIAGNOSIS — I27.20 PULMONARY HYPERTENSION (HCC): ICD-10-CM

## 2025-02-04 DIAGNOSIS — G47.33 OSA (OBSTRUCTIVE SLEEP APNEA): ICD-10-CM

## 2025-02-04 DIAGNOSIS — I50.32 CHRONIC DIASTOLIC HEART FAILURE (HCC): ICD-10-CM

## 2025-02-04 DIAGNOSIS — N18.32 STAGE 3B CHRONIC KIDNEY DISEASE (HCC): ICD-10-CM

## 2025-02-04 DIAGNOSIS — I10 BENIGN ESSENTIAL HYPERTENSION: ICD-10-CM

## 2025-02-04 DIAGNOSIS — I48.19 PERSISTENT ATRIAL FIBRILLATION (HCC): ICD-10-CM

## 2025-02-04 NOTE — PROGRESS NOTES
Spoke to Miguel for Chronic Care Management.      Updates to patient care team/comments: UTD  Patient reported changes in medications: UTD  Med Adherence  Comment: pt taking medications as prescribed     Health Maintenance:   Health Maintenance   Topic Date Due    Annual Physical  01/17/2026    Colorectal Cancer Screening  04/12/2027    Influenza Vaccine  Completed    Annual Depression Screening  Completed    Fall Risk Screening (Annual)  Completed    Pneumococcal Vaccine: 50+ Years  Completed    Zoster Vaccines  Completed    Meningococcal B Vaccine  Aged Out       Patient updates/concerns:    Spoke to pt for monthly outreach   Pt has seen pcp. Pt did not have any additional questions or concerns for pcp at this time. His prescriptions were not changed and pt did not have any imaging tests recommended.    Pt weight has been stable and no issues with swelling.   Pt has been getting his exercise with daily activity. He has additional responsibilities caring for his wife but feels that he is physically able to tolerate. He feels that the improvement he has felt since his galbladder issues has given him more vitality and he can meet any of his daily demands.    Pt eating habits are normal and appetite is appropriate.    Pt spent some time discussing likelihood of transitioning wife into more comprehensive care and the stress involved. Pt states that at this time he has a good support system with friends that he can seek advice from. CCM reviewed with pt [facility]. Pt verbalized understanding and will f/u if and when that resource becomes necessary.   Pt has seen cardiac rehab and has performed mems testing. Pt discussed some inefficiencies with the test that can sometimes preclude him from performing it every day. He will still perform it several times a week but described an inexact process that can take 10-15 minutes or last 30+. Pt states the position he is to lie in is not comfortable but if the machine  has a good visual on him the test is performed quickly. Pt states that problem arises if he is not in the exact position needed for the machine he is not prompted specifically just given a general error message. Pt states that this can sometimes lead to the inability of completing the test.   Pt has discussed what significance the results hold for the specialist and understands that it is important to follow the trend of the results to determine if a right heart cath procedure would be necessary. Pt states that there is one volume of fluid analysis that can be performed prior to necessitating the cath and that is why it is important for him to be consistently performing mems. Pt states that once the test is complete the results are delivered to cardio and pt is not required to follow up and communicate those results.    Pt had routine follow up with dr Stanton.  Specialist is satisfied with pt condition and requests routine surveillance ahead of next follow up. Pt is aware that labs are due and ultrasound is ordered for completion  Goals/Action Plan:    Active goal from previous outreach: manage healthcare    Patient reported progress towards goals: pt continues to see providers as needed and takes medications as prescribed               - What: exercise           - Where/When/How: pt is getting exercise with daily activity and helps manage his wife's care  Patient Reported Barriers and Concerns: n/a                   - Plan for overcoming barriers: none    Care Managers Interventions: continue to provide encouragement and education for healthy coping and dx    Future Appointments:   Future Appointments   Date Time Provider Department Center   2/10/2025 11:00 AM NP TX RN6 NP [facility]   3/3/2025  1:30 PM EH CARD PHLEBOTOMY RM1 E[facility] CARD [facility]   3/3/2025  2:00 PM HEART FAILURE APN 1 EH HF CLIN [facility]   3/13/2025  2:20 PM Taye Farmer MD [id number]   8/1/2025  2:15 PM [place]  MD Eric EMG 3 [facility]         Next Care Manager Follow Up Date: one month    Reason For Follow Up: review progress and or barriers towards patient's goals.     Time Spent This Encounter Total: 30 min medical record review, telephone communication, care plan updates where needed, education, goals, and action plan recreation/update. Provided acknowledgment and validation to patient's concerns.   Monthly Minute Total including today: 30  Physical assessment, complete health history, and need for CCM established by Eric Simon MD.

## 2025-02-10 ENCOUNTER — OFFICE VISIT (OUTPATIENT)
Age: 80
End: 2025-02-10
Attending: INTERNAL MEDICINE
Payer: MEDICARE

## 2025-02-10 VITALS
OXYGEN SATURATION: 98 % | DIASTOLIC BLOOD PRESSURE: 73 MMHG | HEART RATE: 84 BPM | RESPIRATION RATE: 18 BRPM | BODY MASS INDEX: 26.04 KG/M2 | WEIGHT: 186 LBS | HEIGHT: 70.98 IN | SYSTOLIC BLOOD PRESSURE: 120 MMHG | TEMPERATURE: 97 F

## 2025-02-10 DIAGNOSIS — D69.3 IMMUNE THROMBOCYTOPENIC PURPURA (HCC): Primary | ICD-10-CM

## 2025-02-10 DIAGNOSIS — D69.6 THROMBOCYTOPENIA: ICD-10-CM

## 2025-02-10 RX ORDER — ACETAMINOPHEN 325 MG/1
650 TABLET ORAL ONCE
Status: COMPLETED | OUTPATIENT
Start: 2025-02-10 | End: 2025-02-10

## 2025-02-10 RX ORDER — DIPHENHYDRAMINE HCL 25 MG
25 CAPSULE ORAL ONCE
OUTPATIENT
Start: 2025-03-17

## 2025-02-10 RX ORDER — ACETAMINOPHEN 325 MG/1
650 TABLET ORAL ONCE
OUTPATIENT
Start: 2025-03-17

## 2025-02-10 RX ORDER — DIPHENHYDRAMINE HCL 25 MG
25 CAPSULE ORAL ONCE
Status: COMPLETED | OUTPATIENT
Start: 2025-02-10 | End: 2025-02-10

## 2025-02-10 RX ADMIN — DIPHENHYDRAMINE HCL 25 MG: 25 MG CAPSULE ORAL at 11:23:00

## 2025-02-10 RX ADMIN — ACETAMINOPHEN 650 MG: 325 TABLET ORAL at 11:23:00

## 2025-02-10 NOTE — PROGRESS NOTES
Pt here for IVIG infusion . Pt denies any issues or concerns.      Ordering Provider: Dr. Taye Farmer  Order Exp: 4/8/25 (3 month order)     Pt tolerated infusion without difficulty or complaint. Reviewed next apt date/time: 5 weeks - Wednesday 3/19 at 11am      Education Record  Learner:  Patient and Spouse  Disease / Diagnosis: ITP  Barriers / Limitations:  None  Method:  Discussion  General Topics:  Medication, Side effects and symptom management, Plan of care reviewed, and Fall risk and prevention  Outcome:  Shows understanding

## 2025-02-28 NOTE — PROGRESS NOTES
[facility] Progress Note    Miguel Davila is a 79 year old male who presents to clinic for APN assessment and management of chronic diastolic heart failure and is functional class 2.     Subjective:  Since his last clinic visit on 2/3;     He saw Pulmonary on  2/17 to discuss management of sleep apnea. He has not used his cpap for about 4-5 months due to vasomotor rhinitis. Recommended mask refit and chin strap.     His weight is mainly unchanged. His LE edema is stable and mild. He denies shortness of breath. Appetite is good. He thinks his stamina is slowly getting better.       He saw Dr. Craig 1/9/25; no changes made. He will see him in 4 months. He didn't feel strongly about recalibration of CardioMEMs and does not think its needed. Encouraged Bill to restart transmitting MEMs readings. Thought to be euvolemic at visit. F/U in 4 months with echo prior.      He had RHC on 5/20/24 that shows RA 9 mmHg, PA 47/18/31 mmhg, wedge 14 mmHg, CI 3.8 and PVR 2.2 wood units. PAD on MEMs around that time was 31-32 mmHg.  Dr. Jean recommended increasing Entresto with consideration for retrying SGLT2i and increase in MRA at a later date. CardioMEMs sensor was noted to be inaccurate and re-calibration was recommended. He had requested this be postponed since he had cholecystitis and has been slow to recover.  Due to inaccuracy he was not transmitting readings up until recently when he was encouraged to by Dr. Craig. Dr. Craig did not feel recalibration was needed.     CardioMEMS readings, recent PAD has been 30-35 mmHg since he was last seen. Range 29-35 mmhg.        Last Hospitalizations/ED visits:    He was admitted from 6/6-6/10 with acute cholecystitis with localized peritonitis and underwent cholecystectomy 6/7. Treated with antibiotics. Aldactone, Bumex and Entresto were held during hospital stay d.t low BP. He was discharged on Bumex 1 mg, remained off ARNI and MRA.       Admitted again 7/8-7/12 with leg swelling, weeping and abdominal distension. He was felt to be fluid overloaded on admission, but then became hypotensive with diuresis. Albumin level was low and felt to be a contributing factor to leg swelling and hypotension. Subsequently, he was given an albumin infusion followed by diuretics and started to improve. LE US negative for DVT. Treated with antibiotics for cellulitis. GDMT started at low doses. To take PRN Bumex on discharge. Discharge weight of 189 lbs.      Review of Systems   Constitutional: Negative.   Cardiovascular:  Positive for leg swelling (mild and stable).   Respiratory: Negative.         HISTORY:  Past Medical History:    A-fib (HCC)    Arrhythmia    atrial fibrillation    Arthritis    Autoimmune disease (HCC)    hepatits, resolved anfd nephritis, resolved    Ramon's esophagus    Bleeding nose    Gums Treated    Blood disorder    thrombocytopenia    Blood in urine    Blurred vision    cataract due to steroids, surgery in June    Calculus of kidney    One time    Cancer (HCC)    skin    Candidiasis of the esophagus    Due to steroid use for Autoimmune disorder     Cataract    Colon polyps    Congestive heart disease (HCC)    Diarrhea, unspecified    Intermittent due to lupus    Diverticulosis of colon    Easy bruising    On and off prednisone for 20 years    Esophageal polyp    Esophageal reflux    Essential hypertension    Fatigue    polymyositis and lupus    Gout    Hammer toe, acquired    Heart palpitations    Afib    Hepatitis    autoimmune induce hepatitis d/t lupus    High blood pressure    IBS (irritable bowel syndrome)    mild d/t lupus    Irregular bowel habits    Leg swelling    Heart failure, probably caused by lupus    Lupus    MCTD (mixed connective tissue disease) (HCC)    Obstructive sleep apnea (adult) (pediatric)    LI (obstructive sleep apnea)    AHI 37  Supine AHI 38 non-supine AHI 16 Sao2 Pj 83%     LI (obstructive sleep  apnea)    AHI 36 RDI 36 REM AHI 45 Supine AHI 65 non-supine AHI 19 Sao2 Pj 86% CPAP 9cwp    Pain in joints    Personal history of antineoplastic chemotherapy    Pleural effusion    right    Pneumonia due to organism    Polymyositis (HCC)    Presence of other cardiac implants and grafts    watchman filter    Problems with swallowing    dysphagia in 2006    Pulmonary hypertension (HCC)    Raynaud disease    Raynaud disease    Renal disorder    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    Shortness of breath    mainly due to pm or lupus    Sleep apnea    CPAP    Thrombocytopathia (HCC)    Visual impairment    bifocals for reading & computer; distance for driving    Wears glasses      Past Surgical History:   Procedure Laterality Date    Appendectomy  1970    Appendectomy      Cardiac catheterization  09/2019    Cataract Bilateral     Cholecystectomy      Colonoscopy  10/2003 2006 01/2010    x3    Colonoscopy  05/14/2013    Colonoscopy N/A 05/01/2018    Procedure: COLONOSCOPY;  Surgeon: Isaiah Tobar MD;  Location:  ENDOSCOPY    Colonoscopy N/A 04/12/2024    Procedure: COLONOSCOPY;  Surgeon: Gerardo Neves MD;  Location:  ENDOSCOPY    Colonoscopy with biopsy  05/14/2013    [facility]      Hand/finger surgery unlisted  2003    right    Needle biopsy liver      Other  12/2005    needle bipsy of kidney    Other surgical history  2017    watchman filter    Percutaneous  angioplasty  (ptca)- pbp only  2006    right    Rectum surgery procedure unlisted  1946    rectal surgery polypectomy    Skin surgery      MMS BCC R temple 6/24/09    Skin surgery  2007    basal ceell carcinoma    Skin surgery  07/18/2012    BCC-nodular to right superior eyebrow/ MOHS    Skin surgery  07/15/2013    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/MMS    Skin surgery  02/19/2014    BCC-NOD to right superior pinna, MMS, AB    Skin surgery  02/16/2021    BCC- left superior forehead, MMS     Skin surgery  03/07/2022    SCC IN SITU RIGHT ANTIHELIX MMS BY DR GASTELUM     Tonsillectomy      Upper gi endoscopy,exam  10/2003 2006 2010 , 5/13    x3      Family History   Problem Relation Age of Onset    Heart Disease Father         CHF    Gastro-Intestinal Disorder Father         Diverticulosis    Alcohol and Other Disorders Associated Father     Heart Attack Father     Heart Disease Mother         CHF    Breast Cancer Mother     Stroke Mother     Cancer Paternal Grandfather     Heart Attack Paternal Grandmother     Kidney Disease Son     Other (Ramon's Esophagus) Son     Heart Attack Maternal Grandfather       Social History     Socioeconomic History    Marital status:    Occupational History    Occupation: Retired    Tobacco Use    Smoking status: Former     Current packs/day: 0.00     Average packs/day: 0.5 packs/day for 15.0 years (7.5 ttl pk-yrs)     Types: Cigarettes     Start date: 1958     Quit date: 1973     Years since quittin.6    Smokeless tobacco: Never    Tobacco comments:     5 cigarettes daily, stopped smoking in    Vaping Use    Vaping status: Never Used   Substance and Sexual Activity    Alcohol use: Yes     Alcohol/week: 13.0 - 15.0 standard drinks of alcohol     Types: 5 Glasses of wine, 8 - 10 Standard drinks or equivalent per week     Comment: 1 per day wine or liquor    Drug use: Never   Other Topics Concern    Caffeine Concern Yes     Comment: 1 soda per day and decaf coffee    Sleep Concern No    Exercise Yes     Comment: 3-4 times weekly    Seat Belt Yes     Social Drivers of Health     Food Insecurity: No Food Insecurity (2025)    NCSS - Food Insecurity     Worried About Running Out of Food in the Last Year: No     Ran Out of Food in the Last Year: No   Transportation Needs: No Transportation Needs (2025)    NCSS - Transportation     Lack of Transportation: No   Housing Stability: Not At Risk (2025)    NCSS - Housing/Utilities     Has Housing: Yes     Worried About Losing Housing: No     Unable to Get Utilities:  No           Objective:    Telemetry: Afib         /62   Pulse 72   Resp 16   Wt 186 lb (84.4 kg)   SpO2 100%   BMI 25.95 kg/m²     Wt Readings from Last 6 Encounters:   03/03/25 186 lb (84.4 kg)   02/10/25 186 lb (84.4 kg)   02/03/25 185 lb 3.2 oz (84 kg)   01/30/25 186 lb (84.4 kg)   01/17/25 184 lb (83.5 kg)   01/14/25 185 lb 12.8 oz (84.3 kg)            Recent Results (from the past 24 hours)   Basic Metabolic Panel (8)    Collection Time: 03/03/25  1:26 PM   Result Value Ref Range    Glucose 90 70 - 99 mg/dL    Sodium 143 136 - 145 mmol/L    Potassium 5.4 (H) 3.5 - 5.1 mmol/L    Chloride 102 98 - 112 mmol/L    CO2 34.0 (H) 21.0 - 32.0 mmol/L    Anion Gap 7 0 - 18 mmol/L    BUN 47 (H) 9 - 23 mg/dL    Creatinine 1.78 (H) 0.70 - 1.30 mg/dL    Calcium, Total 10.0 8.7 - 10.6 mg/dL    Calculated Osmolality 308 (H) 275 - 295 mOsm/kg    eGFR-Cr 38 (L) >=60 mL/min/1.73m2    Patient Fasting for BMP? No          Current Outpatient Medications:     bumetanide 1 MG Oral Tab, Take 2 tablets (2 mg total) by mouth every morning AND 1 tablet (1 mg total) Every afternoon at 2:00 pm., Disp: 270 tablet, Rfl: 1    PREDNISONE 1 MG Oral Tab, TAKE 2 TABLETS (2 MG TOTAL) BY MOUTH DAILY WITH BREAKFAST, Disp: 180 tablet, Rfl: 0    spironolactone 25 MG Oral Tab, Take 1 tablet (25 mg total) by mouth daily., Disp: 90 tablet, Rfl: 1    omeprazole 20 MG Oral Capsule Delayed Release, TAKE 1 CAPSULE BY MOUTH TWICE A DAY, Disp: 180 capsule, Rfl: 3    ALLOPURINOL 300 MG Oral Tab, TAKE 1 TABLET BY MOUTH EVERY DAY, Disp: 90 tablet, Rfl: 3    ipratropium 0.06 % Nasal Solution, SPRAY 1 SPRAY INTO EACH NOSTRIL TWICE A DAY, Disp: 1 each, Rfl: 5    predniSONE 5 MG Oral Tab, TAKE 1 TABLET BY MOUTH EVERY DAY WITH BREAKFAST, Disp: 90 tablet, Rfl: 1    fexofenadine 180 MG Oral Tab, Take 1 tablet (180 mg total) by mouth daily as needed., Disp: , Rfl:     levothyroxine 50 MCG Oral Tab, Take 1 tablet (50 mcg total) by mouth before breakfast., Disp:  , Rfl:     metoprolol succinate  MG Oral Tablet 24 Hr, Take 1 tablet (100 mg total) by mouth every morning AND 0.5 tablets (50 mg total) every evening., Disp: 60 tablet, Rfl: 3    aspirin 81 MG Oral Tab EC, Take 1 tablet (81 mg total) by mouth daily., Disp: 30 tablet, Rfl: 0    PREVIDENT 5000 BOOSTER PLUS 1.1 % Dental Paste, , Disp: , Rfl:     Multiple Vitamins-Minerals (TAB-A-KEVIN) Oral Tab, Take 1 tablet by mouth daily., Disp: , Rfl:     Immune Globulin, Human, 10 g Intravenous Recon Soln, Inject 0.4 g/kg into the vein. Given for treatment of polymyositis, mixed connective tissue disease, and immune thrombocytopenia purpura monthly at [facility]. Every 5 weeks, Disp: 3 each, Rfl: 0    Calcium Citrate-Vitamin D (CITRACAL + D OR), Take 1 tablet by mouth daily., Disp: , Rfl:     Exam:   General:         Alert, in no apparent distress  HEENT:           no JVD  Lungs:            CTAB                     CV:                  irregularly irregular   Abdomen:       Non-distended, soft  Extremities:    trace-+1 LE edema right above sock line   Neuro:             A&O x 3  Skin:                Pink, warm, dry    Education:  Patient instructed regarding sodium restricted diet, low sodium foods, fluid restriction, daily weights, medication regimen, s/s HF exacerbation and when to call APN/clinic.         [x] CardioMEMs  [] ARNi/ACEi/ARB - hypotension  [x] BB  [x] MRA  [] SGLT2i - possible pancreatitis       Assessment:   Chronic diastolic, RV heart failure - LVEF 55-60% and stable on echo 7/2024, low normal RV function. RHC 5/2024 with PCWP 14, RA 9. S/p CardioMEMs. Recently told to restart transmitting CardioMEMs readings, previously felt to be inaccurate and recalibration recommended. PAD 30 mmhg today. Off Jardiance, thought to have contributed to pancreatitis. MRA discontinued at [facility] for dehydration and near syncope; since have restarted but required reduced dose due to hyperkalemia. Last ProBNP down to  3,617 (highest 5,927). Volume status stable.   PH, mild combined pre and post capillary - RHC 5/20/24 demonstrates mPAP 31 with wedge 14 mmHg, RA 9 mmHg, PVR 2.2 and CI 3.8. RV function low normal. PAD 31-32 mmHg on day of RHC per CardioMEMs.   Mild to Moderate MR/Mild-Mod TR/ Moderate AI - on echo in July.   CKD Stage 3b - prior baseline creatinine ~ 1.8-2.0 mg/dl,  now baseline ~1.5 mg/dl. Creatinine 1.78 today and mildly up but stable. Follows with Dr. Devine.    Chronic Afib - s/p Watchman. Rates controlled.   LI - on CPAP. Wears 22% of the nights according to recent download. Endorsed not wearing at all today and plans to discuss with pulmonary.   Recurrent bilateral pleural effusions - hx of thoracentesis.   Hx Lupus/Mixed CTD/Severe polymyositis - continues IVIG infusions monthly. Last dose 2/10. Follows with Dr. Farmer. On chronic prednisone. Off Imuran due to pancytopenia.   Non-obstructive CAD  Chronic thrombocytopenia, immune mediated. PLTs stable. Follows with Dr. Orr, last seen 1/30.  Hx Hyponatremia, Na 140 today.   HERNANDEZ with biopsy proven stage F0-F1 Fibrosis on US 1/2022. Follows with Dr. Veronica, Hepatology.  Mild hyperkalemia - on low dose MRA and ARNI. Has required Veltassa in the past. Recurrent, K 5.4 today.   Anemia - Hgb 14.4 g/dL; stable. Iron sat 28 and ferritin 97 1/2025. Supplemented with 3 doses of Venofer, last dose 9/16.   Hypothyroidism - on Synthroid. TSH normal 1/2025, Dr. Simon following.     Plan:   Give Veltassa 8.4g x 1 for hyperkalemia.  BMp in 1 week to recheck potassium level. Discussed higher in potassium foods.   Continue current medications.   Follow MEMs, encouraged to continue to transmit readings.   Return to clinic in 4 weeks.  F/U with Dr. Craig as planned in ~ May with echo prior.  CHF discharge instructions given    I have spent 45 min total time on the day of the encounter, including: preparing to see the patient, obtaining and/or reviewing separately  obtained history, performing a medically appropriate examination and/or evaluation, counseling and educating the patient/family caregiver, ordering medication, tests, or procedures, documenting clinical information in Epic, and independently interpreting results and communicating results to the patient/family caregiver     SHEA Fernandez

## 2025-03-03 ENCOUNTER — HOSPITAL ENCOUNTER (OUTPATIENT)
Dept: CARDIOLOGY CLINIC | Facility: HOSPITAL | Age: 80
End: 2025-03-03
Attending: NURSE PRACTITIONER
Payer: MEDICARE

## 2025-03-03 ENCOUNTER — HOSPITAL ENCOUNTER (OUTPATIENT)
Dept: LAB | Facility: HOSPITAL | Age: 80
Discharge: HOME | End: 2025-03-03
Attending: NURSE PRACTITIONER
Payer: MEDICARE

## 2025-03-03 VITALS
RESPIRATION RATE: 16 BRPM | DIASTOLIC BLOOD PRESSURE: 62 MMHG | SYSTOLIC BLOOD PRESSURE: 125 MMHG | WEIGHT: 186 LBS | HEART RATE: 72 BPM | BODY MASS INDEX: 26 KG/M2 | OXYGEN SATURATION: 100 %

## 2025-03-03 DIAGNOSIS — I50.812 CHRONIC RIGHT-SIDED HEART FAILURE (HCC): ICD-10-CM

## 2025-03-03 DIAGNOSIS — E87.5 HYPERKALEMIA: ICD-10-CM

## 2025-03-03 DIAGNOSIS — I48.20 CHRONIC A-FIB (HCC): ICD-10-CM

## 2025-03-03 DIAGNOSIS — I50.32 CHRONIC DIASTOLIC HEART FAILURE (HCC): ICD-10-CM

## 2025-03-03 DIAGNOSIS — I50.32 CHRONIC DIASTOLIC HEART FAILURE (HCC): Primary | ICD-10-CM

## 2025-03-03 DIAGNOSIS — N18.32 STAGE 3B CHRONIC KIDNEY DISEASE (HCC): ICD-10-CM

## 2025-03-03 LAB
ANION GAP SERPL CALC-SCNC: 7 MMOL/L (ref 0–18)
BUN BLD-MCNC: 47 MG/DL (ref 9–23)
CALCIUM BLD-MCNC: 10 MG/DL (ref 8.7–10.6)
CHLORIDE SERPL-SCNC: 102 MMOL/L (ref 98–112)
CO2 SERPL-SCNC: 34 MMOL/L (ref 21–32)
CREAT BLD-MCNC: 1.78 MG/DL
EGFRCR SERPLBLD CKD-EPI 2021: 38 ML/MIN/1.73M2 (ref 60–?)
FASTING STATUS PATIENT QL REPORTED: NO
GLUCOSE BLD-MCNC: 90 MG/DL (ref 70–99)
OSMOLALITY SERPL CALC.SUM OF ELEC: 308 MOSM/KG (ref 275–295)
POTASSIUM SERPL-SCNC: 5.4 MMOL/L (ref 3.5–5.1)
SODIUM SERPL-SCNC: 143 MMOL/L (ref 136–145)

## 2025-03-03 PROCEDURE — 80048 BASIC METABOLIC PNL TOTAL CA: CPT | Performed by: NURSE PRACTITIONER

## 2025-03-03 PROCEDURE — 36415 COLL VENOUS BLD VENIPUNCTURE: CPT | Performed by: NURSE PRACTITIONER

## 2025-03-03 PROCEDURE — 99215 OFFICE O/P EST HI 40 MIN: CPT | Performed by: NURSE PRACTITIONER

## 2025-03-03 NOTE — PATIENT INSTRUCTIONS
Heart Failure Discharge Instructions    Please completed blood work in 1 week.     Activity: Regular exercise and activity is important for your overall health and to help keep your heart strong and functioning as well as possible.   Walk at a slow to moderate pace for 15-20 minutes 3-5 days per week.     Follow these instructions every day to keep yourself in the Green Zone     The Green Zone means you are feeling well and your symptoms are under control                                    Medications  Take your medication every day as instructed  Do not stop taking your medicine or change the amount you are taking without instructions from your doctor or nurse  Do Not take non-steroidal antiinflammatory drugs such as ibuprofen, aleve, advil, or motrin                                    Diet/Fluids  People with heart failure should eat less sodium (salt) and limit fluid. Sodium attracts water and makes the body hold fluid. This extra fluid makes the heart work harder and can worsen the symptoms of heart failure.     Diet    2000 mg sodium daily  Fluid restriction    64 ounces daily  (8 oz. = 1 cup)                                     Body Weight  Weigh yourself every day before breakfast and write your weight down  Use the same scale and wear about the same amount of clothes each time  A sudden weight increase is due to fluid retention rather than fat                                         Activity  Pace your activities to avoid getting overtired  Take rest periods as needed  Elevate your feet to reduce ankle swelling when resting                             Signs of Worsening Heart Failure    You are entering the Yellow Zone - this is a warning zone    Call your doctor or nurse if you have any of these signs or symptoms:  You gain 2 or more pounds overnight or 3-5 pounds in 3-7 days  You have more trouble breathing  You get more tired with regular activity, or are limiting activity because of shortness of breath  or fatigue  You are short of breath lying down, you need more pillows to breathe comfortably,  or wake up during the night short of breath  You urinate less often during the day and more often at night  You have a bloated feeling, upset stomach, loss of appetite, or your clothes are fitting tighter    GO TO THE EMERGENCY DEPARTMENT or CALL 911 IF:    These are signs you are in the RED ZONE - THIS IS AN EMERGENCY  You have tightness or pain in your chest  You are extremely short of breath or can't catch your breath  You cough up frothy pink mucous  You feel confused or can't think clearly  You are traveling and develop symptoms of worsening heart failure     We respect everyone's time and availability. Please be aware that this is not a walk-in clinic and we require appointments in order to facilitate timely care for all patients. We ask you to arrive 30 minutes before your appointment to allow time for you to check-in and have your bloodwork drawn. Please understand if you are late for your appointment, you may be asked to reschedule. If possible, all attempts will be made to accommodate but realize this is no guarantee that this will always be available. We understand there are extenuating circumstances. If you need to cancel or reschedule your appointment, please call the [facility] within 24 hours at [phone].  Thank you for your cooperation, [facility] Staff.    If you are currently [employer] active, starting July 1st 2024, we will be utilizing [employer] messaging ONLY to confirm your appointment.    IF YOU HAVE QUESTIONS REGARDING YOUR BILL, FEEL FREE TO CONTACT Critical access hospital PATIENT ACCOUNTS -[phone]. IF YOU NEED FINANCIAL ASSISTANCE, PLEASE CALL AN Critical access hospital FINANCIAL COUNSELOR -[phone].             [facility]     [phone]

## 2025-03-03 NOTE — PROGRESS NOTES
Patient assessed. Patient denies any issues with bloating and reports no changes with his shortness of breath. Patient with mild pitting edema to his lower extremities. Weight stable at 186.0 lbs. Reviewed current list of patient's allergies and medication; updated the Electronic Medical Record. Labs ordered to assess kidney function and drawn by  Lab.     Serum potassium was 5.4 today. Patient given PO 8.4 grams of Veltassa. Patient tolerated it well. Reviewed follow-up appointment and Heart Failure discharge instructions with patient. Patient verbalized an understanding. Patient to return to the clinic in 1 month.

## 2025-03-06 ENCOUNTER — PATIENT OUTREACH (OUTPATIENT)
Dept: CASE MANAGEMENT | Age: 80
End: 2025-03-06

## 2025-03-06 DIAGNOSIS — I27.20 PULMONARY HYPERTENSION (HCC): ICD-10-CM

## 2025-03-06 DIAGNOSIS — G47.33 OSA (OBSTRUCTIVE SLEEP APNEA): ICD-10-CM

## 2025-03-06 DIAGNOSIS — K22.70 BARRETT'S ESOPHAGUS WITHOUT DYSPLASIA: ICD-10-CM

## 2025-03-06 DIAGNOSIS — C44.219 BASAL CELL CARCINOMA (BCC) OF SKIN OF LEFT EAR: ICD-10-CM

## 2025-03-06 DIAGNOSIS — N18.32 STAGE 3B CHRONIC KIDNEY DISEASE (HCC): ICD-10-CM

## 2025-03-06 DIAGNOSIS — I50.32 CHRONIC DIASTOLIC HEART FAILURE (HCC): ICD-10-CM

## 2025-03-06 DIAGNOSIS — I10 BENIGN ESSENTIAL HYPERTENSION: Primary | ICD-10-CM

## 2025-03-06 DIAGNOSIS — I48.19 PERSISTENT ATRIAL FIBRILLATION (HCC): ICD-10-CM

## 2025-03-06 DIAGNOSIS — I50.32 CHRONIC HEART FAILURE WITH PRESERVED EJECTION FRACTION (HCC): ICD-10-CM

## 2025-03-06 NOTE — PROGRESS NOTES
Spoke to Miguel for Chronic Care Management.      Updates to patient care team/comments: UTD  Patient reported changes in medications: UTD  Med Adherence  Comment: pt taking medications as prescribed     Health Maintenance:   Health Maintenance   Topic Date Due    Annual Physical  01/17/2026    Colorectal Cancer Screening  04/12/2027    Influenza Vaccine  Completed    Annual Depression Screening  Completed    Fall Risk Screening (Annual)  Completed    Pneumococcal Vaccine: 50+ Years  Completed    Zoster Vaccines  Completed    Meningococcal B Vaccine  Aged Out       Patient updates/concerns:    Spoke to pt for monthly outreach   Pt will be following up with pulmonology to request re submission of the order for cpap facemask. Pt f/u with [employer] to discuss fitting and was told that no order was visible. Pt has not started cpap use since visit and estimates he has not used it in over a year. Pt discussed the difficulties he was having keeping the mask on and how the pressure would sometimes leave his mouth very dry. Pt was concerned that with continued use he would begin to suffer unintended damage to his teeth and gums. He also stated that despite using the cpap consistently for years he never felt an improvement in his energy level. Pt confirmed that he has discussed the various other benefits from using cpap like reduced cardiovascular problems like stroke and blood pressure. Pt verbalizes understanding and intends to begin consistent use. Pt has machine and mask at home and he plans to start using it in the meantime while he waits for new mask.    Pt has seen derm and had some spots frozen. Pt is consistent in his skin care routine and usels cera v on torso and arms. Discussed years of steroid use that has left his skin thin. He routinely visits derm every 6 months.    Pt was seen for a teeth cleaning yesterday. Pt consistently visits dentist every 3 months. Dentist was satisfied with pt condition.    Pt sleep  hygiene is appropriate and consistent. He estimates getting up 2-3 times a night to use the bathroom and can easily fall back asleep. Pt states that he does not feel afternoon fatigue and does not require naps.    Pt continues with cardio rehab. He closely checks for swelling and has not had any issues.    Pt medications are all current and he has no issues with rx.   Pt did not have any new questions or concerns for pcp or clinical staff.   Goals/Action Plan:    Active goal from previous outreach: manage healthcare    Patient reported progress towards goals: pt continues to see providers as needed and takes medications as prescribed               - What: exercise           - Where/When/How: pt is getting exercise with daily activity and pt manages his wifes care  Patient Reported Barriers and Concerns: n/a                   - Plan for overcoming barriers: none    Care Managers Interventions: continue to provide encouragement and education for healthy coping and dx    Future Appointments:   Future Appointments   Date Time Provider Department Center   3/13/2025  2:20 PM Taye Farmer MD [facility]   3/17/2025  5:00 PM HEART FAILURE APN 1 [facility] HF CLIN [facility]   3/19/2025 11:00 AM NP TX RN5 NP SW [facility]   3/31/2025  1:30 PM EH CARD PHLEBOTOMY RM1 [facility] CARD [facility]   3/31/2025  2:00 PM HEART FAILURE APN 1 [facility] HF CLIN [facility]   4/17/2025  5:30 PM HEART FAILURE APN 1 E[facility] HF CLIN [facility]   8/1/2025  2:15 PM Eric Simon MD EMG 3 [facility] Care Manager Follow Up Date: one month     Reason For Follow Up: review progress and or barriers towards patient's goals.     Time Spent This Encounter Total: 21 min medical record review, telephone communication, care plan updates where needed, education, goals, and action plan recreation/update. Provided acknowledgment and validation to patient's concerns.   Monthly Minute Total including today: 21  Physical assessment, complete  health history, and need for CCM established by Eric Simon MD.

## 2025-03-12 ENCOUNTER — LAB ENCOUNTER (OUTPATIENT)
Dept: LAB | Age: 80
End: 2025-03-12
Attending: INTERNAL MEDICINE
Payer: MEDICARE

## 2025-03-12 DIAGNOSIS — I43 CARDIOMYOPATHY AS MANIFESTATION OF UNDERLYING DISEASE (HCC): ICD-10-CM

## 2025-03-12 DIAGNOSIS — M33.20 POLYMYOSITIS (HCC): Chronic | ICD-10-CM

## 2025-03-12 DIAGNOSIS — M35.1 MIXED CONNECTIVE TISSUE DISEASE (HCC): ICD-10-CM

## 2025-03-12 DIAGNOSIS — D69.6 THROMBOCYTOPENIA: ICD-10-CM

## 2025-03-12 DIAGNOSIS — N18.31 STAGE 3A CHRONIC KIDNEY DISEASE (HCC): ICD-10-CM

## 2025-03-12 LAB
ALBUMIN SERPL-MCNC: 4.4 G/DL (ref 3.2–4.8)
ALBUMIN/GLOB SERPL: 1.7 {RATIO} (ref 1–2)
ALP LIVER SERPL-CCNC: 61 U/L
ALT SERPL-CCNC: 18 U/L
ANION GAP SERPL CALC-SCNC: 10 MMOL/L (ref 0–18)
AST SERPL-CCNC: 21 U/L (ref ?–34)
BASOPHILS # BLD AUTO: 0.04 X10(3) UL (ref 0–0.2)
BASOPHILS NFR BLD AUTO: 0.4 %
BILIRUB SERPL-MCNC: 0.8 MG/DL (ref 0.2–1.1)
BUN BLD-MCNC: 40 MG/DL (ref 9–23)
CALCIUM BLD-MCNC: 9.5 MG/DL (ref 8.7–10.6)
CHLORIDE SERPL-SCNC: 102 MMOL/L (ref 98–112)
CK SERPL-CCNC: 20 U/L
CO2 SERPL-SCNC: 31 MMOL/L (ref 21–32)
CREAT BLD-MCNC: 1.63 MG/DL
EGFRCR SERPLBLD CKD-EPI 2021: 42 ML/MIN/1.73M2 (ref 60–?)
EOSINOPHIL # BLD AUTO: 0.21 X10(3) UL (ref 0–0.7)
EOSINOPHIL NFR BLD AUTO: 2.1 %
ERYTHROCYTE [DISTWIDTH] IN BLOOD BY AUTOMATED COUNT: 15.8 %
ERYTHROCYTE [SEDIMENTATION RATE] IN BLOOD: 16 MM/HR
FASTING STATUS PATIENT QL REPORTED: YES
GLOBULIN PLAS-MCNC: 2.6 G/DL (ref 2–3.5)
GLUCOSE BLD-MCNC: 93 MG/DL (ref 70–99)
HCT VFR BLD AUTO: 45.5 %
HGB BLD-MCNC: 14.7 G/DL
IMM GRANULOCYTES # BLD AUTO: 0.07 X10(3) UL (ref 0–1)
IMM GRANULOCYTES NFR BLD: 0.7 %
LYMPHOCYTES # BLD AUTO: 1.61 X10(3) UL (ref 1–4)
LYMPHOCYTES NFR BLD AUTO: 16.3 %
MCH RBC QN AUTO: 35.1 PG (ref 26–34)
MCHC RBC AUTO-ENTMCNC: 32.3 G/DL (ref 31–37)
MCV RBC AUTO: 108.6 FL
MONOCYTES # BLD AUTO: 0.83 X10(3) UL (ref 0.1–1)
MONOCYTES NFR BLD AUTO: 8.4 %
NEUTROPHILS # BLD AUTO: 7.09 X10 (3) UL (ref 1.5–7.7)
NEUTROPHILS # BLD AUTO: 7.09 X10(3) UL (ref 1.5–7.7)
NEUTROPHILS NFR BLD AUTO: 72.1 %
OSMOLALITY SERPL CALC.SUM OF ELEC: 305 MOSM/KG (ref 275–295)
PLATELET # BLD AUTO: 128 10(3)UL (ref 150–450)
POTASSIUM SERPL-SCNC: 4.7 MMOL/L (ref 3.5–5.1)
PROT SERPL-MCNC: 7 G/DL (ref 5.7–8.2)
RBC # BLD AUTO: 4.19 X10(6)UL
SODIUM SERPL-SCNC: 143 MMOL/L (ref 136–145)
WBC # BLD AUTO: 9.9 X10(3) UL (ref 4–11)

## 2025-03-12 PROCEDURE — 36415 COLL VENOUS BLD VENIPUNCTURE: CPT

## 2025-03-12 PROCEDURE — 82550 ASSAY OF CK (CPK): CPT

## 2025-03-12 PROCEDURE — 85652 RBC SED RATE AUTOMATED: CPT

## 2025-03-12 PROCEDURE — 85025 COMPLETE CBC W/AUTO DIFF WBC: CPT

## 2025-03-12 PROCEDURE — 80053 COMPREHEN METABOLIC PANEL: CPT

## 2025-03-13 ENCOUNTER — TELEPHONE (OUTPATIENT)
Dept: CARDIOLOGY CLINIC | Facility: HOSPITAL | Age: 80
End: 2025-03-13

## 2025-03-13 ENCOUNTER — OFFICE VISIT (OUTPATIENT)
Dept: RHEUMATOLOGY | Facility: CLINIC | Age: 80
End: 2025-03-13
Payer: MEDICARE

## 2025-03-13 VITALS
DIASTOLIC BLOOD PRESSURE: 64 MMHG | HEART RATE: 86 BPM | OXYGEN SATURATION: 97 % | TEMPERATURE: 97 F | SYSTOLIC BLOOD PRESSURE: 120 MMHG | RESPIRATION RATE: 16 BRPM | WEIGHT: 188 LBS | BODY MASS INDEX: 26.92 KG/M2 | HEIGHT: 70 IN

## 2025-03-13 DIAGNOSIS — I50.32 CHRONIC HEART FAILURE WITH PRESERVED EJECTION FRACTION (HCC): ICD-10-CM

## 2025-03-13 DIAGNOSIS — M35.1 MIXED CONNECTIVE TISSUE DISEASE (HCC): Primary | ICD-10-CM

## 2025-03-13 DIAGNOSIS — I43 CARDIOMYOPATHY AS MANIFESTATION OF UNDERLYING DISEASE (HCC): ICD-10-CM

## 2025-03-13 DIAGNOSIS — D69.3 IMMUNE THROMBOCYTOPENIC PURPURA (HCC): ICD-10-CM

## 2025-03-13 DIAGNOSIS — M33.20 POLYMYOSITIS (HCC): Chronic | ICD-10-CM

## 2025-03-13 DIAGNOSIS — Z95.818 PRESENCE OF WATCHMAN LEFT ATRIAL APPENDAGE CLOSURE DEVICE: ICD-10-CM

## 2025-03-13 PROCEDURE — 99214 OFFICE O/P EST MOD 30 MIN: CPT | Performed by: INTERNAL MEDICINE

## 2025-03-13 NOTE — TELEPHONE ENCOUNTER
Labs reviewed. Potassium improved.      Latest Reference Range & Units 03/12/25 09:37   Glucose 70 - 99 mg/dL 93   Sodium 136 - 145 mmol/L 143   Potassium 3.5 - 5.1 mmol/L 4.7   Chloride 98 - 112 mmol/L 102   Carbon Dioxide, Total 21.0 - 32.0 mmol/L 31.0   BUN 9 - 23 mg/dL 40 (H)   CREATININE 0.70 - 1.30 mg/dL 1.63 (H)   CALCIUM 8.7 - 10.6 mg/dL 9.5   EGFR >=60 mL/min/1.73m2 42 (L)   ANION GAP 0 - 18 mmol/L 10   CALCULATED OSMOLALITY 275 - 295 mOsm/kg 305 (H)   (H): Data is abnormally high  (L): Data is abnormally low

## 2025-03-13 NOTE — PATIENT INSTRUCTIONS
Continue IV Ig monthly for polymyositis.  Continue prednisone at 7 mg a day for mixed connective tissue disease.  Continue regular visits at the heart failure cardiac clinic.  Labs showed a low platelets of 128,000 which is not too bad, and mild renal insufficiency.  Return to office for recheck 3 months with new set of labs.

## 2025-03-13 NOTE — PROGRESS NOTES
[facility]  Dr. Farmer Progress Note     Subjective:   Miguel Davila is a(n) 79 year old male.   Current complaints:   Chief Complaint   Patient presents with    Follow - Up     LOV: 12/30/24  Patient is here for a follow up visit. Patient states that he's been feeling good. No flare ups to speak of. Sed rates have been low   Rapid 3:    History of mixed connective tissue disease, with overlap of polymyositis, lupus and immune thrombocytopenia purpura.  Has developed cardiomyopathy and chronic diastolic heart failure, functional class II.  5 of the [place] [facility]..  And IVIG monthly which helps his myositis and autoimmune disease.  On prednisone 7 mg/day.  Wife Mariya has dementia.  Birthday in 2 days.  Will be 79.   Feeling good.  A bit weak but overall tolerable.   Can do what he needs to do.  Hip flexors are weak, going upstairs slowly.   Joiunt pain no.   Objective:   /64   Pulse 86   Temp 97.4 °F (36.3 °C)   Resp 16   Ht 5' 10\" (1.778 m)   Wt 188 lb (85.3 kg)   SpO2 97%   BMI 26.98 kg/m²   Lungs clear Heat nsr Abd ok  Hand  ok    Ext trace edema right leg   3/12/2025 glucose 93 sodium 143 potassium 4.7 BUN 40 creatinine 1.63 calcium 9.5 GFR 42  Alkaline phos 61 AST 21 ALT 18 bilirubin 0.8 globulin 2.6 CK20.  Total protein 7.0.  Albumin 4.4 white count 9.9 hemoglobin 14.7 hematocrit 45.5 platelet count 128,000   .  Assessment:     Encounter Diagnoses   Name Primary?    Mixed connective tissue disease (HCC) Yes    Polymyositis (HCC)     Chronic heart failure with preserved ejection fraction (HCC)     Immune thrombocytopenic purpura (HCC)     Cardiomyopathy as manifestation of underlying disease (HCC)     Presence of Watchman left atrial appendage closure device    Mixed connective tissue disease overall stable.  Platelets slightly low at 128,000.  Currently heart failure stable.  Continue IVIG monthly.  Plan:     Patient Instructions   Continue IV Ig monthly for  polymyositis.  Continue prednisone at 7 mg a day for mixed connective tissue disease.  Continue regular visits at the heart failure cardiac clinic.  Labs showed a low platelets of 128,000 which is not too bad, and mild renal insufficiency.  Return to office for recheck 3 months with new set of labs.        Taye Farmer MD 3/13/2025 2:42 PM

## 2025-03-17 ENCOUNTER — HOSPITAL ENCOUNTER (OUTPATIENT)
Dept: CARDIOLOGY CLINIC | Facility: HOSPITAL | Age: 80
End: 2025-03-17
Attending: NURSE PRACTITIONER
Payer: MEDICARE

## 2025-03-17 DIAGNOSIS — I43 CARDIOMYOPATHY AS MANIFESTATION OF UNDERLYING DISEASE (HCC): Primary | ICD-10-CM

## 2025-03-17 NOTE — PROGRESS NOTES
[facility]     CardioMEMS pulmonary artery pressure sensor    Remote Monitoring Report Summary    3/17/2025    Miguel Davila    : 3/17/1945    Reporting Period-  2/3-3/10/2025     Diagnosis - HFpEF    Provider- SHERIDAN Cummins       The CardioMEMS report for the above date has been reviewed with the following results:        RHC hemodynamics at time of CardioMEMS impant:    PAD 18  Wedge 8     Acceptable range for Miguel Davila      PAD range 29-35 mmhg     Remote monitoring documentation for the past ~30 days:    03- PAD stable. CTM  2025 PAD 30mmhg and in range. CTM  2025 Isolated PAD stable.  2025 Isolated reading little elevated. Will follow and if next reading is high will adjust diuretics.  02- PAD stable. CTM  2025 PAD stable.      Tasha Barth NP

## 2025-03-19 ENCOUNTER — OFFICE VISIT (OUTPATIENT)
Age: 80
End: 2025-03-19
Attending: INTERNAL MEDICINE
Payer: MEDICARE

## 2025-03-19 VITALS
BODY MASS INDEX: 25.76 KG/M2 | SYSTOLIC BLOOD PRESSURE: 124 MMHG | OXYGEN SATURATION: 100 % | RESPIRATION RATE: 18 BRPM | HEIGHT: 70.98 IN | WEIGHT: 184 LBS | TEMPERATURE: 97 F | DIASTOLIC BLOOD PRESSURE: 72 MMHG | HEART RATE: 87 BPM

## 2025-03-19 DIAGNOSIS — D69.3 IMMUNE THROMBOCYTOPENIC PURPURA (HCC): Primary | ICD-10-CM

## 2025-03-19 DIAGNOSIS — D69.6 THROMBOCYTOPENIA: ICD-10-CM

## 2025-03-19 RX ORDER — DIPHENHYDRAMINE HCL 25 MG
25 CAPSULE ORAL ONCE
Status: COMPLETED | OUTPATIENT
Start: 2025-03-19 | End: 2025-03-19

## 2025-03-19 RX ORDER — DIPHENHYDRAMINE HCL 25 MG
25 CAPSULE ORAL ONCE
OUTPATIENT
Start: 2025-04-23

## 2025-03-19 RX ORDER — ACETAMINOPHEN 325 MG/1
650 TABLET ORAL ONCE
Status: COMPLETED | OUTPATIENT
Start: 2025-03-19 | End: 2025-03-19

## 2025-03-19 RX ORDER — ACETAMINOPHEN 325 MG/1
650 TABLET ORAL ONCE
OUTPATIENT
Start: 2025-04-23

## 2025-03-19 RX ADMIN — ACETAMINOPHEN 650 MG: 325 TABLET ORAL at 11:13:00

## 2025-03-19 RX ADMIN — DIPHENHYDRAMINE HCL 25 MG: 25 MG CAPSULE ORAL at 11:13:00

## 2025-03-19 NOTE — PROGRESS NOTES
Pt here for IVIG . Pt denies any issues or concerns.      Ordering Provider: Darian Campbell Exp: last dose today     Pt tolerated infusion without difficulty or complaint. Reviewed next apt date/time: due in 5 weeks      Education Record  Learner:  Patient  Disease / Diagnosis: ITP  Barriers / Limitations:  None  Method:  Discussion  General Topics:  Medication and Plan of care reviewed  Outcome:  Shows understanding      Here for IVIG. No complaints. Tolerated infusion. Last dose of 3mo order. Pt aware and will reach out to MD so he can be scheduled in 5 weeks for next dose.

## 2025-03-24 ENCOUNTER — PATIENT MESSAGE (OUTPATIENT)
Dept: RHEUMATOLOGY | Facility: CLINIC | Age: 80
End: 2025-03-24

## 2025-03-24 ENCOUNTER — TELEPHONE (OUTPATIENT)
Dept: CARDIOLOGY CLINIC | Facility: HOSPITAL | Age: 80
End: 2025-03-24

## 2025-03-27 NOTE — PROGRESS NOTES
[facility] Progress Note    Miguel Davila is a 80 year old male who presents to clinic for APN assessment and management of chronic diastolic heart failure and is functional class 2.     Subjective:  Since his last clinic visit on 3/3 when he was given Veltassa 8.4 grams with plans to recheck BMP in a week he had repeat labs 3/12 that showed improvement in potassium from 5.4 to 4.7.  He saw Dr. Faremr 3/13; recommended continuing IVIG monthly for polymyositis, prednisone for mixed connective tissue disease. He will see him in 3 months with labs. He had IVIG 3/19.     His weight is down 3 lbs. His LE edema is mild. He denies shortness of breath. Appetite is good but he doesn't think he eats enough since he has been busy. He has been more tired the last 10 days but also has been more stressed. His wife's dementia is worsening and it has been more time consuming to take care of her. He is looking into getting help at home and would like to stay in his condo since it is paid off.      He saw Dr. Craig 1/9/25; no changes made. He will see him in 4 months. He didn't feel strongly about recalibration of CardioMEMs and does not think its needed. Encouraged Bill to restart transmitting MEMs readings. Thought to be euvolemic at visit. F/U in 4 months with echo prior.      Last RHC on 5/20/24 shows RA 9 mmHg, PA 47/18/31 mmhg, wedge 14 mmHg, CI 3.8 and PVR 2.2 wood units. PAD on MEMs around that time was 31-32 mmHg.  Dr. Jean recommended increasing Entresto with consideration for retrying SGLT2i and increase in MRA at a later date. CardioMEMs sensor was noted to be inaccurate and re-calibration was recommended. He had requested this be postponed since he had cholecystitis and has been slow to recover.  Due to inaccuracy he was not transmitting readings up until recently when he was encouraged to by Dr. Craig. Dr. Craig did not feel recalibration was needed.     CardioMEMS  readings, recent PAD has been 30-35 mmHg since he was last seen. Range 29-35 mmhg.      Last Hospitalizations/ED visits:    He was admitted from 6/6-6/10 with acute cholecystitis with localized peritonitis and underwent cholecystectomy 6/7. Treated with antibiotics. Aldactone, Bumex and Entresto were held during hospital stay d.t low BP. He was discharged on Bumex 1 mg, remained off ARNI and MRA.      Admitted again 7/8-7/12 with leg swelling, weeping and abdominal distension. He was felt to be fluid overloaded on admission, but then became hypotensive with diuresis. Albumin level was low and felt to be a contributing factor to leg swelling and hypotension. Subsequently, he was given an albumin infusion followed by diuretics and started to improve. LE US negative for DVT. Treated with antibiotics for cellulitis. GDMT started at low doses. To take PRN Bumex on discharge. Discharge weight of 189 lbs.      Review of Systems   Constitutional: Positive for malaise/fatigue and weight loss.   Cardiovascular:  Positive for leg swelling (mild and stable).   Respiratory: Negative.     Gastrointestinal:  Negative for bloating.       HISTORY:  Past Medical History:    A-fib (HCC)    Arrhythmia    atrial fibrillation    Arthritis    Autoimmune disease (HCC)    hepatits, resolved anfd nephritis, resolved    Ramon's esophagus    Bleeding nose    Gums Treated    Blood disorder    thrombocytopenia    Blood in urine    Blurred vision    cataract due to steroids, surgery in June    Calculus of kidney    One time    Cancer (HCC)    skin    Candidiasis of the esophagus    Due to steroid use for Autoimmune disorder     Cataract    Colon polyps    Congestive heart disease (HCC)    Diarrhea, unspecified    Intermittent due to lupus    Diverticulosis of colon    Easy bruising    On and off prednisone for 20 years    Esophageal polyp    Esophageal reflux    Essential hypertension    Fatigue    polymyositis and lupus    Gout    Hammer toe,  acquired    Heart palpitations    Afib    Hepatitis    autoimmune induce hepatitis d/t lupus    High blood pressure    IBS (irritable bowel syndrome)    mild d/t lupus    Irregular bowel habits    Leg swelling    Heart failure, probably caused by lupus    Lupus    MCTD (mixed connective tissue disease) (HCC)    Obstructive sleep apnea (adult) (pediatric)    LI (obstructive sleep apnea)    AHI 37  Supine AHI 38 non-supine AHI 16 Sao2 Pj 83%     LI (obstructive sleep apnea)    AHI 36 RDI 36 REM AHI 45 Supine AHI 65 non-supine AHI 19 Sao2 Pj 86% CPAP 9cwp    Pain in joints    Personal history of antineoplastic chemotherapy    Pleural effusion    right    Pneumonia due to organism    Polymyositis (HCC)    Presence of other cardiac implants and grafts    watchman filter    Problems with swallowing    dysphagia in 2006    Pulmonary hypertension (HCC)    Raynaud disease    Raynaud disease    Renal disorder    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    Shortness of breath    mainly due to pm or lupus    Sleep apnea    CPAP    Thrombocytopathia (HCC)    Visual impairment    bifocals for reading & computer; distance for driving    Wears glasses      Past Surgical History:   Procedure Laterality Date    Appendectomy  1970    Appendectomy      Cardiac catheterization  09/2019    Cataract Bilateral     Cholecystectomy      Colonoscopy  10/2003 2006 01/2010    x3    Colonoscopy  05/14/2013    Colonoscopy N/A 05/01/2018    Procedure: COLONOSCOPY;  Surgeon: Isaiah Tobar MD;  Location:  ENDOSCOPY    Colonoscopy N/A 04/12/2024    Procedure: COLONOSCOPY;  Surgeon: Gerardo Neves MD;  Location:  ENDOSCOPY    Colonoscopy with biopsy  05/14/2013    Egd      Hand/finger surgery unlisted  2003    right    Needle biopsy liver      Other  12/2005    needle bipsy of kidney    Other surgical history  2017    watchman filter    Percutaneous  angioplasty  (ptca)- pbp only  2006    right    Rectum surgery procedure unlisted  1946    rectal  surgery polypectomy    Skin surgery      MMS BCC R temple 09    Skin surgery  2007    basal ceell carcinoma    Skin surgery  2012    BCC-nodular to right superior eyebrow/ MOHS    Skin surgery  07/15/2013    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/MMS    Skin surgery  2014    BCC-NOD to right superior pinna, MMS, AB    Skin surgery  2021    BCC- left superior forehead, MMS     Skin surgery  2022    SCC IN SITU RIGHT ANTIHELIX MMS BY DR GASTELUM    Tonsillectomy  194    Upper gi endoscopy,exam  10/2003 2006 2010 , 5/13    x3      Family History   Problem Relation Age of Onset    Heart Disease Father         CHF    Gastro-Intestinal Disorder Father         Diverticulosis    Alcohol and Other Disorders Associated Father     Heart Attack Father     Heart Disease Mother         CHF    Breast Cancer Mother     Stroke Mother     Cancer Paternal Grandfather     Heart Attack Paternal Grandmother     Kidney Disease Son     Other (Ramon's Esophagus) Son     Heart Attack Maternal Grandfather       Social History     Socioeconomic History    Marital status:    Occupational History    Occupation: Retired    Tobacco Use    Smoking status: Former     Current packs/day: 0.00     Average packs/day: 0.5 packs/day for 15.0 years (7.5 ttl pk-yrs)     Types: Cigarettes     Start date: 1958     Quit date: 1973     Years since quittin.7    Smokeless tobacco: Never    Tobacco comments:     5 cigarettes daily, stopped smoking in    Vaping Use    Vaping status: Never Used   Substance and Sexual Activity    Alcohol use: Yes     Alcohol/week: 13.0 - 15.0 standard drinks of alcohol     Types: 5 Glasses of wine, 8 - 10 Standard drinks or equivalent per week     Comment: 1 per day wine or liquor    Drug use: Never   Other Topics Concern    Caffeine Concern Yes     Comment: 1 soda per day and decaf coffee    Sleep Concern No    Exercise Yes     Comment: 3-4 times weekly    Seat Belt Yes     Social  Drivers of Health     Food Insecurity: No Food Insecurity (1/17/2025)    NCSS - Food Insecurity     Worried About Running Out of Food in the Last Year: No     Ran Out of Food in the Last Year: No   Transportation Needs: No Transportation Needs (1/17/2025)    NCSS - Transportation     Lack of Transportation: No   Housing Stability: Not At Risk (1/17/2025)    NCSS - Housing/Utilities     Has Housing: Yes     Worried About Losing Housing: No     Unable to Get Utilities: No           Objective:    Telemetry: Afib     /70 (BP Location: Left arm)   Wt 183 lb 3.2 oz (83.1 kg)   SpO2 94%   BMI 25.56 kg/m²     Wt Readings from Last 6 Encounters:   03/31/25 183 lb 3.2 oz (83.1 kg)   03/19/25 184 lb (83.5 kg)   03/13/25 188 lb (85.3 kg)   03/03/25 186 lb (84.4 kg)   02/10/25 186 lb (84.4 kg)   02/03/25 185 lb 3.2 oz (84 kg)            Recent Results (from the past 24 hours)   Basic Metabolic Panel (8)    Collection Time: 03/31/25  1:20 PM   Result Value Ref Range    Glucose 79 70 - 99 mg/dL    Sodium 138 136 - 145 mmol/L    Potassium 5.0 3.5 - 5.1 mmol/L    Chloride 101 98 - 112 mmol/L    CO2 31.0 21.0 - 32.0 mmol/L    Anion Gap 6 0 - 18 mmol/L    BUN 54 (H) 9 - 23 mg/dL    Creatinine 1.73 (H) 0.70 - 1.30 mg/dL    Calcium, Total 9.7 8.7 - 10.6 mg/dL    Calculated Osmolality 300 (H) 275 - 295 mOsm/kg    eGFR-Cr 39 (L) >=60 mL/min/1.73m2    Patient Fasting for BMP? No            Current Outpatient Medications:     bumetanide 1 MG Oral Tab, Take 2 tablets (2 mg total) by mouth every morning AND 1 tablet (1 mg total) Every afternoon at 2:00 pm., Disp: 270 tablet, Rfl: 1    PREDNISONE 1 MG Oral Tab, TAKE 2 TABLETS (2 MG TOTAL) BY MOUTH DAILY WITH BREAKFAST, Disp: 180 tablet, Rfl: 0    spironolactone 25 MG Oral Tab, Take 1 tablet (25 mg total) by mouth daily., Disp: 90 tablet, Rfl: 1    omeprazole 20 MG Oral Capsule Delayed Release, TAKE 1 CAPSULE BY MOUTH TWICE A DAY, Disp: 180 capsule, Rfl: 3    ALLOPURINOL 300 MG Oral  Tab, TAKE 1 TABLET BY MOUTH EVERY DAY, Disp: 90 tablet, Rfl: 3    ipratropium 0.06 % Nasal Solution, SPRAY 1 SPRAY INTO EACH NOSTRIL TWICE A DAY, Disp: 1 each, Rfl: 5    predniSONE 5 MG Oral Tab, TAKE 1 TABLET BY MOUTH EVERY DAY WITH BREAKFAST, Disp: 90 tablet, Rfl: 1    fexofenadine 180 MG Oral Tab, Take 1 tablet (180 mg total) by mouth daily as needed., Disp: , Rfl:     levothyroxine 50 MCG Oral Tab, Take 1 tablet (50 mcg total) by mouth before breakfast., Disp: , Rfl:     metoprolol succinate  MG Oral Tablet 24 Hr, Take 1 tablet (100 mg total) by mouth every morning AND 0.5 tablets (50 mg total) every evening., Disp: 60 tablet, Rfl: 3    aspirin 81 MG Oral Tab EC, Take 1 tablet (81 mg total) by mouth daily., Disp: 30 tablet, Rfl: 0    PREVIDENT 5000 BOOSTER PLUS 1.1 % Dental Paste, , Disp: , Rfl:     Multiple Vitamins-Minerals (TAB-A-KEVIN) Oral Tab, Take 1 tablet by mouth daily., Disp: , Rfl:     Immune Globulin, Human, 10 g Intravenous Recon Soln, Inject 0.4 g/kg into the vein. Given for treatment of polymyositis, mixed connective tissue disease, and immune thrombocytopenia purpura monthly at [facility]. Every 5 weeks, Disp: 3 each, Rfl: 0    Calcium Citrate-Vitamin D (CITRACAL + D OR), Take 1 tablet by mouth daily., Disp: , Rfl:     Exam:   General:         Alert, in no apparent distress  HEENT:           no JVD  Lungs:            CTAB                     CV:                  irregularly irregular   Abdomen:       Non-distended, soft  Extremities:    trace-+1 LE edema right above sock line   Neuro:             A&O x 3  Skin:                Pink, warm, dry    Education:  Patient instructed regarding sodium restricted diet, low sodium foods, fluid restriction, daily weights, medication regimen, s/s HF exacerbation and when to call APN/clinic.         [x] CardioMEMs  [] ARNi/ACEi/ARB - hypotension  [x] BB  [x] MRA  [] SGLT2i - possible pancreatitis     Assessment:   Chronic diastolic, RV heart  failure - LVEF 55-60% and stable on echo 7/2024, low normal RV function. RHC 5/2024 with PCWP 14, RA 9. S/p CardioMEMs. Recently told to restart transmitting CardioMEMs readings, previously felt to be inaccurate and recalibration recommended. Recent PAD's 31-33 mmHg. RA discontinued at [facility] for dehydration and near syncope; since have restarted but required reduced dose due to hyperkalemia. Last ProBNP down to 3,617 (highest 5,927).   PH, mild combined pre and post capillary - RHC 5/20/24 demonstrates mPAP 31, PAD 18 mmHg, wedge 14 mmHg, RA 9 mmHg, PVR 2.2 and CI 3.8. RV function low normal. PAD 31-32 mmHg on day of RHC per CardioMEMs which would suggest wedge today is ~14 mmHg.   Mild to Moderate MR/Mild-Mod TR/ Moderate AI - on echo in July 2024   CKD Stage 3b - prior baseline creatinine ~ 1.8-2.0 mg/dl,  now baseline ~1.5 mg/dl. Creatinine 1.73 today. Follows with Dr. Devine.    Chronic Afib - s/p Watchman. Rates controlled.   LI - on CPAP.  Encouraged to wear more per last sleep medicine visit in February.   Recurrent bilateral pleural effusions - hx of thoracentesis.   Hx Lupus/Mixed CTD/Severe polymyositis - continues IVIG infusions monthly. Follows with Dr. Farmer. On chronic prednisone. Off Imuran due to pancytopenia.   Non-obstructive CAD  Chronic thrombocytopenia, immune mediated. PLTs stable. Follows with Dr. Orr, last seen 1/30.  Hx Hyponatremia, Na 138 today.   HERNANDEZ with biopsy proven stage F0-F1 Fibrosis on US 1/2022. Follows with Dr. Veronica, Hepatology.  Mild hyperkalemia - on low dose MRA and ARNI. Has required Veltassa, mostly recently 3/3. K 5 today.   Anemia - last Hgb 14.7 g/dL; stable. Iron sat 28 and ferritin 97 1/2025. Supplemented with 3 doses of Venofer, last dose 9/16.   Hypothyroidism - on Synthroid. TSH normal 1/2025, Dr. Simon following.     Plan:     Continue current medications.   Follow MEMs, encouraged to continue to transmit readings.   Return to clinic in 4 weeks.  F/U with  Dr. Craig as planned in ~June with echo prior in May.  CHF discharge instructions given    I have spent 45 min total time on the day of the encounter, including: preparing to see the patient, obtaining and/or reviewing separately obtained history, performing a medically appropriate examination and/or evaluation, counseling and educating the patient/family caregiver, ordering medication, tests, or procedures, documenting clinical information in Epic, and independently interpreting results and communicating results to the patient/family caregiver        Tasha Barth NP

## 2025-03-31 ENCOUNTER — HOSPITAL ENCOUNTER (OUTPATIENT)
Dept: CARDIOLOGY CLINIC | Facility: HOSPITAL | Age: 80
End: 2025-03-31
Attending: NURSE PRACTITIONER
Payer: MEDICARE

## 2025-03-31 ENCOUNTER — HOSPITAL ENCOUNTER (OUTPATIENT)
Dept: LAB | Facility: HOSPITAL | Age: 80
Discharge: HOME | End: 2025-03-31
Attending: NURSE PRACTITIONER
Payer: MEDICARE

## 2025-03-31 VITALS
DIASTOLIC BLOOD PRESSURE: 70 MMHG | WEIGHT: 183.19 LBS | BODY MASS INDEX: 26 KG/M2 | OXYGEN SATURATION: 94 % | SYSTOLIC BLOOD PRESSURE: 128 MMHG

## 2025-03-31 DIAGNOSIS — I27.20 PULMONARY HYPERTENSION (HCC): ICD-10-CM

## 2025-03-31 DIAGNOSIS — I50.812 CHRONIC RIGHT-SIDED HEART FAILURE (HCC): Primary | ICD-10-CM

## 2025-03-31 DIAGNOSIS — I50.32 CHRONIC DIASTOLIC HEART FAILURE (HCC): ICD-10-CM

## 2025-03-31 DIAGNOSIS — N18.32 STAGE 3B CHRONIC KIDNEY DISEASE (HCC): ICD-10-CM

## 2025-03-31 DIAGNOSIS — I48.20 CHRONIC A-FIB (HCC): ICD-10-CM

## 2025-03-31 DIAGNOSIS — I43 CARDIOMYOPATHY AS MANIFESTATION OF UNDERLYING DISEASE (HCC): ICD-10-CM

## 2025-03-31 LAB
ANION GAP SERPL CALC-SCNC: 6 MMOL/L (ref 0–18)
BUN BLD-MCNC: 54 MG/DL (ref 9–23)
CALCIUM BLD-MCNC: 9.7 MG/DL (ref 8.7–10.6)
CHLORIDE SERPL-SCNC: 101 MMOL/L (ref 98–112)
CO2 SERPL-SCNC: 31 MMOL/L (ref 21–32)
CREAT BLD-MCNC: 1.73 MG/DL
EGFRCR SERPLBLD CKD-EPI 2021: 39 ML/MIN/1.73M2 (ref 60–?)
FASTING STATUS PATIENT QL REPORTED: NO
GLUCOSE BLD-MCNC: 79 MG/DL (ref 70–99)
OSMOLALITY SERPL CALC.SUM OF ELEC: 300 MOSM/KG (ref 275–295)
POTASSIUM SERPL-SCNC: 5 MMOL/L (ref 3.5–5.1)
SODIUM SERPL-SCNC: 138 MMOL/L (ref 136–145)

## 2025-03-31 PROCEDURE — 99215 OFFICE O/P EST HI 40 MIN: CPT | Performed by: NURSE PRACTITIONER

## 2025-03-31 PROCEDURE — 80048 BASIC METABOLIC PNL TOTAL CA: CPT | Performed by: NURSE PRACTITIONER

## 2025-03-31 PROCEDURE — 36415 COLL VENOUS BLD VENIPUNCTURE: CPT | Performed by: NURSE PRACTITIONER

## 2025-03-31 NOTE — PATIENT INSTRUCTIONS
Heart Failure Discharge Instructions    Activity: Regular exercise and activity is important for your overall health and to help keep your heart strong and functioning as well as possible.   Walk at a slow to moderate pace for 15-20 minutes 3-5 days per week.     Follow these instructions every day to keep yourself in the Green Zone     The Green Zone means you are feeling well and your symptoms are under control                                    Medications  Take your medication every day as instructed  Do not stop taking your medicine or change the amount you are taking without instructions from your doctor or nurse  Do Not take non-steroidal antiinflammatory drugs such as ibuprofen, aleve, advil, or motrin                                    Diet/Fluids  People with heart failure should eat less sodium (salt) and limit fluid. Sodium attracts water and makes the body hold fluid. This extra fluid makes the heart work harder and can worsen the symptoms of heart failure.     Diet    2000 mg sodium daily  Fluid restriction    64 ounces daily  (8 oz. = 1 cup)                                     Body Weight  Weigh yourself every day before breakfast and write your weight down  Use the same scale and wear about the same amount of clothes each time  A sudden weight increase is due to fluid retention rather than fat                                         Activity  Pace your activities to avoid getting overtired  Take rest periods as needed  Elevate your feet to reduce ankle swelling when resting                             Signs of Worsening Heart Failure    You are entering the Yellow Zone - this is a warning zone    Call your doctor or nurse if you have any of these signs or symptoms:  You gain 2 or more pounds overnight or 3-5 pounds in 3-7 days  You have more trouble breathing  You get more tired with regular activity, or are limiting activity because of shortness of breath or fatigue  You are short of breath lying  down, you need more pillows to breathe comfortably,  or wake up during the night short of breath  You urinate less often during the day and more often at night  You have a bloated feeling, upset stomach, loss of appetite, or your clothes are fitting tighter    GO TO THE EMERGENCY DEPARTMENT or CALL 911 IF:    These are signs you are in the RED ZONE - THIS IS AN EMERGENCY  You have tightness or pain in your chest  You are extremely short of breath or can't catch your breath  You cough up frothy pink mucous  You feel confused or can't think clearly  You are traveling and develop symptoms of worsening heart failure     We respect everyone's time and availability. Please be aware that this is not a walk-in clinic and we require appointments in order to facilitate timely care for all patients. We ask you to arrive 30 minutes before your appointment to allow time for you to check-in and have your bloodwork drawn. Please understand if you are late for your appointment, you may be asked to reschedule. If possible, all attempts will be made to accommodate but realize this is no guarantee that this will always be available. We understand there are extenuating circumstances. If you need to cancel or reschedule your appointment, please call the [facility] within 24 hours at [phone].  Thank you for your cooperation, [facility] Staff.    If you are currently [employer] active, starting July 1st 2024, we will be utilizing [employer] messaging ONLY to confirm your appointment.    IF YOU HAVE QUESTIONS REGARDING YOUR BILL, FEEL FREE TO CONTACT [facility] PATIENT ACCOUNTS -[phone]. IF YOU NEED FINANCIAL ASSISTANCE, PLEASE CALL AN [facility] FINANCIAL COUNSELOR -[phone].             [facility]     [phone]

## 2025-04-17 ENCOUNTER — HOSPITAL ENCOUNTER (OUTPATIENT)
Dept: CARDIOLOGY CLINIC | Facility: HOSPITAL | Age: 80
Discharge: HOME | End: 2025-04-17
Attending: NURSE PRACTITIONER
Payer: MEDICARE

## 2025-04-17 DIAGNOSIS — I50.32 CHRONIC HEART FAILURE WITH PRESERVED EJECTION FRACTION (HCC): Primary | ICD-10-CM

## 2025-04-17 PROCEDURE — 93264 REM MNTR WRLS P-ART PRS SNR: CPT | Performed by: NURSE PRACTITIONER

## 2025-04-17 NOTE — PROGRESS NOTES
[facility]     CardioMEMS pulmonary artery pressure sensor    Remote Monitoring Report Summary    2025    Miguel Davila    : 3/17/1945    Reporting Period- 3/11-25    Diagnosis - HFpEF    Provider- SHERIDAN Fernandez       The CardioMEMS report for the above date has been reviewed with the following results:        RHC hemodynamics at time of CardioMEMS impant:    PAD 18  Wedge 8     Acceptable range for Miguel Davila      PAD range 29-35 mmhg     Remote monitoring documentation for the past ~30 days:          2025 PAD 31-33mmhg overall. CTM.  2025 No readings recently. Will reach out to him if he doesn't transmit one soon. Recently seen and doing well.  2025 PAD stable. CTM  2025 Last reading 3/8, will call him.

## 2025-04-23 ENCOUNTER — OFFICE VISIT (OUTPATIENT)
Age: 80
End: 2025-04-23
Attending: INTERNAL MEDICINE
Payer: MEDICARE

## 2025-04-23 ENCOUNTER — PATIENT OUTREACH (OUTPATIENT)
Dept: CASE MANAGEMENT | Age: 80
End: 2025-04-23

## 2025-04-23 VITALS
BODY MASS INDEX: 25.97 KG/M2 | RESPIRATION RATE: 16 BRPM | SYSTOLIC BLOOD PRESSURE: 127 MMHG | WEIGHT: 185.5 LBS | HEIGHT: 70.98 IN | OXYGEN SATURATION: 99 % | HEART RATE: 82 BPM | DIASTOLIC BLOOD PRESSURE: 72 MMHG | TEMPERATURE: 97 F

## 2025-04-23 DIAGNOSIS — D69.3 IMMUNE THROMBOCYTOPENIC PURPURA (HCC): Primary | ICD-10-CM

## 2025-04-23 DIAGNOSIS — D69.6 THROMBOCYTOPENIA: ICD-10-CM

## 2025-04-23 RX ORDER — ACETAMINOPHEN 325 MG/1
650 TABLET ORAL ONCE
OUTPATIENT
Start: 2025-05-28

## 2025-04-23 RX ORDER — DIPHENHYDRAMINE HCL 25 MG
25 CAPSULE ORAL ONCE
Status: COMPLETED | OUTPATIENT
Start: 2025-04-23 | End: 2025-04-23

## 2025-04-23 RX ORDER — DIPHENHYDRAMINE HCL 25 MG
25 CAPSULE ORAL ONCE
OUTPATIENT
Start: 2025-05-28

## 2025-04-23 RX ORDER — ACETAMINOPHEN 325 MG/1
650 TABLET ORAL ONCE
Status: COMPLETED | OUTPATIENT
Start: 2025-04-23 | End: 2025-04-23

## 2025-04-23 RX ADMIN — ACETAMINOPHEN 650 MG: 325 TABLET ORAL at 11:15:00

## 2025-04-23 RX ADMIN — DIPHENHYDRAMINE HCL 25 MG: 25 MG CAPSULE ORAL at 11:15:00

## 2025-04-23 NOTE — PROGRESS NOTES
Pt here for IVIG . Pt denies any issues or concerns.      Ordering Provider: Dr. Farmer  Order Exp: 6/25/25     Pt tolerated infusion without difficulty or complaint. Reviewed next apt date/time: Yes - 5/28/25      Education Record  Learner:  Patient  Disease / Diagnosis: Polymyositis  Barriers / Limitations:  None  Method:  Brief focused and Reinforcement  General Topics:  Medication, Side effects and symptom management, Plan of care reviewed, and Fall risk and prevention  Outcome:  Shows understanding

## 2025-04-24 DIAGNOSIS — M35.1 MIXED CONNECTIVE TISSUE DISEASE (HCC): ICD-10-CM

## 2025-04-25 RX ORDER — PREDNISONE 1 MG/1
2 TABLET ORAL
Qty: 180 TABLET | Refills: 1 | Status: SHIPPED | OUTPATIENT
Start: 2025-04-25

## 2025-04-25 RX ORDER — PREDNISONE 5 MG/1
5 TABLET ORAL
Qty: 90 TABLET | Refills: 1 | Status: SHIPPED | OUTPATIENT
Start: 2025-04-25

## 2025-04-25 NOTE — TELEPHONE ENCOUNTER
Prednisone 5 mg one daily #90 x 1.  Prednisone 1 mg two daily #180 x 1 .  Both refills approve [facility]  [place].

## 2025-04-25 NOTE — TELEPHONE ENCOUNTER
LOV: 03/13/2025    Future Appointments   Date Time Provider Department Center   4/28/2025 12:30 PM EH CARD PHLEBOTOMY RM1 EH CARD LA [facility]   4/28/2025  1:00 PM HEART FAILURE APN 1 EH HF CLIN [facility]   5/16/2025  9:30 AM [facility]   5/28/2025 11:00 AM NP TX RN5 NP SW [facility]   6/23/2025 12:20 PM Taye Farmer MD [facility]   8/1/2025  2:15 PM Eric Simon MD EMG 3 [facility] [place]       LF: Prednisone 5 mg 01/29/2025       QTY: 90       Refills: 0           Prednisone 1 mg 01/13/2025       QTY: 180     Refills: 0    LABS:   Component      Latest Ref Rng 3/12/2025   WBC      4.0 - 11.0 x10(3) uL 9.9    RBC      3.80 - 5.80 x10(6)uL 4.19    Hemoglobin      13.0 - 17.5 g/dL 14.7    Hematocrit      39.0 - 53.0 % 45.5    Platelet Count      150.0 - 450.0 10(3)uL 128.0 (L)    MCV      80.0 - 100.0 fL 108.6 (H)    MCH      26.0 - 34.0 pg 35.1 (H)    MCHC      31.0 - 37.0 g/dL 32.3    RDW      % 15.8    Prelim Neutrophil Abs      1.50 - 7.70 x10 (3) uL 7.09    Neutrophils Absolute      1.50 - 7.70 x10(3) uL 7.09    Lymphocytes Absolute      1.00 - 4.00 x10(3) uL 1.61    Monocytes Absolute      0.10 - 1.00 x10(3) uL 0.83    Eosinophils Absolute      0.00 - 0.70 x10(3) uL 0.21    Basophils Absolute      0.00 - 0.20 x10(3) uL 0.04    Immature Granulocyte Absolute      0.00 - 1.00 x10(3) uL 0.07    Neutrophils %      % 72.1    Lymphocytes %      % 16.3    Monocytes %      % 8.4    Eosinophils %      % 2.1    Basophils %      % 0.4    Immature Granulocyte %      % 0.7    Glucose      70 - 99 mg/dL 93    Sodium      136 - 145 mmol/L 143    Potassium      3.5 - 5.1 mmol/L 4.7    Chloride      98 - 112 mmol/L 102    Carbon Dioxide, Total      21.0 - 32.0 mmol/L 31.0    ANION GAP      0 - 18 mmol/L 10    BUN      9 - 23 mg/dL 40 (H)    CREATININE      0.70 - 1.30 mg/dL 1.63 (H)    CALCIUM      8.7 - 10.6 mg/dL 9.5    CALCULATED OSMOLALITY      275 - 295 mOsm/kg 305 (H)    EGFR       >=60 mL/min/1.73m2 42 (L)    AST (SGOT)      <34 U/L 21    ALT (SGPT)      10 - 49 U/L 18    ALKALINE PHOSPHATASE      45 - 117 U/L 61    Total Bilirubin      0.2 - 1.1 mg/dL 0.8    PROTEIN, TOTAL      5.7 - 8.2 g/dL 7.0    Albumin      3.2 - 4.8 g/dL 4.4    Globulin      2.0 - 3.5 g/dL 2.6    A/G Ratio      1.0 - 2.0  1.7    Patient Fasting for CMP? Yes    SED RATE      0 - 20 mm/Hr 16       Legend:  (L) Low  (H) High

## 2025-04-25 NOTE — PROGRESS NOTES
[facility] Progress Note    Miguel Davila is a 80 year old male who presents to clinic for APN assessment and management of chronic diastolic heart failure and is functional class 2.     Subjective:  Since his last clinic visit on 3/31 when no changes were made and he was encouraged to continue to transmit MEMs readings; he has IVIG infusion 4/23.     His weight is unchanged on our scale.  He reports home weights being down a little since last week. His LE edema is minimal on his left leg.  He denies shortness of breath. Appetite is good. He was more tired after IVIG last week for a few days but that has resolved. He hasn't been paying close attention to potassium in his diet.      He saw Dr. Craig 1/9/25; no changes made. He will see him in 4 months. He didn't feel strongly about recalibration of CardioMEMs and does not think its needed. Encouraged Bill to restart transmitting MEMs readings. Thought to be euvolemic at visit. F/U in 4 months with echo prior.      Last RHC on 5/20/24 shows RA 9 mmHg, PA 47/18/31 mmhg, wedge 14 mmHg, CI 3.8 and PVR 2.2 wood units. PAD on MEMs around that time was 31-32 mmHg.  Dr. Jean recommended increasing Entresto with consideration for retrying SGLT2i and increase in MRA at a later date. CardioMEMs sensor was noted to be inaccurate and re-calibration was recommended. He had requested this be postponed since he had cholecystitis and has been slow to recover.  Due to inaccuracy he was not transmitting readings up until recently when he was encouraged to by Dr. Craig. Dr. Craig did not feel recalibration was needed.     CardioMEMS readings, recent PAD has been 30-35  mmHg since he was last seen. Range 29-35 mmhg.        Last Hospitalizations/ED visits:    He was admitted from 6/6-6/10 with acute cholecystitis with localized peritonitis and underwent cholecystectomy 6/7. Treated with antibiotics. Aldactone, Bumex and Entresto were  held during hospital stay d.t low BP. He was discharged on Bumex 1 mg, remained off ARNI and MRA.      Admitted again 7/8-7/12 with leg swelling, weeping and abdominal distension. He was felt to be fluid overloaded on admission, but then became hypotensive with diuresis. Albumin level was low and felt to be a contributing factor to leg swelling and hypotension. Subsequently, he was given an albumin infusion followed by diuretics and started to improve. LE US negative for DVT. Treated with antibiotics for cellulitis. GDMT started at low doses. To take PRN Bumex on discharge. Discharge weight of 189 lbs.      Review of Systems   Constitutional: Negative.   Cardiovascular:  Positive for leg swelling (mild and stable).   Respiratory: Negative.     Gastrointestinal:  Negative for bloating.       HISTORY:  Past Medical History:    A-fib (HCC)    Arrhythmia    atrial fibrillation    Arthritis    Autoimmune disease (HCC)    hepatits, resolved anfd nephritis, resolved    Ramon's esophagus    Bleeding nose    Gums Treated    Blood disorder    thrombocytopenia    Blood in urine    Blurred vision    cataract due to steroids, surgery in June    Calculus of kidney    One time    Cancer (HCC)    skin    Candidiasis of the esophagus    Due to steroid use for Autoimmune disorder     Cataract    Colon polyps    Congestive heart disease (HCC)    Diarrhea, unspecified    Intermittent due to lupus    Diverticulosis of colon    Easy bruising    On and off prednisone for 20 years    Esophageal polyp    Esophageal reflux    Essential hypertension    Fatigue    polymyositis and lupus    Gout    Hammer toe, acquired    Heart palpitations    Afib    Hepatitis    autoimmune induce hepatitis d/t lupus    High blood pressure    IBS (irritable bowel syndrome)    mild d/t lupus    Irregular bowel habits    Leg swelling    Heart failure, probably caused by lupus    Lupus    MCTD (mixed connective tissue disease) (HCC)    Obstructive sleep apnea  (adult) (pediatric)    LI (obstructive sleep apnea)    AHI 37  Supine AHI 38 non-supine AHI 16 Sao2 Pj 83%     LI (obstructive sleep apnea)    AHI 36 RDI 36 REM AHI 45 Supine AHI 65 non-supine AHI 19 Sao2 Pj 86% CPAP 9cwp    Pain in joints    Personal history of antineoplastic chemotherapy    Pleural effusion    right    Pneumonia due to organism    Polymyositis (HCC)    Presence of other cardiac implants and grafts    watchman filter    Problems with swallowing    dysphagia in 2006    Pulmonary hypertension (HCC)    Raynaud disease    Raynaud disease    Renal disorder    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    Shortness of breath    mainly due to pm or lupus    Sleep apnea    CPAP    Thrombocytopathia (HCC)    Visual impairment    bifocals for reading & computer; distance for driving    Wears glasses      Past Surgical History:   Procedure Laterality Date    Appendectomy  1970    Appendectomy      Cardiac catheterization  09/2019    Cataract Bilateral     Cholecystectomy      Colonoscopy  10/2003 2006 01/2010    x3    Colonoscopy  05/14/2013    Colonoscopy N/A 05/01/2018    Procedure: COLONOSCOPY;  Surgeon: Isaiah Tobar MD;  Location:  ENDOSCOPY    Colonoscopy N/A 04/12/2024    Procedure: COLONOSCOPY;  Surgeon: Gerardo Neves MD;  Location:  ENDOSCOPY    Colonoscopy with biopsy  05/14/2013    [facility]      Hand/finger surgery unlisted  2003    right    Needle biopsy liver      Other  12/2005    needle bipsy of kidney    Other surgical history  2017    watchman filter    Percutaneous  angioplasty  (ptca)- pbp only  2006    right    Rectum surgery procedure unlisted  1946    rectal surgery polypectomy    Skin surgery      MMS BCC R temple 6/24/09    Skin surgery  2007    basal ceell carcinoma    Skin surgery  07/18/2012    BCC-nodular to right superior eyebrow/ MOHS    Skin surgery  07/15/2013    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/MMS    Skin surgery  02/19/2014    BCC-NOD to right superior pinna, MMS, AB    Skin  surgery  2021    BCC- left superior forehead, MMS     Skin surgery  2022    SCC IN SITU RIGHT ANTIHELIX MMS BY DR GASTELUM    Tonsillectomy  194    Upper gi endoscopy,exam  10/2003 2006 2010 , 5/13    x3      Family History   Problem Relation Age of Onset    Heart Disease Father         CHF    Gastro-Intestinal Disorder Father         Diverticulosis    Alcohol and Other Disorders Associated Father     Heart Attack Father     Heart Disease Mother         CHF    Breast Cancer Mother     Stroke Mother     Cancer Paternal Grandfather     Heart Attack Paternal Grandmother     Kidney Disease Son     Other (Ramon's Esophagus) Son     Heart Attack Maternal Grandfather       Social History     Socioeconomic History    Marital status:    Occupational History    Occupation: Retired    Tobacco Use    Smoking status: Former     Current packs/day: 0.00     Average packs/day: 0.5 packs/day for 15.0 years (7.5 ttl pk-yrs)     Types: Cigarettes     Start date: 1958     Quit date: 1973     Years since quittin.7    Smokeless tobacco: Never    Tobacco comments:     5 cigarettes daily, stopped smoking in    Vaping Use    Vaping status: Never Used   Substance and Sexual Activity    Alcohol use: Yes     Alcohol/week: 13.0 - 15.0 standard drinks of alcohol     Types: 5 Glasses of wine, 8 - 10 Standard drinks or equivalent per week     Comment: 1 per day wine or liquor    Drug use: Never   Other Topics Concern    Caffeine Concern Yes     Comment: 1 soda per day and decaf coffee    Sleep Concern No    Exercise Yes     Comment: 3-4 times weekly    Seat Belt Yes     Social Drivers of Health     Food Insecurity: No Food Insecurity (2025)    NCSS - Food Insecurity     Worried About Running Out of Food in the Last Year: No     Ran Out of Food in the Last Year: No   Transportation Needs: No Transportation Needs (2025)    NCSS - Transportation     Lack of Transportation: No   Housing Stability: Not  At Risk (1/17/2025)    NCSS - Housing/Utilities     Has Housing: Yes     Worried About Losing Housing: No     Unable to Get Utilities: No           Objective:    Telemetry: Afib     /59   Pulse 77   Resp 19   Wt 183 lb 12.8 oz (83.4 kg)   SpO2 100%   BMI 25.65 kg/m²     Wt Readings from Last 6 Encounters:   04/28/25 183 lb 12.8 oz (83.4 kg)   04/23/25 185 lb 8 oz (84.1 kg)   03/31/25 183 lb 3.2 oz (83.1 kg)   03/19/25 184 lb (83.5 kg)   03/13/25 188 lb (85.3 kg)   03/03/25 186 lb (84.4 kg)            Recent Results (from the past 24 hours)   Basic Metabolic Panel (8)    Collection Time: 04/28/25 12:26 PM   Result Value Ref Range    Glucose 77 70 - 99 mg/dL    Sodium 139 136 - 145 mmol/L    Potassium 5.2 (H) 3.5 - 5.1 mmol/L    Chloride 101 98 - 112 mmol/L    CO2 31.0 21.0 - 32.0 mmol/L    Anion Gap 7 0 - 18 mmol/L    BUN 59 (H) 9 - 23 mg/dL    Creatinine 1.91 (H) 0.70 - 1.30 mg/dL    Calcium, Total 9.9 8.7 - 10.6 mg/dL    Calculated Osmolality 303 (H) 275 - 295 mOsm/kg    eGFR-Cr 35 (L) >=60 mL/min/1.73m2    Patient Fasting for BMP? No              Current Outpatient Medications:     spironolactone 25 MG Oral Tab, Take 0.5 tablets (12.5 mg total) by mouth daily., Disp: , Rfl:     PREDNISONE 5 MG Oral Tab, TAKE 1 TABLET BY MOUTH EVERY DAY WITH BREAKFAST, Disp: 90 tablet, Rfl: 1    predniSONE 1 MG Oral Tab, TAKE 2 TABLETS (2 MG TOTAL) BY MOUTH DAILY WITH BREAKFAST, Disp: 180 tablet, Rfl: 1    bumetanide 1 MG Oral Tab, Take 2 tablets (2 mg total) by mouth every morning AND 1 tablet (1 mg total) Every afternoon at 2:00 pm., Disp: 270 tablet, Rfl: 1    omeprazole 20 MG Oral Capsule Delayed Release, TAKE 1 CAPSULE BY MOUTH TWICE A DAY, Disp: 180 capsule, Rfl: 3    ALLOPURINOL 300 MG Oral Tab, TAKE 1 TABLET BY MOUTH EVERY DAY, Disp: 90 tablet, Rfl: 3    ipratropium 0.06 % Nasal Solution, SPRAY 1 SPRAY INTO EACH NOSTRIL TWICE A DAY, Disp: 1 each, Rfl: 5    fexofenadine 180 MG Oral Tab, Take 1 tablet (180 mg  total) by mouth daily as needed., Disp: , Rfl:     metoprolol succinate  MG Oral Tablet 24 Hr, Take 1 tablet (100 mg total) by mouth every morning AND 0.5 tablets (50 mg total) every evening., Disp: 60 tablet, Rfl: 3    aspirin 81 MG Oral Tab EC, Take 1 tablet (81 mg total) by mouth daily., Disp: 30 tablet, Rfl: 0    PREVIDENT 5000 BOOSTER PLUS 1.1 % Dental Paste, , Disp: , Rfl:     Multiple Vitamins-Minerals (TAB-A-KEVIN) Oral Tab, Take 1 tablet by mouth daily., Disp: , Rfl:     Immune Globulin, Human, 10 g Intravenous Recon Soln, Inject 0.4 g/kg into the vein. Given for treatment of polymyositis, mixed connective tissue disease, and immune thrombocytopenia purpura monthly at [facility]. Every 5 weeks, Disp: 3 each, Rfl: 0    Calcium Citrate-Vitamin D (CITRACAL + D OR), Take 1 tablet by mouth daily., Disp: , Rfl:     Exam:   General:         Alert, in no apparent distress  HEENT:           no JVD  Lungs:            mildly diminished right base                    CV:                  irregularly irregular   Abdomen:       Non-distended, soft  Extremities:    trace LLE edema   Neuro:             A&O x 3  Skin:                Pink, warm, dry    Education:  Patient instructed regarding sodium restricted diet, low sodium foods, fluid restriction, daily weights, medication regimen, s/s HF exacerbation and when to call APN/clinic.         [x] CardioMEMs  [] ARNi/ACEi/ARB - hypotension  [x] BB  [x] MRA  [] SGLT2i - possible pancreatitis     Assessment:   Chronic diastolic, RV heart failure - LVEF 55-60% and stable on echo 7/2024, low normal RV function. RHC 5/2024 with PCWP 14, RA 9. S/p CardioMEMs. Recently told to restart transmitting CardioMEMs readings, previously felt to be inaccurate and recalibration recommended. Recent PAD's 30-35 mmHg. MRA discontinued at [facility] for dehydration and near syncope; since have restarted but required reduced dose due to hyperkalemia. Last ProBNP down to 3,617 (highest  5,927). Compensated.   PH, mild combined pre and post capillary - RHC 5/20/24 demonstrates mPAP 31, PAD 18 mmHg, wedge 14 mmHg, RA 9 mmHg, PVR 2.2 and CI 3.8. RV function low normal. PAD 31-32 mmHg on day of RHC per CardioMEMs.   Mild to Moderate MR/Mild-Mod TR/ Moderate AI - on echo in July 2024   CKD Stage 3b - prior baseline creatinine ~ 1.8-2.0 mg/dl,  now baseline ~1.5 mg/dl. Creatinine 1.9 today. Follows with Dr. Devine.    Chronic Afib - s/p Watchman. Rates controlled.   LI - on CPAP.    Recurrent bilateral pleural effusions - hx of thoracentesis.   Hx Lupus/Mixed CTD/Severe polymyositis - continues IVIG infusions monthly. Follows with Dr. Farmer. On chronic prednisone. Off Imuran due to pancytopenia.   Non-obstructive CAD  Chronic thrombocytopenia, immune mediated. PLTs stable 3/12. Follows with Dr. Orr, last seen 1/30.  Hx Hyponatremia  HERNANDEZ with biopsy proven stage F0-F1 Fibrosis on US 1/2022. Follows with Dr. Veronica, Hepatology.   Mild hyperkalemia - on low dose MRA and ARNI. Recurrent with potassium 5.2 today. Has required Veltassa, most recently 3/3.  Anemia - last Hgb 14.7 g/dL; stable. Iron sat 28 and ferritin 97 1/2025. Hx Venofer, last dose 9/16.   Hypothyroidism - previously on supplement that was recently stopped per Dr. Simon. TSH normal 1/2025.     Plan:     Reduce Aldactone to 12.5 mg daily from 25 mg daily due to recurrent hyperkalemia.   BMP next week to recheck potassium on reduced dose Aldactone.   Follow MEMs, encouraged to continue to transmit readings.   Return to clinic in 4 weeks.  F/U with Dr. Craig as planned in ~June with echo prior in May.  CHF discharge instructions given      I have spent 40 min total time on the day of the encounter, including: preparing to see the patient, obtaining and/or reviewing separately obtained history, performing a medically appropriate examination and/or evaluation, counseling and educating the patient/family caregiver, ordering medication,  tests, or procedures, documenting clinical information in Epic, and independently interpreting results and communicating results to the patient/family caregiver       Tasha Barth NP

## 2025-04-28 ENCOUNTER — HOSPITAL ENCOUNTER (OUTPATIENT)
Dept: LAB | Facility: HOSPITAL | Age: 80
Discharge: HOME | End: 2025-04-28
Attending: NURSE PRACTITIONER
Payer: MEDICARE

## 2025-04-28 ENCOUNTER — HOSPITAL ENCOUNTER (OUTPATIENT)
Dept: CARDIOLOGY CLINIC | Facility: HOSPITAL | Age: 80
End: 2025-04-28
Attending: NURSE PRACTITIONER
Payer: MEDICARE

## 2025-04-28 VITALS
OXYGEN SATURATION: 100 % | WEIGHT: 183.81 LBS | DIASTOLIC BLOOD PRESSURE: 59 MMHG | RESPIRATION RATE: 19 BRPM | SYSTOLIC BLOOD PRESSURE: 108 MMHG | HEART RATE: 77 BPM | BODY MASS INDEX: 26 KG/M2

## 2025-04-28 DIAGNOSIS — I50.32 CHRONIC DIASTOLIC HEART FAILURE (HCC): ICD-10-CM

## 2025-04-28 DIAGNOSIS — I48.11 LONGSTANDING PERSISTENT ATRIAL FIBRILLATION (HCC): ICD-10-CM

## 2025-04-28 DIAGNOSIS — E87.5 HYPERKALEMIA: ICD-10-CM

## 2025-04-28 DIAGNOSIS — N18.32 STAGE 3B CHRONIC KIDNEY DISEASE (HCC): Primary | ICD-10-CM

## 2025-04-28 DIAGNOSIS — I43 CARDIOMYOPATHY AS MANIFESTATION OF UNDERLYING DISEASE (HCC): ICD-10-CM

## 2025-04-28 LAB
ANION GAP SERPL CALC-SCNC: 7 MMOL/L (ref 0–18)
BUN BLD-MCNC: 59 MG/DL (ref 9–23)
CALCIUM BLD-MCNC: 9.9 MG/DL (ref 8.7–10.6)
CHLORIDE SERPL-SCNC: 101 MMOL/L (ref 98–112)
CO2 SERPL-SCNC: 31 MMOL/L (ref 21–32)
CREAT BLD-MCNC: 1.91 MG/DL (ref 0.7–1.3)
EGFRCR SERPLBLD CKD-EPI 2021: 35 ML/MIN/1.73M2 (ref 60–?)
FASTING STATUS PATIENT QL REPORTED: NO
GLUCOSE BLD-MCNC: 77 MG/DL (ref 70–99)
OSMOLALITY SERPL CALC.SUM OF ELEC: 303 MOSM/KG (ref 275–295)
POTASSIUM SERPL-SCNC: 5.2 MMOL/L (ref 3.5–5.1)
SODIUM SERPL-SCNC: 139 MMOL/L (ref 136–145)

## 2025-04-28 PROCEDURE — 99215 OFFICE O/P EST HI 40 MIN: CPT | Performed by: NURSE PRACTITIONER

## 2025-04-28 PROCEDURE — 80048 BASIC METABOLIC PNL TOTAL CA: CPT | Performed by: NURSE PRACTITIONER

## 2025-04-28 PROCEDURE — 36415 COLL VENOUS BLD VENIPUNCTURE: CPT | Performed by: NURSE PRACTITIONER

## 2025-04-28 RX ORDER — SPIRONOLACTONE 25 MG/1
12.5 TABLET ORAL DAILY
COMMUNITY
Start: 2025-04-28

## 2025-04-28 NOTE — PATIENT INSTRUCTIONS
Heart Failure Discharge Instructions    Reduce Spironolactone to 12.5 mg daily.  Have labs in one week.    Activity: Regular exercise and activity is important for your overall health and to help keep your heart strong and functioning as well as possible.   Walk at a slow to moderate pace for 15-20 minutes 3-5 days per week.     Follow these instructions every day to keep yourself in the Green Zone     The Green Zone means you are feeling well and your symptoms are under control                                    Medications  Take your medication every day as instructed  Do not stop taking your medicine or change the amount you are taking without instructions from your doctor or nurse  Do Not take non-steroidal antiinflammatory drugs such as ibuprofen, aleve, advil, or motrin                                    Diet/Fluids  People with heart failure should eat less sodium (salt) and limit fluid. Sodium attracts water and makes the body hold fluid. This extra fluid makes the heart work harder and can worsen the symptoms of heart failure.     Diet    2000 mg sodium daily  Fluid restriction    64 ounces daily  (8 oz. = 1 cup)                                     Body Weight  Weigh yourself every day before breakfast and write your weight down  Use the same scale and wear about the same amount of clothes each time  A sudden weight increase is due to fluid retention rather than fat                                         Activity  Pace your activities to avoid getting overtired  Take rest periods as needed  Elevate your feet to reduce ankle swelling when resting                             Signs of Worsening Heart Failure    You are entering the Yellow Zone - this is a warning zone    Call your doctor or nurse if you have any of these signs or symptoms:  You gain 2 or more pounds overnight or 3-5 pounds in 3-7 days  You have more trouble breathing  You get more tired with regular activity, or are limiting activity because  of shortness of breath or fatigue  You are short of breath lying down, you need more pillows to breathe comfortably,  or wake up during the night short of breath  You urinate less often during the day and more often at night  You have a bloated feeling, upset stomach, loss of appetite, or your clothes are fitting tighter    GO TO THE EMERGENCY DEPARTMENT or CALL 911 IF:    These are signs you are in the RED ZONE - THIS IS AN EMERGENCY  You have tightness or pain in your chest  You are extremely short of breath or can't catch your breath  You cough up frothy pink mucous  You feel confused or can't think clearly  You are traveling and develop symptoms of worsening heart failure     We respect everyone's time and availability. Please be aware that this is not a walk-in clinic and we require appointments in order to facilitate timely care for all patients. We ask you to arrive 30 minutes before your appointment to allow time for you to check-in and have your bloodwork drawn. Please understand if you are late for your appointment, you may be asked to reschedule. If possible, all attempts will be made to accommodate but realize this is no guarantee that this will always be available. We understand there are extenuating circumstances. If you need to cancel or reschedule your appointment, please call the [facility] within 24 hours at [phone].  Thank you for your cooperation, [facility] Staff.    If you are currently [employer] active, starting July 1st 2024, we will be utilizing [employer] messaging ONLY to confirm your appointment.    IF YOU HAVE QUESTIONS REGARDING YOUR BILL, FEEL FREE TO CONTACT [facility] PATIENT ACCOUNTS -[phone]. IF YOU NEED FINANCIAL ASSISTANCE, PLEASE CALL AN [facility] FINANCIAL COUNSELOR -[phone].             [facility]     [phone]

## 2025-04-28 NOTE — PROGRESS NOTES
Patient Assessed; accompanied by wife.  No signs or symptoms of shortness of breath, fatigue, chest pain or edema noted. Weight stable at 183.8 lbs.   Reviewed current list of patient's allergies and medication; updated the Electronic Medical Record. Labs ordered to assess kidney function and drawn by  Lab. Reviewed follow-up appointment and Heart Failure discharge instructions with patient. Patient verbalized an understanding.     Labs next week.  RTC next month.

## 2025-05-06 ENCOUNTER — LAB ENCOUNTER (OUTPATIENT)
Dept: LAB | Age: 80
End: 2025-05-06
Attending: NURSE PRACTITIONER
Payer: MEDICARE

## 2025-05-06 DIAGNOSIS — E87.5 HYPERKALEMIA: ICD-10-CM

## 2025-05-06 DIAGNOSIS — N18.32 STAGE 3B CHRONIC KIDNEY DISEASE (HCC): ICD-10-CM

## 2025-05-06 LAB
ANION GAP SERPL CALC-SCNC: 11 MMOL/L (ref 0–18)
BUN BLD-MCNC: 47 MG/DL (ref 9–23)
CALCIUM BLD-MCNC: 9.4 MG/DL (ref 8.7–10.6)
CHLORIDE SERPL-SCNC: 101 MMOL/L (ref 98–112)
CO2 SERPL-SCNC: 28 MMOL/L (ref 21–32)
CREAT BLD-MCNC: 1.69 MG/DL (ref 0.7–1.3)
EGFRCR SERPLBLD CKD-EPI 2021: 40 ML/MIN/1.73M2 (ref 60–?)
FASTING STATUS PATIENT QL REPORTED: NO
GLUCOSE BLD-MCNC: 113 MG/DL (ref 70–99)
OSMOLALITY SERPL CALC.SUM OF ELEC: 303 MOSM/KG (ref 275–295)
POTASSIUM SERPL-SCNC: 4.7 MMOL/L (ref 3.5–5.1)
SODIUM SERPL-SCNC: 140 MMOL/L (ref 136–145)

## 2025-05-06 PROCEDURE — 36415 COLL VENOUS BLD VENIPUNCTURE: CPT

## 2025-05-06 PROCEDURE — 80048 BASIC METABOLIC PNL TOTAL CA: CPT

## 2025-05-07 ENCOUNTER — PATIENT OUTREACH (OUTPATIENT)
Dept: CASE MANAGEMENT | Age: 80
End: 2025-05-07

## 2025-05-07 DIAGNOSIS — I50.32 CHRONIC HEART FAILURE WITH PRESERVED EJECTION FRACTION (HCC): ICD-10-CM

## 2025-05-07 DIAGNOSIS — N18.32 STAGE 3B CHRONIC KIDNEY DISEASE (HCC): ICD-10-CM

## 2025-05-07 DIAGNOSIS — G47.33 OSA (OBSTRUCTIVE SLEEP APNEA): ICD-10-CM

## 2025-05-07 DIAGNOSIS — C44.219 BASAL CELL CARCINOMA (BCC) OF SKIN OF LEFT EAR: ICD-10-CM

## 2025-05-07 DIAGNOSIS — I10 BENIGN ESSENTIAL HYPERTENSION: ICD-10-CM

## 2025-05-07 DIAGNOSIS — I48.19 PERSISTENT ATRIAL FIBRILLATION (HCC): ICD-10-CM

## 2025-05-07 DIAGNOSIS — K22.70 BARRETT'S ESOPHAGUS WITHOUT DYSPLASIA: ICD-10-CM

## 2025-05-07 DIAGNOSIS — I27.20 PULMONARY HYPERTENSION (HCC): Primary | ICD-10-CM

## 2025-05-09 NOTE — PROGRESS NOTES
Spoke to Miguel for Chronic Care Management.      Updates to patient care team/comments: UTD  Patient reported changes in medications: UTD  Med Adherence  Comment: pt taking medications as prescribed     Health Maintenance:   Health Maintenance   Topic Date Due    Annual Physical  01/17/2026    Colorectal Cancer Screening  04/12/2027    Influenza Vaccine  Completed    Annual Depression Screening  Completed    Fall Risk Screening (Annual)  Completed    Pneumococcal Vaccine: 50+ Years  Completed    Zoster Vaccines  Completed    Meningococcal B Vaccine  Aged Out       Patient updates/concerns:    Spoke to pt for monthly outreach   Pt has been managing a stressful week pretty well. He states that he is best able to manage his stress by talking through his problems. He has a good [place] of social connections he can rely on and feels that when he spends time visiting with them his disposition is considerably improved.    Pt discussed how the smaller mask on his cpap has allowed for a tighter seal. He is still not consistent in using it for the same amount of time each night. Pt discussed how he has never quite experienced the reported benefits of prolonged cpap use and refers to the likelihood that his lack of consistent use from night to night may be the reason. Pt has a goal of trying to use the machine for the same amount of time every night.    Pt has been more consistent with cardio mems use. Specialist has been satisfied with results and consistency despite some longer gaps in reporting over the last few weeks.    Pt continues with IVIG and will f/u for this every 5 weeks.  Pt reported side effects are minimal swelling and sometimes a rash and fatigue can last  a few days.    Pt is still effectively managing his wifes care. She is experiencing memory decline and pt is weighing his options on whether the most effective care would be available to her at a memory care unit or to bring in home health. Pt has been  discussing options.    Pt did not have any new questions or concerns for pcp or clinical staff  Goals/Action Plan:    Active goal from previous outreach: manage healthcare    Patient reported progress towards goals: pt continues to see providers as needed and takes medications as prescribed               - What: exercise           - Where/When/How: pt gets exercise with daily activity  Patient Reported Barriers and Concerns: n/a                   - Plan for overcoming barriers: none    Care Managers Interventions: continue to provide encouragement and education for healthy coping and dx    Future Appointments:   Future Appointments   Date Time Provider Department Center   5/16/2025  9:30 AM [facility]DR US [facility]   5/28/2025 11:00 AM NP TX RN5 NP SW [facility] [place] [facility]   6/2/2025 12:30 PM EH CARD PHLEBOTOMY RM1 EH CARD LA [facility]   6/2/2025  1:00 PM HEART FAILURE APN 1 EH HF CLIN [facility]   6/23/2025 12:20 PM Taye Farmer MD [id number]   8/1/2025  2:15 PM Eric Simon MD EMG 3 EMG [facility] Care Manager Follow Up Date: one month    Reason For Follow Up: review progress and or barriers towards patient's goals.     Time Spent This Encounter Total: 31 min medical record review, telephone communication, care plan updates where needed, education, goals, and action plan recreation/update. Provided acknowledgment and validation to patient's concerns.   Monthly Minute Total including today: 31  Physical assessment, complete health history, and need for CCM established by Eric Simon MD.

## 2025-05-12 ENCOUNTER — TELEPHONE (OUTPATIENT)
Dept: CARDIOLOGY CLINIC | Facility: HOSPITAL | Age: 80
End: 2025-05-12

## 2025-05-12 NOTE — TELEPHONE ENCOUNTER
Please call Sterling and have him send MEMs reading today. He was elevated on 5/9, up to 37mmhg.

## 2025-05-12 NOTE — TELEPHONE ENCOUNTER
Sterling was informed that his MEMs reading was elevated on 5/9 was 37 mmHg, so Tasha Pina would like him to get his MEMs reading today for her to review. Sterling states that they have had many difficult situations happen over the last 7-10 days, but he will try to get it done today before his echocardiogram, otherwise complete it after his echocardiogram this afternoon.

## 2025-05-16 ENCOUNTER — HOSPITAL ENCOUNTER (OUTPATIENT)
Dept: ULTRASOUND IMAGING | Age: 80
Discharge: HOME | End: 2025-05-16
Attending: INTERNAL MEDICINE
Payer: MEDICARE

## 2025-05-16 DIAGNOSIS — K75.81 NASH (NONALCOHOLIC STEATOHEPATITIS): ICD-10-CM

## 2025-05-16 PROCEDURE — 76700 US EXAM ABDOM COMPLETE: CPT | Performed by: INTERNAL MEDICINE

## 2025-05-16 PROCEDURE — 76981 USE PARENCHYMA: CPT | Performed by: INTERNAL MEDICINE

## 2025-05-20 ENCOUNTER — TELEPHONE (OUTPATIENT)
Dept: CARDIOLOGY CLINIC | Facility: HOSPITAL | Age: 80
End: 2025-05-20

## 2025-05-20 NOTE — TELEPHONE ENCOUNTER
MEMs reviewed. PAD consisently elevated. Have him take an extra 1mg of Bumex today and tomorrow. We see him on 6/2.

## 2025-05-20 NOTE — TELEPHONE ENCOUNTER
Sterling was informed that his PAD has been consistently elevated, so Tasha Pina would like him to take an extra 1 mg of bumex today and tomorrow, then we will see him in the clinic on 6/2.  Sterling verbalized back understanding of all the instructions.  Sterling reports that due to all the stress at home, he has gained 5 lbs recently and has already lost 3 lbs of that already.

## 2025-05-21 ENCOUNTER — PATIENT OUTREACH (OUTPATIENT)
Dept: CASE MANAGEMENT | Age: 80
End: 2025-05-21

## 2025-05-28 ENCOUNTER — OFFICE VISIT (OUTPATIENT)
Age: 80
End: 2025-05-28
Attending: INTERNAL MEDICINE
Payer: MEDICARE

## 2025-05-28 VITALS
SYSTOLIC BLOOD PRESSURE: 119 MMHG | BODY MASS INDEX: 26.99 KG/M2 | DIASTOLIC BLOOD PRESSURE: 77 MMHG | RESPIRATION RATE: 16 BRPM | TEMPERATURE: 97 F | OXYGEN SATURATION: 94 % | HEART RATE: 76 BPM | HEIGHT: 70.98 IN | WEIGHT: 192.81 LBS

## 2025-05-28 DIAGNOSIS — D69.3 IMMUNE THROMBOCYTOPENIC PURPURA (HCC): Primary | ICD-10-CM

## 2025-05-28 DIAGNOSIS — D69.6 THROMBOCYTOPENIA: ICD-10-CM

## 2025-05-28 RX ORDER — ACETAMINOPHEN 325 MG/1
650 TABLET ORAL ONCE
OUTPATIENT
Start: 2025-05-28

## 2025-05-28 RX ORDER — DIPHENHYDRAMINE HCL 25 MG
25 CAPSULE ORAL ONCE
Status: COMPLETED | OUTPATIENT
Start: 2025-05-28 | End: 2025-05-28

## 2025-05-28 RX ORDER — ACETAMINOPHEN 325 MG/1
650 TABLET ORAL ONCE
Status: COMPLETED | OUTPATIENT
Start: 2025-05-28 | End: 2025-05-28

## 2025-05-28 RX ORDER — DIPHENHYDRAMINE HCL 25 MG
25 CAPSULE ORAL ONCE
OUTPATIENT
Start: 2025-05-28

## 2025-05-28 RX ADMIN — ACETAMINOPHEN 650 MG: 325 TABLET ORAL at 11:22:00

## 2025-05-28 RX ADMIN — DIPHENHYDRAMINE HCL 25 MG: 25 MG CAPSULE ORAL at 11:22:00

## 2025-05-28 NOTE — PROGRESS NOTES
Pt here for IVIG . Pt denies any issues or concerns.      Ordering Provider: Darian  Order Exp:      Pt tolerated infusion without difficulty or complaint. Reviewed next apt date/time: needs new orders before next treatment - patient aware      Education Record  Learner:  Patient  Disease / Diagnosis: polymyositis   Barriers / Limitations:  None  Method:  Discussion  General Topics:  Plan of care reviewed  Outcome:  Shows understanding     Patient here for IVIG - tolerated without issue. Patient orders will  before next treatment date. Patient is aware to get new orders and will then schedule - left in stable condition.

## 2025-05-29 ENCOUNTER — PATIENT MESSAGE (OUTPATIENT)
Facility: LOCATION | Age: 80
End: 2025-05-29

## 2025-05-29 RX ORDER — IPRATROPIUM BROMIDE 42 UG/1
1 SPRAY, METERED NASAL 2 TIMES DAILY
Refills: 1 | OUTPATIENT
Start: 2025-05-29

## 2025-05-30 ENCOUNTER — OFFICE VISIT (OUTPATIENT)
Facility: LOCATION | Age: 80
End: 2025-05-30
Payer: MEDICARE

## 2025-05-30 DIAGNOSIS — J30.0 VASOMOTOR RHINITIS: Primary | ICD-10-CM

## 2025-05-30 PROCEDURE — 99213 OFFICE O/P EST LOW 20 MIN: CPT | Performed by: OTOLARYNGOLOGY

## 2025-05-30 RX ORDER — IPRATROPIUM BROMIDE 42 UG/1
1 SPRAY, METERED NASAL 2 TIMES DAILY
Qty: 1 EACH | Refills: 11 | Status: SHIPPED | OUTPATIENT
Start: 2025-05-30

## 2025-05-30 NOTE — PROGRESS NOTES
Miguel Davila is a 80 year old male.   Chief Complaint   Patient presents with    Sinus Problem     Nasal spray refill     HPI:   He has a history of multiorgan lupus.  He has done fairly well lately from it after having an issue with his gallbladder.  He tends to get chronic runny nose.  He uses saline and Atrovent with good relief.  He denies sinus infections or epistaxis.    REVIEW OF SYSTEMS:   GENERAL HEALTH: feels well otherwise  GENERAL : denies fever, chills, sweats, weight loss, weight gain  SKIN: denies any unusual skin lesions or rashes  RESPIRATORY: denies shortness of breath with exertion  NEURO: denies headaches    EXAM:   There were no vitals taken for this visit.    System Findings Details   Constitutional  Overall appearance - Normal.   Psychiatric  Orientation - Oriented to time, place, person & situation. Appropriate mood and affect.   Head/Face  Facial features -- Normal. Skull - Normal.   Eyes  Pupils equal ,round ,react to light and accomidate   Ears, Nose, Throat, Neck  Ears clear no septal deviation mild dryness otherwise clear oropharynx clear neck no masses   Neurological  Memory - Normal. Cranial nerves - Cranial nerves II through XII grossly intact.   Lymph Detail  Submental. Submandibular. Anterior cervical. Posterior cervical. Supraclavicular.       ASSESSMENT AND PLAN:   1. Vasomotor rhinitis  Doing well on Atrovent nasal spray and saline.  He will continue on the above.      The patient indicates understanding of these issues and agrees to the plan.    No follow-ups on file.    Jmibo Marquez MD  5/30/2025  10:02 AM

## 2025-05-30 NOTE — PROGRESS NOTES
[facility] Progress Note    Miguel Davila is a 80 year old male who presents to clinic for APN assessment and management of chronic diastolic heart failure and is functional class 3.     Subjective:  Since his last clinic visit on 4/28 when Aldactone was reduced to 12.5 mg daily from 25 mg due to recurrent hyperkalemia he had repeat labs a week later with improved potassium of 4.7. We contacted him on 5/12 and 5/20 due to elevated PAD's (35-37 mmhg). He was advised to take an extra 1 mg of Bumex for a couple days on 5/20. He had IVIG 5/28.     He had echocardiogram 5/12 that reveals LVEF 55-60%, normal RV function, mild to moderate AI/MR and TR. PASP ~ 40 mmHg and dilated aorta with diameter 4.4 cm.     He saw Dr. Craig in follow up on 5/26; no changes made. To see him in 4 months. Echo reviewed and stable.     Today his weight is up 10 lbs on our scale.  He reports home weights being up to 185 lbs. Ideal home weight is ~180  lbs. His LE edema is worse and he exhibits abdominal bloating.  He denies shortness of breath. Appetite is good but he has not been eating more. He recently has a lupus flare with increased myalgias, fatigue and a butterfly rash on his face. Symptoms are starting to improve.     He has continued stress taking care of his wife whose dementia is progressing. I gave him handout on support group for caregivers with dementia.     Last RHC on 5/20/24 shows RA 9 mmHg, PA 47/18/31 mmhg, wedge 14 mmHg, CI 3.8 and PVR 2.2 wood units. PAD on MEMs around that time was 31-32 mmHg.  Dr. Jean recommended increasing Entresto with consideration for retrying SGLT2i and increase in MRA at a later date. CardioMEMs sensor was noted to be inaccurate and re-calibration was recommended. He had requested this be postponed since he had cholecystitis and has been slow to recover.  Due to inaccuracy he was not transmitting readings up until recently when he was encouraged  to by Dr. Craig. Dr. Craig did not feel recalibration was needed.      Recent PAD's have been 32-34 mmHg and improved since he was given extra bumex (35-37 mmhg). Range 29-35 mmhg. He endorsed taking extra bumex for a week not 2 days.         Review of Systems   Constitutional: Positive for weight gain.   Cardiovascular:  Positive for leg swelling. Negative for dyspnea on exertion.   Skin:  Positive for rash.   Musculoskeletal:  Positive for arthritis.   Gastrointestinal:  Positive for bloating.       HISTORY:  Past Medical History[1]   Past Surgical History[2]   Family History[3]   Short Social Hx on File[4]        Objective:    Telemetry: AF         /65   Pulse 79   Resp 18   Wt 192 lb 3.2 oz (87.2 kg)   SpO2 100%   BMI 26.82 kg/m²     Wt Readings from Last 6 Encounters:   06/02/25 192 lb 3.2 oz (87.2 kg)   05/28/25 192 lb 12.8 oz (87.5 kg)   04/28/25 183 lb 12.8 oz (83.4 kg)   04/23/25 185 lb 8 oz (84.1 kg)   03/31/25 183 lb 3.2 oz (83.1 kg)   03/19/25 184 lb (83.5 kg)            Recent Results (from the past 24 hours)   Basic Metabolic Panel (8)    Collection Time: 06/02/25  1:30 PM   Result Value Ref Range    Glucose 76 70 - 99 mg/dL    Sodium 140 136 - 145 mmol/L    Potassium 4.4 3.5 - 5.1 mmol/L    Chloride 101 98 - 112 mmol/L    CO2 30.0 21.0 - 32.0 mmol/L    Anion Gap 9 0 - 18 mmol/L    BUN 45 (H) 9 - 23 mg/dL    Creatinine 1.68 (H) 0.70 - 1.30 mg/dL    Calcium, Total 9.4 8.7 - 10.6 mg/dL    Calculated Osmolality 300 (H) 275 - 295 mOsm/kg    eGFR-Cr 40 (L) >=60 mL/min/1.73m2    Patient Fasting for BMP? No        Medications - Current[5]    Exam:   General:         Alert, in no apparent distress  HEENT:          elevated JVD  Lungs:           ctab              CV:                  irregular  Abdomen:       round,  semi-firm, non-tender, BS+  Extremities:    bilateral LE edema L>R +1-2 mid-way up shin   Neuro:             A&O x 3, CASTRO  Skin:                Pink, warm, dry    5/12  Echo:    Impression - TTE  The study quality is good.    Impression - TTE  The left ventricle is mildly enlarged and wall thickness is within normal limits. Global left ventricular systolic function is normal. The left ventricular ejection fraction is 55-60%. Left ventricular diastolic function is indeterminate due to heart rhythm. No regional wall motion abnormality noted.    Impression - TTE  The right ventricle is mildly enlarged. Right ventricular systolic function is normal.   Impression - TTE  The left atrium is severely enlarged.   Impression - TTE  The right atrium is moderately enlarged.   Impression - TTE  The inferior vena cava is mildly increased in size.   Impression - TTE  Calcified aortic valve with mild to moderate aortic regurgitation.   Impression - TTE  Dilatation of the prox ascending aorta with diameter is 4.4 cm. Aortic root 3.7cm.   Impression - TTE  Mild to moderate mitral regurgitation with eccentric jet. Calcified AV annulus,   Impression - TTE  Mild to moderate tricuspid regurgitation.   Impression - TTE  The estimated pulmonary artery systolic pressure is 40 mmHg, mildly elevated.   Impression - TTE  No pericardial effusion.   Impression - TTE  Stable echo as compared to echo in 07/2024, 04/2024 and 05/2022.      Education:  Patient instructed regarding sodium restricted diet, low sodium foods, fluid restriction, daily weights, medication regimen, s/s HF exacerbation and when to call APN/clinic.       [x] CardioMEMs  [] ARNi/ACEi/ARB - hypotension  [x] BB  [x] MRA  [] SGLT2i - possible pancreatitis     Assessment:   Chronic diastolic HF, ACC/AHA Stage C, NYHA Class 3- LVEF 55-60% and stable on echo 5/2025, RV function normal. RHC 5/2024 with PCWP 14, RA 9. S/p CardioMEMs. Recent PAD's 32-34 mmHg. MRA discontinued at [facility] for dehydration and near syncope; since have restarted but required reduced dose due to hyperkalemia. Last ProBNP down to 3,617 (highest 5,927). Hypervolemic.   PH, mild  combined pre and post capillary - RHC 5/20/24 demonstrates mPAP 31, PAD 18 mmHg, wedge 14 mmHg, RA 9 mmHg, PVR 2.2 and CI 3.8. RV function low normal. PAD 31-32 mmHg on day of RHC per CardioMEMs.   Mild to Moderate MR/TR and AI - on echo in May 2025.   CKD Stage 3b -recent baseline creatinine ~ 1.7 mg/dl, stable. Follows with Dr. Devine.    Chronic Afib - s/p Watchman. Rates controlled.   LI - on CPAP.    Recurrent bilateral pleural effusions - hx of thoracentesis.   Hx Lupus/Mixed CTD/Severe polymyositis - continues IVIG infusions monthly. Follows with Dr. Farmer. On chronic prednisone. Off Imuran due to pancytopenia.   Non-obstructive CAD  Chronic thrombocytopenia, immune mediated. PLTs stable 3/12. Follows with Dr. Orr, last seen 1/30.  Hx Hyponatremia- Na 140 today.   HERNANDEZ with biopsy proven stage F0-F1 Fibrosis on US 1/2022. Follows with Dr. Veronica, Hepatology.   Mild hyperkalemia, recurrent and has required Veltassa. MRA recently reduced with improvement.  Maintained on low dose ARNI.   Anemia - last Hgb 14.7 g/dL; stable. Iron sat 28 and ferritin 97 1/2025. Hx Venofer, last dose 9/16.   Hypothyroidism - previously on supplement that was recently stopped per Dr. Simon. TSH normal 1/2025.       Plan:     IVP Diuril 250 mg x1  IVP Bumex 4 mg x1  Hold the PM dose of Bumex (1 mg) today. Resume usual dose tomorrow.  Encouraged to continue to follow daily weights, to contact us with weight gain.   Follow MEMs. May need to adjust range since he is fluid overloaded today with a PAD \"in range\" (29-35 mmHg).  Consider BVA to help establish range once volume status is improved.    Return to clinic in 1-2 weeks  F/U with Dr. Craig in ~August/September.   CHF discharge instructions given    I have spent 40 min total time on the day of the encounter, including: preparing to see the patient, obtaining and/or reviewing separately obtained history, performing a medically appropriate examination and/or evaluation,  counseling and educating the patient/family caregiver, ordering medication, tests, or procedures, referring and communicating with other health care professionals, documenting clinical information in Epic, and independently interpreting results and communicating results to the patient/family caregiver      SHEA Cummins               [1]   Past Medical History:   A-fib (HCC)    Arrhythmia    atrial fibrillation    Arthritis    Autoimmune disease (HCC)    hepatits, resolved anfd nephritis, resolved    Ramon's esophagus    Bleeding nose    Gums Treated    Blood disorder    thrombocytopenia    Blood in urine    Blurred vision    cataract due to steroids, surgery in June    Calculus of kidney    One time    Cancer (HCC)    skin    Candidiasis of the esophagus    Due to steroid use for Autoimmune disorder     Cataract    Colon polyps    Congestive heart disease (HCC)    Diarrhea, unspecified    Intermittent due to lupus    Diverticulosis of colon    Easy bruising    On and off prednisone for 20 years    Esophageal polyp    Esophageal reflux    Essential hypertension    Fatigue    polymyositis and lupus    Gout    Hammer toe, acquired    Heart palpitations    Afib    Hepatitis    autoimmune induce hepatitis d/t lupus    High blood pressure    IBS (irritable bowel syndrome)    mild d/t lupus    Irregular bowel habits    Leg swelling    Heart failure, probably caused by lupus    Lupus    MCTD (mixed connective tissue disease) (HCC)    Obstructive sleep apnea (adult) (pediatric)    LI (obstructive sleep apnea)    AHI 37  Supine AHI 38 non-supine AHI 16 Sao2 Pj 83%     LI (obstructive sleep apnea)    AHI 36 RDI 36 REM AHI 45 Supine AHI 65 non-supine AHI 19 Sao2 Pj 86% CPAP 9cwp    Pain in joints    Personal history of antineoplastic chemotherapy    Pleural effusion    right    Pneumonia due to organism    Polymyositis (HCC)    Presence of other cardiac implants and grafts    watchman filter    Problems with  swallowing    dysphagia in 2006    Pulmonary hypertension (HCC)    Raynaud disease    Raynaud disease    Renal disorder    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    Shortness of breath    mainly due to pm or lupus    Sleep apnea    CPAP    Thrombocytopathia (HCC)    Visual impairment    bifocals for reading & computer; distance for driving    Wears glasses   [2]   Past Surgical History:  Procedure Laterality Date    Appendectomy  1970    Appendectomy      Cardiac catheterization  09/2019    Cataract Bilateral     Cholecystectomy      Colonoscopy  10/2003 2006 01/2010    x3    Colonoscopy  05/14/2013    Colonoscopy N/A 05/01/2018    Procedure: COLONOSCOPY;  Surgeon: Isaiah Tobar MD;  Location:  ENDOSCOPY    Colonoscopy N/A 04/12/2024    Procedure: COLONOSCOPY;  Surgeon: eGrardo Neves MD;  Location:  ENDOSCOPY    Colonoscopy with biopsy  05/14/2013    [facility]      Hand/finger surgery unlisted  2003    right    Needle biopsy liver      Other  12/2005    needle bipsy of kidney    Other surgical history  2017    watchman filter    Percutaneous  angioplasty  (ptca)- pbp only  2006    right    Rectum surgery procedure unlisted  1946    rectal surgery polypectomy    Skin surgery      MMS BCC R temple 6/24/09    Skin surgery  2007    basal ceell carcinoma    Skin surgery  07/18/2012    BCC-nodular to right superior eyebrow/ MOHS    Skin surgery  07/15/2013    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/MMS    Skin surgery  02/19/2014    BCC-NOD to right superior pinna, MMS, AB    Skin surgery  02/16/2021    BCC- left superior forehead, MMS     Skin surgery  03/07/2022    SCC IN SITU RIGHT ANTIHELIX MMS BY DR GASTELUM    Tonsillectomy  1948    Upper gi endoscopy,exam  10/2003 2006 01/2010 , 5/13    x3   [3]   Family History  Problem Relation Age of Onset    Heart Disease Father         CHF    Gastro-Intestinal Disorder Father         Diverticulosis    Alcohol and Other Disorders Associated Father     Heart Attack Father     Heart Disease Mother          CHF    Breast Cancer Mother     Stroke Mother     Cancer Paternal Grandfather     Heart Attack Paternal Grandmother     Kidney Disease Son     Other (Ramon's Esophagus) Son     Heart Attack Maternal Grandfather    [4]   Social History  Socioeconomic History    Marital status:    Occupational History    Occupation: Retired    Tobacco Use    Smoking status: Former     Current packs/day: 0.00     Average packs/day: 0.5 packs/day for 15.0 years (7.5 ttl pk-yrs)     Types: Cigarettes     Start date: 1958     Quit date: 1973     Years since quittin.8    Smokeless tobacco: Never    Tobacco comments:     5 cigarettes daily, stopped smoking in    Vaping Use    Vaping status: Never Used   Substance and Sexual Activity    Alcohol use: Yes     Alcohol/week: 13.0 - 15.0 standard drinks of alcohol     Types: 5 Glasses of wine, 8 - 10 Standard drinks or equivalent per week     Comment: 1 per day wine or liquor    Drug use: Never   Other Topics Concern    Caffeine Concern Yes     Comment: 1 soda per day and decaf coffee    Sleep Concern No    Exercise Yes     Comment: 3-4 times weekly    Seat Belt Yes     Social Drivers of Health     Food Insecurity: No Food Insecurity (2025)    NCSS - Food Insecurity     Worried About Running Out of Food in the Last Year: No     Ran Out of Food in the Last Year: No   Transportation Needs: No Transportation Needs (2025)    NCSS - Transportation     Lack of Transportation: No   Housing Stability: Not At Risk (2025)    NCSS - Housing/Utilities     Has Housing: Yes     Worried About Losing Housing: No     Unable to Get Utilities: No   [5]   Current Outpatient Medications:     ipratropium 0.06 % Nasal Solution, 1 spray by Nasal route 2 (two) times daily., Disp: 1 each, Rfl: 11    spironolactone 25 MG Oral Tab, Take 0.5 tablets (12.5 mg total) by mouth daily., Disp: , Rfl:     PREDNISONE 5 MG Oral Tab, TAKE 1 TABLET BY MOUTH EVERY DAY WITH BREAKFAST,  Disp: 90 tablet, Rfl: 1    predniSONE 1 MG Oral Tab, TAKE 2 TABLETS (2 MG TOTAL) BY MOUTH DAILY WITH BREAKFAST, Disp: 180 tablet, Rfl: 1    bumetanide 1 MG Oral Tab, Take 2 tablets (2 mg total) by mouth every morning AND 1 tablet (1 mg total) Every afternoon at 2:00 pm., Disp: 270 tablet, Rfl: 1    omeprazole 20 MG Oral Capsule Delayed Release, TAKE 1 CAPSULE BY MOUTH TWICE A DAY, Disp: 180 capsule, Rfl: 3    ALLOPURINOL 300 MG Oral Tab, TAKE 1 TABLET BY MOUTH EVERY DAY, Disp: 90 tablet, Rfl: 3    fexofenadine 180 MG Oral Tab, Take 1 tablet (180 mg total) by mouth daily as needed., Disp: , Rfl:     metoprolol succinate  MG Oral Tablet 24 Hr, Take 1 tablet (100 mg total) by mouth every morning AND 0.5 tablets (50 mg total) every evening., Disp: 60 tablet, Rfl: 3    aspirin 81 MG Oral Tab EC, Take 1 tablet (81 mg total) by mouth daily., Disp: 30 tablet, Rfl: 0    PREVIDENT 5000 BOOSTER PLUS 1.1 % Dental Paste, , Disp: , Rfl:     Multiple Vitamins-Minerals (TAB-A-KEVIN) Oral Tab, Take 1 tablet by mouth in the morning., Disp: , Rfl:     Immune Globulin, Human, 10 g Intravenous Recon Soln, Inject 0.4 g/kg into the vein. Given for treatment of polymyositis, mixed connective tissue disease, and immune thrombocytopenia purpura monthly at [facility]. Every 5 weeks, Disp: 3 each, Rfl: 0    Calcium Citrate-Vitamin D (CITRACAL + D OR), Take 1 tablet by mouth in the morning., Disp: , Rfl:

## 2025-06-02 ENCOUNTER — HOSPITAL ENCOUNTER (OUTPATIENT)
Dept: LAB | Facility: HOSPITAL | Age: 80
Discharge: HOME | End: 2025-06-02
Attending: NURSE PRACTITIONER
Payer: MEDICARE

## 2025-06-02 ENCOUNTER — HOSPITAL ENCOUNTER (OUTPATIENT)
Dept: CARDIOLOGY CLINIC | Facility: HOSPITAL | Age: 80
End: 2025-06-02
Attending: NURSE PRACTITIONER
Payer: MEDICARE

## 2025-06-02 VITALS
OXYGEN SATURATION: 100 % | HEART RATE: 79 BPM | RESPIRATION RATE: 18 BRPM | SYSTOLIC BLOOD PRESSURE: 108 MMHG | DIASTOLIC BLOOD PRESSURE: 65 MMHG | BODY MASS INDEX: 27 KG/M2 | WEIGHT: 192.19 LBS

## 2025-06-02 DIAGNOSIS — I50.31 ACUTE HEART FAILURE WITH PRESERVED EJECTION FRACTION (HFPEF, >= 50%) (HCC): Primary | ICD-10-CM

## 2025-06-02 DIAGNOSIS — I10 BENIGN ESSENTIAL HYPERTENSION: ICD-10-CM

## 2025-06-02 DIAGNOSIS — I48.19 PERSISTENT ATRIAL FIBRILLATION (HCC): ICD-10-CM

## 2025-06-02 DIAGNOSIS — I43 CARDIOMYOPATHY AS MANIFESTATION OF UNDERLYING DISEASE (HCC): ICD-10-CM

## 2025-06-02 DIAGNOSIS — N18.32 STAGE 3B CHRONIC KIDNEY DISEASE (HCC): ICD-10-CM

## 2025-06-02 DIAGNOSIS — N17.9 ACUTE RENAL FAILURE, UNSPECIFIED ACUTE RENAL FAILURE TYPE: ICD-10-CM

## 2025-06-02 DIAGNOSIS — I50.32 CHRONIC DIASTOLIC HEART FAILURE (HCC): ICD-10-CM

## 2025-06-02 LAB
ANION GAP SERPL CALC-SCNC: 9 MMOL/L (ref 0–18)
BUN BLD-MCNC: 45 MG/DL (ref 9–23)
CALCIUM BLD-MCNC: 9.4 MG/DL (ref 8.7–10.6)
CHLORIDE SERPL-SCNC: 101 MMOL/L (ref 98–112)
CO2 SERPL-SCNC: 30 MMOL/L (ref 21–32)
CREAT BLD-MCNC: 1.68 MG/DL (ref 0.7–1.3)
EGFRCR SERPLBLD CKD-EPI 2021: 40 ML/MIN/1.73M2 (ref 60–?)
FASTING STATUS PATIENT QL REPORTED: NO
GLUCOSE BLD-MCNC: 76 MG/DL (ref 70–99)
OSMOLALITY SERPL CALC.SUM OF ELEC: 300 MOSM/KG (ref 275–295)
POTASSIUM SERPL-SCNC: 4.4 MMOL/L (ref 3.5–5.1)
SODIUM SERPL-SCNC: 140 MMOL/L (ref 136–145)

## 2025-06-02 PROCEDURE — 99215 OFFICE O/P EST HI 40 MIN: CPT | Performed by: NURSE PRACTITIONER

## 2025-06-02 PROCEDURE — 36415 COLL VENOUS BLD VENIPUNCTURE: CPT | Performed by: NURSE PRACTITIONER

## 2025-06-02 PROCEDURE — 80048 BASIC METABOLIC PNL TOTAL CA: CPT | Performed by: NURSE PRACTITIONER

## 2025-06-02 RX ORDER — BUMETANIDE 0.25 MG/ML
4 INJECTION, SOLUTION INTRAMUSCULAR; INTRAVENOUS ONCE
Status: COMPLETED | OUTPATIENT
Start: 2025-06-02 | End: 2025-06-02

## 2025-06-02 RX ADMIN — BUMETANIDE 4 MG: 0.25 INJECTION, SOLUTION INTRAMUSCULAR; INTRAVENOUS at 14:00:00

## 2025-06-02 NOTE — PROGRESS NOTES
Patient Assessed.   Patient complaining of abdominal bloating & lower leg swelling. Weight 192.2 lb; up 9 lbs.   APN notified of patient's complaint of all of the above. Labs ordered and drawn by  Lab. Reviewed allergies and list of current medications with patient and updated it in the Electronic Medical Record. IV established per protocol. IV 4 mg bumex &  diuril given. Patient tolerated it well. Educated patient on low sodium diet and food choices, fluid restriction of 2 liters, and daily weights. Reviewed follow-up appointments and discharge Heart Failure instructions with patient. Patient verbalized an understanding. IV discontinued; catheter intact. Pressure held and gauze applied.     RTC in 2 weeks.

## 2025-06-02 NOTE — PATIENT INSTRUCTIONS
Heart Failure Discharge Instructions    Hold the afternoon dose of Bumex today.  Resume the usual dose of 2 mg in the AM and 1 mg in the PM tomorrow.   Call with no improvement.     Activity: Regular exercise and activity is important for your overall health and to help keep your heart strong and functioning as well as possible.   Walk at a slow to moderate pace for 15-20 minutes 3-5 days per week.     Follow these instructions every day to keep yourself in the Green Zone     The Green Zone means you are feeling well and your symptoms are under control                                    Medications  Take your medication every day as instructed  Do not stop taking your medicine or change the amount you are taking without instructions from your doctor or nurse  Do Not take non-steroidal antiinflammatory drugs such as ibuprofen, aleve, advil, or motrin                                    Diet/Fluids  People with heart failure should eat less sodium (salt) and limit fluid. Sodium attracts water and makes the body hold fluid. This extra fluid makes the heart work harder and can worsen the symptoms of heart failure.     Diet    2000 mg sodium daily  Fluid restriction    64 ounces daily  (8 oz. = 1 cup)                                     Body Weight  Weigh yourself every day before breakfast and write your weight down  Use the same scale and wear about the same amount of clothes each time  A sudden weight increase is due to fluid retention rather than fat                                         Activity  Pace your activities to avoid getting overtired  Take rest periods as needed  Elevate your feet to reduce ankle swelling when resting                             Signs of Worsening Heart Failure    You are entering the Yellow Zone - this is a warning zone    Call your doctor or nurse if you have any of these signs or symptoms:  You gain 2 or more pounds overnight or 3-5 pounds in 3-7 days  You have more trouble  breathing  You get more tired with regular activity, or are limiting activity because of shortness of breath or fatigue  You are short of breath lying down, you need more pillows to breathe comfortably,  or wake up during the night short of breath  You urinate less often during the day and more often at night  You have a bloated feeling, upset stomach, loss of appetite, or your clothes are fitting tighter    GO TO THE EMERGENCY DEPARTMENT or CALL 911 IF:    These are signs you are in the RED ZONE - THIS IS AN EMERGENCY  You have tightness or pain in your chest  You are extremely short of breath or can't catch your breath  You cough up frothy pink mucous  You feel confused or can't think clearly  You are traveling and develop symptoms of worsening heart failure     We respect everyone's time and availability. Please be aware that this is not a walk-in clinic and we require appointments in order to facilitate timely care for all patients. We ask you to arrive 30 minutes before your appointment to allow time for you to check-in and have your bloodwork drawn. Please understand if you are late for your appointment, you may be asked to reschedule. If possible, all attempts will be made to accommodate but realize this is no guarantee that this will always be available. We understand there are extenuating circumstances. If you need to cancel or reschedule your appointment, please call the [facility] within 24 hours at [phone].  Thank you for your cooperation, [facility] Staff.    If you are currently [employer] active, starting July 1st 2024, we will be utilizing [employer] messaging ONLY to confirm your appointment.    IF YOU HAVE QUESTIONS REGARDING YOUR BILL, FEEL FREE TO CONTACT [facility] PATIENT ACCOUNTS -[phone]. IF YOU NEED FINANCIAL ASSISTANCE, PLEASE CALL AN [facility] FINANCIAL COUNSELOR -[phone].             [facility]     [phone]

## 2025-06-03 ENCOUNTER — HOSPITAL ENCOUNTER (EMERGENCY)
Facility: HOSPITAL | Age: 80
Discharge: HOME | End: 2025-06-04
Attending: EMERGENCY MEDICINE
Payer: MEDICARE

## 2025-06-03 VITALS
RESPIRATION RATE: 18 BRPM | WEIGHT: 184 LBS | HEIGHT: 71 IN | DIASTOLIC BLOOD PRESSURE: 62 MMHG | TEMPERATURE: 99 F | BODY MASS INDEX: 25.76 KG/M2 | OXYGEN SATURATION: 99 % | HEART RATE: 95 BPM | SYSTOLIC BLOOD PRESSURE: 128 MMHG

## 2025-06-03 DIAGNOSIS — S81.812A LACERATION OF LEFT LOWER EXTREMITY, INITIAL ENCOUNTER: Primary | ICD-10-CM

## 2025-06-03 PROCEDURE — 12004 RPR S/N/AX/GEN/TRK7.6-12.5CM: CPT

## 2025-06-03 PROCEDURE — 99283 EMERGENCY DEPT VISIT LOW MDM: CPT

## 2025-06-04 NOTE — ED INITIAL ASSESSMENT (HPI)
Left lower leg hit  at the edge of wooden chair  there is laceration on left lower leg . Pressure dressing in place .  Not taking any blood thinner medications .

## 2025-06-04 NOTE — ED PROVIDER NOTES
Patient Seen in: [facility] Emergency Department       The following individual(s) verbally consented to be recorded using ambient AI listening technology and understand that they can each withdraw their consent to this listening technology at any point by asking the clinician to turn off or pause the recording:    Patient name: Miguel Davila  Additional names:        History  Chief Complaint   Patient presents with    Leg or Foot Injury     Stated Complaint: left leg shin bleeding after hitting wooden chair    Subjective:   HPI     Sterling Davila is an 80 year old male with polymyositis who presents with a large skin laceration after minor trauma.    He sustained a large skin laceration after lightly bumping into a wooden chair frame while closing the blinds. There was no tripping or falling involved, and he was not moving quickly at the time of the incident. The wound is described as a 'pretty big gash' and initially caused pain, but he currently has no pain.    He has a history of polymyositis and has been on long-term steroid therapy for approximately 21 years, resulting in very thin and fragile skin. He experiences frequent bruising from minor bumps due to the fragility of his skin.    He managed the wound by applying pressure to stop the bleeding and wrapped it himself. He is surprised at the severity of the wound given the minor nature of the impact, describing it as 'disgusting' and 'like in a question marianna'. He is concerned about the time it will take to heal.    No other injuries are reported, and he is up to date on his tetanus shot.        Objective:     Past Medical History:    A-fib (HCC)    Arrhythmia    atrial fibrillation    Arthritis    Autoimmune disease (HCC)    hepatits, resolved anfd nephritis, resolved    Ramon's esophagus    Bleeding nose    Gums Treated    Blood disorder    thrombocytopenia    Blood in urine    Blurred vision    cataract due to steroids, surgery in  June    Calculus of kidney    One time    Cancer (HCC)    skin    Candidiasis of the esophagus    Due to steroid use for Autoimmune disorder     Cataract    Colon polyps    Congestive heart disease (HCC)    Diarrhea, unspecified    Intermittent due to lupus    Diverticulosis of colon    Easy bruising    On and off prednisone for 20 years    Esophageal polyp    Esophageal reflux    Essential hypertension    Fatigue    polymyositis and lupus    Gout    Hammer toe, acquired    Heart palpitations    Afib    Hepatitis    autoimmune induce hepatitis d/t lupus    High blood pressure    IBS (irritable bowel syndrome)    mild d/t lupus    Irregular bowel habits    Leg swelling    Heart failure, probably caused by lupus    Lupus    MCTD (mixed connective tissue disease) (HCC)    Obstructive sleep apnea (adult) (pediatric)    LI (obstructive sleep apnea)    AHI 37  Supine AHI 38 non-supine AHI 16 Sao2 Pj 83%     LI (obstructive sleep apnea)    AHI 36 RDI 36 REM AHI 45 Supine AHI 65 non-supine AHI 19 Sao2 Pj 86% CPAP 9cwp    Pain in joints    Personal history of antineoplastic chemotherapy    Pleural effusion    right    Pneumonia due to organism    Polymyositis (HCC)    Presence of other cardiac implants and grafts    watchman filter    Problems with swallowing    dysphagia in 2006    Pulmonary hypertension (HCC)    Raynaud disease    Raynaud disease    Renal disorder    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    Shortness of breath    mainly due to pm or lupus    Sleep apnea    CPAP    Thrombocytopathia (HCC)    Visual impairment    bifocals for reading & computer; distance for driving    Wears glasses              Past Surgical History:   Procedure Laterality Date    Appendectomy  1970    Appendectomy      Cardiac catheterization  09/2019    Cataract Bilateral     Cholecystectomy      Colonoscopy  10/2003 2006 01/2010    x3    Colonoscopy  05/14/2013    Colonoscopy N/A 05/01/2018    Procedure: COLONOSCOPY;  Surgeon: Ekaterina  MD Isaiah;  Location:  ENDOSCOPY    Colonoscopy N/A 2024    Procedure: COLONOSCOPY;  Surgeon: Gerardo Neves MD;  Location:  ENDOSCOPY    Colonoscopy with biopsy  2013    E[facility]      Hand/finger surgery unlisted      right    Needle biopsy liver      Other  2005    needle bipsy of kidney    Other surgical history  2017    watchman filter    Percutaneous  angioplasty  (ptca)- pbp only      right    Rectum surgery procedure unlisted  194    rectal surgery polypectomy    Skin surgery      MMS BCC R temple 09    Skin surgery  2007    basal ceell carcinoma    Skin surgery  2012    BCC-nodular to right superior eyebrow/ MOHS    Skin surgery  07/15/2013    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/MMS    Skin surgery  2014    BCC-NOD to right superior pinna, MMS, AB    Skin surgery  2021    BCC- left superior forehead, MMS     Skin surgery  2022    SCC IN SITU RIGHT ANTIHELIX MMS BY DR GASTELUM    Tonsillectomy      Upper gi endoscopy,exam  10/2003 2006 2010 , 5/13    x3                Social History     Socioeconomic History    Marital status:    Occupational History    Occupation: Retired    Tobacco Use    Smoking status: Former     Current packs/day: 0.00     Average packs/day: 0.5 packs/day for 15.0 years (7.5 ttl pk-yrs)     Types: Cigarettes     Start date: 1958     Quit date: 1973     Years since quittin.8    Smokeless tobacco: Never    Tobacco comments:     5 cigarettes daily, stopped smoking in    Vaping Use    Vaping status: Never Used   Substance and Sexual Activity    Alcohol use: Yes     Alcohol/week: 13.0 - 15.0 standard drinks of alcohol     Types: 5 Glasses of wine, 8 - 10 Standard drinks or equivalent per week     Comment: 1 per day wine or liquor    Drug use: Never   Other Topics Concern    Caffeine Concern Yes     Comment: 1 soda per day and decaf coffee    Sleep Concern No    Exercise Yes     Comment: 3-4 times weekly    Seat  Belt Yes     Social Drivers of Health     Food Insecurity: No Food Insecurity (1/17/2025)    NCSS - Food Insecurity     Worried About Running Out of Food in the Last Year: No     Ran Out of Food in the Last Year: No   Transportation Needs: No Transportation Needs (1/17/2025)    NCSS - Transportation     Lack of Transportation: No   Housing Stability: Not At Risk (1/17/2025)    NCSS - Housing/Utilities     Has Housing: Yes     Worried About Losing Housing: No     Unable to Get Utilities: No                                Physical Exam    ED Triage Vitals [06/03/25 2151]   /70   Pulse 92   Resp 18   Temp 98.7 °F (37.1 °C)   Temp src    SpO2 97 %   O2 Device None (Room air)       Current Vitals:   Vital Signs  BP: 128/62  Pulse: 95  Resp: 18  Temp: 98.7 °F (37.1 °C)    Oxygen Therapy  SpO2: 99 %  O2 Device: None (Room air)            Physical Exam  Vitals and nursing note reviewed.   Constitutional:       General: He is not in acute distress.     Appearance: He is well-developed. He is not ill-appearing.   Skin:     Comments: 10 cm curvilinear laceration noted on the anterolateral aspect of patient's left leg.  The skin is quite frail.  There is no exposed underlying muscle or bone or tendon.  There is no foreign body noted within the wound.  Distal CMS exam is intact.   Neurological:      Mental Status: He is alert and oriented to person, place, and time.                ED Course  Labs Reviewed - No data to display       Initially attempted to perform suture repair with a layered approach because of the frailty of the skin.  I applied to subcutaneous Vicryl sutures to bring the wound edges closer together.  I subsequently attempted to place 4-0 nylon sutures as horizontal mattresses because of the frailty of the patient's skin.  However even with this, the sutures were tearing through the skin and I aborted this.  I subsequently resorted to applying multiple skin strips with better reapproximation of wound  edges, but still with slow separation of wound edges.  I felt that this was the best option at this point because of the frailty of the patient's skin and the tearing noted as above.                  MDM     Patient comes to the emergency department with laceration of his left leg.  Addressing this was complicated by the fact that patient is chronically on steroids and has quite frail skin.  I did apply subcutaneous sutures and attempted to perform final surface laceration repair with nylon sutures, but this ended up tearing through the patient's skin.  As result, I reapproximated the wound edges as best as I could with Steri-Strips.  Patient expressed understanding of the compromises.  Patient was discharged home with instructions to follow-up with primary care physician and to return for any acute change or worsening symptoms.        Medical Decision Making      Disposition and Plan     Clinical Impression:  1. Laceration of left lower extremity, initial encounter         Disposition:  Discharge  6/4/2025 12:41 am    Follow-up:  Eric Simon MD  [address]  [facility] [id number] [phone]    Follow up            Medications Prescribed:  Current Discharge Medication List                Supplementary Documentation:

## 2025-06-06 ENCOUNTER — OFFICE VISIT (OUTPATIENT)
Dept: FAMILY MEDICINE CLINIC | Facility: CLINIC | Age: 80
End: 2025-06-06
Payer: MEDICARE

## 2025-06-06 VITALS
WEIGHT: 189.81 LBS | OXYGEN SATURATION: 97 % | RESPIRATION RATE: 14 BRPM | DIASTOLIC BLOOD PRESSURE: 84 MMHG | SYSTOLIC BLOOD PRESSURE: 126 MMHG | BODY MASS INDEX: 26.57 KG/M2 | HEIGHT: 71 IN | HEART RATE: 82 BPM

## 2025-06-06 DIAGNOSIS — S81.812D LACERATION OF LEFT LOWER EXTREMITY, SUBSEQUENT ENCOUNTER: Primary | ICD-10-CM

## 2025-06-06 DIAGNOSIS — Z79.52 CURRENT CHRONIC USE OF SYSTEMIC STEROIDS: ICD-10-CM

## 2025-06-06 PROCEDURE — G2211 COMPLEX E/M VISIT ADD ON: HCPCS | Performed by: FAMILY MEDICINE

## 2025-06-06 PROCEDURE — 99213 OFFICE O/P EST LOW 20 MIN: CPT | Performed by: FAMILY MEDICINE

## 2025-06-06 NOTE — PROGRESS NOTES
The following individual(s) verbally consented to be recorded using ambient AI listening technology and understand that they can each withdraw their consent to this listening technology at any point by asking the clinician to turn off or pause the recording:    Patient name: Miguel Davila  Additional names:  concetta davila

## 2025-06-06 NOTE — PROGRESS NOTES
Subjective:   Sterling is a 80 year old male coming in for had concerns including ER F/U (Went in for left leg laceration ).   HPI  History of Present Illness  Sterling Davila is an 80 year old male with lupus and long-term prednisone use who presents with a leg wound.    He sustained a six-inch Y-shaped wound on his leg after catching it on a cross member of a wooden chair while walking at home. There was significant initial bleeding, which he managed with Steri strips and gauze. Currently, there is no oozing or drainage, and he reports no redness, swelling, or increased pain when changing the dressing. The wound appears to be healing well.    He has a history of lupus and has been on long-term prednisone therapy, currently taking 7 mg daily. He notes increased skin fragility over the last three years, which he attributes to prolonged steroid use. He is concerned about the impact of prednisone on his skin's integrity.    He has a history of sun sensitivity related to lupus, which developed about ten years ago. He has not been exposed to the sun recently and has not played golf since the pandemic.    No signs of infection such as redness, swelling, or increased pain.     Doing well overall.    /84   Pulse 82   Resp 14   Ht 5' 11\" (1.803 m)   Wt 189 lb 12.8 oz (86.1 kg)   SpO2 97%   BMI 26.47 kg/m²  Body mass index is 26.47 kg/m².   Physical Exam  Constitutional:       Appearance: He is well-developed.   HENT:      Head: Normocephalic and atraumatic.   Pulmonary:      Effort: Pulmonary effort is normal. No respiratory distress.   Musculoskeletal:         General: Normal range of motion.      Cervical back: Normal range of motion.   Skin:     Findings: No rash.   Neurological:      Mental Status: He is alert and oriented to person, place, and time.   Psychiatric:         Mood and Affect: Mood normal.         Behavior: Behavior normal.         Thought Content: Thought content normal.         Judgment:  Judgment normal.        Physical Exam  SKIN: Wound on leg without exudate, drainage, infection, or edema.        Results       Assessment & Plan  Laceration of left lower extremity, subsequent encounter         Current chronic use of systemic steroids                  Assessment & Plan  Laceration on leg  6-inch leg laceration stable, no infection, healing well under Steri strips despite prednisone use.  - Continue Steri strips and gauze.  - Consider non-adhesive bandages and self-wrap as healing progresses.  - Apply ointment for lubrication and waterproofing.  - Monitor for infection signs: increased pain, redness, swelling.  - Contact if issues arise, on-call this weekend.    Chronic prednisone use  On 7 mg daily long-term. Discussed potential dose reduction. Current dose low, minimal impact on skin.  - Consider reducing prednisone to 6 mg or 5 mg if appropriate.  I am having Sterling ALETA Davila maintain his Calcium Citrate-Vitamin D (CITRACAL + D OR), Immune Globulin (Human), Tab-A-Nick, PreviDent 5000 Booster Plus, aspirin, metoprolol succinate ER, fexofenadine, allopurinol, omeprazole, bumetanide, predniSONE, predniSONE, spironolactone, and ipratropium.       No follow-ups on file.

## 2025-06-09 ENCOUNTER — TELEPHONE (OUTPATIENT)
Dept: CARDIOLOGY CLINIC | Facility: HOSPITAL | Age: 80
End: 2025-06-09

## 2025-06-09 NOTE — TELEPHONE ENCOUNTER
With ongoing symptoms of fluid overload would increase the PM dose of Bumex from 1 mg to 2 mg till he is seen later this week. PAD not transmitted yet.    Thank you

## 2025-06-09 NOTE — TELEPHONE ENCOUNTER
Please contact Sterling and remind him to transmit CardioMEMs readings. I would like to see if it has improved since he got IV diuretics.   Thank you

## 2025-06-10 NOTE — PROGRESS NOTES
[facility] Progress Note    Miguel Davila is a 80 year old male who presents to clinic for APN assessment and management of chronic diastolic heart failure and is functional class 2-3.     Subjective:  Since his last clinic visit on 6/2; when he was given IVP Diuril 250 mg and Bumex 4 mg,    He was seen in the ED with a skin laceration from an injury. He had bumped into a wooden chair frame while closing the blinds. Wound edges were reapproximated and he was discharged. He saw Dr. Simon in follow up who recommended consideration for reducing prednisone to help with wound healing. We contacted him on June 9th and the PM dose of Bumex was increased to 2 mg from 1 mg due to elevated PAD readings.     Today his weight is 191.4 lbs, only down 1 lb. His weight was up 10 lbs recently. His LE edema is stable. Did notice more swelling to his RLE. He denies abdominal bloating and shortness of breath. His wound is slowly healing. He has been changing the dressing regularly. Reports no signs of infection. He sleeps well and has be great appetite.     He has continued stress taking care of his wife whose dementia is progressing.      Recent PAD's have been 32-36 mmHg. Range 29-35 mmhg.  He has not been consistent with taking readings due to taking care of his wife. PAD in the clinic 23-26 mmhg (do not think this is accurate).         Review of Systems   Constitutional: Negative.   Cardiovascular:  Positive for leg swelling (mild).   Respiratory: Negative.     Gastrointestinal: Negative.        HISTORY:  Past Medical History[1]   Past Surgical History[2]   Family History[3]   Short Social Hx on File[4]        Objective:    Telemetry: Afib         BP 96/50   Pulse 73   Resp 13   Wt 191 lb 6.4 oz (86.8 kg)   SpO2 100%   BMI 26.69 kg/m²     Wt Readings from Last 6 Encounters:   06/13/25 191 lb 6.4 oz (86.8 kg)   06/06/25 189 lb 12.8 oz (86.1 kg)   06/03/25 184 lb (83.5 kg)   06/02/25  192 lb 3.2 oz (87.2 kg)   05/28/25 192 lb 12.8 oz (87.5 kg)   04/28/25 183 lb 12.8 oz (83.4 kg)            Recent Results (from the past 24 hours)   Basic Metabolic Panel (8)    Collection Time: 06/13/25  2:50 PM   Result Value Ref Range    Glucose 108 (H) 70 - 99 mg/dL    Sodium 143 136 - 145 mmol/L    Potassium 4.9 3.5 - 5.1 mmol/L    Chloride 103 98 - 112 mmol/L    CO2 31.0 21.0 - 32.0 mmol/L    Anion Gap 9 0 - 18 mmol/L    BUN 46 (H) 9 - 23 mg/dL    Creatinine 1.72 (H) 0.70 - 1.30 mg/dL    Calcium, Total 9.3 8.7 - 10.6 mg/dL    Calculated Osmolality 308 (H) 275 - 295 mOsm/kg    eGFR-Cr 39 (L) >=60 mL/min/1.73m2    Patient Fasting for BMP? No    Pro Beta Natriuretic Peptide    Collection Time: 06/13/25  2:50 PM   Result Value Ref Range    Pro-Beta Natriuretic Peptide 5,211 (H) <450 pg/mL   CBC, Platelet; No Differential    Collection Time: 06/13/25  2:50 PM   Result Value Ref Range    WBC 7.5 4.0 - 11.0 x10(3) uL    RBC 3.75 (L) 3.80 - 5.80 x10(6)uL    HGB 13.0 13.0 - 17.5 g/dL    HCT 41.5 39.0 - 53.0 %    .0 (L) 150.0 - 450.0 10(3)uL    .7 (H) 80.0 - 100.0 fL    MCH 34.7 (H) 26.0 - 34.0 pg    MCHC 31.3 31.0 - 37.0 g/dL    RDW 16.2 %       Medications - Current[5]    Exam:   General:         Alert, in no apparent distress  HEENT:          +7cm JVD  Lungs:            CTAB                     CV:                   irregularly irregular  Abdomen:       Non-distended, soft, non-tender, BS+  Extremities:    trace-+1 LLE edema; trace RLE edema  Neuro:             A&O x 3, CASTRO  Skin:                Pink, warm, dry      Education:  Patient instructed regarding sodium restricted diet, low sodium foods, fluid restriction, daily weights, medication regimen, s/s HF exacerbation and when to call APN/clinic.        [x] CardioMEMs  [] ARNi/ACEi/ARB - hypotension  [x] BB  [x] MRA, on reduced dose due to hyperkalemia   [] SGLT2i - possible pancreatitis     Assessment:   Chronic diastolic HF, ACC/AHA Stage C, NYHA  Class 3- LVEF 55-60% and stable on echo 5/2025, RV function normal. RHC 5/2024 with PCWP 14, RA 9. S/p CardioMEMs. Recent PAD's 32-35 mmHg. ProBNP 5,211, Last ProBNP down to 3,617 (highest 5,927).   PH, mild combined pre and post capillary - RHC 5/20/24 demonstrates mPAP 31, PAD 18 mmHg, wedge 14 mmHg, RA 9 mmHg, PVR 2.2 and CI 3.8. RV function low normal. PAD 31-32 mmHg on day of RHC per CardioMEMs.   Mild to Moderate MR/TR and AI - on echo in May 2025.   CKD Stage 3b -recent baseline creatinine ~ 1.7 mg/dl, stable. Follows with Dr. Devine.    Chronic Afib - s/p Watchman. Rates controlled.   LI - on CPAP.    Recurrent bilateral pleural effusions - hx of thoracentesis.   Hx Lupus/Mixed CTD/Severe polymyositis - continues IVIG infusions monthly. Follows with Dr. Farmer. On chronic prednisone. Off Imuran due to pancytopenia.   Non-obstructive CAD  Chronic thrombocytopenia, immune mediated. PLTs 108 today. Follows with Dr. Orr.   Hx Hyponatremia  HERNANDEZ with biopsy proven stage F0-F1 Fibrosis on US 1/2022. Follows with Dr. Veronica, Hepatology.   Mild hyperkalemia, recurrent and has required Veltassa. MRA recently reduced with improvement.  Maintained on low dose ARNI.   Anemia - Hgb 13.0 g/dL today, down from Hgb 14.7 g/dL; but recently bled from leg laceration. Iron sat 28 and ferritin 97 1/2025. Hx Venofer, last dose 9/16.   Hypothyroidism - previously on supplement that was recently stopped per Dr. Simon. TSH normal 1/2025.         Plan:   Continue Bumex 2mg BID for now.  Will check MEMs reading at home over the weekend. Depending on reading, may need to adjust diuretics.  Return to clinic in 2 weeks.  F/U with Dr. Craig in ~August/September.   CHF discharge instructions given    I have spent 45 min total time on the day of the encounter, including: preparing to see the patient, obtaining and/or reviewing separately obtained history, performing a medically appropriate examination and/or evaluation, counseling  and educating the patient/family caregiver, ordering medication, tests, or procedures, documenting clinical information in Epic, and independently interpreting results and communicating results to the patient/family caregiver      SHEA Fernandez, 06/13/25, 4:13 PM                 [1]   Past Medical History:   A-fib (HCC)    Arrhythmia    atrial fibrillation    Arthritis    Autoimmune disease (HCC)    hepatits, resolved anfd nephritis, resolved    Ramon's esophagus    Bleeding nose    Gums Treated    Blood disorder    thrombocytopenia    Blood in urine    Blurred vision    cataract due to steroids, surgery in June    Calculus of kidney    One time    Cancer (HCC)    skin    Candidiasis of the esophagus    Due to steroid use for Autoimmune disorder     Cataract    Colon polyps    Congestive heart disease (HCC)    Diarrhea, unspecified    Intermittent due to lupus    Diverticulosis of colon    Easy bruising    On and off prednisone for 20 years    Esophageal polyp    Esophageal reflux    Essential hypertension    Fatigue    polymyositis and lupus    Gout    Hammer toe, acquired    Heart palpitations    Afib    Hepatitis    autoimmune induce hepatitis d/t lupus    High blood pressure    IBS (irritable bowel syndrome)    mild d/t lupus    Irregular bowel habits    Leg swelling    Heart failure, probably caused by lupus    Lupus    MCTD (mixed connective tissue disease) (HCC)    Obstructive sleep apnea (adult) (pediatric)    LI (obstructive sleep apnea)    AHI 37  Supine AHI 38 non-supine AHI 16 Sao2 Pj 83%     LI (obstructive sleep apnea)    AHI 36 RDI 36 REM AHI 45 Supine AHI 65 non-supine AHI 19 Sao2 Pj 86% CPAP 9cwp    Pain in joints    Personal history of antineoplastic chemotherapy    Pleural effusion    right    Pneumonia due to organism    Polymyositis (HCC)    Presence of other cardiac implants and grafts    watchman filter    Problems with swallowing    dysphagia in 2006    Pulmonary hypertension  (HCC)    Raynaud disease    Raynaud disease    Renal disorder    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    Shortness of breath    mainly due to pm or lupus    Sleep apnea    CPAP    Thrombocytopathia (HCC)    Visual impairment    bifocals for reading & computer; distance for driving    Wears glasses   [2]   Past Surgical History:  Procedure Laterality Date    Appendectomy  1970    Appendectomy      Cardiac catheterization  09/2019    Cataract Bilateral     Cholecystectomy      Colonoscopy  10/2003 2006 01/2010    x3    Colonoscopy  05/14/2013    Colonoscopy N/A 05/01/2018    Procedure: COLONOSCOPY;  Surgeon: Isaiah Tobar MD;  Location:  ENDOSCOPY    Colonoscopy N/A 04/12/2024    Procedure: COLONOSCOPY;  Surgeon: Gerardo Neves MD;  Location:  ENDOSCOPY    Colonoscopy with biopsy  05/14/2013    E[facility]      Hand/finger surgery unlisted  2003    right    Needle biopsy liver      Other  12/2005    needle bipsy of kidney    Other surgical history  2017    watchman filter    Percutaneous  angioplasty  (ptca)- pbp only  2006    right    Rectum surgery procedure unlisted  1946    rectal surgery polypectomy    Skin surgery      MMS BCC R temple 6/24/09    Skin surgery  2007    basal ceell carcinoma    Skin surgery  07/18/2012    BCC-nodular to right superior eyebrow/ MOHS    Skin surgery  07/15/2013    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/MMS    Skin surgery  02/19/2014    BCC-NOD to right superior pinna, MMS, AB    Skin surgery  02/16/2021    BCC- left superior forehead, MMS     Skin surgery  03/07/2022    SCC IN SITU RIGHT ANTIHELIX MMS BY DR GASTELUM    Tonsillectomy  1948    Upper gi endoscopy,exam  10/2003 2006 01/2010 , 5/13    x3   [3]   Family History  Problem Relation Age of Onset    Heart Disease Father         CHF    Gastro-Intestinal Disorder Father         Diverticulosis    Alcohol and Other Disorders Associated Father     Heart Attack Father     Heart Disease Mother         CHF    Breast Cancer Mother     Stroke Mother      Cancer Paternal Grandfather     Heart Attack Paternal Grandmother     Kidney Disease Son     Other (Ramon's Esophagus) Son     Heart Attack Maternal Grandfather    [4]   Social History  Socioeconomic History    Marital status:    Occupational History    Occupation: Retired    Tobacco Use    Smoking status: Former     Current packs/day: 0.00     Average packs/day: 0.5 packs/day for 15.0 years (7.5 ttl pk-yrs)     Types: Cigarettes     Start date: 1958     Quit date: 1973     Years since quittin.9    Smokeless tobacco: Never    Tobacco comments:     5 cigarettes daily, stopped smoking in    Vaping Use    Vaping status: Never Used   Substance and Sexual Activity    Alcohol use: Yes     Alcohol/week: 13.0 - 15.0 standard drinks of alcohol     Types: 5 Glasses of wine, 8 - 10 Standard drinks or equivalent per week     Comment: 1 per day wine or liquor    Drug use: Never   Other Topics Concern    Caffeine Concern Yes     Comment: 1 soda per day and decaf coffee    Sleep Concern No    Exercise Yes     Comment: 3-4 times weekly    Seat Belt Yes     Social Drivers of Health     Food Insecurity: No Food Insecurity (2025)    NCSS - Food Insecurity     Worried About Running Out of Food in the Last Year: No     Ran Out of Food in the Last Year: No   Transportation Needs: No Transportation Needs (2025)    NCSS - Transportation     Lack of Transportation: No   Housing Stability: Not At Risk (2025)    NCSS - Housing/Utilities     Has Housing: Yes     Worried About Losing Housing: No     Unable to Get Utilities: No   [5]   Current Outpatient Medications:     bumetanide 1 MG Oral Tab, Take 2 tablets (2 mg total) by mouth BID (Diuretic). Take 2 tablets (2 mg total) by mouth every morning AND 1 tablet (1 mg total) Every afternoon at 2:00 pm., Disp: , Rfl:     ipratropium 0.06 % Nasal Solution, 1 spray by Nasal route 2 (two) times daily., Disp: 1 each, Rfl: 11    spironolactone 25 MG Oral Tab,  Take 0.5 tablets (12.5 mg total) by mouth daily., Disp: , Rfl:     PREDNISONE 5 MG Oral Tab, TAKE 1 TABLET BY MOUTH EVERY DAY WITH BREAKFAST, Disp: 90 tablet, Rfl: 1    predniSONE 1 MG Oral Tab, TAKE 2 TABLETS (2 MG TOTAL) BY MOUTH DAILY WITH BREAKFAST, Disp: 180 tablet, Rfl: 1    omeprazole 20 MG Oral Capsule Delayed Release, TAKE 1 CAPSULE BY MOUTH TWICE A DAY, Disp: 180 capsule, Rfl: 3    ALLOPURINOL 300 MG Oral Tab, TAKE 1 TABLET BY MOUTH EVERY DAY, Disp: 90 tablet, Rfl: 3    fexofenadine 180 MG Oral Tab, Take 1 tablet (180 mg total) by mouth daily as needed., Disp: , Rfl:     metoprolol succinate  MG Oral Tablet 24 Hr, Take 1 tablet (100 mg total) by mouth every morning AND 0.5 tablets (50 mg total) every evening., Disp: 60 tablet, Rfl: 3    aspirin 81 MG Oral Tab EC, Take 1 tablet (81 mg total) by mouth daily., Disp: 30 tablet, Rfl: 0    PREVIDENT 5000 BOOSTER PLUS 1.1 % Dental Paste, , Disp: , Rfl:     Multiple Vitamins-Minerals (TAB-A-KEVIN) Oral Tab, Take 1 tablet by mouth in the morning., Disp: , Rfl:     Immune Globulin, Human, 10 g Intravenous Recon Soln, Inject 0.4 g/kg into the vein. Given for treatment of polymyositis, mixed connective tissue disease, and immune thrombocytopenia purpura monthly at [facility]. Every 5 weeks, Disp: 3 each, Rfl: 0    Calcium Citrate-Vitamin D (CITRACAL + D OR), Take 1 tablet by mouth in the morning., Disp: , Rfl:

## 2025-06-10 NOTE — TELEPHONE ENCOUNTER
Sterling was notified that Tasha Barth wants him to increase his PM dose of bumex from 1 mg to 2 mg until he is seen later this week.

## 2025-06-11 DIAGNOSIS — I50.31 ACUTE HEART FAILURE WITH PRESERVED EJECTION FRACTION (HFPEF, >= 50%) (HCC): Primary | ICD-10-CM

## 2025-06-13 ENCOUNTER — HOSPITAL ENCOUNTER (OUTPATIENT)
Dept: LAB | Facility: HOSPITAL | Age: 80
Discharge: HOME | End: 2025-06-13
Attending: NURSE PRACTITIONER
Payer: MEDICARE

## 2025-06-13 ENCOUNTER — PATIENT MESSAGE (OUTPATIENT)
Dept: RHEUMATOLOGY | Facility: CLINIC | Age: 80
End: 2025-06-13

## 2025-06-13 ENCOUNTER — HOSPITAL ENCOUNTER (OUTPATIENT)
Dept: CARDIOLOGY CLINIC | Facility: HOSPITAL | Age: 80
Discharge: HOME | End: 2025-06-13
Attending: NURSE PRACTITIONER
Payer: MEDICARE

## 2025-06-13 VITALS
BODY MASS INDEX: 27 KG/M2 | RESPIRATION RATE: 13 BRPM | WEIGHT: 191.38 LBS | DIASTOLIC BLOOD PRESSURE: 50 MMHG | HEART RATE: 73 BPM | OXYGEN SATURATION: 100 % | SYSTOLIC BLOOD PRESSURE: 96 MMHG

## 2025-06-13 DIAGNOSIS — I50.32 CHRONIC DIASTOLIC HEART FAILURE (HCC): Primary | ICD-10-CM

## 2025-06-13 DIAGNOSIS — I50.31 ACUTE HEART FAILURE WITH PRESERVED EJECTION FRACTION (HFPEF, >= 50%) (HCC): ICD-10-CM

## 2025-06-13 DIAGNOSIS — I48.20 CHRONIC A-FIB (HCC): ICD-10-CM

## 2025-06-13 LAB
ANION GAP SERPL CALC-SCNC: 9 MMOL/L (ref 0–18)
BUN BLD-MCNC: 46 MG/DL (ref 9–23)
CALCIUM BLD-MCNC: 9.3 MG/DL (ref 8.7–10.6)
CHLORIDE SERPL-SCNC: 103 MMOL/L (ref 98–112)
CO2 SERPL-SCNC: 31 MMOL/L (ref 21–32)
CREAT BLD-MCNC: 1.72 MG/DL (ref 0.7–1.3)
EGFRCR SERPLBLD CKD-EPI 2021: 39 ML/MIN/1.73M2 (ref 60–?)
ERYTHROCYTE [DISTWIDTH] IN BLOOD BY AUTOMATED COUNT: 16.2 %
FASTING STATUS PATIENT QL REPORTED: NO
GLUCOSE BLD-MCNC: 108 MG/DL (ref 70–99)
HCT VFR BLD AUTO: 41.5 % (ref 39–53)
HGB BLD-MCNC: 13 G/DL (ref 13–17.5)
MCH RBC QN AUTO: 34.7 PG (ref 26–34)
MCHC RBC AUTO-ENTMCNC: 31.3 G/DL (ref 31–37)
MCV RBC AUTO: 110.7 FL (ref 80–100)
NT-PROBNP SERPL-MCNC: 5211 PG/ML (ref ?–450)
OSMOLALITY SERPL CALC.SUM OF ELEC: 308 MOSM/KG (ref 275–295)
PLATELET # BLD AUTO: 108 10(3)UL (ref 150–450)
POTASSIUM SERPL-SCNC: 4.9 MMOL/L (ref 3.5–5.1)
RBC # BLD AUTO: 3.75 X10(6)UL (ref 3.8–5.8)
SODIUM SERPL-SCNC: 143 MMOL/L (ref 136–145)
WBC # BLD AUTO: 7.5 X10(3) UL (ref 4–11)

## 2025-06-13 PROCEDURE — 99215 OFFICE O/P EST HI 40 MIN: CPT | Performed by: NURSE PRACTITIONER

## 2025-06-13 PROCEDURE — 80048 BASIC METABOLIC PNL TOTAL CA: CPT | Performed by: NURSE PRACTITIONER

## 2025-06-13 PROCEDURE — 85027 COMPLETE CBC AUTOMATED: CPT | Performed by: NURSE PRACTITIONER

## 2025-06-13 PROCEDURE — 83880 ASSAY OF NATRIURETIC PEPTIDE: CPT | Performed by: NURSE PRACTITIONER

## 2025-06-13 PROCEDURE — 36415 COLL VENOUS BLD VENIPUNCTURE: CPT | Performed by: NURSE PRACTITIONER

## 2025-06-13 RX ORDER — BUMETANIDE 1 MG/1
2 TABLET ORAL
COMMUNITY
Start: 2025-06-13

## 2025-06-13 RX ORDER — BUMETANIDE 1 MG/1
2 TABLET ORAL
COMMUNITY
Start: 2025-06-13 | End: 2025-06-13

## 2025-06-13 NOTE — PROGRESS NOTES
Patient assessed. Patient complaining of having a 6 inch cut on his left shin, which is currently bandaged. Patient with mild edema to bilateral lower extremities with left>right. Patient denies any shortness of breath or bloating. Weight  down 1 lb at 191.4 lbs. APN notified of all the above information. Labs ordered and drawn by  Lab. Reviewed allergies and list of current medications with patient and updated it in the Electronic Medical Record.     CardioMEMs readings were obtained by the nurse and reviewed by the APN. The first reading had a PAD of 23 and the second reading had a PAD of 26. Educated patient on low sodium diet and food choices, fluid restriction of 2 liters, and daily weights. Reviewed follow-up appointments and discharge Heart Failure instructions with patient. Patient verbalized an understanding. Patient to return to the clinic in 2 weeks.

## 2025-06-13 NOTE — PATIENT INSTRUCTIONS
Heart Failure Discharge Instructions    Activity: Regular exercise and activity is important for your overall health and to help keep your heart strong and functioning as well as possible.   Walk at a slow to moderate pace for 15-20 minutes 3-5 days per week.     Follow these instructions every day to keep yourself in the Green Zone     The Green Zone means you are feeling well and your symptoms are under control                                    Medications  Take your medication every day as instructed  Do not stop taking your medicine or change the amount you are taking without instructions from your doctor or nurse  Do Not take non-steroidal antiinflammatory drugs such as ibuprofen, aleve, advil, or motrin                                    Diet/Fluids  People with heart failure should eat less sodium (salt) and limit fluid. Sodium attracts water and makes the body hold fluid. This extra fluid makes the heart work harder and can worsen the symptoms of heart failure.     Diet    2000 mg sodium daily  Fluid restriction    64 ounces daily  (8 oz. = 1 cup)                                     Body Weight  Weigh yourself every day before breakfast and write your weight down  Use the same scale and wear about the same amount of clothes each time  A sudden weight increase is due to fluid retention rather than fat                                         Activity  Pace your activities to avoid getting overtired  Take rest periods as needed  Elevate your feet to reduce ankle swelling when resting                             Signs of Worsening Heart Failure    You are entering the Yellow Zone - this is a warning zone    Call your doctor or nurse if you have any of these signs or symptoms:  You gain 2 or more pounds overnight or 3-5 pounds in 3-7 days  You have more trouble breathing  You get more tired with regular activity, or are limiting activity because of shortness of breath or fatigue  You are short of breath lying  down, you need more pillows to breathe comfortably,  or wake up during the night short of breath  You urinate less often during the day and more often at night  You have a bloated feeling, upset stomach, loss of appetite, or your clothes are fitting tighter    GO TO THE EMERGENCY DEPARTMENT or CALL 911 IF:    These are signs you are in the RED ZONE - THIS IS AN EMERGENCY  You have tightness or pain in your chest  You are extremely short of breath or can't catch your breath  You cough up frothy pink mucous  You feel confused or can't think clearly  You are traveling and develop symptoms of worsening heart failure     We respect everyone's time and availability. Please be aware that this is not a walk-in clinic and we require appointments in order to facilitate timely care for all patients. We ask you to arrive 30 minutes before your appointment to allow time for you to check-in and have your bloodwork drawn. Please understand if you are late for your appointment, you may be asked to reschedule. If possible, all attempts will be made to accommodate but realize this is no guarantee that this will always be available. We understand there are extenuating circumstances. If you need to cancel or reschedule your appointment, please call the [facility] within 24 hours at [phone].  Thank you for your cooperation, [facility] Staff.    If you are currently [employer] active, starting July 1st 2024, we will be utilizing [employer] messaging ONLY to confirm your appointment.    IF YOU HAVE QUESTIONS REGARDING YOUR BILL, FEEL FREE TO CONTACT [facility] PATIENT ACCOUNTS -[phone]. IF YOU NEED FINANCIAL ASSISTANCE, PLEASE CALL AN [facility] FINANCIAL COUNSELOR -[phone].             [facility]     [phone]

## 2025-06-13 NOTE — TELEPHONE ENCOUNTER
Order for IVIG 0.4g/kg every 5wks at [facility] placed on Dr. Darian russell for approval

## 2025-06-18 ENCOUNTER — LAB ENCOUNTER (OUTPATIENT)
Dept: LAB | Age: 80
End: 2025-06-18
Attending: INTERNAL MEDICINE
Payer: MEDICARE

## 2025-06-18 DIAGNOSIS — M35.1 MIXED CONNECTIVE TISSUE DISEASE (HCC): ICD-10-CM

## 2025-06-18 DIAGNOSIS — I50.32 CHRONIC HEART FAILURE WITH PRESERVED EJECTION FRACTION (HCC): ICD-10-CM

## 2025-06-18 DIAGNOSIS — M33.20 POLYMYOSITIS (HCC): Chronic | ICD-10-CM

## 2025-06-18 DIAGNOSIS — D69.3 IMMUNE THROMBOCYTOPENIC PURPURA (HCC): ICD-10-CM

## 2025-06-18 DIAGNOSIS — Z95.818 PRESENCE OF WATCHMAN LEFT ATRIAL APPENDAGE CLOSURE DEVICE: ICD-10-CM

## 2025-06-18 DIAGNOSIS — I43 CARDIOMYOPATHY AS MANIFESTATION OF UNDERLYING DISEASE (HCC): ICD-10-CM

## 2025-06-18 LAB
ALBUMIN SERPL-MCNC: 3.8 G/DL (ref 3.2–4.8)
ALBUMIN/GLOB SERPL: 1.6 {RATIO} (ref 1–2)
ALP LIVER SERPL-CCNC: 53 U/L (ref 45–117)
ALT SERPL-CCNC: 8 U/L (ref 10–49)
ANION GAP SERPL CALC-SCNC: 11 MMOL/L (ref 0–18)
AST SERPL-CCNC: 18 U/L (ref ?–34)
BASOPHILS # BLD AUTO: 0.03 X10(3) UL (ref 0–0.2)
BASOPHILS NFR BLD AUTO: 0.3 %
BILIRUB SERPL-MCNC: 0.8 MG/DL (ref 0.2–1.1)
BUN BLD-MCNC: 42 MG/DL (ref 9–23)
CALCIUM BLD-MCNC: 9 MG/DL (ref 8.7–10.6)
CHLORIDE SERPL-SCNC: 101 MMOL/L (ref 98–112)
CK SERPL-CCNC: <15 U/L (ref 46–171)
CO2 SERPL-SCNC: 30 MMOL/L (ref 21–32)
CREAT BLD-MCNC: 1.63 MG/DL (ref 0.7–1.3)
EGFRCR SERPLBLD CKD-EPI 2021: 42 ML/MIN/1.73M2 (ref 60–?)
EOSINOPHIL # BLD AUTO: 0.16 X10(3) UL (ref 0–0.7)
EOSINOPHIL NFR BLD AUTO: 1.7 %
ERYTHROCYTE [DISTWIDTH] IN BLOOD BY AUTOMATED COUNT: 16.2 %
FASTING STATUS PATIENT QL REPORTED: YES
GLOBULIN PLAS-MCNC: 2.4 G/DL (ref 2–3.5)
GLUCOSE BLD-MCNC: 101 MG/DL (ref 70–99)
HCT VFR BLD AUTO: 32.8 % (ref 39–53)
HGB BLD-MCNC: 10.7 G/DL (ref 13–17.5)
IMM GRANULOCYTES # BLD AUTO: 0.03 X10(3) UL (ref 0–1)
IMM GRANULOCYTES NFR BLD: 0.3 %
LYMPHOCYTES # BLD AUTO: 1.7 X10(3) UL (ref 1–4)
LYMPHOCYTES NFR BLD AUTO: 18.6 %
MCH RBC QN AUTO: 35 PG (ref 26–34)
MCHC RBC AUTO-ENTMCNC: 32.6 G/DL (ref 31–37)
MCV RBC AUTO: 107.2 FL (ref 80–100)
MONOCYTES # BLD AUTO: 0.84 X10(3) UL (ref 0.1–1)
MONOCYTES NFR BLD AUTO: 9.2 %
NEUTROPHILS # BLD AUTO: 6.39 X10 (3) UL (ref 1.5–7.7)
NEUTROPHILS # BLD AUTO: 6.39 X10(3) UL (ref 1.5–7.7)
NEUTROPHILS NFR BLD AUTO: 69.9 %
OSMOLALITY SERPL CALC.SUM OF ELEC: 305 MOSM/KG (ref 275–295)
PLATELET # BLD AUTO: 150 10(3)UL (ref 150–450)
POTASSIUM SERPL-SCNC: 4.2 MMOL/L (ref 3.5–5.1)
PROT SERPL-MCNC: 6.2 G/DL (ref 5.7–8.2)
RBC # BLD AUTO: 3.06 X10(6)UL (ref 3.8–5.8)
SODIUM SERPL-SCNC: 142 MMOL/L (ref 136–145)
WBC # BLD AUTO: 9.2 X10(3) UL (ref 4–11)

## 2025-06-18 PROCEDURE — 82550 ASSAY OF CK (CPK): CPT

## 2025-06-18 PROCEDURE — 36415 COLL VENOUS BLD VENIPUNCTURE: CPT

## 2025-06-18 PROCEDURE — 80053 COMPREHEN METABOLIC PANEL: CPT

## 2025-06-18 PROCEDURE — 85025 COMPLETE CBC W/AUTO DIFF WBC: CPT

## 2025-06-20 RX ORDER — BUMETANIDE 1 MG/1
TABLET ORAL
Qty: 270 TABLET | Refills: 1 | Status: SHIPPED | OUTPATIENT
Start: 2025-06-20

## 2025-06-23 ENCOUNTER — OFFICE VISIT (OUTPATIENT)
Dept: RHEUMATOLOGY | Facility: CLINIC | Age: 80
End: 2025-06-23
Payer: MEDICARE

## 2025-06-23 VITALS
SYSTOLIC BLOOD PRESSURE: 116 MMHG | WEIGHT: 188 LBS | DIASTOLIC BLOOD PRESSURE: 62 MMHG | TEMPERATURE: 97 F | BODY MASS INDEX: 26.32 KG/M2 | HEIGHT: 71 IN | HEART RATE: 82 BPM

## 2025-06-23 DIAGNOSIS — D69.3 IMMUNE THROMBOCYTOPENIC PURPURA (HCC): ICD-10-CM

## 2025-06-23 DIAGNOSIS — N18.32 STAGE 3B CHRONIC KIDNEY DISEASE (HCC): ICD-10-CM

## 2025-06-23 DIAGNOSIS — I50.32 CHRONIC HEART FAILURE WITH PRESERVED EJECTION FRACTION (HCC): ICD-10-CM

## 2025-06-23 DIAGNOSIS — I43 CARDIOMYOPATHY AS MANIFESTATION OF UNDERLYING DISEASE (HCC): ICD-10-CM

## 2025-06-23 DIAGNOSIS — M35.1 MIXED CONNECTIVE TISSUE DISEASE (HCC): Primary | ICD-10-CM

## 2025-06-23 DIAGNOSIS — M33.20 POLYMYOSITIS (HCC): Chronic | ICD-10-CM

## 2025-06-23 PROCEDURE — 99214 OFFICE O/P EST MOD 30 MIN: CPT | Performed by: INTERNAL MEDICINE

## 2025-06-23 NOTE — PATIENT INSTRUCTIONS
Continue IVIG monthly for treatment of underlying polymyositis and autoimmune disease.  Patient has a mixture of autoimmune diseases including thrombocytopenia, myositis, Raynaud's phenomena.  Continue prednisone at 7 mg/day try to lower if feeling better to 6 mg/day.  Extra strength Tylenol 500 mg 1 or 2 as needed for pain.  Pace your activity.  Continue cardiology visits.  Return to office for recheck 3 months with new set of labs.

## 2025-06-23 NOTE — PROGRESS NOTES
[facility]  Dr. Farmer Progress Note     Subjective:   Miguel Davila is a(n) 80 year old male.   Current complaints:   Chief Complaint   Patient presents with    Follow - Up     LOV: 3/13/25  Patient is here for a follow up visit. Patient states that he's been feeling okay. Had an incident involving his leg 3 weeks ago where he was in the ER for bleeding. Feeling fatigued. Lupus isn't flaring. Raynaud's isn't bothering him too much.   Rapid 3: 0.3    History of past myositis.  History of thrombocytopenia.  History of mixed connective tissue issues/overlap immune disease, Raynauds.   Recent bout of diarrhea.  3 weeks ago gash left lower leg, with bleeding.  Had steri strips placed.  Left lower leg is healing but slowly.    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  Chandler developing dementia.  Has a grandchild expected this fall.    Objective:   /62   Pulse 82   Temp 97.2 °F (36.2 °C)   Ht 5' 11\" (1.803 m)   Wt 188 lb (85.3 kg)   BMI 26.22 kg/m²   Lungs clear  Heart nsr  Ext  healing wound left lower leg  Strength ok  6/18/25  Labs  CPK < 15,  Hb  10.7  Glucose 101 BUN 42  Crt  1.63  Plats 108,000  LFTS  ok     Assessment:     Encounter Diagnoses   Name Primary?    Mixed connective tissue disease (HCC) Yes    Polymyositis (HCC)     Chronic heart failure with preserved ejection fraction (HCC)     Immune thrombocytopenic purpura (HCC)     Cardiomyopathy as manifestation of underlying disease (HCC)     Stage 3b chronic kidney disease (HCC)      Plan:     Patient Instructions   Continue IVIG monthly for treatment of underlying polymyositis and autoimmune disease.  Patient has a mixture of autoimmune diseases including thrombocytopenia, myositis, Raynaud's phenomena.  Continue prednisone at 7 mg/day try to lower if feeling better to 6 mg/day.  Extra strength Tylenol 500 mg 1 or 2 as needed for pain.  Pace your activity.  Continue cardiology visits.  Return to office for recheck 3 months with new set of labs.         Taye Farmer MD 6/23/2025 12:51 PM

## 2025-06-24 NOTE — PROGRESS NOTES
[facility] Progress Note    Miguel Davila is a 80 year old male who presents to clinic for APN assessment and management of chronic diastolic heart failure and is functional class 2.     Subjective:  Since his last clinic visit on 6/13 when no changes were made; he saw Dr. Farmer 6/23; to continue IVIG monthly, low dose Prednisone. To reduce dose to 6 mg/day as tolerated. He will see him in 3 months with labs. He is set up for IVIG infusion next week.     He is doing okay today.  His weight is down a couple lbs on our scale. Home weight has been stable around 186 lbs. He would like to get back down to 180 lbs. His LE edema is improving and worse on his left leg since he injured it. He admits to mild abdominal bloating. Denies shortness of breath. Appetite is good and he may be eating a little more. Wound on his left leg is healing. He has only been taking 2 mg of Bumex in the AM and 1 mg in the PM, not 2 mg BID as recommended last visit.     He has continued stress taking care of his wife whose dementia is progressing. He is considering getting a caregiver to help at home or moving to a senior community that offers assistance.      Recent PAD's improved from ~35 mmHg to 29 mmHg. Range 29-35 mmhg.  He has not been consistent with taking readings due to taking care of his wife.           Review of Systems   Constitutional: Positive for weight loss.   Cardiovascular:  Positive for leg swelling (L>R). Negative for dyspnea on exertion.   Gastrointestinal:  Positive for bloating (mild).   Neurological:  Negative for dizziness.       HISTORY:  Past Medical History[1]   Past Surgical History[2]   Family History[3]   Short Social Hx on File[4]        Objective:  Telemetry:   Cardiac Rhythm: Atrial fibrillation    /78   Pulse 82   Wt 189 lb 12.8 oz (86.1 kg)   SpO2 100%   BMI 26.47 kg/m²     Wt Readings from Last 6 Encounters:   06/26/25 189 lb 12.8 oz (86.1 kg)    06/23/25 188 lb (85.3 kg)   06/13/25 191 lb 6.4 oz (86.8 kg)   06/06/25 189 lb 12.8 oz (86.1 kg)   06/03/25 184 lb (83.5 kg)   06/02/25 192 lb 3.2 oz (87.2 kg)            Recent Results (from the past 24 hours)   Basic Metabolic Panel (8)    Collection Time: 06/26/25 12:47 PM   Result Value Ref Range    Glucose 89 70 - 99 mg/dL    Sodium 142 136 - 145 mmol/L    Potassium 4.8 3.5 - 5.1 mmol/L    Chloride 104 98 - 112 mmol/L    CO2 29.0 21.0 - 32.0 mmol/L    Anion Gap 9 0 - 18 mmol/L    BUN 45 (H) 9 - 23 mg/dL    Creatinine 1.93 (H) 0.70 - 1.30 mg/dL    Calcium, Total 9.1 8.7 - 10.6 mg/dL    Calculated Osmolality 305 (H) 275 - 295 mOsm/kg    eGFR-Cr 34 (L) >=60 mL/min/1.73m2    Patient Fasting for BMP? No        Medications - Current[5]    Exam:   General:         Alert, in no apparent distress  HEENT:           no jvd  Lungs:            ctab                   CV:                  irregular   Abdomen:       soft, non-tender   Extremities:    trace-+1 LE edema L>R, below knee on the left, mid-way up calf on the right.   Neuro:             A&O x 3, CASTRO  Skin:                dressing on left shin intact without visible drainage      Education:  Patient instructed regarding sodium restricted diet, low sodium foods, fluid restriction, daily weights, medication regimen, s/s HF exacerbation and when to call APN/clinic.        [x] CardioMEMs  [] ARNi/ACEi/ARB - hypotension  [x] BB  [x] MRA, on reduced dose due to hyperkalemia   [] SGLT2i - possible pancreatitis     Assessment:   Chronic diastolic HF, ACC/AHA Stage C, NYHA Class 3- LVEF 55-60% and stable on echo 5/2025, RV function normal. RHC 5/2024 with PCWP 14, RA 9. S/p CardioMEMs. Recent PAD's improved at 29 mmHg but doesn't transmit regularly. Last ProBNP 5,211 up from 3,617 (highest 5,927). Volume status improving.   PH, mild combined pre and post capillary - RHC 5/20/24 demonstrates mPAP 31, PAD 18 mmHg, wedge 14 mmHg, RA 9 mmHg, PVR 2.2 and CI 3.8. RV function low  normal. PAD 31-32 mmHg on day of RHC per CardioMEMs.   Mild to Moderate MR/TR and AI - on echo in May 2025.   CKD Stage 3b -recent baseline creatinine ~ 1.7 mg/dl, Cr up to 1.9 mg/dL today. Will follow. Follows with Dr. Devine.    Chronic Afib - s/p Watchman. Rates controlled. Follows with Dr. Dior, last seen 6/9.   LI - on CPAP.    Recurrent bilateral pleural effusions - hx of thoracentesis.   Hx Lupus/Mixed CTD/Severe polymyositis - continues IVIG infusions monthly. Follows with Dr. Farmer. On chronic prednisone. Off Imuran due to pancytopenia.   Non-obstructive CAD  Chronic thrombocytopenia, immune mediated.  6/18. Follows with Dr. Orr.   Hx Hyponatremia  HERNANDEZ with biopsy proven stage F0-F1 Fibrosis on US 1/2022. Follows with Dr. Veronica, Hepatology.   Mild hyperkalemia, recurrent and has required Veltassa. MRA recently reduced with improvement.  Maintained on low dose ARNI.   Anemia - last Hgb 10.7 g/dL today, down from 13 g/dL; recently bled from leg laceration. Iron sat 28 and ferritin 97 1/2025. Hx Venofer, last dose 9/16.   Hypothyroidism - previously on supplement that was recently stopped per Dr. Simon. TSH normal 1/2025.     Plan:     Check iron studies with labs next week to see if he would benefit from IV Venofer with recent acute blood loss from leg wound.   Continue to follow daily weights at home.  Continue Bumex 2 mg in the AM and 1 mg in the PM (dose he has been taking), but advised he can take an extra 1 mg in the PM as needed.   Return to clinic in 2-3 weeks, earlier if needed.   F/U with Dr. Craig in ~August/September.   F/U with Dr. Dior 6/2026.   CHF discharge instructions given    I have spent 40 min total time on the day of the encounter, including: preparing to see the patient, obtaining and/or reviewing separately obtained history, performing a medically appropriate examination and/or evaluation, counseling and educating the patient/family caregiver, ordering medication,  tests, or procedures, documenting clinical information in Epic, and independently interpreting results and communicating results to the patient/family caregiver      SHEA Cummins               [1]   Past Medical History:   A-fib (HCC)    Arrhythmia    atrial fibrillation    Arthritis    Autoimmune disease (HCC)    hepatits, resolved anfd nephritis, resolved    Ramon's esophagus    Bleeding nose    Gums Treated    Blood disorder    thrombocytopenia    Blood in urine    Blurred vision    cataract due to steroids, surgery in June    Calculus of kidney    One time    Cancer (HCC)    skin    Candidiasis of the esophagus    Due to steroid use for Autoimmune disorder     Cataract    Colon polyps    Congestive heart disease (HCC)    Diarrhea, unspecified    Intermittent due to lupus    Diverticulosis of colon    Easy bruising    On and off prednisone for 20 years    Esophageal polyp    Esophageal reflux    Essential hypertension    Fatigue    polymyositis and lupus    Gout    Hammer toe, acquired    Heart palpitations    Afib    Hepatitis    autoimmune induce hepatitis d/t lupus    High blood pressure    IBS (irritable bowel syndrome)    mild d/t lupus    Irregular bowel habits    Leg swelling    Heart failure, probably caused by lupus    Lupus    MCTD (mixed connective tissue disease) (HCC)    Obstructive sleep apnea (adult) (pediatric)    LI (obstructive sleep apnea)    AHI 37  Supine AHI 38 non-supine AHI 16 Sao2 Pj 83%     LI (obstructive sleep apnea)    AHI 36 RDI 36 REM AHI 45 Supine AHI 65 non-supine AHI 19 Sao2 Pj 86% CPAP 9cwp    Pain in joints    Personal history of antineoplastic chemotherapy    Pleural effusion    right    Pneumonia due to organism    Polymyositis (HCC)    Presence of other cardiac implants and grafts    watchman filter    Problems with swallowing    dysphagia in 2006    Pulmonary hypertension (HCC)    Raynaud disease    Raynaud disease    Renal disorder    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    Shortness of breath    mainly due to pm or lupus    Sleep apnea    CPAP    Thrombocytopathia (HCC)    Visual impairment    bifocals for reading & computer; distance for driving    Wears glasses   [2]   Past Surgical History:  Procedure Laterality Date    Appendectomy  1970    Appendectomy      Cardiac catheterization  09/2019    Cataract Bilateral     Cholecystectomy      Colonoscopy  10/2003 2006 01/2010    x3    Colonoscopy  05/14/2013    Colonoscopy N/A 05/01/2018    Procedure: COLONOSCOPY;  Surgeon: Isaiah Tobar MD;  Location:  ENDOSCOPY    Colonoscopy N/A 04/12/2024    Procedure: COLONOSCOPY;  Surgeon: Gerardo Neves MD;  Location:  ENDOSCOPY    Colonoscopy with biopsy  05/14/2013    Egd      Hand/finger surgery unlisted  2003    right    Needle biopsy liver      Other  12/2005    needle bipsy of kidney    Other surgical history  2017    watchman filter    Percutaneous  angioplasty  (ptca)- pbp only  2006    right    Rectum surgery procedure unlisted  1946    rectal surgery polypectomy    Skin surgery      MMS BCC R temple 6/24/09    Skin surgery  2007    basal ceell carcinoma    Skin surgery  07/18/2012    BCC-nodular to right superior eyebrow/ MOHS    Skin surgery  07/15/2013    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/MMS    Skin surgery  02/19/2014    BCC-NOD to right superior pinna, MMS, AB    Skin surgery  02/16/2021    BCC- left superior forehead, MMS     Skin surgery  03/07/2022    SCC IN SITU RIGHT ANTIHELIX MMS BY DR GASTELUM    Tonsillectomy  1948    Upper gi endoscopy,exam  10/2003 2006 01/2010 , 5/13    x3   [3]   Family History  Problem Relation Age of Onset    Heart Disease Father         CHF    Gastro-Intestinal Disorder Father         Diverticulosis    Alcohol and Other Disorders Associated Father     Heart Attack Father     Heart Disease Mother         CHF    Breast Cancer Mother     Stroke Mother     Cancer Paternal Grandfather     Heart Attack Paternal Grandmother     Kidney  Disease Son     Other (Ramon's Esophagus) Son     Heart Attack Maternal Grandfather    [4]   Social History  Socioeconomic History    Marital status:    Occupational History    Occupation: Retired    Tobacco Use    Smoking status: Former     Current packs/day: 0.00     Average packs/day: 0.5 packs/day for 15.0 years (7.5 ttl pk-yrs)     Types: Cigarettes     Start date: 1958     Quit date: 1973     Years since quittin.9    Smokeless tobacco: Never    Tobacco comments:     5 cigarettes daily, stopped smoking in    Vaping Use    Vaping status: Never Used   Substance and Sexual Activity    Alcohol use: Yes     Alcohol/week: 13.0 - 15.0 standard drinks of alcohol     Types: 5 Glasses of wine, 8 - 10 Standard drinks or equivalent per week     Comment: 1 per day wine or liquor    Drug use: Never   Other Topics Concern    Caffeine Concern Yes     Comment: 1 soda per day and decaf coffee    Sleep Concern No    Exercise Yes     Comment: 3-4 times weekly    Seat Belt Yes     Social Drivers of Health     Food Insecurity: No Food Insecurity (2025)    NCSS - Food Insecurity     Worried About Running Out of Food in the Last Year: No     Ran Out of Food in the Last Year: No   Transportation Needs: No Transportation Needs (2025)    NCSS - Transportation     Lack of Transportation: No   Housing Stability: Not At Risk (2025)    NCSS - Housing/Utilities     Has Housing: Yes     Worried About Losing Housing: No     Unable to Get Utilities: No   [5]   Current Outpatient Medications:     bumetanide 1 MG Oral Tab, 2 tablets (2 mg total) every morning AND 1 tablet (1 mg total) Every afternoon at 2:00 pm. Can take an extra 1 mg as needed., Disp: , Rfl:     ipratropium 0.06 % Nasal Solution, 1 spray by Nasal route 2 (two) times daily., Disp: 1 each, Rfl: 11    spironolactone 25 MG Oral Tab, Take 0.5 tablets (12.5 mg total) by mouth daily., Disp: , Rfl:     PREDNISONE 5 MG Oral Tab, TAKE 1 TABLET BY  MOUTH EVERY DAY WITH BREAKFAST, Disp: 90 tablet, Rfl: 1    predniSONE 1 MG Oral Tab, TAKE 2 TABLETS (2 MG TOTAL) BY MOUTH DAILY WITH BREAKFAST, Disp: 180 tablet, Rfl: 1    omeprazole 20 MG Oral Capsule Delayed Release, TAKE 1 CAPSULE BY MOUTH TWICE A DAY, Disp: 180 capsule, Rfl: 3    ALLOPURINOL 300 MG Oral Tab, TAKE 1 TABLET BY MOUTH EVERY DAY, Disp: 90 tablet, Rfl: 3    fexofenadine 180 MG Oral Tab, Take 1 tablet (180 mg total) by mouth daily as needed., Disp: , Rfl:     metoprolol succinate  MG Oral Tablet 24 Hr, Take 1 tablet (100 mg total) by mouth every morning AND 0.5 tablets (50 mg total) every evening., Disp: 60 tablet, Rfl: 3    aspirin 81 MG Oral Tab EC, Take 1 tablet (81 mg total) by mouth daily., Disp: 30 tablet, Rfl: 0    PREVIDENT 5000 BOOSTER PLUS 1.1 % Dental Paste, , Disp: , Rfl:     Multiple Vitamins-Minerals (TAB-A-KEVIN) Oral Tab, Take 1 tablet by mouth in the morning., Disp: , Rfl:     Immune Globulin, Human, 10 g Intravenous Recon Soln, Inject 0.4 g/kg into the vein. Given for treatment of polymyositis, mixed connective tissue disease, and immune thrombocytopenia purpura monthly at [facility]. Every 5 weeks, Disp: 3 each, Rfl: 0    Calcium Citrate-Vitamin D (CITRACAL + D OR), Take 1 tablet by mouth in the morning., Disp: , Rfl:

## 2025-06-26 ENCOUNTER — HOSPITAL ENCOUNTER (OUTPATIENT)
Dept: LAB | Facility: HOSPITAL | Age: 80
Discharge: HOME | End: 2025-06-26
Attending: NURSE PRACTITIONER
Payer: MEDICARE

## 2025-06-26 ENCOUNTER — HOSPITAL ENCOUNTER (OUTPATIENT)
Dept: CARDIOLOGY CLINIC | Facility: HOSPITAL | Age: 80
Discharge: HOME | End: 2025-06-26
Attending: NURSE PRACTITIONER
Payer: MEDICARE

## 2025-06-26 VITALS
HEART RATE: 82 BPM | SYSTOLIC BLOOD PRESSURE: 131 MMHG | OXYGEN SATURATION: 100 % | BODY MASS INDEX: 26 KG/M2 | DIASTOLIC BLOOD PRESSURE: 78 MMHG | WEIGHT: 189.81 LBS

## 2025-06-26 DIAGNOSIS — I50.32 CHRONIC DIASTOLIC HEART FAILURE (HCC): ICD-10-CM

## 2025-06-26 DIAGNOSIS — I50.32 CHRONIC DIASTOLIC HEART FAILURE (HCC): Primary | ICD-10-CM

## 2025-06-26 DIAGNOSIS — I48.19 PERSISTENT ATRIAL FIBRILLATION (HCC): ICD-10-CM

## 2025-06-26 DIAGNOSIS — N18.32 STAGE 3B CHRONIC KIDNEY DISEASE (HCC): ICD-10-CM

## 2025-06-26 DIAGNOSIS — I10 BENIGN ESSENTIAL HYPERTENSION: ICD-10-CM

## 2025-06-26 DIAGNOSIS — D64.9 ANEMIA, UNSPECIFIED TYPE: Primary | ICD-10-CM

## 2025-06-26 LAB
ANION GAP SERPL CALC-SCNC: 9 MMOL/L (ref 0–18)
BUN BLD-MCNC: 45 MG/DL (ref 9–23)
CALCIUM BLD-MCNC: 9.1 MG/DL (ref 8.7–10.6)
CHLORIDE SERPL-SCNC: 104 MMOL/L (ref 98–112)
CO2 SERPL-SCNC: 29 MMOL/L (ref 21–32)
CREAT BLD-MCNC: 1.93 MG/DL (ref 0.7–1.3)
EGFRCR SERPLBLD CKD-EPI 2021: 34 ML/MIN/1.73M2 (ref 60–?)
FASTING STATUS PATIENT QL REPORTED: NO
GLUCOSE BLD-MCNC: 89 MG/DL (ref 70–99)
OSMOLALITY SERPL CALC.SUM OF ELEC: 305 MOSM/KG (ref 275–295)
POTASSIUM SERPL-SCNC: 4.8 MMOL/L (ref 3.5–5.1)
SODIUM SERPL-SCNC: 142 MMOL/L (ref 136–145)

## 2025-06-26 PROCEDURE — 99215 OFFICE O/P EST HI 40 MIN: CPT | Performed by: NURSE PRACTITIONER

## 2025-06-26 PROCEDURE — 80048 BASIC METABOLIC PNL TOTAL CA: CPT | Performed by: NURSE PRACTITIONER

## 2025-06-26 PROCEDURE — 36415 COLL VENOUS BLD VENIPUNCTURE: CPT | Performed by: NURSE PRACTITIONER

## 2025-06-26 RX ORDER — BUMETANIDE 1 MG/1
TABLET ORAL
COMMUNITY
Start: 2025-06-26

## 2025-06-26 NOTE — PATIENT INSTRUCTIONS
Heart Failure Discharge Instructions    Have iron studies with upcoming labs.   Continue to follow daily weights. You can take an extra 1 mg of Bumex as needed in the afternoon.     Activity: Regular exercise and activity is important for your overall health and to help keep your heart strong and functioning as well as possible.   Walk at a slow to moderate pace for 15-20 minutes 3-5 days per week.     Follow these instructions every day to keep yourself in the Green Zone     The Green Zone means you are feeling well and your symptoms are under control                                    Medications  Take your medication every day as instructed  Do not stop taking your medicine or change the amount you are taking without instructions from your doctor or nurse  Do Not take non-steroidal antiinflammatory drugs such as ibuprofen, aleve, advil, or motrin                                    Diet/Fluids  People with heart failure should eat less sodium (salt) and limit fluid. Sodium attracts water and makes the body hold fluid. This extra fluid makes the heart work harder and can worsen the symptoms of heart failure.     Diet    2000 mg sodium daily  Fluid restriction    64 ounces daily  (8 oz. = 1 cup)                                     Body Weight  Weigh yourself every day before breakfast and write your weight down  Use the same scale and wear about the same amount of clothes each time  A sudden weight increase is due to fluid retention rather than fat                                         Activity  Pace your activities to avoid getting overtired  Take rest periods as needed  Elevate your feet to reduce ankle swelling when resting                             Signs of Worsening Heart Failure    You are entering the Yellow Zone - this is a warning zone    Call your doctor or nurse if you have any of these signs or symptoms:  You gain 2 or more pounds overnight or 3-5 pounds in 3-7 days  You have more trouble  breathing  You get more tired with regular activity, or are limiting activity because of shortness of breath or fatigue  You are short of breath lying down, you need more pillows to breathe comfortably,  or wake up during the night short of breath  You urinate less often during the day and more often at night  You have a bloated feeling, upset stomach, loss of appetite, or your clothes are fitting tighter    GO TO THE EMERGENCY DEPARTMENT or CALL 911 IF:    These are signs you are in the RED ZONE - THIS IS AN EMERGENCY  You have tightness or pain in your chest  You are extremely short of breath or can't catch your breath  You cough up frothy pink mucous  You feel confused or can't think clearly  You are traveling and develop symptoms of worsening heart failure     We respect everyone's time and availability. Please be aware that this is not a walk-in clinic and we require appointments in order to facilitate timely care for all patients. We ask you to arrive 30 minutes before your appointment to allow time for you to check-in and have your bloodwork drawn. Please understand if you are late for your appointment, you may be asked to reschedule. If possible, all attempts will be made to accommodate but realize this is no guarantee that this will always be available. We understand there are extenuating circumstances. If you need to cancel or reschedule your appointment, please call the [facility] within 24 hours at [phone].  Thank you for your cooperation, [facility] Staff.    If you are currently [employer] active, starting July 1st 2024, we will be utilizing [employer] messaging ONLY to confirm your appointment.    IF YOU HAVE QUESTIONS REGARDING YOUR BILL, FEEL FREE TO CONTACT [facility] PATIENT ACCOUNTS -[phone]. IF YOU NEED FINANCIAL ASSISTANCE, PLEASE CALL AN [facility] FINANCIAL COUNSELOR -[phone].             [facility]     [phone]

## 2025-06-30 ENCOUNTER — LAB ENCOUNTER (OUTPATIENT)
Dept: LAB | Age: 80
End: 2025-06-30
Attending: INTERNAL MEDICINE
Payer: MEDICARE

## 2025-06-30 DIAGNOSIS — K75.81 NASH (NONALCOHOLIC STEATOHEPATITIS): Primary | ICD-10-CM

## 2025-06-30 DIAGNOSIS — I50.32 CHRONIC DIASTOLIC HEART FAILURE (HCC): ICD-10-CM

## 2025-06-30 DIAGNOSIS — D64.9 ANEMIA, UNSPECIFIED TYPE: ICD-10-CM

## 2025-06-30 LAB
ALBUMIN SERPL-MCNC: 4.3 G/DL (ref 3.2–4.8)
ALBUMIN/GLOB SERPL: 1.8 {RATIO} (ref 1–2)
ALP LIVER SERPL-CCNC: 60 U/L (ref 45–117)
ALT SERPL-CCNC: 12 U/L (ref 10–49)
ANION GAP SERPL CALC-SCNC: 9 MMOL/L (ref 0–18)
AST SERPL-CCNC: 17 U/L (ref ?–34)
BASOPHILS # BLD AUTO: 0.03 X10(3) UL (ref 0–0.2)
BASOPHILS NFR BLD AUTO: 0.4 %
BILIRUB SERPL-MCNC: 0.8 MG/DL (ref 0.2–1.1)
BUN BLD-MCNC: 39 MG/DL (ref 9–23)
CALCIUM BLD-MCNC: 9.4 MG/DL (ref 8.7–10.6)
CHLORIDE SERPL-SCNC: 105 MMOL/L (ref 98–112)
CO2 SERPL-SCNC: 30 MMOL/L (ref 21–32)
CREAT BLD-MCNC: 1.58 MG/DL (ref 0.7–1.3)
DEPRECATED HBV CORE AB SER IA-ACNC: 47 NG/ML (ref 50–336)
EGFRCR SERPLBLD CKD-EPI 2021: 43 ML/MIN/1.73M2 (ref 60–?)
EOSINOPHIL # BLD AUTO: 0.12 X10(3) UL (ref 0–0.7)
EOSINOPHIL NFR BLD AUTO: 1.4 %
ERYTHROCYTE [DISTWIDTH] IN BLOOD BY AUTOMATED COUNT: 15.9 %
FASTING STATUS PATIENT QL REPORTED: YES
GLOBULIN PLAS-MCNC: 2.4 G/DL (ref 2–3.5)
GLUCOSE BLD-MCNC: 95 MG/DL (ref 70–99)
HCT VFR BLD AUTO: 34.6 % (ref 39–53)
HGB BLD-MCNC: 10.8 G/DL (ref 13–17.5)
IMM GRANULOCYTES # BLD AUTO: 0.03 X10(3) UL (ref 0–1)
IMM GRANULOCYTES NFR BLD: 0.4 %
IRON SATN MFR SERPL: 10 % (ref 20–50)
IRON SERPL-MCNC: 42 UG/DL (ref 65–175)
LYMPHOCYTES # BLD AUTO: 1.39 X10(3) UL (ref 1–4)
LYMPHOCYTES NFR BLD AUTO: 16.4 %
MCH RBC QN AUTO: 33.6 PG (ref 26–34)
MCHC RBC AUTO-ENTMCNC: 31.2 G/DL (ref 31–37)
MCV RBC AUTO: 107.8 FL (ref 80–100)
MONOCYTES # BLD AUTO: 0.76 X10(3) UL (ref 0.1–1)
MONOCYTES NFR BLD AUTO: 9 %
NEUTROPHILS # BLD AUTO: 6.14 X10 (3) UL (ref 1.5–7.7)
NEUTROPHILS # BLD AUTO: 6.14 X10(3) UL (ref 1.5–7.7)
NEUTROPHILS NFR BLD AUTO: 72.4 %
OSMOLALITY SERPL CALC.SUM OF ELEC: 307 MOSM/KG (ref 275–295)
PLATELET # BLD AUTO: 191 10(3)UL (ref 150–450)
POTASSIUM SERPL-SCNC: 4.5 MMOL/L (ref 3.5–5.1)
PROT SERPL-MCNC: 6.7 G/DL (ref 5.7–8.2)
RBC # BLD AUTO: 3.21 X10(6)UL (ref 3.8–5.8)
SODIUM SERPL-SCNC: 144 MMOL/L (ref 136–145)
TOTAL IRON BINDING CAPACITY: 401 UG/DL (ref 250–425)
TRANSFERRIN SERPL-MCNC: 318 MG/DL (ref 215–365)
WBC # BLD AUTO: 8.5 X10(3) UL (ref 4–11)

## 2025-06-30 PROCEDURE — 85025 COMPLETE CBC W/AUTO DIFF WBC: CPT

## 2025-06-30 PROCEDURE — 36415 COLL VENOUS BLD VENIPUNCTURE: CPT

## 2025-06-30 PROCEDURE — 83540 ASSAY OF IRON: CPT

## 2025-06-30 PROCEDURE — 83550 IRON BINDING TEST: CPT

## 2025-06-30 PROCEDURE — 82728 ASSAY OF FERRITIN: CPT

## 2025-06-30 PROCEDURE — 80053 COMPREHEN METABOLIC PANEL: CPT

## 2025-06-30 NOTE — PROGRESS NOTES
Sterling was informed that his kidney function has improved, but he is iron deficient. Sterling was informed that he can have the iron replaced in 5 doses in the [place] [facility] or he can arrange to have Dr. Orr replace his iron. Sterling verbalized back understanding.  Sterling reports he would prefer to have Dr. Orr replace his iron, because he still is unsure on the cause of why it has been low.

## 2025-07-02 ENCOUNTER — OFFICE VISIT (OUTPATIENT)
Age: 80
End: 2025-07-02
Attending: INTERNAL MEDICINE
Payer: MEDICARE

## 2025-07-02 VITALS
TEMPERATURE: 98 F | HEIGHT: 70.98 IN | HEART RATE: 80 BPM | SYSTOLIC BLOOD PRESSURE: 121 MMHG | WEIGHT: 193.63 LBS | OXYGEN SATURATION: 99 % | RESPIRATION RATE: 16 BRPM | DIASTOLIC BLOOD PRESSURE: 77 MMHG | BODY MASS INDEX: 27.11 KG/M2

## 2025-07-02 DIAGNOSIS — D50.0 IRON DEFICIENCY ANEMIA DUE TO CHRONIC BLOOD LOSS: Primary | ICD-10-CM

## 2025-07-02 DIAGNOSIS — D69.3 IMMUNE THROMBOCYTOPENIC PURPURA (HCC): ICD-10-CM

## 2025-07-02 DIAGNOSIS — M35.1 MIXED CONNECTIVE TISSUE DISEASE (HCC): ICD-10-CM

## 2025-07-02 DIAGNOSIS — D50.0 IRON DEFICIENCY ANEMIA DUE TO CHRONIC BLOOD LOSS: ICD-10-CM

## 2025-07-02 DIAGNOSIS — D63.8 ANEMIA, CHRONIC DISEASE: ICD-10-CM

## 2025-07-02 DIAGNOSIS — D62 ACUTE BLOOD LOSS ANEMIA: ICD-10-CM

## 2025-07-02 DIAGNOSIS — D69.6 THROMBOCYTOPENIA: ICD-10-CM

## 2025-07-02 DIAGNOSIS — D69.3 IMMUNE THROMBOCYTOPENIC PURPURA (HCC): Primary | ICD-10-CM

## 2025-07-02 PROBLEM — D50.9 IRON DEFICIENCY ANEMIA: Status: ACTIVE | Noted: 2025-07-02

## 2025-07-02 RX ORDER — DIPHENHYDRAMINE HCL 25 MG
25 CAPSULE ORAL ONCE
OUTPATIENT
Start: 2025-07-18

## 2025-07-02 RX ORDER — DIPHENHYDRAMINE HCL 25 MG
25 CAPSULE ORAL ONCE
Status: COMPLETED | OUTPATIENT
Start: 2025-07-02 | End: 2025-07-02

## 2025-07-02 RX ORDER — ACETAMINOPHEN 325 MG/1
650 TABLET ORAL ONCE
OUTPATIENT
Start: 2025-07-18

## 2025-07-02 RX ORDER — ACETAMINOPHEN 325 MG/1
650 TABLET ORAL ONCE
Status: COMPLETED | OUTPATIENT
Start: 2025-07-02 | End: 2025-07-02

## 2025-07-02 RX ADMIN — DIPHENHYDRAMINE HCL 25 MG: 25 MG CAPSULE ORAL at 11:47:00

## 2025-07-02 RX ADMIN — ACETAMINOPHEN 650 MG: 325 TABLET ORAL at 11:47:00

## 2025-07-02 NOTE — PROGRESS NOTES
Cancer Center Progress Note    Patient Name: Miguel Davila   YOB: 1945   Medical Record Number: [id number]   CSN: [id number]   Attending Physician: Iris Orr M.D.   Referring Physician: MD Dr. Al Maguire Dr.    Date of Visit: 7/2/2025     Chief Complaint:  Chief Complaint   Patient presents with    Follow - Up     Pt here for review of recent labs. Energy low, some WILSON, denies bleeding, diet normal. Had anemia about 5 years ago, thinkds he tookoral iron and had blood transfuison at taht time. Last GI workup 2 years ago.        Diagnosis:  1. History of lupus and MCTD (overlap) diagnosed 7/2005. Presented with acute polymyositis at that time and received IVIG, steroids and eventually maintained on steroids and Azathioprine which was tapered off around 2011.      Also with h/o positive sta clot as well as DRVVT, IgM phospholipid antibody (medium positive) and thrombocytopenia w/o evidence of thrombosis or vascular events.      -  Was admitted 4/2021 when he presented with progressively worse WILSON, malaise and fatigue. Labs drawn showed pancytopenia with WBC of 2, Hb 7.8 and platelets of 74. Back in March white count and platelets were normal and had stable thrombocytopenia with counts >100. He denied any bleeding. Pancytopenia felt to be from marrow suppression from Imuran. W/u revealed no hemolysis or nutritional deficiencies. Iron studies were not c/w deficiency.  Further Imuran was held. He was given a dose of Filgrastim. Hb had dropped to 7 and was transfused a unit of PRBCs. By discharge Hb was up to 8.4, WBC 3.3 with an ANC of 2350 and platelets were 82.     2. Immune mediated thrombocytopenia.      - started on prednisone 3/30/17 and received IVIG 4/6-7/17 when his platelets dropped to <5. Tapered off steroids 6/2017. Platelets have stayed >30 and therefore not requiring any further treatment    3. Had watchman placed for A fib and off  anticoagulation.    4. Scopes done 5/1/18 which showed Ramon's w/o evidence of dysplasia, tubular adenomas and candida esophagitis. He was placed on treatment for the Candida.    5. S/p basil 6/2024     - Presented to the ED 6/6/24 with right sided abdominal pain and elevated LFTs. CT showed findings c/w acute cholecystitis with cholelithiasis. Surgery was consulted and he underwent lap basil 6/7/24. Was found to have perforated cholecystitis with localized biliary peritonitis. He was placed on a course of IV antibiotics and was discharged home on 6/10/24. He was back at the ED on 7/8/24 for RLE cellulitis and volume overload. He responded well to diuretics and IV antibiotics. Dopplers were negative for DVT    History of Present Illness:  Suffered a large laceration of the left shin after hitting a wooden chair closing the blinds at home. Had significant amount of bleeding and went to the ED 6/4/25. Bleeding would not stop with pressure. Subcutaneous sutures were placed. Attempts to repair with nylon sutures were unsuccessful and steri-strips were placed. He was up to date with tetanus shot. Hgb had been normal but on 6/18 had dropped to 10.7 (from 13).     Denied any bleeding elsewhere. Energy low. He has some WILSON. Has increased bruising.  Denies chest or abdominal pain, change in his bowel or urinary habits, headaches, dizziness or visual symptoms. No fevers. Reports fatigue. He continues on IVIG every 5 weeks and prednisone for polymyositis.     Current Medications:    Current Outpatient Medications:     bumetanide 1 MG Oral Tab, 2 tablets (2 mg total) every morning AND 1 tablet (1 mg total) Every afternoon at 2:00 pm. Can take an extra 1 mg as needed., Disp: , Rfl:     ipratropium 0.06 % Nasal Solution, 1 spray by Nasal route 2 (two) times daily., Disp: 1 each, Rfl: 11    spironolactone 25 MG Oral Tab, Take 0.5 tablets (12.5 mg total) by mouth daily., Disp: , Rfl:     PREDNISONE 5 MG Oral Tab, TAKE 1 TABLET BY  MOUTH EVERY DAY WITH BREAKFAST, Disp: 90 tablet, Rfl: 1    predniSONE 1 MG Oral Tab, TAKE 2 TABLETS (2 MG TOTAL) BY MOUTH DAILY WITH BREAKFAST, Disp: 180 tablet, Rfl: 1    omeprazole 20 MG Oral Capsule Delayed Release, TAKE 1 CAPSULE BY MOUTH TWICE A DAY, Disp: 180 capsule, Rfl: 3    ALLOPURINOL 300 MG Oral Tab, TAKE 1 TABLET BY MOUTH EVERY DAY, Disp: 90 tablet, Rfl: 3    fexofenadine 180 MG Oral Tab, Take 1 tablet (180 mg total) by mouth daily as needed., Disp: , Rfl:     metoprolol succinate  MG Oral Tablet 24 Hr, Take 1 tablet (100 mg total) by mouth every morning AND 0.5 tablets (50 mg total) every evening., Disp: 60 tablet, Rfl: 3    aspirin 81 MG Oral Tab EC, Take 1 tablet (81 mg total) by mouth daily., Disp: 30 tablet, Rfl: 0    PREVIDENT 5000 BOOSTER PLUS 1.1 % Dental Paste, , Disp: , Rfl:     Multiple Vitamins-Minerals (TAB-A-KEVIN) Oral Tab, Take 1 tablet by mouth in the morning., Disp: , Rfl:     Immune Globulin, Human, 10 g Intravenous Recon Soln, Inject 0.4 g/kg into the vein. Given for treatment of polymyositis, mixed connective tissue disease, and immune thrombocytopenia purpura monthly at [facility]. Every 5 weeks, Disp: 3 each, Rfl: 0    Calcium Citrate-Vitamin D (CITRACAL + D OR), Take 1 tablet by mouth in the morning., Disp: , Rfl:     Past Medical History:  Past Medical History:    A-fib (HCC)    Arrhythmia    atrial fibrillation    Arthritis    Autoimmune disease (HCC)    hepatits, resolved anfd nephritis, resolved    Ramon's esophagus    Bleeding nose    Gums Treated    Blood disorder    thrombocytopenia    Blood in urine    Blurred vision    cataract due to steroids, surgery in June    Calculus of kidney    One time    Cancer (HCC)    skin    Candidiasis of the esophagus    Due to steroid use for Autoimmune disorder     Cataract    Colon polyps    Congestive heart disease (HCC)    Diarrhea, unspecified    Intermittent due to lupus    Diverticulosis of colon    Easy bruising     On and off prednisone for 20 years    Esophageal polyp    Esophageal reflux    Essential hypertension    Fatigue    polymyositis and lupus    Gout    Hammer toe, acquired    Heart palpitations    Afib    Hepatitis    autoimmune induce hepatitis d/t lupus    High blood pressure    IBS (irritable bowel syndrome)    mild d/t lupus    Irregular bowel habits    Leg swelling    Heart failure, probably caused by lupus    Lupus    MCTD (mixed connective tissue disease) (HCC)    Obstructive sleep apnea (adult) (pediatric)    LI (obstructive sleep apnea)    AHI 37  Supine AHI 38 non-supine AHI 16 Sao2 Pj 83%     LI (obstructive sleep apnea)    AHI 36 RDI 36 REM AHI 45 Supine AHI 65 non-supine AHI 19 Sao2 Pj 86% CPAP 9cwp    Pain in joints    Personal history of antineoplastic chemotherapy    Pleural effusion    right    Pneumonia due to organism    Polymyositis (HCC)    Presence of other cardiac implants and grafts    watchman filter    Problems with swallowing    dysphagia in 2006    Pulmonary hypertension (HCC)    Raynaud disease    Raynaud disease    Renal disorder    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    Shortness of breath    mainly due to pm or lupus    Sleep apnea    CPAP    Thrombocytopathia (HCC)    Visual impairment    bifocals for reading & computer; distance for driving    Wears glasses       Past Surgical History:  Past Surgical History:   Procedure Laterality Date    Appendectomy  1970    Appendectomy      Cardiac catheterization  09/2019    Cataract Bilateral     Cholecystectomy      Colonoscopy  10/2003 2006 01/2010    x3    Colonoscopy  05/14/2013    Colonoscopy N/A 05/01/2018    Procedure: COLONOSCOPY;  Surgeon: Isaiah Tobar MD;  Location:  ENDOSCOPY    Colonoscopy N/A 04/12/2024    Procedure: COLONOSCOPY;  Surgeon: Gerardo Neves MD;  Location:  ENDOSCOPY    Colonoscopy with biopsy  05/14/2013    [facility]      Hand/finger surgery unlisted  2003    right    Needle biopsy liver      Other  12/2005     needle bipsy of kidney    Other surgical history  2017    watchman filter    Percutaneous  angioplasty  (ptca)- pbp only      right    Rectum surgery procedure unlisted  194    rectal surgery polypectomy    Skin surgery      MMS BCC R temple 09    Skin surgery  2007    basal ceell carcinoma    Skin surgery  2012    BCC-nodular to right superior eyebrow/ MOHS    Skin surgery  07/15/2013    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/MMS    Skin surgery  2014    BCC-NOD to right superior pinna, MMS, AB    Skin surgery  2021    BCC- left superior forehead, MMS     Skin surgery  2022    SCC IN SITU RIGHT ANTIHELIX MMS BY DR GASTELUM    Tonsillectomy      Upper gi endoscopy,exam  10/2003 2006 2010 , 5/13    x3       Family Medical History:  Family History   Problem Relation Age of Onset    Heart Disease Father         CHF    Gastro-Intestinal Disorder Father         Diverticulosis    Alcohol and Other Disorders Associated Father     Heart Attack Father     Heart Disease Mother         CHF    Breast Cancer Mother     Stroke Mother     Cancer Paternal Grandfather     Heart Attack Paternal Grandmother     Kidney Disease Son     Other (Ramon's Esophagus) Son     Heart Attack Maternal Grandfather        Psychosocial History:  Social History     Socioeconomic History    Marital status:      Spouse name: Not on file    Number of children: Not on file    Years of education: Not on file    Highest education level: Not on file   Occupational History    Occupation: Retired    Tobacco Use    Smoking status: Former     Current packs/day: 0.00     Average packs/day: 0.5 packs/day for 15.0 years (7.5 ttl pk-yrs)     Types: Cigarettes     Start date: 1958     Quit date: 1973     Years since quittin.9    Smokeless tobacco: Never    Tobacco comments:     5 cigarettes daily, stopped smoking in    Vaping Use    Vaping status: Never Used   Substance and Sexual Activity    Alcohol use: Yes      Alcohol/week: 13.0 - 15.0 standard drinks of alcohol     Types: 5 Glasses of wine, 8 - 10 Standard drinks or equivalent per week     Comment: 1 per day wine or liquor    Drug use: Never    Sexual activity: Not on file   Other Topics Concern     Service Not Asked    Blood Transfusions Not Asked    Caffeine Concern Yes     Comment: 1 soda per day and decaf coffee    Occupational Exposure Not Asked    Hobby Hazards Not Asked    Sleep Concern No    Stress Concern Not Asked    Weight Concern Not Asked    Special Diet Not Asked    Back Care Not Asked    Exercise Yes     Comment: 3-4 times weekly    Bike Helmet Not Asked    Seat Belt Yes    Self-Exams Not Asked   Social History Narrative    Not on file     Social Drivers of Health     Food Insecurity: No Food Insecurity (1/17/2025)    NCSS - Food Insecurity     Worried About Running Out of Food in the Last Year: No     Ran Out of Food in the Last Year: No   Transportation Needs: No Transportation Needs (1/17/2025)    NCSS - Transportation     Lack of Transportation: No   Housing Stability: Not At Risk (1/17/2025)    NCSS - Housing/Utilities     Has Housing: Yes     Worried About Losing Housing: No     Unable to Get Utilities: No         Allergies:  Allergies   Allergen Reactions    Empagliflozin OTHER (SEE COMMENTS)     Pancreatitis    \"pancreatitis\" per pt        Review of Systems:  A 14-point ROS was done with pertinent positives and negative per the HPI    Vital Signs:  /77 (BP Location: Left arm, Patient Position: Sitting, Cuff Size: adult)   Pulse 80   Temp 97.7 °F (36.5 °C) (Temporal)   Resp 16   Ht 1.803 m (5' 10.98\")   Wt 87.8 kg (193 lb 9.6 oz)   SpO2 99%   BMI 27.01 kg/m²       Physical Examination:  General: Patient is alert and oriented x 3, not in acute distress.  Bruising extremities   Psych:  Mood and affect appropriate  HEENT: EOMs intact. PERRL. Oropharynx is clear.   Neck: No JVD. No palpable lymphadenopathy. Neck is  supple.  Lymphatics: There is no palpable lymphadenopathy   Chest: Clear to auscultation.  Heart: Regular rate and rhythm.   Abdomen: Some distention but soft, non tender with good bowel sounds.  No palpable mass.  Extremities: Pedal pulses are present. Some BLE edema. Left shin wound dressed   Neurological: Grossly intact.       Laboratory:  Lab Results   Component Value Date    WBC 8.5 06/30/2025    RBC 3.21 (L) 06/30/2025    HGB 10.8 (L) 06/30/2025    HCT 34.6 (L) 06/30/2025    .0 06/30/2025    .8 (H) 06/30/2025    MCH 33.6 06/30/2025    MCHC 31.2 06/30/2025    RDW 15.9 06/30/2025    NEPRELIM 6.14 06/30/2025    NEUTABS 0.83 (L) 04/28/2021    LYMPHABS 0.66 (L) 04/28/2021    EOSABS 0.03 04/28/2021    BASABS 0.00 04/28/2021    NEUT 49 04/28/2021    LYMPH 39 04/28/2021    MON 9 04/28/2021    EOS 2 04/28/2021    BASO 0 04/28/2021    NEPERCENT 72.4 06/30/2025    LYPERCENT 16.4 06/30/2025    MOPERCENT 9.0 06/30/2025    EOPERCENT 1.4 06/30/2025    BAPERCENT 0.4 06/30/2025    NE 6.14 06/30/2025    LYMABS 1.39 06/30/2025    MOABSO 0.76 06/30/2025    EOABSO 0.12 06/30/2025    BAABSO 0.03 06/30/2025      Latest Reference Range & Units 06/30/25 09:43   Iron, Serum 65 - 175 ug/dL 42 (L)   Transferrin 215 - 365 mg/dL 318   Iron Bind.Cap.(TIBC) 250 - 425 ug/dL 401   Iron Saturation 20 - 50 % 10 (L)   FERRITIN 50 - 336 ng/mL 47 (L)   (L): Data is abnormally low    Impression and Plan:  1. Iron deficiency anemia- he feels had significant blood loss from laceration on leg. Hgb had been normal until 6/18/25 then dropped to 10.8 when he saw his cardiologist. Labs do show evidence of iron deficiency. He was recommended IV iron which he has opted to have done here. Will plan on Venofer given his CKD and do plan to check for other potential contributing causes. Has known CKD, chronic disease. Pending testing and response to iron may need to consider GI w/u for SHAMEKA    2. Thrombocytopenia- immune mediated. On IVIG and  prednisone for autoimmune disease. Platelet counts have been good for him and more recently actually normal!       3. H/o previous pancytopenia- marrow suppression from Imuran. W/u then was otherwise  negative. Counts have been good off imuran       4. Stage I liver fibrosis- last US suggested stability. Followed by hepatology      5. Chronic diastolic heart failure- followed at HF clinic. Managing with diuretics     6. Autoimmune disease- management as per rheum.  Continues on steroids and IVIG    7. Laceration left shin- he said improving and not infected. Does not feel needs to see wound clinic     IV iron as ordered  Plan labs 4 weeks from the last injection    Emotional Well Being:  I have assessed the patient's emotional well-being and any concerns about anxiety or depression.  We discussed issues of distress, coping difficulties and social support systems and currently there are no active problems.    Iris Orr MD  [facility]

## 2025-07-02 NOTE — PROGRESS NOTES
Education Record    Learner:  Patient    Disease / Diagnosis:iron deficiency anemia, Mixed connective tissue disease for IVIG    Barriers / Limitations:  None   Comments:    Method:  Discussion   Comments:    General Topics:  Medication and Plan of care reviewed   Comments:    Outcome:  Shows understanding   Comments:

## 2025-07-08 ENCOUNTER — OFFICE VISIT (OUTPATIENT)
Age: 80
End: 2025-07-08
Attending: INTERNAL MEDICINE
Payer: MEDICARE

## 2025-07-08 VITALS
OXYGEN SATURATION: 99 % | TEMPERATURE: 97 F | DIASTOLIC BLOOD PRESSURE: 73 MMHG | WEIGHT: 190.19 LBS | HEART RATE: 80 BPM | HEIGHT: 70.98 IN | RESPIRATION RATE: 18 BRPM | BODY MASS INDEX: 26.63 KG/M2 | SYSTOLIC BLOOD PRESSURE: 115 MMHG

## 2025-07-08 DIAGNOSIS — D50.0 IRON DEFICIENCY ANEMIA DUE TO CHRONIC BLOOD LOSS: Primary | ICD-10-CM

## 2025-07-08 RX ORDER — DIPHENHYDRAMINE HCL 25 MG
25 CAPSULE ORAL ONCE
Status: CANCELLED | OUTPATIENT
Start: 2025-07-23

## 2025-07-08 RX ORDER — ACETAMINOPHEN 325 MG/1
650 TABLET ORAL ONCE
Status: CANCELLED | OUTPATIENT
Start: 2025-07-23

## 2025-07-08 NOTE — PROGRESS NOTES
Patient arrives to infusion center for Venofer. Patient denies any issues or concerns.      Ordering Provider: Dominga       Patient appeared to tolerate infusion without difficulty or complaint. No s/s of adverse reaction noted. Reviewed next apt date/time: 7/11    Education Record  Learner:  Patient  Disease / Diagnosis: Anemia  Barriers / Limitations:  None  Method:  Discussion  General Topics:  Plan of care reviewed  Outcome:  shows understanding

## 2025-07-11 ENCOUNTER — OFFICE VISIT (OUTPATIENT)
Age: 80
End: 2025-07-11
Attending: INTERNAL MEDICINE
Payer: MEDICARE

## 2025-07-11 VITALS
RESPIRATION RATE: 18 BRPM | BODY MASS INDEX: 26.77 KG/M2 | HEART RATE: 91 BPM | DIASTOLIC BLOOD PRESSURE: 74 MMHG | TEMPERATURE: 98 F | HEIGHT: 70.98 IN | SYSTOLIC BLOOD PRESSURE: 113 MMHG | OXYGEN SATURATION: 99 % | WEIGHT: 191.19 LBS

## 2025-07-11 DIAGNOSIS — I50.32 CHRONIC DIASTOLIC HEART FAILURE (HCC): Primary | ICD-10-CM

## 2025-07-11 DIAGNOSIS — D50.0 IRON DEFICIENCY ANEMIA DUE TO CHRONIC BLOOD LOSS: Primary | ICD-10-CM

## 2025-07-11 RX ORDER — DIPHENHYDRAMINE HCL 25 MG
25 CAPSULE ORAL ONCE
OUTPATIENT
Start: 2025-07-25

## 2025-07-11 RX ORDER — ACETAMINOPHEN 325 MG/1
650 TABLET ORAL ONCE
OUTPATIENT
Start: 2025-07-25

## 2025-07-11 NOTE — PROGRESS NOTES
Education Record    Learner:  Patient    Disease / Diagnosis: iron deficiency anemia    Barriers / Limitations:  None   Comments:    Method:  Brief focused   Comments:    General Topics:  Plan of care reviewed   Comments:    Outcome:  Shows understanding   Comments:    Pt received venofer today without complication.  Pt discharged in stable condition. Next appt 7/16 2 1pm

## 2025-07-14 RX ORDER — SPIRONOLACTONE 25 MG/1
25 TABLET ORAL DAILY
Qty: 90 TABLET | Refills: 0 | Status: SHIPPED | OUTPATIENT
Start: 2025-07-14

## 2025-07-14 NOTE — PROGRESS NOTES
[facility] Progress Note    Miguel Davila is a 80 year old male who presents to clinic for APN assessment and management of chronic diastolic heart failure and is functional class 3.     Subjective:  Since his last clinic visit on 6/26 when no changes were made and I added on iron studies to see if he would benefit for IV iron;  we contacted him to tell him he was iron deficient. We offered to give him IV iron or have him touch base with Dr. Khalil.  He wanted to touch base with Dr. Khalil since he has not been anemic in a while. He saw her 7/2; she recommended IV Venofer with consideration for GI w/u. He has received 3 doses of Venofer so far.    Today is doing okay. His weight is up one lb. Home weight has been as high as 190 lbs after eating [employer] over the weekend. Last week weight was ~186 lbs before he had and he had increased his bumex to 2 mg twice a day for a few days with no change in weight. His LE edema is worse and now in his thigh on the left leg.  Wound on his left leg is healing. He admit to mild abdominal bloating that is better now after 2, BM's today. He denies worsening shortness of breath. Appetite is unchanged.     He has continued stress taking care of his wife whose dementia is progressing. He is considering getting a caregiver to help at home or moving to a senior community that offers assistance.        Range 29-35 mmhg.  He has not been consistent with taking readings due to taking care of his wife and was encouraged to transmit more readings today. His PAD is elevated at 36 mmHg today.         Review of Systems   Constitutional: Positive for weight gain.   Cardiovascular:  Positive for leg swelling. Negative for dyspnea on exertion.   Gastrointestinal:  Positive for bloating.       HISTORY:  Past Medical History[1]   Past Surgical History[2]   Family History[3]   Short Social Hx on File[4]        Objective:    Telemetry:     Cardiac Rhythm:  Atrial fibrillation    BP 98/70   Pulse 71   Resp 20   Wt 192 lb 12.8 oz (87.5 kg)   SpO2 99%   BMI 26.90 kg/m²     Wt Readings from Last 6 Encounters:   07/15/25 192 lb 12.8 oz (87.5 kg)   07/11/25 191 lb 3.2 oz (86.7 kg)   07/08/25 190 lb 3.2 oz (86.3 kg)   07/02/25 193 lb 9.6 oz (87.8 kg)   06/26/25 189 lb 12.8 oz (86.1 kg)   06/23/25 188 lb (85.3 kg)            Recent Results (from the past 24 hours)   Basic Metabolic Panel (8)    Collection Time: 07/15/25  1:23 PM   Result Value Ref Range    Glucose 97 70 - 99 mg/dL    Sodium 141 136 - 145 mmol/L    Potassium 4.5 3.5 - 5.1 mmol/L    Chloride 103 98 - 112 mmol/L    CO2 31.0 21.0 - 32.0 mmol/L    Anion Gap 7 0 - 18 mmol/L    BUN 30 (H) 9 - 23 mg/dL    Creatinine 1.57 (H) 0.70 - 1.30 mg/dL    Calcium, Total 8.8 8.7 - 10.6 mg/dL    Calculated Osmolality 298 (H) 275 - 295 mOsm/kg    eGFR-Cr 44 (L) >=60 mL/min/1.73m2    Patient Fasting for BMP? No        Medications - Current[5]    Exam:   General:         Alert, in no apparent distress  HEENT:          elevated JVD  Lungs:            CTAB       CV:                  irregularly irregular   Abdomen:       round, mildly distended, non-tender  Extremities:    bilateral LE edema, L>R and up into the thigh   Neuro:             A&O x 3, CASTRO  Skin:                wound dressed on left shin     Education:  Patient instructed regarding sodium restricted diet, low sodium foods, fluid restriction, daily weights, medication regimen, s/s HF exacerbation and when to call APN/clinic.        [x] CardioMEMs  [] ARNi/ACEi/ARB - hypotension  [x] BB  [x] MRA, on reduced dose due to hyperkalemia   [] SGLT2i - possible pancreatitis     Assessment:   Chronic diastolic HF, ACC/AHA Stage C, NYHA Class 3- LVEF 55-60% and stable on echo 5/2025, RV function normal. RHC 5/2024 with PCWP 14, RA 9. S/p CardioMEMs. PAD up to 36 mmHg today. Last ProBNP 5,211 up from 3,617 (highest 5,927). Hypervolemic.   PH, mild combined pre and post capillary  - RHC 5/20/24 demonstrates mPAP 31, PAD 18 mmHg, wedge 14 mmHg, RA 9 mmHg, PVR 2.2 and CI 3.8. RV function low normal. PAD 31-32 mmHg on day of RHC per CardioMEMs.   Mild to Moderate MR/TR and AI - on echo in May 2025.   CKD Stage 3b -recent baseline creatinine ~ 1.7 mg/dl, Cr up to 1.9 mg/dL last visit, 1.6 today. Will follow. Follows with Dr. Devine.    Chronic Afib - s/p Watchman. Rates controlled. Follows with Dr. Dior, last seen 6/9.   LI - on CPAP.    Recurrent bilateral pleural effusions - hx of thoracentesis.   Hx Lupus/Mixed CTD/Severe polymyositis - continues IVIG infusions monthly. Follows with Dr. Farmer. On chronic prednisone. Off Imuran due to pancytopenia.   Non-obstructive CAD  Chronic thrombocytopenia, immune mediated.  6/30. Follows with Dr. Orr.   Hx Hyponatremia  HERNANDEZ with biopsy proven stage F0-F1 Fibrosis on US 1/2022. Follows with Dr. Veronica, Hepatology.   Mild hyperkalemia, recurrent and has required Veltassa. MRA recently reduced with improvement.  Maintained on low dose ARNI.   Anemia - last Hgb 10.8 g/dL,  overall down from 13 g/dL but stable; recently bled from leg laceration. Iron sat 10 and ferritin 47 6/30. [facility]. Orr. To  consider GI workup.   Hypothyroidism - previously on supplement that was recently stopped per Dr. Simon. TSH normal 1/2025.     Plan:     IVP Diuril 250 mg x1  IVP Bumex 4 mg x1  Hold the PM dose of Bumex today.   Continue Bumex 2 mg in the AM and 1 mg in the PM but advised to take an extra dose of Bumex 1 mg if weight is >186 lbs.   Encouraged to transmit CardioMEMs readings more regularly.   Continue to follow daily weights at home.    Return to clinic in 2 weeks  F/U with Dr. Craig in August/September.   CHF discharge instructions given    I have spent 40 min total time on the day of the encounter, including: preparing to see the patient, obtaining and/or reviewing separately obtained history,  performing a medically appropriate examination and/or evaluation, counseling and educating the patient/family caregiver, ordering medication, tests, or procedures, documenting clinical information in Epic, and independently interpreting results and communicating results to the patient/family caregiver      SHEA Cummins               [1]   Past Medical History:   A-fib (HCC)    Arrhythmia    atrial fibrillation    Arthritis    Autoimmune disease (HCC)    hepatits, resolved anfd nephritis, resolved    Ramon's esophagus    Bleeding nose    Gums Treated    Blood disorder    thrombocytopenia    Blood in urine    Blurred vision    cataract due to steroids, surgery in June    Calculus of kidney    One time    Cancer (HCC)    skin    Candidiasis of the esophagus    Due to steroid use for Autoimmune disorder     Cataract    Colon polyps    Congestive heart disease (HCC)    Diarrhea, unspecified    Intermittent due to lupus    Diverticulosis of colon    Easy bruising    On and off prednisone for 20 years    Esophageal polyp    Esophageal reflux    Essential hypertension    Fatigue    polymyositis and lupus    Gout    Hammer toe, acquired    Heart palpitations    Afib    Hepatitis    autoimmune induce hepatitis d/t lupus    High blood pressure    IBS (irritable bowel syndrome)    mild d/t lupus    Irregular bowel habits    Leg swelling    Heart failure, probably caused by lupus    Lupus    MCTD (mixed connective tissue disease) (HCC)    Obstructive sleep apnea (adult) (pediatric)    LI (obstructive sleep apnea)    AHI 37  Supine AHI 38 non-supine AHI 16 Sao2 Pj 83%     LI (obstructive sleep apnea)    AHI 36 RDI 36 REM AHI 45 Supine AHI 65 non-supine AHI 19 Sao2 Pj 86% CPAP 9cwp    Pain in joints    Personal history of antineoplastic chemotherapy    Pleural effusion    right    Pneumonia due to organism    Polymyositis (HCC)    Presence of other cardiac implants and grafts    watchman filter    Problems with  swallowing    dysphagia in 2006    Pulmonary hypertension (HCC)    Raynaud disease    Raynaud disease    Renal disorder    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    Shortness of breath    mainly due to pm or lupus    Sleep apnea    CPAP    Thrombocytopathia (HCC)    Visual impairment    bifocals for reading & computer; distance for driving    Wears glasses   [2]   Past Surgical History:  Procedure Laterality Date    Appendectomy  1970    Appendectomy      Cardiac catheterization  09/2019    Cataract Bilateral     Cholecystectomy      Colonoscopy  10/2003 2006 01/2010    x3    Colonoscopy  05/14/2013    Colonoscopy N/A 05/01/2018    Procedure: COLONOSCOPY;  Surgeon: Isaiah Tobar MD;  Location:  ENDOSCOPY    Colonoscopy N/A 04/12/2024    Procedure: COLONOSCOPY;  Surgeon: Gerardo Neves MD;  Location:  ENDOSCOPY    Colonoscopy with biopsy  05/14/2013    Egd      Hand/finger surgery unlisted  2003    right    Needle biopsy liver      Other  12/2005    needle bipsy of kidney    Other surgical history  2017    watchman filter    Percutaneous  angioplasty  (ptca)- pbp only  2006    right    Rectum surgery procedure unlisted  1946    rectal surgery polypectomy    Skin surgery      MMS BCC R temple 6/24/09    Skin surgery  2007    basal ceell carcinoma    Skin surgery  07/18/2012    BCC-nodular to right superior eyebrow/ MOHS    Skin surgery  07/15/2013    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/MMS    Skin surgery  02/19/2014    BCC-NOD to right superior pinna, MMS, AB    Skin surgery  02/16/2021    BCC- left superior forehead, MMS     Skin surgery  03/07/2022    SCC IN SITU RIGHT ANTIHELIX MMS BY DR GASTELUM    Tonsillectomy  1948    Upper gi endoscopy,exam  10/2003 2006 01/2010 , 5/13    x3   [3]   Family History  Problem Relation Age of Onset    Heart Disease Father         CHF    Gastro-Intestinal Disorder Father         Diverticulosis    Alcohol and Other Disorders Associated Father     Heart Attack Father     Heart Disease Mother          CHF    Breast Cancer Mother     Stroke Mother     Cancer Paternal Grandfather     Heart Attack Paternal Grandmother     Kidney Disease Son     Other (Ramon's Esophagus) Son     Heart Attack Maternal Grandfather    [4]   Social History  Socioeconomic History    Marital status:    Occupational History    Occupation: Retired    Tobacco Use    Smoking status: Former     Current packs/day: 0.00     Average packs/day: 0.5 packs/day for 15.0 years (7.5 ttl pk-yrs)     Types: Cigarettes     Start date: 1958     Quit date: 1973     Years since quittin.0    Smokeless tobacco: Never    Tobacco comments:     5 cigarettes daily, stopped smoking in    Vaping Use    Vaping status: Never Used   Substance and Sexual Activity    Alcohol use: Yes     Alcohol/week: 13.0 - 15.0 standard drinks of alcohol     Types: 5 Glasses of wine, 8 - 10 Standard drinks or equivalent per week     Comment: 1 per day wine or liquor    Drug use: Never   Other Topics Concern    Caffeine Concern Yes     Comment: 1 soda per day and decaf coffee    Sleep Concern No    Exercise Yes     Comment: 3-4 times weekly    Seat Belt Yes     Social Drivers of Health     Food Insecurity: No Food Insecurity (2025)    Received from [facility]    Hunger Vital Sign     Worried About Running Out of Food in the Last Year: Never true     Ran Out of Food in the Last Year: Never true   Transportation Needs: No Transportation Needs (2025)    Received from [facility]    PRAPARE - Transportation     Lack of Transportation (Medical): No     Lack of Transportation (Non-Medical): No   Housing Stability: Unknown (2025)    Received from [facility]    Housing Stability Vital Sign     Unable to Pay for Housing in the Last Year: No   [5]   Current Outpatient Medications:     spironolactone 25 MG Oral Tab, Take 1 tablet (25 mg total) by mouth daily., Disp: 90 tablet, Rfl: 0     bumetanide 1 MG Oral Tab, 2 tablets (2 mg total) every morning AND 1 tablet (1 mg total) Every afternoon at 2:00 pm. Can take an extra 1 mg as needed., Disp: , Rfl:     ipratropium 0.06 % Nasal Solution, 1 spray by Nasal route 2 (two) times daily., Disp: 1 each, Rfl: 11    PREDNISONE 5 MG Oral Tab, TAKE 1 TABLET BY MOUTH EVERY DAY WITH BREAKFAST, Disp: 90 tablet, Rfl: 1    predniSONE 1 MG Oral Tab, TAKE 2 TABLETS (2 MG TOTAL) BY MOUTH DAILY WITH BREAKFAST, Disp: 180 tablet, Rfl: 1    omeprazole 20 MG Oral Capsule Delayed Release, TAKE 1 CAPSULE BY MOUTH TWICE A DAY, Disp: 180 capsule, Rfl: 3    ALLOPURINOL 300 MG Oral Tab, TAKE 1 TABLET BY MOUTH EVERY DAY, Disp: 90 tablet, Rfl: 3    fexofenadine 180 MG Oral Tab, Take 1 tablet (180 mg total) by mouth daily as needed., Disp: , Rfl:     metoprolol succinate  MG Oral Tablet 24 Hr, Take 1 tablet (100 mg total) by mouth every morning AND 0.5 tablets (50 mg total) every evening., Disp: 60 tablet, Rfl: 3    aspirin 81 MG Oral Tab EC, Take 1 tablet (81 mg total) by mouth daily., Disp: 30 tablet, Rfl: 0    PREVIDENT 5000 BOOSTER PLUS 1.1 % Dental Paste, , Disp: , Rfl:     Multiple Vitamins-Minerals (TAB-A-KEVIN) Oral Tab, Take 1 tablet by mouth in the morning., Disp: , Rfl:     Immune Globulin, Human, 10 g Intravenous Recon Soln, Inject 0.4 g/kg into the vein. Given for treatment of polymyositis, mixed connective tissue disease, and immune thrombocytopenia purpura monthly at [facility]. Every 5 weeks, Disp: 3 each, Rfl: 0    Calcium Citrate-Vitamin D (CITRACAL + D OR), Take 1 tablet by mouth in the morning., Disp: , Rfl:

## 2025-07-15 ENCOUNTER — HOSPITAL ENCOUNTER (OUTPATIENT)
Dept: LAB | Facility: HOSPITAL | Age: 80
Discharge: HOME | End: 2025-07-15
Attending: NURSE PRACTITIONER
Payer: MEDICARE

## 2025-07-15 ENCOUNTER — HOSPITAL ENCOUNTER (OUTPATIENT)
Dept: CARDIOLOGY CLINIC | Facility: HOSPITAL | Age: 80
Discharge: HOME | End: 2025-07-15
Attending: NURSE PRACTITIONER
Payer: MEDICARE

## 2025-07-15 VITALS
HEART RATE: 66 BPM | RESPIRATION RATE: 26 BRPM | WEIGHT: 192.81 LBS | DIASTOLIC BLOOD PRESSURE: 71 MMHG | OXYGEN SATURATION: 96 % | SYSTOLIC BLOOD PRESSURE: 109 MMHG | BODY MASS INDEX: 27 KG/M2

## 2025-07-15 DIAGNOSIS — I48.11 LONGSTANDING PERSISTENT ATRIAL FIBRILLATION (HCC): ICD-10-CM

## 2025-07-15 DIAGNOSIS — I50.32 CHRONIC DIASTOLIC HEART FAILURE (HCC): ICD-10-CM

## 2025-07-15 DIAGNOSIS — I50.31 ACUTE HEART FAILURE WITH PRESERVED EJECTION FRACTION (HCC): Primary | ICD-10-CM

## 2025-07-15 DIAGNOSIS — I50.32 CHRONIC DIASTOLIC HEART FAILURE (HCC): Primary | ICD-10-CM

## 2025-07-15 DIAGNOSIS — N18.32 STAGE 3B CHRONIC KIDNEY DISEASE (HCC): ICD-10-CM

## 2025-07-15 LAB
ANION GAP SERPL CALC-SCNC: 7 MMOL/L (ref 0–18)
BUN BLD-MCNC: 30 MG/DL (ref 9–23)
CALCIUM BLD-MCNC: 8.8 MG/DL (ref 8.7–10.6)
CHLORIDE SERPL-SCNC: 103 MMOL/L (ref 98–112)
CO2 SERPL-SCNC: 31 MMOL/L (ref 21–32)
CREAT BLD-MCNC: 1.57 MG/DL (ref 0.7–1.3)
EGFRCR SERPLBLD CKD-EPI 2021: 44 ML/MIN/1.73M2 (ref 60–?)
FASTING STATUS PATIENT QL REPORTED: NO
GLUCOSE BLD-MCNC: 97 MG/DL (ref 70–99)
OSMOLALITY SERPL CALC.SUM OF ELEC: 298 MOSM/KG (ref 275–295)
POTASSIUM SERPL-SCNC: 4.5 MMOL/L (ref 3.5–5.1)
SODIUM SERPL-SCNC: 141 MMOL/L (ref 136–145)

## 2025-07-15 PROCEDURE — 80048 BASIC METABOLIC PNL TOTAL CA: CPT | Performed by: NURSE PRACTITIONER

## 2025-07-15 PROCEDURE — 36415 COLL VENOUS BLD VENIPUNCTURE: CPT | Performed by: NURSE PRACTITIONER

## 2025-07-15 PROCEDURE — 99215 OFFICE O/P EST HI 40 MIN: CPT | Performed by: NURSE PRACTITIONER

## 2025-07-15 RX ORDER — SPIRONOLACTONE 25 MG/1
12.5 TABLET ORAL DAILY
COMMUNITY
Start: 2025-07-15

## 2025-07-15 RX ORDER — BUMETANIDE 0.25 MG/ML
4 INJECTION, SOLUTION INTRAMUSCULAR; INTRAVENOUS ONCE
Status: COMPLETED | OUTPATIENT
Start: 2025-07-15 | End: 2025-07-15

## 2025-07-15 RX ADMIN — BUMETANIDE 4 MG: 0.25 INJECTION, SOLUTION INTRAMUSCULAR; INTRAVENOUS at 15:20:00

## 2025-07-15 NOTE — PROGRESS NOTES
Patient assessed. Patient complaining of increased lower extremity edema that goes above his knees with left>right. Patient reports he has been having increased shortness of breath for today. Weight up 1 lb at 192.8 lbs. Patient reports that he increased his bumex dose to 2 mg BID for 4 days last week due to symptoms. APN notified of all the above information. Labs ordered and drawn by  Lab. Reviewed allergies and list of current medications with patient and updated it in the Electronic Medical Record.     IV established per protocol.  mg diuril given and IV 4 mg bumex given. Patient tolerated it well. Educated patient on low sodium diet and food choices, fluid restriction of 2 liters, and daily weights. Reviewed follow-up appointments and discharge Heart Failure instructions with patient. Patient verbalized an understanding. IV discontinued; catheter intact. Pressure held and gauze applied. Patient to return to the clinic in 2 weeks.

## 2025-07-15 NOTE — PATIENT INSTRUCTIONS
Heart Failure Discharge Instructions    Continue 2 mg of Bumex in the AM and 1 mg in the PM. You can take an extra 1 mg as needed if weight is >186 lbs.     Activity: Regular exercise and activity is important for your overall health and to help keep your heart strong and functioning as well as possible.   Walk at a slow to moderate pace for 15-20 minutes 3-5 days per week.     Follow these instructions every day to keep yourself in the Green Zone     The Green Zone means you are feeling well and your symptoms are under control                                    Medications  Take your medication every day as instructed  Do not stop taking your medicine or change the amount you are taking without instructions from your doctor or nurse  Do Not take non-steroidal antiinflammatory drugs such as ibuprofen, aleve, advil, or motrin                                    Diet/Fluids  People with heart failure should eat less sodium (salt) and limit fluid. Sodium attracts water and makes the body hold fluid. This extra fluid makes the heart work harder and can worsen the symptoms of heart failure.     Diet    2000 mg sodium daily  Fluid restriction    64 ounces daily  (8 oz. = 1 cup)                                     Body Weight  Weigh yourself every day before breakfast and write your weight down  Use the same scale and wear about the same amount of clothes each time  A sudden weight increase is due to fluid retention rather than fat                                         Activity  Pace your activities to avoid getting overtired  Take rest periods as needed  Elevate your feet to reduce ankle swelling when resting                             Signs of Worsening Heart Failure    You are entering the Yellow Zone - this is a warning zone    Call your doctor or nurse if you have any of these signs or symptoms:  You gain 2 or more pounds overnight or 3-5 pounds in 3-7 days  You have more trouble breathing  You get more tired with  regular activity, or are limiting activity because of shortness of breath or fatigue  You are short of breath lying down, you need more pillows to breathe comfortably,  or wake up during the night short of breath  You urinate less often during the day and more often at night  You have a bloated feeling, upset stomach, loss of appetite, or your clothes are fitting tighter    GO TO THE EMERGENCY DEPARTMENT or CALL 911 IF:    These are signs you are in the RED ZONE - THIS IS AN EMERGENCY  You have tightness or pain in your chest  You are extremely short of breath or can't catch your breath  You cough up frothy pink mucous  You feel confused or can't think clearly  You are traveling and develop symptoms of worsening heart failure     We respect everyone's time and availability. Please be aware that this is not a walk-in clinic and we require appointments in order to facilitate timely care for all patients. We ask you to arrive 30 minutes before your appointment to allow time for you to check-in and have your bloodwork drawn. Please understand if you are late for your appointment, you may be asked to reschedule. If possible, all attempts will be made to accommodate but realize this is no guarantee that this will always be available. We understand there are extenuating circumstances. If you need to cancel or reschedule your appointment, please call the [facility] within 24 hours at [phone].  Thank you for your cooperation, [facility] Staff.    If you are currently [employer] active, starting July 1st 2024, we will be utilizing [employer] messaging ONLY to confirm your appointment.    IF YOU HAVE QUESTIONS REGARDING YOUR BILL, FEEL FREE TO CONTACT [facility] PATIENT ACCOUNTS -[phone]. IF YOU NEED FINANCIAL ASSISTANCE, PLEASE CALL AN [facility] FINANCIAL COUNSELOR -[phone].             [facility]     [phone]

## 2025-07-16 ENCOUNTER — OFFICE VISIT (OUTPATIENT)
Age: 80
End: 2025-07-16
Attending: INTERNAL MEDICINE
Payer: MEDICARE

## 2025-07-16 VITALS
DIASTOLIC BLOOD PRESSURE: 62 MMHG | RESPIRATION RATE: 16 BRPM | SYSTOLIC BLOOD PRESSURE: 118 MMHG | TEMPERATURE: 98 F | OXYGEN SATURATION: 99 % | HEART RATE: 80 BPM

## 2025-07-16 DIAGNOSIS — D50.0 IRON DEFICIENCY ANEMIA DUE TO CHRONIC BLOOD LOSS: Primary | ICD-10-CM

## 2025-07-16 RX ORDER — DIPHENHYDRAMINE HCL 25 MG
25 CAPSULE ORAL ONCE
Status: CANCELLED | OUTPATIENT
Start: 2025-07-30

## 2025-07-16 RX ORDER — ACETAMINOPHEN 325 MG/1
650 TABLET ORAL ONCE
Status: CANCELLED | OUTPATIENT
Start: 2025-07-30

## 2025-07-16 NOTE — PROGRESS NOTES
Education Record    Learner:  Patient    Disease / Diagnosis: SHAMEKA    Barriers / Limitations:  None   Comments:    Method:  Brief focused and Reinforcement   Comments:    General Topics:  Plan of care reviewed   Comments:    Outcome:  Shows understanding   Comments:    Patient tolerated Venofer and discharged in stable condition.

## 2025-07-18 ENCOUNTER — OFFICE VISIT (OUTPATIENT)
Age: 80
End: 2025-07-18
Attending: INTERNAL MEDICINE
Payer: MEDICARE

## 2025-07-18 VITALS
RESPIRATION RATE: 16 BRPM | TEMPERATURE: 98 F | SYSTOLIC BLOOD PRESSURE: 123 MMHG | OXYGEN SATURATION: 99 % | HEART RATE: 82 BPM | DIASTOLIC BLOOD PRESSURE: 75 MMHG

## 2025-07-18 DIAGNOSIS — D50.0 IRON DEFICIENCY ANEMIA DUE TO CHRONIC BLOOD LOSS: Primary | ICD-10-CM

## 2025-07-18 RX ORDER — ACETAMINOPHEN 325 MG/1
650 TABLET ORAL ONCE
OUTPATIENT
Start: 2025-08-01

## 2025-07-18 RX ORDER — DIPHENHYDRAMINE HCL 25 MG
25 CAPSULE ORAL ONCE
OUTPATIENT
Start: 2025-08-01

## 2025-07-18 NOTE — PROGRESS NOTES
Education Record    Learner:  Patient    Disease / Diagnosis: SHAMEKA    Barriers / Limitations:  None   Comments:    Method:  Brief focused   Comments:    General Topics:  Plan of care reviewed   Comments:    Outcome:  Shows understanding   Comments:    Venofer IV push #5/5. Tolerated without issue.

## 2025-08-01 ENCOUNTER — OFFICE VISIT (OUTPATIENT)
Dept: FAMILY MEDICINE CLINIC | Facility: CLINIC | Age: 80
End: 2025-08-01

## 2025-08-01 VITALS
DIASTOLIC BLOOD PRESSURE: 72 MMHG | HEIGHT: 71 IN | RESPIRATION RATE: 16 BRPM | SYSTOLIC BLOOD PRESSURE: 128 MMHG | HEART RATE: 78 BPM | WEIGHT: 189.19 LBS | OXYGEN SATURATION: 98 % | BODY MASS INDEX: 26.49 KG/M2

## 2025-08-01 DIAGNOSIS — I50.32 CHRONIC HEART FAILURE WITH PRESERVED EJECTION FRACTION (HCC): Primary | ICD-10-CM

## 2025-08-01 DIAGNOSIS — N18.32 STAGE 3B CHRONIC KIDNEY DISEASE (HCC): ICD-10-CM

## 2025-08-01 DIAGNOSIS — S81.812D LACERATION OF LEFT LOWER EXTREMITY, SUBSEQUENT ENCOUNTER: ICD-10-CM

## 2025-08-01 PROCEDURE — G2211 COMPLEX E/M VISIT ADD ON: HCPCS | Performed by: FAMILY MEDICINE

## 2025-08-01 PROCEDURE — 99214 OFFICE O/P EST MOD 30 MIN: CPT | Performed by: FAMILY MEDICINE

## 2025-08-04 ENCOUNTER — HOSPITAL ENCOUNTER (OUTPATIENT)
Dept: LAB | Facility: HOSPITAL | Age: 80
Discharge: HOME | End: 2025-08-04
Attending: NURSE PRACTITIONER

## 2025-08-04 ENCOUNTER — HOSPITAL ENCOUNTER (OUTPATIENT)
Dept: CARDIOLOGY CLINIC | Facility: HOSPITAL | Age: 80
End: 2025-08-04
Attending: NURSE PRACTITIONER

## 2025-08-04 VITALS
DIASTOLIC BLOOD PRESSURE: 91 MMHG | HEART RATE: 83 BPM | OXYGEN SATURATION: 96 % | SYSTOLIC BLOOD PRESSURE: 133 MMHG | WEIGHT: 188 LBS | RESPIRATION RATE: 27 BRPM | BODY MASS INDEX: 26 KG/M2

## 2025-08-04 DIAGNOSIS — I50.32 CHRONIC DIASTOLIC HEART FAILURE (HCC): ICD-10-CM

## 2025-08-04 DIAGNOSIS — I48.20 CHRONIC A-FIB (HCC): ICD-10-CM

## 2025-08-04 DIAGNOSIS — N18.32 STAGE 3B CHRONIC KIDNEY DISEASE (HCC): ICD-10-CM

## 2025-08-04 DIAGNOSIS — I50.32 CHRONIC DIASTOLIC HEART FAILURE (HCC): Primary | ICD-10-CM

## 2025-08-04 DIAGNOSIS — I50.31 ACUTE HEART FAILURE WITH PRESERVED EJECTION FRACTION (HCC): Primary | ICD-10-CM

## 2025-08-04 LAB
ANION GAP SERPL CALC-SCNC: 6 MMOL/L (ref 0–18)
BUN BLD-MCNC: 32 MG/DL (ref 9–23)
CALCIUM BLD-MCNC: 9.5 MG/DL (ref 8.7–10.6)
CHLORIDE SERPL-SCNC: 101 MMOL/L (ref 98–112)
CO2 SERPL-SCNC: 36 MMOL/L (ref 21–32)
CREAT BLD-MCNC: 1.55 MG/DL (ref 0.7–1.3)
EGFRCR SERPLBLD CKD-EPI 2021: 44 ML/MIN/1.73M2 (ref 60–?)
FASTING STATUS PATIENT QL REPORTED: NO
GLUCOSE BLD-MCNC: 97 MG/DL (ref 70–99)
OSMOLALITY SERPL CALC.SUM OF ELEC: 303 MOSM/KG (ref 275–295)
POTASSIUM SERPL-SCNC: 4.8 MMOL/L (ref 3.5–5.1)
SODIUM SERPL-SCNC: 143 MMOL/L (ref 136–145)

## 2025-08-04 PROCEDURE — 80048 BASIC METABOLIC PNL TOTAL CA: CPT | Performed by: NURSE PRACTITIONER

## 2025-08-04 PROCEDURE — 36415 COLL VENOUS BLD VENIPUNCTURE: CPT | Performed by: NURSE PRACTITIONER

## 2025-08-04 PROCEDURE — 99215 OFFICE O/P EST HI 40 MIN: CPT | Performed by: NURSE PRACTITIONER

## 2025-08-05 ENCOUNTER — LAB ENCOUNTER (OUTPATIENT)
Dept: LAB | Age: 80
End: 2025-08-05
Attending: FAMILY MEDICINE

## 2025-08-05 DIAGNOSIS — D63.8 ANEMIA, CHRONIC DISEASE: ICD-10-CM

## 2025-08-05 DIAGNOSIS — D50.0 IRON DEFICIENCY ANEMIA DUE TO CHRONIC BLOOD LOSS: ICD-10-CM

## 2025-08-05 LAB
BASOPHILS # BLD AUTO: 0.04 X10(3) UL (ref 0–0.2)
BASOPHILS NFR BLD AUTO: 0.5 %
DEPRECATED HBV CORE AB SER IA-ACNC: 282 NG/ML (ref 50–336)
EOSINOPHIL # BLD AUTO: 0.07 X10(3) UL (ref 0–0.7)
EOSINOPHIL NFR BLD AUTO: 0.9 %
ERYTHROCYTE [DISTWIDTH] IN BLOOD BY AUTOMATED COUNT: 17.2 %
FOLATE SERPL-MCNC: 30.9 NG/ML (ref 5.4–?)
HCT VFR BLD AUTO: 43.5 % (ref 39–53)
HGB BLD-MCNC: 13.4 G/DL (ref 13–17.5)
HGB RETIC QN AUTO: 35.6 PG (ref 28.2–36.6)
IMM GRANULOCYTES # BLD AUTO: 0.04 X10(3) UL (ref 0–1)
IMM GRANULOCYTES NFR BLD: 0.5 %
IMM RETICS NFR: 0.13 RATIO (ref 0.1–0.3)
IRON SATN MFR SERPL: 25 % (ref 20–50)
IRON SERPL-MCNC: 83 UG/DL (ref 65–175)
LDH SERPL L TO P-CCNC: 188 U/L (ref 120–246)
LYMPHOCYTES # BLD AUTO: 1.27 X10(3) UL (ref 1–4)
LYMPHOCYTES NFR BLD AUTO: 16.3 %
MCH RBC QN AUTO: 34 PG (ref 26–34)
MCHC RBC AUTO-ENTMCNC: 30.8 G/DL (ref 31–37)
MCV RBC AUTO: 110.4 FL (ref 80–100)
MONOCYTES # BLD AUTO: 0.75 X10(3) UL (ref 0.1–1)
MONOCYTES NFR BLD AUTO: 9.6 %
NEUTROPHILS # BLD AUTO: 5.61 X10 (3) UL (ref 1.5–7.7)
NEUTROPHILS # BLD AUTO: 5.61 X10(3) UL (ref 1.5–7.7)
NEUTROPHILS NFR BLD AUTO: 72.2 %
PLATELET # BLD AUTO: 144 10(3)UL (ref 150–450)
RBC # BLD AUTO: 3.94 X10(6)UL (ref 3.8–5.8)
RETICS # AUTO: 71.3 X10(3) UL (ref 22.5–147.5)
RETICS/RBC NFR AUTO: 1.8 % (ref 0.5–2.5)
TOTAL IRON BINDING CAPACITY: 335 UG/DL (ref 250–425)
TRANSFERRIN SERPL-MCNC: 265 MG/DL (ref 215–365)
VIT B12 SERPL-MCNC: 934 PG/ML (ref 211–911)
WBC # BLD AUTO: 7.8 X10(3) UL (ref 4–11)

## 2025-08-05 PROCEDURE — 36415 COLL VENOUS BLD VENIPUNCTURE: CPT

## 2025-08-05 PROCEDURE — 82728 ASSAY OF FERRITIN: CPT

## 2025-08-05 PROCEDURE — 83540 ASSAY OF IRON: CPT

## 2025-08-05 PROCEDURE — 85045 AUTOMATED RETICULOCYTE COUNT: CPT

## 2025-08-05 PROCEDURE — 82607 VITAMIN B-12: CPT

## 2025-08-05 PROCEDURE — 82746 ASSAY OF FOLIC ACID SERUM: CPT

## 2025-08-05 PROCEDURE — 85025 COMPLETE CBC W/AUTO DIFF WBC: CPT

## 2025-08-05 PROCEDURE — 83615 LACTATE (LD) (LDH) ENZYME: CPT

## 2025-08-05 PROCEDURE — 83550 IRON BINDING TEST: CPT

## 2025-08-06 ENCOUNTER — OFFICE VISIT (OUTPATIENT)
Facility: LOCATION | Age: 80
End: 2025-08-06
Attending: INTERNAL MEDICINE

## 2025-08-06 VITALS
BODY MASS INDEX: 26.43 KG/M2 | DIASTOLIC BLOOD PRESSURE: 69 MMHG | HEART RATE: 91 BPM | OXYGEN SATURATION: 98 % | WEIGHT: 188.81 LBS | HEIGHT: 70.98 IN | SYSTOLIC BLOOD PRESSURE: 111 MMHG | RESPIRATION RATE: 16 BRPM | TEMPERATURE: 97 F

## 2025-08-06 DIAGNOSIS — D50.0 IRON DEFICIENCY ANEMIA DUE TO CHRONIC BLOOD LOSS: Primary | ICD-10-CM

## 2025-08-06 DIAGNOSIS — D69.6 THROMBOCYTOPENIA: ICD-10-CM

## 2025-08-06 DIAGNOSIS — D50.0 IRON DEFICIENCY ANEMIA DUE TO CHRONIC BLOOD LOSS: ICD-10-CM

## 2025-08-06 DIAGNOSIS — D69.3 IMMUNE THROMBOCYTOPENIC PURPURA (HCC): Primary | ICD-10-CM

## 2025-08-06 RX ORDER — ACETAMINOPHEN 325 MG/1
650 TABLET ORAL ONCE
Status: COMPLETED | OUTPATIENT
Start: 2025-08-06 | End: 2025-08-06

## 2025-08-06 RX ORDER — ACETAMINOPHEN 325 MG/1
650 TABLET ORAL ONCE
OUTPATIENT
Start: 2025-09-10

## 2025-08-06 RX ORDER — DIPHENHYDRAMINE HCL 25 MG
25 CAPSULE ORAL ONCE
Status: COMPLETED | OUTPATIENT
Start: 2025-08-06 | End: 2025-08-06

## 2025-08-06 RX ORDER — DIPHENHYDRAMINE HCL 25 MG
25 CAPSULE ORAL ONCE
OUTPATIENT
Start: 2025-09-10

## 2025-08-06 RX ADMIN — DIPHENHYDRAMINE HCL 25 MG: 25 MG CAPSULE ORAL at 11:16:00

## 2025-08-06 RX ADMIN — ACETAMINOPHEN 650 MG: 325 TABLET ORAL at 11:16:00

## 2025-08-09 ENCOUNTER — APPOINTMENT (OUTPATIENT)
Dept: GENERAL RADIOLOGY | Facility: HOSPITAL | Age: 80
End: 2025-08-09
Attending: EMERGENCY MEDICINE

## 2025-08-09 ENCOUNTER — HOSPITAL ENCOUNTER (EMERGENCY)
Facility: HOSPITAL | Age: 80
Discharge: HOME | End: 2025-08-09
Attending: EMERGENCY MEDICINE

## 2025-08-09 VITALS
WEIGHT: 185 LBS | DIASTOLIC BLOOD PRESSURE: 79 MMHG | TEMPERATURE: 98 F | RESPIRATION RATE: 18 BRPM | SYSTOLIC BLOOD PRESSURE: 135 MMHG | OXYGEN SATURATION: 100 % | BODY MASS INDEX: 25.9 KG/M2 | HEART RATE: 76 BPM | HEIGHT: 71 IN

## 2025-08-09 DIAGNOSIS — S80.11XA HEMATOMA OF RIGHT LOWER EXTREMITY, INITIAL ENCOUNTER: Primary | ICD-10-CM

## 2025-08-09 DIAGNOSIS — S81.811A SKIN TEAR OF LOWER LEG WITHOUT COMPLICATION, RIGHT, INITIAL ENCOUNTER: ICD-10-CM

## 2025-08-09 PROCEDURE — 73590 X-RAY EXAM OF LOWER LEG: CPT | Performed by: EMERGENCY MEDICINE

## 2025-08-09 PROCEDURE — 90471 IMMUNIZATION ADMIN: CPT

## 2025-08-09 PROCEDURE — 99283 EMERGENCY DEPT VISIT LOW MDM: CPT

## 2025-08-09 PROCEDURE — 99284 EMERGENCY DEPT VISIT MOD MDM: CPT

## (undated) DIAGNOSIS — I50.32 CHRONIC DIASTOLIC (CONGESTIVE) HEART FAILURE (HCC): Primary | ICD-10-CM

## (undated) DIAGNOSIS — I50.812 CHRONIC RIGHT-SIDED CONGESTIVE HEART FAILURE (HCC): Primary | ICD-10-CM

## (undated) DIAGNOSIS — E87.5 HYPERKALEMIA: Primary | ICD-10-CM

## (undated) DIAGNOSIS — I50.32 CHRONIC DIASTOLIC CONGESTIVE HEART FAILURE (HCC): Primary | ICD-10-CM

## (undated) DIAGNOSIS — I50.30 DIASTOLIC HEART FAILURE, UNSPECIFIED HF CHRONICITY (HCC): Primary | ICD-10-CM

## (undated) DIAGNOSIS — K22.70 BARRETT'S ESOPHAGUS WITHOUT DYSPLASIA: ICD-10-CM

## (undated) DEVICE — C-ARM: Brand: UNBRANDED

## (undated) DEVICE — 1200CC GUARDIAN II: Brand: GUARDIAN

## (undated) DEVICE — SUT PDS II 0 L27IN ABSRB VLT L26MM CT-2

## (undated) DEVICE — 10FT COMBINED O2 DELIVERY/CO2 MONITORING. FILTER WITH MICROSTREAM TYPE LUER: Brand: DUAL ADULT NASAL CANNULA

## (undated) DEVICE — TRADITIONAL MARYLAND DISSECTOR TIP, DISPOSABLE: Brand: RENEW

## (undated) DEVICE — CLIP RESOLUTION 235CM

## (undated) DEVICE — ENDOPATH ULTRA VERESS INSUFFLATION NEEDLES WITH LUER LOCK CONNECTORS: Brand: ENDOPATH

## (undated) DEVICE — 40580 - THE PINK PAD - ADVANCED TRENDELENBURG POSITIONING KIT: Brand: 40580 - THE PINK PAD - ADVANCED TRENDELENBURG POSITIONING KIT

## (undated) DEVICE — LEFT ATRIAL APPENDAGE CLOSURE DEVICE WITH DELIVERY SYSTEM
Type: IMPLANTABLE DEVICE | Status: NON-FUNCTIONAL
Brand: WATCHMAN®
Removed: 2017-07-14

## (undated) DEVICE — SUT MCRYL 4-0 18IN PS-2 ABSRB UD 19MM 3/8 CIR

## (undated) DEVICE — GIJAW SINGLE-USE BIOPSY FORCEPS WITH NEEDLE: Brand: GIJAW

## (undated) DEVICE — LAP CHOLE/APPY CDS-LF: Brand: MEDLINE INDUSTRIES, INC.

## (undated) DEVICE — LAPAROVUE VISIBILITY SYSTEM LAPAROSCOPIC SOLUTIONS: Brand: LAPAROVUE

## (undated) DEVICE — LAPCLINCH GRASPER TIP, DISPOSABLE: Brand: RENEW

## (undated) DEVICE — TRAP 4 CPTR CHMBR N EZ INLN

## (undated) DEVICE — 3M™ RED DOT™ MONITORING ELECTRODE WITH FOAM TAPE AND STICKY GEL, 50/BAG, 20/CASE, 72/PLT 2570: Brand: RED DOT™

## (undated) DEVICE — GLOVE SUR 7 SENSICARE PI PIP CRM PWD F

## (undated) DEVICE — RESERVOIR,SUCTION,100CC,SILICONE: Brand: MEDLINE

## (undated) DEVICE — SUT ETHLN 3-0 18IN PS-1 NABSRB BLK 24MM 3/8 C

## (undated) DEVICE — L-HOOK CAUTERY PROBE TIP, DISPOSABLE: Brand: RENEW

## (undated) DEVICE — REM POLYHESIVE ADULT PATIENT RETURN ELECTRODE: Brand: VALLEYLAB

## (undated) DEVICE — ZIPWIRE GUIDEWIRE .035X150 STR

## (undated) DEVICE — ENDOSCOPY PACK - LOWER: Brand: MEDLINE INDUSTRIES, INC.

## (undated) DEVICE — SNARE CAPTIFLEX MICRO-OVL OLY

## (undated) DEVICE — ADHESIVE SKIN TOP FOR WND CLSR DERMBND ADV

## (undated) DEVICE — LASSO POLYPECTOMY SNARE: Brand: LASSO

## (undated) DEVICE — TROCAR: Brand: KII FIOS FIRST ENTRY

## (undated) DEVICE — DRAIN SUR 19FR L0.25IN 3/4 FLUT 3/16IN TRCR

## (undated) DEVICE — TRAP POLYP W/ 2 SPEC TY CLR MAGNIFYING WIND

## (undated) DEVICE — SOLUTION IRRIG 1000ML 0.9% NACL USP BTL

## (undated) DEVICE — Device: Brand: DEFENDO AIR/WATER/SUCTION AND BIOPSY VALVE

## (undated) DEVICE — POUCH SPECIMEN WIRE 6X3 250ML

## (undated) DEVICE — TROCAR: Brand: KII® SLEEVE

## (undated) DEVICE — KIT VLV 5 PC AIR H2O SUCT BX ENDOGATOR CONN

## (undated) DEVICE — KIT CUSTOM ENDOPROCEDURE STERIS

## (undated) DEVICE — GLOVE SUR 7.5 SENSICARE PI PIP GRN PWD F

## (undated) DEVICE — SYS WATCHMAN 14F DOUBLE ACCESS

## (undated) DEVICE — SHEET,DRAPE,40X58,STERILE: Brand: MEDLINE

## (undated) DEVICE — DISPOSABLE LAPAROSCOPIC CLIP APPLIER WITH 20 CLIPS.: Brand: EPIX® UNIVERSAL CLIP APPLIER

## (undated) DEVICE — FILTERLINE NASAL ADULT O2/CO2

## (undated) DEVICE — SLEEVE COMPR MD KNEE LEN SGL USE KENDALL SCD

## (undated) DEVICE — COVER LT HNDL RIG FOR SUR CAM DISP

## (undated) DEVICE — Device: Brand: SUTURE PASSOR PRO

## (undated) DEVICE — CLIP APPLIER WITH CLIP LOGIC TECHNOLOGY: Brand: ENDO CLIP III

## (undated) DEVICE — BITEBLOCK ENDOSCP 60FR MAXI STRP

## (undated) DEVICE — FORCEP BIOPSY RJ4 LG CAP W/ND

## (undated) DEVICE — ABSORBABLE HEMOSTAT (OXIDIZED REGENERATED CELLULOSE): Brand: SURGICEL

## (undated) DEVICE — CATHETER URET 5FR L70CM FLX OPN TIP NONPORTED

## (undated) DEVICE — SYRINGE MED 10ML LL CTRL W/ FNGR GRP CLR BRL

## (undated) DEVICE — ENDOSCOPY PACK UPPER: Brand: MEDLINE INDUSTRIES, INC.

## (undated) DEVICE — MINI ENDOCUT SCISSOR TIP, DISPOSABLE: Brand: RENEW

## (undated) NOTE — MR AVS SNAPSHOT
After Visit Summary   4/3/2017    Marcie Tyler    MRN: [id number]           Allergies     Amoxicillin Rash    Augmentin [Amoclan] Hives      Your Vital Signs Were     Smoking Status                   Former [address] [place] [address] a prescription drug that is taken by mouth. Your physician is responsible for giving you a prescription for oral medication which you would fill at your local pharmacy. Please follow your doctor's instructions when taking oral sedation.   If you will be ta Visits < Visit Summaries. MyChart questions? Call [phone] for help. [facility] is NOT to be used for urgent needs. For medical emergencies, dial 911.

## (undated) NOTE — MR AVS SNAPSHOT
[facility]  [address] [address] [address] [place] [address] [phone]                    After Visit Summary   4/6/2017    Idalia David    MRN: [id number]           Visit Information        Provider Department Dept Phone MULTI VITAMIN MENS OR None Entered      Diagnoses for This Visit     Other systemic lupus erythematosus with other organ involvement [facility])   [[id number]]       Benign essential hypertension   [[id number]]         Future Appointments        Provider Department

## (undated) NOTE — LETTER
[address]  [address]  Authorization for Surgical Operation and Procedure     Date:___________                                                                                                         Time:__________  I hereby authorize Surgeon(s):  Curtis Harry MD, my physician and his/her assistants (if applicable), which may include medical students, residents, and/or fellows, to perform the following surgical operation/ procedure and administer such anesthesia as may be determined necessary by my physician:  Operation/Procedure name (s) Procedure(s):  LAPAROSCOPIC CHOLECYSTECTOMY WITH INTRAOPERATIVE CHOLANGIOGRAM on [facility]   2.   I recognize that during the surgical operation/procedure, unforeseen conditions may necessitate additional or different procedures than those listed above.  I, therefore, further authorize and request that the above-named surgeon, assistants, or designees perform such procedures as are, in their judgment, necessary and desirable.    3.   My surgeon/physician has discussed prior to my surgery the potential benefits, risks and side effects of this procedure; the likelihood of achieving goals; and potential problems that might occur during recuperation.  They also discussed reasonable alternatives to the procedure, including risks, benefits, and side effects related to the alternatives and risks related to not receiving this procedure.  I have had all my questions answered and I acknowledge that no guarantee has been made as to the result that may be obtained.    4.   Should the need arise during my operation/procedure, which includes change of level of care prior to discharge, I also consent to the administration of blood and/or blood products.  Further, I understand that despite careful testing and screening of blood or blood products by collecting agencies, I may still be subject to ill effects as a result of receiving a blood  transfusion and/or blood products.  The following are some, but not all, of the potential risks that can occur: fever and allergic reactions, hemolytic reactions, transmission of diseases such as Hepatitis, AIDS and Cytomegalovirus (CMV) and fluid overload.  In the event that I wish to have an autologous transfusion of my own blood, or a directed donor transfusion, I will discuss this with my physician.  Check only if Refusing Blood or Blood Products  I understand refusal of blood or blood products as deemed necessary by my physician may have serious consequences to my condition to include possible death. I hereby assume responsibility for my refusal and release the hospital, its personnel, and my physicians from any responsibility for the consequences of my refusal.          o  Refuse      5.   I authorize the use of any specimen, organs, tissues, body parts or foreign objects that may be removed from my body during the operation/procedure for diagnosis, research or teaching purposes and their subsequent disposal by hospital authorities.  I also authorize the release of specimen test results and/or written reports to my treating physician on the hospital medical staff or other referring or consulting physicians involved in my care, at the discretion of the Pathologist or my treating physician.    6.   I consent to the photographing or videotaping of the operations or procedures to be performed, including appropriate portions of my body for medical, scientific, or educational purposes, provided my identity is not revealed by the pictures or by descriptive texts accompanying them.  If the procedure has been photographed/videotaped, the surgeon will obtain the original picture, image, videotape or CD.  The hospital will not be responsible for storage, release or maintenance of the picture, image, tape or CD.    7.   I consent to the presence of a  or observers in the operating room as deemed  necessary by my physician or their designees.    8.   I recognize that in the event my procedure results in extended X-Ray/fluoroscopy time, I may develop a skin reaction.    9. If I have a Do Not Attempt Resuscitation (DNAR) order in place, that status will be suspended while in the operating room, procedural suite, and during the recovery period unless otherwise explicitly stated by me (or a person authorized to consent on my behalf). The surgeon or my attending physician will determine when the applicable recovery period ends for purposes of reinstating the DNAR order.  10. Patients having a sterilization procedure: I understand that if the procedure is successful the results will be permanent and it will therefore be impossible for me to inseminate, conceive, or bear children.  I also understand that the procedure is intended to result in sterility, although the result has not been guaranteed.   11. I acknowledge that my physician has explained sedation/analgesia administration to me including the risk and benefits I consent to the administration of sedation/analgesia as may be necessary or desirable in the judgment of my physician.    I CERTIFY THAT I HAVE READ AND FULLY UNDERSTAND THE ABOVE CONSENT TO OPERATION and/or OTHER PROCEDURE.    _________________________________________  __________________________________  Signature of Patient     Signature of Responsible Person         ___________________________________         Printed Name of Responsible Person           _________________________________                 Relationship to Patient  _________________________________________  ______________________________  Signature of Witness          Date  Time      Patient Name: Miguel Griffin Lola     : 3/17/1945                 Printed: 2024     Medical Record #: [id number]                     Page 1 of 2                                    [address]   [address]    Consent for Anesthesia    IMigule agree to be cared for by an anesthesiologist, who is specially trained to monitor me and give me medicine to put me to sleep or keep me comfortable during my procedure    I understand that my anesthesiologist is not an employee or agent of [facility] or [employer]. He or she works for [employer].    As the patient asking for anesthesia services, I agree to:  Allow the anesthesiologist (anesthesia doctor) to give me medicine and do additional procedures as necessary. Some examples are: Starting or using an “IV” to give me medicine, fluids or blood during my procedure, and having a breathing tube placed to help me breathe when I’m asleep (intubation). In the event that my heart stops working properly, I understand that my anesthesiologist will make every effort to sustain my life, unless otherwise directed by [facility] Do Not Resuscitate documents.  Tell my anesthesia doctor before my procedure:  If I am pregnant.  The last time that I ate or drank.  All of the medicines I take (including prescriptions, herbal supplements, and pills I can buy without a prescription (including street drugs/illegal medications). Failure to inform my anesthesiologist about these medicines may increase my risk of anesthetic complications.  If I am allergic to anything or have had a reaction to anesthesia before.  I understand how the anesthesia medicine will help me (benefits).  I understand that with any type of anesthesia medicine there are risks:  The most common risks are: nausea, vomiting, sore throat, muscle soreness, damage to my eyes, mouth, or teeth (from breathing tube placement).  Rare risks include: remembering what happened during my procedure, allergic reactions to medications, injury to my airway, heart, lungs, vision, nerves, or muscles and in extremely rare instances death.  My doctor has explained to me other choices  available to me for my care (alternatives).  Pregnant Patients (“epidural”):  I understand that the risks of having an epidural (medicine given into my back to help control pain during labor), include itching, low blood pressure, difficulty urinating, headache or slowing of the baby’s heart. Very rare risks include infection, bleeding, seizure, irregular heart rhythms and nerve injury.  Regional Anesthesia (“spinal”, “epidural”, & “nerve blocks”):  I understand that rare but potential complications include headache, bleeding, infection, seizure, irregular heart rhythms, and nerve injury.    I can change my mind about having anesthesia services at any time before I get the medicine.    _____________________________________________________________________________  Patient (or Representative) Signature/Relationship to Patient  Date   Time    _____________________________________________________________________________   Name (if used)    Language/Organization   Time    _____________________________________________________________________________  Anesthesiologist Signature     Date   Time  I have discussed the procedure and information above with the patient (or patient’s representative) and answered their questions. The patient or their representative has agreed to have anesthesia services.    _____________________________________________________________________________  Witness        Date   Time  I have verified that the signature is that of the patient or patient’s representative, and that it was signed before the procedure  Patient Name: Miguel Davila     : 3/17/1945                 Printed: 2024     Medical Record #: [id number]                     Page 2 of 2

## (undated) NOTE — ED AVS SNAPSHOT
[facility] Emergency Department    [facility]  [address] [address]    Phone:  [phone]    Fax:  [phone]           Sania Lopez   MRN: [id number]    Department:  [facility] Emergency Department   Date of Visit:  1/ Commonly known as:  PRADAXA   Take 1 capsule (150 mg total) by mouth 2 (two) times daily. Metoprolol Succinate ER 25 MG Tb24   Quantity:  30 tablet   Commonly known as: Toprol XL   Take 1 tablet (25 mg total) by mouth daily.             Where to Get a substitute for ongoing medical care. Often, one Emergency Department visit does not uncover every injury or illness.  If you have been referred to a primary care or a specialist physician for a follow-up visit, please tell this physician (or your personal Sheridan Oscar Blairgisela ([address]) [phone] [facility]. [address]) [phone]                Additional Information       We are concerned for your overall well being:    - If you are a smoker o atelectasis/scarring in the lung bases. No pleural   effusion or pneumothorax. No lobar consolidation. Degenerative changes in the thoracic spine.              MyChart     Visit MyChart  You can access your MyChart to more actively manage your health care a

## (undated) NOTE — LETTER
[address] [facility] [place] FOR BLOOD OR BLOOD COMPONENTS      In the course of your treatment, it may become necessary to administer a transfusion of blood or blood components.  This form provides basic information concerning this proc alternatives to you if it has not already been done. I,Miguel Davila, have read/had read to me the above. I understand the matters bearing on the decision whether or not to authorize a transfusion of blood or blood components.  I have no questions whic

## (undated) NOTE — Clinical Note
AZALEA, Pt has an appt on 8/5 and declined a sooner one. NCM sent TE to office. TCM template completed.

## (undated) NOTE — MR AVS SNAPSHOT
After Visit Summary   3/30/2017    Taylor Rowe    MRN: [id number]           Diagnoses this Visit     Lupus hepatitis syndrome ([facility]    -  Primary     Splenomegaly         Thrombocytopenia (HCC)           Allergies     Amoxicillin Rash    Augme Thursday March 30, 2017     LAB:  DSDNA (CRITHIDIA LUCILIAE) ANTIBODY IGG BY IFA        Thursday March 30, 2017     LAB:  LDH        Thursday March 30, 2017     LAB:  [facility] SLIDE        Friday March 31, 2017  9:15 AM     Appointment with [address] room. Parents will remain in the MRI waiting area and be reunited with the child upon completion of the MRI. [facility]  [address] [address]       Monday July 10, 2017 10:00 AM     Appointment with Srinivas Simon at [place] [address] ---------                               -----------         ------                     CBC W/ DIFFERENTIAL[[id number]]          Abnormal            Final result                 Please view results for these tests on the individual orders.          Result Sum For medical emergencies, dial 911.

## (undated) NOTE — ED AVS SNAPSHOT
Idalia Hernandez   MRN: [id number]    Department:  [facility] Emergency Department   Date of Visit:  11/19/2018           Disclosure     Insurance plans vary and the physician(s) referred by the ER may not be covered by your plan.  Please contact tell this physician (or your personal doctor if your instructions are to return to your personal doctor) about any new or lasting problems. The primary care or specialist physician will see patients referred from the [facility] Emergency Department.  Cuco Ferguson

## (undated) NOTE — MR AVS SNAPSHOT
[facility]  [address] [facility],Fourth Floor, [address]  [address] [address] [phone]               Thank you for choosing us for your health care visit with Jackson Lennon MD.  We are glad to serve you and happy to provide you with this for giving you a prescription for oral medication which you would fill at your local pharmacy. Please follow your doctor's instructions when taking oral sedation.   If you will be taking oral sedation, you must bring a  who will drive you home (the  Continue see Dr. Reese Proctor of [facility],  your heart specialist regarding her atrial fibrillation.   I talked with Dr. Denver Kings earlier today and she did order an KLAUS, DNA and JEB profile and I will call you with results as soon as they are available TAKE 1 TABLET BY MOUTH EVERY DAY   What changed:  Another medication with the same name was added. Make sure you understand how and when to take each.    Commonly known as:  DELTASONE           * predniSONE 20 MG Tabs   Take 1 tablet (20 mg total) by mouth Fully enjoy your food when eating. Don’t eat while distracted and slow down. Avoid over sized portions. Don’t eat while when you’re bored.      EAT THESE FOODS MORE OFTEN: EAT THESE FOODS LESS OFTEN:   Make half your plate fruits and vegetables Highly

## (undated) NOTE — LETTER
[facility] [address], [address] [place] [address]    Consent for Anesthesia   1.   I, Simon Peters agree to be cared for by an anesthesiologist, who is specially trained to monitor me and give me medicine to put me to sleep or keep me comfor vision, nerves, or muscles and in extremely rare instances death. 5. My doctor has explained to me other choices available to me for my care (alternatives).   6. Pregnant Patients (“epidural”):  I understand that the risks of having an epidural (medicine g

## (undated) NOTE — LETTER
[facility] [address], [address]  Consent for Procedure/Sedation    Date:        Time:       1.  I authorize the performance upon Joshua Em the following:cardiac catheterization, left ventricular cineangiography, bilateral period, the physician will determine when the applicable recovery period ends for purposes of reinstating the Do Not Resuscitate (DNR) order.     Signature of Patient: ____________________________________________________    Signature of person authorized

## (undated) NOTE — LETTER
[facility] [address], [address]  Consent for Procedure/Sedation    Date:        Time:       1.  I authorize the performance upon Kylee Woodson the following:cardiac catheterization, left ventricular cineangiography, bilateral Signature of person authorized                                     Relationship to  to consent for patient: _________________________ patient: ___________________    Witness: _______________________________ Date: _____________________    Printed: 8/3/2020

## (undated) NOTE — IP AVS SNAPSHOT
[facility] [place] [address] [place], [address] ~ [phone]                Discharge Summary   2/27/2017    [address] [facility]           Admission Information        Provider Department    2/27/2017 Aníbal Matthews MD  Interventnl omeprazole 20 MG Cpdr   Commonly known as:  PRILOSEC        TAKE 1 CAPSULE BY MOUTH TWICE DAILY    Eric Richardson                        Potassium Chloride ER 20 MEQ Tbcr   Commonly known as:  K-DUR M20        Take 1 tablet (20 mEq tota WBC RBC Hemoglobin Hematocrit MCV MCH MCHC RDW Platelet MPV    (99/78/53)  4.0 (02/21/17)  4.46 (02/21/17)  13.9 (02/21/17)  44.9 (02/21/17)  100.7 (H) -- -- -- (02/21/17)  52.0 (L) --    (02/01/17)  4.5 (02/01/17)  4.18 (02/01/17)  13.6 (02/01/17)  41.4 harming yourself, contact [facility] at [phone]. - If you don’t have insurance, Domenico Michaels has partnered with Patient [address] to help you get signed up for insurance coverage.   Patient [place] Use: Treat abnormal blood pressure (high or low), cardiac conditions; and/or abnormal heart rates/rhythms   Most common side effects: Dizziness or feeling lightheaded (especially with standing), heart rate changes, headaches, nausea/vomiting   What to repo Fexofenadine HCl (ALLEGRA) 180 MG Oral Tab         Use:  Treat Allergies   Most common side effects:  Drowsiness, dry mouth   What to report to your healthcare team: Changes in thinking, confusion, palpitations           All Other Medications     Elsy

## (undated) NOTE — LETTER
[facility] [facility] FOR BLOOD OR BLOOD COMPONENTS      In the course of your treatment, it may become necessary to administer a transfusion of blood or blood components.  This form provides basic information concerning this proc explain the alternatives to you if it has not already been done. I,Miguel Davila, have read/had read to me the above. I understand the matters bearing on the decision whether or not to authorize a transfusion of blood or blood components.  I have no qu

## (undated) NOTE — MR AVS SNAPSHOT
After Visit Summary   5/1/2017    Wiliam Hernandez    MRN: [id number]           Diagnoses this Visit     Thrombocytopenia (HCC)           Allergies     Amoxicillin Rash    Augmentin [Amoclan] Hives      Your Vital Signs Were     BP Pulse Temp(Src) Brennen ([phone])   [address]  [address] [facility] [address] [address]       Monday July 10, 2017 10:00 AM     Appointment with Josiah Joshua at [place] [address] ([phone])   [address] [place] [address],Fourth Floor, [address]  [place] [phone]       [facility] Eosinophil % 1.0   % Final    Basophil % 0.1   % Final    Immature Granulocyte % 0.9   % Final               MyChart     Visit MyChart  You can access your MyChart to more actively manage your health care and view more details from this visit by going to

## (undated) NOTE — MR AVS SNAPSHOT
After Visit Summary   4/6/2017    Taylor Rwoe    MRN: [id number]           Diagnoses this Visit     Thrombocytopenia ([facility]    -  Primary     Other systemic lupus erythematosus with other organ involvement ([facility]         Benign essential hyperte To Do List     Friday April 07, 2017 10:00 AM     Appointment with [address] at [facility] ([phone])   [address] [address]       Monday July 10, 2017 10:00 AM     Appointment with Wen Bell

## (undated) NOTE — MR AVS SNAPSHOT
After Visit Summary   4/7/2017    Magno Zapata    MRN: [id number]           Diagnoses this Visit     Thrombocytopenia ([facility]    -  Primary     Other systemic lupus erythematosus with other organ involvement ([facility]           Allergies     Amoxicil

## (undated) NOTE — MR AVS SNAPSHOT
After Visit Summary   11/7/2017    Yaneli Holm    MRN: [id number]           Visit Information     Date & Time  11/7/2017  3:00 PM Provider  [address] Dept[address]  Phone  [phone]      Allergies as o EAT THESE FOODS MORE OFTEN: EAT THESE FOODS LESS OFTEN:   Make half your plate fruits and vegetables Highly refined, white starches including white bread, rice and pasta   Eat plenty of protein, keep the fat content low Sugars:  sodas and sports drinks, ca SAME DAY  APPOINTMENTS  Available at primary care offices      [facility]     Treatment for mild  illness or injury that does  not require immediate attention.           Average cost

## (undated) NOTE — ED AVS SNAPSHOT
Simon Peters   MRN: [id number]    Department:  [facility] Emergency Department   Date of Visit:  8/15/2019           Disclosure     Insurance plans vary and the physician(s) referred by the ER may not be covered by your plan.  Please contact y tell this physician (or your personal doctor if your instructions are to return to your personal doctor) about any new or lasting problems. The primary care or specialist physician will see patients referred from the [facility] Emergency Department.  Syed Liang

## (undated) NOTE — MR AVS SNAPSHOT
After Visit Summary   5/31/2017    Luisa Goltz    MRN: [id number]           Allergies     Amoxicillin Rash    Augmentin [Amoclan] Hives      Your Vital Signs Were     BP Pulse Temp(Src)    133/83 mmHg 90 98.4 °F (36.9 °C) (Tympanic)    Resp He Tuesday July 11, 2017 11:00 AM     Appointment with Jong Shin at [facility] ([phone])   [address] [place] [address]            Result Summary for CBC WITH DIFFERENTIAL WITH PLATELET      Narrative     The following orders wer If you've recently had a stay at the Hospital you can access your discharge instructions in [facility] by going to Visits < Admission Summaries.  If you've been to the Emergency Department or your doctor's office, you can view your past visit information in My

## (undated) NOTE — ED AVS SNAPSHOT
[facility] Emergency Department    [place]  [address] [place] [address]    Phone:  [phone]    Fax:  [phone]           Quoc Ahumada   MRN: [id number]    Department:  [facility] Emergency Department   Date of Visit:  1/ IF THERE IS ANY CHANGE OR WORSENING OF YOUR CONDITION, CALL YOUR PRIMARY CARE PHYSICIAN AT ONCE OR RETURN IMMEDIATELY TO THE EMERGENCY DEPARTMENT.     If you have been prescribed any medication(s), please fill your prescription right away and begin taking t

## (undated) NOTE — MR AVS SNAPSHOT
[facility] [place]  [place]  [address] [place] [address] [phone]               Thank you for choosing us for your health care visit with Fany Montalvo MD.  We are glad to serve you and happy to provide you with this summa Exam - Established Patient with MD Trever Bans[address] ([facility])    [address] [facility],Fourth Floor, [address] [phone]            Jul 11, 2017 11:00 AM   EXAM-ESTABLISHED with Jovani Pastrana Take 1 tablet (20 mEq total) by mouth 3 (three) times daily. Commonly known as:  K-DUR M20           PRADAXA 150 MG Caps   Generic drug:  Dabigatran Etexilate Mesylate   Take 150 mg by mouth 2 (two) times daily.            predniSONE 5 MG Tabs   TAKE 1 TA

## (undated) NOTE — Clinical Note
Brandon Sampson, just an update on our mutual patient  Maddy Qiu: He has had recurrent pleural effusion for most last month. It was drained twice but he still quite weak. They think it is a right-sided heart failure, but his ejection fraction is 55 to 60%. In any

## (undated) NOTE — LETTER
Shi Beverly [address] [address]  [place], [address] [facility]    Consent for Operation  Date: __________________                                Time: _______________    1.  I authorize the performance upon Ayden Fernandes the following operation:   [place] videotape. [facility] will not be responsible for storage or maintenance of this tape. 6. For the purpose of advancing medical education, I consent to the admittance of observers to the Operating Room.   7. I authorize the use of any specimen, organs, ti When the patient is a minor or mentally incompetent to give consent:  Signature of person authorized to consent for patient: ___________________________   Relationship to patient: ____________________________________________________    Signature of Witness these medicines may increase my risk of anesthetic complications. iv. If I am allergic to anything or have had a reaction to anesthesia before. 3. I understand how the anesthesia medicine will help me (benefits).   4. I understand that with any type of an patient’s representative) and answered their questions. The patient or their representative has agreed to have anesthesia services.     _____________________________________________________________________________  Witness        Date   Time  I have crow